# Patient Record
Sex: MALE | Employment: OTHER | URBAN - NONMETROPOLITAN AREA
[De-identification: names, ages, dates, MRNs, and addresses within clinical notes are randomized per-mention and may not be internally consistent; named-entity substitution may affect disease eponyms.]

---

## 2020-11-06 ENCOUNTER — DOCTOR'S OFFICE (OUTPATIENT)
Dept: URBAN - NONMETROPOLITAN AREA CLINIC 1 | Facility: CLINIC | Age: 65
Setting detail: OPHTHALMOLOGY
End: 2020-11-06
Payer: COMMERCIAL

## 2020-11-06 DIAGNOSIS — E11.3393: ICD-10-CM

## 2020-11-06 DIAGNOSIS — H43.813: ICD-10-CM

## 2020-11-06 DIAGNOSIS — Z79.84: ICD-10-CM

## 2020-11-06 PROCEDURE — 92134 CPTRZ OPH DX IMG PST SGM RTA: CPT | Performed by: OPHTHALMOLOGY

## 2020-11-06 PROCEDURE — 92201 OPSCPY EXTND RTA DRAW UNI/BI: CPT | Performed by: OPHTHALMOLOGY

## 2020-11-06 PROCEDURE — 99204 OFFICE O/P NEW MOD 45 MIN: CPT | Performed by: OPHTHALMOLOGY

## 2020-11-06 ASSESSMENT — VISUAL ACUITY
OS_BCVA: 20/20
OD_BCVA: 20/20-1

## 2020-11-06 ASSESSMENT — CONFRONTATIONAL VISUAL FIELD TEST (CVF)
OS_FINDINGS: FULL
OD_FINDINGS: FULL

## 2020-11-12 ENCOUNTER — DOCTOR'S OFFICE (OUTPATIENT)
Dept: URBAN - NONMETROPOLITAN AREA CLINIC 1 | Facility: CLINIC | Age: 65
Setting detail: OPHTHALMOLOGY
End: 2020-11-12
Payer: COMMERCIAL

## 2020-11-12 DIAGNOSIS — H43.813: ICD-10-CM

## 2020-11-12 DIAGNOSIS — E11.3293: ICD-10-CM

## 2020-11-12 DIAGNOSIS — Z79.84: ICD-10-CM

## 2020-11-12 PROCEDURE — 92014 COMPRE OPH EXAM EST PT 1/>: CPT | Performed by: OPHTHALMOLOGY

## 2020-11-12 PROCEDURE — 92235 FLUORESCEIN ANGRPH MLTIFRAME: CPT | Performed by: OPHTHALMOLOGY

## 2020-11-12 PROCEDURE — 92250 FUNDUS PHOTOGRAPHY W/I&R: CPT | Performed by: OPHTHALMOLOGY

## 2020-11-12 ASSESSMENT — CONFRONTATIONAL VISUAL FIELD TEST (CVF)
OD_FINDINGS: FULL
OS_FINDINGS: FULL

## 2020-11-12 ASSESSMENT — VISUAL ACUITY
OD_BCVA: 20/25-1
OS_BCVA: 20/40

## 2022-01-11 ENCOUNTER — APPOINTMENT (EMERGENCY)
Dept: CT IMAGING | Facility: HOSPITAL | Age: 67
DRG: 062 | End: 2022-01-11
Payer: MEDICARE

## 2022-01-11 ENCOUNTER — HOSPITAL ENCOUNTER (INPATIENT)
Facility: HOSPITAL | Age: 67
LOS: 4 days | Discharge: HOME | DRG: 062 | End: 2022-01-15
Attending: EMERGENCY MEDICINE | Admitting: INTERNAL MEDICINE
Payer: MEDICARE

## 2022-01-11 DIAGNOSIS — N17.9 ACUTE KIDNEY INJURY SUPERIMPOSED ON CHRONIC KIDNEY DISEASE (HCC): ICD-10-CM

## 2022-01-11 DIAGNOSIS — I10 HTN (HYPERTENSION): ICD-10-CM

## 2022-01-11 DIAGNOSIS — N18.9 ACUTE KIDNEY INJURY SUPERIMPOSED ON CHRONIC KIDNEY DISEASE (HCC): ICD-10-CM

## 2022-01-11 DIAGNOSIS — N18.9 CKD (CHRONIC KIDNEY DISEASE): ICD-10-CM

## 2022-01-11 DIAGNOSIS — I63.9 CVA (CEREBRAL VASCULAR ACCIDENT) (HCC): Primary | ICD-10-CM

## 2022-01-11 LAB
2HR DELTA HS TROPONIN: 3 NG/L
ANION GAP SERPL CALCULATED.3IONS-SCNC: 10 MMOL/L (ref 4–13)
APTT PPP: 32 SECONDS (ref 23–37)
BUN SERPL-MCNC: 52 MG/DL (ref 5–25)
CALCIUM SERPL-MCNC: 9.3 MG/DL (ref 8.3–10.1)
CARDIAC TROPONIN I PNL SERPL HS: 19 NG/L
CARDIAC TROPONIN I PNL SERPL HS: 22 NG/L
CHLORIDE SERPL-SCNC: 103 MMOL/L (ref 100–108)
CO2 SERPL-SCNC: 24 MMOL/L (ref 21–32)
CREAT SERPL-MCNC: 3.96 MG/DL (ref 0.6–1.3)
ERYTHROCYTE [DISTWIDTH] IN BLOOD BY AUTOMATED COUNT: 14.3 % (ref 11.6–15.1)
FLUAV RNA RESP QL NAA+PROBE: NEGATIVE
FLUBV RNA RESP QL NAA+PROBE: NEGATIVE
GFR SERPL CREATININE-BSD FRML MDRD: 14 ML/MIN/1.73SQ M
GLUCOSE SERPL-MCNC: 121 MG/DL (ref 65–140)
GLUCOSE SERPL-MCNC: 154 MG/DL (ref 65–140)
HCT VFR BLD AUTO: 40.9 % (ref 36.5–49.3)
HGB BLD-MCNC: 13.3 G/DL (ref 12–17)
INR PPP: 0.95 (ref 0.84–1.19)
MCH RBC QN AUTO: 33.3 PG (ref 26.8–34.3)
MCHC RBC AUTO-ENTMCNC: 32.5 G/DL (ref 31.4–37.4)
MCV RBC AUTO: 103 FL (ref 82–98)
PLATELET # BLD AUTO: 108 THOUSANDS/UL (ref 149–390)
PMV BLD AUTO: 10 FL (ref 8.9–12.7)
POTASSIUM SERPL-SCNC: 3.8 MMOL/L (ref 3.5–5.3)
PROTHROMBIN TIME: 12.6 SECONDS (ref 11.6–14.5)
RBC # BLD AUTO: 3.99 MILLION/UL (ref 3.88–5.62)
RSV RNA RESP QL NAA+PROBE: NEGATIVE
SARS-COV-2 RNA RESP QL NAA+PROBE: NEGATIVE
SODIUM SERPL-SCNC: 137 MMOL/L (ref 136–145)
WBC # BLD AUTO: 5.89 THOUSAND/UL (ref 4.31–10.16)

## 2022-01-11 PROCEDURE — 0241U HB NFCT DS VIR RESP RNA 4 TRGT: CPT | Performed by: EMERGENCY MEDICINE

## 2022-01-11 PROCEDURE — 85027 COMPLETE CBC AUTOMATED: CPT | Performed by: EMERGENCY MEDICINE

## 2022-01-11 PROCEDURE — 80048 BASIC METABOLIC PNL TOTAL CA: CPT | Performed by: EMERGENCY MEDICINE

## 2022-01-11 PROCEDURE — 99223 1ST HOSP IP/OBS HIGH 75: CPT | Performed by: INTERNAL MEDICINE

## 2022-01-11 PROCEDURE — 82948 REAGENT STRIP/BLOOD GLUCOSE: CPT

## 2022-01-11 PROCEDURE — 3E03317 INTRODUCTION OF OTHER THROMBOLYTIC INTO PERIPHERAL VEIN, PERCUTANEOUS APPROACH: ICD-10-PCS | Performed by: FAMILY MEDICINE

## 2022-01-11 PROCEDURE — NC001 PR NO CHARGE: Performed by: INTERNAL MEDICINE

## 2022-01-11 PROCEDURE — 70496 CT ANGIOGRAPHY HEAD: CPT

## 2022-01-11 PROCEDURE — 85730 THROMBOPLASTIN TIME PARTIAL: CPT | Performed by: EMERGENCY MEDICINE

## 2022-01-11 PROCEDURE — 99291 CRITICAL CARE FIRST HOUR: CPT | Performed by: EMERGENCY MEDICINE

## 2022-01-11 PROCEDURE — 70498 CT ANGIOGRAPHY NECK: CPT

## 2022-01-11 PROCEDURE — 99291 CRITICAL CARE FIRST HOUR: CPT

## 2022-01-11 PROCEDURE — 84484 ASSAY OF TROPONIN QUANT: CPT | Performed by: PHYSICIAN ASSISTANT

## 2022-01-11 PROCEDURE — 93005 ELECTROCARDIOGRAM TRACING: CPT

## 2022-01-11 PROCEDURE — 36415 COLL VENOUS BLD VENIPUNCTURE: CPT | Performed by: EMERGENCY MEDICINE

## 2022-01-11 PROCEDURE — 1123F ACP DISCUSS/DSCN MKR DOCD: CPT | Performed by: PSYCHIATRY & NEUROLOGY

## 2022-01-11 PROCEDURE — 96361 HYDRATE IV INFUSION ADD-ON: CPT

## 2022-01-11 PROCEDURE — 96374 THER/PROPH/DIAG INJ IV PUSH: CPT

## 2022-01-11 PROCEDURE — 85610 PROTHROMBIN TIME: CPT | Performed by: EMERGENCY MEDICINE

## 2022-01-11 PROCEDURE — 84484 ASSAY OF TROPONIN QUANT: CPT | Performed by: EMERGENCY MEDICINE

## 2022-01-11 PROCEDURE — G0508 CRIT CARE TELEHEA CONSULT 60: HCPCS | Performed by: PSYCHIATRY & NEUROLOGY

## 2022-01-11 RX ORDER — FUROSEMIDE 80 MG
120 TABLET ORAL 2 TIMES DAILY
COMMUNITY

## 2022-01-11 RX ORDER — ALLOPURINOL 300 MG/1
TABLET ORAL
COMMUNITY

## 2022-01-11 RX ORDER — LEVOTHYROXINE SODIUM 0.12 MG/1
125 TABLET ORAL
Status: DISCONTINUED | OUTPATIENT
Start: 2022-01-12 | End: 2022-01-15 | Stop reason: HOSPADM

## 2022-01-11 RX ORDER — ATORVASTATIN CALCIUM 40 MG/1
40 TABLET, FILM COATED ORAL
Status: DISCONTINUED | OUTPATIENT
Start: 2022-01-12 | End: 2022-01-15 | Stop reason: HOSPADM

## 2022-01-11 RX ORDER — INSULIN GLARGINE 100 [IU]/ML
20 INJECTION, SOLUTION SUBCUTANEOUS
Status: DISCONTINUED | OUTPATIENT
Start: 2022-01-11 | End: 2022-01-14

## 2022-01-11 RX ORDER — MULTIVITAMIN WITH IRON
2400 TABLET ORAL DAILY
COMMUNITY

## 2022-01-11 RX ORDER — ASPIRIN 81 MG/1
81 TABLET ORAL
COMMUNITY

## 2022-01-11 RX ORDER — METOPROLOL SUCCINATE 25 MG/1
25 TABLET, EXTENDED RELEASE ORAL DAILY
Status: DISCONTINUED | OUTPATIENT
Start: 2022-01-12 | End: 2022-01-15 | Stop reason: HOSPADM

## 2022-01-11 RX ORDER — METOPROLOL SUCCINATE 25 MG/1
TABLET, EXTENDED RELEASE ORAL
COMMUNITY

## 2022-01-11 RX ORDER — ASPIRIN 81 MG/1
81 TABLET, CHEWABLE ORAL DAILY
Status: DISCONTINUED | OUTPATIENT
Start: 2022-01-12 | End: 2022-01-15 | Stop reason: HOSPADM

## 2022-01-11 RX ORDER — ATORVASTATIN CALCIUM 40 MG/1
40 TABLET, FILM COATED ORAL EVERY EVENING
Status: DISCONTINUED | OUTPATIENT
Start: 2022-01-11 | End: 2022-01-11

## 2022-01-11 RX ORDER — LABETALOL 20 MG/4 ML (5 MG/ML) INTRAVENOUS SYRINGE
10 EVERY 4 HOURS PRN
Status: DISCONTINUED | OUTPATIENT
Start: 2022-01-11 | End: 2022-01-13

## 2022-01-11 RX ORDER — TAMSULOSIN HYDROCHLORIDE 0.4 MG/1
0.4 CAPSULE ORAL
COMMUNITY

## 2022-01-11 RX ORDER — SODIUM CHLORIDE, SODIUM GLUCONATE, SODIUM ACETATE, POTASSIUM CHLORIDE, MAGNESIUM CHLORIDE, SODIUM PHOSPHATE, DIBASIC, AND POTASSIUM PHOSPHATE .53; .5; .37; .037; .03; .012; .00082 G/100ML; G/100ML; G/100ML; G/100ML; G/100ML; G/100ML; G/100ML
75 INJECTION, SOLUTION INTRAVENOUS CONTINUOUS
Status: ACTIVE | OUTPATIENT
Start: 2022-01-11 | End: 2022-01-12

## 2022-01-11 RX ORDER — LEVOTHYROXINE SODIUM 0.12 MG/1
TABLET ORAL
COMMUNITY

## 2022-01-11 RX ORDER — ATORVASTATIN CALCIUM 10 MG/1
TABLET, FILM COATED ORAL
COMMUNITY
End: 2022-01-15 | Stop reason: HOSPADM

## 2022-01-11 RX ORDER — LABETALOL 20 MG/4 ML (5 MG/ML) INTRAVENOUS SYRINGE
10 ONCE
Status: COMPLETED | OUTPATIENT
Start: 2022-01-11 | End: 2022-01-11

## 2022-01-11 RX ORDER — ASCORBIC ACID 500 MG
500 TABLET ORAL DAILY
COMMUNITY

## 2022-01-11 RX ORDER — HYDROCODONE BITARTRATE AND ACETAMINOPHEN 10; 325 MG/1; MG/1
TABLET ORAL
COMMUNITY
Start: 2021-09-13

## 2022-01-11 RX ORDER — INSULIN GLARGINE 100 [IU]/ML
20 INJECTION, SOLUTION SUBCUTANEOUS
COMMUNITY

## 2022-01-11 RX ADMIN — ALTEPLASE 81 MG: KIT at 17:44

## 2022-01-11 RX ADMIN — SODIUM CHLORIDE 50 ML: 9 INJECTION, SOLUTION INTRAVENOUS at 18:35

## 2022-01-11 RX ADMIN — IOHEXOL 100 ML: 350 INJECTION, SOLUTION INTRAVENOUS at 17:18

## 2022-01-11 RX ADMIN — INSULIN GLARGINE 20 UNITS: 100 INJECTION, SOLUTION SUBCUTANEOUS at 22:35

## 2022-01-11 RX ADMIN — LABETALOL HYDROCHLORIDE 10 MG: 5 INJECTION, SOLUTION INTRAVENOUS at 20:18

## 2022-01-11 RX ADMIN — LABETALOL HYDROCHLORIDE 10 MG: 5 INJECTION, SOLUTION INTRAVENOUS at 17:54

## 2022-01-11 RX ADMIN — SODIUM CHLORIDE, SODIUM GLUCONATE, SODIUM ACETATE, POTASSIUM CHLORIDE, MAGNESIUM CHLORIDE, SODIUM PHOSPHATE, DIBASIC, AND POTASSIUM PHOSPHATE 75 ML/HR: .53; .5; .37; .037; .03; .012; .00082 INJECTION, SOLUTION INTRAVENOUS at 21:04

## 2022-01-11 RX ADMIN — SODIUM CHLORIDE 1000 ML: 0.9 INJECTION, SOLUTION INTRAVENOUS at 17:20

## 2022-01-11 RX ADMIN — LABETALOL HYDROCHLORIDE 10 MG: 5 INJECTION, SOLUTION INTRAVENOUS at 17:30

## 2022-01-11 NOTE — TELEMEDICINE
TeleConsultation - Stroke   Marcell Parnell 74 y o  male MRN: [id number]  Unit/Bed#: ED 12 Encounter: [id number]    REQUIRED DOCUMENTATION:     1  This service was provided via Telemedicine  2  Provider located at [facility]  3  TeleMed provider: Cornelia Garduno DO   4  Identify all parties in room with patient during tele consult:  Daughter  11  Patient was then informed that this was a Telemedicine visit and that the exam was being conducted confidentially over secure lines  My office door was closed  No one else was in the room  Patient acknowledged consent and understanding of privacy and security of the Telemedicine visit, and gave us permission to have the assistant stay in the room in order to assist with the history and to conduct the exam   I informed the patient that I have reviewed their record in Epic and presented the opportunity for them to ask any questions regarding the visit today  The patient agreed to participate  Assessment/Plan   Assessment:  Acute onset of L hemiparesis, dysarthria, and facial droop concerning for acute ischemic stroke  TPA Decision: After a discussion of risks, benefits and alternatives (including best medical therapy excluding thrombolysis) reviewing inclusion and exclusion criteria the decision was made to proceed with thrombolytic therapy  Verbal consent was obtained from  the patient    Consent was obtained by Dr Branden Quinones and Dr Juancarlos Castelan (myself)    Plan:   -admit to ICU  -close neurochecks, post-tPA protocol; notify with changes  -HCT reviewed - unremarkable  -CTA reviewed - no significant vascular abnormality  -obtain MRI brain  -obtain TTE  -telemetry  -check lipid panel/A1c  -maintain BP <180/105 post-tPA  -avoid antithrombotics until 24 hr scan; if MRI is done around 24 hrs, no need for repeat CT as well  -if 24 hr scan is unremarkable, ok to initiate ASA  -PT/OT evals  -will follow results; call with questions    Marcell Parnell will need follow up in in 6 weeks with neurovascular attending or advance practitioner  He will not require outpatient neurological testing  Reason for Consult / Principal Problem: stroke alert  Hx and PE limited by: N/A  Patient last known well: 4pm 1/11  Stroke alert called: 5:02pm 1/11  Neurology time of arrival: immediate, by phone    HPI: Juliette Coburn is a 74 y o  male with HTN, HLD, CHF, and CKD who presents with acute onset of L-sided hemiparesis along with L facial droop and dysarthria  LKN at 4pm today with onset witnessed by daughter, then EMS was called and pt was brought to the hospital  NIHSS 5 on exam for L facial droop, LUE and LLE drift, and moderate dysarthria  Pt states this occurred suddenly and had felt fine otherwise  Denies prior similar symptoms  Has never had a stroke before  Not on any antithrombotics  No prior hemorrhages and no recent trauma/surgery  HCT and CTA were personally reviewed - no evidence of acute ischemia/hemorrhage and no significant abnormality on angiogram     Discussed tPA with ED and patient  No known contraindications to administration  Patient was in agreement with tPA, however BP was uncontrolled and required a dose of Labetalol  tPA was subsequently given at 5:43pm     Consult to Neurology  Consult performed by: Joel Sheets DO  Consult ordered by: Unique Anderson DO        Review of Systems   Neurological: Positive for facial asymmetry, speech difficulty and weakness  All other systems reviewed and are negative  Historical Information   No past medical history on file  No past surgical history on file  Social History   Social History     Substance and Sexual Activity   Alcohol Use Not on file     Social History     Substance and Sexual Activity   Drug Use Not on file     No existing history information found  No existing history information found    Social History     Tobacco Use   Smoking Status Not on file   Smokeless Tobacco Not on file     Family History: No family history on file     Review of previous medical records was completed  Meds/Allergies   all current active meds have been reviewed, current meds:   Current Facility-Administered Medications   Medication Dose Route Frequency    alteplase (ACTIVASE) infusion 81 mg  81 mg Intravenous Once    Followed by    sodium chloride 0 9 % bolus 50 mL  50 mL Intravenous Once    sodium chloride 0 9 % bolus 1,000 mL  1,000 mL Intravenous Once    and PTA meds:   None       Not on File    Objective   Vitals: There were no vitals taken for this visit  ,There is no height or weight on file to calculate BMI  No intake or output data in the 24 hours ending 01/11/22 1722    Invasive Devices: Invasive Devices  Report    Peripheral Intravenous Line            Peripheral IV 01/11/22 Left Antecubital <1 day                Physical Exam  Constitutional:       Appearance: He is well-developed  HENT:      Head: Normocephalic and atraumatic  Eyes:      Extraocular Movements: EOM normal       Conjunctiva/sclera: Conjunctivae normal    Pulmonary:      Effort: No respiratory distress  Abdominal:      General: There is no distension  Musculoskeletal:         General: No swelling  Cervical back: No rigidity  Skin:     Coloration: Skin is not jaundiced  Neurological:      Mental Status: He is alert and oriented to person, place, and time  Coordination: Finger-Nose-Finger Test normal        Neurologic Exam     Mental Status   Oriented to person, place, and time  Attention: normal    Level of consciousness: alert  Able to name object  Able to repeat  Normal comprehension  Moderately dysarthric     Cranial Nerves     CN II   Visual fields full to confrontation  CN III, IV, VI   Extraocular motions are normal      CN V   Facial sensation intact  CN VIII   Hearing: intact    CN XII   Tongue deviation: none  L lower facial droop     Motor Exam     Strength   Strength 5/5 except as noted   Drift in both LUE and LLE but able to sustain antigravity without hitting bed  Diminished handgrip in LUE     Sensory Exam   Light touch normal      Gait, Coordination, and Reflexes     Coordination   Finger to nose coordination: normal      NIHSS:  1a Level of Consciousness: 0 = Alert   1b  LOC Questions: 0 = Answers both correctly   1c  LOC Commands: 0 = Obeys both correctly   2  Best Gaze: 0 = Normal   3  Visual: 0 = No visual field loss   4  Facial Palsy: 2=Partial paralysis (total or near total paralysis of the lower face)   5a  Motor Right Arm: 0=No drift, limb holds 90 (or 45) degrees for full 10 seconds   5b  Motor Left Arm: 1=Drift, limb holds 90 (or 45) degrees but drifts down before full 10 seconds: does not hit bed   6a  Motor Right Le=No drift, limb holds 90 (or 45) degrees for full 10 seconds   6b  Motor Left Le=Drift, limb holds 90 (or 45) degrees but drifts down before full 10 seconds: does not hit bed   7  Limb Ataxia:  0=Absent   8  Sensory: 0=Normal; no sensory loss   9  Best Language:  0=No aphasia, normal   10  Dysarthria: 1=Mild to moderate, patient slurs at least some words and at worst, can be understood with some difficulty   11   Extinction and Inattention (formerly Neglect): 0=No abnormality   Total Score: 5     Time NIHSS was completed: 5:30pm    Modified [place] Score:  0 (No baseline symptoms/disability)    Lab Results:   CBC:   Results from last 7 days   Lab Units 22  1718   WBC Thousand/uL 5 89   RBC Million/uL 3 99   HEMOGLOBIN g/dL 13 3   HEMATOCRIT % 40 9   MCV fL 103*   PLATELETS Thousands/uL 108*   , BMP/CMP:   Results from last 7 days   Lab Units 22  1718   SODIUM mmol/L 137   POTASSIUM mmol/L 3 8   CHLORIDE mmol/L 103   CO2 mmol/L 24   BUN mg/dL 52*   CREATININE mg/dL 3 96*   CALCIUM mg/dL 9 3   EGFR ml/min/1 73sq m 14   , Coagulation:   Results from last 7 days   Lab Units 22  1718   INR  0 95     Imaging Studies: I have personally reviewed pertinent films in PACS with a Radiologist   EKG, Pathology, and Other Studies: I have personally reviewed pertinent reports  VTE Prophylaxis: Sequential compression device (Venodyne)     Counseling / Coordination of Care  Total Critical Care time spent 45 minutes excluding procedures, teaching and family updates

## 2022-01-11 NOTE — ED PROVIDER NOTES
History  Chief Complaint   Patient presents with   [address]     Patient is a 68-year-old male presents emergency department due to stroke-like symptoms last known well at 4:00 p m  Today call daughter due to slurred speech noticed also left facial droop and left arm weakness  No prior strokes no anticoagulants  Patient has been having issues with uncontrolled hypertension and medication changes recently  History provided by:  Patient and EMS personnel  CVA/TIA-like Symptoms  Presenting symptoms: language symptoms and weakness    Presenting symptoms: no headaches    Date/time of last known well:  1/11/2022 4:00 PM  Onset quality:  Sudden  Duration:  2 hours  Timing:  Constant  Progression:  Unchanged  Similar to previous episodes: no    Associated symptoms: no chest pain, no dizziness, no fever, no nausea and no vomiting        None       No past medical history on file  No past surgical history on file  No family history on file  I have reviewed and agree with the history as documented  No existing history information found  No existing history information found  Social History     Tobacco Use    Smoking status: Not on file    Smokeless tobacco: Not on file   Substance Use Topics    Alcohol use: Not on file    Drug use: Not on file       Review of Systems   Constitutional: Negative for activity change, appetite change, chills, fatigue and fever  HENT: Negative for congestion, ear pain, rhinorrhea and sore throat  Eyes: Negative for discharge, redness and visual disturbance  Respiratory: Negative for cough, chest tightness, shortness of breath and wheezing  Cardiovascular: Negative for chest pain and palpitations  Gastrointestinal: Negative for abdominal pain, constipation, diarrhea, nausea and vomiting  Endocrine: Negative for polydipsia and polyuria  Genitourinary: Negative for difficulty urinating, dysuria, frequency, hematuria and urgency     Musculoskeletal: Negative for arthralgias and myalgias  Skin: Negative for color change, pallor and rash  Neurological: Positive for facial asymmetry, speech difficulty and weakness  Negative for dizziness, light-headedness, numbness and headaches  Hematological: Negative for adenopathy  Does not bruise/bleed easily  All other systems reviewed and are negative  Physical Exam  Physical Exam  Vitals and nursing note reviewed  Constitutional:       Appearance: He is well-developed  HENT:      Head: Normocephalic and atraumatic  Right Ear: External ear normal       Left Ear: External ear normal       Nose: Nose normal    Eyes:      Conjunctiva/sclera: Conjunctivae normal       Pupils: Pupils are equal, round, and reactive to light  Cardiovascular:      Rate and Rhythm: Normal rate and regular rhythm  Heart sounds: Normal heart sounds  Pulmonary:      Effort: Pulmonary effort is normal  No respiratory distress  Breath sounds: Normal breath sounds  No wheezing or rales  Chest:      Chest wall: No tenderness  Abdominal:      General: Bowel sounds are normal  There is no distension  Palpations: Abdomen is soft  Tenderness: There is no abdominal tenderness  There is no guarding  Musculoskeletal:         General: Normal range of motion  Cervical back: Normal range of motion and neck supple  Skin:     General: Skin is warm and dry  Neurological:      Mental Status: He is alert and oriented to person, place, and time  Cranial Nerves: Dysarthria and facial asymmetry present  No cranial nerve deficit  Sensory: No sensory deficit  Motor: Weakness present           Vital Signs  ED Triage Vitals   Temperature Pulse Respirations Blood Pressure SpO2   01/11/22 1715 01/11/22 1715 01/11/22 1715 01/11/22 1715 01/11/22 1715   98 1 °F (36 7 °C) 80 18 142/90 97 %      Temp Source Heart Rate Source Patient Position - Orthostatic VS BP Location FiO2 (%)   01/11/22 1715 01/11/22 1715 01/11/22 1715 01/11/22 1742 --   Temporal Monitor Lying Right arm       Pain Score       01/11/22 1715       4           Vitals:    01/11/22 1742 01/11/22 1745 01/11/22 1758 01/11/22 1813   BP: (!) 181/89 (!) 175/79 167/79 168/76   Pulse: 75 75 71 70   Patient Position - Orthostatic VS: Lying Lying Lying Lying         Visual Acuity  Visual Acuity      Most Recent Value   L Pupil Size (mm) 3   R Pupil Size (mm) 3          ED Medications  Medications   alteplase (ACTIVASE) IV bolus 9 mg (9 mg Intravenous Given 1/11/22 1743)     Followed by   alteplase (ACTIVASE) infusion 81 mg (81 mg Intravenous New Bag 1/11/22 1744)     Followed by   sodium chloride 0 9 % bolus 50 mL (has no administration in time range)   sodium chloride 0 9 % bolus 1,000 mL (1,000 mL Intravenous New Bag 1/11/22 1720)   iohexol (OMNIPAQUE) 350 MG/ML injection (SINGLE-DOSE) 100 mL (100 mL Intravenous Given 1/11/22 1718)   Labetalol HCl (NORMODYNE) injection 10 mg (10 mg Intravenous Given 1/11/22 1730)   Labetalol HCl (NORMODYNE) injection 10 mg (10 mg Intravenous Given 1/11/22 1754)       Diagnostic Studies  Results Reviewed     Procedure Component Value Units Date/Time    COVID/FLU/RSV - 2 hour TAT [[id number]]  (Normal) Collected: 01/11/22 1732    Lab Status: Final result Specimen: Nares from Nasopharyngeal Swab Updated: 01/11/22 1823     SARS-CoV-2 Negative     INFLUENZA A PCR Negative     INFLUENZA B PCR Negative     RSV PCR Negative    Narrative:      FOR PEDIATRIC PATIENTS - copy/paste COVID Guidelines URL to browser: [email or web address]  [email or web address]    SARS-CoV-2 assay is a Nucleic Acid Amplification assay intended for the  qualitative detection of nucleic acid from SARS-CoV-2 in nasopharyngeal  swabs  Results are for the presumptive identification of SARS-CoV-2 RNA      Positive results are indicative of infection with SARS-CoV-2, the virus  causing COVID-19, but do not rule out bacterial infection or co-infection  with other viruses  Laboratories within the [place] and its  territories are required to report all positive results to the appropriate  public health authorities  Negative results do not preclude SARS-CoV-2  infection and should not be used as the sole basis for treatment or other  patient management decisions  Negative results must be combined with  clinical observations, patient history, and epidemiological information  This test has not been FDA cleared or approved  This test has been authorized by FDA under an Emergency Use Authorization  (EUA)  This test is only authorized for the duration of time the  declaration that circumstances exist justifying the authorization of the  emergency use of an in vitro diagnostic tests for detection of SARS-CoV-2  virus and/or diagnosis of COVID-19 infection under section 564(b)(1) of  the Act, 21 U  S C  122RRU-1(J)(1), unless the authorization is terminated  or revoked sooner  The test has been validated but independent review by FDA  and CLIA is pending  Test performed using [employer] GeneXpert: This RT-PCR assay targets N2,  a region unique to SARS-CoV-2  A conserved region in the E-gene was chosen  for pan-Sarbecovirus detection which includes SARS-CoV-2      HS Troponin I 2hr [[id number]]     Lab Status: No result Specimen: Blood     HS Troponin 0hr (reflex protocol) [[id number]]  (Normal) Collected: 01/11/22 1718    Lab Status: Final result Specimen: Blood from Arm, Right Updated: 01/11/22 1750     hs TnI 0hr 19 ng/L     Basic metabolic panel [[id number]]  (Abnormal) Collected: 01/11/22 1718    Lab Status: Final result Specimen: Blood from Arm, Right Updated: 01/11/22 1737     Sodium 137 mmol/L      Potassium 3 8 mmol/L      Chloride 103 mmol/L      CO2 24 mmol/L      ANION GAP 10 mmol/L      BUN 52 mg/dL      Creatinine 3 96 mg/dL      Glucose 154 mg/dL      Calcium 9 3 mg/dL      eGFR 14 ml/min/1 73sq m     Narrative:      National Kidney Disease Foundation guidelines for Chronic Kidney Disease (CKD):     Stage 1 with normal or high GFR (GFR > 90 mL/min/1 73 square meters)    Stage 2 Mild CKD (GFR = 60-89 mL/min/1 73 square meters)    Stage 3A Moderate CKD (GFR = 45-59 mL/min/1 73 square meters)    Stage 3B Moderate CKD (GFR = 30-44 mL/min/1 73 square meters)    Stage 4 Severe CKD (GFR = 15-29 mL/min/1 73 square meters)    Stage 5 End Stage CKD (GFR <15 mL/min/1 73 square meters)  Note: GFR calculation is accurate only with a steady state creatinine    Protime-INR [[id number]]  (Normal) Collected: 01/11/22 1718    Lab Status: Final result Specimen: Blood from Arm, Right Updated: 01/11/22 1735     Protime 12 6 seconds      INR 0 95    APTT [[id number]]  (Normal) Collected: 01/11/22 1718    Lab Status: Final result Specimen: Blood from Arm, Right Updated: 01/11/22 1735     PTT 32 seconds     CBC and Platelet [[id number]]  (Abnormal) Collected: 01/11/22 1718    Lab Status: Final result Specimen: Blood from Arm, Right Updated: 01/11/22 1724     WBC 5 89 Thousand/uL      RBC 3 99 Million/uL      Hemoglobin 13 3 g/dL      Hematocrit 40 9 %       fL      MCH 33 3 pg      MCHC 32 5 g/dL      RDW 14 3 %      Platelets 568 Thousands/uL      MPV 10 0 fL                  CTA stroke alert (head/neck)   Final Result by Travis Houston MD (01/11 1730)      No evidence of hemodynamic significant stenosis, aneurysm or dissection  I personally discussed this study with neurologist on 1/11/2022 at 5:21 PM                         Workstation performed: [id number]         CT stroke alert brain   Final Result by Travis Houston MD (01/11 1722)      No evidence of acute intracranial hemorrhage  Tiny low densities identified in the bilateral caudate age-indeterminate ischemic events               I personally discussed this study with neurologist on 1/11/2022 at 5:21 PM                 Workstation performed: [id number]                    Procedures  CriticalCare Time  Performed by: Merrill Robbins DO  Authorized by: Merrill Robbins DO     Critical care provider statement:     Critical care time (minutes):  60    Critical care was necessary to treat or prevent imminent or life-threatening deterioration of the following conditions:  CNS failure or compromise    Critical care was time spent personally by me on the following activities:  Blood draw for specimens, development of treatment plan with patient or surrogate, discussions with consultants, evaluation of patient's response to treatment, examination of patient, ordering and performing treatments and interventions, ordering and review of laboratory studies, re-evaluation of patient's condition and ordering and review of radiographic studies    ECG 12 Lead Documentation Only    Date/Time: 1/11/2022 6:09 PM  Performed by: Merrill Robbins DO  Authorized by: Merrill Robbins DO     ECG reviewed by me, the ED Provider: yes    Patient location:  ED  Previous ECG:     Comparison to cardiac monitor: Yes    Quality:     Tracing quality:  Limited by artifact  Rate:     ECG rate:  72    ECG rate assessment: normal    Rhythm:     Rhythm: sinus rhythm    QRS:     QRS axis:  Left    QRS intervals: Wide  Conduction:     Conduction: abnormal      Abnormal conduction: complete RBBB    ST segments:     ST segments:  Non-specific  T waves:     T waves: non-specific               ED Course  ED Course as of 01/11/22 1823   Tue Jan 11, 2022   1708 Spoke with Dr Gladis Lizarraga stroke neurologist on-call reviewed case and findings in the emergency department  1337 Tele neuro consultation completed by Dr Isabel Parker no contraindications present blood pressure requires lowering at this time with IV labetalol but is nearly in range 187/86 currently when below 185/110 will be able to administer tPA patient consented and agreement no contraindications  Risks and benefits of tPA were discussed and reviewed with patient and family       1746 BP is now 181 over 89 after IV labetalol proceeding with tPA administration at this time  [address] with Dr Uche Robb critical care attending on-call reviewed case and findings in the emergency department and management thus far Neurology recommendations she accepts for admission to critical care service                      Stroke Assessment     Row Name 01/11/22 1707             NIH Stroke Scale    Interval Baseline      Level of Consciousness (1a ) 0      LOC Questions (1b ) 0      LOC Commands (1c ) 0      Best Gaze (2 ) 0      Visual (3 ) 0      Facial Palsy (4 ) 1      Motor Arm, Left (5a ) 1      Motor Arm, Right (5b ) 0      Motor Leg, Left (6a ) 1      Motor Leg, Right (6b ) 0      Limb Ataxia (7 ) 0      Sensory (8 ) 0      Best Language (9 ) 0      Dysarthria (10 ) 1      Extinction and Inattention (11 ) (Formerly Neglect) 0      Total 4                                          MDM  Number of Diagnoses or Management Options  CKD (chronic kidney disease): new and requires workup  CVA (cerebral vascular accident) ([facility] [address] ): new and requires workup  HTN (hypertension): new and requires workup     Amount and/or Complexity of Data Reviewed  Clinical lab tests: ordered and reviewed  Tests in the radiology section of CPT®: reviewed and ordered  Tests in the medicine section of CPT®: ordered and reviewed  Decide to obtain previous medical records or to obtain history from someone other than the patient: yes  Review and summarize past medical records: yes  Independent visualization of images, tracings, or specimens: yes    Risk of Complications, Morbidity, and/or Mortality  Presenting problems: moderate  Diagnostic procedures: moderate  Management options: moderate    Patient Progress  Patient progress: stable      Disposition  Final diagnoses:   CVA (cerebral vascular accident) ([facility] [address] )   CKD (chronic kidney disease)   HTN (hypertension)     Time reflects when diagnosis was documented in both MDM as applicable and the Disposition within this note     Time User Action Codes Description Comment    1/11/2022  4:58 PM Ulysess Gross Add [I63 9] CVA (cerebral vascular accident) ([facility] [address] )     1/11/2022  5:39 PM Ulysess Gross Add [N18 9] CKD (chronic kidney disease)     1/11/2022  5:39 PM Ulysess Gross Add [I10] HTN (hypertension)       ED Disposition     ED Disposition Condition Date/Time Comment    Admit Stable Tue Jan 11, 2022  5:30 PM Case was discussed with Dr Cory Justin and the patient's admission status was agreed to be Admission Status: inpatient status to the service of Dr Cory Justin  Follow-up Information    None         Patient's Medications    No medications on file       No discharge procedures on file      PDMP Review     None          ED Provider  Electronically Signed by             Eze Hernandez DO  01/11/22 2142

## 2022-01-12 ENCOUNTER — APPOINTMENT (INPATIENT)
Dept: CT IMAGING | Facility: HOSPITAL | Age: 67
DRG: 062 | End: 2022-01-12
Payer: MEDICARE

## 2022-01-12 ENCOUNTER — APPOINTMENT (INPATIENT)
Dept: NON INVASIVE DIAGNOSTICS | Facility: HOSPITAL | Age: 67
DRG: 062 | End: 2022-01-12
Payer: MEDICARE

## 2022-01-12 ENCOUNTER — APPOINTMENT (INPATIENT)
Dept: MRI IMAGING | Facility: HOSPITAL | Age: 67
DRG: 062 | End: 2022-01-12
Payer: MEDICARE

## 2022-01-12 PROBLEM — I50.32 CHRONIC DIASTOLIC HF (HEART FAILURE) (HCC): Status: ACTIVE | Noted: 2022-01-12

## 2022-01-12 PROBLEM — I89.0 LYMPHEDEMA: Status: ACTIVE | Noted: 2022-01-12

## 2022-01-12 PROBLEM — E03.9 HYPOTHYROIDISM: Status: ACTIVE | Noted: 2022-01-12

## 2022-01-12 PROBLEM — N18.9 ACUTE KIDNEY INJURY SUPERIMPOSED ON CHRONIC KIDNEY DISEASE (HCC): Status: ACTIVE | Noted: 2022-01-12

## 2022-01-12 PROBLEM — E78.5 HLD (HYPERLIPIDEMIA): Status: ACTIVE | Noted: 2022-01-12

## 2022-01-12 PROBLEM — N17.9 ACUTE KIDNEY INJURY SUPERIMPOSED ON CHRONIC KIDNEY DISEASE (HCC): Status: ACTIVE | Noted: 2022-01-12

## 2022-01-12 PROBLEM — I10 HTN (HYPERTENSION): Status: ACTIVE | Noted: 2022-01-12

## 2022-01-12 PROBLEM — I63.9 CVA (CEREBRAL VASCULAR ACCIDENT) (HCC): Status: ACTIVE | Noted: 2022-01-12

## 2022-01-12 LAB
4HR DELTA HS TROPONIN: 0 NG/L
ALBUMIN SERPL BCP-MCNC: 3.3 G/DL (ref 3.5–5)
ALP SERPL-CCNC: 106 U/L (ref 46–116)
ALT SERPL W P-5'-P-CCNC: 22 U/L (ref 12–78)
ANION GAP SERPL CALCULATED.3IONS-SCNC: 7 MMOL/L (ref 4–13)
AORTIC ROOT: 2.8 CM
APICAL FOUR CHAMBER EJECTION FRACTION: 62 %
AST SERPL W P-5'-P-CCNC: 40 U/L (ref 5–45)
ATRIAL RATE: 72 BPM
BASOPHILS # BLD AUTO: 0.04 THOUSANDS/ΜL (ref 0–0.1)
BASOPHILS NFR BLD AUTO: 1 % (ref 0–1)
BILIRUB SERPL-MCNC: 0.52 MG/DL (ref 0.2–1)
BUN SERPL-MCNC: 51 MG/DL (ref 5–25)
CALCIUM ALBUM COR SERPL-MCNC: 9.6 MG/DL (ref 8.3–10.1)
CALCIUM SERPL-MCNC: 9 MG/DL (ref 8.3–10.1)
CARDIAC TROPONIN I PNL SERPL HS: 19 NG/L
CHLORIDE SERPL-SCNC: 101 MMOL/L (ref 100–108)
CHOLEST SERPL-MCNC: 140 MG/DL
CO2 SERPL-SCNC: 26 MMOL/L (ref 21–32)
CREAT SERPL-MCNC: 3.81 MG/DL (ref 0.6–1.3)
E WAVE DECELERATION TIME: 310 MS
EOSINOPHIL # BLD AUTO: 0.16 THOUSAND/ΜL (ref 0–0.61)
EOSINOPHIL NFR BLD AUTO: 3 % (ref 0–6)
ERYTHROCYTE [DISTWIDTH] IN BLOOD BY AUTOMATED COUNT: 14.5 % (ref 11.6–15.1)
FRACTIONAL SHORTENING: 50 % (ref 28–44)
GFR SERPL CREATININE-BSD FRML MDRD: 15 ML/MIN/1.73SQ M
GLUCOSE SERPL-MCNC: 102 MG/DL (ref 65–140)
GLUCOSE SERPL-MCNC: 111 MG/DL (ref 65–140)
GLUCOSE SERPL-MCNC: 148 MG/DL (ref 65–140)
GLUCOSE SERPL-MCNC: 157 MG/DL (ref 65–140)
GLUCOSE SERPL-MCNC: 93 MG/DL (ref 65–140)
GLUCOSE SERPL-MCNC: 97 MG/DL (ref 65–140)
HCT VFR BLD AUTO: 38.7 % (ref 36.5–49.3)
HDLC SERPL-MCNC: 30 MG/DL
HGB BLD-MCNC: 12.8 G/DL (ref 12–17)
IMM GRANULOCYTES # BLD AUTO: 0.02 THOUSAND/UL (ref 0–0.2)
IMM GRANULOCYTES NFR BLD AUTO: 0 % (ref 0–2)
INTERVENTRICULAR SEPTUM IN DIASTOLE (PARASTERNAL SHORT AXIS VIEW): 1.1 CM
LAAS-AP4: 21.7 CM2
LDLC SERPL CALC-MCNC: 75 MG/DL (ref 0–100)
LEFT ATRIUM SIZE: 3.9 CM
LEFT INTERNAL DIMENSION IN SYSTOLE: 2.4 CM (ref 2.1–4)
LEFT VENTRICULAR INTERNAL DIMENSION IN DIASTOLE: 4.8 CM (ref 13.07–19.48)
LEFT VENTRICULAR POSTERIOR WALL IN END DIASTOLE: 1.2 CM
LEFT VENTRICULAR STROKE VOLUME: 86 ML
LYMPHOCYTES # BLD AUTO: 1.02 THOUSANDS/ΜL (ref 0.6–4.47)
LYMPHOCYTES NFR BLD AUTO: 16 % (ref 14–44)
MAGNESIUM SERPL-MCNC: 2.1 MG/DL (ref 1.6–2.6)
MCH RBC QN AUTO: 33.4 PG (ref 26.8–34.3)
MCHC RBC AUTO-ENTMCNC: 33.1 G/DL (ref 31.4–37.4)
MCV RBC AUTO: 101 FL (ref 82–98)
MONOCYTES # BLD AUTO: 0.35 THOUSAND/ΜL (ref 0.17–1.22)
MONOCYTES NFR BLD AUTO: 6 % (ref 4–12)
MV PEAK A VEL: 0.88 M/S
MV PEAK E VEL: 48 CM/S
MV STENOSIS PRESSURE HALF TIME: 0 MS
NEUTROPHILS # BLD AUTO: 4.81 THOUSANDS/ΜL (ref 1.85–7.62)
NEUTS SEG NFR BLD AUTO: 74 % (ref 43–75)
NRBC BLD AUTO-RTO: 0 /100 WBCS
P AXIS: 19 DEGREES
PHOSPHATE SERPL-MCNC: 5 MG/DL (ref 2.3–4.1)
PLATELET # BLD AUTO: 102 THOUSANDS/UL (ref 149–390)
PMV BLD AUTO: 9.1 FL (ref 8.9–12.7)
POTASSIUM SERPL-SCNC: 5 MMOL/L (ref 3.5–5.3)
PR INTERVAL: 146 MS
PROT SERPL-MCNC: 7.3 G/DL (ref 6.4–8.2)
QRS AXIS: -19 DEGREES
QRSD INTERVAL: 160 MS
QT INTERVAL: 424 MS
QTC INTERVAL: 464 MS
RBC # BLD AUTO: 3.83 MILLION/UL (ref 3.88–5.62)
RIGHT ATRIAL 2D VOLUME: 50 ML
RIGHT ATRIUM AREA SYSTOLE A4C: 18.3 CM2
SL CV PED ECHO LEFT VENTRICLE DIASTOLIC VOLUME (MOD BIPLANE) 2D: 107 ML
SL CV PED ECHO LEFT VENTRICLE SYSTOLIC VOLUME (MOD BIPLANE) 2D: 21 ML
SODIUM SERPL-SCNC: 134 MMOL/L (ref 136–145)
T WAVE AXIS: 11 DEGREES
TRIGL SERPL-MCNC: 177 MG/DL
VENTRICULAR RATE: 72 BPM
WBC # BLD AUTO: 6.4 THOUSAND/UL (ref 4.31–10.16)
Z-SCORE OF LEFT VENTRICULAR DIMENSION IN END SYSTOLE: -12.01

## 2022-01-12 PROCEDURE — NC001 PR NO CHARGE: Performed by: PSYCHIATRY & NEUROLOGY

## 2022-01-12 PROCEDURE — 80061 LIPID PANEL: CPT | Performed by: PHYSICIAN ASSISTANT

## 2022-01-12 PROCEDURE — 83036 HEMOGLOBIN GLYCOSYLATED A1C: CPT | Performed by: PHYSICIAN ASSISTANT

## 2022-01-12 PROCEDURE — G1004 CDSM NDSC: HCPCS

## 2022-01-12 PROCEDURE — 85025 COMPLETE CBC W/AUTO DIFF WBC: CPT | Performed by: PHYSICIAN ASSISTANT

## 2022-01-12 PROCEDURE — 70450 CT HEAD/BRAIN W/O DYE: CPT

## 2022-01-12 PROCEDURE — 80053 COMPREHEN METABOLIC PANEL: CPT | Performed by: PHYSICIAN ASSISTANT

## 2022-01-12 PROCEDURE — 70551 MRI BRAIN STEM W/O DYE: CPT

## 2022-01-12 PROCEDURE — 84100 ASSAY OF PHOSPHORUS: CPT | Performed by: PHYSICIAN ASSISTANT

## 2022-01-12 PROCEDURE — 82948 REAGENT STRIP/BLOOD GLUCOSE: CPT

## 2022-01-12 PROCEDURE — 97166 OT EVAL MOD COMPLEX 45 MIN: CPT

## 2022-01-12 PROCEDURE — 99233 SBSQ HOSP IP/OBS HIGH 50: CPT | Performed by: INTERNAL MEDICINE

## 2022-01-12 PROCEDURE — 83735 ASSAY OF MAGNESIUM: CPT | Performed by: PHYSICIAN ASSISTANT

## 2022-01-12 PROCEDURE — B24BZZ4 ULTRASONOGRAPHY OF HEART WITH AORTA, TRANSESOPHAGEAL: ICD-10-PCS | Performed by: INTERNAL MEDICINE

## 2022-01-12 PROCEDURE — 93306 TTE W/DOPPLER COMPLETE: CPT

## 2022-01-12 PROCEDURE — 97162 PT EVAL MOD COMPLEX 30 MIN: CPT

## 2022-01-12 RX ORDER — HEPARIN SODIUM 5000 [USP'U]/ML
5000 INJECTION, SOLUTION INTRAVENOUS; SUBCUTANEOUS EVERY 8 HOURS SCHEDULED
Status: DISCONTINUED | OUTPATIENT
Start: 2022-01-12 | End: 2022-01-15 | Stop reason: HOSPADM

## 2022-01-12 RX ORDER — OXYCODONE HYDROCHLORIDE 10 MG/1
10 TABLET ORAL EVERY 6 HOURS PRN
Status: DISCONTINUED | OUTPATIENT
Start: 2022-01-12 | End: 2022-01-15 | Stop reason: HOSPADM

## 2022-01-12 RX ORDER — ACETAMINOPHEN 325 MG/1
650 TABLET ORAL EVERY 6 HOURS PRN
Status: DISCONTINUED | OUTPATIENT
Start: 2022-01-12 | End: 2022-01-15 | Stop reason: HOSPADM

## 2022-01-12 RX ADMIN — LEVOTHYROXINE SODIUM 125 MCG: 125 TABLET ORAL at 05:17

## 2022-01-12 RX ADMIN — INSULIN GLARGINE 20 UNITS: 100 INJECTION, SOLUTION SUBCUTANEOUS at 21:49

## 2022-01-12 RX ADMIN — OXYCODONE HYDROCHLORIDE 10 MG: 10 TABLET ORAL at 17:19

## 2022-01-12 RX ADMIN — METOPROLOL SUCCINATE 25 MG: 25 TABLET, EXTENDED RELEASE ORAL at 09:13

## 2022-01-12 RX ADMIN — FUROSEMIDE 120 MG: 80 TABLET ORAL at 17:19

## 2022-01-12 RX ADMIN — OXYCODONE HYDROCHLORIDE 10 MG: 10 TABLET ORAL at 01:21

## 2022-01-12 RX ADMIN — FUROSEMIDE 120 MG: 80 TABLET ORAL at 09:13

## 2022-01-12 RX ADMIN — HEPARIN SODIUM 5000 UNITS: 5000 INJECTION INTRAVENOUS; SUBCUTANEOUS at 21:49

## 2022-01-12 RX ADMIN — ATORVASTATIN CALCIUM 40 MG: 40 TABLET, FILM COATED ORAL at 17:19

## 2022-01-12 RX ADMIN — ASPIRIN 81 MG CHEWABLE TABLET 81 MG: 81 TABLET CHEWABLE at 18:52

## 2022-01-12 RX ADMIN — OXYCODONE HYDROCHLORIDE 10 MG: 10 TABLET ORAL at 09:16

## 2022-01-12 NOTE — CONSULTS
Consultation - Cardiology   Nate Johnson 74 y o  male MRN: [id number]  Unit/Bed#: -01 Encounter: [id number]    Assessment/Plan     Assessment:  CVA sp TPA with punctate foci of acute cerebral and R cerebral infarction  HTN  Smoking  Hx CAD  DOC on CKD  Lymphedema  HLD  CHF- euvolemic    Plan:  1  Will plan for BETTE next available  Will need to be NPO pending when this is available  2  If negative may consider do loop records as an OP    History of Present Illness   Physician Requesting Consult: Latoya Jackson MD  Reason for Consult / Principal Problem: BETTE for CVA  HPI: Nate Johnson is a 74y o  year old male with PMH of CKD, smoking, lymphedema, HTN, HLD, CAD with MI x 2 (1999, 2011 in reading) presented to the hospital for concerns with left sided hemiparesis and facial droop  He did receive TPA  He had a regular echocardiogram that did not show any abnormalities but technically difficult study  Consultation was placed to proceed with BETTE given MRI finding of small punctate cerebral infarctions  Neurology believes this is likely embolic   Pt reports no issues with swallowing and never had esophageal issues  He does report polyps on vocal cords but this was more than 40 years ago  He never had gastric bypass  He reports no recent chest pain  He does not regularly see a cardiologist  His EKG is non ischemic  Troponins negative on admission  Inpatient consult to Cardiology  Consult performed by: Immanuel Simon PA-C  Consult ordered by: GAGANDEEP Saunders          Review of Systems   Constitutional: Negative for chills, fatigue and unexpected weight change  Respiratory: Negative for chest tightness and shortness of breath  Cardiovascular: Negative for chest pain, palpitations and leg swelling  Skin: Negative for color change, pallor and rash  Neurological: Positive for speech difficulty and weakness  Negative for dizziness and light-headedness     Psychiatric/Behavioral: Negative for agitation, behavioral problems and confusion  Historical Information   History reviewed  No pertinent past medical history  History reviewed  No pertinent surgical history  Social History     Substance and Sexual Activity   Alcohol Use Not Currently     Social History     Substance and Sexual Activity   Drug Use Not on file     E-Cigarette/Vaping     E-Cigarette/Vaping Substances     Social History     Tobacco Use   Smoking Status Current Every Day Smoker    Packs/day: 0 50    Types: Cigarettes   Smokeless Tobacco Never Used     Family History: non-contributory    Meds/Allergies   all current active meds have been reviewed  Allergies   Allergen Reactions    Aspartame - Food Allergy Diarrhea    Hydralazine Tachycardia    Lisinopril Other (See Comments)     Other reaction(s): dry heaves      Morphine Swelling     Other reaction(s): lump at injection  Hand swelling at IV injection site      Ciprofloxacin Rash       Objective   Vitals: Blood pressure (!) 175/79, pulse 73, temperature (!) 97 °F (36 1 °C), temperature source Temporal, resp  rate (!) 25, height 6' (1 829 m), weight 124 kg (273 lb), SpO2 90 %  Orthostatic Blood Pressures      Most Recent Value   Blood Pressure 175/79 filed at 01/12/2022 0913   Patient Position - Orthostatic VS Lying filed at 01/12/2022 0724            Intake/Output Summary (Last 24 hours) at 1/12/2022 1335  Last data filed at 1/12/2022 1101  Gross per 24 hour   Intake 1396 ml   Output 1425 ml   Net -29 ml       Invasive Devices  Report    Peripheral Intravenous Line            Peripheral IV 01/11/22 Right Hand 1 day    Peripheral IV 01/11/22 Left Antecubital <1 day                Physical Exam  Constitutional:       Appearance: He is obese  He is not ill-appearing  Cardiovascular:      Rate and Rhythm: Normal rate  Heart sounds: No murmur heard  No gallop  Pulmonary:      Effort: Pulmonary effort is normal       Breath sounds: Normal breath sounds     Abdominal: Palpations: Abdomen is soft  Musculoskeletal:      Cervical back: Neck supple  Skin:     General: Skin is warm and dry  Capillary Refill: Capillary refill takes less than 2 seconds  Neurological:      Mental Status: He is alert and oriented to person, place, and time  Lab Results:   I have personally reviewed pertinent lab results  CBC with diff:   Results from last 7 days   Lab Units 01/11/22  1718   WBC Thousand/uL 5 89   RBC Million/uL 3 99   HEMOGLOBIN g/dL 13 3   HEMATOCRIT % 40 9   MCV fL 103*   MCH pg 33 3   MCHC g/dL 32 5   RDW % 14 3   MPV fL 10 0   PLATELETS Thousands/uL 108*     CMP:   Results from last 7 days   Lab Units 01/11/22  1718   SODIUM mmol/L 137   POTASSIUM mmol/L 3 8   CHLORIDE mmol/L 103   CO2 mmol/L 24   BUN mg/dL 52*   CREATININE mg/dL 3 96*   CALCIUM mg/dL 9 3   EGFR ml/min/1 73sq m 14     BNP:   Results from last 7 days   Lab Units 01/11/22  1718   POTASSIUM mmol/L 3 8   CHLORIDE mmol/L 103   CO2 mmol/L 24   BUN mg/dL 52*   CREATININE mg/dL 3 96*   CALCIUM mg/dL 9 3   EGFR ml/min/1 73sq m 14     Coags:   Results from last 7 days   Lab Units 01/11/22  1718   PTT seconds 32   INR  0 95     TSH:     Magnesium:     Lipid Profile:     Imaging: I have personally reviewed pertinent reports  TTE 1/12/21:      Left Ventricle: Technically difficult study, echo enhancement was not utilized  Mild concentric left ventricular hypertrophy with normal LV cavity size and systolic function  Estimated ejection fraction 62%  No obvious segmental wall motion abnormality identified  Full diastolic assessment was not performed    Tricuspid Valve: Grossly normal-appearing tricuspid leaflets with trace insufficiency  No adequate spectral envelope for estimating pulmonary pressure    Pulmonary pressure is not able to be estimated    Other findings as below      Compared to study performed June 9678, LV systolic function was described as normal with ejection fraction 60%  The right ventricle was described as having normal size and function  That study was also described as technically difficult          EKG: no events on telemetry

## 2022-01-12 NOTE — SPEECH THERAPY NOTE
Consult received and chart reviewed  Per chart review and discussion with RN, pt passed RN dysphagia assessment and is tolerating regular diet with thin liquids with no s/s of aspiration  Speech/Language deficits also denied by bedside RN  Formal speech/swallow evaluation does not appear to be indicated at this time  If new concerns arise, please reconsult  Thank you

## 2022-01-12 NOTE — QUICK NOTE
Updated daughter at the bedside on past 24 hour events and current treatment plan  All questions answered to her satisfaction

## 2022-01-12 NOTE — ASSESSMENT & PLAN NOTE
Lab Results   Component Value Date    EGFR 14 01/11/2022    CREATININE 3 96 (H) 01/11/2022      Baseline Cr: Appears to be 3 1 to 3 6 but widely variable   Admission Cr: 3 96   Etiology: Unclear   Avoid nephrotoxic medications/hypotension     Gentle fluids given IV contrast load

## 2022-01-12 NOTE — PROGRESS NOTES
[address]  Progress Note - Dong Locket [birth year], 74 y o  male MRN: [id number]  Unit/Bed#: -01 Encounter: [id number]  Primary Care Provider: Varghese Loo DO   Date and time admitted to hospital: 1/11/2022  5:11 PM    * CVA (cerebral vascular accident) [facility])  Assessment & Plan  · Initial [address] 1/11  · [place]  · Repeat overnight 1/12 given headache: NAICA  · Initial CTA 1/11:  · No LVO  · TPA administered at 1743 on 1/11  · MRI pending  · Neurology following  · NIHSS on arrival 5  · Left hemiparesis, dysarthria, facial droop,   · Current Neuro Exam:  · Mild left facial droop with decreased sensation lower facial nerve region on left, subtle 4/5 weakness on left upper and lower extremity that can oppose gravity fully but slightly decreased  strength, overshoots finger to nose test  · Continue stroke pathway  · MRI pending  · Antiplatelet Plan start ASA if serial CTH normal  · TTE pending  · High Intensity Statin  · Lipid Panel Pending  · Blood pressure goal:  · Permissive hypertension with goal SBP < 180  · Continue telemetry  · PT/OT/Speech following  · PMR consult  · Hemoglobin A1C: pending    Acute kidney injury superimposed on chronic kidney disease stage 4 ([address] )  Assessment & Plan  Lab Results   Component Value Date    EGFR 14 01/11/2022    CREATININE 3 96 (H) 01/11/2022      Baseline Cr: Appears to be 3 1 to 3 6 but widely variable   Admission Cr: 3 96   Etiology: Unclear   Avoid nephrotoxic medications/hypotension     Gentle fluids given IV contrast load      Hypothyroidism  Assessment & Plan  · Continue synthroid, check TSH    Lymphedema  Assessment & Plan  · Continue lasix    HLD (hyperlipidemia)  Assessment & Plan  · Increase statin intensity     Chronic diastolic HF (heart failure) (HCC)  Assessment & Plan  Wt Readings from Last 3 Encounters:   01/11/22 127 kg (280 lb 10 3 oz)       · Does not appear in exacerbation  · Takes lasix 120 mg BID at home  · Will hold tonight, restart tomorrow AM  · Daily weights      HTN (hypertension)  Assessment & Plan  · Resume home Toprolol XL today      ----------------------------------------------------------------------------------------  HPI  Per my H&P from 1/11:  "74 y o  male who presented with acute onset left sided facial droop, left sided hemiparesis, and dysarthria  He was a pre -hospital stroke alert  He has a PMH of CKD IV, chronic lymphedema of B/l LE, hypothyroidism, hypertension, and HLD  He arrived approx 1 hour following event to [facility]  His NIHSS was 5  He underwent CTH stroke alert that was unremarkable and CTA that did not show LVO  He was consented and underwent tPA administration at 1743  He had almost immediate improvement with residual subtle left sided hemiparesis and facial droop  He is referred to ICU s/p tPA  "    24hr events:   · Persistent headache, sent for [facility] without any acute intracranial pathology      Patient appropriate for transfer out of the ICU today?: Patient does not meet criteria for referral to the ICU Follow-Up Clinic; referral has not been made     Disposition: Transfer to Med Surg with Telemetry  once interval CTH negative  Code Status: Level 1 - Full Code  ---------------------------------------------------------------------------------------  SUBJECTIVE  Slept well overnight; Still with persistent frontal headache now in the center of his head but slightly improved    Review of Systems  Review of systems was reviewed and negative unless stated above in HPI/24-hour events   ---------------------------------------------------------------------------------------  OBJECTIVE    Vitals   Vitals:    01/12/22 0130 01/12/22 0200 01/12/22 0230 01/12/22 0300   BP: 166/76 146/65 158/68 145/65   BP Location: Right arm Right arm Right arm Right arm   Pulse: 69 69 69 69   Resp: 17 17 16 17   Temp:    98 2 °F (36 8 °C)   TempSrc:    Temporal   SpO2: 93% (!) 87% (!) 87% 91%   Weight:       Height: Temp (24hrs), Av °F (36 7 °C), Min:97 2 °F (36 2 °C), Max:98 2 °F (36 8 °C)  Current: Temperature: 98 2 °F (36 8 °C)          Respiratory:  SpO2: SpO2: 91 %, SpO2 Activity: SpO2 Activity: At Rest, SpO2 Device: O2 Device: None (Room air)       Invasive/non-invasive ventilation settings   Respiratory  Report   Lab Data (Last 4 hours)    None         O2/Vent Data (Last 4 hours)    None                Physical Exam  Vitals and nursing note reviewed  Constitutional:       General: He is not in acute distress  Appearance: He is well-developed and well-groomed  HENT:      Head: Normocephalic and atraumatic  Nose: Nose normal       Mouth/Throat:      Mouth: Mucous membranes are moist    Eyes:      Extraocular Movements: Extraocular movements intact  Conjunctiva/sclera: Conjunctivae normal       Pupils: Pupils are equal, round, and reactive to light  Cardiovascular:      Rate and Rhythm: Normal rate and regular rhythm  Pulses: Normal pulses  Heart sounds: No murmur heard  Pulmonary:      Effort: Pulmonary effort is normal       Breath sounds: Normal breath sounds  No wheezing or rales  Abdominal:      General: Abdomen is flat  Bowel sounds are normal  There is no distension  Tenderness: There is no abdominal tenderness  Genitourinary:     Comments: Deferred  Musculoskeletal:         General: Normal range of motion  Cervical back: Neck supple  Right lower leg: Edema (Chronic lymphedema) present  Left lower leg: Edema (Chronic lymphedema) present  Skin:     General: Skin is warm  Capillary Refill: Capillary refill takes less than 2 seconds  Neurological:      Mental Status: He is alert and oriented to person, place, and time  Comments: Left sided facial droop with decreased sensation in mandibular facial nerve on left side   Improved left upp and lower extremity strength now 5/5; Mildly ataxic with left finger with finger to nose Laboratory and Diagnostics:  Results from last 7 days   Lab Units 01/11/22  1718   WBC Thousand/uL 5 89   HEMOGLOBIN g/dL 13 3   HEMATOCRIT % 40 9   PLATELETS Thousands/uL 108*     Results from last 7 days   Lab Units 01/11/22  1718   SODIUM mmol/L 137   POTASSIUM mmol/L 3 8   CHLORIDE mmol/L 103   CO2 mmol/L 24   ANION GAP mmol/L 10   BUN mg/dL 52*   CREATININE mg/dL 3 96*   CALCIUM mg/dL 9 3   GLUCOSE RANDOM mg/dL 154*          Results from last 7 days   Lab Units 01/11/22  1718   INR  0 95   PTT seconds 32              ABG:    VBG:          Micro        EKG:   Imaging: I have personally reviewed pertinent films in PACS    Intake and Output  I/O       01/10 0701 01/11 0700 01/11 0701 01/12 0700    I V  (mL/kg)  221 3 (1 7)    IV Piggyback  81    Total Intake(mL/kg)  302 3 (2 4)    Urine (mL/kg/hr)  750    Total Output  750    Net  -447 8                Height and Weights   Height: 6' (182 9 cm)  IBW (Ideal Body Weight): 77 6 kg  Body mass index is 38 06 kg/m²  Weight (last 2 days)     Date/Time Weight    01/1953 127 (280 65)    01/11/22 1726 133 (293 43)    01/11/22 1715 133 (293 43)            Nutrition       Diet Orders   (From admission, onward)             Start     Ordered    01/11/22 1902  Diet NPO  Diet effective now        References:    Nutrtion Support Algorithm Enteral vs  Parenteral   Question Answer Comment   Diet Type NPO    RD to adjust diet per protocol?  Yes        01/11/22 1901                  Active Medications  Scheduled Meds:  Current Facility-Administered Medications   Medication Dose Route Frequency Provider Last Rate    acetaminophen  650 mg Oral Q6H PRN Baldemar Hubbard PA-C      aspirin  81 mg Oral Daily [place], [place]      atorvastatin  40 mg Oral Daily With Tiffany Moritz [place], [place]      insulin glargine  20 Units Subcutaneous HS [place], [place]      insulin lispro  1-6 Units Subcutaneous Q6H [facility] [place], [place]      Labetalol HCl  10 mg Intravenous Q4H PRN Yvon Polanco PA-C      levothyroxine  125 mcg Oral Early Morning Yvon Polanco [place]      metoprolol succinate  25 mg Oral Daily Yvon Polanco [place]      multi-electrolyte  75 mL/hr Intravenous Continuous Yvon Polanco [place] 75 mL/hr (01/11/22 2104)    oxyCODONE  10 mg Oral Q6H PRN Yvon Polanco PA-C       Continuous Infusions:  multi-electrolyte, 75 mL/hr, Last Rate: 75 mL/hr (01/11/22 2104)      PRN Meds:   acetaminophen, 650 mg, Q6H PRN  Labetalol HCl, 10 mg, Q4H PRN  oxyCODONE, 10 mg, Q6H PRN        Invasive Devices Review  Invasive Devices  Report    Peripheral Intravenous Line            Peripheral IV 01/11/22 Right Hand 1 day    Peripheral IV 01/11/22 Left Antecubital <1 day                Rationale for remaining devices: N/A  ---------------------------------------------------------------------------------------  Advance Directive and Living Will:      Power of :    POLST:    ---------------------------------------------------------------------------------------  Care Time Delivered:   No Critical Care time spent       Yvon Polanco PA-C      Portions of the record may have been created with voice recognition software  Occasional wrong word or "sound a like" substitutions may have occurred due to the inherent limitations of voice recognition software    Read the chart carefully and recognize, using context, where substitutions have occurred

## 2022-01-12 NOTE — H&P
[address] 1955, 74 y o  male MRN: [id number]  Unit/Bed#: -01 Encounter: [id number]  Primary Care Provider: Sheila Chase DO   Date and time admitted to hospital: 1/11/2022  5:11 PM    No new Assessment & Plan notes have been filed under this hospital service since the last note was generated  Service: Critical Care/ICU    -------------------------------------------------------------------------------------------------------------  Chief Complaint: Left facial droop, left sided weakness, dysarthria    History of Present Illness   HX and PE limited by: Daniela  Maria Teresa Schrader is a 74 y o  male who presented with acute onset left sided facial droop, left sided hemiparesis, and dysarthria  He was a pre -hospital stroke alert  He has a PMH of CKD IV, chronic lymphedema of B/l LE, hypothyroidism, hypertension, and HLD  He arrived approx 1 hour following event to [address] [place] [address]  His NIHSS was 5  He underwent CTH stroke alert that was unremarkable and CTA that did not show LVO  He was consented and underwent tPA administration at 1743  He had almost immediate improvement with residual subtle left sided hemiparesis and facial droop  He is referred to ICU s/p tPA  History obtained from chart review and the patient  His wife was present at bedside  -------------------------------------------------------------------------------------------------------------  Dispo: Admit to Critical Care     Code Status: Level 1 - Full Code  --------------------------------------------------------------------------------------------------------------  Review of Systems    A 12-point, complete review of systems was reviewed and negative except as stated above     Physical Exam  Vitals and nursing note reviewed  Constitutional:       General: He is not in acute distress  Appearance: He is well-developed and well-groomed  HENT:      Head: Normocephalic and atraumatic        Nose: Nose normal       Mouth/Throat:      Mouth: Mucous membranes are moist    Eyes:      Extraocular Movements: EOM normal       Conjunctiva/sclera: Conjunctivae normal       Pupils: Pupils are equal, round, and reactive to light  Cardiovascular:      Rate and Rhythm: Normal rate and regular rhythm  Pulses: Normal pulses  Heart sounds: No murmur heard  Pulmonary:      Effort: Pulmonary effort is normal       Breath sounds: Normal breath sounds  No wheezing  Abdominal:      General: Abdomen is flat  Bowel sounds are normal  There is no distension  Palpations: Abdomen is soft  Genitourinary:     Comments: Deferred  Musculoskeletal:         General: Normal range of motion  Cervical back: Neck supple  Right lower leg: Edema (Chronic lymphedema) present  Left lower leg: Edema (Chronic lymphedema ) present  Skin:     General: Skin is warm and dry  Capillary Refill: Capillary refill takes less than 2 seconds  Neurological:      Mental Status: He is alert and oriented to person, place, and time  Coordination: Finger-Nose-Finger Test abnormal (overshoots with left upper extremity)  Psychiatric:         Speech: Speech normal      Neurologic Exam     Mental Status   Oriented to person, place, and time  Registration: recalls 3 of 3 objects  Follows 3 step commands  Attention: normal  Concentration: normal    Speech: speech is normal   Level of consciousness: alert  Knowledge: good  Cranial Nerves     CN II   Visual fields full to confrontation  CN III, IV, VI   Pupils are equal, round, and reactive to light    Extraocular motions are normal      CN V   Right facial sensation deficit: none  Left facial sensation deficit: mandible    CN VII   Right facial weakness: none  Left facial weakness: central    CN VIII   CN VIII normal      CN IX, X   CN IX normal    CN X normal      CN XI   CN XI normal      CN XII   CN XII normal      Motor Exam     Strength   Strength 5/5 except as noted  Left biceps: 4/5  Left triceps: 4/5  Left wrist flexion: 4/5  Left wrist extension: 4/5  Left interossei: 3/5    Sensory Exam   Light touch normal      Gait, Coordination, and Reflexes     Coordination   Finger to nose coordination: abnormal (overshoots with left upper extremity)    --------------------------------------------------------------------------------------------------------------  Vitals:   Vitals:    01/11/22 2200 01/11/22 2230 01/11/22 2300 01/11/22 2330   BP: 157/70 162/74 155/74 156/72   BP Location: Right arm Right arm Right arm Right arm   Pulse: 67 67 70 69   Resp: 18 17 16 19   Temp:    98 1 °F (36 7 °C)   TempSrc:    Temporal   SpO2: 95% 93% 90% 94%   Weight:       Height:         Temp  Min: 97 2 °F (36 2 °C)  Max: 98 1 °F (36 7 °C)  IBW (Ideal Body Weight): 77 6 kg  Height: 6' (182 9 cm)  Body mass index is 38 06 kg/m²  Laboratory and Diagnostics:  Results from last 7 days   Lab Units 01/11/22  1718   WBC Thousand/uL 5 89   HEMOGLOBIN g/dL 13 3   HEMATOCRIT % 40 9   PLATELETS Thousands/uL 108*     Results from last 7 days   Lab Units 01/11/22  1718   SODIUM mmol/L 137   POTASSIUM mmol/L 3 8   CHLORIDE mmol/L 103   CO2 mmol/L 24   ANION GAP mmol/L 10   BUN mg/dL 52*   CREATININE mg/dL 3 96*   CALCIUM mg/dL 9 3   GLUCOSE RANDOM mg/dL 154*          Results from last 7 days   Lab Units 01/11/22  1718   INR  0 95   PTT seconds 32              ABG:    VBG:          Micro:        EKG:   Imaging: I have personally reviewed pertinent films in PACS      Historical Information   History reviewed  No pertinent past medical history  History reviewed  No pertinent surgical history    Social History   Social History     Substance and Sexual Activity   Alcohol Use Not Currently     Social History     Substance and Sexual Activity   Drug Use Not on file     Social History     Tobacco Use   Smoking Status Current Every Day Smoker    Packs/day: 0 50    Types: Cigarettes   Smokeless Tobacco Never Used     Exercise History: N/A  Family History:   History reviewed  No pertinent family history  I have reviewed this patient's family history and commented on sigificant items within the HPI      Medications:  Current Facility-Administered Medications   Medication Dose Route Frequency    [START ON 1/12/2022] aspirin chewable tablet 81 mg  81 mg Oral Daily    [START ON 1/12/2022] atorvastatin (LIPITOR) tablet 40 mg  40 mg Oral Daily With Dinner    insulin glargine (LANTUS) subcutaneous injection 20 Units 0 2 mL  20 Units Subcutaneous HS    insulin lispro (HumaLOG) 100 units/mL subcutaneous injection 1-6 Units  1-6 Units Subcutaneous Q6H [address]    Labetalol HCl (NORMODYNE) injection 10 mg  10 mg Intravenous Q4H PRN    [START ON 1/12/2022] levothyroxine tablet 125 mcg  125 mcg Oral Early Morning    [START ON 1/12/2022] metoprolol succinate (TOPROL-XL) 24 hr tablet 25 mg  25 mg Oral Daily    multi-electrolyte (PLASMALYTE-A/ISOLYTE-S PH 7 4) IV solution  75 mL/hr Intravenous Continuous     Home medications:  Prior to Admission Medications   Prescriptions Last Dose Informant Patient Reported? Taking?    HYDROcodone-acetaminophen (NORCO)  mg per tablet   Yes Yes   Sig: Take by mouth   allopurinol (ZYLOPRIM) 300 mg tablet   Yes No   Sig: Take by mouth   ascorbic acid (VITAMIN C) 500 MG tablet   Yes No   Sig: Take 500 mg by mouth daily   aspirin (ECOTRIN LOW STRENGTH) 81 mg EC tablet   Yes No   Sig: Take 81 mg by mouth   atorvastatin (LIPITOR) 10 mg tablet   Yes No   Sig: Take by mouth   co-enzyme Q-10 30 MG capsule   Yes No   Sig: Take 100 mg by mouth   furosemide (LASIX) 80 mg tablet   Yes Yes   Sig: Take 120 mg by mouth 2 (two) times a day   insulin glargine (LANTUS) 100 units/mL subcutaneous injection   Yes No   Sig: Inject 20 Units under the skin   insulin lispro (HumaLOG) 100 units/mL injection   Yes No   levothyroxine 125 mcg tablet   Yes No   Sig: Take by mouth   metoprolol succinate (TOPROL-XL) 25 mg 24 hr tablet   Yes No   Sig: Take by mouth   tamsulosin (Flomax) 0 4 mg   Yes Yes   Sig: Take 0 4 mg by mouth daily with dinner   vitamin A 2400 MCG (8000 UT) capsule   Yes No   Sig: Take 2,400 Units by mouth daily   vitamin E, tocopherol, 200 units capsule   Yes No   Sig: Take 200 Units by mouth daily      Facility-Administered Medications: None     Allergies: Allergies   Allergen Reactions    Aspartame - Food Allergy Diarrhea    Hydralazine Tachycardia    Lisinopril Other (See Comments)     Other reaction(s): dry heaves      Morphine Swelling     Other reaction(s): lump at injection  Hand swelling at IV injection site      Ciprofloxacin Rash       ------------------------------------------------------------------------------------------------------------  Advance Directive and Living Will:      Power of :    POLST:    ------------------------------------------------------------------------------------------------------------  Anticipated Length of Stay is > 2 midnights    Care Time Delivered:   No Critical Care time spent       Archana Gramajo PA-C        Portions of the record may have been created with voice recognition software  Occasional wrong word or "sound a like" substitutions may have occurred due to the inherent limitations of voice recognition software    Read the chart carefully and recognize, using context, where substitutions have occurred

## 2022-01-12 NOTE — ASSESSMENT & PLAN NOTE
Wt Readings from Last 3 Encounters:   01/11/22 127 kg (280 lb 10 3 oz)       · Does not appear in exacerbation  · Takes lasix 120 mg BID at home  · Will hold tonight, restart tomorrow AM  · Daily weights

## 2022-01-12 NOTE — PLAN OF CARE
Problem: PHYSICAL THERAPY ADULT  Goal: Performs mobility at highest level of function for planned discharge setting  See evaluation for individualized goals  Description: Treatment/Interventions: Functional transfer training,LE strengthening/ROM,Elevations,Therapeutic exercise,Endurance training,Patient/family training,Equipment eval/education,Bed mobility,Gait training,Compensatory technique education,Spoke to nursing,OT  Equipment Recommended:  (dana, pt has)       See flowsheet documentation for full assessment, interventions and recommendations  Note: Prognosis: Good  Problem List: Decreased strength,Decreased endurance,Impaired balance,Decreased mobility,Decreased safety awareness,Impaired judgement,Obesity  Assessment: Pt is a 74 y o  male seen for PT evaluation s/p admission to [address] [place] [address] on 1/11/2022 with CVA (cerebral vascular accident) ([facility], [place] [address] )  Order placed for PT services  Upon evaluation: Pt is presenting with impaired functional mobility due to decreased strength, decreased endurance, impaired balance, gait deviations, decreased safety awareness, impaired judgment, fall risk and LE edema requiring supervision assistance for transfers and stand by to close supervision assistance for ambulation with spc  Pt's clinical presentation is currently evolving given the functional mobility deficits above, especially weakness, edema of extremities, decreased endurance, gait deviations, decreased activity tolerance, decreased functional mobility tolerance, altered sensation, decreased safety awareness and impaired judgement, coupled with fall risks as indicated by AM-PAC 6-Clicks: 23/72 as well as impaired balance, polypharmacy, impaired judgement, decreased safety awareness and limited sensation/neuropathy and combined with medical complications of hypertension , abnormal renal lab values and abnormal blood sugars, CVA    Pt's PMHx and comorbidities that may affect physical performance and progress include: CHF, CKD, HTN and lymphedema  Personal factors affecting pt at time of IE include: limited home support, inability to perform IADLs, inability to navigate community distances and limited insight into impairments  Pt will benefit from continued skilled PT services to address deficits as defined above and to maximize level of functional mobility to facilitate return toward PLOF and improved QOL  From PT/mobility standpoint, recommendation at time of d/c would be OP PT pending progress in order to reduce fall risk and maximize pt's functional independence and consistency with mobility in order to facilitate return to PLOF  Recommend ther ex next 1-2 sessions  Barriers to Discharge: Decreased caregiver support  Barriers to Discharge Comments: lives alone  PT Discharge Recommendation: Home with outpatient rehabilitation          See flowsheet documentation for full assessment

## 2022-01-12 NOTE — PLAN OF CARE
Problem: MOBILITY - ADULT  Goal: Maintain or return to baseline ADL function  Description: INTERVENTIONS:  -  Assess patient's ability to carry out ADLs; assess patient's baseline for ADL function and identify physical deficits which impact ability to perform ADLs (bathing, care of mouth/teeth, toileting, grooming, dressing, etc )  - Assess/evaluate cause of self-care deficits   - Assess range of motion  - Assess patient's mobility; develop plan if impaired  - Assess patient's need for assistive devices and provide as appropriate  - Encourage maximum independence but intervene and supervise when necessary  - Involve family in performance of ADLs  - Assess for home care needs following discharge   - Consider OT consult to assist with ADL evaluation and planning for discharge  - Provide patient education as appropriate  Outcome: Progressing  Goal: Maintains/Returns to pre admission functional level  Description: INTERVENTIONS:  - Perform BMAT or MOVE assessment daily    - Set and communicate daily mobility goal to care team and patient/family/caregiver  - Collaborate with rehabilitation services on mobility goals if consulted  - Perform Range of Motion 3 times a day  - Reposition patient every 2 hours    - Dangle patient 3 times a day  - Stand patient 3 times a day  - Ambulate patient 3 times a day  - Out of bed to chair 3 times a day   - Out of bed for meals 3 times a day  - Out of bed for toileting  - Record patient progress and toleration of activity level   Outcome: Progressing     Problem: PAIN - ADULT  Goal: Verbalizes/displays adequate comfort level or baseline comfort level  Description: Interventions:  - Encourage patient to monitor pain and request assistance  - Assess pain using appropriate pain scale  - Administer analgesics based on type and severity of pain and evaluate response  - Implement non-pharmacological measures as appropriate and evaluate response  - Consider cultural and social influences on pain and pain management  - Notify physician/advanced practitioner if interventions unsuccessful or patient reports new pain  Outcome: Progressing     Problem: INFECTION - ADULT  Goal: Absence or prevention of progression during hospitalization  Description: INTERVENTIONS:  - Assess and monitor for signs and symptoms of infection  - Monitor lab/diagnostic results  - Monitor all insertion sites, i e  indwelling lines, tubes, and drains  - Monitor endotracheal if appropriate and nasal secretions for changes in amount and color  - [place] appropriate cooling/warming therapies per order  - Administer medications as ordered  - Instruct and encourage patient and family to use good hand hygiene technique  - Identify and instruct in appropriate isolation precautions for identified infection/condition  Outcome: Progressing  Goal: Absence of fever/infection during neutropenic period  Description: INTERVENTIONS:  - Monitor WBC    Outcome: Progressing     Problem: SAFETY ADULT  Goal: Maintain or return to baseline ADL function  Description: INTERVENTIONS:  -  Assess patient's ability to carry out ADLs; assess patient's baseline for ADL function and identify physical deficits which impact ability to perform ADLs (bathing, care of mouth/teeth, toileting, grooming, dressing, etc )  - Assess/evaluate cause of self-care deficits   - Assess range of motion  - Assess patient's mobility; develop plan if impaired  - Assess patient's need for assistive devices and provide as appropriate  - Encourage maximum independence but intervene and supervise when necessary  - Involve family in performance of ADLs  - Assess for home care needs following discharge   - Consider OT consult to assist with ADL evaluation and planning for discharge  - Provide patient education as appropriate  Outcome: Progressing  Goal: Maintains/Returns to pre admission functional level  Description: INTERVENTIONS:  - Perform BMAT or MOVE assessment daily    - Set and communicate daily mobility goal to care team and patient/family/caregiver  - Collaborate with rehabilitation services on mobility goals if consulted  - Perform Range of Motion 3 times a day  - Reposition patient every 2 hours    - Dangle patient 3 times a day  - Stand patient 3 times a day  - Ambulate patient 3 times a day  - Out of bed to chair 3 times a day   - Out of bed for meals 3 times a day  - Out of bed for toileting  - Record patient progress and toleration of activity level   Outcome: Progressing  Goal: Patient will remain free of falls  Description: INTERVENTIONS:  - Educate patient/family on patient safety including physical limitations  - Instruct patient to call for assistance with activity   - Consult OT/PT to assist with strengthening/mobility   - Keep Call bell within reach  - Keep bed low and locked with side rails adjusted as appropriate  - Keep care items and personal belongings within reach  - Initiate and maintain comfort rounds  - Make Fall Risk Sign visible to staff  - Offer Toileting every 2 Hours, in advance of need  - Initiate/Maintain bed/chair alarm  - Obtain necessary fall risk management equipment: call bell/personal items in reach  - Apply yellow socks and bracelet for high fall risk patients  - Consider moving patient to room near nurses station  Outcome: Progressing     Problem: DISCHARGE PLANNING  Goal: Discharge to home or other facility with appropriate resources  Description: INTERVENTIONS:  - Identify barriers to discharge w/patient and caregiver  - Arrange for needed discharge resources and transportation as appropriate  - Identify discharge learning needs (meds, wound care, etc )  - Arrange for interpretive services to assist at discharge as needed  - Refer to Case Management Department for coordinating discharge planning if the patient needs post-hospital services based on physician/advanced practitioner order or complex needs related to functional status, cognitive ability, or social support system  Outcome: Progressing     Problem: Knowledge Deficit  Goal: Patient/family/caregiver demonstrates understanding of disease process, treatment plan, medications, and discharge instructions  Description: Complete learning assessment and assess knowledge base  Interventions:  - Provide teaching at level of understanding  - Provide teaching via preferred learning methods  Outcome: Progressing     Problem: Neurological Deficit  Goal: Neurological status is stable or improving  Description: Interventions:  - Monitor and assess patient's level of consciousness, motor function, sensory function, and level of assistance needed for ADLs  - Monitor and report changes from baseline  Collaborate with interdisciplinary team to initiate plan and implement interventions as ordered  - Provide and maintain a safe environment  - Consider seizure precautions  - Consider fall precautions  - Consider aspiration precautions  - Consider bleeding precautions  Outcome: Progressing     Problem: Activity Intolerance/Impaired Mobility  Goal: Mobility/activity is maintained at optimum level for patient  Description: Interventions:  - Assess and monitor patient  barriers to mobility and need for assistive/adaptive devices  - Assess patient's emotional response to limitations  - Collaborate with interdisciplinary team and initiate plans and interventions as ordered  - Encourage independent activity per ability   - Maintain proper body alignment  - Perform active/passive rom as tolerated/ordered  - Plan activities to conserve energy   - Turn patient as appropriate  Outcome: Progressing     Problem: Communication Impairment  Goal: Ability to express needs and understand communication  Description: Assess patient's communication skills and ability to understand information    Patient will demonstrate use of effective communication techniques, alternative methods of communication and understanding even if not able to speak  - Encourage communication and provide alternate methods of communication as needed  - Collaborate with case management/ for discharge needs  - Include patient/family/caregiver in decisions related to communication  Outcome: Progressing     Problem: Potential for Aspiration  Goal: Non-ventilated patient's risk of aspiration is minimized  Description: Assess and monitor vital signs, respiratory status, and labs (WBC)  Monitor for signs of aspiration (tachypnea, cough, rales, wheezing, cyanosis, fever)  - Assess and monitor patient's ability to swallow  - Place patient up in chair to eat if possible  - HOB up at 90 degrees to eat if unable to get patient up into chair   - Supervise patient during oral intake  - Instruct patient/ family to take small bites  - Instruct patient/ family to take small single sips when taking liquids  - Follow patient-specific strategies generated by speech pathologist   Outcome: Progressing  Goal: Ventilated patient's risk of aspiration is minimized  Description: Assess and monitor vital signs, respiratory status, airway cuff pressure, and labs (WBC)  Monitor for signs of aspiration (tachypnea, cough, rales, wheezing, cyanosis, fever)  - Elevate head of bed 30 degrees if patient has tube feeding   - Monitor tube feeding  Outcome: Progressing     Problem: Nutrition  Goal: Nutrition/Hydration status is improving  Description: Monitor and assess patient's nutrition/hydration status for malnutrition (ex- brittle hair, bruises, dry skin, pale skin and conjunctiva, muscle wasting, smooth red tongue, and disorientation)  Collaborate with interdisciplinary team and initiate plan and interventions as ordered  Monitor patient's weight and dietary intake as ordered or per policy  Utilize nutrition screening tool and intervene per policy  Determine patient's food preferences and provide high-protein, high-caloric foods as appropriate       - Assist patient with eating   - Allow adequate time for meals   - Encourage patient to take dietary supplement as ordered  - Collaborate with clinical nutritionist   - Include patient/family/caregiver in decisions related to nutrition    Outcome: Progressing

## 2022-01-12 NOTE — PROGRESS NOTES
Since arrival to ICU patient with moderate headache  He states he had this since symptoms started  Initially when he arrived to ICU he had hypertension treated with labetalol  Patient and nurse alerting me that headache has same severity and quality but has moved from his 'right temple to left temple to forehead ' Currently normotensive, no neuro changes  Will send for [facility] to ensure no findings concerning for hemorrhage  Neurology on call updated       Salvador Remy PA-C

## 2022-01-12 NOTE — PHYSICAL THERAPY NOTE
PHYSICAL THERAPY EVALUATION  NAME:  Iker Mo  DATE: 01/12/22    AGE:   74 y o  Mrn:   [id number]  ADMIT DX:  Slurred speech [R47 81]  HTN (hypertension) [I10]  CVA (cerebral vascular accident) ([address] ) [I63 9]  CKD (chronic kidney disease) [N18 9]    History reviewed  No pertinent past medical history  Length Of Stay: 1  Performed at least 2 patient identifiers during session: Name and Birthday  PHYSICAL THERAPY EVALUATION :      01/12/22 1439   PT Last Visit   PT Visit Date 01/12/22   Note Type   Note type Evaluation   Pain Assessment   Pain Assessment Tool 0-10   Pain Score No Pain   Restrictions/Precautions   Other Precautions Chair Alarm; Fall Risk;Multiple lines;Telemetry   Home Living   Type of Home Apartment  (no ISELA)   Home Layout One level;Performs ADLs on one level; Able to live on main level with bedroom/bathroom   Bathroom Shower/Tub Tub/shower unit   Bathroom Toilet Standard   Bathroom Equipment Shower chair;Grab bars in shower;Grab bars around toilet  (doesn't use shower chair)   Home Equipment Cane;Walker  (uses cane for mobility)   Additional Comments Reports living in a 1st floor apartment without ISELA and using spc PTA  Prior Function   Level of [place] Independent with ADLs and functional mobility   Lives With Alone   ADL Assistance Independent   IADLs Independent  (+ driving)   Falls in the last 6 months 0   Vocational Retired   Comments Reports being independent with mobility with spc and independent for ADLs and IADLs  General   Additional Pertinent History CT head negative for acute intracranial abnormality  MRI showing Few scattered punctate foci of acute cerebral and right cerebellar infarction  The multi territorial distribution of the punctate foci of infarction suggests a cardioembolic etiology  pt received tPA at 1743 on 1/11/2022      Family/Caregiver Present   (dtr present at end of session)   Cognition   Orientation Level Oriented X4   Following Commands Follows all commands and directions without difficulty   Subjective   Subjective "I'm cold"   RLE Assessment   RLE Assessment WFL   LLE Assessment   LLE Assessment   (inc B LE edema L LE > R)   LLE Overall AROM   L Hip Flexion AROM to 90 degrees, PROm WNL   L Hip ABduction WFL   L Knee Flexion WFL   L Knee Extension 0 degrees   L Ankle Dorsiflexion WFL   Strength LLE   LLE Overall Strength 2+/5  (except hip flexion 2/5 and ankle DF 3+/5)   Coordination   Rapid Alternating Movements Intact   Light Touch   RLE Light Touch Impaired   RLE Light Touch Comments intact to light touch, but diminished   LLE Light Touch Impaired   LLE Light Touch Comments intact to light touch, but diminished   Bed Mobility   Supine to Sit 7  Independent   Additional Comments HOB flat without bedrail   Transfers   Sit to Stand 5  Supervision   Additional items Increased time required;Verbal cues   Stand to Sit 5  Supervision   Additional items Increased time required;Verbal cues   Stand pivot 5  Supervision   Additional items Increased time required;Verbal cues   Additional Comments use of spc  sit<>stand with wide FREDO with supervision  spt with spc with supervision  pt with LOb posteriorly stepping to recover without physical assistance  pt frustrated with therapists being present despite balance deficits  Ambulation/Elevation   Gait pattern Wide FREDO;Step through pattern;Excessively slow   Gait Assistance   (stand by assistance to close supervision)   Additional items Verbal cues   Assistive Device [employer]   Distance 25'x1, 110'x1 with spc with stand by assistance-->close supervision with wide FREDO and decreased step length, slight path deviation     Balance   Static Sitting Normal   Dynamic Sitting Good   Static Standing Good   Dynamic Standing Fair +   Ambulatory Fair   Activity Tolerance   Activity Tolerance Patient limited by fatigue   Medical Staff Made Aware Jake CONSTANTINO   Nurse Made Aware GAGANDEEP Rod   Assessment   Prognosis Good   Problem List Decreased strength;Decreased endurance; Impaired balance;Decreased mobility; Decreased safety awareness; Impaired judgement;Obesity   Barriers to Discharge Decreased caregiver support   Barriers to Discharge Comments lives alone  Goals   Patient Goals "Go home"   [facility] Expiration Date 01/26/22   PT Treatment Day 0   Plan   Treatment/Interventions Functional transfer training;LE strengthening/ROM; Elevations; Therapeutic exercise; Endurance training;Patient/family training;Equipment eval/education; Bed mobility;Gait training; Compensatory technique education;Spoke to nursing;OT   PT Frequency 1-2x/wk   Recommendation   PT Discharge Recommendation Home with outpatient rehabilitation   Equipment Recommended   (cane, pt has)   AM-PAC Basic Mobility Inpatient   Turning in Bed Without Bedrails 4   Lying on Back to Sitting on Edge of Flat Bed 4   Moving Bed to Chair 3   Standing Up From Chair 3   Walk in Room 3   Climb 3-5 Stairs 3   Basic Mobility Inpatient Raw Score 20   Basic Mobility Standardized Score 43 99   Highest Level Of Mobility   JH-HLM Goal 6: Walk 10 steps or more   JH-HLM Highest Level of Mobility 7: Walk 25 feet or more   JH-HLM Goal Achieved Yes   End of Consult   Patient Position at End of Consult Bed/Chair alarm activated; All needs within reach  (sititng in recliner)     (Please find full objective findings from PT assessment regarding body systems outlined above)  Assessment: Pt is a 74 y o  male seen for PT evaluation s/p admission to [address] [place] [address] on 1/11/2022 with CVA (cerebral vascular accident) [facility])  Order placed for PT services  Upon evaluation: Pt is presenting with impaired functional mobility due to decreased strength, decreased endurance, impaired balance, gait deviations, decreased safety awareness, impaired judgment, fall risk and LE edema requiring supervision assistance for transfers and stand by to close supervision assistance for ambulation with spc   Pt's clinical presentation is currently evolving given the functional mobility deficits above, especially weakness, edema of extremities, decreased endurance, gait deviations, decreased activity tolerance, decreased functional mobility tolerance, altered sensation, decreased safety awareness and impaired judgement, coupled with fall risks as indicated by AM-PAC 6-Clicks: 49/40 as well as impaired balance, polypharmacy, impaired judgement, decreased safety awareness and limited sensation/neuropathy and combined with medical complications of hypertension , abnormal renal lab values and abnormal blood sugars, CVA  Pt's PMHx and comorbidities that may affect physical performance and progress include: CHF, CKD, HTN and lymphedema  Personal factors affecting pt at time of IE include: limited home support, inability to perform IADLs, inability to navigate community distances and limited insight into impairments  Pt will benefit from continued skilled PT services to address deficits as defined above and to maximize level of functional mobility to facilitate return toward PLOF and improved QOL  From PT/mobility standpoint, recommendation at time of d/c would be OP PT pending progress in order to reduce fall risk and maximize pt's functional independence and consistency with mobility in order to facilitate return to PLOF  Recommend ther ex next 1-2 sessions  The patient's AM-[facility] Basic Mobility Inpatient Short Form Raw Score is 20  A Raw score of greater than 16 suggests the patient may benefit from discharge to home  Please also refer to the recommendation of the Physical Therapist for safe discharge planning  Goals: Pt will: Perform bed mobility tasks with independent to reposition in bed and prepare for transfers  Pt will perform transfers with modified I to decrease burden of care, decrease risk for falls and improve activity tolerance and prepare for ambulation   Pt will ambulate with spc for >/= 200' with  modified I  to decrease burden of care, decrease risk for falls, improve activity tolerance and improve gait quality and to access home environment  Pt will complete >/= 4 steps with with unilateral handrail and spc with Supervision to improve activity tolerance and access community environment  Pt will participate in objective balance assessment to determine baseline fall risk  Pt will increase B LE strength >/= 1/2 MMT grade to facilitate functional mobility        Joshua Metuchen, PT,DPT

## 2022-01-12 NOTE — CASE MANAGEMENT
Case Management Assessment & Discharge Planning Note    Patient name Rajesh Lunsford  Location /-36 MRN [id number]  : 1955 Date 2022       Current Admission Date: 2022  Current Admission Diagnosis:CVA (cerebral vascular accident) [facility])   Patient Active Problem List    Diagnosis Date Noted    [facility] ) 2022    Acute kidney injury superimposed on chronic kidney disease stage 4 ([facility] ) 2022    HTN (hypertension) 2022    Chronic diastolic HF (heart failure) ([facility] ) 2022    HLD (hyperlipidemia) 2022    Lymphedema 2022    Hypothyroidism 2022      LOS (days): 1  Geometric Mean LOS (GMLOS) (days): 2 90  Days to GMLOS:2 1     OBJECTIVE:    Risk of Unplanned Readmission Score: 11         Current admission status: Inpatient       Preferred Pharmacy:   [address] [address]  Phone: [phone] Fax: [phone]    Primary Care Provider: Haylee Olmos DO    Primary Insurance: MEDICARE  Secondary Insurance:     ASSESSMENT:  Active Health Care Agents    There are no active Health Care Agents on file  Advance Directives  Does patient have a [address]?: No  Does patient currently have a Health Care decision maker?: Yes, please see Health Care Proxy section (Pts daughter, Song Walker, whould serve as decision maker)              Patient Information  Admitted from[de-identified] Home  Mental Status: Alert  During Assessment patient was accompanied by: Not accompanied during assessment  Assessment information provided by[de-identified] Daughter  Primary Caregiver: Self  Support Systems: [address] of Residence: [address] [place] [address] entry access options   Select all that apply : Stairs  Number of steps to enter home : 3  Type of Current Residence: Apartment  Floor Level: 1  Upon entering residence, is there a bedroom on the main floor (no further steps)?: Yes  Upon entering residence, is there a bathroom on the main floor (no further steps)?: Yes  In the last 12 months, was there a time when you were not able to pay the mortgage or rent on time?: No  In the last 12 months, how many places have you lived?: 1  In the last 12 months, was there a time when you did not have a steady place to sleep or slept in a shelter (including now)?: No  Living Arrangements: Lives Alone    Activities of Daily Living Prior to Admission  Functional Status: Independent  Completes ADLs independently?: Yes  Ambulates independently?: Yes  Does patient use assisted devices?: Yes  Assisted Devices (DME) used: Straight Cane  Does patient currently own DME?: Yes  What DME does the patient currently own?: Straight Cane  Does patient have a history of Outpatient Therapy (PT/OT)?: No  Does the patient have a history of Short-Term Rehab?: No  Does patient have a history of HHC?: Yes (does not remember name)  Does patient currently have [facility]?: No         Patient Information Continued  Income Source: Pension/senior living  Does patient have prescription coverage?: Yes  Within the past 12 months, you worried that your food would run out before you got the money to buy more : Never true  Within the past 12 months, the food you bought just didnt last and you didnt have money to get more : Never true  Does patient receive dialysis treatments?: No  Does patient have a history of substance abuse?: No  Does patient have a history of Mental Health Diagnosis?: No         Means of Transportation  Means of Transport to Appts[de-identified] Drives Self  In the past 12 months, has lack of transportation kept you from medical appointments or from getting medications?: No  In the past 12 months, has lack of transportation kept you from meetings, work, or from getting things needed for daily living?: No        DISCHARGE DETAILS:    Discharge planning discussed with[de-identified] pts daughter, Branden Prado of Choice: Yes     CM contacted family/caregiver?: Yes             Contacts  Patient Contacts: Melanyestefani Wagoner, daughter  Relationship to Patient[de-identified] Family  Contact Method: Phone  Reason/Outcome: Discharge Planning                   Would you like to participate in our [address] service program?  : No - Declined                    pt is from home, lives alone in an apartment  Pts family lives "across town" and is available to assist with pts needs at time of discharge  Pt drives  Pt uses a cane to ambulate  Pt IPTA  Pt does not have an official POA, daughter , Melany Wagoner, would act as decision maker if pt is unable to do so

## 2022-01-12 NOTE — PLAN OF CARE
Problem: MOBILITY - ADULT  Goal: Maintain or return to baseline ADL function  Description: INTERVENTIONS:  -  Assess patient's ability to carry out ADLs; assess patient's baseline for ADL function and identify physical deficits which impact ability to perform ADLs (bathing, care of mouth/teeth, toileting, grooming, dressing, etc )  - Assess/evaluate cause of self-care deficits   - Assess range of motion  - Assess patient's mobility; develop plan if impaired  - Assess patient's need for assistive devices and provide as appropriate  - Encourage maximum independence but intervene and supervise when necessary  - Involve family in performance of ADLs  - Assess for home care needs following discharge   - Consider OT consult to assist with ADL evaluation and planning for discharge  - Provide patient education as appropriate  Outcome: Progressing     Problem: PAIN - ADULT  Goal: Verbalizes/displays adequate comfort level or baseline comfort level  Description: Interventions:  - Encourage patient to monitor pain and request assistance  - Assess pain using appropriate pain scale  - Administer analgesics based on type and severity of pain and evaluate response  - Implement non-pharmacological measures as appropriate and evaluate response  - Consider cultural and social influences on pain and pain management  - Notify physician/advanced practitioner if interventions unsuccessful or patient reports new pain  Outcome: Progressing     Problem: INFECTION - ADULT  Goal: Absence or prevention of progression during hospitalization  Description: INTERVENTIONS:  - Assess and monitor for signs and symptoms of infection  - Monitor lab/diagnostic results  - Monitor all insertion sites, i e  indwelling lines, tubes, and drains  - Monitor endotracheal if appropriate and nasal secretions for changes in amount and color  - [place] appropriate cooling/warming therapies per order  - Administer medications as ordered  - Instruct and encourage patient and family to use good hand hygiene technique  - Identify and instruct in appropriate isolation precautions for identified infection/condition  Outcome: Progressing  Goal: Absence of fever/infection during neutropenic period  Description: INTERVENTIONS:  - Monitor WBC    Outcome: Progressing     Problem: SAFETY ADULT  Goal: Maintain or return to baseline ADL function  Description: INTERVENTIONS:  -  Assess patient's ability to carry out ADLs; assess patient's baseline for ADL function and identify physical deficits which impact ability to perform ADLs (bathing, care of mouth/teeth, toileting, grooming, dressing, etc )  - Assess/evaluate cause of self-care deficits   - Assess range of motion  - Assess patient's mobility; develop plan if impaired  - Assess patient's need for assistive devices and provide as appropriate  - Encourage maximum independence but intervene and supervise when necessary  - Involve family in performance of ADLs  - Assess for home care needs following discharge   - Consider OT consult to assist with ADL evaluation and planning for discharge  - Provide patient education as appropriate  Outcome: Progressing  Problem: DISCHARGE PLANNING  Goal: Discharge to home or other facility with appropriate resources  Description: INTERVENTIONS:  - Identify barriers to discharge w/patient and caregiver  - Arrange for needed discharge resources and transportation as appropriate  - Identify discharge learning needs (meds, wound care, etc )  - Arrange for interpretive services to assist at discharge as needed  - Refer to Case Management Department for coordinating discharge planning if the patient needs post-hospital services based on physician/advanced practitioner order or complex needs related to functional status, cognitive ability, or social support system  Outcome: Progressing     Problem: Knowledge Deficit  Goal: Patient/family/caregiver demonstrates understanding of disease process, treatment plan, medications, and discharge instructions  Description: Complete learning assessment and assess knowledge base  Interventions:  - Provide teaching at level of understanding  - Provide teaching via preferred learning methods  Outcome: Progressing     Problem: Neurological Deficit  Goal: Neurological status is stable or improving  Description: Interventions:  - Monitor and assess patient's level of consciousness, motor function, sensory function, and level of assistance needed for ADLs  - Monitor and report changes from baseline  Collaborate with interdisciplinary team to initiate plan and implement interventions as ordered  - Provide and maintain a safe environment  - Consider seizure precautions  - Consider fall precautions  - Consider aspiration precautions  - Consider bleeding precautions  Outcome: Progressing     Problem: Activity Intolerance/Impaired Mobility  Goal: Mobility/activity is maintained at optimum level for patient  Description: Interventions:  - Assess and monitor patient  barriers to mobility and need for assistive/adaptive devices  - Assess patient's emotional response to limitations  - Collaborate with interdisciplinary team and initiate plans and interventions as ordered  - Encourage independent activity per ability   - Maintain proper body alignment  - Perform active/passive rom as tolerated/ordered  - Plan activities to conserve energy   - Turn patient as appropriate  Outcome: Progressing     Problem: Communication Impairment  Goal: Ability to express needs and understand communication  Description: Assess patient's communication skills and ability to understand information  Patient will demonstrate use of effective communication techniques, alternative methods of communication and understanding even if not able to speak  - Encourage communication and provide alternate methods of communication as needed    - Collaborate with case management/ for discharge needs   - Include patient/family/caregiver in decisions related to communication  Outcome: Progressing     Problem: Potential for Aspiration  Goal: Non-ventilated patient's risk of aspiration is minimized  Description: Assess and monitor vital signs, respiratory status, and labs (WBC)  Monitor for signs of aspiration (tachypnea, cough, rales, wheezing, cyanosis, fever)  - Assess and monitor patient's ability to swallow  - Place patient up in chair to eat if possible  - HOB up at 90 degrees to eat if unable to get patient up into chair   - Supervise patient during oral intake  - Instruct patient/ family to take small bites  - Instruct patient/ family to take small single sips when taking liquids  - Follow patient-specific strategies generated by speech pathologist   Outcome: Progressing  Goal: Ventilated patient's risk of aspiration is minimized  Description: Assess and monitor vital signs, respiratory status, airway cuff pressure, and labs (WBC)  Monitor for signs of aspiration (tachypnea, cough, rales, wheezing, cyanosis, fever)  - Elevate head of bed 30 degrees if patient has tube feeding   - Monitor tube feeding  Outcome: Progressing     Problem: Nutrition  Goal: Nutrition/Hydration status is improving  Description: Monitor and assess patient's nutrition/hydration status for malnutrition (ex- brittle hair, bruises, dry skin, pale skin and conjunctiva, muscle wasting, smooth red tongue, and disorientation)  Collaborate with interdisciplinary team and initiate plan and interventions as ordered  Monitor patient's weight and dietary intake as ordered or per policy  Utilize nutrition screening tool and intervene per policy  Determine patient's food preferences and provide high-protein, high-caloric foods as appropriate  - Assist patient with eating   - Allow adequate time for meals   - Encourage patient to take dietary supplement as ordered    - Collaborate with clinical nutritionist   - Include patient/family/caregiver in decisions related to nutrition    Outcome: Progressing

## 2022-01-12 NOTE — QUICK NOTE
Neurology Note    Reviewed MRI imaging - scattered punctate acute infarcts in bilateral frontoparietal cortex, R occipital at MCA/PCA border, and R cerebellum indicating a proximal embolic source  TTE was technically difficult and could not visualize a significant portion though no clear embolic source identified; unfortunately given the higher suspicion for cardioembolism this is likely not a sufficient study to rule out an embolic source  Repeat CT tonight at 24 hrs pending - once this is done and no hemorrhage seen, can initiate ASA 81mg daily  Continue Atorvastatin  Maintain on telemetry and notify with any evidence of arrhythmia  Would obtain BETTE at this time if nothing on telemetry to explain his stroke (e g  Afib)  Recommend checking lipid panel, A1c, and D-dimer  If all above work-up unremarkable, may need loop recorder placement  Notify Neurology with any changes in exam     Call with questions

## 2022-01-12 NOTE — ASSESSMENT & PLAN NOTE
· Initial CTH 1/12  · NAICA  · Initial CTA 1/12:  · No LVO  · TPA administered at 1743  · MRI pending  · Neurology following  · NIHSS on arrival 5  · Left hemiparesis, dysarthria, facial droop,   · Current Neuro Exam:  · Mild left facial droop with decreased sensation lower facial nerve region on left, subtle 4/5 weakness on left upper and lower extremity that can oppose gravity fully but slightly decreased  strength, overshoots finger to nose test  · Continue stroke pathway  · MRI pending  · Antiplatelet Plan start ASA if serial CTH normal  · TTE pending  · High Intensity Statin  · Lipid Panel Pending  · Blood pressure goal:  · Permissive hypertension with goal SBP < 180  · Continue telemetry  ·   · PT/OT/Speech following  · PMR consult  · Hemoglobin A1C: pending

## 2022-01-12 NOTE — OCCUPATIONAL THERAPY NOTE
Occupational Therapy Evaluation     Patient Name: Dong Kerr  KJIPC'R Date: 1/12/2022  Problem List  Principal Problem:    CVA (cerebral vascular accident) [facility])  Active Problems:    Acute kidney injury superimposed on chronic kidney disease stage 4 (HCC)    HTN (hypertension)    Chronic diastolic HF (heart failure) (HCC)    HLD (hyperlipidemia)    Lymphedema    Hypothyroidism    Past Medical History  History reviewed  No pertinent past medical history  Past Surgical History  History reviewed  No pertinent surgical history  01/12/22 1440   Note Type   Note type Evaluation   Restrictions/Precautions   Other Precautions Chair Alarm; Fall Risk;Multiple lines;Telemetry   Pain Assessment   Pain Assessment Tool 0-10   Pain Score No Pain   Home Living   Type of Home Apartment  (0 ISEAL)   Home Layout One level;Performs ADLs on one level; Able to live on main level with bedroom/bathroom   Bathroom Shower/Tub Tub/shower unit   Bathroom Toilet Standard   Bathroom Equipment Shower chair;Grab bars in shower;Grab bars around toilet   Home Equipment Cane;Walker   Additional Comments Pt reports living alone in a one-level apartment  SPC use @ baseline  Prior Function   Level of [place] Independent with ADLs and functional mobility   Lives With Alone   ADL Assistance Independent   IADLs Independent   Falls in the last 6 months 0   Vocational Retired   Comments Pt reports Mod I for ADLs, IADLs and community mobility (+driving)  Subjective   Subjective "Don't bug me"   ADL   Grooming Assistance 5  Supervision/Setup   Grooming Deficit Setup;Supervision/safety   UB Dressing Assistance 5  Supervision/Setup   UB Dressing Deficit Setup; Increased time to complete   LB Dressing Assistance 5  Supervision/Setup   LB Dressing Deficit Setup; Increased time to complete   Toileting Assistance  5  Supervision/Setup   Toileting Deficit Setup; Increased time to complete   Additional Comments UB/LB ADLs @ S, provided set-up and increased time to complete  Toileting and grooming @ S   Bed Mobility   Supine to Sit 7  Independent   Additional Comments Pt supine in bed at beginning of session  Supine to sit @ I   Transfers   Sit to Stand 5  Supervision   Additional items Increased time required;Verbal cues   Stand to Sit 5  Supervision   Additional items Increased time required;Verbal cues   Stand pivot 5  Supervision   Additional items Increased time required;Verbal cues   Toilet transfer 5  Supervision   Additional items Verbal cues; Increased time required;Standard toilet   Additional Comments STS from EOB with SPC @ S  Pt able to ambulate around room, to/from bathroom, sink and toilet @ SBA d/t balance deficits  Pt with posterior LOB when turning from toilet to sink, corrected by wall  Pt requesting therapist do not put hands on him as they will not be able to help him at home  Pt seated in recliner chair at end of session with chair alarm intact and call bell within reach  All needs met  Balance   Static Sitting Normal   Dynamic Sitting Good   Static Standing Good   Dynamic Standing Fair +   Activity Tolerance   Activity Tolerance Patient limited by fatigue   Medical Staff Made Aware Co-eval with PT Aquiles Paiz   Nurse Made Aware  Spoke with GAGANDEEP Rod   RUKENDY Assessment   RUE Assessment WFL   LUE Assessment   LUE Assessment WFL   Hand Function   Gross Motor Coordination Functional   Fine Motor Coordination Functional   Sensation   Light Touch No apparent deficits   Vision - Complex Assessment   Tracking Able to track stimulus in all quads without difficulty   Cognition   Overall Cognitive Status [facility]   Arousal/Participation Alert; Responsive; Cooperative   Attention Within functional limits   Orientation Level Oriented X4   Memory Within functional limits   Following Commands Follows all commands and directions without difficulty   Comments Pt frustrated with therapy services for making pt moving and trying to assist   Assessment Limitation Decreased endurance;Decreased Safe judgement during ADL  (Decreased balance)   Prognosis Good   Assessment Pt is a 74 y o  male, admitted to [address] [place] [address] 1/11/2022 d/t experiencing left-sided facial droop/weakness and dysarthria  Dx: CVA  MRI showing few scattered punctate foci of acute cerebral and right cerebellar infarction; multi territorial distribution suggestion cardioembolic etiology  Pt with PMHx impacting their performance during ADL tasks, including: CKD IV, chronic lymphedema of BLE, hypothyroidism and hypertension  Prior to admission to the hospital Pt was performing ADLs, IADLs and functional mobility without physical assistance  Cognitive status was PTA was Intact  OT order placed to assess Pt's ADLs, cognitive status, and performance during functional tasks in order to maximize safety and independence while making most appropriate d/c recommendations  Pt's clinical presentation is currently evolving given new onset deficits that effect Pt's occupational performance and ability to safely return to OF including decrease endurance, decrease standing balance and decrease safety awareness  combined with medical complications of hypertension , abnormal CBC and need for input for mobility technique/safety  Personal factors affecting Pt at time of initial evaluation include: limited home support  Spoke with pt who reported no concerns regarding ADLs, IADLs or functional mobility upon return to home  Pt appears to be at prior level of functioning at this time and does not require acute OT services  Recommend outpatient PT services for balance deficits  D/C OT effective 1/12/2022  If new concerns arise, please re-consult     Plan   OT Frequency Eval only  (PT keeping pt on caseload for balance/endurance deficits)   Recommendation   OT Discharge Recommendation   (outpatient PT for balance deficits)   AM-PAC Daily Activity Inpatient   Lower Body Dressing 3   Bathing 3   Toileting 3   Upper Body Dressing 3 Grooming 3   Eating 4   Daily Activity Raw Score 19   Daily Activity Standardized Score (Calc for Raw Score >=11) 40 22   AM-[facility] Applied Cognition Inpatient   Following a Speech/Presentation 4   Understanding Ordinary Conversation 4   Taking Medications 4   Remembering Where Things Are Placed or Put Away 4   Remembering List of 4-5 Errands 3   Taking Care of Complicated Tasks 3   Applied Cognition Raw Score 22   Applied Cognition Standardized Score 47 83     The patient's raw score on the AM-PAC Daily Activity inpatient short form is 19, standardized score is 40 22, greater than 39 4  Patients at this level are likely to benefit from DC to home  Pt appears to be at prior level of functioning at this time and does not require acute OT services  D/C OT effective 1/12/2022  If new concerns arise, please re-consult      Shivani Paniagua OTR/L

## 2022-01-13 PROBLEM — D64.9 ANEMIA: Status: ACTIVE | Noted: 2022-01-13

## 2022-01-13 LAB
ANION GAP SERPL CALCULATED.3IONS-SCNC: 7 MMOL/L (ref 4–13)
BUN SERPL-MCNC: 54 MG/DL (ref 5–25)
CALCIUM SERPL-MCNC: 8.7 MG/DL (ref 8.3–10.1)
CHLORIDE SERPL-SCNC: 104 MMOL/L (ref 100–108)
CO2 SERPL-SCNC: 26 MMOL/L (ref 21–32)
CREAT SERPL-MCNC: 3.97 MG/DL (ref 0.6–1.3)
ERYTHROCYTE [DISTWIDTH] IN BLOOD BY AUTOMATED COUNT: 14.4 % (ref 11.6–15.1)
EST. AVERAGE GLUCOSE BLD GHB EST-MCNC: 131 MG/DL
FERRITIN SERPL-MCNC: 80 NG/ML (ref 8–388)
FOLATE SERPL-MCNC: >20 NG/ML (ref 3.1–17.5)
GFR SERPL CREATININE-BSD FRML MDRD: 14 ML/MIN/1.73SQ M
GLUCOSE SERPL-MCNC: 100 MG/DL (ref 65–140)
GLUCOSE SERPL-MCNC: 114 MG/DL (ref 65–140)
GLUCOSE SERPL-MCNC: 121 MG/DL (ref 65–140)
GLUCOSE SERPL-MCNC: 164 MG/DL (ref 65–140)
GLUCOSE SERPL-MCNC: 92 MG/DL (ref 65–140)
HBA1C MFR BLD: 6.2 %
HCT VFR BLD AUTO: 32.6 % (ref 36.5–49.3)
HGB BLD-MCNC: 10.9 G/DL (ref 12–17)
IRON SATN MFR SERPL: 25 % (ref 20–50)
IRON SERPL-MCNC: 63 UG/DL (ref 65–175)
MAGNESIUM SERPL-MCNC: 2.2 MG/DL (ref 1.6–2.6)
MCH RBC QN AUTO: 33.9 PG (ref 26.8–34.3)
MCHC RBC AUTO-ENTMCNC: 33.4 G/DL (ref 31.4–37.4)
MCV RBC AUTO: 101 FL (ref 82–98)
PLATELET # BLD AUTO: 101 THOUSANDS/UL (ref 149–390)
PMV BLD AUTO: 9.7 FL (ref 8.9–12.7)
POTASSIUM SERPL-SCNC: 3.8 MMOL/L (ref 3.5–5.3)
RBC # BLD AUTO: 3.22 MILLION/UL (ref 3.88–5.62)
SODIUM SERPL-SCNC: 137 MMOL/L (ref 136–145)
TIBC SERPL-MCNC: 250 UG/DL (ref 250–450)
TSH SERPL DL<=0.05 MIU/L-ACNC: 2.95 UIU/ML (ref 0.36–3.74)
VIT B12 SERPL-MCNC: 800 PG/ML (ref 100–900)
WBC # BLD AUTO: 5.36 THOUSAND/UL (ref 4.31–10.16)

## 2022-01-13 PROCEDURE — 80048 BASIC METABOLIC PNL TOTAL CA: CPT | Performed by: NURSE PRACTITIONER

## 2022-01-13 PROCEDURE — 83540 ASSAY OF IRON: CPT | Performed by: PHYSICIAN ASSISTANT

## 2022-01-13 PROCEDURE — 84443 ASSAY THYROID STIM HORMONE: CPT | Performed by: FAMILY MEDICINE

## 2022-01-13 PROCEDURE — 99232 SBSQ HOSP IP/OBS MODERATE 35: CPT | Performed by: PHYSICIAN ASSISTANT

## 2022-01-13 PROCEDURE — 83550 IRON BINDING TEST: CPT | Performed by: PHYSICIAN ASSISTANT

## 2022-01-13 PROCEDURE — 82948 REAGENT STRIP/BLOOD GLUCOSE: CPT

## 2022-01-13 PROCEDURE — 82607 VITAMIN B-12: CPT | Performed by: PHYSICIAN ASSISTANT

## 2022-01-13 PROCEDURE — 85027 COMPLETE CBC AUTOMATED: CPT | Performed by: NURSE PRACTITIONER

## 2022-01-13 PROCEDURE — 82728 ASSAY OF FERRITIN: CPT | Performed by: PHYSICIAN ASSISTANT

## 2022-01-13 PROCEDURE — 83735 ASSAY OF MAGNESIUM: CPT | Performed by: NURSE PRACTITIONER

## 2022-01-13 PROCEDURE — 82746 ASSAY OF FOLIC ACID SERUM: CPT | Performed by: PHYSICIAN ASSISTANT

## 2022-01-13 PROCEDURE — 86022 PLATELET ANTIBODIES: CPT | Performed by: PHYSICIAN ASSISTANT

## 2022-01-13 RX ORDER — LABETALOL 20 MG/4 ML (5 MG/ML) INTRAVENOUS SYRINGE
10 EVERY 4 HOURS PRN
Status: DISCONTINUED | OUTPATIENT
Start: 2022-01-13 | End: 2022-01-15 | Stop reason: HOSPADM

## 2022-01-13 RX ADMIN — LABETALOL HYDROCHLORIDE 10 MG: 5 INJECTION, SOLUTION INTRAVENOUS at 17:05

## 2022-01-13 RX ADMIN — FUROSEMIDE 120 MG: 80 TABLET ORAL at 17:06

## 2022-01-13 RX ADMIN — ATORVASTATIN CALCIUM 40 MG: 40 TABLET, FILM COATED ORAL at 16:36

## 2022-01-13 RX ADMIN — HEPARIN SODIUM 5000 UNITS: 5000 INJECTION INTRAVENOUS; SUBCUTANEOUS at 16:36

## 2022-01-13 RX ADMIN — OXYCODONE HYDROCHLORIDE 10 MG: 10 TABLET ORAL at 05:38

## 2022-01-13 RX ADMIN — INSULIN GLARGINE 20 UNITS: 100 INJECTION, SOLUTION SUBCUTANEOUS at 22:41

## 2022-01-13 RX ADMIN — ASPIRIN 81 MG CHEWABLE TABLET 81 MG: 81 TABLET CHEWABLE at 09:55

## 2022-01-13 RX ADMIN — METOPROLOL SUCCINATE 25 MG: 25 TABLET, EXTENDED RELEASE ORAL at 09:55

## 2022-01-13 RX ADMIN — OXYCODONE HYDROCHLORIDE 10 MG: 10 TABLET ORAL at 14:07

## 2022-01-13 RX ADMIN — LEVOTHYROXINE SODIUM 125 MCG: 125 TABLET ORAL at 05:39

## 2022-01-13 RX ADMIN — FUROSEMIDE 120 MG: 80 TABLET ORAL at 09:55

## 2022-01-13 RX ADMIN — INSULIN LISPRO 1 UNITS: 100 INJECTION, SOLUTION INTRAVENOUS; SUBCUTANEOUS at 16:36

## 2022-01-13 RX ADMIN — OXYCODONE HYDROCHLORIDE 10 MG: 10 TABLET ORAL at 22:41

## 2022-01-13 RX ADMIN — HEPARIN SODIUM 5000 UNITS: 5000 INJECTION INTRAVENOUS; SUBCUTANEOUS at 22:40

## 2022-01-13 RX ADMIN — HEPARIN SODIUM 5000 UNITS: 5000 INJECTION INTRAVENOUS; SUBCUTANEOUS at 05:39

## 2022-01-13 NOTE — ASSESSMENT & PLAN NOTE
· Patient with chronic history of lymphedema in bilateral lower extremities  · Continue home dose Lasix 120 mg b i d

## 2022-01-13 NOTE — ASSESSMENT & PLAN NOTE
· Hgb drop from 12 9 to 10 9 today, platelets low 623   · No prior hx of significant anemia   · No evidence of bleeding  · Check FOBT, iron study panel, vitB12, folate  · Continue to monitor for evidence of bleeding   · Patient is currently on SQ heparin for DVT ppx- will check HIT antibody

## 2022-01-13 NOTE — PLAN OF CARE
Problem: MOBILITY - ADULT  Goal: Maintain or return to baseline ADL function  Description: INTERVENTIONS:  -  Assess patient's ability to carry out ADLs; assess patient's baseline for ADL function and identify physical deficits which impact ability to perform ADLs (bathing, care of mouth/teeth, toileting, grooming, dressing, etc )  - Assess/evaluate cause of self-care deficits   - Assess range of motion  - Assess patient's mobility; develop plan if impaired  - Assess patient's need for assistive devices and provide as appropriate  - Encourage maximum independence but intervene and supervise when necessary  - Involve family in performance of ADLs  - Assess for home care needs following discharge   - Consider OT consult to assist with ADL evaluation and planning for discharge  - Provide patient education as appropriate  Outcome: Progressing  Goal: Maintains/Returns to pre admission functional level  Description: INTERVENTIONS:  - Perform BMAT or MOVE assessment daily    - Set and communicate daily mobility goal to care team and patient/family/caregiver  - Collaborate with rehabilitation services on mobility goals if consulted  - Perform Range of Motion 3 times a day  - Reposition patient every 2 hours    - Dangle patient 3 times a day  - Stand patient 3 times a day  - Ambulate patient 3 times a day  - Out of bed to chair 3 times a day   - Out of bed for meals 3 times a day  - Out of bed for toileting  - Record patient progress and toleration of activity level   Outcome: Progressing     Problem: PAIN - ADULT  Goal: Verbalizes/displays adequate comfort level or baseline comfort level  Description: Interventions:  - Encourage patient to monitor pain and request assistance  - Assess pain using appropriate pain scale  - Administer analgesics based on type and severity of pain and evaluate response  - Implement non-pharmacological measures as appropriate and evaluate response  - Consider cultural and social influences on pain and pain management  - Notify physician/advanced practitioner if interventions unsuccessful or patient reports new pain  Outcome: Progressing     Problem: INFECTION - ADULT  Goal: Absence or prevention of progression during hospitalization  Description: INTERVENTIONS:  - Assess and monitor for signs and symptoms of infection  - Monitor lab/diagnostic results  - Monitor all insertion sites, i e  indwelling lines, tubes, and drains  - Monitor endotracheal if appropriate and nasal secretions for changes in amount and color  - [place] appropriate cooling/warming therapies per order  - Administer medications as ordered  - Instruct and encourage patient and family to use good hand hygiene technique  - Identify and instruct in appropriate isolation precautions for identified infection/condition  Outcome: Progressing  Goal: Absence of fever/infection during neutropenic period  Description: INTERVENTIONS:  - Monitor WBC    Outcome: Progressing     Problem: SAFETY ADULT  Goal: Maintain or return to baseline ADL function  Description: INTERVENTIONS:  -  Assess patient's ability to carry out ADLs; assess patient's baseline for ADL function and identify physical deficits which impact ability to perform ADLs (bathing, care of mouth/teeth, toileting, grooming, dressing, etc )  - Assess/evaluate cause of self-care deficits   - Assess range of motion  - Assess patient's mobility; develop plan if impaired  - Assess patient's need for assistive devices and provide as appropriate  - Encourage maximum independence but intervene and supervise when necessary  - Involve family in performance of ADLs  - Assess for home care needs following discharge   - Consider OT consult to assist with ADL evaluation and planning for discharge  - Provide patient education as appropriate  Outcome: Progressing  Goal: Maintains/Returns to pre admission functional level  Description: INTERVENTIONS:  - Perform BMAT or MOVE assessment daily    - Set and communicate daily mobility goal to care team and patient/family/caregiver  - Collaborate with rehabilitation services on mobility goals if consulted  - Perform Range of Motion 3 times a day  - Reposition patient every 2 hours    - Dangle patient 3 times a day  - Stand patient 3 times a day  - Ambulate patient 3 times a day  - Out of bed to chair 3 times a day   - Out of bed for meals 3 times a day  - Out of bed for toileting  - Record patient progress and toleration of activity level   Outcome: Progressing  Goal: Patient will remain free of falls  Description: INTERVENTIONS:  - Educate patient/family on patient safety including physical limitations  - Instruct patient to call for assistance with activity   - Consult OT/PT to assist with strengthening/mobility   - Keep Call bell within reach  - Keep bed low and locked with side rails adjusted as appropriate  - Keep care items and personal belongings within reach  - Initiate and maintain comfort rounds  - Make Fall Risk Sign visible to staff  - Apply yellow socks and bracelet for high fall risk patients  - Consider moving patient to room near nurses station  Outcome: Progressing     Problem: DISCHARGE PLANNING  Goal: Discharge to home or other facility with appropriate resources  Description: INTERVENTIONS:  - Identify barriers to discharge w/patient and caregiver  - Arrange for needed discharge resources and transportation as appropriate  - Identify discharge learning needs (meds, wound care, etc )  - Arrange for interpretive services to assist at discharge as needed  - Refer to Case Management Department for coordinating discharge planning if the patient needs post-hospital services based on physician/advanced practitioner order or complex needs related to functional status, cognitive ability, or social support system  Outcome: Progressing     Problem: Knowledge Deficit  Goal: Patient/family/caregiver demonstrates understanding of disease process, treatment plan, medications, and discharge instructions  Description: Complete learning assessment and assess knowledge base  Interventions:  - Provide teaching at level of understanding  - Provide teaching via preferred learning methods  Outcome: Progressing     Problem: Neurological Deficit  Goal: Neurological status is stable or improving  Description: Interventions:  - Monitor and assess patient's level of consciousness, motor function, sensory function, and level of assistance needed for ADLs  - Monitor and report changes from baseline  Collaborate with interdisciplinary team to initiate plan and implement interventions as ordered  - Provide and maintain a safe environment  - Consider seizure precautions  - Consider fall precautions  - Consider aspiration precautions  - Consider bleeding precautions  Outcome: Progressing     Problem: Activity Intolerance/Impaired Mobility  Goal: Mobility/activity is maintained at optimum level for patient  Description: Interventions:  - Assess and monitor patient  barriers to mobility and need for assistive/adaptive devices  - Assess patient's emotional response to limitations  - Collaborate with interdisciplinary team and initiate plans and interventions as ordered  - Encourage independent activity per ability   - Maintain proper body alignment  - Perform active/passive rom as tolerated/ordered  - Plan activities to conserve energy   - Turn patient as appropriate  Outcome: Progressing     Problem: Communication Impairment  Goal: Ability to express needs and understand communication  Description: Assess patient's communication skills and ability to understand information  Patient will demonstrate use of effective communication techniques, alternative methods of communication and understanding even if not able to speak  - Encourage communication and provide alternate methods of communication as needed    - Collaborate with case management/ for discharge needs   - Include patient/family/caregiver in decisions related to communication  Outcome: Progressing     Problem: Potential for Aspiration  Goal: Non-ventilated patient's risk of aspiration is minimized  Description: Assess and monitor vital signs, respiratory status, and labs (WBC)  Monitor for signs of aspiration (tachypnea, cough, rales, wheezing, cyanosis, fever)  - Assess and monitor patient's ability to swallow  - Place patient up in chair to eat if possible  - HOB up at 90 degrees to eat if unable to get patient up into chair   - Supervise patient during oral intake  - Instruct patient/ family to take small bites  - Instruct patient/ family to take small single sips when taking liquids  - Follow patient-specific strategies generated by speech pathologist   Outcome: Progressing  Goal: Ventilated patient's risk of aspiration is minimized  Description: Assess and monitor vital signs, respiratory status, airway cuff pressure, and labs (WBC)  Monitor for signs of aspiration (tachypnea, cough, rales, wheezing, cyanosis, fever)  - Elevate head of bed 30 degrees if patient has tube feeding   - Monitor tube feeding  Outcome: Progressing     Problem: Nutrition  Goal: Nutrition/Hydration status is improving  Description: Monitor and assess patient's nutrition/hydration status for malnutrition (ex- brittle hair, bruises, dry skin, pale skin and conjunctiva, muscle wasting, smooth red tongue, and disorientation)  Collaborate with interdisciplinary team and initiate plan and interventions as ordered  Monitor patient's weight and dietary intake as ordered or per policy  Utilize nutrition screening tool and intervene per policy  Determine patient's food preferences and provide high-protein, high-caloric foods as appropriate  - Assist patient with eating   - Allow adequate time for meals   - Encourage patient to take dietary supplement as ordered    - Collaborate with clinical nutritionist   - Include patient/family/caregiver in decisions related to nutrition    Outcome: Progressing     Problem: Potential for Falls  Goal: Patient will remain free of falls  Description: INTERVENTIONS:  - Educate patient/family on patient safety including physical limitations  - Instruct patient to call for assistance with activity   - Consult OT/PT to assist with strengthening/mobility   - Keep Call bell within reach  - Keep bed low and locked with side rails adjusted as appropriate  - Keep care items and personal belongings within reach  - Initiate and maintain comfort rounds  - Make Fall Risk Sign visible to staff  - Apply yellow socks and bracelet for high fall risk patients  - Consider moving patient to room near nurses station  Outcome: Progressing

## 2022-01-13 NOTE — CONSULTS
Consult received for stroke pathway  Pt passed RN dysphagia screen  Pt was placed on CCD 2, cardiac diet though reports he feels he is not receiving enough food  Pt with allergy to aspartame, blood sugar levels have been < 180 since admission, will d/c CCD 2 restriction  Continue cardiac diet for chronic CHF/BLE edema  Pt declined diet ed stating, "My A1C is going up because the doctors are messing with my blood pressure medications  My A1C has always been around 5 6-5 7 " Pt reports not following special diet to control CKD 4, not interested at this time in discussing labs with physician  Is not interested in following diet

## 2022-01-13 NOTE — ASSESSMENT & PLAN NOTE
· BP has been labile, initially permissive HTN for 24 hours following acute CVA  · Continue home Toprol XL   · IV labetalol PRN SBP >170

## 2022-01-13 NOTE — PROGRESS NOTES
[address]  Progress Note - Bartolo Campbell [birth year], 74 y o  male MRN: [id number]  Unit/Bed#: -01 Encounter: [id number]  Primary Care Provider: Igor Zimmer DO   Date and time admitted to hospital: 1/11/2022  5:11 PM    * CVA (cerebral vascular accident) [facility])  Assessment & Plan  · ICU downgrade 01/13 after receiving TPA 01/11 for left hemiparesis, dysarthria, facial droop   · CT head and CTA head/neck without any acute findings  · MRI brain shows few scattered punctate foci of acute cerebral and right cerebellar infarction    The multi territorial distribution of the punctate foci of infarction suggests a cardioembolic etiology  · TTE technically difficult, could not clearly visualize embolic source   · Plan for BETTE with cardiology tomorrow to r/o embolic source, NPO at midnight   · If BETTE negative, patient will need outpatient loop recorder   · ASA 81mg daily re-initiated 01/13 after repeat CT head without evidence of hemorrhage   · Continue atorvastatin   · Telemetry without any evidence of afib or arrhythmia  · Lipid panel: elevated TG, low HDL, HgbA1c: 6 2   · Check d-dimer   · Cleared by speech therapy for regular diet, thin liquids  · PT/OT recommend outpatient rehab        Acute kidney injury superimposed on chronic kidney disease stage 4 [facility])  Assessment & Plan  Lab Results   Component Value Date    EGFR 14 01/13/2022    EGFR 15 01/12/2022    EGFR 14 01/11/2022    CREATININE 3 97 (H) 01/13/2022    CREATININE 3 81 (H) 01/12/2022    CREATININE 3 96 (H) 01/11/2022     · Baseline creatinine 3 1-3 6   · Creatinine on admission 3 96  · Creatinine today persistently elevated at 3 97, this is possibly secondary to receiving IV contrast for CTA head/neck on 01/11  · Will avoid IVF hydration in patient with bilateral LE edema and underlying diastolic CHF   · Patient is urinating appropriately   · Home dose of lasix was resumed 01/12, continue to monitor closely   · Avoid further Refill when due.   nephrotoxins/hypotension  · Will defer nephrology consult for now, if creatinine worsens tomorrow then will consult     Anemia  Assessment & Plan  · Hgb drop from 12 9 to 10 9 today, platelets low 018   · No prior hx of significant anemia   · No evidence of bleeding  · Check FOBT, iron study panel, vitB12, folate  · Continue to monitor for evidence of bleeding   · Patient is currently on SQ heparin for DVT ppx- will check HIT antibody     Hypothyroidism  Assessment & Plan  · Continue Synthroid  · Check TSH    Lymphedema  Assessment & Plan  · Patient with chronic history of lymphedema in bilateral lower extremities  · Continue home dose Lasix 120 mg b i d  HLD (hyperlipidemia)  Assessment & Plan  · Continue statin     Chronic diastolic HF (heart failure) (HCC)  Assessment & Plan  Wt Readings from Last 3 Encounters:   01/13/22 127 kg (280 lb 13 9 oz)   01/12/22 127 kg (279 lb 15 8 oz)       · 01/12 echo: EF 33%, diastolic function not evaluated   · Patient has bilateral LE edema which is chronic in nature due to underlying lymphedema  · Does not appear to be in acute exacerbation   · Home diuretic: lasix 120mg BID which was initially held and resumed 01/12  · Daily weights, Intake/Output   · Sodium restricted diet     HTN (hypertension)  Assessment & Plan  · BP has been labile, initially permissive HTN for 24 hours following acute CVA  · Continue home Toprol XL   · IV labetalol PRN SBP >170           VTE Pharmacologic Prophylaxis: VTE Score: 8 High Risk (Score >/= 5) - Pharmacological DVT Prophylaxis Ordered: heparin  Sequential Compression Devices Ordered  Patient Centered Rounds: I performed bedside rounds with nursing staff today  Discussions with Specialists or Other Care Team Provider: GIA,RN, cards    Education and Discussions with Family / Patient: Updated  (son) via phone  Time Spent for Care: 30 minutes   More than 50% of total time spent on counseling and coordination of care as described above  Current Length of Stay: 2 day(s)  Current Patient Status: Inpatient   Certification Statement: The patient will continue to require additional inpatient hospital stay due to Acute CVA, pending BETTE, DOC  Discharge Plan: Anticipate discharge in 24-48 hrs to home  Code Status: Level 1 - Full Code    Subjective:   Patient was sitting in the chair comfortably after eating breakfast today  No complaints at this time  Patient was hoping to go home today, discussed with patient the need of having a BETTE prior to discharge to figure out the cause of his stroke  Patient is agreeable with this plan  Denies any chest pain or shortness of breath  No residual weakness  No aphasia  Objective:     Vitals:   Temp (24hrs), Av 9 °F (36 6 °C), Min:97 4 °F (36 3 °C), Max:98 4 °F (36 9 °C)    Temp:  [97 4 °F (36 3 °C)-98 4 °F (36 9 °C)] 97 5 °F (36 4 °C)  HR:  [61-73] 61  Resp:  [16-44] 17  BP: (132-182)/(65-81) 177/80  SpO2:  [89 %-98 %] 95 %  Body mass index is 38 09 kg/m²  Input and Output Summary (last 24 hours): Intake/Output Summary (Last 24 hours) at 2022 1132  Last data filed at 2022 0830  Gross per 24 hour   Intake 490 ml   Output 925 ml   Net -435 ml       Physical Exam:   Physical Exam  Constitutional:       General: He is not in acute distress  HENT:      Mouth/Throat:      Mouth: Mucous membranes are moist    Eyes:      General: No scleral icterus  Cardiovascular:      Rate and Rhythm: Normal rate and regular rhythm  Heart sounds: Normal heart sounds  Pulmonary:      Effort: Pulmonary effort is normal  No respiratory distress  Breath sounds: Normal breath sounds  No wheezing, rhonchi or rales  Abdominal:      General: Abdomen is flat  Bowel sounds are normal       Palpations: Abdomen is soft  Tenderness: There is no abdominal tenderness  Musculoskeletal:         General: Swelling present  Right lower leg: Edema present        Left lower leg: Edema present  Comments: +1 bilateral LE pitting edema      Skin:     General: Skin is warm  Neurological:      General: No focal deficit present  Mental Status: He is alert and oriented to person, place, and time  Cranial Nerves: No cranial nerve deficit  Sensory: No sensory deficit  Motor: No weakness  Psychiatric:         Mood and Affect: Mood normal           Additional Data:     Labs:  Results from last 7 days   Lab Units 01/13/22  0542 01/12/22  1903 01/12/22  1903   WBC Thousand/uL 5 36   < > 6 40   HEMOGLOBIN g/dL 10 9*   < > 12 8   HEMATOCRIT % 32 6*   < > 38 7   PLATELETS Thousands/uL 101*   < > 102*   NEUTROS PCT %  --   --  74   LYMPHS PCT %  --   --  16   MONOS PCT %  --   --  6   EOS PCT %  --   --  3    < > = values in this interval not displayed  Results from last 7 days   Lab Units 01/13/22  0542 01/12/22  1844 01/12/22  1844   SODIUM mmol/L 137   < > 134*   POTASSIUM mmol/L 3 8   < > 5 0   CHLORIDE mmol/L 104   < > 101   CO2 mmol/L 26   < > 26   BUN mg/dL 54*   < > 51*   CREATININE mg/dL 3 97*   < > 3 81*   ANION GAP mmol/L 7   < > 7   CALCIUM mg/dL 8 7   < > 9 0   ALBUMIN g/dL  --   --  3 3*   TOTAL BILIRUBIN mg/dL  --   --  0 52   ALK PHOS U/L  --   --  106   ALT U/L  --   --  22   AST U/L  --   --  40   GLUCOSE RANDOM mg/dL 92   < > 157*    < > = values in this interval not displayed  Results from last 7 days   Lab Units 01/11/22  1718   INR  0 95     Results from last 7 days   Lab Units 01/13/22  0738 01/12/22  2148 01/12/22  1739 01/12/22  1157 01/12/22  0603 01/12/22  0044 01/11/22 2024   POC GLUCOSE mg/dl 100 97 148* 102 93 111 121     Results from last 7 days   Lab Units 01/12/22  1903   HEMOGLOBIN A1C % 6 2*           Lines/Drains:  Invasive Devices  Report    Peripheral Intravenous Line            Peripheral IV 01/13/22 Dorsal (posterior); Left Hand <1 day                  Telemetry:  Telemetry Orders (From admission, onward)             48 Hour Telemetry Monitoring  Continuous x 48 hours        Expiring   References:    Telemetry Guidelines   Question:  Reason for 48 Hour Telemetry  Answer:  Acute CVA (<24 hrs old, hemispheric strokes, selected brainstem strokes, cardiac arrhythmias)                 Telemetry Reviewed: Normal Sinus Rhythm  Indication for Continued Telemetry Use: Acute CVA             Imaging: Reviewed radiology reports from this admission including: CT head and MRI brain    Recent Cultures (last 7 days):         Last 24 Hours Medication List:   Current Facility-Administered Medications   Medication Dose Route Frequency Provider Last Rate    acetaminophen  650 mg Oral Q6H PRN Elizabeth Kaminski PA-C      aspirin  81 mg Oral Daily Elizabeth Garvey [place]      atorvastatin  40 mg Oral Daily With [employer] CHRISTA Hartmann      furosemide  120 mg Oral BID GAGANDEEP Finn      heparin (porcine)  5,000 Units Subcutaneous [facility] [place]      insulin glargine  20 Units Subcutaneous HS Elizabeth Kaminski PA-C      insulin lispro  1-6 Units Subcutaneous TID With Meals GAGANDEEP Finn      Labetalol HCl  10 mg Intravenous Q4H PRN Vin Davidson PA-C      levothyroxine  125 mcg Oral Early Morning Elizabeth Kaminski PA-C      metoprolol succinate  25 mg Oral Daily Elizabeth Kaminski [place]      oxyCODONE  10 mg Oral Q6H PRN Elizabeth Kaimnski PA-C          Today, Patient Was Seen By: Vin Davidson PA-C    **Please Note: This note may have been constructed using a voice recognition system  **

## 2022-01-13 NOTE — ASSESSMENT & PLAN NOTE
Lab Results   Component Value Date    EGFR 14 01/13/2022    EGFR 15 01/12/2022    EGFR 14 01/11/2022    CREATININE 3 97 (H) 01/13/2022    CREATININE 3 81 (H) 01/12/2022    CREATININE 3 96 (H) 01/11/2022     · Baseline creatinine 3 1-3 6   · Creatinine on admission 3 96  · Creatinine today persistently elevated at 3 97, this is possibly secondary to receiving IV contrast for CTA head/neck on 01/11  · Will avoid IVF hydration in patient with bilateral LE edema and underlying diastolic CHF   · Patient is urinating appropriately   · Home dose of lasix was resumed 01/12, continue to monitor closely   · Avoid further nephrotoxins/hypotension  · Will defer nephrology consult for now, if creatinine worsens tomorrow then will consult

## 2022-01-13 NOTE — ASSESSMENT & PLAN NOTE
Wt Readings from Last 3 Encounters:   01/13/22 127 kg (280 lb 13 9 oz)   01/12/22 127 kg (279 lb 15 8 oz)       · 01/12 echo: EF 44%, diastolic function not evaluated   · Patient has bilateral LE edema which is chronic in nature due to underlying lymphedema     · Does not appear to be in acute exacerbation   · Home diuretic: lasix 120mg BID which was initially held and resumed 01/12  · Daily weights, Intake/Output   · Sodium restricted diet

## 2022-01-13 NOTE — ASSESSMENT & PLAN NOTE
· ICU downgrade 01/13 after receiving TPA 01/11 for left hemiparesis, dysarthria, facial droop   · CT head and CTA head/neck without any acute findings  · MRI brain shows few scattered punctate foci of acute cerebral and right cerebellar infarction    The multi territorial distribution of the punctate foci of infarction suggests a cardioembolic etiology  · TTE technically difficult, could not clearly visualize embolic source   · Plan for BETTE with cardiology tomorrow to r/o embolic source, NPO at midnight   · If BETTE negative, patient will need outpatient loop recorder   · ASA 81mg daily re-initiated 01/13 after repeat CT head without evidence of hemorrhage   · Continue atorvastatin   · Telemetry without any evidence of afib or arrhythmia  · Lipid panel: elevated TG, low HDL, HgbA1c: 6 2   · Check d-dimer   · Cleared by speech therapy for regular diet, thin liquids  · PT/OT recommend outpatient rehab

## 2022-01-13 NOTE — QUICK NOTE
BETTE to be done tomorrow  Time is currently pending   NPO order is in      [place] CHRISTA

## 2022-01-13 NOTE — PLAN OF CARE
Problem: MOBILITY - ADULT  Goal: Maintain or return to baseline ADL function  Description: INTERVENTIONS:  -  Assess patient's ability to carry out ADLs; assess patient's baseline for ADL function and identify physical deficits which impact ability to perform ADLs (bathing, care of mouth/teeth, toileting, grooming, dressing, etc )  - Assess/evaluate cause of self-care deficits   - Assess range of motion  - Assess patient's mobility; develop plan if impaired  - Assess patient's need for assistive devices and provide as appropriate  - Encourage maximum independence but intervene and supervise when necessary  - Involve family in performance of ADLs  - Assess for home care needs following discharge   - Consider OT consult to assist with ADL evaluation and planning for discharge  - Provide patient education as appropriate  Outcome: Progressing  Goal: Maintains/Returns to pre admission functional level  Description: INTERVENTIONS:  - Perform BMAT or MOVE assessment daily    - Set and communicate daily mobility goal to care team and patient/family/caregiver  - Collaborate with rehabilitation services on mobility goals if consulted  - Perform Range of Motion 3 times a day  - Reposition patient every 2 hours    - Dangle patient 3 times a day  - Stand patient 3 times a day  - Ambulate patient 3 times a day  - Out of bed to chair 3 times a day   - Out of bed for meals 3 times a day  - Out of bed for toileting  - Record patient progress and toleration of activity level   Outcome: Progressing     Problem: PAIN - ADULT  Goal: Verbalizes/displays adequate comfort level or baseline comfort level  Description: Interventions:  - Encourage patient to monitor pain and request assistance  - Assess pain using appropriate pain scale  - Administer analgesics based on type and severity of pain and evaluate response  - Implement non-pharmacological measures as appropriate and evaluate response  - Consider cultural and social influences on pain and pain management  - Notify physician/advanced practitioner if interventions unsuccessful or patient reports new pain  Outcome: Progressing     Problem: INFECTION - ADULT  Goal: Absence or prevention of progression during hospitalization  Description: INTERVENTIONS:  - Assess and monitor for signs and symptoms of infection  - Monitor lab/diagnostic results  - Monitor all insertion sites, i e  indwelling lines, tubes, and drains  - Monitor endotracheal if appropriate and nasal secretions for changes in amount and color  - [place] appropriate cooling/warming therapies per order  - Administer medications as ordered  - Instruct and encourage patient and family to use good hand hygiene technique  - Identify and instruct in appropriate isolation precautions for identified infection/condition  Outcome: Progressing  Goal: Absence of fever/infection during neutropenic period  Description: INTERVENTIONS:  - Monitor WBC    Outcome: Progressing     Problem: SAFETY ADULT  Goal: Maintain or return to baseline ADL function  Description: INTERVENTIONS:  -  Assess patient's ability to carry out ADLs; assess patient's baseline for ADL function and identify physical deficits which impact ability to perform ADLs (bathing, care of mouth/teeth, toileting, grooming, dressing, etc )  - Assess/evaluate cause of self-care deficits   - Assess range of motion  - Assess patient's mobility; develop plan if impaired  - Assess patient's need for assistive devices and provide as appropriate  - Encourage maximum independence but intervene and supervise when necessary  - Involve family in performance of ADLs  - Assess for home care needs following discharge   - Consider OT consult to assist with ADL evaluation and planning for discharge  - Provide patient education as appropriate  Outcome: Progressing  Goal: Maintains/Returns to pre admission functional level  Description: INTERVENTIONS:  - Perform BMAT or MOVE assessment daily    - Set and communicate daily mobility goal to care team and patient/family/caregiver  - Collaborate with rehabilitation services on mobility goals if consulted  - Perform Range of Motion 3 times a day  - Reposition patient every 2 hours  - Dangle patient 3 times a day  - Stand patient 3 times a day  - Ambulate patient 3 times a day  - Out of bed to chair 3 times a day   - Out of bed for meals 3 times a day  - Out of bed for toileting  - Record patient progress and toleration of activity level   Outcome: Progressing     Problem: DISCHARGE PLANNING  Goal: Discharge to home or other facility with appropriate resources  Description: INTERVENTIONS:  - Identify barriers to discharge w/patient and caregiver  - Arrange for needed discharge resources and transportation as appropriate  - Identify discharge learning needs (meds, wound care, etc )  - Arrange for interpretive services to assist at discharge as needed  - Refer to Case Management Department for coordinating discharge planning if the patient needs post-hospital services based on physician/advanced practitioner order or complex needs related to functional status, cognitive ability, or social support system  Outcome: Progressing     Problem: Knowledge Deficit  Goal: Patient/family/caregiver demonstrates understanding of disease process, treatment plan, medications, and discharge instructions  Description: Complete learning assessment and assess knowledge base  Interventions:  - Provide teaching at level of understanding  - Provide teaching via preferred learning methods  Outcome: Progressing     Problem: Neurological Deficit  Goal: Neurological status is stable or improving  Description: Interventions:  - Monitor and assess patient's level of consciousness, motor function, sensory function, and level of assistance needed for ADLs  - Monitor and report changes from baseline   Collaborate with interdisciplinary team to initiate plan and implement interventions as ordered  - Provide and maintain a safe environment  - Consider seizure precautions  - Consider fall precautions  - Consider aspiration precautions  - Consider bleeding precautions  Outcome: Progressing     Problem: Activity Intolerance/Impaired Mobility  Goal: Mobility/activity is maintained at optimum level for patient  Description: Interventions:  - Assess and monitor patient  barriers to mobility and need for assistive/adaptive devices  - Assess patient's emotional response to limitations  - Collaborate with interdisciplinary team and initiate plans and interventions as ordered  - Encourage independent activity per ability   - Maintain proper body alignment  - Perform active/passive rom as tolerated/ordered  - Plan activities to conserve energy   - Turn patient as appropriate  Outcome: Progressing     Problem: Communication Impairment  Goal: Ability to express needs and understand communication  Description: Assess patient's communication skills and ability to understand information  Patient will demonstrate use of effective communication techniques, alternative methods of communication and understanding even if not able to speak  - Encourage communication and provide alternate methods of communication as needed  - Collaborate with case management/ for discharge needs  - Include patient/family/caregiver in decisions related to communication  Outcome: Progressing     Problem: Potential for Aspiration  Goal: Non-ventilated patient's risk of aspiration is minimized  Description: Assess and monitor vital signs, respiratory status, and labs (WBC)  Monitor for signs of aspiration (tachypnea, cough, rales, wheezing, cyanosis, fever)  - Assess and monitor patient's ability to swallow  - Place patient up in chair to eat if possible  - HOB up at 90 degrees to eat if unable to get patient up into chair   - Supervise patient during oral intake     - Instruct patient/ family to take small bites  - Instruct patient/ family to take small single sips when taking liquids  - Follow patient-specific strategies generated by speech pathologist   Outcome: Progressing  Goal: Ventilated patient's risk of aspiration is minimized  Description: Assess and monitor vital signs, respiratory status, airway cuff pressure, and labs (WBC)  Monitor for signs of aspiration (tachypnea, cough, rales, wheezing, cyanosis, fever)  - Elevate head of bed 30 degrees if patient has tube feeding   - Monitor tube feeding  Outcome: Progressing     Problem: Nutrition  Goal: Nutrition/Hydration status is improving  Description: Monitor and assess patient's nutrition/hydration status for malnutrition (ex- brittle hair, bruises, dry skin, pale skin and conjunctiva, muscle wasting, smooth red tongue, and disorientation)  Collaborate with interdisciplinary team and initiate plan and interventions as ordered  Monitor patient's weight and dietary intake as ordered or per policy  Utilize nutrition screening tool and intervene per policy  Determine patient's food preferences and provide high-protein, high-caloric foods as appropriate  - Assist patient with eating   - Allow adequate time for meals   - Encourage patient to take dietary supplement as ordered  - Collaborate with clinical nutritionist   - Include patient/family/caregiver in decisions related to nutrition    Outcome: Progressing

## 2022-01-14 ENCOUNTER — APPOINTMENT (INPATIENT)
Dept: NON INVASIVE DIAGNOSTICS | Facility: HOSPITAL | Age: 67
DRG: 062 | End: 2022-01-14
Payer: MEDICARE

## 2022-01-14 ENCOUNTER — APPOINTMENT (INPATIENT)
Dept: ULTRASOUND IMAGING | Facility: HOSPITAL | Age: 67
DRG: 062 | End: 2022-01-14
Payer: MEDICARE

## 2022-01-14 PROBLEM — I21.9 MI (MYOCARDIAL INFARCTION) (HCC): Status: ACTIVE | Noted: 2022-01-14

## 2022-01-14 PROBLEM — I20.8 ANGINA AT REST (HCC): Status: ACTIVE | Noted: 2022-01-14

## 2022-01-14 PROBLEM — F17.200 SMOKING: Status: ACTIVE | Noted: 2022-01-14

## 2022-01-14 PROBLEM — D69.6 THROMBOCYTOPENIA (HCC): Status: ACTIVE | Noted: 2022-01-14

## 2022-01-14 PROBLEM — I20.89 ANGINA AT REST: Status: ACTIVE | Noted: 2022-01-14

## 2022-01-14 PROBLEM — Z98.61 HISTORY OF PTCA: Status: ACTIVE | Noted: 2022-01-14

## 2022-01-14 PROBLEM — G47.30 SLEEP APNEA: Status: ACTIVE | Noted: 2022-01-14

## 2022-01-14 LAB
ANION GAP SERPL CALCULATED.3IONS-SCNC: 11 MMOL/L (ref 4–13)
BACTERIA UR QL AUTO: ABNORMAL /HPF
BASOPHILS # BLD AUTO: 0.02 THOUSANDS/ΜL (ref 0–0.1)
BASOPHILS NFR BLD AUTO: 0 % (ref 0–1)
BILIRUB UR QL STRIP: NEGATIVE
BUN SERPL-MCNC: 63 MG/DL (ref 5–25)
CALCIUM SERPL-MCNC: 8.5 MG/DL (ref 8.3–10.1)
CHLORIDE SERPL-SCNC: 101 MMOL/L (ref 100–108)
CLARITY UR: CLEAR
CO2 SERPL-SCNC: 23 MMOL/L (ref 21–32)
COLOR UR: YELLOW
CREAT SERPL-MCNC: 4.42 MG/DL (ref 0.6–1.3)
CREAT UR-MCNC: 72.4 MG/DL
D DIMER PPP FEU-MCNC: 1.3 UG/ML FEU
EOSINOPHIL # BLD AUTO: 0.16 THOUSAND/ΜL (ref 0–0.61)
EOSINOPHIL NFR BLD AUTO: 3 % (ref 0–6)
ERYTHROCYTE [DISTWIDTH] IN BLOOD BY AUTOMATED COUNT: 14.1 % (ref 11.6–15.1)
GFR SERPL CREATININE-BSD FRML MDRD: 12 ML/MIN/1.73SQ M
GLUCOSE SERPL-MCNC: 103 MG/DL (ref 65–140)
GLUCOSE SERPL-MCNC: 150 MG/DL (ref 65–140)
GLUCOSE SERPL-MCNC: 72 MG/DL (ref 65–140)
GLUCOSE SERPL-MCNC: 97 MG/DL (ref 65–140)
GLUCOSE SERPL-MCNC: 98 MG/DL (ref 65–140)
GLUCOSE UR STRIP-MCNC: NEGATIVE MG/DL
HCT VFR BLD AUTO: 31.5 % (ref 36.5–49.3)
HGB BLD-MCNC: 10.7 G/DL (ref 12–17)
HGB UR QL STRIP.AUTO: NEGATIVE
IMM GRANULOCYTES # BLD AUTO: 0.01 THOUSAND/UL (ref 0–0.2)
IMM GRANULOCYTES NFR BLD AUTO: 0 % (ref 0–2)
KETONES UR STRIP-MCNC: NEGATIVE MG/DL
LEUKOCYTE ESTERASE UR QL STRIP: NEGATIVE
LYMPHOCYTES # BLD AUTO: 1.33 THOUSANDS/ΜL (ref 0.6–4.47)
LYMPHOCYTES NFR BLD AUTO: 26 % (ref 14–44)
MCH RBC QN AUTO: 34.1 PG (ref 26.8–34.3)
MCHC RBC AUTO-ENTMCNC: 34 G/DL (ref 31.4–37.4)
MCV RBC AUTO: 100 FL (ref 82–98)
MONOCYTES # BLD AUTO: 0.3 THOUSAND/ΜL (ref 0.17–1.22)
MONOCYTES NFR BLD AUTO: 6 % (ref 4–12)
NEUTROPHILS # BLD AUTO: 3.21 THOUSANDS/ΜL (ref 1.85–7.62)
NEUTS SEG NFR BLD AUTO: 65 % (ref 43–75)
NITRITE UR QL STRIP: NEGATIVE
NON-SQ EPI CELLS URNS QL MICRO: ABNORMAL /HPF
NRBC BLD AUTO-RTO: 0 /100 WBCS
PH UR STRIP.AUTO: 5 [PH]
PLATELET # BLD AUTO: 98 THOUSANDS/UL (ref 149–390)
PMV BLD AUTO: 9.8 FL (ref 8.9–12.7)
POTASSIUM SERPL-SCNC: 3.7 MMOL/L (ref 3.5–5.3)
PROT UR STRIP-MCNC: ABNORMAL MG/DL
PROT UR-MCNC: 97 MG/DL
PROT/CREAT UR: 1.34 MG/G{CREAT} (ref 0–0.1)
RBC # BLD AUTO: 3.14 MILLION/UL (ref 3.88–5.62)
RBC #/AREA URNS AUTO: ABNORMAL /HPF
SODIUM SERPL-SCNC: 135 MMOL/L (ref 136–145)
SP GR UR STRIP.AUTO: 1.02 (ref 1–1.03)
UROBILINOGEN UR QL STRIP.AUTO: 0.2 E.U./DL
UUN 24H UR-MCNC: 450 MG/DL
WBC # BLD AUTO: 5.03 THOUSAND/UL (ref 4.31–10.16)
WBC #/AREA URNS AUTO: ABNORMAL /HPF

## 2022-01-14 PROCEDURE — 82948 REAGENT STRIP/BLOOD GLUCOSE: CPT

## 2022-01-14 PROCEDURE — 85025 COMPLETE CBC W/AUTO DIFF WBC: CPT | Performed by: FAMILY MEDICINE

## 2022-01-14 PROCEDURE — 93312 ECHO TRANSESOPHAGEAL: CPT

## 2022-01-14 PROCEDURE — 76770 US EXAM ABDO BACK WALL COMP: CPT

## 2022-01-14 PROCEDURE — 84156 ASSAY OF PROTEIN URINE: CPT | Performed by: PHYSICIAN ASSISTANT

## 2022-01-14 PROCEDURE — 80048 BASIC METABOLIC PNL TOTAL CA: CPT | Performed by: FAMILY MEDICINE

## 2022-01-14 PROCEDURE — 87205 SMEAR GRAM STAIN: CPT | Performed by: PHYSICIAN ASSISTANT

## 2022-01-14 PROCEDURE — 82570 ASSAY OF URINE CREATININE: CPT | Performed by: PHYSICIAN ASSISTANT

## 2022-01-14 PROCEDURE — 84540 ASSAY OF URINE/UREA-N: CPT | Performed by: PHYSICIAN ASSISTANT

## 2022-01-14 PROCEDURE — 99232 SBSQ HOSP IP/OBS MODERATE 35: CPT | Performed by: PHYSICIAN ASSISTANT

## 2022-01-14 PROCEDURE — 85379 FIBRIN DEGRADATION QUANT: CPT | Performed by: FAMILY MEDICINE

## 2022-01-14 PROCEDURE — 99223 1ST HOSP IP/OBS HIGH 75: CPT | Performed by: PHYSICIAN ASSISTANT

## 2022-01-14 PROCEDURE — 81001 URINALYSIS AUTO W/SCOPE: CPT | Performed by: PHYSICIAN ASSISTANT

## 2022-01-14 RX ORDER — LIDOCAINE HYDROCHLORIDE 10 MG/ML
INJECTION, SOLUTION EPIDURAL; INFILTRATION; INTRACAUDAL; PERINEURAL AS NEEDED
Status: DISCONTINUED | OUTPATIENT
Start: 2022-01-14 | End: 2022-01-14

## 2022-01-14 RX ORDER — INSULIN GLARGINE 100 [IU]/ML
15 INJECTION, SOLUTION SUBCUTANEOUS
Status: DISCONTINUED | OUTPATIENT
Start: 2022-01-14 | End: 2022-01-15 | Stop reason: HOSPADM

## 2022-01-14 RX ORDER — PROPOFOL 10 MG/ML
INJECTION, EMULSION INTRAVENOUS CONTINUOUS PRN
Status: DISCONTINUED | OUTPATIENT
Start: 2022-01-14 | End: 2022-01-14

## 2022-01-14 RX ORDER — SODIUM CHLORIDE, SODIUM LACTATE, POTASSIUM CHLORIDE, CALCIUM CHLORIDE 600; 310; 30; 20 MG/100ML; MG/100ML; MG/100ML; MG/100ML
50 INJECTION, SOLUTION INTRAVENOUS CONTINUOUS
Status: DISCONTINUED | OUTPATIENT
Start: 2022-01-14 | End: 2022-01-14

## 2022-01-14 RX ORDER — SODIUM CHLORIDE, SODIUM LACTATE, POTASSIUM CHLORIDE, CALCIUM CHLORIDE 600; 310; 30; 20 MG/100ML; MG/100ML; MG/100ML; MG/100ML
INJECTION, SOLUTION INTRAVENOUS CONTINUOUS PRN
Status: DISCONTINUED | OUTPATIENT
Start: 2022-01-14 | End: 2022-01-14

## 2022-01-14 RX ORDER — PROPOFOL 10 MG/ML
INJECTION, EMULSION INTRAVENOUS AS NEEDED
Status: DISCONTINUED | OUTPATIENT
Start: 2022-01-14 | End: 2022-01-14

## 2022-01-14 RX ORDER — METOCLOPRAMIDE HYDROCHLORIDE 5 MG/ML
INJECTION INTRAMUSCULAR; INTRAVENOUS AS NEEDED
Status: DISCONTINUED | OUTPATIENT
Start: 2022-01-14 | End: 2022-01-14

## 2022-01-14 RX ADMIN — PROPOFOL 100 MCG/KG/MIN: 10 INJECTION, EMULSION INTRAVENOUS at 08:04

## 2022-01-14 RX ADMIN — METOPROLOL SUCCINATE 25 MG: 25 TABLET, EXTENDED RELEASE ORAL at 09:11

## 2022-01-14 RX ADMIN — OXYCODONE HYDROCHLORIDE 10 MG: 10 TABLET ORAL at 14:24

## 2022-01-14 RX ADMIN — PROPOFOL 50 MG: 10 INJECTION, EMULSION INTRAVENOUS at 08:06

## 2022-01-14 RX ADMIN — OXYCODONE HYDROCHLORIDE 10 MG: 10 TABLET ORAL at 20:53

## 2022-01-14 RX ADMIN — HEPARIN SODIUM 5000 UNITS: 5000 INJECTION INTRAVENOUS; SUBCUTANEOUS at 20:53

## 2022-01-14 RX ADMIN — PROPOFOL 50 MG: 10 INJECTION, EMULSION INTRAVENOUS at 08:07

## 2022-01-14 RX ADMIN — INSULIN GLARGINE 15 UNITS: 100 INJECTION, SOLUTION SUBCUTANEOUS at 21:21

## 2022-01-14 RX ADMIN — INSULIN LISPRO 1 UNITS: 100 INJECTION, SOLUTION INTRAVENOUS; SUBCUTANEOUS at 11:39

## 2022-01-14 RX ADMIN — ATORVASTATIN CALCIUM 40 MG: 40 TABLET, FILM COATED ORAL at 16:29

## 2022-01-14 RX ADMIN — LIDOCAINE HYDROCHLORIDE 50 MG: 10 INJECTION, SOLUTION EPIDURAL; INFILTRATION; INTRACAUDAL; PERINEURAL at 08:04

## 2022-01-14 RX ADMIN — SODIUM CHLORIDE, SODIUM LACTATE, POTASSIUM CHLORIDE, AND CALCIUM CHLORIDE: .6; .31; .03; .02 INJECTION, SOLUTION INTRAVENOUS at 07:50

## 2022-01-14 RX ADMIN — OXYCODONE HYDROCHLORIDE 10 MG: 10 TABLET ORAL at 06:34

## 2022-01-14 RX ADMIN — PROPOFOL 50 MG: 10 INJECTION, EMULSION INTRAVENOUS at 08:05

## 2022-01-14 RX ADMIN — METOCLOPRAMIDE 10 MG: 5 INJECTION, SOLUTION INTRAMUSCULAR; INTRAVENOUS at 08:21

## 2022-01-14 RX ADMIN — LEVOTHYROXINE SODIUM 125 MCG: 125 TABLET ORAL at 06:03

## 2022-01-14 RX ADMIN — TOPICAL ANESTHETIC 1 SPRAY: 200 SPRAY DENTAL; PERIODONTAL at 08:02

## 2022-01-14 RX ADMIN — ASPIRIN 81 MG CHEWABLE TABLET 81 MG: 81 TABLET CHEWABLE at 09:11

## 2022-01-14 RX ADMIN — HEPARIN SODIUM 5000 UNITS: 5000 INJECTION INTRAVENOUS; SUBCUTANEOUS at 06:03

## 2022-01-14 RX ADMIN — HEPARIN SODIUM 5000 UNITS: 5000 INJECTION INTRAVENOUS; SUBCUTANEOUS at 14:20

## 2022-01-14 RX ADMIN — PROPOFOL 50 MG: 10 INJECTION, EMULSION INTRAVENOUS at 08:04

## 2022-01-14 NOTE — QUICK NOTE
Neurology Note    Reviewed BETTE results - unremarkable without evidence of cardiac source of embolism  Continue on ASA/statin  Keep on telemetry during admission  If no evidence of Afib on tele, plan on loop recorder placement as an outpatient  Will be seen as outpatient  No further inpatient recommendations otherwise; call with questions

## 2022-01-14 NOTE — PLAN OF CARE
Problem: MOBILITY - ADULT  Goal: Maintain or return to baseline ADL function  Description: INTERVENTIONS:  -  Assess patient's ability to carry out ADLs; assess patient's baseline for ADL function and identify physical deficits which impact ability to perform ADLs (bathing, care of mouth/teeth, toileting, grooming, dressing, etc )  - Assess/evaluate cause of self-care deficits   - Assess range of motion  - Assess patient's mobility; develop plan if impaired  - Assess patient's need for assistive devices and provide as appropriate  - Encourage maximum independence but intervene and supervise when necessary  - Involve family in performance of ADLs  - Assess for home care needs following discharge   - Consider OT consult to assist with ADL evaluation and planning for discharge  - Provide patient education as appropriate  Outcome: Progressing  Goal: Maintains/Returns to pre admission functional level  Description: INTERVENTIONS:  - Perform BMAT or MOVE assessment daily    - Set and communicate daily mobility goal to care team and patient/family/caregiver  - Collaborate with rehabilitation services on mobility goals if consulted  - Perform Range of Motion 3 times a day  - Reposition patient every 2 hours    - Dangle patient 3 times a day  - Stand patient 3 times a day  - Ambulate patient 3 times a day  - Out of bed to chair 3 times a day   - Out of bed for meals 3 times a day  - Out of bed for toileting  - Record patient progress and toleration of activity level   Outcome: Progressing     Problem: PAIN - ADULT  Goal: Verbalizes/displays adequate comfort level or baseline comfort level  Description: Interventions:  - Encourage patient to monitor pain and request assistance  - Assess pain using appropriate pain scale  - Administer analgesics based on type and severity of pain and evaluate response  - Implement non-pharmacological measures as appropriate and evaluate response  - Consider cultural and social influences on pain and pain management  - Notify physician/advanced practitioner if interventions unsuccessful or patient reports new pain  Outcome: Progressing     Problem: INFECTION - ADULT  Goal: Absence or prevention of progression during hospitalization  Description: INTERVENTIONS:  - Assess and monitor for signs and symptoms of infection  - Monitor lab/diagnostic results  - Monitor all insertion sites, i e  indwelling lines, tubes, and drains  - Monitor endotracheal if appropriate and nasal secretions for changes in amount and color  - [place] appropriate cooling/warming therapies per order  - Administer medications as ordered  - Instruct and encourage patient and family to use good hand hygiene technique  - Identify and instruct in appropriate isolation precautions for identified infection/condition  Outcome: Progressing     Problem: SAFETY ADULT  Goal: Maintain or return to baseline ADL function  Description: INTERVENTIONS:  -  Assess patient's ability to carry out ADLs; assess patient's baseline for ADL function and identify physical deficits which impact ability to perform ADLs (bathing, care of mouth/teeth, toileting, grooming, dressing, etc )  - Assess/evaluate cause of self-care deficits   - Assess range of motion  - Assess patient's mobility; develop plan if impaired  - Assess patient's need for assistive devices and provide as appropriate  - Encourage maximum independence but intervene and supervise when necessary  - Involve family in performance of ADLs  - Assess for home care needs following discharge   - Consider OT consult to assist with ADL evaluation and planning for discharge  - Provide patient education as appropriate  Outcome: Progressing  Goal: Maintains/Returns to pre admission functional level  Description: INTERVENTIONS:  - Perform BMAT or MOVE assessment daily    - Set and communicate daily mobility goal to care team and patient/family/caregiver     - Collaborate with rehabilitation services on mobility goals if consulted  - Perform Range of Motion 3 times a day  - Reposition patient every 2 hours    - Dangle patient 3 times a day  - Stand patient 3 times a day  - Ambulate patient 3 times a day  - Out of bed to chair 3 times a day   - Out of bed for meals 3 times a day  - Out of bed for toileting  - Record patient progress and toleration of activity level   Outcome: Progressing  Goal: Patient will remain free of falls  Description: INTERVENTIONS:  - Educate patient/family on patient safety including physical limitations  - Instruct patient to call for assistance with activity   - Consult OT/PT to assist with strengthening/mobility   - Keep Call bell within reach  - Keep bed low and locked with side rails adjusted as appropriate  - Keep care items and personal belongings within reach  - Initiate and maintain comfort rounds  - Make Fall Risk Sign visible to staff    - Apply yellow socks and bracelet for high fall risk patients  - Consider moving patient to room near nurses station  Outcome: Progressing     Problem: DISCHARGE PLANNING  Goal: Discharge to home or other facility with appropriate resources  Description: INTERVENTIONS:  - Identify barriers to discharge w/patient and caregiver  - Arrange for needed discharge resources and transportation as appropriate  - Identify discharge learning needs (meds, wound care, etc )  - Arrange for interpretive services to assist at discharge as needed  - Refer to Case Management Department for coordinating discharge planning if the patient needs post-hospital services based on physician/advanced practitioner order or complex needs related to functional status, cognitive ability, or social support system  Outcome: Progressing     Problem: Knowledge Deficit  Goal: Patient/family/caregiver demonstrates understanding of disease process, treatment plan, medications, and discharge instructions  Description: Complete learning assessment and assess knowledge base   Interventions:  - Provide teaching at level of understanding  - Provide teaching via preferred learning methods  Outcome: Progressing     Problem: Neurological Deficit  Goal: Neurological status is stable or improving  Description: Interventions:  - Monitor and assess patient's level of consciousness, motor function, sensory function, and level of assistance needed for ADLs  - Monitor and report changes from baseline  Collaborate with interdisciplinary team to initiate plan and implement interventions as ordered  - Provide and maintain a safe environment  - Consider seizure precautions  - Consider fall precautions  - Consider aspiration precautions  - Consider bleeding precautions  Outcome: Progressing     Problem: Activity Intolerance/Impaired Mobility  Goal: Mobility/activity is maintained at optimum level for patient  Description: Interventions:  - Assess and monitor patient  barriers to mobility and need for assistive/adaptive devices  - Assess patient's emotional response to limitations  - Collaborate with interdisciplinary team and initiate plans and interventions as ordered  - Encourage independent activity per ability   - Maintain proper body alignment  - Perform active/passive rom as tolerated/ordered  - Plan activities to conserve energy   - Turn patient as appropriate  Outcome: Progressing     Problem: Communication Impairment  Goal: Ability to express needs and understand communication  Description: Assess patient's communication skills and ability to understand information  Patient will demonstrate use of effective communication techniques, alternative methods of communication and understanding even if not able to speak  - Encourage communication and provide alternate methods of communication as needed  - Collaborate with case management/ for discharge needs  - Include patient/family/caregiver in decisions related to communication    Outcome: Progressing Problem: Potential for Aspiration  Goal: Non-ventilated patient's risk of aspiration is minimized  Description: Assess and monitor vital signs, respiratory status, and labs (WBC)  Monitor for signs of aspiration (tachypnea, cough, rales, wheezing, cyanosis, fever)  - Assess and monitor patient's ability to swallow  - Place patient up in chair to eat if possible  - HOB up at 90 degrees to eat if unable to get patient up into chair   - Supervise patient during oral intake  - Instruct patient/ family to take small bites  - Instruct patient/ family to take small single sips when taking liquids  - Follow patient-specific strategies generated by speech pathologist   Outcome: Progressing  Goal: Ventilated patient's risk of aspiration is minimized  Description: Assess and monitor vital signs, respiratory status, airway cuff pressure, and labs (WBC)  Monitor for signs of aspiration (tachypnea, cough, rales, wheezing, cyanosis, fever)  - Elevate head of bed 30 degrees if patient has tube feeding   - Monitor tube feeding  Outcome: Progressing     Problem: Nutrition  Goal: Nutrition/Hydration status is improving  Description: Monitor and assess patient's nutrition/hydration status for malnutrition (ex- brittle hair, bruises, dry skin, pale skin and conjunctiva, muscle wasting, smooth red tongue, and disorientation)  Collaborate with interdisciplinary team and initiate plan and interventions as ordered  Monitor patient's weight and dietary intake as ordered or per policy  Utilize nutrition screening tool and intervene per policy  Determine patient's food preferences and provide high-protein, high-caloric foods as appropriate  - Assist patient with eating   - Allow adequate time for meals   - Encourage patient to take dietary supplement as ordered  - Collaborate with clinical nutritionist   - Include patient/family/caregiver in decisions related to nutrition    Outcome: Progressing     Problem: Potential for Falls  Goal: Patient will remain free of falls  Description: INTERVENTIONS:  - Educate patient/family on patient safety including physical limitations  - Instruct patient to call for assistance with activity   - Consult OT/PT to assist with strengthening/mobility   - Keep Call bell within reach  - Keep bed low and locked with side rails adjusted as appropriate  - Keep care items and personal belongings within reach  - Initiate and maintain comfort rounds    - Apply yellow socks and bracelet for high fall risk patients  - Consider moving patient to room near nurses station  Outcome: Progressing

## 2022-01-14 NOTE — PHYSICAL THERAPY NOTE
PHYSICAL THERAPY TREATMENT NOTE  NAME:  Marie Latif  DATE: 01/14/22    Length Of Stay: 3  Performed at least 2 patient identifiers during session: Name and Birthday    TREATMENT flow sheet:      01/14/22 1137   PT Last Visit   PT Visit Date 01/14/22   Note Type   Note Type Cancelled Session   Cancel Reasons Other   Pain Assessment   Pain Assessment Tool 0-10   Pain Score No Pain   General   Chart Reviewed Yes       Pt pleasantly declined PT today  Pt noted that he is unsure to why he is being seen by PT  Pt was supine in bed and stated "watch this " Pt transferred himself out of bed and walked in his room with his SPC  Pt was in bedside recliner post-tx       Leslie Dickson PTA

## 2022-01-14 NOTE — CONSULTS
Consultation - Nephrology   Camille Tucker 74 y o  male MRN: [id number]  Unit/Bed#: -01 Encounter: [id number]      Assessment and Plan    1  Acute kidney injury, present on admission, superimposed on chronic kidney disease, stage IV  · With baseline serum creatinine 3 2 mg/dL on 12/21/2021  · With presenting creatinine 3 96 mg/dL on 01/11/2022 -> progressively worsened to serum creatinine 4 42 mg/dL  · Assessment/ Workup:  · No reports of poor p o  intake, N/V/D   · Recent history of CVA  · Pre-hospital diuretics furosemide 80 mg in the morning and 40 mg in the afternoon, per patient report  · Urinalysis reveals 2+ proteinuria  · Nonoliguric with 1 8 L urine output  · Nephrotoxin exposure:    · IV contrast exposure 01/11/2022  · Furosemide 120 mg by mouth twice daily started 01/12/2022 through 01/13/2022  · Propofol on 01/14/2022 (can cause renal tubular toxicity)  · Etiology:  · Suspect primary mechanism is over-diuresis given initiation of furosemide at a higher dose than pre-hospital in the days following IV contrast exposure  · At risk for contrast-induced acute kidney injury  · No evidence of urinary retention on renal ultrasound 01/14/2022  · Plan:  Request urine studies  Follow up results  Diuretic holiday today  If renal function improves tomorrow, will plan to restart pre-hospital furosemide 80 mg in the morning and 40 mg in the afternoon  Management of known lymphedema with compression treatments, if possible  Trend renal function  Continue to provide supportive care  Optimize hemodynamics  Maintain mean arterial pressure greater than 65 mmHg  Renally dose medications  Avoid nephrotoxins  Please discuss with renal any diuretics or possible nephrotoxic agents, such as NSAIDs or IV contrast  Recommend avoidance of PPIs in favor of H2 blocker, if appropriate for clinical scenario     Monitor for urinary retention- strict I&Os, record bladder scan PVRs and follow urinary retention protocol  2  Chronic kidney disease, stage IV baseline creatinine 3 2 mg/dL with proteinuria  · Follows with Dr Isauro Schrader  · Negative serologic workup 10/18/2021:  Normal AMBAR, complement C3, C4, SPEP  · Will need outpatient nephrology follow-up with primary nephrologist   3  Chronic diastolic congestive heart failure / HFpEF LVEF 62% / Lymphedema  · Strict I&Os  Standing scale daily weights  · Request 2 g sodium restriction, 1 8 L fluid restriction  · Diuretic holiday due to acute kidney injury  If creatinine improves, may restart pre-hospital furosemide 80 mg in the morning and 40 mg in the afternoon  4  Hypertension   · Patient reports worsening renal function with Arb, worsening lower extremity edema with calcium channel blocker and hydralazine  · Pre-hospital regimen was furosemide 80 mg in the morning and 40 mg in the afternoon, metoprolol succinate 25 mg once daily  · Blood pressure hypertensive, then borderline elevated  Maintain blood pressure less than 180/105 post tPA, per Neurology on 01/11/2022  · Continue metoprolol  5  Hypertensive nephrosclerosis, likely  · Not current candidate for Ace, Arb  6  Benign prostatic hyperplasia  · Consider restarting home tamsulosin 0 4 mg daily  Urinary retention protocol  7  Cerebrovascular accident  · S/p tPA 1/11/22  Neurology on board  On statin, beta-blocker  8  Factor V Leiden deficiency  · May contribute to increased risk of venous thrombosis embolism  9  Bilateral simple renal cysts  · Noted on renal ultrasound 01/14/2022  Repeat renal ultrasound in 6 months to monitor, if clinically indicated  Thank you for allowing us to participate in the care of your patient  Please feel free to contact us regarding the care of this patient, or any other questions/concerns that may be applicable      Patient Active Problem List   Diagnosis    [facility]A [facility]cerebral vascular accident) ([facility] [address] )    Acute kidney [facility] superimposed on chronic kidney disease stage 4 ([facility])    HTN (hypertension)    Chronic diastolic HF (heart failure) (HCC)    HLD (hyperlipidemia)    Lymphedema    Hypothyroidism    Anemia    Thrombocytopenia (HCC)    [facility] at [facility])    MI (myocardial infarction) ([facility] [address] )    History of PTCA    Sleep apnea    Smoking       History of Present Illness     Physician Requesting Consult: Molly Enrique MD    Reason for Consult / Principal Problem: Acute kidney injury superimposed on chronic kidney disease    Hx and PE limited by:  None    HPI: Cedric Guzman is a 74y o  year old male who presented to [address] [place] [address] Emergency Department on 01/11/2022 for evaluation of slurred speech, left-sided facial droop, left arm weakness  Patient received tPA  MRI brain without contrast on 01/12/2022 demonstrated "scattered punctate foci of acute cerebral and right cerebellar infarction   suggest cardioembolic etiology "    Nephrology is consulted for evaluation and management of acute kidney injury with peak creatinine 4 42 mg/dL 01/14/2021, superimposed on chronic kidney disease with baseline creatinine 3 2 mg/dL on 12/21/2021 per outside labs  Review of outside records reveals that patient follows with Dr Alexandra Dubois  Renal ultrasound reveals normal size kidneys and echogenicity with no evidence of obstruction  Urinalysis reveals 2+ proteinuria, otherwise bland  Further urine studies pending  Note the patient received furosemide 120 mg by mouth twice daily initiated past 2 days  Upon speaking with the patient, he reports home diuretic regimen is furosemide 80 mg in the morning 40 mg in the afternoon  He does report increased urination response to this dosage  He has known lymphedema  Patient explains he has had difficulty with numerous antihypertensive medications in the past:  Swelling with hydralazine, nifedipine, exacerbation of depression with carvedilol, worsening of renal function with losartan  He denies any poor p o  intake  He is currently NPO, however  Denies chest pain, trouble breathing, nausea, vomiting, diarrhea  Reports swelling is slightly worse than typical     History obtained from chart review and the patient    Review of Systems    Complains of lower extremity swelling, feeling down  A complete 10 point review of systems was performed and is otherwise negative  Historical Information   Past Medical History:   Diagnosis Date    Chronic kidney disease     Disease of thyroid gland     Hyperlipidemia     Hypertension     Stroke [facility])      Past Surgical History:   Procedure Laterality Date    CHOLECYSTECTOMY      CORONARY ANGIOPLASTY WITH STENT PLACEMENT      JOINT REPLACEMENT      bilateral knee     Social History   Social History     Substance and Sexual Activity   Alcohol Use Not Currently     Social History     Substance and Sexual Activity   Drug Use Never     Social History     Tobacco Use   Smoking Status Current Every Day Smoker    Packs/day: 0 50    Types: Cigarettes   Smokeless Tobacco Never Used     History reviewed  No pertinent family history      Meds/Allergies   all current active meds have been reviewed, current meds:   Current Facility-Administered Medications   Medication Dose Route Frequency    acetaminophen (TYLENOL) tablet 650 mg  650 mg Oral Q6H PRN    aspirin chewable tablet 81 mg  81 mg Oral Daily    atorvastatin (LIPITOR) tablet 40 mg  40 mg Oral Daily With Dinner    heparin (porcine) subcutaneous injection 5,000 Units  5,000 Units Subcutaneous Q8H [address]    insulin glargine (LANTUS) subcutaneous injection 20 Units 0 2 mL  20 Units Subcutaneous HS    insulin lispro (HumaLOG) 100 units/mL subcutaneous injection 1-6 Units  1-6 Units Subcutaneous TID With Meals    Labetalol HCl (NORMODYNE) injection 10 mg  10 mg Intravenous Q4H PRN    levothyroxine tablet 125 mcg  125 mcg Oral Early Morning    metoprolol succinate (TOPROL-XL) 24 hr tablet 25 mg  25 mg Oral Daily    oxyCODONE (ROXICODONE) immediate release tablet 10 mg  10 mg Oral Q6H PRN    and PTA meds:    Medications Prior to Admission   Medication    furosemide (LASIX) 80 mg tablet    HYDROcodone-acetaminophen (NORCO)  mg per tablet    tamsulosin (Flomax) 0 4 mg    allopurinol (ZYLOPRIM) 300 mg tablet    ascorbic acid (VITAMIN C) 500 MG tablet    aspirin (ECOTRIN LOW STRENGTH) 81 mg EC tablet    atorvastatin (LIPITOR) 10 mg tablet    co-enzyme Q-10 30 MG capsule    insulin glargine (LANTUS) 100 units/mL subcutaneous injection    insulin lispro (HumaLOG) 100 units/mL injection    levothyroxine 125 mcg tablet    metoprolol succinate (TOPROL-XL) 25 mg 24 hr tablet    vitamin A 2400 MCG (8000 UT) capsule    vitamin E, tocopherol, 200 units capsule         Allergies   Allergen Reactions    Carvedilol Shortness Of Breath    Aspartame - Food Allergy Diarrhea    Hydralazine Tachycardia    Lisinopril Other (See Comments)     Other reaction(s): dry heaves      Morphine Swelling     Other reaction(s): lump at injection  Hand swelling at IV injection site      Nifedipine Lightheadedness     didn't tolerate    Ciprofloxacin Rash    Strawberry Extract - Food Allergy Rash       Objective     Intake/Output Summary (Last 24 hours) at 1/14/2022 1329  Last data filed at 1/14/2022 1142  Gross per 24 hour   Intake 1200 ml   Output 2100 ml   Net -900 ml       Invasive Devices:        Physical Exam  Vitals and nursing note reviewed  Constitutional:       General: He is awake  He is not in acute distress  Appearance: Normal appearance  He is well-developed  He is morbidly obese  He is not ill-appearing, toxic-appearing or diaphoretic  HENT:      Head: Normocephalic and atraumatic  Jaw: There is normal jaw occlusion  Nose: Nose normal       Mouth/Throat:      Mouth: Mucous membranes are moist       Pharynx: Oropharynx is clear  No oropharyngeal exudate or posterior oropharyngeal erythema     Eyes:      General: Lids are normal  Vision grossly intact  Gaze aligned appropriately  No scleral icterus  Right eye: No discharge  Left eye: No discharge  Extraocular Movements: Extraocular movements intact  Conjunctiva/sclera: Conjunctivae normal       Pupils: Pupils are equal, round, and reactive to light  Neck:      Thyroid: No thyroid mass or thyromegaly  Trachea: Trachea and phonation normal    Cardiovascular:      Rate and Rhythm: Normal rate and regular rhythm  Heart sounds: Normal heart sounds, S1 normal and S2 normal  No murmur heard  No friction rub  No gallop  Comments: 3+ pedal edema->2+ edema at thighs  Pulmonary:      Effort: Pulmonary effort is normal  No respiratory distress  Breath sounds: Normal breath sounds  No stridor  No wheezing, rhonchi or rales  Abdominal:      General: Abdomen is flat  Bowel sounds are normal  There is no distension  Palpations: Abdomen is soft  There is no mass  Tenderness: There is no abdominal tenderness  There is no guarding  Hernia: No hernia is present  Musculoskeletal:         General: Normal range of motion  Cervical back: Normal range of motion and neck supple  No rigidity or tenderness  Right lower leg: 3+ Pitting Edema present  Left lower leg: 3+ Pitting Edema present  Lymphadenopathy:      Cervical: No cervical adenopathy  Skin:     General: Skin is warm and dry  Coloration: Skin is not jaundiced  Findings: No bruising  Nails: There is no clubbing  Comments: Bilateral lower extremity skin changes consistent with chronic venous stasis dermatitis  Erythema without warmth nor tenderness  Neurological:      Mental Status: He is alert and oriented to person, place, and time  Psychiatric:         Attention and Perception: Attention normal          Mood and Affect: Mood is depressed  Affect is flat           Speech: Speech normal          Behavior: Behavior normal  I/O last 3 completed shifts: In: 1010 [P O :1000; I V :10]  Out: 2325 [Urine:2325]    Vitals:    01/14/22 1206   BP: 154/76   Pulse: 68   Resp: 19   Temp: 98 5 °F (36 9 °C)   SpO2: 92%         Current Weight: Weight - Scale: 124 kg (273 lb)  First Weight: Weight - Scale: 133 kg (293 lb 6 9 oz)    Lab Results:  I have personally reviewed pertinent labs      CBC:   Lab Results   Component Value Date    WBC 5 03 01/14/2022    HGB 10 7 (L) 01/14/2022    HCT 31 5 (L) 01/14/2022     (H) 01/14/2022    PLT 98 (L) 01/14/2022    MCH 34 1 01/14/2022    MCHC 34 0 01/14/2022    RDW 14 1 01/14/2022    MPV 9 8 01/14/2022    NRBC 0 01/14/2022     CMP:   Lab Results   Component Value Date    K 3 7 01/14/2022     01/14/2022    CO2 23 01/14/2022    BUN 63 (H) 01/14/2022    CREATININE 4 42 (H) 01/14/2022    CALCIUM 8 5 01/14/2022    EGFR 12 01/14/2022     Phosphorus: No results found for: PHOS  Magnesium: No results found for: MG  Urinalysis:   Lab Results   Component Value Date    COLORU Yellow 01/14/2022    CLARITYU Clear 01/14/2022    SPECGRAV 1 020 01/14/2022    PHUR 5 0 01/14/2022    LEUKOCYTESUR Negative 01/14/2022    NITRITE Negative 01/14/2022    GLUCOSEU Negative 01/14/2022    KETONESU Negative 01/14/2022    BILIRUBINUR Negative 01/14/2022    BLOODU Negative 01/14/2022     Ionized Calcium: No results found for: CAION  Coagulation: No results found for: PT, INR, APTT  Troponin: No results found for: TROPONINI  ABG: No results found for: PHART, HPJ9NVM, PO2ART, VFW0PQD, M9JFKBMT, BEART, SOURCE    Results from last 7 days   Lab Units 01/14/22  0609 01/13/22  0542 01/12/22  1844   POTASSIUM mmol/L 3 7 3 8 5 0   CHLORIDE mmol/L 101 104 101   CO2 mmol/L 23 26 26   BUN mg/dL 63* 54* 51*   CREATININE mg/dL 4 42* 3 97* 3 81*   CALCIUM mg/dL 8 5 8 7 9 0   ALK PHOS U/L  --   --  106   ALT U/L  --   --  22   AST U/L  --   --  40       Radiology review:  Procedure: CT head wo contrast    Result Date: 1/12/2022  Narrative: CT BRAIN - WITHOUT CONTRAST INDICATION:   Stroke, follow up [address] F/u  COMPARISON:  CT and MRI performed earlier today  TECHNIQUE:  CT examination of the brain was performed  In addition to axial images, sagittal and coronal 2D reformatted images were created and submitted for interpretation  Radiation dose length product (DLP) for this visit:  958 mGy-cm   This examination, like all CT scans performed in the [facility], was performed utilizing techniques to minimize radiation dose exposure, including the use of iterative reconstruction and automated exposure control  IMAGE QUALITY:  Diagnostic  FINDINGS: PARENCHYMA: Scattered punctate infarcts and punctate microhemorrhage in the right brachium pontis identified on earlier MRI are not appreciated on CT  No acute territorial infarct  No acute hemorrhage, mass effect, shift or herniation  Mild chronic microangiopathic change  VENTRICLES AND EXTRA-AXIAL SPACES:  Normal for patient's age  VISUALIZED ORBITS AND PARANASAL SINUSES:  Unremarkable  CALVARIUM AND EXTRACRANIAL SOFT TISSUES:  Normal      Impression: Punctate infarcts and focus of microhemorrhage identified on earlier MRI are not appreciated on CT  No acute territorial infarct, hemorrhage or mass effect  Workstation performed: [id number]     Procedure: CT head wo contrast    Result Date: 1/12/2022  Narrative: CT BRAIN - WITHOUT CONTRAST INDICATION:   Headache, new or worsening (Age >= 50y) Headache after tPN  COMPARISON:  CTA of the head performed on 1/11/2022  TECHNIQUE:  CT examination of the brain was performed  In addition to axial images, sagittal and coronal 2D reformatted images were created and submitted for interpretation  Radiation dose length product (DLP) for this visit:  921 mGy-cm     This examination, like all CT scans performed in the [facility], was performed utilizing techniques to minimize radiation dose exposure, including the use of iterative reconstruction and automated exposure control  IMAGE QUALITY:  Diagnostic  FINDINGS: PARENCHYMA:  No intracranial mass, mass effect or midline shift  No CT signs of acute infarction  No acute parenchymal hemorrhage  Again seen are punctate hypodensities in bilateral basal ganglia which may represent age-indeterminate lacunar infarcts  Contrast enhancement of the cerebral vessels likely on the basis of CTA performed earlier  VENTRICLES AND EXTRA-AXIAL SPACES:  Normal for the patient's age  VISUALIZED ORBITS AND PARANASAL SINUSES:  Unremarkable  CALVARIUM AND EXTRACRANIAL SOFT TISSUES:  Normal      Impression: No acute intracranial abnormality  Workstation performed: [id number]     Procedure: MRI brain wo contrast    Result Date: 1/12/2022  Narrative: MRI BRAIN WITHOUT CONTRAST INDICATION: CVA  COMPARISON:   Head CT and CT angiography of the head and neck from January 11, 2022 and head CT from January 12, 2022  TECHNIQUE:  Sagittal T1, axial T2, axial FLAIR, axial T1, axial Mckenna and axial diffusion imaging  Imaging performed on 1 5T MRI  IMAGE QUALITY:  Diagnostic  FINDINGS: BRAIN PARENCHYMA:  There are few punctate scattered foci of diffusion restriction within the frontal lobes, right centrum semiovale, and right brachium pontis  Of these, the right brachium pontis focus of infarction is associated with petechial hemorrhage  Minimal cerebral white matter chronic microangiopathic changes  VENTRICLES:  Normal for the patient's age  SELLA AND PITUITARY GLAND:  Normal  ORBITS:  Normal  PARANASAL SINUSES:  Mild mucosal thickening in the ethmoid air cells  VASCULATURE:  Evaluation of the major intracranial vasculature demonstrates appropriate flow voids  CALVARIUM AND SKULL BASE:  Normal  EXTRACRANIAL SOFT TISSUES:  Normal      Impression: Few scattered punctate foci of acute cerebral and right cerebellar infarction    The multi territorial distribution of the punctate foci of infarction suggests a cardioembolic etiology  Minimal cerebral white matter signal abnormality suggesting chronic microangiopathy  I personally discussed this study with Eduardo Shelton on 1/12/2022 at 12:49 PM  Workstation performed: [id number]     Procedure: CT stroke alert brain    Result Date: 1/11/2022  Narrative: CT BRAIN - STROKE ALERT PROTOCOL INDICATION:   cva  COMPARISON:  None  TECHNIQUE:  CT examination of the brain was performed  In addition to axial images, coronal reformatted images were created and submitted for interpretation  Radiation dose length product (DLP) for this visit:  963 mGy-cm   This examination, like all CT scans performed in the [facility], was performed utilizing techniques to minimize radiation dose exposure, including the use of iterative reconstruction and automated exposure control  IMAGE QUALITY:  Diagnostic  FINDINGS:  PARENCHYMA:  No evidence of acute intracranial hemorrhage  Tiny low densities identified in the bilateral caudate age-indeterminate ischemic events  VENTRICLES AND EXTRA-AXIAL SPACES:  Normal for patient's age  VISUALIZED ORBITS AND PARANASAL SINUSES:  Unremarkable  CALVARIUM AND EXTRACRANIAL SOFT TISSUES:   Normal      Impression: No evidence of acute intracranial hemorrhage  Tiny low densities identified in the bilateral caudate age-indeterminate ischemic events  I personally discussed this study with neurologist on 1/11/2022 at 5:21 PM   Workstation performed: [id number]     Procedure: US kidney and bladder    Result Date: 1/14/2022  Narrative: RENAL ULTRASOUND INDICATION:   Worsening DOC  COMPARISON: None TECHNIQUE:   Ultrasound of the retroperitoneum was performed with a curvilinear transducer utilizing volumetric sweeps and still imaging techniques  FINDINGS: KIDNEYS: Symmetric and normal size  Right kidney:  10 6 x 5 3 x 4 6 cm  Left kidney:  11 2 x 5 9 x 5 point cm  Right kidney Normal echogenicity and contour  No suspicious masses detected    Simple right renal cysts measuring up to 16 mm are identified  No hydronephrosis  No shadowing calculi  No perinephric fluid collections  Left kidney Normal echogenicity and contour  No suspicious masses detected  Simple left renal cysts measuring up to 5 0 cm are identified  No hydronephrosis  No shadowing calculi  No perinephric fluid collections  URETERS: Nonvisualized  BLADDER: Normally distended  No focal thickening or mass lesions  Impression: No evidence of obstruction  Workstation performed: [id number]     Procedure: CTA stroke alert (head/neck)    Result Date: 1/11/2022  Narrative: CTA NECK AND BRAIN WITH CONTRAST INDICATION: cva COMPARISON:   None  TECHNIQUE:   Post contrast imaging was performed after administration of iodinated contrast through the neck and brain  Post contrast axial 0 625 mm images timed to opacify the arterial system  3D rendering was performed on an independent workstation  MIP reconstructions performed  Coronal reconstructions were performed of the noncontrast portion of the brain  Radiation dose length product (DLP) for this visit:  963 mGy-cm   This examination, like all CT scans performed in the [facility], was performed utilizing techniques to minimize radiation dose exposure, including the use of iterative reconstruction and automated exposure control  IV Contrast:  100 mL of iohexol (OMNIPAQUE)  IMAGE QUALITY:   Diagnostic FINDINGS: CERVICAL VASCULATURE AORTIC ARCH AND GREAT VESSELS:  Mild atherosclerotic disease of the arch, proximal great vessels and visualized subclavian vessels  No significant stenosis  RIGHT VERTEBRAL ARTERY CERVICAL SEGMENT:  Moderate stenosis at the origin  The vessel is normal in caliber throughout the neck  LEFT VERTEBRAL ARTERY CERVICAL SEGMENT:  Normal origin  The vessel is normal in caliber throughout the neck   RIGHT EXTRACRANIAL CAROTID SEGMENT:  Mild atherosclerotic disease of the distal common carotid artery and proximal cervical internal carotid artery without significant stenosis compared to the more distal ICA  LEFT EXTRACRANIAL CAROTID SEGMENT:  Mild atherosclerotic disease of the distal common carotid artery and proximal cervical internal carotid artery without significant stenosis compared to the more distal ICA  NASCET criteria was used to determine the degree of internal carotid artery diameter stenosis  INTRACRANIAL VASCULATURE INTERNAL CAROTID ARTERIES:  Normal enhancement of the intracranial portions of the internal carotid arteries  Normal ophthalmic artery origins  Normal ICA terminus  ANTERIOR CIRCULATION:  Symmetric A1 segments and anterior cerebral arteries with normal enhancement  Normal anterior communicating artery  MIDDLE CEREBRAL ARTERY CIRCULATION:  M1 segment and middle cerebral artery branches demonstrate normal enhancement bilaterally  DISTAL VERTEBRAL ARTERIES:  Normal distal vertebral arteries  Posterior inferior cerebellar artery origins are normal  Normal vertebral basilar junction  BASILAR ARTERY:  Basilar artery is normal in caliber  Normal superior cerebellar arteries  POSTERIOR CEREBRAL ARTERIES: Both posterior cerebral arteries arises from the basilar tip  Both arteries demonstrate normal enhancement  Normal posterior communicating arteries  VENOUS STRUCTURES:  Normal  NON VASCULAR ANATOMY BONY STRUCTURES:  No acute osseous abnormality  SOFT TISSUES OF THE NECK:  Unremarkable  THORACIC INLET:  Unremarkable  Impression: No evidence of hemodynamic significant stenosis, aneurysm or dissection  I personally discussed this study with neurologist on 1/11/2022 at 5:21 PM  Workstation performed: [id number]     Procedure: Echo complete w/ contrast if indicated    Result Date: 1/12/2022  Narrative: Kaylin Dean  Left Ventricle: Technically difficult study, echo enhancement was not utilized  Mild concentric left ventricular hypertrophy with normal LV cavity size and systolic function  Estimated ejection fraction 62%  No obvious segmental wall motion abnormality identified  Full diastolic assessment was not performed    Tricuspid Valve: Grossly normal-appearing tricuspid leaflets with trace insufficiency  No adequate spectral envelope for estimating pulmonary pressure    Pulmonary pressure is not able to be estimated    Other findings as below  Compared to study performed June 5732, LV systolic function was described as normal with ejection fraction 60%  The right ventricle was described as having normal size and function  That study was also described as technically difficult  Procedure: BETTE    Result Date: 1/14/2022  Narrative: Karina Ac  Left Ventricle: Intact LV systolic function  Visually estimated ejection fraction 60%  No obvious segmental wall motion abnormality seen  Diastolic characterization was not performed within the limits of the study    Left Atrial Appendage: Normal appearing left atrial appendage without thrombus  Normal spectral velocities seen within the left atrial appendage    Aorta: No significant plaquing identified in the thoracic aorta    No intracardiac mass or thrombus seen  No clear source of intracardiac thromboembolism  No intracardiac shunting identified  Lizbet Corey PA-C      Portions of the record may have been created with voice recognition software  Occasional wrong word or "sound a like" substitutions may have occurred due to the inherent limitations of voice recognition software  Read the chart carefully and recognize, using context, where substitutions have occurred

## 2022-01-14 NOTE — PROGRESS NOTES
[address]  Progress Note - [place] [birth year], 74 y o  male MRN: [id number]  Unit/Bed#: -01 Encounter: [id number]  Primary Care Provider: Sheila Chase DO   Date and time admitted to hospital: 1/11/2022  5:11 PM    * CVA (cerebral vascular accident) [facility])  Assessment & Plan  · ICU downgrade 01/13 after receiving TPA 01/11 for left hemiparesis, dysarthria, facial droop   · CT head and CTA head/neck without any acute findings  · MRI brain shows few scattered punctate foci of acute cerebral and right cerebellar infarction    The multi territorial distribution of the punctate foci of infarction suggests a cardioembolic etiology  · TTE technically difficult, could not clearly visualize embolic source   · BETTE without obvious source  · Patient will need outpatient loop recorder at discharge  · ASA 81mg daily re-initiated 01/13 after repeat CT head without evidence of hemorrhage   · Continue atorvastatin   · Telemetry without any evidence of afib or arrhythmia  · Lipid panel: elevated TG, low HDL, HgbA1c: 6 2   · D-dimer 1 3   · Cleared by speech therapy for regular diet, thin liquids  · PT/OT recommend outpatient rehab    · Outpatient follow-up with Neurology      Acute kidney injury superimposed on chronic kidney disease stage 4 [facility])  Assessment & Plan  Lab Results   Component Value Date    EGFR 12 01/14/2022    EGFR 14 01/13/2022    EGFR 15 01/12/2022    CREATININE 4 42 (H) 01/14/2022    CREATININE 3 97 (H) 01/13/2022    CREATININE 3 81 (H) 01/12/2022     · Baseline creatinine 3 1-3 6   · Creatinine on admission 3 96  · Creatinine today worsening to 4 42 today, this is possibly secondary to receiving IV contrast for CTA head/neck on 01/11  · Will avoid IVF hydration in patient with bilateral LE edema and underlying diastolic CHF   · Patient is urinating appropriately   · Home dose of lasix was resumed 01/12, continue to monitor closely   · Avoid further nephrotoxins/hypotension  · Check kidney/bladder ultrasound  · Will consult Nephrology due to progressively worsening kidney injury appreciate further input    Anemia  Assessment & Plan  · Hgb drop from 12 9 to 10 9 01/13  · Hgb today stable 10 7 compared to yesterday 10  9  platelets 98   · No prior hx of significant anemia   · No evidence of bleeding  · Check FOBT: pending   · Suspected anemia in the setting of chronic disease  · Continue to monitor for evidence of bleeding   · Patient is currently on SQ heparin for DVT ppx- pending HIT antibody     Hypothyroidism  Assessment & Plan  · Continue Synthroid  · TSH 2 9     Lymphedema  Assessment & Plan  · Patient with chronic history of lymphedema in bilateral lower extremities  · Continue home dose Lasix 120 mg b i d  HLD (hyperlipidemia)  Assessment & Plan  · Continue statin     Chronic diastolic HF (heart failure) (HCC)  Assessment & Plan  Wt Readings from Last 3 Encounters:   01/14/22 124 kg (273 lb)   01/12/22 127 kg (279 lb 15 8 oz)       · 01/12 echo: EF 41%, diastolic function not evaluated   · Patient has bilateral LE edema which is chronic in nature due to underlying lymphedema  · Does not appear to be in acute exacerbation   · Home diuretic: lasix 120mg BID which was initially held and resumed 01/12  · Daily weights, Intake/Output   · Sodium restricted diet     HTN (hypertension)  Assessment & Plan  · BP has been labile, initially permissive HTN for 24 hours following acute CVA  · Continue home Toprol XL   · IV labetalol PRN SBP >170         VTE Pharmacologic Prophylaxis: VTE Score: 8 High Risk (Score >/= 5) - Pharmacological DVT Prophylaxis Ordered: heparin  Sequential Compression Devices Ordered  Patient Centered Rounds: I performed bedside rounds with nursing staff today    Discussions with Specialists or Other Care Team Provider: Annika Tubbs     Education and Discussions with Family / Patient: Updated  (son's wife ) via phone     Time Spent for Care: 30 minutes  More than 50% of total time spent on counseling and coordination of care as described above  Current Length of Stay: 3 day(s)  Current Patient Status: Inpatient   Certification Statement: The patient will continue to require additional inpatient hospital stay due to worsening DOC   Discharge Plan: Anticipate discharge in 24-48 hrs to home  Code Status: Level 1 - Full Code    Subjective:   Patient was resting in bed comfortably after having BETTE completed today  Does note a mild cough likely secondary to anesthesia from BTETE  No complaints at this time  Reports to be eating and drinking well  Patient states he would like to go home  Objective:     Vitals:   Temp (24hrs), Av 1 °F (36 7 °C), Min:97 5 °F (36 4 °C), Max:98 5 °F (36 9 °C)    Temp:  [97 5 °F (36 4 °C)-98 5 °F (36 9 °C)] 98 5 °F (36 9 °C)  HR:  [62-80] 68  Resp:  [12-21] 19  BP: (104-175)/(54-83) 154/76  SpO2:  [90 %-99 %] 92 %  Body mass index is 37 03 kg/m²  Input and Output Summary (last 24 hours): Intake/Output Summary (Last 24 hours) at 2022 1316  Last data filed at 2022 1142  Gross per 24 hour   Intake 1200 ml   Output 2100 ml   Net -900 ml       Physical Exam:   Physical Exam  Constitutional:       General: He is not in acute distress  Appearance: He is obese  HENT:      Mouth/Throat:      Mouth: Mucous membranes are moist    Eyes:      General: No scleral icterus  Cardiovascular:      Rate and Rhythm: Normal rate and regular rhythm  Heart sounds: Normal heart sounds  Pulmonary:      Effort: Pulmonary effort is normal  No respiratory distress  Breath sounds: Normal breath sounds  No wheezing, rhonchi or rales  Abdominal:      General: Abdomen is flat  Bowel sounds are normal       Palpations: Abdomen is soft  Tenderness: There is no abdominal tenderness  Musculoskeletal:      Right lower leg: Edema present  Left lower leg: Edema present  Comments: +2 bilateral lower extremity pitting edema   Skin:     General: Skin is warm  Neurological:      General: No focal deficit present  Mental Status: He is alert  Mental status is at baseline  Psychiatric:         Mood and Affect: Mood normal           Additional Data:     Labs:  Results from last 7 days   Lab Units 01/14/22  0609   WBC Thousand/uL 5 03   HEMOGLOBIN g/dL 10 7*   HEMATOCRIT % 31 5*   PLATELETS Thousands/uL 98*   NEUTROS PCT % 65   LYMPHS PCT % 26   MONOS PCT % 6   EOS PCT % 3     Results from last 7 days   Lab Units 01/14/22  0609 01/13/22  0542 01/12/22  1844   SODIUM mmol/L 135*   < > 134*   POTASSIUM mmol/L 3 7   < > 5 0   CHLORIDE mmol/L 101   < > 101   CO2 mmol/L 23   < > 26   BUN mg/dL 63*   < > 51*   CREATININE mg/dL 4 42*   < > 3 81*   ANION GAP mmol/L 11   < > 7   CALCIUM mg/dL 8 5   < > 9 0   ALBUMIN g/dL  --   --  3 3*   TOTAL BILIRUBIN mg/dL  --   --  0 52   ALK PHOS U/L  --   --  106   ALT U/L  --   --  22   AST U/L  --   --  40   GLUCOSE RANDOM mg/dL 98   < > 157*    < > = values in this interval not displayed  Results from last 7 days   Lab Units 01/11/22  1718   INR  0 95     Results from last 7 days   Lab Units 01/14/22  1055 01/14/22  0734 01/13/22  2213 01/13/22  1619 01/13/22  1105 01/13/22  0738 01/12/22  2148 01/12/22  1739 01/12/22  1157 01/12/22  0603 01/12/22  0044 01/11/22  2024   POC GLUCOSE mg/dl 150* 103 121 164* 114 100 97 148* 102 93 111 121     Results from last 7 days   Lab Units 01/12/22  1903   HEMOGLOBIN A1C % 6 2*           Lines/Drains:  Invasive Devices  Report    Peripheral Intravenous Line            Peripheral IV 01/13/22 Dorsal (posterior); Left Hand 1 day                  Telemetry:  Telemetry Orders (From admission, onward)             48 Hour Telemetry Monitoring  Continuous x 48 hours        References:    Telemetry Guidelines   Question:  Reason for 48 Hour Telemetry  Answer:  Acute CVA (<24 hrs old, hemispheric strokes, selected brainstem strokes, cardiac arrhythmias)                 Telemetry Reviewed: Normal Sinus Rhythm  Indication for Continued Telemetry Use: Acute CVA             Imaging: No pertinent imaging reviewed  Recent Cultures (last 7 days):         Last 24 Hours Medication List:   Current Facility-Administered Medications   Medication Dose Route Frequency Provider Last Rate    acetaminophen  650 mg Oral Q6H PRN Will CHRISTA Juarez      aspirin  81 mg Oral Daily Will Stephen Juarez      atorvastatin  40 mg Oral Daily With Niyah Hair CHRISTA Hartmann      heparin (porcine)  5,000 Units Subcutaneous [facility] Will Stephen Juarez      insulin glargine  20 Units Subcutaneous HS Will Stephen Juarez      insulin lispro  1-6 Units Subcutaneous TID With Meals Will CHRISTA Jaurez      Labetalol HCl  10 mg Intravenous Q4H PRN GAGANDEEP Bernardo      levothyroxine  125 mcg Oral Early Morning Will CHRISTA Juarez      metoprolol succinate  25 mg Oral Daily Will Stephen Juarez      oxyCODONE  10 mg Oral Q6H PRN Will CHRISTA Juarez          Today, Patient Was Seen By: Ama Sharma PA-C    **Please Note: This note may have been constructed using a voice recognition system  **

## 2022-01-14 NOTE — ASSESSMENT & PLAN NOTE
Wt Readings from Last 3 Encounters:   01/14/22 124 kg (273 lb)   01/12/22 127 kg (279 lb 15 8 oz)       · 01/12 echo: EF 24%, diastolic function not evaluated   · Patient has bilateral LE edema which is chronic in nature due to underlying lymphedema     · Does not appear to be in acute exacerbation   · Home diuretic: lasix 120mg BID which was initially held and resumed 01/12  · Daily weights, Intake/Output   · Sodium restricted diet

## 2022-01-14 NOTE — ANESTHESIA POSTPROCEDURE EVALUATION
Post-Op Assessment Note    CV Status:  Stable  Pain Score: 0    Pain management: adequate     Mental Status:  Sleepy   Hydration Status:  Euvolemic   PONV Controlled:  Controlled   Airway Patency:  Patent      Post Op Vitals Reviewed: Yes      Staff: CRNA         No complications documented      BP   104/95   Temp      Pulse 79   Resp 18   SpO2 97

## 2022-01-14 NOTE — ASSESSMENT & PLAN NOTE
· Hgb drop from 12 9 to 10 9 01/13  · Hgb today stable 10 7 compared to yesterday 10  9   platelets 98   · No prior hx of significant anemia   · No evidence of bleeding  · Check FOBT: pending   · Suspected anemia in the setting of chronic disease  · Continue to monitor for evidence of bleeding   · Patient is currently on SQ heparin for DVT ppx- pending HIT antibody

## 2022-01-14 NOTE — PLAN OF CARE
Problem: MOBILITY - ADULT  Goal: Maintain or return to baseline ADL function  Description: INTERVENTIONS:  -  Assess patient's ability to carry out ADLs; assess patient's baseline for ADL function and identify physical deficits which impact ability to perform ADLs (bathing, care of mouth/teeth, toileting, grooming, dressing, etc )  - Assess/evaluate cause of self-care deficits   - Assess range of motion  - Assess patient's mobility; develop plan if impaired  - Assess patient's need for assistive devices and provide as appropriate  - Encourage maximum independence but intervene and supervise when necessary  - Involve family in performance of ADLs  - Assess for home care needs following discharge   - Consider OT consult to assist with ADL evaluation and planning for discharge  - Provide patient education as appropriate  Outcome: Progressing  Goal: Maintains/Returns to pre admission functional level  Description: INTERVENTIONS:  - Perform BMAT or MOVE assessment daily    - Set and communicate daily mobility goal to care team and patient/family/caregiver  - Collaborate with rehabilitation services on mobility goals if consulted  - Perform Range of Motion 3 times a day  - Reposition patient every 2 hours    - Dangle patient 3 times a day  - Stand patient 3 times a day  - Ambulate patient 3 times a day  - Out of bed to chair 3 times a day   - Out of bed for meals 3 times a day  - Out of bed for toileting  - Record patient progress and toleration of activity level   Outcome: Progressing     Problem: PAIN - ADULT  Goal: Verbalizes/displays adequate comfort level or baseline comfort level  Description: Interventions:  - Encourage patient to monitor pain and request assistance  - Assess pain using appropriate pain scale  - Administer analgesics based on type and severity of pain and evaluate response  - Implement non-pharmacological measures as appropriate and evaluate response  - Consider cultural and social influences on pain and pain management  - Notify physician/advanced practitioner if interventions unsuccessful or patient reports new pain  Outcome: Progressing     Problem: INFECTION - ADULT  Goal: Absence or prevention of progression during hospitalization  Description: INTERVENTIONS:  - Assess and monitor for signs and symptoms of infection  - Monitor lab/diagnostic results  - Monitor all insertion sites, i e  indwelling lines, tubes, and drains  - Monitor endotracheal if appropriate and nasal secretions for changes in amount and color  - [place] appropriate cooling/warming therapies per order  - Administer medications as ordered  - Instruct and encourage patient and family to use good hand hygiene technique  - Identify and instruct in appropriate isolation precautions for identified infection/condition  Outcome: Progressing  Goal: Absence of fever/infection during neutropenic period  Description: INTERVENTIONS:  - Monitor WBC    Outcome: Progressing

## 2022-01-14 NOTE — ANESTHESIA PREPROCEDURE EVALUATION
Procedure:  BETTE    Relevant Problems   ANESTHESIA (within normal limits)      CARDIO   (+) Angina at rest [facility]) (pt reports sporadic angina lasting "couple minutes", unrelated to activity, has been ongoing "for a long time" - none since in hospital   Prior hx angioplasty and remote stent after 2 MIs)   (+) HLD (hyperlipidemia)   (+) HTN (hypertension) (labile BPs, mostly -160s)   (+) History of PTCA   (+) MI (myocardial infarction) ([facility])      ENDO   (+) Hypothyroidism      /RENAL   (+) Acute kidney injury superimposed on chronic kidney disease stage 4 ([facility])      HEMATOLOGY   (+) Anemia   (+) Thrombocytopenia (HCC)      NEURO/PSYCH   (+) CVA (cerebral vascular accident) ([address] ) (s/p TPA 1/11)      PULMONARY   (+) Sleep apnea (pt reports prior dx SHAD but did not get CPAP)   (+) Smoking   (-) URI (upper respiratory infection)      Other   (+) Chronic diastolic HF (heart failure) ([facility])   (+) Lymphedema    BMI 38    Physical Exam    Airway    Mallampati score: II  TM Distance: >3 FB  Neck ROM: full     Dental   Comment: Upper edentulous, lower incisors intact,     Cardiovascular      Pulmonary      Other Findings       Lab Results   Component Value Date    WBC 5 03 01/14/2022    HGB 10 7 (L) 01/14/2022    PLT 98 (L) 01/14/2022     Lab Results   Component Value Date    SODIUM 135 (L) 01/14/2022    K 3 7 01/14/2022    BUN 63 (H) 01/14/2022    CREATININE 4 42 (H) 01/14/2022    EGFR 12 01/14/2022     Lab Results   Component Value Date    PTT 32 01/11/2022      Lab Results   Component Value Date    INR 0 95 01/11/2022     Lab Results   Component Value Date    HGBA1C 6 2 (H) 01/12/2022     Anesthesia Plan  ASA Score- 4     Anesthesia Type- IV sedation with anesthesia with ASA Monitors  Additional Monitors:   Airway Plan:           Plan Factors-Exercise tolerance (METS): >4 METS  Chart reviewed  Existing labs reviewed  Patient summary reviewed  Patient is a current smoker   Patient did not smoke on day of surgery  Induction- intravenous  Postoperative Plan-     Informed Consent- Anesthetic plan and risks discussed with patient  I personally reviewed this patient with the CRNA  Discussed and agreed on the Anesthesia Plan with the CRNA Gerhardt Sep

## 2022-01-14 NOTE — CASE MANAGEMENT
Case Management Progress Note    Patient name Jaiden Nguyen /-64 MRN [id number]  : 1955 Date 2022       LOS (days): 3  Geometric Mean LOS (GMLOS) (days): 2 90  Days to GMLOS:0        OBJECTIVE:        Current admission status: Inpatient  Preferred Pharmacy:   [address] [place] [id number]  Phone: [phone] Fax: [phone]    Primary Care Provider: eBar Huynh DO    Primary Insurance: MEDICARE  Secondary Insurance:     Chart reviewed aware of medical management  Case was discussed in multidisciplinary discharge meeting, with provider, PT, Nursing and CM  Clinical information supporting hospitalization:   + stroke  Plan is BETTE today  Pt with DOC _ ^ Creatinine   - renal is consulted  Barriers to discharge:  - medical management  Discharge plan:  Home with OP Therapy  CM will continue to follow plan of care

## 2022-01-14 NOTE — ASSESSMENT & PLAN NOTE
Lab Results   Component Value Date    EGFR 12 01/14/2022    EGFR 14 01/13/2022    EGFR 15 01/12/2022    CREATININE 4 42 (H) 01/14/2022    CREATININE 3 97 (H) 01/13/2022    CREATININE 3 81 (H) 01/12/2022     · Baseline creatinine 3 1-3 6   · Creatinine on admission 3 96  · Creatinine today worsening to 4 42 today, this is possibly secondary to receiving IV contrast for CTA head/neck on 01/11  · Will avoid IVF hydration in patient with bilateral LE edema and underlying diastolic CHF   · Patient is urinating appropriately   · Home dose of lasix was resumed 01/12, continue to monitor closely   · Avoid further nephrotoxins/hypotension  · Check kidney/bladder ultrasound  · Will consult Nephrology due to progressively worsening kidney injury appreciate further input

## 2022-01-14 NOTE — ASSESSMENT & PLAN NOTE
· ICU downgrade 01/13 after receiving TPA 01/11 for left hemiparesis, dysarthria, facial droop   · CT head and CTA head/neck without any acute findings  · MRI brain shows few scattered punctate foci of acute cerebral and right cerebellar infarction    The multi territorial distribution of the punctate foci of infarction suggests a cardioembolic etiology  · TTE technically difficult, could not clearly visualize embolic source   · BETTE without obvious source  · Patient will need outpatient loop recorder at discharge  · ASA 81mg daily re-initiated 01/13 after repeat CT head without evidence of hemorrhage   · Continue atorvastatin   · Telemetry without any evidence of afib or arrhythmia  · Lipid panel: elevated TG, low HDL, HgbA1c: 6 2   · D-dimer 1 3   · Cleared by speech therapy for regular diet, thin liquids  · PT/OT recommend outpatient rehab    · Outpatient follow-up with Neurology

## 2022-01-15 VITALS
BODY MASS INDEX: 38.68 KG/M2 | HEIGHT: 72 IN | RESPIRATION RATE: 19 BRPM | TEMPERATURE: 97.8 F | OXYGEN SATURATION: 97 % | HEART RATE: 56 BPM | DIASTOLIC BLOOD PRESSURE: 79 MMHG | WEIGHT: 285.6 LBS | SYSTOLIC BLOOD PRESSURE: 147 MMHG

## 2022-01-15 PROBLEM — D68.51 FACTOR 5 LEIDEN MUTATION, HETEROZYGOUS (HCC): Status: ACTIVE | Noted: 2022-01-15

## 2022-01-15 LAB
ANION GAP SERPL CALCULATED.3IONS-SCNC: 6 MMOL/L (ref 4–13)
BASOPHILS # BLD AUTO: 0.04 THOUSANDS/ΜL (ref 0–0.1)
BASOPHILS NFR BLD AUTO: 1 % (ref 0–1)
BUN SERPL-MCNC: 63 MG/DL (ref 5–25)
CALCIUM SERPL-MCNC: 8.4 MG/DL (ref 8.3–10.1)
CHLORIDE SERPL-SCNC: 103 MMOL/L (ref 100–108)
CO2 SERPL-SCNC: 25 MMOL/L (ref 21–32)
CREAT SERPL-MCNC: 4.43 MG/DL (ref 0.6–1.3)
EOSINOPHIL # BLD AUTO: 0.18 THOUSAND/ΜL (ref 0–0.61)
EOSINOPHIL NFR BLD AUTO: 4 % (ref 0–6)
EOSINOPHIL NFR URNS MANUAL: 0 %
ERYTHROCYTE [DISTWIDTH] IN BLOOD BY AUTOMATED COUNT: 14 % (ref 11.6–15.1)
GFR SERPL CREATININE-BSD FRML MDRD: 12 ML/MIN/1.73SQ M
GLUCOSE SERPL-MCNC: 103 MG/DL (ref 65–140)
GLUCOSE SERPL-MCNC: 129 MG/DL (ref 65–140)
GLUCOSE SERPL-MCNC: 77 MG/DL (ref 65–140)
HCT VFR BLD AUTO: 31.6 % (ref 36.5–49.3)
HGB BLD-MCNC: 10.6 G/DL (ref 12–17)
IMM GRANULOCYTES # BLD AUTO: 0.02 THOUSAND/UL (ref 0–0.2)
IMM GRANULOCYTES NFR BLD AUTO: 0 % (ref 0–2)
LYMPHOCYTES # BLD AUTO: 1.32 THOUSANDS/ΜL (ref 0.6–4.47)
LYMPHOCYTES NFR BLD AUTO: 28 % (ref 14–44)
MCH RBC QN AUTO: 33.3 PG (ref 26.8–34.3)
MCHC RBC AUTO-ENTMCNC: 33.5 G/DL (ref 31.4–37.4)
MCV RBC AUTO: 99 FL (ref 82–98)
MONOCYTES # BLD AUTO: 0.33 THOUSAND/ΜL (ref 0.17–1.22)
MONOCYTES NFR BLD AUTO: 7 % (ref 4–12)
NEUTROPHILS # BLD AUTO: 2.76 THOUSANDS/ΜL (ref 1.85–7.62)
NEUTS SEG NFR BLD AUTO: 60 % (ref 43–75)
NRBC BLD AUTO-RTO: 0 /100 WBCS
PF4 HEPARIN CMPLX AB SER-ACNC: 0.84 OD (ref 0–0.4)
PLATELET # BLD AUTO: 100 THOUSANDS/UL (ref 149–390)
PMV BLD AUTO: 8.8 FL (ref 8.9–12.7)
POTASSIUM SERPL-SCNC: 3.9 MMOL/L (ref 3.5–5.3)
RBC # BLD AUTO: 3.18 MILLION/UL (ref 3.88–5.62)
SODIUM SERPL-SCNC: 134 MMOL/L (ref 136–145)
WBC # BLD AUTO: 4.65 THOUSAND/UL (ref 4.31–10.16)

## 2022-01-15 PROCEDURE — 82948 REAGENT STRIP/BLOOD GLUCOSE: CPT

## 2022-01-15 PROCEDURE — 80048 BASIC METABOLIC PNL TOTAL CA: CPT | Performed by: PHYSICIAN ASSISTANT

## 2022-01-15 PROCEDURE — 99239 HOSP IP/OBS DSCHRG MGMT >30: CPT | Performed by: PHYSICIAN ASSISTANT

## 2022-01-15 PROCEDURE — 85025 COMPLETE CBC W/AUTO DIFF WBC: CPT | Performed by: PHYSICIAN ASSISTANT

## 2022-01-15 RX ORDER — ATORVASTATIN CALCIUM 40 MG/1
40 TABLET, FILM COATED ORAL
Qty: 30 TABLET | Refills: 0 | Status: SHIPPED | OUTPATIENT
Start: 2022-01-15

## 2022-01-15 RX ADMIN — HEPARIN SODIUM 5000 UNITS: 5000 INJECTION INTRAVENOUS; SUBCUTANEOUS at 05:06

## 2022-01-15 RX ADMIN — LEVOTHYROXINE SODIUM 125 MCG: 125 TABLET ORAL at 05:06

## 2022-01-15 RX ADMIN — ASPIRIN 81 MG CHEWABLE TABLET 81 MG: 81 TABLET CHEWABLE at 08:45

## 2022-01-15 RX ADMIN — METOPROLOL SUCCINATE 25 MG: 25 TABLET, EXTENDED RELEASE ORAL at 08:45

## 2022-01-15 RX ADMIN — OXYCODONE HYDROCHLORIDE 10 MG: 10 TABLET ORAL at 05:15

## 2022-01-15 NOTE — ASSESSMENT & PLAN NOTE
· Patient with chronic history of lymphedema in bilateral lower extremities  · Continue home dose Lasix 80mg qAM + 40mg in the afternoon

## 2022-01-15 NOTE — ASSESSMENT & PLAN NOTE
Wt Readings from Last 3 Encounters:   01/15/22 130 kg (285 lb 9 6 oz)   01/12/22 127 kg (279 lb 15 8 oz)       · 01/12 echo: EF 02%, diastolic function not evaluated   · Patient has bilateral LE edema which is chronic in nature due to underlying lymphedema     · Does not appear to be in acute exacerbation   · Home diuretic: lasix 80mg qAM and 40mg in the afternoon   · Lasix 120mg bid was started on 01/12 and stopped 01/14 due to worsening DOC due to over-diuresis  · Daily weights, Intake/Output   · Sodium restricted diet   · Hold lasix today and resume tomorrow, follow up with nephrology as outpatient and cardiologist

## 2022-01-15 NOTE — ASSESSMENT & PLAN NOTE
Lab Results   Component Value Date    EGFR 12 01/15/2022    EGFR 12 01/14/2022    EGFR 14 01/13/2022    CREATININE 4 43 (H) 01/15/2022    CREATININE 4 42 (H) 01/14/2022    CREATININE 3 97 (H) 01/13/2022     · Baseline creatinine 3 1-3 6   · Creatinine on admission 3 96  · Creatinine today worsening to 4 42 today, this is possibly secondary to receiving IV contrast for CTA head/neck on 01/11  · Will avoid IVF hydration in patient with bilateral LE edema and underlying diastolic CHF   · Patient is urinating appropriately   · Lasix was resumed 01/12, and held 01/14 and 01/15   · Avoid further nephrotoxins/hypotension  · Kidney/bladder ultrasound without evidence obstruction or retention  · Will consult Nephrology due to progressively worsening kidney injury appreciate further input:   · Recommend diuretic holiday, continue to hold Lasix today, patient can resume home dose tomorrow which is 80 mg in the morning and 40 mg in afternoon  · Acute kidney injury likely multifactorial in the setting of over-diuresis, contrast induced kidney injury, propofol  · Follow up with Dr Davian Berkowitz as outpatient on 01/25 with BMP prior

## 2022-01-15 NOTE — ASSESSMENT & PLAN NOTE
· Hgb drop from 12 9 to 10 9 01/13  · Hgb today stable 10 7 compared to yesterday 10  9   platelets 98   · No prior hx of significant anemia   · No evidence of bleeding  · Check FOBT: pending- no BM since admission   · Suspected anemia in the setting of chronic disease  · Continue to monitor for evidence of bleeding   · Patient was on SQ heparin for DVT ppx  · HIT antibody resulted after patient was discharged mildly elevated 0 844- T- score 1, HIT ruled out

## 2022-01-15 NOTE — DISCHARGE SUMMARY
[address]  Discharge- Tod Flower 1955, 74 y o  male MRN: [id number]  Unit/Bed#: -01 Encounter: [id number]  Primary Care Provider: Lucian Preston DO   Date and time admitted to hospital: 1/11/2022  5:11 PM    * CVA (cerebral vascular accident) [facility])  Assessment & Plan  · ICU downgrade 01/13 after receiving TPA 01/11 for left hemiparesis, dysarthria, facial droop   · CT head and CTA head/neck without any acute findings  · MRI brain shows few scattered punctate foci of acute cerebral and right cerebellar infarction    The multi territorial distribution of the punctate foci of infarction suggests a cardioembolic etiology  · TTE technically difficult, could not clearly visualize embolic source   · BETTE without obvious source  · Patient will need outpatient loop recorder at discharge  · ASA 81mg daily re-initiated 01/13 after repeat CT head without evidence of hemorrhage   · Continue atorvastatin   · Telemetry without any evidence of afib or arrhythmia  · Lipid panel: elevated TG, low HDL, HgbA1c: 6 2   · D-dimer 1 3   · Cleared by speech therapy for regular diet, thin liquids  · PT/OT recommend outpatient rehab    · Outpatient follow-up with Neurology  · Outpatient loop recorder with Cardiology      Acute kidney injury superimposed on chronic kidney disease stage 4 [facility])  Assessment & Plan  Lab Results   Component Value Date    EGFR 12 01/15/2022    EGFR 12 01/14/2022    EGFR 14 01/13/2022    CREATININE 4 43 (H) 01/15/2022    CREATININE 4 42 (H) 01/14/2022    CREATININE 3 97 (H) 01/13/2022     · Baseline creatinine 3 1-3 6   · Creatinine on admission 3 96  · Creatinine today worsening to 4 42 today, this is possibly secondary to receiving IV contrast for CTA head/neck on 01/11  · Will avoid IVF hydration in patient with bilateral LE edema and underlying diastolic CHF   · Patient is urinating appropriately   · Lasix was resumed 01/12, and held 01/14 and 01/15   · Avoid further nephrotoxins/hypotension  · Kidney/bladder ultrasound without evidence obstruction or retention  · Will consult Nephrology due to progressively worsening kidney injury appreciate further input:   · Recommend diuretic holiday, continue to hold Lasix today, patient can resume home dose tomorrow which is 80 mg in the morning and 40 mg in afternoon  · Acute kidney injury likely multifactorial in the setting of over-diuresis, contrast induced kidney injury, propofol  · Follow up with Dr Myrtle Chavez as outpatient on 01/25 with BMP prior     Anemia  Assessment & Plan  · Hgb drop from 12 9 to 10 9 01/13  · Hgb today stable 10 7 compared to yesterday 10  9  platelets 98   · No prior hx of significant anemia   · No evidence of bleeding  · Check FOBT: pending- no BM since admission   · Suspected anemia in the setting of chronic disease  · Continue to monitor for evidence of bleeding   · Patient was on SQ heparin for DVT ppx  · HIT antibody resulted after patient was discharged mildly elevated 0 844- T- score 1, HIT ruled out     Factor 5 Leiden mutation, heterozygous ([facility] [address] )  Assessment & Plan  · Not on chronic anticoagulation    Hypothyroidism  Assessment & Plan  · Continue Synthroid  · TSH 2 9     Lymphedema  Assessment & Plan  · Patient with chronic history of lymphedema in bilateral lower extremities  · Continue home dose Lasix 80mg qAM + 40mg in the afternoon     HLD (hyperlipidemia)  Assessment & Plan  · Continue statin     Chronic diastolic HF (heart failure) (HCC)  Assessment & Plan  Wt Readings from Last 3 Encounters:   01/15/22 130 kg (285 lb 9 6 oz)   01/12/22 127 kg (279 lb 15 8 oz)       · 01/12 echo: EF 65%, diastolic function not evaluated   · Patient has bilateral LE edema which is chronic in nature due to underlying lymphedema     · Does not appear to be in acute exacerbation   · Home diuretic: lasix 80mg qAM and 40mg in the afternoon   · Lasix 120mg bid was started on 01/12 and stopped 01/14 due to worsening DOC due to over-diuresis  · Daily weights, Intake/Output   · Sodium restricted diet   · Hold lasix today and resume tomorrow, follow up with nephrology as outpatient and cardiologist     HTN (hypertension)  Assessment & Plan  · BP has been labile, initially permissive HTN for 24 hours following acute CVA  · Continue home Toprol XL   · IV labetalol PRN SBP >170         Medical Problems             Resolved Problems  Date Reviewed: 1/15/2022    None              Discharging Physician / Practitioner: Brie Franco PA-C  PCP: Haylee Olmos DO  Admission Date:   Admission Orders (From admission, onward)     Ordered        01/11/22 [address]  Once                      Discharge Date: 01/15/22    Consultations During Hospital Stay:  · Neurology, Cardiology, Nephrology    Procedures Performed:   01/14 BETTE: No clear source of intracardiac thromboembolism  No intracardiac shunting identified  Significant Findings / Test Results:   · 01/11 CT stroke alert brain: no acute intracranial hemorrhage  Tiny low densities identified the bilateral caudate age-indeterminate ischemic events  · 0 1/11 CTA stroke alert head/neck:  No evidence of hemodynamic significant stenosis, aneurysm or dissection  · 01/12 CT head:  Acute intracranial abnormality  · 01/12 MRI brain: Few scattered punctate foci of acute cerebral and right cerebellar infarction  The multi territorial distribution of the punctate foci of infarction suggests a cardioembolic etiology  Minimal cerebral white matter signal abnormality suggesting chronic microangiopathy  · 01/12 CT head: Punctate infarcts and focus of microhemorrhage identified on earlier MRI are not appreciated on CT  No acute territorial infarct, hemorrhage or mass effect  · 01/14 US kidney and bladder:  No evidence of obstruction  · Creatinine on admission 3 96, on discharge 4 43    Incidental Findings:   · None     Test Results Pending at Discharge (will require follow up):    · None Outpatient Tests Requested:  · Outpatient loop recorder with Cardiology    Complications:  None    Reason for Admission:  Left hemiparesis, dysarthria, facial droop due to acute CVA    Hospital Course:   Krystian Crenshaw is a 74 y o  male patient who originally presented to the hospital on 1/11/2022 due to acute onset left-sided facial droop, left-sided hemiparesis, dysarthria  Patient was pre-hospital stroke alert  NIHSS score was 5  Patient underwent tPA administration on admission and was admitted to Critical Care Service  Patient was subsequent ICU downgrade on 0 1/13  MRI of the brain showed "few scattered punctate foci of acute cerebral and right cerebellar infarction  The multi territorial distribution of the punctate foci of infarction suggests a cardioembolic etiology " TTE was indeterminate and therefore Cardiology was consulted for BETTE which was without obvious cardioembolic source  Patient was seen by Neurology who recommends for patient to continue aspirin 81 mg daily and increased the dose of his atorvastatin to 40 mg daily at discharge  Patient will need outpatient loop recorder placed with Cardiology a discharge  Patient was monitored on telemetry throughout admission without any evidence of atrial fibrillation or arrhythmia  Patient was cleared by speech therapy for regular diet, thin liquids  Patient was also seen by PT/OT who states patient can go home with outpatient rehab  Patient is to follow-up with neurology as an outpatient and also Cardiology for loop recorder placement  Patient with underlying chronic kidney disease baseline creatinine around 3 1 to 3 6 with progressively worsening creatinine throughout admission due to receiving IV contrast and propofol and also due to over-diuresis with p o  Lasix  Patient is seen by Nephrology who recommended for patient to undergo diuretic holiday and hold Lasix  from 01/14 until 1/15  Patient can resume Lasix tomorrow    Patient has follow-up already scheduled with Dr Armando Aguilar his outpatient nephrologist on 01/25  Patient is to have outpatient BMP prior to follow-up with Nephrology to monitor creatinine  Patient was requesting to leave today, patient is stable for discharge today with outpatient follow-up with Neurology, Cardiology, Nephrology  All questions were answered appropriately  Please see above list of diagnoses and related plan for additional information  Condition at Discharge: stable    Discharge Day Visit / Exam:   Subjective:  Patient was sitting in the chair comfortably with no complaints at this time  Patient was requesting to go home  Vitals: Blood Pressure: 147/79 (01/15/22 1057)  Pulse: 56 (01/15/22 1057)  Temperature: 97 8 °F (36 6 °C) (01/15/22 1057)  Temp Source: Oral (01/14/22 2303)  Respirations: 19 (01/15/22 1057)  Height: 6' (182 9 cm) (01/14/22 0750)  Weight - Scale: 130 kg (285 lb 9 6 oz) (01/15/22 0535)  SpO2: 97 % (01/15/22 1057)  Exam:   Physical Exam  Constitutional:       General: He is not in acute distress  Appearance: He is obese  HENT:      Mouth/Throat:      Mouth: Mucous membranes are moist    Eyes:      General: No scleral icterus  Cardiovascular:      Rate and Rhythm: Normal rate and regular rhythm  Heart sounds: Normal heart sounds  Pulmonary:      Breath sounds: Normal breath sounds  Abdominal:      General: Abdomen is flat  Bowel sounds are normal       Palpations: Abdomen is soft  Tenderness: There is no abdominal tenderness  Musculoskeletal:      Right lower leg: Edema present  Left lower leg: Edema present  Skin:     General: Skin is warm  Neurological:      General: No focal deficit present  Mental Status: He is alert and oriented to person, place, and time  Cranial Nerves: No cranial nerve deficit  Motor: No weakness     Psychiatric:         Mood and Affect: Mood normal           Discussion with Family: Patient declined call to contact person  Discharge instructions/Information to patient and family:   See after visit summary for information provided to patient and family  Provisions for Follow-Up Care:  See after visit summary for information related to follow-up care and any pertinent home health orders  Disposition:   Home with outpatient rehab    Planned Readmission:  None     Discharge Statement:  I spent 45 minutes discharging the patient  This time was spent on the day of discharge  I had direct contact with the patient on the day of discharge  Greater than 50% of the total time was spent examining patient, answering all patient questions, arranging and discussing plan of care with patient as well as directly providing post-discharge instructions  Additional time then spent on discharge activities  Discharge Medications:  See after visit summary for reconciled discharge medications provided to patient and/or family        **Please Note: This note may have been constructed using a voice recognition system**

## 2022-01-15 NOTE — DISCHARGE INSTR - AVS FIRST PAGE
Please take 40mg atorvastatin instead of 10mg and continue taking aspirin 81mg daily   Please follow up with cardiology as an outpatient for a loop recorder, they will call to schedule appointment   Stop taking lasix today, you may resume your normal dose of lasix 80mg in the morning and 40mg in the afternoon  tomorrow, have outpatient labwork called BMP prior to your appointment with Dr Davian Berkowitz scheduled for 01/25  Please also follow up with neurology in regards to your stroke, they will call to schedule appointment but if you don't hear from them within a few days please call the office to schedule   Please return to the ER If you notice any sudden changes in strength, speech, movement, dizziness, etc

## 2022-01-15 NOTE — DISCHARGE INSTRUCTIONS
Ischemic Stroke   WHAT YOU NEED TO KNOW:   An ischemic stroke occurs when blood flow to part of your brain is blocked  The block is usually caused by a blood clot that gets stuck in a narrow blood vessel  When oxygen cannot get to an area of the brain, tissue in that area may get damaged  The damage can cause loss of body functions controlled by that area of the brain  A stroke is a medical emergency that needs immediate treatment  Most medicines and treatments work best the sooner they are given  DISCHARGE INSTRUCTIONS:   Call your local emergency number (911 in the [address] [facility]) or have someone else call if:   · You have any of the following signs of a stroke:      ? Numbness or drooping on one side of your face     ? Weakness in an arm or leg    ? Confusion or difficulty speaking    ? Dizziness, a severe headache, or vision loss       · You have a seizure  · You have chest pain or shortness of breath  Seek care immediately if:   · Your arm or leg feels warm, tender, and painful  It may look swollen and red  · You have loss of balance or coordination  · You have double vision or vision loss  · You have unusual or heavy bleeding  Call your doctor if:   · Your blood pressure is higher or lower than you were told it should be  · You have questions or concerns about your condition or care  Warning signs of a stroke: The words BE FAST can help you remember and recognize warning signs of a stroke:  · B = Balance:  Sudden loss of balance    · E = Eyes:  Loss of vision in one or both eyes    · F = Face:  Face droops on one side    · A = Arms:  Arm drops when both arms are raised    · S = Speech:  Speech is slurred or sounds different    · T = Time:  Time to get help immediately       Medicines: You may  need any of the following:  · Antiplatelets , such as aspirin, help prevent blood clots  Take your antiplatelet medicine exactly as directed   These medicines make it more likely for you to bleed or bruise  If you are told to take aspirin, do not take acetaminophen or ibuprofen instead  · Blood thinners  help prevent blood clots  Clots can cause strokes, heart attacks, and death  The following are general safety guidelines to follow while you are taking a blood thinner:    ? Watch for bleeding and bruising while you take blood thinners  Watch for bleeding from your gums or nose  Watch for blood in your urine and bowel movements  Use a soft washcloth on your skin, and a soft toothbrush to brush your teeth  This can keep your skin and gums from bleeding  If you shave, use an electric shaver  Do not play contact sports  ? Tell your dentist and other healthcare providers that you take a blood thinner  Wear a bracelet or necklace that says you take this medicine  ? Do not start or stop any other medicines unless your healthcare provider tells you to  Many medicines cannot be used with blood thinners  ? Take your blood thinner exactly as prescribed by your healthcare provider  Do not skip does or take less than prescribed  Tell your provider right away if you forget to take your blood thinner, or if you take too much  ? Warfarin  is a blood thinner that you may need to take  The following are things you should be aware of if you take warfarin:     § Foods and medicines can affect the amount of warfarin in your blood  Do not make major changes to your diet while you take warfarin  Warfarin works best when you eat about the same amount of vitamin K every day  Vitamin K is found in green leafy vegetables and certain other foods  Ask for more information about what to eat when you are taking warfarin  § You will need to see your healthcare provider for follow-up visits when you are on warfarin  You will need regular blood tests  These tests are used to decide how much medicine you need  · Medicines  may be given to treat health conditions that increase the risk for a stroke   Examples are high cholesterol, high blood pressure, and diabetes  · Take your medicine as directed  Contact your healthcare provider if you think your medicine is not helping or if you have side effects  Tell him or her if you are allergic to any medicine  Keep a list of the medicines, vitamins, and herbs you take  Include the amounts, and when and why you take them  Bring the list or the pill bottles to follow-up visits  Carry your medicine list with you in case of an emergency  Recovery testing: Your healthcare provider will test your recovery 90 days (3 months) after your stroke  This may be done over the phone or in person  Your provider will ask how well you can do the activities you did before the stroke  He or she will also ask how well you can do your daily activities without help  Your provider may make recommendations for you based on your test  For example, you may need someone to help you walk safely  You may also need help with daily activities, such as getting dressed  Based on your answers, your provider may do this test again over time  Manage an ischemic stroke:   · Go to stroke rehabilitation (rehab) if directed  Rehab is a program run by specialists who will help you recover abilities you may have lost  Specialists include physical, occupational, and speech therapists  Physical therapists help you gain strength or keep your balance  Occupational therapists teach you new ways to do daily activities, such as getting dressed  Your therapy may include movements for everyday activities  An example is being able to raise yourself from a chair  A speech therapist helps you improve your ability to talk and swallow  You may also be shown ways to manage a medical condition that increases the risk for a stroke  Examples include diabetes, high blood pressure, or a heart condition  · Wear pressure stockings as directed  Pressure stockings help keep blood from pooling in your leg veins   Your healthcare provider can prescribe stockings that are right for you  Do not buy over-the-counter pressure stockings unless your healthcare provider says it is okay  They may not fit correctly or may have elastic that cuts off your circulation  Ask your healthcare provider when to start wearing pressure stockings and how long to wear them each day  · Make your home safe  Remove anything you might trip over  Tape electrical cords down  Keep paths clear throughout your home  Make sure your home is well lit  Put nonslip materials on surfaces that might be slippery  An example is your bathtub or shower floor  A cane or walker may help you keep your balance as you walk  What you can do to prevent another stroke:   · Manage health conditions  A condition such as diabetes can increase your risk for a stroke  Control your blood sugar level if you have hyperglycemia or diabetes  Take your prescribed medicines and check your blood sugar level as directed  · Check your blood pressure as directed  High blood pressure can increase your risk for a stroke  If you have high blood pressure, follow your healthcare provider's directions for controlling it  · Do not use nicotine products or illegal drugs  Nicotine and other chemicals in cigarettes and cigars can cause blood vessel damage  Nicotine and illegal drugs both increase your risk for a stroke  Ask your healthcare provider for information if you currently smoke or use drugs and need help to quit  E- cigarettes or smokeless tobacco still contain nicotine  Talk to your healthcare provider before you use these products  · Talk to your healthcare provider about alcohol  Alcohol can raise your blood pressure  A drink of alcohol is 12 ounces of beer, 5 ounces of wine, or 1½ ounces of liquor  The recommended limit is 2 drinks within 24 hours for men and 1 drink within 24 hours for women  Do not binge drink or save a week's worth of alcohol to drink in 1 or 2 days   Limit weekly amounts as directed by your provider  · Eat a variety of healthy foods  Healthy foods include whole-grain breads, low-fat dairy products, beans, lean meats, and fish  Eat at least 5 servings of fruits and vegetables each day  Choose foods that are low in fat, cholesterol, salt, and sugar  Eat foods that are high in potassium, such as potatoes and bananas  A dietitian can help you create healthy meal plans  · Maintain a healthy weight  Ask your healthcare provider what a healthy weight is for you  Ask him or her to help you create a weight loss plan if you are overweight  He or she can help you create small goals if you have a lot of weight to lose  · Exercise as directed  Exercise can lower your blood pressure, cholesterol, weight, and blood sugar levels  Healthcare providers will help you create exercise goals  They can also help you make a plan to reach your goals  For example, you can break exercise into 10 minute periods, 3 times in the day  Find an exercise that you enjoy  This will make it easier for you to reach your exercise goals  · Manage stress  Stress can raise your blood pressure  Find new ways to relax, such as deep breathing or listening to music  · Talk to your healthcare provider about risk factors for women  Birth control pills increase your risk, especially if you are older than 35 or smoke cigarettes  Talk to your healthcare provider about other forms of contraception  Estrogen levels drop during menopause  Low estrogen levels may increase your risk for stroke  Talk to your healthcare provider about hormone replacement therapy to reduce your risk for a stroke  What you need to know about depression after a stroke:  Talk to your healthcare provider if you have depression that continues or is getting worse  Your provider may be able to help treat your depression  Your provider can also recommend support groups for you to join   A support group is a place to talk with others who have had a stroke  It may also help to talk to friends and family members about how you are feeling  Tell your family and friends to let your healthcare provider know if they see any signs of depression:  · Extreme sadness    · Avoiding social interaction with family or friends    · A lack of interest in things you once enjoyed    · Irritability    · Trouble sleeping    · Low energy levels    · A change in eating habits or sudden weight gain or loss    Follow up with your doctor or neurologist as directed: You may need to come in over time for brain function or blood tests  Your provider may refer you to a specialist, or to other kinds of care  An example is palliative (comfort) care  Your provider can also give information about respite care to anyone who helps care for you  Respite care is a service to help caregivers take a break or get more rest  Write down your questions so you remember to ask them during your visits  For support and more information:   · American Heart Association and American Stroke Association  [address] [address] [facility]   Phone: 2- 221 - 527-1407  Web Address: https://www strong [employer]  [address] \[facility] [place] [facility]\"" 2021 Information is for End User's use only and may not be sold, redistributed or otherwise used for commercial purposes  All illustrations and images included in CareNotes® are the copyrighted property of A D A M , Inc  or [address] [place] [address]  The above information is an  only  It is not intended as medical advice for individual conditions or treatments  Talk to your doctor, nurse or pharmacist before following any medical regimen to see if it is safe and effective for you

## 2022-01-27 ENCOUNTER — TELEPHONE (OUTPATIENT)
Dept: NEUROLOGY | Facility: CLINIC | Age: 67
End: 2022-01-27

## 2022-01-27 NOTE — TELEPHONE ENCOUNTER
1st Attempt Spoke with Pt's daughter in law after pt's phone numb has been disconnected to [facility] HFU appt, she stated that her  has more control of Pt's appt, took our phone number and will pass msg to pt's son  HFU/SLOW/ATT/AP/Stroke Alert/DC1/15    Note from Chart:         Onofre Mensah will need follow up in in 6 weeks with neurovascular attending or advance practitioner  He will not require outpatient neurological testing

## 2022-03-08 ENCOUNTER — OFFICE VISIT (OUTPATIENT)
Dept: NEUROLOGY | Facility: CLINIC | Age: 67
End: 2022-03-08
Payer: MEDICARE

## 2022-03-08 VITALS
SYSTOLIC BLOOD PRESSURE: 172 MMHG | HEART RATE: 70 BPM | BODY MASS INDEX: 38.33 KG/M2 | DIASTOLIC BLOOD PRESSURE: 75 MMHG | WEIGHT: 283 LBS | HEIGHT: 72 IN

## 2022-03-08 DIAGNOSIS — D68.51 FACTOR 5 LEIDEN MUTATION, HETEROZYGOUS (HCC): ICD-10-CM

## 2022-03-08 DIAGNOSIS — F17.200 SMOKING: ICD-10-CM

## 2022-03-08 DIAGNOSIS — Z86.73 HISTORY OF CVA (CEREBROVASCULAR ACCIDENT): Primary | ICD-10-CM

## 2022-03-08 DIAGNOSIS — E78.5 HLD (HYPERLIPIDEMIA): ICD-10-CM

## 2022-03-08 DIAGNOSIS — I10 HYPERTENSION, UNSPECIFIED TYPE: ICD-10-CM

## 2022-03-08 DIAGNOSIS — G47.30 SLEEP APNEA: ICD-10-CM

## 2022-03-08 PROCEDURE — 1123F ACP DISCUSS/DSCN MKR DOCD: CPT | Performed by: PHYSICIAN ASSISTANT

## 2022-03-08 PROCEDURE — 99215 OFFICE O/P EST HI 40 MIN: CPT | Performed by: PHYSICIAN ASSISTANT

## 2022-03-08 NOTE — PATIENT INSTRUCTIONS
 Continue with combination of aspirin 81mg and atorvastatin 40mg daily for secondary stroke prevention   BP goal < 130/80, continue to monitor twice a day    LDL goal < 100   Defer to PCP regarding glycemic and blood pressure management    Counseling    Smoking cessation  Discussed that untreated sleep apnea is risk factor for future strokes  Men with moderate-severe sleep apnea are 3x greater risk compared to men without or mild sleep apnea   Overtime untreated sleep apnea can lead to systemic problems with uncontrolled blood pressure and atrial fibrillation   Highly suggest loop recorder placement and sleep medicine referral    Follow up with ophthalmologist    I advised patient to avoid using NSAIDs for headaches or other pain and to stick to tylenol if needed   Recommend mediterranean diet & regular exercise regimen atleast 4-5 times a week for 20-30 minutes   Educated patient/family regarding medication compliance     Recommend follow up 6 months  I would be happy to see the patient sooner if any new questions/concerns arise  Patient/Guardian was advised to the call the office if they have any questions and concerns in the meantime  Patient/Guardian does understand that if they have any new stroke like symptoms such as facial droop on one side, weakness/paralysis on either side, speech trouble, numbness on one side, balance issues, any vision changes, extreme dizziness or any new headache, to call 9-1-1 immediately or to proceed to the nearest ER immediately

## 2022-03-08 NOTE — ASSESSMENT & PLAN NOTE
· Checks blood pressure twice a day  · On average around 145-160/72-90  · Today in the office 175/75  · BP goal <130/80  · Management deferred to PCP and nephrologist

## 2022-03-08 NOTE — PROGRESS NOTES
[facility]  PATIENT:  Abhi Moore  MRN:  [id number]  :  1955  DATE OF SERVICE:  3/8/2022      Assessment/Plan:        Problem List Items Addressed This Visit        Respiratory    Sleep apnea     · In the hospital, came running in with oxygen   · Had a sleep study in the past but couldn't fall asleep  Discussed that untreated sleep apnea is risk factor for future strokes  Men with moderate-severe sleep apnea are 3x greater risk compared to men without or mild sleep apnea  · Overtime untreated sleep apnea can lead to systemic problems with uncontrolled blood pressure and atrial fibrillation  · He does not wish for referral for sleep medicine at this time            Cardiovascular and Mediastinum    HTN (hypertension)     · Checks blood pressure twice a day  · On average around 145-160/72-90  · Today in the office 175/75  · BP goal <130/80  · Management deferred to PCP and nephrologist            Hematopoietic and Hemostatic    Factor [facility], [facility]       Other    History of CVA (cerebrovascular accident) - Primary     65M presented with dysarthric speech, left sided weakness  NIHSS4 status post tPA    Imaging:  CTA without LVO, stenosis, aneurysm or dissection  MRI brain few scattered punctate foci of acute cerebral and right cerebellar infarct  BETTE unremarkable    Plan:  Concern for cardioembolic  No history of atrial fibrillation  Due to no strong evidence of atrial fibrillation was placed on aspirin 81mg and atorvastatin increased to 40mg daily for secondary stroke prevention   Discussed loop recorder  He will consider placement at this time  If atrial fibrillation was detected he would need addition of AC to current regimen (history of MCKINLEY)   Reported new double vision occasionally for the past 4 weeks which started after his hospitalization   Explained that locations of strokes would not correlate with symptom and that concern would be for additional strokes  Recommended CT head to evaluate for new stroke  He does not wish to undergo imaging at this time  Recommend following up with ophthalmology   Reviewed small vessel risk factors as well  Follow up in 6 months           HLD (hyperlipidemia)     · On atorvastatin          Smoking     · 0 5 ppd smoker  · Not ready to quit at this time  History of Present Illness:   Sophie Leon is a right handed 74 y o  male PMH including HTN, HLD, CKD stage 4, MI s/p MCKINLEY x1, heterozygous factor 5 leiden mutation who presents for follow up in regard to his recent stroke  Errol López presented to [address] [place] [address] on 01/11/2022 with symptoms of slurred speech, left facial and arm and leg weakness that started two hours prior to arrival  /90  NIHSS of 4  He was found a candidate for thrombolytic therapy  CTH was negative for any acute intracranial hemorrhage  Tiny low densities identified in the bilateral caudate age indeterminate ischemic events  CTA without evidence of hemodynamic significant stenosis, aneurysm or dissection  MRI brain demonstrated few scattered punctate foci of acute cereberal and right cerebellar infarction  The multi territorial distribution of the punctate foci of infarction suggests a cardioembolic etiology  BETTE was unremarkable  Etiology for stroke felt to be embolic  He was started on aspirin 81mg and statin medication  No signs of atrial fibrillation detected on tele  It was recommended for outpatient loop recorder placement  Residual symptoms of weakness, fatigue    Double vision which started about 4 weeks ago described as [id number] and overlying  Noticed out of one eye  If he closes his one eye it balances out  He also notes problems with focusing  Therapies: None  Errol López has been on aspirin 81mg and atorvastatin for secondary stroke prevention  He is compliant and tolerating the medication without significant side effects   No reported new TIA/stroke like symptoms  He admits to occasional SOB that sometimes comes out of no where  He denies any dizziness, heart palpitations, chest pain  PMH/FHx:  History of PE in 2008  History of nontraumatic compartment syndrome  History of MI with 1 MCKINLEY   Diagnosed with Factor 5 liden over 10 years ago when he was getting knee replacements  States he had a thrombosis panel as workup for his orthopedic   Fhx: brother with PE    Sleep:  4-5 hours per night   Wakes up often to use the bathroom  Believes he snores, does live alone  He has woken himself from sleep  [place] Sleepiness Score 8/24    Mood:  Panic attacks  Results:     Stroke risk factors were evaluated including:   Lab Results   Component Value Date/Time    CHOLESTEROL 140 01/12/2022 06:44 PM     Lab Results   Component Value Date/Time    TRIG 177 (H) 01/12/2022 06:44 PM     Lab Results   Component Value Date/Time    HDL 30 (L) 01/12/2022 06:44 PM     Lab Results   Component Value Date/Time    LDLCALC 75 01/12/2022 06:44 PM       Lab Results   Component Value Date/Time    HGBA1C 6 2 (H) 01/12/2022 07:03 PM     Lab Results   Component Value Date/Time     01/12/2022 07:03 PM       Imaging:     Results for orders placed during the hospital encounter of 01/11/22    MRI brain wo contrast    Narrative  MRI BRAIN WITHOUT CONTRAST    INDICATION: CVA  COMPARISON:   Head CT and CT angiography of the head and neck from January 11, 2022 and head CT from January 12, 2022  TECHNIQUE:  Sagittal T1, axial T2, axial FLAIR, axial T1, axial [place] and axial diffusion imaging  Imaging performed on 1 5T MRI  IMAGE QUALITY:  Diagnostic  FINDINGS:    BRAIN PARENCHYMA:  There are few punctate scattered foci of diffusion restriction within the frontal lobes, right centrum semiovale, and right brachium pontis  Of these, the right brachium pontis focus of infarction is associated with petechial  hemorrhage        Minimal cerebral white matter chronic microangiopathic changes  VENTRICLES:  Normal for the patient's age  SELLA AND PITUITARY GLAND:  Normal     ORBITS:  Normal     PARANASAL SINUSES:  Mild mucosal thickening in the ethmoid air cells  VASCULATURE:  Evaluation of the major intracranial vasculature demonstrates appropriate flow voids  CALVARIUM AND SKULL BASE:  Normal     EXTRACRANIAL SOFT TISSUES:  Normal     Impression  Few scattered punctate foci of acute cerebral and right cerebellar infarction  The multi territorial distribution of the punctate foci of infarction suggests a cardioembolic etiology  Minimal cerebral white matter signal abnormality suggesting chronic microangiopathy  I personally discussed this study with Nazanin Campbells on 1/12/2022 at 12:49 PM           Workstation performed: [id number]    Results for orders placed during the hospital encounter of 01/11/22    CTA stroke alert (head/neck)    Narrative  CTA NECK AND BRAIN WITH CONTRAST    INDICATION: cva    COMPARISON:   None  TECHNIQUE:   Post contrast imaging was performed after administration of iodinated contrast through the neck and brain  Post contrast axial 0 625 mm images timed to opacify the arterial system  3D rendering was performed on an independent workstation  MIP reconstructions performed  Coronal reconstructions were performed of the noncontrast portion of the brain  Radiation dose length product (DLP) for this visit:  963 mGy-cm   This examination, like all CT scans performed in the [facility], was performed utilizing techniques to minimize radiation dose exposure, including the use of iterative  reconstruction and automated exposure control  IV Contrast:  100 mL of iohexol (OMNIPAQUE)    IMAGE QUALITY:   Diagnostic    FINDINGS:    CERVICAL VASCULATURE  AORTIC ARCH AND GREAT VESSELS:  Mild atherosclerotic disease of the arch, proximal great vessels and visualized subclavian vessels  No significant stenosis      RIGHT VERTEBRAL ARTERY CERVICAL SEGMENT:  Moderate stenosis at the origin  The vessel is normal in caliber throughout the neck  LEFT VERTEBRAL ARTERY CERVICAL SEGMENT:  Normal origin  The vessel is normal in caliber throughout the neck  RIGHT EXTRACRANIAL CAROTID SEGMENT:  Mild atherosclerotic disease of the distal common carotid artery and proximal cervical internal carotid artery without significant stenosis compared to the more distal ICA  LEFT EXTRACRANIAL CAROTID SEGMENT:  Mild atherosclerotic disease of the distal common carotid artery and proximal cervical internal carotid artery without significant stenosis compared to the more distal ICA  NASCET criteria was used to determine the degree of internal carotid artery diameter stenosis  INTRACRANIAL VASCULATURE  INTERNAL CAROTID ARTERIES:  Normal enhancement of the intracranial portions of the internal carotid arteries  Normal ophthalmic artery origins  Normal ICA terminus  ANTERIOR CIRCULATION:  Symmetric A1 segments and anterior cerebral arteries with normal enhancement  Normal anterior communicating artery  MIDDLE CEREBRAL ARTERY CIRCULATION:  M1 segment and middle cerebral artery branches demonstrate normal enhancement bilaterally  DISTAL VERTEBRAL ARTERIES:  Normal distal vertebral arteries  Posterior inferior cerebellar artery origins are normal  Normal vertebral basilar junction  BASILAR ARTERY:  Basilar artery is normal in caliber  Normal superior cerebellar arteries  POSTERIOR CEREBRAL ARTERIES: Both posterior cerebral arteries arises from the basilar tip  Both arteries demonstrate normal enhancement  Normal posterior communicating arteries  VENOUS STRUCTURES:  Normal       NON VASCULAR ANATOMY  BONY STRUCTURES:  No acute osseous abnormality  SOFT TISSUES OF THE NECK:  Unremarkable  THORACIC INLET:  Unremarkable  Impression  No evidence of hemodynamic significant stenosis, aneurysm or dissection          I personally discussed this study with neurologist on 1/11/2022 at 5:21 PM                 Workstation performed: [id number]    No results found for this or any previous visit  No results found for this or any previous visit  No results found for this or any previous visit  No results found for this or any previous visit        Past Medical History:     Past Medical History:   Diagnosis Date    Chronic kidney disease     Disease of thyroid gland     Hyperlipidemia     Hypertension     Stroke [facility])        Patient Active Problem List   Diagnosis    History of CVA (cerebrovascular accident)    Acute kidney injury superimposed on chronic kidney disease stage 4 ([place] [address] )    HTN (hypertension)    [facility] diastolic HF [facility] failure[facility]    HLD (hyperlipidemia)    Lymphedema    Hypothyroidism    Anemia    Thrombocytopenia ([facility])    Angina at rest [facility])    MI (myocardial infarction) ([place] [address] )    History of PTCA    Sleep apnea    Smoking    Factor 5 Leiden mutation, heterozygous ([facility])       Medications:      Current Outpatient Medications   Medication Sig Dispense Refill    allopurinol (ZYLOPRIM) 300 mg tablet Take by mouth      ascorbic acid (VITAMIN C) 500 MG tablet Take 500 mg by mouth daily      aspirin (ECOTRIN LOW STRENGTH) 81 mg EC tablet Take 81 mg by mouth      atorvastatin (LIPITOR) 40 mg tablet Take 1 tablet (40 mg total) by mouth daily with dinner 30 tablet 0    co-enzyme Q-10 30 MG capsule Take 100 mg by mouth      furosemide (LASIX) 80 mg tablet Take 120 mg by mouth 2 (two) times a day      HYDROcodone-acetaminophen (NORCO)  mg per tablet Take by mouth      insulin glargine (LANTUS) 100 units/mL subcutaneous injection Inject 20 Units under the skin      insulin lispro (HumaLOG) 100 units/mL injection       levothyroxine 125 mcg tablet Take by mouth      metoprolol succinate (TOPROL-XL) 25 mg 24 hr tablet Take by mouth      tamsulosin (Flomax) 0 4 mg Take 0 4 mg by mouth daily with dinner      vitamin A 2400 MCG (8000 UT) capsule Take 2,400 Units by mouth daily      vitamin E, tocopherol, 200 units capsule Take 200 Units by mouth daily       No current facility-administered medications for this visit  Allergies: Allergies   Allergen Reactions    Carvedilol Shortness Of Breath    Aspartame - Food Allergy Diarrhea    Hydralazine Tachycardia    Lisinopril Other (See Comments)     Other reaction(s): dry heaves      Morphine Swelling     Other reaction(s): lump at injection  Hand swelling at IV injection site      Nifedipine Lightheadedness     didn't tolerate    Ciprofloxacin Rash    Strawberry Extract - Food Allergy Rash       Family History:     History reviewed  No pertinent family history  Social History:     Social History     Socioeconomic History    Marital status:      Spouse name: Not on file    Number of children: Not on file    Years of education: Not on file    Highest education level: Not on file   Occupational History    Not on file   Tobacco Use    Smoking status: Current Every Day Smoker     Packs/day: 0 50     Types: Cigarettes    Smokeless tobacco: Never Used   Vaping Use    Vaping Use: Never used   Substance and Sexual Activity    Alcohol use: Not Currently    Drug use: Never    Sexual activity: Not on file   Other Topics Concern    Not on file   Social History Narrative    Not on file     Social Determinants of Health     Financial Resource Strain: Not on file   Food Insecurity: No Food Insecurity    Worried About Running Out of Food in the Last Year: Never true    Janneth of Food in the Last Year: Never true   Transportation Needs: No Transportation Needs    Lack of Transportation (Medical): No    Lack of Transportation (Non-Medical):  No   Physical Activity: Not on file   Stress: Not on file   Social Connections: Not on file   Intimate Partner Violence: Not on file   Housing Stability: Low Risk     Unable to Pay for Housing in the Last Year: No    Number of Places Lived in the Last Year: 1    Unstable Housing in the Last Year: No         ROS:     Review of Systems   Constitutional: Negative  Negative for appetite change and fever  HENT: Negative  Negative for hearing loss, tinnitus, trouble swallowing and voice change  Eyes: Positive for visual disturbance (double vision)  Negative for photophobia and pain  Respiratory: Negative  Negative for shortness of breath  Cardiovascular: Negative  Negative for palpitations  Gastrointestinal: Negative  Negative for nausea and vomiting  Endocrine: Negative  Negative for cold intolerance  Genitourinary: Negative  Negative for dysuria, frequency and urgency  Musculoskeletal: Negative for gait problem, myalgias and neck pain  Skin: Negative  Negative for rash  Neurological: Positive for weakness (left leg)  Negative for dizziness, tremors, seizures, syncope, facial asymmetry, speech difficulty, light-headedness, numbness and headaches  Hematological: Negative  Does not bruise/bleed easily  Psychiatric/Behavioral: Positive for sleep disturbance (sleeping alot)  Negative for confusion and hallucinations  All other systems reviewed and are negative      ROS reviewed personally and edited as needed  Objective:   Physical Exam:    BP (!) 172/75 (BP Location: Right arm, Patient Position: Sitting, Cuff Size: Standard)   Pulse 70   Ht 6' (1 829 m)   Wt 128 kg (283 lb)   BMI 38 38 kg/m²     Physical Exam  Eyes:      Extraocular Movements: EOM normal       Pupils: Pupils are equal, round, and reactive to light  Neurological:      Mental Status: He is oriented to person, place, and time  Coordination: Finger-Nose-Finger Test normal       Gait: Gait is intact  Deep Tendon Reflexes:      Reflex Scores:       Bicep reflexes are 1+ on the right side and 1+ on the left side         Brachioradialis reflexes are 1+ on the right side and 1+ on the left side        Patellar reflexes are 1+ on the right side and 1+ on the left side  Achilles reflexes are 1+ on the right side and 1+ on the left side  Psychiatric:         Speech: Speech normal        Neurologic Exam     Mental Status   Oriented to person, place, and time  Follows 2 step commands  Attention: normal  Concentration: normal    Speech: speech is normal   Level of consciousness: alert  Knowledge: good  Normal comprehension  Cranial Nerves     CN II   Right visual field deficit: none  Left visual field deficit: none     CN III, IV, VI   Pupils are equal, round, and reactive to light  Extraocular motions are normal    Right pupil: Size: 3 mm  Shape: regular  Reactivity: brisk  Left pupil: Size: 3 mm  Shape: regular  Reactivity: brisk  CN III: no CN III palsy  CN VI: no CN VI palsy  Nystagmus: none   Diplopia: none  Ophthalmoparesis: none  Upgaze: normal  Downgaze: normal  Conjugate gaze: present    CN V   Facial sensation intact  CN VII   Facial expression full, symmetric  CN VIII   Hearing: intact    CN IX, X   Palate: symmetric    CN XI   Right trapezius strength: normal  Left trapezius strength: normal    CN XII   Tongue: not atrophic  Fasciculations: absent  Tongue deviation: none    Motor Exam   Right arm pronator drift: absent  Left arm pronator drift: absent    Strength   Strength 5/5 except as noted     RLE:  HF: 3-4-/5    LLE:  HF 2/5  KF/KE: 4/5     Sensory Exam   Light touch normal      Gait, Coordination, and Reflexes     Gait  Gait: normal    Coordination   Finger to nose coordination: normal    Reflexes   Right brachioradialis: 1+  Left brachioradialis: 1+  Right biceps: 1+  Left biceps: 1+  Right patellar: 1+  Left patellar: 1+  Right achilles: 1+  Left achilles: 1+  Right ankle clonus: absent  Left ankle clonus: absent        I have spent 60 minutes with Patient  today in which greater than 50% of this time was spent in counseling/coordination of care regarding Diagnostic results, Prognosis, Risks and benefits of tx options, Intructions for management, Patient and family education, Importance of tx compliance, Risk factor reductions, Impressions and Plan of care as above

## 2022-03-08 NOTE — PROGRESS NOTES
Review of Systems   Constitutional: Negative  Negative for appetite change and fever  HENT: Negative  Negative for hearing loss, tinnitus, trouble swallowing and voice change  Eyes: Positive for visual disturbance (double vision)  Negative for photophobia and pain  Respiratory: Negative  Negative for shortness of breath  Cardiovascular: Negative  Negative for palpitations  Gastrointestinal: Negative  Negative for nausea and vomiting  Endocrine: Negative  Negative for cold intolerance  Genitourinary: Negative  Negative for dysuria, frequency and urgency  Musculoskeletal: Negative for gait problem, myalgias and neck pain  Skin: Negative  Negative for rash  Neurological: Positive for weakness (left leg)  Negative for dizziness, tremors, seizures, syncope, facial asymmetry, speech difficulty, light-headedness, numbness and headaches  Hematological: Negative  Does not bruise/bleed easily  Psychiatric/Behavioral: Positive for sleep disturbance (sleeping alot)  Negative for confusion and hallucinations  All other systems reviewed and are negative

## 2022-03-08 NOTE — ASSESSMENT & PLAN NOTE
· In the hospital, came running in with oxygen   · Had a sleep study in the past but couldn't fall asleep  Discussed that untreated sleep apnea is risk factor for future strokes  Men with moderate-severe sleep apnea are 3x greater risk compared to men without or mild sleep apnea  · Overtime untreated sleep apnea can lead to systemic problems with uncontrolled blood pressure and atrial fibrillation     · He does not wish for referral for sleep medicine at this time

## 2022-03-08 NOTE — ASSESSMENT & PLAN NOTE
66M presented with dysarthric speech, left sided weakness  NIHSS4 status post tPA    Imaging:  CTA without LVO, stenosis, aneurysm or dissection  MRI brain few scattered punctate foci of acute cerebral and right cerebellar infarct  BETTE unremarkable    Plan:  Concern for cardioembolic  No history of atrial fibrillation  Due to no strong evidence of atrial fibrillation was placed on aspirin 81mg and atorvastatin increased to 40mg daily for secondary stroke prevention   Discussed loop recorder  He will consider placement at this time  If atrial fibrillation was detected he would need addition of AC to current regimen (history of MCKINLEY)   Reported new double vision occasionally for the past 4 weeks which started after his hospitalization  Explained that locations of strokes would not correlate with symptom and that concern would be for additional strokes  Recommended CT head to evaluate for new stroke  He does not wish to undergo imaging at this time  Recommend following up with ophthalmology   Reviewed small vessel risk factors as well       Follow up in 6 months

## 2022-04-11 ENCOUNTER — DOCTOR'S OFFICE (OUTPATIENT)
Dept: URBAN - NONMETROPOLITAN AREA CLINIC 1 | Facility: CLINIC | Age: 67
Setting detail: OPHTHALMOLOGY
End: 2022-04-11
Payer: COMMERCIAL

## 2022-04-11 DIAGNOSIS — H53.2: ICD-10-CM

## 2022-04-11 DIAGNOSIS — H43.813: ICD-10-CM

## 2022-04-11 DIAGNOSIS — I63.9: ICD-10-CM

## 2022-04-11 DIAGNOSIS — Z79.84: ICD-10-CM

## 2022-04-11 DIAGNOSIS — H50.21: ICD-10-CM

## 2022-04-11 PROCEDURE — 92060 SENSORIMOTOR EXAMINATION: CPT | Performed by: OPHTHALMOLOGY

## 2022-04-11 PROCEDURE — 99214 OFFICE O/P EST MOD 30 MIN: CPT | Performed by: OPHTHALMOLOGY

## 2022-04-11 PROCEDURE — 92083 EXTENDED VISUAL FIELD XM: CPT | Performed by: OPHTHALMOLOGY

## 2022-04-11 ASSESSMENT — CONFRONTATIONAL VISUAL FIELD TEST (CVF)
OS_FINDINGS: FULL
OD_FINDINGS: FULL

## 2022-04-14 ASSESSMENT — SPHEQUIV_DERIVED: OS_SPHEQUIV: 0.5

## 2022-04-14 ASSESSMENT — VISUAL ACUITY
OS_BCVA: 20/25-1
OD_BCVA: 20/25-1

## 2022-04-14 ASSESSMENT — REFRACTION_CURRENTRX
OS_CYLINDER: -0.75
OS_SPHERE: -1.25
OD_OVR_VA: 20/
OD_SPHERE: -0.50
OS_OVR_VA: 20/
OD_AXIS: 71
OD_CYLINDER: -0.25
OS_AXIS: 151

## 2022-04-14 ASSESSMENT — REFRACTION_AUTOREFRACTION
OS_CYLINDER: -1.00
OD_AXIS: 56
OD_CYLINDER: -0.75
OS_AXIS: 96
OS_SPHERE: +1.00
OD_SPHERE: PL

## 2022-05-19 ENCOUNTER — DOCTOR'S OFFICE (OUTPATIENT)
Dept: URBAN - NONMETROPOLITAN AREA CLINIC 1 | Facility: CLINIC | Age: 67
Setting detail: OPHTHALMOLOGY
End: 2022-05-19
Payer: COMMERCIAL

## 2022-05-19 DIAGNOSIS — I63.9: ICD-10-CM

## 2022-05-19 DIAGNOSIS — H53.2: ICD-10-CM

## 2022-05-19 DIAGNOSIS — H50.21: ICD-10-CM

## 2022-05-19 DIAGNOSIS — Z79.84: ICD-10-CM

## 2022-05-19 PROBLEM — H43.813 POSTERIOR VITREOUS DETACHMENT; BOTH EYES: Status: ACTIVE | Noted: 2020-11-06

## 2022-05-19 PROCEDURE — 99213 OFFICE O/P EST LOW 20 MIN: CPT | Performed by: OPHTHALMOLOGY

## 2022-05-19 ASSESSMENT — CONFRONTATIONAL VISUAL FIELD TEST (CVF)
OS_FINDINGS: FULL
OD_FINDINGS: FULL

## 2022-05-19 ASSESSMENT — SPHEQUIV_DERIVED
OD_SPHEQUIV: -0.375
OS_SPHEQUIV: 0.625

## 2022-05-19 ASSESSMENT — REFRACTION_CURRENTRX
OD_AXIS: 71
OS_SPHERE: -1.25
OD_SPHERE: -0.50
OD_CYLINDER: -0.25
OS_OVR_VA: 20/
OS_AXIS: 151
OS_CYLINDER: -0.75
OD_OVR_VA: 20/

## 2022-05-19 ASSESSMENT — VISUAL ACUITY
OD_BCVA: 20/25-1
OS_BCVA: 20/25-2

## 2022-05-19 ASSESSMENT — REFRACTION_AUTOREFRACTION
OD_SPHERE: 0.00
OS_AXIS: 93
OD_AXIS: 59
OD_CYLINDER: -0.75
OS_SPHERE: +1.00
OS_CYLINDER: -0.75

## 2022-07-15 ENCOUNTER — OFFICE VISIT (OUTPATIENT)
Dept: NEPHROLOGY | Facility: CLINIC | Age: 67
End: 2022-07-15
Payer: MEDICARE

## 2022-07-15 VITALS
DIASTOLIC BLOOD PRESSURE: 78 MMHG | BODY MASS INDEX: 38.19 KG/M2 | OXYGEN SATURATION: 94 % | WEIGHT: 282 LBS | HEIGHT: 72 IN | SYSTOLIC BLOOD PRESSURE: 164 MMHG | HEART RATE: 71 BPM

## 2022-07-15 DIAGNOSIS — N18.4 TYPE 2 DIABETES MELLITUS WITH STAGE 4 CHRONIC KIDNEY DISEASE, WITH LONG-TERM CURRENT USE OF INSULIN (HCC): ICD-10-CM

## 2022-07-15 DIAGNOSIS — N18.9 ACUTE KIDNEY INJURY SUPERIMPOSED ON CHRONIC KIDNEY DISEASE (HCC): Primary | ICD-10-CM

## 2022-07-15 DIAGNOSIS — E66.01 OBESITY, MORBID (HCC): ICD-10-CM

## 2022-07-15 DIAGNOSIS — I10 HYPERTENSION, UNSPECIFIED TYPE: ICD-10-CM

## 2022-07-15 DIAGNOSIS — N17.9 ACUTE KIDNEY INJURY SUPERIMPOSED ON CHRONIC KIDNEY DISEASE (HCC): Primary | ICD-10-CM

## 2022-07-15 DIAGNOSIS — N18.4 STAGE 4 CHRONIC KIDNEY DISEASE (HCC): ICD-10-CM

## 2022-07-15 DIAGNOSIS — Z79.4 TYPE 2 DIABETES MELLITUS WITH STAGE 4 CHRONIC KIDNEY DISEASE, WITH LONG-TERM CURRENT USE OF INSULIN (HCC): ICD-10-CM

## 2022-07-15 DIAGNOSIS — F17.200 SMOKING: ICD-10-CM

## 2022-07-15 DIAGNOSIS — E11.22 TYPE 2 DIABETES MELLITUS WITH STAGE 4 CHRONIC KIDNEY DISEASE, WITH LONG-TERM CURRENT USE OF INSULIN (HCC): ICD-10-CM

## 2022-07-15 DIAGNOSIS — I50.32 CHRONIC DIASTOLIC HF (HEART FAILURE) (HCC): ICD-10-CM

## 2022-07-15 DIAGNOSIS — N25.81 SECONDARY HYPERPARATHYROIDISM OF RENAL ORIGIN (HCC): ICD-10-CM

## 2022-07-15 PROCEDURE — 99215 OFFICE O/P EST HI 40 MIN: CPT | Performed by: PHYSICIAN ASSISTANT

## 2022-07-15 RX ORDER — VITAMIN B COMPLEX
1 CAPSULE ORAL DAILY
COMMUNITY

## 2022-07-15 NOTE — ASSESSMENT & PLAN NOTE
Lab Results   Component Value Date    HGBA1C 6 2 (H) 01/12/2022   Likely not a candidate for SGLT2 inhibitor given advanced renal disease

## 2022-07-15 NOTE — PROGRESS NOTES
Assessment & Plan:    1  Acute kidney injury superimposed on chronic kidney disease stage 4 [facility])  Assessment & Plan:  Lab Results   Component Value Date    EGFR 12 01/15/2022    EGFR 12 01/14/2022    EGFR 14 01/13/2022    CREATININE 4 43 (H) 01/15/2022    CREATININE 4 42 (H) 01/14/2022    CREATININE 3 97 (H) 01/13/2022   Improvement with estimated GFR around 18 mL/min  Patient presents for 2nd opinion  Prior negative serologic workup  Renal biopsy August 2020  Will request report  Patient would like to opt for peritoneal dialysis when the time comes, but does not believe that dialysis initiation is indicated  Indeed, patient denies any uremic signs or symptoms  Volume control remains an issue due to lymphedema, but his lungs are clear  He has tried compression treatments in the past, from which he would likely benefit  Of note, during prior hospitalization, patient was over diuresed resulting in worsening of renal function  Will likely have to live with edema to a certain extent to optimize renal function  Patient reports that he would like to follow-up with [facility]  I did leave that decision entirely up to the patient  I did advise that we also utilize [facility] [employer]  Patient opted to follow-up with us  Will plan to re-evaluate in 2 months with labs prior  Patient declines transplant referral at this time  He would like peritoneal dialysis, when the time comes  I did advise that we would revisit dialysis preparations if evidence of renal function worsening  He verbalized understanding  Avoid nephrotoxins and renally dose medications for creatinine clearance of 15 mL/min  2  Stage 4 chronic kidney disease ([facility] [address] )  -     Comprehensive metabolic panel; Future; Expected date: 08/29/2022  -     CBC and differential; Future; Expected date: 08/29/2022  -     Protein / creatinine ratio, urine;  Future; Expected date: 08/29/2022  -     Urinalysis with microscopic; Future; Expected date: 08/29/2022    3  Type 2 diabetes mellitus with stage 4 chronic kidney disease, with long-term current use of insulin ([facility])  Assessment & Plan:    Lab Results   Component Value Date    HGBA1C 6 2 (H) 01/12/2022   Likely not a candidate for SGLT2 inhibitor given advanced renal disease  4  Obesity, morbid ([facility] [address] )  Assessment & Plan: Body mass index is 38 25 kg/m²  BMI greater than 40 is associated with greater risk of progression of renal disease secondary to glomerular hyperfiltration  Recommend weight loss  5  Secondary hyperparathyroidism of renal origin [facility])  Assessment & Plan:  Continue calcitriol 0 25 mcg Monday Wednesday Friday  Patient reports that he experienced adverse effects when he was taking this daily  6  Hypertension, unspecified type  Assessment & Plan:  Elevated in office today  May be impart situational given topic of conversation today  In addition, he has significant lower extremity edema  Will continue furosemide 80 mg twice daily, metoprolol 25 mg daily  I am asked patient to reduce fluid intake  Would look to adjustment of diuretic at follow-up, however patient reports decreased urine output with higher dose and when he was on bumetanide  He is not certain what dose of bumetanide he was taking at the time  Recommend low-sodium diet  7  Chronic diastolic HF (heart failure) ([facility])  Assessment & Plan:  Wt Readings from Last 3 Encounters:   07/15/22 128 kg (282 lb)   03/08/22 128 kg (283 lb)   01/15/22 130 kg (285 lb 9 6 oz)   With lymphedema but overall compensated with lungs clear to auscultation  Continue Lasix 80 mg twice daily  Reduce fluid intake to 50 oz daily  Low-sodium diet  8  Smoking  Assessment & Plan:  Would benefit from smoking cessation  The benefits, risks and alternatives to the treatment plan were discussed at this visit   Patient was advised of common adverse effects of any medical therapies prescribed  All questions were answered and discussed with the patient and any accompanying family members or caretakers  Subjective:      Patient ID: Camille Tucker is a 74 y o  male with HTN, CKD4, HLD, Factor 5 Leiden mutation, history of stroke seen in the [facility]  HPI     Patient was seen during hospitalization at [facility] from 01/11/2022 through 01/15/2022  At that time, he was seen for acute kidney injury superimposed on chronic kidney disease, stage IV with baseline creatinine 3 2 mg/dL with proteinuria  On this occurred in the setting of likely over-diuresis  Patient typically follows with [facility] [place]  Today, patient presents for follow-up of hospitalization and for a "second opinion " He has undergone renal biopsy August 2020  Will need to request biopsy results  Risk factors for chronic kidney disease include smoking status, uncontrolled hypertension, diabetes, hyperlipidemia  Blood pressure is 164/78 with a heart rate of seventy-one  Patient reports adherence with furosemide 120 mg twice daily, metoprolol succinate 25 mg once daily  Patient had a BUN of 60 mg/dL with a creatinine of 3 6 mg/dL estimated GFR 18 mL/min on 06/28/2022  He is not certain if he is interested in transplant given his other comorbidities  When the time comes, he would like to opt for peritoneal dialysis  He would like to switch providers because he is not comfortable with the push to begin dialysis now when he does not believe it is indicated  Additionally, he was unhappy with the [address] [place] [address] that he went to that told him the weekend is the "kill zone" and that if he misses 1 treatment he will die  He has significant lower extremity edema  He reports taking furosemide 80 mg twice daily  He reports that higher dose and change to bumetanide actually decreased urine output    He drinks about 2 quarts of water daily in addition to sodas     History is obtained from patient  He is accompanied by significant other  The following portions of the patient's history were reviewed and updated as appropriate: allergies, current medications, past family history, past medical history, past social history, past surgical history, and problem list     Review of Systems   Constitutional: Negative for activity change, chills, diaphoresis, fatigue and fever  HENT: Negative for mouth sores and trouble swallowing  Respiratory: Negative for apnea, cough, chest tightness, shortness of breath and wheezing  Cardiovascular: Positive for leg swelling  Negative for chest pain and palpitations  Gastrointestinal: Negative for abdominal distention, abdominal pain, blood in stool, constipation, diarrhea and nausea  Genitourinary: Negative for decreased urine volume, difficulty urinating, dysuria, enuresis, frequency, hematuria and urgency  Musculoskeletal: Negative for arthralgias, back pain and joint swelling  Skin: Negative for pallor, rash and wound  Neurological: Negative for dizziness, seizures, light-headedness, numbness and headaches  Hematological: Does not bruise/bleed easily  Psychiatric/Behavioral: Negative for agitation, behavioral problems and confusion  The patient is not nervous/anxious  Objective:      /78 (BP Location: Left arm, Patient Position: Sitting, Cuff Size: Standard)   Pulse 71   Ht 6' (1 829 m)   Wt 128 kg (282 lb)   SpO2 94%   BMI 38 25 kg/m²          Physical Exam  Vitals and nursing note reviewed  Constitutional:       General: He is awake  He is not in acute distress  Appearance: Normal appearance  He is well-developed  He is obese  He is not ill-appearing, toxic-appearing or diaphoretic  HENT:      Head: Normocephalic and atraumatic  Jaw: There is normal jaw occlusion        Nose: Nose normal       Mouth/Throat:      Mouth: Mucous membranes are moist       Pharynx: Oropharynx is clear  No oropharyngeal exudate or posterior oropharyngeal erythema  Eyes:      General: Lids are normal  Vision grossly intact  Gaze aligned appropriately  No scleral icterus  Right eye: No discharge  Left eye: No discharge  Extraocular Movements: Extraocular movements intact  Conjunctiva/sclera: Conjunctivae normal       Pupils: Pupils are equal, round, and reactive to light  Neck:      Thyroid: No thyroid mass or thyromegaly  Trachea: Trachea and phonation normal    Cardiovascular:      Rate and Rhythm: Normal rate and regular rhythm  Heart sounds: Normal heart sounds, S1 normal and S2 normal  No murmur heard  No friction rub  No gallop  Comments: Bilateral lower extremity lymphedema  Pulmonary:      Effort: Pulmonary effort is normal  No respiratory distress  Breath sounds: Normal breath sounds  No stridor  No wheezing, rhonchi or rales  Abdominal:      General: Abdomen is flat  Bowel sounds are normal  There is no distension  Palpations: Abdomen is soft  There is no mass  Tenderness: There is no abdominal tenderness  There is no guarding  Hernia: No hernia is present  Musculoskeletal:         General: Normal range of motion  Cervical back: Normal range of motion and neck supple  No rigidity or tenderness  Right lower leg: 3+ Pitting Edema present  Left lower leg: 3+ Pitting Edema present  Lymphadenopathy:      Cervical: No cervical adenopathy  Skin:     General: Skin is warm and dry  Coloration: Skin is not jaundiced  Findings: No bruising  Nails: There is no clubbing  Neurological:      General: No focal deficit present  Mental Status: He is alert and oriented to person, place, and time  Mental status is at baseline     Psychiatric:         Attention and Perception: Attention and perception normal          Mood and Affect: Mood and affect normal          Speech: Speech normal          Behavior: Behavior normal  Behavior is cooperative  Thought Content:  Thought content normal          Judgment: Judgment normal              Lab Results   Component Value Date    SODIUM 134 (L) 01/15/2022    K 3 9 01/15/2022     01/15/2022    CO2 25 01/15/2022    AGAP 6 01/15/2022    BUN 63 (H) 01/15/2022    CREATININE 4 43 (H) 01/15/2022    GLUC 77 01/15/2022    CALCIUM 8 4 01/15/2022    AST 40 01/12/2022    ALT 22 01/12/2022    ALKPHOS 106 01/12/2022    TP 7 3 01/12/2022    TBILI 0 52 01/12/2022    EGFR 12 01/15/2022      Lab Results   Component Value Date    CREATININE 4 43 (H) 01/15/2022    CREATININE 4 42 (H) 01/14/2022    CREATININE 3 97 (H) 01/13/2022    CREATININE 3 81 (H) 01/12/2022    CREATININE 3 96 (H) 01/11/2022      Lab Results   Component Value Date    COLORU Yellow 01/14/2022    CLARITYU Clear 01/14/2022    SPECGRAV 1 020 01/14/2022    PHUR 5 0 01/14/2022    LEUKOCYTESUR Negative 01/14/2022    NITRITE Negative 01/14/2022    PROTEIN  (2+) (A) 01/14/2022    GLUCOSEU Negative 01/14/2022    KETONESU Negative 01/14/2022    UROBILINOGEN 0 2 01/14/2022    BILIRUBINUR Negative 01/14/2022    BLOODU Negative 01/14/2022    RBCUA None Seen 01/14/2022    WBCUA None Seen 01/14/2022    EPIS None Seen 01/14/2022    BACTERIA None Seen 01/14/2022      No results found for: LABPROT  No results found for: Rowdy Elaine  Lab Results   Component Value Date    WBC 4 65 01/15/2022    HGB 10 6 (L) 01/15/2022    HCT 31 6 (L) 01/15/2022    MCV 99 (H) 01/15/2022     (L) 01/15/2022      Lab Results   Component Value Date    HGB 10 6 (L) 01/15/2022    HGB 10 7 (L) 01/14/2022    HGB 10 9 (L) 01/13/2022    HGB 12 8 01/12/2022    HGB 13 3 01/11/2022      Lab Results   Component Value Date    IRON 63 (L) 01/13/2022    TIBC 250 01/13/2022    FERRITIN 80 01/13/2022      No results found for: PTHCALCIUM, PJFH29THGSWE, PHOSPHORUS   Lab Results   Component Value Date    CHOLESTEROL 140 01/12/2022    HDL 30 (L) 01/12/2022    LDLCALC 75 01/12/2022    TRIG 177 (H) 01/12/2022      No results found for: URICACID   Lab Results   Component Value Date    HGBA1C 6 2 (H) 01/12/2022      No results found for: TSHANTIBODY, T0SBOUQ, FREET4   No results found for: AMBAR, DSDNAAB, RFIGM   No results found for: PROT, UPEP, IMMUNOFIX, KAPPALAMBDA, KAPPALIGHT     Spent 45 minutes in face-to-face time with patient  Portions of the record may have been created with voice recognition software  Occasional wrong word or "sound a like" substitutions may have occurred due to the inherent limitations of voice recognition software  Read the chart carefully and recognize, using context, where substitutions have occurred  If you have any questions, please contact the dictating provider

## 2022-07-15 NOTE — ASSESSMENT & PLAN NOTE
Wt Readings from Last 3 Encounters:   07/15/22 128 kg (282 lb)   03/08/22 128 kg (283 lb)   01/15/22 130 kg (285 lb 9 6 oz)   With lymphedema but overall compensated with lungs clear to auscultation  Continue Lasix 80 mg twice daily  Reduce fluid intake to 50 oz daily  Low-sodium diet

## 2022-07-15 NOTE — ASSESSMENT & PLAN NOTE
Lab Results   Component Value Date    EGFR 12 01/15/2022    EGFR 12 01/14/2022    EGFR 14 01/13/2022    CREATININE 4 43 (H) 01/15/2022    CREATININE 4 42 (H) 01/14/2022    CREATININE 3 97 (H) 01/13/2022   Improvement with estimated GFR around 18 mL/min  Patient presents for 2nd opinion  Prior negative serologic workup  Renal biopsy August 2020  Will request report  Patient would like to opt for peritoneal dialysis when the time comes, but does not believe that dialysis initiation is indicated  Indeed, patient denies any uremic signs or symptoms  Volume control remains an issue due to lymphedema, but his lungs are clear  He has tried compression treatments in the past, from which he would likely benefit  Of note, during prior hospitalization, patient was over diuresed resulting in worsening of renal function  Will likely have to live with edema to a certain extent to optimize renal function  Patient reports that he would like to follow-up with [facility]  I did leave that decision entirely up to the patient  I did advise that we also utilize [facility] [employer]  Patient opted to follow-up with us  Will plan to re-evaluate in 2 months with labs prior  Patient declines transplant referral at this time  He would like peritoneal dialysis, when the time comes  I did advise that we would revisit dialysis preparations if evidence of renal function worsening  He verbalized understanding  Avoid nephrotoxins and renally dose medications for creatinine clearance of 15 mL/min

## 2022-07-15 NOTE — ASSESSMENT & PLAN NOTE
Continue calcitriol 0 25 mcg Monday Wednesday Friday  Patient reports that he experienced adverse effects when he was taking this daily

## 2022-07-15 NOTE — ASSESSMENT & PLAN NOTE
Elevated in office today  May be impart situational given topic of conversation today  In addition, he has significant lower extremity edema  Will continue furosemide 80 mg twice daily, metoprolol 25 mg daily  I am asked patient to reduce fluid intake  Would look to adjustment of diuretic at follow-up, however patient reports decreased urine output with higher dose and when he was on bumetanide  He is not certain what dose of bumetanide he was taking at the time  Recommend low-sodium diet

## 2022-07-15 NOTE — ASSESSMENT & PLAN NOTE
Body mass index is 38 25 kg/m²  BMI greater than 40 is associated with greater risk of progression of renal disease secondary to glomerular hyperfiltration  Recommend weight loss

## 2022-09-09 LAB
CREAT ?TM UR-SCNC: 59.9 UMOL/L
EXT PROTEIN URINE: 124
HBA1C MFR BLD HPLC: 6.9 %
PROT/CREAT UR: 2.07 MG/G{CREAT}

## 2022-09-16 ENCOUNTER — OFFICE VISIT (OUTPATIENT)
Dept: NEPHROLOGY | Facility: CLINIC | Age: 67
End: 2022-09-16
Payer: MEDICARE

## 2022-09-16 VITALS
HEIGHT: 72 IN | SYSTOLIC BLOOD PRESSURE: 158 MMHG | DIASTOLIC BLOOD PRESSURE: 82 MMHG | BODY MASS INDEX: 37.93 KG/M2 | WEIGHT: 280 LBS

## 2022-09-16 DIAGNOSIS — N18.4 STAGE 4 CHRONIC KIDNEY DISEASE (HCC): Primary | ICD-10-CM

## 2022-09-16 DIAGNOSIS — M89.9 CHRONIC KIDNEY DISEASE-MINERAL AND BONE DISORDER: ICD-10-CM

## 2022-09-16 DIAGNOSIS — E83.9 CHRONIC KIDNEY DISEASE-MINERAL AND BONE DISORDER: ICD-10-CM

## 2022-09-16 DIAGNOSIS — N18.9 CHRONIC KIDNEY DISEASE-MINERAL AND BONE DISORDER: ICD-10-CM

## 2022-09-16 DIAGNOSIS — I50.32 CHRONIC DIASTOLIC HF (HEART FAILURE) (HCC): ICD-10-CM

## 2022-09-16 DIAGNOSIS — I12.9 HYPERTENSIVE NEPHROSCLEROSIS, STAGE 1 THROUGH STAGE 4 OR UNSPECIFIED CHRONIC KIDNEY DISEASE: ICD-10-CM

## 2022-09-16 DIAGNOSIS — E11.21 DIABETIC NEPHROPATHY ASSOCIATED WITH TYPE 2 DIABETES MELLITUS (HCC): ICD-10-CM

## 2022-09-16 PROCEDURE — 99213 OFFICE O/P EST LOW 20 MIN: CPT | Performed by: NURSE PRACTITIONER

## 2022-09-16 RX ORDER — CLINDAMYCIN HYDROCHLORIDE 300 MG/1
CAPSULE ORAL
COMMUNITY
Start: 2022-09-16

## 2022-09-16 NOTE — PROGRESS NOTES
Nephrology   Office Follow-Up  Shivam Stallworth 69 y o  male MRN: [id number]    Encounter: [id number]        [address] was seen in the [facility] office today  All diagnoses and orders for visit:     1  Stage 4 chronic kidney disease ([address] )  · Baseline creatinine mid 3s  · Previously followed with Dr Sarahi Lorenz  · Etiology likely diabetic nephropathy with subnephrotic range proteinuria, hypertensive nephrosclerosis, obesity related issues, possible underlying CRS  · ? Biopsy years ago- will attempt to obtain records  · Not on ace/arg/SGLT2i  · Underwent education class with [facility]  Apparently TCU was also offered- patient declined  · Reviewed access and modality in detail today- declined referral  · Transplant referral- declined referral  · Obtain labs from this week from Dr Devaughn Skiff   -     Comprehensive metabolic panel; Future; Expected date: 12/23/2022   -     CBC and differential; Future; Expected date: 12/23/2022   -     Microalbumin / creatinine urine ratio; Future; Expected date: 12/23/2022   -     Urinalysis with microscopic; Future; Expected date: 12/23/2022   -     Protein / creatinine ratio, urine; Future; Expected date: 12/23/2022  2  Diabetic nephropathy associated with type 2 diabetes mellitus (HCC)  · Suspected diagnosis  Not a candidate for ace/arb/sglt2i  3  Hypertensive nephrosclerosis, stage 1 through stage 4 or unspecified chronic kidney disease  · Blood pressure goal < 140/80 mmHg  · Home blood pressures ranging 123/71-1/40/80  · No changes made  4  Chronic diastolic HF (heart failure) (H[facility])  · Lower extremity edema noted- patient states better than typical  · Continue lasix 80 mg BID  Seems to produce response to this based upon our conversation  May eventually need transition to torsemide or bumex  5  Chronic kidney disease-mineral and bone disorder   -     PTH, intact; Future; Expected date: 12/23/2022   -     Phosphorus; Future; Expected date: 12/23/2022  6  Acidosis  · Previously on bicarb tabs  Await repeat labs    HPI: Juliette Coburn is a 69 y o  male who is here for scheduled follow-up regarding CKD 4  Previously followed with Dr Daniela Valenzuela but switched to our practice over the summer  Reviewed indications of dialysis (acidosis, electrolyte disorders, uremia, overload/oliguria) with patient  Thus far no indication for RRT (renal replacement therapy) - monitor acid/base  Reviewed modalities of RRT including iHD, HHD, PD  Reviewed access (PC/AVG/AVF/PDC)  No interest in access planning  Declined transplant referral  "I hate the word dialysis "     Will obtain lab work from PCP office  May need to add sodium bicarbonate tabs  Continue sodium restricted diet, lasix 80 mg BID  Serial lab work  Return to office in December with nephrologist      ROS:   Review of Systems   Constitutional: Negative for chills and fever  HENT: Negative for ear pain and sore throat  Eyes: Negative for pain and visual disturbance  Respiratory: Negative for cough and shortness of breath  Cardiovascular: Negative for chest pain and palpitations  Gastrointestinal: Negative for abdominal pain and vomiting  Genitourinary: Negative for dysuria and hematuria  Musculoskeletal: Negative for arthralgias and back pain  Skin: Negative for color change and rash  Neurological: Negative for seizures and syncope  All other systems reviewed and are negative        Allergies: Carvedilol, Aspartame - food allergy, Bumetanide, Hydralazine, Lisinopril, Morphine, Nifedipine, Ciprofloxacin, and Strawberry extract - food allergy    Medications:   Current Outpatient Medications:     allopurinol (ZYLOPRIM) 300 mg tablet, Take by mouth, Disp: , Rfl:     Arginine 1000 MG TABS, Take by mouth One daily, Disp: , Rfl:     ascorbic acid (VITAMIN C) 500 MG tablet, Take 500 mg by mouth daily, Disp: , Rfl:     aspirin (ECOTRIN LOW STRENGTH) 81 mg EC tablet, Take 81 mg by mouth, Disp: , Rfl:    atorvastatin (LIPITOR) 40 mg tablet, Take 1 tablet (40 mg total) by mouth daily with dinner, Disp: 30 tablet, Rfl: 0    b complex vitamins capsule, Take 1 capsule by mouth daily, Disp: , Rfl:     clindamycin (CLEOCIN) 300 MG capsule, , Disp: , Rfl:     co-enzyme Q-10 30 MG capsule, Take 100 mg by mouth, Disp: , Rfl:     furosemide (LASIX) 80 mg tablet, Take 120 mg by mouth 2 (two) times a day, Disp: , Rfl:     HYDROcodone-acetaminophen (NORCO)  mg per tablet, Take by mouth, Disp: , Rfl:     insulin glargine (LANTUS) 100 units/mL subcutaneous injection, Inject 20 Units under the skin, Disp: , Rfl:     insulin lispro (HumaLOG) 100 units/mL injection, , Disp: , Rfl:     levothyroxine 125 mcg tablet, Take by mouth, Disp: , Rfl:     metoprolol succinate (TOPROL-XL) 25 mg 24 hr tablet, Take by mouth 1-2 prn, Disp: , Rfl:     tamsulosin (FLOMAX) 0 4 mg, Take 0 4 mg by mouth daily with dinner, Disp: , Rfl:     vitamin A 2400 MCG (8000 UT) capsule, Take 2,400 Units by mouth daily, Disp: , Rfl:     vitamin E, tocopherol, 200 units capsule, Take 200 Units by mouth daily 180 mg daily, Disp: , Rfl:     Past Medical History:   Diagnosis Date    Chronic kidney disease     Disease of thyroid gland     Hyperlipidemia     Hypertension     Stroke [facility])      Past Surgical History:   Procedure Laterality Date    CHOLECYSTECTOMY      CORONARY ANGIOPLASTY WITH STENT PLACEMENT      JOINT REPLACEMENT      bilateral knee     Family History   Problem Relation Age of Onset    Kidney failure Mother     Cirrhosis Mother     Colon cancer Brother       reports that he has been smoking cigarettes  He has been smoking about 0 50 packs per day  He has never used smokeless tobacco  He reports previous alcohol use  He reports that he does not use drugs  Physical Exam:   Vitals:    09/16/22 1243   BP: 158/82   Weight: 127 kg (280 lb)   Height: 6' (1 829 m)     Body mass index is 37 97 kg/m²      General: conscious, cooperative, in no acute distress, appears stated age  Eyes: conjunctivae pale, anicteric sclerae  ENT: lips and mucous membranes moist  Neck: supple, no JVD, no masses  Chest:  essentially clear breath sounds bilaterally, no crackles, ronchus or wheezings  CVS: S1 & S2, normal rate, regular rhythm  Abdomen: soft, non-tender, non-distended, normoactive bowel sounds, rounded obese  Extremities: moderate edema of both legs  Skin: no rash   Neuro: awake, alert, oriented       Diagnostic Data:  Lab: I have personally reviewed pertinent lab results  ,   CBC:       CMP: No results found for: SODIUM, K, CL, CO2, ANIONGAP, BUN, CREATININE, GLUCOSE, CALCIUM, AST, ALT, ALKPHOS, PROT, BILITOT, EGFR,   PT/INR: No results found for: PT, INR,   Magnesium: No components found for: MAG,  Phosphorous: No results found for: PHOS    Patient Instructions   Lab work in December       Portions of the record may have been created with voice recognition software  Occasional wrong word or "sound a like" substitutions may have occurred due to the inherent limitations of voice recognition software  Read the chart carefully and recognize, using context, where substitutions have occurred  If you have any questions, please contact the dictating provider

## 2022-09-23 ENCOUNTER — HOSPITAL ENCOUNTER (INPATIENT)
Facility: HOSPITAL | Age: 67
LOS: 3 days | Discharge: HOME | DRG: 660 | End: 2022-09-26
Attending: INTERNAL MEDICINE | Admitting: INTERNAL MEDICINE
Payer: MEDICARE

## 2022-09-23 ENCOUNTER — HOSPITAL ENCOUNTER (EMERGENCY)
Facility: HOSPITAL | Age: 67
End: 2022-09-23
Attending: EMERGENCY MEDICINE
Payer: MEDICARE

## 2022-09-23 ENCOUNTER — APPOINTMENT (EMERGENCY)
Dept: CT IMAGING | Facility: HOSPITAL | Age: 67
End: 2022-09-23
Payer: MEDICARE

## 2022-09-23 VITALS
RESPIRATION RATE: 18 BRPM | HEART RATE: 69 BPM | BODY MASS INDEX: 41.31 KG/M2 | WEIGHT: 288.58 LBS | HEIGHT: 70 IN | OXYGEN SATURATION: 93 % | TEMPERATURE: 97.4 F | SYSTOLIC BLOOD PRESSURE: 163 MMHG | DIASTOLIC BLOOD PRESSURE: 76 MMHG

## 2022-09-23 DIAGNOSIS — L03.317 CELLULITIS AND ABSCESS OF BUTTOCK: ICD-10-CM

## 2022-09-23 DIAGNOSIS — N20.1 LEFT URETERAL STONE: ICD-10-CM

## 2022-09-23 DIAGNOSIS — L02.31 CELLULITIS AND ABSCESS OF BUTTOCK: ICD-10-CM

## 2022-09-23 DIAGNOSIS — N18.9 CHRONIC KIDNEY DISEASE-MINERAL AND BONE DISORDER: ICD-10-CM

## 2022-09-23 DIAGNOSIS — N18.4 STAGE 4 CHRONIC KIDNEY DISEASE (HCC): ICD-10-CM

## 2022-09-23 DIAGNOSIS — M89.9 CHRONIC KIDNEY DISEASE-MINERAL AND BONE DISORDER: ICD-10-CM

## 2022-09-23 DIAGNOSIS — D68.51 FACTOR 5 LEIDEN MUTATION, HETEROZYGOUS (HCC): ICD-10-CM

## 2022-09-23 DIAGNOSIS — E66.01 OBESITY, MORBID (HCC): ICD-10-CM

## 2022-09-23 DIAGNOSIS — E83.9 CHRONIC KIDNEY DISEASE-MINERAL AND BONE DISORDER: ICD-10-CM

## 2022-09-23 DIAGNOSIS — N13.2 HYDRONEPHROSIS WITH URINARY OBSTRUCTION DUE TO URETERAL CALCULUS: ICD-10-CM

## 2022-09-23 DIAGNOSIS — N13.2 URETERAL STONE WITH HYDRONEPHROSIS: ICD-10-CM

## 2022-09-23 DIAGNOSIS — N17.9 AKI (ACUTE KIDNEY INJURY) (HCC): ICD-10-CM

## 2022-09-23 DIAGNOSIS — N13.30 HYDROURETERONEPHROSIS: ICD-10-CM

## 2022-09-23 DIAGNOSIS — N20.1 URETEROLITHIASIS: Primary | ICD-10-CM

## 2022-09-23 DIAGNOSIS — L02.31 CUTANEOUS ABSCESS OF BUTTOCK: Primary | ICD-10-CM

## 2022-09-23 PROBLEM — K61.0 PERIANAL ABSCESS: Status: ACTIVE | Noted: 2022-09-23

## 2022-09-23 LAB
ALBUMIN SERPL BCP-MCNC: 3.6 G/DL (ref 3.5–5)
ALP SERPL-CCNC: 120 U/L (ref 46–116)
ALT SERPL W P-5'-P-CCNC: 25 U/L (ref 12–78)
ANION GAP SERPL CALCULATED.3IONS-SCNC: 12 MMOL/L (ref 4–13)
APTT PPP: 30 SECONDS (ref 23–37)
AST SERPL W P-5'-P-CCNC: 18 U/L (ref 5–45)
BACTERIA UR QL AUTO: NORMAL /HPF
BASOPHILS # BLD AUTO: 0.04 THOUSANDS/ΜL (ref 0–0.1)
BASOPHILS NFR BLD AUTO: 0 % (ref 0–1)
BILIRUB SERPL-MCNC: 0.41 MG/DL (ref 0.2–1)
BILIRUB UR QL STRIP: NEGATIVE
BUN SERPL-MCNC: 50 MG/DL (ref 5–25)
CALCIUM SERPL-MCNC: 9.9 MG/DL (ref 8.3–10.1)
CHLORIDE SERPL-SCNC: 100 MMOL/L (ref 96–108)
CLARITY UR: CLEAR
CO2 SERPL-SCNC: 24 MMOL/L (ref 21–32)
COARSE GRAN CASTS URNS QL MICRO: NORMAL /LPF
COLOR UR: YELLOW
CREAT SERPL-MCNC: 4.19 MG/DL (ref 0.6–1.3)
EOSINOPHIL # BLD AUTO: 0.07 THOUSAND/ΜL (ref 0–0.61)
EOSINOPHIL NFR BLD AUTO: 1 % (ref 0–6)
ERYTHROCYTE [DISTWIDTH] IN BLOOD BY AUTOMATED COUNT: 14.7 % (ref 11.6–15.1)
GFR SERPL CREATININE-BSD FRML MDRD: 13 ML/MIN/1.73SQ M
GLUCOSE SERPL-MCNC: 128 MG/DL (ref 65–140)
GLUCOSE SERPL-MCNC: 164 MG/DL (ref 65–140)
GLUCOSE SERPL-MCNC: 236 MG/DL (ref 65–140)
GLUCOSE UR STRIP-MCNC: NEGATIVE MG/DL
HCT VFR BLD AUTO: 42 % (ref 36.5–49.3)
HGB BLD-MCNC: 13.4 G/DL (ref 12–17)
HGB UR QL STRIP.AUTO: ABNORMAL
IMM GRANULOCYTES # BLD AUTO: 0.04 THOUSAND/UL (ref 0–0.2)
IMM GRANULOCYTES NFR BLD AUTO: 0 % (ref 0–2)
INR PPP: 0.96 (ref 0.84–1.19)
KETONES UR STRIP-MCNC: NEGATIVE MG/DL
LACTATE SERPL-SCNC: 1.7 MMOL/L (ref 0.5–2)
LEUKOCYTE ESTERASE UR QL STRIP: NEGATIVE
LYMPHOCYTES # BLD AUTO: 0.85 THOUSANDS/ΜL (ref 0.6–4.47)
LYMPHOCYTES NFR BLD AUTO: 9 % (ref 14–44)
MCH RBC QN AUTO: 32.1 PG (ref 26.8–34.3)
MCHC RBC AUTO-ENTMCNC: 31.9 G/DL (ref 31.4–37.4)
MCV RBC AUTO: 101 FL (ref 82–98)
MONOCYTES # BLD AUTO: 0.31 THOUSAND/ΜL (ref 0.17–1.22)
MONOCYTES NFR BLD AUTO: 3 % (ref 4–12)
NEUTROPHILS # BLD AUTO: 8.29 THOUSANDS/ΜL (ref 1.85–7.62)
NEUTS SEG NFR BLD AUTO: 87 % (ref 43–75)
NITRITE UR QL STRIP: NEGATIVE
NON-SQ EPI CELLS URNS QL MICRO: NORMAL /HPF
NRBC BLD AUTO-RTO: 0 /100 WBCS
PH UR STRIP.AUTO: 5.5 [PH]
PLATELET # BLD AUTO: 127 THOUSANDS/UL (ref 149–390)
PMV BLD AUTO: 9.4 FL (ref 8.9–12.7)
POTASSIUM SERPL-SCNC: 4.1 MMOL/L (ref 3.5–5.3)
PROT SERPL-MCNC: 8.3 G/DL (ref 6.4–8.4)
PROT UR STRIP-MCNC: ABNORMAL MG/DL
PROTHROMBIN TIME: 12.9 SECONDS (ref 11.6–14.5)
RBC # BLD AUTO: 4.17 MILLION/UL (ref 3.88–5.62)
RBC #/AREA URNS AUTO: NORMAL /HPF
SODIUM SERPL-SCNC: 136 MMOL/L (ref 135–147)
SP GR UR STRIP.AUTO: 1.02 (ref 1–1.03)
UROBILINOGEN UR QL STRIP.AUTO: 0.2 E.U./DL
WBC # BLD AUTO: 9.6 THOUSAND/UL (ref 4.31–10.16)
WBC #/AREA URNS AUTO: NORMAL /HPF

## 2022-09-23 PROCEDURE — 96361 HYDRATE IV INFUSION ADD-ON: CPT

## 2022-09-23 PROCEDURE — 99285 EMERGENCY DEPT VISIT HI MDM: CPT

## 2022-09-23 PROCEDURE — 83605 ASSAY OF LACTIC ACID: CPT | Performed by: PHYSICIAN ASSISTANT

## 2022-09-23 PROCEDURE — 80053 COMPREHEN METABOLIC PANEL: CPT | Performed by: PHYSICIAN ASSISTANT

## 2022-09-23 PROCEDURE — 85610 PROTHROMBIN TIME: CPT | Performed by: PHYSICIAN ASSISTANT

## 2022-09-23 PROCEDURE — 96374 THER/PROPH/DIAG INJ IV PUSH: CPT

## 2022-09-23 PROCEDURE — 85025 COMPLETE CBC W/AUTO DIFF WBC: CPT | Performed by: PHYSICIAN ASSISTANT

## 2022-09-23 PROCEDURE — 99285 EMERGENCY DEPT VISIT HI MDM: CPT | Performed by: PHYSICIAN ASSISTANT

## 2022-09-23 PROCEDURE — 85730 THROMBOPLASTIN TIME PARTIAL: CPT | Performed by: PHYSICIAN ASSISTANT

## 2022-09-23 PROCEDURE — 36415 COLL VENOUS BLD VENIPUNCTURE: CPT | Performed by: PHYSICIAN ASSISTANT

## 2022-09-23 PROCEDURE — 81001 URINALYSIS AUTO W/SCOPE: CPT | Performed by: PHYSICIAN ASSISTANT

## 2022-09-23 PROCEDURE — 82948 REAGENT STRIP/BLOOD GLUCOSE: CPT

## 2022-09-23 PROCEDURE — 74176 CT ABD & PELVIS W/O CONTRAST: CPT

## 2022-09-23 PROCEDURE — 96376 TX/PRO/DX INJ SAME DRUG ADON: CPT

## 2022-09-23 PROCEDURE — 99222 1ST HOSP IP/OBS MODERATE 55: CPT | Performed by: UROLOGY

## 2022-09-23 PROCEDURE — 96375 TX/PRO/DX INJ NEW DRUG ADDON: CPT

## 2022-09-23 RX ORDER — OXYCODONE HYDROCHLORIDE 5 MG/1
5 TABLET ORAL EVERY 4 HOURS PRN
Status: DISCONTINUED | OUTPATIENT
Start: 2022-09-23 | End: 2022-09-26 | Stop reason: HOSPADM

## 2022-09-23 RX ORDER — OXYCODONE HYDROCHLORIDE 5 MG/1
2.5 TABLET ORAL EVERY 4 HOURS PRN
Status: CANCELLED | OUTPATIENT
Start: 2022-09-23

## 2022-09-23 RX ORDER — OXYCODONE HYDROCHLORIDE 5 MG/1
5 TABLET ORAL EVERY 4 HOURS PRN
Status: CANCELLED | OUTPATIENT
Start: 2022-09-23

## 2022-09-23 RX ORDER — ONDANSETRON 2 MG/ML
4 INJECTION INTRAMUSCULAR; INTRAVENOUS ONCE
Status: COMPLETED | OUTPATIENT
Start: 2022-09-23 | End: 2022-09-23

## 2022-09-23 RX ORDER — ONDANSETRON 2 MG/ML
4 INJECTION INTRAMUSCULAR; INTRAVENOUS EVERY 6 HOURS PRN
Status: DISCONTINUED | OUTPATIENT
Start: 2022-09-23 | End: 2022-09-26 | Stop reason: HOSPADM

## 2022-09-23 RX ORDER — INSULIN LISPRO 100 [IU]/ML
2-12 INJECTION, SOLUTION INTRAVENOUS; SUBCUTANEOUS
Status: DISCONTINUED | OUTPATIENT
Start: 2022-09-23 | End: 2022-09-26 | Stop reason: HOSPADM

## 2022-09-23 RX ORDER — FENTANYL CITRATE 50 UG/ML
25 INJECTION, SOLUTION INTRAMUSCULAR; INTRAVENOUS ONCE
Status: COMPLETED | OUTPATIENT
Start: 2022-09-23 | End: 2022-09-23

## 2022-09-23 RX ORDER — SODIUM CHLORIDE, SODIUM GLUCONATE, SODIUM ACETATE, POTASSIUM CHLORIDE, MAGNESIUM CHLORIDE, SODIUM PHOSPHATE, DIBASIC, AND POTASSIUM PHOSPHATE .53; .5; .37; .037; .03; .012; .00082 G/100ML; G/100ML; G/100ML; G/100ML; G/100ML; G/100ML; G/100ML
50 INJECTION, SOLUTION INTRAVENOUS CONTINUOUS
Status: DISCONTINUED | OUTPATIENT
Start: 2022-09-23 | End: 2022-09-25

## 2022-09-23 RX ORDER — HEPARIN SODIUM 5000 [USP'U]/ML
5000 INJECTION, SOLUTION INTRAVENOUS; SUBCUTANEOUS EVERY 8 HOURS SCHEDULED
Status: DISCONTINUED | OUTPATIENT
Start: 2022-09-23 | End: 2022-09-26 | Stop reason: HOSPADM

## 2022-09-23 RX ORDER — ACETAMINOPHEN 325 MG/1
975 TABLET ORAL EVERY 8 HOURS SCHEDULED
Status: DISCONTINUED | OUTPATIENT
Start: 2022-09-23 | End: 2022-09-26 | Stop reason: HOSPADM

## 2022-09-23 RX ORDER — HYDROMORPHONE HCL IN WATER/PF 6 MG/30 ML
0.2 PATIENT CONTROLLED ANALGESIA SYRINGE INTRAVENOUS EVERY 4 HOURS PRN
Status: CANCELLED | OUTPATIENT
Start: 2022-09-23

## 2022-09-23 RX ORDER — HYDROMORPHONE HCL IN WATER/PF 6 MG/30 ML
0.2 PATIENT CONTROLLED ANALGESIA SYRINGE INTRAVENOUS EVERY 4 HOURS PRN
Status: DISCONTINUED | OUTPATIENT
Start: 2022-09-23 | End: 2022-09-25

## 2022-09-23 RX ORDER — SODIUM CHLORIDE 9 MG/ML
100 INJECTION, SOLUTION INTRAVENOUS CONTINUOUS
Status: DISCONTINUED | OUTPATIENT
Start: 2022-09-23 | End: 2022-09-23 | Stop reason: HOSPADM

## 2022-09-23 RX ORDER — FENTANYL CITRATE 50 UG/ML
50 INJECTION, SOLUTION INTRAMUSCULAR; INTRAVENOUS ONCE
Status: COMPLETED | OUTPATIENT
Start: 2022-09-23 | End: 2022-09-23

## 2022-09-23 RX ORDER — INSULIN GLARGINE 100 [IU]/ML
16 INJECTION, SOLUTION SUBCUTANEOUS
Status: DISCONTINUED | OUTPATIENT
Start: 2022-09-23 | End: 2022-09-26 | Stop reason: HOSPADM

## 2022-09-23 RX ORDER — OXYCODONE HYDROCHLORIDE 5 MG/1
2.5 TABLET ORAL EVERY 4 HOURS PRN
Status: DISCONTINUED | OUTPATIENT
Start: 2022-09-23 | End: 2022-09-26 | Stop reason: HOSPADM

## 2022-09-23 RX ADMIN — OXYCODONE HYDROCHLORIDE 5 MG: 5 TABLET ORAL at 17:47

## 2022-09-23 RX ADMIN — FENTANYL CITRATE 25 MCG: 50 INJECTION INTRAMUSCULAR; INTRAVENOUS at 11:07

## 2022-09-23 RX ADMIN — INSULIN LISPRO 4 UNITS: 100 INJECTION, SOLUTION INTRAVENOUS; SUBCUTANEOUS at 21:17

## 2022-09-23 RX ADMIN — ONDANSETRON 4 MG: 2 INJECTION INTRAMUSCULAR; INTRAVENOUS at 10:05

## 2022-09-23 RX ADMIN — ACETAMINOPHEN 975 MG: 325 TABLET, FILM COATED ORAL at 17:47

## 2022-09-23 RX ADMIN — SODIUM CHLORIDE 100 ML/HR: 0.9 INJECTION, SOLUTION INTRAVENOUS at 12:44

## 2022-09-23 RX ADMIN — HEPARIN SODIUM 5000 UNITS: 5000 INJECTION INTRAVENOUS; SUBCUTANEOUS at 17:47

## 2022-09-23 RX ADMIN — SODIUM CHLORIDE, SODIUM GLUCONATE, SODIUM ACETATE, POTASSIUM CHLORIDE, MAGNESIUM CHLORIDE, SODIUM PHOSPHATE, DIBASIC, AND POTASSIUM PHOSPHATE 100 ML/HR: .53; .5; .37; .037; .03; .012; .00082 INJECTION, SOLUTION INTRAVENOUS at 17:47

## 2022-09-23 RX ADMIN — INSULIN GLARGINE 16 UNITS: 100 INJECTION, SOLUTION SUBCUTANEOUS at 21:16

## 2022-09-23 RX ADMIN — FENTANYL CITRATE 50 MCG: 50 INJECTION INTRAMUSCULAR; INTRAVENOUS at 10:05

## 2022-09-23 RX ADMIN — OXYCODONE HYDROCHLORIDE 5 MG: 5 TABLET ORAL at 21:15

## 2022-09-23 NOTE — EMTALA/ACUTE CARE TRANSFER
3 [facility] [address]  [facility] [address]  Dept: [phone]      EMTALA TRANSFER CONSENT    NAME America Roman DOB 1955                              MRN [id number]    I have been informed of my rights regarding examination, treatment, and transfer   by Dr Ashley Vivas DO    Benefits: Specialized equipment and/or services available at the receiving facility (Include comment)________________________    Risks: Potential for delay in receiving treatment, Potential deterioration of medical condition, Loss of IV, Increased discomfort during transfer, Possible worsening of condition or death during transfer      Consent for Transfer:  I acknowledge that my medical condition has been evaluated and explained to me by the emergency department physician or other qualified medical person and/or my attending physician, who has recommended that I be transferred to the service of  Accepting Physician: Dr Lori Patino at [address] [facility] Name, [address] : [facility]  The above potential benefits of such transfer, the potential risks associated with such transfer, and the probable risks of not being transferred have been explained to me, and I fully understand them  The doctor has explained that, in my case, the benefits of transfer outweigh the risks  I agree to be transferred  I authorize the performance of emergency medical procedures and treatments upon me in both transit and upon arrival at the receiving facility  Additionally, I authorize the release of any and all medical records to the receiving facility and request they be transported with me, if possible  I understand that the safest mode of transportation during a medical emergency is an ambulance and that the Hospital advocates the use of this mode of transport   Risks of traveling to the receiving facility by car, including absence of medical control, life sustaining equipment, such as oxygen, and medical personnel has been explained to me and I fully understand them  (CARY CORRECT BOX BELOW)  [  ]  I consent to the stated transfer and to be transported by ambulance/helicopter  [  ]  I consent to the stated transfer, but refuse transportation by ambulance and accept full responsibility for my transportation by car  I understand the risks of non-ambulance transfers and I exonerate the Hospital and its staff from any deterioration in my condition that results from this refusal     X___________________________________________    DATE  22  TIME________  Signature of patient or legally responsible individual signing on patient behalf           RELATIONSHIP TO PATIENT_________________________          Provider Certification    NAME Norah Jacobs                                         1955                              MRN [id number]    A medical screening exam was performed on the above named patient  Based on the examination:    Condition Necessitating Transfer The primary encounter diagnosis was Ureterolithiasis  Diagnoses of Hydronephrosis with urinary obstruction due to ureteral calculus, DOC (acute kidney injury) ([facility] [address] ), Left ureteral stone, and Hydroureteronephrosis were also pertinent to this visit      Patient Condition: The patient has been stabilized such that within reasonable medical probability, no material deterioration of the patient condition or the condition of the unborn child(lesa) is likely to result from the transfer    Reason for Transfer: Level of Care needed not available at this facility    Transfer Requirements: Lauren Wayne7, [place]   · Space available and qualified personnel available for treatment as acknowledged by    · Agreed to accept transfer and to provide appropriate medical treatment as acknowledged by       Dr Giovanni Lugo  · Appropriate medical records of the examination and treatment of the patient are provided at the time of transfer   [address] [facility], [address] _______  · Transfer will be performed by qualified personnel from    and appropriate transfer equipment as required, including the use of necessary and appropriate life support measures  Provider Certification: I have examined the patient and explained the following risks and benefits of being transferred/refusing transfer to the patient/family:  General risk, such as traffic hazards, adverse weather conditions, rough terrain or turbulence, possible failure of equipment (including vehicle or aircraft), or consequences of actions of persons outside the control of the transport personnel, Unanticipated needs of medical equipment and personnel during transport, Risk of worsening condition, The possibility of a transport vehicle being unavailable      Based on these reasonable risks and benefits to the patient and/or the unborn child(lesa), and based upon the information available at the time of the patients examination, I certify that the medical benefits reasonably to be expected from the provision of appropriate medical treatments at another medical facility outweigh the increasing risks, if any, to the individuals medical condition, and in the case of labor to the unborn child, from effecting the transfer      X____________________________________________ DATE 09/23/22        TIME_______      ORIGINAL - SEND TO MEDICAL RECORDS   COPY - SEND WITH PATIENT DURING TRANSFER

## 2022-09-23 NOTE — TREATMENT PLAN
Received TT message from ED provider that patient had presented for complaints of left flank pain  I did not personally exam the patient but a comprehensive chart review was performed  Patient has a PMHx significant for CKD stage 4 with a baseline creatinine in the mid 3's and follows with nephrology  CT imaging from today shows a 5 mm left proximal ureteral calculus with mild upstream hydronephrosis  Additional left renal calculi measuring 1 5 cm and a 3 mm right non-obstructing renal calculi  Patient is afebrile, hypertensive otherwise stable  Negative leukocytosis, negative lactic acid, urine is pending  DOC creatinine 4 19 previously 3 6 (6/28/2022)  Plan:    -Recommend non-urgent transfer to [facility] under [facility] service with consult to Urology for possible add on stone intervention tomorrow    -Patient should be made NPO at midnight  -Consider antibiotics if urine is positive for infection   -Continue MET with IV hydration, Flomax, and pain control         [place]

## 2022-09-23 NOTE — ED NOTES
Patient transported to [facility] [address], RN  09/23/22 6114

## 2022-09-23 NOTE — H&P
INTERNAL MEDICINE RESIDENCY ADMISSION H&P     Name: Erasmo Fabry   Age & Sex: 69 y o  male   MRN: [id number]  Unit/Bed#: [facility] 923-01   Encounter: [id number]  Primary Care Provider: Cheo Vazquez DO    Code Status: Prior  Admission Status: INPATIENT   Disposition: Patient requires Med/Surg    Admit to team: SOD Team C     ASSESSMENT/PLAN     Principal Problem:    Ureteral stone with hydronephrosis  Active Problems:    History of CVA (cerebrovascular accident)    Acute kidney injury superimposed on chronic kidney disease stage 4 ([facility])    HTN (hypertension)    HLD (hyperlipidemia)    Lymphedema    Hypothyroidism    MI (myocardial infarction) ([facility] )    Factor 5 Leiden mutation, heterozygous ([place] [address] )    Type 2 diabetes mellitus with stage 4 chronic kidney disease, with long-term current use of insulin ([place] [address] )    Cutaneous abscess of buttock      Cutaneous abscess of buttock  Assessment & Plan  Patient describes having boil for 3-4 days  On exam, has right medial buttock abscess, but non expressible  Assessment: abscess is 0 7x1cm (less than 2 cm in diameter) without significant erythema or systemic symptoms  Will hold off on incision & drainage  Will hold off on routine antibiotic therapy in setting of mild skin abscess (in reference to IDSA guideline 2014)  · Consider antibiotics with gm+ coverage for skin/soft tissue infection if sample may be cultured prior  · Wound care consulted, appreciate recs    Type 2 diabetes mellitus with stage 4 chronic kidney disease, with long-term current use of insulin ([facility])  Assessment & Plan  Lab Results   Component Value Date    HGBA1C 6 9 09/09/2022       No results for input(s): POCGLU in the last 72 hours  Blood Sugar Average: Last 72 hrs:     Home regimen:  Lantus 20 units  A1c 6 9 earlier this month      · Lantus 16 U  · SSI algorithm 4  · BG checks before meals and HS  · Adjust regimen as necessary to maintain -180    Factor 5 Leiden mutation, heterozygous [facility])  Assessment & Plan  · Not on chronic anticoagulation at home    MI (myocardial infarction) [facility])  Assessment & Plan  S/p balloon angioplasty in 1999 and MCKINLEY in 2015  Chest pain-free  · Continue home aspirin 81 mg, atorvastatin 40 mg    Hypothyroidism  Assessment & Plan  Continue home levothyroxine 125 mcg    Lymphedema  Assessment & Plan  Hx of lymphedema with swelling of bilateral lower extremities  Weeping present in RLE  · Wound care consulted for management of LE weeping    HLD (hyperlipidemia)  Assessment & Plan  Lipid panel from January:  , , HDL 30, LDL 75     · Continue home atorvastatin 40 mg    HTN (hypertension)  Assessment & Plan  · Continue home regimen Toprol 25 mg  · Continue to monitor BP    Acute kidney injury superimposed on chronic kidney disease stage 4 [facility])  Assessment & Plan  Lab Results   Component Value Date    EGFR 13 09/23/2022    EGFR 12 01/15/2022    EGFR 12 01/14/2022    CREATININE 4 19 (H) 09/23/2022    CREATININE 4 43 (H) 01/15/2022    CREATININE 4 42 (H) 01/14/2022       Creatinine 4 19, baseline 3 2-3 4  Seen by Nephrology in 9/16/22 and declined transplant referral   Was recommended to start dialysis from prior nephrology visit in on July but patient declined  Home regimen: Lasix 120 mg BID  · Isolyte 100 cc/hr  · Nephrology consulted - appreciate recommendations  · Monitor I/O, BUN, creatinine  · Daily weights   · Avoid nephrotoxic agents, hypotension    History of CVA (cerebrovascular accident)  Assessment & Plan  Stroke in January s/p tPA  MRI showed few scattered punctate foci of acute cerebral and right cerebellar infarction, suspicious for cardioembolic etiology  TTE unrevealing  Underwent loop recorder placement on 06/28  * Ureteral stone with hydronephrosis  Assessment & Plan  Presented with left flank pain around midnight  Found to have 5 mm left proximal ureteral calculus with mild hydronephroureterosis  Labs significant for DOC  No leukocytosis or lactic acidosis  UA showed proteinuria, negative for WBC, bacteria, nitrites  Started on  cc prior to transfer  Case discussed with urology who recommended transfer for operative procedure  · Geriatric pain regimen:  · Tylenol 970 5q 8 hours  · Oxycodone 2 5 mg q 4 hours p r n  For moderate pain  · Oxycodone 5 mg Q 4 hours p r n  For severe pain  · IV Dilaudid 0 2 mg q 4 hours for breakthrough  · NPO @ MN for anticipated procedure  · Continue isolyte 100 cc/hr  · Urology consulted - appreciate recommendations  · Appears to have scheduled cytoscopy tomorrow   · PT/OT      VTE Pharmacologic Prophylaxis: Heparin  VTE Mechanical Prophylaxis: sequential compression device    CHIEF COMPLAINT   No chief complaint on file  HISTORY OF PRESENT ILLNESS     68 y/o male with a history of CKD 4, hypertension, CAD status post MCKINLEY in 2015, stroke in January status post tPA and loop recorder placement in June, type 2 diabetes on insulin, tobacco use, HFpEF, secondary hyperparathyroidism who presented to the [place] [address] with left flank pain  Pain started around midnight last night and is constant  Found to have 5 mm left ureteral stone with hydronephrosis and DOC  Vitals stable  No leukocytosis, lactic acidosis, or evidence of infection on UA  Case discussed with Urology who recommended transfer for stent placement  Started on IVF and given Zofran and fentanyl prior to transfer  On admission, patient endorses having left flank pain previously but currently pain is more controlled  Otherwise denies dysuria, hematuria, suprapubic tenderness, urinary frequency/urgency, fevers, chills  Patient was able to urinate and void without issues upon arrival to [facility]  Vitals on admission: 97 7F, /87, SpO2 94%  REVIEW OF SYSTEMS     Review of Systems   Constitutional: Negative for chills, diaphoresis, fatigue, fever and unexpected weight change     HENT: Negative for congestion, rhinorrhea, sinus pressure, sinus pain and sore throat  Eyes: Negative for visual disturbance  Respiratory: Negative for apnea, cough, chest tightness, shortness of breath and wheezing  Cardiovascular: Positive for leg swelling  Negative for chest pain and palpitations  Gastrointestinal: Negative for abdominal distention, abdominal pain, anal bleeding, blood in stool, constipation, diarrhea and nausea  Genitourinary: Positive for flank pain  Negative for difficulty urinating, dysuria, frequency, penile discharge, penile pain, scrotal swelling and testicular pain  Musculoskeletal: Negative for arthralgias, back pain and joint swelling  Uses cane   Skin: Positive for wound         "boil" on right buttock x 4 days (Gets boils once in a while)   Neurological: Positive for headaches  Negative for dizziness and syncope  Psychiatric/Behavioral: Negative for confusion and decreased concentration  OBJECTIVE   There were no vitals filed for this visit  Temperature:   Temp (24hrs), Av 4 °F (36 3 °C), Min:97 4 °F (36 3 °C), Max:97 4 °F (36 3 °C)       Intake & Output:  I/O     None        Weights: There is no height or weight on file to calculate BMI  Weight (last 2 days)     None        Physical Exam  Vitals and nursing note reviewed  Constitutional:       Appearance: He is well-developed  HENT:      Head: Normocephalic and atraumatic  Nose: No congestion or rhinorrhea  Mouth/Throat:      Pharynx: No oropharyngeal exudate  Eyes:      Conjunctiva/sclera: Conjunctivae normal    Cardiovascular:      Rate and Rhythm: Normal rate and regular rhythm  Heart sounds: No murmur heard  Pulmonary:      Effort: Pulmonary effort is normal  No respiratory distress  Breath sounds: Normal breath sounds  Abdominal:      General: There is no distension  Palpations: Abdomen is soft  Tenderness: There is no abdominal tenderness   There is no right CVA tenderness, left CVA tenderness or guarding  Genitourinary:     Comments: Right medial buttock with 0 7x1cm skin abscess, fluctuant but no drainage of purulent fluid / non expressible  Tender to touch  Musculoskeletal:      Cervical back: Neck supple  Right lower leg: Edema present  Left lower leg: Edema present  Comments: Non pitting b/l edema  Venous stasis with weeping on the LLE  Skin:     General: Skin is warm and dry  Capillary Refill: Capillary refill takes less than 2 seconds  Findings: No lesion  Comments: B/l venous stasis of LEs   Neurological:      Mental Status: He is alert and oriented to person, place, and time  Gait: Gait normal       Comments: Uses cane to ambulate   Psychiatric:         Mood and Affect: Mood normal        PAST MEDICAL HISTORY     Past Medical History:   Diagnosis Date    Chronic kidney disease     Disease of thyroid gland     Hyperlipidemia     Hypertension     Stroke ([facility] [address] )      PAST SURGICAL HISTORY     Past Surgical History:   Procedure Laterality Date    CHOLECYSTECTOMY      CORONARY ANGIOPLASTY WITH STENT PLACEMENT      JOINT REPLACEMENT      bilateral knee     SOCIAL & FAMILY HISTORY     Social History     Substance and Sexual Activity   Alcohol Use Not Currently     Social History     Substance and Sexual Activity   Drug Use Never     Social History     Tobacco Use   Smoking Status Current Every Day Smoker    Packs/day: 0 50    Types: Cigarettes   Smokeless Tobacco Never Used     Family History   Problem Relation Age of Onset    Kidney failure Mother     Cirrhosis Mother     Colon cancer Brother      LABORATORY DATA     Labs: I have personally reviewed pertinent reports      Results from last 7 days   Lab Units 09/23/22  0950   WBC Thousand/uL 9 60   HEMOGLOBIN g/dL 13 4   HEMATOCRIT % 42 0   PLATELETS Thousands/uL 127*   NEUTROS PCT % 87*   MONOS PCT % 3*      Results from last 7 days   Lab Units 09/23/22  0950   POTASSIUM mmol/L 4 1 CHLORIDE mmol/L 100   CO2 mmol/L 24   BUN mg/dL 50*   CREATININE mg/dL 4 19*   CALCIUM mg/dL 9 9   ALK PHOS U/L 120*   ALT U/L 25   AST U/L 18              Results from last 7 days   Lab Units 09/23/22  0950   INR  0 96   PTT seconds 30     Results from last 7 days   Lab Units 09/23/22  0950   LACTIC ACID mmol/L 1 7         Micro:  No results found for: Marge Ollie, WOUNDCULT, SPUTUMCULTUR  IMAGING & DIAGNOSTIC TESTS     Imaging: I have personally reviewed pertinent reports  CT renal stone study abdomen pelvis wo contrast    Result Date: 9/23/2022  Impression: 5 mm left proximal ureteral calculus with mild upstream hydronephroureterosis  No loculated perinephric collection  Left renal calculi, 1 5 cm and smaller  3 mm right renal nonobstructing calculus  Colonic diverticulosis  This study demonstrates a significant  finding and was documented as such in [facility] for liaison and referring practitioner notification  Workstation performed: [id number]     EKG, Pathology, and Other Studies: I have personally reviewed pertinent reports  ALLERGIES     Allergies   Allergen Reactions    Carvedilol Shortness Of Breath    Aspartame - Food Allergy Diarrhea    Bumetanide Other (See Comments)     Lightheadedness,tiredness, confusion, drowsiness, dry mouth, nausea, ringing in ears, bruising, change in urine  Lightheadedness,tiredness, confusion, drowsiness, dry mouth, nausea, ringing in ears, bruising, change in urine      Hydralazine Tachycardia    Lisinopril Other (See Comments)     Other reaction(s): dry heaves      Morphine Swelling     Other reaction(s): lump at injection  Hand swelling at IV injection site      Nifedipine Lightheadedness     didn't tolerate    Ciprofloxacin Rash    Strawberry Extract - Food Allergy Rash     MEDICATIONS PRIOR TO ARRIVAL     Prior to Admission medications    Medication Sig Start Date End Date Taking?  Authorizing Provider   allopurinol (ZYLOPRIM) 300 mg tablet Take by mouth Historical Provider, MD   Arginine 1000 MG TABS Take by mouth One daily    Historical Provider, MD   ascorbic acid (VITAMIN C) 500 MG tablet Take 500 mg by mouth daily    Historical Provider, MD   aspirin (ECOTRIN LOW STRENGTH) 81 mg EC tablet Take 81 mg by mouth    Historical Provider, MD   atorvastatin (LIPITOR) 40 mg tablet Take 1 tablet (40 mg total) by mouth daily with dinner 1/15/22   Yariel John PA-C   b complex vitamins capsule Take 1 capsule by mouth daily    Historical Provider, MD   clindamycin (CLEOCIN) 300 MG capsule  9/16/22   Historical Provider, MD   co-enzyme Q-10 30 MG capsule Take 100 mg by mouth    Historical Provider, MD   furosemide (LASIX) 80 mg tablet Take 120 mg by mouth 2 (two) times a day    Historical Provider, MD   HYDROcodone-acetaminophen (NORCO)  mg per tablet Take by mouth 9/13/21   Historical Provider, MD   insulin glargine (LANTUS) 100 units/mL subcutaneous injection Inject 20 Units under the skin    Historical Provider, MD   insulin lispro (HumaLOG) 100 units/mL injection     Historical Provider, MD   levothyroxine 125 mcg tablet Take by mouth    Historical Provider, MD   metoprolol succinate (TOPROL-XL) 25 mg 24 hr tablet Take by mouth 1-2 prn    Historical Provider, MD   tamsulosin (FLOMAX) 0 4 mg Take 0 4 mg by mouth daily with dinner    Historical Provider, MD   vitamin A 2400 MCG (8000 UT) capsule Take 2,400 Units by mouth daily    Historical Provider, MD   vitamin E, tocopherol, 200 units capsule Take 200 Units by mouth daily 180 mg daily    Historical Provider, MD     MEDICATIONS ADMINISTERED IN LAST 24 HOURS     CURRENT MEDICATIONS     Facility-Administered Medications Ordered in Other Encounters   Medication Dose Route Frequency Provider Last Rate    sodium chloride  100 mL/hr Intravenous Continuous Danni Vaughn PA-C 100 mL/hr (09/23/22 1244)     No current facility-administered medications for this encounter          Admission Time  I spent 30 minutes admitting the patient  This involved direct patient contact where I performed a full history and physical, reviewing previous records, and reviewing laboratory and other diagnostic studies  Portions of the record may have been created with voice recognition software  Occasional wrong word or "sound a like" substitutions may have occurred due to the inherent limitations of voice recognition software    Read the chart carefully and recognize, using context, where substitutions have occurred     ==  Isis Xiao MD  [address]  Internal Medicine Residency PGY-2

## 2022-09-23 NOTE — ED PROVIDER NOTES
History  Chief Complaint   Patient presents with    Flank Pain     Pt reports L sided kidney pain more so located in his back that has been constant since 1230 lastnight  Denies any vomiting/blood in urine  69year old male presents emergency department for evaluation of left-sided flank pain onset midnight yesterday when patient was attempting to fall asleep  States pain as been consistent since  Does not radiate around into the abdomen  He denies fevers or chills  Denies dysuria, hematuria urinary frequency  Reports occasional nausea  Denies vomiting  Denies any testicular pain or swelling  Has history kidney stones and reports this feels similar  History provided by:  Patient  Flank Pain  Pain location:  L flank  Pain quality: sharp    Pain radiates to:  Does not radiate  Pain severity:  Moderate  Onset quality:  Sudden  Timing:  Constant  Chronicity:  New  Relieved by:  None tried  Worsened by:  Nothing  Ineffective treatments:  None tried  Associated symptoms: nausea    Associated symptoms: no anorexia, no belching, no chest pain, no chills, no constipation, no cough, no diarrhea, no dysuria, no fatigue, no fever, no flatus, no hematemesis, no hematochezia, no hematuria, no melena, no shortness of breath, no sore throat and no vomiting        Prior to Admission Medications   Prescriptions Last Dose Informant Patient Reported? Taking?    Arginine 1000 MG TABS   Yes No   Sig: Take by mouth One daily   HYDROcodone-acetaminophen (NORCO)  mg per tablet   Yes No   Sig: Take by mouth   allopurinol (ZYLOPRIM) 300 mg tablet   Yes No   Sig: Take by mouth   ascorbic acid (VITAMIN C) 500 MG tablet   Yes No   Sig: Take 500 mg by mouth daily   aspirin (ECOTRIN LOW STRENGTH) 81 mg EC tablet   Yes No   Sig: Take 81 mg by mouth   atorvastatin (LIPITOR) 40 mg tablet   No No   Sig: Take 1 tablet (40 mg total) by mouth daily with dinner   b complex vitamins capsule   Yes No   Sig: Take 1 capsule by mouth daily clindamycin (CLEOCIN) 300 MG capsule   Yes No   co-enzyme Q-10 30 MG capsule   Yes No   Sig: Take 100 mg by mouth   furosemide (LASIX) 80 mg tablet   Yes No   Sig: Take 120 mg by mouth 2 (two) times a day   insulin glargine (LANTUS) 100 units/mL subcutaneous injection   Yes No   Sig: Inject 20 Units under the skin   insulin lispro (HumaLOG) 100 units/mL injection   Yes No   levothyroxine 125 mcg tablet   Yes No   Sig: Take by mouth   metoprolol succinate (TOPROL-XL) 25 mg 24 hr tablet   Yes No   Sig: Take by mouth 1-2 prn   tamsulosin (FLOMAX) 0 4 mg   Yes No   Sig: Take 0 4 mg by mouth daily with dinner   vitamin A 2400 MCG (8000 UT) capsule   Yes No   Sig: Take 2,400 Units by mouth daily   vitamin E, tocopherol, 200 units capsule   Yes No   Sig: Take 200 Units by mouth daily 180 mg daily      Facility-Administered Medications: None       Past Medical History:   Diagnosis Date    Chronic kidney disease     Disease of thyroid gland     Hyperlipidemia     Hypertension     Stroke [facility])        Past Surgical History:   Procedure Laterality Date    CHOLECYSTECTOMY      CORONARY ANGIOPLASTY WITH STENT PLACEMENT      JOINT REPLACEMENT      bilateral knee       Family History   Problem Relation Age of Onset    Kidney failure Mother     Cirrhosis Mother     Colon cancer Brother      I have reviewed and agree with the history as documented  E-Cigarette/Vaping    E-Cigarette Use Never User      E-Cigarette/Vaping Substances     Social History     Tobacco Use    Smoking status: Current Every Day Smoker     Packs/day: 0 50     Types: Cigarettes    Smokeless tobacco: Never Used   Vaping Use    Vaping Use: Never used   Substance Use Topics    Alcohol use: Not Currently    Drug use: Never       Review of Systems   Constitutional: Negative for appetite change, chills, diaphoresis, fatigue and fever  HENT: Negative  Negative for sore throat  Respiratory: Negative    Negative for cough and shortness of breath  Cardiovascular: Negative  Negative for chest pain  Gastrointestinal: Positive for nausea  Negative for abdominal distention, abdominal pain, anal bleeding, anorexia, blood in stool, constipation, diarrhea, flatus, hematemesis, hematochezia, melena, rectal pain and vomiting  Genitourinary: Positive for flank pain  Negative for decreased urine volume, difficulty urinating, dysuria, frequency, genital sores, hematuria, penile discharge, penile pain, penile swelling, scrotal swelling, testicular pain and urgency  Skin: Negative  Neurological: Negative  All other systems reviewed and are negative  Physical Exam  Physical Exam  Vitals and nursing note reviewed  Constitutional:       General: He is not in acute distress  Appearance: Normal appearance  He is obese  He is ill-appearing (chronically )  He is not toxic-appearing or diaphoretic  HENT:      Head: Normocephalic and atraumatic  Nose: Nose normal       Mouth/Throat:      Mouth: Mucous membranes are moist    Eyes:      Conjunctiva/sclera: Conjunctivae normal    Cardiovascular:      Rate and Rhythm: Normal rate and regular rhythm  Pulmonary:      Effort: Pulmonary effort is normal  No respiratory distress  Breath sounds: Normal breath sounds  No stridor  No wheezing or rhonchi  Chest:      Chest wall: No tenderness  Abdominal:      General: Abdomen is flat  Bowel sounds are normal  There is no distension  Palpations: Abdomen is soft  Tenderness: There is no abdominal tenderness  There is left CVA tenderness  There is no right CVA tenderness or guarding  Musculoskeletal:         General: Normal range of motion  Cervical back: Normal range of motion  Right lower leg: Edema present  Left lower leg: Edema present  Skin:     General: Skin is warm and dry  Capillary Refill: Capillary refill takes less than 2 seconds  Neurological:      General: No focal deficit present        Mental Status: He is alert and oriented to person, place, and time     Psychiatric:         Mood and Affect: Mood normal          Behavior: Behavior normal          Vital Signs  ED Triage Vitals [09/23/22 0938]   Temperature Pulse Respirations Blood Pressure SpO2   (!) 97 4 °F (36 3 °C) 78 19 (!) 186/88 96 %      Temp Source Heart Rate Source Patient Position - Orthostatic VS BP Location FiO2 (%)   Temporal Monitor Sitting Right arm --      Pain Score       9           Vitals:    09/23/22 1130 09/23/22 1200 09/23/22 1230 09/23/22 1300   BP: 169/74 170/77 155/74 164/76   Pulse: 74 72 75 72   Patient Position - Orthostatic VS:             Visual Acuity      ED Medications  Medications   sodium chloride 0 9 % infusion (100 mL/hr Intravenous New Bag 9/23/22 1244)   ondansetron (ZOFRAN) injection 4 mg (4 mg Intravenous Given 9/23/22 1005)   fentanyl citrate (PF) 100 MCG/2ML 50 mcg (50 mcg Intravenous Given 9/23/22 1005)   fentanyl citrate (PF) 100 MCG/2ML 25 mcg (25 mcg Intravenous Given 9/23/22 1107)       Diagnostic Studies  Results Reviewed     Procedure Component Value Units Date/Time    Urine Microscopic [[id number]] Collected: 09/23/22 1243    Lab Status: Final result Specimen: Urine, Clean Catch Updated: 09/23/22 1315     RBC, UA 1-2 /hpf      WBC, UA 0-1 /hpf      Epithelial Cells Occasional /hpf      Bacteria, UA Occasional /hpf      COARSE GRANULAR CASTS 0-1 /lpf     UA w Reflex to Microscopic w Reflex to Culture [[id number]]  (Abnormal) Collected: 09/23/22 1243    Lab Status: Final result Specimen: Urine, Clean Catch Updated: 09/23/22 1300     Color, UA Yellow     Clarity, UA Clear     Specific [place], UA 1 020     pH, UA 5 5     Leukocytes, UA Negative     Nitrite, UA Negative     Protein,  (2+) mg/dl      Glucose, UA Negative mg/dl      Ketones, UA Negative mg/dl      Urobilinogen, UA 0 2 E U /dl      Bilirubin, UA Negative     Occult Blood, UA Trace-Intact    Lactic acid [[id number]]  (Normal) Collected: 09/23/22 0950    Lab Status: Final result Specimen: Blood from Arm, Right Updated: 09/23/22 1030     LACTIC ACID 1 7 mmol/L     Narrative:      Result may be elevated if tourniquet was used during collection      Comprehensive metabolic panel [[id number]]  (Abnormal) Collected: 09/23/22 0950    Lab Status: Final result Specimen: Blood from Arm, Right Updated: 09/23/22 1029     Sodium 136 mmol/L      Potassium 4 1 mmol/L      Chloride 100 mmol/L      CO2 24 mmol/L      ANION GAP 12 mmol/L      BUN 50 mg/dL      Creatinine 4 19 mg/dL      Glucose 164 mg/dL      Calcium 9 9 mg/dL      AST 18 U/L      ALT 25 U/L      Alkaline Phosphatase 120 U/L      Total Protein 8 3 g/dL      Albumin 3 6 g/dL      Total Bilirubin 0 41 mg/dL      eGFR 13 ml/min/1 73sq m     Narrative:      National Kidney Disease Foundation guidelines for Chronic Kidney Disease (CKD):     Stage 1 with normal or high GFR (GFR > 90 mL/min/1 73 square meters)    Stage 2 Mild CKD (GFR = 60-89 mL/min/1 73 square meters)    Stage 3A Moderate CKD (GFR = 45-59 mL/min/1 73 square meters)    Stage 3B Moderate CKD (GFR = 30-44 mL/min/1 73 square meters)    Stage 4 Severe CKD (GFR = 15-29 mL/min/1 73 square meters)    Stage 5 End Stage CKD (GFR <15 mL/min/1 73 square meters)  Note: GFR calculation is accurate only with a steady state creatinine    Protime-INR [[id number]]  (Normal) Collected: 09/23/22 0950    Lab Status: Final result Specimen: Blood from Arm, Right Updated: 09/23/22 1011     Protime 12 9 seconds      INR 0 96    APTT [[id number]]  (Normal) Collected: 09/23/22 0950    Lab Status: Final result Specimen: Blood from Arm, Right Updated: 09/23/22 1011     PTT 30 seconds     CBC and differential [[id number]]  (Abnormal) Collected: 09/23/22 0950    Lab Status: Final result Specimen: Blood from Arm, Right Updated: 09/23/22 0957     WBC 9 60 Thousand/uL      RBC 4 17 Million/uL      Hemoglobin 13 4 g/dL      Hematocrit 42 0 %       fL      MCH 32 1 pg      MCHC 31 9 g/dL      RDW 14 7 %      MPV 9 4 fL      Platelets 611 Thousands/uL      nRBC 0 /100 WBCs      Neutrophils Relative 87 %      Immat GRANS % 0 %      Lymphocytes Relative 9 %      Monocytes Relative 3 %      Eosinophils Relative 1 %      Basophils Relative 0 %      Neutrophils Absolute 8 29 Thousands/µL      Immature Grans Absolute 0 04 Thousand/uL      Lymphocytes Absolute 0 85 Thousands/µL      Monocytes Absolute 0 31 Thousand/µL      Eosinophils Absolute 0 07 Thousand/µL      Basophils Absolute 0 04 Thousands/µL                  CT renal stone study abdomen pelvis wo contrast   Final Result by Brynn Gatica MD (09/23 1021)      5 mm left proximal ureteral calculus with mild upstream hydronephroureterosis  No loculated perinephric collection  Left renal calculi, 1 5 cm and smaller  3 mm right renal nonobstructing calculus  Colonic diverticulosis  This study demonstrates a significant  finding and was documented as such in [facility] for liaison and referring practitioner notification  Workstation performed: [id number]                    Procedures  Procedures         ED Course  ED Course as of 09/23/22 1325   Fri Sep 23, 2022   1019 WBC: 9 60   1033 Creatinine(!): 4 19  Baseline 3 2 per nephrology note was been currently working patient up for DOC   1033 CT renal stone:   5 mm left proximal ureteral calculus with mild upstream hydronephroureterosis  No loculated perinephric collection      Left renal calculi, 1 5 cm and smaller      3 mm right renal nonobstructing calculus      Colonic diverticulosis  1044 TT sent to urology    [phone] Urology KARMEN Juares recommends given his Creatinine I would recommend non-urgent transfer to [place] for possible stone intervention tomorrow  I would admit to [facility] with consult to Urology  MET with fomax and hydration as appropriate and pain control  2403 I discussed treatment plan with patient    He is agreeable to transfer for intervention  Requests lunch  1227 Patient accepted by [facility] by Dr Bladimir Enriquez  Discussed recommendation of IV fluids and patient has history of heart failure  [facility] recommends may proceed with light fluids   1325 Blood, UA(!): Trace-Intact  Negative for UTI                               SBIRT 22yo+    Flowsheet Row Most Recent Value   SBIRT (25 yo +)    In order to provide better care to our patients, we are screening all of our patients for alcohol and drug use  Would it be okay to ask you these screening questions? No Filed at: 09/23/2022 0946                    MDM  Number of Diagnoses or Management Options  DOC (acute kidney injury) [facility]): new and requires workup  Hydroureteronephrosis: new and requires workup  Left ureteral stone: new and requires workup  Diagnosis management comments: 69year old male presenting to emergency department for evaluation of left flank pain onset last night  Vitals and medical record review  Left CVA tenderness on exam   Abdomen is soft nontender  Lab significant for DOC  UA noted for blood however no sign of infection  No leukocytosis or lactic acidosis  CT scan concerning for left ureteral stone with hydroureteronephrosis  Discussed with urology who recommended transfer to [facility] for stone intervention  Discussed transfer with patient for stone intervention, requests [facility]  Patient was accepted by Dr Lindy La at [facility] for suspected stone intervention tomorrow          Amount and/or Complexity of Data Reviewed  Clinical lab tests: ordered and reviewed  Tests in the radiology section of CPT®: ordered and reviewed  Review and summarize past medical records: yes  Discuss the patient with other providers: yes  Independent visualization of images, tracings, or specimens: yes        Disposition  Final diagnoses:   DOC (acute kidney injury) ([facility] [address] )   Left ureteral stone   Hydroureteronephrosis     Time reflects when diagnosis was documented in both MDM as applicable and the Disposition within this note     Time User Action Codes Description Comment    9/23/2022 12:09 PM Aubree Heman T Add [N20 1] Ureterolithiasis     9/23/2022 12:09 PM Jil, Damaris Foxholm T Add [N13 2] Hydronephrosis with urinary obstruction due to ureteral calculus     9/23/2022 12:09 PM Jil, Damaris Foxholm T Add [N17 9] DOC (acute kidney injury) ([address] )     9/23/2022 12:28 PM Ulysses Clan Add [N20 1] Left ureteral stone     9/23/2022 12:29 PM Ulysses Clan Add [N13 30] Hydroureteronephrosis     9/23/2022 12:29 PM Ulysses Clan Modify [N17 9] DOC (acute kidney injury) [facility])       ED Disposition     ED Disposition   Transfer to Another Facility-In Network    Condition   --    Date/Time   Fri Sep 23, 2022 12:28 PM    Comment   Chet Lobato should be [facility]             MD Documentation    [address] Most Recent Value   Patient Condition The patient has been stabilized such that within reasonable medical probability, no material deterioration of the patient condition or the condition of the unborn child(lesa) is likely to result from the transfer   Reason for Transfer Level of Care needed not available at this facility   Benefits of Transfer Specialized equipment and/or services available at the receiving facility (Include comment)________________________   Risks of Transfer Potential for delay in receiving treatment, Potential deterioration of medical condition, Loss of IV, Increased discomfort during transfer, Possible worsening of condition or death during transfer   Accepting Physician Dr Dee Dee Dawson Name, Carlitos Kelly, [facility] PA-C   Provider Certification General risk, such as traffic hazards, adverse weather conditions, rough terrain or turbulence, possible failure of equipment (including vehicle or aircraft), or consequences of actions of persons outside the control of the transport personnel, Unanticipated needs of medical equipment and personnel during transport, Risk of worsening condition, The possibility of a transport vehicle being unavailable      RN Documentation    [address] Name, [facility]      Follow-up Information    None         Patient's Medications   Discharge Prescriptions    No medications on file       No discharge procedures on file      PDMP Review     None          ED Provider  Electronically Signed by           Chon Stevens PA-C  09/23/22 0183

## 2022-09-23 NOTE — ASSESSMENT & PLAN NOTE
Presented with left flank pain around midnight  Found to have 5 mm left proximal ureteral calculus with mild hydronephroureterosis  Labs significant for DOC  No leukocytosis or lactic acidosis  UA showed proteinuria, negative for WBC, bacteria, nitrites  Started on  cc prior to transfer  Case discussed with urology who recommended transfer for operative procedure  · Geriatric pain regimen:  · Tylenol 970 5q 8 hours  · Oxycodone 2 5 mg q 4 hours p r n  For moderate pain  · Oxycodone 5 mg Q 4 hours p r n   For severe pain  · IV Dilaudid 0 2 mg q 4 hours for breakthrough  · Made NPO @ [facility] for anticipated procedure  · Continue isolyte 100 cc/hr  · Urology consulted - appreciate recommendations  · Status post cystoscopy with lithotripsy and left ureteral stent placement on 09/24  · Plan for stent removal with Urology in 1 week as an outpatient  · PT/OT

## 2022-09-23 NOTE — ASSESSMENT & PLAN NOTE
S/p balloon angioplasty in 1999 and MCKINLEY in 2015  Chest pain-free      · Continue home aspirin 81 mg, atorvastatin 40 mg  · Restarted ASA post-op with recs from [facility]

## 2022-09-23 NOTE — ASSESSMENT & PLAN NOTE
Lab Results   Component Value Date    EGFR 12 09/25/2022    EGFR 13 09/24/2022    EGFR 13 09/23/2022    CREATININE 4 33 (H) 09/25/2022    CREATININE 4 31 (H) 09/24/2022    CREATININE 4 19 (H) 09/23/2022       Creatinine 4 19, baseline 3 2-3 4  Seen by Nephrology in 9/16/22 and declined transplant referral   Was recommended to start dialysis from prior nephrology visit in on July but patient declined  Home regimen: Lasix 120 mg BID      · Isolyte 100 cc/hr  · Nephrology consulted - appreciate recommendations  · Monitor I/O, BUN, creatinine  · Daily weights   · Avoid nephrotoxic agents, hypotension  · Home lasix restarted

## 2022-09-23 NOTE — ASSESSMENT & PLAN NOTE
Lab Results   Component Value Date    HGBA1C 6 9 09/09/2022       Recent Labs     09/24/22  1800 09/24/22 2046 09/25/22  0717 09/25/22  1154   POCGLU 215* 243* 172* 254*       Blood Sugar Average: Last 72 hrs:  (P) 193 375   Home regimen:  Lantus 20 units  A1c 6 9 earlier this month      · Lantus 16 U  · SSI algorithm 4  · BG checks before meals and HS  · Adjust regimen as necessary to maintain -180

## 2022-09-23 NOTE — ASSESSMENT & PLAN NOTE
Stroke in January s/p tPA  MRI showed few scattered punctate foci of acute cerebral and right cerebellar infarction, suspicious for cardioembolic etiology  TTE unrevealing    Underwent loop recorder placement on 06/28     -Restarted aspirin after procedure with urology on 9/24

## 2022-09-23 NOTE — ASSESSMENT & PLAN NOTE
Hx of lymphedema with swelling of bilateral lower extremities  Weeping present in RLE       · Wound care consulted for management of LE weeping

## 2022-09-23 NOTE — ASSESSMENT & PLAN NOTE
Patient describes having boil for 3-4 days  On exam, has right medial buttock abscess, but non expressible  Assessment: abscess is 0 7x1cm (less than 2 cm in diameter) without significant erythema or systemic symptoms  Will hold off on incision & drainage  Will hold off on routine antibiotic therapy in setting of mild skin abscess (in reference to IDSA guideline 2014)       · Wound culture and started Ancef x7days  · Wound culture growing Gram-positive diplococci -> will await sensitivities for outpatient antibiotic regimen  · Will d/c on Doxy 100 mg BID for 7-day course and follow wound cx sensitivities   · Wound care consulted, appreciate recs

## 2022-09-24 ENCOUNTER — APPOINTMENT (OUTPATIENT)
Dept: RADIOLOGY | Facility: HOSPITAL | Age: 67
DRG: 660 | End: 2022-09-24
Payer: MEDICARE

## 2022-09-24 ENCOUNTER — ANESTHESIA (INPATIENT)
Dept: PERIOP | Facility: HOSPITAL | Age: 67
DRG: 660 | End: 2022-09-24
Payer: MEDICARE

## 2022-09-24 ENCOUNTER — ANESTHESIA EVENT (INPATIENT)
Dept: PERIOP | Facility: HOSPITAL | Age: 67
DRG: 660 | End: 2022-09-24
Payer: MEDICARE

## 2022-09-24 LAB
ANION GAP SERPL CALCULATED.3IONS-SCNC: 6 MMOL/L (ref 4–13)
BASOPHILS # BLD AUTO: 0.03 THOUSANDS/ΜL (ref 0–0.1)
BASOPHILS NFR BLD AUTO: 1 % (ref 0–1)
BUN SERPL-MCNC: 56 MG/DL (ref 5–25)
CALCIUM SERPL-MCNC: 9.3 MG/DL (ref 8.3–10.1)
CHLORIDE SERPL-SCNC: 108 MMOL/L (ref 96–108)
CO2 SERPL-SCNC: 25 MMOL/L (ref 21–32)
CREAT SERPL-MCNC: 4.31 MG/DL (ref 0.6–1.3)
EOSINOPHIL # BLD AUTO: 0.17 THOUSAND/ΜL (ref 0–0.61)
EOSINOPHIL NFR BLD AUTO: 3 % (ref 0–6)
ERYTHROCYTE [DISTWIDTH] IN BLOOD BY AUTOMATED COUNT: 14.8 % (ref 11.6–15.1)
FERRITIN SERPL-MCNC: 74 NG/ML (ref 8–388)
FOLATE SERPL-MCNC: >20 NG/ML (ref 3.1–17.5)
GFR SERPL CREATININE-BSD FRML MDRD: 13 ML/MIN/1.73SQ M
GLUCOSE SERPL-MCNC: 113 MG/DL (ref 65–140)
GLUCOSE SERPL-MCNC: 143 MG/DL (ref 65–140)
GLUCOSE SERPL-MCNC: 156 MG/DL (ref 65–140)
GLUCOSE SERPL-MCNC: 215 MG/DL (ref 65–140)
GLUCOSE SERPL-MCNC: 243 MG/DL (ref 65–140)
HCT VFR BLD AUTO: 34.2 % (ref 36.5–49.3)
HGB BLD-MCNC: 11 G/DL (ref 12–17)
IMM GRANULOCYTES # BLD AUTO: 0.02 THOUSAND/UL (ref 0–0.2)
IMM GRANULOCYTES NFR BLD AUTO: 0 % (ref 0–2)
IRON SATN MFR SERPL: 16 % (ref 20–50)
IRON SERPL-MCNC: 34 UG/DL (ref 65–175)
LYMPHOCYTES # BLD AUTO: 1.21 THOUSANDS/ΜL (ref 0.6–4.47)
LYMPHOCYTES NFR BLD AUTO: 19 % (ref 14–44)
MCH RBC QN AUTO: 32.8 PG (ref 26.8–34.3)
MCHC RBC AUTO-ENTMCNC: 32.2 G/DL (ref 31.4–37.4)
MCV RBC AUTO: 102 FL (ref 82–98)
MONOCYTES # BLD AUTO: 0.34 THOUSAND/ΜL (ref 0.17–1.22)
MONOCYTES NFR BLD AUTO: 5 % (ref 4–12)
NEUTROPHILS # BLD AUTO: 4.66 THOUSANDS/ΜL (ref 1.85–7.62)
NEUTS SEG NFR BLD AUTO: 72 % (ref 43–75)
NRBC BLD AUTO-RTO: 0 /100 WBCS
PLATELET # BLD AUTO: 108 THOUSANDS/UL (ref 149–390)
PMV BLD AUTO: 9.5 FL (ref 8.9–12.7)
POTASSIUM SERPL-SCNC: 3.8 MMOL/L (ref 3.5–5.3)
RBC # BLD AUTO: 3.35 MILLION/UL (ref 3.88–5.62)
SODIUM SERPL-SCNC: 139 MMOL/L (ref 135–147)
TIBC SERPL-MCNC: 211 UG/DL (ref 250–450)
VIT B12 SERPL-MCNC: 843 PG/ML (ref 100–900)
WBC # BLD AUTO: 6.43 THOUSAND/UL (ref 4.31–10.16)

## 2022-09-24 PROCEDURE — NC001 PR NO CHARGE: Performed by: INTERNAL MEDICINE

## 2022-09-24 PROCEDURE — C1894 INTRO/SHEATH, NON-LASER: HCPCS | Performed by: UROLOGY

## 2022-09-24 PROCEDURE — 87077 CULTURE AEROBIC IDENTIFY: CPT

## 2022-09-24 PROCEDURE — 0TF78ZZ FRAGMENTATION IN LEFT URETER, VIA NATURAL OR ARTIFICIAL OPENING ENDOSCOPIC: ICD-10-PCS | Performed by: UROLOGY

## 2022-09-24 PROCEDURE — 99222 1ST HOSP IP/OBS MODERATE 55: CPT | Performed by: INTERNAL MEDICINE

## 2022-09-24 PROCEDURE — 0T778DZ DILATION OF LEFT URETER WITH INTRALUMINAL DEVICE, VIA NATURAL OR ARTIFICIAL OPENING ENDOSCOPIC: ICD-10-PCS | Performed by: UROLOGY

## 2022-09-24 PROCEDURE — 85025 COMPLETE CBC W/AUTO DIFF WBC: CPT

## 2022-09-24 PROCEDURE — C1769 GUIDE WIRE: HCPCS | Performed by: UROLOGY

## 2022-09-24 PROCEDURE — 83540 ASSAY OF IRON: CPT

## 2022-09-24 PROCEDURE — 87186 SC STD MICRODIL/AGAR DIL: CPT

## 2022-09-24 PROCEDURE — 82746 ASSAY OF FOLIC ACID SERUM: CPT

## 2022-09-24 PROCEDURE — 80048 BASIC METABOLIC PNL TOTAL CA: CPT

## 2022-09-24 PROCEDURE — 87205 SMEAR GRAM STAIN: CPT

## 2022-09-24 PROCEDURE — 83550 IRON BINDING TEST: CPT

## 2022-09-24 PROCEDURE — C2617 STENT, NON-COR, TEM W/O DEL: HCPCS | Performed by: UROLOGY

## 2022-09-24 PROCEDURE — 82948 REAGENT STRIP/BLOOD GLUCOSE: CPT

## 2022-09-24 PROCEDURE — 74018 RADEX ABDOMEN 1 VIEW: CPT

## 2022-09-24 PROCEDURE — C1758 CATHETER, URETERAL: HCPCS | Performed by: UROLOGY

## 2022-09-24 PROCEDURE — 82607 VITAMIN B-12: CPT

## 2022-09-24 PROCEDURE — 99223 1ST HOSP IP/OBS HIGH 75: CPT | Performed by: INTERNAL MEDICINE

## 2022-09-24 PROCEDURE — 87070 CULTURE OTHR SPECIMN AEROBIC: CPT

## 2022-09-24 PROCEDURE — 82728 ASSAY OF FERRITIN: CPT

## 2022-09-24 PROCEDURE — 52356 CYSTO/URETERO W/LITHOTRIPSY: CPT | Performed by: UROLOGY

## 2022-09-24 DEVICE — INLAY OPTIMA URETERAL STENT W/O GUIDEWIRE
Type: IMPLANTABLE DEVICE | Site: URETER | Status: FUNCTIONAL
Brand: BARD® INLAY OPTIMA® URETERAL STENT

## 2022-09-24 RX ORDER — ATORVASTATIN CALCIUM 40 MG/1
40 TABLET, FILM COATED ORAL
Status: DISCONTINUED | OUTPATIENT
Start: 2022-09-24 | End: 2022-09-26 | Stop reason: HOSPADM

## 2022-09-24 RX ORDER — PROPOFOL 10 MG/ML
INJECTION, EMULSION INTRAVENOUS AS NEEDED
Status: DISCONTINUED | OUTPATIENT
Start: 2022-09-24 | End: 2022-09-24

## 2022-09-24 RX ORDER — FENTANYL CITRATE/PF 50 MCG/ML
25 SYRINGE (ML) INJECTION
Status: CANCELLED | OUTPATIENT
Start: 2022-09-24

## 2022-09-24 RX ORDER — PHENAZOPYRIDINE HYDROCHLORIDE 100 MG/1
100 TABLET, FILM COATED ORAL 3 TIMES DAILY PRN
Status: DISCONTINUED | OUTPATIENT
Start: 2022-09-24 | End: 2022-09-26 | Stop reason: HOSPADM

## 2022-09-24 RX ORDER — CEFAZOLIN SODIUM 1 G/3ML
INJECTION, POWDER, FOR SOLUTION INTRAMUSCULAR; INTRAVENOUS AS NEEDED
Status: DISCONTINUED | OUTPATIENT
Start: 2022-09-24 | End: 2022-09-24

## 2022-09-24 RX ORDER — METOPROLOL SUCCINATE 25 MG/1
25 TABLET, EXTENDED RELEASE ORAL DAILY
Status: DISCONTINUED | OUTPATIENT
Start: 2022-09-24 | End: 2022-09-26 | Stop reason: HOSPADM

## 2022-09-24 RX ORDER — ONDANSETRON 2 MG/ML
INJECTION INTRAMUSCULAR; INTRAVENOUS AS NEEDED
Status: DISCONTINUED | OUTPATIENT
Start: 2022-09-24 | End: 2022-09-24

## 2022-09-24 RX ORDER — ASPIRIN 81 MG/1
81 TABLET ORAL DAILY
Status: DISCONTINUED | OUTPATIENT
Start: 2022-09-25 | End: 2022-09-26 | Stop reason: HOSPADM

## 2022-09-24 RX ORDER — ALBUTEROL SULFATE 2.5 MG/3ML
2.5 SOLUTION RESPIRATORY (INHALATION) EVERY 4 HOURS PRN
Status: CANCELLED | OUTPATIENT
Start: 2022-09-24

## 2022-09-24 RX ORDER — ONDANSETRON 2 MG/ML
4 INJECTION INTRAMUSCULAR; INTRAVENOUS ONCE AS NEEDED
Status: CANCELLED | OUTPATIENT
Start: 2022-09-24

## 2022-09-24 RX ORDER — LIDOCAINE HYDROCHLORIDE 10 MG/ML
INJECTION, SOLUTION EPIDURAL; INFILTRATION; INTRACAUDAL; PERINEURAL AS NEEDED
Status: DISCONTINUED | OUTPATIENT
Start: 2022-09-24 | End: 2022-09-24

## 2022-09-24 RX ORDER — MAGNESIUM HYDROXIDE 1200 MG/15ML
LIQUID ORAL AS NEEDED
Status: DISCONTINUED | OUTPATIENT
Start: 2022-09-24 | End: 2022-09-24 | Stop reason: HOSPADM

## 2022-09-24 RX ORDER — SODIUM CHLORIDE, SODIUM GLUCONATE, SODIUM ACETATE, POTASSIUM CHLORIDE, MAGNESIUM CHLORIDE, SODIUM PHOSPHATE, DIBASIC, AND POTASSIUM PHOSPHATE .53; .5; .37; .037; .03; .012; .00082 G/100ML; G/100ML; G/100ML; G/100ML; G/100ML; G/100ML; G/100ML
100 INJECTION, SOLUTION INTRAVENOUS CONTINUOUS
Status: CANCELLED | OUTPATIENT
Start: 2022-09-24

## 2022-09-24 RX ORDER — FENTANYL CITRATE 50 UG/ML
INJECTION, SOLUTION INTRAMUSCULAR; INTRAVENOUS AS NEEDED
Status: DISCONTINUED | OUTPATIENT
Start: 2022-09-24 | End: 2022-09-24

## 2022-09-24 RX ORDER — DEXAMETHASONE SODIUM PHOSPHATE 10 MG/ML
INJECTION, SOLUTION INTRAMUSCULAR; INTRAVENOUS AS NEEDED
Status: DISCONTINUED | OUTPATIENT
Start: 2022-09-24 | End: 2022-09-24

## 2022-09-24 RX ORDER — CEFAZOLIN SODIUM 2 G/50ML
2000 SOLUTION INTRAVENOUS EVERY 12 HOURS
Status: DISCONTINUED | OUTPATIENT
Start: 2022-09-24 | End: 2022-09-26 | Stop reason: HOSPADM

## 2022-09-24 RX ORDER — TAMSULOSIN HYDROCHLORIDE 0.4 MG/1
0.4 CAPSULE ORAL
Status: DISCONTINUED | OUTPATIENT
Start: 2022-09-24 | End: 2022-09-26 | Stop reason: HOSPADM

## 2022-09-24 RX ORDER — LEVOTHYROXINE SODIUM 0.12 MG/1
125 TABLET ORAL
Status: DISCONTINUED | OUTPATIENT
Start: 2022-09-24 | End: 2022-09-26 | Stop reason: HOSPADM

## 2022-09-24 RX ADMIN — METOPROLOL SUCCINATE 25 MG: 25 TABLET, EXTENDED RELEASE ORAL at 12:38

## 2022-09-24 RX ADMIN — HEPARIN SODIUM 5000 UNITS: 5000 INJECTION INTRAVENOUS; SUBCUTANEOUS at 13:27

## 2022-09-24 RX ADMIN — PHENAZOPYRIDINE 100 MG: 100 TABLET ORAL at 13:26

## 2022-09-24 RX ADMIN — FENTANYL CITRATE 25 MCG: 50 INJECTION INTRAMUSCULAR; INTRAVENOUS at 09:39

## 2022-09-24 RX ADMIN — CEFAZOLIN SODIUM 2000 MG: 2 SOLUTION INTRAVENOUS at 21:17

## 2022-09-24 RX ADMIN — FENTANYL CITRATE 25 MCG: 50 INJECTION INTRAMUSCULAR; INTRAVENOUS at 09:27

## 2022-09-24 RX ADMIN — SODIUM CHLORIDE, SODIUM GLUCONATE, SODIUM ACETATE, POTASSIUM CHLORIDE, MAGNESIUM CHLORIDE, SODIUM PHOSPHATE, DIBASIC, AND POTASSIUM PHOSPHATE 50 ML/HR: .53; .5; .37; .037; .03; .012; .00082 INJECTION, SOLUTION INTRAVENOUS at 15:44

## 2022-09-24 RX ADMIN — HEPARIN SODIUM 5000 UNITS: 5000 INJECTION INTRAVENOUS; SUBCUTANEOUS at 04:36

## 2022-09-24 RX ADMIN — HEPARIN SODIUM 5000 UNITS: 5000 INJECTION INTRAVENOUS; SUBCUTANEOUS at 21:12

## 2022-09-24 RX ADMIN — INSULIN LISPRO 4 UNITS: 100 INJECTION, SOLUTION INTRAVENOUS; SUBCUTANEOUS at 21:14

## 2022-09-24 RX ADMIN — FENTANYL CITRATE 25 MCG: 50 INJECTION INTRAMUSCULAR; INTRAVENOUS at 09:32

## 2022-09-24 RX ADMIN — DEXAMETHASONE SODIUM PHOSPHATE 10 MG: 10 INJECTION, SOLUTION INTRAMUSCULAR; INTRAVENOUS at 09:30

## 2022-09-24 RX ADMIN — ACETAMINOPHEN 975 MG: 325 TABLET, FILM COATED ORAL at 13:27

## 2022-09-24 RX ADMIN — LIDOCAINE HYDROCHLORIDE 50 MG: 10 INJECTION, SOLUTION EPIDURAL; INFILTRATION; INTRACAUDAL; PERINEURAL at 09:20

## 2022-09-24 RX ADMIN — ACETAMINOPHEN 975 MG: 325 TABLET, FILM COATED ORAL at 21:12

## 2022-09-24 RX ADMIN — FENTANYL CITRATE 25 MCG: 50 INJECTION INTRAMUSCULAR; INTRAVENOUS at 09:56

## 2022-09-24 RX ADMIN — OXYCODONE HYDROCHLORIDE 5 MG: 5 TABLET ORAL at 11:24

## 2022-09-24 RX ADMIN — CEFAZOLIN 3000 MG: 1 INJECTION, POWDER, FOR SOLUTION INTRAMUSCULAR; INTRAVENOUS at 09:15

## 2022-09-24 RX ADMIN — ATORVASTATIN CALCIUM 40 MG: 40 TABLET, FILM COATED ORAL at 18:00

## 2022-09-24 RX ADMIN — SODIUM CHLORIDE, SODIUM GLUCONATE, SODIUM ACETATE, POTASSIUM CHLORIDE, MAGNESIUM CHLORIDE, SODIUM PHOSPHATE, DIBASIC, AND POTASSIUM PHOSPHATE 100 ML/HR: .53; .5; .37; .037; .03; .012; .00082 INJECTION, SOLUTION INTRAVENOUS at 06:16

## 2022-09-24 RX ADMIN — INSULIN LISPRO 2 UNITS: 100 INJECTION, SOLUTION INTRAVENOUS; SUBCUTANEOUS at 12:39

## 2022-09-24 RX ADMIN — TAMSULOSIN HYDROCHLORIDE 0.4 MG: 0.4 CAPSULE ORAL at 18:00

## 2022-09-24 RX ADMIN — PROPOFOL 200 MG: 10 INJECTION, EMULSION INTRAVENOUS at 09:20

## 2022-09-24 RX ADMIN — LEVOTHYROXINE SODIUM 125 MCG: 125 TABLET ORAL at 11:17

## 2022-09-24 RX ADMIN — INSULIN GLARGINE 16 UNITS: 100 INJECTION, SOLUTION SUBCUTANEOUS at 21:17

## 2022-09-24 RX ADMIN — ONDANSETRON 4 MG: 2 INJECTION INTRAMUSCULAR; INTRAVENOUS at 09:52

## 2022-09-24 RX ADMIN — INSULIN LISPRO 4 UNITS: 100 INJECTION, SOLUTION INTRAVENOUS; SUBCUTANEOUS at 18:01

## 2022-09-24 NOTE — PLAN OF CARE
Problem: PAIN - ADULT  Goal: Verbalizes/displays adequate comfort level or baseline comfort level  Description: Interventions:  - Encourage patient to monitor pain and request assistance  - Assess pain using appropriate pain scale  - Administer analgesics based on type and severity of pain and evaluate response  - Implement non-pharmacological measures as appropriate and evaluate response  - Consider cultural and social influences on pain and pain management  - Notify physician/advanced practitioner if interventions unsuccessful or patient reports new pain  Outcome: Progressing     Problem: INFECTION - ADULT  Goal: Absence or prevention of progression during hospitalization  Description: INTERVENTIONS:  - Assess and monitor for signs and symptoms of infection  - Monitor lab/diagnostic results  - Monitor all insertion sites, i e  indwelling lines, tubes, and drains  - Monitor endotracheal if appropriate and nasal secretions for changes in amount and color  - [place] appropriate cooling/warming therapies per order  - Administer medications as ordered  - Instruct and encourage patient and family to use good hand hygiene technique  - Identify and instruct in appropriate isolation precautions for identified infection/condition  Outcome: Progressing  Goal: Absence of fever/infection during neutropenic period  Description: INTERVENTIONS:  - Monitor WBC    Outcome: Progressing     Problem: SAFETY ADULT  Goal: Patient will remain free of falls  Description: INTERVENTIONS:  - Educate patient/family on patient safety including physical limitations  - Instruct patient to call for assistance with activity   - Consult OT/PT to assist with strengthening/mobility   - Keep Call bell within reach  - Keep bed low and locked with side rails adjusted as appropriate  - Keep care items and personal belongings within reach  - Initiate and maintain comfort rounds  - Make Fall Risk Sign visible to staff  - Offer Toileting every *** Hours, in advance of need  - Initiate/Maintain ***alarm  - Obtain necessary fall risk management equipment: ***  - Apply yellow socks and bracelet for high fall risk patients  - Consider moving patient to room near nurses station  Outcome: Progressing  Goal: Maintain or return to baseline ADL function  Description: INTERVENTIONS:  -  Assess patient's ability to carry out ADLs; assess patient's baseline for ADL function and identify physical deficits which impact ability to perform ADLs (bathing, care of mouth/teeth, toileting, grooming, dressing, etc )  - Assess/evaluate cause of self-care deficits   - Assess range of motion  - Assess patient's mobility; develop plan if impaired  - Assess patient's need for assistive devices and provide as appropriate  - Encourage maximum independence but intervene and supervise when necessary  - Involve family in performance of ADLs  - Assess for home care needs following discharge   - Consider OT consult to assist with ADL evaluation and planning for discharge  - Provide patient education as appropriate  Outcome: Progressing  Goal: Maintains/Returns to pre admission functional level  Description: INTERVENTIONS:  - Perform BMAT or MOVE assessment daily    - Set and communicate daily mobility goal to care team and patient/family/caregiver  - Collaborate with rehabilitation services on mobility goals if consulted  - Perform Range of Motion *** times a day  - Reposition patient every *** hours    - Dangle patient *** times a day  - Stand patient *** times a day  - Ambulate patient *** times a day  - Out of bed to chair *** times a day   - Out of bed for meals *** times a day  - Out of bed for toileting  - Record patient progress and toleration of activity level   Outcome: Progressing     Problem: DISCHARGE PLANNING  Goal: Discharge to home or other facility with appropriate resources  Description: INTERVENTIONS:  - Identify barriers to discharge w/patient and caregiver  - Arrange for needed discharge resources and transportation as appropriate  - Identify discharge learning needs (meds, wound care, etc )  - Arrange for interpretive services to assist at discharge as needed  - Refer to Case Management Department for coordinating discharge planning if the patient needs post-hospital services based on physician/advanced practitioner order or complex needs related to functional status, cognitive ability, or social support system  Outcome: Progressing     Problem: Knowledge Deficit  Goal: Patient/family/caregiver demonstrates understanding of disease process, treatment plan, medications, and discharge instructions  Description: Complete learning assessment and assess knowledge base  Interventions:  - Provide teaching at level of understanding  - Provide teaching via preferred learning methods  Outcome: Progressing     Problem: Nutrition/Hydration-ADULT  Goal: Nutrient/Hydration intake appropriate for improving, restoring or maintaining nutritional needs  Description: Monitor and assess patient's nutrition/hydration status for malnutrition  Collaborate with interdisciplinary team and initiate plan and interventions as ordered  Monitor patient's weight and dietary intake as ordered or per policy  Utilize nutrition screening tool and intervene as necessary  Determine patient's food preferences and provide high-protein, high-caloric foods as appropriate       INTERVENTIONS:  - Monitor oral intake, urinary output, labs, and treatment plans  - Assess nutrition and hydration status and recommend course of action  - Evaluate amount of meals eaten  - Assist patient with eating if necessary   - Allow adequate time for meals  - Recommend/ encourage appropriate diets, oral nutritional supplements, and vitamin/mineral supplements  - Order, calculate, and assess calorie counts as needed  - Recommend, monitor, and adjust tube feedings and TPN/PPN based on assessed needs  - Assess need for intravenous fluids  - Provide specific nutrition/hydration education as appropriate  - Include patient/family/caregiver in decisions related to nutrition  Outcome: Progressing

## 2022-09-24 NOTE — OP NOTE
OPERATIVE REPORT  PATIENT NAME: Norah Jacobs    :  1955  MRN: [id number]  Pt Location: [facility] ROOM 01    SURGERY DATE: 2022    Surgeon(s) and Role:     * Margie Masters MD - Primary    Preop Diagnosis:  Ureterolithiasis [N20 1] left  Hydronephrosis with urinary obstruction due to ureteral calculus [N13 2]  DOC (acute kidney injury) ([address] ) [N17 9]  Left renal calculus  Post-Op Diagnosis Codes:     * Ureterolithiasis [N20 1]     * Hydronephrosis with urinary obstruction due to ureteral calculus [N13 2]     * DOC (acute kidney injury) ([address] ) [N17 9]  Left renal calculus  Procedure(s) (LRB):  CYSTOSCOPY URETEROSCOPY WITH LITHOTRIPSY HOLMIUM LASER, AND INSERTION STENT URETERAL (Left)    Specimen(s):  * No specimens in log *    Estimated Blood Loss:   Minimal    Drains:  * No LDAs found *    Anesthesia Type:   General    Operative Indications:  Ureterolithiasis [N20 1]  Hydronephrosis with urinary obstruction due to ureteral calculus [N13 2]  DOC (acute kidney injury) ([address] ) [N17 9]  As above    Operative Findings:  Proximal ureteral stone pushed up into the kidney, large 1 5 cm stone in the lower pole, broken up into tiny passable fragments  Stent left with string  Complications:   None    Procedure and Technique:  49-year-old man, transfer from [address] [place] [address] with a 5 mm proximal left ureteral calculus with mild hydronephrosis  No fever, no elevated white count  He has 1 5 cm and some smaller stones in his left kidney  There is a 3 mm right renal nonobstructing calculus  He has not had surgery for stones before but he has passed many on his own  He currently is not having much in the way of pain  I explained the situation to him and we offered him cystoscopy left ureteroscopy laser lithotripsy of the ureteral and left renal calculi  I do not feel that he will pass the left renal calculus which is 1 5 cm  He wishes to proceed    The risks of bleeding infection damage urinary tract with need for additional procedures were explained he gives informed consent  Patient was brought to the operating room identified properly  LMA was induced patient was prepped and draped in dorsal lithotomy position usual fashion  A time-out was performed  Cystoscopy was carried out with a 22 [place] cystoscopy sheath and 30 degree lens  The urethra was normal without stricture  The bladder was smooth not trabeculated there are no stones tumors or lesions  The prostate was nonobstructive  A 0 035 in guidewire was placed up the left ureter up into the kidney  I then established 2 wire access with the dual-lumen catheter, then placed a 35 cm 10/12 ureteral access sheath over the working wire  I could Not see any stones fluoroscopically  I then placed the disposable ureteroscope up the ureter and I saw where the proximal ureteral stone had been but obviously had been pushed up into the kidney  I went up into the kidney and I looked around and there was a couple of stones, 1 very large which was a 1 5 cm  Using the 200 micron holmium laser fiber, I broke up all stones into tiny passable fragments  I then withdrew the scope and inspected the entire ureter and saw nothing else  I placed 6 [place] by 26 cm stent over the wire and coils were established renal pelvis and bladder  The stent string was externalized and taped to the dorsal penis  The bladder was drained with the cystoscopy sheath     I was present for the entire procedure and A qualified resident physician was not available    Patient Disposition:  PACU  and extubated and stable        SIGNATURE: [unfilled]  DATE: September 24, 2022  TIME: 9:55 AM

## 2022-09-24 NOTE — PLAN OF CARE
Problem: PAIN - ADULT  Goal: Verbalizes/displays adequate comfort level or baseline comfort level  Description: Interventions:  - Encourage patient to monitor pain and request assistance  - Assess pain using appropriate pain scale  - Administer analgesics based on type and severity of pain and evaluate response  - Implement non-pharmacological measures as appropriate and evaluate response  - Consider cultural and social influences on pain and pain management  - Notify physician/advanced practitioner if interventions unsuccessful or patient reports new pain  Outcome: Progressing     Problem: INFECTION - ADULT  Goal: Absence or prevention of progression during hospitalization  Description: INTERVENTIONS:  - Assess and monitor for signs and symptoms of infection  - Monitor lab/diagnostic results  - Monitor all insertion sites, i e  indwelling lines, tubes, and drains  - Monitor endotracheal if appropriate and nasal secretions for changes in amount and color  - [place] appropriate cooling/warming therapies per order  - Administer medications as ordered  - Instruct and encourage patient and family to use good hand hygiene technique  - Identify and instruct in appropriate isolation precautions for identified infection/condition  Outcome: Progressing  Goal: Absence of fever/infection during neutropenic period  Description: INTERVENTIONS:  - Monitor WBC    Outcome: Progressing     Problem: SAFETY ADULT  Goal: Patient will remain free of falls  Description: INTERVENTIONS:  - Educate patient/family on patient safety including physical limitations  - Instruct patient to call for assistance with activity   - Consult OT/PT to assist with strengthening/mobility   - Keep Call bell within reach  - Keep bed low and locked with side rails adjusted as appropriate  - Keep care items and personal belongings within reach  - Initiate and maintain comfort rounds  - Make Fall Risk Sign visible to staff  - Offer Toileting every 2 Hours, in advance of need  - Initiate/Maintain TABS alarm  - Obtain necessary fall risk management equipment  - Apply yellow socks and bracelet for high fall risk patients  - Consider moving patient to room near nurses station  Outcome: Progressing  Goal: Maintain or return to baseline ADL function  Description: INTERVENTIONS:  -  Assess patient's ability to carry out ADLs; assess patient's baseline for ADL function and identify physical deficits which impact ability to perform ADLs (bathing, care of mouth/teeth, toileting, grooming, dressing, etc )  - Assess/evaluate cause of self-care deficits   - Assess range of motion  - Assess patient's mobility; develop plan if impaired  - Assess patient's need for assistive devices and provide as appropriate  - Encourage maximum independence but intervene and supervise when necessary  - Involve family in performance of ADLs  - Assess for home care needs following discharge   - Consider OT consult to assist with ADL evaluation and planning for discharge  - Provide patient education as appropriate  Outcome: Progressing  Goal: Maintains/Returns to pre admission functional level  Description: INTERVENTIONS:  - Perform BMAT or MOVE assessment daily    - Set and communicate daily mobility goal to care team and patient/family/caregiver  - Collaborate with rehabilitation services on mobility goals if consulted  - Perform Range of Motion 5 times a day  - Reposition patient every 2 hours    - Dangle patient 3 times a day  - Stand patient 3 times a day  - Ambulate patient 3 times a day  - Out of bed to chair 3 times a day   - Out of bed for meals 3 times a day  - Out of bed for toileting  - Record patient progress and toleration of activity level   Outcome: Progressing     Problem: DISCHARGE PLANNING  Goal: Discharge to home or other facility with appropriate resources  Description: INTERVENTIONS:  - Identify barriers to discharge w/patient and caregiver  - Arrange for needed discharge resources and transportation as appropriate  - Identify discharge learning needs (meds, wound care, etc )  - Arrange for interpretive services to assist at discharge as needed  - Refer to Case Management Department for coordinating discharge planning if the patient needs post-hospital services based on physician/advanced practitioner order or complex needs related to functional status, cognitive ability, or social support system  Outcome: Progressing     Problem: Knowledge Deficit  Goal: Patient/family/caregiver demonstrates understanding of disease process, treatment plan, medications, and discharge instructions  Description: Complete learning assessment and assess knowledge base    Interventions:  - Provide teaching at level of understanding  - Provide teaching via preferred learning methods  Outcome: Progressing

## 2022-09-24 NOTE — CONSULTS
Consultation - Nephrology   Betty Fairbanks 69 y o  male MRN: [id number]  Unit/Bed#: [facility] 923-01 Encounter: [id number]      Assessment/Plan:  1  Acute kidney injury, most likely secondary to component of prerenal azotemia plus obstructive uropathy given obstructing stone  2  CKD stage 4 with baseline creatinine recently between 3 3 and 3 4, EGFR of 18  3  Left-sided hydronephrosis secondary to obstructing ureteral calculus status post cystoscopy with laser lithotripsy and stent placement  4  Anemia of chronic kidney disease hemoglobin currently stable at 11 0  5  Noted abscess of the buttock  Workup as per primary service  6  Diabetes with associated diabetic kidney disease  7  Hypertension, blood pressure currently appears stable  8  Chronic lymphedema with chronic bilateral lower extremity swelling  9  History of CVA    Plan:  · Recommend continued monitoring of renal function creatinine currently 4 3, hopeful plateau  · Given patient's significant lower extremity swelling, avoid aggressive IV fluid resuscitation  · Continue with gentle IV fluids  · Continue to hold diuretic therapy    History of Present Illness   Physician Requesting Consult: Brenda Rivera MD  Reason for Consult / Principal Problem:  Acute kidney injury  HPI: Betty Fairbanks is a 69y o  year old male who presents with flank pain    Patient is a 58-year-old male with complex past medical history significant for chronic kidney disease stage 4, hypertension, coronary disease, CVA, diabetes and diastolic heart failure  Patient presents secondary to increasing left flank pain  Presented to [facility], evaluation at that time showed obstructing hydronephrosis secondary to ureteral stone  Patient was transferred for cystoscopy and stent placement  Patient currently seen in the PACU after cystoscopy  Appears comfortable  Denies any chest pain shortness of breath  Denies abdominal pain  Currently without nausea vomiting or diarrhea  Complains of polyuria  History obtained from chart review and the patient    Review of Systems   Constitutional: Negative for activity change and appetite change  Respiratory: Negative for chest tightness and shortness of breath  Cardiovascular: Positive for leg swelling  Gastrointestinal: Negative for abdominal pain, diarrhea, nausea and vomiting  Genitourinary: Positive for difficulty urinating and flank pain  Neurological: Negative for syncope         Pertinent findings of a 10 point review of systems noted above otherwise all others negative    Historical Information   Patient Active Problem List   Diagnosis    History of CVA (cerebrovascular accident)    Acute kidney injury superimposed on chronic kidney disease stage 4 ([place] [address] )    HTN (hypertension)    [facility]    HLD (hyperlipidemia)    Lymphedema    Hypothyroidism    Anemia    Thrombocytopenia ([facility]    Angina at rest [facility])    MI (myocardial infarction) ([place] [address] )    History of PTCA    Sleep apnea    Smoking    Factor 5 Leiden mutation, heterozygous ([place] [address] )    Type 2 diabetes mellitus with stage 4 chronic kidney disease, with long-term current use of insulin ([facility])    Obesity, morbid ([place] [address] )    Secondary hyperparathyroidism of renal origin ([place] [address] )    Stage 4 chronic kidney disease ([facility])    Chronic kidney disease-mineral and bone disorder    Ureteral stone with hydronephrosis    Cutaneous abscess of buttock     Past Medical History:   Diagnosis Date    Chronic kidney disease     Disease of thyroid gland     Hyperlipidemia     Hypertension     Stroke [facility])      Past Surgical History:   Procedure Laterality Date    CHOLECYSTECTOMY      CORONARY ANGIOPLASTY WITH STENT PLACEMENT      JOINT REPLACEMENT      bilateral knee     Social History   Social History     Substance and Sexual Activity   Alcohol Use Not Currently     Social History     Substance and Sexual Activity   Drug Use Never     Social History Tobacco Use   Smoking Status Current Every Day Smoker    Packs/day: 0 50    Types: Cigarettes   Smokeless Tobacco Never Used     Family History   Problem Relation Age of Onset    Kidney failure Mother     Cirrhosis Mother     Colon cancer Brother        Meds/Allergies   current meds:   Current Facility-Administered Medications   Medication Dose Route Frequency    acetaminophen (TYLENOL) tablet 975 mg  975 mg Oral Q8H [facility]    [START ON 9/25/2022] aspirin (ECOTRIN LOW STRENGTH) EC tablet 81 mg  81 mg Oral Daily    atorvastatin (LIPITOR) tablet 40 mg  40 mg Oral Daily With Dinner    ceFAZolin (ANCEF) IVPB (premix in dextrose) 2,000 mg 50 mL  2,000 mg Intravenous Q12H    heparin (porcine) subcutaneous injection 5,000 Units  5,000 Units Subcutaneous Q8H [facility]    oxyCODONE (ROXICODONE) IR tablet 2 5 mg  2 5 mg Oral Q4H PRN    Or    oxyCODONE (ROXICODONE) IR tablet 5 mg  5 mg Oral Q4H PRN    Or    HYDROmorphone HCl (DILAUDID) injection 0 2 mg  0 2 mg Intravenous Q4H PRN    insulin glargine (LANTUS) subcutaneous injection 16 Units 0 16 mL  16 Units Subcutaneous HS    insulin lispro (HumaLOG) 100 units/mL subcutaneous injection 2-12 Units  2-12 Units Subcutaneous TID AC    insulin lispro (HumaLOG) 100 units/mL subcutaneous injection 2-12 Units  2-12 Units Subcutaneous HS    levothyroxine tablet 125 mcg  125 mcg Oral Early Morning    metoprolol succinate (TOPROL-XL) 24 hr tablet 25 mg  25 mg Oral Daily    multi-electrolyte (PLASMALYTE-A/ISOLYTE-S PH 7 4) IV solution  50 mL/hr Intravenous Continuous    ondansetron (ZOFRAN) injection 4 mg  4 mg Intravenous Q6H PRN    phenazopyridine (PYRIDIUM) tablet 100 mg  100 mg Oral TID PRN    tamsulosin (FLOMAX) capsule 0 4 mg  0 4 mg Oral Daily With Dinner       Allergies   Allergen Reactions    Carvedilol Shortness Of Breath    Aspartame - Food Allergy Diarrhea    Bumetanide Other (See Comments)     Lightheadedness,tiredness, confusion, drowsiness, dry mouth, nausea, ringing in ears, bruising, change in urine  Lightheadedness,tiredness, confusion, drowsiness, dry mouth, nausea, ringing in ears, bruising, change in urine      Hydralazine Tachycardia    Lisinopril Other (See Comments)     Other reaction(s): dry heaves      Morphine Swelling     Other reaction(s): lump at injection  Hand swelling at IV injection site      Nifedipine Lightheadedness     didn't tolerate    Ciprofloxacin Rash    Strawberry Extract - Food Allergy Rash         Objective   /78 (BP Location: Right arm)   Pulse 66   Temp (!) 97 2 °F (36 2 °C) (Temporal)   Resp 16   Ht 5' 10" (1 778 m)   Wt 129 kg (284 lb 13 4 oz)   SpO2 96%   BMI 40 87 kg/m²     Intake/Output Summary (Last 24 hours) at 9/24/2022 1244  Last data filed at 9/24/2022 1128  Gross per 24 hour   Intake 1748 33 ml   Output 1100 ml   Net 648 33 ml       Current Weight: Weight - Scale: 129 kg (284 lb 13 4 oz)    Physical Exam  Constitutional:       Appearance: He is obese  He is not ill-appearing  HENT:      Head: Normocephalic and atraumatic  Mouth/Throat:      Mouth: Mucous membranes are moist       Pharynx: Oropharynx is clear  Cardiovascular:      Rate and Rhythm: Regular rhythm  Pulmonary:      Effort: Pulmonary effort is normal       Breath sounds: Normal breath sounds  Abdominal:      General: There is no distension  Palpations: Abdomen is soft  Tenderness: There is no abdominal tenderness  Musculoskeletal:         General: No deformity  Right lower leg: Edema present  Left lower leg: Edema present  Lymphadenopathy:      Cervical: No cervical adenopathy  Skin:     General: Skin is warm and dry  Findings: No rash  Neurological:      Mental Status: He is alert and oriented to person, place, and time             Lab Results:    Results from last 7 days   Lab Units 09/24/22  0440   WBC Thousand/uL 6 43   HEMOGLOBIN g/dL 11 0*   HEMATOCRIT % 34 2*   PLATELETS Thousands/uL 108* Results from last 7 days   Lab Units 09/24/22  0440   POTASSIUM mmol/L 3 8   CHLORIDE mmol/L 108   CO2 mmol/L 25   BUN mg/dL 56*   CREATININE mg/dL 4 31*   CALCIUM mg/dL 9 3

## 2022-09-24 NOTE — PROGRESS NOTES
INTERNAL MEDICINE RESIDENCY PROGRESS NOTE     Name: Ezequiel Valero   Age & Sex: 69 y o  male   MRN: [id number]  Unit/Bed#: [facility] 923-01   Encounter: [id number]  Team: SOD Team C     PATIENT INFORMATION     Name: Ezequiel Valero   Age & Sex: 69 y o  male   MRN: [id number]  Hospital Stay Days: 1    ASSESSMENT/PLAN     Principal Problem:    Ureteral stone with hydronephrosis  Active Problems:    History of CVA (cerebrovascular accident)    Acute kidney injury superimposed on chronic kidney disease stage 4 (HCC)    HTN (hypertension)    HLD (hyperlipidemia)    Lymphedema    Hypothyroidism    MI (myocardial infarction) ([facility] )    Factor 5 Leiden mutation, heterozygous ([place] [id number] )    Type 2 diabetes mellitus with stage 4 chronic kidney disease, with long-term current use of insulin ([facility])    Cutaneous abscess of buttock      Cutaneous abscess of buttock  Assessment & Plan  Patient describes having boil for 3-4 days  On exam, has right medial buttock abscess, but non expressible  Assessment: abscess is 0 7x1cm (less than 2 cm in diameter) without significant erythema or systemic symptoms  Will hold off on incision & drainage  Will hold off on routine antibiotic therapy in setting of mild skin abscess (in reference to IDSA guideline 2014)  · Consider antibiotics with gm+ coverage for skin/soft tissue infection if sample may be cultured prior  · Wound care consulted, appreciate recs    Type 2 diabetes mellitus with stage 4 chronic kidney disease, with long-term current use of insulin [facility])  Assessment & Plan  Lab Results   Component Value Date    HGBA1C 6 9 09/09/2022       Recent Labs     09/23/22  1743 09/23/22 2038   POCGLU 128 236*       Blood Sugar Average: Last 72 hrs:  (P) 182   Home regimen:  Lantus 20 units  A1c 6 9 earlier this month      · Lantus 16 U  · SSI algorithm 4  · BG checks before meals and HS  · Adjust regimen as necessary to maintain -180    Factor 5 Leiden mutation, heterozygous [facility])  Assessment & Plan  · Not on chronic anticoagulation at home    MI (myocardial infarction) [facility])  Assessment & Plan  S/p balloon angioplasty in 1999 and MCKINLEY in 2015  Chest pain-free  · Continue home aspirin 81 mg, atorvastatin 40 mg  · Held aspirin in anticipation of urologic procedure, will restart post-op with recs from Uro    Hypothyroidism  Assessment & Plan  Continue home levothyroxine 125 mcg    Lymphedema  Assessment & Plan  Hx of lymphedema with swelling of bilateral lower extremities  Weeping present in RLE  · Wound care consulted for management of LE weeping    HLD (hyperlipidemia)  Assessment & Plan  Lipid panel from January:  , , HDL 30, LDL 75     · Continue home atorvastatin 40 mg    HTN (hypertension)  Assessment & Plan  · Held home regimen Toprol 25 mg  · Continue to monitor BP    Acute kidney injury superimposed on chronic kidney disease stage 4 [facility])  Assessment & Plan  Lab Results   Component Value Date    EGFR 13 09/24/2022    EGFR 13 09/23/2022    EGFR 12 01/15/2022    CREATININE 4 31 (H) 09/24/2022    CREATININE 4 19 (H) 09/23/2022    CREATININE 4 43 (H) 01/15/2022       Creatinine 4 19, baseline 3 2-3 4  Seen by Nephrology in 9/16/22 and declined transplant referral   Was recommended to start dialysis from prior nephrology visit in on July but patient declined  Home regimen: Lasix 120 mg BID  · Isolyte 100 cc/hr  · Nephrology consulted - appreciate recommendations  · Monitor I/O, BUN, creatinine  · Daily weights   · Avoid nephrotoxic agents, hypotension  · Holding Lasix    History of CVA (cerebrovascular accident)  Assessment & Plan  Stroke in January s/p tPA  MRI showed few scattered punctate foci of acute cerebral and right cerebellar infarction, suspicious for cardioembolic etiology  TTE unrevealing    Underwent loop recorder placement on 06/28     -Restart aspirin after procedure with urology on 9/24    * Ureteral stone with hydronephrosis  Assessment & Plan  Presented with left flank pain around midnight  Found to have 5 mm left proximal ureteral calculus with mild hydronephroureterosis  Labs significant for DOC  No leukocytosis or lactic acidosis  UA showed proteinuria, negative for WBC, bacteria, nitrites  Started on  cc prior to transfer  Case discussed with urology who recommended transfer for operative procedure  · Geriatric pain regimen:  · Tylenol 970 5q 8 hours  · Oxycodone 2 5 mg q 4 hours p r n  For moderate pain  · Oxycodone 5 mg Q 4 hours p r n  For severe pain  · IV Dilaudid 0 2 mg q 4 hours for breakthrough  · Made NPO @ [facility] for anticipated procedure  · Continue isolyte 100 cc/hr  · Urology consulted - appreciate recommendations  · Appears to have scheduled cytoscopy tomorrow   · PT/OT      Disposition: Continue inpatient care    SUBJECTIVE     Patient seen and examined  No acute events overnight  Denies any complaints on my exam  Awaiting procedure with urology  OBJECTIVE     Vitals:    22 2216 22 2252 22 0538 22 0747   BP:  163/84  159/80   BP Location:       Pulse:  72  68   Resp:  16  18   Temp:  (!) 97 2 °F (36 2 °C)  97 5 °F (36 4 °C)   SpO2: 95% 95%  91%   Weight:   129 kg (284 lb 13 4 oz)    Height:          Temperature:   Temp (24hrs), Av 5 °F (36 4 °C), Min:97 2 °F (36 2 °C), Max:97 7 °F (36 5 °C)    Temperature: 97 5 °F (36 4 °C)  Intake & Output:  I/O        0701   0700  0701   0700    I V  (mL/kg)  1248 3 (9 7)    Total Intake(mL/kg)  1248 3 (9 7)    Urine (mL/kg/hr)  850    Total Output  850    Net  +398 3              Weights:   IBW (Ideal Body Weight): 73 kg    Body mass index is 40 87 kg/m²  Weight (last 2 days)     Date/Time Weight    22 0538 129 (284 83)    22 1700 129 (285)        Physical Exam  Constitutional:       General: He is not in acute distress  Appearance: He is obese  HENT:      Head: Normocephalic and atraumatic        Nose: Nose normal       Mouth/Throat: Mouth: Mucous membranes are moist    Eyes:      Extraocular Movements: Extraocular movements intact  Cardiovascular:      Rate and Rhythm: Normal rate and regular rhythm  Pulmonary:      Effort: Pulmonary effort is normal  No respiratory distress  Abdominal:      Palpations: Abdomen is soft  Genitourinary:     Comments: R medial buttocks wound  Musculoskeletal:      Right lower leg: Edema (Non-pitting) present  Left lower leg: Edema (NOn-pitting) present  Comments: LLE wrapped 2/2 weeping wounds   Skin:     General: Skin is warm and dry  Neurological:      Mental Status: He is alert and oriented to person, place, and time  LABORATORY DATA     Labs: I have personally reviewed pertinent reports  Results from last 7 days   Lab Units 09/24/22  0440 09/23/22  0950   WBC Thousand/uL 6 43 9 60   HEMOGLOBIN g/dL 11 0* 13 4   HEMATOCRIT % 34 2* 42 0   PLATELETS Thousands/uL 108* 127*   NEUTROS PCT % 72 87*   MONOS PCT % 5 3*      Results from last 7 days   Lab Units 09/24/22  0440 09/23/22  0950   POTASSIUM mmol/L 3 8 4 1   CHLORIDE mmol/L 108 100   CO2 mmol/L 25 24   BUN mg/dL 56* 50*   CREATININE mg/dL 4 31* 4 19*   CALCIUM mg/dL 9 3 9 9   ALK PHOS U/L  --  120*   ALT U/L  --  25   AST U/L  --  18              Results from last 7 days   Lab Units 09/23/22  0950   INR  0 96   PTT seconds 30     Results from last 7 days   Lab Units 09/23/22  0950   LACTIC ACID mmol/L 1 7           IMAGING & DIAGNOSTIC TESTING     Radiology Results: I have personally reviewed pertinent reports  CT renal stone study abdomen pelvis wo contrast    Result Date: 9/23/2022  Impression: 5 mm left proximal ureteral calculus with mild upstream hydronephroureterosis  No loculated perinephric collection  Left renal calculi, 1 5 cm and smaller  3 mm right renal nonobstructing calculus  Colonic diverticulosis   This study demonstrates a significant  finding and was documented as such in [facility] for liaison and referring practitioner notification  Workstation performed: [id number]     Other Diagnostic Testing: I have personally reviewed pertinent reports  ACTIVE MEDICATIONS     Current Facility-Administered Medications   Medication Dose Route Frequency    acetaminophen (TYLENOL) tablet 975 mg  975 mg Oral Q8H [address]    atorvastatin (LIPITOR) tablet 40 mg  40 mg Oral Daily With Dinner    heparin (porcine) subcutaneous injection 5,000 Units  5,000 Units Subcutaneous Q8H [address]    oxyCODONE (ROXICODONE) IR tablet 2 5 mg  2 5 mg Oral Q4H PRN    Or    oxyCODONE (ROXICODONE) IR tablet 5 mg  5 mg Oral Q4H PRN    Or    HYDROmorphone HCl (DILAUDID) injection 0 2 mg  0 2 mg Intravenous Q4H PRN    insulin glargine (LANTUS) subcutaneous injection 16 Units 0 16 mL  16 Units Subcutaneous HS    insulin lispro (HumaLOG) 100 units/mL subcutaneous injection 2-12 Units  2-12 Units Subcutaneous TID AC    insulin lispro (HumaLOG) 100 units/mL subcutaneous injection 2-12 Units  2-12 Units Subcutaneous HS    levothyroxine tablet 125 mcg  125 mcg Oral Early Morning    multi-electrolyte (PLASMALYTE-A/ISOLYTE-S PH 7 4) IV solution  100 mL/hr Intravenous Continuous    ondansetron (ZOFRAN) injection 4 mg  4 mg Intravenous Q6H PRN    tamsulosin (FLOMAX) capsule 0 4 mg  0 4 mg Oral Daily With Dinner       VTE Pharmacologic Prophylaxis: Heparin  VTE Mechanical Prophylaxis: sequential compression device    Portions of the record may have been created with voice recognition software  Occasional wrong word or "sound a like" substitutions may have occurred due to the inherent limitations of voice recognition software    Read the chart carefully and recognize, using context, where substitutions have occurred   ==  [address], [address]  Internal Medicine Residency PGY-1

## 2022-09-24 NOTE — DISCHARGE INSTRUCTIONS
Please follow-up with urology in the next 1 week and with your PCP in the next 2 weeks  Follow-up with Nephrology within next 2 weeks   Take Doxycycline for 7 days (ending on 10/02/2022)  Get lab-work done by this Thursday    Expect to see blood in the urine, and to experience urgency/frequency/burning with urination and dribbling  This is normal after urological procedures  Is also normal to experience some nausea after these procedures  Go back your regular diet carefully  It is normal to feel pain in the kidney when urinating and when the bladder is filling due to urine refluxing up to the kidney because of the open stent  The stent is necessary to keep the ureter open to allow clots and swelling to resolve and to allow the kidney to drain properly after instrumentation  Some stones may pass around the stent, but most stones pass after the stent is removed  The presence of the stent makes the ureter wider while it is in and after has been removed, allowing passage of larger fragments  Call for fever greater that 101 5, inability to urinate, prolonged nausea and vomiting, or severe pain not relieved by pain medications  Cony Quinonez Keep stent string taped- if it becomes dislodged or gets pulled out early, that is OK- simply remove it completely by pulling on string until it is completely out  No driving/operating machinery for 24 hours, and while taking narcotics  Take over the counter remedy of choice to avoid constipation  Drink plenty of fluids  Acute Kidney Injury (DOC)  You have been diagnosed with Acute Kidney Injury (DOC)  The following information has been developed to provide you with information about DOC and treatment  What is Acute Kidney Injury (DOC)? DOC occurs when kidney function decreases over a short period of time  This condition causes a buildup of waste products in the blood and can cause fluid to build up causing swelling in the legs and shortness of breath    Sometimes called Acute Kidney Failure or Acute Renal Failure, DOC is often reversible if it is found and treated quickly  How do you know if you have DOC? DOC is diagnosed by assessing kidney function  This is done by obtaining a blood test to measure the blood level of creatinine  Decreased urine output can sometimes also indicate DOC  Who is at risk for DOC? DOC can happen to anyone but usually happens to people who are already sick and may be in the hospital  People are at higher risk for DOC if they have any of the following:  age 72 years or older  high or low blood pressure  underlying kidney disease (e g , Chronic Kidney Disease (CKD)  peripheral vascular disease (hardening of arteries)  chronic diseases such as liver disease, heart disease and diabetes  a single kidney    What are the symptoms of DOC? You may or may not have the symptoms to suggest you have kidney injury until the DOC has progressed  Some of the symptoms are listed below:  Not making enough urine  Increased swelling in legs   Feeling tired  Trouble breathing or shortness of breath  Nausea  New or worsening confusion    What causes DOC? The causes are divided into three categories:  Not enough blood flowing to the kidneys (e g , low blood pressure, bleeding, diarrhea, dehydration)  Injury directly to the kidneys (e g , blood clots, severe infections such as sepsis, medicine toxicity, IV contrast dye used for cardiac catheterization or CT scans)  Blockage to the tubes (ureters) that drain the urine from the kidneys (e g , enlarged prostate, kidney stones, blood clots)    What is the treatment for DOC? The treatment for DOC depends on correcting what caused it  Treatment usually involves removing the cause and measures to prevent further injury to the kidneys  This may require the use of intravenous fluids or medications and/or temporary dialysis      Dialysis is a process using a machine that does the job of the kidneys to remove waste and help correct the electrolyte and fluid balance while the kidneys are recovering  If dialysis is needed to treat DOC, the doctor will assess daily to see if the kidneys are showing signs of recovery  The daily assessments determine how long dialysis needs to continue  Depending on the cause and the extent of damage, an episode of DOC may resolve in a few days to several weeks to several months  What are the long term effects of having an episode of DOC? People who have one episode of DOC are at an increased risk of having another episode of DOC as well as other health problems such as kidney disease, stroke, and heart disease  In a small number of people who had unrecognized kidney disease, an DOC episode may result in Chronic Kidney Disease (CKD) which requires lifelong monitoring and treatment  How do you prevent future episodes of DOC? Make sure to follow up with the kidney doctor after hospital discharge and obtain blood work to reassess and monitor kidney function  If you have diabetes, keep your blood sugar in goal range and keep appointments with your diabetes specialist    If you have high blood pressure, have your blood pressure checked regularly to make sure it is in target range  If you take blood pressure medicine called ACEIs or ARBs (e g , Lisinopril, Enalapril, Diovan, Losartan), your doctor may tell you to skip a dose or two if you have severe dehydration and your blood pressure is running low  Avoid using medicines such as NSAIDs (Nonsteroidal Anti-inflammatory Drugs) and Feliciano-2 Inhibitors (a type of NSAID) that may be harmful to kidney function  These may include the medicines listed in the table that follows  Examples of NSAIDS and Feliciano-2 Inhibitors   Talk to your doctor or healthcare provider before stopping any medicine ordered for you     Celecoxib (CELEBREX) Ketoprofen (ORUDIS, ORUVAIL)   Diclofenac (VOLTAREN, CATAFLAM) Ketorolac (TORADOL)   Diflunisal (DOLOBID) Meloxicam (MOBIC)   Etodolac (LODINE) Nabumetone (RELAFEN)   Fenoprofen (NALFON) Naproxen (ALEVE, NAPROSYN,    NAPRELAN, ANAPROX)   Flurbiprofen (ANSAID) Oxaprozin (DAYPRO)   Ibuprofen (MOTRIN, ADVIL) Piroxicam (FELDENE)   Indomethacin (INDOCIN) Sulindac (CLINORIL)    Tolmetin (Ulises )     Is there a special diet for people with DOC? People with DOC and/or other kidney disease often have high potassium and phosphorus levels in their blood  To protect the kidneys from further injury and to avoid complications, most doctors recommend following a healthy diet choosing foods low potassium and low phosphorus  Limiting dietary potassium to 2 5 grams/day and phosphorus to 800 milligrams/day is recommended  A dietitian can help you with learning more about this type of diet  The tables on the following page may help you to choose lower potassium and phosphorus foods  The following websites are also good sources of information:  [employer] at  org/nutrition/Kidney-Disease-Stages-1-4   [facility]  https://www niddk nih gov/health-information/kidney-disease/chronic-kidney-disease-ckd/eating-nutrition  https://[facility] org/health/articles/15641-renal-diet-basics  [employer]    If you have any questions or concerns about your condition, please contact your doctor or healthcare provider  These tables may help you to choose lower potassium and phosphorus foods    AVOID these higher phosphorus* foods: CHOOSE these lower phosphorus* foods:   Milk, pudding , yogurt made from animals and from many soy varieties Rice milk (unfortified), nondairy creamer   Hard cheeses, ricotta, cottage cheese, fat-free cream cheese Regular and low-fat cream cheese   Ice cream, frozen yogurt Sherbet, frozen fruit pops, sorbet   Soups made with milk, dried peas, beans, lentils or other high phosphorus ingredients Soups made with broth, are water-based, or other lower phosphorus ingredients   Whole grains, including whole-grain breads, crackers, cereal, rice and pasta, corn tortillas Refined grains, including white bread, crackers, cereals, rice and pasta   Quick breads, biscuits, cornbread, muffins, pancakes, waffles, granola, wheat germ Homemade refined (white) dinner rolls, bagels, English muffins, sugar cookies, shortbread cookies, juve food cake   Dried peas (split, black-eyed), beans (black, garbanzo, lima, kidney, navy, nuñez), lentils Green peas (canned, frozen), green beans, wax beans   Organ meats, walleye, [place], sardines Lean beef, pork, lamb, poultry, other fish   Nuts and seeds Popcorn   Peanut butter, other nut butters; tofu, veggie or soy burgers Jam, jelly, honey   Chocolate, including chocolate drinks Carob (chocolate-flavored) candy, hard candy,  gumdrops   German, pepper-type sodas, flavored hall, bottled teas, beer Lemon-lime soda, ginger ale or root beer, plain water, cream soda, grape soda   *Always read labels and avoid foods with ingredients containing "phos"  AVOID these higher potassium foods: CHOOSE these lower potassium foods:      Milk (fat free, low fat, whole, buttermilk, Soy), yogurt    Regular and low-fat cream cheese      Beans (white, [place]), [place] sprouts, spinach Swiss       chard, broccoli, avocado, artichoke, potatoes, sweet      potatoes, tomatoes/tomato sauce, beet greens      Green beans, alfalfa sprouts, bamboo shoots (canned),       cabbage, carrots, cauliflower, corn, cucumber, eggplant,      endive, lettuce, mushrooms, onions, radishes,  watercress,       water chestnuts (canned), rice, peas      Halibut, tuna, cod, snapper, tuna fish, turkey    Egg, lean beef, pork, lamb, shellfish, chicken       Banana, papaya, orange, cantaloupe, dates, raisins and      other dried fruit, pomegranate, avocado      Apple, applesauce, blackberries, raspberries, pears,     watermelon, cucumbers, blueberries, cranberries,       peaches      Almonds, peanuts, hazelnuts, [place], cashew, mixed,      seeds (sunflower, pumpkin)     Homemade refined (white) dinner rolls, bagels,      English muffins, flour tortilla, crackers, veda      crackers, popcorn, pretzels, spaghetti or macaroni,       hummus      Tomato or vegetable juice, prune juice    Papaya, marixa, or pear nectar, cranberry juice cocktail      Molasses                                                               Kidney Stones   WHAT YOU NEED TO KNOW:   Kidney stones form in the urinary system when the water and waste in your urine are out of balance  When this happens, certain types of waste crystals separate from the urine  The crystals build up and form kidney stones  You may have more than one kidney stone  DISCHARGE INSTRUCTIONS:   Return to the emergency department if:   You are vomiting and it is not relieved with medicine  Call your doctor or kidney specialist if:   You have a fever  You have trouble urinating  You see blood in your urine  You have severe pain  You have any questions or concerns about your condition or care  Medicines: You may need any of the following:  NSAIDs , such as ibuprofen, help decrease swelling, pain, and fever  This medicine is available with or without a doctor's order  NSAIDs can cause stomach bleeding or kidney problems in certain people  If you take blood thinner medicine, always ask your healthcare provider if NSAIDs are safe for you  Always read the medicine label and follow directions  Acetaminophen  decreases pain and fever  It is available without a doctor's order  Ask how much to take and how often to take it  Follow directions  Read the labels of all other medicines you are using to see if they also contain acetaminophen, or ask your doctor or pharmacist  Acetaminophen can cause liver damage if not taken correctly   Do not use more than 4 grams (4,000 milligrams) total of acetaminophen in one day  Prescription pain medicine  may be given  Ask your healthcare provider how to take this medicine safely  Some prescription pain medicines contain acetaminophen  Do not take other medicines that contain acetaminophen without talking to your healthcare provider  Too much acetaminophen may cause liver damage  Prescription pain medicine may cause constipation  Ask your healthcare provider how to prevent or treat constipation  Medicines  to balance your electrolytes may be needed  Take your medicine as directed  Contact your healthcare provider if you think your medicine is not helping or if you have side effects  Tell him or her if you are allergic to any medicine  Keep a list of the medicines, vitamins, and herbs you take  Include the amounts, and when and why you take them  Bring the list or the pill bottles to follow-up visits  Carry your medicine list with you in case of an emergency  What you can do to manage kidney stones:   Drink more liquids  Your healthcare provider may tell you to drink at least 8 to 12 (eight-ounce) cups of liquids each day  This helps flush out the kidney stones when you urinate  Water is the best liquid to drink  Strain your urine every time you go to the bathroom  Urinate through a strainer or a piece of thin cloth to catch the stones  Take the stones to your healthcare provider so they can be sent to the lab for tests  This will help your healthcare providers plan the best treatment for you  Ask if you should avoid any foods  You may need to limit oxalate  Oxalate is a chemical found in some plant foods  The most common type of kidney stone is made up of crystals that contain calcium and oxalate  Your healthcare provider or dietitian may recommend that you limit oxalate if you get this type of kidney stone often  You may need to limit how much sodium (salt) or protein you eat  Ask for information about the best foods for you  Be physically active as directed  Your stones may pass more easily if you stay active  Physical activity can also help you manage your weight  Ask about the best activities for you  After you pass the kidney stones: Your healthcare provider may  order a 24-hour urine test  Results from a 24-hour urine test will help your healthcare provider plan ways to prevent more stones from forming  Your healthcare provider will give you more instructions  Follow up with your doctor or kidney specialist as directed:  Write down your questions so you remember to ask them during your visits  © Copyright [employer] 2022 Information is for End User's use only and may not be sold, redistributed or otherwise used for commercial purposes  All illustrations and images included in CareNotes® are the copyrighted property of [employer]  or [facility] Dax Esqueda   The above information is an  only  It is not intended as medical advice for individual conditions or treatments  Talk to your doctor, nurse or pharmacist before following any medical regimen to see if it is safe and effective for you

## 2022-09-24 NOTE — QUICK NOTE
Communicated with nurse Zak to obtain wound culture from R medial buttocks abscess prior to starting Ancef

## 2022-09-24 NOTE — ANESTHESIA POSTPROCEDURE EVALUATION
Post-Op Assessment Note    CV Status:  Stable    Pain management: adequate     Mental Status:  Awake and sleepy   Hydration Status:  Euvolemic   PONV Controlled:  Controlled   Airway Patency:  Patent      Post Op Vitals Reviewed: Yes      Staff: Anesthesiologist, CRNA         No complications documented      /65 (09/24/22 1005)    Temp 97 6 °F (36 4 °C) (09/24/22 1005)    Pulse 64 (09/24/22 1005)   Resp 16 (09/24/22 1005)    SpO2 100 % (09/24/22 1005)

## 2022-09-24 NOTE — CONSULTS
Consults: Mariia Wong [facility] 69 y o  male [id number]   Unit/Bed #: [facility] 923-01  Encounter: [id number]        Assessment  & Plan  :  Nephrolithiasis:  -CT scan reveals 5 mm left proximal ureteral calculus with mild upstream hydroureteronephrosis  No loculated perinephric collection  Left renal calculi measuring 1 5 cm and smaller  Nonobstructing right mid 3 mm calculus  -patient with CKD baseline 3 1, currently 4 1, most likely due to obstruction, nephrology consulted appreciate their input  -no leukocytosis  -UA negative , 0-1 WBC, RBC1-2  occasional bacteria  -Discussed with patient procedure in depth discussed cystoscopy ureteroscopy holmium laser lithotripsy basket extraction ureteral stent insertion on the left  Discussed risks of procedure including bleeding, infection, damage to nearby structures such as kidney ureter and bladder need for additional stone procedures in the setting of possible infection and impaction  Discuss which would expect with ureteral stent placement including frequency urgency, dysuria and bladder spasms   -surgical consent signed at bedside, patient agreeable to plan at this   -NPO at midnight, plan for surgical intervention tomorrow    Plan for OR Tomorrow     Subjective :    Yenni Ballard  is a 69 y o  male past medical history of CKD stage 4, hypertension, CAD status post MCKINLEY in 2015, stroke in January status post tPA and loop recorder placement in June, type 2 diabetes on insulin, tobacco use,HF along with past urologic history of nephrolithiasis  He is to follow with a urologist many years ago for nephrolithiasis  Denies any prior surgical intervention  Reports that he has been able to passed stones on his own  Reports he began to experience left-sided flank pain starting evening  Denies any nausea vomiting fevers or chills  Reports that this began late in the evening attempting to go to sleep however continued to worsen    Patient hydrocodone which she has for chronic back pain and this did not ease his pain  His pain continued to worsen at that point he realized, that this is most likely a kidney stone  And presented to the emergency room  CT scan revealed a 5 mm left proximal ureteral calculus some mild upstream hydroureteronephrosis along with nonobstructing stones on the left measuring 1 5 cm and nonobstructing stone on the right measuring 3 mm     Patient also DOC 4 1 baseline 3 1  No white count, UA negative  Patient was transferred to [facility] for surgical intervention  Patient evaluated bedside currently reporting his pain is significantly improved currently at a 3 or 4/10  Denies any current nausea, vomiting, fevers, chills  Allergies   Allergen Reactions    Carvedilol Shortness Of Breath    Aspartame - Food Allergy Diarrhea    Bumetanide Other (See Comments)     Lightheadedness,tiredness, confusion, drowsiness, dry mouth, nausea, ringing in ears, bruising, change in urine  Lightheadedness,tiredness, confusion, drowsiness, dry mouth, nausea, ringing in ears, bruising, change in urine      Hydralazine Tachycardia    Lisinopril Other (See Comments)     Other reaction(s): dry heaves      Morphine Swelling     Other reaction(s): lump at injection  Hand swelling at IV injection site      Nifedipine Lightheadedness     didn't tolerate    Ciprofloxacin Rash    Strawberry Extract - Food Allergy Rash      Current Outpatient Medications   Medication Instructions    allopurinol (ZYLOPRIM) 300 mg tablet Oral    Arginine 1000 MG TABS Oral, One daily    ascorbic acid (VITAMIN C) 500 mg, Oral, Daily    aspirin (ECOTRIN LOW STRENGTH) 81 mg, Oral    atorvastatin (LIPITOR) 40 mg, Oral, Daily with dinner    b complex vitamins capsule 1 capsule, Oral, Daily    clindamycin (CLEOCIN) 300 MG capsule No dose, route, or frequency recorded      co-enzyme Q-10 100 mg, Oral    furosemide (LASIX) 120 mg, Oral, 2 times daily    HYDROcodone-acetaminophen (NORCO)  mg per tablet Oral    insulin glargine (LANTUS) 20 Units, Subcutaneous    insulin lispro (HumaLOG) 100 units/mL injection No dose, route, or frequency recorded   levothyroxine 125 mcg tablet Oral    metoprolol succinate (TOPROL-XL) 25 mg 24 hr tablet Oral, 1-2 prn    tamsulosin (FLOMAX) 0 4 mg, Oral, Daily with dinner    vitamin A 2,400 Units, Oral, Daily    vitamin E (tocopherol) 200 Units, Oral, Daily, 180 mg daily      Past Medical History:   Diagnosis Date    Chronic kidney disease     Disease of thyroid gland     Hyperlipidemia     Hypertension     Stroke [facility])      Past Surgical History:   Procedure Laterality Date    CHOLECYSTECTOMY      CORONARY ANGIOPLASTY WITH STENT PLACEMENT      JOINT REPLACEMENT      bilateral knee     Family History   Problem Relation Age of Onset    Kidney failure Mother     Cirrhosis Mother     Colon cancer Brother      Social History     Socioeconomic History    Marital status:      Spouse name: Not on file    Number of children: Not on file    Years of education: Not on file    Highest education level: Not on file   Occupational History    Not on file   Tobacco Use    Smoking status: Current Every Day Smoker     Packs/day: 0 50     Types: Cigarettes    Smokeless tobacco: Never Used   Vaping Use    Vaping Use: Never used   Substance and Sexual Activity    Alcohol use: Not Currently    Drug use: Never    Sexual activity: Not on file   Other Topics Concern    Not on file   Social History Narrative    Not on file     Social Determinants of Health     Financial Resource Strain: Not on file   Food Insecurity: No Food Insecurity    Worried About Running Out of Food in the Last Year: Never true    Janneth of Food in the Last Year: Never true   Transportation Needs: No Transportation Needs    Lack of Transportation (Medical): No    Lack of Transportation (Non-Medical):  No   Physical Activity: Not on file Stress: Not on file   Social Connections: Not on file   Intimate Partner Violence: Not on file   Housing Stability: Low Risk     Unable to Pay for Housing in the Last Year: No    Number of Places Lived in the Last Year: 1    Unstable Housing in the Last Year: No        Review of Systems   Constitutional: Negative  Negative for chills and fever  HENT: Negative  Eyes: Negative  Respiratory: Negative  Cardiovascular: Negative  Gastrointestinal: Negative  Negative for abdominal pain, diarrhea, nausea and vomiting  Endocrine: Negative  Genitourinary: Positive for flank pain  Negative for difficulty urinating, frequency, hematuria and urgency  Skin: Negative  Allergic/Immunologic: Negative  Neurological: Negative  Hematological: Negative  Psychiatric/Behavioral: Negative  Objective     Physical Exam  Constitutional:       General: He is not in acute distress  Appearance: He is obese  He is not ill-appearing, toxic-appearing or diaphoretic  HENT:      Head: Normocephalic and atraumatic  Right Ear: External ear normal       Left Ear: External ear normal       Nose: Nose normal       Mouth/Throat:      Pharynx: Oropharynx is clear  Eyes:      General: No scleral icterus  Conjunctiva/sclera: Conjunctivae normal    Cardiovascular:      Rate and Rhythm: Normal rate and regular rhythm  Pulses: Normal pulses  Heart sounds: No murmur heard  No friction rub  No gallop  Pulmonary:      Effort: Pulmonary effort is normal  No respiratory distress  Breath sounds: No wheezing, rhonchi or rales  Abdominal:      General: Bowel sounds are normal  There is no distension  Palpations: Abdomen is soft  Tenderness: There is no abdominal tenderness  There is left CVA tenderness  There is no right CVA tenderness  Musculoskeletal:         General: Normal range of motion  Cervical back: Normal range of motion     Skin:     General: Skin is warm and dry  Neurological:      General: No focal deficit present  Mental Status: He is alert and oriented to person, place, and time  Psychiatric:         Mood and Affect: Mood normal          Behavior: Behavior normal          Thought Content: Thought content normal          Judgment: Judgment normal                 Imaging:  CT ABDOMEN AND PELVIS WITHOUT IV CONTRAST - LOW DOSE RENAL STONE      INDICATION:   Left flank pain      COMPARISON:  None      TECHNIQUE:  Low radiation dose thin section CT examination of the abdomen and pelvis was performed without intravenous or oral contrast according to a protocol specifically designed to evaluate for urinary tract calculus  Axial, sagittal, and coronal 2D   reformatted images were created from the source data and submitted for interpretation  Evaluation for pathology in the abdomen and pelvis that is unrelated to urinary tract calculi is limited        Radiation dose length product (DLP) for this visit:  847 58 mGy-cm   This examination, like all CT scans performed in the [facility], was performed utilizing techniques to minimize radiation dose exposure, including the use of iterative   reconstruction and automated exposure control      URINARY TRACT FINDINGS:     RIGHT KIDNEY AND URETER:  3 mm lower pole calculus  No hydronephrosis or hydroureter  Several renal simple cysts, the largest of which measures 2 7 cm and several subcentimeter renal hypodensities too small to characterize statistically representing   simple cysts      LEFT KIDNEY AND URETER:  5 mm calculus in the proximal ureter at the level of L4 with mild upstream hydronephroureterosis and perinephric stranding  No loculated perinephric collection  1 5 cm renal lower pole calyceal calculus  Additional 1 to 2 mm   renal calculi are present   Several renal simple cysts, the largest of which measures 4 8 cm and several subcentimeter renal hypodensities too small to characterize statistically representing simple cysts      URINARY BLADDER:  Unremarkable         ADDITIONAL FINDINGS:     LOWER CHEST:  Coronary artery calcification      SOLID VISCERA: Limited low radiation dose noncontrast CT evaluation demonstrates no clinically significant abnormality of the imaged portions of the liver, spleen, or adrenal glands  Mild diffuse fatty infiltration of the pancreas      GALLBLADDER/BILIARY TREE:  Post cholecystectomy      STOMACH AND BOWEL:  Colonic diverticulosis without diverticulitis  No bowel obstruction      APPENDIX:  A normal appendix was visualized      ABDOMINOPELVIC CAVITY:  No ascites  No pneumoperitoneum  No lymphadenopathy  Aortoiliac calcification  Mild ectasia of the infrarenal aorta maximum diameter 2 8 cm and proximal right and distal left common iliac artery maximum diameter 1 8 cm      REPRODUCTIVE ORGANS:  Unremarkable for patient's age      ABDOMINAL WALL/INGUINAL REGIONS:  Minimal bilateral ventral lower abdominal wall subcutaneous reticular nodular soft tissue thickening attributed to sequela of prior injections  Small fat-containing left inguinal hernia      OSSEOUS STRUCTURES:  No acute fracture or osseous destructive lesion identified  Degenerative changes of the spine, pubic symphysis, and multiple joints      IMPRESSION:     5 mm left proximal ureteral calculus with mild upstream hydronephroureterosis   No loculated perinephric collection      Left renal calculi, 1 5 cm and smaller      3 mm right renal nonobstructing calculus      Colonic diverticulosis      This study demonstrates a significant  finding and was documented as such in [facility] for liaison and referring practitioner notification         Labs:  Lab Results   Component Value Date    SODIUM 136 09/23/2022    K 4 1 09/23/2022     09/23/2022    CO2 24 09/23/2022    BUN 50 (H) 09/23/2022    CREATININE 4 19 (H) 09/23/2022    GLUC 164 (H) 09/23/2022    CALCIUM 9 9 09/23/2022         Lab Results Component Value Date    WBC 9 60 09/23/2022    HGB 13 4 09/23/2022    HCT 42 0 09/23/2022     (H) 09/23/2022     (L) 09/23/2022         VTE Pharmacologic Prophylaxis: Heparin  VTE Mechanical Prophylaxis: sequential compression device     Brigitte Richter PA-C

## 2022-09-24 NOTE — ANESTHESIA PREPROCEDURE EVALUATION
Procedure:  CYSTOSCOPY URETEROSCOPY WITH LITHOTRIPSY HOLMIUM LASER, RETROGRADE PYELOGRAM AND INSERTION STENT URETERAL (Left Bladder)    Relevant Problems   CARDIO   (+) Angina at rest ([facility])   (+) HLD (hyperlipidemia)   (+) HTN (hypertension)   (+) History of PTCA   (+) MI (myocardial infarction) (HCC)      ENDO   (+) Hypothyroidism   (+) Secondary hyperparathyroidism of renal origin ([facility])   (+) Type 2 diabetes mellitus with stage 4 chronic kidney disease, with long-term current use of insulin ([facility])      /RENAL   (+) Acute kidney injury superimposed on chronic kidney disease stage 4 ([facility])   (+) Chronic kidney disease-mineral and bone disorder   (+) Stage 4 chronic kidney disease ([facility])   (+) Ureteral stone with hydronephrosis      HEMATOLOGY   (+) Anemia   (+) Thrombocytopenia (HCC)      NEURO/PSYCH   (+) History of CVA (cerebrovascular accident)      PULMONARY   (+) Sleep apnea   (+) Smoking      Cardiovascular and Mediastinum   (+) Chronic diastolic HF (heart failure) ([facility])      Musculoskeletal and Integument   (+) Cutaneous abscess of buttock      Hematopoietic and Hemostatic   (+) Factor 5 Leiden mutation, heterozygous ([facility] )      Other   (+) Lymphedema   (+) Obesity, morbid ([facility] )      Lab Results   Component Value Date    SODIUM 139 09/24/2022    K 3 8 09/24/2022     09/24/2022    CO2 25 09/24/2022    AGAP 6 09/24/2022    BUN 56 (H) 09/24/2022    CREATININE 4 31 (H) 09/24/2022    GLUC 113 09/24/2022    CALCIUM 9 3 09/24/2022    AST 18 09/23/2022    ALT 25 09/23/2022    ALKPHOS 120 (H) 09/23/2022    TP 8 3 09/23/2022    TBILI 0 41 09/23/2022    EGFR 13 09/24/2022     Lab Results   Component Value Date    WBC 6 43 09/24/2022    HGB 11 0 (L) 09/24/2022    HCT 34 2 (L) 09/24/2022     (H) 09/24/2022     (L) 09/24/2022       Physical Exam    Airway       Dental       Cardiovascular      Pulmonary      Other Findings        Anesthesia Plan  ASA Score- 3     Anesthesia Type- general with ASA Monitors  Additional Monitors:   Airway Plan: LMA  Plan Factors-Exercise tolerance (METS): >4 METS  Chart reviewed  EKG reviewed  Imaging results reviewed  Existing labs reviewed  Patient summary reviewed  Induction- intravenous  Postoperative Plan-     Informed Consent- Anesthetic plan and risks discussed with patient  I personally reviewed this patient with the CRNA  Discussed and agreed on the Anesthesia Plan with the CRNA  Karen Klein

## 2022-09-24 NOTE — QUICK NOTE
Patient underwent successful cystoscopy left ureteroscopy laser lithotripsy and all stones were dusted to small passable fragments  A stent was left with a string taped to the penis  He will follow-up with us in our office next week to have this removed, and our office has been notified contact him to set this up  From my standpoint he can be discharged if afebrile and stable

## 2022-09-25 LAB
ANION GAP SERPL CALCULATED.3IONS-SCNC: 7 MMOL/L (ref 4–13)
BASOPHILS # BLD AUTO: 0.01 THOUSANDS/ΜL (ref 0–0.1)
BASOPHILS NFR BLD AUTO: 0 % (ref 0–1)
BUN SERPL-MCNC: 66 MG/DL (ref 5–25)
CALCIUM SERPL-MCNC: 9.9 MG/DL (ref 8.3–10.1)
CHLORIDE SERPL-SCNC: 105 MMOL/L (ref 96–108)
CO2 SERPL-SCNC: 24 MMOL/L (ref 21–32)
CREAT SERPL-MCNC: 4.33 MG/DL (ref 0.6–1.3)
EOSINOPHIL # BLD AUTO: 0 THOUSAND/ΜL (ref 0–0.61)
EOSINOPHIL NFR BLD AUTO: 0 % (ref 0–6)
ERYTHROCYTE [DISTWIDTH] IN BLOOD BY AUTOMATED COUNT: 14.5 % (ref 11.6–15.1)
GFR SERPL CREATININE-BSD FRML MDRD: 12 ML/MIN/1.73SQ M
GLUCOSE SERPL-MCNC: 134 MG/DL (ref 65–140)
GLUCOSE SERPL-MCNC: 168 MG/DL (ref 65–140)
GLUCOSE SERPL-MCNC: 172 MG/DL (ref 65–140)
GLUCOSE SERPL-MCNC: 253 MG/DL (ref 65–140)
GLUCOSE SERPL-MCNC: 254 MG/DL (ref 65–140)
HCT VFR BLD AUTO: 36.8 % (ref 36.5–49.3)
HGB BLD-MCNC: 11.9 G/DL (ref 12–17)
IMM GRANULOCYTES # BLD AUTO: 0.04 THOUSAND/UL (ref 0–0.2)
IMM GRANULOCYTES NFR BLD AUTO: 1 % (ref 0–2)
LYMPHOCYTES # BLD AUTO: 0.56 THOUSANDS/ΜL (ref 0.6–4.47)
LYMPHOCYTES NFR BLD AUTO: 7 % (ref 14–44)
MCH RBC QN AUTO: 32.9 PG (ref 26.8–34.3)
MCHC RBC AUTO-ENTMCNC: 32.3 G/DL (ref 31.4–37.4)
MCV RBC AUTO: 102 FL (ref 82–98)
MONOCYTES # BLD AUTO: 0.26 THOUSAND/ΜL (ref 0.17–1.22)
MONOCYTES NFR BLD AUTO: 3 % (ref 4–12)
NEUTROPHILS # BLD AUTO: 7.69 THOUSANDS/ΜL (ref 1.85–7.62)
NEUTS SEG NFR BLD AUTO: 89 % (ref 43–75)
NRBC BLD AUTO-RTO: 0 /100 WBCS
PLATELET # BLD AUTO: 133 THOUSANDS/UL (ref 149–390)
PMV BLD AUTO: 9.8 FL (ref 8.9–12.7)
POTASSIUM SERPL-SCNC: 4.7 MMOL/L (ref 3.5–5.3)
RBC # BLD AUTO: 3.62 MILLION/UL (ref 3.88–5.62)
SODIUM SERPL-SCNC: 136 MMOL/L (ref 135–147)
WBC # BLD AUTO: 8.56 THOUSAND/UL (ref 4.31–10.16)

## 2022-09-25 PROCEDURE — 97162 PT EVAL MOD COMPLEX 30 MIN: CPT

## 2022-09-25 PROCEDURE — 99232 SBSQ HOSP IP/OBS MODERATE 35: CPT | Performed by: INTERNAL MEDICINE

## 2022-09-25 PROCEDURE — 85025 COMPLETE CBC W/AUTO DIFF WBC: CPT | Performed by: UROLOGY

## 2022-09-25 PROCEDURE — 82948 REAGENT STRIP/BLOOD GLUCOSE: CPT

## 2022-09-25 PROCEDURE — 80048 BASIC METABOLIC PNL TOTAL CA: CPT | Performed by: UROLOGY

## 2022-09-25 RX ORDER — HYDROMORPHONE HCL IN WATER/PF 6 MG/30 ML
0.2 PATIENT CONTROLLED ANALGESIA SYRINGE INTRAVENOUS EVERY 4 HOURS PRN
Status: DISCONTINUED | OUTPATIENT
Start: 2022-09-25 | End: 2022-09-26 | Stop reason: HOSPADM

## 2022-09-25 RX ORDER — FUROSEMIDE 80 MG
80 TABLET ORAL
Status: DISCONTINUED | OUTPATIENT
Start: 2022-09-25 | End: 2022-09-26 | Stop reason: HOSPADM

## 2022-09-25 RX ORDER — CEPHALEXIN 500 MG/1
500 CAPSULE ORAL EVERY 6 HOURS SCHEDULED
Qty: 40 CAPSULE | Refills: 0 | Status: CANCELLED | OUTPATIENT
Start: 2022-09-25 | End: 2022-10-05

## 2022-09-25 RX ADMIN — INSULIN LISPRO 6 UNITS: 100 INJECTION, SOLUTION INTRAVENOUS; SUBCUTANEOUS at 22:25

## 2022-09-25 RX ADMIN — METOPROLOL SUCCINATE 25 MG: 25 TABLET, EXTENDED RELEASE ORAL at 09:09

## 2022-09-25 RX ADMIN — INSULIN LISPRO 6 UNITS: 100 INJECTION, SOLUTION INTRAVENOUS; SUBCUTANEOUS at 13:49

## 2022-09-25 RX ADMIN — CEFAZOLIN SODIUM 2000 MG: 2 SOLUTION INTRAVENOUS at 09:09

## 2022-09-25 RX ADMIN — HEPARIN SODIUM 5000 UNITS: 5000 INJECTION INTRAVENOUS; SUBCUTANEOUS at 22:20

## 2022-09-25 RX ADMIN — INSULIN GLARGINE 16 UNITS: 100 INJECTION, SOLUTION SUBCUTANEOUS at 22:24

## 2022-09-25 RX ADMIN — ACETAMINOPHEN 975 MG: 325 TABLET, FILM COATED ORAL at 13:49

## 2022-09-25 RX ADMIN — OXYCODONE HYDROCHLORIDE 5 MG: 5 TABLET ORAL at 22:19

## 2022-09-25 RX ADMIN — HEPARIN SODIUM 5000 UNITS: 5000 INJECTION INTRAVENOUS; SUBCUTANEOUS at 05:41

## 2022-09-25 RX ADMIN — HYDROMORPHONE HYDROCHLORIDE 0.2 MG: 0.2 INJECTION, SOLUTION INTRAMUSCULAR; INTRAVENOUS; SUBCUTANEOUS at 23:28

## 2022-09-25 RX ADMIN — ACETAMINOPHEN 975 MG: 325 TABLET, FILM COATED ORAL at 05:41

## 2022-09-25 RX ADMIN — CEFAZOLIN SODIUM 2000 MG: 2 SOLUTION INTRAVENOUS at 22:30

## 2022-09-25 RX ADMIN — FUROSEMIDE 80 MG: 80 TABLET ORAL at 17:39

## 2022-09-25 RX ADMIN — OXYCODONE HYDROCHLORIDE 5 MG: 5 TABLET ORAL at 03:44

## 2022-09-25 RX ADMIN — OXYCODONE HYDROCHLORIDE 5 MG: 5 TABLET ORAL at 09:09

## 2022-09-25 RX ADMIN — ASPIRIN 81 MG: 81 TABLET, COATED ORAL at 09:09

## 2022-09-25 RX ADMIN — SODIUM CHLORIDE, SODIUM GLUCONATE, SODIUM ACETATE, POTASSIUM CHLORIDE, MAGNESIUM CHLORIDE, SODIUM PHOSPHATE, DIBASIC, AND POTASSIUM PHOSPHATE 50 ML/HR: .53; .5; .37; .037; .03; .012; .00082 INJECTION, SOLUTION INTRAVENOUS at 09:13

## 2022-09-25 RX ADMIN — INSULIN LISPRO 2 UNITS: 100 INJECTION, SOLUTION INTRAVENOUS; SUBCUTANEOUS at 09:17

## 2022-09-25 RX ADMIN — ATORVASTATIN CALCIUM 40 MG: 40 TABLET, FILM COATED ORAL at 17:35

## 2022-09-25 RX ADMIN — HEPARIN SODIUM 5000 UNITS: 5000 INJECTION INTRAVENOUS; SUBCUTANEOUS at 13:49

## 2022-09-25 RX ADMIN — LEVOTHYROXINE SODIUM 125 MCG: 125 TABLET ORAL at 05:41

## 2022-09-25 RX ADMIN — TAMSULOSIN HYDROCHLORIDE 0.4 MG: 0.4 CAPSULE ORAL at 17:35

## 2022-09-25 NOTE — PROGRESS NOTES
NEPHROLOGY PROGRESS NOTE   Laura Crowley 69 y o  male MRN: [id number]  Unit/Bed#: [facility] 923-01 Encounter: [id number]  Reason for Consult:  Acute kidney injury    Assessment/Plan:  1  Acute kidney injury, most likely secondary to component of prerenal azotemia plus obstructive uropathy given obstructing stone  2  CKD stage 4 with baseline creatinine recently between 3 3 and 3 4, EGFR of 18  3  Left-sided hydronephrosis secondary to obstructing ureteral calculus status post cystoscopy with laser lithotripsy and stent placement  4  Anemia of chronic kidney disease hemoglobin currently stable at 11 0  5  Noted abscess of the buttock  Workup as per primary service  6  Diabetes with associated diabetic kidney disease  7  Hypertension, blood pressure currently appears stable  8  Chronic lymphedema with chronic bilateral lower extremity swelling  9  History of CVA     PLAN:  · Renal function overall appears stable with creatinine of 4 3, given advanced CKD in recent DOC may represent new baseline  · Oral intake appears stable, discontinue IV fluids and resume diuretic therapy    SUBJECTIVE:  Seen and examined  Patient awake alert  Overall is feeling better  Still with dysuria however urine is clearing  Appetite is improving  Denies any shortness of breath  Review of Systems    OBJECTIVE:  Current Weight: Weight - Scale: 129 kg (284 lb 13 4 oz)  Vitals:    09/24/22 1523 09/24/22 2141 09/24/22 2200 09/25/22 0720   BP: 139/81 141/80  156/81   BP Location:       Pulse: 65 67  62   Resp: 18 20     Temp: (!) 97 3 °F (36 3 °C) 97 7 °F (36 5 °C)  97 7 °F (36 5 °C)   TempSrc:  Temporal     SpO2: 94%  95% 94%   Weight:       Height:           Intake/Output Summary (Last 24 hours) at 9/25/2022 1146  Last data filed at 9/25/2022 0501  Gross per 24 hour   Intake 530 ml   Output 1600 ml   Net -1070 ml       Physical Exam  Constitutional:       Appearance: He is obese  He is not ill-appearing     HENT:      Head: Normocephalic and atraumatic  Eyes:      General: No scleral icterus  Cardiovascular:      Rate and Rhythm: Normal rate and regular rhythm  Pulmonary:      Effort: Pulmonary effort is normal       Breath sounds: Rales (Basilar rales) present  Abdominal:      General: There is distension  Palpations: Abdomen is soft  Tenderness: There is no abdominal tenderness  Musculoskeletal:      Right lower leg: Edema present  Left lower leg: Edema present  Skin:     General: Skin is warm and dry  Neurological:      Mental Status: He is alert and oriented to person, place, and time           Medications:    Current Facility-Administered Medications:     acetaminophen (TYLENOL) tablet 975 mg, 975 mg, Oral, Q8H [facility], Jose Little MD, 975 mg at 09/25/22 0541    aspirin (ECOTRIN LOW STRENGTH) EC tablet 81 mg, 81 mg, Oral, Daily, Genna Ron MD, 81 mg at 09/25/22 0909    atorvastatin (LIPITOR) tablet 40 mg, 40 mg, Oral, Daily With Arash Montilla MD, 40 mg at 09/24/22 1800    ceFAZolin (ANCEF) IVPB (premix in dextrose) 2,000 mg 50 mL, 2,000 mg, Intravenous, Q12H, Preeti Caicedo MD, Last Rate: 100 mL/hr at 09/25/22 0909, 2,000 mg at 09/25/22 0909    heparin (porcine) subcutaneous injection 5,000 Units, 5,000 Units, Subcutaneous, Q8H [facility], 5,000 Units at 09/25/22 0541 **AND** [CANCELED] Platelet count, , , Once, Preeti Caicedo MD    oxyCODONE (ROXICODONE) IR tablet 2 5 mg, 2 5 mg, Oral, Q4H PRN **OR** oxyCODONE (ROXICODONE) IR tablet 5 mg, 5 mg, Oral, Q4H PRN, 5 mg at 09/25/22 0909 **OR** HYDROmorphone HCl (DILAUDID) injection 0 2 mg, 0 2 mg, Intravenous, Q4H PRN, Jose Little MD    insulin glargine (LANTUS) subcutaneous injection 16 Units 0 16 mL, 16 Units, Subcutaneous, HS, Jose Little MD, 16 Units at 09/24/22 2117    insulin lispro (HumaLOG) 100 units/mL subcutaneous injection 2-12 Units, 2-12 Units, Subcutaneous, TID AC, 2 Units at 09/25/22 0917 **AND** Fingerstick Glucose (POCT), , , 4x Daily AC and at bedtime, Tal Garcia MD    insulin lispro (HumaLOG) 100 units/mL subcutaneous injection 2-12 Units, 2-12 Units, Subcutaneous, HS, Tal Garcia MD, 4 Units at 09/24/22 2114    levothyroxine tablet 125 mcg, 125 mcg, Oral, Early Morning, Tal Garcia MD, 125 mcg at 09/25/22 0541    metoprolol succinate (TOPROL-XL) 24 hr tablet 25 mg, 25 mg, Oral, Daily, Nazanin Martin MD, 25 mg at 09/25/22 0909    multi-electrolyte (PLASMALYTE-A/ISOLYTE-S PH 7 4) IV solution, 50 mL/hr, Intravenous, Continuous, Keisha Dunn DO, Last Rate: 50 mL/hr at 09/25/22 0913, 50 mL/hr at 09/25/22 0913    ondansetron (ZOFRAN) injection 4 mg, 4 mg, Intravenous, Q6H PRN, Tal Garcia MD    phenazopyridine (PYRIDIUM) tablet 100 mg, 100 mg, Oral, TID PRN, Tal Garcia MD, 100 mg at 09/24/22 1326    tamsulosin (FLOMAX) capsule 0 4 mg, 0 4 mg, Oral, Daily With Norm Lara MD, 0 4 mg at 09/24/22 1800    Laboratory Results:  Results from last 7 days   Lab Units 09/25/22  0628 09/24/22  0440 09/23/22  0950   WBC Thousand/uL 8 56 6 43 9 60   HEMOGLOBIN g/dL 11 9* 11 0* 13 4   HEMATOCRIT % 36 8 34 2* 42 0   PLATELETS Thousands/uL 133* 108* 127*   POTASSIUM mmol/L 4 7 3 8 4 1   CHLORIDE mmol/L 105 108 100   CO2 mmol/L 24 25 24   BUN mg/dL 66* 56* 50*   CREATININE mg/dL 4 33* 4 31* 4 19*   CALCIUM mg/dL 9 9 9 3 9 9

## 2022-09-25 NOTE — PROGRESS NOTES
INTERNAL MEDICINE RESIDENCY PROGRESS NOTE     Name: Ebenezer Larios   Age & Sex: 69 y o  male   MRN: [id number]  Unit/Bed#: [facility] 923-01   Encounter: [id number]  Team: SOD Team C     PATIENT INFORMATION     Name: Ebenezer Larios   Age & Sex: 69 y o  male   MRN: [id number]  Hospital Stay Days: 2    ASSESSMENT/PLAN     Principal Problem:    Ureteral stone with hydronephrosis  Active Problems:    History of CVA (cerebrovascular accident)    Acute kidney injury superimposed on chronic kidney disease stage 4 (HCC)    HTN (hypertension)    HLD (hyperlipidemia)    Lymphedema    Hypothyroidism    MI (myocardial infarction) ([facility] [address] )    Factor 5 Leiden mutation, heterozygous ([facility] )    Type 2 diabetes mellitus with stage 4 chronic kidney disease, with long-term current use of insulin ([facility])    Cutaneous abscess of buttock      Cutaneous abscess of buttock  Assessment & Plan  Patient describes having boil for 3-4 days  On exam, has right medial buttock abscess, but non expressible  Assessment: abscess is 0 7x1cm (less than 2 cm in diameter) without significant erythema or systemic symptoms  Will hold off on incision & drainage  Will hold off on routine antibiotic therapy in setting of mild skin abscess (in reference to IDSA guideline 2014)  · Wound culture and started Ancef x7days  · Wound culture growing Gram-positive diplococci -> will await sensitivities for outpatient antibiotic regimen  · Wound care consulted, appreciate recs    Type 2 diabetes mellitus with stage 4 chronic kidney disease, with long-term current use of insulin [facility])  Assessment & Plan  Lab Results   Component Value Date    HGBA1C 6 9 09/09/2022       Recent Labs     09/24/22  1800 09/24/22  2046 09/25/22  0717 09/25/22  1154   POCGLU 215* 243* 172* 254*       Blood Sugar Average: Last 72 hrs:  (P) 193 375   Home regimen:  Lantus 20 units  A1c 6 9 earlier this month      · Lantus 16 U  · SSI algorithm 4  · BG checks before meals and HS  · Adjust regimen as necessary to maintain -180    Factor 5 Leiden mutation, heterozygous [facility])  Assessment & Plan  · Not on chronic anticoagulation at home    MI (myocardial infarction) [facility])  Assessment & Plan  S/p balloon angioplasty in 1999 and MCKINLEY in 2015  Chest pain-free  · Continue home aspirin 81 mg, atorvastatin 40 mg  · Restarted ASA post-op with recs from Uro    Hypothyroidism  Assessment & Plan  Continue home levothyroxine 125 mcg    Lymphedema  Assessment & Plan  Hx of lymphedema with swelling of bilateral lower extremities  Weeping present in RLE  · Wound care consulted for management of LE weeping    HLD (hyperlipidemia)  Assessment & Plan  Lipid panel from January:  , , HDL 30, LDL 75     · Continue home atorvastatin 40 mg    HTN (hypertension)  Assessment & Plan  · Continue home regimen Toprol 25 mg  · Continue to monitor BP    Acute kidney injury superimposed on chronic kidney disease stage 4 [facility])  Assessment & Plan  Lab Results   Component Value Date    EGFR 12 09/25/2022    EGFR 13 09/24/2022    EGFR 13 09/23/2022    CREATININE 4 33 (H) 09/25/2022    CREATININE 4 31 (H) 09/24/2022    CREATININE 4 19 (H) 09/23/2022       Creatinine 4 19, baseline 3 2-3 4  Seen by Nephrology in 9/16/22 and declined transplant referral   Was recommended to start dialysis from prior nephrology visit in on July but patient declined  Home regimen: Lasix 120 mg BID  · Isolyte 100 cc/hr  · Nephrology consulted - appreciate recommendations  · Monitor I/O, BUN, creatinine  · Daily weights   · Avoid nephrotoxic agents, hypotension  · Holding Lasix    History of CVA (cerebrovascular accident)  Assessment & Plan  Stroke in January s/p tPA  MRI showed few scattered punctate foci of acute cerebral and right cerebellar infarction, suspicious for cardioembolic etiology  TTE unrevealing    Underwent loop recorder placement on 06/28     -Restarted aspirin after procedure with urology on 9/24    * Ureteral stone with hydronephrosis  Assessment & Plan  Presented with left flank pain around midnight  Found to have 5 mm left proximal ureteral calculus with mild hydronephroureterosis  Labs significant for DOC  No leukocytosis or lactic acidosis  UA showed proteinuria, negative for WBC, bacteria, nitrites  Started on  cc prior to transfer  Case discussed with urology who recommended transfer for operative procedure  · Geriatric pain regimen:  · Tylenol 970 5q 8 hours  · Oxycodone 2 5 mg q 4 hours p r n  For moderate pain  · Oxycodone 5 mg Q 4 hours p r n  For severe pain  · IV Dilaudid 0 2 mg q 4 hours for breakthrough  · Made NPO @ [facility] for anticipated procedure  · Continue isolyte 100 cc/hr  · Urology consulted - appreciate recommendations  · Status post cystoscopy with lithotripsy and left ureteral stent placement on   · Plan for stent removal with Urology in 1 week as an outpatient  · PT/OT      Disposition: Inpatient     SUBJECTIVE     Patient seen and examined  No acute events overnight  Today overall the patient was feeling well and had no acute complaints  He states that he would like to go home  OBJECTIVE     Vitals:    22 1523 22 2141 22 2200 22 0720   BP: 139/81 141/80  156/81   BP Location:       Pulse: 65 67  62   Resp: 18 20     Temp: (!) 97 3 °F (36 3 °C) 97 7 °F (36 5 °C)  97 7 °F (36 5 °C)   TempSrc:  Temporal     SpO2: 94%  95% 94%   Weight:       Height:          Temperature:   Temp (24hrs), Av 6 °F (36 4 °C), Min:97 3 °F (36 3 °C), Max:97 7 °F (36 5 °C)    Temperature: 97 7 °F (36 5 °C)  Intake & Output:  I/O        07 0700  0701   0700  07 0700    P  O   480     I V  (mL/kg) 1248 3 (9 7) 500 (3 9)     IV Piggyback  50     Total Intake(mL/kg) 1248 3 (9 7) 1030 (8)     Urine (mL/kg/hr) 850 1850 (0 6)     Total Output 850 1850     Net +398 3 -820                Weights:   IBW (Ideal Body Weight): 73 kg    Body mass index is 40 87 kg/m²  Weight (last 2 days)     Date/Time Weight    09/24/22 0538 129 (284 83)    09/23/22 1700 129 (285)        Physical Exam  Constitutional:       Appearance: He is well-developed  He is obese  HENT:      Head: Normocephalic and atraumatic  Nose: Nose normal    Eyes:      General:         Right eye: No discharge  Left eye: No discharge  Conjunctiva/sclera: Conjunctivae normal       Pupils: Pupils are equal, round, and reactive to light  Neck:      Thyroid: No thyromegaly  Cardiovascular:      Rate and Rhythm: Normal rate and regular rhythm  Heart sounds: Normal heart sounds  No murmur heard  No friction rub  No gallop  Pulmonary:      Effort: Pulmonary effort is normal  No respiratory distress  Breath sounds: Normal breath sounds  No stridor  No wheezing or rales  Chest:      Chest wall: No tenderness  Abdominal:      General: Bowel sounds are normal  There is no distension  Palpations: Abdomen is soft  There is no mass  Tenderness: There is no abdominal tenderness  There is no guarding or rebound  Musculoskeletal:         General: No tenderness or deformity  Right lower leg: Edema present  Left lower leg: Edema present  Comments: Wrapped left lower extremity   Lymphadenopathy:      Cervical: No cervical adenopathy  Skin:     General: Skin is warm and dry  Neurological:      Mental Status: He is alert and oriented to person, place, and time  Psychiatric:         Mood and Affect: Mood normal          Behavior: Behavior normal          Thought Content: Thought content normal          Judgment: Judgment normal        LABORATORY DATA     Labs: I have personally reviewed pertinent reports    Results from last 7 days   Lab Units 09/25/22  0628 09/24/22  0440 09/23/22  0950   WBC Thousand/uL 8 56 6 43 9 60   HEMOGLOBIN g/dL 11 9* 11 0* 13 4   HEMATOCRIT % 36 8 34 2* 42 0   PLATELETS Thousands/uL 133* 108* 127*   NEUTROS PCT % 89* 72 87*   MONOS PCT % 3* 5 3*      Results from last 7 days   Lab Units 09/25/22  0628 09/24/22  0440 09/23/22  0950   POTASSIUM mmol/L 4 7 3 8 4 1   CHLORIDE mmol/L 105 108 100   CO2 mmol/L 24 25 24   BUN mg/dL 66* 56* 50*   CREATININE mg/dL 4 33* 4 31* 4 19*   CALCIUM mg/dL 9 9 9 3 9 9   ALK PHOS U/L  --   --  120*   ALT U/L  --   --  25   AST U/L  --   --  18              Results from last 7 days   Lab Units 09/23/22  0950   INR  0 96   PTT seconds 30     Results from last 7 days   Lab Units 09/23/22  0950   LACTIC ACID mmol/L 1 7           IMAGING & DIAGNOSTIC TESTING     Radiology Results: I have personally reviewed pertinent reports  XR abdomen 1 view kub    Result Date: 9/25/2022  Impression: Fluoroscopy provided for procedure guidance  Please see separate procedure note for details  Workstation performed: [id number]     CT renal stone study abdomen pelvis wo contrast    Result Date: 9/23/2022  Impression: 5 mm left proximal ureteral calculus with mild upstream hydronephroureterosis  No loculated perinephric collection  Left renal calculi, 1 5 cm and smaller  3 mm right renal nonobstructing calculus  Colonic diverticulosis  This study demonstrates a significant  finding and was documented as such in [facility] for liaison and referring practitioner notification  Workstation performed: [id number]     Other Diagnostic Testing: I have personally reviewed pertinent reports      ACTIVE MEDICATIONS     Current Facility-Administered Medications   Medication Dose Route Frequency    acetaminophen (TYLENOL) tablet 975 mg  975 mg Oral Q8H [address]    aspirin (ECOTRIN LOW STRENGTH) EC tablet 81 mg  81 mg Oral Daily    atorvastatin (LIPITOR) tablet 40 mg  40 mg Oral Daily With Dinner    ceFAZolin (ANCEF) IVPB (premix in dextrose) 2,000 mg 50 mL  2,000 mg Intravenous Q12H    furosemide (LASIX) tablet 80 mg  80 mg Oral BID (diuretic)    heparin (porcine) subcutaneous injection 5,000 Units  5,000 Units Subcutaneous Q8H [address]    oxyCODONE (ROXICODONE) IR tablet 2 5 mg  2 5 mg Oral Q4H PRN    Or    oxyCODONE (ROXICODONE) IR tablet 5 mg  5 mg Oral Q4H PRN    Or    HYDROmorphone HCl (DILAUDID) injection 0 2 mg  0 2 mg Intravenous Q4H PRN    insulin glargine (LANTUS) subcutaneous injection 16 Units 0 16 mL  16 Units Subcutaneous HS    insulin lispro (HumaLOG) 100 units/mL subcutaneous injection 2-12 Units  2-12 Units Subcutaneous TID AC    insulin lispro (HumaLOG) 100 units/mL subcutaneous injection 2-12 Units  2-12 Units Subcutaneous HS    levothyroxine tablet 125 mcg  125 mcg Oral Early Morning    metoprolol succinate (TOPROL-XL) 24 hr tablet 25 mg  25 mg Oral Daily    ondansetron (ZOFRAN) injection 4 mg  4 mg Intravenous Q6H PRN    phenazopyridine (PYRIDIUM) tablet 100 mg  100 mg Oral TID PRN    tamsulosin (FLOMAX) capsule 0 4 mg  0 4 mg Oral Daily With Dinner       VTE Pharmacologic Prophylaxis: Heparin  VTE Mechanical Prophylaxis: sequential compression device    Portions of the record may have been created with voice recognition software  Occasional wrong word or "sound a like" substitutions may have occurred due to the inherent limitations of voice recognition software    Read the chart carefully and recognize, using context, where substitutions have occurred   ==  Alonzo Ramos MD  [facility]  Internal Medicine Residency PGY-3

## 2022-09-25 NOTE — PLAN OF CARE
Problem: PAIN - ADULT  Goal: Verbalizes/displays adequate comfort level or baseline comfort level  Description: Interventions:  - Encourage patient to monitor pain and request assistance  - Assess pain using appropriate pain scale  - Administer analgesics based on type and severity of pain and evaluate response  - Implement non-pharmacological measures as appropriate and evaluate response  - Consider cultural and social influences on pain and pain management  - Notify physician/advanced practitioner if interventions unsuccessful or patient reports new pain  Outcome: Progressing     Problem: INFECTION - ADULT  Goal: Absence or prevention of progression during hospitalization  Description: INTERVENTIONS:  - Assess and monitor for signs and symptoms of infection  - Monitor lab/diagnostic results  - Monitor all insertion sites, i e  indwelling lines, tubes, and drains  - Monitor endotracheal if appropriate and nasal secretions for changes in amount and color  - Byron appropriate cooling/warming therapies per order  - Administer medications as ordered  - Instruct and encourage patient and family to use good hand hygiene technique  - Identify and instruct in appropriate isolation precautions for identified infection/condition  Outcome: Progressing  Goal: Absence of fever/infection during neutropenic period  Description: INTERVENTIONS:  - Monitor WBC    Outcome: Progressing     Problem: SAFETY ADULT  Goal: Patient will remain free of falls  Description: INTERVENTIONS:  - Educate patient/family on patient safety including physical limitations  - Instruct patient to call for assistance with activity   - Consult OT/PT to assist with strengthening/mobility   - Keep Call bell within reach  - Keep bed low and locked with side rails adjusted as appropriate  - Keep care items and personal belongings within reach  - Initiate and maintain comfort rounds  - Make Fall Risk Sign visible to staff  - Offer Toileting every 2 Hours, in advance of need  - Initiate/Maintain alarm  - Obtain necessary fall risk management equipment:   - Apply yellow socks and bracelet for high fall risk patients  - Consider moving patient to room near nurses station  Outcome: Progressing  Goal: Maintain or return to baseline ADL function  Description: INTERVENTIONS:  -  Assess patient's ability to carry out ADLs; assess patient's baseline for ADL function and identify physical deficits which impact ability to perform ADLs (bathing, care of mouth/teeth, toileting, grooming, dressing, etc )  - Assess/evaluate cause of self-care deficits   - Assess range of motion  - Assess patient's mobility; develop plan if impaired  - Assess patient's need for assistive devices and provide as appropriate  - Encourage maximum independence but intervene and supervise when necessary  - Involve family in performance of ADLs  - Assess for home care needs following discharge   - Consider OT consult to assist with ADL evaluation and planning for discharge  - Provide patient education as appropriate  Outcome: Progressing  Goal: Maintains/Returns to pre admission functional level  Description: INTERVENTIONS:  - Perform BMAT or MOVE assessment daily    - Set and communicate daily mobility goal to care team and patient/family/caregiver  - Collaborate with rehabilitation services on mobility goals if consulted  - Reposition patient every 2 hours    - Dangle patient 3 times a day  - Stand patient 3 times a day  - Ambulate patient 3 times a day  - Out of bed to chair 3 times a day   - Out of bed for meals 3 times a day  - Out of bed for toileting  - Record patient progress and toleration of activity level   Outcome: Progressing     Problem: DISCHARGE PLANNING  Goal: Discharge to home or other facility with appropriate resources  Description: INTERVENTIONS:  - Identify barriers to discharge w/patient and caregiver  - Arrange for needed discharge resources and transportation as appropriate  - Identify discharge learning needs (meds, wound care, etc )  - Arrange for interpretive services to assist at discharge as needed  - Refer to Case Management Department for coordinating discharge planning if the patient needs post-hospital services based on physician/advanced practitioner order or complex needs related to functional status, cognitive ability, or social support system  Outcome: Progressing     Problem: Knowledge Deficit  Goal: Patient/family/caregiver demonstrates understanding of disease process, treatment plan, medications, and discharge instructions  Description: Complete learning assessment and assess knowledge base  Interventions:  - Provide teaching at level of understanding  - Provide teaching via preferred learning methods  Outcome: Progressing     Problem: Nutrition/Hydration-ADULT  Goal: Nutrient/Hydration intake appropriate for improving, restoring or maintaining nutritional needs  Description: Monitor and assess patient's nutrition/hydration status for malnutrition  Collaborate with interdisciplinary team and initiate plan and interventions as ordered  Monitor patient's weight and dietary intake as ordered or per policy  Utilize nutrition screening tool and intervene as necessary  Determine patient's food preferences and provide high-protein, high-caloric foods as appropriate       INTERVENTIONS:  - Monitor oral intake, urinary output, labs, and treatment plans  - Assess nutrition and hydration status and recommend course of action  - Evaluate amount of meals eaten  - Assist patient with eating if necessary   - Allow adequate time for meals  - Recommend/ encourage appropriate diets, oral nutritional supplements, and vitamin/mineral supplements  - Order, calculate, and assess calorie counts as needed  - Recommend, monitor, and adjust tube feedings and TPN/PPN based on assessed needs  - Assess need for intravenous fluids  - Provide specific nutrition/hydration education as appropriate  - Include patient/family/caregiver in decisions related to nutrition  Outcome: Progressing     Problem: Potential for Falls  Goal: Patient will remain free of falls  Description: INTERVENTIONS:  - Educate patient/family on patient safety including physical limitations  - Instruct patient to call for assistance with activity   - Consult OT/PT to assist with strengthening/mobility   - Keep Call bell within reach  - Keep bed low and locked with side rails adjusted as appropriate  - Keep care items and personal belongings within reach  - Initiate and maintain comfort rounds  - Make Fall Risk Sign visible to staff  - Offer Toileting every 2 Hours, in advance of need  - Initiate/Maintain alarm  - Obtain necessary fall risk management equipment:   - Apply yellow socks and bracelet for high fall risk patients  - Consider moving patient to room near nurses station  Outcome: Progressing

## 2022-09-25 NOTE — PHYSICAL THERAPY NOTE
Physical Therapy Evaluation    Patient's Name: Render Grist    Admitting Diagnosis  Kidney stone [N20 0]    Problem List  Patient Active Problem List   Diagnosis    History of CVA (cerebrovascular accident)    Acute kidney injury superimposed on chronic kidney disease stage 4 ([facility])    HTN (hypertension)    Chronic diastolic HF (heart failure) ([facility])    HLD (hyperlipidemia)    Lymphedema    Hypothyroidism    Anemia    Thrombocytopenia ([facility])    Angina at rest [facility])    MI (myocardial infarction) ([place] [address] )    History of PTCA    Sleep apnea    Smoking    Factor 5 Leiden mutation, heterozygous ([place] [address] )    Type 2 diabetes mellitus with stage 4 chronic kidney disease, with long-term current use of insulin ([facility])    Obesity, morbid ([facility])    Secondary hyperparathyroidism of renal origin ([place] [address] )    Stage 4 chronic kidney disease ([facility])    Chronic kidney disease-mineral and bone disorder    Ureteral stone with hydronephrosis    Cutaneous abscess of buttock       Past Medical History  Past Medical History:   Diagnosis Date    Chronic kidney disease     Disease of thyroid gland     Hyperlipidemia     Hypertension     Stroke [facility])        Past Surgical History  Past Surgical History:   Procedure Laterality Date    CHOLECYSTECTOMY      CORONARY ANGIOPLASTY WITH STENT PLACEMENT      JOINT REPLACEMENT      bilateral knee        09/25/22 0828   PT Last Visit   PT Visit Date 09/25/22   Note Type   Note type Evaluation   Pain Assessment   Pain Assessment Tool 0-10   Pain Score No Pain  (no pain, "just itchy")   Restrictions/Precautions   Weight Bearing Precautions Per Order No   Other Precautions Multiple lines   Home Living   Type of Home Apartment  (1st floor, 0 ISELA)   Home Equipment Cane   Prior Function   Level of [place] Independent with ADLs and functional mobility   Lives With Alone   Receives Help From Family  (local children able to assist as needed)   ADL Assistance Independent   IADLs Independent   Falls in the last 6 months 0 Vocational Retired   Comments Pt reports use of straight cane at baselines, denies any recent falls   General   Family/Caregiver Present No   Cognition   Overall Cognitive Status WFL   Arousal/Participation Alert   Orientation Level Oriented X4   Following Commands Follows all commands and directions without difficulty   Comments Pt pleasant and cooperative throughout session   RLE Assessment   RLE Assessment WFL   LLE Assessment   LLE Assessment WFL   Light Touch   RLE Light Touch Grossly intact  (B/L distal LE pitting edema)   LLE Light Touch Grossly intact  (B/L distal LE pitting edema)   Bed Mobility   Additional Comments Pt OOB in chair upon PT arrival   Transfers   Sit to Stand 6  Modified independent   Stand to Sit 6  Modified independent   Additional Comments with straight cane   Ambulation/Elevation   Gait pattern Decreased foot clearance; Short stride; Wide FREDO   Gait Assistance 5  Supervision   Assistive Device Straight cane   Distance 100 ft, limited by fatigue, mild BENITEZ, O2 sats 92-95% on RA   Balance   Static Sitting Good   Dynamic Sitting Fair +   Static Standing Fair   Dynamic Standing Fair   Ambulatory Fair -   Activity Tolerance   Activity Tolerance Patient limited by fatigue   Nurse Made Aware RN updated   Assessment   Assessment Pt is a 69 y o  male seen for PT evaluation s/p admit to [facility] on 9/23/2022  Pt was admitted with a primary dx of: Ureteral stone with hydronephrosis s/p cystoscopy with stent placement on 9/24  PT now consulted for assessment of mobility and d/c needs  Pts current comorbidities and personal factors effecting treatment include: CVA, HTN, DM, CKD  Pts current clinical presentation is Evolving (moderate complexity) due to Ongoing medical management for primary dx, Decreased activity tolerance compared to baseline, Trending lab values, Continuous pulse oximetry monitoring   Prior to admission, pt was independent with use of SC   Upon evaluation, pt currently isModified Independent for transfers and Supervision for ambulation 100 ft w/ SPC  Given BENITEZ, pt educated on energy conservation techniques, importance of rest breaks, family assistance for IADL's, pt verbalized understanding and states children will be able to assist as needed  At conclusion of PT session pt returned back in chair with phone and call bell within reach  Pt denies any further questions at this time  Recommend no rehab needs upon hospital D/C     Goals   Patient Goals to go home today   Plan   PT Frequency Other (Comment)  (one time evaluation)   Recommendation   PT Discharge Recommendation No rehabilitation needs   AM-PAC Basic Mobility Inpatient   Turning in Bed Without Bedrails 4   Lying on Back to Sitting on Edge of Flat Bed 4   Moving Bed to Chair 4   Standing Up From Chair 4   Walk in Room 4   Climb 3-5 Stairs 3   Basic Mobility Inpatient Raw Score 23   Basic Mobility Standardized Score 50 88   Highest Level Of Mobility   JH-HLM Goal 7: Walk 25 feet or more   JH-HLM Achieved 7: Walk 25 feet or more       Lu Davidson, PT, DPT, GCS

## 2022-09-26 ENCOUNTER — TELEPHONE (OUTPATIENT)
Dept: NEPHROLOGY | Facility: CLINIC | Age: 67
End: 2022-09-26

## 2022-09-26 ENCOUNTER — TELEPHONE (OUTPATIENT)
Dept: UROLOGY | Facility: MEDICAL CENTER | Age: 67
End: 2022-09-26

## 2022-09-26 VITALS
BODY MASS INDEX: 40.78 KG/M2 | OXYGEN SATURATION: 94 % | SYSTOLIC BLOOD PRESSURE: 167 MMHG | WEIGHT: 284.83 LBS | TEMPERATURE: 97.3 F | HEIGHT: 70 IN | HEART RATE: 69 BPM | DIASTOLIC BLOOD PRESSURE: 79 MMHG | RESPIRATION RATE: 19 BRPM

## 2022-09-26 DIAGNOSIS — N18.9 ACUTE KIDNEY INJURY SUPERIMPOSED ON CHRONIC KIDNEY DISEASE (HCC): Primary | ICD-10-CM

## 2022-09-26 DIAGNOSIS — N17.9 ACUTE KIDNEY INJURY SUPERIMPOSED ON CHRONIC KIDNEY DISEASE (HCC): Primary | ICD-10-CM

## 2022-09-26 LAB
ANION GAP SERPL CALCULATED.3IONS-SCNC: 8 MMOL/L (ref 4–13)
BASOPHILS # BLD AUTO: 0.02 THOUSANDS/ΜL (ref 0–0.1)
BASOPHILS NFR BLD AUTO: 0 % (ref 0–1)
BUN SERPL-MCNC: 70 MG/DL (ref 5–25)
CALCIUM SERPL-MCNC: 9.6 MG/DL (ref 8.3–10.1)
CHLORIDE SERPL-SCNC: 106 MMOL/L (ref 96–108)
CO2 SERPL-SCNC: 23 MMOL/L (ref 21–32)
CREAT SERPL-MCNC: 4.19 MG/DL (ref 0.6–1.3)
EOSINOPHIL # BLD AUTO: 0.09 THOUSAND/ΜL (ref 0–0.61)
EOSINOPHIL NFR BLD AUTO: 1 % (ref 0–6)
ERYTHROCYTE [DISTWIDTH] IN BLOOD BY AUTOMATED COUNT: 14.5 % (ref 11.6–15.1)
GFR SERPL CREATININE-BSD FRML MDRD: 13 ML/MIN/1.73SQ M
GLUCOSE SERPL-MCNC: 125 MG/DL (ref 65–140)
GLUCOSE SERPL-MCNC: 129 MG/DL (ref 65–140)
GLUCOSE SERPL-MCNC: 278 MG/DL (ref 65–140)
HCT VFR BLD AUTO: 35.7 % (ref 36.5–49.3)
HGB BLD-MCNC: 11.8 G/DL (ref 12–17)
IMM GRANULOCYTES # BLD AUTO: 0.03 THOUSAND/UL (ref 0–0.2)
IMM GRANULOCYTES NFR BLD AUTO: 0 % (ref 0–2)
LYMPHOCYTES # BLD AUTO: 1.39 THOUSANDS/ΜL (ref 0.6–4.47)
LYMPHOCYTES NFR BLD AUTO: 19 % (ref 14–44)
MCH RBC QN AUTO: 33.3 PG (ref 26.8–34.3)
MCHC RBC AUTO-ENTMCNC: 33.1 G/DL (ref 31.4–37.4)
MCV RBC AUTO: 101 FL (ref 82–98)
MONOCYTES # BLD AUTO: 0.33 THOUSAND/ΜL (ref 0.17–1.22)
MONOCYTES NFR BLD AUTO: 4 % (ref 4–12)
NEUTROPHILS # BLD AUTO: 5.64 THOUSANDS/ΜL (ref 1.85–7.62)
NEUTS SEG NFR BLD AUTO: 76 % (ref 43–75)
NRBC BLD AUTO-RTO: 0 /100 WBCS
PLATELET # BLD AUTO: 130 THOUSANDS/UL (ref 149–390)
PMV BLD AUTO: 10.1 FL (ref 8.9–12.7)
POTASSIUM SERPL-SCNC: 3.7 MMOL/L (ref 3.5–5.3)
RBC # BLD AUTO: 3.54 MILLION/UL (ref 3.88–5.62)
SODIUM SERPL-SCNC: 137 MMOL/L (ref 135–147)
WBC # BLD AUTO: 7.5 THOUSAND/UL (ref 4.31–10.16)

## 2022-09-26 PROCEDURE — 80048 BASIC METABOLIC PNL TOTAL CA: CPT | Performed by: UROLOGY

## 2022-09-26 PROCEDURE — 82948 REAGENT STRIP/BLOOD GLUCOSE: CPT

## 2022-09-26 PROCEDURE — 99238 HOSP IP/OBS DSCHRG MGMT 30/<: CPT | Performed by: INTERNAL MEDICINE

## 2022-09-26 PROCEDURE — 99233 SBSQ HOSP IP/OBS HIGH 50: CPT | Performed by: INTERNAL MEDICINE

## 2022-09-26 PROCEDURE — 97166 OT EVAL MOD COMPLEX 45 MIN: CPT

## 2022-09-26 PROCEDURE — 99222 1ST HOSP IP/OBS MODERATE 55: CPT | Performed by: SURGERY

## 2022-09-26 PROCEDURE — 85025 COMPLETE CBC W/AUTO DIFF WBC: CPT | Performed by: UROLOGY

## 2022-09-26 RX ORDER — DOXYCYCLINE 100 MG/1
100 TABLET ORAL 2 TIMES DAILY
Qty: 14 TABLET | Refills: 0 | Status: SHIPPED | OUTPATIENT
Start: 2022-09-26 | End: 2022-10-03

## 2022-09-26 RX ORDER — SENNOSIDES 8.6 MG
650 CAPSULE ORAL EVERY 8 HOURS PRN
Qty: 30 TABLET | Refills: 0 | Status: SHIPPED | OUTPATIENT
Start: 2022-09-26 | End: 2022-09-29

## 2022-09-26 RX ADMIN — HEPARIN SODIUM 5000 UNITS: 5000 INJECTION INTRAVENOUS; SUBCUTANEOUS at 06:02

## 2022-09-26 RX ADMIN — FUROSEMIDE 80 MG: 80 TABLET ORAL at 08:48

## 2022-09-26 RX ADMIN — LEVOTHYROXINE SODIUM 125 MCG: 125 TABLET ORAL at 06:01

## 2022-09-26 RX ADMIN — HEPARIN SODIUM 5000 UNITS: 5000 INJECTION INTRAVENOUS; SUBCUTANEOUS at 13:21

## 2022-09-26 RX ADMIN — METOPROLOL SUCCINATE 25 MG: 25 TABLET, EXTENDED RELEASE ORAL at 08:49

## 2022-09-26 RX ADMIN — ASPIRIN 81 MG: 81 TABLET, COATED ORAL at 08:49

## 2022-09-26 RX ADMIN — OXYCODONE HYDROCHLORIDE 5 MG: 5 TABLET ORAL at 06:06

## 2022-09-26 RX ADMIN — CEFAZOLIN SODIUM 2000 MG: 2 SOLUTION INTRAVENOUS at 08:51

## 2022-09-26 RX ADMIN — ACETAMINOPHEN 975 MG: 325 TABLET, FILM COATED ORAL at 13:21

## 2022-09-26 RX ADMIN — INSULIN LISPRO 6 UNITS: 100 INJECTION, SOLUTION INTRAVENOUS; SUBCUTANEOUS at 13:22

## 2022-09-26 RX ADMIN — ACETAMINOPHEN 975 MG: 325 TABLET, FILM COATED ORAL at 06:01

## 2022-09-26 RX ADMIN — PHENAZOPYRIDINE 100 MG: 100 TABLET ORAL at 06:01

## 2022-09-26 NOTE — DISCHARGE SUMMARY
INTERNAL MEDICINE RESIDENCY DISCHARGE SUMMARY     Juanita Benoit   69 y o  male  MRN: [id number]  Room/Bed: [place] [address]    Encounter: [id number]    Principal Problem:    Ureteral stone with hydronephrosis  Active Problems:    History of CVA (cerebrovascular accident)    Acute kidney injury superimposed on chronic kidney disease stage 4 (HCC)    HTN (hypertension)    HLD (hyperlipidemia)    Lymphedema    Hypothyroidism    MI (myocardial infarction) ([facility] )    Factor 5 Leiden mutation, heterozygous ([facility] )    Type 2 diabetes mellitus with stage 4 chronic kidney disease, with long-term current use of insulin ([facility])    Cutaneous abscess of buttock      Cutaneous abscess of buttock  Assessment & Plan  Patient describes having boil for 3-4 days  On exam, has right medial buttock abscess, but non expressible  Assessment: abscess is 0 7x1cm (less than 2 cm in diameter) without significant erythema or systemic symptoms  Will hold off on incision & drainage  Will hold off on routine antibiotic therapy in setting of mild skin abscess (in reference to IDSA guideline 2014)  · Wound culture and started Ancef x7days  · Wound culture growing Gram-positive diplococci -> will await sensitivities for outpatient antibiotic regimen  · Will d/c on Doxy 100 mg BID for 7-day course and follow wound cx sensitivities   · Wound care consulted, appreciate recs    Type 2 diabetes mellitus with stage 4 chronic kidney disease, with long-term current use of insulin [facility])  Assessment & Plan  Lab Results   Component Value Date    HGBA1C 6 9 09/09/2022       Recent Labs     09/24/22  1800 09/24/22  2046 09/25/22  0717 09/25/22  1154   POCGLU 215* 243* 172* 254*       Blood Sugar Average: Last 72 hrs:  (P) 193 375   Home regimen:  Lantus 20 units  A1c 6 9 earlier this month      · Lantus 16 U  · SSI algorithm 4  · BG checks before meals and HS  · Adjust regimen as necessary to maintain -180    Factor 5 Leiden mutation, heterozygous [facility])  Assessment & Plan  · Not on chronic anticoagulation at home    MI (myocardial infarction) [facility])  Assessment & Plan  S/p balloon angioplasty in 1999 and MCKINLEY in 2015  Chest pain-free  · Continue home aspirin 81 mg, atorvastatin 40 mg  · Restarted ASA post-op with recs from Uro    Hypothyroidism  Assessment & Plan  Continue home levothyroxine 125 mcg    Lymphedema  Assessment & Plan  Hx of lymphedema with swelling of bilateral lower extremities  Weeping present in RLE  · Wound care consulted for management of LE weeping    HLD (hyperlipidemia)  Assessment & Plan  Lipid panel from January:  , , HDL 30, LDL 75     · Continue home atorvastatin 40 mg    HTN (hypertension)  Assessment & Plan  · Continue home regimen Toprol 25 mg  · Continue to monitor BP    Acute kidney injury superimposed on chronic kidney disease stage 4 [facility])  Assessment & Plan  Lab Results   Component Value Date    EGFR 12 09/25/2022    EGFR 13 09/24/2022    EGFR 13 09/23/2022    CREATININE 4 33 (H) 09/25/2022    CREATININE 4 31 (H) 09/24/2022    CREATININE 4 19 (H) 09/23/2022       Creatinine 4 19, baseline 3 2-3 4  Seen by Nephrology in 9/16/22 and declined transplant referral   Was recommended to start dialysis from prior nephrology visit in on July but patient declined  Home regimen: Lasix 120 mg BID  · Isolyte 100 cc/hr  · Nephrology consulted - appreciate recommendations  · Monitor I/O, BUN, creatinine  · Daily weights   · Avoid nephrotoxic agents, hypotension  · Home lasix restarted    History of CVA (cerebrovascular accident)  Assessment & Plan  Stroke in January s/p tPA  MRI showed few scattered punctate foci of acute cerebral and right cerebellar infarction, suspicious for cardioembolic etiology  TTE unrevealing    Underwent loop recorder placement on 06/28     -Restarted aspirin after procedure with urology on 9/24    * Ureteral stone with hydronephrosis  Assessment & Plan  Presented with left flank pain around midnight  Found to have 5 mm left proximal ureteral calculus with mild hydronephroureterosis  Labs significant for DOC  No leukocytosis or lactic acidosis  UA showed proteinuria, negative for WBC, bacteria, nitrites  Started on  cc prior to transfer  Case discussed with urology who recommended transfer for operative procedure  · Geriatric pain regimen:  · Tylenol 970 5q 8 hours  · Oxycodone 2 5 mg q 4 hours p r n  For moderate pain  · Oxycodone 5 mg Q 4 hours p r n  For severe pain  · IV Dilaudid 0 2 mg q 4 hours for breakthrough  · Made NPO @ [facility] for anticipated procedure  · Continue isolyte 100 cc/hr  · Urology consulted - appreciate recommendations  · Status post cystoscopy with lithotripsy and left ureteral stent placement on 09/24  · Plan for stent removal with Urology in 1 week as an outpatient  · PT/OT      [address]     Per Dr Giovanna Gonzalez MD H&P on 09/23/2022: "70 y/o male with a history of CKD 4, hypertension, CAD status post MCKINLEY in 2015, stroke in January status post tPA and loop recorder placement in June, type 2 diabetes on insulin, tobacco use, HFpEF, secondary hyperparathyroidism who presented to the [place] [address] with left flank pain  Pain started around midnight last night and is constant  Found to have 5 mm left ureteral stone with hydronephrosis and DOC  Vitals stable  No leukocytosis, lactic acidosis, or evidence of infection on UA  Case discussed with Urology who recommended transfer for stent placement  Started on IVF and given Zofran and fentanyl prior to transfer        On admission, patient endorses having left flank pain previously but currently pain is more controlled  Otherwise denies dysuria, hematuria, suprapubic tenderness, urinary frequency/urgency, fevers, chills  Patient was able to urinate and void without issues upon arrival to [facility]   Vitals on admission: 97 7F, /87, SpO2 94% "    Hospital Course:  CT abdomen pelvis showed 5 mm left proximal ureteral calculus with mild hydro nephroureterosis and 1 5 cm renal lower pole calculus  Patient went to OR with Urology on 09/24/2022 for cystoscopy ureteroscopy with laser lithotripsy and left ureteral stent placement  Per Urology after procedure patient was stable for discharge and recommended outpatient follow-up in 1 week for removal of stent  Nephrology was also consulted as patient had elevated creatinine to 4 3 from baseline of 3 1 to 3 6  Nephrology initially recommended holding diuretics, however on 09/25 recommended restarting home diuretics and discontinuing IV fluids  Recommended outpatient follow-up with Nephrology  On admission, patient was also found to have an abscess on his right medial buttocks  Initially, wound culture was taken and patient was started on IV Ancef  Wound cx revealed Citrobacter amalonaticus, however sensitivities were pending  General surgery was consulted for recommendations on I and D of abscess  They noted a spontaneously draining right gluteal abscess and recommended probing or opening of abscess to ensure no deep pockets present  However, patient was not agreeable to surgical intervention  PT/OT recommended home without any acute rehab needs  Given that patient was otherwise stable, he was discharged on 7 day course of doxycycline 100 mg b i d , urology follow-up, and Nephrology follow-up  Also ordered BMP within next week to assess renal function  He was advised to follow-up with his PCP for further management of his gluteal abscess  Will follow-up on results of wound culture sensitivity for further adjustment of antibiotic regimen  DISCHARGE INFORMATION     PCP at Discharge: Mary Scott DO    Admitting Provider: Domingo Martínez MD  Admission Date: 9/23/2022    Discharge Provider:  Aria Vallejo MD  Discharge Date: 09/26/2022    Discharge Disposition: Home/Self Care  Discharge Condition: fair  Discharge with Lines: no    Discharge Diet: regular diet  Activity Restrictions: none  Test Results Pending at Discharge:  Post hospitalization BMP    Discharge Diagnoses:  Principal Problem:    Ureteral stone with hydronephrosis  Active Problems:    History of CVA (cerebrovascular accident)    Acute kidney injury superimposed on chronic kidney disease stage 4 (HCC)    HTN (hypertension)    HLD (hyperlipidemia)    Lymphedema    Hypothyroidism    MI (myocardial infarction) ([facility] )    Factor 5 Leiden mutation, heterozygous ([facility] )    Type 2 diabetes mellitus with stage 4 chronic kidney disease, with long-term current use of insulin ([facility])    Cutaneous abscess of buttock  Resolved Problems:    * No resolved hospital problems  *      Consulting Providers:  ·  urology  · Nephrology  · General surgery    Diagnostic & Therapeutic Procedures Performed:  XR abdomen 1 view kub    Result Date: 9/25/2022  Impression: Fluoroscopy provided for procedure guidance  Please see separate procedure note for details  Workstation performed: [id number]     CT renal stone study abdomen pelvis wo contrast    Result Date: 9/23/2022  Impression: 5 mm left proximal ureteral calculus with mild upstream hydronephroureterosis  No loculated perinephric collection  Left renal calculi, 1 5 cm and smaller  3 mm right renal nonobstructing calculus  Colonic diverticulosis  This study demonstrates a significant  finding and was documented as such in [facility] for liaison and referring practitioner notification   Workstation performed: [id number]       Code Status: Level 1 - Full Code  Advance Directive & Living Will: <no information>  Power of :    POLST:      Medications:  Current Discharge Medication List        Current Discharge Medication List        Current Discharge Medication List      CONTINUE these medications which have NOT CHANGED    Details   allopurinol (ZYLOPRIM) 300 mg tablet Take by mouth      Arginine 1000 MG TABS Take by mouth One daily      ascorbic acid (VITAMIN C) 500 MG tablet Take 500 mg by mouth daily      aspirin (ECOTRIN LOW STRENGTH) 81 mg EC tablet Take 81 mg by mouth      atorvastatin (LIPITOR) 40 mg tablet Take 1 tablet (40 mg total) by mouth daily with dinner  Qty: 30 tablet, Refills: 0    Associated Diagnoses: CVA (cerebral vascular accident) ([facility] )      b complex vitamins capsule Take 1 capsule by mouth daily      clindamycin (CLEOCIN) 300 MG capsule       co-enzyme Q-10 30 MG capsule Take 100 mg by mouth      furosemide (LASIX) 80 mg tablet Take 120 mg by mouth 2 (two) times a day      HYDROcodone-acetaminophen (NORCO)  mg per tablet Take by mouth      insulin glargine (LANTUS) 100 units/mL subcutaneous injection Inject 20 Units under the skin      insulin lispro (HumaLOG) 100 units/mL injection       levothyroxine 125 mcg tablet Take by mouth      metoprolol succinate (TOPROL-XL) 25 mg 24 hr tablet Take by mouth 1-2 prn      tamsulosin (FLOMAX) 0 4 mg Take 0 4 mg by mouth daily with dinner      vitamin A 2400 MCG (8000 UT) capsule Take 2,400 Units by mouth daily      vitamin E, tocopherol, 200 units capsule Take 200 Units by mouth daily 180 mg daily             Allergies:   Allergies   Allergen Reactions    Carvedilol Shortness Of Breath    Aspartame - Food Allergy Diarrhea    Bumetanide Other (See Comments)     Lightheadedness,tiredness, confusion, drowsiness, dry mouth, nausea, ringing in ears, bruising, change in urine  Lightheadedness,tiredness, confusion, drowsiness, dry mouth, nausea, ringing in ears, bruising, change in urine      Hydralazine Tachycardia    Lisinopril Other (See Comments)     Other reaction(s): dry heaves      Morphine Swelling     Other reaction(s): lump at injection  Hand swelling at IV injection site      Nifedipine Lightheadedness     didn't tolerate    Ciprofloxacin Rash    Strawberry Extract - Food Allergy Rash     Visit Vitals  /79   Pulse 69   Temp (!) 97 3 °F (36 3 °C)   Resp 19   Ht 5' 10" (1 778 m)   Wt 129 kg (284 lb 13 4 oz)   SpO2 94%   BMI 40 87 kg/m²   Smoking Status Current Every Day Smoker   BSA 2 42 m²       Physical Exam  Constitutional:       Appearance: He is well-developed  He is obese  HENT:      Head: Normocephalic and atraumatic  Nose: Nose normal    Eyes:      General:         Right eye: No discharge  Left eye: No discharge  Conjunctiva/sclera: Conjunctivae normal       Pupils: Pupils are equal, round, and reactive to light  Neck:      Thyroid: No thyromegaly  Cardiovascular:      Rate and Rhythm: Normal rate and regular rhythm  Heart sounds: Normal heart sounds  No murmur heard  No friction rub  No gallop  Pulmonary:      Effort: Pulmonary effort is normal  No respiratory distress  Breath sounds: Normal breath sounds  No stridor  No wheezing or rales  Chest:      Chest wall: No tenderness  Abdominal:      General: Bowel sounds are normal  There is no distension  Palpations: Abdomen is soft  There is no mass  Tenderness: There is no abdominal tenderness  There is no guarding or rebound  Musculoskeletal:         General: No tenderness or deformity  Right lower leg: Edema present  Left lower leg: Edema present  Comments: Wrapped left lower extremity   Lymphadenopathy:      Cervical: No cervical adenopathy  Skin:     General: Skin is warm and dry  Neurological:      Mental Status: He is alert and oriented to person, place, and time  Psychiatric:         Mood and Affect: Mood normal          Behavior: Behavior normal          Thought Content:  Thought content normal          Judgment: Judgment normal        FOLLOW-UP     PCP Outpatient Follow-up:  Follow-up with PCP within next week    Consulting Providers Follow-up:  Follow-up with urology for removal of left ureteral stent; follow-up with nephrology for DOC on CKD     Active Issues Requiring Follow-up:   Left ureteral stent and DOC and CKD    Discharge Statement:   I spent 30 minutes minutes discharging the patient  This time was spent on the day of discharge  I had direct contact with the patient on the day of discharge  Additional documentation is required if more than 30 minutes were spent on discharge  Portions of the record may have been created with voice recognition software  Occasional wrong word or "sound a like" substitutions may have occurred due to the inherent limitations of voice recognition software    Read the chart carefully and recognize, using context, where substitutions have occurred     ==  Caren Hinkle MD  [address]  Internal Medicine Resident PGY-1

## 2022-09-26 NOTE — CONSULTS
Acute Care Surgery  Consultation    Assessment:  69year old male admitted for hydronephrosis 2/2 ureteral stone, general surgery consulted d/t right gluteal abscess growing gram positive cocci in pairs  Vital signs stable, afebrile    WBC: 7 50 (8 56)  Hgb: 11 8 (11 9)  Cr: 4 19 (4 33)    Plan:   Wound inspected at bedside  There is a linear opening within the abscess that has actively drained without intervention  On manipulation of surrounding skin, no further drainage from abscess  Induration noted at 12 oclock position   Discussed with patient that this wound could be further probed or opened to ensure no deeper pockets are present  He is not agreeable to surgical intervention at this time   Allow wound to remain open to air for drainage   Local wound care   Continue antibiotics   General surgery to sign off at this time  Please reach out with any new questions or concerns or should patient be more agreeable to further abscess I&D      History of Present Illness   HPI:  Rosalia Cervantes is a 69 y o  male admitted 2/2 hydronephrosis in setting of ureteral stones  He was additionally found to have a right gluteal abscess which is open to air and draining without intervention  On presentation to patient's room, it was difficult to obtain history of abscess 2/2 patient being uncooperative  He reports that he has previously had abscesses and that they drain on their own and do not need surgical involvement  He reported multiple times that he does not want surgical intervention and simply wants to be discharged home, stating unless I can help with him being discharged, he does not want my involvement  Review of Systems   Reason unable to perform ROS: Uncooperative         Historical Information   Past Medical History:   Diagnosis Date    Chronic kidney disease     Disease of thyroid gland     Hyperlipidemia     Hypertension     Stroke [facility])      Past Surgical History:   Procedure Laterality Date    CHOLECYSTECTOMY      CORONARY ANGIOPLASTY WITH STENT PLACEMENT      JOINT REPLACEMENT      bilateral knee     Social History   Social History     Substance and Sexual Activity   Alcohol Use Not Currently     Social History     Substance and Sexual Activity   Drug Use Never     Social History     Tobacco Use   Smoking Status Current Every Day Smoker    Packs/day: 0 50    Types: Cigarettes   Smokeless Tobacco Never Used     Family History   Problem Relation Age of Onset    Kidney failure Mother     Cirrhosis Mother     Colon cancer Brother        Meds/Allergies   all current active meds have been reviewed, current meds:   Current Facility-Administered Medications   Medication Dose Route Frequency    acetaminophen (TYLENOL) tablet 975 mg  975 mg Oral Q8H [address]    aspirin (ECOTRIN LOW STRENGTH) EC tablet 81 mg  81 mg Oral Daily    atorvastatin (LIPITOR) tablet 40 mg  40 mg Oral Daily With Dinner    ceFAZolin (ANCEF) IVPB (premix in dextrose) 2,000 mg 50 mL  2,000 mg Intravenous Q12H    furosemide (LASIX) tablet 80 mg  80 mg Oral BID (diuretic)    heparin (porcine) subcutaneous injection 5,000 Units  5,000 Units Subcutaneous Q8H [address]    HYDROmorphone HCl (DILAUDID) injection 0 2 mg  0 2 mg Intravenous Q4H PRN    insulin glargine (LANTUS) subcutaneous injection 16 Units 0 16 mL  16 Units Subcutaneous HS    insulin lispro (HumaLOG) 100 units/mL subcutaneous injection 2-12 Units  2-12 Units Subcutaneous TID AC    insulin lispro (HumaLOG) 100 units/mL subcutaneous injection 2-12 Units  2-12 Units Subcutaneous HS    levothyroxine tablet 125 mcg  125 mcg Oral Early Morning    metoprolol succinate (TOPROL-XL) 24 hr tablet 25 mg  25 mg Oral Daily    ondansetron (ZOFRAN) injection 4 mg  4 mg Intravenous Q6H PRN    oxyCODONE (ROXICODONE) IR tablet 2 5 mg  2 5 mg Oral Q4H PRN    Or    oxyCODONE (ROXICODONE) IR tablet 5 mg  5 mg Oral Q4H PRN    phenazopyridine (PYRIDIUM) tablet 100 mg  100 mg Oral TID PRN    tamsulosin (FLOMAX) capsule 0 4 mg  0 4 mg Oral Daily With Dinner    and PTA meds:   Prior to Admission Medications   Prescriptions Last Dose Informant Patient Reported? Taking?    Arginine 1000 MG TABS   Yes No   Sig: Take by mouth One daily   HYDROcodone-acetaminophen (NORCO)  mg per tablet   Yes No   Sig: Take by mouth   allopurinol (ZYLOPRIM) 300 mg tablet   Yes No   Sig: Take by mouth   ascorbic acid (VITAMIN C) 500 MG tablet   Yes No   Sig: Take 500 mg by mouth daily   aspirin (ECOTRIN LOW STRENGTH) 81 mg EC tablet   Yes No   Sig: Take 81 mg by mouth   atorvastatin (LIPITOR) 40 mg tablet   No No   Sig: Take 1 tablet (40 mg total) by mouth daily with dinner   b complex vitamins capsule   Yes No   Sig: Take 1 capsule by mouth daily   clindamycin (CLEOCIN) 300 MG capsule   Yes No   co-enzyme Q-10 30 MG capsule   Yes No   Sig: Take 100 mg by mouth   furosemide (LASIX) 80 mg tablet   Yes No   Sig: Take 120 mg by mouth 2 (two) times a day   insulin glargine (LANTUS) 100 units/mL subcutaneous injection   Yes No   Sig: Inject 20 Units under the skin   insulin lispro (HumaLOG) 100 units/mL injection   Yes No   levothyroxine 125 mcg tablet   Yes No   Sig: Take by mouth   metoprolol succinate (TOPROL-XL) 25 mg 24 hr tablet   Yes No   Sig: Take by mouth 1-2 prn   tamsulosin (FLOMAX) 0 4 mg   Yes No   Sig: Take 0 4 mg by mouth daily with dinner   vitamin A 2400 MCG (8000 UT) capsule   Yes No   Sig: Take 2,400 Units by mouth daily   vitamin E, tocopherol, 200 units capsule   Yes No   Sig: Take 200 Units by mouth daily 180 mg daily      Facility-Administered Medications: None     Allergies   Allergen Reactions    Carvedilol Shortness Of Breath    Aspartame - Food Allergy Diarrhea    Bumetanide Other (See Comments)     Lightheadedness,tiredness, confusion, drowsiness, dry mouth, nausea, ringing in ears, bruising, change in urine  Lightheadedness,tiredness, confusion, drowsiness, dry mouth, nausea, ringing in ears, bruising, change in urine      Hydralazine Tachycardia    Lisinopril Other (See Comments)     Other reaction(s): dry heaves      Morphine Swelling     Other reaction(s): lump at injection  Hand swelling at IV injection site      Nifedipine Lightheadedness     didn't tolerate    Ciprofloxacin Rash    Strawberry Extract - Food Allergy Rash       Objective   First Vitals:   Blood Pressure: 162/87 (09/23/22 1736)  Pulse: 75 (09/23/22 1736)  Temperature: 97 7 °F (36 5 °C) (09/23/22 1736)  Temp Source: Temporal (09/24/22 1005)  Respirations: 20 (09/23/22 1736)  Height: 5' 10" (177 8 cm) (09/23/22 1700)  Weight - Scale: 129 kg (285 lb) (09/23/22 1700)  SpO2: 94 % (09/23/22 1736)    Current Vitals:   Blood Pressure: 146/73 (09/26/22 0717)  Pulse: 69 (09/25/22 2328)  Temperature: 98 1 °F (36 7 °C) (09/26/22 0717)  Temp Source: Temporal (09/25/22 2328)  Respirations: 18 (09/25/22 2328)  Height: 5' 10" (177 8 cm) (09/23/22 1700)  Weight - Scale: 129 kg (284 lb 13 4 oz) (09/24/22 0538)  SpO2: 94 % (09/25/22 2328)      Intake/Output Summary (Last 24 hours) at 9/26/2022 1237  Last data filed at 9/25/2022 2230  Gross per 24 hour   Intake 235 ml   Output 400 ml   Net -165 ml       Invasive Devices  Report    Peripheral Intravenous Line  Duration           Peripheral IV 09/23/22 Left;Ventral (anterior) Forearm 2 days    Peripheral IV 09/24/22 Left;Dorsal (posterior) Hand 2 days                Physical Exam  Vitals and nursing note reviewed  Constitutional:       General: He is not in acute distress  Appearance: Normal appearance  He is obese  He is not ill-appearing  HENT:      Head: Normocephalic  Nose: Nose normal       Mouth/Throat:      Mouth: Mucous membranes are moist       Pharynx: Oropharynx is clear  Eyes:      Conjunctiva/sclera: Conjunctivae normal    Cardiovascular:      Rate and Rhythm: Normal rate and regular rhythm  Pulmonary:      Effort: Pulmonary effort is normal  No respiratory distress  Abdominal:      General: Abdomen is flat  There is no distension  Palpations: Abdomen is soft  Musculoskeletal:      Right lower leg: Edema present  Left lower leg: Edema present  Skin:     Findings: Abscess and lesion present  Neurological:      General: No focal deficit present  Mental Status: He is alert and oriented to person, place, and time  Mental status is at baseline  Psychiatric:         Attention and Perception: Attention and perception normal          Mood and Affect: Affect is angry  Behavior: Behavior is uncooperative and aggressive  Cognition and Memory: Cognition and memory normal          Lab Results:   CBC:   Lab Results   Component Value Date    WBC 7 50 09/26/2022    HGB 11 8 (L) 09/26/2022    HCT 35 7 (L) 09/26/2022     (H) 09/26/2022     (L) 09/26/2022    MCH 33 3 09/26/2022    MCHC 33 1 09/26/2022    RDW 14 5 09/26/2022    MPV 10 1 09/26/2022    NRBC 0 09/26/2022   , CMP:   Lab Results   Component Value Date    SODIUM 137 09/26/2022    K 3 7 09/26/2022     09/26/2022    CO2 23 09/26/2022    BUN 70 (H) 09/26/2022    CREATININE 4 19 (H) 09/26/2022    CALCIUM 9 6 09/26/2022    EGFR 13 09/26/2022     Imaging: I have personally reviewed pertinent reports  EKG, Pathology, and Other Studies: I have personally reviewed pertinent reports  Counseling / Coordination of Care  Total floor / unit time spent today 15 minutes  Greater than 50% of total time was spent with the patient and / or family counseling and / or coordination of care      Casey Davis MD  9/26/2022 12:37 PM

## 2022-09-26 NOTE — PROGRESS NOTES
NEPHROLOGY PROGRESS NOTE   Arron Hernandez 69 y o  male MRN: [id number]  Unit/Bed#: [facility] 923-01 Encounter: [id number]    ASSESSMENT & PLAN:  55-year-old male with history of hypertension, diabetes mellitus, diastolic heart failure, CVA presented with left flank pain and was transferred to [facility] for cystoscopy and stent placement  After transfer underwent cystoscopy and laser lithotripsy with stent placement by Urology    Acute kidney injury, POA  -Baseline creatinine:  3 3-3 6 mg/dl  -Admission creatinine:  On 9/23 was 4 19 mg/dl  - Work up:   · UA with microscopy:  UA with 2+ protein, just 1-2 RBCs per high-power field  · Imaging:  CT abdomen with 5 mm left proximal ureteral calculus with hydroureteronephrosis  Left renal calculi, 3 mm right renal calculi  -Etiology:  Acute kidney injury suspected to be component of prerenal azotemia plus obstructive uropathy given left hydroureteronephrosis which required stent placement on 09/24  [facility] Course:  Status post cystoscopy, lithotripsy and left ureteral stent placement on 09/24 by Urology  -Plan:   · Renal function improving to creatinine 4 19  Was started back on diuretics on 09/25  Continue current home dose diuretics 80 mg twice daily of Lasix and monitor input and output  · Overall stable from Nephrology side, patient wants to go home and will follow-up with outpatient Nephrology  · Avoid nephrotoxins and dose all medications per EGFR  · Avoid hypotension  Chronic Kidney Disease Stage 4  -Outpatient Nephrologist: follows with KARMEN Yu and was last seen on 09/16  -Baseline Creatinine:  3 3-3 6, creatinine on outpatient labs in June 28, 2022 was 3 6  -Etiology:  Due likely due to hypertension as well as diabetes mellitus  -Avoid Nephrotoxins and Dose all medications per eGFR  -Will need outpatient follow up after discharge      Left-sided hydronephrosis, status post obstructing ureteral calculus  -status post cystoscopy, laser lithotripsy and stent placement on 09/24 by Urology  -continue to follow-up with Urology    BP/Primary hypertension  -blood pressure is currently stable  Continue metoprolol, avoid hypotension  Currently on Lasix    Anemia in the setting of chronic kidney disease:  Currently stable continue to monitor  Iron saturation 16%  Consider oral iron  Would hold off on IV iron in the setting of active infection requiring antibiotic  -hemoglobin stable at 11 8 gram/deciliter    Lower extremity edema/lymphedema  -restarted back on home dose of Lasix 80 mg twice daily on 09/25, continue at current dose  Recommend limb elevation     Cutaneous abscess of the buttock-wound culture positive   Antibiotics per primary team  History of CVA, status post tPA    Discussed with primary team resident  Office informed and message sent to her outpatient nephrologist advanced practitioner    SUBJECTIVE:  C/o dark urine  No chest pain and sob  Has chronic edema     OBJECTIVE:  Current Weight: Weight - Scale: 129 kg (284 lb 13 4 oz)  Vitals:    09/26/22 0717   BP: 146/73   Pulse:    Resp:    Temp: 98 1 °F (36 7 °C)   SpO2:        Intake/Output Summary (Last 24 hours) at 9/26/2022 4815  Last data filed at 9/25/2022 2230  Gross per 24 hour   Intake 235 ml   Output 400 ml   Net -165 ml       Physical Exam  General:  Ill looking, awake  Eyes: Conjunctivae pink,  Sclera anicteric  ENT: lips and mucous membranes moist  Neck: supple   Chest: Clear to Auscultation both lungs,  no crackles, ronchus or wheezing  CVS: S1 & S2 present, normal rate, regular rhythm, no murmur    Abdomen: soft, non-tender, non-distended, Bowel sounds normoactive  Extremities: 1 +  edema of  legs  Skin: no rash  Neuro: awake, alert, oriented x 3   Psych: Mood and affect appropriate     Medications:    Current Facility-Administered Medications:     acetaminophen (TYLENOL) tablet 975 mg, 975 mg, Oral, Q8H [address], Ozzy Mullen MD, 975 mg at 09/26/22 0601    aspirin (ECOTRIN LOW STRENGTH) EC tablet 81 mg, 81 mg, Oral, Daily, Genna Chasidy Casarez MD, 81 mg at 09/26/22 0849    atorvastatin (LIPITOR) tablet 40 mg, 40 mg, Oral, Daily With Vic Avila MD, 40 mg at 09/25/22 1735    ceFAZolin (ANCEF) IVPB (premix in dextrose) 2,000 mg 50 mL, 2,000 mg, Intravenous, Q12H, María Moreno MD, Last Rate: 100 mL/hr at 09/26/22 0851, 2,000 mg at 09/26/22 0851    furosemide (LASIX) tablet 80 mg, 80 mg, Oral, BID (diuretic), Mercedes Dunn DO, 80 mg at 09/26/22 0848    heparin (porcine) subcutaneous injection 5,000 Units, 5,000 Units, Subcutaneous, Q8H [facility], 5,000 Units at 09/26/22 0602 **AND** [CANCELED] Platelet count, , , Once, María Moreno MD    HYDROmorphone HCl (DILAUDID) injection 0 2 mg, 0 2 mg, Intravenous, Q4H PRN, Leighton Schaffer MD, 0 2 mg at 09/25/22 2328    insulin glargine (LANTUS) subcutaneous injection 16 Units 0 16 mL, 16 Units, Subcutaneous, HS, Tiffani Genao MD, 16 Units at 09/25/22 2224    insulin lispro (HumaLOG) 100 units/mL subcutaneous injection 2-12 Units, 2-12 Units, Subcutaneous, TID AC, 6 Units at 09/25/22 1349 **AND** Fingerstick Glucose (POCT), , , 4x Daily AC and at bedtime, Tiffani Genao MD    insulin lispro (HumaLOG) 100 units/mL subcutaneous injection 2-12 Units, 2-12 Units, Subcutaneous, HS, Tiffani Genao MD, 6 Units at 09/25/22 2225    levothyroxine tablet 125 mcg, 125 mcg, Oral, Early Morning, Tiffani Genao MD, 125 mcg at 09/26/22 0601    metoprolol succinate (TOPROL-XL) 24 hr tablet 25 mg, 25 mg, Oral, Daily, María Moreno MD, 25 mg at 09/26/22 0849    ondansetron (ZOFRAN) injection 4 mg, 4 mg, Intravenous, Q6H PRN, Tiffani Genao MD    oxyCODONE (ROXICODONE) IR tablet 2 5 mg, 2 5 mg, Oral, Q4H PRN **OR** oxyCODONE (ROXICODONE) IR tablet 5 mg, 5 mg, Oral, Q4H PRN, 5 mg at 09/26/22 0606 **OR** [DISCONTINUED] HYDROmorphone HCl (DILAUDID) injection 0 2 mg, 0 2 mg, Intravenous, Q4H PRN, Kathy Messer MD    phenazopyridine (PYRIDIUM) tablet 100 mg, 100 mg, Oral, TID PRN, Kathy Messer MD, 100 mg at 09/26/22 0601    tamsulosin (FLOMAX) capsule 0 4 mg, 0 4 mg, Oral, Daily With Dinner, Kathy Messer MD, 0 4 mg at 09/25/22 1735    Invasive Devices:        Lab Results:   Results from last 7 days   Lab Units 09/26/22  0539 09/25/22  0628 09/24/22  0440 09/23/22  0950   WBC Thousand/uL 7 50 8 56 6 43 9 60   HEMOGLOBIN g/dL 11 8* 11 9* 11 0* 13 4   HEMATOCRIT % 35 7* 36 8 34 2* 42 0   PLATELETS Thousands/uL 130* 133* 108* 127*   POTASSIUM mmol/L 3 7 4 7 3 8 4 1   CHLORIDE mmol/L 106 105 108 100   CO2 mmol/L 23 24 25 24   BUN mg/dL 70* 66* 56* 50*   CREATININE mg/dL 4 19* 4 33* 4 31* 4 19*   CALCIUM mg/dL 9 6 9 9 9 3 9 9   ALK PHOS U/L  --   --   --  120*   ALT U/L  --   --   --  25   AST U/L  --   --   --  18       Previous work up:         Portions of the record may have been created with voice recognition software  Occasional wrong word or "sound a like" substitutions may have occurred due to the inherent limitations of voice recognition software  Read the chart carefully and recognize, using context, where substitutions have occurred  If you have any questions, please contact the dictating provider

## 2022-09-26 NOTE — PLAN OF CARE
Problem: PAIN - ADULT  Goal: Verbalizes/displays adequate comfort level or baseline comfort level  Description: Interventions:  - Encourage patient to monitor pain and request assistance  - Assess pain using appropriate pain scale  - Administer analgesics based on type and severity of pain and evaluate response  - Implement non-pharmacological measures as appropriate and evaluate response  - Consider cultural and social influences on pain and pain management  - Notify physician/advanced practitioner if interventions unsuccessful or patient reports new pain  Outcome: Progressing     Problem: INFECTION - ADULT  Goal: Absence or prevention of progression during hospitalization  Description: INTERVENTIONS:  - Assess and monitor for signs and symptoms of infection  - Monitor lab/diagnostic results  - Monitor all insertion sites, i e  indwelling lines, tubes, and drains  - Monitor endotracheal if appropriate and nasal secretions for changes in amount and color  - [place] appropriate cooling/warming therapies per order  - Administer medications as ordered  - Instruct and encourage patient and family to use good hand hygiene technique  - Identify and instruct in appropriate isolation precautions for identified infection/condition  Outcome: Progressing  Goal: Absence of fever/infection during neutropenic period  Description: INTERVENTIONS:  - Monitor WBC    Outcome: Progressing     Problem: SAFETY ADULT  Goal: Patient will remain free of falls  Description: INTERVENTIONS:  - Educate patient/family on patient safety including physical limitations  - Instruct patient to call for assistance with activity   - Consult OT/PT to assist with strengthening/mobility   - Keep Call bell within reach  - Keep bed low and locked with side rails adjusted as appropriate  - Keep care items and personal belongings within reach  - Initiate and maintain comfort rounds  - Make Fall Risk Sign visible to staff  - Offer Toileting every 2 Hours, in advance of need  - Initiate/Maintain alarm  - Obtain necessary fall risk management equipment:   - Apply yellow socks and bracelet for high fall risk patients  - Consider moving patient to room near nurses station  Outcome: Progressing  Goal: Maintain or return to baseline ADL function  Description: INTERVENTIONS:  -  Assess patient's ability to carry out ADLs; assess patient's baseline for ADL function and identify physical deficits which impact ability to perform ADLs (bathing, care of mouth/teeth, toileting, grooming, dressing, etc )  - Assess/evaluate cause of self-care deficits   - Assess range of motion  - Assess patient's mobility; develop plan if impaired  - Assess patient's need for assistive devices and provide as appropriate  - Encourage maximum independence but intervene and supervise when necessary  - Involve family in performance of ADLs  - Assess for home care needs following discharge   - Consider OT consult to assist with ADL evaluation and planning for discharge  - Provide patient education as appropriate  Outcome: Progressing  Goal: Maintains/Returns to pre admission functional level  Description: INTERVENTIONS:  - Perform BMAT or MOVE assessment daily    - Set and communicate daily mobility goal to care team and patient/family/caregiver  - Collaborate with rehabilitation services on mobility goals if consulted  - Reposition patient every 2 hours    - Dangle patient 3 times a day  - Stand patient 3 times a day  - Ambulate patient 3 times a day  - Out of bed to chair 3 times a day   - Out of bed for meals 3 times a day  - Out of bed for toileting  - Record patient progress and toleration of activity level   Outcome: Progressing     Problem: DISCHARGE PLANNING  Goal: Discharge to home or other facility with appropriate resources  Description: INTERVENTIONS:  - Identify barriers to discharge w/patient and caregiver  - Arrange for needed discharge resources and transportation as appropriate  - Identify discharge learning needs (meds, wound care, etc )  - Arrange for interpretive services to assist at discharge as needed  - Refer to Case Management Department for coordinating discharge planning if the patient needs post-hospital services based on physician/advanced practitioner order or complex needs related to functional status, cognitive ability, or social support system  Outcome: Progressing     Problem: Knowledge Deficit  Goal: Patient/family/caregiver demonstrates understanding of disease process, treatment plan, medications, and discharge instructions  Description: Complete learning assessment and assess knowledge base  Interventions:  - Provide teaching at level of understanding  - Provide teaching via preferred learning methods  Outcome: Progressing     Problem: Nutrition/Hydration-ADULT  Goal: Nutrient/Hydration intake appropriate for improving, restoring or maintaining nutritional needs  Description: Monitor and assess patient's nutrition/hydration status for malnutrition  Collaborate with interdisciplinary team and initiate plan and interventions as ordered  Monitor patient's weight and dietary intake as ordered or per policy  Utilize nutrition screening tool and intervene as necessary  Determine patient's food preferences and provide high-protein, high-caloric foods as appropriate       INTERVENTIONS:  - Monitor oral intake, urinary output, labs, and treatment plans  - Assess nutrition and hydration status and recommend course of action  - Evaluate amount of meals eaten  - Assist patient with eating if necessary   - Allow adequate time for meals  - Recommend/ encourage appropriate diets, oral nutritional supplements, and vitamin/mineral supplements  - Order, calculate, and assess calorie counts as needed  - Recommend, monitor, and adjust tube feedings and TPN/PPN based on assessed needs  - Assess need for intravenous fluids  - Provide specific nutrition/hydration education as appropriate  - Include patient/family/caregiver in decisions related to nutrition  Outcome: Progressing     Problem: Potential for Falls  Goal: Patient will remain free of falls  Description: INTERVENTIONS:  - Educate patient/family on patient safety including physical limitations  - Instruct patient to call for assistance with activity   - Consult OT/PT to assist with strengthening/mobility   - Keep Call bell within reach  - Keep bed low and locked with side rails adjusted as appropriate  - Keep care items and personal belongings within reach  - Initiate and maintain comfort rounds  - Make Fall Risk Sign visible to staff  - Offer Toileting every 2 Hours, in advance of need  - Initiate/Maintain alarm  - Obtain necessary fall risk management equipment:   - Apply yellow socks and bracelet for high fall risk patients  - Consider moving patient to room near nurses station  Outcome: Progressing

## 2022-09-26 NOTE — OCCUPATIONAL THERAPY NOTE
Occupational Therapy Evaluation     Patient Name: Mayo Morgan  [id number] Date: 9/26/2022  Problem List  Principal Problem:    Ureteral stone with hydronephrosis  Active Problems:    History of CVA (cerebrovascular accident)    Acute kidney injury superimposed on chronic kidney disease stage 4 (HCC)    HTN (hypertension)    HLD (hyperlipidemia)    Lymphedema    Hypothyroidism    MI (myocardial infarction) ([facility] [address] )    Factor 5 Leiden mutation, heterozygous ([place] [address] )    Type 2 diabetes mellitus with stage 4 chronic kidney disease, with long-term current use of insulin ([place] [address] )    Cutaneous abscess of buttock    Past Medical History  Past Medical History:   Diagnosis Date    Chronic kidney disease     Disease of thyroid gland     Hyperlipidemia     Hypertension     Stroke [facility])      Past Surgical History  Past Surgical History:   Procedure Laterality Date    CHOLECYSTECTOMY      CORONARY ANGIOPLASTY WITH STENT PLACEMENT      JOINT REPLACEMENT      bilateral knee    [place] CYSTO/URETERO W/LITHOTRIPSY &INDWELL STENT INSRT Left 9/24/2022    Procedure: CYSTOSCOPY URETEROSCOPY WITH LITHOTRIPSY HOLMIUM LASER, AND INSERTION STENT URETERAL;  Surgeon: Tiffani Genao MD;  Location: [facility];  Service: Urology         09/26/22 0920   OT Last Visit   OT Visit Date 09/26/22   Note Type   Note type Evaluation   Restrictions/Precautions   Weight Bearing Precautions Per Order No   Other Precautions Multiple lines;Pain   Pain Assessment   Pain Assessment Tool 0-10   Pain Score 4   Pain Location/Orientation Orientation: Left; Location: Back   Hospital Pain Intervention(s) Repositioned; Ambulation/increased activity   Home Living   Type of Home Apartment   Home Layout One level  (2STE)   Bathroom Shower/Tub Tub/shower unit   Bathroom Toilet Standard   Bathroom Equipment Grab bars in shower; Shower chair   Home Equipment Cane;Walker  (used [facility] PTA)   Prior Function   Level of [place] Independent with ADLs and functional mobility   Lives With Alone Receives Help From Family   ADL Assistance Independent   IADLs Independent   Falls in the last 6 months 0   Vocational Retired   Lifestyle   Autonomy PTA, pt reports being I with ADLs, IADLs, SPC for fnxl mobility, (+)   Reciprocal Relationships Pt reports local kids who can assist as needed   Service to Others Retired   Intrinsic Gratification Watching movies   Psychosocial   Psychosocial (WDL) WDL   ADL   Where Assessed Chair   Eating Assistance 7  Independent   Grooming Assistance 7  Independent   UB Bathing Assistance 7  Independent   LB Bathing Assistance 5  [address]  7  Independent   LB Dressing Assistance 5  Supervision/Setup   LB Dressing Deficit Don/doff L shoe;Don/doff R shoe  (pt currently utilizes slides, does not use socks 2* increased swelling in B/L feet)   Toileting Assistance  5  Supervision/Setup   Bed Mobility   Supine to Sit Unable to assess   Sit to Supine Unable to assess   Additional Comments Pt seated OOB in chair upon arrival   Transfers   Sit to Stand 5  Supervision   Stand to Sit 5  Supervision   Additional Comments transfers with [facility]   Functional Mobility   Functional Mobility 5  Supervision   Additional items [facility]   Balance   Static Sitting Good   Dynamic Sitting Fair +   Static Standing Fair   Dynamic Standing Fair   Ambulatory Fair -   Activity Tolerance   Activity Tolerance Patient tolerated treatment well   Nurse Made Aware PAYAM Bhatt   RUE Assessment   RUE Assessment WFL   LUE Assessment   LUE Assessment WFL   Vision - Complex Assessment   Ocular Range of Motion WFL   Head Position WDL   Cognition   Overall Cognitive Status WFL   Arousal/Participation Responsive; Alert; Cooperative   Attention Within functional limits   Orientation Level Oriented X4   Memory Within functional limits   Following Commands Follows all commands and directions without difficulty   Comments Pt pleasant and cooperative t/o session   Assessment   Assessment Pt is a 69 y o  male admitted to [facility] on 9/23/2022 w/ Ureteral stone with hydronephrosis  Pt s/p cystoscopy and stent  has a past medical history of Chronic kidney disease, Disease of thyroid gland, Hyperlipidemia, Hypertension, and Stroke ([facility] [address] )  Pt with active OT orders  As per pt report, pta, resides alone in a 1STA, 2STE  Pt was I w/  ADLS and IADLS, (+) drove  Upon evaluation, pt currently requires S with [facility] for transfers and mobility  Pt currently requires I eating, I grooming, I UB ADLs, S LB ADLs, and S toileting  Pt reports local kids who can assist as needed  Pt with no questions or concerns at this time  From OT standpoint, recommendation would be home with social support  No further acute OT needs  D/C OT  Please re-consult if needed  Thank you  Pt was left after session with all current needs met  The patient's raw score on the AM-PAC Daily Activity inpatient short form is 21, standardized score is 44 27, greater than 39 4  Patients at this level are likely to benefit from discharge to home  Please refer to the recommendation of the Occupational Therapist for safe discharge planning     Goals   Patient Goals to go home   Plan   OT Frequency Eval only   Recommendation   OT Discharge Recommendation No rehabilitation needs   Equipment Recommended Bedside commode   Commode Type Standard   AM-PAC Daily Activity Inpatient   Lower Body Dressing 3   Bathing 3   Toileting 3   Upper Body Dressing 4   Grooming 4   Eating 4   Daily Activity Raw Score 21   Daily Activity Standardized Score (Calc for Raw Score >=11) 44 27   AM-PAC Applied Cognition Inpatient   Following a Speech/Presentation 4   Understanding Ordinary Conversation 4   Taking Medications 4   Remembering Where Things Are Placed or Put Away 4   Remembering List of 4-5 Errands 4   Taking Care of Complicated Tasks 4   Applied Cognition Raw Score 24   Applied Cognition Standardized Score 62 21       Shiloh Monge MS, OTR/L

## 2022-09-26 NOTE — TELEPHONE ENCOUNTER
----- Message from Derrek Wick MD sent at 9/24/2022 10:05 AM EDT -----  Patient underwent cystoscopy left ureteroscopy laser lithotripsy with stent with string-please have him follow-up this upcoming week such as Thursday for cystoscopy and stent removal by pulling on the string with nurse, then see a provider in 6 months with a renal ultrasound  Mikey Veronica

## 2022-09-26 NOTE — TELEPHONE ENCOUNTER
----- Message from Rohan Brar MD sent at 9/26/2022  9:45 AM EDT -----  MA team:   Patient was seen for acute kidney injury likely prerenal plus obstructive uropathy underwent left-sided stent placement  She follows [address]  Please arrange for outpatient follow-up with advanced practitioner Ainsley Woodward    Please order for repeat BMP to be done in 1 week with results forwarded to Martina Coronel

## 2022-09-26 NOTE — CASE MANAGEMENT
Case Management Discharge Planning Note    Patient name Alba Esposito  Location [facility] 923/[facility] 275-53 MRN [id number]  : 1955 Date 2022       Current Admission Date: 2022  Current Admission Diagnosis:Ureteral stone with hydronephrosis   Patient Active Problem List    Diagnosis Date Noted    Ureteral stone with hydronephrosis 2022    Cutaneous abscess of buttock 2022    Chronic kidney disease-mineral and bone disorder 2022    Type 2 diabetes mellitus with stage 4 chronic kidney disease, with long-term current use of insulin ([place] [address] ) 07/15/2022    Obesity, morbid ([place] [address] ) 07/15/2022    Secondary hyperparathyroidism of renal origin ([place] [address] ) 07/15/2022    Stage 4 chronic kidney disease ([place] [address] ) 07/15/2022    Factor 5 Leiden mutation, heterozygous ([place] [address] ) 01/15/2022    Thrombocytopenia ([place] [address] ) 2022    [facility] rest [facility]) 2022    MI (myocardial infarction) ([place] [address] ) 2022    History of PTCA 2022    Sleep apnea 2022    Smoking 2022    Anemia 2022    History of CVA (cerebrovascular accident) 2022    Acute kidney injury superimposed on chronic kidney disease stage 4 ([place] [address] ) 2022    HTN (hypertension) 2022    Chronic diastolic HF (heart failure) ([place] [address] ) 2022    HLD (hyperlipidemia) 2022    Lymphedema 2022    Hypothyroidism 2022      LOS (days): 3  Geometric Mean LOS (GMLOS) (days): 3 10  Days to GMLOS:0 1     OBJECTIVE:  Risk of Unplanned Readmission Score: 15 3         Current admission status: Inpatient   Preferred Pharmacy:   [address] [place] [address] [place] [address]  Phone: [phone] Fax: [phone]    Primary Care Provider: Shante Parker DO    Primary Insurance: MEDICARE  Secondary Insurance:     DISCHARGE DETAILS:       Additional Comments: CM was made aware that pt is cleared for d/c today   CM ordered [facility] as requested

## 2022-09-26 NOTE — WOUND OSTOMY CARE
Consult Note - Wound   Arron Hernandez 69 y o  male MRN: [id number]  Unit/Bed#: [facility] 923-01 Encounter: [id number]        History and Present Illness:  Patient is a 70 yo male that was admitted to the hospital for treatment of ureteral stone with hydronephrosis  Patient has a PMH of CKD 4, hypertension, CAD status post MCKINLEY in 2015, stroke in January status post tPA and loop recorder placement in June, type 2 diabetes on insulin, tobacco use, HFpEF, secondary hyperparathyroidism  PMH of lymphadema- reports wearing compression socks out patient  Patient is a min assist for turning and repositioning  Patient is reports continence of bowel and bladder- patient had leaks of urine when ambulating  On assessment, patient is sitting OOB in chair- patient states that he cannot sleep in bed       Wound Care was consulted for right butt skin abscess and RLE weeping  B/L lower extremities are swollen and dry  B/l heels are dry, intact, and lacie  Assessment Findings:   1  Left Pre-Tibial Partial thickness wound: wound bed is pink and fragile  Viviane-wound is macerated with scar tissue noted circumferentially around the site  Small amount of serosanguineous drainage noted  Area cleansed and dressed per orders below  2  Right Pretibial: Skin is clean, dry, and intact with no skin openings noted  Lotion applied  3  Right Buttocks Abscess: Full thickness skin loss with a yellow slough wound bed  Unable to determine full depth related to slough covering wound bed  Viviane-wound is hyperpigmented and fragile and blanches  Small serosanguineous drainage noted  Area dressed and cleansed per orders below  No induration, fluctuance, odor, warmth/temperature differences, redness, or purulence noted to the above noted wounds and skin areas assessed  New dressings applied per orders listed below  Patient tolerated well- no s/s of non-verbal pain or discomfort observed during the encounter  Bedside nurse aware of plan of care   See flow sheets for more detailed assessment findings  Orders listed below and wound care will continue to follow, call or tiger text with questions  Skin Care Plan:  1-Cleanse sacro-buttocks with soap and water  Apply Maxorb Ag and a foam to right buttocks abscess  Darryn with T for treatment  Change every other day or PRN  2-Turn/reposition q2h or when medically stable for pressure re-distribution on skin   3-Elevate heels to offload pressure  4-Moisturize skin daily with skin nourishing cream- especially b/l LE   5-Ehob cushion in chair when out of bed  6-Hydraguard to bilateral heels BID and PRN  7-Left Pre-Tibial: Cleanse area and pat dry  Apply adaptic to wound bed and place Maxorb on top  Cover with Allevyn Foam Dressing  Darryn with T for treatment  Change every other day or PRN  Wounds:  Wound 09/26/22 Pretibial Left (Active)   Wound Image   09/26/22 0936   Wound Description Pink;Fragile 09/26/22 0936   Viviane-wound Assessment Scar Tissue; Maceration 09/26/22 0936   Wound Length (cm) 2 5 cm 09/26/22 0936   Wound Width (cm) 3 cm 09/26/22 0936   Wound Depth (cm) 0 1 cm 09/26/22 0936   Wound Surface Area (cm^2) 7 5 cm^2 09/26/22 0936   Wound Volume (cm^3) 0 75 cm^3 09/26/22 0936   Calculated Wound Volume (cm^3) 0 75 cm^3 09/26/22 0936   Drainage Amount Small 09/26/22 0936   Drainage Description Serosanguineous 09/26/22 0936   Non-staged Wound Description Partial thickness 09/26/22 0936   Treatments Cleansed;Site care 09/26/22 0936   Dressing Non adherent;Calcium Alginate; Foam, Silicon (eg  Allevyn, etc) 09/26/22 0936   Wound packed? No 09/26/22 0936   Dressing Changed New 09/26/22 0936   Patient Tolerance Tolerated well 09/26/22 0936   Dressing Status Clean;Dry; Intact 09/26/22 0936       Wound 09/26/22 Pretibial Right (Active)   Wound Image   09/26/22 0937   Wound Description Intact;Dry 09/26/22 0937   Viviane-wound Assessment Intact 09/26/22 0937   Wound Length (cm) 0 cm 09/26/22 0937   Wound Width (cm) 0 cm 09/26/22 0937   Wound Depth (cm) 0 cm 09/26/22 0937   Wound Surface Area (cm^2) 0 cm^2 09/26/22 0937   Wound Volume (cm^3) 0 cm^3 09/26/22 0937   Calculated Wound Volume (cm^3) 0 cm^3 09/26/22 0937   Treatments Cleansed 09/26/22 0937   Dressing Changed New 09/26/22 0937   Patient Tolerance Tolerated well 09/26/22 0937       Wound 09/26/22 Buttocks Right (Active)   Wound Image   09/26/22 9222   Wound Description Slough; Yellow 09/26/22 0938   Viviane-wound Assessment Hyperpigmented;Fragile 09/26/22 0938   Wound Length (cm) 0 5 cm 09/26/22 0938   Wound Width (cm) 2 cm 09/26/22 0938   Wound Depth (cm) 0 2 cm 09/26/22 0938   Wound Surface Area (cm^2) 1 cm^2 09/26/22 0938   Wound Volume (cm^3) 0 2 cm^3 09/26/22 0938   Calculated Wound Volume (cm^3) 0 2 cm^3 09/26/22 0938   Drainage Amount Small 09/26/22 0938   Drainage Description Serosanguineous 09/26/22 0938   Non-staged Wound Description Full thickness 09/26/22 0938   Treatments Cleansed 09/26/22 0938   Dressing Calcium Alginate; Foam, Silicon (eg  Allevyn, etc) 09/26/22 0938   Dressing Changed New 09/26/22 0938   Patient Tolerance Tolerated well 09/26/22 0938   Dressing Status Dry; Intact; Clean 09/26/22 Ana M Gamboa 794, BSN

## 2022-09-26 NOTE — DISCHARGE INSTR - OTHER ORDERS
Skin Care Plan:  1-Cleanse sacro-buttocks with soap and water  Apply Maxorb Ag and a foam to right buttocks abscess  Darryn with T for treatment  Change every other day or PRN  2-Turn/reposition q2h or when medically stable for pressure re-distribution on skin   3-Elevate heels to offload pressure  4-Moisturize skin daily with skin nourishing cream- especially b/l LE   5-Ehob cushion in chair when out of bed  6-Hydraguard to bilateral heels BID and PRN  7-Left Pre-Tibial: Cleanse area and pat dry  Apply adaptic to wound bed and place Maxorb on top  Cover with Allevyn Foam Dressing  Darryn with T for treatment  Change every other day or PRN

## 2022-09-27 LAB
BACTERIA WND AEROBE CULT: ABNORMAL
BACTERIA WND AEROBE CULT: ABNORMAL
GRAM STN SPEC: ABNORMAL
GRAM STN SPEC: ABNORMAL

## 2022-09-27 NOTE — TELEPHONE ENCOUNTER
Post Op Note    Ebenezer Larios is a 69 y o  male s/p CYSTOSCOPY URETEROSCOPY WITH LITHOTRIPSY HOLMIUM LASER, AND INSERTION STENT URETERAL ( left bladder) performed 9/24/2022 by Dr Bolivar Knowles  How would you rate your pain on a scale from 1 to 10, 10 being the worst pain ever? 3- Some discomfort, not too bad   Have you had a fever? No  Have your bowel movements been regular? Yes  Do you have any difficulty urinating? No  Do you have any other questions or concerns that I can address at this time? Patient reports feeling ok, just urinating frequently  Advised that this is normal with stent in place  Patient denies fevers  Scheduled patient for stent with string removal in the [facility] on 9/29 @ 11:00 am  He was provided with office address  Patient wishes to wait to schedule 6 month follow up with renal ultrasound until he comes into the office on Thursday

## 2022-09-28 LAB
DME PARACHUTE DELIVERY DATE ACTUAL: NORMAL
DME PARACHUTE DELIVERY DATE REQUESTED: NORMAL
DME PARACHUTE ITEM DESCRIPTION: NORMAL
DME PARACHUTE ORDER STATUS: NORMAL
DME PARACHUTE SUPPLIER NAME: NORMAL
DME PARACHUTE SUPPLIER PHONE: NORMAL

## 2022-11-09 DIAGNOSIS — I48.0 PAROXYSMAL ATRIAL FIBRILLATION (HCC): ICD-10-CM

## 2022-11-09 DIAGNOSIS — R60.1 GENERALIZED EDEMA: ICD-10-CM

## 2022-11-17 ENCOUNTER — DOCTOR'S OFFICE (OUTPATIENT)
Dept: URBAN - NONMETROPOLITAN AREA CLINIC 1 | Facility: CLINIC | Age: 67
Setting detail: OPHTHALMOLOGY
End: 2022-11-17
Payer: COMMERCIAL

## 2022-11-17 DIAGNOSIS — H53.2: ICD-10-CM

## 2022-11-17 DIAGNOSIS — H25.13: ICD-10-CM

## 2022-11-17 DIAGNOSIS — I63.9: ICD-10-CM

## 2022-11-17 DIAGNOSIS — H50.21: ICD-10-CM

## 2022-11-17 PROCEDURE — 99213 OFFICE O/P EST LOW 20 MIN: CPT | Performed by: OPHTHALMOLOGY

## 2022-11-17 ASSESSMENT — REFRACTION_CURRENTRX
OD_SPHERE: -0.50
OD_OVR_VA: 20/
OS_VPRISM_DIRECTION: BF
OD_OVR_VA: 20/
OD_AXIS: 032
OD_SPHERE: -1.25
OS_OVR_VA: 20/
OS_SPHERE: -1.25
OD_VPRISM_DIRECTION: BF
OS_SPHERE: -0.50
OS_CYLINDER: -0.75
OS_OVR_VA: 20/
OS_CYLINDER: -0.25
OS_AXIS: 151
OD_ADD: +2.00
OS_AXIS: 118
OD_AXIS: 71
OD_CYLINDER: -0.75
OS_ADD: +2.00
OD_CYLINDER: -0.25

## 2022-11-17 ASSESSMENT — CONFRONTATIONAL VISUAL FIELD TEST (CVF)
OS_FINDINGS: FULL
OD_FINDINGS: FULL

## 2022-11-17 ASSESSMENT — REFRACTION_AUTOREFRACTION
OS_AXIS: 088
OS_SPHERE: -0.25
OD_CYLINDER: -0.75
OD_SPHERE: -0.75
OD_AXIS: 058
OS_CYLINDER: -0.50

## 2022-11-17 ASSESSMENT — SPHEQUIV_DERIVED
OS_SPHEQUIV: -0.5
OD_SPHEQUIV: -1.125

## 2022-11-17 ASSESSMENT — VISUAL ACUITY
OD_BCVA: 20/25+2
OS_BCVA: 20/40+2

## 2022-11-30 ENCOUNTER — DOCTOR'S OFFICE (OUTPATIENT)
Dept: URBAN - NONMETROPOLITAN AREA CLINIC 1 | Facility: CLINIC | Age: 67
Setting detail: OPHTHALMOLOGY
End: 2022-11-30

## 2022-11-30 ENCOUNTER — OPTICAL OFFICE (OUTPATIENT)
Dept: URBAN - NONMETROPOLITAN AREA CLINIC 4 | Facility: CLINIC | Age: 67
Setting detail: OPHTHALMOLOGY
End: 2022-11-30

## 2022-11-30 DIAGNOSIS — H52.223: ICD-10-CM

## 2022-11-30 DIAGNOSIS — H52.4: ICD-10-CM

## 2022-11-30 PROBLEM — H25.13 CATARACT; BOTH EYES: Status: ACTIVE | Noted: 2022-11-17

## 2022-11-30 PROCEDURE — 92015 DETERMINE REFRACTIVE STATE: CPT | Performed by: OPTOMETRIST

## 2022-11-30 PROCEDURE — V2784 LENS POLYCARB OR EQUAL: HCPCS | Performed by: OPTOMETRIST

## 2022-11-30 PROCEDURE — V2781 PROGRESSIVE LENS PER LENS: HCPCS | Performed by: OPTOMETRIST

## 2022-11-30 PROCEDURE — V2744 TINT PHOTOCHROMATIC LENS/ES: HCPCS | Performed by: OPTOMETRIST

## 2022-11-30 PROCEDURE — V2020 VISION SVCS FRAMES PURCHASES: HCPCS | Performed by: OPTOMETRIST

## 2022-11-30 PROCEDURE — V2750 ANTI-REFLECTIVE COATING: HCPCS | Performed by: OPTOMETRIST

## 2022-11-30 ASSESSMENT — REFRACTION_CURRENTRX
OD_CYLINDER: -0.75
OD_ADD: +2.00
OD_OVR_VA: 20/
OD_SPHERE: -1.25
OD_VPRISM_DIRECTION: BF
OS_OVR_VA: 20/
OD_CYLINDER: -0.75
OS_SPHERE: -0.50
OS_AXIS: 118
OS_OVR_VA: 20/
OD_OVR_VA: 20/
OD_AXIS: 032
OD_ADD: +2.00
OD_AXIS: 035
OS_AXIS: 103
OS_VPRISM_DIRECTION: BF
OS_SPHERE: -0.50
OS_CYLINDER: -0.25
OS_CYLINDER: -0.25
OS_ADD: +2.25
OS_ADD: +2.00
OD_SPHERE: -1.25

## 2022-11-30 ASSESSMENT — SPHEQUIV_DERIVED
OS_SPHEQUIV: -0.125
OD_SPHEQUIV: -1.125
OS_SPHEQUIV: -0.5
OD_SPHEQUIV: -1.125

## 2022-11-30 ASSESSMENT — REFRACTION_MANIFEST
OS_ADD: +2.50
OU_VA: 20/20-2
OD_CYLINDER: -0.75
OD_ADD: +2.50
OS_VA1: 20/25+2
OS_VA2: 20/25+2
OS_CYLINDER: -0.75
OS_SPHERE: +0.25
OD_SPHERE: -0.75
OD_VA2: 20/25+2
OS_AXIS: 090
OD_VA1: 20/25+2
OD_AXIS: 065

## 2022-11-30 ASSESSMENT — REFRACTION_AUTOREFRACTION
OS_CYLINDER: -0.50
OS_SPHERE: -0.25
OS_AXIS: 088
OD_SPHERE: -0.75
OD_CYLINDER: -0.75
OD_AXIS: 058

## 2022-11-30 ASSESSMENT — VISUAL ACUITY
OS_BCVA: 20/30-2
OD_BCVA: 20/30-1

## 2022-12-06 ENCOUNTER — HOSPITAL ENCOUNTER (EMERGENCY)
Facility: HOSPITAL | Age: 67
Discharge: HOME | End: 2022-12-06
Attending: EMERGENCY MEDICINE

## 2022-12-06 ENCOUNTER — APPOINTMENT (EMERGENCY)
Dept: RADIOLOGY | Facility: HOSPITAL | Age: 67
End: 2022-12-06

## 2022-12-06 VITALS
HEART RATE: 109 BPM | TEMPERATURE: 97.3 F | OXYGEN SATURATION: 96 % | HEIGHT: 70 IN | WEIGHT: 281.09 LBS | DIASTOLIC BLOOD PRESSURE: 68 MMHG | SYSTOLIC BLOOD PRESSURE: 128 MMHG | BODY MASS INDEX: 40.24 KG/M2 | RESPIRATION RATE: 20 BRPM

## 2022-12-06 DIAGNOSIS — I48.91 ATRIAL FIBRILLATION WITH RAPID VENTRICULAR RESPONSE (HCC): Primary | ICD-10-CM

## 2022-12-06 DIAGNOSIS — N18.9 CKD (CHRONIC KIDNEY DISEASE): ICD-10-CM

## 2022-12-06 LAB
ALBUMIN SERPL BCP-MCNC: 3.6 G/DL (ref 3.5–5)
ALP SERPL-CCNC: 121 U/L (ref 46–116)
ALT SERPL W P-5'-P-CCNC: 33 U/L (ref 12–78)
ANION GAP SERPL CALCULATED.3IONS-SCNC: 10 MMOL/L (ref 4–13)
AST SERPL W P-5'-P-CCNC: 31 U/L (ref 5–45)
BASOPHILS # BLD AUTO: 0.05 THOUSANDS/ÂΜL (ref 0–0.1)
BASOPHILS NFR BLD AUTO: 1 % (ref 0–1)
BILIRUB SERPL-MCNC: 0.4 MG/DL (ref 0.2–1)
BUN SERPL-MCNC: 58 MG/DL (ref 5–25)
CALCIUM SERPL-MCNC: 9.8 MG/DL (ref 8.3–10.1)
CARDIAC TROPONIN I PNL SERPL HS: 7 NG/L
CHLORIDE SERPL-SCNC: 102 MMOL/L (ref 96–108)
CO2 SERPL-SCNC: 26 MMOL/L (ref 21–32)
CREAT SERPL-MCNC: 3.64 MG/DL (ref 0.6–1.3)
EOSINOPHIL # BLD AUTO: 0.19 THOUSAND/ÂΜL (ref 0–0.61)
EOSINOPHIL NFR BLD AUTO: 2 % (ref 0–6)
ERYTHROCYTE [DISTWIDTH] IN BLOOD BY AUTOMATED COUNT: 14.9 % (ref 11.6–15.1)
FLUAV RNA RESP QL NAA+PROBE: NEGATIVE
FLUBV RNA RESP QL NAA+PROBE: NEGATIVE
GFR SERPL CREATININE-BSD FRML MDRD: 16 ML/MIN/1.73SQ M
GLUCOSE SERPL-MCNC: 132 MG/DL (ref 65–140)
HCT VFR BLD AUTO: 42.7 % (ref 36.5–49.3)
HGB BLD-MCNC: 13.7 G/DL (ref 12–17)
IMM GRANULOCYTES # BLD AUTO: 0.02 THOUSAND/UL (ref 0–0.2)
IMM GRANULOCYTES NFR BLD AUTO: 0 % (ref 0–2)
LYMPHOCYTES # BLD AUTO: 1.28 THOUSANDS/ÂΜL (ref 0.6–4.47)
LYMPHOCYTES NFR BLD AUTO: 16 % (ref 14–44)
MAGNESIUM SERPL-MCNC: 2.2 MG/DL (ref 1.6–2.6)
MCH RBC QN AUTO: 32.7 PG (ref 26.8–34.3)
MCHC RBC AUTO-ENTMCNC: 32.1 G/DL (ref 31.4–37.4)
MCV RBC AUTO: 102 FL (ref 82–98)
MONOCYTES # BLD AUTO: 0.31 THOUSAND/ÂΜL (ref 0.17–1.22)
MONOCYTES NFR BLD AUTO: 4 % (ref 4–12)
NEUTROPHILS # BLD AUTO: 5.98 THOUSANDS/ÂΜL (ref 1.85–7.62)
NEUTS SEG NFR BLD AUTO: 77 % (ref 43–75)
NRBC BLD AUTO-RTO: 0 /100 WBCS
NT-PROBNP SERPL-MCNC: 300 PG/ML
PLATELET # BLD AUTO: 135 THOUSANDS/UL (ref 149–390)
PMV BLD AUTO: 9 FL (ref 8.9–12.7)
POTASSIUM SERPL-SCNC: 4.1 MMOL/L (ref 3.5–5.3)
PROT SERPL-MCNC: 8 G/DL (ref 6.4–8.4)
RBC # BLD AUTO: 4.19 MILLION/UL (ref 3.88–5.62)
RSV RNA RESP QL NAA+PROBE: NEGATIVE
SARS-COV-2 RNA RESP QL NAA+PROBE: NEGATIVE
SODIUM SERPL-SCNC: 138 MMOL/L (ref 135–147)
WBC # BLD AUTO: 7.83 THOUSAND/UL (ref 4.31–10.16)

## 2022-12-06 RX ORDER — METOPROLOL TARTRATE 5 MG/5ML
5 INJECTION INTRAVENOUS ONCE
Status: COMPLETED | OUTPATIENT
Start: 2022-12-06 | End: 2022-12-06

## 2022-12-06 RX ADMIN — METOPROLOL TARTRATE 25 MG: 25 TABLET, FILM COATED ORAL at 17:49

## 2022-12-06 RX ADMIN — METOROPROLOL TARTRATE 5 MG: 5 INJECTION, SOLUTION INTRAVENOUS at 18:44

## 2022-12-06 NOTE — ED PROVIDER NOTES
History  Chief Complaint   Patient presents with   • Chest Pain     Pt reports chest pain since this AM, states it started as a tightness but now pt reports feeling palpitations, pt also having SOB and cough during triage     Patient is a 64-year-old male with history of coronary artery disease congestive heart failure and atrial fibrillation on Eliquis presents to the emergency department due to palpitations and irregular heartbeat and shortness of breath and chest discomfort started this morning patient reports having similar symptoms on Sunday but self resolved patient reports having a internal cardiac monitor implanted and monitor was reading to him that he is in A  fib today  Patient reports she is not always in A  Fib  History provided by:  Patient  Chest Pain  Pain location:  Substernal area  Pain quality: tightness    Pain radiates to:  Does not radiate  Pain severity:  Mild  Onset quality:  Gradual  Duration:  1 day  Timing:  Constant  Progression:  Worsening  Chronicity:  New  Associated symptoms: palpitations and shortness of breath    Associated symptoms: no abdominal pain, no cough, no dizziness, no fatigue, no fever, no headache, no nausea, no numbness, not vomiting and no weakness        Prior to Admission Medications   Prescriptions Last Dose Informant Patient Reported? Taking?    Arginine 1000 MG TABS   Yes No   Sig: Take by mouth One daily   HYDROcodone-acetaminophen (NORCO)  mg per tablet   Yes No   Sig: Take by mouth   allopurinol (ZYLOPRIM) 300 mg tablet   Yes No   Sig: Take by mouth   ascorbic acid (VITAMIN C) 500 MG tablet   Yes No   Sig: Take 500 mg by mouth daily   aspirin (ECOTRIN LOW STRENGTH) 81 mg EC tablet   Yes No   Sig: Take 81 mg by mouth   atorvastatin (LIPITOR) 40 mg tablet   No No   Sig: Take 1 tablet (40 mg total) by mouth daily with dinner   b complex vitamins capsule   Yes No   Sig: Take 1 capsule by mouth daily   clindamycin (CLEOCIN) 300 MG capsule   Yes No co-enzyme Q-10 30 MG capsule   Yes No   Sig: Take 100 mg by mouth   furosemide (LASIX) 80 mg tablet   Yes No   Sig: Take 120 mg by mouth 2 (two) times a day   insulin glargine (LANTUS) 100 units/mL subcutaneous injection   Yes No   Sig: Inject 20 Units under the skin   insulin lispro (HumaLOG) 100 units/mL injection   Yes No   levothyroxine 125 mcg tablet   Yes No   Sig: Take by mouth   metoprolol succinate (TOPROL-XL) 25 mg 24 hr tablet   Yes No   Sig: Take by mouth 1-2 prn   tamsulosin (FLOMAX) 0 4 mg   Yes No   Sig: Take 0 4 mg by mouth daily with dinner   vitamin A 2400 MCG (8000 UT) capsule   Yes No   Sig: Take 2,400 Units by mouth daily   vitamin E, tocopherol, 200 units capsule   Yes No   Sig: Take 200 Units by mouth daily 180 mg daily      Facility-Administered Medications: None       Past Medical History:   Diagnosis Date   • Chronic kidney disease    • Disease of thyroid gland    • Hyperlipidemia    • Hypertension    • MI (myocardial infarction) ([address] [address] )    • Stroke [facility])        Past Surgical History:   Procedure Laterality Date   • CHOLECYSTECTOMY     • CORONARY ANGIOPLASTY WITH STENT PLACEMENT     • JOINT REPLACEMENT      bilateral knee   • NH CYSTO/URETERO W/LITHOTRIPSY &INDWELL STENT INSRT Left 9/24/2022    Procedure: CYSTOSCOPY URETEROSCOPY WITH LITHOTRIPSY HOLMIUM LASER, AND INSERTION STENT URETERAL;  Surgeon: Tiffani Genao MD;  Location: [facility];  Service: Urology       Family History   Problem Relation Age of Onset   • Kidney failure Mother    • Cirrhosis Mother    • Colon cancer Brother      I have reviewed and agree with the history as documented      E-Cigarette/Vaping   • E-Cigarette Use Never User      E-Cigarette/Vaping Substances     Social History     Tobacco Use   • Smoking status: Every Day     Packs/day: 0 50     Types: Cigarettes   • Smokeless tobacco: Never   Vaping Use   • Vaping Use: Never used   Substance Use Topics   • Alcohol use: Not Currently   • Drug use: Never       Review of Systems   Constitutional: Negative for activity change, appetite change, chills, fatigue and fever  HENT: Negative for congestion, ear pain, rhinorrhea and sore throat  Eyes: Negative for discharge, redness and visual disturbance  Respiratory: Positive for shortness of breath  Negative for cough, chest tightness and wheezing  Cardiovascular: Positive for chest pain, palpitations and leg swelling  Gastrointestinal: Negative for abdominal pain, constipation, diarrhea, nausea and vomiting  Endocrine: Negative for polydipsia and polyuria  Genitourinary: Negative for difficulty urinating, dysuria, frequency, hematuria and urgency  Musculoskeletal: Negative for arthralgias and myalgias  Skin: Negative for color change, pallor and rash  Neurological: Negative for dizziness, weakness, light-headedness, numbness and headaches  Hematological: Negative for adenopathy  Does not bruise/bleed easily  All other systems reviewed and are negative  Physical Exam  Physical Exam  Vitals and nursing note reviewed  Constitutional:       Appearance: He is well-developed and well-nourished  HENT:      Head: Normocephalic and atraumatic  Right Ear: External ear normal       Left Ear: External ear normal       Nose: Nose normal       Mouth/Throat:      Mouth: Oropharynx is clear and moist    Eyes:      Extraocular Movements: EOM normal       Conjunctiva/sclera: Conjunctivae normal       Pupils: Pupils are equal, round, and reactive to light  Cardiovascular:      Rate and Rhythm: Tachycardia present  Rhythm irregularly irregular  Pulses: Intact distal pulses  Heart sounds: Normal heart sounds  Pulmonary:      Effort: Pulmonary effort is normal  No respiratory distress  Breath sounds: Examination of the right-lower field reveals decreased breath sounds  Examination of the left-lower field reveals decreased breath sounds  Decreased breath sounds present  No wheezing or rales     Chest: Chest wall: No tenderness  Abdominal:      General: Bowel sounds are normal  There is no distension  Palpations: Abdomen is soft  Tenderness: There is no abdominal tenderness  There is no guarding  Musculoskeletal:         General: Normal range of motion  Cervical back: Normal range of motion and neck supple  Right lower leg: Edema present  Left lower leg: Edema present  Skin:     General: Skin is warm and dry  Neurological:      Mental Status: He is alert and oriented to person, place, and time  Cranial Nerves: No cranial nerve deficit  Sensory: No sensory deficit  Psychiatric:         Mood and Affect: Mood and affect normal          Vital Signs  ED Triage Vitals [12/06/22 1706]   Temperature Pulse Respirations Blood Pressure SpO2   (!) 97 3 °F (36 3 °C) 68 20 166/91 97 %      Temp Source Heart Rate Source Patient Position - Orthostatic VS BP Location FiO2 (%)   Temporal Monitor Sitting Left arm --      Pain Score       --           Vitals:    12/06/22 1706 12/06/22 1745   BP: 166/91 128/68   Pulse: 68 (!) 109   Patient Position - Orthostatic VS: Sitting Sitting         Visual Acuity      ED Medications  Medications   metoprolol tartrate (LOPRESSOR) tablet 25 mg (25 mg Oral Given 12/6/22 1749)   metoprolol (LOPRESSOR) injection 5 mg (5 mg Intravenous Given 12/6/22 1844)       Diagnostic Studies  Results Reviewed     Procedure Component Value Units Date/Time    FLU/RSV/COVID - if FLU/RSV clinically relevant [[id number]]  (Normal) Collected: 12/06/22 1824    Lab Status: Final result Specimen: Nares from Nose Updated: 12/06/22 1917     SARS-CoV-2 Negative     INFLUENZA A PCR Negative     INFLUENZA B PCR Negative     RSV PCR Negative    Narrative:      FOR PEDIATRIC PATIENTS - copy/paste COVID Guidelines URL to browser: [email or web address]    SARS-CoV-2 assay is a Nucleic Acid Amplification assay intended for the  qualitative detection of nucleic acid from SARS-CoV-2 in nasopharyngeal  swabs  Results are for the presumptive identification of SARS-CoV-2 RNA  Positive results are indicative of infection with SARS-CoV-2, the virus  causing COVID-19, but do not rule out bacterial infection or co-infection  with other viruses  Laboratories within the [facility] and its  territories are required to report all positive results to the appropriate  public health authorities  Negative results do not preclude SARS-CoV-2  infection and should not be used as the sole basis for treatment or other  patient management decisions  Negative results must be combined with  clinical observations, patient history, and epidemiological information  This test has not been FDA cleared or approved  This test has been authorized by FDA under an Emergency Use Authorization  (EUA)  This test is only authorized for the duration of time the  declaration that circumstances exist justifying the authorization of the  emergency use of an in vitro diagnostic tests for detection of SARS-CoV-2  virus and/or diagnosis of COVID-19 infection under section 564(b)(1) of  the Act, 21 U  S C  169GJV-1(L)(2), unless the authorization is terminated  or revoked sooner  The test has been validated but independent review by FDA  and CLIA is pending  Test performed using [employer]: This RT-PCR assay targets N2,  a region unique to SARS-CoV-2  A conserved region in the E-gene was chosen  for pan-Sarbecovirus detection which includes SARS-CoV-2  According to CMS-2020-01-R, this platform meets the definition of high-throughput technology      Comprehensive metabolic panel [[id number]]  (Abnormal) Collected: 12/06/22 1824    Lab Status: Final result Specimen: Blood from Arm, Right Updated: 12/06/22 1858     Sodium 138 mmol/L      Potassium 4 1 mmol/L      Chloride 102 mmol/L      CO2 26 mmol/L      ANION GAP 10 mmol/L      BUN 58 mg/dL      Creatinine 3 64 mg/dL      Glucose 132 mg/dL      Calcium 9 8 mg/dL      AST 31 U/L      ALT 33 U/L      Alkaline Phosphatase 121 U/L      Total Protein 8 0 g/dL      Albumin 3 6 g/dL      Total Bilirubin 0 40 mg/dL      eGFR 16 ml/min/1 73sq m     Narrative:      [place] guidelines for Chronic Kidney Disease (CKD):   •  Stage 1 with normal or high GFR (GFR > 90 mL/min/1 73 square meters)  •  Stage 2 Mild CKD (GFR = 60-89 mL/min/1 73 square meters)  •  Stage 3A Moderate CKD (GFR = 45-59 mL/min/1 73 square meters)  •  Stage 3B Moderate CKD (GFR = 30-44 mL/min/1 73 square meters)  •  Stage 4 Severe CKD (GFR = 15-29 mL/min/1 73 square meters)  •  Stage 5 End Stage CKD (GFR <15 mL/min/1 73 square meters)  Note: GFR calculation is accurate only with a steady state creatinine    Magnesium [[id number]]  (Normal) Collected: 12/06/22 1824    Lab Status: Final result Specimen: Blood from Arm, Right Updated: 12/06/22 1855     Magnesium 2 2 mg/dL     NT-BNP PRO [[id number]]  (Abnormal) Collected: 12/06/22 1824    Lab Status: Final result Specimen: Blood from Arm, Right Updated: 12/06/22 1855     NT-proBNP 300 pg/mL     HS Troponin 0hr (reflex protocol) [[id number]]  (Normal) Collected: 12/06/22 1824    Lab Status: Final result Specimen: Blood from Arm, Right Updated: 12/06/22 1855     hs TnI 0hr 7 ng/L     HS Troponin I 2hr [[id number]]     Lab Status: No result Specimen: Blood     Protime-INR [[id number]] Updated: 12/06/22 1853    Lab Status: No result Specimen: Blood from Arm, Right     APTT [[id number]] Updated: 12/06/22 1853    Lab Status: No result Specimen: Blood from Arm, Right     CBC and differential [[id number]]  (Abnormal) Collected: 12/06/22 1824    Lab Status: Final result Specimen: Blood from Arm, Right Updated: 12/06/22 1837     WBC 7 83 Thousand/uL      RBC 4 19 Million/uL      Hemoglobin 13 7 g/dL      Hematocrit 42 7 %       fL      MCH 32 7 pg      MCHC 32 1 g/dL      RDW 14 9 %      MPV 9 0 fL Platelets 104 Thousands/uL      nRBC 0 /100 WBCs      Neutrophils Relative 77 %      Immat GRANS % 0 %      Lymphocytes Relative 16 %      Monocytes Relative 4 %      Eosinophils Relative 2 %      Basophils Relative 1 %      Neutrophils Absolute 5 98 Thousands/µL      Immature Grans Absolute 0 02 Thousand/uL      Lymphocytes Absolute 1 28 Thousands/µL      Monocytes Absolute 0 31 Thousand/µL      Eosinophils Absolute 0 19 Thousand/µL      Basophils Absolute 0 05 Thousands/µL                  XR chest 1 view portable   ED Interpretation by Elio Hernandez DO (12/06 1731)   Mild pulmonary vascular congestion bibasilar atelectasis no focal infiltrate                 Procedures  Procedures         ED Course  ED Course as of 12/06/22 1918   Tue Dec 06, 2022   1754 And noted to be in atrial fibrillation with rate in the low 100s to 130s intermittently in the ED given oral and will give IV lopressor also when IV access obtained  1857 Creatinine(!): 3 64  Noted baseline creatinine about 3 5-4 with chronic kidney disease  1858 Heart rate is now improved into the 80s to 90s still in A  fib patient does feel better feels like heart is not racing around so much now after oral and IV Lopressor given  26 Spoke with Dr Linn Kennedy reviewed case and findings in the emergency department and management thus far recommends that patient's metoprolol should be increased to 50 mg twice daily from current dose of 50 in the morning and 25 in evening now  1911 Patient reevaluated in the ED now advised of cardiology recommendations he is anxious and wishing to return home at this time feels entirely well and reports the rumbling is in his chest has resolved with rate control now    Advised patient of increasing metoprolol dose recommendation he understands this and agrees also recommended prompt follow-up with cardiologist for further evaluation and treatment and obtain test results patient understands this recommendation and agrees and will follow up promptly as advised return precautions and anticipatory guidance discussed  HEART Risk Score    Flowsheet Row Most Recent Value   Heart Score Risk Calculator    History 0 Filed at: 12/06/2022 1909   ECG 1 Filed at: 12/06/2022 1909   Age 2 Filed at: 12/06/2022 1909   Risk Factors 2 Filed at: 12/06/2022 1909   Troponin 0 Filed at: 12/06/2022 1909   HEART Score 5 Filed at: 12/06/2022 1909                                      MDM  Number of Diagnoses or Management Options  Atrial fibrillation with rapid ventricular response [facility]): new and requires workup  CKD (chronic kidney disease): new and requires workup  Diagnosis management comments: Patient remained clinically and hemodynamically stable in the emergency department initial rapid A  fib was resolved in the ED rates remained steady in the 80-90 range and patient was feeling well and wishing to return home  No evidence of acute cardiopulmonary pathology symptoms had been ongoing for a full day very low utility of second troponin at this point patient did not really have chest pain rather a discomfort and uncomfortable feeling in the chest related to the A  fib likely  Case was discussed with cardiology who was in agreement with increasing metoprolol dose and recommended discharge for now with prompt follow-up  Patient was in agreement and wished to return home and felt well  Advise prompt follow-up with PCP and cardiology return precautions anticipatory as discussed           Amount and/or Complexity of Data Reviewed  Clinical lab tests: ordered and reviewed  Tests in the radiology section of CPT®: ordered and reviewed  Tests in the medicine section of CPT®: reviewed and ordered  Decide to obtain previous medical records or to obtain history from someone other than the patient: yes  Review and summarize past medical records: yes  Independent visualization of images, tracings, or specimens: yes    Risk of Complications, Morbidity, and/or Mortality  Presenting problems: moderate  Diagnostic procedures: moderate  Management options: moderate    Patient Progress  Patient progress: stable      Disposition  Final diagnoses:   Atrial fibrillation with rapid ventricular response ([address] ) - Rate controlled in the ED with oral meds   CKD (chronic kidney disease)     Time reflects when diagnosis was documented in both MDM as applicable and the Disposition within this note     Time User Action Codes Description Comment    12/6/2022  7:09 PM Easton Sharath Add [I48 91] Atrial fibrillation with rapid ventricular response ([address] )     12/6/2022  7:10 PM Easton Sharath Modify [I48 91] Atrial fibrillation with rapid ventricular response ([address] ) Rate controlled in the ED with oral meds    12/6/2022  7:10 PM Bennie Shoemaker Add [N18 9] CKD (chronic kidney disease)       ED Disposition     ED Disposition   Discharge    Condition   Stable    Date/Time   Tue Dec 6, 2022  7:15 PM    Comment   Mena Cuellar discharge to home/self care  Follow-up Information     Follow up With Specialties Details Why Contact Info    Buffalo Skill, DO Internal Medicine, Pediatrics Schedule an appointment as soon as possible for a visit in 3 days  [address] [facility] [id number]  [facility] [address], DO Cardiology, Internal Medicine Schedule an appointment as soon as possible for a visit in 3 days  Radha Azevedo [place] [address]  [phone]            Patient's Medications   Discharge Prescriptions    No medications on file       No discharge procedures on file      PDMP Review     None          ED Provider  Electronically Signed by           Akash Mosqueda DO  12/06/22 1918

## 2022-12-07 NOTE — DISCHARGE INSTRUCTIONS
Increase your metoprolol dose to 50 mg twice daily for now and follow-up promptly with your cardiologist

## 2022-12-11 LAB
QRS AXIS: -22 DEGREES
QRSD INTERVAL: 156 MS
QT INTERVAL: 394 MS
QTC INTERVAL: 510 MS
T WAVE AXIS: 3 DEGREES
VENTRICULAR RATE: 101 BPM

## 2023-01-27 ENCOUNTER — OFFICE VISIT (OUTPATIENT)
Dept: NEPHROLOGY | Facility: CLINIC | Age: 68
End: 2023-01-27

## 2023-01-27 VITALS — WEIGHT: 287 LBS | HEIGHT: 70 IN | OXYGEN SATURATION: 100 % | HEART RATE: 69 BPM | BODY MASS INDEX: 41.09 KG/M2

## 2023-01-27 DIAGNOSIS — I10 PRIMARY HYPERTENSION: Primary | ICD-10-CM

## 2023-01-27 DIAGNOSIS — N18.4 STAGE 4 CHRONIC KIDNEY DISEASE (HCC): ICD-10-CM

## 2023-01-27 DIAGNOSIS — N25.81 SECONDARY HYPERPARATHYROIDISM OF RENAL ORIGIN (HCC): ICD-10-CM

## 2023-01-27 DIAGNOSIS — R60.0 BILATERAL LOWER EXTREMITY EDEMA: ICD-10-CM

## 2023-01-27 DIAGNOSIS — N13.2 URETERAL STONE WITH HYDRONEPHROSIS: ICD-10-CM

## 2023-01-27 DIAGNOSIS — I50.32 CHRONIC DIASTOLIC HF (HEART FAILURE) (HCC): ICD-10-CM

## 2023-01-27 RX ORDER — NITROGLYCERIN 0.4 MG/1
TABLET SUBLINGUAL
COMMUNITY
Start: 2022-12-09

## 2023-01-27 NOTE — ASSESSMENT & PLAN NOTE
Blood pressures are controlled at this time, continue with current medications  Continue to focus on low-sodium diet

## 2023-01-27 NOTE — PROGRESS NOTES
[facility] [place], [place]    Name: Yomi Cunningham  YOB: 1955      Assessment/Plan:    Stage 4 chronic kidney disease [facility])  Lab Results   Component Value Date    EGFR 16 12/06/2022    EGFR 13 09/26/2022    EGFR 12 09/25/2022    CREATININE 3 64 (H) 12/06/2022    CREATININE 4 19 (H) 09/26/2022    CREATININE 4 33 (H) 09/25/2022     Patient is a presumed chronic kidney disease stage IV with an estimated GFR less than 30 mils/minute  We will continue to monitor the patient's blood work closely  We will likely check labs every 2 months for now, potentially more often depending on where his labs were noted to be from last week  Patient is aware to avoid all nonsteroid inflammatory medications in the meantime  He is also to continue to focus on blood sugar control  We briefly discussed dialysis, but spent most the time discussing potential transplant  Since the patient has been stable and his presumed estimated GFR is less than 20 mill/minute, he is a good candidate to be evaluated for transplant at this time  Patient is aware that there are 4 programs in the area which we can refer him to  He will think on this, discussed with his family, and then get back to us with a decision  Chronic diastolic HF (heart failure) (HCC)  Wt Readings from Last 3 Encounters:   01/27/23 130 kg (287 lb)   12/06/22 127 kg (281 lb 1 4 oz)   09/29/22 131 kg (288 lb)   Patient appears to be compensated at this time, continue with furosemide 120 mg twice daily  Patient could continue to improve sodium restriction in his diet  He will be at high risk for potential acute kidney injury and acute exacerbation of underlying chronic kidney disease if he continues to remain on the higher sodium diet which he has been enjoying    Patient's risk for acute kidney injury may also increase if we need to escalate diuretic regimen, which we will try to avoid by limiting sodium intake is much as possible in the diet  HTN (hypertension)  Blood pressures are controlled at this time, continue with current medications  Continue to focus on low-sodium diet  Secondary hyperparathyroidism of renal origin ([facility] )  Follow-up PTH levels, patient's previous levels although elevated did not warrant initiation of an activated vitamin D agent  We will continue to monitor for now and consider treatment depending on how the patient progresses from the clinical standpoint  Problem List Items Addressed This Visit        Endocrine    Secondary hyperparathyroidism of renal origin ([facility] )     Follow-up PTH levels, patient's previous levels although elevated did not warrant initiation of an activated vitamin D agent  We will continue to monitor for now and consider treatment depending on how the patient progresses from the clinical standpoint  Cardiovascular and Mediastinum    HTN (hypertension) - Primary     Blood pressures are controlled at this time, continue with current medications  Continue to focus on low-sodium diet  Chronic diastolic HF (heart failure) (HCC)     Wt Readings from Last 3 Encounters:   01/27/23 130 kg (287 lb)   12/06/22 127 kg (281 lb 1 4 oz)   09/29/22 131 kg (288 lb)   Patient appears to be compensated at this time, continue with furosemide 120 mg twice daily  Patient could continue to improve sodium restriction in his diet  He will be at high risk for potential acute kidney injury and acute exacerbation of underlying chronic kidney disease if he continues to remain on the higher sodium diet which he has been enjoying  Patient's risk for acute kidney injury may also increase if we need to escalate diuretic regimen, which we will try to avoid by limiting sodium intake is much as possible in the diet               Relevant Medications    nitroglycerin (NITROSTAT) 0 4 mg SL tablet       Genitourinary    Stage 4 chronic kidney disease (HCC)     Lab Results Component Value Date    EGFR 16 12/06/2022    EGFR 13 09/26/2022    EGFR 12 09/25/2022    CREATININE 3 64 (H) 12/06/2022    CREATININE 4 19 (H) 09/26/2022    CREATININE 4 33 (H) 09/25/2022     Patient is a presumed chronic kidney disease stage IV with an estimated GFR less than 30 mils/minute  We will continue to monitor the patient's blood work closely  We will likely check labs every 2 months for now, potentially more often depending on where his labs were noted to be from last week  Patient is aware to avoid all nonsteroid inflammatory medications in the meantime  He is also to continue to focus on blood sugar control  We briefly discussed dialysis, but spent most the time discussing potential transplant  Since the patient has been stable and his presumed estimated GFR is less than 20 mill/minute, he is a good candidate to be evaluated for transplant at this time  Patient is aware that there are 4 programs in the area which we can refer him to  He will think on this, discussed with his family, and then get back to us with a decision  Ureteral stone with hydronephrosis       Other    Bilateral lower extremity edema       Patient currently stable from a renal standpoint, we will see him back for regular appointment in approximately 2 months, blood work will likely be done every 2 months as well  Subjective:      Patient ID: Mena Rendon is a 69 y o  male  Patient presents for follow-up appointment  This is the first time that I saw the patient, we reviewed all labs available  Patient also had labs done with his primary care provider, these labs are pending to be faxed over at the moment  It appears that the patient had fairly good kidney function up until 4 or 5 years ago at which time he suffered a significant decline down to presumed kidney function of 20 mL/minute    Since that time, the patient has been seen nephrology, however he has been disappointed with some aspects of his care in the outpatient setting  They have been preparing him for potential dialysis since he was seen, however he has not had an evaluation by the vascular surgeons for fistula placement, and he was also interested in having home therapy, and his previous nephrologist was not keen on this treatment modality for him  Thankfully, the patient has been stable with his kidney function, the most recent blood work noted an estimated GFR of 16 mL/minute, however, as mentioned above there is blood work from the previous week which is pending from his primary care provider  He has not had a kidney biopsy in the past     One of the patient's main medical issues includes swelling of the bilateral lower extremities  He is currently on furosemide 120 mg twice daily  Patient believes he produces approximately 2 quarts or more of urine a day  He continues to enjoy some high sodium foods, but has been working on trying to reduce this as much as possible  Patient is taking all medications as prescribed with no specific side effects at this time  The following portions of the patient's history were reviewed and updated as appropriate: allergies, current medications, past family history, past medical history, past social history, past surgical history and problem list     Review of Systems   All other systems reviewed and are negative  Social History     Socioeconomic History   • Marital status:       Spouse name: None   • Number of children: None   • Years of education: None   • Highest education level: None   Occupational History   • None   Tobacco Use   • Smoking status: Every Day     Packs/day: 0 50     Types: Cigarettes   • Smokeless tobacco: Never   Vaping Use   • Vaping Use: Never used   Substance and Sexual Activity   • Alcohol use: Not Currently   • Drug use: Never   • Sexual activity: None   Other Topics Concern   • None   Social History Narrative   • None     Social Determinants of Health     Financial Resource Strain: Not on file   Food Insecurity: Not on file   Transportation Needs: Not on file   Physical Activity: Not on file   Stress: Not on file   Social Connections: Not on file   Intimate Partner Violence: Not on file   Housing Stability: Not on file     Past Medical History:   Diagnosis Date   • Chronic kidney disease    • Disease of thyroid gland    • Hyperlipidemia    • Hypertension    • MI (myocardial infarction) ([facility] )    • Stroke [facility])      Past Surgical History:   Procedure Laterality Date   • CHOLECYSTECTOMY     • CORONARY ANGIOPLASTY WITH STENT PLACEMENT     • JOINT REPLACEMENT      bilateral knee   • AK CYSTO/URETERO W/LITHOTRIPSY &INDWELL STENT INSRT Left 9/24/2022    Procedure: CYSTOSCOPY URETEROSCOPY WITH LITHOTRIPSY HOLMIUM LASER, AND INSERTION STENT URETERAL;  Surgeon: Gopal Camp MD;  Location: [facility];  Service: Urology       Current Outpatient Medications:   •  allopurinol (ZYLOPRIM) 300 mg tablet, Take by mouth, Disp: , Rfl:   •  apixaban (ELIQUIS) 5 mg, 2 (two) times a day, Disp: , Rfl:   •  Arginine 1000 MG TABS, Take by mouth One daily, Disp: , Rfl:   •  ascorbic acid (VITAMIN C) 500 MG tablet, Take 500 mg by mouth daily, Disp: , Rfl:   •  aspirin (ECOTRIN LOW STRENGTH) 81 mg EC tablet, Take 81 mg by mouth, Disp: , Rfl:   •  atorvastatin (LIPITOR) 40 mg tablet, Take 1 tablet (40 mg total) by mouth daily with dinner, Disp: 30 tablet, Rfl: 0  •  b complex vitamins capsule, Take 1 capsule by mouth daily, Disp: , Rfl:   •  co-enzyme Q-10 30 MG capsule, Take 100 mg by mouth, Disp: , Rfl:   •  furosemide (LASIX) 80 mg tablet, Take 120 mg by mouth 2 (two) times a day, Disp: , Rfl:   •  HYDROcodone-acetaminophen (NORCO)  mg per tablet, Take by mouth, Disp: , Rfl:   •  insulin glargine (LANTUS) 100 units/mL subcutaneous injection, Inject 20 Units under the skin every 12 (twelve) hours, Disp: , Rfl:   •  insulin lispro (HumaLOG) 100 units/mL injection, , Disp: , Rfl:   •  levothyroxine 125 mcg tablet, Take by mouth, Disp: , Rfl:   •  metoprolol succinate (TOPROL-XL) 25 mg 24 hr tablet, Take by mouth 4 (four) times a day 1-2 prn, Disp: , Rfl:   •  nitroglycerin (NITROSTAT) 0 4 mg SL tablet, PLEASE SEE ATTACHED FOR DETAILED DIRECTIONS, Disp: , Rfl:   •  tamsulosin (FLOMAX) 0 4 mg, Take 0 4 mg by mouth daily with dinner, Disp: , Rfl:   •  vitamin A 2400 MCG (8000 UT) capsule, Take 2,400 Units by mouth daily, Disp: , Rfl:   •  vitamin E, tocopherol, 200 units capsule, Take 200 Units by mouth daily 180 mg daily, Disp: , Rfl:     Lab Results   Component Value Date    SODIUM 138 12/06/2022    K 4 1 12/06/2022     12/06/2022    CO2 26 12/06/2022    AGAP 10 12/06/2022    BUN 58 (H) 12/06/2022    CREATININE 3 64 (H) 12/06/2022    GLUC 132 12/06/2022    CALCIUM 9 8 12/06/2022    AST 31 12/06/2022    ALT 33 12/06/2022    ALKPHOS 121 (H) 12/06/2022    TP 8 0 12/06/2022    TBILI 0 40 12/06/2022    EGFR 16 12/06/2022     Lab Results   Component Value Date    WBC 7 83 12/06/2022    HGB 13 7 12/06/2022    HCT 42 7 12/06/2022     (H) 12/06/2022     (L) 12/06/2022     Lab Results   Component Value Date    CHOLESTEROL 140 01/12/2022     Lab Results   Component Value Date    HDL 30 (L) 01/12/2022     Lab Results   Component Value Date    LDLCALC 75 01/12/2022     Lab Results   Component Value Date    TRIG 177 (H) 01/12/2022     No results found for: [place], [place]  Lab Results   Component Value Date    XPB3MQQVOQFG 2 949 01/13/2022     Lab Results   Component Value Date    CALCIUM 9 8 12/06/2022    PHOS 5 0 (H) 01/12/2022     No results found for: SPEP, UPEP  No results found for: TREMAYNE CORDERO        Objective:      BP (P) 134/80 (BP Location: Left arm, Patient Position: Sitting, Cuff Size: Standard)   Pulse 69   Ht 5' 10" (1 778 m)   Wt 130 kg (287 lb)   SpO2 100%   BMI 41 18 kg/m²          Physical Exam  Vitals reviewed  Constitutional:       General: He is not in acute distress  Appearance: He is well-developed  HENT:      Head: Normocephalic and atraumatic  Eyes:      Conjunctiva/sclera: Conjunctivae normal    Cardiovascular:      Rate and Rhythm: Normal rate and regular rhythm  Pulmonary:      Effort: Pulmonary effort is normal       Breath sounds: Normal breath sounds  Abdominal:      Palpations: Abdomen is soft  Musculoskeletal:      Cervical back: Neck supple  Right lower leg: Edema present  Left lower leg: Edema present  Skin:     General: Skin is warm  Findings: No rash  Neurological:      Mental Status: He is alert and oriented to person, place, and time  Cranial Nerves: No cranial nerve deficit     Psychiatric:         Behavior: Behavior normal

## 2023-01-27 NOTE — ASSESSMENT & PLAN NOTE
Lab Results   Component Value Date    EGFR 16 12/06/2022    EGFR 13 09/26/2022    EGFR 12 09/25/2022    CREATININE 3 64 (H) 12/06/2022    CREATININE 4 19 (H) 09/26/2022    CREATININE 4 33 (H) 09/25/2022     Patient is a presumed chronic kidney disease stage IV with an estimated GFR less than 30 mils/minute  We will continue to monitor the patient's blood work closely  We will likely check labs every 2 months for now, potentially more often depending on where his labs were noted to be from last week  Patient is aware to avoid all nonsteroid inflammatory medications in the meantime  He is also to continue to focus on blood sugar control  We briefly discussed dialysis, but spent most the time discussing potential transplant  Since the patient has been stable and his presumed estimated GFR is less than 20 mill/minute, he is a good candidate to be evaluated for transplant at this time  Patient is aware that there are 4 programs in the area which we can refer him to  He will think on this, discussed with his family, and then get back to us with a decision

## 2023-01-27 NOTE — ASSESSMENT & PLAN NOTE
Wt Readings from Last 3 Encounters:   01/27/23 130 kg (287 lb)   12/06/22 127 kg (281 lb 1 4 oz)   09/29/22 131 kg (288 lb)   Patient appears to be compensated at this time, continue with furosemide 120 mg twice daily  Patient could continue to improve sodium restriction in his diet  He will be at high risk for potential acute kidney injury and acute exacerbation of underlying chronic kidney disease if he continues to remain on the higher sodium diet which he has been enjoying  Patient's risk for acute kidney injury may also increase if we need to escalate diuretic regimen, which we will try to avoid by limiting sodium intake is much as possible in the diet

## 2023-01-27 NOTE — ASSESSMENT & PLAN NOTE
Follow-up PTH levels, patient's previous levels although elevated did not warrant initiation of an activated vitamin D agent  We will continue to monitor for now and consider treatment depending on how the patient progresses from the clinical standpoint

## 2023-03-06 DIAGNOSIS — N18.9 ACUTE KIDNEY INJURY SUPERIMPOSED ON CHRONIC KIDNEY DISEASE: Primary | ICD-10-CM

## 2023-03-06 DIAGNOSIS — N17.9 ACUTE KIDNEY INJURY SUPERIMPOSED ON CHRONIC KIDNEY DISEASE: Primary | ICD-10-CM

## 2023-03-15 LAB — HBA1C MFR BLD HPLC: 6.5 %

## 2023-03-20 RX ORDER — CALCITRIOL 0.25 UG/1
CAPSULE, LIQUID FILLED ORAL
COMMUNITY
Start: 2023-02-10

## 2023-03-24 ENCOUNTER — OFFICE VISIT (OUTPATIENT)
Dept: NEPHROLOGY | Facility: CLINIC | Age: 68
End: 2023-03-24

## 2023-03-24 VITALS
SYSTOLIC BLOOD PRESSURE: 158 MMHG | HEART RATE: 67 BPM | TEMPERATURE: 97 F | DIASTOLIC BLOOD PRESSURE: 88 MMHG | OXYGEN SATURATION: 98 % | WEIGHT: 287.8 LBS | HEIGHT: 70 IN | BODY MASS INDEX: 41.2 KG/M2

## 2023-03-24 DIAGNOSIS — E66.01 OBESITY, MORBID (HCC): ICD-10-CM

## 2023-03-24 DIAGNOSIS — N18.4 TYPE 2 DIABETES MELLITUS WITH STAGE 4 CHRONIC KIDNEY DISEASE, WITH LONG-TERM CURRENT USE OF INSULIN (HCC): ICD-10-CM

## 2023-03-24 DIAGNOSIS — E11.22 TYPE 2 DIABETES MELLITUS WITH STAGE 4 CHRONIC KIDNEY DISEASE, WITH LONG-TERM CURRENT USE OF INSULIN (HCC): ICD-10-CM

## 2023-03-24 DIAGNOSIS — E55.9 VITAMIN D DEFICIENCY: ICD-10-CM

## 2023-03-24 DIAGNOSIS — N18.4 STAGE 4 CHRONIC KIDNEY DISEASE (HCC): ICD-10-CM

## 2023-03-24 DIAGNOSIS — N25.81 SECONDARY HYPERPARATHYROIDISM OF RENAL ORIGIN (HCC): ICD-10-CM

## 2023-03-24 DIAGNOSIS — Z79.4 TYPE 2 DIABETES MELLITUS WITH STAGE 4 CHRONIC KIDNEY DISEASE, WITH LONG-TERM CURRENT USE OF INSULIN (HCC): ICD-10-CM

## 2023-03-24 DIAGNOSIS — I89.0 LYMPHEDEMA: Primary | ICD-10-CM

## 2023-03-24 DIAGNOSIS — N13.2 URETERAL STONE WITH HYDRONEPHROSIS: ICD-10-CM

## 2023-03-24 RX ORDER — METOLAZONE 5 MG/1
5 TABLET ORAL DAILY
Qty: 30 TABLET | Refills: 4 | Status: SHIPPED | OUTPATIENT
Start: 2023-03-24

## 2023-03-24 NOTE — ASSESSMENT & PLAN NOTE
Lab Results   Component Value Date    EGFR 16 12/06/2022    EGFR 13 09/26/2022    EGFR 12 09/25/2022    CREATININE 3 64 (H) 12/06/2022    CREATININE 4 19 (H) 09/26/2022    CREATININE 4 33 (H) 09/25/2022   Appears to have plateaued

## 2023-03-24 NOTE — PATIENT INSTRUCTIONS
Constipation  -Check C lax contains bisacodyl which is what you should take as it is a stimulant laxative    You probably should take a stool softener as well as MiraLAX

## 2023-03-24 NOTE — ASSESSMENT & PLAN NOTE
Status post cystoscopy with lithotripsy  He is still passing fragments    He has a chronic stone former having the first 1 in 1982

## 2023-03-24 NOTE — PROGRESS NOTES
[facility] [place], [place]    Name: Dc Escalante  YOB: 1955      Assessment/Plan:         Problem List Items Addressed This Visit        Endocrine    Type 2 diabetes mellitus with stage 4 chronic kidney disease, with long-term current use of insulin ([facility] [address] )       Lab Results   Component Value Date    HGBA1C 6 9 09/09/2022   Being followed by his PCP         Relevant Orders    Vitamin D 25 hydroxy    Secondary hyperparathyroidism of renal origin ([facility] )     Check a PTH and vitamin D level         Relevant Orders    PTH, intact       Genitourinary    Stage 4 chronic kidney disease ([facility] )     Creatinine has been fairly stable over the last few lab draws  We will refer him to Dr Jeff Maradiaga for transplant evaluation         Relevant Orders    Comprehensive metabolic panel    Ureteral stone with hydronephrosis     Status post cystoscopy with lithotripsy  He is still passing fragments  He has a chronic stone former having the first 1 in 1982            Other    Lymphedema - Primary     He has significant lower extremity edema with stasis changes  Unfortunately he is sleeping in the recliner and is not elevating his legs  I explained that Lasix will probably cause vascular volume depletion and worsening of his kidney function  He noted that he has less voiding when he does take Lasix and a higher dose  He is taking 160 mg now  We will try adding metolazone 5 mg in the morning 30 minutes prior to Lasix dose  He should notice an increase in his urinary volume  If he does not he needs to let us know and stop the metolazone  He should weigh himself each day, limit fluids and avoid salt  He will have a BMP in 1 week to check his kidney function         Obesity, morbid ([facility] )   Other Visit Diagnoses     Vitamin D deficiency        Relevant Orders    Vitamin D 25 hydroxy            Subjective:      Patient ID: Dc Escalante is a 69 y o  male  referred by Dr Sara ARTHUR he has a history of stage IV/V chronic kidney disease with a baseline creatinine of 4 19 which did improve to 3 64 on July 6  He has a history of chronic diastolic heart failure, hypertension and secondary hyperparathyroidism of renal origin  He checks his blood pressure at home and it runs in the 130 range    Labs on 3/15 demonstrate an eGFR of 16 ml/min  He does not want to go on dialysis  His eGFR is stable and he says his legs are swelling  He increased his furosemide to 200mg and had less urianry output  He is taking 160mg of furosemide daily    From an historical perspective  He has a history of hypertension, diabetes mellitus, diastolic heart failure and CVA  He had been evaluated at [facility] September 2022 for an obstructing stone  He underwent cystoscopy and laser lithotripsy with stent placement by urology  He continues to pass fragments  On admission his creatinine at that time was 4 19 with a baseline creatinine of 3 3-3 6  He did improve subsequently  His current creatinine is 3 9 mg/dL    The following portions of the patient's history were reviewed and updated as appropriate: allergies, current medications, past family history, past medical history, past social history, past surgical history and problem list     Review of Systems   Constitutional: Positive for fatigue  HENT: Negative for hearing loss  Eyes: Negative for visual disturbance  Respiratory: Positive for shortness of breath  With atrial fibrillation  He had an MI 2 weeks ago on March 8th  He did not go to the hospital but went to his PCP who did an EKG    Cardiovascular: Positive for palpitations and leg swelling  Negative for chest pain  Gastrointestinal: Positive for constipation  Negative for abdominal pain, blood in stool and diarrhea  Genitourinary: Negative for difficulty urinating, dysuria and hematuria  Musculoskeletal: Positive for arthralgias and gait problem     Neurological: Positive for weakness  Negative for dizziness  Social History     Socioeconomic History   • Marital status:       Spouse name: None   • Number of children: None   • Years of education: None   • Highest education level: None   Occupational History   • None   Tobacco Use   • Smoking status: Every Day     Packs/day: 0 50     Types: Cigarettes   • Smokeless tobacco: Never   Vaping Use   • Vaping Use: Never used   Substance and Sexual Activity   • Alcohol use: Not Currently   • Drug use: Never   • Sexual activity: None   Other Topics Concern   • None   Social History Narrative   • None     Social Determinants of Health     Financial Resource Strain: Not on file   Food Insecurity: Not on file   Transportation Needs: Not on file   Physical Activity: Not on file   Stress: Not on file   Social Connections: Not on file   Intimate Partner Violence: Not on file   Housing Stability: Not on file     Past Medical History:   Diagnosis Date   • Chronic kidney disease    • Disease of thyroid gland    • Hyperlipidemia    • Hypertension    • MI (myocardial infarction) ([facility] [address] )    • Stroke ([facility] [address] )      Past Surgical History:   Procedure Laterality Date   • CHOLECYSTECTOMY     • CORONARY ANGIOPLASTY WITH STENT PLACEMENT     • JOINT REPLACEMENT      bilateral knee   • [place] CYSTO/URETERO W/LITHOTRIPSY &INDWELL STENT INSRT Left 9/24/2022    Procedure: CYSTOSCOPY URETEROSCOPY WITH LITHOTRIPSY HOLMIUM LASER, AND INSERTION STENT URETERAL;  Surgeon: Wes Perez MD;  Location: [facility];  Service: Urology       Current Outpatient Medications:   •  allopurinol (ZYLOPRIM) 300 mg tablet, Take by mouth, Disp: , Rfl:   •  apixaban (ELIQUIS) 5 mg, 2 (two) times a day, Disp: , Rfl:   •  Arginine 1000 MG TABS, Take by mouth One daily, Disp: , Rfl:   •  ascorbic acid (VITAMIN C) 500 MG tablet, Take 500 mg by mouth daily, Disp: , Rfl:   •  aspirin (ECOTRIN LOW STRENGTH) 81 mg EC tablet, Take 81 mg by mouth, Disp: , Rfl:   •  atorvastatin (LIPITOR) 40 mg tablet, Take 1 tablet (40 mg total) by mouth daily with dinner, Disp: 30 tablet, Rfl: 0  •  b complex vitamins capsule, Take 1 capsule by mouth daily, Disp: , Rfl:   •  calcitriol (ROCALTROL) 0 25 mcg capsule, TAKE 1 CAPSULE (0 25 MCG TOTAL) BY MOUTH THREE TIMES A WEEK, Disp: , Rfl:   •  co-enzyme Q-10 30 MG capsule, Take 100 mg by mouth, Disp: , Rfl:   •  furosemide (LASIX) 80 mg tablet, Take 160 mg by mouth 2 (two) times a day, Disp: , Rfl:   •  HYDROcodone-acetaminophen (NORCO)  mg per tablet, Take by mouth, Disp: , Rfl:   •  insulin glargine (LANTUS) 100 units/mL subcutaneous injection, Inject 20 Units under the skin every 12 (twelve) hours, Disp: , Rfl:   •  insulin lispro (HumaLOG) 100 units/mL injection, , Disp: , Rfl:   •  levothyroxine 125 mcg tablet, Take by mouth, Disp: , Rfl:   •  metoprolol succinate (TOPROL-XL) 25 mg 24 hr tablet, Take by mouth 4 (four) times a day 1-2 prn, Disp: , Rfl:   •  nitroglycerin (NITROSTAT) 0 4 mg SL tablet, PLEASE SEE ATTACHED FOR DETAILED DIRECTIONS, Disp: , Rfl:   •  tamsulosin (FLOMAX) 0 4 mg, Take 0 4 mg by mouth daily with dinner, Disp: , Rfl:   •  vitamin A 2400 MCG (8000 UT) capsule, Take 2,400 Units by mouth daily, Disp: , Rfl:   •  vitamin E, tocopherol, 200 units capsule, Take 200 Units by mouth daily 180 mg daily, Disp: , Rfl:     Lab Results   Component Value Date    SODIUM 138 12/06/2022    K 4 1 12/06/2022     12/06/2022    CO2 26 12/06/2022    AGAP 10 12/06/2022    BUN 58 (H) 12/06/2022    CREATININE 3 64 (H) 12/06/2022    GLUC 132 12/06/2022    CALCIUM 9 8 12/06/2022    AST 31 12/06/2022    ALT 33 12/06/2022    ALKPHOS 121 (H) 12/06/2022    TP 8 0 12/06/2022    TBILI 0 40 12/06/2022    EGFR 16 12/06/2022     Lab Results   Component Value Date    WBC 7 83 12/06/2022    HGB 13 7 12/06/2022    HCT 42 7 12/06/2022     (H) 12/06/2022     (L) 12/06/2022     Lab Results   Component Value Date    CHOLESTEROL 140 01/12/2022     Lab Results Component Value Date    HDL 30 (L) 01/12/2022     Lab Results   Component Value Date    LDLCALC 75 01/12/2022     Lab Results   Component Value Date    TRIG 177 (H) 01/12/2022     No results found for: [place], [place]  Lab Results   Component Value Date    PUU7ZUFBVBXT 2 949 01/13/2022     Lab Results   Component Value Date    CALCIUM 9 8 12/06/2022    PHOS 5 0 (H) 01/12/2022     No results found for: SPEP, UPEP  No results found for: MICROALBUR, [id number]        Objective:      /88 (BP Location: Left arm, Patient Position: Sitting, Cuff Size: Large)   Pulse 67   Temp (!) 97 °F (36 1 °C)   Ht 5' 10" (1 778 m)   Wt 131 kg (287 lb 12 8 oz)   SpO2 98%   BMI 41 30 kg/m²          Physical Exam  Vitals reviewed  Constitutional:       General: He is not in acute distress  Appearance: He is obese  He is not toxic-appearing  HENT:      Head: Normocephalic and atraumatic  Right Ear: External ear normal       Left Ear: External ear normal    Eyes:      Extraocular Movements: Extraocular movements intact  Conjunctiva/sclera: Conjunctivae normal       Pupils: Pupils are equal, round, and reactive to light  Cardiovascular:      Rate and Rhythm: Normal rate  Rhythm irregular  Pulmonary:      Effort: Pulmonary effort is normal  No respiratory distress  Breath sounds: Normal breath sounds  No wheezing or rales  Abdominal:      General: Bowel sounds are normal       Palpations: Abdomen is soft  Musculoskeletal:      Cervical back: Normal range of motion and neck supple  Right lower leg: Edema present  Left lower leg: Edema present  Lymphadenopathy:      Cervical: No cervical adenopathy  Neurological:      General: No focal deficit present  Mental Status: He is alert  Gait: Gait abnormal    Psychiatric:         Mood and Affect: Mood normal          Behavior: Behavior normal          Thought Content:  Thought content normal          Judgment: Judgment normal

## 2023-03-24 NOTE — ASSESSMENT & PLAN NOTE
He has significant lower extremity edema with stasis changes  Unfortunately he is sleeping in the recliner and is not elevating his legs  I explained that Lasix will probably cause vascular volume depletion and worsening of his kidney function  He noted that he has less voiding when he does take Lasix and a higher dose  He is taking 160 mg now  We will try adding metolazone 5 mg in the morning 30 minutes prior to Lasix dose  He should notice an increase in his urinary volume  If he does not he needs to let us know and stop the metolazone  He should weigh himself each day, limit fluids and avoid salt    He will have a BMP in 1 week to check his kidney function

## 2023-03-24 NOTE — ASSESSMENT & PLAN NOTE
Creatinine has been fairly stable over the last few lab draws    We will refer him to Dr Wilmer Walker for transplant evaluation

## 2023-03-27 ENCOUNTER — DOCUMENTATION (OUTPATIENT)
Dept: NEPHROLOGY | Facility: CLINIC | Age: 68
End: 2023-03-27

## 2023-03-27 NOTE — PROGRESS NOTES
Sent [employer] message to schedule a pre-transplant evaluation with Dr Per Thompson  This is the first attempt

## 2023-07-14 LAB — HBA1C MFR BLD HPLC: 6.2 %

## 2023-07-17 ENCOUNTER — OFFICE VISIT (OUTPATIENT)
Dept: NEPHROLOGY | Facility: CLINIC | Age: 68
End: 2023-07-17
Payer: COMMERCIAL

## 2023-07-17 VITALS
OXYGEN SATURATION: 94 % | BODY MASS INDEX: 23.59 KG/M2 | HEART RATE: 56 BPM | WEIGHT: 164.8 LBS | SYSTOLIC BLOOD PRESSURE: 140 MMHG | HEIGHT: 70 IN | DIASTOLIC BLOOD PRESSURE: 68 MMHG

## 2023-07-17 DIAGNOSIS — N25.81 SECONDARY HYPERPARATHYROIDISM OF RENAL ORIGIN (HCC): ICD-10-CM

## 2023-07-17 DIAGNOSIS — N18.4 STAGE 4 CHRONIC KIDNEY DISEASE (HCC): Primary | ICD-10-CM

## 2023-07-17 DIAGNOSIS — I89.0 LYMPHEDEMA: ICD-10-CM

## 2023-07-17 DIAGNOSIS — I50.32 CHRONIC DIASTOLIC HF (HEART FAILURE) (HCC): ICD-10-CM

## 2023-07-17 DIAGNOSIS — R60.0 BILATERAL LOWER EXTREMITY EDEMA: ICD-10-CM

## 2023-07-17 PROBLEM — N17.9 ACUTE KIDNEY INJURY SUPERIMPOSED ON CHRONIC KIDNEY DISEASE (HCC): Status: RESOLVED | Noted: 2022-01-12 | Resolved: 2023-07-17

## 2023-07-17 PROBLEM — N18.9 ACUTE KIDNEY INJURY SUPERIMPOSED ON CHRONIC KIDNEY DISEASE (HCC): Status: RESOLVED | Noted: 2022-01-12 | Resolved: 2023-07-17

## 2023-07-17 PROCEDURE — 99214 OFFICE O/P EST MOD 30 MIN: CPT | Performed by: INTERNAL MEDICINE

## 2023-07-17 RX ORDER — METOLAZONE 5 MG/1
5 TABLET ORAL DAILY
Qty: 90 TABLET | Refills: 2
Start: 2023-07-17

## 2023-07-17 NOTE — ASSESSMENT & PLAN NOTE
Wt Readings from Last 3 Encounters:   07/17/23 74.8 kg (164 lb 12.8 oz)   03/24/23 131 kg (287 lb 12.8 oz)   01/27/23 130 kg (287 lb)     Patient is compensated at this time, weight recorded is not accurate. Patient remains significantly volume overloaded and was advised to reinitiate metolazone instead of taking on a daily basis to take it every other day or 3 times a week. Patient is aware that this will significantly increase urine output and that in general at this time our goal is for him to lose about 15 pounds over the next 2 weeks. We will check blood work in 2 weeks to reassess electrolytes as well as kidney function. Previously with aggressive diuresis, the patient may have gone into acute kidney injury however we do not have blood work to confirm this. We will also need to keep an eye on his potassium levels.

## 2023-07-17 NOTE — ASSESSMENT & PLAN NOTE
As noted above, patient to reinitiate metolazone with the furosemide, however only every other day or perhaps 3 times a week. He was also told that he may reduce this further if he begins to experience signs and symptoms of aggressive diuresis such as palpitations, lightheadedness/dizziness, or other signs and symptoms as kidney failure such as metallic taste.

## 2023-07-17 NOTE — PROGRESS NOTES
[facility] [address] [place] [address]    Name: Fili Cole  YOB: 1955      Assessment/Plan:    Stage 4 chronic kidney disease [facility])  Lab Results   Component Value Date    EGFR 16 12/06/2022    EGFR 13 09/26/2022    EGFR 12 09/25/2022    CREATININE 3.64 (H) 12/06/2022    CREATININE 4.19 (H) 09/26/2022    CREATININE 4.33 (H) 09/25/2022     Follow-up blood work, patient just had this done on July 14. Continue to avoid potential nephrotoxins. Patient will be seen the transplant program in early August 2023. He is aware that he will need to discontinue tobacco use in order to remain eligible for a kidney transplant going forward. Chronic diastolic HF (heart failure) (HCC)  Wt Readings from Last 3 Encounters:   07/17/23 74.8 kg (164 lb 12.8 oz)   03/24/23 131 kg (287 lb 12.8 oz)   01/27/23 130 kg (287 lb)     Patient is compensated at this time, weight recorded is not accurate. Patient remains significantly volume overloaded and was advised to reinitiate metolazone instead of taking on a daily basis to take it every other day or 3 times a week. Patient is aware that this will significantly increase urine output and that in general at this time our goal is for him to lose about 15 pounds over the next 2 weeks. We will check blood work in 2 weeks to reassess electrolytes as well as kidney function. Previously with aggressive diuresis, the patient may have gone into acute kidney injury however we do not have blood work to confirm this. We will also need to keep an eye on his potassium levels. Secondary hyperparathyroidism of renal origin ([address])  No recent PTH level, will assess PTH prior to next regular appointment. Bilateral lower extremity edema  As noted above, patient to reinitiate metolazone with the furosemide, however only every other day or perhaps 3 times a week.   He was also told that he may reduce this further if he begins to experience signs and symptoms of aggressive diuresis such as palpitations, lightheadedness/dizziness, or other signs and symptoms as kidney failure such as metallic taste. Problem List Items Addressed This Visit        Endocrine    Secondary hyperparathyroidism of renal origin ([address])     No recent PTH level, will assess PTH prior to next regular appointment. Cardiovascular and Mediastinum    Chronic diastolic HF (heart failure) (HCC)     Wt Readings from Last 3 Encounters:   07/17/23 74.8 kg (164 lb 12.8 oz)   03/24/23 131 kg (287 lb 12.8 oz)   01/27/23 130 kg (287 lb)     Patient is compensated at this time, weight recorded is not accurate. Patient remains significantly volume overloaded and was advised to reinitiate metolazone instead of taking on a daily basis to take it every other day or 3 times a week. Patient is aware that this will significantly increase urine output and that in general at this time our goal is for him to lose about 15 pounds over the next 2 weeks. We will check blood work in 2 weeks to reassess electrolytes as well as kidney function. Previously with aggressive diuresis, the patient may have gone into acute kidney injury however we do not have blood work to confirm this. We will also need to keep an eye on his potassium levels. Genitourinary    Stage 4 chronic kidney disease [facility]) - Primary     Lab Results   Component Value Date    EGFR 16 12/06/2022    EGFR 13 09/26/2022    EGFR 12 09/25/2022    CREATININE 3.64 (H) 12/06/2022    CREATININE 4.19 (H) 09/26/2022    CREATININE 4.33 (H) 09/25/2022     Follow-up blood work, patient just had this done on July 14. Continue to avoid potential nephrotoxins. Patient will be seen the transplant program in early August 2023. He is aware that he will need to discontinue tobacco use in order to remain eligible for a kidney transplant going forward.          Relevant Medications    metolazone (ZAROXOLYN) 5 mg tablet    Other Relevant Orders    Basic metabolic panel    Magnesium       Other    Lymphedema    Relevant Medications    metolazone (ZAROXOLYN) 5 mg tablet    Bilateral lower extremity edema     As noted above, patient to reinitiate metolazone with the furosemide, however only every other day or perhaps 3 times a week. He was also told that he may reduce this further if he begins to experience signs and symptoms of aggressive diuresis such as palpitations, lightheadedness/dizziness, or other signs and symptoms as kidney failure such as metallic taste. Patient appears to be stable at this time, we will reassess kidney function every 2 weeks for now, more frequently if indicated. We will see him back for regular appointment in approximately 3 months. Subjective:      Patient ID: Flower Blackwell is a 69 y.o. male. Patient presents for follow-up appoint. Reviewed the patient's labs in detail, most recent creatinine from March 2023 was noted to be 3.9 mg/dL, there were no significant electrolyte abnormalities. Patient has been avoiding all nonsteroid anti-inflammatory medications. He is taking all of his medications as prescribed with no specific side effects at this time. He was given metolazone at last visit and lost about 30 lbs, but was lightheaded and dizzy, as well as having a metallic taste in his mouth, and he stopped taking and he now has his swelling back, but he is no longer having symptoms. Hypertension  This is a chronic problem. The current episode started more than 1 year ago. The problem is unchanged. The problem is controlled. Pertinent negatives include no chest pain, orthopnea or peripheral edema. There are no associated agents to hypertension. Risk factors for coronary artery disease include male gender, obesity and diabetes mellitus. Past treatments include lifestyle changes and beta blockers.        The following portions of the patient's history were reviewed and updated as appropriate: allergies, current medications, past family history, past medical history, past social history, past surgical history and problem list.    Review of Systems   Cardiovascular: Negative for chest pain and orthopnea. All other systems reviewed and are negative. Social History     Socioeconomic History   • Marital status:      Spouse name: None   • Number of children: None   • Years of education: None   • Highest education level: None   Occupational History   • None   Tobacco Use   • Smoking status: Every Day     Packs/day: 0.50     Types: Cigarettes   • Smokeless tobacco: Never   Vaping Use   • Vaping Use: Never used   Substance and Sexual Activity   • Alcohol use: Not Currently   • Drug use: Never   • Sexual activity: None   Other Topics Concern   • None   Social History Narrative   • None     Social Determinants of Health     Financial Resource Strain: Not on file   Food Insecurity: No Food Insecurity (1/12/2022)    Hunger Vital Sign    • Worried About Running Out of Food in the Last Year: Never true    • Ran Out of Food in the Last Year: Never true   Transportation Needs: No Transportation Needs (1/12/2022)    PRAPARE - Transportation    • Lack of Transportation (Medical): No    • Lack of Transportation (Non-Medical):  No   Physical Activity: Not on file   Stress: Not on file   Social Connections: Not on file   Intimate Partner Violence: Not on file   Housing Stability: Low Risk  (1/12/2022)    Housing Stability Vital Sign    • Unable to Pay for Housing in the Last Year: No    • Number of Places Lived in the Last Year: 1    • Unstable Housing in the Last Year: No     Past Medical History:   Diagnosis Date   • Acute kidney injury superimposed on chronic kidney disease stage 4 ([facility]) 1/12/2022   • Chronic kidney disease    • Disease of thyroid gland    • Hyperlipidemia    • Hypertension    • MI (myocardial infarction) ([address] [place] [address])    • Stroke [facility])      Past Surgical History:   Procedure Laterality Date   • CHOLECYSTECTOMY     • CORONARY ANGIOPLASTY WITH STENT PLACEMENT     • JOINT REPLACEMENT      bilateral knee   • MI CYSTO/URETERO W/LITHOTRIPSY &INDWELL STENT INSRT Left 9/24/2022    Procedure: CYSTOSCOPY URETEROSCOPY WITH LITHOTRIPSY HOLMIUM LASER, AND INSERTION STENT URETERAL;  Surgeon: Lucas Harden MD;  Location: [facility];  Service: Urology   • [facility] [address] BIOPSY  8/5/2020       Current Outpatient Medications:   •  allopurinol (ZYLOPRIM) 300 mg tablet, Take by mouth, Disp: , Rfl:   •  apixaban (ELIQUIS) 5 mg, 2 (two) times a day, Disp: , Rfl:   •  Arginine 1000 MG TABS, Take by mouth One daily, Disp: , Rfl:   •  ascorbic acid (VITAMIN C) 500 MG tablet, Take 500 mg by mouth daily, Disp: , Rfl:   •  aspirin (ECOTRIN LOW STRENGTH) 81 mg EC tablet, Take 81 mg by mouth, Disp: , Rfl:   •  atorvastatin (LIPITOR) 40 mg tablet, Take 1 tablet (40 mg total) by mouth daily with dinner, Disp: 30 tablet, Rfl: 0  •  b complex vitamins capsule, Take 1 capsule by mouth daily, Disp: , Rfl:   •  calcitriol (ROCALTROL) 0.25 mcg capsule, TAKE 1 CAPSULE (0.25 MCG TOTAL) BY MOUTH THREE TIMES A WEEK, Disp: , Rfl:   •  co-enzyme Q-10 30 MG capsule, Take 100 mg by mouth, Disp: , Rfl:   •  furosemide (LASIX) 80 mg tablet, Take 160 mg by mouth 2 (two) times a day, Disp: , Rfl:   •  HYDROcodone-acetaminophen (NORCO)  mg per tablet, Take by mouth, Disp: , Rfl:   •  insulin glargine (LANTUS) 100 units/mL subcutaneous injection, Inject 20 Units under the skin every 12 (twelve) hours, Disp: , Rfl:   •  insulin lispro (HumaLOG) 100 units/mL injection, , Disp: , Rfl:   •  levothyroxine 125 mcg tablet, Take by mouth, Disp: , Rfl:   •  metolazone (ZAROXOLYN) 5 mg tablet, Take 1 tablet (5 mg total) by mouth daily, Disp: 90 tablet, Rfl: 2  •  metoprolol succinate (TOPROL-XL) 25 mg 24 hr tablet, Take by mouth 4 (four) times a day 1-2 prn, Disp: , Rfl:   •  nitroglycerin (NITROSTAT) 0.4 mg SL tablet, PLEASE SEE ATTACHED FOR DETAILED DIRECTIONS, Disp: , Rfl:   •  tamsulosin (FLOMAX) 0.4 mg, Take 0.4 mg by mouth daily with dinner, Disp: , Rfl:   •  vitamin A 2400 MCG (8000 UT) capsule, Take 2,400 Units by mouth daily, Disp: , Rfl:   •  vitamin E, tocopherol, 200 units capsule, Take 200 Units by mouth daily 180 mg daily, Disp: , Rfl:     Lab Results   Component Value Date    SODIUM 138 12/06/2022    K 4.1 12/06/2022     12/06/2022    CO2 26 12/06/2022    AGAP 10 12/06/2022    BUN 58 (H) 12/06/2022    CREATININE 3.64 (H) 12/06/2022    GLUC 132 12/06/2022    CALCIUM 9.8 12/06/2022    AST 31 12/06/2022    ALT 33 12/06/2022    ALKPHOS 121 (H) 12/06/2022    TP 8.0 12/06/2022    TBILI 0.40 12/06/2022    EGFR 16 12/06/2022     Lab Results   Component Value Date    WBC 7.83 12/06/2022    HGB 13.7 12/06/2022    HCT 42.7 12/06/2022     (H) 12/06/2022     (L) 12/06/2022     Lab Results   Component Value Date    CHOLESTEROL 140 01/12/2022     Lab Results   Component Value Date    HDL 30 (L) 01/12/2022     Lab Results   Component Value Date    LDLCALC 75 01/12/2022     Lab Results   Component Value Date    TRIG 177 (H) 01/12/2022     No results found for: "CHOLHDL"  Lab Results   Component Value Date    FLK4VNSKSDGE 2.949 01/13/2022     Lab Results   Component Value Date    CALCIUM 9.8 12/06/2022    PHOS 5.0 (H) 01/12/2022     No results found for: "SPEP", "UPEP"  No results found for: "Carlin Primmer", "[id number]"        Objective:      /68 (BP Location: Left arm, Patient Position: Sitting, Cuff Size: Large) Comment: 122/60 in right arm. Pulse 56   Ht 5' 10" (1.778 m)   Wt 74.8 kg (164 lb 12.8 oz)   SpO2 94%   BMI 23.65 kg/m²          Physical Exam  Vitals reviewed. Constitutional:       General: He is not in acute distress. Appearance: He is well-developed. HENT:      Head: Normocephalic and atraumatic.    Eyes:      Conjunctiva/sclera: Conjunctivae normal.   Cardiovascular:      Rate and Rhythm: Normal rate and regular rhythm. Pulmonary:      Effort: Pulmonary effort is normal.      Breath sounds: Normal breath sounds. Abdominal:      Palpations: Abdomen is soft. Musculoskeletal:      Cervical back: Neck supple. Right lower leg: Edema present. Left lower leg: Edema present. Skin:     General: Skin is warm. Findings: No rash. Neurological:      Mental Status: He is alert and oriented to person, place, and time. Cranial Nerves: No cranial nerve deficit.    Psychiatric:         Behavior: Behavior normal.

## 2023-07-17 NOTE — ASSESSMENT & PLAN NOTE
Lab Results   Component Value Date    EGFR 16 12/06/2022    EGFR 13 09/26/2022    EGFR 12 09/25/2022    CREATININE 3.64 (H) 12/06/2022    CREATININE 4.19 (H) 09/26/2022    CREATININE 4.33 (H) 09/25/2022     Follow-up blood work, patient just had this done on July 14. Continue to avoid potential nephrotoxins. Patient will be seen the transplant program in early August 2023. He is aware that he will need to discontinue tobacco use in order to remain eligible for a kidney transplant going forward.

## 2023-07-17 NOTE — PATIENT INSTRUCTIONS
Please take metolazone 3 times a week for now. Please check labs every 2 or 4 weeks depending on how your blood work is looking.

## 2023-08-08 ENCOUNTER — OFFICE VISIT (OUTPATIENT)
Dept: NEPHROLOGY | Facility: CLINIC | Age: 68
End: 2023-08-08

## 2023-08-08 VITALS
WEIGHT: 257 LBS | HEIGHT: 70 IN | BODY MASS INDEX: 36.79 KG/M2 | SYSTOLIC BLOOD PRESSURE: 122 MMHG | DIASTOLIC BLOOD PRESSURE: 70 MMHG

## 2023-08-08 DIAGNOSIS — Z01.818 PRE-TRANSPLANT EVALUATION FOR CKD (CHRONIC KIDNEY DISEASE): Primary | ICD-10-CM

## 2023-08-08 NOTE — PROGRESS NOTES
Assessment     Ester Lagos is a 69 y.o. male  referred by Dr. Ember Estrada and Dr Clementina Storey with a past medical history of CKD stage IV native disease secondary to biopsy proven diabetes/HTN, with creat 3.9 mg/dL 3/2022, with other past medical history of diastolic heart failure, Factor V Leiden mutation, lymphedema, diabetes (diagnosed ), CAD with prior PCI, a fib on AC, ureteral stone status post lithotripsy with history of nephrolithiasis since , hypertension (since at least ), CVA, hypothyroid, strong family history of CAD/CVA in paternal side and cancer in maternal side, history of compartment syndrome  after bleed RUE due to coumadin, retired (was ), smoker (actively smoking 48 year), no ETOH, no drugs seen in the Nephrology Clinic for pre-transplant kidney evaluation.  - MI; pt was on warfarin had complicated by compartment syndrome in RUE    2020-renal biopsy with diffuse diabetic glomerulosclerosis, mild, segmental sclerosing features-hypertension obesity obese associated, arteriolosclerosis with hyalinosis moderate, tubular atrophy and interstitial fibrosis    2023-echocardiogram- EF 60 to 99%, grade 1 diastolic dysfunction, mild aortic regurgitation,    2022-CT scan of the abdomen pelvis-5 mm left proximal ureteral calculus with hydronephrosis, left renal calculi, 3 mm right nonobstructing calculi, diverticulosis    2022 - had admission - cutaneous abscess of glut treated with antibiotic. Had admission also for ureteral stone with hydro. Plan   1. High risk - CAD, poor ambulation (utlizes cane, frail); history of pRBC, Factor V Leiden mutation, history of PE , CVA, current active smoker, will discuss regarding AOT see pt locally prior to moving forward with evaluation. 2. The need to avoid blood transfusion to decrease allosensitization was explained to the patient.   3. The advantages of a living donor over a  donor was explained to the patient and family. I strongly encouraged the patient and family to pursue a living donor. 4-follows with Dr Portia Oakley at [facility] - CAD and a fib - MI in [address] (angio); second MI had PCI; had potential third MI but pt did not see help at that time  5 - CVA - required TPA in 2022, scattered punctate foci of acute cerebral and R cerebellar infart; was found to have A fib. 6. Uses cane for ambulation - chronic pain, spine disease, CVA  7. Endocrine - insulin dependent DM. Takes total 40 units daily  8.  had PE and history of Factor V Leiden mutation - restarted on coumadin  9 - history of RUE bleed/compartment syndrome in  post starting coumadin and was off coumadin until  when had PE  10 - Psych- history of depression and anxiety  11 - current active smoker    I have discussed with Kathrine Kerr and family at length about the risk and benefits of kidney transplantation. I have strongly encouraged him to pursue living donation, and explained to him the benefits of living donation over  donor transplantation. We have briefly discussed the surgical procedure. We have discussed about the need for life long immunosuppression and the importance of compliance. We have discussed the side effects of immunosuppression including but not limited to infection, malignancies and developing/worsening diabetes control. Kathrine Kerr verbalized understanding and is interested in pursuing transplant. During this visit, I spent 45 minutes with the patient; more than 50% of the time I counseled the patient regarding the risks and benefits of kidney transplantation, the immediate and long-term complications of kidney transplantation, and the advantage of receiving a living donor kidney transplant. It was a pleasure evaluating your patient in the office today. Thank you for allowing our team to participate in the care of Mr Kathrine Kerr.  Please do not hesitate to contact our team if further issues/questions shall arise in the interim. HISTORY OF PRESENT ILLNESS    Lisset Arthur is a 69 y.o. male seen in the Nephrology Clinic for evaluation for kidney transplant. CKD IV due to DM/HTN. Renal disease is biopsy proven. Primary Nephrologist is Dr Nick Jack and Dr Elliott Corbett. HD unit - n/a    Native Urine Output: yes  Hx of voiding difficulty: no  Hx of hematuria: no  Hx of proteinuria: yes  Hx of nephrolithiasis: yes  Hx of recurrent urinary tract infection: no    HTN: onset 1999,  hx of malignant HTN: no, admission for HTN emergencies: no    DM type 2    Total insulin requirement/day 40 units  DM retinopathy: no, Laser Surgery: no  DM neuropathy: no  DM gastroparesis: no  DKA: no  Hypoglycemic awareness: no  Hx of amputation: no     PAD/PVD: no, Claudication: no  Cardiac history - CAD: yes, MI: yes     Primary Cardiologist: Dr Carmina Barron; Dr Shaan Cunningham    Exercise tolerance: no    Hx of CVA: yes    Hx of DVT/PE: no    Viral Infection:    HIV: no  Hep B: no  Hep C: no    Cancer: History of cancer: no; brother colon ca; maternal aunt brain tumor; maternal aunt breast ca    Health maintenance and other pertinent history:    Male:    Colonoscopy: unsure if up to date  Prostate: no issues    Blood transfusion: yes    Last admission 9/2022    Review Of Systems:     Constitutional: dec appetite. no fevers, chills, involuntary weight gain or weight loss. Eyes: wears glasses; does not recall retinopathy  ENT: no ear disease, epistaxis or oral ulcers. Respiratory: no SOB, cough, hemoptysis, BENITEZ. Cardiovascular: no CP, palpitations, claudication, edema. GI: no N/V/D/C, abdominal pain, melena, BRBPR. : see HPI  Endocrine: + thyroid disease (hypothyroid)  Heme: + factor V leiden, PE  MS: lymphedema clinic.   Skin: chronic venous changes LE  Neuro: + CVA  Psych: + depression, anxiety    Lives with self; son is nearby    Employment history: retired;  and  prior    HD Access: n/a  Abdominal Surgeries: cholecystectomy     Smoke: yes (1/2 ppd; smoked for 50 years)  ETOH: none  Drugs: none    Past Medical History:   Diagnosis Date   • Acute kidney injury superimposed on chronic kidney disease stage 4 ([address]) 1/12/2022   • Chronic kidney disease    • Disease of thyroid gland    • Hyperlipidemia    • Hypertension    • MI (myocardial infarction) ([address])    • Stroke [facility])      Past Surgical History:   Procedure Laterality Date   • CHOLECYSTECTOMY     • CORONARY ANGIOPLASTY WITH STENT PLACEMENT     • JOINT REPLACEMENT      bilateral knee   • CO CYSTO/URETERO W/LITHOTRIPSY &INDWELL STENT INSRT Left 9/24/2022    Procedure: CYSTOSCOPY URETEROSCOPY WITH LITHOTRIPSY HOLMIUM LASER, AND INSERTION STENT URETERAL;  Surgeon: Jarad Lawler MD;  Location: [facility];  Service: Urology   • [address] [place] [address],[address] BIOPSY  8/5/2020       Family History   Problem Relation Age of Onset   • Kidney failure Mother    • Cirrhosis Mother    • Colon cancer Brother      Social History     Socioeconomic History   • Marital status:      Spouse name: None   • Number of children: None   • Years of education: None   • Highest education level: None   Occupational History   • None   Tobacco Use   • Smoking status: Every Day     Packs/day: 0.50     Types: Cigarettes   • Smokeless tobacco: Never   Vaping Use   • Vaping Use: Never used   Substance and Sexual Activity   • Alcohol use: Not Currently   • Drug use: Never   • Sexual activity: None   Other Topics Concern   • None   Social History Narrative   • None     Social Determinants of Health     Financial Resource Strain: Not on file   Food Insecurity: No Food Insecurity (1/12/2022)    Hunger Vital Sign    • Worried About Running Out of Food in the Last Year: Never true    • Ran Out of Food in the Last Year: Never true   Transportation Needs: No Transportation Needs (1/12/2022)    PRAPARE - Transportation    • Lack of Transportation (Medical):  No    • Lack of Transportation (Non-Medical):  No   Physical Activity: Not on file   Stress: Not on file   Social Connections: Not on file   Intimate Partner Violence: Not on file   Housing Stability: Low Risk  (1/12/2022)    Housing Stability Vital Sign    • Unable to Pay for Housing in the Last Year: No    • Number of Places Lived in the Last Year: 1    • Unstable Housing in the Last Year: No         Living donors:  has not discussed with family    Current Outpatient Medications   Medication Sig Dispense Refill   • allopurinol (ZYLOPRIM) 300 mg tablet Take by mouth     • apixaban (ELIQUIS) 5 mg 2 (two) times a day     • Arginine 1000 MG TABS Take by mouth One daily     • ascorbic acid (VITAMIN C) 500 MG tablet Take 500 mg by mouth daily     • aspirin (ECOTRIN LOW STRENGTH) 81 mg EC tablet Take 81 mg by mouth     • atorvastatin (LIPITOR) 40 mg tablet Take 1 tablet (40 mg total) by mouth daily with dinner 30 tablet 0   • b complex vitamins capsule Take 1 capsule by mouth daily     • calcitriol (ROCALTROL) 0.25 mcg capsule TAKE 1 CAPSULE (0.25 MCG TOTAL) BY MOUTH THREE TIMES A WEEK     • co-enzyme Q-10 30 MG capsule Take 100 mg by mouth     • furosemide (LASIX) 80 mg tablet Take 160 mg by mouth 2 (two) times a day     • HYDROcodone-acetaminophen (NORCO)  mg per tablet Take by mouth     • insulin glargine (LANTUS) 100 units/mL subcutaneous injection Inject 20 Units under the skin every 12 (twelve) hours     • insulin lispro (HumaLOG) 100 units/mL injection      • levothyroxine 125 mcg tablet Take by mouth     • metoprolol succinate (TOPROL-XL) 25 mg 24 hr tablet Take by mouth 4 (four) times a day 1-2 prn     • nitroglycerin (NITROSTAT) 0.4 mg SL tablet PLEASE SEE ATTACHED FOR DETAILED DIRECTIONS     • tamsulosin (FLOMAX) 0.4 mg Take 0.4 mg by mouth daily with dinner     • vitamin A 2400 MCG (8000 UT) capsule Take 2,400 Units by mouth daily     • vitamin E, tocopherol, 200 units capsule Take 200 Units by mouth daily 180 mg daily     • metolazone (ZAROXOLYN) 5 mg tablet Take 1 tablet (5 mg total) by mouth daily (Patient not taking: Reported on 8/8/2023) 90 tablet 2     No current facility-administered medications for this visit. Carvedilol, Aspartame - food allergy, Bumetanide, Cephalexin, Hydralazine, Lisinopril, Morphine, Nifedipine, Ciprofloxacin, and Strawberry extract - food allergy    Physical Exam:    /70 (BP Location: Left arm, Patient Position: Sitting, Cuff Size: Large)   Ht 5' 10" (1.778 m)   Wt 117 kg (257 lb)   BMI 36.88 kg/m²     General : NAD    HEENT: anicteric, no thrush, dental poor.  Missing teeth upper jaw; lower jaw poor dentition  Cardiac:  RRR, S1, S2 normal,  no murmur    Lung:  CTAB   Abdomen:  soft, nontender, no ascites   HD access: n/a   2+ edema; but skin wrinklin  Neuro:no focal neuro deficits   Skin: no rash  Carotid bruit: no

## 2023-08-08 NOTE — PATIENT INSTRUCTIONS
1) We will review your case at the transplant committee meeting and will review our decision with you in approximately 4 weeks over the phone. 2) If you do not receive a call from [facility] within 4 weeks, please call our local Nephrology office. 3) Once we call you with our decision regarding further evaluation, you will receive further instruction over the phone and in the mail regarding the next steps to complete the transplant evaluation. 4) All of your non transplant evaluation follow up regarding your regular medical follow ups, your renal care follow ups, and any other specialists visits you are following with should continue as you are advised by those respective physicians and continue to follow with their management and care.    5) please review with your PCP about timing of colonoscopy if you are due to up date or not  6)  please have labwork for your nephrologist as you are planning for Dr Aleja Quezada this week

## 2023-08-08 NOTE — LETTER
August 8, 2023     Gelene Balloon, [address]  [address] [facility] [id number]    Patient: Tashi Avila   YOB: 1955   Date of Visit: 8/8/2023       Dear Dr. Shoaib English: Thank you for referring Tobias Ko to me for evaluation. Below are my notes for this consultation. If you have questions, please do not hesitate to call me. I look forward to following your patient along with you. Sincerely,        Nehemiah Goode MD        CC: MD Nehemiah De Santiago MD  8/8/2023 12:23 PM  Sign when Signing Visit  Assessment     Tashi Avila is a 69 y.o. male  referred by Dr. Amador Bean and Dr Rodrick Ferrell with a past medical history of CKD stage IV native disease secondary to biopsy proven diabetes/HTN, with creat 3.9 mg/dL 3/2022, with other past medical history of diastolic heart failure, Factor V Leiden mutation, lymphedema, diabetes (diagnosed 2005), CAD with prior PCI, a fib on AC, ureteral stone status post lithotripsy with history of nephrolithiasis since 1982, hypertension (since at least 1999), CVA, hypothyroid, strong family history of CAD/CVA in paternal side and cancer in maternal side, history of compartment syndrome 2019 after bleed RUE due to coumadin, retired (was ), smoker (actively smoking 48 year), no ETOH, no drugs seen in the Nephrology Clinic for pre-transplant kidney evaluation.     1999 - MI; pt was on warfarin had complicated by compartment syndrome in RUE    August 2020-renal biopsy with diffuse diabetic glomerulosclerosis, mild, segmental sclerosing features-hypertension obesity obese associated, arteriolosclerosis with hyalinosis moderate, tubular atrophy and interstitial fibrosis    April 2023-echocardiogram- EF 60 to 41%, grade 1 diastolic dysfunction, mild aortic regurgitation,    September 2022-CT scan of the abdomen pelvis-5 mm left proximal ureteral calculus with hydronephrosis, left renal calculi, 3 mm right nonobstructing calculi, diverticulosis    2022 - had admission - cutaneous abscess of glut treated with antibiotic. Had admission also for ureteral stone with hydro. Plan   1. High risk - CAD, poor ambulation (utlizes cane, frail); history of pRBC, Factor V Leiden mutation, history of PE , CVA, current active smoker, will discuss regarding AOT see pt locally prior to moving forward with evaluation. 2. The need to avoid blood transfusion to decrease allosensitization was explained to the patient. 3. The advantages of a living donor over a  donor was explained to the patient and family. I strongly encouraged the patient and family to pursue a living donor. 4-follows with Dr Zeynep Vargas at [facility] - CAD and a fib - MI in [address] [facility] (angio); second MI had PCI; had potential third MI but pt did not see help at that time  5 - CVA - required TPA in 2022, scattered punctate foci of acute cerebral and R cerebellar infart; was found to have A fib. 6. Uses cane for ambulation - chronic pain, spine disease, CVA  7. Endocrine - insulin dependent DM. Takes total 40 units daily  8.  had PE and history of Factor V Leiden mutation - restarted on coumadin  9 - history of RUE bleed/compartment syndrome in  post starting coumadin and was off coumadin until  when had PE  10 - Psych- history of depression and anxiety  11 - current active smoker    I have discussed with Jayden Dawson and family at length about the risk and benefits of kidney transplantation. I have strongly encouraged him to pursue living donation, and explained to him the benefits of living donation over  donor transplantation. We have briefly discussed the surgical procedure. We have discussed about the need for life long immunosuppression and the importance of compliance. We have discussed the side effects of immunosuppression including but not limited to infection, malignancies and developing/worsening diabetes control.  Jayden Dawson verbalized understanding and is interested in pursuing transplant. During this visit, I spent 45 minutes with the patient; more than 50% of the time I counseled the patient regarding the risks and benefits of kidney transplantation, the immediate and long-term complications of kidney transplantation, and the advantage of receiving a living donor kidney transplant. It was a pleasure evaluating your patient in the office today. Thank you for allowing our team to participate in the care of Mr Tashi Avila. Please do not hesitate to contact our team if further issues/questions shall arise in the interim. HISTORY OF PRESENT ILLNESS    Tashi Avila is a 69 y.o. male seen in the Nephrology Clinic for evaluation for kidney transplant. CKD IV due to DM/HTN. Renal disease is biopsy proven. Primary Nephrologist is Dr Amador Bean and Dr Rodrick Ferrell. HD unit - n/a    Native Urine Output: yes  Hx of voiding difficulty: no  Hx of hematuria: no  Hx of proteinuria: yes  Hx of nephrolithiasis: yes  Hx of recurrent urinary tract infection: no    HTN: onset 1999,  hx of malignant HTN: no, admission for HTN emergencies: no    DM type 2    Total insulin requirement/day 40 units  DM retinopathy: no, Laser Surgery: no  DM neuropathy: no  DM gastroparesis: no  DKA: no  Hypoglycemic awareness: no  Hx of amputation: no     PAD/PVD: no, Claudication: no  Cardiac history - CAD: yes, MI: yes     Primary Cardiologist: Dr Francisco Infante; Dr Ortega Mccray    Exercise tolerance: no    Hx of CVA: yes    Hx of DVT/PE: no    Viral Infection:    HIV: no  Hep B: no  Hep C: no    Cancer: History of cancer: no; brother colon ca; maternal aunt brain tumor; maternal aunt breast ca    Health maintenance and other pertinent history:    Male:    Colonoscopy: unsure if up to date  Prostate: no issues    Blood transfusion: yes    Last admission 9/2022    Review Of Systems:     Constitutional: dec appetite. no fevers, chills, involuntary weight gain or weight loss.   Eyes: wears glasses; does not recall retinopathy  ENT: no ear disease, epistaxis or oral ulcers. Respiratory: no SOB, cough, hemoptysis, BENITEZ. Cardiovascular: no CP, palpitations, claudication, edema. GI: no N/V/D/C, abdominal pain, melena, BRBPR. : see HPI  Endocrine: + thyroid disease (hypothyroid)  Heme: + factor V leiden, PE  MS: lymphedema clinic. Skin: chronic venous changes LE  Neuro: + CVA  Psych: + depression, anxiety    Lives with self; son is nearby    Employment history: retired;  and  prior    HD Access: n/a  Abdominal Surgeries: cholecystectomy     Smoke: yes (1/2 ppd; smoked for 50 years)  ETOH: none  Drugs: none    Past Medical History:   Diagnosis Date   • Acute kidney injury superimposed on chronic kidney disease stage 4 ([address]) 1/12/2022   • Chronic kidney disease    • Disease of thyroid gland    • Hyperlipidemia    • Hypertension    • MI (myocardial infarction) ([address])    • Stroke [facility])      Past Surgical History:   Procedure Laterality Date   • CHOLECYSTECTOMY     • CORONARY ANGIOPLASTY WITH STENT PLACEMENT     • JOINT REPLACEMENT      bilateral knee   • FL CYSTO/URETERO W/LITHOTRIPSY &INDWELL STENT INSRT Left 9/24/2022    Procedure: CYSTOSCOPY URETEROSCOPY WITH LITHOTRIPSY HOLMIUM LASER, AND INSERTION STENT URETERAL;  Surgeon: Theo Khan MD;  Location: [facility];  Service: Urology   • US GUIDED KIDNEY BIOPSY  8/5/2020       Family History   Problem Relation Age of Onset   • Kidney failure Mother    • Cirrhosis Mother    • Colon cancer Brother      Social History     Socioeconomic History   • Marital status:       Spouse name: None   • Number of children: None   • Years of education: None   • Highest education level: None   Occupational History   • None   Tobacco Use   • Smoking status: Every Day     Packs/day: 0.50     Types: Cigarettes   • Smokeless tobacco: Never   Vaping Use   • Vaping Use: Never used   Substance and Sexual Activity   • Alcohol use: Not Currently   • Drug use: Never   • Sexual activity: None   Other Topics Concern   • None   Social History Narrative   • None     Social Determinants of Health     Financial Resource Strain: Not on file   Food Insecurity: No Food Insecurity (1/12/2022)    Hunger Vital Sign    • Worried About Running Out of Food in the Last Year: Never true    • Ran Out of Food in the Last Year: Never true   Transportation Needs: No Transportation Needs (1/12/2022)    PRAPARE - Transportation    • Lack of Transportation (Medical): No    • Lack of Transportation (Non-Medical):  No   Physical Activity: Not on file   Stress: Not on file   Social Connections: Not on file   Intimate Partner Violence: Not on file   Housing Stability: Low Risk  (1/12/2022)    Housing Stability Vital Sign    • Unable to Pay for Housing in the Last Year: No    • Number of Places Lived in the Last Year: 1    • Unstable Housing in the Last Year: No         Living donors:  has not discussed with family    Current Outpatient Medications   Medication Sig Dispense Refill   • allopurinol (ZYLOPRIM) 300 mg tablet Take by mouth     • apixaban (ELIQUIS) 5 mg 2 (two) times a day     • Arginine 1000 MG TABS Take by mouth One daily     • ascorbic acid (VITAMIN C) 500 MG tablet Take 500 mg by mouth daily     • aspirin (ECOTRIN LOW STRENGTH) 81 mg EC tablet Take 81 mg by mouth     • atorvastatin (LIPITOR) 40 mg tablet Take 1 tablet (40 mg total) by mouth daily with dinner 30 tablet 0   • b complex vitamins capsule Take 1 capsule by mouth daily     • calcitriol (ROCALTROL) 0.25 mcg capsule TAKE 1 CAPSULE (0.25 MCG TOTAL) BY MOUTH THREE TIMES A WEEK     • co-enzyme Q-10 30 MG capsule Take 100 mg by mouth     • furosemide (LASIX) 80 mg tablet Take 160 mg by mouth 2 (two) times a day     • HYDROcodone-acetaminophen (NORCO)  mg per tablet Take by mouth     • insulin glargine (LANTUS) 100 units/mL subcutaneous injection Inject 20 Units under the skin every 12 (twelve) hours • insulin lispro (HumaLOG) 100 units/mL injection      • levothyroxine 125 mcg tablet Take by mouth     • metoprolol succinate (TOPROL-XL) 25 mg 24 hr tablet Take by mouth 4 (four) times a day 1-2 prn     • nitroglycerin (NITROSTAT) 0.4 mg SL tablet PLEASE SEE ATTACHED FOR DETAILED DIRECTIONS     • tamsulosin (FLOMAX) 0.4 mg Take 0.4 mg by mouth daily with dinner     • vitamin A 2400 MCG (8000 UT) capsule Take 2,400 Units by mouth daily     • vitamin E, tocopherol, 200 units capsule Take 200 Units by mouth daily 180 mg daily     • metolazone (ZAROXOLYN) 5 mg tablet Take 1 tablet (5 mg total) by mouth daily (Patient not taking: Reported on 8/8/2023) 90 tablet 2     No current facility-administered medications for this visit. Carvedilol, Aspartame - food allergy, Bumetanide, Cephalexin, Hydralazine, Lisinopril, Morphine, Nifedipine, Ciprofloxacin, and Strawberry extract - food allergy    Physical Exam:    /70 (BP Location: Left arm, Patient Position: Sitting, Cuff Size: Large)   Ht 5' 10" (1.778 m)   Wt 117 kg (257 lb)   BMI 36.88 kg/m²     General : NAD    HEENT: anicteric, no thrush, dental poor.  Missing teeth upper jaw; lower jaw poor dentition  Cardiac:  RRR, S1, S2 normal,  no murmur    Lung:  CTAB   Abdomen:  soft, nontender, no ascites   HD access: n/a   2+ edema; but skin wrinklin  Neuro:no focal neuro deficits   Skin: no rash  Carotid bruit: no

## 2023-11-03 ENCOUNTER — APPOINTMENT (OUTPATIENT)
Dept: LAB | Facility: MEDICAL CENTER | Age: 68
End: 2023-11-03
Payer: COMMERCIAL

## 2023-11-03 ENCOUNTER — OFFICE VISIT (OUTPATIENT)
Dept: NEPHROLOGY | Facility: CLINIC | Age: 68
End: 2023-11-03
Payer: COMMERCIAL

## 2023-11-03 VITALS
HEIGHT: 70 IN | HEART RATE: 77 BPM | DIASTOLIC BLOOD PRESSURE: 70 MMHG | BODY MASS INDEX: 37.8 KG/M2 | WEIGHT: 264 LBS | SYSTOLIC BLOOD PRESSURE: 140 MMHG | OXYGEN SATURATION: 97 %

## 2023-11-03 DIAGNOSIS — R60.0 BILATERAL LOWER EXTREMITY EDEMA: ICD-10-CM

## 2023-11-03 DIAGNOSIS — I50.32 CHRONIC DIASTOLIC HF (HEART FAILURE) (HCC): ICD-10-CM

## 2023-11-03 DIAGNOSIS — I10 PRIMARY HYPERTENSION: ICD-10-CM

## 2023-11-03 DIAGNOSIS — N18.5 STAGE 5 CHRONIC KIDNEY DISEASE NOT ON CHRONIC DIALYSIS (HCC): Primary | ICD-10-CM

## 2023-11-03 DIAGNOSIS — N25.81 SECONDARY HYPERPARATHYROIDISM OF RENAL ORIGIN (HCC): ICD-10-CM

## 2023-11-03 LAB
ANION GAP SERPL CALCULATED.3IONS-SCNC: 7 MMOL/L
BUN SERPL-MCNC: 65 MG/DL (ref 5–25)
CALCIUM SERPL-MCNC: 10.3 MG/DL (ref 8.4–10.2)
CHLORIDE SERPL-SCNC: 104 MMOL/L (ref 96–108)
CO2 SERPL-SCNC: 26 MMOL/L (ref 21–32)
CREAT SERPL-MCNC: 3.86 MG/DL (ref 0.6–1.3)
GFR SERPL CREATININE-BSD FRML MDRD: 14 ML/MIN/1.73SQ M
GLUCOSE SERPL-MCNC: 103 MG/DL (ref 65–140)
MAGNESIUM SERPL-MCNC: 2 MG/DL (ref 1.9–2.7)
POTASSIUM SERPL-SCNC: 4.1 MMOL/L (ref 3.5–5.3)
SODIUM SERPL-SCNC: 137 MMOL/L (ref 135–147)

## 2023-11-03 PROCEDURE — 99214 OFFICE O/P EST MOD 30 MIN: CPT | Performed by: INTERNAL MEDICINE

## 2023-11-03 NOTE — ASSESSMENT & PLAN NOTE
Wt Readings from Last 3 Encounters:   11/03/23 120 kg (264 lb)   08/08/23 117 kg (257 lb)   07/17/23 74.8 kg (164 lb 12.8 oz)     Patient is currently compensated, his weight in general is controlled despite no longer being able to take metolazone. We can consider other diuretics such as spironolactone or amiloride depending on how progresses from clinical standpoint.

## 2023-11-03 NOTE — PROGRESS NOTES
[facility] [address] [place] [address]    Name: Dee Dee Gonzalez  YOB: 1955      Assessment/Plan:    Stage 5 chronic kidney disease not on chronic dialysis [facility])  Lab Results   Component Value Date    EGFR 16 12/06/2022    EGFR 13 09/26/2022    EGFR 12 09/25/2022    CREATININE 3.64 (H) 12/06/2022    CREATININE 4.19 (H) 09/26/2022    CREATININE 4.33 (H) 09/25/2022     Patient's most recent creatinine noted to be at 3.8 mg/dL is from August.  He is agreeable with presenting today to have labs drawn for updated kidney function. Unfortunately, as mentioned in history present illness, the patient was declined for kidney transplant candidate with the [facility] due to high risk from the cardiovascular standpoint. The patient does not wish to proceed with second opinion at this time but will consider and think on this prior to our next visit. With respect to potentially proceeding with dialysis, the patient was hesitant if dialysis is a treatment choice if it does not serve as a bridge towards transplant. I agree that ideally dialysis is a bridge toward transplant but dialysis in and of itself if transplant is not an option is still worth it in order to allow extension of life in any meaningful way. I explained that if he wished to not have dialysis that is appropriate and his choice to make after discussion with the family and we will help with medical management. Normally, this will also mean a referral to our palliative care or hospice team depending on the patient's symptoms and how advanced he may be at that time. Patient understands that if he declines dialysis, the above is a possibility. If the patient is to initiate dialysis, he would prefer peritoneal dialysis, he does not want to go to in center treatments. We reviewed some of the specifics regarding peritoneal dialysis.   He is aware that he will need to have a surgical consultation to confirm that he is a candidate for. Hemodialysis catheter placement, after which time we will simply continue to monitor him and then if and when the situation calls for start of dialysis at some point in the near future, we will ask our surgeon to place the peritoneal dialysis catheter. With respect to continuation of medical management, patient is on L-arginine supplements, strongly recommended for the patient to continue this amino acid as it is one of the few which assist with acid management which can potentially extend native kidney function, and avoid the need for sodium bicarbonate supplementation. Chronic diastolic HF (heart failure) (HCC)  Wt Readings from Last 3 Encounters:   11/03/23 120 kg (264 lb)   08/08/23 117 kg (257 lb)   07/17/23 74.8 kg (164 lb 12.8 oz)     Patient is currently compensated, his weight in general is controlled despite no longer being able to take metolazone. We can consider other diuretics such as spironolactone or amiloride depending on how progresses from clinical standpoint. Bilateral lower extremity edema  As noted above, continue to encourage low-sodium diet, continue with current diuretic regimen. Although his overall fluid weight is increased, it is stable and appropriate and improved from our previous visit. Problem List Items Addressed This Visit          Endocrine    Secondary hyperparathyroidism of renal origin [facility])       Cardiovascular and Mediastinum    HTN (hypertension)    Chronic diastolic HF (heart failure) (HCC)     Wt Readings from Last 3 Encounters:   11/03/23 120 kg (264 lb)   08/08/23 117 kg (257 lb)   07/17/23 74.8 kg (164 lb 12.8 oz)   Patient is currently compensated, his weight in general is controlled despite no longer being able to take metolazone. We can consider other diuretics such as spironolactone or amiloride depending on how progresses from clinical standpoint.                     Genitourinary Stage 5 chronic kidney disease not on chronic dialysis [facility]) - Primary     Lab Results   Component Value Date    EGFR 16 12/06/2022    EGFR 13 09/26/2022    EGFR 12 09/25/2022    CREATININE 3.64 (H) 12/06/2022    CREATININE 4.19 (H) 09/26/2022    CREATININE 4.33 (H) 09/25/2022   Patient's most recent creatinine noted to be at 3.8 mg/dL is from August.  He is agreeable with presenting today to have labs drawn for updated kidney function. Unfortunately, as mentioned in history present illness, the patient was declined for kidney transplant candidate with the [facility] due to high risk from the cardiovascular standpoint. The patient does not wish to proceed with second opinion at this time but will consider and think on this prior to our next visit. With respect to potentially proceeding with dialysis, the patient was hesitant if dialysis is a treatment choice if it does not serve as a bridge towards transplant. I agree that ideally dialysis is a bridge toward transplant but dialysis in and of itself if transplant is not an option is still worth it in order to allow extension of life in any meaningful way. I explained that if he wished to not have dialysis that is appropriate and his choice to make after discussion with the family and we will help with medical management. Normally, this will also mean a referral to our palliative care or hospice team depending on the patient's symptoms and how advanced he may be at that time. Patient understands that if he declines dialysis, the above is a possibility. If the patient is to initiate dialysis, he would prefer peritoneal dialysis, he does not want to go to in center treatments. We reviewed some of the specifics regarding peritoneal dialysis. He is aware that he will need to have a surgical consultation to confirm that he is a candidate for.   Hemodialysis catheter placement, after which time we will simply continue to monitor him and then if and when the situation calls for start of dialysis at some point in the near future, we will ask our surgeon to place the peritoneal dialysis catheter. With respect to continuation of medical management, patient is on L-arginine supplements, strongly recommended for the patient to continue this amino acid as it is one of the few which assist with acid management which can potentially extend native kidney function, and avoid the need for sodium bicarbonate supplementation. Other    Bilateral lower extremity edema     As noted above, continue to encourage low-sodium diet, continue with current diuretic regimen. Although his overall fluid weight is increased, it is stable and appropriate and improved from our previous visit. Patient remained stable from the symptomatic standpoint regarding advanced chronic kidney disease. He will have blood work done today to update us on kidney function and electrolytes. Additional labs as needed and indicated. We will continue to check labs every 4-6 weeks for now, and see him back for regular appointment in January 2024. Subjective:      Patient ID: Carlos Mohr is a 77 y.o. male. Patient presents for follow-up appointment. Reviewed the patient's labs in detail. The most recent creatinine is 3.8 mg/dL from September 2023. There were no significant electrolyte abnormalities noted. He is taking all medications as prescribed with the exception of metolazone due to side effect of dysgeusia, intolerance of this medication was added to his allergy list.  Patient weight has overall significant improved, but continues to experience bilateral lower extremity edema. Since we last saw the patient, he was evaluated by our kidney transplant program and was declined due to high risk from the cardiovascular standpoint. He has not made a decision at this time if he wishes to pursue additional/second opinion regarding transplant.     He currently denies symptoms consistent with uremia. The following portions of the patient's history were reviewed and updated as appropriate: allergies, current medications, past family history, past medical history, past social history, past surgical history and problem list.    Review of Systems   All other systems reviewed and are negative. Social History     Socioeconomic History    Marital status:      Spouse name: None    Number of children: None    Years of education: None    Highest education level: None   Occupational History    None   Tobacco Use    Smoking status: Every Day     Packs/day: 0.50     Types: Cigarettes    Smokeless tobacco: Never   Vaping Use    Vaping Use: Never used   Substance and Sexual Activity    Alcohol use: Not Currently    Drug use: Never    Sexual activity: None   Other Topics Concern    None   Social History Narrative    None     Social Determinants of Health     Financial Resource Strain: Not on file   Food Insecurity: No Food Insecurity (1/12/2022)    Hunger Vital Sign     Worried About Running Out of Food in the Last Year: Never true     Ran Out of Food in the Last Year: Never true   Transportation Needs: No Transportation Needs (1/12/2022)    PRAPARE - Transportation     Lack of Transportation (Medical): No     Lack of Transportation (Non-Medical):  No   Physical Activity: Not on file   Stress: Not on file   Social Connections: Not on file   Intimate Partner Violence: Not on file   Housing Stability: Low Risk  (1/12/2022)    Housing Stability Vital Sign     Unable to Pay for Housing in the Last Year: No     Number of Places Lived in the Last Year: 1     Unstable Housing in the Last Year: No     Past Medical History:   Diagnosis Date    Acute kidney injury superimposed on chronic kidney disease stage 4 ([facility]) 1/12/2022    Chronic kidney disease     Disease of thyroid gland     Hyperlipidemia     Hypertension     MI (myocardial infarction) ([address] [place] [address])     Stroke ([address] [place] [address]) Past Surgical History:   Procedure Laterality Date    CHOLECYSTECTOMY      CORONARY ANGIOPLASTY WITH STENT PLACEMENT      JOINT REPLACEMENT      bilateral knee    ME CYSTO/URETERO W/LITHOTRIPSY &INDWELL STENT INSRT Left 9/24/2022    Procedure: CYSTOSCOPY URETEROSCOPY WITH LITHOTRIPSY HOLMIUM LASER, AND INSERTION STENT URETERAL;  Surgeon: Abdifatah Mayers MD;  Location: [facility];  Service: Urology    [address] [place] [address]  8/5/2020       Current Outpatient Medications:     allopurinol (ZYLOPRIM) 300 mg tablet, Take by mouth, Disp: , Rfl:     apixaban (ELIQUIS) 5 mg, 2 (two) times a day, Disp: , Rfl:     Arginine 1000 MG TABS, Take by mouth One daily, Disp: , Rfl:     ascorbic acid (VITAMIN C) 500 MG tablet, Take 500 mg by mouth daily, Disp: , Rfl:     aspirin (ECOTRIN LOW STRENGTH) 81 mg EC tablet, Take 81 mg by mouth, Disp: , Rfl:     atorvastatin (LIPITOR) 40 mg tablet, Take 1 tablet (40 mg total) by mouth daily with dinner, Disp: 30 tablet, Rfl: 0    b complex vitamins capsule, Take 1 capsule by mouth daily, Disp: , Rfl:     calcitriol (ROCALTROL) 0.25 mcg capsule, TAKE 1 CAPSULE (0.25 MCG TOTAL) BY MOUTH THREE TIMES A WEEK, Disp: , Rfl:     co-enzyme Q-10 30 MG capsule, Take 100 mg by mouth, Disp: , Rfl:     furosemide (LASIX) 80 mg tablet, Take 160 mg by mouth 2 (two) times a day, Disp: , Rfl:     HYDROcodone-acetaminophen (NORCO)  mg per tablet, Take by mouth, Disp: , Rfl:     insulin glargine (LANTUS) 100 units/mL subcutaneous injection, Inject 20 Units under the skin every 12 (twelve) hours, Disp: , Rfl:     insulin lispro (HumaLOG) 100 units/mL injection, , Disp: , Rfl:     levothyroxine 125 mcg tablet, Take by mouth, Disp: , Rfl:     metoprolol succinate (TOPROL-XL) 25 mg 24 hr tablet, Take by mouth 4 (four) times a day 1-2 prn, Disp: , Rfl:     nitroglycerin (NITROSTAT) 0.4 mg SL tablet, PLEASE SEE ATTACHED FOR DETAILED DIRECTIONS, Disp: , Rfl:     tamsulosin (FLOMAX) 0.4 mg, Take 0.4 mg by mouth daily with dinner, Disp: , Rfl:     vitamin A 2400 MCG (8000 UT) capsule, Take 2,400 Units by mouth daily, Disp: , Rfl:     vitamin E, tocopherol, 200 units capsule, Take 200 Units by mouth daily 180 mg daily, Disp: , Rfl:     Lab Results   Component Value Date    SODIUM 138 12/06/2022    K 4.1 12/06/2022     12/06/2022    CO2 26 12/06/2022    AGAP 10 12/06/2022    BUN 58 (H) 12/06/2022    CREATININE 3.64 (H) 12/06/2022    GLUC 132 12/06/2022    CALCIUM 9.8 12/06/2022    AST 31 12/06/2022    ALT 33 12/06/2022    ALKPHOS 121 (H) 12/06/2022    TP 8.0 12/06/2022    TBILI 0.40 12/06/2022    EGFR 16 12/06/2022     Lab Results   Component Value Date    WBC 7.83 12/06/2022    HGB 13.7 12/06/2022    HCT 42.7 12/06/2022     (H) 12/06/2022     (L) 12/06/2022     Lab Results   Component Value Date    CHOLESTEROL 140 01/12/2022     Lab Results   Component Value Date    HDL 30 (L) 01/12/2022     Lab Results   Component Value Date    LDLCALC 75 01/12/2022     Lab Results   Component Value Date    TRIG 177 (H) 01/12/2022     No results found for: "CHOLHDL"  Lab Results   Component Value Date    SIO9RQPQQKBP 2.949 01/13/2022     Lab Results   Component Value Date    CALCIUM 9.8 12/06/2022    PHOS 5.0 (H) 01/12/2022     No results found for: "SPEP", "UPEP"  No results found for: "Aysha Giselle", "[id number]"        Objective:      /70 (BP Location: Left arm, Patient Position: Sitting, Cuff Size: Large)   Pulse 77   Ht 5' 10" (1.778 m)   Wt 120 kg (264 lb)   SpO2 97%   BMI 37.88 kg/m²          Physical Exam  Vitals reviewed. Constitutional:       General: He is not in acute distress. Appearance: He is well-developed. HENT:      Head: Normocephalic and atraumatic. Eyes:      Conjunctiva/sclera: Conjunctivae normal.   Cardiovascular:      Rate and Rhythm: Normal rate and regular rhythm. Pulmonary:      Effort: Pulmonary effort is normal.      Breath sounds: Normal breath sounds.    Abdominal: Palpations: Abdomen is soft. Musculoskeletal:      Cervical back: Neck supple. Skin:     General: Skin is warm. Findings: No rash. Neurological:      Mental Status: He is alert and oriented to person, place, and time. Cranial Nerves: No cranial nerve deficit.    Psychiatric:         Behavior: Behavior normal.

## 2023-11-03 NOTE — ASSESSMENT & PLAN NOTE
As noted above, continue to encourage low-sodium diet, continue with current diuretic regimen. Although his overall fluid weight is increased, it is stable and appropriate and improved from our previous visit.

## 2023-11-03 NOTE — ASSESSMENT & PLAN NOTE
Lab Results   Component Value Date    EGFR 16 12/06/2022    EGFR 13 09/26/2022    EGFR 12 09/25/2022    CREATININE 3.64 (H) 12/06/2022    CREATININE 4.19 (H) 09/26/2022    CREATININE 4.33 (H) 09/25/2022     Patient's most recent creatinine noted to be at 3.8 mg/dL is from August.  He is agreeable with presenting today to have labs drawn for updated kidney function. Unfortunately, as mentioned in history present illness, the patient was declined for kidney transplant candidate with the [facility] due to high risk from the cardiovascular standpoint. The patient does not wish to proceed with second opinion at this time but will consider and think on this prior to our next visit. With respect to potentially proceeding with dialysis, the patient was hesitant if dialysis is a treatment choice if it does not serve as a bridge towards transplant. I agree that ideally dialysis is a bridge toward transplant but dialysis in and of itself if transplant is not an option is still worth it in order to allow extension of life in any meaningful way. I explained that if he wished to not have dialysis that is appropriate and his choice to make after discussion with the family and we will help with medical management. Normally, this will also mean a referral to our palliative care or hospice team depending on the patient's symptoms and how advanced he may be at that time. Patient understands that if he declines dialysis, the above is a possibility. If the patient is to initiate dialysis, he would prefer peritoneal dialysis, he does not want to go to in center treatments. We reviewed some of the specifics regarding peritoneal dialysis. He is aware that he will need to have a surgical consultation to confirm that he is a candidate for.   Hemodialysis catheter placement, after which time we will simply continue to monitor him and then if and when the situation calls for start of dialysis at some point in the near future, we will ask our surgeon to place the peritoneal dialysis catheter. With respect to continuation of medical management, patient is on L-arginine supplements, strongly recommended for the patient to continue this amino acid as it is one of the few which assist with acid management which can potentially extend native kidney function, and avoid the need for sodium bicarbonate supplementation.

## 2024-01-08 ENCOUNTER — APPOINTMENT (OUTPATIENT)
Dept: LAB | Facility: MEDICAL CENTER | Age: 69
End: 2024-01-08
Payer: COMMERCIAL

## 2024-01-08 ENCOUNTER — OFFICE VISIT (OUTPATIENT)
Dept: NEPHROLOGY | Facility: CLINIC | Age: 69
End: 2024-01-08
Payer: COMMERCIAL

## 2024-01-08 VITALS
DIASTOLIC BLOOD PRESSURE: 72 MMHG | OXYGEN SATURATION: 100 % | BODY MASS INDEX: 37.51 KG/M2 | HEART RATE: 64 BPM | WEIGHT: 262 LBS | HEIGHT: 70 IN | SYSTOLIC BLOOD PRESSURE: 134 MMHG

## 2024-01-08 DIAGNOSIS — N18.4 TYPE 2 DIABETES MELLITUS WITH STAGE 4 CHRONIC KIDNEY DISEASE, WITH LONG-TERM CURRENT USE OF INSULIN (HCC): ICD-10-CM

## 2024-01-08 DIAGNOSIS — E11.22 TYPE 2 DIABETES MELLITUS WITH STAGE 4 CHRONIC KIDNEY DISEASE, WITH LONG-TERM CURRENT USE OF INSULIN (HCC): ICD-10-CM

## 2024-01-08 DIAGNOSIS — Z79.4 TYPE 2 DIABETES MELLITUS WITH STAGE 4 CHRONIC KIDNEY DISEASE, WITH LONG-TERM CURRENT USE OF INSULIN (HCC): ICD-10-CM

## 2024-01-08 DIAGNOSIS — N18.5 STAGE 5 CHRONIC KIDNEY DISEASE NOT ON CHRONIC DIALYSIS (HCC): Primary | ICD-10-CM

## 2024-01-08 DIAGNOSIS — I50.32 CHRONIC DIASTOLIC HF (HEART FAILURE) (HCC): ICD-10-CM

## 2024-01-08 DIAGNOSIS — E66.01 OBESITY, MORBID (HCC): ICD-10-CM

## 2024-01-08 DIAGNOSIS — N25.81 SECONDARY HYPERPARATHYROIDISM OF RENAL ORIGIN (HCC): ICD-10-CM

## 2024-01-08 DIAGNOSIS — D68.51 FACTOR 5 LEIDEN MUTATION, HETEROZYGOUS (HCC): ICD-10-CM

## 2024-01-08 DIAGNOSIS — R60.0 BILATERAL LOWER EXTREMITY EDEMA: ICD-10-CM

## 2024-01-08 DIAGNOSIS — E55.9 VITAMIN D DEFICIENCY: ICD-10-CM

## 2024-01-08 DIAGNOSIS — N18.4 STAGE 4 CHRONIC KIDNEY DISEASE (HCC): ICD-10-CM

## 2024-01-08 LAB
25(OH)D3 SERPL-MCNC: 15 NG/ML (ref 30–100)
ALBUMIN SERPL BCP-MCNC: 3.8 G/DL (ref 3.5–5)
ALP SERPL-CCNC: 102 U/L (ref 34–104)
ALT SERPL W P-5'-P-CCNC: 13 U/L (ref 7–52)
ANION GAP SERPL CALCULATED.3IONS-SCNC: 10 MMOL/L
AST SERPL W P-5'-P-CCNC: 13 U/L (ref 13–39)
BILIRUB SERPL-MCNC: 0.36 MG/DL (ref 0.2–1)
BUN SERPL-MCNC: 79 MG/DL (ref 5–25)
CALCIUM SERPL-MCNC: 10.3 MG/DL (ref 8.4–10.2)
CHLORIDE SERPL-SCNC: 108 MMOL/L (ref 96–108)
CO2 SERPL-SCNC: 22 MMOL/L (ref 21–32)
CREAT SERPL-MCNC: 4.09 MG/DL (ref 0.6–1.3)
GFR SERPL CREATININE-BSD FRML MDRD: 13 ML/MIN/1.73SQ M
GLUCOSE P FAST SERPL-MCNC: 101 MG/DL (ref 65–99)
POTASSIUM SERPL-SCNC: 4.4 MMOL/L (ref 3.5–5.3)
PROT SERPL-MCNC: 7 G/DL (ref 6.4–8.4)
PTH-INTACT SERPL-MCNC: 199.8 PG/ML (ref 12–88)
SODIUM SERPL-SCNC: 140 MMOL/L (ref 135–147)

## 2024-01-08 PROCEDURE — 80053 COMPREHEN METABOLIC PANEL: CPT

## 2024-01-08 PROCEDURE — 36415 COLL VENOUS BLD VENIPUNCTURE: CPT

## 2024-01-08 PROCEDURE — 99214 OFFICE O/P EST MOD 30 MIN: CPT | Performed by: INTERNAL MEDICINE

## 2024-01-08 PROCEDURE — 82306 VITAMIN D 25 HYDROXY: CPT

## 2024-01-08 PROCEDURE — 83970 ASSAY OF PARATHORMONE: CPT

## 2024-01-08 RX ORDER — METOPROLOL SUCCINATE 100 MG/1
TABLET, EXTENDED RELEASE ORAL
COMMUNITY
Start: 2024-01-08

## 2024-01-08 NOTE — PROGRESS NOTES
[facility]  Marquise Santos DO    Name: Masoud Perry  YOB: 1955      Assessment/Plan:    Stage 5 chronic kidney disease not on chronic dialysis (HCC)  Lab Results   Component Value Date    EGFR 14 11/03/2023    EGFR 16 12/06/2022    EGFR 13 09/26/2022    CREATININE 3.86 (H) 11/03/2023    CREATININE 3.64 (H) 12/06/2022    CREATININE 4.19 (H) 09/26/2022     Patient had blood work done today to have follow-up labs.  At this time he is asymptomatic from the uremic standpoint.    If he is to proceed with dialysis, patient only wishes to have peritoneal dialysis.  If this is the case we will need to ensure that he is a candidate for a.  No dialysis catheter placement.  Referral placed into the chart for our surgeon to evaluate and confirm that he is able to have a catheter placed.  Patient has had an open cholecystectomy in the past.    As noted previously, unfortunately, the patient has been declined for a kidney transplant due to cardiovascular risk.  He is scheduled to have an ablation to address paroxysmal atrial fibrillation.  At this time, he wishes to not be reassessed for cardiac clearance for transplant after ablation is successfully managed.    Secondary hyperparathyroidism of renal origin ([facility])  Continue with calcitriol, follow PTH level with next set of labs.    Chronic diastolic HF (heart failure) ([facility])  Wt Readings from Last 3 Encounters:   01/08/24 119 kg (262 lb)   11/03/23 120 kg (264 lb)   08/08/23 117 kg (257 lb)     Patient appears to be compensated at this time, continue to encourage low-sodium diet, and furosemide currently dosed 160 mg twice daily.            Bilateral lower extremity edema  Stable, continue with low-sodium diet and furosemide as noted above.         Problem List Items Addressed This Visit          Endocrine    Type 2 diabetes mellitus with stage 4 chronic kidney disease, with long-term current use of insulin ([facility])     Secondary hyperparathyroidism of renal origin ([facility])     Continue with calcitriol, follow PTH level with next set of labs.            Cardiovascular and Mediastinum    Chronic diastolic HF (heart failure) ([facility])     Wt Readings from Last 3 Encounters:   01/08/24 119 kg (262 lb)   11/03/23 120 kg (264 lb)   08/08/23 117 kg (257 lb)     Patient appears to be compensated at this time, continue to encourage low-sodium diet, and furosemide currently dosed 160 mg twice daily.                 Relevant Medications    metoprolol succinate (TOPROL-XL) 100 mg 24 hr tablet       Hematopoietic and Hemostatic    Factor 5 Leiden mutation, heterozygous ([facility])       Genitourinary    Stage 5 chronic kidney disease not on chronic dialysis ([facility]) - Primary     Lab Results   Component Value Date    EGFR 14 11/03/2023    EGFR 16 12/06/2022    EGFR 13 09/26/2022    CREATININE 3.86 (H) 11/03/2023    CREATININE 3.64 (H) 12/06/2022    CREATININE 4.19 (H) 09/26/2022     Patient had blood work done today to have follow-up labs.  At this time he is asymptomatic from the uremic standpoint.    If he is to proceed with dialysis, patient only wishes to have peritoneal dialysis.  If this is the case we will need to ensure that he is a candidate for a.  No dialysis catheter placement.  Referral placed into the chart for our surgeon to evaluate and confirm that he is able to have a catheter placed.  Patient has had an open cholecystectomy in the past.    As noted previously, unfortunately, the patient has been declined for a kidney transplant due to cardiovascular risk.  He is scheduled to have an ablation to address paroxysmal atrial fibrillation.  At this time, he wishes to not be reassessed for cardiac clearance for transplant after ablation is successfully managed.         Relevant Orders    Ambulatory Referral to General Surgery       Other    Obesity, morbid (HCC)    Bilateral lower extremity edema     Stable, continue with low-sodium diet and  furosemide as noted above.            Patient remained stable from the kidney standpoint, advanced chronic kidney disease with no overt symptoms or signs of uremia.  Patient is agreeable for evaluation by surgeon for peritoneal dialysis catheter placement, referral was placed into the chart with preference of appointment to be done sometime in mid to late March 2024 due to upcoming cardiac ablation.    Will see the patient back for regular appointment in mid to late April 2024, to confirm that he is a candidate for peritoneal dialysis and establish plan of care going forward.    Subjective:      Patient ID: Masoud Perry is a 68 y.o. male.    Patient presents for follow-up appoint.    We reviewed the patient's labs in detail, most recent creatinine noted to be 3.86 mg/dL, which gives him an estimated GFR of around 14 mL/min, had a mild hypercalcemia, otherwise no other significant electrolyte abnormalities were noted.  He is taking all medications as prescribed with no specific side effects at this time.  He denies use of nonsteroidal anti-inflammatory medications.    Patient denies overt uremic signs and symptoms at this time.    Patient has been thinking about potentially proceeding with dialysis if needed.  If he would have dialysis he would be in the form of peritoneal dialysis.  Of note the patient has a history of an open cholecystectomy in the past, he denies history of small bowel obstruction or other obstructive process in his abdomen that he is aware of.    Patient has a loop recorder, noted to have several episodes of P-A-fib, and will be seeing cardio-electrophysiologist in the near future.    He also passed a few kidney stones since we last saw him without incident.              The following portions of the patient's history were reviewed and updated as appropriate: allergies, current medications, past family history, past medical history, past social history, past surgical history and problem  list.    Review of Systems   All other systems reviewed and are negative.        Social History     Socioeconomic History    Marital status:      Spouse name: None    Number of children: None    Years of education: None    Highest education level: None   Occupational History    None   Tobacco Use    Smoking status: Every Day     Current packs/day: 0.50     Types: Cigarettes    Smokeless tobacco: Never   Vaping Use    Vaping status: Never Used   Substance and Sexual Activity    Alcohol use: Not Currently    Drug use: Never    Sexual activity: None   Other Topics Concern    None   Social History Narrative    None     Social Determinants of Health     Financial Resource Strain: Not on file   Food Insecurity: No Food Insecurity (1/12/2022)    Hunger Vital Sign     Worried About Running Out of Food in the Last Year: Never true     Ran Out of Food in the Last Year: Never true   Transportation Needs: No Transportation Needs (1/12/2022)    PRAPARE - Transportation     Lack of Transportation (Medical): No     Lack of Transportation (Non-Medical): No   Physical Activity: Not on file   Stress: Not on file   Social Connections: Not on file   Intimate Partner Violence: Not on file   Housing Stability: Low Risk  (1/12/2022)    Housing Stability Vital Sign     Unable to Pay for Housing in the Last Year: No     Number of Places Lived in the Last Year: 1     Unstable Housing in the Last Year: No     Past Medical History:   Diagnosis Date    Acute kidney injury superimposed on chronic kidney disease stage 4 (HCC) 1/12/2022    Chronic kidney disease     Disease of thyroid gland     Hyperlipidemia     Hypertension     MI (myocardial infarction) (HCC)     Stroke (HCC)      Past Surgical History:   Procedure Laterality Date    CHOLECYSTECTOMY      CORONARY ANGIOPLASTY WITH STENT PLACEMENT      JOINT REPLACEMENT      bilateral knee    MT CYSTO/URETERO W/LITHOTRIPSY &INDWELL STENT INSRT Left 9/24/2022    Procedure: CYSTOSCOPY  URETEROSCOPY WITH LITHOTRIPSY HOLMIUM LASER, AND INSERTION STENT URETERAL;  Surgeon: Lewis Dahl MD;  Location: [facility];  Service: Urology    US GUIDED KIDNEY BIOPSY  8/5/2020       Current Outpatient Medications:     allopurinol (ZYLOPRIM) 300 mg tablet, Take by mouth, Disp: , Rfl:     apixaban (ELIQUIS) 5 mg, 2 (two) times a day, Disp: , Rfl:     Arginine 1000 MG TABS, Take by mouth One daily, Disp: , Rfl:     ascorbic acid (VITAMIN C) 500 MG tablet, Take 500 mg by mouth daily, Disp: , Rfl:     aspirin (ECOTRIN LOW STRENGTH) 81 mg EC tablet, Take 81 mg by mouth, Disp: , Rfl:     atorvastatin (LIPITOR) 40 mg tablet, Take 1 tablet (40 mg total) by mouth daily with dinner, Disp: 30 tablet, Rfl: 0    b complex vitamins capsule, Take 1 capsule by mouth daily, Disp: , Rfl:     calcitriol (ROCALTROL) 0.25 mcg capsule, TAKE 1 CAPSULE (0.25 MCG TOTAL) BY MOUTH THREE TIMES A WEEK, Disp: , Rfl:     co-enzyme Q-10 30 MG capsule, Take 100 mg by mouth, Disp: , Rfl:     furosemide (LASIX) 80 mg tablet, Take 160 mg by mouth 2 (two) times a day, Disp: , Rfl:     HYDROcodone-acetaminophen (NORCO)  mg per tablet, Take by mouth, Disp: , Rfl:     insulin glargine (LANTUS) 100 units/mL subcutaneous injection, Inject 20 Units under the skin every 12 (twelve) hours, Disp: , Rfl:     insulin lispro (HumaLOG) 100 units/mL injection, , Disp: , Rfl:     levothyroxine 125 mcg tablet, Take by mouth, Disp: , Rfl:     metoprolol succinate (TOPROL-XL) 100 mg 24 hr tablet, , Disp: , Rfl:     nitroglycerin (NITROSTAT) 0.4 mg SL tablet, PLEASE SEE ATTACHED FOR DETAILED DIRECTIONS, Disp: , Rfl:     tamsulosin (FLOMAX) 0.4 mg, Take 0.4 mg by mouth daily with dinner, Disp: , Rfl:     vitamin A 2400 MCG (8000 UT) capsule, Take 2,400 Units by mouth daily, Disp: , Rfl:     vitamin E, tocopherol, 200 units capsule, Take 200 Units by mouth daily 180 mg daily, Disp: , Rfl:     Lab Results   Component Value Date    SODIUM 137 11/03/2023    K 4.1  "11/03/2023     11/03/2023    CO2 26 11/03/2023    AGAP 7 11/03/2023    BUN 65 (H) 11/03/2023    CREATININE 3.86 (H) 11/03/2023    GLUC 103 11/03/2023    CALCIUM 10.3 (H) 11/03/2023    AST 31 12/06/2022    ALT 33 12/06/2022    ALKPHOS 121 (H) 12/06/2022    TP 8.0 12/06/2022    TBILI 0.40 12/06/2022    EGFR 14 11/03/2023     Lab Results   Component Value Date    WBC 7.83 12/06/2022    HGB 13.7 12/06/2022    HCT 42.7 12/06/2022     (H) 12/06/2022     (L) 12/06/2022     Lab Results   Component Value Date    CHOLESTEROL 140 01/12/2022     Lab Results   Component Value Date    HDL 30 (L) 01/12/2022     Lab Results   Component Value Date    LDLCALC 75 01/12/2022     Lab Results   Component Value Date    TRIG 177 (H) 01/12/2022     No results found for: \"CHOLHDL\"  Lab Results   Component Value Date    IPP1VHSPQQWH 2.949 01/13/2022     Lab Results   Component Value Date    CALCIUM 10.3 (H) 11/03/2023    PHOS 5.0 (H) 01/12/2022     No results found for: \"SPEP\", \"UPEP\"  No results found for: \"MICROALBUR\", \"DHVM29KRS\"        Objective:      /72 (BP Location: Left arm, Patient Position: Sitting, Cuff Size: Large)   Pulse 64   Ht 5' 10\" (1.778 m)   Wt 119 kg (262 lb)   SpO2 100%   BMI 37.59 kg/m²          Physical Exam  Vitals reviewed.   Constitutional:       General: He is not in acute distress.     Appearance: Normal appearance.   HENT:      Head: Normocephalic and atraumatic.      Right Ear: External ear normal.      Left Ear: External ear normal.   Eyes:      Conjunctiva/sclera: Conjunctivae normal.   Cardiovascular:      Rate and Rhythm: Normal rate and regular rhythm.      Heart sounds: Normal heart sounds.   Pulmonary:      Effort: No respiratory distress.      Breath sounds: No wheezing.   Abdominal:      Palpations: Abdomen is soft.   Musculoskeletal:      Right lower leg: Edema present.      Left lower leg: Edema present.      Comments: Left lower extremity edema with lymphedema " significantly greater than the right which is chronic   Skin:     General: Skin is warm and dry.   Neurological:      General: No focal deficit present.      Mental Status: He is alert and oriented to person, place, and time.   Psychiatric:         Mood and Affect: Mood normal.         Behavior: Behavior normal.

## 2024-01-08 NOTE — ASSESSMENT & PLAN NOTE
Lab Results   Component Value Date    EGFR 14 11/03/2023    EGFR 16 12/06/2022    EGFR 13 09/26/2022    CREATININE 3.86 (H) 11/03/2023    CREATININE 3.64 (H) 12/06/2022    CREATININE 4.19 (H) 09/26/2022     Patient had blood work done today to have follow-up labs.  At this time he is asymptomatic from the uremic standpoint.    If he is to proceed with dialysis, patient only wishes to have peritoneal dialysis.  If this is the case we will need to ensure that he is a candidate for a.  No dialysis catheter placement.  Referral placed into the chart for our surgeon to evaluate and confirm that he is able to have a catheter placed.  Patient has had an open cholecystectomy in the past.    As noted previously, unfortunately, the patient has been declined for a kidney transplant due to cardiovascular risk.  He is scheduled to have an ablation to address paroxysmal atrial fibrillation.  At this time, he wishes to not be reassessed for cardiac clearance for transplant after ablation is successfully managed.

## 2024-01-08 NOTE — ASSESSMENT & PLAN NOTE
Wt Readings from Last 3 Encounters:   01/08/24 119 kg (262 lb)   11/03/23 120 kg (264 lb)   08/08/23 117 kg (257 lb)     Patient appears to be compensated at this time, continue to encourage low-sodium diet, and furosemide currently dosed 160 mg twice daily.

## 2024-01-31 LAB — HBA1C MFR BLD HPLC: 6 %

## 2024-02-01 ENCOUNTER — TELEPHONE (OUTPATIENT)
Dept: NEPHROLOGY | Facility: CLINIC | Age: 69
End: 2024-02-01

## 2024-02-01 NOTE — TELEPHONE ENCOUNTER
Robaxin 500 mg 2 or 3 times a day if absolutely needed.  There is caution with all muscle relaxants, however this dosing should be okay for him.  Alternatively, benzodiazepines such as Versed may be another option if he does not respond to work feels uncomfortable with traditional muscle relaxants.

## 2024-02-01 NOTE — TELEPHONE ENCOUNTER
Called and spoke with Bridget at  Office she is aware   Robaxin 500 mg 2 or 3 times a day if absolutely needed.  There is caution with all muscle relaxants, however this dosing should be okay for him.  Alternatively, benzodiazepines such as Versed may be another option if he does not respond to work feels uncomfortable with traditional muscle relaxants.

## 2024-02-01 NOTE — TELEPHONE ENCOUNTER
Received a call from Bridget at  office regarding patient Masoud Perry, would like to know what he can give patient for muscle spasms as patient states he takes Magnesium . But knows that is not recommend.Please Advise.        [phone]

## 2024-02-15 ENCOUNTER — TELEPHONE (OUTPATIENT)
Dept: NEPHROLOGY | Facility: CLINIC | Age: 69
End: 2024-02-15

## 2024-02-15 NOTE — TELEPHONE ENCOUNTER
Call from Bridget at PCP's office.  Faxed over recent labs for review. Kidney function decreased, parathyroid increased.  Do you want to follow up with patient for these or do you want pcp to?

## 2024-02-16 NOTE — TELEPHONE ENCOUNTER
Called and spoke with patient.  He is aware, these labs are stable from before.    He states he has an issue with Afib today that lasted about 2 hours.  He states he did not go to the ER.  He states he is eating and drinking normally and no other issues at this time.

## 2024-02-16 NOTE — TELEPHONE ENCOUNTER
I believe these labs are stable from before, please make sure the patient is feeling okay, that he is not complaining of significant fatigue, that he is eating and drinking normally and there is no other issues going on at this time.

## 2024-03-26 ENCOUNTER — TELEPHONE (OUTPATIENT)
Dept: NEPHROLOGY | Facility: CLINIC | Age: 69
End: 2024-03-26

## 2024-03-26 DIAGNOSIS — N18.5 STAGE 5 CHRONIC KIDNEY DISEASE NOT ON CHRONIC DIALYSIS (HCC): Primary | ICD-10-CM

## 2024-03-26 NOTE — TELEPHONE ENCOUNTER
I called and spoke with Masoud regarding completing labs prior to appt on 04/02/2024. He states he normally goes to the lab before or after the appt to complete labs.

## 2024-04-02 ENCOUNTER — OFFICE VISIT (OUTPATIENT)
Dept: NEPHROLOGY | Facility: CLINIC | Age: 69
End: 2024-04-02
Payer: COMMERCIAL

## 2024-04-02 ENCOUNTER — APPOINTMENT (OUTPATIENT)
Dept: LAB | Facility: MEDICAL CENTER | Age: 69
End: 2024-04-02
Payer: COMMERCIAL

## 2024-04-02 VITALS
OXYGEN SATURATION: 99 % | DIASTOLIC BLOOD PRESSURE: 84 MMHG | BODY MASS INDEX: 39.57 KG/M2 | HEART RATE: 71 BPM | WEIGHT: 276.4 LBS | SYSTOLIC BLOOD PRESSURE: 142 MMHG | HEIGHT: 70 IN

## 2024-04-02 DIAGNOSIS — E03.9 HYPOTHYROIDISM, UNSPECIFIED TYPE: ICD-10-CM

## 2024-04-02 DIAGNOSIS — E55.9 VITAMIN D DEFICIENCY: ICD-10-CM

## 2024-04-02 DIAGNOSIS — I50.32 CHRONIC DIASTOLIC HF (HEART FAILURE) (HCC): ICD-10-CM

## 2024-04-02 DIAGNOSIS — E83.52 HYPERCALCEMIA: ICD-10-CM

## 2024-04-02 DIAGNOSIS — E03.9 HYPOTHYROIDISM, UNSPECIFIED TYPE: Primary | ICD-10-CM

## 2024-04-02 DIAGNOSIS — N25.81 SECONDARY HYPERPARATHYROIDISM OF RENAL ORIGIN (HCC): ICD-10-CM

## 2024-04-02 DIAGNOSIS — N18.5 STAGE 5 CHRONIC KIDNEY DISEASE NOT ON CHRONIC DIALYSIS (HCC): Primary | ICD-10-CM

## 2024-04-02 DIAGNOSIS — N18.5 STAGE 5 CHRONIC KIDNEY DISEASE NOT ON CHRONIC DIALYSIS (HCC): ICD-10-CM

## 2024-04-02 DIAGNOSIS — I12.0 BENIGN HYPERTENSION WITH CKD (CHRONIC KIDNEY DISEASE) STAGE V (HCC): ICD-10-CM

## 2024-04-02 DIAGNOSIS — N18.5 BENIGN HYPERTENSION WITH CKD (CHRONIC KIDNEY DISEASE) STAGE V (HCC): ICD-10-CM

## 2024-04-02 DIAGNOSIS — E11.21 DIABETIC NEPHROPATHY ASSOCIATED WITH TYPE 2 DIABETES MELLITUS (HCC): ICD-10-CM

## 2024-04-02 LAB
ALBUMIN SERPL BCP-MCNC: 3.8 G/DL (ref 3.5–5)
ALP SERPL-CCNC: 106 U/L (ref 34–104)
ALT SERPL W P-5'-P-CCNC: 14 U/L (ref 7–52)
ANION GAP SERPL CALCULATED.3IONS-SCNC: 10 MMOL/L (ref 4–13)
AST SERPL W P-5'-P-CCNC: 16 U/L (ref 13–39)
BASOPHILS # BLD AUTO: 0.05 THOUSANDS/ÂΜL (ref 0–0.1)
BASOPHILS NFR BLD AUTO: 1 % (ref 0–1)
BILIRUB SERPL-MCNC: 0.37 MG/DL (ref 0.2–1)
BUN SERPL-MCNC: 68 MG/DL (ref 5–25)
CA-I BLD-SCNC: 1.28 MMOL/L (ref 1.12–1.32)
CALCIUM SERPL-MCNC: 9.8 MG/DL (ref 8.4–10.2)
CHLORIDE SERPL-SCNC: 104 MMOL/L (ref 96–108)
CO2 SERPL-SCNC: 25 MMOL/L (ref 21–32)
CREAT SERPL-MCNC: 4.77 MG/DL (ref 0.6–1.3)
EOSINOPHIL # BLD AUTO: 0.29 THOUSAND/ÂΜL (ref 0–0.61)
EOSINOPHIL NFR BLD AUTO: 5 % (ref 0–6)
ERYTHROCYTE [DISTWIDTH] IN BLOOD BY AUTOMATED COUNT: 15.6 % (ref 11.6–15.1)
GFR SERPL CREATININE-BSD FRML MDRD: 11 ML/MIN/1.73SQ M
GLUCOSE SERPL-MCNC: 76 MG/DL (ref 65–140)
HCT VFR BLD AUTO: 34.1 % (ref 36.5–49.3)
HGB BLD-MCNC: 10.4 G/DL (ref 12–17)
IMM GRANULOCYTES # BLD AUTO: 0.01 THOUSAND/UL (ref 0–0.2)
IMM GRANULOCYTES NFR BLD AUTO: 0 % (ref 0–2)
LYMPHOCYTES # BLD AUTO: 1.39 THOUSANDS/ÂΜL (ref 0.6–4.47)
LYMPHOCYTES NFR BLD AUTO: 22 % (ref 14–44)
MAGNESIUM SERPL-MCNC: 2.7 MG/DL (ref 1.9–2.7)
MCH RBC QN AUTO: 32.6 PG (ref 26.8–34.3)
MCHC RBC AUTO-ENTMCNC: 30.5 G/DL (ref 31.4–37.4)
MCV RBC AUTO: 107 FL (ref 82–98)
MONOCYTES # BLD AUTO: 0.38 THOUSAND/ÂΜL (ref 0.17–1.22)
MONOCYTES NFR BLD AUTO: 6 % (ref 4–12)
NEUTROPHILS # BLD AUTO: 4.15 THOUSANDS/ÂΜL (ref 1.85–7.62)
NEUTS SEG NFR BLD AUTO: 66 % (ref 43–75)
NRBC BLD AUTO-RTO: 0 /100 WBCS
PHOSPHATE SERPL-MCNC: 5 MG/DL (ref 2.3–4.1)
PLATELET # BLD AUTO: 127 THOUSANDS/UL (ref 149–390)
PMV BLD AUTO: 9.3 FL (ref 8.9–12.7)
POTASSIUM SERPL-SCNC: 4.3 MMOL/L (ref 3.5–5.3)
PROT SERPL-MCNC: 7.1 G/DL (ref 6.4–8.4)
PTH-INTACT SERPL-MCNC: 211.4 PG/ML (ref 12–88)
RBC # BLD AUTO: 3.19 MILLION/UL (ref 3.88–5.62)
SODIUM SERPL-SCNC: 139 MMOL/L (ref 135–147)
T4 FREE SERPL-MCNC: 1.16 NG/DL (ref 0.61–1.12)
TSH SERPL DL<=0.05 MIU/L-ACNC: 6.52 UIU/ML (ref 0.45–4.5)
WBC # BLD AUTO: 6.27 THOUSAND/UL (ref 4.31–10.16)

## 2024-04-02 PROCEDURE — 99214 OFFICE O/P EST MOD 30 MIN: CPT | Performed by: NURSE PRACTITIONER

## 2024-04-02 PROCEDURE — 36415 COLL VENOUS BLD VENIPUNCTURE: CPT

## 2024-04-02 PROCEDURE — 82330 ASSAY OF CALCIUM: CPT

## 2024-04-02 PROCEDURE — 80053 COMPREHEN METABOLIC PANEL: CPT

## 2024-04-02 PROCEDURE — 83970 ASSAY OF PARATHORMONE: CPT

## 2024-04-02 PROCEDURE — 84439 ASSAY OF FREE THYROXINE: CPT

## 2024-04-02 PROCEDURE — 85025 COMPLETE CBC W/AUTO DIFF WBC: CPT

## 2024-04-02 PROCEDURE — 84100 ASSAY OF PHOSPHORUS: CPT

## 2024-04-02 PROCEDURE — 83735 ASSAY OF MAGNESIUM: CPT

## 2024-04-02 PROCEDURE — 84443 ASSAY THYROID STIM HORMONE: CPT

## 2024-04-02 RX ORDER — CALCIUM CARBONATE 300MG(750)
TABLET,CHEWABLE ORAL
COMMUNITY
Start: 2024-02-06

## 2024-04-02 RX ORDER — ERGOCALCIFEROL 1.25 MG/1
50000 CAPSULE ORAL WEEKLY
Qty: 12 CAPSULE | Refills: 1 | Status: SHIPPED | OUTPATIENT
Start: 2024-04-02

## 2024-04-02 NOTE — PATIENT INSTRUCTIONS
You have an elevated parathyroid hormone level and a low vitamin D level. I will treat you first with vitamin D supplementation as I suspect this may be one of the reasons your parathyroid hormone level is elevated. Will start vitamin D supplementation - ergocalciferol once a week     Continue calcitriol     Labs now

## 2024-04-02 NOTE — PROGRESS NOTES
Nephrology   Office Follow-Up  Masoud Perry 68 y.o. male MRN: [id number]    Encounter: [id number]        Assessment & Plan    Masoud Perry was seen in the [place] office today. All diagnoses and orders for visit:     1. Stage 5 chronic kidney disease not on chronic dialysis (HCC)  Baseline creatinine appears to be 3.6-4.3 mg/dL dating back to 2021. No updated labs to review- will go today after appt. Etiology biopsy proven (2020) diabetic nephropathy, HTN nephrosclerosis in addition to prerenal effect of diuretic.   Apparently not a candidate for renal transplant due to cardiovascular risk  Denies symptoms of uremia. No asterixis on exam. Examines significantly volume overloaded at risk for decompensation and hospitalization. Declines adjustment in diuretic at this time as outlined below. He will focus on dietary restrictions and likely will need PD sooner rather than later (potentially 3 months or less unless volume derangement improves)  Has upcoming appt with Dr. Park for PDC candidacy.   Once blood work resulted further recommendations will be forthcoming  -     Comprehensive metabolic panel; Future  -     CBC and differential; Future  -     Magnesium; Future  -     Phosphorus; Future  2. Chronic diastolic HF (heart failure) (HCC)  Examines significantly hypervolemic on lasix 160 mg twice a day. Declines adjustments or addition of sequential blockade. Prefers to adjust diet. As mentioned above, will likely need PD sooner rather than later to avoid further volume derangements.   3. Diabetic nephropathy associated with type 2 diabetes mellitus (HCC)  Not a candidate for RAASi, SGLT2i, AMC given advanced renal disease  4. Benign hypertension with CKD (chronic kidney disease) stage V (HCC)  Blood pressure 142/84 mmHg- no changes made at present   5. Secondary hyperparathyroidism of renal origin (HCC)  Continue current dose of calcitriol and add ergocalciferol. Repeat PTH today.   -     PTH, intact; Future  6.  Hypercalcemia  -     Calcium, ionized; Future  7. Vitamin D deficiency  -     ergocalciferol (VITAMIN D2) 50,000 units; Take 1 capsule (50,000 Units total) by mouth once a week  8. Hypothyroidism, unspecified type  -     TSH + Free T4; Future        HPI: Masoud Perry is a 68 y.o. male who is here for scheduled follow-up     Pertinent problems include CKD 5, HFpEF, Factor V Leiden, morbid obesity, PAF on Eliquis, hx CVA s/p tPA 2022, CAD s/p PCI, T2DM with presumed diabetic nephropathy, HTN, smoker    Planned for AF ablation with Dr. Alcala at end of month    Appt with Dr. Park this month to evaluate PDC candidacy    Examines significantly hypervolemic and at risk for decompensated heart failure. Discussed increasing intensity of diuretic regimen with patient in terms of switching agents or adding sequential blockade with metolazone. He declines any change and states he develops symptoms consistent with kidney failure (dysguesia, decreased urine output) with any diuretic change. No lab work to review. Will be done today after appt. To see Dr. Park for PDC placement. Start Vit D supplement ergocalciferol weekly. Suspect will likely need PD sooner rather than later to control volume status. This was discussed with patient and son.       ROS:   Review of Systems   Constitutional:  Positive for activity change. Negative for chills and fever.        Metal taste in mouth and decreased urination with any change to diuretics   HENT:  Negative for ear pain and sore throat.    Eyes:  Negative for pain and visual disturbance.   Respiratory:  Negative for cough and shortness of breath.    Cardiovascular:  Positive for leg swelling. Negative for chest pain and palpitations.   Gastrointestinal:  Negative for abdominal pain and vomiting.   Genitourinary:  Positive for frequency. Negative for dysuria and hematuria.   Musculoskeletal:  Positive for arthralgias, back pain and gait problem.   Skin:  Negative for color change and  rash.   Neurological:  Negative for seizures and syncope.   All other systems reviewed and are negative.      Allergies: Carvedilol, Aspartame - food allergy, Bumetanide, Cephalexin, Hydralazine, Lisinopril, Morphine, Nifedipine, Ciprofloxacin, Metolazone, and Strawberry extract - food allergy    Medications:   Current Outpatient Medications:     allopurinol (ZYLOPRIM) 300 mg tablet, Take by mouth, Disp: , Rfl:     apixaban (ELIQUIS) 5 mg, 2 (two) times a day, Disp: , Rfl:     Arginine 1000 MG TABS, Take 3,000 mg by mouth One daily, Disp: , Rfl:     ascorbic acid (VITAMIN C) 500 MG tablet, Take 500 mg by mouth daily, Disp: , Rfl:     aspirin (ECOTRIN LOW STRENGTH) 81 mg EC tablet, Take 81 mg by mouth, Disp: , Rfl:     atorvastatin (LIPITOR) 40 mg tablet, Take 1 tablet (40 mg total) by mouth daily with dinner, Disp: 30 tablet, Rfl: 0    b complex vitamins capsule, Take 1 capsule by mouth daily, Disp: , Rfl:     calcitriol (ROCALTROL) 0.25 mcg capsule, TAKE 1 CAPSULE (0.25 MCG TOTAL) BY MOUTH THREE TIMES A WEEK, Disp: , Rfl:     co-enzyme Q-10 30 MG capsule, Take 100 mg by mouth, Disp: , Rfl:     ergocalciferol (VITAMIN D2) 50,000 units, Take 1 capsule (50,000 Units total) by mouth once a week, Disp: 12 capsule, Rfl: 1    furosemide (LASIX) 80 mg tablet, Take 160 mg by mouth 2 (two) times a day, Disp: , Rfl:     HYDROcodone-acetaminophen (NORCO)  mg per tablet, Take by mouth, Disp: , Rfl:     insulin glargine (LANTUS) 100 units/mL subcutaneous injection, Inject 20 Units under the skin every 12 (twelve) hours, Disp: , Rfl:     insulin lispro (HumaLOG) 100 units/mL injection, as needed, Disp: , Rfl:     levothyroxine 125 mcg tablet, Take by mouth, Disp: , Rfl:     Magnesium 400 MG TABS, Take by mouth, Disp: , Rfl:     metoprolol succinate (TOPROL-XL) 100 mg 24 hr tablet, , Disp: , Rfl:     nitroglycerin (NITROSTAT) 0.4 mg SL tablet, PLEASE SEE ATTACHED FOR DETAILED DIRECTIONS, Disp: , Rfl:     tamsulosin (FLOMAX)  "0.4 mg, Take 0.4 mg by mouth daily with dinner, Disp: , Rfl:     vitamin A 2400 MCG (8000 UT) capsule, Take 2,400 Units by mouth daily, Disp: , Rfl:     vitamin E, tocopherol, 200 units capsule, Take 200 Units by mouth daily 180 mg daily, Disp: , Rfl:     Past Medical History:   Diagnosis Date    Acute kidney injury superimposed on chronic kidney disease stage 4 (HCC) 1/12/2022    Chronic kidney disease     Disease of thyroid gland     Hyperlipidemia     Hypertension     MI (myocardial infarction) (HCC)     Stroke (HCC)      Past Surgical History:   Procedure Laterality Date    CHOLECYSTECTOMY      CORONARY ANGIOPLASTY WITH STENT PLACEMENT      JOINT REPLACEMENT      bilateral knee    AR CYSTO/URETERO W/LITHOTRIPSY &INDWELL STENT INSRT Left 9/24/2022    Procedure: CYSTOSCOPY URETEROSCOPY WITH LITHOTRIPSY HOLMIUM LASER, AND INSERTION STENT URETERAL;  Surgeon: Lewis Dahl MD;  Location: [facility];  Service: Urology    US GUIDED KIDNEY BIOPSY  8/5/2020     Family History   Problem Relation Age of Onset    Kidney failure Mother     Cirrhosis Mother     Colon cancer Brother       reports that he has been smoking cigarettes. He has never used smokeless tobacco. He reports that he does not currently use alcohol. He reports that he does not use drugs.      Physical Exam:   Vitals:    04/02/24 1359   BP: 142/84   BP Location: Left arm   Patient Position: Sitting   Pulse: 71   SpO2: 99%   Weight: 125 kg (276 lb 6.4 oz)   Height: 5' 10\" (1.778 m)     Body mass index is 39.66 kg/m².    General: conscious, cooperative, in no acute distress, appears stated age  Eyes: conjunctivae pale, anicteric sclerae  ENT: lips and mucous membranes moist  Neck: supple, no JVD, no masses  Chest:  essentially clear breath sounds bilaterally, no crackles, ronchus or wheezings  CVS: S1 & S2, normal rate, regular rhythm  Abdomen: soft, non-tender, non-distended, normoactive bowel sounds, rounded obese  Extremities: severe edema of both legs, left " "greater than right  Skin: no rash pale  Neuro: awake, alert, oriented       Diagnostic Data:  Lab: I have personally reviewed pertinent lab results.,   CBC:  Results from last 7 days   Lab Units 04/02/24  1441   WBC Thousand/uL 6.27   HEMOGLOBIN g/dL 10.4*   HEMATOCRIT % 34.1*   PLATELETS Thousands/uL 127*      CMP:   Lab Results   Component Value Date    SODIUM 139 04/02/2024    K 4.3 04/02/2024     04/02/2024    CO2 25 04/02/2024    BUN 68 (H) 04/02/2024    CREATININE 4.77 (H) 04/02/2024    CALCIUM 9.8 04/02/2024    AST 16 04/02/2024    ALT 14 04/02/2024    ALKPHOS 106 (H) 04/02/2024    EGFR 11 04/02/2024   ,   PT/INR: No results found for: \"PT\", \"INR\",   Magnesium: No components found for: \"MAG\",  Phosphorous:   Lab Results   Component Value Date    PHOS 5.0 (H) 04/02/2024       Patient Instructions   You have an elevated parathyroid hormone level and a low vitamin D level. I will treat you first with vitamin D supplementation as I suspect this may be one of the reasons your parathyroid hormone level is elevated. Will start vitamin D supplementation - ergocalciferol once a week     Continue calcitriol     Labs now      Portions of the record may have been created with voice recognition software. Occasional wrong word or \"sound a like\" substitutions may have occurred due to the inherent limitations of voice recognition software. Read the chart carefully and recognize, using context, where substitutions have occurred.    If you have any questions, please contact the dictating provider.  "

## 2024-04-03 ENCOUNTER — TELEPHONE (OUTPATIENT)
Dept: NEPHROLOGY | Facility: CLINIC | Age: 69
End: 2024-04-03

## 2024-04-03 DIAGNOSIS — E61.1 IRON DEFICIENCY: ICD-10-CM

## 2024-04-03 DIAGNOSIS — N18.5 STAGE 5 CHRONIC KIDNEY DISEASE NOT ON CHRONIC DIALYSIS (HCC): Primary | ICD-10-CM

## 2024-04-03 NOTE — TELEPHONE ENCOUNTER
I called and spoke with Masoud. He is aware of lab results and provider recommendations. He is aware of what symptoms to watch for and seek Er attention if he develops any.

## 2024-04-18 ENCOUNTER — CONSULT (OUTPATIENT)
Dept: SURGERY | Facility: CLINIC | Age: 69
End: 2024-04-18

## 2024-04-18 VITALS
HEIGHT: 70 IN | TEMPERATURE: 97.6 F | HEART RATE: 60 BPM | OXYGEN SATURATION: 96 % | WEIGHT: 280 LBS | DIASTOLIC BLOOD PRESSURE: 64 MMHG | SYSTOLIC BLOOD PRESSURE: 140 MMHG | BODY MASS INDEX: 40.09 KG/M2

## 2024-04-18 DIAGNOSIS — I10 PRIMARY HYPERTENSION: Primary | ICD-10-CM

## 2024-04-18 DIAGNOSIS — I50.32 CHRONIC DIASTOLIC HF (HEART FAILURE) (HCC): ICD-10-CM

## 2024-04-18 DIAGNOSIS — N18.5 STAGE 5 CHRONIC KIDNEY DISEASE NOT ON CHRONIC DIALYSIS (HCC): ICD-10-CM

## 2024-04-19 NOTE — ASSESSMENT & PLAN NOTE
Blood pressure reviewed on today's office visit.  Appears stable.  Continue current medical management as per PCP/nephrology.

## 2024-04-19 NOTE — ASSESSMENT & PLAN NOTE
Lab Results   Component Value Date    EGFR 11 04/02/2024    EGFR 13 01/08/2024    EGFR 14 11/03/2023    CREATININE 4.77 (H) 04/02/2024    CREATININE 4.09 (H) 01/08/2024    CREATININE 3.86 (H) 11/03/2023       Patient with stage V chronic kidney disease.  Currently not exhibiting any uremic signs.  On based on history and physical examination I do feel that patient could be a good candidate for placement peritoneal dialysis catheter.  He has a history of open gallbladder but hopefully that will not interfere with the catheter placement.  Based on his body habitus he may require a extended cath.  The procedure was discussed at great length with the patient including all associated risk which included bleeding, infection, injury to surrounding structures, catheter dysfunction, and inability to place the catheter.  The patient verbalized understanding of these risks and is willing to proceed, consent was signed.  Patient is going to be undergoing a cardiac ablation in the next month and would need to follow-up with his cardiologist to determine clearance/optimization prior to surgery.  He is currently on Eliquis and will likely need to stop Eliquis 2 days prior to the procedure unless he he is taken off due to the cardiac ablation.  Will also reach out to nephrology to determine timeframe.

## 2024-04-19 NOTE — PROGRESS NOTES
Assessment/Plan:    Stage 5 chronic kidney disease not on chronic dialysis (HCC)  Lab Results   Component Value Date    EGFR 11 04/02/2024    EGFR 13 01/08/2024    EGFR 14 11/03/2023    CREATININE 4.77 (H) 04/02/2024    CREATININE 4.09 (H) 01/08/2024    CREATININE 3.86 (H) 11/03/2023       Patient with stage V chronic kidney disease.  Currently not exhibiting any uremic signs.  On based on history and physical examination I do feel that patient could be a good candidate for placement peritoneal dialysis catheter.  He has a history of open gallbladder but hopefully that will not interfere with the catheter placement.  Based on his body habitus he may require a extended cath.  The procedure was discussed at great length with the patient including all associated risk which included bleeding, infection, injury to surrounding structures, catheter dysfunction, and inability to place the catheter.  The patient verbalized understanding of these risks and is willing to proceed, consent was signed.  Patient is going to be undergoing a cardiac ablation in the next month and would need to follow-up with his cardiologist to determine clearance/optimization prior to surgery.  He is currently on Eliquis and will likely need to stop Eliquis 2 days prior to the procedure unless he he is taken off due to the cardiac ablation.  Will also reach out to nephrology to determine timeframe.      HTN (hypertension)  Blood pressure reviewed on today's office visit.  Appears stable.  Continue current medical management as per PCP/nephrology.     Diagnoses and all orders for this visit:    Primary hypertension    Stage 5 chronic kidney disease not on chronic dialysis (HCC)  -     Ambulatory Referral to General Surgery            Cardiology/imaging:    Recent echocardiogram dated April 7, 2023 was personally reviewed by me.      Notes:    Recent office visit by nephrology dated April 2, 2024 was personally viewed by me.    Labs:    Recent CBC,  CMP, dated April 2, 2024 was personally reviewed by me.      Subjective:      Patient ID: Masoud Perry is a 68 y.o. male.    68-year-old gentleman with CKD stage V, thyroid disease, hyperlipidemia, hypertension, CAD history of MI, history of stroke, A-fib on Eliquis, presents today in consultation for evaluation of peritoneal dialysis catheter.  Patient follows closely with nephrology and after further discussion he has decided he would prefer to proceed with peritoneal dialysis versus hemodialysis.  Patient states currently he feels fine he has no systems of renal failure as right now.  No nausea vomiting fevers chills.  Tolerates diet.  No lightheadedness.  No chest pain or shortness of breath.  He does walk with a cane.  Does have severe lymphedema which he hopes dialysis will help with.  He follows closely with cardiology as well and states he is scheduled for an ablation next month due to A-fib.        The following portions of the patient's history were reviewed and updated as appropriate: He  has a past medical history of Acute kidney injury superimposed on chronic kidney disease stage 4 ([facility]) (1/12/2022), Chronic kidney disease, Disease of thyroid gland, Hyperlipidemia, Hypertension, MI (myocardial infarction) (HCC), and Stroke (HCC).  He   Patient Active Problem List    Diagnosis Date Noted    Bilateral lower extremity edema 01/27/2023    Ureteral stone with hydronephrosis 09/23/2022    Cutaneous abscess of buttock 09/23/2022    Chronic kidney disease-mineral and bone disorder 09/16/2022    Type 2 diabetes mellitus with stage 4 chronic kidney disease, with long-term current use of insulin ([facility]) 07/15/2022    Obesity, morbid ([facility]) 07/15/2022    Secondary hyperparathyroidism of renal origin ([facility]) 07/15/2022    Stage 5 chronic kidney disease not on chronic dialysis ([facility]) 07/15/2022    Factor 5 Leiden mutation, heterozygous ([facility]) 01/15/2022    Thrombocytopenia ([facility]) 01/14/2022    Angina at rest 01/14/2022    MI  (myocardial infarction) (HCC) 01/14/2022    History of PTCA 01/14/2022    Sleep apnea 01/14/2022    Smoking 01/14/2022    Anemia 01/13/2022    History of CVA (cerebrovascular accident) 01/12/2022    HTN (hypertension) 01/12/2022    Chronic diastolic HF (heart failure) (HCC) 01/12/2022    HLD (hyperlipidemia) 01/12/2022    Lymphedema 01/12/2022    Hypothyroidism 01/12/2022     He  has a past surgical history that includes Cholecystectomy; Joint replacement; Coronary angioplasty with stent; pr cysto/uretero w/lithotripsy &indwell stent insrt (Left, 9/24/2022); and US guided kidney biopsy (8/5/2020).  His family history includes Cirrhosis in his mother; Colon cancer in his brother; Kidney failure in his mother.  He  reports that he has been smoking cigarettes. He has never used smokeless tobacco. He reports that he does not currently use alcohol. He reports that he does not use drugs.  Current Outpatient Medications   Medication Sig Dispense Refill    allopurinol (ZYLOPRIM) 300 mg tablet Take by mouth      apixaban (ELIQUIS) 5 mg 2 (two) times a day      Arginine 1000 MG TABS Take 3,000 mg by mouth One daily      ascorbic acid (VITAMIN C) 500 MG tablet Take 500 mg by mouth daily      aspirin (ECOTRIN LOW STRENGTH) 81 mg EC tablet Take 81 mg by mouth      atorvastatin (LIPITOR) 40 mg tablet Take 1 tablet (40 mg total) by mouth daily with dinner 30 tablet 0    b complex vitamins capsule Take 1 capsule by mouth daily      calcitriol (ROCALTROL) 0.25 mcg capsule TAKE 1 CAPSULE (0.25 MCG TOTAL) BY MOUTH THREE TIMES A WEEK      co-enzyme Q-10 30 MG capsule Take 100 mg by mouth      ergocalciferol (VITAMIN D2) 50,000 units Take 1 capsule (50,000 Units total) by mouth once a week 12 capsule 1    furosemide (LASIX) 80 mg tablet Take 160 mg by mouth 2 (two) times a day      HYDROcodone-acetaminophen (NORCO)  mg per tablet Take by mouth      insulin glargine (LANTUS) 100 units/mL subcutaneous injection Inject 20 Units under  "the skin every 12 (twelve) hours      insulin lispro (HumaLOG) 100 units/mL injection as needed      levothyroxine 125 mcg tablet Take by mouth      Magnesium 400 MG TABS Take by mouth      metoprolol succinate (TOPROL-XL) 100 mg 24 hr tablet       nitroglycerin (NITROSTAT) 0.4 mg SL tablet PLEASE SEE ATTACHED FOR DETAILED DIRECTIONS      tamsulosin (FLOMAX) 0.4 mg Take 0.4 mg by mouth daily with dinner      vitamin A 2400 MCG (8000 UT) capsule Take 2,400 Units by mouth daily      vitamin E, tocopherol, 200 units capsule Take 200 Units by mouth daily 180 mg daily       No current facility-administered medications for this visit.     He is allergic to carvedilol, aspartame - food allergy, bumetanide, cephalexin, hydralazine, lisinopril, morphine, nifedipine, ciprofloxacin, metolazone, and strawberry extract - food allergy..    Review of Systems      Review of systems completed, all negative except as noted in the HPI    Objective:      /64 (BP Location: Left arm, Patient Position: Sitting, Cuff Size: Standard)   Pulse 60   Temp 97.6 °F (36.4 °C) (Temporal)   Ht 5' 10\" (1.778 m)   Wt 127 kg (280 lb)   SpO2 96%   BMI 40.18 kg/m²          Physical Exam  Vitals reviewed.   Constitutional:       General: He is not in acute distress.     Appearance: Normal appearance. He is ill-appearing. He is not toxic-appearing or diaphoretic.   HENT:      Head: Normocephalic and atraumatic.      Right Ear: External ear normal.      Left Ear: External ear normal.   Eyes:      General: No scleral icterus.        Right eye: No discharge.         Left eye: No discharge.   Cardiovascular:      Rate and Rhythm: Normal rate.   Pulmonary:      Effort: Pulmonary effort is normal. No respiratory distress.   Abdominal:      General: There is no distension.      Palpations: Abdomen is soft. There is no mass.      Tenderness: There is no abdominal tenderness. There is no guarding.      Hernia: No hernia is present.   Musculoskeletal:    "      General: Swelling present.      Cervical back: Normal range of motion.      Right lower leg: Edema present.      Left lower leg: Edema present.   Skin:     General: Skin is warm.      Coloration: Skin is not jaundiced or pale.      Findings: No bruising or erythema.   Neurological:      General: No focal deficit present.      Mental Status: He is alert and oriented to person, place, and time.      Cranial Nerves: No cranial nerve deficit.   Psychiatric:         Mood and Affect: Mood normal.         Behavior: Behavior normal.         Thought Content: Thought content normal.         Judgment: Judgment normal.

## 2024-05-15 ENCOUNTER — PREP FOR PROCEDURE (OUTPATIENT)
Dept: SURGERY | Facility: CLINIC | Age: 69
End: 2024-05-15

## 2024-05-15 ENCOUNTER — TELEPHONE (OUTPATIENT)
Dept: SURGERY | Facility: CLINIC | Age: 69
End: 2024-05-15

## 2024-05-15 ENCOUNTER — TELEPHONE (OUTPATIENT)
Dept: PULMONOLOGY | Facility: CLINIC | Age: 69
End: 2024-05-15

## 2024-05-15 DIAGNOSIS — N28.9 KIDNEY DISEASE: Primary | ICD-10-CM

## 2024-05-15 NOTE — TELEPHONE ENCOUNTER
Serge'd call from [facility] re: the clearance requested for PD cath insertion procedure.  They asked if we would be handling the Loven[place], but I did let them know that if it is needed we would like his cardiology provider to handle it.  They will call back if they need anything further.

## 2024-05-15 NOTE — TELEPHONE ENCOUNTER
Per Dr Park and Nephrology. Pt is ready for pd cath placement. Pt had ablation done 4/29. I scheduled pt for 6/7 for cath and sent cardiac clearance to cardiologist. I went over instructions with patient and he has procedure packet. Post op Made.

## 2024-05-24 ENCOUNTER — ANESTHESIA EVENT (OUTPATIENT)
Dept: PERIOP | Facility: HOSPITAL | Age: 69
End: 2024-05-24
Payer: COMMERCIAL

## 2024-05-24 NOTE — PRE-PROCEDURE INSTRUCTIONS
Pre-Surgery Instructions:   Medication Instructions    allopurinol (ZYLOPRIM) 300 mg tablet Take as directed    apixaban (ELIQUIS) 5 mg Instructions provided by MD    Arginine 1000 MG TABS Stop taking 7 days prior to surgery.    ascorbic acid (VITAMIN C) 500 MG tablet Stop taking 7 days prior to surgery.    aspirin (ECOTRIN LOW STRENGTH) 81 mg EC tablet Instructions provided by MD    atorvastatin (LIPITOR) 40 mg tablet Take night before surgery    b complex vitamins capsule Stop taking 7 days prior to surgery.    calcitriol (ROCALTROL) 0.25 mcg capsule Stop taking 7 days prior to surgery.    co-enzyme Q-10 30 MG capsule Stop taking 7 days prior to surgery.    ergocalciferol (VITAMIN D2) 50,000 units Stop taking 7 days prior to surgery.    furosemide (LASIX) 80 mg tablet Hold day of surgery.    HYDROcodone-acetaminophen (NORCO)  mg per tablet PRN    insulin glargine (LANTUS) 100 units/mL subcutaneous injection Take full dose night before and morning of surgery    insulin lispro (HumaLOG) 100 units/mL injection Uses PRN- DO NOT take day of surgery    levothyroxine 125 mcg tablet Take day of surgery.    Magnesium 400 MG TABS Stop taking 7 days prior to surgery.    metoprolol succinate (TOPROL-XL) 100 mg 24 hr tablet Take day of surgery.    nitroglycerin (NITROSTAT) 0.4 mg SL tablet Uses PRN- OK to take day of surgery    tamsulosin (FLOMAX) 0.4 mg Take day of surgery.    vitamin A 2400 MCG (8000 UT) capsule Stop taking 7 days prior to surgery.    vitamin E, tocopherol, 200 units capsule Stop taking 7 days prior to surgery.      Medication instructions for day surgery reviewed. Please use only a sip of water to take your instructed medications. Avoid all over the counter vitamins, supplements and NSAIDS for one week prior to surgery per anesthesia guidelines. Tylenol is ok to take as needed.     You will receive a call one business day prior to surgery with an arrival time and hospital directions. If your surgery  is scheduled on a Monday, the hospital will be calling you on the Friday prior to your surgery. If you have not heard from anyone by 8pm, please call the hospital supervisor through the hospital  at [phone]. ([place] [phone] or [place] [phone]).    Do not eat or drink anything after midnight the night before your surgery, including candy, mints, lifesavers, or chewing gum. Do not drink alcohol 24hrs before your surgery. Try not to smoke at least 24hrs before your surgery.       Follow the pre surgery showering instructions as listed in the “My Surgical Experience Booklet” or otherwise provided by your surgeon's office. Do not use a blade to shave the surgical area 1 week before surgery. It is okay to use a clean electric clippers up to 24 hours before surgery. Do not apply any lotions, creams, including makeup, cologne, deodorant, or perfumes after showering on the day of your surgery. Do not use dry shampoo, hair spray, hair gel, or any type of hair products.     No contact lenses, eye make-up, or artificial eyelashes. Remove nail polish, including gel polish, and any artificial, gel, or acrylic nails if possible. Remove all jewelry including rings and body piercing jewelry.     Wear causal clothing that is easy to take on and off. Consider your type of surgery.    Keep any valuables, jewelry, piercings at home. Please bring any specially ordered equipment (sling, braces) if indicated.    Arrange for a responsible person to drive you to and from the hospital on the day of your surgery. Please confirm the visitor policy for the day of your procedure when you receive your phone call with an arrival time.     Call the surgeon's office with any new illnesses, exposures, or additional questions prior to surgery.    Please reference your “My Surgical Experience Booklet” for additional information to prepare for your upcoming surgery.

## 2024-05-30 ENCOUNTER — TELEPHONE (OUTPATIENT)
Dept: SURGERY | Facility: CLINIC | Age: 69
End: 2024-05-30

## 2024-05-30 NOTE — TELEPHONE ENCOUNTER
Informed the patient he is able to hold his eliquis 2days prior to pd cath placement.  He will finish Thursday evening and then hold until after procedure.  He verbalized understanding.     The clearance is in cardiology telephone encounter started 5/15/24

## 2024-06-06 NOTE — ANESTHESIA PREPROCEDURE EVALUATION
Procedure:  INSERTION PERITONEAL CATHETER DIALYSIS LAPAROSCOPIC (Abdomen)    Relevant Problems   CARDIO   (+) Angina at rest   (+) HLD (hyperlipidemia)   (+) HTN (hypertension)   (+) History of PTCA   (+) MI (myocardial infarction) (HCC)      ENDO   (+) Hypothyroidism   (+) Secondary hyperparathyroidism of renal origin (HCC)   (+) Type 2 diabetes mellitus with stage 4 chronic kidney disease, with long-term current use of insulin (HCC)      /RENAL   (+) Chronic kidney disease-mineral and bone disorder   (+) Stage 5 chronic kidney disease not on chronic dialysis (HCC)   (+) Ureteral stone with hydronephrosis      HEMATOLOGY   (+) Anemia   (+) Thrombocytopenia (HCC)      PULMONARY   (+) Sleep apnea   (+) Smoking      BMI 40  Physical Exam    Airway    Mallampati score: II  TM Distance: >3 FB  Neck ROM: full     Dental   No notable dental hx     Cardiovascular  Cardiovascular exam normal    Pulmonary  Pulmonary exam normal     Other Findings        Anesthesia Plan  ASA Score- 4     Anesthesia Type- general with ASA Monitors.         Additional Monitors:     Airway Plan: ETT.           Plan Factors-Exercise tolerance (METS): >4 METS.    Chart reviewed. EKG reviewed. Imaging results reviewed. Existing labs reviewed. Patient summary reviewed.    Patient is a current smoker.  Patient instructed to abstain from smoking on day of procedure. Patient did not smoke on day of surgery.    Obstructive sleep apnea risk education given perioperatively.        Induction- intravenous.    Postoperative Plan-     Perioperative Resuscitation Plan - Level 1 - Full Code.       Informed Consent- Anesthetic plan and risks discussed with patient.  I personally reviewed this patient with the CRNA. Discussed and agreed on the Anesthesia Plan with the CRNA..

## 2024-06-07 ENCOUNTER — HOSPITAL ENCOUNTER (OUTPATIENT)
Facility: HOSPITAL | Age: 69
Setting detail: OUTPATIENT SURGERY
Discharge: HOME | End: 2024-06-07
Attending: SURGERY | Admitting: SURGERY
Payer: COMMERCIAL

## 2024-06-07 ENCOUNTER — ANESTHESIA (OUTPATIENT)
Dept: PERIOP | Facility: HOSPITAL | Age: 69
End: 2024-06-07
Payer: COMMERCIAL

## 2024-06-07 VITALS
HEIGHT: 70 IN | WEIGHT: 270 LBS | HEART RATE: 54 BPM | TEMPERATURE: 97 F | BODY MASS INDEX: 38.65 KG/M2 | OXYGEN SATURATION: 96 % | DIASTOLIC BLOOD PRESSURE: 59 MMHG | SYSTOLIC BLOOD PRESSURE: 124 MMHG | RESPIRATION RATE: 20 BRPM

## 2024-06-07 LAB — GLUCOSE SERPL-MCNC: 109 MG/DL (ref 65–140)

## 2024-06-07 PROCEDURE — NC001 PR NO CHARGE

## 2024-06-07 PROCEDURE — 82948 REAGENT STRIP/BLOOD GLUCOSE: CPT

## 2024-06-07 PROCEDURE — 49324 LAP INSERT TUNNEL IP CATH: CPT

## 2024-06-07 PROCEDURE — C1750 CATH, HEMODIALYSIS,LONG-TERM: HCPCS | Performed by: SURGERY

## 2024-06-07 PROCEDURE — 93005 ELECTROCARDIOGRAM TRACING: CPT

## 2024-06-07 PROCEDURE — NC001 PR NO CHARGE: Performed by: SURGERY

## 2024-06-07 PROCEDURE — 49324 LAP INSERT TUNNEL IP CATH: CPT | Performed by: SURGERY

## 2024-06-07 DEVICE — PERITONEAL DIALYSIS CATHETER, SWAN NECK CURL CATH, 2 CUFFS RIGHT
Type: IMPLANTABLE DEVICE | Status: FUNCTIONAL
Brand: ARGYLE

## 2024-06-07 RX ORDER — EPHEDRINE SULFATE 50 MG/ML
INJECTION INTRAVENOUS AS NEEDED
Status: DISCONTINUED | OUTPATIENT
Start: 2024-06-07 | End: 2024-06-07

## 2024-06-07 RX ORDER — ALBUTEROL SULFATE 2.5 MG/3ML
2.5 SOLUTION RESPIRATORY (INHALATION) ONCE
Status: COMPLETED | OUTPATIENT
Start: 2024-06-07 | End: 2024-06-07

## 2024-06-07 RX ORDER — SODIUM CHLORIDE, SODIUM LACTATE, POTASSIUM CHLORIDE, CALCIUM CHLORIDE 600; 310; 30; 20 MG/100ML; MG/100ML; MG/100ML; MG/100ML
50 INJECTION, SOLUTION INTRAVENOUS CONTINUOUS
Status: DISCONTINUED | OUTPATIENT
Start: 2024-06-07 | End: 2024-06-07 | Stop reason: HOSPADM

## 2024-06-07 RX ORDER — SODIUM CHLORIDE 9 MG/ML
75 INJECTION, SOLUTION INTRAVENOUS CONTINUOUS
Status: DISCONTINUED | OUTPATIENT
Start: 2024-06-07 | End: 2024-06-07 | Stop reason: HOSPADM

## 2024-06-07 RX ORDER — GLYCOPYRROLATE 0.2 MG/ML
INJECTION INTRAMUSCULAR; INTRAVENOUS AS NEEDED
Status: DISCONTINUED | OUTPATIENT
Start: 2024-06-07 | End: 2024-06-07

## 2024-06-07 RX ORDER — KETAMINE HCL IN NACL, ISO-OSM 100MG/10ML
SYRINGE (ML) INJECTION AS NEEDED
Status: DISCONTINUED | OUTPATIENT
Start: 2024-06-07 | End: 2024-06-07

## 2024-06-07 RX ORDER — BUPIVACAINE HYDROCHLORIDE 5 MG/ML
INJECTION, SOLUTION EPIDURAL; INTRACAUDAL AS NEEDED
Status: DISCONTINUED | OUTPATIENT
Start: 2024-06-07 | End: 2024-06-07 | Stop reason: HOSPADM

## 2024-06-07 RX ORDER — ONDANSETRON 2 MG/ML
4 INJECTION INTRAMUSCULAR; INTRAVENOUS ONCE AS NEEDED
Status: DISCONTINUED | OUTPATIENT
Start: 2024-06-07 | End: 2024-06-07 | Stop reason: HOSPADM

## 2024-06-07 RX ORDER — HEPARIN SODIUM 5000 [USP'U]/ML
5000 INJECTION, SOLUTION INTRAVENOUS; SUBCUTANEOUS EVERY 8 HOURS SCHEDULED
Status: COMPLETED | OUTPATIENT
Start: 2024-06-07 | End: 2024-06-07

## 2024-06-07 RX ORDER — FENTANYL CITRATE 50 UG/ML
INJECTION, SOLUTION INTRAMUSCULAR; INTRAVENOUS AS NEEDED
Status: DISCONTINUED | OUTPATIENT
Start: 2024-06-07 | End: 2024-06-07

## 2024-06-07 RX ORDER — SODIUM CHLORIDE 9 MG/ML
INJECTION, SOLUTION INTRAVENOUS CONTINUOUS PRN
Status: DISCONTINUED | OUTPATIENT
Start: 2024-06-07 | End: 2024-06-07

## 2024-06-07 RX ORDER — NEOSTIGMINE METHYLSULFATE 1 MG/ML
INJECTION INTRAVENOUS AS NEEDED
Status: DISCONTINUED | OUTPATIENT
Start: 2024-06-07 | End: 2024-06-07

## 2024-06-07 RX ORDER — PROPOFOL 10 MG/ML
INJECTION, EMULSION INTRAVENOUS CONTINUOUS PRN
Status: DISCONTINUED | OUTPATIENT
Start: 2024-06-07 | End: 2024-06-07

## 2024-06-07 RX ORDER — FENTANYL CITRATE/PF 50 MCG/ML
25 SYRINGE (ML) INJECTION
Status: DISCONTINUED | OUTPATIENT
Start: 2024-06-07 | End: 2024-06-07 | Stop reason: HOSPADM

## 2024-06-07 RX ORDER — ONDANSETRON 2 MG/ML
4 INJECTION INTRAMUSCULAR; INTRAVENOUS EVERY 8 HOURS PRN
Status: DISCONTINUED | OUTPATIENT
Start: 2024-06-07 | End: 2024-06-07 | Stop reason: HOSPADM

## 2024-06-07 RX ORDER — HYDROMORPHONE HCL/PF 1 MG/ML
0.5 SYRINGE (ML) INJECTION
Status: DISCONTINUED | OUTPATIENT
Start: 2024-06-07 | End: 2024-06-07 | Stop reason: HOSPADM

## 2024-06-07 RX ORDER — OXYCODONE HYDROCHLORIDE AND ACETAMINOPHEN 5; 325 MG/1; MG/1
2 TABLET ORAL EVERY 4 HOURS PRN
Status: DISCONTINUED | OUTPATIENT
Start: 2024-06-07 | End: 2024-06-07 | Stop reason: HOSPADM

## 2024-06-07 RX ORDER — HEPARIN 100 UNIT/ML
SYRINGE INTRAVENOUS AS NEEDED
Status: DISCONTINUED | OUTPATIENT
Start: 2024-06-07 | End: 2024-06-07 | Stop reason: HOSPADM

## 2024-06-07 RX ORDER — CEFAZOLIN SODIUM 2 G/50ML
2000 SOLUTION INTRAVENOUS ONCE
Status: COMPLETED | OUTPATIENT
Start: 2024-06-07 | End: 2024-06-07

## 2024-06-07 RX ORDER — ACETAMINOPHEN 325 MG/1
975 TABLET ORAL ONCE
Status: COMPLETED | OUTPATIENT
Start: 2024-06-07 | End: 2024-06-07

## 2024-06-07 RX ORDER — CEFAZOLIN SODIUM 1 G/50ML
SOLUTION INTRAVENOUS AS NEEDED
Status: DISCONTINUED | OUTPATIENT
Start: 2024-06-07 | End: 2024-06-07

## 2024-06-07 RX ORDER — MAGNESIUM HYDROXIDE 1200 MG/15ML
LIQUID ORAL AS NEEDED
Status: DISCONTINUED | OUTPATIENT
Start: 2024-06-07 | End: 2024-06-07 | Stop reason: HOSPADM

## 2024-06-07 RX ORDER — ONDANSETRON 2 MG/ML
INJECTION INTRAMUSCULAR; INTRAVENOUS AS NEEDED
Status: DISCONTINUED | OUTPATIENT
Start: 2024-06-07 | End: 2024-06-07

## 2024-06-07 RX ORDER — VECURONIUM BROMIDE 1 MG/ML
INJECTION, POWDER, LYOPHILIZED, FOR SOLUTION INTRAVENOUS AS NEEDED
Status: DISCONTINUED | OUTPATIENT
Start: 2024-06-07 | End: 2024-06-07

## 2024-06-07 RX ORDER — PROPOFOL 10 MG/ML
INJECTION, EMULSION INTRAVENOUS AS NEEDED
Status: DISCONTINUED | OUTPATIENT
Start: 2024-06-07 | End: 2024-06-07

## 2024-06-07 RX ORDER — LIDOCAINE HYDROCHLORIDE 10 MG/ML
INJECTION, SOLUTION EPIDURAL; INFILTRATION; INTRACAUDAL; PERINEURAL AS NEEDED
Status: DISCONTINUED | OUTPATIENT
Start: 2024-06-07 | End: 2024-06-07

## 2024-06-07 RX ADMIN — HEPARIN SODIUM 5000 UNITS: 5000 INJECTION, SOLUTION INTRAVENOUS; SUBCUTANEOUS at 13:51

## 2024-06-07 RX ADMIN — NEOSTIGMINE METHYLSULFATE 1 MG: 1 INJECTION INTRAVENOUS at 15:40

## 2024-06-07 RX ADMIN — Medication 10 MG: at 15:07

## 2024-06-07 RX ADMIN — CEFAZOLIN SODIUM 1000 MG: 1 SOLUTION INTRAVENOUS at 14:35

## 2024-06-07 RX ADMIN — EPHEDRINE SULFATE 10 MG: 50 INJECTION, SOLUTION INTRAVENOUS at 14:40

## 2024-06-07 RX ADMIN — GLYCOPYRROLATE 0.2 MG: 0.2 INJECTION, SOLUTION INTRAMUSCULAR; INTRAVENOUS at 15:40

## 2024-06-07 RX ADMIN — VECURONIUM BROMIDE 7 MG: 10 INJECTION, POWDER, LYOPHILIZED, FOR SOLUTION INTRAVENOUS at 14:26

## 2024-06-07 RX ADMIN — PROPOFOL 150 MG: 10 INJECTION, EMULSION INTRAVENOUS at 14:25

## 2024-06-07 RX ADMIN — GLYCOPYRROLATE 0.6 MG: 0.2 INJECTION, SOLUTION INTRAMUSCULAR; INTRAVENOUS at 15:39

## 2024-06-07 RX ADMIN — PROPOFOL 100 MCG/KG/MIN: 10 INJECTION, EMULSION INTRAVENOUS at 14:30

## 2024-06-07 RX ADMIN — SUGAMMADEX 100 MG: 100 INJECTION, SOLUTION INTRAVENOUS at 15:52

## 2024-06-07 RX ADMIN — ALBUTEROL SULFATE 2.5 MG: 2.5 SOLUTION RESPIRATORY (INHALATION) at 12:29

## 2024-06-07 RX ADMIN — FENTANYL CITRATE 50 MCG: 50 INJECTION INTRAMUSCULAR; INTRAVENOUS at 14:25

## 2024-06-07 RX ADMIN — NEOSTIGMINE METHYLSULFATE 4 MG: 1 INJECTION INTRAVENOUS at 15:39

## 2024-06-07 RX ADMIN — EPHEDRINE SULFATE 5 MG: 50 INJECTION, SOLUTION INTRAVENOUS at 14:37

## 2024-06-07 RX ADMIN — ONDANSETRON 4 MG: 2 INJECTION INTRAMUSCULAR; INTRAVENOUS at 14:25

## 2024-06-07 RX ADMIN — SODIUM CHLORIDE: 0.9 INJECTION, SOLUTION INTRAVENOUS at 13:34

## 2024-06-07 RX ADMIN — SUGAMMADEX 100 MG: 100 INJECTION, SOLUTION INTRAVENOUS at 15:50

## 2024-06-07 RX ADMIN — LIDOCAINE HYDROCHLORIDE 50 MG: 10 INJECTION, SOLUTION EPIDURAL; INFILTRATION; INTRACAUDAL; PERINEURAL at 14:25

## 2024-06-07 RX ADMIN — FENTANYL CITRATE 50 MCG: 50 INJECTION INTRAMUSCULAR; INTRAVENOUS at 15:04

## 2024-06-07 RX ADMIN — Medication 20 MG: at 14:25

## 2024-06-07 RX ADMIN — CEFAZOLIN SODIUM 2000 MG: 2 SOLUTION INTRAVENOUS at 14:20

## 2024-06-07 RX ADMIN — ACETAMINOPHEN 975 MG: 325 TABLET, FILM COATED ORAL at 12:29

## 2024-06-07 RX ADMIN — NOREPINEPHRINE BITARTRATE 2 MCG/MIN: 1 SOLUTION INTRAVENOUS at 14:37

## 2024-06-07 NOTE — DISCHARGE INSTR - AVS FIRST PAGE
Follow-up with Dr. Park in the office after seen by the dialysis nurse.    Further diastolic.    Low intense activity as tolerated, no heavy lifting, nothing greater than 10 pounds for 2 weeks.    Dressing should remain on and should not be removed until seen by the dialysis nurse.  No showers or tub baths.  May sponge bathe but do not get the abdomen wet.    You may restart your Eliquis on Monday.    If develop fever, nausea vomit, worsening pain, redness drainage to the incision call the office or go to the ER.

## 2024-06-07 NOTE — ANESTHESIA POSTPROCEDURE EVALUATION
Post-Op Assessment Note    CV Status:  Stable  Pain Score: 1    Pain management: adequate    Multimodal analgesia used between 6 hours prior to anesthesia start to PACU discharge    Mental Status:  Sleepy and arousable   Hydration Status:  Euvolemic   PONV Controlled:  Controlled   Airway Patency:  Patent     Post Op Vitals Reviewed: Yes    No anethesia notable event occurred.    Staff: Anesthesiologist, CRNA               /63 (06/07/24 1559)    Temp 97.6 °F (36.4 °C) (06/07/24 1559)    Pulse 59 (06/07/24 1559)   Resp 16 (06/07/24 1559)    SpO2 99 % (06/07/24 1559)

## 2024-06-07 NOTE — H&P
"  Patient seen and examined. Prior history reviewed. Patient examined and no change from prior. Will proceed to OR as planned.    Blood pressure 161/72, pulse 61, temperature (!) 97.4 °F (36.3 °C), temperature source Temporal, resp. rate 20, height 5' 10\" (1.778 m), weight 122 kg (270 lb), SpO2 95%.    Assessment/Plan:     Stage 5 chronic kidney disease not on chronic dialysis (HCC)        Lab Results   Component Value Date     EGFR 11 04/02/2024     EGFR 13 01/08/2024     EGFR 14 11/03/2023     CREATININE 4.77 (H) 04/02/2024     CREATININE 4.09 (H) 01/08/2024     CREATININE 3.86 (H) 11/03/2023         Patient with stage V chronic kidney disease.  Currently not exhibiting any uremic signs.  On based on history and physical examination I do feel that patient could be a good candidate for placement peritoneal dialysis catheter.  He has a history of open gallbladder but hopefully that will not interfere with the catheter placement.  Based on his body habitus he may require a extended cath.  The procedure was discussed at great length with the patient including all associated risk which included bleeding, infection, injury to surrounding structures, catheter dysfunction, and inability to place the catheter.  The patient verbalized understanding of these risks and is willing to proceed, consent was signed.  Patient is going to be undergoing a cardiac ablation in the next month and would need to follow-up with his cardiologist to determine clearance/optimization prior to surgery.  He is currently on Eliquis and will likely need to stop Eliquis 2 days prior to the procedure unless he he is taken off due to the cardiac ablation.  Will also reach out to nephrology to determine timeframe.        HTN (hypertension)  Blood pressure reviewed on today's office visit.  Appears stable.  Continue current medical management as per PCP/nephrology.      Diagnoses and all orders for this visit:     Primary hypertension     Stage 5 " chronic kidney disease not on chronic dialysis (HCC)  -     Ambulatory Referral to General Surgery               Cardiology/imaging:     Recent echocardiogram dated April 7, 2023 was personally reviewed by me.        Notes:     Recent office visit by nephrology dated April 2, 2024 was personally viewed by me.     Labs:     Recent CBC, CMP, dated April 2, 2024 was personally reviewed by me.        Subjective:       Patient ID: Masoud Perry is a 68 y.o. male.     68-year-old gentleman with CKD stage V, thyroid disease, hyperlipidemia, hypertension, CAD history of MI, history of stroke, A-fib on Eliquis, presents today in consultation for evaluation of peritoneal dialysis catheter.  Patient follows closely with nephrology and after further discussion he has decided he would prefer to proceed with peritoneal dialysis versus hemodialysis.  Patient states currently he feels fine he has no systems of renal failure as right now.  No nausea vomiting fevers chills.  Tolerates diet.  No lightheadedness.  No chest pain or shortness of breath.  He does walk with a cane.  Does have severe lymphedema which he hopes dialysis will help with.  He follows closely with cardiology as well and states he is scheduled for an ablation next month due to A-fib.           The following portions of the patient's history were reviewed and updated as appropriate: He  has a past medical history of Acute kidney injury superimposed on chronic kidney disease stage 4 (HCC) (1/12/2022), Chronic kidney disease, Disease of thyroid gland, Hyperlipidemia, Hypertension, MI (myocardial infarction) (HCC), and Stroke (HCC).  He        Patient Active Problem List     Diagnosis Date Noted    Bilateral lower extremity edema 01/27/2023    Ureteral stone with hydronephrosis 09/23/2022    Cutaneous abscess of buttock 09/23/2022    Chronic kidney disease-mineral and bone disorder 09/16/2022    Type 2 diabetes mellitus with stage 4 chronic kidney disease, with  long-term current use of insulin ([facility]) 07/15/2022    Obesity, morbid ([facility]) 07/15/2022    Secondary hyperparathyroidism of renal origin ([facility]) 07/15/2022    Stage 5 chronic kidney disease not on chronic dialysis ([facility]) 07/15/2022    Factor 5 Leiden mutation, heterozygous ([facility]) 01/15/2022    Thrombocytopenia ([facility]) 01/14/2022    Angina at rest 01/14/2022    MI (myocardial infarction) ([facility]) 01/14/2022    History of PTCA 01/14/2022    Sleep apnea 01/14/2022    Smoking 01/14/2022    Anemia 01/13/2022    History of CVA (cerebrovascular accident) 01/12/2022    HTN (hypertension) 01/12/2022    Chronic diastolic HF (heart failure) ([facility]) 01/12/2022    HLD (hyperlipidemia) 01/12/2022    Lymphedema 01/12/2022    Hypothyroidism 01/12/2022      He  has a past surgical history that includes Cholecystectomy; Joint replacement; Coronary angioplasty with stent; pr cysto/uretero w/lithotripsy &indwell stent insrt (Left, 9/24/2022); and US guided kidney biopsy (8/5/2020).  His family history includes Cirrhosis in his mother; Colon cancer in his brother; Kidney failure in his mother.  He  reports that he has been smoking cigarettes. He has never used smokeless tobacco. He reports that he does not currently use alcohol. He reports that he does not use drugs.  Current Rx          Current Outpatient Medications   Medication Sig Dispense Refill    allopurinol (ZYLOPRIM) 300 mg tablet Take by mouth        apixaban (ELIQUIS) 5 mg 2 (two) times a day        Arginine 1000 MG TABS Take 3,000 mg by mouth One daily        ascorbic acid (VITAMIN C) 500 MG tablet Take 500 mg by mouth daily        aspirin (ECOTRIN LOW STRENGTH) 81 mg EC tablet Take 81 mg by mouth        atorvastatin (LIPITOR) 40 mg tablet Take 1 tablet (40 mg total) by mouth daily with dinner 30 tablet 0    b complex vitamins capsule Take 1 capsule by mouth daily        calcitriol (ROCALTROL) 0.25 mcg capsule TAKE 1 CAPSULE (0.25 MCG TOTAL) BY MOUTH THREE TIMES A WEEK        co-enzyme  "Q-10 30 MG capsule Take 100 mg by mouth        ergocalciferol (VITAMIN D2) 50,000 units Take 1 capsule (50,000 Units total) by mouth once a week 12 capsule 1    furosemide (LASIX) 80 mg tablet Take 160 mg by mouth 2 (two) times a day        HYDROcodone-acetaminophen (NORCO)  mg per tablet Take by mouth        insulin glargine (LANTUS) 100 units/mL subcutaneous injection Inject 20 Units under the skin every 12 (twelve) hours        insulin lispro (HumaLOG) 100 units/mL injection as needed        levothyroxine 125 mcg tablet Take by mouth        Magnesium 400 MG TABS Take by mouth        metoprolol succinate (TOPROL-XL) 100 mg 24 hr tablet          nitroglycerin (NITROSTAT) 0.4 mg SL tablet PLEASE SEE ATTACHED FOR DETAILED DIRECTIONS        tamsulosin (FLOMAX) 0.4 mg Take 0.4 mg by mouth daily with dinner        vitamin A 2400 MCG (8000 UT) capsule Take 2,400 Units by mouth daily        vitamin E, tocopherol, 200 units capsule Take 200 Units by mouth daily 180 mg daily          No current facility-administered medications for this visit.         He is allergic to carvedilol, aspartame - food allergy, bumetanide, cephalexin, hydralazine, lisinopril, morphine, nifedipine, ciprofloxacin, metolazone, and strawberry extract - food allergy..     Review of Systems       Review of systems completed, all negative except as noted in the HPI     Objective:        /64 (BP Location: Left arm, Patient Position: Sitting, Cuff Size: Standard)   Pulse 60   Temp 97.6 °F (36.4 °C) (Temporal)   Ht 5' 10\" (1.778 m)   Wt 127 kg (280 lb)   SpO2 96%   BMI 40.18 kg/m²             Physical Exam  Vitals reviewed.   Constitutional:       General: He is not in acute distress.     Appearance: Normal appearance. He is ill-appearing. He is not toxic-appearing or diaphoretic.   HENT:      Head: Normocephalic and atraumatic.      Right Ear: External ear normal.      Left Ear: External ear normal.   Eyes:      General: No scleral " icterus.        Right eye: No discharge.         Left eye: No discharge.   Cardiovascular:      Rate and Rhythm: Normal rate.   Pulmonary:      Effort: Pulmonary effort is normal. No respiratory distress.   Abdominal:      General: There is no distension.      Palpations: Abdomen is soft. There is no mass.      Tenderness: There is no abdominal tenderness. There is no guarding.      Hernia: No hernia is present.   Musculoskeletal:         General: Swelling present.      Cervical back: Normal range of motion.      Right lower leg: Edema present.      Left lower leg: Edema present.   Skin:     General: Skin is warm.      Coloration: Skin is not jaundiced or pale.      Findings: No bruising or erythema.   Neurological:      General: No focal deficit present.      Mental Status: He is alert and oriented to person, place, and time.      Cranial Nerves: No cranial nerve deficit.   Psychiatric:         Mood and Affect: Mood normal.         Behavior: Behavior normal.         Thought Content: Thought content normal.         Judgment: Judgment normal.

## 2024-06-08 NOTE — OP NOTE
OPERATIVE REPORT  PATIENT NAME: Masoud Perry    :  1955  MRN: [id number]  Pt Location: [facility] OR ROOM 02    SURGERY DATE: 2024    Surgeons and Role:     * Hiram Park,  - Primary     * Joseph Galvez PA-C - Assisting    Preop Diagnosis:  Kidney disease [N28.9]    Post-Op Diagnosis Codes:     * Kidney disease [N28.9]    Procedure(s):  INSERTION PERITONEAL CATHETER DIALYSIS LAPAROSCOPIC    Specimen(s):  * No specimens in log *    Estimated Blood Loss:   5 mL    Drains:  * No LDAs found *    Anesthesia Type:   General    Operative Indications:  Kidney disease [N28.9]      Operative Findings:  Successful placement of laparoscopic inserted peritoneal dialysis catheter on the right.  Good inflow and outflow.  No patient's anatomy and landmarks were somewhat unconventional.  Very high pubic symphysis which made measuring for the placement of the catheter somewhat difficult.  However was able to successfully place catheter on the right side within the pelvis with good inflow and outflow.  Noted scar tissue from previous open cholecystectomy which did not cause any issues.      Complications:   None    Procedure and Technique:  Patient was brought to the operating arena and placed in supine position on the operating table.  All the regular monitor devices were then connected.  The patient underwent general anesthesia with endotracheal ovation without complication.  Patient received perioperative antibiotics.  The patient received subcutaneous heparin addition to bilateral lower sequential rhizolysis DVT relaxants.  Patient is then prepped draped in usual sterile fashion.  Timeout was performed to verify correct patient, procedure position with side.  In the left upper quadrant a 5 mm incision was made using 15 blade scalpel.  A Veress needle was excessively inserted after positive saline drop test, the abdomen was insufflated with carbon oxide temperature 12 to 14 mmHg.  The peritoneum was then removed and using  the Optiview technique a 5 m trocar along with 30 degree scope was inserted under direct vision.  No injuries noted.  The abdomen was inspected there appeared to be adhesions to the upper abdominal wall in the epigastric region consistent with previous open cholecystectomy.  Additional 5 mm trocar was then placed right upper quadrant.  Upon placing the trocar there appeared to be bleeding above the peritoneum.  Likely due to repeated transection of a superficial vein.  The balloon of the trocar was then insufflated and retracted to provide pressure against the abdominal wall.  This took place for near nearly 3 to 4 minutes.  After which the bleeding appeared to be controlled.  The abdomen was then inspected there appeared to be no additional adhesions within the lower abdominal cavity.  The pelvis appeared to be free of any adhesions.  There was some slight adhesions of the sigmoid colon to the pelvic sidewall which was expected.  But no obvious adhesions in the pelvis that would disrupt the ability to place a peritoneal dialysis catheter.  Patient was then placed in Trendelenburg position.  The bowel swept out of the pelvis.  This point a 8 mm incision was made just beneath the initial marking as it was felt that the catheter would need to be placed lower to accommodate for the patient's anatomy.  The 8 mm trocar was then placed through the incision and pierced the fascia but did not ogden peritoneum.  The trocar was then tunneled for approximately 4 cm and then the peritoneum was pierced.  The pigtail peritoneal catheter was then placed make sure that the pigtail was facing towards the right.  The deep cuff was visualized and then pulled back to allow it to be snug within the rectus muscle.  The catheter was then tunneled and exited through the predetermined external site which was superior to a known skin fold.  500 cc of normal saline was then infused into the pelvis which had no issue and infused quickly.  The  the outflow was then tested and initially felt to be pretty good however there is significant mount of air that was also being removed.  Patient was taken out of Trendelenburg which also helped.  In addition some of the pneumoperitoneum was also released allowing the catheter to dive deeper into the pelvis as the abdominal wall was extremely distended due to the insufflation.  This allow the catheter to easily reach the pelvis and additional drainage of saline was obtained making a total of approximately 200 that was removed.  The abdominal wall where the trocar had pierced the abdominal wall was reevaluated and there is no active bleeding.  The abdomen was then allowed to desufflate.  Heparin was then flushed through the pigtail catheter.  The trocars were then removed.  Additional accessories to the end of the catheter were then placed.  The 8 mm incision was closed in layers using 3-0 Vicryl for the deep dermis.  4-0 Monocryl was then used for all skin incisions.  Surgical glue was then placed at the 3 incisions.  The catheter was then covered in a sandwich technique using 4 x 4's and Tegaderms.    The patient Toller procedure well to the postanesthesia care unit in stable condition all lap, needle, instrument counts were correct.   I was present for the entire procedure., A qualified resident physician was not available., and A physician assistant was required during the procedure for retraction, tissue handling, dissection and suturing.    Patient Disposition:  PACU         SIGNATURE: Hiram Park DO  DATE: June 7, 2024  TIME: 10:39 PM

## 2024-06-10 ENCOUNTER — TELEPHONE (OUTPATIENT)
Dept: NEPHROLOGY | Facility: CLINIC | Age: 69
End: 2024-06-10

## 2024-06-10 ENCOUNTER — TELEPHONE (OUTPATIENT)
Age: 69
End: 2024-06-10

## 2024-06-10 DIAGNOSIS — N18.6 ESRD ON PERITONEAL DIALYSIS (HCC): Primary | ICD-10-CM

## 2024-06-10 DIAGNOSIS — Z99.2 ESRD ON PERITONEAL DIALYSIS (HCC): Primary | ICD-10-CM

## 2024-06-10 LAB
ATRIAL RATE: 58 BPM
P AXIS: 31 DEGREES
PR INTERVAL: 198 MS
QRS AXIS: -12 DEGREES
QRSD INTERVAL: 160 MS
QT INTERVAL: 460 MS
QTC INTERVAL: 451 MS
T WAVE AXIS: 12 DEGREES
VENTRICULAR RATE: 58 BPM

## 2024-06-10 PROCEDURE — 93010 ELECTROCARDIOGRAM REPORT: CPT | Performed by: INTERNAL MEDICINE

## 2024-06-10 NOTE — TELEPHONE ENCOUNTER
Zahida from [facility] called asking for a note/order that the pt can be seen to have his PD cath flushed at the [place] location as the pt was never seen before. I spoke with Loraine in the office who said Neela will be filling the form out at Dr. Champion is off the next 2 days. I relayed the message to Zahida who verbalized understanding. If there are any questions Zahida's phone number is [phone].

## 2024-06-10 NOTE — TELEPHONE ENCOUNTER
[facility] calling saying they need an order placed to see patient to have his PD catheter flushed.  Thank you

## 2024-06-12 ENCOUNTER — TELEPHONE (OUTPATIENT)
Dept: NEPHROLOGY | Facility: CLINIC | Age: 69
End: 2024-06-12

## 2024-06-12 NOTE — TELEPHONE ENCOUNTER
Admissions paperwork completed and faxed to [facility].  Spoke with Zahida.  Appt for PD flush is scheduled for Friday this week.   CXR and Hep panel pending.

## 2024-06-12 NOTE — TELEPHONE ENCOUNTER
Li from [facility] called stating she needs Hep B panel and X-ray done. I called and spoke to patient, he stated is aware. Patient stated he has an appointment at [facility] on 6/20 to get his incision checked. Patient stated that's when he will get the labs and x-ray done.

## 2024-06-14 NOTE — TELEPHONE ENCOUNTER
Puneet from [facility], to follow up on the request for Hep B and Xray. I informed him the provider has ordered this and the patient is scheduled on 6/20/24. Puneet stated he would follow up after that date to have the results faxed to them.    Fax: [phone]   Tel: [phone] ext [phone]

## 2024-06-18 NOTE — TELEPHONE ENCOUNTER
Called and spoke with patient to remind him to have Blood work and CXR done for [facility] admission.  He states he will be going Thursday.  Will watch for results and fax to [facility].

## 2024-06-20 ENCOUNTER — APPOINTMENT (OUTPATIENT)
Dept: LAB | Facility: HOSPITAL | Age: 69
End: 2024-06-20
Payer: COMMERCIAL

## 2024-06-20 ENCOUNTER — HOSPITAL ENCOUNTER (OUTPATIENT)
Dept: RADIOLOGY | Facility: HOSPITAL | Age: 69
Discharge: HOME | End: 2024-06-20
Payer: COMMERCIAL

## 2024-06-20 ENCOUNTER — OFFICE VISIT (OUTPATIENT)
Dept: SURGERY | Facility: CLINIC | Age: 69
End: 2024-06-20

## 2024-06-20 VITALS
BODY MASS INDEX: 38.65 KG/M2 | SYSTOLIC BLOOD PRESSURE: 132 MMHG | WEIGHT: 270 LBS | HEART RATE: 66 BPM | HEIGHT: 70 IN | DIASTOLIC BLOOD PRESSURE: 64 MMHG | OXYGEN SATURATION: 96 % | TEMPERATURE: 98.1 F

## 2024-06-20 DIAGNOSIS — N18.6 ESRD ON PERITONEAL DIALYSIS (HCC): ICD-10-CM

## 2024-06-20 DIAGNOSIS — Z99.2 ESRD ON PERITONEAL DIALYSIS (HCC): ICD-10-CM

## 2024-06-20 DIAGNOSIS — N18.5 STAGE 5 CHRONIC KIDNEY DISEASE NOT ON CHRONIC DIALYSIS (HCC): Primary | ICD-10-CM

## 2024-06-20 PROCEDURE — 86705 HEP B CORE ANTIBODY IGM: CPT

## 2024-06-20 PROCEDURE — 87340 HEPATITIS B SURFACE AG IA: CPT

## 2024-06-20 PROCEDURE — 36415 COLL VENOUS BLD VENIPUNCTURE: CPT

## 2024-06-20 PROCEDURE — 86706 HEP B SURFACE ANTIBODY: CPT

## 2024-06-20 PROCEDURE — 86803 HEPATITIS C AB TEST: CPT

## 2024-06-20 PROCEDURE — 99024 POSTOP FOLLOW-UP VISIT: CPT

## 2024-06-20 PROCEDURE — 86704 HEP B CORE ANTIBODY TOTAL: CPT

## 2024-06-20 PROCEDURE — 71046 X-RAY EXAM CHEST 2 VIEWS: CPT

## 2024-06-20 NOTE — PROGRESS NOTES
Ambulatory Visit  Name: Masoud Perry      : 1955      MRN: [id number]  Encounter Provider: Joseph Galvez PA-C  Encounter Date: 2024   Encounter department: [facility] SURGERY [facility]    Assessment & Plan   1. Stage 5 chronic kidney disease not on chronic dialysis (HCC)  Assessment & Plan:  68-year-old male with a history of stage V chronic kidney disease seen here today for postoperative follow-up status post laparoscopic placement of a peritoneal dialysis catheter on 2024 by Dr. Hiram Park.  He was discharged after outpatient surgery that same day.  Overall doing well.  He was seen in the dialysis clinic last week and had instruction in initial takedown of the dialysis catheter dressing.  He reported that the dialysis nurse was able to flush the catheter without problem.  He is scheduled to begin initial dialysis through the PD catheter this Monday at the clinic for 2 weeks and then transition to home PD dialysis.  He offers no complaints at this time, overall doing well.  Appetite fine.  His son is present with him here today.    Abdomen appears obese.  Positive bowel sounds are heard.  Laparoscopic incisions are all closed with skin glue, some dried scab, no periwound erythema.  PD catheter site dressing was not removed, no tenderness or bruising.    At this time no further follow-up with general surgery needed.  Follow-up with nephrology and dialysis clinic as scheduled.  No need for any pain medication.  Follow-up PCP for management of chronic medical problems and glycemic control.  Patient was instructed about the importance of maintaining bowel regiment and prevent constipation, may use over-the-counter stool softener daily and as needed laxative such as MiraLAX.    Joseph Galvez PA-C      OPERATIVE REPORT  PATIENT NAME: Masoud Perry    :  1955  MRN: [id number]  Pt Location: [facility] OR ROOM 02     SURGERY DATE: 2024     Surgeons and Role:     * Hiram Park DO -  Primary     * Joseph Galvez PA-C - Assisting     Preop Diagnosis:  Kidney disease [N28.9]     Post-Op Diagnosis Codes:     * Kidney disease [N28.9]     Procedure(s):  INSERTION PERITONEAL CATHETER DIALYSIS LAPAROSCOPIC       History of Present Illness     Masoud Perry is a 68 y.o. male with history of stage V end-stage renal failure not yet started on dialysis presented here to the surgery office for follow-up today, he underwent laparoscopic placement of PD catheter by Dr. Park on June 7, 2024.  Discharged the same day after his elective surgery.  Patient seen with his son present here today.  Offers no complaints.  Normal bowel movements.  Patient was followed after surgery in the dialysis clinic earlier this week, initial takedown of the dressing.  Apparently the catheter flushed well by the nurse there at the dialysis clinic.  Plans for initiation of dialysis through the catheter this Monday to be performed in the clinic for an initial 2 weeks then transition to home dialysis after that.  His nephrologist is Dr. Marquise Santos.    Review of Systems   Constitutional:  Negative for chills, fever and unexpected weight change.   Respiratory:  Negative for cough, shortness of breath and wheezing.    Cardiovascular:  Negative for chest pain, palpitations and leg swelling.   Gastrointestinal:  Negative for abdominal pain, constipation, diarrhea and nausea.   Genitourinary:  Positive for decreased urine volume. Negative for difficulty urinating, dysuria, flank pain, frequency, hematuria, penile swelling, scrotal swelling, testicular pain and urgency.   Musculoskeletal:  Negative for arthralgias and back pain.   Skin:  Positive for wound. Negative for color change, pallor and rash.   Neurological:  Negative for dizziness.   Hematological: Negative.    Psychiatric/Behavioral:  Negative for confusion and decreased concentration.    All other systems reviewed and are negative.        Pertinent Medical History    Past Medical History   Past Medical History:   Diagnosis Date    Acute kidney injury superimposed on chronic kidney disease stage 4 (HCC) 01/12/2022    Chronic kidney disease     Compartment syndrome of forearm ([facility])     Right, 2019    Disease of thyroid gland     Hyperlipidemia     Hypertension     MI (myocardial infarction) ([facility])     x3 - 1999, 2010, after 2015    Stroke ([facility])     2021-22     Past Surgical History:   Procedure Laterality Date    CARDIAC ELECTROPHYSIOLOGY STUDY AND ABLATION  04/29/2024    CHOLECYSTECTOMY      CORONARY ANGIOPLASTY WITH STENT PLACEMENT      JOINT REPLACEMENT      bilateral knee    AR CYSTO/URETERO W/LITHOTRIPSY &INDWELL STENT INSRT Left 09/24/2022    Procedure: CYSTOSCOPY URETEROSCOPY WITH LITHOTRIPSY HOLMIUM LASER, AND INSERTION STENT URETERAL;  Surgeon: Lewis Dahl MD;  Location: [facility];  Service: Urology    AR LAPS INSERTION TUNNELED INTRAPERITONEAL CATHETER N/A 6/7/2024    Procedure: INSERTION PERITONEAL CATHETER DIALYSIS LAPAROSCOPIC;  Surgeon: Hiram Park DO;  Location: [facility];  Service: General    US GUIDED KIDNEY BIOPSY  08/05/2020     Family History   Problem Relation Age of Onset    Kidney failure Mother     Cirrhosis Mother     Colon cancer Brother      Current Outpatient Medications on File Prior to Visit   Medication Sig Dispense Refill    allopurinol (ZYLOPRIM) 300 mg tablet Take by mouth      apixaban (ELIQUIS) 5 mg 2 (two) times a day      Arginine 1000 MG TABS Take 3,000 mg by mouth One daily      ascorbic acid (VITAMIN C) 500 MG tablet Take 500 mg by mouth daily      aspirin (ECOTRIN LOW STRENGTH) 81 mg EC tablet Take 81 mg by mouth      atorvastatin (LIPITOR) 40 mg tablet Take 1 tablet (40 mg total) by mouth daily with dinner 30 tablet 0    b complex vitamins capsule Take 1 capsule by mouth daily      calcitriol (ROCALTROL) 0.25 mcg capsule TAKE 1 CAPSULE (0.25 MCG TOTAL) BY MOUTH THREE TIMES A WEEK      co-enzyme Q-10 30 MG capsule Take 100 mg by  mouth      ergocalciferol (VITAMIN D2) 50,000 units Take 1 capsule (50,000 Units total) by mouth once a week 12 capsule 1    furosemide (LASIX) 80 mg tablet Take 160 mg by mouth 2 (two) times a day      HYDROcodone-acetaminophen (NORCO)  mg per tablet Take by mouth      insulin glargine (LANTUS) 100 units/mL subcutaneous injection Inject 20 Units under the skin every 12 (twelve) hours      insulin lispro (HumaLOG) 100 units/mL injection as needed      levothyroxine 125 mcg tablet Take by mouth      Magnesium 400 MG TABS Take by mouth      metoprolol succinate (TOPROL-XL) 100 mg 24 hr tablet       nitroglycerin (NITROSTAT) 0.4 mg SL tablet PLEASE SEE ATTACHED FOR DETAILED DIRECTIONS      tamsulosin (FLOMAX) 0.4 mg Take 0.4 mg by mouth daily with dinner      vitamin A 2400 MCG (8000 UT) capsule Take 2,400 Units by mouth daily      vitamin E, tocopherol, 200 units capsule Take 200 Units by mouth daily 180 mg daily       No current facility-administered medications on file prior to visit.     Allergies   Allergen Reactions    Carvedilol Shortness Of Breath    Aspartame - Food Allergy Diarrhea    Bumetanide Other (See Comments)     Lightheadedness,tiredness, confusion, drowsiness, dry mouth, nausea, ringing in ears, bruising, change in urine  Lightheadedness,tiredness, confusion, drowsiness, dry mouth, nausea, ringing in ears, bruising, change in urine      Cephalexin Other (See Comments)    Hydralazine Tachycardia    Lisinopril Other (See Comments)     Other reaction(s): dry heaves      Morphine Swelling     Other reaction(s): lump at injection  Hand swelling at IV injection site      Nifedipine Lightheadedness     didn't tolerate    Ciprofloxacin Rash    Metolazone Other (See Comments)     Metallic taste    Strawberry Extract - Food Allergy Rash      Current Outpatient Medications on File Prior to Visit   Medication Sig Dispense Refill    allopurinol (ZYLOPRIM) 300 mg tablet Take by mouth      apixaban (ELIQUIS)  5 mg 2 (two) times a day      Arginine 1000 MG TABS Take 3,000 mg by mouth One daily      ascorbic acid (VITAMIN C) 500 MG tablet Take 500 mg by mouth daily      aspirin (ECOTRIN LOW STRENGTH) 81 mg EC tablet Take 81 mg by mouth      atorvastatin (LIPITOR) 40 mg tablet Take 1 tablet (40 mg total) by mouth daily with dinner 30 tablet 0    b complex vitamins capsule Take 1 capsule by mouth daily      calcitriol (ROCALTROL) 0.25 mcg capsule TAKE 1 CAPSULE (0.25 MCG TOTAL) BY MOUTH THREE TIMES A WEEK      co-enzyme Q-10 30 MG capsule Take 100 mg by mouth      ergocalciferol (VITAMIN D2) 50,000 units Take 1 capsule (50,000 Units total) by mouth once a week 12 capsule 1    furosemide (LASIX) 80 mg tablet Take 160 mg by mouth 2 (two) times a day      HYDROcodone-acetaminophen (NORCO)  mg per tablet Take by mouth      insulin glargine (LANTUS) 100 units/mL subcutaneous injection Inject 20 Units under the skin every 12 (twelve) hours      insulin lispro (HumaLOG) 100 units/mL injection as needed      levothyroxine 125 mcg tablet Take by mouth      Magnesium 400 MG TABS Take by mouth      metoprolol succinate (TOPROL-XL) 100 mg 24 hr tablet       nitroglycerin (NITROSTAT) 0.4 mg SL tablet PLEASE SEE ATTACHED FOR DETAILED DIRECTIONS      tamsulosin (FLOMAX) 0.4 mg Take 0.4 mg by mouth daily with dinner      vitamin A 2400 MCG (8000 UT) capsule Take 2,400 Units by mouth daily      vitamin E, tocopherol, 200 units capsule Take 200 Units by mouth daily 180 mg daily       No current facility-administered medications on file prior to visit.      Social History     Tobacco Use    Smoking status: Every Day     Current packs/day: 0.50     Types: Cigarettes    Smokeless tobacco: Never   Vaping Use    Vaping status: Never Used   Substance and Sexual Activity    Alcohol use: Not Currently    Drug use: Never    Sexual activity: Not on file     Objective     /64 (BP Location: Left arm, Patient Position: Sitting, Cuff Size:  "Standard)   Pulse 66   Temp 98.1 °F (36.7 °C) (Temporal)   Ht 5' 10\" (1.778 m)   Wt 122 kg (270 lb)   SpO2 96%   BMI 38.74 kg/m²     Physical Exam  Vitals reviewed.   Constitutional:       General: He is not in acute distress.     Appearance: He is ill-appearing. He is not toxic-appearing.   HENT:      Head: Normocephalic.      Nose: Nose normal.      Mouth/Throat:      Pharynx: Oropharynx is clear.   Eyes:      Conjunctiva/sclera: Conjunctivae normal.   Cardiovascular:      Rate and Rhythm: Normal rate and regular rhythm.   Pulmonary:      Effort: Pulmonary effort is normal.      Breath sounds: Normal breath sounds.   Abdominal:      General: There is distension.      Palpations: Abdomen is soft. There is no mass.      Tenderness: There is no abdominal tenderness. There is no right CVA tenderness, left CVA tenderness, guarding or rebound.      Hernia: No hernia is present.      Comments: Obese.  PD catheter in place on the midline of the abdominal fold, dressing not removed.  Nontender, no drainage or bruising.  Laparoscopic incisions all closed with skin glue, some scab formation, nontender throughout.   Musculoskeletal:      Cervical back: Normal range of motion and neck supple.      Right lower leg: No edema.      Left lower leg: No edema.   Skin:     Capillary Refill: Capillary refill takes less than 2 seconds.      Coloration: Skin is not pale.      Findings: No bruising, erythema or rash.   Neurological:      Mental Status: He is alert. Mental status is at baseline.   Psychiatric:         Mood and Affect: Mood normal.         Thought Content: Thought content normal.         Judgment: Judgment normal.       Administrative Statements    I have spent a total time of 30 minutes on 06/20/24 In caring for this patient including Prognosis, Risks and benefits of tx options, Instructions for management, Patient and family education, Importance of tx compliance, Impressions, Counseling / Coordination of care, " Documenting in the medical record, and Obtaining or reviewing history  .    Joseph Galvez PA-C

## 2024-06-20 NOTE — ASSESSMENT & PLAN NOTE
68-year-old male with a history of stage V chronic kidney disease seen here today for postoperative follow-up status post laparoscopic placement of a peritoneal dialysis catheter on June 7, 2024 by Dr. Hiram Park.  He was discharged after outpatient surgery that same day.  Overall doing well.  He was seen in the dialysis clinic last week and had instruction in initial takedown of the dialysis catheter dressing.  He reported that the dialysis nurse was able to flush the catheter without problem.  He is scheduled to begin initial dialysis through the PD catheter this Monday at the clinic for 2 weeks and then transition to home PD dialysis.  He offers no complaints at this time, overall doing well.  Appetite fine.  His son is present with him here today.    Abdomen appears obese.  Positive bowel sounds are heard.  Laparoscopic incisions are all closed with skin glue, some dried scab, no periwound erythema.  PD catheter site dressing was not removed, no tenderness or bruising.    At this time no further follow-up with general surgery needed.  Follow-up with nephrology and dialysis clinic as scheduled.  No need for any pain medication.  Follow-up PCP for management of chronic medical problems and glycemic control.  Patient was instructed about the importance of maintaining bowel regiment and prevent constipation, may use over-the-counter stool softener daily and as needed laxative such as MiraLAX.    Joseph Galvez PA-C

## 2024-06-20 NOTE — RESULT ENCOUNTER NOTE
XR result faxed to Puneet at [facility]. Hep B panel completed today but still in process.

## 2024-06-21 ENCOUNTER — TELEPHONE (OUTPATIENT)
Dept: NEPHROLOGY | Facility: CLINIC | Age: 69
End: 2024-06-21

## 2024-06-21 LAB
HBV CORE AB SER QL: NORMAL
HBV CORE IGM SER QL: NORMAL
HBV SURFACE AB SER-ACNC: <3 MIU/ML
HBV SURFACE AG SER QL: NORMAL
HCV AB SER QL: NORMAL

## 2024-06-21 NOTE — TELEPHONE ENCOUNTER
Call from [facility], requesting CXR and Hep panel results.  Faxed CXR.  They are aware Hep B is in process and I will fax as soon as resulted.

## 2024-06-25 ENCOUNTER — HOSPITAL ENCOUNTER (EMERGENCY)
Facility: HOSPITAL | Age: 69
Discharge: HOME | End: 2024-06-25
Attending: EMERGENCY MEDICINE
Payer: COMMERCIAL

## 2024-06-25 ENCOUNTER — APPOINTMENT (EMERGENCY)
Dept: RADIOLOGY | Facility: HOSPITAL | Age: 69
End: 2024-06-25
Payer: COMMERCIAL

## 2024-06-25 VITALS
WEIGHT: 273.81 LBS | HEART RATE: 84 BPM | SYSTOLIC BLOOD PRESSURE: 128 MMHG | DIASTOLIC BLOOD PRESSURE: 65 MMHG | RESPIRATION RATE: 21 BRPM | OXYGEN SATURATION: 96 % | HEIGHT: 70 IN | TEMPERATURE: 97.2 F | BODY MASS INDEX: 39.2 KG/M2

## 2024-06-25 DIAGNOSIS — R00.0 RACING HEART BEAT: ICD-10-CM

## 2024-06-25 DIAGNOSIS — R07.89 ATYPICAL CHEST PAIN: Primary | ICD-10-CM

## 2024-06-25 LAB
ALBUMIN SERPL BCG-MCNC: 4 G/DL (ref 3.5–5)
ALP SERPL-CCNC: 105 U/L (ref 34–104)
ALT SERPL W P-5'-P-CCNC: 15 U/L (ref 7–52)
ANION GAP SERPL CALCULATED.3IONS-SCNC: 10 MMOL/L (ref 4–13)
ANISOCYTOSIS BLD QL SMEAR: PRESENT
APTT PPP: 31 SECONDS (ref 23–37)
AST SERPL W P-5'-P-CCNC: 18 U/L (ref 13–39)
BASOPHILS # BLD AUTO: 0.04 THOUSANDS/ÂΜL (ref 0–0.1)
BASOPHILS NFR BLD AUTO: 1 % (ref 0–1)
BILIRUB SERPL-MCNC: 0.36 MG/DL (ref 0.2–1)
BUN SERPL-MCNC: 73 MG/DL (ref 5–25)
CALCIUM SERPL-MCNC: 10.6 MG/DL (ref 8.4–10.2)
CARDIAC TROPONIN I PNL SERPL HS: 15 NG/L
CHLORIDE SERPL-SCNC: 105 MMOL/L (ref 96–108)
CK SERPL-CCNC: 22 U/L (ref 39–308)
CO2 SERPL-SCNC: 21 MMOL/L (ref 21–32)
CREAT SERPL-MCNC: 5.04 MG/DL (ref 0.6–1.3)
EOSINOPHIL # BLD AUTO: 0.19 THOUSAND/ÂΜL (ref 0–0.61)
EOSINOPHIL NFR BLD AUTO: 4 % (ref 0–6)
ERYTHROCYTE [DISTWIDTH] IN BLOOD BY AUTOMATED COUNT: 15.8 % (ref 11.6–15.1)
FLUAV RNA RESP QL NAA+PROBE: NEGATIVE
FLUBV RNA RESP QL NAA+PROBE: NEGATIVE
GFR SERPL CREATININE-BSD FRML MDRD: 10 ML/MIN/1.73SQ M
GLUCOSE SERPL-MCNC: 127 MG/DL (ref 65–140)
HCT VFR BLD AUTO: 34 % (ref 36.5–49.3)
HGB BLD-MCNC: 10.6 G/DL (ref 12–17)
IMM GRANULOCYTES # BLD AUTO: 0.02 THOUSAND/UL (ref 0–0.2)
IMM GRANULOCYTES NFR BLD AUTO: 0 % (ref 0–2)
INR PPP: 1.05 (ref 0.84–1.19)
LYMPHOCYTES # BLD AUTO: 1.03 THOUSANDS/ÂΜL (ref 0.6–4.47)
LYMPHOCYTES NFR BLD AUTO: 21 % (ref 14–44)
MAGNESIUM SERPL-MCNC: 2.8 MG/DL (ref 1.9–2.7)
MCH RBC QN AUTO: 31.3 PG (ref 26.8–34.3)
MCHC RBC AUTO-ENTMCNC: 31.2 G/DL (ref 31.4–37.4)
MCV RBC AUTO: 100 FL (ref 82–98)
MICROCYTES BLD QL AUTO: PRESENT
MONOCYTES # BLD AUTO: 0.26 THOUSAND/ÂΜL (ref 0.17–1.22)
MONOCYTES NFR BLD AUTO: 5 % (ref 4–12)
NEUTROPHILS # BLD AUTO: 3.49 THOUSANDS/ÂΜL (ref 1.85–7.62)
NEUTS SEG NFR BLD AUTO: 69 % (ref 43–75)
NRBC BLD AUTO-RTO: 0 /100 WBCS
PLATELET # BLD AUTO: 99 THOUSANDS/UL (ref 149–390)
PLATELET BLD QL SMEAR: ABNORMAL
PMV BLD AUTO: 8.8 FL (ref 8.9–12.7)
POTASSIUM SERPL-SCNC: 4.1 MMOL/L (ref 3.5–5.3)
PROT SERPL-MCNC: 7.6 G/DL (ref 6.4–8.4)
PROTHROMBIN TIME: 14 SECONDS (ref 11.6–14.5)
RBC # BLD AUTO: 3.39 MILLION/UL (ref 3.88–5.62)
RBC MORPH BLD: PRESENT
RSV RNA RESP QL NAA+PROBE: NEGATIVE
SARS-COV-2 RNA RESP QL NAA+PROBE: NEGATIVE
SODIUM SERPL-SCNC: 136 MMOL/L (ref 135–147)
WBC # BLD AUTO: 5.03 THOUSAND/UL (ref 4.31–10.16)

## 2024-06-25 PROCEDURE — 85610 PROTHROMBIN TIME: CPT | Performed by: PHYSICIAN ASSISTANT

## 2024-06-25 PROCEDURE — 82550 ASSAY OF CK (CPK): CPT | Performed by: PHYSICIAN ASSISTANT

## 2024-06-25 PROCEDURE — 93005 ELECTROCARDIOGRAM TRACING: CPT

## 2024-06-25 PROCEDURE — 83735 ASSAY OF MAGNESIUM: CPT | Performed by: PHYSICIAN ASSISTANT

## 2024-06-25 PROCEDURE — 85025 COMPLETE CBC W/AUTO DIFF WBC: CPT | Performed by: PHYSICIAN ASSISTANT

## 2024-06-25 PROCEDURE — 84484 ASSAY OF TROPONIN QUANT: CPT | Performed by: PHYSICIAN ASSISTANT

## 2024-06-25 PROCEDURE — 99285 EMERGENCY DEPT VISIT HI MDM: CPT | Performed by: PHYSICIAN ASSISTANT

## 2024-06-25 PROCEDURE — 80053 COMPREHEN METABOLIC PANEL: CPT | Performed by: PHYSICIAN ASSISTANT

## 2024-06-25 PROCEDURE — 36415 COLL VENOUS BLD VENIPUNCTURE: CPT | Performed by: PHYSICIAN ASSISTANT

## 2024-06-25 PROCEDURE — 71045 X-RAY EXAM CHEST 1 VIEW: CPT

## 2024-06-25 PROCEDURE — 0241U HB NFCT DS VIR RESP RNA 4 TRGT: CPT | Performed by: PHYSICIAN ASSISTANT

## 2024-06-25 PROCEDURE — 85730 THROMBOPLASTIN TIME PARTIAL: CPT | Performed by: PHYSICIAN ASSISTANT

## 2024-06-25 RX ORDER — AMIODARONE HYDROCHLORIDE 200 MG/1
200 TABLET ORAL 2 TIMES DAILY WITH MEALS
Status: DISCONTINUED | OUTPATIENT
Start: 2024-06-25 | End: 2024-06-25 | Stop reason: HOSPADM

## 2024-06-25 RX ORDER — WARFARIN SODIUM 5 MG/1
5 TABLET ORAL
Status: COMPLETED | OUTPATIENT
Start: 2024-06-25 | End: 2024-06-25

## 2024-06-25 RX ADMIN — WARFARIN SODIUM 5 MG: 5 TABLET ORAL at 11:43

## 2024-06-25 RX ADMIN — AMIODARONE HYDROCHLORIDE 200 MG: 200 TABLET ORAL at 11:43

## 2024-06-25 NOTE — DISCHARGE INSTRUCTIONS
Take the amlodipine as directed and anticoagulation clinic should call you regarding your Coumadin dosing.

## 2024-06-25 NOTE — CONSULTS
Consultation - Cardiology   Masoud Perry 68 y.o. male MRN: [id number]  Unit/Bed#: ED 07 Encounter: [id number]    Assessment & Plan     Assessment[place]  [place] [facility] [place]   - episodes of afib on loop recorder [place] [facility] [place]    - all episodes of symptoms are not afib   - currently in NSR    - on BB   - he stopped his Eliquis due to Fatigue  ESRD on Peritoneal Dialysis   CAD  Hx stroke     Plan:  Start Coumadin per patient request  Will put in MTM referral  INR goal 2-3   Continue BB  Add amiodarone 200 mg PO BID x 2 weeks  Then amiodarone 200 mg daily  [place] with [place] [facility]     History of Present Illness   Physician Requesting Consult: Scott Nielsen MD  Reason for Consult / Principal Problem: paf    HPI: Masoud Perry is a 68 y.o. year old male with history of CAD, ESRD on Peritoneal dialysis, PAF sp ablation 4[place]2024 [place] [facility] [place] here for concerns with weakness and fatigue. Having multiple episodes of fast heart rate and fatigue and this AM felt that his heart was beating 150 bpm. He sent a remote in for his loop recorder. Currently at the bedside he is feeling very tired and feels that he is still having afib. Monitor is showing nsr. He reports a lot of frustration over his Eliquis that made him feel weak. He discontinued this and feels much better off Eliquis. After our discussion he would be willing to start coumadin.    Consult to cardiology  Consult performed by: Maria Eugenia Ortiz PA-C  Consult ordered by: Abdoul Kent PA-C          Review of Systems   Constitutional:  Positive for fatigue. Negative for chills and unexpected weight change.   Respiratory:  Positive for shortness of breath. Negative for chest tightness.    Cardiovascular:  Negative for chest pain, palpitations and leg swelling.   Skin:  Negative for color change.   Neurological:  Positive for dizziness, weakness and light-headedness.   Psychiatric/Behavioral:  Negative for agitation, behavioral problems  and confusion.        Historical Information   Past Medical History:   Diagnosis Date    Acute kidney injury superimposed on chronic kidney disease stage 4 (HCC) 01/12/2022    Chronic kidney disease     Compartment syndrome of forearm ([facility])     Right, 2019    Disease of thyroid gland     Hyperlipidemia     Hypertension     MI (myocardial infarction) ([facility])     x3 - 1999, 2010, after 2015    Stroke ([facility])     2021-22     Past Surgical History:   Procedure Laterality Date    CARDIAC ELECTROPHYSIOLOGY STUDY AND ABLATION  04/29/2024    CHOLECYSTECTOMY      CORONARY ANGIOPLASTY WITH STENT PLACEMENT      JOINT REPLACEMENT      bilateral knee    NC CYSTO/URETERO W/LITHOTRIPSY &INDWELL STENT INSRT Left 09/24/2022    Procedure: CYSTOSCOPY URETEROSCOPY WITH LITHOTRIPSY HOLMIUM LASER, AND INSERTION STENT URETERAL;  Surgeon: Lewis Dahl MD;  Location:  MAIN OR;  Service: Urology    NC LAPS INSERTION TUNNELED INTRAPERITONEAL CATHETER N/A 6/7/2024    Procedure: INSERTION PERITONEAL CATHETER DIALYSIS LAPAROSCOPIC;  Surgeon: Hiram Park DO;  Location: [facility];  Service: General    US GUIDED KIDNEY BIOPSY  08/05/2020     Social History     Substance and Sexual Activity   Alcohol Use Not Currently     Social History     Substance and Sexual Activity   Drug Use Never     E-Cigarette/Vaping    E-Cigarette Use Never User      E-Cigarette/Vaping Substances     Social History     Tobacco Use   Smoking Status Every Day    Current packs/day: 0.50    Types: Cigarettes   Smokeless Tobacco Never     Family History: non-contributory    Meds/Allergies   all current active meds have been reviewed  Allergies   Allergen Reactions    Carvedilol Shortness Of Breath    Aspartame - Food Allergy Diarrhea    Bumetanide Other (See Comments)     Lightheadedness,tiredness, confusion, drowsiness, dry mouth, nausea, ringing in ears, bruising, change in urine  Lightheadedness,tiredness, confusion, drowsiness, dry mouth, nausea, ringing in ears,  "bruising, change in urine      Cephalexin Other (See Comments)    Hydralazine Tachycardia    Lisinopril Other (See Comments)     Other reaction(s): dry heaves      Morphine Swelling     Other reaction(s): lump at injection  Hand swelling at IV injection site      Nifedipine Lightheadedness     didn't tolerate    Ciprofloxacin Rash    Metolazone Other (See Comments)     Metallic taste    Strawberry Extract - Food Allergy Rash       Objective   Vitals: Blood pressure 128/65, pulse 84, temperature (!) 97.2 °F (36.2 °C), resp. rate 21, height 5' 10\" (1.778 m), weight 124 kg (273 lb 13 oz), SpO2 96%.  Orthostatic Blood Pressures      Flowsheet Row Most Recent Value   Blood Pressure 128/65 filed at 06/25/2024 1015   Patient Position - Orthostatic VS Lying filed at 06/25/2024 1015            No intake or output data in the 24 hours ending 06/25/24 1119    Invasive Devices       Peripheral Intravenous Line  Duration             Peripheral IV 06/25/24 Left Antecubital <1 day                    Physical Exam  Constitutional:       Appearance: Normal appearance.   HENT:      Head: Normocephalic and atraumatic.   Cardiovascular:      Rate and Rhythm: Normal rate.      Heart sounds: No murmur heard.  Pulmonary:      Effort: Pulmonary effort is normal.   Musculoskeletal:         General: Swelling present.      Cervical back: Neck supple.   Skin:     Capillary Refill: Capillary refill takes less than 2 seconds.   Neurological:      Mental Status: He is alert.   Psychiatric:         Mood and Affect: Mood normal.         Lab Results: I have personally reviewed pertinent lab results.    CBC with diff:   Results from last 7 days   Lab Units 06/25/24  0917   WBC Thousand/uL 5.03   RBC Million/uL 3.39*   HEMOGLOBIN g/dL 10.6*   HEMATOCRIT % 34.0*   MCV fL 100*   MCH pg 31.3   MCHC g/dL 31.2*   RDW % 15.8*   MPV fL 8.8*   PLATELETS Thousands/uL 99*     CMP:   Results from last 7 days   Lab Units 06/25/24  0917   SODIUM mmol/L 136 "   CHLORIDE mmol/L 105   CO2 mmol/L 21   BUN mg/dL 73*   CREATININE mg/dL 5.04*   CALCIUM mg/dL 10.6*   AST U/L 18   ALT U/L 15   ALK PHOS U/L 105*   EGFR ml/min/1.73sq m 10     HS Troponin:   0   Lab Value Date/Time    HSTNI0 15 06/25/2024 0917    HSTNI2 22 01/11/2022 2141    HSTNI4 19 01/11/2022 2335     BNP:   Results from last 7 days   Lab Units 06/25/24  0917   POTASSIUM mmol/L 4.1   CHLORIDE mmol/L 105   CO2 mmol/L 21   BUN mg/dL 73*   CREATININE mg/dL 5.04*   CALCIUM mg/dL 10.6*   EGFR ml/min/1.73sq m 10     Coags:   Results from last 7 days   Lab Units 06/25/24  0917   PTT seconds 31   INR  1.05     TSH:     Magnesium:   Results from last 7 days   Lab Units 06/25/24  0917   MAGNESIUM mg/dL 2.8*     Lipid Profile:     Imaging: I have personally reviewed pertinent reports.    EKG: nsr on telemetry

## 2024-06-25 NOTE — ED PROVIDER NOTES
"History  Chief Complaint   Patient presents with    Chest Pain     Pt presented to this ED via EMS from home c/o chest pain \"burning\" across chest radiating down both arms and up neck since dialysis at 1600 w/sob and weakness. Hx afib/ablation , dm. Denies travel/fevers/cough/n/v/d     The patient is a 68-year-old male with a past medical history of end-stage renal disease, A-fib, CAD on Eliquis who presents with a chief complaint of generalized weakness shortness of breath chest pain.  He states that he has had recurrent and more frequent episodes of A-fib.  Has heart racing sensations for several hours.  Most recently today for 5 hours he had heart racing, chest burning.  He states that currently he feels very worn out and weak.  He states that he had 4 aspirin given by EMS.  States that he has had more frequent episodes over the last 4 months of his A-fib.  Has had an ablation done in the past.  Has started peritoneal dialysis started yesterday for his first treatment.  Still does produce urine.          Prior to Admission Medications   Prescriptions Last Dose Informant Patient Reported? Taking?   Arginine 1000 MG TABS 2024 Self Yes Yes   Sig: Take 3,000 mg by mouth One daily   HYDROcodone-acetaminophen (NORCO)  mg per tablet 2024 Self Yes Yes   Sig: Take by mouth   Magnesium 400 MG TABS 2024  Yes Yes   Sig: Take by mouth   allopurinol (ZYLOPRIM) 300 mg tablet 2024 Self Yes Yes   Sig: Take by mouth   apixaban (ELIQUIS) 5 mg Not Taking Self Yes No   Si (two) times a day   Patient not taking: Reported on 2024   ascorbic acid (VITAMIN C) 500 MG tablet 2024 Self Yes Yes   Sig: Take 500 mg by mouth daily   aspirin (ECOTRIN LOW STRENGTH) 81 mg EC tablet 2024 Self Yes Yes   Sig: Take 81 mg by mouth   atorvastatin (LIPITOR) 40 mg tablet  Self No No   Sig: Take 1 tablet (40 mg total) by mouth daily with dinner   b complex vitamins capsule 2024 Self Yes Yes   Sig: Take " 1 capsule by mouth daily   calcitriol (ROCALTROL) 0.25 mcg capsule 6/24/2024 Self Yes Yes   Sig: TAKE 1 CAPSULE (0.25 MCG TOTAL) BY MOUTH THREE TIMES A WEEK   co-enzyme Q-10 30 MG capsule 6/24/2024 Self Yes Yes   Sig: Take 100 mg by mouth   ergocalciferol (VITAMIN D2) 50,000 units   No No   Sig: Take 1 capsule (50,000 Units total) by mouth once a week   furosemide (LASIX) 80 mg tablet 6/25/2024 Self Yes Yes   Sig: Take 160 mg by mouth 2 (two) times a day   insulin glargine (LANTUS) 100 units/mL subcutaneous injection 6/24/2024 Self Yes Yes   Sig: Inject 20 Units under the skin every 12 (twelve) hours   insulin lispro (HumaLOG) 100 units/mL injection 6/24/2024 Self Yes Yes   Sig: as needed   levothyroxine 125 mcg tablet 6/25/2024 Self Yes Yes   Sig: Take by mouth   metoprolol succinate (TOPROL-XL) 100 mg 24 hr tablet 6/25/2024  Yes Yes   nitroglycerin (NITROSTAT) 0.4 mg SL tablet Unknown Self Yes No   Sig: PLEASE SEE ATTACHED FOR DETAILED DIRECTIONS   tamsulosin (FLOMAX) 0.4 mg 6/24/2024 Self Yes Yes   Sig: Take 0.4 mg by mouth daily with dinner   vitamin A 2400 MCG (8000 UT) capsule 6/24/2024 Self Yes Yes   Sig: Take 2,400 Units by mouth daily   vitamin E, tocopherol, 200 units capsule 6/24/2024 Self Yes Yes   Sig: Take 200 Units by mouth daily 180 mg daily      Facility-Administered Medications: None       Past Medical History:   Diagnosis Date    Acute kidney injury superimposed on chronic kidney disease stage 4 ([facility]) 01/12/2022    Chronic kidney disease     Compartment syndrome of forearm ([facility])     Right, 2019    Disease of thyroid gland     Hyperlipidemia     Hypertension     MI (myocardial infarction) ([facility])     x3 - 1999, 2010, after 2015    Stroke ([facility])     2021-22       Past Surgical History:   Procedure Laterality Date    CARDIAC ELECTROPHYSIOLOGY STUDY AND ABLATION  04/29/2024    CHOLECYSTECTOMY      CORONARY ANGIOPLASTY WITH STENT PLACEMENT      JOINT REPLACEMENT      bilateral knee    IA CYSTO/URETERO  W/LITHOTRIPSY &INDWELL STENT INSRT Left 09/24/2022    Procedure: CYSTOSCOPY URETEROSCOPY WITH LITHOTRIPSY HOLMIUM LASER, AND INSERTION STENT URETERAL;  Surgeon: Lewis Dahl MD;  Location:  MAIN OR;  Service: Urology    MI LAPS INSERTION TUNNELED INTRAPERITONEAL CATHETER N/A 6/7/2024    Procedure: INSERTION PERITONEAL CATHETER DIALYSIS LAPAROSCOPIC;  Surgeon: Hiram Park DO;  Location: [facility];  Service: General    US GUIDED KIDNEY BIOPSY  08/05/2020       Family History   Problem Relation Age of Onset    Kidney failure Mother     Cirrhosis Mother     Colon cancer Brother      I have reviewed and agree with the history as documented.    E-Cigarette/Vaping    E-Cigarette Use Never User      E-Cigarette/Vaping Substances     Social History     Tobacco Use    Smoking status: Every Day     Current packs/day: 0.50     Types: Cigarettes    Smokeless tobacco: Never   Vaping Use    Vaping status: Never Used   Substance Use Topics    Alcohol use: Not Currently    Drug use: Never       Review of Systems   All other systems reviewed and are negative.      Physical Exam  Physical Exam  Vitals and nursing note reviewed.   Constitutional:       General: He is not in acute distress.     Appearance: He is well-developed.   HENT:      Head: Normocephalic and atraumatic.      Mouth/Throat:      Mouth: Oropharynx is clear and moist.   Eyes:      Extraocular Movements: Extraocular movements intact and EOM normal.      Pupils: Pupils are equal, round, and reactive to light.   Cardiovascular:      Rate and Rhythm: Regular rhythm. Bradycardia present.      Heart sounds: No murmur heard.  Pulmonary:      Effort: Pulmonary effort is normal. No respiratory distress.      Breath sounds: Normal breath sounds.   Chest:      Chest wall: No tenderness.   Abdominal:      General: Bowel sounds are normal.      Palpations: Abdomen is soft.      Tenderness: There is no abdominal tenderness.   Musculoskeletal:      Cervical back: Normal range  of motion.      Right lower leg: No edema.      Left lower leg: No edema.   Skin:     General: Skin is warm and dry.      Capillary Refill: Capillary refill takes less than 2 seconds.   Neurological:      Mental Status: He is alert and oriented to person, place, and time.   Psychiatric:         Mood and Affect: Mood and affect normal.         Behavior: Behavior normal.         Vital Signs  ED Triage Vitals   Temperature Pulse Respirations Blood Pressure SpO2   06/25/24 0904 06/25/24 0904 06/25/24 0904 06/25/24 0904 06/25/24 0901   (!) 97.2 °F (36.2 °C) 57 22 (!) 171/75 99 %      Temp src Heart Rate Source Patient Position - Orthostatic VS BP Location FiO2 (%)   -- 06/25/24 0904 06/25/24 0904 06/25/24 0904 --    Monitor Lying Left arm       Pain Score       06/25/24 0904       6           Vitals:    06/25/24 0904 06/25/24 0945 06/25/24 1015   BP: (!) 171/75 130/63 128/65   Pulse: 57 88 84   Patient Position - Orthostatic VS: Lying Lying Lying         Visual Acuity      ED Medications  Medications   amiodarone tablet 200 mg (200 mg Oral Given 6/25/24 1143)   warfarin (COUMADIN) tablet 5 mg (5 mg Oral Given 6/25/24 1143)       Diagnostic Studies  Results Reviewed       Procedure Component Value Units Date/Time    FLU/RSV/COVID - if FLU/RSV clinically relevant [[id number]]  (Normal) Collected: 06/25/24 0917    Lab Status: Final result Specimen: Nares from Nose Updated: 06/25/24 1001     SARS-CoV-2 Negative     INFLUENZA A PCR Negative     INFLUENZA B PCR Negative     RSV PCR Negative    Narrative:      FOR PEDIATRIC PATIENTS - copy/paste COVID Guidelines URL to browser: [email or web address]    SARS-CoV-2 assay is a Nucleic Acid Amplification assay intended for the  qualitative detection of nucleic acid from SARS-CoV-2 in nasopharyngeal  swabs. Results are for the presumptive identification of SARS-CoV-2 RNA.    Positive results are indicative of infection with  SARS-CoV-2, the virus  causing COVID-19, but do not rule out bacterial infection or co-infection  with other viruses. Laboratories within the [place] and its  territories are required to report all positive results to the appropriate  public health authorities. Negative results do not preclude SARS-CoV-2  infection and should not be used as the sole basis for treatment or other  patient management decisions. Negative results must be combined with  clinical observations, patient history, and epidemiological information.  This test has not been FDA cleared or approved.    This test has been authorized by FDA under an Emergency Use Authorization  (EUA). This test is only authorized for the duration of time the  declaration that circumstances exist justifying the authorization of the  emergency use of an in vitro diagnostic tests for detection of SARS-CoV-2  virus and/or diagnosis of COVID-19 infection under section 564(b)(1) of  the Act, 21 U.S.C. 360bbb-3(b)(1), unless the authorization is terminated  or revoked sooner. The test has been validated but independent review by FDA  and CLIA is pending.    Test performed using [employer] Gene[employer]pert: This RT-PCR assay targets N2,  a region unique to SARS-CoV-2. A conserved region in the E-gene was chosen  for pan-Sarbecovirus detection which includes SARS-CoV-2.    According to CMS-2020-01-R, this platform meets the definition of high-throughput technology.    HS Troponin 0hr (reflex protocol) [[id number]]  (Normal) Collected: 06/25/24 0917    Lab Status: Final result Specimen: Blood from Arm, Left Updated: 06/25/24 0954     hs TnI 0hr 15 ng/L     CBC and differential [[id number]]  (Abnormal) Collected: 06/25/24 0917    Lab Status: Final result Specimen: Blood from Arm, Left Updated: 06/25/24 0952     WBC 5.03 Thousand/uL      RBC 3.39 Million/uL      Hemoglobin 10.6 g/dL      Hematocrit 34.0 %       fL      MCH 31.3 pg      MCHC 31.2 g/dL      RDW 15.8 %      MPV  8.8 fL      Platelets 99 Thousands/uL      nRBC 0 /100 WBCs      Segmented % 69 %      Immature Grans % 0 %      Lymphocytes % 21 %      Monocytes % 5 %      Eosinophils Relative 4 %      Basophils Relative 1 %      Absolute Neutrophils 3.49 Thousands/µL      Absolute Immature Grans 0.02 Thousand/uL      Absolute Lymphocytes 1.03 Thousands/µL      Absolute Monocytes 0.26 Thousand/µL      Eosinophils Absolute 0.19 Thousand/µL      Basophils Absolute 0.04 Thousands/µL     Narrative:      This is an appended report.  These results have been appended to a previously verified report.    Smear Review(Phlebs Do Not Order) [[id number]]  (Abnormal) Collected: 06/25/24 0917    Lab Status: Final result Specimen: Blood from Arm, Left Updated: 06/25/24 0952     RBC Morphology Present     Platelet Estimate Decreased     Anisocytosis Present     Microcytes Present    Comprehensive metabolic panel [[id number]]  (Abnormal) Collected: 06/25/24 0917    Lab Status: Final result Specimen: Blood from Arm, Left Updated: 06/25/24 0947     Sodium 136 mmol/L      Potassium 4.1 mmol/L      Chloride 105 mmol/L      CO2 21 mmol/L      ANION GAP 10 mmol/L      BUN 73 mg/dL      Creatinine 5.04 mg/dL      Glucose 127 mg/dL      Calcium 10.6 mg/dL      AST 18 U/L      ALT 15 U/L      Alkaline Phosphatase 105 U/L      Total Protein 7.6 g/dL      Albumin 4.0 g/dL      Total Bilirubin 0.36 mg/dL      eGFR 10 ml/min/1.73sq m     Narrative:      National Kidney Disease Foundation guidelines for Chronic Kidney Disease (CKD):     Stage 1 with normal or high GFR (GFR > 90 mL/min/1.73 square meters)    Stage 2 Mild CKD (GFR = 60-89 mL/min/1.73 square meters)    Stage 3A Moderate CKD (GFR = 45-59 mL/min/1.73 square meters)    Stage 3B Moderate CKD (GFR = 30-44 mL/min/1.73 square meters)    Stage 4 Severe CKD (GFR = 15-29 mL/min/1.73 square meters)    Stage 5 End Stage CKD (GFR <15 mL/min/1.73 square meters)  Note: GFR calculation is accurate only with a  steady state creatinine    CK [[id number]]  (Abnormal) Collected: 06/25/24 0917    Lab Status: Final result Specimen: Blood from Arm, Left Updated: 06/25/24 0947     Total CK 22 U/L     Magnesium [[id number]]  (Abnormal) Collected: 06/25/24 0917    Lab Status: Final result Specimen: Blood from Arm, Left Updated: 06/25/24 0947     Magnesium 2.8 mg/dL     Protime-INR [[id number]]  (Normal) Collected: 06/25/24 0917    Lab Status: Final result Specimen: Blood from Arm, Left Updated: 06/25/24 0942     Protime 14.0 seconds      INR 1.05    APTT [[id number]]  (Normal) Collected: 06/25/24 0917    Lab Status: Final result Specimen: Blood from Arm, Left Updated: 06/25/24 0942     PTT 31 seconds                    XR chest 1 view portable    (Results Pending)              Procedures  Procedures         ED Course  ED Course as of 06/25/24 1216   Tue Jun 25, 2024   1032 Reaching out to cardiology, Dr. Fisher   1051 Cardiology at bedside to discussed with patient                                             Medical Decision Making  The patient is a 68-year-old male with a past medical history of end-stage renal disease, A-fib, CAD on Eliquis who presents with a chief complaint of generalized weakness shortness of breath chest pain.  He states that he has had recurrent and more frequent episodes of A-fib.  Has heart racing sensations for several hours.  Most recently today for 5 hours he had heart racing, chest burning.  He states that currently he feels very worn out and weak.  He states that he had 4 aspirin given by EMS.  States that he has had more frequent episodes over the last 4 months of his A-fib.  Has had an ablation done in the past.  Has started peritoneal dialysis started yesterday for his first treatment.  Still does produce urine.    Was not in A-fib in the emergency department.  Patient's blood work demonstrated CKD which the patient has a history of.  Magnesium slightly elevated.  Patient troponin of 15 which is  consistent with previous lab draws as well as the patient's kidney dysfunction.  Patient discussed with cardiology consult placed.  Cardiology did speak with the EP  Will transition now to Coumadin.  Did start on amiodarone.  Patient in agreement treatment plan discharged home.    Amount and/or Complexity of Data Reviewed  Labs: ordered. Decision-making details documented in ED Course.  Radiology: ordered and independent interpretation performed. Decision-making details documented in ED Course.  ECG/medicine tests: ordered and independent interpretation performed. Decision-making details documented in ED Course.    Risk  Prescription drug management.             Disposition  Final diagnoses:   Atypical chest pain   Racing heart beat     Time reflects when diagnosis was documented in both MDM as applicable and the Disposition within this note       Time User Action Codes Description Comment    6/25/2024 11:29 AM Abdoul Kent [R07.89] Atypical chest pain     6/25/2024 11:29 AM Abdoul Kent [R00.0] Racing heart beat           ED Disposition       ED Disposition   Discharge    Condition   Stable    Date/Time   Tue Jun 25, 2024 11:29 AM    Comment   Masoud Perry discharge to home/self care.                   Follow-up Information    None         Discharge Medication List as of 6/25/2024 11:32 AM        CONTINUE these medications which have NOT CHANGED    Details   allopurinol (ZYLOPRIM) 300 mg tablet Take by mouth, Historical Med      Arginine 1000 MG TABS Take 3,000 mg by mouth One daily, Historical Med      ascorbic acid (VITAMIN C) 500 MG tablet Take 500 mg by mouth daily, Historical Med      aspirin (ECOTRIN LOW STRENGTH) 81 mg EC tablet Take 81 mg by mouth, Historical Med      b complex vitamins capsule Take 1 capsule by mouth daily, Historical Med      calcitriol (ROCALTROL) 0.25 mcg capsule TAKE 1 CAPSULE (0.25 MCG TOTAL) BY MOUTH THREE TIMES A WEEK, Historical Med      co-enzyme Q-10 30 MG capsule  Take 100 mg by mouth, Historical Med      furosemide (LASIX) 80 mg tablet Take 160 mg by mouth 2 (two) times a day, Historical Med      HYDROcodone-acetaminophen (NORCO)  mg per tablet Take by mouth, Starting Mon 9/13/2021, Historical Med      insulin glargine (LANTUS) 100 units/mL subcutaneous injection Inject 20 Units under the skin every 12 (twelve) hours, Historical Med      insulin lispro (HumaLOG) 100 units/mL injection as needed, Historical Med      levothyroxine 125 mcg tablet Take by mouth, Historical Med      Magnesium 400 MG TABS Take by mouth, Starting Tue 2/6/2024, Historical Med      metoprolol succinate (TOPROL-XL) 100 mg 24 hr tablet Historical Med      tamsulosin (FLOMAX) 0.4 mg Take 0.4 mg by mouth daily with dinner, Historical Med      vitamin A 2400 MCG (8000 UT) capsule Take 2,400 Units by mouth daily, Historical Med      vitamin E, tocopherol, 200 units capsule Take 200 Units by mouth daily 180 mg daily, Historical Med      apixaban (ELIQUIS) 5 mg 2 (two) times a day, Starting Fri 9/30/2022, Historical Med      atorvastatin (LIPITOR) 40 mg tablet Take 1 tablet (40 mg total) by mouth daily with dinner, Starting Sat 1/15/2022, Normal      ergocalciferol (VITAMIN D2) 50,000 units Take 1 capsule (50,000 Units total) by mouth once a week, Starting Tue 4/2/2024, Normal      nitroglycerin (NITROSTAT) 0.4 mg SL tablet PLEASE SEE ATTACHED FOR DETAILED DIRECTIONS, Historical Med             No discharge procedures on file.    PDMP Review         Value Time User    PDMP Reviewed  Yes 6/20/2024  1:26 PM Joseph Galvez PA-C            ED Provider  Electronically Signed by             Abdoul Kent PA-C  06/25/24 8225

## 2024-06-26 LAB
ATRIAL RATE: 82 BPM
P AXIS: 78 DEGREES
PR INTERVAL: 240 MS
QRS AXIS: -23 DEGREES
QRSD INTERVAL: 162 MS
QT INTERVAL: 396 MS
QTC INTERVAL: 462 MS
T WAVE AXIS: 6 DEGREES
VENTRICULAR RATE: 82 BPM

## 2024-07-22 ENCOUNTER — DOCUMENTATION (OUTPATIENT)
Dept: OTHER | Facility: HOSPITAL | Age: 69
End: 2024-07-22

## 2024-07-22 DIAGNOSIS — I82.463 ACUTE DEEP VEIN THROMBOSIS (DVT) OF CALF MUSCLE VEIN OF BOTH LOWER EXTREMITIES (HCC): Primary | ICD-10-CM

## 2024-07-23 ENCOUNTER — APPOINTMENT (EMERGENCY)
Dept: NON INVASIVE DIAGNOSTICS | Facility: HOSPITAL | Age: 69
DRG: 602 | End: 2024-07-23
Payer: COMMERCIAL

## 2024-07-23 ENCOUNTER — APPOINTMENT (EMERGENCY)
Dept: RADIOLOGY | Facility: HOSPITAL | Age: 69
DRG: 602 | End: 2024-07-23
Payer: COMMERCIAL

## 2024-07-23 ENCOUNTER — HOSPITAL ENCOUNTER (INPATIENT)
Facility: HOSPITAL | Age: 69
LOS: 13 days | Discharge: HOME HEALTH | DRG: 602 | End: 2024-08-05
Attending: EMERGENCY MEDICINE | Admitting: FAMILY MEDICINE
Payer: COMMERCIAL

## 2024-07-23 DIAGNOSIS — L03.115 CELLULITIS OF RIGHT LOWER EXTREMITY: Primary | ICD-10-CM

## 2024-07-23 DIAGNOSIS — N18.6 STAGE 5 CHRONIC KIDNEY DISEASE ON CHRONIC DIALYSIS (HCC): ICD-10-CM

## 2024-07-23 DIAGNOSIS — R79.89 ELEVATED LFTS: ICD-10-CM

## 2024-07-23 DIAGNOSIS — Z99.2 STAGE 5 CHRONIC KIDNEY DISEASE ON CHRONIC DIALYSIS (HCC): ICD-10-CM

## 2024-07-23 DIAGNOSIS — I48.91 ATRIAL FIBRILLATION WITH RVR (HCC): ICD-10-CM

## 2024-07-23 DIAGNOSIS — I48.91 ATRIAL FIBRILLATION, UNSPECIFIED TYPE (HCC): ICD-10-CM

## 2024-07-23 DIAGNOSIS — R79.1 SUPRATHERAPEUTIC INR: ICD-10-CM

## 2024-07-23 DIAGNOSIS — N18.6 ESRD (END STAGE RENAL DISEASE) (HCC): ICD-10-CM

## 2024-07-23 DIAGNOSIS — I50.33 ACUTE ON CHRONIC DIASTOLIC HF (HEART FAILURE) (HCC): ICD-10-CM

## 2024-07-23 DIAGNOSIS — D64.9 ANEMIA, UNSPECIFIED TYPE: ICD-10-CM

## 2024-07-23 LAB
2HR DELTA HS TROPONIN: -1 NG/L
4HR DELTA HS TROPONIN: -1 NG/L
ALBUMIN SERPL BCG-MCNC: 3.6 G/DL (ref 3.5–5)
ALP SERPL-CCNC: 91 U/L (ref 34–104)
ALT SERPL W P-5'-P-CCNC: 22 U/L (ref 7–52)
ANION GAP SERPL CALCULATED.3IONS-SCNC: 13 MMOL/L (ref 4–13)
AST SERPL W P-5'-P-CCNC: 24 U/L (ref 13–39)
BACTERIA UR QL AUTO: ABNORMAL /HPF
BASOPHILS # BLD AUTO: 0.02 THOUSANDS/ÂΜL (ref 0–0.1)
BASOPHILS NFR BLD AUTO: 0 % (ref 0–1)
BILIRUB SERPL-MCNC: 0.51 MG/DL (ref 0.2–1)
BILIRUB UR QL STRIP: NEGATIVE
BNP SERPL-MCNC: 71 PG/ML (ref 0–100)
BUN SERPL-MCNC: 83 MG/DL (ref 5–25)
CALCIUM SERPL-MCNC: 10.8 MG/DL (ref 8.4–10.2)
CARDIAC TROPONIN I PNL SERPL HS: 6 NG/L
CARDIAC TROPONIN I PNL SERPL HS: 6 NG/L
CARDIAC TROPONIN I PNL SERPL HS: 7 NG/L
CHLORIDE SERPL-SCNC: 99 MMOL/L (ref 96–108)
CLARITY UR: CLEAR
CO2 SERPL-SCNC: 21 MMOL/L (ref 21–32)
COLOR UR: YELLOW
CREAT SERPL-MCNC: 5.84 MG/DL (ref 0.6–1.3)
CREAT UR-MCNC: 66.9 MG/DL
EOSINOPHIL # BLD AUTO: 0.13 THOUSAND/ÂΜL (ref 0–0.61)
EOSINOPHIL NFR BLD AUTO: 1 % (ref 0–6)
ERYTHROCYTE [DISTWIDTH] IN BLOOD BY AUTOMATED COUNT: 16.5 % (ref 11.6–15.1)
GFR SERPL CREATININE-BSD FRML MDRD: 8 ML/MIN/1.73SQ M
GLUCOSE SERPL-MCNC: 141 MG/DL (ref 65–140)
GLUCOSE SERPL-MCNC: 143 MG/DL (ref 65–140)
GLUCOSE SERPL-MCNC: 158 MG/DL (ref 65–140)
GLUCOSE UR STRIP-MCNC: NEGATIVE MG/DL
HCT VFR BLD AUTO: 31.7 % (ref 36.5–49.3)
HGB BLD-MCNC: 10.1 G/DL (ref 12–17)
HGB UR QL STRIP.AUTO: ABNORMAL
IMM GRANULOCYTES # BLD AUTO: 0.03 THOUSAND/UL (ref 0–0.2)
IMM GRANULOCYTES NFR BLD AUTO: 0 % (ref 0–2)
INR PPP: 6.62 (ref 0.84–1.19)
KETONES UR STRIP-MCNC: NEGATIVE MG/DL
LACTATE SERPL-SCNC: 1 MMOL/L (ref 0.5–2)
LEUKOCYTE ESTERASE UR QL STRIP: NEGATIVE
LYMPHOCYTES # BLD AUTO: 0.7 THOUSANDS/ÂΜL (ref 0.6–4.47)
LYMPHOCYTES NFR BLD AUTO: 8 % (ref 14–44)
MCH RBC QN AUTO: 31.5 PG (ref 26.8–34.3)
MCHC RBC AUTO-ENTMCNC: 31.9 G/DL (ref 31.4–37.4)
MCV RBC AUTO: 99 FL (ref 82–98)
MONOCYTES # BLD AUTO: 0.53 THOUSAND/ÂΜL (ref 0.17–1.22)
MONOCYTES NFR BLD AUTO: 6 % (ref 4–12)
NEUTROPHILS # BLD AUTO: 7.65 THOUSANDS/ÂΜL (ref 1.85–7.62)
NEUTS SEG NFR BLD AUTO: 85 % (ref 43–75)
NITRITE UR QL STRIP: NEGATIVE
NON-SQ EPI CELLS URNS QL MICRO: ABNORMAL /HPF
NRBC BLD AUTO-RTO: 0 /100 WBCS
PH UR STRIP.AUTO: 5.5 [PH]
PLATELET # BLD AUTO: 134 THOUSANDS/UL (ref 149–390)
PMV BLD AUTO: 10.6 FL (ref 8.9–12.7)
POTASSIUM SERPL-SCNC: 3.5 MMOL/L (ref 3.5–5.3)
PROCALCITONIN SERPL-MCNC: 0.89 NG/ML
PROT SERPL-MCNC: 7.6 G/DL (ref 6.4–8.4)
PROT UR STRIP-MCNC: ABNORMAL MG/DL
PROTHROMBIN TIME: 57.8 SECONDS (ref 11.6–14.5)
RBC # BLD AUTO: 3.21 MILLION/UL (ref 3.88–5.62)
RBC #/AREA URNS AUTO: ABNORMAL /HPF
SODIUM 24H UR-SCNC: 48 MOL/L
SODIUM SERPL-SCNC: 133 MMOL/L (ref 135–147)
SP GR UR STRIP.AUTO: 1.02 (ref 1–1.03)
UROBILINOGEN UR QL STRIP.AUTO: 0.2 E.U./DL
WBC # BLD AUTO: 9.06 THOUSAND/UL (ref 4.31–10.16)
WBC #/AREA URNS AUTO: ABNORMAL /HPF

## 2024-07-23 PROCEDURE — 96365 THER/PROPH/DIAG IV INF INIT: CPT

## 2024-07-23 PROCEDURE — 83880 ASSAY OF NATRIURETIC PEPTIDE: CPT | Performed by: EMERGENCY MEDICINE

## 2024-07-23 PROCEDURE — 99223 1ST HOSP IP/OBS HIGH 75: CPT

## 2024-07-23 PROCEDURE — 87081 CULTURE SCREEN ONLY: CPT

## 2024-07-23 PROCEDURE — 85025 COMPLETE CBC W/AUTO DIFF WBC: CPT | Performed by: EMERGENCY MEDICINE

## 2024-07-23 PROCEDURE — 99285 EMERGENCY DEPT VISIT HI MDM: CPT | Performed by: EMERGENCY MEDICINE

## 2024-07-23 PROCEDURE — 85610 PROTHROMBIN TIME: CPT | Performed by: EMERGENCY MEDICINE

## 2024-07-23 PROCEDURE — 87186 SC STD MICRODIL/AGAR DIL: CPT

## 2024-07-23 PROCEDURE — 82043 UR ALBUMIN QUANTITATIVE: CPT

## 2024-07-23 PROCEDURE — 1124F ACP DISCUSS-NO DSCNMKR DOCD: CPT | Performed by: INTERNAL MEDICINE

## 2024-07-23 PROCEDURE — 87186 SC STD MICRODIL/AGAR DIL: CPT | Performed by: EMERGENCY MEDICINE

## 2024-07-23 PROCEDURE — 93005 ELECTROCARDIOGRAM TRACING: CPT

## 2024-07-23 PROCEDURE — 83935 ASSAY OF URINE OSMOLALITY: CPT

## 2024-07-23 PROCEDURE — 83605 ASSAY OF LACTIC ACID: CPT | Performed by: EMERGENCY MEDICINE

## 2024-07-23 PROCEDURE — 87205 SMEAR GRAM STAIN: CPT

## 2024-07-23 PROCEDURE — 82948 REAGENT STRIP/BLOOD GLUCOSE: CPT

## 2024-07-23 PROCEDURE — 36415 COLL VENOUS BLD VENIPUNCTURE: CPT | Performed by: EMERGENCY MEDICINE

## 2024-07-23 PROCEDURE — 71045 X-RAY EXAM CHEST 1 VIEW: CPT

## 2024-07-23 PROCEDURE — 84484 ASSAY OF TROPONIN QUANT: CPT

## 2024-07-23 PROCEDURE — 81001 URINALYSIS AUTO W/SCOPE: CPT

## 2024-07-23 PROCEDURE — 87154 CUL TYP ID BLD PTHGN 6+ TRGT: CPT | Performed by: EMERGENCY MEDICINE

## 2024-07-23 PROCEDURE — 84145 PROCALCITONIN (PCT): CPT

## 2024-07-23 PROCEDURE — 87040 BLOOD CULTURE FOR BACTERIA: CPT | Performed by: EMERGENCY MEDICINE

## 2024-07-23 PROCEDURE — 87077 CULTURE AEROBIC IDENTIFY: CPT

## 2024-07-23 PROCEDURE — 84300 ASSAY OF URINE SODIUM: CPT

## 2024-07-23 PROCEDURE — 93970 EXTREMITY STUDY: CPT

## 2024-07-23 PROCEDURE — 93970 EXTREMITY STUDY: CPT | Performed by: SURGERY

## 2024-07-23 PROCEDURE — 99285 EMERGENCY DEPT VISIT HI MDM: CPT

## 2024-07-23 PROCEDURE — 84484 ASSAY OF TROPONIN QUANT: CPT | Performed by: EMERGENCY MEDICINE

## 2024-07-23 PROCEDURE — 82570 ASSAY OF URINE CREATININE: CPT

## 2024-07-23 PROCEDURE — 80053 COMPREHEN METABOLIC PANEL: CPT | Performed by: EMERGENCY MEDICINE

## 2024-07-23 PROCEDURE — 87070 CULTURE OTHR SPECIMN AEROBIC: CPT

## 2024-07-23 PROCEDURE — 87077 CULTURE AEROBIC IDENTIFY: CPT | Performed by: EMERGENCY MEDICINE

## 2024-07-23 RX ORDER — SIMETHICONE 80 MG
80 TABLET,CHEWABLE ORAL 4 TIMES DAILY PRN
Status: DISCONTINUED | OUTPATIENT
Start: 2024-07-23 | End: 2024-08-05 | Stop reason: HOSPADM

## 2024-07-23 RX ORDER — INSULIN LISPRO 100 [IU]/ML
1-6 INJECTION, SOLUTION INTRAVENOUS; SUBCUTANEOUS
Status: DISCONTINUED | OUTPATIENT
Start: 2024-07-23 | End: 2024-08-05 | Stop reason: HOSPADM

## 2024-07-23 RX ORDER — POLYETHYLENE GLYCOL 3350 17 G/17G
17 POWDER, FOR SOLUTION ORAL DAILY PRN
Status: DISCONTINUED | OUTPATIENT
Start: 2024-07-23 | End: 2024-07-31

## 2024-07-23 RX ORDER — LANOLIN ALCOHOL/MO/W.PET/CERES
400 CREAM (GRAM) TOPICAL DAILY
Status: DISCONTINUED | OUTPATIENT
Start: 2024-07-24 | End: 2024-07-29

## 2024-07-23 RX ORDER — OXYCODONE HYDROCHLORIDE 10 MG/1
10 TABLET ORAL EVERY 4 HOURS PRN
Status: DISCONTINUED | OUTPATIENT
Start: 2024-07-23 | End: 2024-07-23

## 2024-07-23 RX ORDER — HYDROCODONE BITARTRATE AND ACETAMINOPHEN 5; 325 MG/1; MG/1
2 TABLET ORAL EVERY 6 HOURS PRN
Status: DISCONTINUED | OUTPATIENT
Start: 2024-07-23 | End: 2024-08-05 | Stop reason: HOSPADM

## 2024-07-23 RX ORDER — ATORVASTATIN CALCIUM 40 MG/1
40 TABLET, FILM COATED ORAL
Status: DISCONTINUED | OUTPATIENT
Start: 2024-07-23 | End: 2024-08-05 | Stop reason: HOSPADM

## 2024-07-23 RX ORDER — METOPROLOL SUCCINATE 100 MG/1
100 TABLET, EXTENDED RELEASE ORAL 2 TIMES DAILY
Status: DISCONTINUED | OUTPATIENT
Start: 2024-07-23 | End: 2024-08-05 | Stop reason: HOSPADM

## 2024-07-23 RX ORDER — VANCOMYCIN HYDROCHLORIDE 1 G/200ML
1000 INJECTION, SOLUTION INTRAVENOUS ONCE
Status: COMPLETED | OUTPATIENT
Start: 2024-07-23 | End: 2024-07-23

## 2024-07-23 RX ORDER — HYDROCODONE BITARTRATE AND ACETAMINOPHEN 5; 325 MG/1; MG/1
1 TABLET ORAL EVERY 6 HOURS PRN
Status: DISCONTINUED | OUTPATIENT
Start: 2024-07-23 | End: 2024-08-05 | Stop reason: HOSPADM

## 2024-07-23 RX ORDER — LEVOTHYROXINE SODIUM 0.12 MG/1
125 TABLET ORAL
Status: DISCONTINUED | OUTPATIENT
Start: 2024-07-24 | End: 2024-08-05 | Stop reason: HOSPADM

## 2024-07-23 RX ORDER — NICOTINE 21 MG/24HR
1 PATCH, TRANSDERMAL 24 HOURS TRANSDERMAL DAILY
Status: DISCONTINUED | OUTPATIENT
Start: 2024-07-24 | End: 2024-07-25

## 2024-07-23 RX ORDER — ACETAMINOPHEN 325 MG/1
650 TABLET ORAL EVERY 6 HOURS PRN
Status: DISCONTINUED | OUTPATIENT
Start: 2024-07-23 | End: 2024-08-05 | Stop reason: HOSPADM

## 2024-07-23 RX ORDER — TAMSULOSIN HYDROCHLORIDE 0.4 MG/1
0.4 CAPSULE ORAL
Status: DISCONTINUED | OUTPATIENT
Start: 2024-07-23 | End: 2024-08-05 | Stop reason: HOSPADM

## 2024-07-23 RX ORDER — OXYCODONE HYDROCHLORIDE 5 MG/1
5 TABLET ORAL EVERY 4 HOURS PRN
Status: DISCONTINUED | OUTPATIENT
Start: 2024-07-23 | End: 2024-07-23

## 2024-07-23 RX ORDER — INSULIN LISPRO 100 [IU]/ML
1-6 INJECTION, SOLUTION INTRAVENOUS; SUBCUTANEOUS
Status: DISCONTINUED | OUTPATIENT
Start: 2024-07-24 | End: 2024-08-05 | Stop reason: HOSPADM

## 2024-07-23 RX ORDER — HYDROMORPHONE HCL IN WATER/PF 6 MG/30 ML
0.2 PATIENT CONTROLLED ANALGESIA SYRINGE INTRAVENOUS
Status: DISCONTINUED | OUTPATIENT
Start: 2024-07-23 | End: 2024-07-24

## 2024-07-23 RX ORDER — INSULIN GLARGINE 100 [IU]/ML
15 INJECTION, SOLUTION SUBCUTANEOUS EVERY 12 HOURS SCHEDULED
Status: DISCONTINUED | OUTPATIENT
Start: 2024-07-23 | End: 2024-08-05 | Stop reason: HOSPADM

## 2024-07-23 RX ORDER — INSULIN LISPRO 100 [IU]/ML
1-6 INJECTION, SOLUTION INTRAVENOUS; SUBCUTANEOUS
Status: DISCONTINUED | OUTPATIENT
Start: 2024-07-23 | End: 2024-07-23

## 2024-07-23 RX ADMIN — METOPROLOL SUCCINATE 100 MG: 100 TABLET, EXTENDED RELEASE ORAL at 21:28

## 2024-07-23 RX ADMIN — HYDROCODONE BITARTRATE AND ACETAMINOPHEN 2 TABLET: 5; 325 TABLET ORAL at 17:40

## 2024-07-23 RX ADMIN — VANCOMYCIN HYDROCHLORIDE 1000 MG: 1 INJECTION, SOLUTION INTRAVENOUS at 13:00

## 2024-07-23 RX ADMIN — AMPICILLIN SODIUM AND SULBACTAM SODIUM 3 G: 100; 50 INJECTION, POWDER, FOR SOLUTION INTRAVENOUS at 18:20

## 2024-07-23 RX ADMIN — TAMSULOSIN HYDROCHLORIDE 0.4 MG: 0.4 CAPSULE ORAL at 17:40

## 2024-07-23 RX ADMIN — INSULIN LISPRO 1 UNITS: 100 INJECTION, SOLUTION INTRAVENOUS; SUBCUTANEOUS at 18:20

## 2024-07-23 RX ADMIN — HYDROMORPHONE HYDROCHLORIDE 0.2 MG: 0.2 INJECTION, SOLUTION INTRAMUSCULAR; INTRAVENOUS; SUBCUTANEOUS at 21:56

## 2024-07-23 RX ADMIN — ATORVASTATIN CALCIUM 40 MG: 40 TABLET, FILM COATED ORAL at 17:40

## 2024-07-23 RX ADMIN — INSULIN GLARGINE 15 UNITS: 100 INJECTION, SOLUTION SUBCUTANEOUS at 21:28

## 2024-07-23 NOTE — ASSESSMENT & PLAN NOTE
Wt Readings from Last 3 Encounters:   07/23/24 121 kg (265 lb 10.5 oz)   06/25/24 124 kg (273 lb 13 oz)   06/20/24 122 kg (270 lb)     History of chf managed by both cardiology and nephrology, on lasix 80 mg bid outpatient, patient states that he took 240 mg of lasix yesterday and again today.   Echo 4/2023: Systolic function is normal with an ejection fraction of 60-65%. Wall motion is within normal limits. There is grade I (mild) diastolic dysfunction   BNP normal  CXR: No acute cardiopulmonary disease.   Venous duplex no DVT  Due to patient taking 240 mg lasix today, hold further lasix today, but patient does examine overloaded, appreciate nephrology recs on diuretics.   Monitor I&Os and daily weights  Sodium and fluid restriction

## 2024-07-23 NOTE — ED PROVIDER NOTES
History  Chief Complaint   Patient presents with    Leg Swelling     Pt reports bilateral leg swelling x friday     Patient reports increasing bilateral leg swelling over the past 4 days.  States the right is more involved than the left.  He is having pain in the right leg as well.  No fevers or chills but there is redness he states over the right leg with some drainage.  He denies chest pain or shortness of breath.      History provided by:  Patient   used: No    Leg Pain  Location:  Leg  Time since incident:  4 days  Leg location:  L lower leg and R lower leg  Pain details:     Quality:  Aching (Swelling and drainage)    Radiates to:  Does not radiate    Severity:  Moderate    Onset quality:  Gradual    Duration:  4 days    Timing:  Constant    Progression:  Unchanged  Chronicity:  New  Relieved by:  Nothing  Worsened by:  Nothing  Ineffective treatments:  None tried  Associated symptoms: no fever and no neck pain        Prior to Admission Medications   Prescriptions Last Dose Informant Patient Reported? Taking?   Arginine 1000 MG TABS  Self Yes No   Sig: Take 3,000 mg by mouth One daily   HYDROcodone-acetaminophen (NORCO)  mg per tablet  Self Yes No   Sig: Take by mouth   Magnesium 400 MG TABS   Yes No   Sig: Take by mouth   allopurinol (ZYLOPRIM) 300 mg tablet  Self Yes No   Sig: Take by mouth   apixaban (ELIQUIS) 5 mg  Self Yes No   Si (two) times a day   Patient not taking: Reported on 2024   ascorbic acid (VITAMIN C) 500 MG tablet  Self Yes No   Sig: Take 500 mg by mouth daily   aspirin (ECOTRIN LOW STRENGTH) 81 mg EC tablet  Self Yes No   Sig: Take 81 mg by mouth   atorvastatin (LIPITOR) 40 mg tablet  Self No No   Sig: Take 1 tablet (40 mg total) by mouth daily with dinner   b complex vitamins capsule  Self Yes No   Sig: Take 1 capsule by mouth daily   calcitriol (ROCALTROL) 0.25 mcg capsule  Self Yes No   Sig: TAKE 1 CAPSULE (0.25 MCG TOTAL) BY MOUTH THREE TIMES A WEEK    co-enzyme Q-10 30 MG capsule  Self Yes No   Sig: Take 100 mg by mouth   ergocalciferol (VITAMIN D2) 50,000 units   No No   Sig: Take 1 capsule (50,000 Units total) by mouth once a week   furosemide (LASIX) 80 mg tablet  Self Yes No   Sig: Take 160 mg by mouth 2 (two) times a day   insulin glargine (LANTUS) 100 units/mL subcutaneous injection  Self Yes No   Sig: Inject 20 Units under the skin every 12 (twelve) hours   insulin lispro (HumaLOG) 100 units/mL injection  Self Yes No   Sig: as needed   levothyroxine 125 mcg tablet  Self Yes No   Sig: Take by mouth   metoprolol succinate (TOPROL-XL) 100 mg 24 hr tablet   Yes No   nitroglycerin (NITROSTAT) 0.4 mg SL tablet  Self Yes No   Sig: PLEASE SEE ATTACHED FOR DETAILED DIRECTIONS   tamsulosin (FLOMAX) 0.4 mg  Self Yes No   Sig: Take 0.4 mg by mouth daily with dinner   vitamin A 2400 MCG (8000 UT) capsule  Self Yes No   Sig: Take 2,400 Units by mouth daily   vitamin E, tocopherol, 200 units capsule  Self Yes No   Sig: Take 200 Units by mouth daily 180 mg daily      Facility-Administered Medications: None       Past Medical History:   Diagnosis Date    Acute kidney injury superimposed on chronic kidney disease stage 4 ([facility]) 01/12/2022    Chronic kidney disease     Compartment syndrome of forearm ([facility])     Right, 2019    Disease of thyroid gland     Hyperlipidemia     Hypertension     MI (myocardial infarction) ([facility])     x3 - 1999, 2010, after 2015    Stroke ([facility])     2021-22       Past Surgical History:   Procedure Laterality Date    CARDIAC ELECTROPHYSIOLOGY STUDY AND ABLATION  04/29/2024    CHOLECYSTECTOMY      CORONARY ANGIOPLASTY WITH STENT PLACEMENT      JOINT REPLACEMENT      bilateral knee    NM CYSTO/URETERO W/LITHOTRIPSY &INDWELL STENT INSRT Left 09/24/2022    Procedure: CYSTOSCOPY URETEROSCOPY WITH LITHOTRIPSY HOLMIUM LASER, AND INSERTION STENT URETERAL;  Surgeon: Lewis Dahl MD;  Location: [facility];  Service: Urology    NM LAPS INSERTION TUNNELED  INTRAPERITONEAL CATHETER N/A 6/7/2024    Procedure: INSERTION PERITONEAL CATHETER DIALYSIS LAPAROSCOPIC;  Surgeon: Hiram Park DO;  Location: [facility];  Service: General    US GUIDED KIDNEY BIOPSY  08/05/2020       Family History   Problem Relation Age of Onset    Kidney failure Mother     Cirrhosis Mother     Colon cancer Brother      I have reviewed and agree with the history as documented.    E-Cigarette/Vaping    E-Cigarette Use Never User      E-Cigarette/Vaping Substances     Social History     Tobacco Use    Smoking status: Every Day     Current packs/day: 0.50     Types: Cigarettes    Smokeless tobacco: Never   Vaping Use    Vaping status: Never Used   Substance Use Topics    Alcohol use: Not Currently    Drug use: Never       Review of Systems   Constitutional:  Negative for chills and fever.   HENT:  Negative for ear pain, hearing loss, sore throat, trouble swallowing and voice change.    Eyes:  Negative for pain and discharge.   Respiratory:  Negative for cough, shortness of breath and wheezing.    Cardiovascular:  Negative for chest pain and palpitations.   Gastrointestinal:  Negative for abdominal pain, blood in stool, constipation, diarrhea, nausea and vomiting.   Genitourinary:  Negative for dysuria, flank pain, frequency and hematuria.   Musculoskeletal:  Negative for joint swelling, neck pain and neck stiffness.   Skin:  Negative for rash and wound.   Neurological:  Negative for dizziness, seizures, syncope, facial asymmetry and headaches.   Psychiatric/Behavioral:  Negative for hallucinations, self-injury and suicidal ideas.    All other systems reviewed and are negative.      Physical Exam  Physical Exam  Vitals and nursing note reviewed.   Constitutional:       General: He is not in acute distress.     Appearance: He is well-developed.   HENT:      Head: Normocephalic and atraumatic.      Right Ear: External ear normal.      Left Ear: External ear normal.   Eyes:      General: No scleral  icterus.        Right eye: No discharge.         Left eye: No discharge.      Extraocular Movements: Extraocular movements intact.      Conjunctiva/sclera: Conjunctivae normal.   Cardiovascular:      Rate and Rhythm: Normal rate and regular rhythm.      Heart sounds: Normal heart sounds. No murmur heard.  Pulmonary:      Effort: Pulmonary effort is normal.      Breath sounds: Normal breath sounds. No wheezing or rales.   Abdominal:      General: Bowel sounds are normal. There is no distension.      Palpations: Abdomen is soft.      Tenderness: There is no abdominal tenderness. There is no guarding or rebound.   Musculoskeletal:         General: No deformity. Normal range of motion.      Cervical back: Normal range of motion and neck supple.      Comments: Bilateral lower extremity swelling, 2+ pitting edema, chronic venous stasis change in the bilateral legs.  However, patient has increased redness and warmth of the right leg compared to the left.  These findings are concerning for cellulitis.  Patient has some drainage from the areas.   Skin:     General: Skin is warm and dry.      Findings: No rash.   Neurological:      General: No focal deficit present.      Mental Status: He is alert and oriented to person, place, and time.      Cranial Nerves: No cranial nerve deficit.   Psychiatric:         Mood and Affect: Mood normal.         Behavior: Behavior normal.         Thought Content: Thought content normal.         Judgment: Judgment normal.         Vital Signs  ED Triage Vitals   Temperature Pulse Respirations Blood Pressure SpO2   07/23/24 1242 07/23/24 1243 07/23/24 1242 07/23/24 1243 07/23/24 1243   97.9 °F (36.6 °C) 89 18 126/58 97 %      Temp Source Heart Rate Source Patient Position - Orthostatic VS BP Location FiO2 (%)   07/23/24 1242 07/23/24 1242 -- -- --   Temporal Monitor         Pain Score       07/23/24 1241       10 - Worst Possible Pain           Vitals:    07/23/24 1243   BP: 126/58   Pulse: 89          Visual Acuity      ED Medications  Medications   vancomycin (VANCOCIN) IVPB (premix in dextrose) 1,000 mg 200 mL (1,000 mg Intravenous New Bag 7/23/24 1300)       Diagnostic Studies  Results Reviewed       Procedure Component Value Units Date/Time    Protime-INR [[id number]]  (Abnormal) Collected: 07/23/24 1400    Lab Status: Final result Specimen: Blood from Arm, Right Updated: 07/23/24 1419     Protime 57.8 seconds      INR 6.62    B-Type Natriuretic Peptide(BNP) [[id number]]  (Normal) Collected: 07/23/24 1253    Lab Status: Final result Specimen: Blood from Arm, Left Updated: 07/23/24 1327     BNP 71 pg/mL     Comprehensive metabolic panel [[id number]]  (Abnormal) Collected: 07/23/24 1253    Lab Status: Final result Specimen: Blood from Arm, Left Updated: 07/23/24 1322     Sodium 133 mmol/L      Potassium 3.5 mmol/L      Chloride 99 mmol/L      CO2 21 mmol/L      ANION GAP 13 mmol/L      BUN 83 mg/dL      Creatinine 5.84 mg/dL      Glucose 141 mg/dL      Calcium 10.8 mg/dL      AST 24 U/L      ALT 22 U/L      Alkaline Phosphatase 91 U/L      Total Protein 7.6 g/dL      Albumin 3.6 g/dL      Total Bilirubin 0.51 mg/dL      eGFR 8 ml/min/1.73sq m     Narrative:      National Kid[employer] guidelines for Chronic Kidney Disease (CKD):     Stage 1 with normal or high GFR (GFR > 90 mL/min/1.73 square meters)    Stage 2 Mild CKD (GFR = 60-89 mL/min/1.73 square meters)    Stage 3A Moderate CKD (GFR = 45-59 mL/min/1.73 square meters)    Stage 3B Moderate CKD (GFR = 30-44 mL/min/1.73 square meters)    Stage 4 Severe CKD (GFR = 15-29 mL/min/1.73 square meters)    Stage 5 End Stage CKD (GFR <15 mL/min/1.73 square meters)  Note: GFR calculation is accurate only with a steady state creatinine    HS Troponin 0hr (reflex protocol) [[id number]]  (Normal) Collected: 07/23/24 1253    Lab Status: Final result Specimen: Blood from Arm, Left Updated: 07/23/24 1321     hs TnI 0hr 7 ng/L     HS Troponin I 2hr [[id number]]      Lab Status: No result Specimen: Blood     Lactic acid, plasma (w/reflex if result > 2.0) [[id number]]  (Normal) Collected: 07/23/24 1253    Lab Status: Final result Specimen: Blood from Arm, Left Updated: 07/23/24 1313     LACTIC ACID 1.0 mmol/L     Narrative:      Result may be elevated if tourniquet was used during collection.    CBC and differential [[id number]]  (Abnormal) Collected: 07/23/24 1253    Lab Status: Final result Specimen: Blood from Arm, Left Updated: 07/23/24 1300     WBC 9.06 Thousand/uL      RBC 3.21 Million/uL      Hemoglobin 10.1 g/dL      Hematocrit 31.7 %      MCV 99 fL      MCH 31.5 pg      MCHC 31.9 g/dL      RDW 16.5 %      MPV 10.6 fL      Platelets 134 Thousands/uL      nRBC 0 /100 WBCs      Segmented % 85 %      Immature Grans % 0 %      Lymphocytes % 8 %      Monocytes % 6 %      Eosinophils Relative 1 %      Basophils Relative 0 %      Absolute Neutrophils 7.65 Thousands/µL      Absolute Immature Grans 0.03 Thousand/uL      Absolute Lymphocytes 0.70 Thousands/µL      Absolute Monocytes 0.53 Thousand/µL      Eosinophils Absolute 0.13 Thousand/µL      Basophils Absolute 0.02 Thousands/µL     Blood culture #2 [[id number]] Collected: 07/23/24 1253    Lab Status: In process Specimen: Blood from Arm, Right Updated: 07/23/24 1259    Blood culture #1 [[id number]] Collected: 07/23/24 1253    Lab Status: In process Specimen: Blood from Arm, Left Updated: 07/23/24 1259                   XR chest portable   Final Result by Sedrick Jade MD (07/23 1344)      No acute cardiopulmonary disease.            Workstation performed: [id number]          VAS VENOUS DUPLEX - LOWER LIMB BILATERAL    (Results Pending)              Procedures  ECG 12 Lead Documentation Only    Date/Time: 7/23/2024 12:52 PM    Performed by: Fermin Francisco MD  Authorized by: Fermin Francisco MD    ECG reviewed by me, the ED Provider: yes    Patient location:  ED  Previous ECG:     Previous ECG:   Unavailable  Interpretation:     Interpretation: non-specific    Rate:     ECG rate:  76    ECG rate assessment: normal    Rhythm:     Rhythm: sinus rhythm    Ectopy:     Ectopy: none    QRS:     QRS axis:  Normal    QRS intervals:  Normal  Conduction:     Conduction: abnormal      Abnormal conduction: complete RBBB    ST segments:     ST segments:  Normal  T waves:     T waves: normal             ED Course                                 SBIRT 22yo+      Flowsheet Row Most Recent Value   Initial Alcohol Screen: US AUDIT-C     1. How often do you have a drink containing alcohol? 0 Filed at: 07/23/2024 1241   2. How many drinks containing alcohol do you have on a typical day you are drinking?  0 Filed at: 07/23/2024 1241   3a. Male UNDER 65: How often do you have five or more drinks on one occasion? 0 Filed at: 07/23/2024 1241   3b. FEMALE Any Age, or MALE 65+: How often do you have 4 or more drinks on one occassion? 0 Filed at: 07/23/2024 1241   Audit-C Score 0 Filed at: 07/23/2024 1241   ERASTO: How many times in the past year have you...    Used an illegal drug or used a prescription medication for non-medical reasons? Never Filed at: 07/23/2024 1241                      Medical Decision Making  Patient presents to the emergency department with leg swelling and pain.      Based on the MSE and the history provided, diagnostic considerations include but are not limited to dependent edema, CHF, DVT, cellulitis    Based on the work-up performed in the emergency department which includes physical examination, laboratory testing, imaging which may include advanced imaging as necessary such as CT scan or ultrasound, it is deemed that the patient will require admission to the hospital for treatment of cellulitis.    DVT study negative.  Lab work reveals normal white count, no lactic acidosis.  Noted to have elevated INR.  Exam findings however consistent with cellulitis of the right leg, will admit for same.    Amount  and/or Complexity of Data Reviewed  Labs: ordered. Decision-making details documented in ED Course.     Details: Elevated INR  Radiology: ordered and independent interpretation performed. Decision-making details documented in ED Course.     Details: DVT study negative, chest x-ray negative  ECG/medicine tests: ordered and independent interpretation performed. Decision-making details documented in ED Course.     Details: Normal sinus rhythm rate 75 with right bundle    Risk  Prescription drug management.  Decision regarding hospitalization.                 Disposition  Final diagnoses:   Cellulitis of right lower extremity     Time reflects when diagnosis was documented in both MDM as applicable and the Disposition within this note       Time User Action Codes Description Comment    7/23/2024  2:19 PM Fermin Francisco Add [L03.115] Cellulitis of right lower extremity           ED Disposition       ED Disposition   Admit    Condition   Stable    Date/Time   Tue Jul 23, 2024 1419    Comment                  Follow-up Information    None         Patient's Medications   Discharge Prescriptions    No medications on file       No discharge procedures on file.    PDMP Review         Value Time User    PDMP Reviewed  Yes 6/20/2024  1:26 PM Joseph Galvez PA-C            ED Provider  Electronically Signed by             Fermin Francisco MD  07/23/24 4772

## 2024-07-23 NOTE — H&P
[facility]  H&P  Name: Masoud Perry 68 y.o. male I MRN: [id number]  Unit/Bed#: -01 I Date of Admission: 7/23/2024   Date of Service: 7/23/2024 I Hospital Day: 0      Assessment & Plan   * Cellulitis of right lower extremity  Assessment & Plan  Presented to ED with bilateral leg swelling x 4 days. Right leg more swollen and red over the last 2 days. Significant pain and having drainage in the right leg as well. NO fevers or chills.   No recent antibiotics outpatient  No imaging  Procal slightly elevated 0.89  BC drawn - will follow and adjust antibiotics as needed   Was started on vancomycin in the ED; will continue on unasyn  Follow CBC and fever curve    Lab Results   Component Value Date    WBC 9.06 07/23/2024    WBC 5.03 06/25/2024    WBC 6.27 04/02/2024       Acute on chronic diastolic HF (heart failure) (HCC)  Assessment & Plan  Wt Readings from Last 3 Encounters:   07/23/24 121 kg (265 lb 10.5 oz)   06/25/24 124 kg (273 lb 13 oz)   06/20/24 122 kg (270 lb)     History of chf managed by both cardiology and nephrology, on lasix 80 mg bid outpatient, patient states that he took 240 mg of lasix yesterday and again today.   Echo 4/2023: Systolic function is normal with an ejection fraction of 60-65%. Wall motion is within normal limits. There is grade I (mild) diastolic dysfunction   BNP normal  CXR: No acute cardiopulmonary disease.   Venous duplex no DVT  Due to patient taking 240 mg lasix today, hold further lasix today, but patient does examine overloaded, appreciate nephrology recs on diuretics.   Monitor I&Os and daily weights  Sodium and fluid restriction    Atrial fibrillation (HCC)  Assessment & Plan  Paroxysmal atrial fibrillation. sp ablation with Dr Alcala at [facility] 4/2024, took himself off Eliquis post procedure AMA;  has episodes on loop recorder of afib that are symptomatic, no longer taking amiodarone  Currently patient on coumadin, follows outpatient with coumadin  clinic, INR supratherapeutic, holding further coumadin dosing  Monitor INR daily    Stage 5 chronic kidney disease on peritoneal dialysis (HCC)  Assessment & Plan  Lab Results   Component Value Date    EGFR 8 07/23/2024    EGFR 10 06/25/2024    EGFR 12 (L) 04/29/2024    CREATININE 5.84 (H) 07/23/2024    CREATININE 5.04 (H) 06/25/2024    CREATININE 5.0 (H) 04/29/2024     History of stage 5 ckd recently started on peritoneal dialysis, first treatment on 6/24. Had PD catheter placed on 6/7/2024. Was scheduled to do PD dialysis in clinic and then transition to home PD dialysis.   Last PD was done yesterday, patient currently doing every other day PD, goal for everyday per patient.   Will consult nephrology  Avoid nephrotoxic medications, nsaids, hypotension    Lymphedema  Assessment & Plan  With history of chronic lymphedema following with cardiology outpatient. Feels that his legs have been swelling more recently, right leg more swollen than left due to current cellulitis episode.     HTN (hypertension)  Assessment & Plan  History of htn on lasix and toprol xl, continue home meds  BP acceptable    History of CVA (cerebrovascular accident)  Assessment & Plan  Status post presumed cardioembolic CVA requiring tPA administration January 2022  Currently on aspirin and lipitor, continue       VTE Pharmacologic Prophylaxis: VTE Score: 5 High Risk (Score >/= 5) - Pharmacological DVT Prophylaxis Contraindicated. Sequential Compression Devices Ordered. Supratherapeutic INR  Code Status: Level 1 - Full Code   Discussion with family: Updated  (son) at bedside.    Anticipated Length of Stay: Patient will be admitted on an inpatient basis with an anticipated length of stay of greater than 2 midnights secondary to cellulitis, chf exacerbation.    Total Time Spent on Date of Encounter in care of patient: 70 mins. This time was spent on one or more of the following: performing physical exam; counseling and coordination of  care; obtaining or reviewing history; documenting in the medical record; reviewing/ordering tests, medications or procedures; communicating with other healthcare professionals and discussing with patient's family/caregivers.    Chief Complaint: bilateral lower extremity swelling, right leg redness    History of Present Illness:  Masoud Perry is a 68 y.o. male with a PMH of chf, a fib on coumadin, factor 5 Leiden mutation, HTN, CKD on peritoneal dialysis, lymphedema, history of CVA who presents with bilateral lower extremity swelling for the last few days. States that he noticed he was having increased swelling to the lower legs over the last few days, right more than the left. States that the left is only slightly more swollen than baseline. Also noted to have significantly more redness to the right leg than normal and it is more painful as well for him to touch. No fevers or chills. No nausea, vomiting, diarrhea, constipation. Doesn't feel he is gaining any weight, feels he is losing it. Feels SOB when he is moving around. Significantly painful on the right leg to move around. Patient also states he started PD recently and last session was last night, currently doing every other day, working towards daily. Yesterday took 240 mg of lasix, today took 240 mg daily of lasix. Still makes urine.     Review of Systems:  Review of Systems    Past Medical and Surgical History:   Past Medical History:   Diagnosis Date    Acute kidney injury superimposed on chronic kidney disease stage 4 (HCC) 01/12/2022    Chronic kidney disease     Compartment syndrome of forearm ([facility])     Right, 2019    Disease of thyroid gland     Hyperlipidemia     Hypertension     MI (myocardial infarction) ([facility])     x3 - 1999, 2010, after 2015    Stroke ([facility])     2021-22       Past Surgical History:   Procedure Laterality Date    CARDIAC ELECTROPHYSIOLOGY STUDY AND ABLATION  04/29/2024    CHOLECYSTECTOMY      CORONARY ANGIOPLASTY WITH STENT PLACEMENT       JOINT REPLACEMENT      bilateral knee    NC CYSTO/URETERO W/LITHOTRIPSY &INDWELL STENT INSRT Left 09/24/2022    Procedure: CYSTOSCOPY URETEROSCOPY WITH LITHOTRIPSY HOLMIUM LASER, AND INSERTION STENT URETERAL;  Surgeon: Lewis Dahl MD;  Location: [facility];  Service: Urology    NC LAPS INSERTION TUNNELED INTRAPERITONEAL CATHETER N/A 6/7/2024    Procedure: INSERTION PERITONEAL CATHETER DIALYSIS LAPAROSCOPIC;  Surgeon: Hiram Park DO;  Location: [facility];  Service: General    US GUIDED KIDNEY BIOPSY  08/05/2020       Meds/Allergies:  Prior to Admission medications    Medication Sig Start Date End Date Taking? Authorizing Provider   allopurinol (ZYLOPRIM) 300 mg tablet Take by mouth   Yes Historical Provider, MD   aspirin (ECOTRIN LOW STRENGTH) 81 mg EC tablet Take 81 mg by mouth   Yes Historical Provider, MD   atorvastatin (LIPITOR) 40 mg tablet Take 1 tablet (40 mg total) by mouth daily with dinner 1/15/22  Yes Sheila Bronson PA-C   calcitriol (ROCALTROL) 0.25 mcg capsule TAKE 1 CAPSULE (0.25 MCG TOTAL) BY MOUTH THREE TIMES A WEEK 2/10/23  Yes Historical Provider, MD   HYDROcodone-acetaminophen (NORCO)  mg per tablet Take by mouth 9/13/21  Yes Historical Provider, MD   insulin glargine (LANTUS) 100 units/mL subcutaneous injection Inject 15 Units under the skin every 12 (twelve) hours   Yes Historical Provider, MD   insulin lispro (HumaLOG) 100 units/mL injection as needed   Yes Historical Provider, MD   levothyroxine 125 mcg tablet Take by mouth   Yes Historical Provider, MD   metoprolol succinate (TOPROL-XL) 100 mg 24 hr tablet Take 100 mg by mouth 2 (two) times a day 1/8/24  Yes Historical Provider, MD   tamsulosin (FLOMAX) 0.4 mg Take 0.4 mg by mouth daily with dinner   Yes Historical Provider, MD   apixaban (ELIQUIS) 5 mg 2 (two) times a day  Patient not taking: Reported on 7/23/2024 9/30/22   Historical Provider, MD   Arginine 1000 MG TABS Take 3,000 mg by mouth One daily    Historical  Provider, MD   ascorbic acid (VITAMIN C) 500 MG tablet Take 500 mg by mouth daily    Historical Provider, MD   b complex vitamins capsule Take 1 capsule by mouth daily    Historical Provider, MD   co-enzyme Q-10 30 MG capsule Take 100 mg by mouth    Historical Provider, MD   ergocalciferol (VITAMIN D2) 50,000 units Take 1 capsule (50,000 Units total) by mouth once a week 4/2/24   GAGANDEEP Browne   furosemide (LASIX) 80 mg tablet Take 160 mg by mouth 2 (two) times a day    Historical Provider, MD   Magnesium 400 MG TABS Take by mouth 2/6/24   Historical Provider, MD   nitroglycerin (NITROSTAT) 0.4 mg SL tablet PLEASE SEE ATTACHED FOR DETAILED DIRECTIONS 12/9/22   Historical Provider, MD   vitamin A 2400 MCG (8000 UT) capsule Take 2,400 Units by mouth daily    Historical Provider, MD   vitamin E, tocopherol, 200 units capsule Take 200 Units by mouth daily 180 mg daily    Historical Provider, MD     I have reviewed home medications with patient personally.    Allergies:   Allergies   Allergen Reactions    Amiodarone Dizziness    Carvedilol Shortness Of Breath    Aspartame - Food Allergy Diarrhea    Bumetanide Other (See Comments)     Lightheadedness,tiredness, confusion, drowsiness, dry mouth, nausea, ringing in ears, bruising, change in urine  Lightheadedness,tiredness, confusion, drowsiness, dry mouth, nausea, ringing in ears, bruising, change in urine      Cephalexin Other (See Comments)    Hydralazine Tachycardia    Lisinopril Other (See Comments)     Other reaction(s): dry heaves      Morphine Swelling     Other reaction(s): lump at injection  Hand swelling at IV injection site      Nifedipine Lightheadedness     didn't tolerate    Ciprofloxacin Rash    Metolazone Other (See Comments)     Metallic taste    Strawberry Extract - Food Allergy Rash       Social History:  Marital Status:    Occupation:   Patient Pre-hospital Living Situation: Home  Patient Pre-hospital Level of Mobility: unable to be assessed  "at time of evaluation  Patient Pre-hospital Diet Restrictions:   Substance Use History:   Social History     Substance and Sexual Activity   Alcohol Use Not Currently     Social History     Tobacco Use   Smoking Status Every Day    Current packs/day: 0.50    Types: Cigarettes   Smokeless Tobacco Never     Social History     Substance and Sexual Activity   Drug Use Never       Family History:  Family History   Problem Relation Age of Onset    Kidney failure Mother     Cirrhosis Mother     Colon cancer Brother        Physical Exam:     Vitals:   Blood Pressure: 158/69 (07/23/24 1535)  Pulse: 81 (07/23/24 1535)  Temperature: 98.3 °F (36.8 °C) (07/23/24 1535)  Temp Source: Temporal (07/23/24 1535)  Respirations: 20 (07/23/24 1535)  Height: 5' 10\" (177.8 cm) (07/23/24 1530)  Weight - Scale: 119 kg (263 lb 0.1 oz) (07/23/24 1530)  SpO2: 98 % (07/23/24 1535)    Physical Exam  Vitals reviewed.   Constitutional:       General: He is not in acute distress.     Appearance: He is obese. He is ill-appearing. He is not toxic-appearing.   HENT:      Head: Normocephalic and atraumatic.      Mouth/Throat:      Mouth: Mucous membranes are moist.   Cardiovascular:      Rate and Rhythm: Normal rate and regular rhythm.      Heart sounds: No murmur heard.  Pulmonary:      Effort: No respiratory distress.      Breath sounds: No stridor. No wheezing, rhonchi or rales.   Abdominal:      General: Bowel sounds are normal. There is no distension.      Palpations: Abdomen is soft. There is no mass.      Tenderness: There is no abdominal tenderness.   Musculoskeletal:         General: Tenderness (tenderness to palpation RLE) present.      Right lower leg: Edema present.      Left lower leg: Edema present.   Skin:     General: Skin is warm and dry.      Findings: Erythema (significant erythema and warmth to the RLE) present.      Comments: Some drainage appreciated from the RLE   Neurological:      Mental Status: He is alert and oriented to " person, place, and time.   Psychiatric:         Mood and Affect: Mood normal.         Behavior: Behavior normal.          Additional Data:     Lab Results:  Results from last 7 days   Lab Units 07/23/24  1253   WBC Thousand/uL 9.06   HEMOGLOBIN g/dL 10.1*   HEMATOCRIT % 31.7*   PLATELETS Thousands/uL 134*   SEGS PCT % 85*   LYMPHO PCT % 8*   MONO PCT % 6   EOS PCT % 1     Results from last 7 days   Lab Units 07/23/24  1253   SODIUM mmol/L 133*   POTASSIUM mmol/L 3.5   CHLORIDE mmol/L 99   CO2 mmol/L 21   BUN mg/dL 83*   CREATININE mg/dL 5.84*   ANION GAP mmol/L 13   CALCIUM mg/dL 10.8*   ALBUMIN g/dL 3.6   TOTAL BILIRUBIN mg/dL 0.51   ALK PHOS U/L 91   ALT U/L 22   AST U/L 24   GLUCOSE RANDOM mg/dL 141*     Results from last 7 days   Lab Units 07/23/24  1400   INR  6.62*         Lab Results   Component Value Date    HGBA1C 6.0 01/31/2024    HGBA1C 6.2 07/14/2023    HGBA1C 6.5 03/15/2023     Results from last 7 days   Lab Units 07/23/24  1253   LACTIC ACID mmol/L 1.0   PROCALCITONIN ng/ml 0.89*       Lines/Drains:  Invasive Devices       Peripheral Intravenous Line  Duration             Peripheral IV 07/23/24 Right;Ventral (anterior) Forearm <1 day                        Imaging: Reviewed radiology reports from this admission including: chest xray   VAS VENOUS DUPLEX - LOWER LIMB BILATERAL   Final Result by Conor Silva DO (07/23 1636)      XR chest portable   Final Result by Sedrick Jade MD (07/23 1344)      No acute cardiopulmonary disease.            Workstation performed: [id number]             EKG and Other Studies Reviewed on Admission:   EKG: Personally Reviewed. NSR. HR 76.    ** Please Note: This note has been constructed using a voice recognition system. **

## 2024-07-23 NOTE — ASSESSMENT & PLAN NOTE
Presented to ED with bilateral leg swelling x 4 days. Right leg more swollen and red over the last 2 days. Significant pain and having drainage in the right leg as well. NO fevers or chills.   No recent antibiotics outpatient  No imaging  Procal slightly elevated 0.89  BC drawn - will follow and adjust antibiotics as needed   Was started on vancomycin in the ED; will continue on unasyn  Follow CBC and fever curve    Lab Results   Component Value Date    WBC 9.06 07/23/2024    WBC 5.03 06/25/2024    WBC 6.27 04/02/2024

## 2024-07-23 NOTE — ASSESSMENT & PLAN NOTE
Lab Results   Component Value Date    EGFR 8 07/23/2024    EGFR 10 06/25/2024    EGFR 12 (L) 04/29/2024    CREATININE 5.84 (H) 07/23/2024    CREATININE 5.04 (H) 06/25/2024    CREATININE 5.0 (H) 04/29/2024     History of stage 5 ckd recently started on peritoneal dialysis, first treatment on 6/24. Had PD catheter placed on 6/7/2024. Was scheduled to do PD dialysis in clinic and then transition to home PD dialysis.   Last PD was done yesterday, patient currently doing every other day PD, goal for everyday per patient.   Will consult nephrology  Avoid nephrotoxic medications, nsaids, hypotension

## 2024-07-23 NOTE — ASSESSMENT & PLAN NOTE
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

## 2024-07-23 NOTE — ASSESSMENT & PLAN NOTE
With history of chronic lymphedema following with cardiology outpatient. Feels that his legs have been swelling more recently, right leg more swollen than left due to current cellulitis episode.

## 2024-07-23 NOTE — PLAN OF CARE
Problem: Prexisting or High Potential for Compromised Skin Integrity  Goal: Skin integrity is maintained or improved  Description: INTERVENTIONS:  - Identify patients at risk for skin breakdown  - Assess and monitor skin integrity  - Assess and monitor nutrition and hydration status  - Monitor labs   - Assess for incontinence   - Turn and reposition patient  - Assist with mobility/ambulation  - Relieve pressure over bony prominences  - Avoid friction and shearing  - Provide appropriate hygiene as needed including keeping skin clean and dry  - Evaluate need for skin moisturizer/barrier cream  - Collaborate with interdisciplinary team   - Patient/family teaching  - Consider wound care consult   Outcome: Progressing     Problem: METABOLIC, FLUID AND ELECTROLYTES - ADULT  Goal: Electrolytes maintained within normal limits  Description: INTERVENTIONS:  - Monitor labs and assess patient for signs and symptoms of electrolyte imbalances  - Administer electrolyte replacement as ordered  - Monitor response to electrolyte replacements, including repeat lab results as appropriate  - Instruct patient on fluid and nutrition as appropriate  Outcome: Progressing  Goal: Fluid balance maintained  Description: INTERVENTIONS:  - Monitor labs   - Monitor I/O and WT  - Instruct patient on fluid and nutrition as appropriate  - Assess for signs & symptoms of volume excess or deficit  Outcome: Progressing  Goal: Glucose maintained within target range  Description: INTERVENTIONS:  - Monitor Blood Glucose as ordered  - Assess for signs and symptoms of hyperglycemia and hypoglycemia  - Administer ordered medications to maintain glucose within target range  - Assess nutritional intake and initiate nutrition service referral as needed  Outcome: Progressing     Problem: SKIN/TISSUE INTEGRITY - ADULT  Goal: Skin Integrity remains intact(Skin Breakdown Prevention)  Description: Assess:  -Perform Quintin assessment every shift  -Clean and  moisturize skin as needed  -Inspect skin when repositioning, toileting, and assisting with ADLS  -Assess extremities for adequate circulation and sensation     Bed Management:  -Have minimal linens on bed & keep smooth, unwrinkled  -Change linens as needed when moist or perspiring  -Avoid sitting or lying in one position for more than 2 hours while in bed  -Keep HOB at 30degrees     Toileting:  -Assess for incontinence every 2 hours       Problem: PAIN - ADULT  Goal: Verbalizes/displays adequate comfort level or baseline comfort level  Description: Interventions:  - Encourage patient to monitor pain and request assistance  - Assess pain using appropriate pain scale  - Administer analgesics based on type and severity of pain and evaluate response  - Implement non-pharmacological measures as appropriate and evaluate response  - Consider cultural and social influences on pain and pain management  - Notify physician/advanced practitioner if interventions unsuccessful or patient reports new pain  Outcome: Progressing     Problem: INFECTION - ADULT  Goal: Absence or prevention of progression during hospitalization  Description: INTERVENTIONS:  - Assess and monitor for signs and symptoms of infection  - Monitor lab/diagnostic results  - Monitor all insertion sites, i.e. indwelling lines, tubes, and drains  - Monitor endotracheal if appropriate and nasal secretions for changes in amount and color  - [place] appropriate cooling/warming therapies per order  - Administer medications as ordered  - Instruct and encourage patient and family to use good hand hygiene technique  - Identify and instruct in appropriate isolation precautions for identified infection/condition  Outcome: Progressing  Goal: Absence of fever/infection during neutropenic period  Description: INTERVENTIONS:  - Monitor WBC    Outcome: Progressing

## 2024-07-23 NOTE — ASSESSMENT & PLAN NOTE
Paroxysmal atrial fibrillation. sp ablation with Dr Alcala at [facility] 4/2024, took himself off Eliquis post procedure AMA;  has episodes on loop recorder of afib that are symptomatic, no longer taking amiodarone  Currently patient on coumadin, follows outpatient with coumadin clinic, INR supratherapeutic, holding further coumadin dosing  Monitor INR daily

## 2024-07-24 ENCOUNTER — APPOINTMENT (INPATIENT)
Dept: CT IMAGING | Facility: HOSPITAL | Age: 69
DRG: 602 | End: 2024-07-24
Payer: COMMERCIAL

## 2024-07-24 PROBLEM — Z99.2 PATIENT ON PERITONEAL DIALYSIS (HCC): Status: ACTIVE | Noted: 2024-07-24

## 2024-07-24 PROBLEM — N18.6 ESRD (END STAGE RENAL DISEASE) (HCC): Status: ACTIVE | Noted: 2024-07-24

## 2024-07-24 PROBLEM — E87.70 HYPERVOLEMIA: Status: ACTIVE | Noted: 2024-07-24

## 2024-07-24 PROBLEM — R78.81 BACTEREMIA: Status: ACTIVE | Noted: 2024-07-24

## 2024-07-24 LAB
25(OH)D3 SERPL-MCNC: 30.8 NG/ML (ref 30–100)
ALBUMIN SERPL BCG-MCNC: 2.6 G/DL (ref 3.5–5)
ALP SERPL-CCNC: 70 U/L (ref 34–104)
ALT SERPL W P-5'-P-CCNC: 26 U/L (ref 7–52)
ANION GAP SERPL CALCULATED.3IONS-SCNC: 10 MMOL/L (ref 4–13)
ANION GAP SERPL CALCULATED.3IONS-SCNC: 10 MMOL/L (ref 4–13)
AST SERPL W P-5'-P-CCNC: 34 U/L (ref 13–39)
ATRIAL RATE: 76 BPM
BILIRUB SERPL-MCNC: 0.37 MG/DL (ref 0.2–1)
BUN SERPL-MCNC: 83 MG/DL (ref 5–25)
BUN SERPL-MCNC: 84 MG/DL (ref 5–25)
CALCIUM ALBUM COR SERPL-MCNC: 10.9 MG/DL (ref 8.3–10.1)
CALCIUM SERPL-MCNC: 9.8 MG/DL (ref 8.4–10.2)
CALCIUM SERPL-MCNC: 9.9 MG/DL (ref 8.4–10.2)
CHLORIDE SERPL-SCNC: 102 MMOL/L (ref 96–108)
CHLORIDE SERPL-SCNC: 103 MMOL/L (ref 96–108)
CO2 SERPL-SCNC: 21 MMOL/L (ref 21–32)
CO2 SERPL-SCNC: 23 MMOL/L (ref 21–32)
CREAT SERPL-MCNC: 5.86 MG/DL (ref 0.6–1.3)
CREAT SERPL-MCNC: 5.88 MG/DL (ref 0.6–1.3)
CREAT UR-MCNC: 67.4 MG/DL
ERYTHROCYTE [DISTWIDTH] IN BLOOD BY AUTOMATED COUNT: 16.3 % (ref 11.6–15.1)
EST. AVERAGE GLUCOSE BLD GHB EST-MCNC: 123 MG/DL
FERRITIN SERPL-MCNC: 69 NG/ML (ref 24–336)
FOLATE SERPL-MCNC: >22.3 NG/ML
GFR SERPL CREATININE-BSD FRML MDRD: 8 ML/MIN/1.73SQ M
GFR SERPL CREATININE-BSD FRML MDRD: 8 ML/MIN/1.73SQ M
GLUCOSE SERPL-MCNC: 116 MG/DL (ref 65–140)
GLUCOSE SERPL-MCNC: 120 MG/DL (ref 65–140)
GLUCOSE SERPL-MCNC: 132 MG/DL (ref 65–140)
GLUCOSE SERPL-MCNC: 145 MG/DL (ref 65–140)
GLUCOSE SERPL-MCNC: 155 MG/DL (ref 65–140)
HBA1C MFR BLD: 5.9 %
HCT VFR BLD AUTO: 23.4 % (ref 36.5–49.3)
HCT VFR BLD AUTO: 23.9 % (ref 36.5–49.3)
HGB BLD-MCNC: 7.5 G/DL (ref 12–17)
HGB BLD-MCNC: 7.5 G/DL (ref 12–17)
INR PPP: 7.56 (ref 0.84–1.19)
IRON SATN MFR SERPL: 6 % (ref 15–50)
IRON SERPL-MCNC: 10 UG/DL (ref 50–212)
MAGNESIUM SERPL-MCNC: 2.2 MG/DL (ref 1.9–2.7)
MCH RBC QN AUTO: 30.5 PG (ref 26.8–34.3)
MCHC RBC AUTO-ENTMCNC: 31.4 G/DL (ref 31.4–37.4)
MCV RBC AUTO: 97 FL (ref 82–98)
MICROALBUMIN UR-MCNC: 381 MG/L
MICROALBUMIN/CREAT 24H UR: 565 MG/G CREATININE (ref 0–30)
OSMOLALITY UR: 307 MMOL/KG
P AXIS: 15 DEGREES
PHOSPHATE SERPL-MCNC: 4.9 MG/DL (ref 2.3–4.1)
PLATELET # BLD AUTO: 101 THOUSANDS/UL (ref 149–390)
PMV BLD AUTO: 9.4 FL (ref 8.9–12.7)
POTASSIUM SERPL-SCNC: 3.2 MMOL/L (ref 3.5–5.3)
POTASSIUM SERPL-SCNC: 3.7 MMOL/L (ref 3.5–5.3)
PR INTERVAL: 184 MS
PROCALCITONIN SERPL-MCNC: 0.72 NG/ML
PROT SERPL-MCNC: 5.5 G/DL (ref 6.4–8.4)
PROTHROMBIN TIME: 64 SECONDS (ref 11.6–14.5)
QRS AXIS: -24 DEGREES
QRSD INTERVAL: 154 MS
QT INTERVAL: 434 MS
QTC INTERVAL: 488 MS
RBC # BLD AUTO: 2.46 MILLION/UL (ref 3.88–5.62)
SODIUM SERPL-SCNC: 134 MMOL/L (ref 135–147)
SODIUM SERPL-SCNC: 135 MMOL/L (ref 135–147)
T WAVE AXIS: -5 DEGREES
TIBC SERPL-MCNC: 174 UG/DL (ref 250–450)
UIBC SERPL-MCNC: 164 UG/DL (ref 155–355)
VENTRICULAR RATE: 76 BPM
VIT B12 SERPL-MCNC: 811 PG/ML (ref 180–914)
WBC # BLD AUTO: 6.62 THOUSAND/UL (ref 4.31–10.16)

## 2024-07-24 PROCEDURE — 82728 ASSAY OF FERRITIN: CPT

## 2024-07-24 PROCEDURE — 73700 CT LOWER EXTREMITY W/O DYE: CPT

## 2024-07-24 PROCEDURE — 82746 ASSAY OF FOLIC ACID SERUM: CPT

## 2024-07-24 PROCEDURE — 82306 VITAMIN D 25 HYDROXY: CPT

## 2024-07-24 PROCEDURE — 85014 HEMATOCRIT: CPT

## 2024-07-24 PROCEDURE — 80048 BASIC METABOLIC PNL TOTAL CA: CPT

## 2024-07-24 PROCEDURE — 80053 COMPREHEN METABOLIC PANEL: CPT

## 2024-07-24 PROCEDURE — 99255 IP/OBS CONSLTJ NEW/EST HI 80: CPT | Performed by: INTERNAL MEDICINE

## 2024-07-24 PROCEDURE — 83735 ASSAY OF MAGNESIUM: CPT

## 2024-07-24 PROCEDURE — 85027 COMPLETE CBC AUTOMATED: CPT

## 2024-07-24 PROCEDURE — 84145 PROCALCITONIN (PCT): CPT

## 2024-07-24 PROCEDURE — 84100 ASSAY OF PHOSPHORUS: CPT

## 2024-07-24 PROCEDURE — 83550 IRON BINDING TEST: CPT

## 2024-07-24 PROCEDURE — 99223 1ST HOSP IP/OBS HIGH 75: CPT | Performed by: INTERNAL MEDICINE

## 2024-07-24 PROCEDURE — 85018 HEMOGLOBIN: CPT

## 2024-07-24 PROCEDURE — 99232 SBSQ HOSP IP/OBS MODERATE 35: CPT

## 2024-07-24 PROCEDURE — 83540 ASSAY OF IRON: CPT

## 2024-07-24 PROCEDURE — 82948 REAGENT STRIP/BLOOD GLUCOSE: CPT

## 2024-07-24 PROCEDURE — 82607 VITAMIN B-12: CPT

## 2024-07-24 PROCEDURE — 85610 PROTHROMBIN TIME: CPT

## 2024-07-24 PROCEDURE — 83036 HEMOGLOBIN GLYCOSYLATED A1C: CPT | Performed by: FAMILY MEDICINE

## 2024-07-24 RX ORDER — FUROSEMIDE 10 MG/ML
80 INJECTION INTRAMUSCULAR; INTRAVENOUS ONCE
Status: COMPLETED | OUTPATIENT
Start: 2024-07-24 | End: 2024-07-24

## 2024-07-24 RX ORDER — HYDROMORPHONE HCL/PF 1 MG/ML
1 SYRINGE (ML) INJECTION
Status: DISCONTINUED | OUTPATIENT
Start: 2024-07-24 | End: 2024-08-05 | Stop reason: HOSPADM

## 2024-07-24 RX ORDER — FUROSEMIDE 10 MG/ML
20 SYRINGE (ML) INJECTION CONTINUOUS
Status: DISCONTINUED | OUTPATIENT
Start: 2024-07-24 | End: 2024-07-26

## 2024-07-24 RX ORDER — HYDROMORPHONE HCL/PF 1 MG/ML
0.5 SYRINGE (ML) INJECTION
Status: DISCONTINUED | OUTPATIENT
Start: 2024-07-24 | End: 2024-07-24

## 2024-07-24 RX ORDER — POTASSIUM CHLORIDE 20 MEQ/1
40 TABLET, EXTENDED RELEASE ORAL ONCE
Status: COMPLETED | OUTPATIENT
Start: 2024-07-24 | End: 2024-07-24

## 2024-07-24 RX ADMIN — INSULIN LISPRO 1 UNITS: 100 INJECTION, SOLUTION INTRAVENOUS; SUBCUTANEOUS at 11:45

## 2024-07-24 RX ADMIN — METOPROLOL SUCCINATE 100 MG: 100 TABLET, EXTENDED RELEASE ORAL at 20:12

## 2024-07-24 RX ADMIN — HYDROMORPHONE HYDROCHLORIDE 0.5 MG: 1 INJECTION, SOLUTION INTRAMUSCULAR; INTRAVENOUS; SUBCUTANEOUS at 20:30

## 2024-07-24 RX ADMIN — INSULIN GLARGINE 15 UNITS: 100 INJECTION, SOLUTION SUBCUTANEOUS at 08:31

## 2024-07-24 RX ADMIN — HYDROMORPHONE HYDROCHLORIDE 0.5 MG: 1 INJECTION, SOLUTION INTRAMUSCULAR; INTRAVENOUS; SUBCUTANEOUS at 13:38

## 2024-07-24 RX ADMIN — HYDROCODONE BITARTRATE AND ACETAMINOPHEN 2 TABLET: 5; 325 TABLET ORAL at 15:39

## 2024-07-24 RX ADMIN — FUROSEMIDE 80 MG: 10 INJECTION, SOLUTION INTRAMUSCULAR; INTRAVENOUS at 11:46

## 2024-07-24 RX ADMIN — PIPERACILLIN AND TAZOBACTAM 4.5 G: 36; 4.5 INJECTION, POWDER, FOR SOLUTION INTRAVENOUS at 20:37

## 2024-07-24 RX ADMIN — HYDROCODONE BITARTRATE AND ACETAMINOPHEN 2 TABLET: 5; 325 TABLET ORAL at 02:39

## 2024-07-24 RX ADMIN — ASPIRIN 81 MG: 81 TABLET, COATED ORAL at 08:31

## 2024-07-24 RX ADMIN — PIPERACILLIN AND TAZOBACTAM 4.5 G: 4; .5 INJECTION, POWDER, FOR SOLUTION INTRAVENOUS at 15:40

## 2024-07-24 RX ADMIN — Medication 20 MG/HR: at 11:51

## 2024-07-24 RX ADMIN — HYDROMORPHONE HYDROCHLORIDE 1 MG: 1 INJECTION, SOLUTION INTRAMUSCULAR; INTRAVENOUS; SUBCUTANEOUS at 23:18

## 2024-07-24 RX ADMIN — METOPROLOL SUCCINATE 100 MG: 100 TABLET, EXTENDED RELEASE ORAL at 08:31

## 2024-07-24 RX ADMIN — Medication 20 MG/HR: at 23:05

## 2024-07-24 RX ADMIN — TAMSULOSIN HYDROCHLORIDE 0.4 MG: 0.4 CAPSULE ORAL at 15:40

## 2024-07-24 RX ADMIN — INSULIN GLARGINE 15 UNITS: 100 INJECTION, SOLUTION SUBCUTANEOUS at 20:40

## 2024-07-24 RX ADMIN — Medication 400 MG: at 08:31

## 2024-07-24 RX ADMIN — ATORVASTATIN CALCIUM 40 MG: 40 TABLET, FILM COATED ORAL at 15:40

## 2024-07-24 RX ADMIN — HYDROCODONE BITARTRATE AND ACETAMINOPHEN 2 TABLET: 5; 325 TABLET ORAL at 09:05

## 2024-07-24 RX ADMIN — LEVOTHYROXINE SODIUM 125 MCG: 125 TABLET ORAL at 05:09

## 2024-07-24 RX ADMIN — HYDROMORPHONE HYDROCHLORIDE 0.2 MG: 0.2 INJECTION, SOLUTION INTRAMUSCULAR; INTRAVENOUS; SUBCUTANEOUS at 07:36

## 2024-07-24 RX ADMIN — HYDROCODONE BITARTRATE AND ACETAMINOPHEN 2 TABLET: 5; 325 TABLET ORAL at 22:30

## 2024-07-24 RX ADMIN — POTASSIUM CHLORIDE 40 MEQ: 1500 TABLET, EXTENDED RELEASE ORAL at 16:46

## 2024-07-24 NOTE — DISCHARGE INSTR - OTHER ORDERS
Skin care plans:  1-Hydraguard to sacrum, buttocks TID and PRN.  2-Hydraguard to bilateral heel BID and PRN.  3-Elevate heels to offload pressure.  4-Ehob cushion when out of bed when medically stable  5-Turn/reposition q2h or when medically stable for pressure re-distribution on skin.Use foam wedges and ATR system.  6-Moisturize skin daily with skin nourishing cream.  7-Cleanse bilateral lower extremities with soap and water, apply skin nourishing cream to intact skin . Apply Normogel to wound beds and cover with adaptic non adherent dressing cover with Melgisorb AG DSD wrap with kelix and ace. Change daily and prn   [id number] Low Air Loss Mattress        Private Pay Home Health Agencies: Serving [facility] and [facility]        [employer]:       For [facility]: [phone]                                                For [facility] :  [phone]        [place]:                 For [facility]: [phone]                                        For [facility] :    [phone]        [facility] Hel[facility]:  Serving Parts of [facility], [facility],                                        [place]: [phone]        [facility]: Serving [facility]  [phone]        In Home Referral: Serving [facility], ( [facility])                                         [phone]        Independent Living: Serving [facility]: [phone]        [facility] [facility]: Serving [facility]  [phone]        [facility] : [phone]        [facility]: Serving [facility] [phone]        [facility]: Serving [facility]  [phone]        [facility]: Serving [facility]: [phone]/ [phone]        [facility]: Serving [facility]  [phone]  ([phone])        [employer]: Serving [facility]: [phone]        [employer]: Serving [facility], [facility]                                 [phone]        Care Link: For  [facility]      [phone]        [facility] [place]: For [facility]    [phone]            Other Information:      [facility]: [phone]_ 24 hr shifts or Live in_ available in [facility]        [employer] ( a company that will provide assistance to find care ) [employer]   [phone]        This list is not intended to be all inclusive, please utilize it as a resource.                Mar-24

## 2024-07-24 NOTE — ASSESSMENT & PLAN NOTE
Wt Readings from Last 3 Encounters:   07/24/24 120 kg (263 lb 7.2 oz)   06/25/24 124 kg (273 lb 13 oz)   06/20/24 122 kg (270 lb)     History of chf managed by both cardiology and nephrology, on lasix 80 mg bid outpatient, patient states that he took 240 mg of lasix yesterday and again today.   Echo 4/2023: Systolic function is normal with an ejection fraction of 60-65%. Wall motion is within normal limits. There is grade I (mild) diastolic dysfunction   BNP normal  CXR: No acute cardiopulmonary disease.   Venous duplex no DVT  Due to patient taking 240 mg lasix today, hold further lasix today, but patient does examine overloaded, appreciate nephrology recs on diuretics.   Nephro: Started on Lasix infusion  Monitor I&Os and daily weights  Sodium and fluid restriction

## 2024-07-24 NOTE — ASSESSMENT & PLAN NOTE
Presented to ED with bilateral leg swelling x 4 days. Right leg more swollen and red over the last 2 days. Significant pain and having drainage in the right leg as well. NO fevers or chills.   No recent antibiotics outpatient  CT RLE: No fluid collection seen, cellulitic changes leg, No intramuscular collection, no lytic lesion or periosteal reaction  Procal slightly elevated 0.89  Wound culture pending  BC 1/2 gram negative rods; Enterobacterales   Was started on vancomycin in the ED; switched to zosyn per ID recs  Follow CBC and fever curve  ID consulted: Start Zosyn renally dose, follow-up blood cultures, anticipate 10 days of therapy    Lab Results   Component Value Date    WBC 6.62 07/24/2024    WBC 9.06 07/23/2024    WBC 5.03 06/25/2024

## 2024-07-24 NOTE — ASSESSMENT & PLAN NOTE
Lab Results   Component Value Date    EGFR 8 07/24/2024    EGFR 8 07/23/2024    EGFR 10 06/25/2024    CREATININE 5.88 (H) 07/24/2024    CREATININE 5.84 (H) 07/23/2024    CREATININE 5.04 (H) 06/25/2024     History of stage 5 ckd recently started on peritoneal dialysis, first treatment on 6/24. Had PD catheter placed on 6/7/2024. Was scheduled to do PD dialysis in clinic and then transition to home PD dialysis.   Last PD was done yesterday, patient currently doing every other day PD, goal for everyday per patient.   Nephro: His outpatient prescription is a 2400 fill volume with 3 exchanges for a total fill volume of 7200 mL over 9 hours.  Patient was not as compliant with dialysis over the last few days due to feeling sick.  While inpatient, plan on CAPD with 2000 cc fills, 4 exchanges a day of dextrose 2.5%. We will start with this lower dextrose concentration and see if we can attain a negative value in terms of his output to avoid excessively increasing his sugars but may require dextrose 4.25%.   Avoid nephrotoxic medications, nsaids, hypotension

## 2024-07-24 NOTE — WOUND OSTOMY CARE
Consult Note - Wound   Masoud Perry 68 y.o. male MRN: [id number]  Unit/Bed#: -01 Encounter: [id number]      History and Present Illness:  68 year old male admitted with ICU OW cellulitis of right lower extremity lymphedema CKD       Assessment and findings.   Pt for initial wound consult. Pt is beginning procedure of PD. RN discussed assessment and findings plan of care initiated.  1)POA Bilateral inner lower buttocks with purple non blanchable tissue presents as Deep Tissue Injury. DTI may evolve to stage 3 4 or unstageable wound true depth unknown.  Pt to have hydraguard applied to hydrate skin. Position off of sacrum and use foam wedges and low air loss mattress to offload pressure.    1)Left anterior and posterior tibia. Mix of irregular shaped partial thickness skin loss. Irregular borders with ill defined edges. Periwound is thick dry flaky with hemosiderin staining.                  Skin care plans:  1-Hydraguard to sacrum, buttocks TID and PRN.  2-Hydraguard to bilateral heel BID and PRN.  3-Elevate heels to offload pressure.  4-Ehob cushion when out of bed when medically stable  5-Turn/reposition q2h or when medically stable for pressure re-distribution on skin.Use foam wedges and ATR system.  6-Moisturize skin daily with skin nourishing cream.  7-Cleanse bilateral lower extremities with soap and water, apply skin nourishing cream to intact skin . Apply Normogel to wound beds and cover with adaptic non adherent dressing cover with Melgisorb AG DSD wrap with kelix and ace. Change daily and prn   8-[id number] Low Air Loss Mattress     Call or tigertext with any questions  Wound Care will continue to follow weekly    Melissa LARRYN RN [facility]ON

## 2024-07-24 NOTE — ASSESSMENT & PLAN NOTE
1 out of 2 blood cultures positive for gram-negative rods, coming back as Enterobacterales  Second blood culture still pending  ID consulted, recommending discontinue Unasyn and start Zosyn  Anticipate 10 days of therapy final choice of antibiotics to be determined

## 2024-07-24 NOTE — PROGRESS NOTES
[facility]  Progress Note  Name: Masoud Perry I  MRN: [id number]  Unit/Bed#: -01 I Date of Admission: 7/23/2024   Date of Service: 7/24/2024 I Hospital Day: 1    Assessment & Plan   * Cellulitis of right lower extremity  Assessment & Plan  Presented to ED with bilateral leg swelling x 4 days. Right leg more swollen and red over the last 2 days. Significant pain and having drainage in the right leg as well. NO fevers or chills.   No recent antibiotics outpatient  CT RLE: No fluid collection seen, cellulitic changes leg, No intramuscular collection, no lytic lesion or periosteal reaction  Procal slightly elevated 0.89  Wound culture pending  BC 1/2 gram negative rods; Enterobacterales   Was started on vancomycin in the ED; switched to zosyn per ID recs  Follow CBC and fever curve  ID consulted: Start Zosyn renally dose, follow-up blood cultures, anticipate 10 days of therapy    Lab Results   Component Value Date    WBC 6.62 07/24/2024    WBC 9.06 07/23/2024    WBC 5.03 06/25/2024       Acute on chronic diastolic HF (heart failure) (HCC)  Assessment & Plan  Wt Readings from Last 3 Encounters:   07/24/24 120 kg (263 lb 7.2 oz)   06/25/24 124 kg (273 lb 13 oz)   06/20/24 122 kg (270 lb)     History of chf managed by both cardiology and nephrology, on lasix 80 mg bid outpatient, patient states that he took 240 mg of lasix yesterday and again today.   Echo 4/2023: Systolic function is normal with an ejection fraction of 60-65%. Wall motion is within normal limits. There is grade I (mild) diastolic dysfunction   BNP normal  CXR: No acute cardiopulmonary disease.   Venous duplex no DVT  Due to patient taking 240 mg lasix today, hold further lasix today, but patient does examine overloaded, appreciate nephrology recs on diuretics.   Nephro: Started on Lasix infusion  Monitor I&Os and daily weights  Sodium and fluid restriction    Bacteremia  Assessment & Plan  1 out of 2 blood cultures  positive for gram-negative rods, coming back as Enterobacterales  Second blood culture still pending  ID consulted, recommending discontinue Unasyn and start Zosyn  Anticipate 10 days of therapy final choice of antibiotics to be determined    Anemia  Assessment & Plan  Patient with history of anemia baseline hemoglobin around 9-10 per outpatient labs  On admission, patient with hemoglobin of 10.1, decreased to 7.5 today, possibly secondary to volume overload, no evidence of bleeding at this time.  CT right lower extremity without evidence of bleeding.  Repeat hemoglobin still at 7.5  Iron panel ordered and pending  Does receive IV Venofer 200 mg with dialysis treatments as an outpatient and received 5 doses for iron deficiency anemia.  Hold on Venofer in setting of acute infection.  Transfuse for hemoglobin less than 7    Stage 5 chronic kidney disease on peritoneal dialysis (HCC)  Assessment & Plan  Lab Results   Component Value Date    EGFR 8 07/24/2024    EGFR 8 07/23/2024    EGFR 10 06/25/2024    CREATININE 5.88 (H) 07/24/2024    CREATININE 5.84 (H) 07/23/2024    CREATININE 5.04 (H) 06/25/2024     History of stage 5 ckd recently started on peritoneal dialysis, first treatment on 6/24. Had PD catheter placed on 6/7/2024. Was scheduled to do PD dialysis in clinic and then transition to home PD dialysis.   Last PD was done yesterday, patient currently doing every other day PD, goal for everyday per patient.   Nephro: His outpatient prescription is a 2400 fill volume with 3 exchanges for a total fill volume of 7200 mL over 9 hours.  Patient was not as compliant with dialysis over the last few days due to feeling sick.  While inpatient, plan on CAPD with 2000 cc fills, 4 exchanges a day of dextrose 2.5%. We will start with this lower dextrose concentration and see if we can attain a negative value in terms of his output to avoid excessively increasing his sugars but may require dextrose 4.25%.   Avoid nephrotoxic  medications, nsaids, hypotension    Atrial fibrillation (HCC)  Assessment & Plan  Paroxysmal atrial fibrillation. sp ablation with Dr Alcala at [facility] 4/2024, took himself off Eliquis post procedure AMA;  has episodes on loop recorder of afib that are symptomatic, no longer taking amiodarone  Currently patient on coumadin, follows outpatient with coumadin clinic, INR supratherapeutic, holding further coumadin dosing  Monitor INR daily -remains supratherapeutic, if continues to elevate, consider dose of vitamin K, no evidence of bleeding at this time.    Lymphedema  Assessment & Plan  With history of chronic lymphedema following with cardiology outpatient. Feels that his legs have been swelling more recently, right leg more swollen than left due to current cellulitis episode.   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 Sequential Compression Devices Ordered.    Mobility:   Basic Mobility Inpatient Raw Score: 11  JH-HLM Goal: 4: Move to chair/commode  JH-HLM Achieved: 2: Bed activities/Dependent transfer  JH-HLM Goal NOT achieved. Continue with multidisciplinary rounding and encourage appropriate mobility to improve upon JH-HLM goals.    Patient Centered Rounds: I performed bedside rounds with nursing staff today.   Discussions with Specialists or Other Care Team Provider: Nursing, case management, nephrology, infectious disease    Education and Discussions with Family / Patient: Patient declined call to .     Total Time Spent on Date of Encounter in care of patient:  mins. This time was spent on  one or more of the following: performing physical exam; counseling and coordination of care; obtaining or reviewing history; documenting in the medical record; reviewing/ordering tests, medications or procedures; communicating with other healthcare professionals and discussing with patient's family/caregivers.    Current Length of Stay: 1 day(s)  Current Patient Status: Inpatient   Certification Statement: The patient will continue to require additional inpatient hospital stay due to IV antibiotics, cellulitis, bacteremia, CHF exacerbation  Discharge Plan: Anticipate discharge in >72 hrs to discharge location to be determined pending rehab evaluations.    Code Status: Level 1 - Full Code    Subjective:   Seen and examined at bedside today.  Patient states that he is still having lots of pain in the right leg.  Does not feel that the swelling or redness has gone down at all yet.  Said that he slept well last night.  No nausea or vomiting.  Feels that the legs are still weeping.  Feels that the left leg may have gone down a little bit.  No other complaints.  No abdominal pain.    Objective:     Vitals:   Temp (24hrs), Av.3 °F (36.8 °C), Min:97.8 °F (36.6 °C), Max:99.3 °F (37.4 °C)    Temp:  [97.8 °F (36.6 °C)-99.3 °F (37.4 °C)] 97.8 °F (36.6 °C)  HR:  [69-79] 70  Resp:  [17-20] 20  BP: (121-148)/(56-84) 132/63  SpO2:  [88 %-98 %] 96 %  Body mass index is 37.8 kg/m².     Input and Output Summary (last 24 hours):     Intake/Output Summary (Last 24 hours) at 2024 1554  Last data filed at 2024 1510  Gross per 24 hour   Intake 820.2 ml   Output 2475 ml   Net -1654.8 ml       Physical Exam:   Physical Exam  Vitals reviewed.   Constitutional:       General: He is not in acute distress.     Appearance: He is obese. He is ill-appearing. He is not toxic-appearing.   HENT:      Head: Normocephalic and atraumatic.      Mouth/Throat:      Mouth: Mucous membranes are moist.   Cardiovascular:      Rate and Rhythm:  Normal rate and regular rhythm.      Heart sounds: No murmur heard.  Pulmonary:      Effort: No respiratory distress.      Breath sounds: No stridor. No wheezing, rhonchi or rales.   Abdominal:      General: Bowel sounds are normal. There is no distension.      Palpations: Abdomen is soft. There is no mass.      Tenderness: There is no abdominal tenderness.   Musculoskeletal:         General: Tenderness (Right lower extremity) present.      Right lower leg: Edema (Lower extremity more swollen than left lower extremity) present.      Left lower leg: Edema present.   Skin:     General: Skin is warm and dry.      Findings: Erythema (Right lower extremity) present.      Comments: Bilateral lower extremities weeping   Neurological:      General: No focal deficit present.      Mental Status: He is alert and oriented to person, place, and time.   Psychiatric:         Mood and Affect: Mood normal.         Behavior: Behavior normal.          Additional Data:     Labs:  Results from last 7 days   Lab Units 07/24/24  1039 07/24/24  0450 07/23/24  1253   WBC Thousand/uL  --  6.62 9.06   HEMOGLOBIN g/dL 7.5* 7.5* 10.1*   HEMATOCRIT % 23.4* 23.9* 31.7*   PLATELETS Thousands/uL  --  101* 134*   SEGS PCT %  --   --  85*   LYMPHO PCT %  --   --  8*   MONO PCT %  --   --  6   EOS PCT %  --   --  1     Results from last 7 days   Lab Units 07/24/24  0450   SODIUM mmol/L 134*   POTASSIUM mmol/L 3.7   CHLORIDE mmol/L 103   CO2 mmol/L 21   BUN mg/dL 84*   CREATININE mg/dL 5.88*   ANION GAP mmol/L 10   CALCIUM mg/dL 9.8   ALBUMIN g/dL 2.6*   TOTAL BILIRUBIN mg/dL 0.37   ALK PHOS U/L 70   ALT U/L 26   AST U/L 34   GLUCOSE RANDOM mg/dL 120     Results from last 7 days   Lab Units 07/24/24  0450   INR  7.56*     Results from last 7 days   Lab Units 07/24/24  1059 07/24/24  0710 07/23/24  2121 07/23/24  1803   POC GLUCOSE mg/dl 155* 116 143* 158*     Results from last 7 days   Lab Units 07/24/24  0450   HEMOGLOBIN A1C % 5.9*     Results from  last 7 days   Lab Units 07/23/24  1253   LACTIC ACID mmol/L 1.0   PROCALCITONIN ng/ml 0.89*       Lines/Drains:  Invasive Devices       Peripheral Intravenous Line  Duration             Peripheral IV 07/23/24 Right;Ventral (anterior) Forearm 1 day    Long-Dwell Peripheral IV (Midline) 07/24/24 Left Basilic <1 day              Line  Duration             Peritoneal Dialysis Catheter Column-disk Abdominal 47 days                          Imaging: Reviewed radiology reports from this admission including: CT right lower extremity    Recent Cultures (last 7 days):   Results from last 7 days   Lab Units 07/23/24  1757 07/23/24  1253   BLOOD CULTURE   --  Received in Microbiology Lab. Culture in Progress.   GRAM STAIN RESULT  No polys seen*  4+ Gram negative rods* Gram negative rods*       Last 24 Hours Medication List:   Current Facility-Administered Medications   Medication Dose Route Frequency Provider Last Rate    acetaminophen  650 mg Oral Q6H PRN Rachel Conn PA-C      aspirin  81 mg Oral Daily Rachel Conn PA-C      atorvastatin  40 mg Oral Daily With Dinner Rachel Conn PA-C      furosemide  20 mg/hr Intravenous Continuous [place] MachadoC 20 mg/hr (07/24/24 1157)    HYDROcodone-acetaminophen  1 tablet Oral Q6H PRN Rachel Conn PA-C      HYDROcodone-acetaminophen  2 tablet Oral Q6H PRN Rachel Conn PA-C      HYDROmorphone  0.5 mg Intravenous Q3H PRN Rachel Conn PA-C      insulin glargine  15 Units Subcutaneous Q12H VASU Rachel Conn PA-C      insulin lispro  1-6 Units Subcutaneous HS Rachel Conn PA-C      insulin lispro  1-6 Units Subcutaneous TID AC Nora Mathew MD      levothyroxine  125 mcg Oral Early Morning Rcahel Conn PA-C      magnesium Oxide  400 mg Oral Daily Rachel Conn PA-C      metoprolol succinate  100 mg Oral BID Rachel Conn PA-C      nicotine  1 patch Transdermal Daily Rachel Conn PA-C       piperacillin-tazobactam  4.5 g Intravenous Once Rachel Conn PA-C 4.5 g (07/24/24 1540)    Followed by    piperacillin-tazobactam  4.5 g Intravenous Q12H Rachel Conn PA-C      polyethylene glycol  17 g Oral Daily PRN Rachel Conn PA-C      simethicone  80 mg Oral 4x Daily PRN Rachel Conn PA-C      tamsulosin  0.4 mg Oral Daily With Dinner Rachel Conn PA-C          Today, Patient Was Seen By: Rachel Conn PA-C    **Please Note: This note may have been constructed using a voice recognition system.**

## 2024-07-24 NOTE — CONSULTS
Consultation - Nephrology   Masoud Perry 68 y.o. male MRN: [id number]  Unit/Bed#: -01 Encounter: [id number]    ASSESSMENT/PLAN:    ESRD on PD  [facility]'s outpatient clinic is [facility] under the care of Dr Santos. EDW is 119 kg. He recently started about a month ago and undergoes nightly CCPD at home. His outpatient prescription is a 2400 fill volume with 3 exchanges for a total fill volume of 7200 mL over 9 hours.     Acute on chronic diastolic congestive heart failure  Patient presented with worsening edema in his legs, typically on lasix 80 mg BID as an outpatient but was taking lasix 3-4 times instead over the past few days to get edema under better control. He does still examine significantly volume overloaded with 3+ pitting edema and would recommend aggressive diuresis with lasix infusion. Patient has adequate urine output with 1.2 L in the past 24 hours. Recheck BMP later this afternoon to monitor for electrolyte abnormalities. Discussed plan of care with Dr Azar    Hypertension/blood pressure  -OP regimen: metoprolol 100 mg BID, lasix 80 mg BID  -Current regimen: monitor with lasix infusion     Hyponatremia  -mild hyponatremia likely related to volume overload and decreased free water excretion with ESRD     Anemia  -Hgb 7.5, abrupt drop from 7/23. Check repeat Hb/HCT.   -Recommend transfuse for goal greater than 7 per primary team  -patient on IV venofer 200 mg with dialysis treatments as an outpatient and has received 5 doses for iron deficiency anemia.     Cellulitis of right lower extremity  Antibiotic management per primary team     Additional Medical Problems: atrial fibrillation, history of CVA on aspirin and lipitor     HISTORY OF PRESENT ILLNESS:  Requesting Physician: Nora Mathew MD  Reason for Consult: Diuretic management, ESRD    Masoud Perry is a 68 y.o. year old male with a history of ESRD on PD, chronic diastolic heart failure, atrial fibrillation, hypertension, chronic  lymphedema, BPH, type 2 diabetes and nephrolithiasis who was admitted to [facility] after presenting with worsening lower extremity edema. He was also found to have a right lower extremity cellulitis for which he was placed on antibiotics. The patient typically takes lasix 80 mg twice daily as an outpatient but more recently he has been taking his lasix 3-4 times a day to get better control of his peripheral edema. He was recently started on peritoneal dialysis about a month ago to better address volume issues. His outpatient clinic is [facility]. A renal consultation is requested today for assistance in the management of acute CHF.    PAST MEDICAL HISTORY:  Past Medical History:   Diagnosis Date    Anemia in chronic kidney disease     Anticoagulated on Coumadin     Atrial fibrillation (HCC)     BPH (benign prostatic hyperplasia)     CAD (coronary artery disease)     CKD (chronic kidney disease), stage V (Formerly [facility])     Compartment syndrome of forearm ([facility]ly [facility])     Right, 2019    COPD (chronic obstructive pulmonary disease) (Formerly [facility])     CVA (cerebral vascular accident) ([facility]ly [facility])     Diastolic CHF ([facility]ly [facility])     grade 1 DD    DVT (deep venous thrombosis) ([facility])     Factor V deficiency ([facility])     Gout     Hyperlipidemia     Hypertension     Hypothyroidism     MI (myocardial infarction) ([facility]ly [facility])     x3 - 1999, 2010, after 2015    Morbid obesity ([facility])     Nephrolithiasis     Noncompliance with medications     SHAD (obstructive sleep apnea)     Peritoneal dialysis catheter in place ([facility])     Pulmonary embolism ([facility])     Secondary hyperparathyroidism of renal origin ([facility])     Spinal stenosis of lumbar region     Status post placement of implantable loop recorder     Tobacco dependence        PAST SURGICAL HISTORY:  Past Surgical History:   Procedure Laterality Date    CARDIAC ELECTROPHYSIOLOGY STUDY AND ABLATION  04/29/2024    CHOLECYSTECTOMY      CORONARY ANGIOPLASTY WITH STENT PLACEMENT      JOINT REPLACEMENT      bilateral knee    KS CYSTO/URETERO  W/LITHOTRIPSY &INDWELL STENT INSRT Left 09/24/2022    Procedure: CYSTOSCOPY URETEROSCOPY WITH LITHOTRIPSY HOLMIUM LASER, AND INSERTION STENT URETERAL;  Surgeon: Lewis Dahl MD;  Location:  MAIN OR;  Service: Urology    KY LAPS INSERTION TUNNELED INTRAPERITONEAL CATHETER N/A 6/7/2024    Procedure: INSERTION PERITONEAL CATHETER DIALYSIS LAPAROSCOPIC;  Surgeon: Hiram Park DO;  Location: [facility];  Service: General    US GUIDED KIDNEY BIOPSY  08/05/2020       ALLERGIES:  Allergies   Allergen Reactions    Carvedilol Shortness Of Breath    Amiodarone Dizziness    Aspartame - Food Allergy Diarrhea    Bumetanide GI Intolerance and Lightheadedness           Cephalexin Other (See Comments)     unknown    Ciprofloxacin Rash    Hydralazine Tachycardia    Lisinopril GI Intolerance           Metolazone Other (See Comments)     Metallic taste    Morphine Swelling     Of injection site    Nifedipine Lightheadedness    Strawberry Extract - Food Allergy Rash       SOCIAL HISTORY:  Social History     Substance and Sexual Activity   Alcohol Use Not Currently     Social History     Substance and Sexual Activity   Drug Use Never     Social History     Tobacco Use   Smoking Status Every Day    Current packs/day: 0.50    Types: Cigarettes   Smokeless Tobacco Never       FAMILY HISTORY:  Family History   Problem Relation Age of Onset    Kidney failure Mother     Cirrhosis Mother     Colon cancer Brother        MEDICATIONS:    Current Facility-Administered Medications:     acetaminophen (TYLENOL) tablet 650 mg, 650 mg, Oral, Q6H PRN, Rachel Conn PA-C    ampicillin-sulbactam (UNASYN) 3 g in sodium chloride 0.9 % 100 mL IVPB, 3 g, Intravenous, Q24H, Rachel Conn PA-C, Stopped at 07/23/24 1850    aspirin (ECOTRIN LOW STRENGTH) EC tablet 81 mg, 81 mg, Oral, Daily, Rachel Conn PA-C, 81 mg at 07/24/24 0831    atorvastatin (LIPITOR) tablet 40 mg, 40 mg, Oral, Daily With Dinner, Rachel Conn PA-C, 40 mg at  07/23/24 1740    HYDROcodone-acetaminophen (NORCO) 5-325 mg per tablet 1 tablet, 1 tablet, Oral, Q6H PRN, Rachel Conn PA-C    HYDROcodone-acetaminophen (NORCO) 5-325 mg per tablet 2 tablet, 2 tablet, Oral, Q6H PRN, HOANG Givens-C, 2 tablet at 07/24/24 0905    HYDROmorphone HCl (DILAUDID) injection 0.2 mg, 0.2 mg, Intravenous, Q3H PRN, Adina Garcia MD, 0.2 mg at 07/24/24 0736    insulin glargine (LANTUS) subcutaneous injection 15 Units 0.15 mL, 15 Units, Subcutaneous, Q12H VASU, Rachel Conn PA-C, 15 Units at 07/24/24 0831    insulin lispro (HumALOG/ADMELOG) 100 units/mL subcutaneous injection 1-6 Units, 1-6 Units, Subcutaneous, HS, Rachel Conn PA-C    insulin lispro (HumALOG/ADMELOG) 100 units/mL subcutaneous injection 1-6 Units, 1-6 Units, Subcutaneous, TID AC **AND** Fingerstick Glucose (POCT), , , 4x Daily AC and at bedtime, Nora Mathew MD    levothyroxine tablet 125 mcg, 125 mcg, Oral, Early Morning, Rachel Conn PA-C, 125 mcg at 07/24/24 0509    magnesium Oxide (MAG-OX) tablet 400 mg, 400 mg, Oral, Daily, HOANG Givens-BAIRON, 400 mg at 07/24/24 0831    metoprolol succinate (TOPROL-XL) 24 hr tablet 100 mg, 100 mg, Oral, BID, HOANG Givens-C, 100 mg at 07/24/24 0831    nicotine (NICODERM CQ) 14 mg/24hr TD 24 hr patch 1 patch, 1 patch, Transdermal, Daily, Rachel Conn PA-C    polyethylene glycol (MIRALAX) packet 17 g, 17 g, Oral, Daily PRN, Rachel Conn PA-C    simethicone (MYLICON) chewable tablet 80 mg, 80 mg, Oral, 4x Daily PRN, Rachel Conn PA-C    tamsulosin (FLOMAX) capsule 0.4 mg, 0.4 mg, Oral, Daily With Dinner, Rachel Conn PA-C, 0.4 mg at 07/23/24 1740    REVIEW OF SYSTEMS:  Review of Systems   Constitutional:  Negative for chills and fever.   Respiratory:  Negative for cough and shortness of breath.    Cardiovascular:  Negative for chest pain.   Gastrointestinal:  Negative for abdominal pain, constipation and  diarrhea.   Genitourinary:  Negative for dysuria and hematuria.   Neurological:  Positive for headaches. Negative for dizziness.      A complete 10 point review of systems was performed and found to be negative unless otherwise noted above or in the HPI.    PHYSICAL EXAM:  Current Weight: Weight - Scale: 120 kg (263 lb 7.2 oz)  First Weight: Weight - Scale: 121 kg (265 lb 10.5 oz)  Vitals:    07/24/24 0440 07/24/24 0700 07/24/24 0740 07/24/24 0831   BP:  148/65     BP Location:  Right arm     Pulse: 71 71  79   Resp:       Temp:  99.3 °F (37.4 °C)     TempSrc:  Temporal     SpO2: 93% 98% 98%    Weight:       Height:           Intake/Output Summary (Last 24 hours) at 7/24/2024 0950  Last data filed at 7/24/2024 0600  Gross per 24 hour   Intake 300 ml   Output 1225 ml   Net -925 ml     Physical Exam  Vitals and nursing note reviewed.   Constitutional:       General: He is not in acute distress.     Appearance: He is well-developed.   HENT:      Head: Normocephalic and atraumatic.   Cardiovascular:      Rate and Rhythm: Normal rate and regular rhythm.      Heart sounds: No murmur heard.  Pulmonary:      Effort: Pulmonary effort is normal. No respiratory distress.      Breath sounds: Normal breath sounds.   Abdominal:      Palpations: Abdomen is soft.      Tenderness: There is no abdominal tenderness.   Musculoskeletal:      Right lower leg: Edema present.      Left lower leg: Edema present.      Comments: Notable erythema and tenderness present in RLE   Skin:     General: Skin is warm and dry.      Capillary Refill: Capillary refill takes less than 2 seconds.   Neurological:      Mental Status: He is alert.   Psychiatric:         Mood and Affect: Mood normal.          Invasive Devices:      Lab Results:   Results from last 7 days   Lab Units 07/24/24  0450 07/23/24  1253   WBC Thousand/uL 6.62 9.06   HEMOGLOBIN g/dL 7.5* 10.1*   HEMATOCRIT % 23.9* 31.7*   PLATELETS Thousands/uL 101* 134*   POTASSIUM mmol/L 3.7 3.5    CHLORIDE mmol/L 103 99   CO2 mmol/L 21 21   BUN mg/dL 84* 83*   CREATININE mg/dL 5.88* 5.84*   CALCIUM mg/dL 9.8 10.8*   MAGNESIUM mg/dL 2.2  --    PHOSPHORUS mg/dL 4.9*  --    ALK PHOS U/L 70 91   ALT U/L 26 22   AST U/L 34 24       I have personally reviewed the blood work as stated above and in my note.  I have personally reviewed CXR imaging studies.  I have personally reviewed internal medicine note.

## 2024-07-24 NOTE — UTILIZATION REVIEW
Initial Clinical Review    Admission: Date/Time/Statement:   Admission Orders (From admission, onward)       Ordered        07/23/24 1420  INPATIENT ADMISSION  Once                          Orders Placed This Encounter   Procedures    INPATIENT ADMISSION     Standing Status:   Standing     Number of Occurrences:   1     Order Specific Question:   Level of Care     Answer:   Med Surg [16]     Order Specific Question:   Estimated length of stay     Answer:   More than 2 Midnights     Order Specific Question:   Certification     Answer:   I certify that inpatient services are medically necessary for this patient for a duration of greater than two midnights. See H&P and MD Progress Notes for additional information about the patient's course of treatment.     ED Arrival Information       Expected   -    Arrival   7/23/2024 12:40    Acuity   Urgent              Means of arrival   Ambulance    Escorted by   [facility]    Service   Hospitalist    Admission type   Emergency              Arrival complaint   Edema in feet             Chief Complaint   Patient presents with    Leg Swelling     Pt reports bilateral leg swelling x friday       Initial Presentation: 68 y.o. male to ED via EMS from home  Present to ED with bilateral leg swelling x 4 days. Right leg more swollen and red over the last 2 days. Significant pain and having drainage in the right leg as well.  Feels SOB when he is moving around. Significantly painful on the right leg to move around. Patient also states he started PD recently and last session was last night, currently doing every other day, working towards daily. patient states that he took 240 mg of lasix yesterday and again today. Still makes urine.   PMHX: chf, a fib on coumadin, factor 5 Leiden mutation, HTN, CVA, CKD stage 5 ckd recently started on peritoneal dialysis, first treatment on 6/24. Had PD catheter placed on 6/7/2024. Echo 4/2023: Systolic function is normal with an ejection fraction  of 60-65%. Wall motion is within normal limits. There is grade I (mild) diastolic dysfunction   Admitted to [facility] with DX: Cellulitis of right lower extremity   on exam: lungs with rales; Ext right > left edema -right is erythematous/ tender and warm; Some drainage appreciated from the RLE. Pain 10/10; procal 0.89; Cr 5.84; Na 133   PLAN: cont iv abx; monitor labs; pain control (see below); fluid restriction; Accuchecks with ssic; cont PD; f/u blood cx; Nephrology consult      Anticipated Length of Stay/Certification Statement: Patient will be admitted on an inpatient basis with an anticipated length of stay of greater than 2 midnights secondary to cellulitis, chf exacerbation.       Date: 7/24/24       Day 2  Patient states that he is still having lots of pain in the right leg. Does not feel that the swelling or redness has gone down at all yet. Feels that the legs are still weeping. Feels that the left leg may have gone down a little bit. BC 1/2 gram negative rods; Enterobacterales. Second blood cx  - pending; ID consulted: Start Zosyn renally dose, follow-up blood cultures, anticipate 10 days of therapy. INR daily -remains supratherapeutic (INR 7.56). Exam: I/O net -1654; Tenderness and erythema RLE; B/L LE edema with weeping; Heme 7.5; Na 134; Cr 5.88; Alb 2.6  Plan: cont iv abx; recd lasix iv x1 then started on lasix gtt; monitor labs; pain control (see below); fluid restriction; Accuchecks with ssic; cont PD; f/u 2nd blood cx; f/u wound cx; f/u iron panel      NEPHROLOGY CONSULT   Renal: The patient has recent onset of end-stage renal disease has been initiated and trained on nocturnal cycler peritoneal dialysis.  He states he has done it at home just did not do it the last couple days as he was feeling sick and his legs were really swollen.  Labs reviewed today show potassium of 3.7 and hemoglobin is 7.5.  The patient has significant lower extremity edema with cellulitis. The patient is on nocturnal cycler  peritoneal dialysis and that modality is not available at this facility so we will do CAPD as his regimen. Will plan on CAPD with 2000 cc fills, 4 exchanges a day of dextrose 2.5%.  We will start with this lower dextrose concentration and see if we can attain a negative value in terms of his output to avoid excessively increasing his sugars but may require dextrose 4.25%.  Lower extremity edema: For her lower extremity edema has had oozing and weeping and cellulitis.  He still some residual function so I think it is a good idea to place him on a Lasix infusion to try and reduce his volume status as tolerated.  We will also use peritoneal dialysis as above.  Plan: give Lasix 80 mg IV bolus and then Lasix 20 mg an hour       Date: 7/25/24      Day 3: Has surpassed a 2nd midnight with active treatments and services. Require additional inpatient hospital stay due to requiring lasix drip and iv antibiotics for cellulitis and chf exacerbation   Patient states that he is in a lot of pain. States that he is refusing all dressings on lower extremities due to severe pain.  Exam: I/O net -283; Procal trending down (0.65); Second blood culture no growth at 24 hours; Cr 5.91; Iron panel showing low iron; Heme 7.8; INR 4.63; Heme A1c 5.9  Plan: cont iv abx; Given one dose of epogen; monitor labs; pain control (see below); fluid restriction; Accuchecks with ssic; cont PD; f/u 2nd blood cx; f/u wound cx; f/u occult stool; hold coumadin      ED Triage Vitals   Temperature Pulse Respirations Blood Pressure SpO2 Pain Score   07/23/24 1242 07/23/24 1243 07/23/24 1242 07/23/24 1243 07/23/24 1243 07/23/24 1241   97.9 °F (36.6 °C) 89 18 126/58 97 % 10 - Worst Possible Pain     Weight (last 2 days)       Date/Time Weight    07/25/24 0300 116 (256.62)     Weight: new bed; no PD fluid instilled at 07/25/24 0300    07/24/24 0400 120 (263.45)    07/23/24 1530 119 (263.01)    07/23/24 1242 121 (265.65)            Vital Signs (last 3 days)        Date/Time Temp Pulse Resp BP MAP (mmHg) SpO2 Calculated FIO2 (%) - Nasal Cannula Nasal Cannula O2 Flow Rate (L/min) O2 Device Patient Position - Orthostatic VS [place] Coma Scale Score Pain    07/25/24 1051 -- -- -- -- -- -- -- -- -- -- -- 9    07/25/24 0900 97.5 °F (36.4 °C) 77 22 142/66 -- 96 % -- -- None (Room air) -- -- 10 - Worst Possible Pain    07/25/24 0845 -- -- -- -- -- -- -- -- -- -- -- 6    07/25/24 0844 -- -- -- -- -- -- -- -- -- -- -- 6    07/25/24 0722 98 °F (36.7 °C) 66 22 139/64 92 99 % 28 2 L/min Nasal cannula Lying -- --    07/25/24 0600 -- 61 18 -- -- 99 % -- -- -- -- -- --    07/25/24 0508 -- -- -- -- -- -- -- -- -- -- -- 10 - Worst Possible Pain    07/25/24 0300 97.9 °F (36.6 °C) 69 22 126/60 86 98 % 28 2 L/min Nasal cannula Lying -- --    07/25/24 0037 97.6 °F (36.4 °C) 74 33 139/64 92 99 % -- -- None (Room air) Lying -- --    07/24/24 2345 -- 74 29 -- -- 99 % -- -- -- -- 15 --    07/24/24 2318 -- -- -- -- -- -- -- -- -- -- -- 10 - Worst Possible Pain    07/24/24 2230 -- -- -- -- -- -- -- -- -- -- -- 10 - Worst Possible Pain    07/24/24 2200 -- 71 18 -- -- 95 % -- -- -- -- -- --    07/24/24 2030 -- -- -- -- -- -- -- -- -- -- -- 10 - Worst Possible Pain    07/24/24 1945 -- 72 30 -- -- 98 % -- -- -- -- 15 6    07/24/24 1918 98.8 °F (37.1 °C) 70 24 116/58 82 98 % 28 2 L/min -- Lying -- --    07/24/24 1539 -- -- -- -- -- -- -- -- -- -- 15 9    07/24/24 1510 97.8 °F (36.6 °C) 70 -- 132/63 90 96 % -- -- None (Room air) Lying -- --    07/24/24 1337 -- -- -- -- -- -- -- -- -- -- -- 9    07/24/24 1150 -- 69 -- 126/84 96 93 % -- -- -- -- -- --    07/24/24 0905 -- -- -- -- -- -- -- -- -- -- -- 10 - Worst Possible Pain    07/24/24 0831 -- 79 -- -- -- -- -- -- -- -- -- --    07/24/24 0740 -- -- -- -- -- 98 % -- -- None (Room air) -- 15 --    07/24/24 0736 -- -- -- -- -- -- -- -- -- -- -- 8    07/24/24 0700 99.3 °F (37.4 °C) 71 -- 148/65 94 98 % 28 2 L/min Nasal cannula Lying -- --    07/24/24 0440 -- 71 --  -- -- 93 % 28 2 L/min Nasal cannula -- -- --    07/24/24 0415 -- 75 -- -- -- 88 % -- -- -- -- -- --    07/24/24 0400 98.1 °F (36.7 °C) 76 20 121/56 81 91 % -- -- None (Room air) Lying -- --    07/24/24 0239 -- -- -- -- -- -- -- -- -- -- -- 9 07/24/24 0000 -- -- -- -- -- -- -- -- -- -- 15 5    07/23/24 2300 98 °F (36.7 °C) 78 18 137/64 91 93 % -- -- None (Room air) Lying -- --    07/23/24 2156 -- -- -- -- -- -- -- -- -- -- -- 9 07/23/24 2000 -- 77 -- -- -- 96 % -- -- -- -- 15 7    07/23/24 1900 98.2 °F (36.8 °C) 75 17 125/60 87 96 % -- -- None (Room air) Lying -- --    07/23/24 1740 -- -- -- -- -- -- -- -- -- -- -- 7 07/23/24 1535 98.3 °F (36.8 °C) 81 20 158/69 99 98 % -- -- None (Room air) Lying 15 3    07/23/24 1506 -- 68 16 128/62 89 99 % -- -- None (Room air) Lying -- --    07/23/24 1335 -- -- -- -- -- -- -- -- -- -- 15 --    07/23/24 1243 -- 89 -- 126/58 -- 97 % -- -- -- -- -- --    07/23/24 1242 97.9 °F (36.6 °C) -- 18 -- -- -- -- -- -- -- -- --    07/23/24 1241 -- -- -- -- -- -- -- -- -- -- -- 10 - Worst Possible Pain              Pertinent Labs/Diagnostic Test Results:   Radiology:  CT lower extremity wo contrast right   Final Interpretation by Janey Guzman MD (07/24 1414)      No fluid collection seen   Cellulitic changes leg   No intramuscular collection   No lytic lesion or periosteal reaction         Workstation performed: [id number]          VAS VENOUS DUPLEX - LOWER LIMB BILATERAL   Final Interpretation by Conor Silva DO (07/23 1636)      XR chest portable   Final Interpretation by Sedrick Jade MD (07/23 7961)      No acute cardiopulmonary disease.            Workstation performed: [id number]           Cardiology:  ECG 12 lead   Final Result by Jagjit Yuan MD (07/24 0927)   Normal sinus rhythm   Right bundle branch block   T wave abnormality, consider lateral ischemia   Abnormal ECG   When compared with ECG of 25-JUN-2024 09:02,   MI interval has decreased   T wave  inversion now evident in Anterolateral leads   Confirmed by Jagjit Yuan [id number]) on 7/24/2024 9:27:00 AM          Results from last 7 days   Lab Units 07/25/24  0341 07/24/24  1039 07/24/24  0450 07/23/24  1253   WBC Thousand/uL 6.34  --  6.62 9.06   HEMOGLOBIN g/dL 7.8* 7.5* 7.5* 10.1*   HEMATOCRIT % 24.7* 23.4* 23.9* 31.7*   PLATELETS Thousands/uL 112*  --  101* 134*   TOTAL NEUT ABS Thousands/µL  --   --   --  7.65*        Results from last 7 days   Lab Units 07/25/24  0341 07/24/24  1554 07/24/24  0450 07/23/24  1253   SODIUM mmol/L 137 135 134* 133*   POTASSIUM mmol/L 3.8 3.2* 3.7 3.5   CHLORIDE mmol/L 102 102 103 99   CO2 mmol/L 23 23 21 21   ANION GAP mmol/L 12 10 10 13   BUN mg/dL 85* 83* 84* 83*   CREATININE mg/dL 5.91* 5.86* 5.88* 5.84*   EGFR ml/min/1.73sq m 8 8 8 8   CALCIUM mg/dL 10.1 9.9 9.8 10.8*   MAGNESIUM mg/dL 2.3  --  2.2  --    PHOSPHORUS mg/dL 5.1*  --  4.9*  --      Results from last 7 days   Lab Units 07/24/24  0450 07/23/24  1253   AST U/L 34 24   ALT U/L 26 22   ALK PHOS U/L 70 91   TOTAL PROTEIN g/dL 5.5* 7.6   ALBUMIN g/dL 2.6* 3.6   TOTAL BILIRUBIN mg/dL 0.37 0.51     Results from last 7 days   Lab Units 07/25/24  1052 07/25/24  0723 07/24/24  2040 07/24/24  1059 07/24/24  0710 07/23/24  2121 07/23/24  1803   POC GLUCOSE mg/dl 207* 139 132 155* 116 143* 158*     Results from last 7 days   Lab Units 07/25/24  0341 07/24/24  1554 07/24/24  0450 07/23/24  1253   GLUCOSE RANDOM mg/dL 138 145* 120 141*        Results from last 7 days   Lab Units 07/24/24  0450   HEMOGLOBIN A1C % 5.9*   EAG mg/dl 123       Results from last 7 days   Lab Units 07/23/24  1746 07/23/24  1509 07/23/24  1253   HS TNI 0HR ng/L  --   --  7   HS TNI 2HR ng/L  --  6  --    HSTNI D2 ng/L  --  -1  --    HS TNI 4HR ng/L 6  --   --    HSTNI D4 ng/L -1  --   --         Results from last 7 days   Lab Units 07/25/24  0341 07/24/24  0450 07/23/24  1400   PROTIME seconds 43.9* 64.0* 57.8*   INR  4.63* 7.56* 6.62*         Results from last 7 days   Lab Units 07/25/24  0341 07/24/24  1747 07/23/24  1253   PROCALCITONIN ng/ml 0.65* 0.72* 0.89*     Results from last 7 days   Lab Units 07/23/24  1253   LACTIC ACID mmol/L 1.0       Results from last 7 days   Lab Units 07/23/24  1253   BNP pg/mL 71       Results from last 7 days   Lab Units 07/23/24  1802   OSMO UR mmol/     Results from last 7 days   Lab Units 07/23/24  1802   CLARITY UA  Clear   COLOR UA  Yellow   SPEC GRAV UA  1.020   PH UA  5.5   GLUCOSE UA mg/dl Negative   KETONES UA mg/dl Negative   BLOOD UA  Large*   PROTEIN UA mg/dl 100 (2+)*   NITRITE UA  Negative   BILIRUBIN UA  Negative   UROBILINOGEN UA E.U./dl 0.2   LEUKOCYTES UA  Negative   WBC UA /hpf 2-4   RBC UA /hpf 10-20*   BACTERIA UA /hpf Occasional   EPITHELIAL CELLS WET PREP /hpf None Seen   SODIUM UR  48   CREATININE UR mg/dL 67.4  66.9       Results from last 7 days   Lab Units 07/23/24  1757 07/23/24  1253   BLOOD CULTURE   --  No Growth at 24 hrs.   GRAM STAIN RESULT  No polys seen*  4+ Gram negative rods* Gram negative rods*     ED Treatment-Medication Administration from 07/23/2024 1239 to 07/23/2024 1527         Date/Time Order Dose Route Action     07/23/2024 1300 vancomycin (VANCOCIN) IVPB (premix in dextrose) 1,000 mg 200 mL 1,000 mg Intravenous New Bag            Past Medical History:   Diagnosis Date    Anemia in chronic kidney disease     Anticoagulated on Coumadin     Atrial fibrillation (HCC)     BPH (benign prostatic hyperplasia)     CAD (coronary artery disease)     CKD (chronic kidney disease), stage V (HCC)     Compartment syndrome of forearm (HCC)     Right, 2019    COPD (chronic obstructive pulmonary disease) (HCC)     CVA (cerebral vascular accident) (HCC)     Diastolic CHF (HCC)     grade 1 DD    DVT (deep venous thrombosis) ([facility])     Factor V deficiency (HCC)     Gout     Hyperlipidemia     Hypertension     Hypothyroidism     MI (myocardial infarction) ([facility])     x3 - 1999, 2010,  after 2015    Morbid obesity (HCC)     Nephrolithiasis     Noncompliance with medications     SHAD (obstructive sleep apnea)     Peritoneal dialysis catheter in place (HCC)     Pulmonary embolism (HCC)     Secondary hyperparathyroidism of renal origin (HCC)     Spinal stenosis of lumbar region     Status post placement of implantable loop recorder     Tobacco dependence      Present on Admission:   HTN (hypertension)   Acute on chronic diastolic HF (heart failure) (HCC)   Lymphedema   Anemia      Admitting Diagnosis: Leg swelling [M79.89]  Cellulitis of right lower extremity [L03.115]    Age/Sex: 68 y.o. male    Admission Orders:  SCDs; I/O; daily wts; Consistent Carbohydrate Diet. Blood glucose checks ACHS. Fluid restriction 2000      Scheduled Medications:  ampicillin-sulbactam, 3 g, Intravenous, Q24H  aspirin, 81 mg, Oral, Daily  atorvastatin, 40 mg, Oral, Daily With Dinner  furosemide, 80 mg, Intravenous, Once  (recd 7/24)   insulin glargine, 15 Units, Subcutaneous, Q12H VASU  insulin lispro, 1-6 Units, Subcutaneous, HS  insulin lispro, 1-6 Units, Subcutaneous, TID AC  levothyroxine, 125 mcg, Oral, Early Morning  magnesium Oxide, 400 mg, Oral, Daily  metoprolol succinate, 100 mg, Oral, BID  nicotine, 1 patch, Transdermal, Daily  tamsulosin, 0.4 mg, Oral, Daily With Dinner      Continuous IV Infusions:  furosemide, 20 mg/hr, Intravenous, Continuous (started 7/24)       PRN Meds:  acetaminophen, 650 mg, Oral, Q6H PRN  HYDROcodone-acetaminophen, 1 tablet, Oral, Q6H PRN  HYDROcodone-acetaminophen, 2 tablet, Oral, Q6H PRN  (7/23 recd x1)  (7/24 recd x4)  (7/25 recd x2 so far today)   HYDROmorphone, 0.2 mg, Intravenous, Q3H PRN  (7/23 recd x1)  (7/24 recd x2)   polyethylene glycol, 17 g, Oral, Daily PRN  (7/25 recd x1 so far today)   simethicone, 80 mg, Oral, 4x Daily PRN  HYDROmorphone (DILAUDID) injection 1 mg, Intravenous, Q3H PRN  (7/24 recd x1)  (7/25 recd x1 so far today)         IP CONSULT TO NEPHROLOGY  IP  CONSULT TO INFECTIOUS DISEASES  IP CONSULT TO ACUTE CARE SURGERY    Network Utilization Review Department  ATTENTION: Please call with any questions or concerns to [phone] and carefully listen to the prompts so that you are directed to the right person. All voicemails are confidential.   For Discharge needs, contact Care Management DC Support Team at [phone] opt. 2  Send all requests for admission clinical reviews, approved or denied determinations and any other requests to dedicated fax number below belonging to the campus where the patient is receiving treatment. List of dedicated fax numbers for the Facilities:  FACILITY NAME UR FAX NUMBER   ADMISSION DENIALS (Administrative/Medical Necessity) [phone]   DISCHARGE SUPPORT TEAM (NETWORK) [phone]   PARENT CHILD HEALTH (Maternity/NICU/Pediatrics) [phone]   [facility] [phone]   [facility] [phone]   [facility] [phone]   [facility] [phone]   [facility] [phone]   [facility] [phone]   [facility] [phone]   [facility] [phone]   [facility] [phone]   Counts include 234 beds at the [facility] [phone]   [facility] [phone]   [facility] [phone]

## 2024-07-24 NOTE — PROGRESS NOTES
"Pt reports good appetite currently and PTA, not happy with meal options offered at hospital. Reports getting severe diarrhea from sugar substitutes and does not want to receive them. Reports he cooks his own meals at home \"And I know what I'm doing.\" Has no nutrition questions at this time.     Will order double PRO servings for increased PRO needs. Continue CCD 2, 2gm Na, fluid restriction per MD. +sucralose and saccharin to allergy list as pt does not want to receive these due to causing severe diarrhea. Unable to provide Robert to pt for wound healing, contains sugar substitutes.   "

## 2024-07-24 NOTE — ASSESSMENT & PLAN NOTE
Patient with history of anemia baseline hemoglobin around 9-10 per outpatient labs  On admission, patient with hemoglobin of 10.1, decreased to 7.5 today, possibly secondary to volume overload, no evidence of bleeding at this time.  CT right lower extremity without evidence of bleeding.  Repeat hemoglobin still at 7.5  Iron panel ordered and pending  Does receive IV Venofer 200 mg with dialysis treatments as an outpatient and received 5 doses for iron deficiency anemia.  Hold on Venofer in setting of acute infection.  Transfuse for hemoglobin less than 7

## 2024-07-24 NOTE — CASE MANAGEMENT
Case Management Assessment & Discharge Planning Note    Patient name Masoud Goregh  Location /-01 MRN [id number]  : 1955 Date 2024       Current Admission Date: 2024  Current Admission Diagnosis:Cellulitis of right lower extremity   Patient Active Problem List    Diagnosis Date Noted Date Diagnosed    Cellulitis of right lower extremity 2024     Atrial fibrillation (HCC) 2024     Bilateral lower extremity edema 2023     Ureteral stone with hydronephrosis 2022     Cutaneous abscess of buttock 2022     Chronic kidney disease-mineral and bone disorder 2022     Type 2 diabetes mellitus with stage 4 chronic kidney disease, with long-term current use of insulin (HCC) 07/15/2022     Obesity, morbid ([facility]) 07/15/2022     Secondary hyperparathyroidism of renal origin ([facility]) 07/15/2022     Stage 5 chronic kidney disease on peritoneal dialysis ([facility]) 07/15/2022     Factor 5 Leiden mutation, heterozygous ([facility]) 01/15/2022     Thrombocytopenia ([facility]) 2022     Angina at rest 2022     MI (myocardial infarction) ([facility]) 2022     History of PTCA 2022     Sleep apnea 2022     Smoking 2022     Anemia 2022     History of CVA (cerebrovascular accident) 2022     HTN (hypertension) 2022     Acute on chronic diastolic HF (heart failure) ([facility]) 2022     HLD (hyperlipidemia) 2022     Lymphedema 2022     Hypothyroidism 2022       LOS (days): 1  Geometric Mean LOS (GMLOS) (days): 4.8  Days to GMLOS:4     OBJECTIVE:    Risk of Unplanned Readmission Score: 20.67         Current admission status: Inpatient  Referral Reason:  (dc planning)    Preferred Pharmacy:   [facility] - [place], [place] - [address]  [address]  [place] [address]  Phone: [phone] Fax: [phone]    Primary Care Provider: Jignesh Baker DO    Primary Insurance: [employer] REP  Secondary Insurance:      CM met with patient at the bedside,baseline information  was obtained. CM discussed the role of CM in helping the patient develop a discharge plan and assist the patient in carry out their plan.      ASSESSMENT:  Active Health Care Proxies    There are no active Health Care Proxies on file.       Advance Directives  Does patient have a Health Care POA?: Yes  Does patient have Advance Directives?: No  Was patient offered paperwork?: Yes (declined)  Primary Contact: timothy mack JR (Son)  [phone]         Readmission Root Cause  30 Day Readmission: No    Patient Information  Admitted from:: Home  Mental Status: Alert  During Assessment patient was accompanied by: Not accompanied during assessment  Assessment information provided by:: Patient  Primary Caregiver: Self  Support Systems: Son, Family members  [place] Residence[place] [facility]  What city do you live in?: [facility]  Home entry access options. Select all that apply.: Stairs  Number of steps to enter home.: 2  Type of Current Residence: Apartment  Floor Level: 1  Living Arrangements: Lives Alone    Activities of Daily Living Prior to Admission  Functional Status: Independent  Completes ADLs independently?: Yes  Ambulates independently?: Yes  Does patient use assisted devices?: Yes  Assisted Devices (DME) used: Walker  Does patient currently own DME?: Yes  What DME does the patient currently own?: Bedside Commode, Straight Cane, Walker  Does patient have a history of Outpatient Therapy (PT/OT)?: No  Does the patient have a history of Short-Term Rehab?: No  Does patient have a history of HHC?: Yes ([facility])  Does patient currently have HHC?: No         Patient Information Continued  Income Source: Pension/senior living  Does patient have prescription coverage?: Yes  Does patient receive dialysis treatments?: Yes ([facility]  Does patient have a history of substance abuse?: No  Does patient have a history of Mental Health  Diagnosis?: No         Means of Transportation  Means of Transport to Appts:: Family transport      Social Determinants of Health (SDOH)      Flowsheet Row Most Recent Value   Housing Stability    In the last 12 months, was there a time when you were not able to pay the mortgage or rent on time? N   In the past 12 months, how many times have you moved where you were living? 0   Transportation Needs    In the past 12 months, has lack of transportation kept you from medical appointments or from getting medications? no   In the past 12 months, has lack of transportation kept you from meetings, work, or from getting things needed for daily living? No   Food Insecurity    Within the past 12 months, you worried that your food would run out before you got the money to buy more. Never true   Within the past 12 months, the food you bought just didn't last and you didn't have money to get more. Never true   Utilities    In the past 12 months has the electric, gas, oil, or water company threatened to shut off services in your home? No          Pt has 2 children that resides close by.  Pt does not drive, since Nov 2023- relies on son and grandson.    [facility] [place]  [facility]         DISCHARGE DETAILS:    Discharge planning discussed with:: patient  Freedom of Choice: Yes  Comments - Freedom of Choice: Discussed the high probabilty of STR and the barriers associated with STR will be finding a facility that accepts his insurance and can manage his PD. He is aware a broad referral will be made and it may not be local to his home.  CM contacted family/caregiver?: Yes  Were Treatment Team discharge recommendations reviewed with patient/caregiver?: Yes          Contacts  Patient Contacts: timothy mack JR (Son)  [phone]  Relationship to Patient:: Family  Contact Method: Phone  Phone Number: [phone]  Reason/Outcome: Discharge Planning (confirmed baseline information and discussed potential  STR and reviewed barriers of finding a local SNF that can manage his PD needs)    Requested Home Health Care         Is the patient interested in HHC at discharge?: No         Treatment Team Recommendation:  (tbd- anticipte STR)  Discharge Destination Plan:: Short Term Rehab (tbd)  Transport at Discharge : Wheelchair van        Will request therapy evaluation.   Anticipate STR.

## 2024-07-24 NOTE — ASSESSMENT & PLAN NOTE
1325  Discharged with mom after id bands checked and matched teaching done questions answered no distress   Paroxysmal atrial fibrillation. [place] [facility] [place], took himself off Eliquis post procedure AMA;  has episodes on loop recorder of afib that are symptomatic, no longer taking amiodarone  Currently patient on coumadin, follows outpatient with coumadin clinic, INR supratherapeutic, holding further coumadin dosing  Monitor INR daily -remains supratherapeutic, if continues to elevate, consider dose of vitamin K, no evidence of bleeding at this time.

## 2024-07-24 NOTE — PLAN OF CARE
Pt admitted yesterday for RLE cellulitis.  Pt on unasyn; renal dosing and given dose of vancomycin.  Pt with significant pain with any movement or tactile stimulation.  Pt chronically take 2 norco for back pain.  Pt getting norco and IV dilaudid for breakthrough pain.  Pt intermittently refusing repositioning due to pain.  BLE continue to weep with yellow drainage R > L.  Nephrology consulted for PD therapy.  Pt making QS urine.  Labs drawn and results pending.  Will continue to monitor and support as needed.    Problem: Prexisting or High Potential for Compromised Skin Integrity  Goal: Skin integrity is maintained or improved  Description: INTERVENTIONS:  - Identify patients at risk for skin breakdown  - Assess and monitor skin integrity  - Assess and monitor nutrition and hydration status  - Monitor labs   - Assess for incontinence   - Turn and reposition patient  - Assist with mobility/ambulation  - Relieve pressure over bony prominences  - Avoid friction and shearing  - Provide appropriate hygiene as needed including keeping skin clean and dry  - Evaluate need for skin moisturizer/barrier cream  - Collaborate with interdisciplinary team   - Patient/family teaching  - Consider wound care consult   Outcome: Progressing     Problem: METABOLIC, FLUID AND ELECTROLYTES - ADULT  Goal: Electrolytes maintained within normal limits  Description: INTERVENTIONS:  - Monitor labs and assess patient for signs and symptoms of electrolyte imbalances  - Administer electrolyte replacement as ordered  - Monitor response to electrolyte replacements, including repeat lab results as appropriate  - Instruct patient on fluid and nutrition as appropriate  Outcome: Progressing  Goal: Fluid balance maintained  Description: INTERVENTIONS:  - Monitor labs   - Monitor I/O and WT  - Instruct patient on fluid and nutrition as appropriate  - Assess for signs & symptoms of volume excess or deficit  Outcome: Progressing  Goal: Glucose maintained  within target range  Description: INTERVENTIONS:  - Monitor Blood Glucose as ordered  - Assess for signs and symptoms of hyperglycemia and hypoglycemia  - Administer ordered medications to maintain glucose within target range  - Assess nutritional intake and initiate nutrition service referral as needed  Outcome: Progressing     Problem: SKIN/TISSUE INTEGRITY - ADULT  Goal: Incision(s), wounds(s) or drain site(s) healing without S/S of infection  Description: INTERVENTIONS  - Assess and document dressing, incision, wound bed, drain sites and surrounding tissue  - Provide patient and family education  - Perform skin care/dressing changes per wound  Outcome: Progressing     Problem: PAIN - ADULT  Goal: Verbalizes/displays adequate comfort level or baseline comfort level  Description: Interventions:  - Encourage patient to monitor pain and request assistance  - Assess pain using appropriate pain scale  - Administer analgesics based on type and severity of pain and evaluate response  - Implement non-pharmacological measures as appropriate and evaluate response  - Consider cultural and social influences on pain and pain management  - Notify physician/advanced practitioner if interventions unsuccessful or patient reports new pain  Outcome: Progressing     Problem: INFECTION - ADULT  Goal: Absence or prevention of progression during hospitalization  Description: INTERVENTIONS:  - Assess and monitor for signs and symptoms of infection  - Monitor lab/diagnostic results  - Monitor all insertion sites, i.e. indwelling lines, tubes, and drains  - Monitor endotracheal if appropriate and nasal secretions for changes in amount and color  - [place] appropriate cooling/warming therapies per order  - Administer medications as ordered  - Instruct and encourage patient and family to use good hand hygiene technique  - Identify and instruct in appropriate isolation precautions for identified infection/condition  Outcome: Progressing      Problem: Nutrition/Hydration-ADULT  Goal: Nutrient/Hydration intake appropriate for improving, restoring or maintaining nutritional needs  Description: Monitor and assess patient's nutrition/hydration status for malnutrition. Collaborate with interdisciplinary team and initiate plan and interventions as ordered.  Monitor patient's weight and dietary intake as ordered or per policy. Utilize nutrition screening tool and intervene as necessary. Determine patient's food preferences and provide high-protein, high-caloric foods as appropriate.     INTERVENTIONS:  - Monitor oral intake, urinary output, labs, and treatment plans  - Assess nutrition and hydration status and recommend course of action  - Evaluate amount of meals eaten  - Assist patient with eating if necessary   - Allow adequate time for meals  - Recommend/ encourage appropriate diets, oral nutritional supplements, and vitamin/mineral supplements  - Order, calculate, and assess calorie counts as needed  - Recommend, monitor, and adjust tube feedings and TPN/PPN based on assessed needs  - Assess need for intravenous fluids  - Provide specific nutrition/hydration education as appropriate  - Include patient/family/caregiver in decisions related to nutrition  Outcome: Progressing     Problem: GENITOURINARY - ADULT  Goal: Maintains or returns to baseline urinary function  Description: INTERVENTIONS:  - Assess urinary function  - Encourage oral fluids to ensure adequate hydration if ordered  - Administer IV fluids as ordered to ensure adequate hydration  - Administer ordered medications as needed  - Offer frequent toileting  - Follow urinary retention protocol if ordered  Outcome: Progressing     Problem: SAFETY ADULT  Goal: Patient will remain free of falls  Description: INTERVENTIONS:  - Educate patient/family on patient safety including physical limitations  - Instruct patient to call for assistance with activity   - Consult OT/PT to assist with  strengthening/mobility   - Keep Call bell within reach  - Keep bed low and locked with side rails adjusted as appropriate  - Keep care items and personal belongings within reach  - Initiate and maintain comfort rounds  - Make Fall Risk Sign visible to staff  - Offer Toileting every 2 Hours, in advance of need if unable to notify staff of need  - Initiate/Maintain bed/chair alarm for confusion, impulsivity, or noncompliance with notifying staff.  - Apply yellow socks and bracelet for high fall risk patients  - Consider moving patient to room near nurses station  Outcome: Progressing  Goal: Maintain or return to baseline ADL function  Description: INTERVENTIONS:  -  Assess patient's ability to carry out ADLs; assess patient's baseline for ADL function and identify physical deficits which impact ability to perform ADLs (bathing, care of mouth/teeth, toileting, grooming, dressing, etc.)  - Assess/evaluate cause of self-care deficits   - Assess range of motion  - Assess patient's mobility; develop plan if impaired  - Assess patient's need for assistive devices and provide as appropriate  - Encourage maximum independence but intervene and supervise when necessary  - Involve family in performance of ADLs  - Assess for home care needs following discharge   - Consider OT consult to assist with ADL evaluation and planning for discharge  - Provide patient education as appropriate  Outcome: Progressing  Goal: Maintains/Returns to pre admission functional level  Description: INTERVENTIONS:  - Perform AM-PAC 6 Click Basic Mobility/ Daily Activity assessment daily.  - Set and communicate daily mobility goal to care team and patient/family/caregiver.   - Collaborate with rehabilitation services on mobility goals if consulted  - Perform Range of Motion 3 times a day.  - Reposition patient every 2 hours if unable to reposition self  - Out of bed for toileting if medically appropriate  - Record patient progress and toleration of  activity level   Outcome: Progressing     Problem: DISCHARGE PLANNING  Goal: Discharge to home or other facility with appropriate resources  Description: INTERVENTIONS:  - Identify barriers to discharge w/patient and caregiver  - Arrange for needed discharge resources and transportation as appropriate  - Identify discharge learning needs (meds, wound care, etc.)  - Arrange for interpretive services to assist at discharge as needed  - Refer to Case Management Department for coordinating discharge planning if the patient needs post-hospital services based on physician/advanced practitioner order or complex needs related to functional status, cognitive ability, or social support system  Outcome: Progressing     Problem: Knowledge Deficit  Goal: Patient/family/caregiver demonstrates understanding of disease process, treatment plan, medications, and discharge instructions  Description: Complete learning assessment and assess knowledge base.  Interventions:  - Provide teaching at level of understanding  - Provide teaching via preferred learning methods  Outcome: Progressing

## 2024-07-24 NOTE — CONSULTS
Consultation - Infectious Disease   Masoud Perry 68 y.o. male MRN: [id number]  Unit/Bed#: -01 Encounter: [id number]      Inpatient consult to Infectious Diseases  Consult performed by: Leni Matta MD  Consult ordered by: Rachel Conn PA-C            VIRTUAL CARE DOCUMENTATION:     1. This service was provided via Telemedicine using [employer]     2. Parties in the room with patient during teleconsult Patient only    3. Confidentiality My office door was closed     4. Participants No one else was in the room    5. Patient acknowledged consent and understanding of privacy and security of the  Telemedicine consult. I informed the patient that I have reviewed their record in Epic and presented the opportunity for them to ask any questions regarding the visit today.  The patient agreed to participate.    6. Time spent 7 minutes       Assessment/Recommendations     Enterobacterales bacteremia   Secondary to right leg cellulitis. No other source appreciated, no GI,  or respiratory symptoms. Has a recently placed PD catheter but without abd pain, cloudy effluent or local signs of infection. Afebrile without leukcytosis    -Discontinue amp-sulbactam and start zosyn renally dosed  -FU blood c/s  -Additional mngt as below  -Anticipate 10 days of therapy, final choice of abx to be determined     Right leg cellulitis   In the setting of worsening lower extremity edema, chronic lymphedema, acute on chronic diastolic CHF, ESRD. Wound cx with gram negative rods. No clinical evidence of abscess.     -Recommend zosyn  -FU cultures  -Await CT RLE  -Serial extremity exams, optimize volume status with PD and diuretic infusion, recommend wound care consult, limb elevation and ace wrap when patient is able to tolerate    ESRD on peritoneal dialysis   Had PD catheter placed on 6/7, tolerating well with no local signs of infection    -Management as per nephrology    Acute on chronic diastolic CHF, lymphedema  Risk  factors for recurrent infection      RLE duplex images personally reviewed and show no DVT    Discussed above plan in detail with the primary service who is in agreement.    History     Reason for Consult: Cellulitis  HPI: Masoud Perry is a 68 y.o. year old male with ESRD on peritoneal dialysis, chronic lymphedema, chronic diastolic CHF, hx of stroke.  He presented to the ER on 7/23 with few days of worsening leg swelling, right leg pain with redness and disclored drainage as well as burning pain. He reports missing few PD sessions because of feeling unwell.  He has no previous hx leg cellulitis but reports chronic leg swelling.  Infectious disease is being consulted for diagnostic work up and antibiotic management.    Review of Systems  Pertinent positives and negatives as noted in HPI. Rest complete 12 point system-based review of systems is otherwise negative.    PAST MEDICAL HISTORY:  Past Medical History:   Diagnosis Date    Anemia in chronic kidney disease     Anticoagulated on Coumadin     Atrial fibrillation (HCC)     BPH (benign prostatic hyperplasia)     CAD (coronary artery disease)     CKD (chronic kidney disease), stage V (HCC)     Compartment syndrome of forearm (HCC)     Right, 2019    COPD (chronic obstructive pulmonary disease) (HCC)     CVA (cerebral vascular accident) (HCC)     Diastolic CHF (HCC)     grade 1 DD    DVT (deep venous thrombosis) (HCC)     Factor V deficiency (HCC)     Gout     Hyperlipidemia     Hypertension     Hypothyroidism     MI (myocardial infarction) (HCC)     x3 - 1999, 2010, after 2015    Morbid obesity (HCC)     Nephrolithiasis     Noncompliance with medications     SHAD (obstructive sleep apnea)     Peritoneal dialysis catheter in place (HCC)     Pulmonary embolism (HCC)     Secondary hyperparathyroidism of renal origin (HCC)     Spinal stenosis of lumbar region     Status post placement of implantable loop recorder     Tobacco dependence      Past Surgical History:    Procedure Laterality Date    CARDIAC ELECTROPHYSIOLOGY STUDY AND ABLATION  2024    CHOLECYSTECTOMY      CORONARY ANGIOPLASTY WITH STENT PLACEMENT      JOINT REPLACEMENT      bilateral knee    NH CYSTO/URETERO W/LITHOTRIPSY &INDWELL STENT INSRT Left 2022    Procedure: CYSTOSCOPY URETEROSCOPY WITH LITHOTRIPSY HOLMIUM LASER, AND INSERTION STENT URETERAL;  Surgeon: Lewis Dahl MD;  Location: [facility];  Service: Urology    NH LAPS INSERTION TUNNELED INTRAPERITONEAL CATHETER N/A 2024    Procedure: INSERTION PERITONEAL CATHETER DIALYSIS LAPAROSCOPIC;  Surgeon: Hiram Park DO;  Location: [facility];  Service: General    US GUIDED KIDNEY BIOPSY  2020       FAMILY HISTORY:  Non-contributory    SOCIAL HISTORY:  Social History     Social History     Substance and Sexual Activity   Alcohol Use Not Currently     Social History     Substance and Sexual Activity   Drug Use Never     Social History     Tobacco Use   Smoking Status Every Day    Current packs/day: 0.50    Types: Cigarettes   Smokeless Tobacco Never       ALLERGIES:  Allergies   Allergen Reactions    Carvedilol Shortness Of Breath    Amiodarone Dizziness    Aspartame - Food Allergy Diarrhea    Bumetanide GI Intolerance and Lightheadedness           Cephalexin Other (See Comments)     unknown    Ciprofloxacin Rash    Hydralazine Tachycardia    Lisinopril GI Intolerance           Metolazone Other (See Comments)     Metallic taste    Morphine Swelling     Of injection site    Nifedipine Lightheadedness    Strawberry Extract - Food Allergy Rash       MEDICATIONS:  All current active medications have been reviewed.    Physical Exam     Temp:  [97.9 °F (36.6 °C)-99.3 °F (37.4 °C)] 99.3 °F (37.4 °C)  HR:  [68-89] 79  Resp:  [16-20] 20  BP: (121-158)/(56-69) 148/65  SpO2:  [88 %-99 %] 98 %  Temp (24hrs), Av.3 °F (36.8 °C), Min:97.9 °F (36.6 °C), Max:99.3 °F (37.4 °C)  Current: Temperature: 99.3 °F (37.4 °C)    Intake/Output Summary  (Last 24 hours) at 7/24/2024 1105  Last data filed at 7/24/2024 1103  Gross per 24 hour   Intake 780 ml   Output 1225 ml   Net -445 ml         Physical exam findings reported by bedside and primary medical team staff    General Appearance:  Appearing ill, nontoxic, and in no distress, appears stated age   Head:  Normocephalic, without obvious abnormality, atraumatic   Eyes:  PERRL, conjunctiva pink and sclera anicteric, both eyes   Nose: Nares normal, mucosa normal, no drainage   Throat: Oropharynx moist without lesions; lips, mucosa, and tongue normal; teeth and gums normal   Neck: Supple, symmetrical, trachea midline, no adenopathy, no tenderness/mass/nodules   Back:   Symmetric, no curvature, ROM normal, no CVA tenderness   Lungs:   Clear to auscultation bilaterally, no audible wheezes, rhonchi and rales, respirations unlabored   Chest Wall:  No tenderness or deformity   Heart:  Regular rate and rhythm, S1, S2 normal, no murmur, rub or gallop   Abdomen:   Soft, non-tender, non-distended, positive bowel sounds, no masses, no organomegaly, PD catheter site c/d/i    No CVA tenderness   Extremities: R>L 2-3+ edema    Skin: RLE warmth and erythema, warm and render to touch . Chronic venous hyperpigmentation changes noted.   Lymph nodes: Cervical, supraclavicular, and axillary nodes normal   Neurologic: Alert and oriented times 3       Invasive Devices:   Peripheral IV 07/23/24 Right;Ventral (anterior) Forearm (Active)   Site Assessment WDL 07/24/24 0736   Dressing Type Transparent 07/24/24 0736   Line Status Flushed 07/24/24 0736   Dressing Status Clean;Dry;Intact 07/24/24 0736   Dressing Change Due 07/27/24 07/24/24 0736   Reason Not Rotated Not due 07/24/24 0736       Peritoneal Dialysis Catheter Column-disk Abdominal (Active)       Labs, Imaging, & Other Studies     Lab Results:    I have personally reviewed pertinent labs.    Results from last 7 days   Lab Units 07/24/24  1039 07/24/24  0450 07/23/24  1253   WBC  Thousand/uL  --  6.62 9.06   HEMOGLOBIN g/dL 7.5* 7.5* 10.1*   PLATELETS Thousands/uL  --  101* 134*     Results from last 7 days   Lab Units 07/24/24  0450 07/23/24  1253   POTASSIUM mmol/L 3.7 3.5   CHLORIDE mmol/L 103 99   CO2 mmol/L 21 21   BUN mg/dL 84* 83*   CREATININE mg/dL 5.88* 5.84*   EGFR ml/min/1.73sq m 8 8   CALCIUM mg/dL 9.8 10.8*   AST U/L 34 24   ALT U/L 26 22   ALK PHOS U/L 70 91     Results from last 7 days   Lab Units 07/23/24  1253   BLOOD CULTURE  Received in Microbiology Lab. Culture in Progress.   GRAM STAIN RESULT  Gram negative rods*       Imaging Studies:   I have personally reviewed pertinent imaging study reports and images in PACS.      EKG, Pathology, and Other Studies:   I have personally reviewed pertinent reports and reviewed external records.    Counseling/Coordination of care:       Total 90 minutes communication with the patient via telehealth.  Labs, medical tests and imaging studies were independently and extensively reviewed by me as noted above in HPI and old records were obtained and summarized as noted above in HPI.   My recommendations were discussed with the patient in detail who verbalized understanding.

## 2024-07-24 NOTE — PLAN OF CARE
Problem: Prexisting or High Potential for Compromised Skin Integrity  Goal: Skin integrity is maintained or improved  Description: INTERVENTIONS:  - Identify patients at risk for skin breakdown  - Assess and monitor skin integrity  - Assess and monitor nutrition and hydration status  - Monitor labs   - Assess for incontinence   - Turn and reposition patient  - Assist with mobility/ambulation  - Relieve pressure over bony prominences  - Avoid friction and shearing  - Provide appropriate hygiene as needed including keeping skin clean and dry  - Evaluate need for skin moisturizer/barrier cream  - Collaborate with interdisciplinary team   - Patient/family teaching  - Consider wound care consult   Outcome: Progressing     Problem: METABOLIC, FLUID AND ELECTROLYTES - ADULT  Goal: Electrolytes maintained within normal limits  Description: INTERVENTIONS:  - Monitor labs and assess patient for signs and symptoms of electrolyte imbalances  - Administer electrolyte replacement as ordered  - Monitor response to electrolyte replacements, including repeat lab results as appropriate  - Instruct patient on fluid and nutrition as appropriate  Outcome: Progressing  Goal: Fluid balance maintained  Description: INTERVENTIONS:  - Monitor labs   - Monitor I/O and WT  - Instruct patient on fluid and nutrition as appropriate  - Assess for signs & symptoms of volume excess or deficit  Outcome: Progressing  Goal: Glucose maintained within target range  Description: INTERVENTIONS:  - Monitor Blood Glucose as ordered  - Assess for signs and symptoms of hyperglycemia and hypoglycemia  - Administer ordered medications to maintain glucose within target range  - Assess nutritional intake and initiate nutrition service referral as needed  Outcome: Progressing     Problem: SKIN/TISSUE INTEGRITY - ADULT  Goal: Incision(s), wounds(s) or drain site(s) healing without S/S of infection  Description: INTERVENTIONS  - Assess and document dressing,  incision, wound bed, drain sites and surrounding tissue  - Provide patient and family education  - Perform skin care/dressing changes per wound  Outcome: Progressing     Problem: PAIN - ADULT  Goal: Verbalizes/displays adequate comfort level or baseline comfort level  Description: Interventions:  - Encourage patient to monitor pain and request assistance  - Assess pain using appropriate pain scale  - Administer analgesics based on type and severity of pain and evaluate response  - Implement non-pharmacological measures as appropriate and evaluate response  - Consider cultural and social influences on pain and pain management  - Notify physician/advanced practitioner if interventions unsuccessful or patient reports new pain  Outcome: Progressing     Problem: INFECTION - ADULT  Goal: Absence or prevention of progression during hospitalization  Description: INTERVENTIONS:  - Assess and monitor for signs and symptoms of infection  - Monitor lab/diagnostic results  - Monitor all insertion sites, i.e. indwelling lines, tubes, and drains  - Monitor endotracheal if appropriate and nasal secretions for changes in amount and color  - [place] appropriate cooling/warming therapies per order  - Administer medications as ordered  - Instruct and encourage patient and family to use good hand hygiene technique  - Identify and instruct in appropriate isolation precautions for identified infection/condition  Outcome: Progressing     Problem: Nutrition/Hydration-ADULT  Goal: Nutrient/Hydration intake appropriate for improving, restoring or maintaining nutritional needs  Description: Monitor and assess patient's nutrition/hydration status for malnutrition. Collaborate with interdisciplinary team and initiate plan and interventions as ordered.  Monitor patient's weight and dietary intake as ordered or per policy. Utilize nutrition screening tool and intervene as necessary. Determine patient's food preferences and provide high-protein,  high-caloric foods as appropriate.     INTERVENTIONS:  - Monitor oral intake, urinary output, labs, and treatment plans  - Assess nutrition and hydration status and recommend course of action  - Evaluate amount of meals eaten  - Assist patient with eating if necessary   - Allow adequate time for meals  - Recommend/ encourage appropriate diets, oral nutritional supplements, and vitamin/mineral supplements  - Order, calculate, and assess calorie counts as needed  - Recommend, monitor, and adjust tube feedings and TPN/PPN based on assessed needs  - Assess need for intravenous fluids  - Provide specific nutrition/hydration education as appropriate  - Include patient/family/caregiver in decisions related to nutrition  Outcome: Progressing     Problem: GENITOURINARY - ADULT  Goal: Maintains or returns to baseline urinary function  Description: INTERVENTIONS:  - Assess urinary function  - Encourage oral fluids to ensure adequate hydration if ordered  - Administer IV fluids as ordered to ensure adequate hydration  - Administer ordered medications as needed  - Offer frequent toileting  - Follow urinary retention protocol if ordered  Outcome: Progressing     Problem: SAFETY ADULT  Goal: Patient will remain free of falls  Description: INTERVENTIONS:  - Educate patient/family on patient safety including physical limitations  - Instruct patient to call for assistance with activity   - Consult OT/PT to assist with strengthening/mobility   - Keep Call bell within reach  - Keep bed low and locked with side rails adjusted as appropriate  - Keep care items and personal belongings within reach  - Initiate and maintain comfort rounds  - Make Fall Risk Sign visible to staff  - Offer Toileting every 2 Hours, in advance of need if unable to notify staff of need  - Initiate/Maintain bed/chair alarm for confusion, impulsivity, or noncompliance with notifying staff.  - Apply yellow socks and bracelet for high fall risk patients  - Consider  moving patient to room near nurses station  Outcome: Progressing  Goal: Maintain or return to baseline ADL function  Description: INTERVENTIONS:  -  Assess patient's ability to carry out ADLs; assess patient's baseline for ADL function and identify physical deficits which impact ability to perform ADLs (bathing, care of mouth/teeth, toileting, grooming, dressing, etc.)  - Assess/evaluate cause of self-care deficits   - Assess range of motion  - Assess patient's mobility; develop plan if impaired  - Assess patient's need for assistive devices and provide as appropriate  - Encourage maximum independence but intervene and supervise when necessary  - Involve family in performance of ADLs  - Assess for home care needs following discharge   - Consider OT consult to assist with ADL evaluation and planning for discharge  - Provide patient education as appropriate  Outcome: Progressing  Goal: Maintains/Returns to pre admission functional level  Description: INTERVENTIONS:  - Perform AM-PAC 6 Click Basic Mobility/ Daily Activity assessment daily.  - Set and communicate daily mobility goal to care team and patient/family/caregiver.   - Collaborate with rehabilitation services on mobility goals if consulted  - Perform Range of Motion 3 times a day.  - Reposition patient every 2 hours if unable to reposition self  - Out of bed for toileting if medically appropriate  - Record patient progress and toleration of activity level   Outcome: Progressing     Problem: DISCHARGE PLANNING  Goal: Discharge to home or other facility with appropriate resources  Description: INTERVENTIONS:  - Identify barriers to discharge w/patient and caregiver  - Arrange for needed discharge resources and transportation as appropriate  - Identify discharge learning needs (meds, wound care, etc.)  - Arrange for interpretive services to assist at discharge as needed  - Refer to Case Management Department for coordinating discharge planning if the patient  needs post-hospital services based on physician/advanced practitioner order or complex needs related to functional status, cognitive ability, or social support system  Outcome: Progressing     Problem: Knowledge Deficit  Goal: Patient/family/caregiver demonstrates understanding of disease process, treatment plan, medications, and discharge instructions  Description: Complete learning assessment and assess knowledge base.  Interventions:  - Provide teaching at level of understanding  - Provide teaching via preferred learning methods  Outcome: Progressing

## 2024-07-24 NOTE — ASSESSMENT & PLAN NOTE
With history of chronic lymphedema following with cardiology outpatient. Feels that his legs have been swelling more recently, right leg more swollen than left due to current cellulitis episode.   Started on Lasix infusion

## 2024-07-25 LAB
ANION GAP SERPL CALCULATED.3IONS-SCNC: 12 MMOL/L (ref 4–13)
APPEARANCE FLD: CLEAR
BUN SERPL-MCNC: 85 MG/DL (ref 5–25)
CALCIUM SERPL-MCNC: 10.1 MG/DL (ref 8.4–10.2)
CHLORIDE SERPL-SCNC: 102 MMOL/L (ref 96–108)
CO2 SERPL-SCNC: 23 MMOL/L (ref 21–32)
COLOR FLD: COLORLESS
CREAT SERPL-MCNC: 5.91 MG/DL (ref 0.6–1.3)
ERYTHROCYTE [DISTWIDTH] IN BLOOD BY AUTOMATED COUNT: 16.4 % (ref 11.6–15.1)
GFR SERPL CREATININE-BSD FRML MDRD: 8 ML/MIN/1.73SQ M
GLUCOSE SERPL-MCNC: 138 MG/DL (ref 65–140)
GLUCOSE SERPL-MCNC: 139 MG/DL (ref 65–140)
GLUCOSE SERPL-MCNC: 169 MG/DL (ref 65–140)
GLUCOSE SERPL-MCNC: 176 MG/DL (ref 65–140)
GLUCOSE SERPL-MCNC: 207 MG/DL (ref 65–140)
HCT VFR BLD AUTO: 24.7 % (ref 36.5–49.3)
HGB BLD-MCNC: 7.8 G/DL (ref 12–17)
HISTIOCYTES NFR FLD: 3 %
INR PPP: 4.63 (ref 0.84–1.19)
LYMPHOCYTES NFR BLD AUTO: 17 %
MAGNESIUM SERPL-MCNC: 2.3 MG/DL (ref 1.9–2.7)
MCH RBC QN AUTO: 31.1 PG (ref 26.8–34.3)
MCHC RBC AUTO-ENTMCNC: 31.6 G/DL (ref 31.4–37.4)
MCV RBC AUTO: 98 FL (ref 82–98)
MONO+MESO NFR FLD MANUAL: 1 %
MONOCYTES NFR BLD AUTO: 65 %
MRSA NOSE QL CULT: NORMAL
NEUTS SEG NFR BLD AUTO: 14 %
PHOSPHATE SERPL-MCNC: 5.1 MG/DL (ref 2.3–4.1)
PLATELET # BLD AUTO: 112 THOUSANDS/UL (ref 149–390)
PMV BLD AUTO: 8.9 FL (ref 8.9–12.7)
POTASSIUM SERPL-SCNC: 3.8 MMOL/L (ref 3.5–5.3)
PROCALCITONIN SERPL-MCNC: 0.65 NG/ML
PROTHROMBIN TIME: 43.9 SECONDS (ref 11.6–14.5)
RBC # BLD AUTO: 2.51 MILLION/UL (ref 3.88–5.62)
SITE: NORMAL
SODIUM SERPL-SCNC: 137 MMOL/L (ref 135–147)
TOTAL CELLS COUNTED SPEC: 100
WBC # BLD AUTO: 6.34 THOUSAND/UL (ref 4.31–10.16)
WBC # FLD MANUAL: 8 /UL

## 2024-07-25 PROCEDURE — 89051 BODY FLUID CELL COUNT: CPT | Performed by: INTERNAL MEDICINE

## 2024-07-25 PROCEDURE — 97163 PT EVAL HIGH COMPLEX 45 MIN: CPT

## 2024-07-25 PROCEDURE — 85027 COMPLETE CBC AUTOMATED: CPT | Performed by: FAMILY MEDICINE

## 2024-07-25 PROCEDURE — 82948 REAGENT STRIP/BLOOD GLUCOSE: CPT

## 2024-07-25 PROCEDURE — 83735 ASSAY OF MAGNESIUM: CPT | Performed by: FAMILY MEDICINE

## 2024-07-25 PROCEDURE — 84145 PROCALCITONIN (PCT): CPT | Performed by: FAMILY MEDICINE

## 2024-07-25 PROCEDURE — 84100 ASSAY OF PHOSPHORUS: CPT | Performed by: FAMILY MEDICINE

## 2024-07-25 PROCEDURE — 85610 PROTHROMBIN TIME: CPT | Performed by: FAMILY MEDICINE

## 2024-07-25 PROCEDURE — 97167 OT EVAL HIGH COMPLEX 60 MIN: CPT

## 2024-07-25 PROCEDURE — 99233 SBSQ HOSP IP/OBS HIGH 50: CPT | Performed by: INTERNAL MEDICINE

## 2024-07-25 PROCEDURE — 99232 SBSQ HOSP IP/OBS MODERATE 35: CPT

## 2024-07-25 PROCEDURE — 80048 BASIC METABOLIC PNL TOTAL CA: CPT | Performed by: FAMILY MEDICINE

## 2024-07-25 PROCEDURE — 99232 SBSQ HOSP IP/OBS MODERATE 35: CPT | Performed by: INTERNAL MEDICINE

## 2024-07-25 RX ORDER — DOCUSATE SODIUM 100 MG/1
100 CAPSULE, LIQUID FILLED ORAL 2 TIMES DAILY
Status: DISCONTINUED | OUTPATIENT
Start: 2024-07-25 | End: 2024-07-31

## 2024-07-25 RX ORDER — SENNOSIDES 8.6 MG
2 TABLET ORAL
Status: DISCONTINUED | OUTPATIENT
Start: 2024-07-25 | End: 2024-07-31

## 2024-07-25 RX ORDER — POTASSIUM CHLORIDE 20 MEQ/1
40 TABLET, EXTENDED RELEASE ORAL ONCE
Status: COMPLETED | OUTPATIENT
Start: 2024-07-25 | End: 2024-07-25

## 2024-07-25 RX ORDER — FERROUS SULFATE 325(65) MG
325 TABLET ORAL
Status: DISCONTINUED | OUTPATIENT
Start: 2024-07-25 | End: 2024-08-05 | Stop reason: HOSPADM

## 2024-07-25 RX ADMIN — LEVOTHYROXINE SODIUM 125 MCG: 125 TABLET ORAL at 05:09

## 2024-07-25 RX ADMIN — HYDROCODONE BITARTRATE AND ACETAMINOPHEN 2 TABLET: 5; 325 TABLET ORAL at 10:52

## 2024-07-25 RX ADMIN — DOCUSATE SODIUM 100 MG: 100 CAPSULE, LIQUID FILLED ORAL at 17:01

## 2024-07-25 RX ADMIN — INSULIN GLARGINE 15 UNITS: 100 INJECTION, SOLUTION SUBCUTANEOUS at 20:53

## 2024-07-25 RX ADMIN — METOPROLOL SUCCINATE 100 MG: 100 TABLET, EXTENDED RELEASE ORAL at 20:53

## 2024-07-25 RX ADMIN — HYDROCODONE BITARTRATE AND ACETAMINOPHEN 2 TABLET: 5; 325 TABLET ORAL at 05:08

## 2024-07-25 RX ADMIN — INSULIN GLARGINE 15 UNITS: 100 INJECTION, SOLUTION SUBCUTANEOUS at 09:10

## 2024-07-25 RX ADMIN — HYDROMORPHONE HYDROCHLORIDE 1 MG: 1 INJECTION, SOLUTION INTRAMUSCULAR; INTRAVENOUS; SUBCUTANEOUS at 19:44

## 2024-07-25 RX ADMIN — HYDROMORPHONE HYDROCHLORIDE 1 MG: 1 INJECTION, SOLUTION INTRAMUSCULAR; INTRAVENOUS; SUBCUTANEOUS at 12:24

## 2024-07-25 RX ADMIN — TAMSULOSIN HYDROCHLORIDE 0.4 MG: 0.4 CAPSULE ORAL at 16:03

## 2024-07-25 RX ADMIN — ATORVASTATIN CALCIUM 40 MG: 40 TABLET, FILM COATED ORAL at 16:03

## 2024-07-25 RX ADMIN — INSULIN LISPRO 1 UNITS: 100 INJECTION, SOLUTION INTRAVENOUS; SUBCUTANEOUS at 17:00

## 2024-07-25 RX ADMIN — POTASSIUM CHLORIDE 40 MEQ: 1500 TABLET, EXTENDED RELEASE ORAL at 09:59

## 2024-07-25 RX ADMIN — FERROUS SULFATE TAB 325 MG (65 MG ELEMENTAL FE) 325 MG: 325 (65 FE) TAB at 09:59

## 2024-07-25 RX ADMIN — HYDROCODONE BITARTRATE AND ACETAMINOPHEN 2 TABLET: 5; 325 TABLET ORAL at 17:00

## 2024-07-25 RX ADMIN — HYDROMORPHONE HYDROCHLORIDE 1 MG: 1 INJECTION, SOLUTION INTRAMUSCULAR; INTRAVENOUS; SUBCUTANEOUS at 09:10

## 2024-07-25 RX ADMIN — EPOETIN ALFA 20000 UNITS: 10000 SOLUTION INTRAVENOUS; SUBCUTANEOUS at 10:52

## 2024-07-25 RX ADMIN — ASPIRIN 81 MG: 81 TABLET, COATED ORAL at 09:10

## 2024-07-25 RX ADMIN — PIPERACILLIN AND TAZOBACTAM 4.5 G: 36; 4.5 INJECTION, POWDER, FOR SOLUTION INTRAVENOUS at 19:43

## 2024-07-25 RX ADMIN — HYDROMORPHONE HYDROCHLORIDE 1 MG: 1 INJECTION, SOLUTION INTRAMUSCULAR; INTRAVENOUS; SUBCUTANEOUS at 15:27

## 2024-07-25 RX ADMIN — Medication 400 MG: at 09:10

## 2024-07-25 RX ADMIN — METOPROLOL SUCCINATE 100 MG: 100 TABLET, EXTENDED RELEASE ORAL at 09:10

## 2024-07-25 RX ADMIN — DOCUSATE SODIUM 100 MG: 100 CAPSULE, LIQUID FILLED ORAL at 10:52

## 2024-07-25 RX ADMIN — INSULIN LISPRO 2 UNITS: 100 INJECTION, SOLUTION INTRAVENOUS; SUBCUTANEOUS at 10:56

## 2024-07-25 RX ADMIN — INSULIN LISPRO 1 UNITS: 100 INJECTION, SOLUTION INTRAVENOUS; SUBCUTANEOUS at 21:02

## 2024-07-25 RX ADMIN — HYDROCODONE BITARTRATE AND ACETAMINOPHEN 2 TABLET: 5; 325 TABLET ORAL at 21:52

## 2024-07-25 RX ADMIN — PIPERACILLIN AND TAZOBACTAM 4.5 G: 36; 4.5 INJECTION, POWDER, FOR SOLUTION INTRAVENOUS at 07:50

## 2024-07-25 RX ADMIN — POLYETHYLENE GLYCOL 3350 17 G: 17 POWDER, FOR SOLUTION ORAL at 10:51

## 2024-07-25 RX ADMIN — SENNOSIDES 17.2 MG: 8.6 TABLET, FILM COATED ORAL at 21:02

## 2024-07-25 NOTE — PLAN OF CARE
Problem: Prexisting or High Potential for Compromised Skin Integrity  Goal: Skin integrity is maintained or improved  Description: INTERVENTIONS:  - Identify patients at risk for skin breakdown  - Assess and monitor skin integrity  - Assess and monitor nutrition and hydration status  - Monitor labs   - Assess for incontinence   - Turn and reposition patient  - Assist with mobility/ambulation  - Relieve pressure over bony prominences  - Avoid friction and shearing  - Provide appropriate hygiene as needed including keeping skin clean and dry  - Evaluate need for skin moisturizer/barrier cream  - Collaborate with interdisciplinary team   - Patient/family teaching  - Consider wound care consult   Outcome: Progressing     Problem: METABOLIC, FLUID AND ELECTROLYTES - ADULT  Goal: Electrolytes maintained within normal limits  Description: INTERVENTIONS:  - Monitor labs and assess patient for signs and symptoms of electrolyte imbalances  - Administer electrolyte replacement as ordered  - Monitor response to electrolyte replacements, including repeat lab results as appropriate  - Instruct patient on fluid and nutrition as appropriate  Outcome: Progressing  Goal: Fluid balance maintained  Description: INTERVENTIONS:  - Monitor labs   - Monitor I/O and WT  - Instruct patient on fluid and nutrition as appropriate  - Assess for signs & symptoms of volume excess or deficit  Outcome: Progressing  Goal: Glucose maintained within target range  Description: INTERVENTIONS:  - Monitor Blood Glucose as ordered  - Assess for signs and symptoms of hyperglycemia and hypoglycemia  - Administer ordered medications to maintain glucose within target range  - Assess nutritional intake and initiate nutrition service referral as needed  Outcome: Progressing     Problem: SKIN/TISSUE INTEGRITY - ADULT  Goal: Incision(s), wounds(s) or drain site(s) healing without S/S of infection  Description: INTERVENTIONS  - Assess and document dressing,  incision, wound bed, drain sites and surrounding tissue  - Provide patient and family education  - Perform skin care/dressing changes per wound  Outcome: Progressing     Problem: GENITOURINARY - ADULT  Goal: Maintains or returns to baseline urinary function  Description: INTERVENTIONS:  - Assess urinary function  - Encourage oral fluids to ensure adequate hydration if ordered  - Administer IV fluids as ordered to ensure adequate hydration  - Administer ordered medications as needed  - Offer frequent toileting  - Follow urinary retention protocol if ordered  Outcome: Progressing     Problem: PAIN - ADULT  Goal: Verbalizes/displays adequate comfort level or baseline comfort level  Description: Interventions:  - Encourage patient to monitor pain and request assistance  - Assess pain using appropriate pain scale  - Administer analgesics based on type and severity of pain and evaluate response  - Implement non-pharmacological measures as appropriate and evaluate response  - Consider cultural and social influences on pain and pain management  - Notify physician/advanced practitioner if interventions unsuccessful or patient reports new pain  Outcome: Progressing     Problem: INFECTION - ADULT  Goal: Absence or prevention of progression during hospitalization  Description: INTERVENTIONS:  - Assess and monitor for signs and symptoms of infection  - Monitor lab/diagnostic results  - Monitor all insertion sites, i.e. indwelling lines, tubes, and drains  - Monitor endotracheal if appropriate and nasal secretions for changes in amount and color  - [place] appropriate cooling/warming therapies per order  - Administer medications as ordered  - Instruct and encourage patient and family to use good hand hygiene technique  - Identify and instruct in appropriate isolation precautions for identified infection/condition  Outcome: Progressing     Problem: SAFETY ADULT  Goal: Patient will remain free of falls  Description:  INTERVENTIONS:  - Educate patient/family on patient safety including physical limitations  - Instruct patient to call for assistance with activity   - Consult OT/PT to assist with strengthening/mobility   - Keep Call bell within reach  - Keep bed low and locked with side rails adjusted as appropriate  - Keep care items and personal belongings within reach  - Initiate and maintain comfort rounds  - Make Fall Risk Sign visible to staff  - Offer Toileting every 2 Hours, in advance of need if unable to notify staff of need  - Initiate/Maintain bed/chair alarm for confusion, impulsivity, or noncompliance with notifying staff.  - Apply yellow socks and bracelet for high fall risk patients  - Consider moving patient to room near nurses station  Outcome: Progressing  Goal: Maintain or return to baseline ADL function  Description: INTERVENTIONS:  -  Assess patient's ability to carry out ADLs; assess patient's baseline for ADL function and identify physical deficits which impact ability to perform ADLs (bathing, care of mouth/teeth, toileting, grooming, dressing, etc.)  - Assess/evaluate cause of self-care deficits   - Assess range of motion  - Assess patient's mobility; develop plan if impaired  - Assess patient's need for assistive devices and provide as appropriate  - Encourage maximum independence but intervene and supervise when necessary  - Involve family in performance of ADLs  - Assess for home care needs following discharge   - Consider OT consult to assist with ADL evaluation and planning for discharge  - Provide patient education as appropriate  Outcome: Progressing  Goal: Maintains/Returns to pre admission functional level  Description: INTERVENTIONS:  - Perform AM-PAC 6 Click Basic Mobility/ Daily Activity assessment daily.  - Set and communicate daily mobility goal to care team and patient/family/caregiver.   - Collaborate with rehabilitation services on mobility goals if consulted  - Perform Range of Motion 3  times a day.  - Reposition patient every 2 hours if unable to reposition self  - Out of bed for toileting if medically appropriate  - Record patient progress and toleration of activity level   Outcome: Progressing     Problem: DISCHARGE PLANNING  Goal: Discharge to home or other facility with appropriate resources  Description: INTERVENTIONS:  - Identify barriers to discharge w/patient and caregiver  - Arrange for needed discharge resources and transportation as appropriate  - Identify discharge learning needs (meds, wound care, etc.)  - Arrange for interpretive services to assist at discharge as needed  - Refer to Case Management Department for coordinating discharge planning if the patient needs post-hospital services based on physician/advanced practitioner order or complex needs related to functional status, cognitive ability, or social support system  Outcome: Progressing     Problem: Knowledge Deficit  Goal: Patient/family/caregiver demonstrates understanding of disease process, treatment plan, medications, and discharge instructions  Description: Complete learning assessment and assess knowledge base.  Interventions:  - Provide teaching at level of understanding  - Provide teaching via preferred learning methods  Outcome: Progressing     Problem: Nutrition/Hydration-ADULT  Goal: Nutrient/Hydration intake appropriate for improving, restoring or maintaining nutritional needs  Description: Monitor and assess patient's nutrition/hydration status for malnutrition. Collaborate with interdisciplinary team and initiate plan and interventions as ordered.  Monitor patient's weight and dietary intake as ordered or per policy. Utilize nutrition screening tool and intervene as necessary. Determine patient's food preferences and provide high-protein, high-caloric foods as appropriate.     INTERVENTIONS:  - Monitor oral intake, urinary output, labs, and treatment plans  - Assess nutrition and hydration status and recommend  course of action  - Evaluate amount of meals eaten  - Assist patient with eating if necessary   - Allow adequate time for meals  - Recommend/ encourage appropriate diets, oral nutritional supplements, and vitamin/mineral supplements  - Order, calculate, and assess calorie counts as needed  - Recommend, monitor, and adjust tube feedings and TPN/PPN based on assessed needs  - Assess need for intravenous fluids  - Provide specific nutrition/hydration education as appropriate  - Include patient/family/caregiver in decisions related to nutrition  Outcome: Progressing     Problem: Knowledge Deficit  Goal: Patient/family/caregiver demonstrates understanding of disease process, treatment plan, medications, and discharge instructions  Description: Complete learning assessment and assess knowledge base.  Interventions:  - Provide teaching at level of understanding  - Provide teaching via preferred learning methods  Outcome: Progressing

## 2024-07-25 NOTE — PROGRESS NOTES
NEPHROLOGY PROGRESS NOTE   Masoud Perry 68 y.o. male MRN: [id number]  Unit/Bed#: -01 Encounter: [id number]      HPI/24hr EVENTS:    -This is a 68 year old male with a history of chronic lymphedema, hypertension, CVA, atrial fibrillation, ESRD on PD, chronic diastolic heart failure, BPH, type 2 diabetes and nephrolithiasis presenting for worsening bilateral lower extremity edema despite increasing the dose and frequency of his outpatient diuretics as well as a painful and erythematous right leg. Nephrology consulted for management of volume status.     -Patient seen and examined today. He was undergoing therapy at the time of my examination. He reports that 2.4L of urine output is normal for him. No acute overnight events noted.     ASSESSMENT/PLAN:    ESRD on PD  Patient's outpatient clinic is [facility] under the care of Dr Santos. EDW is 119 kg. He recently started about a month ago and undergoes nightly CCPD at home. His outpatient prescription is a 2400 fill volume with 3 exchanges for a total fill volume of 7200 mL over 9 hours. He is currently undergoing CAPD with 2000 cc fills and 4 exchanges per day with dextrose 2.5%. His most recent weight was 116 kg on bed scale with no PD fluid instilled. Trying to avoid 4.25% dextrose solution to prevent hyperglycemia with a history of type 2 diabetes but will continue to monitor and utilize if indicated.     Acute on chronic diastolic congestive heart failure  He was started on a lasix infusion at 20 cc/hr on 7/24. In this span he is net negative 1.6 L and has a urine output of 2.4 L over 24 hours. His weight has also decreased to 116 kg on bed scale. He continues to examine significantly volume overloaded and will likely continue with aggressive diuresis over the next 24 hours. Further escalation of diuresis as necessary vs initiation of sequential nephron blockade however metolazone use may be limited due to the patient's allergy of metallic taste on  this medication. Would encourage patient to abide by a salt restriction    Mild hyponatremia  Resolved with fluid restriction and aggressive diuresis. Continue to monitor     Hypokalemia  Afternoon bmp on 7/24 revealing hypokalemia was repleted and levels now back to normal. Continue to monitor on lasix infusion    Anemia  Recent iron saturation revealing an isat of 6 as well as a hemoglobin of 7.8 compared to 10.1 on 7/23. Patient reports not having a bowel movement since being hospitalized. With active bacteremia would avoid IV venofer as this may exacerbate infection. Transfuse for hb less than 7 as indicated    Atrial fibrillation  Patient rate controlled with metoprolol 100 mg BID. Holding coumadin per primary team    Bacteremia  Patient with growth of enterobacterales on blood culture in setting of RLE cellulitis. Antibiotics adjusted and renally dosed per ID    Hyperphosphatemia  Phosphorus level 5.1, no active intervention indicated at this time. Continue to monitor     ROS:  Review of Systems   Constitutional:  Positive for chills. Negative for fever.   HENT:  Negative for rhinorrhea and sore throat.    Respiratory:  Negative for cough and shortness of breath.    Cardiovascular:  Negative for chest pain and palpitations.   Gastrointestinal:  Negative for abdominal pain and nausea.   Genitourinary:  Negative for dysuria and hematuria.   Neurological:  Negative for dizziness and headaches.      A complete 10 point review of systems was performed and found to be negative unless otherwise noted above or in the HPI.    OBJECTIVE:  Current Weight: Weight - Scale: 116 kg (256 lb 9.9 oz) (new bed; no PD fluid instilled)  Vitals:    07/25/24 0037 07/25/24 0300 07/25/24 0600 07/25/24 0722   BP: 139/64 126/60  139/64   BP Location: Right arm Right arm  Right arm   Pulse: 74 69 61 66   Resp: (!) 33 22 18 (!) 32   Temp: 97.6 °F (36.4 °C) 97.9 °F (36.6 °C)  98 °F (36.7 °C)   TempSrc: Temporal Temporal  Temporal   SpO2:  99% 98% 99% 99%   Weight:  116 kg (256 lb 9.9 oz)     Height:           Intake/Output Summary (Last 24 hours) at 7/25/2024 0918  Last data filed at 7/25/2024 0822  Gross per 24 hour   Intake 1786.3 ml   Output 2775 ml   Net -988.7 ml     Physical Exam  Vitals and nursing note reviewed.   Constitutional:       General: He is not in acute distress.     Appearance: He is well-developed.   HENT:      Head: Normocephalic and atraumatic.   Cardiovascular:      Rate and Rhythm: Normal rate and regular rhythm.      Heart sounds: No murmur heard.  Pulmonary:      Effort: Pulmonary effort is normal. No respiratory distress.      Breath sounds: Normal breath sounds.   Abdominal:      Palpations: Abdomen is soft.      Tenderness: There is no abdominal tenderness.   Musculoskeletal:      Right lower leg: Edema present.      Left lower leg: Edema present.   Skin:     General: Skin is warm and dry.      Capillary Refill: Capillary refill takes less than 2 seconds.   Neurological:      Mental Status: He is alert.   Psychiatric:         Mood and Affect: Mood normal.          Medications:    Current Facility-Administered Medications:     acetaminophen (TYLENOL) tablet 650 mg, 650 mg, Oral, Q6H PRN, Rachel Conn PA-C    aspirin (ECOTRIN LOW STRENGTH) EC tablet 81 mg, 81 mg, Oral, Daily, Rachel Conn PA-C, 81 mg at 07/25/24 0910    atorvastatin (LIPITOR) tablet 40 mg, 40 mg, Oral, Daily With Dinner, Rachel Conn PA-C, 40 mg at 07/24/24 1540    epoetin domingo (EPOGEN,PROCRIT) injection 20,000 Units, 20,000 Units, Subcutaneous, Once, Jignesh Hussein MD    ferrous sulfate tablet 325 mg, 325 mg, Oral, Daily With Breakfast, Jignesh Hussein MD    furosemide (LASIX) 500 mg infusion 50 mL, 20 mg/hr, Intravenous, Continuous, Nickolas Blount PA-C, Last Rate: 2 mL/hr at 07/24/24 2305, 20 mg/hr at 07/24/24 2305    HYDROcodone-acetaminophen (NORCO) 5-325 mg per tablet 1 tablet, 1 tablet, Oral, Q6H PRN, Rachel Conn PA-C     HYDROcodone-acetaminophen (NORCO) 5-325 mg per tablet 2 tablet, 2 tablet, Oral, Q6H PRN, Rachel Conn PA-C, 2 tablet at 07/25/24 0508    HYDROmorphone (DILAUDID) injection 1 mg, 1 mg, Intravenous, Q3H PRN, Citlali S Trudy, CRNP, 1 mg at 07/25/24 0910    insulin glargine (LANTUS) subcutaneous injection 15 Units 0.15 mL, 15 Units, Subcutaneous, Q12H VASU, Rachel Conn PA-C, 15 Units at 07/25/24 0910    insulin lispro (HumALOG/ADMELOG) 100 units/mL subcutaneous injection 1-6 Units, 1-6 Units, Subcutaneous, HS, Rachel Conn PA-C    insulin lispro (HumALOG/ADMELOG) 100 units/mL subcutaneous injection 1-6 Units, 1-6 Units, Subcutaneous, TID AC, 1 Units at 07/24/24 1145 **AND** Fingerstick Glucose (POCT), , , 4x Daily AC and at bedtime, Nora Mathew MD    levothyroxine tablet 125 mcg, 125 mcg, Oral, Early Morning, Rachel Conn PA-C, 125 mcg at 07/25/24 0509    magnesium Oxide (MAG-OX) tablet 400 mg, 400 mg, Oral, Daily, Rachel Conn PA-C, 400 mg at 07/25/24 0910    metoprolol succinate (TOPROL-XL) 24 hr tablet 100 mg, 100 mg, Oral, BID, Rachel Conn PA-C, 100 mg at 07/25/24 0910    nicotine (NICODERM CQ) 14 mg/24hr TD 24 hr patch 1 patch, 1 patch, Transdermal, Daily, Rachel Conn PA-C    [COMPLETED] piperacillin-tazobactam (ZOSYN) 4.5 g in sodium chloride 0.9 % 100 mL IV LOADING DOSE, 4.5 g, Intravenous, Once, Stopped at 07/24/24 1650 **FOLLOWED BY** piperacillin-tazobactam (ZOSYN) 4.5 g in sodium chloride 0.9 % 100 mL IVPB (EXTENDED INFUSION), 4.5 g, Intravenous, Q12H, Rachel Conn PA-C, Last Rate: 25 mL/hr at 07/25/24 0750, 4.5 g at 07/25/24 0750    polyethylene glycol (MIRALAX) packet 17 g, 17 g, Oral, Daily PRN, Rachel Conn PA-C    potassium chloride (Klor-Con M20) CR tablet 40 mEq, 40 mEq, Oral, Once, Jignesh Hussein MD    simethicone (MYLICON) chewable tablet 80 mg, 80 mg, Oral, 4x Daily PRN, Rachel Conn PA-C    tamsulosin (FLOMAX) capsule 0.4  mg, 0.4 mg, Oral, Daily With Dinner, Rachel Conn PA-C, 0.4 mg at 07/24/24 1540    Laboratory Results:  Results from last 7 days   Lab Units 07/25/24  0341 07/24/24  1554 07/24/24  1039 07/24/24  0450 07/23/24  1253   WBC Thousand/uL 6.34  --   --  6.62 9.06   HEMOGLOBIN g/dL 7.8*  --  7.5* 7.5* 10.1*   HEMATOCRIT % 24.7*  --  23.4* 23.9* 31.7*   PLATELETS Thousands/uL 112*  --   --  101* 134*   POTASSIUM mmol/L 3.8 3.2*  --  3.7 3.5   CHLORIDE mmol/L 102 102  --  103 99   CO2 mmol/L 23 23  --  21 21   BUN mg/dL 85* 83*  --  84* 83*   CREATININE mg/dL 5.91* 5.86*  --  5.88* 5.84*   CALCIUM mg/dL 10.1 9.9  --  9.8 10.8*   MAGNESIUM mg/dL 2.3  --   --  2.2  --    PHOSPHORUS mg/dL 5.1*  --   --  4.9*  --        I have personally reviewed the blood work as stated above and in my note.  I have personally reviewed internal medicine note.

## 2024-07-25 NOTE — NURSING NOTE
Pt cont with significant pain in right lower extremity, attempted to work with physical therapy, able to dangle at side. Refused to get oob to chair or stand, move or walk. Becomes agitated and verbally aggressive with staff when educated on need to move, deep breath and get oob. Pt medicated for pain but cont to refuse to reposition in bed. Refusing to allow dressing to be applied or cleansing of wounds on rt leg. Refusing to elevate or even let staff touch rt leg. Ofelia Salmeron PA-C for [facility] made aware of same.

## 2024-07-25 NOTE — ASSESSMENT & PLAN NOTE
Presented to ED with bilateral leg swelling x 4 days. Right leg more swollen and red over the last 2 days. Significant pain and having drainage in the right leg as well. NO fevers or chills.   No recent antibiotics outpatient  CT RLE: No fluid collection seen, cellulitic changes leg, No intramuscular collection, no lytic lesion or periosteal reaction  Procal slightly elevated 0.89, now down trending   Wound culture pending  BC 1/2 gram negative rods; Enterobacterales   Follow CBC and fever curve  ID consulted: Start Zosyn renally dose, follow-up blood cultures, anticipate 10 days of therapy. Slow and prolonged recovery expected  Now agreeable to wound care and dressing changes   General surgery consulted- no surgical intervention, no signs of necrosis. Continue wound care    Lab Results   Component Value Date    WBC 6.34 07/25/2024    WBC 6.62 07/24/2024    WBC 9.06 07/23/2024

## 2024-07-25 NOTE — ASSESSMENT & PLAN NOTE
Lab Results   Component Value Date    EGFR 8 07/25/2024    EGFR 8 07/24/2024    EGFR 8 07/24/2024    CREATININE 5.91 (H) 07/25/2024    CREATININE 5.86 (H) 07/24/2024    CREATININE 5.88 (H) 07/24/2024     History of stage 5 ckd recently started on peritoneal dialysis, first treatment on 6/24. Had PD catheter placed on 6/7/2024. Was scheduled to do PD dialysis in clinic and then transition to home PD dialysis.   Last PD was done yesterday, patient currently doing every other day PD, goal for everyday per patient.   Nephro: His outpatient prescription is a 2400 fill volume with 3 exchanges for a total fill volume of 7200 mL over 9 hours.  Patient was not as compliant with dialysis over the last few days due to feeling sick.  While inpatient, plan on CAPD with 2000 cc fills, 4 exchanges a day of dextrose 2.5%.   Avoid nephrotoxic medications, nsaids, hypotension

## 2024-07-25 NOTE — CONSULTS
Consultation - General Surgery   Masoud Perry 68 y.o. male MRN: [id number]  Unit/Bed#: -01 Encounter: [id number]    Assessment:  Masoud Perry is a 68 y.o. male with past medical history significant for anemia of chronic disease, chronic lymphedema, anticoagulated on Coumadin for atrial fibrillation, BPH, coronary artery disease, chronic kidney disease on peritoneal dialysis, COPD, history of CVA, diastolic CHF, DVT, factor V deficiency, hypertension, hyperlipidemia, history of myocardial infarction, morbid obesity, tobacco abuse, who initially presented to [facility] with complaints of bilateral lower extremity swelling for a few days prior to admission, right leg worse than left leg with significant pain and drainage in the right leg.     CT lower extremity wo contrast right 07/24/2024:  -No fluid collection seen  -Cellulitic changes leg  -No intramuscular collection  -No lytic lesion or periosteal reaction    Venous duplex b/l LE 07/23/2024:  -RIGHT LOWER LIMB:  No evidence of acute or chronic deep vein thrombosis in the visualized  segments.  No evidence of superficial thrombophlebitis noted.  Doppler evaluation shows a normal response to augmentation maneuvers.  Popliteal, posterior tibial and anterior tibial arterial Doppler waveforms are  triphasic/hyperemic.  -LEFT LOWER LIMB:  No evidence of acute or chronic deep vein thrombosis in the visualized  segments.  No evidence of superficial thrombophlebitis noted.  Doppler evaluation shows a normal response to augmentation maneuvers.  Popliteal, posterior tibial and anterior tibial arterial Doppler waveforms are  triphasic/biphasic.  -Technically difficult/limited study. The bilateral calf veins were poorly  visualized on today's exam.    -Afebrile, vital signs stable on room air    -No evidence of leukocytosis WBC 6 (6, 9)  -Chronic anemia with hemoglobin of 7.8 (7.5, 7.5, 10.1)  -Chronic thrombocytopenia platelets 112 (101, 134)  -Chronic kidney  disease stage V on PD EGFR 8 (8, 8, 8)  -Supratherapeutic, but trending downward, holding Coumadin, PT 43.9, INR 4.6 (64/7.56)  -downward trending procal 0.65 (0.72, 0.89)  -MRSA culture neg  -wound culture 07/23/2024: no polys +4 gram neg rods  -blood culture 07/23/2024: +gram neg rods 1/2    Plan:  -no plans for acute surgical intervention as no evidence of abscess or collection on ct scan  -cont IV zosyn  -cont volume control per nephrology  -agree with wound care recommendations, when pt is able to tolerate:  cleansing bilateral lower extremities with soap and water.  Apply skin nourishing cream to intact skin.  Apply normal gel to wound beds and cover with Adaptic nonadherent dressing cover with Melgisorb Ag dry sterile dressing wrap with Kerlix and Ace.  Change daily and as needed.  Along with low air mattress.  -cont to elevate as tolerated by patient    -remainder of care per primary team      History of Present Illness   Chief Complaint: b/l LE edema R>L with pain and drainage    HPI:  Masoud Perry is a 68 y.o. male with past medical history significant for anemia of chronic disease, chronic lymphedema, anticoagulated on Coumadin for atrial fibrillation, BPH, coronary artery disease, chronic kidney disease on peritoneal dialysis, COPD, history of CVA, diastolic CHF, DVT, factor V deficiency, hypertension, hyperlipidemia, history of myocardial infarction, morbid obesity, tobacco abuse, who initially presented to [facility] with complaints of bilateral lower extremity swelling for a few days prior to admission, right leg worse than left leg with significant pain and drainage in the right leg.     Patient was admitted, underwent CT scan of right lower extremity with no fluid collection, but cellulitic changes noted.  No intramuscular collection no, lytic lesions or periosteal reaction.  Blood cultures 1 out of 2 showing gram-negative rods Enterobacter is on Zosyn per infectious disease  recommendations, with continued improvement in leukocytosis and Pro-Scott with downward trend.    Patient was seen and evaluated yesterday by wound care, who made recommendations of cleansing bilateral lower extremities with soap and water.  Apply skin nourishing cream to intact skin.  Apply normal gel to wound beds and cover with Adaptic nonadherent dressing cover with Melgisorb Ag dry sterile dressing wrap with Kerlix and Ace.  Change daily and as needed.  Along with low air mattress.  Unfortunately patient has not allowed wound care to be completed.    Given continued pain, consultation was placed to general surgery for evaluation.    Review of Systems   Skin:  Positive for color change.        Chronic venous stasis changes appreciated with erythema and hyperpigmentation       Historical Information   Past Medical History:   Diagnosis Date    Anemia in chronic kidney disease     Anticoagulated on Coumadin     Atrial fibrillation (HCC)     BPH (benign prostatic hyperplasia)     CAD (coronary artery disease)     CKD (chronic kidney disease), stage V ([facility])     Compartment syndrome of forearm ([facility])     Right, 2019    COPD (chronic obstructive pulmonary disease) ([facility])     CVA (cerebral vascular accident) ([facility])     Diastolic CHF ([facility])     grade 1 DD    DVT (deep venous thrombosis) ([facility])     Factor V deficiency ([facility])     Gout     Hyperlipidemia     Hypertension     Hypothyroidism     MI (myocardial infarction) ([facility])     x3 - 1999, 2010, after 2015    Morbid obesity ([facility])     Nephrolithiasis     Noncompliance with medications     SHAD (obstructive sleep apnea)     Peritoneal dialysis catheter in place ([facility])     Pulmonary embolism ([facility])     Secondary hyperparathyroidism of renal origin ([facility])     Spinal stenosis of lumbar region     Status post placement of implantable loop recorder     Tobacco dependence      Past Surgical History:   Procedure Laterality Date    CARDIAC ELECTROPHYSIOLOGY STUDY AND ABLATION  04/29/2024     CHOLECYSTECTOMY      CORONARY ANGIOPLASTY WITH STENT PLACEMENT      JOINT REPLACEMENT      bilateral knee    WI CYSTO/URETERO W/LITHOTRIPSY &INDWELL STENT INSRT Left 09/24/2022    Procedure: CYSTOSCOPY URETEROSCOPY WITH LITHOTRIPSY HOLMIUM LASER, AND INSERTION STENT URETERAL;  Surgeon: Lewis Dahl MD;  Location:  MAIN OR;  Service: Urology    WI LAPS INSERTION TUNNELED INTRAPERITONEAL CATHETER N/A 6/7/2024    Procedure: INSERTION PERITONEAL CATHETER DIALYSIS LAPAROSCOPIC;  Surgeon: Hiram Park DO;  Location: [facility] MAIN OR;  Service: General    US GUIDED KIDNEY BIOPSY  08/05/2020     Social History   Social History     Substance and Sexual Activity   Alcohol Use Not Currently     Social History     Substance and Sexual Activity   Drug Use Never     E-Cigarette/Vaping    E-Cigarette Use Never User      E-Cigarette/Vaping Substances     Social History     Tobacco Use   Smoking Status Every Day    Current packs/day: 0.50    Types: Cigarettes   Smokeless Tobacco Never     Family History: non-contributory    Meds/Allergies   all current active meds have been reviewed  Allergies   Allergen Reactions    Carvedilol Shortness Of Breath    Saccharin - Food Allergy Diarrhea     GI intolerance, adamant does not want to receive    Sucralose - Food Allergy Diarrhea     GI intolerance, adamant does not want to receive    Amiodarone Dizziness    Aspartame - Food Allergy Diarrhea    Bumetanide GI Intolerance and Lightheadedness           Cephalexin Other (See Comments)     unknown    Ciprofloxacin Rash    Hydralazine Tachycardia    Lisinopril GI Intolerance           Metolazone Other (See Comments)     Metallic taste    Morphine Swelling     Of injection site    Nifedipine Lightheadedness    Strawberry Extract - Food Allergy Rash       Objective   First Vitals:   Blood Pressure: 126/58 (07/23/24 1243)  Pulse: 89 (07/23/24 1243)  Temperature: 97.9 °F (36.6 °C) (07/23/24 1242)  Temp Source: Temporal (07/23/24 1242)  Respirations: 18  "(07/23/24 1242)  Height: 5' 10\" (177.8 cm) (07/23/24 1242)  Weight - Scale: 121 kg (265 lb 10.5 oz) (07/23/24 1242)  SpO2: 97 % (07/23/24 1243)    Current Vitals:   Blood Pressure: 142/66 (07/25/24 0900)  Pulse: 77 (07/25/24 0900)  Temperature: 97.5 °F (36.4 °C) (07/25/24 0900)  Temp Source: Temporal (07/25/24 0900)  Respirations: 22 (07/25/24 0900)  Height: 5' 10\" (177.8 cm) (07/23/24 1530)  Weight - Scale: 116 kg (256 lb 9.9 oz) (new bed; no PD fluid instilled) (07/25/24 0300)  SpO2: 96 % (07/25/24 0900)      Intake/Output Summary (Last 24 hours) at 7/25/2024 1250  Last data filed at 7/25/2024 1230  Gross per 24 hour   Intake 1914.13 ml   Output 2150 ml   Net -235.87 ml       Invasive Devices       Peripheral Intravenous Line  Duration             Peripheral IV 07/23/24 Right;Ventral (anterior) Forearm 1 day    Long-Dwell Peripheral IV (Midline) 07/24/24 Left Basilic <1 day              Line  Duration             Peritoneal Dialysis Catheter Column-disk Abdominal 48 days                    Physical Exam  Vitals reviewed.   Constitutional:       Appearance: He is ill-appearing.      Comments: Chronically ill appearing   Cardiovascular:      Rate and Rhythm: Normal rate.      Comments: Easily dopplerable DP and PT on RLE  Pulmonary:      Effort: Pulmonary effort is normal. No respiratory distress.   Musculoskeletal:         General: Swelling (+ 3 pitting edema RLE) present.   Skin:     General: Skin is warm.      Findings: Erythema, lesion and rash present.      Comments: Chronic venous stasis changes with hyperpigmentation to B/L LE.    Pitting edema B/L LE    Right leg with erythema/hyperpigmentation extending from right foot to below right knee with no large open wounds but small skin tear at anterior shin and bottom of foot that is leaking serosang fluid ( in setting of INR 4). Foot and leg are warm with no evidence of necrosis. Leg is tender and patient is not agreeable to leg being moved or lifted at time of " evaluation besides a very small internal rotation at his hip.    Neurological:      General: No focal deficit present.      Mental Status: He is alert.         Lab Results: I have personally reviewed pertinent lab results.  , CBC:   Lab Results   Component Value Date    WBC 6.34 07/25/2024    HGB 7.8 (L) 07/25/2024    HCT 24.7 (L) 07/25/2024    MCV 98 07/25/2024     (L) 07/25/2024    RBC 2.51 (L) 07/25/2024    MCH 31.1 07/25/2024    MCHC 31.6 07/25/2024    RDW 16.4 (H) 07/25/2024    MPV 8.9 07/25/2024   , CMP:   Lab Results   Component Value Date    SODIUM 137 07/25/2024    K 3.8 07/25/2024     07/25/2024    CO2 23 07/25/2024    BUN 85 (H) 07/25/2024    CREATININE 5.91 (H) 07/25/2024    CALCIUM 10.1 07/25/2024    EGFR 8 07/25/2024     Imaging: I have personally reviewed pertinent films in PACS  EKG, Pathology, and Other Studies: I have personally reviewed pertinent reports.        Code Status: Level 1 - Full Code  Advance Directive and Living Will:      Power of :    POLST:      Counseling / Coordination of Care  Total floor / unit time spent today 30 minutes.  Greater than 50% of total time was spent with the patient and / or family counseling and / or coordination of care.  A description of the counseling / coordination of care: Potential plan of care, recommendations.

## 2024-07-25 NOTE — UTILIZATION REVIEW
NOTIFICATION OF INPATIENT ADMISSION   AUTHORIZATION REQUEST   SERVICING FACILITY:   [place] [address]  Tax ID: [id number]  NPI: [id number] ATTENDING PROVIDER:  Attending Name and NPI#: Nora Mathew Md [[id number]]  Address: [address] [place] [address]  Phone: [phone]   ADMISSION INFORMATION:  Place of Service: Inpatient [facility]  Place of Service Code: 21  Inpatient Admission Date/Time: 7/23/24  2:20 PM  Discharge Date/Time: No discharge date for patient encounter.  Admitting Diagnosis Code/Description:  Leg swelling [M79.89]  Cellulitis of right lower extremity [L03.115]     UTILIZATION REVIEW CONTACT:  Arleen Cotto, Utilization   Network Utilization Review Department  Phone: [phone]  Fax [phone]  Email: [email or web address]  Contact for approvals/pending authorizations, clinical reviews, and discharge.     PHYSICIAN ADVISORY SERVICES:  Medical Necessity Denial & Gnbv-fg-Lvko Review  Phone: [phone]  Fax: [phone]  Email: [email or web address]     DISCHARGE SUPPORT TEAM:  For Patients Discharge Needs & Updates  Phone: [phone] opt. 2 Fax: [phone]  Email: [email or web address]

## 2024-07-25 NOTE — ASSESSMENT & PLAN NOTE
Wt Readings from Last 3 Encounters:   07/25/24 116 kg (256 lb 9.9 oz)   06/25/24 124 kg (273 lb 13 oz)   06/20/24 122 kg (270 lb)     History of chf managed by both cardiology and nephrology, on lasix 80 mg bid outpatient, patient states that he took 240 mg of lasix yesterday and again today.   Echo 4/2023: Systolic function is normal with an ejection fraction of 60-65%. Wall motion is within normal limits. There is grade I (mild) diastolic dysfunction   BNP normal  CXR: No acute cardiopulmonary disease.   Venous duplex-no DVT  Pedal pulses monitored with dopplers  Nephro: Started on Lasix infusion. Plan to transition to oral in 1-2 days  Monitor I&Os and daily weights

## 2024-07-25 NOTE — NURSING NOTE
"Pt asked again about doing dressing to RLE, cont to refuse. States, \"not today\" Was able to elavate on pillow and place 4x4 on outer rt calf for small to mod amt serosanq drainage. Pt repositioned as able and allowed, shifted off right side with wedges.   "

## 2024-07-25 NOTE — PLAN OF CARE
Problem: PHYSICAL THERAPY ADULT  Goal: Performs mobility at highest level of function for planned discharge setting.  See evaluation for individualized goals.  Description: Treatment/Interventions: Functional transfer training, ADL retraining, LE strengthening/ROM, Therapeutic exercise, Elevations, Endurance training, Patient/family training, Equipment eval/education, Bed mobility, Gait training, Compensatory technique education, Spoke to nursing, OT, Spoke to case management          See flowsheet documentation for full assessment, interventions and recommendations.  Note: Prognosis: Fair  Problem List: Decreased strength, Decreased range of motion, Decreased endurance, Impaired balance, Decreased mobility, Impaired judgement, Obesity, Decreased skin integrity, Pain, Impaired sensation  Assessment: Pt is a 68 y.o. male seen for PT evaluation s/p admission to [facility] on 7/23/2024 with Cellulitis of right lower extremity.  Order placed for PT services.  Upon evaluation: Pt is presenting with impaired functional mobility due to pain, decreased strength, decreased ROM, decreased endurance, impaired balance, impaired judgment, fall risk, LE edema, and impaired skin integrity requiring  2-3 person assistance for bed mobility. Pt's clinical presentation is currently unstable/unpredictable given the functional mobility deficits above, especially weakness, decreased ROM, edema of extremities, decreased skin integrity, decreased endurance, impaired balance, pain, decreased activity tolerance, decreased functional mobility tolerance, altered sensation, and impaired judgement, coupled with fall risks as indicated by AM-PAC 6-Clicks: 07/24 as well as polypharmacy and obesity and combined with medical complications of pain impacting overall mobility status, abnormal renal lab values, abnormal H&H, abnormal CBC, fear/retreat, and need for input for mobility technique/safety.  Pt's PMHx and comorbidities  that may affect physical performance and progress include: CHF, CKD, CVA, obesity, and lymphedema . Personal factors affecting pt at time of IE include: questionable non-compliance, anxiety, step(s) to enter environment, behavioral pattern, inability to perform IADLs, inability to perform ADLs, inability to ambulate household distances, inability to navigate community distances, and tobacco use. Pt will benefit from continued skilled PT services to address deficits as defined above and to maximize level of functional mobility to facilitate return toward PLOF and improved QOL. From PT/mobility standpoint, recommendation at time of d/c would be Level I (Maximum Resource Intensity) pending progress in order to reduce fall risk and maximize pt's functional independence and consistency with mobility in order to facilitate return to PLOF.  Recommend trial with walker next 1-2 sessions and ther ex next 1-2 sessions.  Barriers to Discharge: Other (Comment)  Barriers to Discharge Comments: current assist level for mobility; pain level; questionable insight; fall risk  Rehab Resource Intensity Level, PT: I (Maximum Resource Intensity)    See flowsheet documentation for full assessment.

## 2024-07-25 NOTE — ASSESSMENT & PLAN NOTE
Patient with history of anemia baseline hemoglobin around 9-10 per outpatient labs  On admission, patient with hemoglobin of 10.1, decreased to 7.5, possibly secondary to volume overload, no evidence of bleeding at this time.  CT right lower extremity without evidence of bleeding.  Iron panel showing low iron  Does receive IV Venofer 200 mg with dialysis treatments as an outpatient and received 5 doses for iron deficiency anemia.  Hold on Venofer in setting of acute infection.  Given one dose of epogen on 7/25  Stool occult pending   Transfuse for hemoglobin less than 7

## 2024-07-25 NOTE — ASSESSMENT & PLAN NOTE
Presented to ED with bilateral leg swelling x 4 days. Right leg more swollen and red over the last 2 days. Significant pain and having drainage in the right leg as well. NO fevers or chills.   No recent antibiotics outpatient  CT RLE: No fluid collection seen, cellulitic changes leg, No intramuscular collection, no lytic lesion or periosteal reaction  Procal slightly elevated 0.89, now down trending   Wound culture pending  BC 1/2 gram negative rods; Enterobacterales   Follow CBC and fever curve  ID consulted: Start Zosyn renally dose, follow-up blood cultures, anticipate 10 days of therapy  Adamantly refusing dressings on lower extremities despite education     Lab Results   Component Value Date    WBC 6.34 07/25/2024    WBC 6.62 07/24/2024    WBC 9.06 07/23/2024

## 2024-07-25 NOTE — PROGRESS NOTES
[facility]  Progress Note  Name: Masoud Perry I  MRN: [id number]  Unit/Bed#: -01 I Date of Admission: 7/23/2024   Date of Service: 7/25/2024 I Hospital Day: 2    Assessment & Plan   * Cellulitis of right lower extremity  Assessment & Plan  Presented to ED with bilateral leg swelling x 4 days. Right leg more swollen and red over the last 2 days. Significant pain and having drainage in the right leg as well. NO fevers or chills.   No recent antibiotics outpatient  CT RLE: No fluid collection seen, cellulitic changes leg, No intramuscular collection, no lytic lesion or periosteal reaction  Procal slightly elevated 0.89, now down trending   Wound culture pending  BC 1/2 gram negative rods; Enterobacterales   Follow CBC and fever curve  ID consulted: Start Zosyn renally dose, follow-up blood cultures, anticipate 10 days of therapy  Adamantly refusing dressings on lower extremities despite education     Lab Results   Component Value Date    WBC 6.34 07/25/2024    WBC 6.62 07/24/2024    WBC 9.06 07/23/2024       Bacteremia  Assessment & Plan  1 out of 2 blood cultures positive for gram-negative rods, coming back as Enterobacterales  Second blood culture no growth at 24 hours   ID consulted, recommending discontinue Unasyn and start Zosyn  Anticipate 10 days of therapy final choice of antibiotics to be determined    Atrial fibrillation (HCC)  Assessment & Plan  Paroxysmal atrial fibrillation. sp ablation with Dr Alcala at [facility] 4/2024, took himself off Eliquis post procedure AMA;  has episodes on loop recorder of afib that are symptomatic, no longer taking amiodarone  Currently patient on coumadin, follows outpatient with coumadin clinic  Monitor INR daily -remains supratherapeutic, no evidence of bleeding at this time. Hold coumadin    Stage 5 chronic kidney disease on peritoneal dialysis (HCC)  Assessment & Plan  Lab Results   Component Value Date    EGFR 8 07/25/2024    EGFR 8 07/24/2024     EGFR 8 07/24/2024    CREATININE 5.91 (H) 07/25/2024    CREATININE 5.86 (H) 07/24/2024    CREATININE 5.88 (H) 07/24/2024     History of stage 5 ckd recently started on peritoneal dialysis, first treatment on 6/24. Had PD catheter placed on 6/7/2024. Was scheduled to do PD dialysis in clinic and then transition to home PD dialysis.   Last PD was done yesterday, patient currently doing every other day PD, goal for everyday per patient.   Nephro: His outpatient prescription is a 2400 fill volume with 3 exchanges for a total fill volume of 7200 mL over 9 hours.  Patient was not as compliant with dialysis over the last few days due to feeling sick.  While inpatient, plan on CAPD with 2000 cc fills, 4 exchanges a day of dextrose 2.5%.   Avoid nephrotoxic medications, nsaids, hypotension    Anemia  Assessment & Plan  Patient with history of anemia baseline hemoglobin around 9-10 per outpatient labs  On admission, patient with hemoglobin of 10.1, decreased to 7.5, possibly secondary to volume overload, no evidence of bleeding at this time.  CT right lower extremity without evidence of bleeding.  Iron panel showing low iron  Does receive IV Venofer 200 mg with dialysis treatments as an outpatient and received 5 doses for iron deficiency anemia.  Hold on Venofer in setting of acute infection.  Given one dose of epogen on 7/25  Stool occult pending   Transfuse for hemoglobin less than 7    Lymphedema  Assessment & Plan  With history of chronic lymphedema following with cardiology outpatient. Feels that his legs have been swelling more recently, right leg more swollen than left due to current cellulitis episode.   Started on Lasix infusion    Acute on chronic diastolic HF (heart failure) (HCC)  Assessment & Plan  Wt Readings from Last 3 Encounters:   07/25/24 116 kg (256 lb 9.9 oz)   06/25/24 124 kg (273 lb 13 oz)   06/20/24 122 kg (270 lb)     History of chf managed by both cardiology and nephrology, on lasix 80 mg bid  outpatient, patient states that he took 240 mg of lasix yesterday and again today.   Echo 4/2023: Systolic function is normal with an ejection fraction of 60-65%. Wall motion is within normal limits. There is grade I (mild) diastolic dysfunction   BNP normal  CXR: No acute cardiopulmonary disease.   Venous duplex-no DVT  Pedal pulses monitored with dopplers  Nephro: Started on Lasix infusion. Plan to transition to oral in 1-2 days  Monitor I&Os and daily weights    HTN (hypertension)  Assessment & Plan  History of htn on lasix and toprol xl, continue home meds    History of CVA (cerebrovascular accident)  Assessment & Plan  Status post presumed cardioembolic CVA requiring tPA administration January 2022  Currently on aspirin and lipitor, continue             VTE Pharmacologic Prophylaxis: VTE Score: 5 High Risk (Score >/= 5) - Pharmacological DVT Prophylaxis Contraindicated. Sequential Compression Devices Ordered.    Mobility:   Basic Mobility Inpatient Raw Score: 7  JH-HLM Goal: 2: Bed activities/Dependent transfer  JH-HLM Achieved: 3: Sit at edge of bed  JH-HLM Goal achieved. Continue to encourage appropriate mobility.    Patient Centered Rounds: I performed bedside rounds with nursing staff today.   Discussions with Specialists or Other Care Team Provider: nursing, cm and nephrology    Education and Discussions with Family / Patient: Patient declined call to .     Total Time Spent on Date of Encounter in care of patient:  mins. This time was spent on one or more of the following: performing physical exam; counseling and coordination of care; obtaining or reviewing history; documenting in the medical record; reviewing/ordering tests, medications or procedures; communicating with other healthcare professionals and discussing with patient's family/caregivers.    Current Length of Stay: 2 day(s)  Current Patient Status: Inpatient   Certification Statement: The patient will continue to require additional  inpatient hospital stay due to requiring lasix drip and iv antibiotics for cellulitis and chf exacerbation  Discharge Plan: Anticipate discharge in >72 hrs to rehab facility.    Code Status: Level 1 - Full Code    Subjective:   Patient states that he is in a lot of pain. States that he is refusing all dressings on lower extremities due to severe pain.     Objective:     Vitals:   Temp (24hrs), Av.9 °F (36.6 °C), Min:97.5 °F (36.4 °C), Max:98.8 °F (37.1 °C)    Temp:  [97.5 °F (36.4 °C)-98.8 °F (37.1 °C)] 97.5 °F (36.4 °C)  HR:  [61-77] 77  Resp:  [18-33] 22  BP: (116-142)/(58-84) 142/66  SpO2:  [93 %-99 %] 96 %  Body mass index is 36.82 kg/m².     Input and Output Summary (last 24 hours):     Intake/Output Summary (Last 24 hours) at 2024 1110  Last data filed at 2024 1001  Gross per 24 hour   Intake 1666.3 ml   Output 1950 ml   Net -283.7 ml       Physical Exam:   Physical Exam  Vitals reviewed.   Constitutional:       General: He is not in acute distress.     Appearance: Normal appearance. He is obese. He is ill-appearing (chronically).   HENT:      Head: Normocephalic and atraumatic.      Nose: Nose normal.      Mouth/Throat:      Mouth: Mucous membranes are moist.      Pharynx: Oropharynx is clear.   Eyes:      Extraocular Movements: Extraocular movements intact.      Conjunctiva/sclera: Conjunctivae normal.   Cardiovascular:      Rate and Rhythm: Normal rate and regular rhythm.      Pulses: Normal pulses.      Heart sounds: Normal heart sounds. No murmur heard.  Pulmonary:      Effort: Pulmonary effort is normal. No respiratory distress.      Breath sounds: Normal breath sounds. No wheezing.   Abdominal:      General: Abdomen is flat. Bowel sounds are normal. There is no distension.      Palpations: Abdomen is soft.      Tenderness: There is no abdominal tenderness. There is no guarding.   Musculoskeletal:         General: Tenderness present. Normal range of motion.      Cervical back: Normal range  of motion.      Right lower leg: Edema present.      Left lower leg: Edema present.      Comments: Chronic lymphedema   Skin:     General: Skin is warm.      Findings: Erythema present.      Comments: Chronic venous stasis changes to bilateral lower extremities   Erythema worse on right lower extremity  Tenderness to palpation of bilateral lower extremities   Neurological:      General: No focal deficit present.      Mental Status: He is alert and oriented to person, place, and time. Mental status is at baseline.      Motor: No weakness.   Psychiatric:         Mood and Affect: Mood normal.         Behavior: Behavior normal.         Thought Content: Thought content normal.         Judgment: Judgment normal.          Additional Data:     Labs:  Results from last 7 days   Lab Units 07/25/24  0341 07/24/24  0450 07/23/24  1253   WBC Thousand/uL 6.34   < > 9.06   HEMOGLOBIN g/dL 7.8*   < > 10.1*   HEMATOCRIT % 24.7*   < > 31.7*   PLATELETS Thousands/uL 112*   < > 134*   SEGS PCT %  --   --  85*   LYMPHO PCT %  --   --  8*   MONO PCT %  --   --  6   EOS PCT %  --   --  1    < > = values in this interval not displayed.     Results from last 7 days   Lab Units 07/25/24  0341 07/24/24  1554 07/24/24  0450   SODIUM mmol/L 137   < > 134*   POTASSIUM mmol/L 3.8   < > 3.7   CHLORIDE mmol/L 102   < > 103   CO2 mmol/L 23   < > 21   BUN mg/dL 85*   < > 84*   CREATININE mg/dL 5.91*   < > 5.88*   ANION GAP mmol/L 12   < > 10   CALCIUM mg/dL 10.1   < > 9.8   ALBUMIN g/dL  --   --  2.6*   TOTAL BILIRUBIN mg/dL  --   --  0.37   ALK PHOS U/L  --   --  70   ALT U/L  --   --  26   AST U/L  --   --  34   GLUCOSE RANDOM mg/dL 138   < > 120    < > = values in this interval not displayed.     Results from last 7 days   Lab Units 07/25/24  0341   INR  4.63*     Results from last 7 days   Lab Units 07/25/24  1052 07/25/24  0723 07/24/24  2040 07/24/24  1059 07/24/24  0710 07/23/24  2121 07/23/24  1803   POC GLUCOSE mg/dl 207* 139 132 155* 116  143* 158*     Results from last 7 days   Lab Units 07/24/24  0450   HEMOGLOBIN A1C % 5.9*     Results from last 7 days   Lab Units 07/25/24  0341 07/24/24  1747 07/23/24  1253   LACTIC ACID mmol/L  --   --  1.0   PROCALCITONIN ng/ml 0.65* 0.72* 0.89*       Lines/Drains:  Invasive Devices       Peripheral Intravenous Line  Duration             Peripheral IV 07/23/24 Right;Ventral (anterior) Forearm 1 day    Long-Dwell Peripheral IV (Midline) 07/24/24 Left Basilic <1 day              Line  Duration             Peritoneal Dialysis Catheter Column-disk Abdominal 48 days                      Telemetry:  Telemetry Orders (From admission, onward)               24 Hour Telemetry Monitoring  Continuous x 24 Hours (Telem)        Question:  Reason for 24 Hour Telemetry  Answer:  Decompensated CHF- and any one of the following: continuous diuretic infusion or total diuretic dose >200 mg daily, associated electrolyte derangement (I.e. K < 3.0), ionotropic drip (continuous infusion), hx of ventricular arrhythmia, or new EF < 35%                     Telemetry Reviewed: Normal Sinus Rhythm  Indication for Continued Telemetry Use: Acute CHF on >200 mg lasix/day or equivalent dose or with new reduced EF.              Imaging: No pertinent imaging reviewed.    Recent Cultures (last 7 days):   Results from last 7 days   Lab Units 07/23/24  1757 07/23/24  1253   BLOOD CULTURE   --  No Growth at 24 hrs.   GRAM STAIN RESULT  No polys seen*  4+ Gram negative rods* Gram negative rods*       Last 24 Hours Medication List:   Current Facility-Administered Medications   Medication Dose Route Frequency Provider Last Rate    acetaminophen  650 mg Oral Q6H PRN Rachel Conn PA-C      aspirin  81 mg Oral Daily Rachel Conn PA-C      atorvastatin  40 mg Oral Daily With Dinner Rachel Conn PA-C      docusate sodium  100 mg Oral BID Ofelia Salmeron PA-C      ferrous sulfate  325 mg Oral Daily With Breakfast Jignesh Hussein MD       furosemide  20 mg/hr Intravenous Continuous Nickolas Blount PA-C 20 mg/hr (07/24/24 2305)    HYDROcodone-acetaminophen  1 tablet Oral Q6H PRN Rachel Conn PA-C      HYDROcodone-acetaminophen  2 tablet Oral Q6H PRN Rachel Conn PA-C      HYDROmorphone  1 mg Intravenous Q3H PRN GAGANDEEP Bernardo      insulin glargine  15 Units Subcutaneous Q12H VASU Rachel Conn PA-C      insulin lispro  1-6 Units Subcutaneous HS Rachel Conn PA-C      insulin lispro  1-6 Units Subcutaneous TID AC Nora Mathew MD      levothyroxine  125 mcg Oral Early Morning Rachel Conn PA-C      magnesium Oxide  400 mg Oral Daily Rachel Conn PA-C      metoprolol succinate  100 mg Oral BID Rachel Conn PA-C      piperacillin-tazobactam  4.5 g Intravenous Q12H Rachel Conn PA-C 4.5 g (07/25/24 0750)    polyethylene glycol  17 g Oral Daily PRN Rachel Conn PA-C      senna  2 tablet Oral HS Ofelia Salmeron PA-C      simethicone  80 mg Oral 4x Daily PRN Rachel Conn PA-C      tamsulosin  0.4 mg Oral Daily With Dinner Rachel Conn PA-C          Today, Patient Was Seen By: Ofelia Salmeron PA-C    **Please Note: This note may have been constructed using a voice recognition system.**

## 2024-07-25 NOTE — ASSESSMENT & PLAN NOTE
1 out of 2 blood cultures positive for gram-negative rods, coming back as Enterobacterales  Second blood culture no growth at 24 hours   ID consulted, recommending discontinue Unasyn and start Zosyn  Anticipate 10 days of therapy final choice of antibiotics to be determined

## 2024-07-25 NOTE — PHYSICAL THERAPY NOTE
PHYSICAL THERAPY EVALUATION      NAME:  Masoud Perry  DATE: 07/25/24    AGE:   68 y.o.  Mrn:   [id number]  ADMIT DX:  Leg swelling [M79.89]  Cellulitis of right lower extremity [L03.115]    Past Medical History:   Diagnosis Date    Anemia in chronic kidney disease     Anticoagulated on Coumadin     Atrial fibrillation (HCC)     BPH (benign prostatic hyperplasia)     CAD (coronary artery disease)     CKD (chronic kidney disease), stage V (HCC)     Compartment syndrome of forearm (HCC)     Right, 2019    COPD (chronic obstructive pulmonary disease) (HCC)     CVA (cerebral vascular accident) (HCC)     Diastolic CHF (HCC)     grade 1 DD    DVT (deep venous thrombosis) (HCC)     Factor V deficiency (HCC)     Gout     Hyperlipidemia     Hypertension     Hypothyroidism     MI (myocardial infarction) (HCC)     x3 - 1999, 2010, after 2015    Morbid obesity (HCC)     Nephrolithiasis     Noncompliance with medications     SHAD (obstructive sleep apnea)     Peritoneal dialysis catheter in place (HCC)     Pulmonary embolism (HCC)     Secondary hyperparathyroidism of renal origin (HCC)     Spinal stenosis of lumbar region     Status post placement of implantable loop recorder     Tobacco dependence      Length Of Stay: 2  Performed at least 2 patient identifiers during session: Name and Birthday    PHYSICAL THERAPY EVALUATION:       07/25/24 0844   PT Last Visit   PT Visit Date 07/25/24   Note Type   Note type Evaluation   Pain Assessment   Pain Assessment Tool 0-10   Pain Score 6  (at rest)   Pain Location/Orientation Orientation: Right;Orientation: Lower;Location: Leg   Pain Onset/Description Onset: Ongoing;Frequency: Constant/Continuous   Hospital Pain Intervention(s)   (nurse issues pain medication)   Restrictions/Precautions   Weight Bearing Precautions Per Order No   Other Precautions Chair Alarm;Bed Alarm;Fall Risk;Pain;O2;Multiple lines;Telemetry   Home Living   Type of Home Apartment   Home Layout One level  (2 ISELA no  rail)   Bathroom Shower/Tub Tub/shower unit   Bathroom Toilet Standard   Bathroom Equipment Grab bars in shower  (BSC frame over toilet)   Home Equipment Cane;Walker   Additional Comments Sleeps in recliner chair at home   Prior Function   Level of [place] Independent with ADLs;Independent with functional mobility;Needs assistance with IADLS   Lives With Alone   Receives Help From Family  (son, grandson, dtr)   IADLs Family/Friend/Other provides transportation;Independent with meal prep;Independent with medication management  (has not driven since November)   Falls in the last 6 months 1 to 4   Comments Typically ambulates with cane mod (I), but last few days at home has needed to utilize RW due to pain   General   Family/Caregiver Present No   Cognition   Overall Cognitive Status WFL   Arousal/Participation Alert   Orientation Level Oriented X4   RLE Assessment   RLE Assessment X  (unable to formally assess due to significant pain and hypersensitivity)   LLE Assessment   LLE Assessment WFL   Light Touch   RLE Light Touch   (very painful and sensitive)   Bed Mobility   Supine to Sit 2  Maximal assistance   Additional items Assist x 2;Increased time required;Verbal cues;LE management  (& trunk; pt attempting to assist with his personal leg )   Sit to Supine 1  Dependent   Additional items Assist x 3;Increased time required;Verbal cues;LE management  (& trunk)   Transfers   Sit to Stand   (attempted up to RW however pt unable to achieve full stand due to pain level R LE - achieves about 30% stand with no WBing through R LE)   Ambulation/Elevation   Gait Assistance Not tested  (pt unable due to pain R LE)   Balance   Static Sitting Fair +   Dynamic Sitting Fair   Endurance Deficit   Endurance Deficit Yes   Activity Tolerance   Activity Tolerance Patient limited by pain;Patient limited by fatigue   Medical Staff Made Aware DOUGIE Cabral   Nurse Made Aware Yes, Matilda   Assessment   Prognosis Fair   Problem List  Decreased strength;Decreased range of motion;Decreased endurance;Impaired balance;Decreased mobility;Impaired judgement;Obesity;Decreased skin integrity;Pain;Impaired sensation   Barriers to Discharge Other (Comment)   Barriers to Discharge Comments current assist level for mobility; pain level; questionable insight; fall risk   Goals   STG Expiration Date 08/08/24   PT Treatment Day 0   Plan   Treatment/Interventions Functional transfer training;ADL retraining;LE strengthening/ROM;Therapeutic exercise;Elevations;Endurance training;Patient/family training;Equipment eval/education;Bed mobility;Gait training;Compensatory technique education;Spoke to nursing;OT;Spoke to case management   PT Frequency 2-3x/wk   Discharge Recommendation   Rehab Resource Intensity Level, PT I (Maximum Resource Intensity)   AM-PAC Basic Mobility Inpatient   Turning in Flat Bed Without Bedrails 2   Lying on Back to Sitting on Edge of Flat Bed Without Bedrails 1   Moving Bed to Chair 1   Standing Up From Chair Using Arms 1   Walk in Room 1   Climb 3-5 Stairs With Railing 1   Basic Mobility Inpatient Raw Score 7   Turning Head Towards Sound 4   Follow Simple Instructions 4   Low Function Basic Mobility Raw Score  15   Low Function Basic Mobility Standardized Score  23.9   [facility] Highest Level Of Mobility   -HL Goal 2: Bed activities/Dependent transfer   -HL Achieved 3: Sit at edge of bed   End of Consult   Patient Position at End of Consult Supine;All needs within reach  (RN present)   End of Consult Comments Room Air with SpO2 >90%     (Please find full objective findings from PT assessment regarding body systems outlined above).     Assessment: Pt is a 68 y.o. male seen for PT evaluation s/p admission to [facility] on 7/23/2024 with Cellulitis of right lower extremity.  Order placed for PT services.  Upon evaluation: Pt is presenting with impaired functional mobility due to pain, decreased strength, decreased  ROM, decreased endurance, impaired balance, impaired judgment, fall risk, LE edema, and impaired skin integrity requiring  2-3 person assistance for bed mobility. Pt's clinical presentation is currently unstable/unpredictable given the functional mobility deficits above, especially weakness, decreased ROM, edema of extremities, decreased skin integrity, decreased endurance, impaired balance, pain, decreased activity tolerance, decreased functional mobility tolerance, altered sensation, and impaired judgement, coupled with fall risks as indicated by AM-PAC 6-Clicks: 07/24 as well as polypharmacy and obesity and combined with medical complications of pain impacting overall mobility status, abnormal renal lab values, abnormal H&H, abnormal CBC, fear/retreat, and need for input for mobility technique/safety.  Pt's PMHx and comorbidities that may affect physical performance and progress include: CHF, CKD, CVA, obesity, and lymphedema . Personal factors affecting pt at time of IE include: questionable non-compliance, anxiety, step(s) to enter environment, behavioral pattern, inability to perform IADLs, inability to perform ADLs, inability to ambulate household distances, inability to navigate community distances, and tobacco use. Pt will benefit from continued skilled PT services to address deficits as defined above and to maximize level of functional mobility to facilitate return toward PLOF and improved QOL. From PT/mobility standpoint, recommendation at time of d/c would be Level I (Maximum Resource Intensity) pending progress in order to reduce fall risk and maximize pt's functional independence and consistency with mobility in order to facilitate return to PLOF.  Recommend trial with walker next 1-2 sessions and ther ex next 1-2 sessions.     The patient's AM-[facility] Basic Mobility Inpatient Short Form Raw Score is 7. A Raw score of less than or equal to 16 suggests the patient may benefit from discharge to post-acute  rehabilitation services. Please also refer to the recommendation of the Physical Therapist for safe discharge planning.       Goals: Pt will perform bed mobility tasks with modified I to reposition in bed and prepare for transfers. Pt will perform transfers with modified I to increase Indep in home environment and decrease burden of care and prepare for ambulation. Pt will ambulate with RW for >/= 20 ft with  modified I  to decrease risk for falls, improve activity tolerance, improve gait quality, and promote proper use of assistive device and to access home environment. Pt will complete >/= 2 steps with with unilateral handrail with  min A  to return to home with ISELA. Pt will participate in objective balance assessment to determine baseline fall risk.         Leonides Toure, PT,DPT

## 2024-07-25 NOTE — PROGRESS NOTES
Progress Note - Infectious Disease   Masoud Perry 68 y.o. male MRN: [id number]  Unit/Bed#: -01 Encounter: [id number]          VIRTUAL CARE DOCUMENTATION:     1. This service was provided via Telemedicine using iCreate Kit     2. Parties in the room with patient during teleconsult Patient only    3. Confidentiality My office door was closed     4. Participants No one else was in the room    5. Patient acknowledged consent and understanding of privacy and security of the  Telemedicine consult. I informed the patient that I have reviewed their record in Epic and presented the opportunity for them to ask any questions regarding the visit today.  The patient agreed to participate.    6. Time spent 7 minutes       Assessment/Recommendations     Enterobacterales bacteremia              Secondary to right leg cellulitis. No other source appreciated, no GI,  or respiratory symptoms. Has a recently placed PD catheter but without abd pain, cloudy effluent or local signs of infection. Afebrile without leukcytosis     -Continue zosyn renally dosed  -FU blood c/s  -Additional mngt as below  -Anticipate 10 days of therapy, final choice of abx to be determined     Right leg cellulitis              In the setting of worsening lower extremity edema, chronic lymphedema, acute on chronic diastolic CHF, ESRD. Wound cx with gram negative rods. No clinical or CT evidence of abscess. However patient has intractable pain and reports bloody drainage     -Continue zosyn  -FU cultures  -Given severe pain would recommend surgical evaluation  -Serial extremity exams, optimize volume status with PD and diuretic infusion, recommend wound care consult, limb elevation and ace wrap when patient is able to tolerate     ESRD on peritoneal dialysis              Had PD catheter placed on 6/7, tolerating well with no local signs of infection     -Management as per nephrology     Acute on chronic diastolic CHF, lymphedema  Risk factors for  recurrent infection        CT RLE images personally reviewed and show no fluid collection    Discussed above plan in detail with the primary service who is in agreement.    History       Subjective:  The patient had severe pain overnight and declined repositioning, dressing or care due to right leg pain. Tolerating peritoneal dialysis.  No fevers, chills, or sweats.  No nausea, vomiting, or diarrhea.    Antibiotics:  Zosyn    Physical Exam     Temp:  [97.6 °F (36.4 °C)-98.8 °F (37.1 °C)] 98 °F (36.7 °C)  HR:  [61-79] 66  Resp:  [18-33] 32  BP: (116-139)/(58-84) 139/64  SpO2:  [93 %-99 %] 99 %  Temp (24hrs), Av °F (36.7 °C), Min:97.6 °F (36.4 °C), Max:98.8 °F (37.1 °C)  Current: Temperature: 98 °F (36.7 °C)    Intake/Output Summary (Last 24 hours) at 2024 0824  Last data filed at 2024 0822  Gross per 24 hour   Intake 2026.3 ml   Output 2775 ml   Net -748.7 ml       Physical exam findings reported by bedside and primary medical team staff      General Appearance:  Appearing ill and fatigued, nontoxic, and in no distress, appears stated age   Lungs:   Diminished to auscultation bilaterally, no audible wheezes, rhonchi and rales, respirations unlabored   Heart:  Regular rate and rhythm, S1, S2 normal, no murmur, rub or gallop   Abdomen:   Soft, non-tender, non-distended, positive bowel sounds, no masses, no organomegaly, PD catheter in place    No CVA tenderness   Extremities: Extremities normal, atraumatic, no cyanosis, clubbing , 2-3+ edema    Skin: RLE diffuse erythema, chronic venous hyperpigmentation changes with weeping, no open wounds. Leg not examined today due to severe pain patient has declined dressing care   Neurologic: Alert and oriented times 3,         Invasive Devices:   Peripheral IV 24 Right;Ventral (anterior) Forearm (Active)   Site Assessment WDL 24   Dressing Type Transparent 24   Line Status Infusing;Flushed;No blood return 24   Dressing Status  Clean;Dry;Intact 07/24/24 1945   Dressing Change Due 07/27/24 07/24/24 1600   Reason Not Rotated Not due 07/24/24 1945       Long-Dwell Peripheral IV (Midline) 07/24/24 Left Basilic (Active)   Site Assessment WDL 07/24/24 1945   Dressing Type Chlorhexidine dressing;Securing device 07/24/24 1945   Line Status Blood return noted;Flushed;Saline locked 07/24/24 1945   Dressing Status Clean;Dry;Intact 07/24/24 1945   Dressing Change Due 07/31/24 07/24/24 1600   Reason Not Rotated Not due 07/24/24 1945       Peritoneal Dialysis Catheter Column-disk Abdominal (Active)   Site Assessment WDL 07/25/24 0300   Status Draining 07/25/24 0300   Site Condition No complications 07/25/24 0300   Dressing Status Clean;Dry;Intact 07/25/24 0300   Dressing Gauze 07/24/24 1945   Drainage Description Other (Comment) 07/24/24 1344       Labs, Imaging, & Other Studies     Lab Results:    I have personally reviewed pertinent labs.    Results from last 7 days   Lab Units 07/25/24  0341 07/24/24  1039 07/24/24  0450 07/23/24  1253   WBC Thousand/uL 6.34  --  6.62 9.06   HEMOGLOBIN g/dL 7.8* 7.5* 7.5* 10.1*   PLATELETS Thousands/uL 112*  --  101* 134*     Results from last 7 days   Lab Units 07/25/24  0341 07/24/24  1554 07/24/24  0450 07/23/24  1253   POTASSIUM mmol/L 3.8   < > 3.7 3.5   CHLORIDE mmol/L 102   < > 103 99   CO2 mmol/L 23   < > 21 21   BUN mg/dL 85*   < > 84* 83*   CREATININE mg/dL 5.91*   < > 5.88* 5.84*   EGFR ml/min/1.73sq m 8   < > 8 8   CALCIUM mg/dL 10.1   < > 9.8 10.8*   AST U/L  --   --  34 24   ALT U/L  --   --  26 22   ALK PHOS U/L  --   --  70 91    < > = values in this interval not displayed.     Results from last 7 days   Lab Units 07/23/24  1757 07/23/24  1253   BLOOD CULTURE   --  No Growth at 24 hrs.   GRAM STAIN RESULT  No polys seen*  4+ Gram negative rods* Gram negative rods*       Imaging Studies:   I have personally reviewed pertinent imaging study reports and images in PACS.      EKG, Pathology, and Other  Studies:   I have personally reviewed pertinent reports.        Counseling/Coordination of care:       Total 50 minutes communication with the patient via telehealth.  Labs, medical tests and imaging studies were independently reviewed by me as noted above.

## 2024-07-25 NOTE — ASSESSMENT & PLAN NOTE
Paroxysmal atrial fibrillation. sp ablation with Dr Alcala at [facility] 4/2024, took himself off Eliquis post procedure AMA;  has episodes on loop recorder of afib that are symptomatic, no longer taking amiodarone  Currently patient on coumadin, follows outpatient with coumadin clinic  Monitor INR daily -remains supratherapeutic, no evidence of bleeding at this time. Hold coumadin

## 2024-07-25 NOTE — PLAN OF CARE
ID consulted yesterday d/t positive wound and blood cultures.  Pt's abx changed to extended zosyn with renal dosing.  Pt transferred to critical care bed at beginning of shift.  Pt continues with severe pain getting his routine norco and dilaudid IV per breakthrough pain.  Pt repeatedly refuses care or repositioning due to pain.  During this shift, pt has yelled and RN and raised his fist twice like he was going to hit.  Pt remains on Lasix gtt.  PD Q6 hrs.  Will continue to monitor and support as needed.    Problem: Prexisting or High Potential for Compromised Skin Integrity  Goal: Skin integrity is maintained or improved  Description: INTERVENTIONS:  - Identify patients at risk for skin breakdown  - Assess and monitor skin integrity  - Assess and monitor nutrition and hydration status  - Monitor labs   - Assess for incontinence   - Turn and reposition patient  - Assist with mobility/ambulation  - Relieve pressure over bony prominences  - Avoid friction and shearing  - Provide appropriate hygiene as needed including keeping skin clean and dry  - Evaluate need for skin moisturizer/barrier cream  - Collaborate with interdisciplinary team   - Patient/family teaching  - Consider wound care consult   Outcome: Progressing     Problem: METABOLIC, FLUID AND ELECTROLYTES - ADULT  Goal: Electrolytes maintained within normal limits  Description: INTERVENTIONS:  - Monitor labs and assess patient for signs and symptoms of electrolyte imbalances  - Administer electrolyte replacement as ordered  - Monitor response to electrolyte replacements, including repeat lab results as appropriate  - Instruct patient on fluid and nutrition as appropriate  Outcome: Progressing  Goal: Fluid balance maintained  Description: INTERVENTIONS:  - Monitor labs   - Monitor I/O and WT  - Instruct patient on fluid and nutrition as appropriate  - Assess for signs & symptoms of volume excess or deficit  Outcome: Progressing  Goal: Glucose maintained  within target range  Description: INTERVENTIONS:  - Monitor Blood Glucose as ordered  - Assess for signs and symptoms of hyperglycemia and hypoglycemia  - Administer ordered medications to maintain glucose within target range  - Assess nutritional intake and initiate nutrition service referral as needed  Outcome: Progressing     Problem: SKIN/TISSUE INTEGRITY - ADULT  Goal: Incision(s), wounds(s) or drain site(s) healing without S/S of infection  Description: INTERVENTIONS  - Assess and document dressing, incision, wound bed, drain sites and surrounding tissue  - Provide patient and family education  - Perform skin care/dressing changes per wound  Outcome: Progressing     Problem: PAIN - ADULT  Goal: Verbalizes/displays adequate comfort level or baseline comfort level  Description: Interventions:  - Encourage patient to monitor pain and request assistance  - Assess pain using appropriate pain scale  - Administer analgesics based on type and severity of pain and evaluate response  - Implement non-pharmacological measures as appropriate and evaluate response  - Consider cultural and social influences on pain and pain management  - Notify physician/advanced practitioner if interventions unsuccessful or patient reports new pain  Outcome: Progressing     Problem: INFECTION - ADULT  Goal: Absence or prevention of progression during hospitalization  Description: INTERVENTIONS:  - Assess and monitor for signs and symptoms of infection  - Monitor lab/diagnostic results  - Monitor all insertion sites, i.e. indwelling lines, tubes, and drains  - Monitor endotracheal if appropriate and nasal secretions for changes in amount and color  - [place] appropriate cooling/warming therapies per order  - Administer medications as ordered  - Instruct and encourage patient and family to use good hand hygiene technique  - Identify and instruct in appropriate isolation precautions for identified infection/condition  Outcome: Progressing      Problem: Nutrition/Hydration-ADULT  Goal: Nutrient/Hydration intake appropriate for improving, restoring or maintaining nutritional needs  Description: Monitor and assess patient's nutrition/hydration status for malnutrition. Collaborate with interdisciplinary team and initiate plan and interventions as ordered.  Monitor patient's weight and dietary intake as ordered or per policy. Utilize nutrition screening tool and intervene as necessary. Determine patient's food preferences and provide high-protein, high-caloric foods as appropriate.     INTERVENTIONS:  - Monitor oral intake, urinary output, labs, and treatment plans  - Assess nutrition and hydration status and recommend course of action  - Evaluate amount of meals eaten  - Assist patient with eating if necessary   - Allow adequate time for meals  - Recommend/ encourage appropriate diets, oral nutritional supplements, and vitamin/mineral supplements  - Order, calculate, and assess calorie counts as needed  - Recommend, monitor, and adjust tube feedings and TPN/PPN based on assessed needs  - Assess need for intravenous fluids  - Provide specific nutrition/hydration education as appropriate  - Include patient/family/caregiver in decisions related to nutrition  Outcome: Progressing     Problem: GENITOURINARY - ADULT  Goal: Maintains or returns to baseline urinary function  Description: INTERVENTIONS:  - Assess urinary function  - Encourage oral fluids to ensure adequate hydration if ordered  - Administer IV fluids as ordered to ensure adequate hydration  - Administer ordered medications as needed  - Offer frequent toileting  - Follow urinary retention protocol if ordered  Outcome: Progressing     Problem: SAFETY ADULT  Goal: Patient will remain free of falls  Description: INTERVENTIONS:  - Educate patient/family on patient safety including physical limitations  - Instruct patient to call for assistance with activity   - Consult OT/PT to assist with  strengthening/mobility   - Keep Call bell within reach  - Keep bed low and locked with side rails adjusted as appropriate  - Keep care items and personal belongings within reach  - Initiate and maintain comfort rounds  - Make Fall Risk Sign visible to staff  - Offer Toileting every 2 Hours, in advance of need if unable to notify staff of need  - Initiate/Maintain bed/chair alarm for confusion, impulsivity, or noncompliance with notifying staff.  - Apply yellow socks and bracelet for high fall risk patients  - Consider moving patient to room near nurses station  Outcome: Progressing  Goal: Maintain or return to baseline ADL function  Description: INTERVENTIONS:  -  Assess patient's ability to carry out ADLs; assess patient's baseline for ADL function and identify physical deficits which impact ability to perform ADLs (bathing, care of mouth/teeth, toileting, grooming, dressing, etc.)  - Assess/evaluate cause of self-care deficits   - Assess range of motion  - Assess patient's mobility; develop plan if impaired  - Assess patient's need for assistive devices and provide as appropriate  - Encourage maximum independence but intervene and supervise when necessary  - Involve family in performance of ADLs  - Assess for home care needs following discharge   - Consider OT consult to assist with ADL evaluation and planning for discharge  - Provide patient education as appropriate  Outcome: Progressing  Goal: Maintains/Returns to pre admission functional level  Description: INTERVENTIONS:  - Perform AM-PAC 6 Click Basic Mobility/ Daily Activity assessment daily.  - Set and communicate daily mobility goal to care team and patient/family/caregiver.   - Collaborate with rehabilitation services on mobility goals if consulted  - Perform Range of Motion 3 times a day.  - Reposition patient every 2 hours if unable to reposition self  - Out of bed for toileting if medically appropriate  - Record patient progress and toleration of  activity level   Outcome: Progressing     Problem: DISCHARGE PLANNING  Goal: Discharge to home or other facility with appropriate resources  Description: INTERVENTIONS:  - Identify barriers to discharge w/patient and caregiver  - Arrange for needed discharge resources and transportation as appropriate  - Identify discharge learning needs (meds, wound care, etc.)  - Arrange for interpretive services to assist at discharge as needed  - Refer to Case Management Department for coordinating discharge planning if the patient needs post-hospital services based on physician/advanced practitioner order or complex needs related to functional status, cognitive ability, or social support system  Outcome: Progressing     Problem: Knowledge Deficit  Goal: Patient/family/caregiver demonstrates understanding of disease process, treatment plan, medications, and discharge instructions  Description: Complete learning assessment and assess knowledge base.  Interventions:  - Provide teaching at level of understanding  - Provide teaching via preferred learning methods  Outcome: Progressing

## 2024-07-25 NOTE — OCCUPATIONAL THERAPY NOTE
Occupational Therapy Evaluation     Patient Name: Masoud Perry  Today's Date: 7/25/2024  Problem List  Principal Problem:    Cellulitis of right lower extremity  Active Problems:    History of CVA (cerebrovascular accident)    HTN (hypertension)    Acute on chronic diastolic HF (heart failure) (HCC)    Lymphedema    Anemia    Stage 5 chronic kidney disease on peritoneal dialysis (HCC)    Atrial fibrillation (HCC)    Bacteremia    Past Medical History  Past Medical History:   Diagnosis Date    Anemia in chronic kidney disease     Anticoagulated on Coumadin     Atrial fibrillation (HCC)     BPH (benign prostatic hyperplasia)     CAD (coronary artery disease)     CKD (chronic kidney disease), stage V (HCC)     Compartment syndrome of forearm (HCC)     Right, 2019    COPD (chronic obstructive pulmonary disease) (HCC)     CVA (cerebral vascular accident) (HCC)     Diastolic CHF (HCC)     grade 1 DD    DVT (deep venous thrombosis) (HCC)     Factor V deficiency (HCC)     Gout     Hyperlipidemia     Hypertension     Hypothyroidism     MI (myocardial infarction) (HCC)     x3 - 1999, 2010, after 2015    Morbid obesity (HCC)     Nephrolithiasis     Noncompliance with medications     SHAD (obstructive sleep apnea)     Peritoneal dialysis catheter in place (HCC)     Pulmonary embolism (HCC)     Secondary hyperparathyroidism of renal origin (HCC)     Spinal stenosis of lumbar region     Status post placement of implantable loop recorder     Tobacco dependence      Past Surgical History  Past Surgical History:   Procedure Laterality Date    CARDIAC ELECTROPHYSIOLOGY STUDY AND ABLATION  04/29/2024    CHOLECYSTECTOMY      CORONARY ANGIOPLASTY WITH STENT PLACEMENT      JOINT REPLACEMENT      bilateral knee    SC CYSTO/URETERO W/LITHOTRIPSY &INDWELL STENT INSRT Left 09/24/2022    Procedure: CYSTOSCOPY URETEROSCOPY WITH LITHOTRIPSY HOLMIUM LASER, AND INSERTION STENT URETERAL;  Surgeon: Lewis Dahl MD;  Location: [facility];  Service:  Urology    SC LAPS INSERTION TUNNELED INTRAPERITONEAL CATHETER N/A 6/7/2024    Procedure: INSERTION PERITONEAL CATHETER DIALYSIS LAPAROSCOPIC;  Surgeon: Hiram Park DO;  Location: [facility];  Service: General    US GUIDED KIDNEY BIOPSY  08/05/2020 07/25/24 0871   Note Type   Note type Evaluation   Pain Assessment   Pain Assessment Tool 0-10   Pain Score 6   Pain Location/Orientation Orientation: Right;Location: Leg   Restrictions/Precautions   Other Precautions Chair Alarm;Bed Alarm;Fall Risk;Pain;Telemetry;Multiple lines;O2  (2 L O2 NC upon start of session- titrated down to RA- spO2 reading >/=95% throughout)   Home Living   Type of Home Apartment  (2 ISELA no HR)   Home Layout One level;Performs ADLs on one level;Able to live on main level with bedroom/bathroom   Bathroom Shower/Tub Tub/shower unit   Bathroom Toilet Standard  (BSC frame over top)   Bathroom Equipment Grab bars in shower   Home Equipment Walker;Cane   Additional Comments Pt reports living in an apt with 2 ISELA no HR and primarily uses SPC for functional mobility, however for the last few days has needed to use the RW.   Prior Function   Level of [place] Independent with ADLs;Independent with functional mobility   Lives With Alone   Receives Help From Family  (Son, grandson, daughter)   IADLs Independent with meal prep;Independent with medication management;Family/Friend/Other provides transportation   Falls in the last 6 months 1 to 4   Comments PTA, pt reports independence with ADLs, functional mobility, and most IADLs- has assistance for transportation.   ADL   UB Dressing Assistance 5  Supervision/Setup   UB Dressing Deficit Verbal cueing;Supervision/safety;Increased time to complete   LB Dressing Assistance 1  Total Assistance   LB Dressing Deficit Don/doff R sock;Don/doff L sock   Additional Comments Pt would require total assistance for LB ADLs at this time due to significant pain in BLEs. Functionally, pt could complete UB ADLs  with setup and increased time.   Bed Mobility   Supine to Sit 2  Maximal assistance   Additional items Assist x 2;Increased time required;Verbal cues;LE management;Other;Comment;Leg   (LE management and trunk management. Pt attempting to assist with his personal leg .)   Sit to Supine 1  Dependent   Additional items Assist x 3;Increased time required;Verbal cues;LE management;Other;Comment  (Trunk management)   Additional Comments Significant time required for the pt to complete bed mobility as he is limited by pain. Pt completing supine > sit with max assist x 2 and dependent x 3 to return to supine.   Transfers   Sit to Stand Unable to assess   Additional Comments While seated EOB, pt attempted to perform sit > stand up to RW. However, pt unable to achieve full stand due RLE pain- achieves about 30% stand with no WBing through RLE.   Balance   Static Sitting Fair +   Dynamic Sitting Fair   Activity Tolerance   Activity Tolerance Patient limited by pain;Patient limited by fatigue   Medical Staff Made Aware Carlie POTTER   Nurse Made Aware aMtilda HARE   RUKENDY Assessment   RUE Assessment WFL   LUE Assessment   LUE Assessment WFL   Hand Function   Gross Motor Coordination Functional   Fine Motor Coordination Functional   Sensation   Light Touch No apparent deficits   Psychosocial   Psychosocial (WDL) X   Patient Behaviors/Mood Anxious   Cognition   Overall Cognitive Status WFL   Arousal/Participation Alert;Cooperative   Attention Within functional limits   Orientation Level Oriented X4   Memory Within functional limits   Following Commands Follows one step commands with increased time or repetition   Comments Maximal time required to carry out commands but only due to significant pain that he tends to perseverate on. Is receptive to reassurance and encouragement   Assessment   Limitation Decreased ADL status;Decreased endurance;Decreased self-care trans;Decreased high-level ADLs   Prognosis Fair   Assessment Pt  is a 68 y.o. male, admitted to [facility]/23/2024 d/t experiencing leg swelling. Dx: Cellulitis of RLE. Pt with PMHx impacting their performance during ADL tasks, including: acute on chronic diastolic HF, Afib, CKD 5 on peritoneal dialysis, lymphedema, HTN, CVA, factor 5 Leiden mutation, compartment syndrome of R forearm 2019, HLD, MI, B TKRs. Prior to admission to the hospital Pt was performing ADLs without physical assistance. Most IADLs without physical assistance. Functional transfers/ambulation without physical assistance. Cognitive status PTA was WFL. OT order placed to assess Pt's ADLs, cognitive status, and performance during functional tasks in order to maximize safety and independence while making most appropriate d/c recommendations. PT/OT co-evaluation completed at this time d/t significant mobility deficits and safety concerns. Pt's clinical presentation is currently unstable/unpredictable given new onset deficits that affect Pt's occupational performance and ability to safely return to PLOF including decrease activity tolerance, decrease standing balance, decrease performance during ADL tasks, increased pain, decrease activity engagement, decrease performance during functional transfers, steps to enter home, limited family support, high fall risk, and limited insight to deficits combined with medical complications of pain impacting overall mobility status, abnormal renal lab values, abnormal H&H, abnormal CBC, edema/swelling, wounds, decreased skin integrity, fear/retreat, and need for input for mobility technique/safety. Personal factors affecting Pt at time of initial evaluation include: hx of non-compliance, step(s) to enter environment, limited home support, inability to perform IADLs, inability to perform ADLs, inability to ambulate household distances, inability to navigate community distances, limited insight into impairments, decreased initiation and engagement, recent fall(s)/fall history,  questionable non-compliance, and tobacco use. Pt will benefit from continued skilled OT services to address deficits as defined above and to maximize level independence/participation during ADLs and functional tasks to facilitate return toward PLOF and improved quality of life. From an occupational therapy standpoint, Level I (Maximum Resource Intensity) is recommended upon d/c.   Goals   Patient Goals to have no pain   Plan   Treatment Interventions ADL retraining;Functional transfer training;UE strengthening/ROM;Endurance training;Patient/family training;Equipment evaluation/education;Compensatory technique education;Continued evaluation;Energy conservation;Activityengagement   Goal Expiration Date 08/08/24   OT Frequency 3-5x/wk   Discharge Recommendation   Rehab Resource Intensity Level, OT I (Maximum Resource Intensity)   -PAC Daily Activity Inpatient   Lower Body Dressing 1   Bathing 2   Toileting 2   Upper Body Dressing 3   Grooming 3   Eating 4   Daily Activity Raw Score 15   Daily Activity Standardized Score (Calc for Raw Score >=11) 34.69   AM-PAC Applied Cognition Inpatient   Following a Speech/Presentation 3   Understanding Ordinary Conversation 4   Taking Medications 3   Remembering Where Things Are Placed or Put Away 3   Remembering List of 4-5 Errands 3   Taking Care of Complicated Tasks 3   Applied Cognition Raw Score 19   Applied Cognition Standardized Score 39.77   End of Consult   Patient Position at End of Consult Supine;Bed/Chair alarm activated;All needs within reach     The patient's raw score on the AM-PAC Daily Activity Inpatient Short Form is 15. A raw score of less than 19 suggests the patient may benefit from discharge to post-acute rehabilitation services. Please refer to the recommendation of the Occupational Therapist for safe discharge planning.    Pt goals to be met by 8/8/2024:    Pt will demonstrate ability to complete grooming/hygiene tasks @ mod I after set-up.  Pt will  demonstrate ability to complete supine<>sit @ mod I in order to increase safety and independence during ADL tasks.  Pt will demonstrate ability to complete UB ADLs including washing/dressing @ mod I in order to increase performance and participation during meaningful tasks  Pt will demonstrate ability to complete LB dressing @ mod I in order to increase safety and independence during meaningful tasks.   Pt will demonstrate ability to nain/doff socks/shoes while sitting EOB/chair @ mod I in order to increase safety and independence during meaningful tasks.   Pt will demonstrate ability to complete toileting tasks including CM and pericare @ mod I in order to increase safety and independence during meaningful tasks.  Pt will demonstrate ability to complete EOB, chair, toilet/commode transfers @ mod I in order to increase performance and participation during functional tasks.  Pt will demonstrate ability to stand for 10 minutes while maintaining F+ balance with use of RW for UB support PRN.  Pt will demonstrate ability to tolerate 30 minute OT session with no vc'ing for deep breathing or use of energy conservation techniques in order to increase activity tolerance during functional tasks.   Pt will demonstrate Good carryover of use of energy conservation/compensatory strategies during ADLs and functional tasks in order to increase safety and reduce risk for falls.   Pt will demonstrate Good attention and participation in continued evaluation of functional ambulation house hold distances in order to assist with safe d/c planning.  Pt will attend to continued cognitive assessments 100% of the time in order to provide most appropriate d/c recommendations.   Pt will follow 100% simple 2-step commands and be A&O x4 consistently with environmental cues to increase participation in functional activities.   Pt will identify 3 areas of interest/hobbies and 1 intervention on how to incorporate into daily life in order to  increase interaction with environment and peers as well as increase participation in meaningful tasks.   Pt will demonstrate 100% carryover of BUE HEP in order to increase BUE MS and increase performance during functional tasks upon d/c home.    Naun Fagan MS, OTR/L

## 2024-07-26 ENCOUNTER — APPOINTMENT (INPATIENT)
Dept: RADIOLOGY | Facility: HOSPITAL | Age: 69
DRG: 602 | End: 2024-07-26
Payer: COMMERCIAL

## 2024-07-26 LAB
2HR DELTA HS TROPONIN: 2 NG/L
4HR DELTA HS TROPONIN: 2 NG/L
ANION GAP SERPL CALCULATED.3IONS-SCNC: 8 MMOL/L (ref 4–13)
ATRIAL RATE: 131 BPM
ATRIAL RATE: 136 BPM
ATRIAL RATE: 83 BPM
BACTERIA BLD CULT: ABNORMAL
BUN SERPL-MCNC: 67 MG/DL (ref 5–25)
CALCIUM SERPL-MCNC: 8.7 MG/DL (ref 8.4–10.2)
CARDIAC TROPONIN I PNL SERPL HS: 11 NG/L
CARDIAC TROPONIN I PNL SERPL HS: 11 NG/L
CARDIAC TROPONIN I PNL SERPL HS: 9 NG/L
CHLORIDE SERPL-SCNC: 108 MMOL/L (ref 96–108)
CO2 SERPL-SCNC: 22 MMOL/L (ref 21–32)
CREAT SERPL-MCNC: 4.7 MG/DL (ref 0.6–1.3)
DATE SPECIMEN #1: ABNORMAL
DATE SPECIMEN #2: ABNORMAL
DATE SPECIMEN #3: ABNORMAL
ENTEROBACTERALES DNA BLD POS NAA+N-PRB: DETECTED
ERYTHROCYTE [DISTWIDTH] IN BLOOD BY AUTOMATED COUNT: 16.5 % (ref 11.6–15.1)
GFR SERPL CREATININE-BSD FRML MDRD: 11 ML/MIN/1.73SQ M
GLUCOSE SERPL-MCNC: 152 MG/DL (ref 65–140)
GLUCOSE SERPL-MCNC: 171 MG/DL (ref 65–140)
GLUCOSE SERPL-MCNC: 174 MG/DL (ref 65–140)
GLUCOSE SERPL-MCNC: 182 MG/DL (ref 65–140)
GLUCOSE SERPL-MCNC: 195 MG/DL (ref 65–140)
GRAM STN SPEC: ABNORMAL
HCT VFR BLD AUTO: 23.4 % (ref 36.5–49.3)
HEMOCCULT SP1 STL QL: POSITIVE
HEMOCCULT SP2 STL QL: POSITIVE
HEMOCCULT SP3 STL QL: NEGATIVE
HGB BLD-MCNC: 7.2 G/DL (ref 12–17)
INR PPP: 4.07 (ref 0.84–1.19)
MAGNESIUM SERPL-MCNC: 1.8 MG/DL (ref 1.9–2.7)
MCH RBC QN AUTO: 30.8 PG (ref 26.8–34.3)
MCHC RBC AUTO-ENTMCNC: 30.8 G/DL (ref 31.4–37.4)
MCV RBC AUTO: 100 FL (ref 82–98)
P AXIS: 14 DEGREES
PLATELET # BLD AUTO: 117 THOUSANDS/UL (ref 149–390)
PMV BLD AUTO: 9.2 FL (ref 8.9–12.7)
POTASSIUM SERPL-SCNC: 3.2 MMOL/L (ref 3.5–5.3)
POTASSIUM SERPL-SCNC: 4.2 MMOL/L (ref 3.5–5.3)
PR INTERVAL: 186 MS
PROTHROMBIN TIME: 39.7 SECONDS (ref 11.6–14.5)
QRS AXIS: -27 DEGREES
QRS AXIS: -29 DEGREES
QRS AXIS: -40 DEGREES
QRSD INTERVAL: 158 MS
QRSD INTERVAL: 164 MS
QRSD INTERVAL: 168 MS
QT INTERVAL: 376 MS
QT INTERVAL: 378 MS
QT INTERVAL: 390 MS
QTC INTERVAL: 482 MS
QTC INTERVAL: 560 MS
QTC INTERVAL: 571 MS
RBC # BLD AUTO: 2.34 MILLION/UL (ref 3.88–5.62)
SODIUM SERPL-SCNC: 138 MMOL/L (ref 135–147)
T WAVE AXIS: -22 DEGREES
T WAVE AXIS: -35 DEGREES
T WAVE AXIS: -46 DEGREES
VENTRICULAR RATE: 129 BPM
VENTRICULAR RATE: 132 BPM
VENTRICULAR RATE: 99 BPM
WBC # BLD AUTO: 6.45 THOUSAND/UL (ref 4.31–10.16)

## 2024-07-26 PROCEDURE — 99232 SBSQ HOSP IP/OBS MODERATE 35: CPT | Performed by: INTERNAL MEDICINE

## 2024-07-26 PROCEDURE — 71045 X-RAY EXAM CHEST 1 VIEW: CPT

## 2024-07-26 PROCEDURE — 80048 BASIC METABOLIC PNL TOTAL CA: CPT | Performed by: INTERNAL MEDICINE

## 2024-07-26 PROCEDURE — 84132 ASSAY OF SERUM POTASSIUM: CPT | Performed by: INTERNAL MEDICINE

## 2024-07-26 PROCEDURE — 85027 COMPLETE CBC AUTOMATED: CPT

## 2024-07-26 PROCEDURE — 85610 PROTHROMBIN TIME: CPT

## 2024-07-26 PROCEDURE — 99232 SBSQ HOSP IP/OBS MODERATE 35: CPT

## 2024-07-26 PROCEDURE — 99233 SBSQ HOSP IP/OBS HIGH 50: CPT | Performed by: INTERNAL MEDICINE

## 2024-07-26 PROCEDURE — 82948 REAGENT STRIP/BLOOD GLUCOSE: CPT

## 2024-07-26 PROCEDURE — 84484 ASSAY OF TROPONIN QUANT: CPT | Performed by: PHYSICIAN ASSISTANT

## 2024-07-26 PROCEDURE — 83735 ASSAY OF MAGNESIUM: CPT | Performed by: INTERNAL MEDICINE

## 2024-07-26 PROCEDURE — 93005 ELECTROCARDIOGRAM TRACING: CPT

## 2024-07-26 PROCEDURE — 82270 OCCULT BLOOD FECES: CPT | Performed by: INTERNAL MEDICINE

## 2024-07-26 RX ORDER — MAGNESIUM SULFATE HEPTAHYDRATE 40 MG/ML
2 INJECTION, SOLUTION INTRAVENOUS ONCE
Status: COMPLETED | OUTPATIENT
Start: 2024-07-26 | End: 2024-07-26

## 2024-07-26 RX ORDER — POTASSIUM CHLORIDE 20 MEQ/1
40 TABLET, EXTENDED RELEASE ORAL EVERY 4 HOURS
Status: COMPLETED | OUTPATIENT
Start: 2024-07-26 | End: 2024-07-26

## 2024-07-26 RX ORDER — TORSEMIDE 20 MG/1
40 TABLET ORAL 2 TIMES DAILY
Status: DISCONTINUED | OUTPATIENT
Start: 2024-07-26 | End: 2024-07-27

## 2024-07-26 RX ORDER — METOPROLOL TARTRATE 1 MG/ML
5 INJECTION, SOLUTION INTRAVENOUS ONCE
Status: COMPLETED | OUTPATIENT
Start: 2024-07-26 | End: 2024-07-26

## 2024-07-26 RX ADMIN — PIPERACILLIN AND TAZOBACTAM 4.5 G: 36; 4.5 INJECTION, POWDER, FOR SOLUTION INTRAVENOUS at 08:59

## 2024-07-26 RX ADMIN — INSULIN GLARGINE 15 UNITS: 100 INJECTION, SOLUTION SUBCUTANEOUS at 09:02

## 2024-07-26 RX ADMIN — HYDROMORPHONE HYDROCHLORIDE 1 MG: 1 INJECTION, SOLUTION INTRAMUSCULAR; INTRAVENOUS; SUBCUTANEOUS at 16:23

## 2024-07-26 RX ADMIN — Medication 400 MG: at 09:09

## 2024-07-26 RX ADMIN — FERROUS SULFATE TAB 325 MG (65 MG ELEMENTAL FE) 325 MG: 325 (65 FE) TAB at 07:35

## 2024-07-26 RX ADMIN — ATORVASTATIN CALCIUM 40 MG: 40 TABLET, FILM COATED ORAL at 15:58

## 2024-07-26 RX ADMIN — METOPROLOL TARTRATE 5 MG: 5 INJECTION INTRAVENOUS at 05:19

## 2024-07-26 RX ADMIN — MAGNESIUM SULFATE HEPTAHYDRATE 2 G: 40 INJECTION, SOLUTION INTRAVENOUS at 09:04

## 2024-07-26 RX ADMIN — TORSEMIDE 40 MG: 20 TABLET ORAL at 20:22

## 2024-07-26 RX ADMIN — HYDROMORPHONE HYDROCHLORIDE 1 MG: 1 INJECTION, SOLUTION INTRAMUSCULAR; INTRAVENOUS; SUBCUTANEOUS at 07:35

## 2024-07-26 RX ADMIN — HYDROMORPHONE HYDROCHLORIDE 1 MG: 1 INJECTION, SOLUTION INTRAMUSCULAR; INTRAVENOUS; SUBCUTANEOUS at 10:53

## 2024-07-26 RX ADMIN — INSULIN LISPRO 1 UNITS: 100 INJECTION, SOLUTION INTRAVENOUS; SUBCUTANEOUS at 16:25

## 2024-07-26 RX ADMIN — TORSEMIDE 40 MG: 20 TABLET ORAL at 10:53

## 2024-07-26 RX ADMIN — HYDROMORPHONE HYDROCHLORIDE 1 MG: 1 INJECTION, SOLUTION INTRAMUSCULAR; INTRAVENOUS; SUBCUTANEOUS at 22:01

## 2024-07-26 RX ADMIN — METOPROLOL SUCCINATE 100 MG: 100 TABLET, EXTENDED RELEASE ORAL at 07:34

## 2024-07-26 RX ADMIN — TAMSULOSIN HYDROCHLORIDE 0.4 MG: 0.4 CAPSULE ORAL at 15:58

## 2024-07-26 RX ADMIN — INSULIN LISPRO 1 UNITS: 100 INJECTION, SOLUTION INTRAVENOUS; SUBCUTANEOUS at 21:24

## 2024-07-26 RX ADMIN — INSULIN LISPRO 1 UNITS: 100 INJECTION, SOLUTION INTRAVENOUS; SUBCUTANEOUS at 11:04

## 2024-07-26 RX ADMIN — INSULIN GLARGINE 15 UNITS: 100 INJECTION, SOLUTION SUBCUTANEOUS at 20:22

## 2024-07-26 RX ADMIN — METOPROLOL SUCCINATE 100 MG: 100 TABLET, EXTENDED RELEASE ORAL at 20:22

## 2024-07-26 RX ADMIN — POTASSIUM CHLORIDE 40 MEQ: 1500 TABLET, EXTENDED RELEASE ORAL at 09:09

## 2024-07-26 RX ADMIN — HYDROCODONE BITARTRATE AND ACETAMINOPHEN 2 TABLET: 5; 325 TABLET ORAL at 03:11

## 2024-07-26 RX ADMIN — Medication 20 MG/HR: at 00:08

## 2024-07-26 RX ADMIN — HYDROCODONE BITARTRATE AND ACETAMINOPHEN 2 TABLET: 5; 325 TABLET ORAL at 09:09

## 2024-07-26 RX ADMIN — INSULIN LISPRO 2 UNITS: 100 INJECTION, SOLUTION INTRAVENOUS; SUBCUTANEOUS at 07:43

## 2024-07-26 RX ADMIN — ASPIRIN 81 MG: 81 TABLET, COATED ORAL at 09:09

## 2024-07-26 RX ADMIN — LEVOTHYROXINE SODIUM 125 MCG: 125 TABLET ORAL at 05:19

## 2024-07-26 RX ADMIN — DOCUSATE SODIUM 100 MG: 100 CAPSULE, LIQUID FILLED ORAL at 09:09

## 2024-07-26 RX ADMIN — POTASSIUM CHLORIDE 40 MEQ: 1500 TABLET, EXTENDED RELEASE ORAL at 12:18

## 2024-07-26 RX ADMIN — HYDROCODONE BITARTRATE AND ACETAMINOPHEN 2 TABLET: 5; 325 TABLET ORAL at 15:58

## 2024-07-26 RX ADMIN — PIPERACILLIN AND TAZOBACTAM 4.5 G: 36; 4.5 INJECTION, POWDER, FOR SOLUTION INTRAVENOUS at 19:24

## 2024-07-26 NOTE — PLAN OF CARE
Problem: INFECTION - ADULT  Goal: Absence or prevention of progression during hospitalization  Description: INTERVENTIONS:  - Assess and monitor for signs and symptoms of infection  - Monitor lab/diagnostic results  - Monitor all insertion sites, i.e. indwelling lines, tubes, and drains  - Monitor endotracheal if appropriate and nasal secretions for changes in amount and color  - [place] appropriate cooling/warming therapies per order  - Administer medications as ordered  - Instruct and encourage patient and family to use good hand hygiene technique  - Identify and instruct in appropriate isolation precautions for identified infection/condition  Outcome: Progressing     Problem: PAIN - ADULT  Goal: Verbalizes/displays adequate comfort level or baseline comfort level  Description: Interventions:  - Encourage patient to monitor pain and request assistance  - Assess pain using appropriate pain scale  - Administer analgesics based on type and severity of pain and evaluate response  - Implement non-pharmacological measures as appropriate and evaluate response  - Consider cultural and social influences on pain and pain management  - Notify physician/advanced practitioner if interventions unsuccessful or patient reports new pain  Outcome: Progressing     Problem: GENITOURINARY - ADULT  Goal: Maintains or returns to baseline urinary function  Description: INTERVENTIONS:  - Assess urinary function  - Encourage oral fluids to ensure adequate hydration if ordered  - Administer IV fluids as ordered to ensure adequate hydration  - Administer ordered medications as needed  - Offer frequent toileting  - Follow urinary retention protocol if ordered  Outcome: Progressing     Problem: SKIN/TISSUE INTEGRITY - ADULT  Goal: Incision(s), wounds(s) or drain site(s) healing without S/S of infection  Description: INTERVENTIONS  - Assess and document dressing, incision, wound bed, drain sites and surrounding tissue  - Provide patient and  family education  - Perform skin care/dressing changes per wound  Outcome: Progressing     Problem: METABOLIC, FLUID AND ELECTROLYTES - ADULT  Goal: Electrolytes maintained within normal limits  Description: INTERVENTIONS:  - Monitor labs and assess patient for signs and symptoms of electrolyte imbalances  - Administer electrolyte replacement as ordered  - Monitor response to electrolyte replacements, including repeat lab results as appropriate  - Instruct patient on fluid and nutrition as appropriate  Outcome: Progressing  Goal: Fluid balance maintained  Description: INTERVENTIONS:  - Monitor labs   - Monitor I/O and WT  - Instruct patient on fluid and nutrition as appropriate  - Assess for signs & symptoms of volume excess or deficit  Outcome: Progressing     Problem: Prexisting or High Potential for Compromised Skin Integrity  Goal: Skin integrity is maintained or improved  Description: INTERVENTIONS:  - Identify patients at risk for skin breakdown  - Assess and monitor skin integrity  - Assess and monitor nutrition and hydration status  - Monitor labs   - Assess for incontinence   - Turn and reposition patient  - Assist with mobility/ambulation  - Relieve pressure over bony prominences  - Avoid friction and shearing  - Provide appropriate hygiene as needed including keeping skin clean and dry  - Evaluate need for skin moisturizer/barrier cream  - Collaborate with interdisciplinary team   - Patient/family teaching  - Consider wound care consult   Outcome: Progressing

## 2024-07-26 NOTE — NURSING NOTE
Pt continues having uncontrolled afib with RVR. Hr 120-160, occ breaks into NSR but does not sustain. Pt admits to having palpitations with elevated HR that comes and goes. Denies any cp or sob  at present. Pt medicated with Toprol XL 100mg  PO early. EKG done. Ofelia Salmeron PA-C and Dr. Mathew notified of same. Will consult cardiology.

## 2024-07-26 NOTE — PLAN OF CARE
Problem: Prexisting or High Potential for Compromised Skin Integrity  Goal: Skin integrity is maintained or improved  Description: INTERVENTIONS:  - Identify patients at risk for skin breakdown  - Assess and monitor skin integrity  - Assess and monitor nutrition and hydration status  - Monitor labs   - Assess for incontinence   - Turn and reposition patient  - Assist with mobility/ambulation  - Relieve pressure over bony prominences  - Avoid friction and shearing  - Provide appropriate hygiene as needed including keeping skin clean and dry  - Evaluate need for skin moisturizer/barrier cream  - Collaborate with interdisciplinary team   - Patient/family teaching  - Consider wound care consult   Outcome: Progressing     Problem: METABOLIC, FLUID AND ELECTROLYTES - ADULT  Goal: Electrolytes maintained within normal limits  Description: INTERVENTIONS:  - Monitor labs and assess patient for signs and symptoms of electrolyte imbalances  - Administer electrolyte replacement as ordered  - Monitor response to electrolyte replacements, including repeat lab results as appropriate  - Instruct patient on fluid and nutrition as appropriate  Outcome: Progressing     Problem: SKIN/TISSUE INTEGRITY - ADULT  Goal: Incision(s), wounds(s) or drain site(s) healing without S/S of infection  Description: INTERVENTIONS  - Assess and document dressing, incision, wound bed, drain sites and surrounding tissue  - Provide patient and family education  - Perform skin care/dressing changes per wound  Outcome: Progressing     Problem: GENITOURINARY - ADULT  Goal: Maintains or returns to baseline urinary function  Description: INTERVENTIONS:  - Assess urinary function  - Encourage oral fluids to ensure adequate hydration if ordered  - Administer IV fluids as ordered to ensure adequate hydration  - Administer ordered medications as needed  - Offer frequent toileting  - Follow urinary retention protocol if ordered  Outcome: Progressing     Problem:  PAIN - ADULT  Goal: Verbalizes/displays adequate comfort level or baseline comfort level  Description: Interventions:  - Encourage patient to monitor pain and request assistance  - Assess pain using appropriate pain scale  - Administer analgesics based on type and severity of pain and evaluate response  - Implement non-pharmacological measures as appropriate and evaluate response  - Consider cultural and social influences on pain and pain management  - Notify physician/advanced practitioner if interventions unsuccessful or patient reports new pain  Outcome: Not Progressing     Problem: SAFETY ADULT  Goal: Patient will remain free of falls  Description: INTERVENTIONS:  - Educate patient/family on patient safety including physical limitations  - Instruct patient to call for assistance with activity   - Consult OT/PT to assist with strengthening/mobility   - Keep Call bell within reach  - Keep bed low and locked with side rails adjusted as appropriate  - Keep care items and personal belongings within reach  - Initiate and maintain comfort rounds  - Make Fall Risk Sign visible to staff  - Offer Toileting every 2 Hours, in advance of need if unable to notify staff of need  - Initiate/Maintain bed/chair alarm for confusion, impulsivity, or noncompliance with notifying staff.  - Apply yellow socks and bracelet for high fall risk patients  - Consider moving patient to room near nurses station  Outcome: Progressing

## 2024-07-26 NOTE — PROGRESS NOTES
Progress Note - General Surgery   Masoud Perry 68 y.o. male MRN: [id number]  Unit/Bed#: -01 Encounter: [id number]     Assessment: 67yo M with severe right leg pain and edema d/t cellulitis   -bacteremia, 1/2 BC 7/23/2024 growing Providencia  -no signs of abscess collection or necrotizing infx on exam or CT  -hx of severe chronic kelsea LE lymphedema and venous stasis  -pt reports slight improvement in pain level today, was able to tolerate wound care and ace wrapping of his legs     CT right lower extremity wo contrast 07/24/2024:  -No fluid collection seen  -Cellulitic changes leg  -No intramuscular collection  -No lytic lesion or periosteal reaction     Venous duplex b/l LE 07/23/2024:  -RIGHT LOWER LIMB:  No evidence of acute or chronic deep vein thrombosis in the visualized  segments.  No evidence of superficial thrombophlebitis noted.  Doppler evaluation shows a normal response to augmentation maneuvers.  Popliteal, posterior tibial and anterior tibial arterial Doppler waveforms are  triphasic/hyperemic.  -LEFT LOWER LIMB:  No evidence of acute or chronic deep vein thrombosis in the visualized  segments.  No evidence of superficial thrombophlebitis noted.  Doppler evaluation shows a normal response to augmentation maneuvers.  Popliteal, posterior tibial and anterior tibial arterial Doppler waveforms are  triphasic/biphasic.  -Technically difficult/limited study. The bilateral calf veins were poorly  visualized on today's exam.     -Afebrile, vital signs stable on room air   -No leukocytosis WBC 6 (6,6,9)  -Chronic anemia with hemoglobin of 7.2 (7.8, 7.5, 7.5, 10.1)  -Chronic thrombocytopenia platelets 112 (101, 134)  -Chronic kidney disease stage V on PD EGFR 8 (8, 8, 8)  -Supratherapeutic INR, but downtrending, Coumadin on hold, INR 4.07 (4.6, 7.5, 6.6))  -procal yesterday 0.65 (0.72, 0.89)  -MRSA culture neg  -wound culture 07/23/2024: Growing Pseudomonas    Pmhx: anemia of chronic disease, chronic  lymphedema, anticoagulated on Coumadin for atrial fibrillation, BPH, coronary artery disease, chronic kidney disease on peritoneal dialysis, COPD, history of CVA, diastolic CHF, DVT, factor V deficiency, hypertension, hyperlipidemia, history of myocardial infarction, morbid obesity, tobacco abuse  Pshx: PD catheter insertion 6/7/24, Cholecystectomy; Joint replacement; Coronary angioplasty with stent; pr cysto/uretero w/lithotripsy &indwell stent insrt (Left, 9/24/2022); and US guided kidney biopsy (8/5/2020)     Plan:  -no plans for acute surgical intervention as no evidence of abscess or collection on ct scan  -cont IV Abx per ID  -cont volume control per nephrology  -agree with wound care recommendations: cleansing bilateral lower extremities with soap and water.  Apply skin nourishing cream to intact skin.  Apply Normogel to wound beds and cover with Adaptic nonadherent dressing cover with Melgisorb Ag dry sterile dressing wrap with Kerlix and Ace.  Change daily and as needed.  Along with low air mattress.  -cont to elevate as tolerated by patient     -remainder of care per primary team    Subjective: Patient seen while having peritoneal dialysis.  He states he is doing a little better today, but continues to have pain.  He feels his edema has decreased bilaterally, though only slightly on the right.  He was premedicated then was able to tolerate having wound care performed to his legs earlier this morning.  Pain is currently well-controlled.    Objective:   General: VSS, NAD, alert  Constitutional: Ill-appearing  Lungs nl respiratory effort  Heart: Normal rate  Extremities: Nursing staff has just finished wound care of both legs including Ace wrapping.  Wrappings were not removed for further examination.  DP and PT pulses easily dopplerable on exam yesterday.  Neuro: A&Ox3, affect appropriate      Blood pressure 102/57, pulse (!) 133, temperature 97.6 °F (36.4 °C), temperature source Oral, resp. rate (!) 23,  "height 5' 10\" (1.778 m), weight 117 kg (258 lb 13.1 oz), SpO2 95%.,Body mass index is 37.14 kg/m².      Intake/Output Summary (Last 24 hours) at 7/26/2024 0936  Last data filed at 7/26/2024 0303  Gross per 24 hour   Intake 579.53 ml   Output 1575 ml   Net -995.47 ml       Invasive Devices       Peripheral Intravenous Line  Duration             Peripheral IV 07/23/24 Right;Ventral (anterior) Forearm 2 days    Long-Dwell Peripheral IV (Midline) 07/24/24 Left Basilic 1 day              Line  Duration             Peritoneal Dialysis Catheter Column-disk Abdominal 49 days                    Lab, Imaging and other studies:CBC:   Lab Results   Component Value Date    WBC 6.45 07/26/2024    HGB 7.2 (L) 07/26/2024    HCT 23.4 (L) 07/26/2024     (H) 07/26/2024     (L) 07/26/2024    RBC 2.34 (L) 07/26/2024    MCH 30.8 07/26/2024    MCHC 30.8 (L) 07/26/2024    RDW 16.5 (H) 07/26/2024    MPV 9.2 07/26/2024   , CMP:   Lab Results   Component Value Date    SODIUM 138 07/26/2024    K 3.2 (L) 07/26/2024     07/26/2024    CO2 22 07/26/2024    BUN 67 (H) 07/26/2024    CREATININE 4.70 (H) 07/26/2024    CALCIUM 8.7 07/26/2024    EGFR 11 07/26/2024   , Coagulation:   Lab Results   Component Value Date    INR 4.07 (H) 07/26/2024   , Urinalysis: No results found for: \"COLORU\", \"CLARITYU\", \"SPECGRAV\", \"PHUR\", \"LEUKOCYTESUR\", \"NITRITE\", \"PROTEINUA\", \"GLUCOSEU\", \"KETONESU\", \"BILIRUBINUR\", \"BLOODU\", Amylase: No results found for: \"AMYLASE\", Lipase: No results found for: \"LIPASE\"  VTE Pharmacologic Prophylaxis: Reason for no pharmacologic prophylaxis supratherapeutic INR  VTE Mechanical Prophylaxis: reason for no mechanical VTE prophylaxis unable to tolerater      "

## 2024-07-26 NOTE — PROGRESS NOTES
Progress Note - Infectious Disease   Masoud Perry 68 y.o. male MRN: [id number]  Unit/Bed#: -01 Encounter: [id number]          VIRTUAL CARE DOCUMENTATION:     1. This service was provided via Telemedicine using [employer]     2. Parties in the room with patient during teleconsult RN    3. Confidentiality My office door was closed     4. Participants No one else was in the room    5. Patient acknowledged consent and understanding of privacy and security of the  Telemedicine consult. I informed the patient that I have reviewed their record in Epic and presented the opportunity for them to ask any questions regarding the visit today.  The patient agreed to participate.    6. Time spent 9 minutes       Assessment/Recommendations     Providencia bacteremia              Secondary to right leg cellulitis. No other source appreciated, no GI,  or respiratory symptoms. Has a recently placed PD catheter but without abd pain, cloudy effluent or local signs of infection. Afebrile without leukcytosis     -Continue zosyn renally dosed  -FU blood c/s  -Additional mngt as below  -Anticipate 10 days of therapy, final choice of abx to be determined     Right leg cellulitis              In the setting of worsening lower extremity edema, chronic lymphedema, acute on chronic diastolic CHF, ESRD. Wound cx with Pseudomonas. No clinical or CT evidence of abscess or necrotizing infection. Patient has ongoing severe pain and edema     -Continue zosyn  -FU cultures  -Appreciate surgical evaluation given intractable leg pain, no evidence of necrotizing infection and no surgical intervention anticipated  -Continue serial extremity exams, optimize volume status with PD and diuretics, continue wound care, limb elevation and ace wrap and recommend premedicating to allow limb care  -Anticipate slow and prolonged recovery due to extent of tissue edema     ESRD on peritoneal dialysis              Had PD catheter placed on 6/7, tolerating  well with no local signs of infection/PD related peritonitis. Dialysate wbc 8     -Management as per nephrology     4. Acute on chronic diastolic CHF, lymphedema  Risk factors for recurrent infection    5. A fib with RVR   Likely secondary to acute infection     CT RLE images personally reviewed and show no fluid collection    Discussed above plan in detail with the primary service who is in agreement.    History       Subjective:  The patient continues with severe R leg pain , unable to tolerate repositioning, dressing or care. Had an episode of rapid a fib this morning.  No fevers, chills, or sweats.  No nausea, vomiting, or diarrhea.    Antibiotics:  Zosyn    Physical Exam     Temp:  [97.3 °F (36.3 °C)-98.2 °F (36.8 °C)] 97.6 °F (36.4 °C)  HR:  [] 133  Resp:  [16-38] 23  BP: ()/(57-65) 102/57  SpO2:  [95 %-96 %] 95 %  Temp (24hrs), Av.7 °F (36.5 °C), Min:97.3 °F (36.3 °C), Max:98.2 °F (36.8 °C)  Current: Temperature: 97.6 °F (36.4 °C)    Intake/Output Summary (Last 24 hours) at 2024 0912  Last data filed at 2024 0303  Gross per 24 hour   Intake 819.53 ml   Output 575 ml   Net 244.53 ml       Physical exam findings reported by bedside and primary medical team staff      General Appearance:  Appearing ill and fatigued, nontoxic, and in no distress, appears stated age   Lungs:   Diminished to auscultation bilaterally, no audible wheezes, rhonchi and rales, respirations unlabored   Heart:  Irregularly irregular rate and rhythm, S1, S2 normal, no murmur, rub or gallop   Abdomen:   Soft, non-tender, non-distended, positive bowel sounds, no masses, no organomegaly, PD catheter in place    No CVA tenderness   Extremities: Extremities normal, atraumatic, no cyanosis, clubbing , 2-3+ edema    Skin: RLE limited exam but diffuse erythema, ecchymosis, chronic venous hyperpigmentation changes with weeping, small lateral superficial wound without necrosis and small skin tear of plantar surface of  foot   Neurologic: Alert and oriented times 3,         Invasive Devices:   Peripheral IV 07/23/24 Right;Ventral (anterior) Forearm (Active)   Site Assessment WDL 07/24/24 1945   Dressing Type Transparent 07/24/24 1945   Line Status Infusing;Flushed;No blood return 07/24/24 1945   Dressing Status Clean;Dry;Intact 07/24/24 1945   Dressing Change Due 07/27/24 07/24/24 1600   Reason Not Rotated Not due 07/24/24 1945       Long-Dwell Peripheral IV (Midline) 07/24/24 Left Basilic (Active)   Site Assessment WDL 07/24/24 1945   Dressing Type Chlorhexidine dressing;Securing device 07/24/24 1945   Line Status Blood return noted;Flushed;Saline locked 07/24/24 1945   Dressing Status Clean;Dry;Intact 07/24/24 1945   Dressing Change Due 07/31/24 07/24/24 1600   Reason Not Rotated Not due 07/24/24 1945       Peritoneal Dialysis Catheter Column-disk Abdominal (Active)   Site Assessment [facility] 07/25/24 0300   Status Draining 07/25/24 0300   Site Condition No complications 07/25/24 0300   Dressing Status Clean;Dry;Intact 07/25/24 0300   Dressing Gauze 07/24/24 1945   Drainage Description Other (Comment) 07/24/24 1344       Labs, Imaging, & Other Studies     Lab Results:    I have personally reviewed pertinent labs.    Results from last 7 days   Lab Units 07/26/24  0433 07/25/24  0341 07/24/24  1039 07/24/24  0450   WBC Thousand/uL 6.45 6.34  --  6.62   HEMOGLOBIN g/dL 7.2* 7.8* 7.5* 7.5*   PLATELETS Thousands/uL 117* 112*  --  101*     Results from last 7 days   Lab Units 07/26/24  0433 07/24/24  1554 07/24/24  0450 07/23/24  1253   POTASSIUM mmol/L 3.2*   < > 3.7 3.5   CHLORIDE mmol/L 108   < > 103 99   CO2 mmol/L 22   < > 21 21   BUN mg/dL 67*   < > 84* 83*   CREATININE mg/dL 4.70*   < > 5.88* 5.84*   EGFR ml/min/1.73sq m 11   < > 8 8   CALCIUM mg/dL 8.7   < > 9.8 10.8*   AST U/L  --   --  34 24   ALT U/L  --   --  26 22   ALK PHOS U/L  --   --  70 91    < > = values in this interval not displayed.     Results from last 7 days   Lab  Units 07/23/24  1757 07/23/24  1253   BLOOD CULTURE   --  No Growth at 48 hrs.  Providencia rettgeri*   GRAM STAIN RESULT  No polys seen*  4+ Gram negative rods* Gram negative rods*   WOUND CULTURE  4+ Growth of Pseudomonas aeruginosa*  --    MRSA CULTURE ONLY  No Methicillin Resistant Staphlyococcus aureus (MRSA) isolated  --        Imaging Studies:   I have personally reviewed pertinent imaging study reports and images in PACS.      EKG, Pathology, and Other Studies:   I have personally reviewed pertinent reports.        Counseling/Coordination of care:       Total 50 minutes communication with the patient via telehealth.  Labs, medical tests and imaging studies were independently reviewed by me as noted above.

## 2024-07-26 NOTE — CONSULTS
Consultation - Cardiology   Masoud Perry 68 y.o. male MRN: [id number]  Unit/Bed#: -01 Encounter: [id number]    Assessment & Plan     Assessment:  PAF- converted to NSR this AM   - anticoagulated on coumadin   - hx of afib ablation at [facility] 4/2024   - intolerant to amiodarone historically    - CP likely related to demand ischemia from afib with rvr, first troponin 9  ESRD on PD  Cellulitis   CAD sp PCI remotely   Hx stroke   Smoking  Dm2  HTN    Plan:  Continue rate control  Unfortunately pt unable to tolerate amiodarone  Anticoagulate with coumadin   Monitor on telemetry  Treat electrolyte disturbance as appropriate  Treat underlying cellulitis as likely  of his recurrence  Needs fu with EP team on discharge - [facility] [address] [place]     History of Present Illness   Physician Requesting Consult: Nora Mathew MD  Reason for Consult / Principal Problem: afib with rvr   HPI: Masoud Perry is a 68 y.o. year old male with history of DM2, HTN, smoking, CAD, ESRD on PD, PAF sp ablation 4/2024 on coumadin, loop recorder presented to the ED for concerns with cellulitis. Having episodes of afib with rvr in the setting of active infection. The episodes do cause him to have some chest discomfort. By the time of our evaluation patient converted back to NSR. He states his pain has improved since then. Mag and potassium being replaced by nephrology.     Inpatient consult to Cardiology  Consult performed by: Maria Eugenia Ortiz PA-C  Consult ordered by: Ofelia Salmeron PA-C          Review of Systems   Constitutional:  Positive for fatigue.   Respiratory:  Positive for chest tightness and shortness of breath.    Cardiovascular:  Positive for chest pain and palpitations.   Skin:  Negative for color change.   Neurological:  Negative for weakness.   Psychiatric/Behavioral:  Positive for agitation.        Historical Information   Past Medical History:   Diagnosis Date    Anemia in chronic kidney disease     Anticoagulated on Coumadin      Atrial fibrillation (HCC)     BPH (benign prostatic hyperplasia)     CAD (coronary artery disease)     CKD (chronic kidney disease), stage V (HCC)     Compartment syndrome of forearm (HCC)     Right, 2019    COPD (chronic obstructive pulmonary disease) (HCC)     CVA (cerebral vascular accident) (HCC)     Diastolic CHF (HCC)     grade 1 DD    DVT (deep venous thrombosis) (HCC)     Factor V deficiency (HCC)     Gout     Hyperlipidemia     Hypertension     Hypothyroidism     MI (myocardial infarction) (HCC)     x3 - 1999, 2010, after 2015    Morbid obesity (HCC)     Nephrolithiasis     Noncompliance with medications     SHAD (obstructive sleep apnea)     Peritoneal dialysis catheter in place (HCC)     Pulmonary embolism (HCC)     Secondary hyperparathyroidism of renal origin (HCC)     Spinal stenosis of lumbar region     Status post placement of implantable loop recorder     Tobacco dependence      Past Surgical History:   Procedure Laterality Date    CARDIAC ELECTROPHYSIOLOGY STUDY AND ABLATION  04/29/2024    CHOLECYSTECTOMY      CORONARY ANGIOPLASTY WITH STENT PLACEMENT      JOINT REPLACEMENT      bilateral knee    TX CYSTO/URETERO W/LITHOTRIPSY &INDWELL STENT INSRT Left 09/24/2022    Procedure: CYSTOSCOPY URETEROSCOPY WITH LITHOTRIPSY HOLMIUM LASER, AND INSERTION STENT URETERAL;  Surgeon: Lewis Dahl MD;  Location:  MAIN OR;  Service: Urology    TX LAPS INSERTION TUNNELED INTRAPERITONEAL CATHETER N/A 6/7/2024    Procedure: INSERTION PERITONEAL CATHETER DIALYSIS LAPAROSCOPIC;  Surgeon: Hiram Park DO;  Location: [facility];  Service: General    US GUIDED KIDNEY BIOPSY  08/05/2020     Social History     Substance and Sexual Activity   Alcohol Use Not Currently     Social History     Substance and Sexual Activity   Drug Use Never     E-Cigarette/Vaping    E-Cigarette Use Never User      E-Cigarette/Vaping Substances     Social History     Tobacco Use   Smoking Status Every Day    Current packs/day: 0.50     "Types: Cigarettes   Smokeless Tobacco Never     Family History: non-contributory    Meds/Allergies   all current active meds have been reviewed  Allergies   Allergen Reactions    Carvedilol Shortness Of Breath    Saccharin - Food Allergy Diarrhea     GI intolerance, adamant does not want to receive    Sucralose - Food Allergy Diarrhea     GI intolerance, adamant does not want to receive    Amiodarone Dizziness    Aspartame - Food Allergy Diarrhea    Bumetanide GI Intolerance and Lightheadedness           Cephalexin Other (See Comments)     unknown    Ciprofloxacin Rash    Hydralazine Tachycardia    Lisinopril GI Intolerance           Metolazone Other (See Comments)     Metallic taste    Morphine Swelling     Of injection site    Nifedipine Lightheadedness    Strawberry Extract - Food Allergy Rash       Objective   Vitals: Blood pressure 99/58, pulse 74, temperature 97.6 °F (36.4 °C), temperature source Oral, resp. rate 19, height 5' 10\" (1.778 m), weight 117 kg (258 lb 13.1 oz), SpO2 98%.  Orthostatic Blood Pressures      Flowsheet Row Most Recent Value   Blood Pressure 99/58 filed at 07/26/2024 1000   Patient Position - Orthostatic VS Lying filed at 07/26/2024 0732              Intake/Output Summary (Last 24 hours) at 7/26/2024 1104  Last data filed at 7/26/2024 0946  Gross per 24 hour   Intake 1291.5 ml   Output 1550 ml   Net -258.5 ml       Invasive Devices       Peripheral Intravenous Line  Duration             Peripheral IV 07/23/24 Right;Ventral (anterior) Forearm 2 days    Long-Dwell Peripheral IV (Midline) 07/24/24 Left Basilic 1 day              Line  Duration             Peritoneal Dialysis Catheter Column-disk Abdominal 49 days                    Physical Exam  Constitutional:       Appearance: Normal appearance.   Cardiovascular:      Rate and Rhythm: Normal rate.      Heart sounds: No murmur heard.  Pulmonary:      Effort: Pulmonary effort is normal.   Abdominal:      Palpations: Abdomen is soft. "   Musculoskeletal:      Cervical back: Neck supple.   Neurological:      General: No focal deficit present.      Mental Status: He is alert.   Psychiatric:         Mood and Affect: Mood normal.         Lab Results: I have personally reviewed pertinent lab results.    CBC with diff:   Results from last 7 days   Lab Units 07/26/24  0433   WBC Thousand/uL 6.45   RBC Million/uL 2.34*   HEMOGLOBIN g/dL 7.2*   HEMATOCRIT % 23.4*   MCV fL 100*   MCH pg 30.8   MCHC g/dL 30.8*   RDW % 16.5*   MPV fL 9.2   PLATELETS Thousands/uL 117*     CMP:   Results from last 7 days   Lab Units 07/26/24  0433 07/24/24  1554 07/24/24  0450   SODIUM mmol/L 138   < > 134*   CHLORIDE mmol/L 108   < > 103   CO2 mmol/L 22   < > 21   BUN mg/dL 67*   < > 84*   CREATININE mg/dL 4.70*   < > 5.88*   CALCIUM mg/dL 8.7   < > 9.8   AST U/L  --   --  34   ALT U/L  --   --  26   ALK PHOS U/L  --   --  70   EGFR ml/min/1.73sq m 11   < > 8    < > = values in this interval not displayed.     HS Troponin:   0   Lab Value Date/Time    HSTNI0 9 07/26/2024 0859    HSTNI2 6 07/23/2024 1509    HSTNI4 6 07/23/2024 1746    HSTNI4 19 01/11/2022 2335     BNP:   Results from last 7 days   Lab Units 07/26/24  0433   POTASSIUM mmol/L 3.2*   CHLORIDE mmol/L 108   CO2 mmol/L 22   BUN mg/dL 67*   CREATININE mg/dL 4.70*   CALCIUM mg/dL 8.7   EGFR ml/min/1.73sq m 11     Coags:   Results from last 7 days   Lab Units 07/26/24  0433   INR  4.07*     TSH:     Magnesium:   Results from last 7 days   Lab Units 07/26/24  0433   MAGNESIUM mg/dL 1.8*     Lipid Profile:     Imaging: I have personally reviewed pertinent reports.    EKG: ns with RBBB      Echo 4/7/2023:    Left Ventricle: Left ventricle is normal in size. There is mild   concentric hypertrophy. Systolic function is normal with an ejection   fraction of 60-65%. Wall motion is within normal limits. There is grade I   (mild) diastolic dysfunction and normal left atrial pressure.     Left Atrium: Left atrium cavity size is  normal.     Right Ventricle: Right ventricle was not well visualized. Right   ventricle cavity is probably mildly dilated. Systolic function is normal.     Aortic Valve: There is mild sclerosis. There is mild regurgitation.   There is no evidence of aortic valve stenosis.     Pericardium: There is no pericardial effusion.     Ascending Aorta: The aorta appears normal in size.

## 2024-07-26 NOTE — NURSING NOTE
Pt admits to chest tightness on and off with radiation to neck. Denies any SOB. LAZARO Navarrete for cardiology made aware of same. Labs drawn and sent. Pt allowed staff to bathe him, change his linen, reposition and do dressing changes on bilat lower extremities. Aure fair. Med for pain prior to cares. Offered to get pt oob, refused. Refusing pillows under right leg.

## 2024-07-26 NOTE — CASE MANAGEMENT
Case Management Discharge Planning Note    Patient name Masoud Goregh  Location /-01 MRN [id number]  : 1955 Date 2024       Current Admission Date: 2024  Current Admission Diagnosis:Cellulitis of right lower extremity   Patient Active Problem List    Diagnosis Date Noted Date Diagnosed    Patient on peritoneal dialysis (HCC) 2024     Hypervolemia 2024     ESRD (end stage renal disease) ([facility]) 2024     Bacteremia 2024     Cellulitis of right lower extremity 2024     Atrial fibrillation ([facility]) 2024     Bilateral lower extremity edema 2023     Ureteral stone with hydronephrosis 2022     Cutaneous abscess of buttock 2022     Chronic kidney disease-mineral and bone disorder 2022     Type 2 diabetes mellitus with stage 4 chronic kidney disease, with long-term current use of insulin ([facility]) 07/15/2022     Obesity, morbid ([facility]) 07/15/2022     Secondary hyperparathyroidism of renal origin ([facility]) 07/15/2022     Stage 5 chronic kidney disease on peritoneal dialysis ([facility]) 07/15/2022     Factor 5 Leiden mutation, heterozygous ([facility]) 01/15/2022     Thrombocytopenia ([facility]) 2022     Angina at rest 2022     MI (myocardial infarction) ([facility]) 2022     History of PTCA 2022     Sleep apnea 2022     Smoking 2022     Anemia 2022     History of CVA (cerebrovascular accident) 2022     HTN (hypertension) 2022     Acute on chronic diastolic HF (heart failure) ([facility]) 2022     HLD (hyperlipidemia) 2022     Lymphedema 2022     Hypothyroidism 2022       LOS (days): 3  Geometric Mean LOS (GMLOS) (days): 4.8  Days to GMLOS:2     OBJECTIVE:  Risk of Unplanned Readmission Score: 24.79         Current admission status: Inpatient   Preferred Pharmacy:   [facility] - [place], [place] - [address]  [address]  [place] [address]  Phone: [phone] Fax:  [phone]    Primary Care Provider: Jignesh Baker DO    Primary Insurance: "[id number]  Secondary Insurance:     DISCHARGE DETAILS:     Chart reviewed aware of medical management. Case was discussed in multidisciplinary discharge meeting.  Clinical information supporting hospitalization:   Providencia bacteremia  Right leg cellulitis  ID is active.  Lasix infusion, IV antibiotics   Barriers to discharge:  - medical management  Discharge plan:  STR    Other: patient does need STR however plan is to make referrals after patient makes some progress with therapy- he is have a lot of pain in his legs.  Pt and son were made aware of the challenges of finding STR/ that can manage his PD needs and insurance.    CM will continue to follow plan of care.

## 2024-07-26 NOTE — ASSESSMENT & PLAN NOTE
Lab Results   Component Value Date    EGFR 11 07/26/2024    EGFR 8 07/25/2024    EGFR 8 07/24/2024    CREATININE 4.70 (H) 07/26/2024    CREATININE 5.91 (H) 07/25/2024    CREATININE 5.86 (H) 07/24/2024     History of stage 5 ckd recently started on peritoneal dialysis, first treatment on 6/24. Had PD catheter placed on 6/7/2024. Was scheduled to do PD dialysis in clinic and then transition to home PD dialysis.   Last PD was done yesterday, patient currently doing every other day PD, goal for everyday per patient.   Nephro: His outpatient prescription is a 2400 fill volume with 3 exchanges for a total fill volume of 7200 mL over 9 hours.  Patient was not as compliant with dialysis over the last few days due to feeling sick.  While inpatient, plan on CAPD with 2000 cc fills, 4 exchanges a day of dextrose 2.5%.   Avoid nephrotoxic medications, nsaids, hypotension

## 2024-07-26 NOTE — PROGRESS NOTES
[facility]  Progress Note  Name: Masoud Perry I  MRN: [id number]  Unit/Bed#: -01 I Date of Admission: 7/23/2024   Date of Service: 7/26/2024 I Hospital Day: 3    Assessment & Plan   * Cellulitis of right lower extremity  Assessment & Plan  Presented to ED with bilateral leg swelling x 4 days. Right leg more swollen and red over the last 2 days. Significant pain and having drainage in the right leg as well. NO fevers or chills.   No recent antibiotics outpatient  CT RLE: No fluid collection seen, cellulitic changes leg, No intramuscular collection, no lytic lesion or periosteal reaction  Procal slightly elevated 0.89, now down trending   Wound culture pending  BC 1/2 gram negative rods; Enterobacterales   Follow CBC and fever curve  ID consulted: Start Zosyn renally dose, follow-up blood cultures, anticipate 10 days of therapy. Slow and prolonged recovery expected  Now agreeable to wound care and dressing changes   General surgery consulted- no surgical intervention, no signs of necrosis. Continue wound care    Lab Results   Component Value Date    WBC 6.34 07/25/2024    WBC 6.62 07/24/2024    WBC 9.06 07/23/2024       Bacteremia  Assessment & Plan  1 out of 2 blood cultures positive for gram-negative rods, coming back as Enterobacterales  Second blood culture no growth at 48 hours   ID consulted, recommending discontinue Unasyn and start Zosyn  Anticipate 10 days of therapy final choice of antibiotics to be determined    Atrial fibrillation (HCC)  Assessment & Plan  Paroxysmal atrial fibrillation. sp ablation with Dr Alcala [place] [facility] [place], took himself off Eliquis post procedure AMA;  has episodes on loop recorder of afib that are symptomatic, no longer taking amiodarone  Currently patient on coumadin, follows outpatient with coumadin clinic  Patient went into a fib rvr overnight on 7/26  Cardiology consulted - continue metoprolol and monitor on tele. Unable to tolerate amio and  cannot take other anti arrhythmics due to esrd   Converted back into NSR on 7/26  Monitor INR daily -remains supratherapeutic. Hold coumadin    Stage 5 chronic kidney disease on peritoneal dialysis (HCC)  Assessment & Plan  Lab Results   Component Value Date    EGFR 11 07/26/2024    EGFR 8 07/25/2024    EGFR 8 07/24/2024    CREATININE 4.70 (H) 07/26/2024    CREATININE 5.91 (H) 07/25/2024    CREATININE 5.86 (H) 07/24/2024     History of stage 5 ckd recently started on peritoneal dialysis, first treatment on 6/24. Had PD catheter placed on 6/7/2024. Was scheduled to do PD dialysis in clinic and then transition to home PD dialysis.   Last PD was done yesterday, patient currently doing every other day PD, goal for everyday per patient.   Nephro: His outpatient prescription is a 2400 fill volume with 3 exchanges for a total fill volume of 7200 mL over 9 hours.  Patient was not as compliant with dialysis over the last few days due to feeling sick.  While inpatient, plan on CAPD with 2000 cc fills, 4 exchanges a day of dextrose 2.5%.   Avoid nephrotoxic medications, nsaids, hypotension    Anemia  Assessment & Plan  Patient with history of anemia baseline hemoglobin around 9-10 per outpatient labs  On admission, patient with hemoglobin of 10.1, decreased to 7.5, possibly secondary to volume overload, no evidence of bleeding at this time.  CT right lower extremity without evidence of bleeding.  Iron panel showing low iron  Does receive IV Venofer 200 mg with dialysis treatments as an outpatient and received 5 doses for iron deficiency anemia.  Hold on Venofer in setting of acute infection.  Given one dose of epogen on 7/25  Stool occult positive x1  GI consulted    Transfuse for hemoglobin less than 7    Lymphedema  Assessment & Plan  With history of chronic lymphedema following with cardiology outpatient. Feels that his legs have been swelling more recently, right leg more swollen than left due to current cellulitis  episode.   Transitioned from lasix infusion to torsemide 40mg bid     Acute on chronic diastolic HF (heart failure) (HCC)  Assessment & Plan  Wt Readings from Last 3 Encounters:   07/26/24 117 kg (258 lb 13.1 oz)   06/25/24 124 kg (273 lb 13 oz)   06/20/24 122 kg (270 lb)     History of chf managed by both cardiology and nephrology, on lasix 80 mg bid outpatient, patient states that he took 240 mg of lasix yesterday and again today.   Echo 4/2023: Systolic function is normal with an ejection fraction of 60-65%. Wall motion is within normal limits. There is grade I (mild) diastolic dysfunction   BNP normal  CXR: No acute cardiopulmonary disease.   Venous duplex-no DVT  Pedal pulses monitored with dopplers  Nephro: Stop Lasix infusion. 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 Sequential Compression Devices Ordered.    Mobility:   Basic Mobility Inpatient Raw Score: 10  JH-HLM Goal: 4: Move to chair/commode  JH-HLM Achieved: 3: Sit at edge of bed  JH-HLM Goal achieved. Continue to encourage appropriate mobility.    Patient Centered Rounds: I performed bedside rounds with nursing staff today.   Discussions with Specialists or Other Care Team Provider: nursing, cm, GI, cardiology and nephrology    Education and Discussions with Family / Patient: Patient declined call to .     Total Time Spent on Date of Encounter in care of patient:  mins. This time was spent on one or more of the following: performing physical exam; counseling and coordination of care; obtaining or  reviewing history; documenting in the medical record; reviewing/ordering tests, medications or procedures; communicating with other healthcare professionals and discussing with patient's family/caregivers.    Current Length of Stay: 3 day(s)  Current Patient Status: Inpatient   Certification Statement: The patient will continue to require additional inpatient hospital stay due to requiring iv antibiotics for cellulitis   Discharge Plan: Anticipate discharge in >72 hrs to rehab facility.    Code Status: Level 3 - DNAR and DNI    Subjective:   Patient states that his pain is improved today. Denies chest pain or shortness of breath. States that this morning when he was in a fib, he was having shortness of breath and palpitations. These symptoms have since resolved since going back into NSR. Agreeable to dressing changes today and wound care.     Objective:     Vitals:   Temp (24hrs), Av.7 °F (36.5 °C), Min:97.3 °F (36.3 °C), Max:98.2 °F (36.8 °C)    Temp:  [97.3 °F (36.3 °C)-98.2 °F (36.8 °C)] 97.9 °F (36.6 °C)  HR:  [] 74  Resp:  [16-38] 19  BP: ()/(57-65) 99/58  SpO2:  [95 %-98 %] 98 %  Body mass index is 37.14 kg/m².     Input and Output Summary (last 24 hours):     Intake/Output Summary (Last 24 hours) at 2024 1351  Last data filed at 2024 1201  Gross per 24 hour   Intake 1041.5 ml   Output 1500 ml   Net -458.5 ml       Physical Exam:   Physical Exam  Vitals reviewed.   Constitutional:       General: He is not in acute distress.     Appearance: Normal appearance. He is obese. He is ill-appearing.   HENT:      Head: Normocephalic and atraumatic.      Nose: Nose normal.      Mouth/Throat:      Mouth: Mucous membranes are moist.      Pharynx: Oropharynx is clear.   Eyes:      Extraocular Movements: Extraocular movements intact.      Conjunctiva/sclera: Conjunctivae normal.   Cardiovascular:      Rate and Rhythm: Normal rate and regular rhythm.      Pulses: Normal pulses.      Heart sounds:  Normal heart sounds. No murmur heard.  Pulmonary:      Effort: Pulmonary effort is normal. No respiratory distress.      Breath sounds: Normal breath sounds. No wheezing.   Abdominal:      General: Abdomen is flat. Bowel sounds are normal. There is no distension.      Palpations: Abdomen is soft.      Tenderness: There is no abdominal tenderness. There is no guarding.   Musculoskeletal:         General: Normal range of motion.      Cervical back: Normal range of motion.      Right lower leg: Edema present.      Left lower leg: Edema present.      Comments: Chronic lymphedema   Skin:     General: Skin is warm.      Coloration: Skin is pale.      Findings: Erythema present.      Comments: Erythema to right lower extremity   Dressing on bilateral lower extremities CDI    Neurological:      General: No focal deficit present.      Mental Status: He is alert and oriented to person, place, and time. Mental status is at baseline.      Motor: No weakness.   Psychiatric:         Mood and Affect: Mood normal.         Behavior: Behavior normal.         Thought Content: Thought content normal.         Judgment: Judgment normal.          Additional Data:     Labs:  Results from last 7 days   Lab Units 07/26/24  0433 07/24/24  0450 07/23/24  1253   WBC Thousand/uL 6.45   < > 9.06   HEMOGLOBIN g/dL 7.2*   < > 10.1*   HEMATOCRIT % 23.4*   < > 31.7*   PLATELETS Thousands/uL 117*   < > 134*   SEGS PCT %  --   --  85*   LYMPHO PCT %  --   --  8*   MONO PCT %  --   --  6   EOS PCT %  --   --  1    < > = values in this interval not displayed.     Results from last 7 days   Lab Units 07/26/24  0433 07/24/24  1554 07/24/24  0450   SODIUM mmol/L 138   < > 134*   POTASSIUM mmol/L 3.2*   < > 3.7   CHLORIDE mmol/L 108   < > 103   CO2 mmol/L 22   < > 21   BUN mg/dL 67*   < > 84*   CREATININE mg/dL 4.70*   < > 5.88*   ANION GAP mmol/L 8   < > 10   CALCIUM mg/dL 8.7   < > 9.8   ALBUMIN g/dL  --   --  2.6*   TOTAL BILIRUBIN mg/dL  --   --  0.37    ALK PHOS U/L  --   --  70   ALT U/L  --   --  26   AST U/L  --   --  34   GLUCOSE RANDOM mg/dL 152*   < > 120    < > = values in this interval not displayed.     Results from last 7 days   Lab Units 07/26/24  0433   INR  4.07*     Results from last 7 days   Lab Units 07/26/24  1103 07/26/24  0728 07/25/24  2053 07/25/24  1600 07/25/24  1052 07/25/24  0723 07/24/24  2040 07/24/24  1059 07/24/24  0710 07/23/24  2121 07/23/24  1803   POC GLUCOSE mg/dl 171* 195* 176* 169* 207* 139 132 155* 116 143* 158*     Results from last 7 days   Lab Units 07/24/24  0450   HEMOGLOBIN A1C % 5.9*     Results from last 7 days   Lab Units 07/25/24  0341 07/24/24  1747 07/23/24  1253   LACTIC ACID mmol/L  --   --  1.0   PROCALCITONIN ng/ml 0.65* 0.72* 0.89*       Lines/Drains:  Invasive Devices       Peripheral Intravenous Line  Duration             Peripheral IV 07/23/24 Right;Ventral (anterior) Forearm 3 days    Long-Dwell Peripheral IV (Midline) 07/24/24 Left Basilic 1 day              Line  Duration             Peritoneal Dialysis Catheter Column-disk Abdominal 49 days                      Telemetry:  Telemetry Orders (From admission, onward)               24 Hour Telemetry Monitoring  Continuous x 24 Hours (Telem)        Question:  Reason for 24 Hour Telemetry  Answer:  Decompensated CHF- and any one of the following: continuous diuretic infusion or total diuretic dose >200 mg daily, associated electrolyte derangement (I.e. K < 3.0), ionotropic drip (continuous infusion), hx of ventricular arrhythmia, or new EF < 35%                     Telemetry Reviewed: Normal Sinus Rhythm  Indication for Continued Telemetry Use: Arrthymias requiring medical therapy             Imaging: No pertinent imaging reviewed.    Recent Cultures (last 7 days):   Results from last 7 days   Lab Units 07/23/24  1757 07/23/24  1253   BLOOD CULTURE   --  Providencia rettgeri*  No Growth at 48 hrs.   GRAM STAIN RESULT  No polys seen*  4+ Gram negative rods*  Gram negative rods*   WOUND CULTURE  4+ Growth of Pseudomonas aeruginosa*  4+ Growth of Enterococcus faecalis*  --        Last 24 Hours Medication List:   Current Facility-Administered Medications   Medication Dose Route Frequency Provider Last Rate    acetaminophen  650 mg Oral Q6H PRN Rachel Conn PA-C      aspirin  81 mg Oral Daily Rachel Conn PA-C      atorvastatin  40 mg Oral Daily With Dinner Rachel Conn PA-C      docusate sodium  100 mg Oral BID Ofelia Salmeron PA-C      ferrous sulfate  325 mg Oral Daily With Breakfast Jignesh Hussein MD      HYDROcodone-acetaminophen  1 tablet Oral Q6H PRN Rachel Conn PA-C      HYDROcodone-acetaminophen  2 tablet Oral Q6H PRN Rachel Conn PA-C      HYDROmorphone  1 mg Intravenous Q3H PRN GAGANDEEP Bernardo      insulin glargine  15 Units Subcutaneous Q12H [facility] Rachel Conn PA-C      insulin lispro  1-6 Units Subcutaneous HS Rachel Conn PA-C      insulin lispro  1-6 Units Subcutaneous TID AC Nora Mathew MD      levothyroxine  125 mcg Oral Early Morning Rachel Conn PA-C      magnesium Oxide  400 mg Oral Daily Rachel Conn PA-C      metoprolol succinate  100 mg Oral BID Rachel Conn PA-C      piperacillin-tazobactam  4.5 g Intravenous Q12H Rachel Conn PA-C 4.5 g (07/26/24 0859)    polyethylene glycol  17 g Oral Daily PRN Rachel Conn PA-C      senna  2 tablet Oral HS Ofelia Salmeron PA-C      simethicone  80 mg Oral 4x Daily PRN Rachel Conn PA-C      tamsulosin  0.4 mg Oral Daily With Dinner Rachel Conn PA-C      torsemide  40 mg Oral BID Jignesh Hussein MD          Today, Patient Was Seen By: Ofelia Salmeron PA-C    **Please Note: This note may have been constructed using a voice recognition system.**

## 2024-07-26 NOTE — ASSESSMENT & PLAN NOTE
Paroxysmal atrial fibrillation. [place] [facility] [place], took himself off Eliquis post procedure AMA;  has episodes on loop recorder of afib that are symptomatic, no longer taking amiodarone  Currently patient on coumadin, follows outpatient with coumadin clinic  Patient went into a fib rvr overnight on 7/26  Cardiology consulted - continue metoprolol and monitor on tele. Unable to tolerate amio and cannot take other anti arrhythmics due to esrd   Converted back into NSR on 7/26  Monitor INR daily -remains supratherapeutic. Hold coumadin

## 2024-07-26 NOTE — ASSESSMENT & PLAN NOTE
With history of chronic lymphedema following with cardiology outpatient. Feels that his legs have been swelling more recently, right leg more swollen than left due to current cellulitis episode.   Transitioned from lasix infusion to torsemide 40mg bid

## 2024-07-26 NOTE — ASSESSMENT & PLAN NOTE
Patient with history of anemia baseline hemoglobin around 9-10 per outpatient labs  On admission, patient with hemoglobin of 10.1, decreased to 7.5, possibly secondary to volume overload, no evidence of bleeding at this time.  CT right lower extremity without evidence of bleeding.  Iron panel showing low iron  Does receive IV Venofer 200 mg with dialysis treatments as an outpatient and received 5 doses for iron deficiency anemia.  Hold on Venofer in setting of acute infection.  Given one dose of epogen on 7/25  Stool occult positive x1  GI consulted    Transfuse for hemoglobin less than 7

## 2024-07-26 NOTE — PLAN OF CARE
Problem: Prexisting or High Potential for Compromised Skin Integrity  Goal: Skin integrity is maintained or improved  Description: INTERVENTIONS:  - Identify patients at risk for skin breakdown  - Assess and monitor skin integrity  - Assess and monitor nutrition and hydration status  - Monitor labs   - Assess for incontinence   - Turn and reposition patient  - Assist with mobility/ambulation  - Relieve pressure over bony prominences  - Avoid friction and shearing  - Provide appropriate hygiene as needed including keeping skin clean and dry  - Evaluate need for skin moisturizer/barrier cream  - Collaborate with interdisciplinary team   - Patient/family teaching  - Consider wound care consult   Outcome: Progressing     Problem: METABOLIC, FLUID AND ELECTROLYTES - ADULT  Goal: Electrolytes maintained within normal limits  Description: INTERVENTIONS:  - Monitor labs and assess patient for signs and symptoms of electrolyte imbalances  - Administer electrolyte replacement as ordered  - Monitor response to electrolyte replacements, including repeat lab results as appropriate  - Instruct patient on fluid and nutrition as appropriate  Outcome: Progressing  Goal: Fluid balance maintained  Description: INTERVENTIONS:  - Monitor labs   - Monitor I/O and WT  - Instruct patient on fluid and nutrition as appropriate  - Assess for signs & symptoms of volume excess or deficit  Outcome: Progressing  Goal: Glucose maintained within target range  Description: INTERVENTIONS:  - Monitor Blood Glucose as ordered  - Assess for signs and symptoms of hyperglycemia and hypoglycemia  - Administer ordered medications to maintain glucose within target range  - Assess nutritional intake and initiate nutrition service referral as needed  Outcome: Progressing     Problem: SKIN/TISSUE INTEGRITY - ADULT  Goal: Incision(s), wounds(s) or drain site(s) healing without S/S of infection  Description: INTERVENTIONS  - Assess and document dressing,  incision, wound bed, drain sites and surrounding tissue  - Provide patient and family education  - Perform skin care/dressing changes per wound  Outcome: Progressing     Problem: GENITOURINARY - ADULT  Goal: Maintains or returns to baseline urinary function  Description: INTERVENTIONS:  - Assess urinary function  - Encourage oral fluids to ensure adequate hydration if ordered  - Administer IV fluids as ordered to ensure adequate hydration  - Administer ordered medications as needed  - Offer frequent toileting  - Follow urinary retention protocol if ordered  Outcome: Progressing     Problem: PAIN - ADULT  Goal: Verbalizes/displays adequate comfort level or baseline comfort level  Description: Interventions:  - Encourage patient to monitor pain and request assistance  - Assess pain using appropriate pain scale  - Administer analgesics based on type and severity of pain and evaluate response  - Implement non-pharmacological measures as appropriate and evaluate response  - Consider cultural and social influences on pain and pain management  - Notify physician/advanced practitioner if interventions unsuccessful or patient reports new pain  Outcome: Progressing     Problem: INFECTION - ADULT  Goal: Absence or prevention of progression during hospitalization  Description: INTERVENTIONS:  - Assess and monitor for signs and symptoms of infection  - Monitor lab/diagnostic results  - Monitor all insertion sites, i.e. indwelling lines, tubes, and drains  - Monitor endotracheal if appropriate and nasal secretions for changes in amount and color  - [place] appropriate cooling/warming therapies per order  - Administer medications as ordered  - Instruct and encourage patient and family to use good hand hygiene technique  - Identify and instruct in appropriate isolation precautions for identified infection/condition  Outcome: Progressing     Problem: SAFETY ADULT  Goal: Patient will remain free of falls  Description:  INTERVENTIONS:  - Educate patient/family on patient safety including physical limitations  - Instruct patient to call for assistance with activity   - Consult OT/PT to assist with strengthening/mobility   - Keep Call bell within reach  - Keep bed low and locked with side rails adjusted as appropriate  - Keep care items and personal belongings within reach  - Initiate and maintain comfort rounds  - Make Fall Risk Sign visible to staff  - Offer Toileting every 2 Hours, in advance of need if unable to notify staff of need  - Initiate/Maintain bed/chair alarm for confusion, impulsivity, or noncompliance with notifying staff.  - Apply yellow socks and bracelet for high fall risk patients  - Consider moving patient to room near nurses station  Outcome: Progressing  Goal: Maintain or return to baseline ADL function  Description: INTERVENTIONS:  -  Assess patient's ability to carry out ADLs; assess patient's baseline for ADL function and identify physical deficits which impact ability to perform ADLs (bathing, care of mouth/teeth, toileting, grooming, dressing, etc.)  - Assess/evaluate cause of self-care deficits   - Assess range of motion  - Assess patient's mobility; develop plan if impaired  - Assess patient's need for assistive devices and provide as appropriate  - Encourage maximum independence but intervene and supervise when necessary  - Involve family in performance of ADLs  - Assess for home care needs following discharge   - Consider OT consult to assist with ADL evaluation and planning for discharge  - Provide patient education as appropriate  Outcome: Progressing  Goal: Maintains/Returns to pre admission functional level  Description: INTERVENTIONS:  - Perform AM-PAC 6 Click Basic Mobility/ Daily Activity assessment daily.  - Set and communicate daily mobility goal to care team and patient/family/caregiver.   - Collaborate with rehabilitation services on mobility goals if consulted  - Perform Range of Motion 3  times a day.  - Reposition patient every 2 hours if unable to reposition self  - Out of bed for toileting if medically appropriate  - Record patient progress and toleration of activity level   Outcome: Progressing     Problem: DISCHARGE PLANNING  Goal: Discharge to home or other facility with appropriate resources  Description: INTERVENTIONS:  - Identify barriers to discharge w/patient and caregiver  - Arrange for needed discharge resources and transportation as appropriate  - Identify discharge learning needs (meds, wound care, etc.)  - Arrange for interpretive services to assist at discharge as needed  - Refer to Case Management Department for coordinating discharge planning if the patient needs post-hospital services based on physician/advanced practitioner order or complex needs related to functional status, cognitive ability, or social support system  Outcome: Progressing     Problem: Knowledge Deficit  Goal: Patient/family/caregiver demonstrates understanding of disease process, treatment plan, medications, and discharge instructions  Description: Complete learning assessment and assess knowledge base.  Interventions:  - Provide teaching at level of understanding  - Provide teaching via preferred learning methods  Outcome: Progressing     Problem: Nutrition/Hydration-ADULT  Goal: Nutrient/Hydration intake appropriate for improving, restoring or maintaining nutritional needs  Description: Monitor and assess patient's nutrition/hydration status for malnutrition. Collaborate with interdisciplinary team and initiate plan and interventions as ordered.  Monitor patient's weight and dietary intake as ordered or per policy. Utilize nutrition screening tool and intervene as necessary. Determine patient's food preferences and provide high-protein, high-caloric foods as appropriate.     INTERVENTIONS:  - Monitor oral intake, urinary output, labs, and treatment plans  - Assess nutrition and hydration status and recommend  course of action  - Evaluate amount of meals eaten  - Assist patient with eating if necessary   - Allow adequate time for meals  - Recommend/ encourage appropriate diets, oral nutritional supplements, and vitamin/mineral supplements  - Order, calculate, and assess calorie counts as needed  - Recommend, monitor, and adjust tube feedings and TPN/PPN based on assessed needs  - Assess need for intravenous fluids  - Provide specific nutrition/hydration education as appropriate  - Include patient/family/caregiver in decisions related to nutrition  Outcome: Progressing

## 2024-07-26 NOTE — ASSESSMENT & PLAN NOTE
1 out of 2 blood cultures positive for gram-negative rods, coming back as Enterobacterales  Second blood culture no growth at 48 hours   ID consulted, recommending discontinue Unasyn and start Zosyn  Anticipate 10 days of therapy final choice of antibiotics to be determined

## 2024-07-26 NOTE — ASSESSMENT & PLAN NOTE
Wt Readings from Last 3 Encounters:   07/26/24 117 kg (258 lb 13.1 oz)   06/25/24 124 kg (273 lb 13 oz)   06/20/24 122 kg (270 lb)     History of chf managed by both cardiology and nephrology, on lasix 80 mg bid outpatient, patient states that he took 240 mg of lasix yesterday and again today.   Echo 4/2023: Systolic function is normal with an ejection fraction of 60-65%. Wall motion is within normal limits. There is grade I (mild) diastolic dysfunction   BNP normal  CXR: No acute cardiopulmonary disease.   Venous duplex-no DVT  Pedal pulses monitored with dopplers  Nephro: Stop Lasix infusion. Transition to torsemide 40mg bid   Monitor I&Os and daily weights

## 2024-07-27 LAB
ANION GAP SERPL CALCULATED.3IONS-SCNC: 11 MMOL/L (ref 4–13)
BACTERIA WND AEROBE CULT: ABNORMAL
BACTERIA WND AEROBE CULT: ABNORMAL
BASOPHILS # BLD AUTO: 0.04 THOUSANDS/ÂΜL (ref 0–0.1)
BASOPHILS NFR BLD AUTO: 0 % (ref 0–1)
BUN SERPL-MCNC: 72 MG/DL (ref 5–25)
CALCIUM SERPL-MCNC: 10.7 MG/DL (ref 8.4–10.2)
CHLORIDE SERPL-SCNC: 103 MMOL/L (ref 96–108)
CO2 SERPL-SCNC: 23 MMOL/L (ref 21–32)
CREAT SERPL-MCNC: 5.51 MG/DL (ref 0.6–1.3)
EOSINOPHIL # BLD AUTO: 0.26 THOUSAND/ÂΜL (ref 0–0.61)
EOSINOPHIL NFR BLD AUTO: 2 % (ref 0–6)
ERYTHROCYTE [DISTWIDTH] IN BLOOD BY AUTOMATED COUNT: 16.5 % (ref 11.6–15.1)
GFR SERPL CREATININE-BSD FRML MDRD: 9 ML/MIN/1.73SQ M
GLUCOSE SERPL-MCNC: 208 MG/DL (ref 65–140)
GLUCOSE SERPL-MCNC: 235 MG/DL (ref 65–140)
GLUCOSE SERPL-MCNC: 247 MG/DL (ref 65–140)
GLUCOSE SERPL-MCNC: 248 MG/DL (ref 65–140)
GLUCOSE SERPL-MCNC: 248 MG/DL (ref 65–140)
GRAM STN SPEC: ABNORMAL
GRAM STN SPEC: ABNORMAL
HCT VFR BLD AUTO: 28.3 % (ref 36.5–49.3)
HGB BLD-MCNC: 8.9 G/DL (ref 12–17)
IMM GRANULOCYTES # BLD AUTO: 0.1 THOUSAND/UL (ref 0–0.2)
IMM GRANULOCYTES NFR BLD AUTO: 1 % (ref 0–2)
INR PPP: 4.74 (ref 0.84–1.19)
LYMPHOCYTES # BLD AUTO: 0.66 THOUSANDS/ÂΜL (ref 0.6–4.47)
LYMPHOCYTES NFR BLD AUTO: 6 % (ref 14–44)
MAGNESIUM SERPL-MCNC: 2.6 MG/DL (ref 1.9–2.7)
MCH RBC QN AUTO: 30.6 PG (ref 26.8–34.3)
MCHC RBC AUTO-ENTMCNC: 31.4 G/DL (ref 31.4–37.4)
MCV RBC AUTO: 97 FL (ref 82–98)
MONOCYTES # BLD AUTO: 0.56 THOUSAND/ÂΜL (ref 0.17–1.22)
MONOCYTES NFR BLD AUTO: 5 % (ref 4–12)
NEUTROPHILS # BLD AUTO: 9.18 THOUSANDS/ÂΜL (ref 1.85–7.62)
NEUTS SEG NFR BLD AUTO: 86 % (ref 43–75)
NRBC BLD AUTO-RTO: 0 /100 WBCS
PLATELET # BLD AUTO: 201 THOUSANDS/UL (ref 149–390)
PMV BLD AUTO: 9.8 FL (ref 8.9–12.7)
POTASSIUM SERPL-SCNC: 4.3 MMOL/L (ref 3.5–5.3)
PROTHROMBIN TIME: 44.7 SECONDS (ref 11.6–14.5)
RBC # BLD AUTO: 2.91 MILLION/UL (ref 3.88–5.62)
SODIUM SERPL-SCNC: 137 MMOL/L (ref 135–147)
WBC # BLD AUTO: 10.8 THOUSAND/UL (ref 4.31–10.16)

## 2024-07-27 PROCEDURE — 99232 SBSQ HOSP IP/OBS MODERATE 35: CPT | Performed by: INTERNAL MEDICINE

## 2024-07-27 PROCEDURE — 83735 ASSAY OF MAGNESIUM: CPT | Performed by: INTERNAL MEDICINE

## 2024-07-27 PROCEDURE — 85025 COMPLETE CBC W/AUTO DIFF WBC: CPT | Performed by: INTERNAL MEDICINE

## 2024-07-27 PROCEDURE — 80048 BASIC METABOLIC PNL TOTAL CA: CPT | Performed by: INTERNAL MEDICINE

## 2024-07-27 PROCEDURE — 99232 SBSQ HOSP IP/OBS MODERATE 35: CPT

## 2024-07-27 PROCEDURE — 82948 REAGENT STRIP/BLOOD GLUCOSE: CPT

## 2024-07-27 PROCEDURE — 85610 PROTHROMBIN TIME: CPT

## 2024-07-27 RX ORDER — TORSEMIDE 20 MG/1
60 TABLET ORAL 2 TIMES DAILY
Status: DISCONTINUED | OUTPATIENT
Start: 2024-07-27 | End: 2024-08-03

## 2024-07-27 RX ADMIN — INSULIN LISPRO 3 UNITS: 100 INJECTION, SOLUTION INTRAVENOUS; SUBCUTANEOUS at 12:42

## 2024-07-27 RX ADMIN — HYDROMORPHONE HYDROCHLORIDE 1 MG: 1 INJECTION, SOLUTION INTRAMUSCULAR; INTRAVENOUS; SUBCUTANEOUS at 02:59

## 2024-07-27 RX ADMIN — HYDROCODONE BITARTRATE AND ACETAMINOPHEN 2 TABLET: 5; 325 TABLET ORAL at 16:14

## 2024-07-27 RX ADMIN — METOPROLOL SUCCINATE 100 MG: 100 TABLET, EXTENDED RELEASE ORAL at 20:22

## 2024-07-27 RX ADMIN — PIPERACILLIN AND TAZOBACTAM 4.5 G: 36; 4.5 INJECTION, POWDER, FOR SOLUTION INTRAVENOUS at 08:33

## 2024-07-27 RX ADMIN — METOPROLOL SUCCINATE 100 MG: 100 TABLET, EXTENDED RELEASE ORAL at 08:24

## 2024-07-27 RX ADMIN — ASPIRIN 81 MG: 81 TABLET, COATED ORAL at 08:23

## 2024-07-27 RX ADMIN — TAMSULOSIN HYDROCHLORIDE 0.4 MG: 0.4 CAPSULE ORAL at 16:14

## 2024-07-27 RX ADMIN — Medication 400 MG: at 08:23

## 2024-07-27 RX ADMIN — HYDROCODONE BITARTRATE AND ACETAMINOPHEN 2 TABLET: 5; 325 TABLET ORAL at 04:31

## 2024-07-27 RX ADMIN — INSULIN LISPRO 3 UNITS: 100 INJECTION, SOLUTION INTRAVENOUS; SUBCUTANEOUS at 22:16

## 2024-07-27 RX ADMIN — HYDROCODONE BITARTRATE AND ACETAMINOPHEN 2 TABLET: 5; 325 TABLET ORAL at 22:16

## 2024-07-27 RX ADMIN — TORSEMIDE 60 MG: 20 TABLET ORAL at 08:23

## 2024-07-27 RX ADMIN — HYDROCODONE BITARTRATE AND ACETAMINOPHEN 2 TABLET: 5; 325 TABLET ORAL at 10:33

## 2024-07-27 RX ADMIN — HYDROMORPHONE HYDROCHLORIDE 1 MG: 1 INJECTION, SOLUTION INTRAMUSCULAR; INTRAVENOUS; SUBCUTANEOUS at 08:32

## 2024-07-27 RX ADMIN — INSULIN LISPRO 2 UNITS: 100 INJECTION, SOLUTION INTRAVENOUS; SUBCUTANEOUS at 08:18

## 2024-07-27 RX ADMIN — PIPERACILLIN AND TAZOBACTAM 4.5 G: 36; 4.5 INJECTION, POWDER, FOR SOLUTION INTRAVENOUS at 20:08

## 2024-07-27 RX ADMIN — INSULIN GLARGINE 15 UNITS: 100 INJECTION, SOLUTION SUBCUTANEOUS at 08:23

## 2024-07-27 RX ADMIN — LEVOTHYROXINE SODIUM 125 MCG: 125 TABLET ORAL at 05:10

## 2024-07-27 RX ADMIN — TORSEMIDE 60 MG: 20 TABLET ORAL at 20:22

## 2024-07-27 RX ADMIN — ATORVASTATIN CALCIUM 40 MG: 40 TABLET, FILM COATED ORAL at 16:14

## 2024-07-27 RX ADMIN — INSULIN GLARGINE 15 UNITS: 100 INJECTION, SOLUTION SUBCUTANEOUS at 20:26

## 2024-07-27 RX ADMIN — INSULIN LISPRO 3 UNITS: 100 INJECTION, SOLUTION INTRAVENOUS; SUBCUTANEOUS at 16:23

## 2024-07-27 RX ADMIN — HYDROMORPHONE HYDROCHLORIDE 1 MG: 1 INJECTION, SOLUTION INTRAMUSCULAR; INTRAVENOUS; SUBCUTANEOUS at 17:31

## 2024-07-27 RX ADMIN — FERROUS SULFATE TAB 325 MG (65 MG ELEMENTAL FE) 325 MG: 325 (65 FE) TAB at 08:23

## 2024-07-27 RX ADMIN — HYDROMORPHONE HYDROCHLORIDE 1 MG: 1 INJECTION, SOLUTION INTRAMUSCULAR; INTRAVENOUS; SUBCUTANEOUS at 12:42

## 2024-07-27 NOTE — ASSESSMENT & PLAN NOTE
Paroxysmal atrial fibrillation. sp ablation with Dr Alcala at [facility] [place] 4/2024, took himself off Eliquis post procedure AMA;  has episodes on loop recorder of afib that are symptomatic, no longer taking amiodarone  Currently patient on coumadin, follows outpatient with coumadin clinic  Patient went into a fib rvr overnight on 7/26  Cardiology consulted - continue metoprolol and monitor on tele. Unable to tolerate amio and cannot take other anti arrhythmics due to esrd   Converted back into NSR on 7/26  Monitor INR daily -remains supratherapeutic. Hold coumadin

## 2024-07-27 NOTE — ASSESSMENT & PLAN NOTE
Lab Results   Component Value Date    EGFR 9 07/27/2024    EGFR 11 07/26/2024    EGFR 8 07/25/2024    CREATININE 5.51 (H) 07/27/2024    CREATININE 4.70 (H) 07/26/2024    CREATININE 5.91 (H) 07/25/2024     History of stage 5 ckd recently started on peritoneal dialysis, first treatment on 6/24. Had PD catheter placed on 6/7/2024. Was scheduled to do PD dialysis in clinic and then transition to home PD dialysis.   Last PD was done yesterday, patient currently doing every other day PD, goal for everyday per patient.   Nephro: His outpatient prescription is a 2400 fill volume with 3 exchanges. Patient was not as compliant with dialysis over the last few days due to feeling sick.  While inpatient, plan on CAPD with 2000 cc fills, 4 exchanges a day of dextrose 2.5%.   Avoid nephrotoxic medications, nsaids, hypotension

## 2024-07-27 NOTE — NURSING NOTE
0900:  Patient refused B/L wound care/dressing changes.  Stated this could be done at a later time, was too tired/in pain.  Pain medicine given.    1300:  Attempted wound care/dressing changes, patient again declined.     1700:  Wound care/dressing changes were again refused per patient, educated patient on the need for daily wound care/dressing changes, patient firmly declined at this time.

## 2024-07-27 NOTE — ASSESSMENT & PLAN NOTE
Wt Readings from Last 3 Encounters:   07/27/24 117 kg (257 lb 15 oz)   06/25/24 124 kg (273 lb 13 oz)   06/20/24 122 kg (270 lb)     History of chf managed by both cardiology and nephrology, on lasix 80 mg bid outpatient, patient states that he took 240 mg of lasix yesterday and again today.   Echo 4/2023: Systolic function is normal with an ejection fraction of 60-65%. Wall motion is within normal limits. There is grade I (mild) diastolic dysfunction   BNP normal  CXR: No acute cardiopulmonary disease.   Venous duplex-no DVT  Pedal pulses monitored with dopplers  Nephro: Stop Lasix infusion. Transition to torsemide 60mg bid. If no improvement today, may transition back to iv diuretics   Monitor I&Os and daily weights

## 2024-07-27 NOTE — ASSESSMENT & PLAN NOTE
Presented to ED with bilateral leg swelling x 4 days. Right leg more swollen and red over the last 2 days. Significant pain and having drainage in the right leg as well. NO fevers or chills.   No recent antibiotics outpatient  CT RLE: No fluid collection seen, cellulitic changes leg, No intramuscular collection, no lytic lesion or periosteal reaction  Procal slightly elevated 0.89, now down trending   Wound culture pending  BC 1/2 gram negative rods; Enterobacterales   Follow CBC and fever curve  ID consulted: Start Zosyn renally dose, follow-up blood cultures, anticipate 10 days of therapy. Slow and prolonged recovery expected  Now agreeable to wound care and dressing changes   General surgery consulted- no surgical intervention, no signs of necrosis. Continue wound care    Lab Results   Component Value Date    WBC 10.80 (H) 07/27/2024    WBC 6.45 07/26/2024    WBC 6.34 07/25/2024

## 2024-07-27 NOTE — PROGRESS NOTES
[facility]  Progress Note  Name: Masoud Perry I  MRN: [id number]  Unit/Bed#: -01 I Date of Admission: 7/23/2024   Date of Service: 7/27/2024 I Hospital Day: 4    Assessment & Plan   * Cellulitis of right lower extremity  Assessment & Plan  Presented to ED with bilateral leg swelling x 4 days. Right leg more swollen and red over the last 2 days. Significant pain and having drainage in the right leg as well. NO fevers or chills.   No recent antibiotics outpatient  CT RLE: No fluid collection seen, cellulitic changes leg, No intramuscular collection, no lytic lesion or periosteal reaction  Procal slightly elevated 0.89, now down trending   Wound culture pending  BC 1/2 gram negative rods; Enterobacterales   Follow CBC and fever curve  ID consulted: Start Zosyn renally dose, follow-up blood cultures, anticipate 10 days of therapy. Slow and prolonged recovery expected  Now agreeable to wound care and dressing changes   General surgery consulted- no surgical intervention, no signs of necrosis. Continue wound care    Lab Results   Component Value Date    WBC 10.80 (H) 07/27/2024    WBC 6.45 07/26/2024    WBC 6.34 07/25/2024       Acute on chronic diastolic HF (heart failure) (HCC)  Assessment & Plan  Wt Readings from Last 3 Encounters:   07/27/24 117 kg (257 lb 15 oz)   06/25/24 124 kg (273 lb 13 oz)   06/20/24 122 kg (270 lb)     History of chf managed by both cardiology and nephrology, on lasix 80 mg bid outpatient, patient states that he took 240 mg of lasix yesterday and again today.   Echo 4/2023: Systolic function is normal with an ejection fraction of 60-65%. Wall motion is within normal limits. There is grade I (mild) diastolic dysfunction   BNP normal  CXR: No acute cardiopulmonary disease.   Venous duplex-no DVT  Pedal pulses monitored with dopplers  Nephro: Stop Lasix infusion. Transition to torsemide 60mg bid. If no improvement today, may transition back to iv diuretics    Monitor I&Os and daily weights    Anemia  Assessment & Plan  Patient with history of anemia baseline hemoglobin around 9-10 per outpatient labs  On admission, patient with hemoglobin of 10.1, decreased to 7.5, possibly secondary to volume overload, no evidence of bleeding at this time.  CT right lower extremity without evidence of bleeding.  Iron panel showing low iron  Does receive IV Venofer 200 mg with dialysis treatments as an outpatient and received 5 doses for iron deficiency anemia.  Hold on Venofer in setting of acute infection.  Given one dose of epogen on 7/25  Stool occult positive x1  GI consulted  - NPO Sunday night into Monday for possible EGD. Sounds like gastritis. If hgb stable Monday, can f/u OP. If drop in hgb, will have egd   Transfuse for hemoglobin less than 7    Bacteremia  Assessment & Plan  1 out of 2 blood cultures positive for gram-negative rods, coming back as Enterobacterales  Second blood culture no growth at 48 hours   ID consulted, recommending discontinue Unasyn and start Zosyn  Anticipate 10 days of therapy final choice of antibiotics to be determined    Atrial fibrillation (HCC)  Assessment & Plan  Paroxysmal atrial fibrillation. [place] [facility] [place] took himself off Eliquis post procedure AMA;  has episodes on loop recorder of afib that are symptomatic, no longer taking amiodarone  Currently patient on coumadin, follows outpatient with coumadin clinic  Patient went into a fib rvr overnight on 7/26  Cardiology consulted - continue metoprolol and monitor on tele. Unable to tolerate amio and cannot take other anti arrhythmics due to esrd   Converted back into NSR on 7/26  Monitor INR daily -remains supratherapeutic. Hold coumadin    Stage 5 chronic kidney disease on peritoneal dialysis (HCC)  Assessment & Plan  Lab Results   Component Value Date    EGFR 9 07/27/2024    EGFR 11 07/26/2024    EGFR 8 07/25/2024    CREATININE 5.51 (H) 07/27/2024    CREATININE 4.70  (H) 07/26/2024    CREATININE 5.91 (H) 07/25/2024     History of stage 5 ckd recently started on peritoneal dialysis, first treatment on 6/24. Had PD catheter placed on 6/7/2024. Was scheduled to do PD dialysis in clinic and then transition to home PD dialysis.   Last PD was done yesterday, patient currently doing every other day PD, goal for everyday per patient.   Nephro: His outpatient prescription is a 2400 fill volume with 3 exchanges. Patient was not as compliant with dialysis over the last few days due to feeling sick.  While inpatient, plan on CAPD with 2000 cc fills, 4 exchanges a day of dextrose 2.5%.   Avoid nephrotoxic medications, nsaids, hypotension    Lymphedema  Assessment & Plan  With history of chronic lymphedema following with cardiology outpatient. Feels that his legs have been swelling more recently, right leg more swollen than left due to current cellulitis episode.   Transitioned from lasix infusion to torsemide 40mg bid     HTN (hypertension)  Assessment & Plan  History of htn on lasix and toprol xl, continue home meds    History of CVA (cerebrovascular accident)  Assessment & Plan  Status post presumed cardioembolic CVA requiring tPA administration January 2022  Currently on aspirin and lipitor, continue             VTE Pharmacologic Prophylaxis: VTE Score: 5 High Risk (Score >/= 5) - Pharmacological DVT Prophylaxis Contraindicated. Sequential Compression Devices Ordered.    Mobility:   Basic Mobility Inpatient Raw Score: 10  JH-HLM Goal: 4: Move to chair/commode  JH-HLM Achieved: 4: Move to chair/commode  JH-HLM Goal achieved. Continue to encourage appropriate mobility.    Patient Centered Rounds: I performed bedside rounds with nursing staff today.   Discussions with Specialists or Other Care Team Provider: nursing, cm and nephrology    Education and Discussions with Family / Patient: Patient declined call to .     Total Time Spent on Date of Encounter in care of patient:   mins. This time was spent on one or more of the following: performing physical exam; counseling and coordination of care; obtaining or reviewing history; documenting in the medical record; reviewing/ordering tests, medications or procedures; communicating with other healthcare professionals and discussing with patient's family/caregivers.    Current Length of Stay: 4 day(s)  Current Patient Status: Inpatient   Certification Statement: The patient will continue to require additional inpatient hospital stay due to requiring diuresis and iv antibiotics for cellulitis   Discharge Plan: Anticipate discharge in >72 hrs to rehab facility.    Code Status: Level 3 - DNAR and DNI    Subjective:   Patient states that he is still having a lot of pain in his right foot. Denies chest pain or shortness of breath    Objective:     Vitals:   Temp (24hrs), Av.2 °F (36.8 °C), Min:97.5 °F (36.4 °C), Max:98.9 °F (37.2 °C)    Temp:  [97.5 °F (36.4 °C)-98.9 °F (37.2 °C)] 98.9 °F (37.2 °C)  HR:  [76-85] 79  Resp:  [20-35] 22  BP: (108-143)/(58-69) 108/59  SpO2:  [95 %-99 %] 97 %  Body mass index is 37.01 kg/m².     Input and Output Summary (last 24 hours):     Intake/Output Summary (Last 24 hours) at 2024 1109  Last data filed at 2024 0920  Gross per 24 hour   Intake 1015.42 ml   Output -150 ml   Net 1165.42 ml       Physical Exam:   Physical Exam  Vitals reviewed.   Constitutional:       General: He is not in acute distress.     Appearance: Normal appearance. He is obese. He is not ill-appearing.   HENT:      Head: Normocephalic and atraumatic.      Nose: Nose normal.      Mouth/Throat:      Mouth: Mucous membranes are moist.      Pharynx: Oropharynx is clear.   Eyes:      Extraocular Movements: Extraocular movements intact.      Conjunctiva/sclera: Conjunctivae normal.   Cardiovascular:      Rate and Rhythm: Normal rate and regular rhythm.      Pulses: Normal pulses.      Heart sounds: Normal heart sounds. No murmur  heard.  Pulmonary:      Effort: Pulmonary effort is normal. No respiratory distress.      Breath sounds: Normal breath sounds. No wheezing.   Abdominal:      General: Abdomen is flat. Bowel sounds are normal. There is no distension.      Palpations: Abdomen is soft.      Tenderness: There is no abdominal tenderness. There is no guarding.   Musculoskeletal:         General: Normal range of motion.      Cervical back: Normal range of motion.      Right lower leg: Edema present.      Left lower leg: Edema present.      Comments: Chronic lymphedema with R>L   Skin:     General: Skin is warm.      Findings: Erythema present.      Comments: Right lower extremity erythema with warmth  Dressing lower extremities cdi   Neurological:      General: No focal deficit present.      Mental Status: He is alert and oriented to person, place, and time. Mental status is at baseline.      Motor: No weakness.   Psychiatric:         Mood and Affect: Mood normal.         Behavior: Behavior normal.         Thought Content: Thought content normal.         Judgment: Judgment normal.          Additional Data:     Labs:  Results from last 7 days   Lab Units 07/27/24  0434   WBC Thousand/uL 10.80*   HEMOGLOBIN g/dL 8.9*   HEMATOCRIT % 28.3*   PLATELETS Thousands/uL 201   SEGS PCT % 86*   LYMPHO PCT % 6*   MONO PCT % 5   EOS PCT % 2     Results from last 7 days   Lab Units 07/27/24  0434 07/24/24  1554 07/24/24  0450   SODIUM mmol/L 137   < > 134*   POTASSIUM mmol/L 4.3   < > 3.7   CHLORIDE mmol/L 103   < > 103   CO2 mmol/L 23   < > 21   BUN mg/dL 72*   < > 84*   CREATININE mg/dL 5.51*   < > 5.88*   ANION GAP mmol/L 11   < > 10   CALCIUM mg/dL 10.7*   < > 9.8   ALBUMIN g/dL  --   --  2.6*   TOTAL BILIRUBIN mg/dL  --   --  0.37   ALK PHOS U/L  --   --  70   ALT U/L  --   --  26   AST U/L  --   --  34   GLUCOSE RANDOM mg/dL 247*   < > 120    < > = values in this interval not displayed.     Results from last 7 days   Lab Units 07/27/24  0434   INR   4.74*     Results from last 7 days   Lab Units 24  0707 24  2021 24  1605 24  1103 24  0728 24  2053 24  1600 24  1052 24  0723 24  2040 24  1059 24  0710   POC GLUCOSE mg/dl 208* 182* 174* 171* 195* 176* 169* 207* 139 132 155* 116     Results from last 7 days   Lab Units 24  0450   HEMOGLOBIN A1C % 5.9*     Results from last 7 days   Lab Units 24  0341 24  1747 24  1253   LACTIC ACID mmol/L  --   --  1.0   PROCALCITONIN ng/ml 0.65* 0.72* 0.89*       Lines/Drains:  Invasive Devices       Peripheral Intravenous Line  Duration             Peripheral IV 24 Right;Ventral (anterior) Forearm 3 days    Long-Dwell Peripheral IV (Midline) 24 Left Basilic 2 days              Line  Duration             Peritoneal Dialysis Catheter Column-disk Abdominal 50 days                      Telemetry:  Telemetry Orders (From admission, onward)               24 Hour Telemetry Monitoring  Continuous x 24 Hours (Telem)           Question:  Reason for 24 Hour Telemetry  Answer:  Decompensated CHF- and any one of the following: continuous diuretic infusion or total diuretic dose >200 mg daily, associated electrolyte derangement (I.e. K < 3.0), ionotropic drip (continuous infusion), hx of ventricular arrhythmia, or new EF < 35%                     Telemetry Reviewed: Normal Sinus Rhythm  Indication for Continued Telemetry Use: No indication for continued use. Will discontinue.              Imaging: No pertinent imaging reviewed.    Recent Cultures (last 7 days):   Results from last 7 days   Lab Units 24  1757 24  1253   BLOOD CULTURE   --  No Growth at 72 hrs.  Providencia rettgeri*   GRAM STAIN RESULT  No polys seen*  4+ Gram negative rods* Gram negative rods*   WOUND CULTURE  4+ Growth of Pseudomonas aeruginosa*  4+ Growth of Enterococcus faecalis*  --        Last 24 Hours Medication List:   Current  Facility-Administered Medications   Medication Dose Route Frequency Provider Last Rate    acetaminophen  650 mg Oral Q6H PRN Rachel Conn PA-C      aspirin  81 mg Oral Daily Rachel Conn PA-C      atorvastatin  40 mg Oral Daily With Dinner Rachel Conn PA-C      docusate sodium  100 mg Oral BID Ofelia Salmeron PA-C      ferrous sulfate  325 mg Oral Daily With Breakfast Jignesh Hussein MD      HYDROcodone-acetaminophen  1 tablet Oral Q6H PRN Rachel Conn PA-C      HYDROcodone-acetaminophen  2 tablet Oral Q6H PRN Rachel Conn PA-C      HYDROmorphone  1 mg Intravenous Q3H PRN GAGANDEEP Bernardo      insulin glargine  15 Units Subcutaneous Q12H VASU Rachel Conn PA-C      insulin lispro  1-6 Units Subcutaneous HS Rachel Conn PA-C      insulin lispro  1-6 Units Subcutaneous TID AC Nora Mathew MD      levothyroxine  125 mcg Oral Early Morning Rachel Conn PA-C      magnesium Oxide  400 mg Oral Daily Rachel Conn PA-C      metoprolol succinate  100 mg Oral BID Rachel Conn PA-C      piperacillin-tazobactam  4.5 g Intravenous Q12H Rachel Conn PA-C 4.5 g (07/27/24 0833)    polyethylene glycol  17 g Oral Daily PRN Rachel Conn PA-C      senna  2 tablet Oral HS Ofelia Salmeron PA-C      simethicone  80 mg Oral 4x Daily PRN Rachel Conn PA-C      tamsulosin  0.4 mg Oral Daily With Dinner Rachel Conn PA-C      torsemide  60 mg Oral BID Ze Doty DO          Today, Patient Was Seen By: Ofelia Salmeron PA-C    **Please Note: This note may have been constructed using a voice recognition system.**

## 2024-07-27 NOTE — PLAN OF CARE
Problem: Prexisting or High Potential for Compromised Skin Integrity  Goal: Skin integrity is maintained or improved  Description: INTERVENTIONS:  - Identify patients at risk for skin breakdown  - Assess and monitor skin integrity  - Assess and monitor nutrition and hydration status  - Monitor labs   - Assess for incontinence   - Turn and reposition patient  - Assist with mobility/ambulation  - Relieve pressure over bony prominences  - Avoid friction and shearing  - Provide appropriate hygiene as needed including keeping skin clean and dry  - Evaluate need for skin moisturizer/barrier cream  - Collaborate with interdisciplinary team   - Patient/family teaching  - Consider wound care consult   Outcome: Progressing     Problem: METABOLIC, FLUID AND ELECTROLYTES - ADULT  Goal: Electrolytes maintained within normal limits  Description: INTERVENTIONS:  - Monitor labs and assess patient for signs and symptoms of electrolyte imbalances  - Administer electrolyte replacement as ordered  - Monitor response to electrolyte replacements, including repeat lab results as appropriate  - Instruct patient on fluid and nutrition as appropriate  Outcome: Progressing  Goal: Fluid balance maintained  Description: INTERVENTIONS:  - Monitor labs   - Monitor I/O and WT  - Instruct patient on fluid and nutrition as appropriate  - Assess for signs & symptoms of volume excess or deficit  Outcome: Progressing  Goal: Glucose maintained within target range  Description: INTERVENTIONS:  - Monitor Blood Glucose as ordered  - Assess for signs and symptoms of hyperglycemia and hypoglycemia  - Administer ordered medications to maintain glucose within target range  - Assess nutritional intake and initiate nutrition service referral as needed  Outcome: Progressing     Problem: SKIN/TISSUE INTEGRITY - ADULT  Goal: Incision(s), wounds(s) or drain site(s) healing without S/S of infection  Description: INTERVENTIONS  - Assess and document dressing,  incision, wound bed, drain sites and surrounding tissue  - Provide patient and family education  - Perform skin care/dressing changes per wound  Outcome: Progressing     Problem: GENITOURINARY - ADULT  Goal: Maintains or returns to baseline urinary function  Description: INTERVENTIONS:  - Assess urinary function  - Encourage oral fluids to ensure adequate hydration if ordered  - Administer IV fluids as ordered to ensure adequate hydration  - Administer ordered medications as needed  - Offer frequent toileting  - Follow urinary retention protocol if ordered  Outcome: Progressing     Problem: PAIN - ADULT  Goal: Verbalizes/displays adequate comfort level or baseline comfort level  Description: Interventions:  - Encourage patient to monitor pain and request assistance  - Assess pain using appropriate pain scale  - Administer analgesics based on type and severity of pain and evaluate response  - Implement non-pharmacological measures as appropriate and evaluate response  - Consider cultural and social influences on pain and pain management  - Notify physician/advanced practitioner if interventions unsuccessful or patient reports new pain  Outcome: Progressing     Problem: INFECTION - ADULT  Goal: Absence or prevention of progression during hospitalization  Description: INTERVENTIONS:  - Assess and monitor for signs and symptoms of infection  - Monitor lab/diagnostic results  - Monitor all insertion sites, i.e. indwelling lines, tubes, and drains  - Monitor endotracheal if appropriate and nasal secretions for changes in amount and color  - [place] appropriate cooling/warming therapies per order  - Administer medications as ordered  - Instruct and encourage patient and family to use good hand hygiene technique  - Identify and instruct in appropriate isolation precautions for identified infection/condition  Outcome: Progressing     Problem: SAFETY ADULT  Goal: Patient will remain free of falls  Description:  INTERVENTIONS:  - Educate patient/family on patient safety including physical limitations  - Instruct patient to call for assistance with activity   - Consult OT/PT to assist with strengthening/mobility   - Keep Call bell within reach  - Keep bed low and locked with side rails adjusted as appropriate  - Keep care items and personal belongings within reach  - Initiate and maintain comfort rounds  - Make Fall Risk Sign visible to staff  - Offer Toileting every 2 Hours, in advance of need if unable to notify staff of need  - Initiate/Maintain bed/chair alarm for confusion, impulsivity, or noncompliance with notifying staff.  - Apply yellow socks and bracelet for high fall risk patients  - Consider moving patient to room near nurses station  Outcome: Progressing  Goal: Maintain or return to baseline ADL function  Description: INTERVENTIONS:  -  Assess patient's ability to carry out ADLs; assess patient's baseline for ADL function and identify physical deficits which impact ability to perform ADLs (bathing, care of mouth/teeth, toileting, grooming, dressing, etc.)  - Assess/evaluate cause of self-care deficits   - Assess range of motion  - Assess patient's mobility; develop plan if impaired  - Assess patient's need for assistive devices and provide as appropriate  - Encourage maximum independence but intervene and supervise when necessary  - Involve family in performance of ADLs  - Assess for home care needs following discharge   - Consider OT consult to assist with ADL evaluation and planning for discharge  - Provide patient education as appropriate  Outcome: Progressing  Goal: Maintains/Returns to pre admission functional level  Description: INTERVENTIONS:  - Perform AM-PAC 6 Click Basic Mobility/ Daily Activity assessment daily.  - Set and communicate daily mobility goal to care team and patient/family/caregiver.   - Collaborate with rehabilitation services on mobility goals if consulted  - Perform Range of Motion 3  times a day.  - Reposition patient every 2 hours if unable to reposition self  - Out of bed for toileting if medically appropriate  - Record patient progress and toleration of activity level   Outcome: Progressing     Problem: DISCHARGE PLANNING  Goal: Discharge to home or other facility with appropriate resources  Description: INTERVENTIONS:  - Identify barriers to discharge w/patient and caregiver  - Arrange for needed discharge resources and transportation as appropriate  - Identify discharge learning needs (meds, wound care, etc.)  - Arrange for interpretive services to assist at discharge as needed  - Refer to Case Management Department for coordinating discharge planning if the patient needs post-hospital services based on physician/advanced practitioner order or complex needs related to functional status, cognitive ability, or social support system  Outcome: Progressing     Problem: Knowledge Deficit  Goal: Patient/family/caregiver demonstrates understanding of disease process, treatment plan, medications, and discharge instructions  Description: Complete learning assessment and assess knowledge base.  Interventions:  - Provide teaching at level of understanding  - Provide teaching via preferred learning methods  Outcome: Progressing     Problem: Nutrition/Hydration-ADULT  Goal: Nutrient/Hydration intake appropriate for improving, restoring or maintaining nutritional needs  Description: Monitor and assess patient's nutrition/hydration status for malnutrition. Collaborate with interdisciplinary team and initiate plan and interventions as ordered.  Monitor patient's weight and dietary intake as ordered or per policy. Utilize nutrition screening tool and intervene as necessary. Determine patient's food preferences and provide high-protein, high-caloric foods as appropriate.     INTERVENTIONS:  - Monitor oral intake, urinary output, labs, and treatment plans  - Assess nutrition and hydration status and recommend  course of action  - Evaluate amount of meals eaten  - Assist patient with eating if necessary   - Allow adequate time for meals  - Recommend/ encourage appropriate diets, oral nutritional supplements, and vitamin/mineral supplements  - Order, calculate, and assess calorie counts as needed  - Recommend, monitor, and adjust tube feedings and TPN/PPN based on assessed needs  - Assess need for intravenous fluids  - Provide specific nutrition/hydration education as appropriate  - Include patient/family/caregiver in decisions related to nutrition  Outcome: Progressing

## 2024-07-27 NOTE — PROGRESS NOTES
Progress Note - Nephrology   Masoud Perry 68 y.o. male MRN: [id number]  Unit/Bed#: -01 Encounter: [id number]    Assessment and Plan:  End-stage renal disease on CAPD: The patient utilizes [facility] at home and has been on CAPD during his hospitalization.  He is currently receiving 4.25% every 6 hours.  When I spoke with the patient's nurse the patient is approximately only 300 cc net negative over the past 24 hours.  Will maintain at least for the next 24 hours 4.25% exchanges but will change the frequency of the exchanges to every 4 hours to try to maximize ultrafiltration.  Depending on response would likely that on July 28 alternate 4.25 and 2.5 as the goal would not be to maintain long-term use of 4.25 exchanges.  Patient's glucose levels have been acceptable with last 3 levels measured 174, 182 and 208.  Electrolytes including potassium are acceptable as well.  There is no evidence of any peritonitis with white cells 8 in the peritoneal fluid.      2.  Acute on chronic diastolic CHF and lower extremity edema: Plan with increasing 4.25% exchanges every 4 hours as above.  In addition we will increase torsemide dose to 60 twice daily and follow urine output.  Chest x-ray obtained on July 26 showed mild venous vascular congestion.  I did review chest x-ray myself as well.  There is certainly baseline lymphedema especially given asymmetric edema in the lower extremity.      3.  Anemia secondary to end-stage renal disease on CAPD: Hemoglobin was 7.2 on July 26.  Hemoglobin is 8.9.  Questionable improvement in delusional effect versus lab abnormality.  Repeat CBC.    4.  Lymphedema/right lower extremity cellulitis: Increasing torsemide to 60 twice daily.  The patient is on renal dose Zosyn and ID has been following.    5. Providencia Bacteremia: ID following felt to be secondary to right leg cellulitis.      VIRTUAL CARE DOCUMENTATION:     1. This service was provided via Telemedicine using [employer]     2.  "Parties in the room with patient during teleconsult Patient only    3. Confidentiality My office door was closed     4. Participants No one else was in the room    5. Patient acknowledged consent and understanding of privacy and security of the  Telemedicine consult. I informed the patient that I have reviewed their record in Epic and presented the opportunity for them to ask any questions regarding the visit today.  The patient agreed to participate.    6. Time spent including review of the medical record, creation of plan of care, clinical decision making and documentation of medical decision making, interview with the patient, brief discussion with the patient's nurse total time including patient interview as noted above approximately 32 minutes.            Subjective:   Mr. Perry is seen in follow-up with regards to end-stage renal disease currently on CAPD for fluid management.  He denies any complaints with regards to his PD which seems to be going well.  I spoke with the patient's nurse this morning with regards to his PD regimen.  He is currently on 4.25% 2 L every 6 hours.  He is just about even to just very slightly net negative approximately 300 cc net negative over the past 24 hours.  He had been on Lasix infusion and this was transitioned to oral diuretic.  It is likely there is certainly baseline lymphedema.  The patient still notes discomfort in that right lower extremity.  Systolic blood pressure has been 120-140 predominantly over the past 12 hours.  Pulse oximetry is 95% on room air.  His current weight via bed scale is 257 pounds and on July 23 it had been 263 pounds.      Objective:     Vitals: Blood pressure 108/59, pulse 79, temperature 98.9 °F (37.2 °C), temperature source Temporal, resp. rate 22, height 5' 10\" (1.778 m), weight 117 kg (257 lb 15 oz), SpO2 95%.,Body mass index is 37.01 kg/m².    Weight (last 2 days)       Date/Time Weight    07/27/24 0431 117 (257.94)    07/26/24 0500 117 " (258.82)    07/25/24 0300 116 (256.62)     Weight: new bed; no PD fluid instilled at 07/25/24 0300              Intake/Output Summary (Last 24 hours) at 7/27/2024 0731  Last data filed at 7/27/2024 0300  Gross per 24 hour   Intake 1757.35 ml   Output 800 ml   Net 957.35 ml            Physical Exam: General: patient is in NAD  Skin: No new rash noted  Eyes: I do not appreciate any scleral icterus  ENT: Normocephalic atraumatic  Neck: Appears to be supple  Chest: There is no accessory muscle use noted, there is no conversational dyspnea appreciated.  CVS: The patient's pulse has been in the 70s to 80s.  Extremities: There is lower extremity edema noted.  The patient feels the left leg is certainly improved and there is asymmetric edema with right leg greater than left.  The right lower extremity is wrapped from below the knee down.  As per the patient it is tender to palpation.  Prior notes indicate no evidence of DVT by venous duplex but there is cellulitis noted.  Neuro: No focal deficits.  Psych: The patient answers questions appropriately.                Medications Prior to Admission:     allopurinol (ZYLOPRIM) 300 mg tablet    ascorbic acid (VITAMIN C) 500 MG tablet    aspirin (ECOTRIN LOW STRENGTH) 81 mg EC tablet    atorvastatin (LIPITOR) 40 mg tablet    b complex vitamins capsule    calcitriol (ROCALTROL) 0.25 mcg capsule    co-enzyme Q-10 30 MG capsule    ergocalciferol (VITAMIN D2) 50,000 units    furosemide (LASIX) 80 mg tablet    HYDROcodone-acetaminophen (NORCO)  mg per tablet    insulin glargine (LANTUS) 100 units/mL subcutaneous injection    insulin lispro (HumaLOG) 100 units/mL injection    levothyroxine 125 mcg tablet    Magnesium 400 MG TABS    metoprolol succinate (TOPROL-XL) 100 mg 24 hr tablet    tamsulosin (FLOMAX) 0.4 mg    vitamin A 2400 MCG (8000 UT) capsule    vitamin E, tocopherol, 200 units capsule    Arginine 1000 MG TABS    nitroglycerin (NITROSTAT) 0.4 mg SL tablet    Current  Facility-Administered Medications   Medication Dose Route Frequency    acetaminophen (TYLENOL) tablet 650 mg  650 mg Oral Q6H PRN    aspirin (ECOTRIN LOW STRENGTH) EC tablet 81 mg  81 mg Oral Daily    atorvastatin (LIPITOR) tablet 40 mg  40 mg Oral Daily With Dinner    docusate sodium (COLACE) capsule 100 mg  100 mg Oral BID    ferrous sulfate tablet 325 mg  325 mg Oral Daily With Breakfast    HYDROcodone-acetaminophen (NORCO) 5-325 mg per tablet 1 tablet  1 tablet Oral Q6H PRN    HYDROcodone-acetaminophen (NORCO) 5-325 mg per tablet 2 tablet  2 tablet Oral Q6H PRN    HYDROmorphone (DILAUDID) injection 1 mg  1 mg Intravenous Q3H PRN    insulin glargine (LANTUS) subcutaneous injection 15 Units 0.15 mL  15 Units Subcutaneous Q12H VASU    insulin lispro (HumALOG/ADMELOG) 100 units/mL subcutaneous injection 1-6 Units  1-6 Units Subcutaneous HS    insulin lispro (HumALOG/ADMELOG) 100 units/mL subcutaneous injection 1-6 Units  1-6 Units Subcutaneous TID AC    levothyroxine tablet 125 mcg  125 mcg Oral Early Morning    magnesium Oxide (MAG-OX) tablet 400 mg  400 mg Oral Daily    metoprolol succinate (TOPROL-XL) 24 hr tablet 100 mg  100 mg Oral BID    piperacillin-tazobactam (ZOSYN) 4.5 g in sodium chloride 0.9 % 100 mL IVPB (EXTENDED INFUSION)  4.5 g Intravenous Q12H    polyethylene glycol (MIRALAX) packet 17 g  17 g Oral Daily PRN    senna (SENOKOT) tablet 17.2 mg  2 tablet Oral HS    simethicone (MYLICON) chewable tablet 80 mg  80 mg Oral 4x Daily PRN    tamsulosin (FLOMAX) capsule 0.4 mg  0.4 mg Oral Daily With Dinner    torsemide (DEMADEX) tablet 40 mg  40 mg Oral BID        Lab, Imaging and other studies: I have personally reviewed pertinent labs.  CBC:   Lab Results   Component Value Date    WBC 10.80 (H) 07/27/2024    RBC 2.91 (L) 07/27/2024     CMP:   Lab Results   Component Value Date     07/27/2024     04/29/2024    CO2 23 07/27/2024    CO2 22 04/29/2024    BUN 72 (H) 07/27/2024    BUN 66 (H)  04/29/2024    BUN 46 (H) 10/18/2021    CREATININE 5.51 (H) 07/27/2024    CREATININE 5.0 (H) 04/29/2024    CALCIUM 10.7 (H) 07/27/2024    CALCIUM 10.0 04/29/2024    AST 34 07/24/2024    AST 20 06/10/2020    ALT 26 07/24/2024    ALT 17 06/10/2020    ALKPHOS 70 07/24/2024    ALKPHOS 70 06/10/2020    EGFR 9 07/27/2024    EGFR 12 (L) 04/29/2024    EGFR 16 (L) 06/11/2020     Phosphorus:   Lab Results   Component Value Date    PHOS 5.1 (H) 07/25/2024    PHOS 3.9 10/18/2021     Magnesium:   Lab Results   Component Value Date    MG 2.6 07/27/2024     Urinalysis:   Lab Results   Component Value Date    COLORU Yellow 07/23/2024    CLARITYU Clear 07/23/2024    SPECGRAV 1.020 07/23/2024    PHUR 5.5 07/23/2024    LEUKOCYTESUR Negative 07/23/2024    NITRITE Negative 07/23/2024    GLUCOSEU Negative 07/23/2024    KETONESU Negative 07/23/2024    BILIRUBINUR Negative 07/23/2024    BLOODU Large (A) 07/23/2024     BMP:   Lab Results   Component Value Date    SODIUM 137 07/27/2024    SODIUM 139 04/29/2024    CO2 23 07/27/2024    CO2 22 04/29/2024    BUN 72 (H) 07/27/2024    BUN 66 (H) 04/29/2024    BUN 46 (H) 10/18/2021    CREATININE 5.51 (H) 07/27/2024    CREATININE 5.0 (H) 04/29/2024    CALCIUM 10.7 (H) 07/27/2024    CALCIUM 10.0 04/29/2024

## 2024-07-28 ENCOUNTER — APPOINTMENT (INPATIENT)
Dept: ULTRASOUND IMAGING | Facility: HOSPITAL | Age: 69
DRG: 602 | End: 2024-07-28
Payer: COMMERCIAL

## 2024-07-28 PROBLEM — R79.89 ELEVATED LFTS: Status: ACTIVE | Noted: 2024-07-28

## 2024-07-28 LAB
ALBUMIN SERPL BCG-MCNC: 2.7 G/DL (ref 3.5–5)
ALP SERPL-CCNC: 97 U/L (ref 34–104)
ALT SERPL W P-5'-P-CCNC: 188 U/L (ref 7–52)
ANION GAP SERPL CALCULATED.3IONS-SCNC: 9 MMOL/L (ref 4–13)
AST SERPL W P-5'-P-CCNC: 143 U/L (ref 13–39)
BACTERIA BLD CULT: NORMAL
BILIRUB DIRECT SERPL-MCNC: 0.07 MG/DL (ref 0–0.2)
BILIRUB SERPL-MCNC: 0.46 MG/DL (ref 0.2–1)
BUN SERPL-MCNC: 67 MG/DL (ref 5–25)
CALCIUM SERPL-MCNC: 10.8 MG/DL (ref 8.4–10.2)
CHLORIDE SERPL-SCNC: 100 MMOL/L (ref 96–108)
CO2 SERPL-SCNC: 27 MMOL/L (ref 21–32)
CREAT SERPL-MCNC: 5.65 MG/DL (ref 0.6–1.3)
ERYTHROCYTE [DISTWIDTH] IN BLOOD BY AUTOMATED COUNT: 16.4 % (ref 11.6–15.1)
GFR SERPL CREATININE-BSD FRML MDRD: 9 ML/MIN/1.73SQ M
GLUCOSE SERPL-MCNC: 224 MG/DL (ref 65–140)
GLUCOSE SERPL-MCNC: 231 MG/DL (ref 65–140)
GLUCOSE SERPL-MCNC: 265 MG/DL (ref 65–140)
GLUCOSE SERPL-MCNC: 272 MG/DL (ref 65–140)
GLUCOSE SERPL-MCNC: 328 MG/DL (ref 65–140)
HCT VFR BLD AUTO: 27.8 % (ref 36.5–49.3)
HGB BLD-MCNC: 8.5 G/DL (ref 12–17)
INR PPP: 5.62 (ref 0.84–1.19)
MAGNESIUM SERPL-MCNC: 2.5 MG/DL (ref 1.9–2.7)
MCH RBC QN AUTO: 30.7 PG (ref 26.8–34.3)
MCHC RBC AUTO-ENTMCNC: 30.6 G/DL (ref 31.4–37.4)
MCV RBC AUTO: 100 FL (ref 82–98)
PLATELET # BLD AUTO: 215 THOUSANDS/UL (ref 149–390)
PMV BLD AUTO: 10.4 FL (ref 8.9–12.7)
POTASSIUM SERPL-SCNC: 3.7 MMOL/L (ref 3.5–5.3)
PROT SERPL-MCNC: 6.2 G/DL (ref 6.4–8.4)
PROTHROMBIN TIME: 51 SECONDS (ref 11.6–14.5)
RBC # BLD AUTO: 2.77 MILLION/UL (ref 3.88–5.62)
SODIUM SERPL-SCNC: 136 MMOL/L (ref 135–147)
WBC # BLD AUTO: 8.89 THOUSAND/UL (ref 4.31–10.16)

## 2024-07-28 PROCEDURE — 99232 SBSQ HOSP IP/OBS MODERATE 35: CPT | Performed by: INTERNAL MEDICINE

## 2024-07-28 PROCEDURE — 82948 REAGENT STRIP/BLOOD GLUCOSE: CPT

## 2024-07-28 PROCEDURE — 76705 ECHO EXAM OF ABDOMEN: CPT

## 2024-07-28 PROCEDURE — 83735 ASSAY OF MAGNESIUM: CPT

## 2024-07-28 PROCEDURE — 80076 HEPATIC FUNCTION PANEL: CPT

## 2024-07-28 PROCEDURE — 99232 SBSQ HOSP IP/OBS MODERATE 35: CPT

## 2024-07-28 PROCEDURE — 85027 COMPLETE CBC AUTOMATED: CPT

## 2024-07-28 PROCEDURE — 85610 PROTHROMBIN TIME: CPT

## 2024-07-28 PROCEDURE — 80048 BASIC METABOLIC PNL TOTAL CA: CPT

## 2024-07-28 RX ORDER — POTASSIUM CHLORIDE 20 MEQ/1
20 TABLET, EXTENDED RELEASE ORAL ONCE
Status: COMPLETED | OUTPATIENT
Start: 2024-07-28 | End: 2024-07-28

## 2024-07-28 RX ADMIN — TAMSULOSIN HYDROCHLORIDE 0.4 MG: 0.4 CAPSULE ORAL at 16:42

## 2024-07-28 RX ADMIN — INSULIN LISPRO 5 UNITS: 100 INJECTION, SOLUTION INTRAVENOUS; SUBCUTANEOUS at 08:22

## 2024-07-28 RX ADMIN — INSULIN GLARGINE 15 UNITS: 100 INJECTION, SOLUTION SUBCUTANEOUS at 08:22

## 2024-07-28 RX ADMIN — PIPERACILLIN AND TAZOBACTAM 4.5 G: 36; 4.5 INJECTION, POWDER, FOR SOLUTION INTRAVENOUS at 20:09

## 2024-07-28 RX ADMIN — HYDROCODONE BITARTRATE AND ACETAMINOPHEN 2 TABLET: 5; 325 TABLET ORAL at 18:14

## 2024-07-28 RX ADMIN — PIPERACILLIN AND TAZOBACTAM 4.5 G: 36; 4.5 INJECTION, POWDER, FOR SOLUTION INTRAVENOUS at 09:00

## 2024-07-28 RX ADMIN — Medication 400 MG: at 08:23

## 2024-07-28 RX ADMIN — ASPIRIN 81 MG: 81 TABLET, COATED ORAL at 08:23

## 2024-07-28 RX ADMIN — METOPROLOL SUCCINATE 100 MG: 100 TABLET, EXTENDED RELEASE ORAL at 21:09

## 2024-07-28 RX ADMIN — INSULIN LISPRO 2 UNITS: 100 INJECTION, SOLUTION INTRAVENOUS; SUBCUTANEOUS at 16:48

## 2024-07-28 RX ADMIN — FERROUS SULFATE TAB 325 MG (65 MG ELEMENTAL FE) 325 MG: 325 (65 FE) TAB at 08:23

## 2024-07-28 RX ADMIN — ATORVASTATIN CALCIUM 40 MG: 40 TABLET, FILM COATED ORAL at 16:42

## 2024-07-28 RX ADMIN — HYDROCODONE BITARTRATE AND ACETAMINOPHEN 1 TABLET: 5; 325 TABLET ORAL at 23:03

## 2024-07-28 RX ADMIN — INSULIN LISPRO 4 UNITS: 100 INJECTION, SOLUTION INTRAVENOUS; SUBCUTANEOUS at 11:47

## 2024-07-28 RX ADMIN — HYDROMORPHONE HYDROCHLORIDE 1 MG: 1 INJECTION, SOLUTION INTRAMUSCULAR; INTRAVENOUS; SUBCUTANEOUS at 14:07

## 2024-07-28 RX ADMIN — TORSEMIDE 60 MG: 20 TABLET ORAL at 21:09

## 2024-07-28 RX ADMIN — POTASSIUM CHLORIDE 20 MEQ: 1500 TABLET, EXTENDED RELEASE ORAL at 08:23

## 2024-07-28 RX ADMIN — HYDROCODONE BITARTRATE AND ACETAMINOPHEN 2 TABLET: 5; 325 TABLET ORAL at 05:08

## 2024-07-28 RX ADMIN — TORSEMIDE 60 MG: 20 TABLET ORAL at 08:31

## 2024-07-28 RX ADMIN — METOPROLOL SUCCINATE 100 MG: 100 TABLET, EXTENDED RELEASE ORAL at 08:31

## 2024-07-28 RX ADMIN — HYDROMORPHONE HYDROCHLORIDE 1 MG: 1 INJECTION, SOLUTION INTRAMUSCULAR; INTRAVENOUS; SUBCUTANEOUS at 09:11

## 2024-07-28 RX ADMIN — INSULIN LISPRO 3 UNITS: 100 INJECTION, SOLUTION INTRAVENOUS; SUBCUTANEOUS at 21:10

## 2024-07-28 RX ADMIN — LEVOTHYROXINE SODIUM 125 MCG: 125 TABLET ORAL at 05:08

## 2024-07-28 RX ADMIN — HYDROCODONE BITARTRATE AND ACETAMINOPHEN 2 TABLET: 5; 325 TABLET ORAL at 11:47

## 2024-07-28 RX ADMIN — INSULIN GLARGINE 15 UNITS: 100 INJECTION, SOLUTION SUBCUTANEOUS at 21:10

## 2024-07-28 NOTE — ASSESSMENT & PLAN NOTE
Patient with history of anemia baseline hemoglobin around 9-10 per outpatient labs  On admission, patient with hemoglobin of 10.1, decreased to 7.5, possibly secondary to volume overload, no evidence of bleeding at this time.  CT right lower extremity without evidence of bleeding.  Iron panel showing low iron  Does receive IV Venofer 200 mg with dialysis treatments as an outpatient and received 5 doses for iron deficiency anemia.  Hold on Venofer in setting of acute infection.  Given one dose of epogen on 7/25  Stool occult positive x1  GI consulted  - NPO Sunday night into Monday for possible EGD. Sounds like gastritis. If hgb stable Monday, can f/u OP. If drop in hgb, will have egd   Transfuse for hemoglobin less than 7

## 2024-07-28 NOTE — ASSESSMENT & PLAN NOTE
No personal hx of cirrhosis  Likely the cause of the elevated inr, as patient has not gotten coumadin since prior to admission   RUQ US pending   Suspect hepatorenal, as patient has ESRD on PD  Trend LFT

## 2024-07-28 NOTE — ASSESSMENT & PLAN NOTE
Wt Readings from Last 3 Encounters:   07/28/24 116 kg (254 lb 13.6 oz)   06/25/24 124 kg (273 lb 13 oz)   06/20/24 122 kg (270 lb)     History of chf managed by both cardiology and nephrology, on lasix 80 mg bid outpatient, patient states that he took 240 mg of lasix yesterday and again today.   Echo 4/2023: Systolic function is normal with an ejection fraction of 60-65%. Wall motion is within normal limits. There is grade I (mild) diastolic dysfunction   BNP normal  CXR: No acute cardiopulmonary disease.   Venous duplex-no DVT  Pedal pulses monitored with dopplers  Nephro: Stop Lasix infusion. Transition to torsemide 60mg bid.  Monitor I&Os and daily weights

## 2024-07-28 NOTE — ASSESSMENT & PLAN NOTE
1 out of 2 blood cultures positive for gram-negative rods, coming back as Enterobacterales  Second blood culture no growth at 4 days  ID consulted, recommending discontinue Unasyn and start Zosyn  Anticipate 10 days of therapy final choice of antibiotics to be determined

## 2024-07-28 NOTE — PLAN OF CARE
Problem: Prexisting or High Potential for Compromised Skin Integrity  Goal: Skin integrity is maintained or improved  Description: INTERVENTIONS:  - Identify patients at risk for skin breakdown  - Assess and monitor skin integrity  - Assess and monitor nutrition and hydration status  - Monitor labs   - Assess for incontinence   - Turn and reposition patient  - Assist with mobility/ambulation  - Relieve pressure over bony prominences  - Avoid friction and shearing  - Provide appropriate hygiene as needed including keeping skin clean and dry  - Evaluate need for skin moisturizer/barrier cream  - Collaborate with interdisciplinary team   - Patient/family teaching  - Consider wound care consult   Outcome: Progressing     Problem: METABOLIC, FLUID AND ELECTROLYTES - ADULT  Goal: Electrolytes maintained within normal limits  Description: INTERVENTIONS:  - Monitor labs and assess patient for signs and symptoms of electrolyte imbalances  - Administer electrolyte replacement as ordered  - Monitor response to electrolyte replacements, including repeat lab results as appropriate  - Instruct patient on fluid and nutrition as appropriate  Outcome: Progressing  Goal: Fluid balance maintained  Description: INTERVENTIONS:  - Monitor labs   - Monitor I/O and WT  - Instruct patient on fluid and nutrition as appropriate  - Assess for signs & symptoms of volume excess or deficit  Outcome: Progressing  Goal: Glucose maintained within target range  Description: INTERVENTIONS:  - Monitor Blood Glucose as ordered  - Assess for signs and symptoms of hyperglycemia and hypoglycemia  - Administer ordered medications to maintain glucose within target range  - Assess nutritional intake and initiate nutrition service referral as needed  Outcome: Progressing     Problem: SKIN/TISSUE INTEGRITY - ADULT  Goal: Incision(s), wounds(s) or drain site(s) healing without S/S of infection  Description: INTERVENTIONS  - Assess and document dressing,  incision, wound bed, drain sites and surrounding tissue  - Provide patient and family education  - Perform skin care/dressing changes per wound  Outcome: Progressing     Problem: GENITOURINARY - ADULT  Goal: Maintains or returns to baseline urinary function  Description: INTERVENTIONS:  - Assess urinary function  - Encourage oral fluids to ensure adequate hydration if ordered  - Administer IV fluids as ordered to ensure adequate hydration  - Administer ordered medications as needed  - Offer frequent toileting  - Follow urinary retention protocol if ordered  Outcome: Progressing     Problem: PAIN - ADULT  Goal: Verbalizes/displays adequate comfort level or baseline comfort level  Description: Interventions:  - Encourage patient to monitor pain and request assistance  - Assess pain using appropriate pain scale  - Administer analgesics based on type and severity of pain and evaluate response  - Implement non-pharmacological measures as appropriate and evaluate response  - Consider cultural and social influences on pain and pain management  - Notify physician/advanced practitioner if interventions unsuccessful or patient reports new pain  Outcome: Progressing     Problem: INFECTION - ADULT  Goal: Absence or prevention of progression during hospitalization  Description: INTERVENTIONS:  - Assess and monitor for signs and symptoms of infection  - Monitor lab/diagnostic results  - Monitor all insertion sites, i.e. indwelling lines, tubes, and drains  - Monitor endotracheal if appropriate and nasal secretions for changes in amount and color  - Miranda appropriate cooling/warming therapies per order  - Administer medications as ordered  - Instruct and encourage patient and family to use good hand hygiene technique  - Identify and instruct in appropriate isolation precautions for identified infection/condition  Outcome: Progressing     Problem: SAFETY ADULT  Goal: Patient will remain free of falls  Description:  INTERVENTIONS:  - Educate patient/family on patient safety including physical limitations  - Instruct patient to call for assistance with activity   - Consult OT/PT to assist with strengthening/mobility   - Keep Call bell within reach  - Keep bed low and locked with side rails adjusted as appropriate  - Keep care items and personal belongings within reach  - Initiate and maintain comfort rounds  - Make Fall Risk Sign visible to staff  - Offer Toileting every 2 Hours, in advance of need if unable to notify staff of need  - Initiate/Maintain bed/chair alarm for confusion, impulsivity, or noncompliance with notifying staff.  - Apply yellow socks and bracelet for high fall risk patients  - Consider moving patient to room near nurses station  Outcome: Progressing  Goal: Maintain or return to baseline ADL function  Description: INTERVENTIONS:  -  Assess patient's ability to carry out ADLs; assess patient's baseline for ADL function and identify physical deficits which impact ability to perform ADLs (bathing, care of mouth/teeth, toileting, grooming, dressing, etc.)  - Assess/evaluate cause of self-care deficits   - Assess range of motion  - Assess patient's mobility; develop plan if impaired  - Assess patient's need for assistive devices and provide as appropriate  - Encourage maximum independence but intervene and supervise when necessary  - Involve family in performance of ADLs  - Assess for home care needs following discharge   - Consider OT consult to assist with ADL evaluation and planning for discharge  - Provide patient education as appropriate  Outcome: Progressing  Goal: Maintains/Returns to pre admission functional level  Description: INTERVENTIONS:  - Perform AM-PAC 6 Click Basic Mobility/ Daily Activity assessment daily.  - Set and communicate daily mobility goal to care team and patient/family/caregiver.   - Collaborate with rehabilitation services on mobility goals if consulted  - Perform Range of Motion 3  times a day.  - Reposition patient every 2 hours if unable to reposition self  - Out of bed for toileting if medically appropriate  - Record patient progress and toleration of activity level   Outcome: Progressing     Problem: DISCHARGE PLANNING  Goal: Discharge to home or other facility with appropriate resources  Description: INTERVENTIONS:  - Identify barriers to discharge w/patient and caregiver  - Arrange for needed discharge resources and transportation as appropriate  - Identify discharge learning needs (meds, wound care, etc.)  - Arrange for interpretive services to assist at discharge as needed  - Refer to Case Management Department for coordinating discharge planning if the patient needs post-hospital services based on physician/advanced practitioner order or complex needs related to functional status, cognitive ability, or social support system  Outcome: Progressing     Problem: Knowledge Deficit  Goal: Patient/family/caregiver demonstrates understanding of disease process, treatment plan, medications, and discharge instructions  Description: Complete learning assessment and assess knowledge base.  Interventions:  - Provide teaching at level of understanding  - Provide teaching via preferred learning methods  Outcome: Progressing     Problem: Nutrition/Hydration-ADULT  Goal: Nutrient/Hydration intake appropriate for improving, restoring or maintaining nutritional needs  Description: Monitor and assess patient's nutrition/hydration status for malnutrition. Collaborate with interdisciplinary team and initiate plan and interventions as ordered.  Monitor patient's weight and dietary intake as ordered or per policy. Utilize nutrition screening tool and intervene as necessary. Determine patient's food preferences and provide high-protein, high-caloric foods as appropriate.     INTERVENTIONS:  - Monitor oral intake, urinary output, labs, and treatment plans  - Assess nutrition and hydration status and recommend  course of action  - Evaluate amount of meals eaten  - Assist patient with eating if necessary   - Allow adequate time for meals  - Recommend/ encourage appropriate diets, oral nutritional supplements, and vitamin/mineral supplements  - Order, calculate, and assess calorie counts as needed  - Recommend, monitor, and adjust tube feedings and TPN/PPN based on assessed needs  - Assess need for intravenous fluids  - Provide specific nutrition/hydration education as appropriate  - Include patient/family/caregiver in decisions related to nutrition  Outcome: Progressing

## 2024-07-28 NOTE — PROGRESS NOTES
Progress Note - Nephrology   Masoud Perry 68 y.o. male MRN: [id number]  Unit/Bed#: -01 Encounter: [id number]    Assessment and Plan:  End-stage renal disease on CAPD: The patient utilizes CCPD at home and has been on CAPD during his hospitalization.  Continue to maintain today 4.25% exchanges every 4 hours.  The patient again has been net negative about 1 L.  I think we may be reaching his equilibration point especially looking at the blood pressures being  systolic range predominantly.  Will maintain torsemide dosing at 60 twice daily.  Monitor glucose levels they had been 208-248 on July 27. The patient is on short and long-acting insulin.     2.  Acute on chronic diastolic CHF and lower extremity edema: Plan on maintaining 4.25% exchanges every 4 hours as above.  In addition we will increase torsemide dose to 60 twice daily and follow urine output.  Chest x-ray obtained on July 26 showed mild venous vascular congestion.  I did review chest x-ray myself as well.  There is certainly baseline lymphedema especially given asymmetric edema in the lower extremity.     3.  Anemia secondary to end-stage renal disease on CAPD with possible acute component: Hemoglobin is 8.5 on July 28.  The patient received 1 dose of Epogen on July 25.  Patient's stool occult was positive x 1.  Patient's hemoglobin had been 10.1 and a decrease in the mid 7 range.  Plan is for possible EGD on Monday.    4.  Lymphedema/right lower extremity cellulitis: Maintaining torsemide 60 mg twice daily.  The patient is on renal dose Zosyn and ID has been following.     5. Providencia Bacteremia: ID following felt to be secondary to right leg cellulitis.    6.  Electrolytes and acid-base: Potassium was 3.7 and magnesium was 2.5.  CO2 was 27.  Will replace potassium.  Last phosphorus was 5.1 on July 25 and calcium is 10.8.  We may be reaching his equilibration point here.    VIRTUAL CARE DOCUMENTATION:     1. This service was provided via  "Telemedicine using [employer]     2. Parties in the room with patient during teleconsult Patient only    3. Confidentiality My office door was closed     4. Participants No one else was in the room    5. Patient acknowledged consent and understanding of privacy and security of the  Telemedicine consult. I informed the patient that I have reviewed their record in Epic and presented the opportunity for them to ask any questions regarding the visit today.  The patient agreed to participate.    6. Time spent including documentation of medical care, documentation of clinical decision making, interview with the patient this morning, review of medical record, approximately 23 minutes.       Subjective:   Mr. Perry is seen today on July 28 in follow-up for CAPD and fluid management.  On July 27 it was again noted that on 4.25% every 6 hours 2 L exchanges he was net negative only approximately 300 cc.  We had increased his frequency of his PD exchanges on CAPD to 4.25% every 4 hours.  He is approximately net negative about 1 L over the past 24 hours.  His weight is decreased on bed scale down to 254 pounds.  We had also increased his oral torsemide as well.  This morning he denies any acute complaints did not get much sleep overnight.  Systolic blood pressure has been as low as  systolic and last blood pressure 132/70.  Pulse oximetry is 97% on room air.  Weight is decreased to 254 pounds with 257 pounds again noting it is a bed scale.  Not sure if there is fluid in the belly or if the patient has been drained fluid wise before weight was obtained.  No acute events noted overnight.    Objective:     Vitals: Blood pressure 132/70, pulse 80, temperature 98.2 °F (36.8 °C), temperature source Temporal, resp. rate (!) 30, height 5' 10\" (1.778 m), weight 116 kg (254 lb 13.6 oz), SpO2 97%.,Body mass index is 36.57 kg/m².    Weight (last 2 days)       Date/Time Weight    07/28/24 0514 116 (254.85)    07/27/24 0431 117 " (257.94)    07/26/24 0500 117 (258.82)              Intake/Output Summary (Last 24 hours) at 7/28/2024 0652  Last data filed at 7/28/2024 0546  Gross per 24 hour   Intake 540 ml   Output 1500 ml   Net -960 ml            Physical Exam: General: patient is in NAD; he is lying flat supine in bed and he is not in any acute distress  Skin: No new rash is noted  Eyes: I do not appreciate any scleral icterus  ENT: NC/AT  Neck: Supple  Chest: There is no conversational dyspnea when I spoke with the patient this morning the patient is not in any respiratory distress.  CVS: Pulse ranges have been 70-80 on July 27  Abdomen: The patient denies any abdominal discomfort.  Extremities: There is bilateral lower extremity edema and there is asymmetric edema with right leg bigger than the left leg and there is some erythema on the right lower extremity.  There are dressings on the lower extremities.  Neuro: No focal deficit noted  Psych: The patient answers questions appropriately for me.                Medications Prior to Admission:     allopurinol (ZYLOPRIM) 300 mg tablet    ascorbic acid (VITAMIN C) 500 MG tablet    aspirin (ECOTRIN LOW STRENGTH) 81 mg EC tablet    atorvastatin (LIPITOR) 40 mg tablet    b complex vitamins capsule    calcitriol (ROCALTROL) 0.25 mcg capsule    co-enzyme Q-10 30 MG capsule    ergocalciferol (VITAMIN D2) 50,000 units    furosemide (LASIX) 80 mg tablet    HYDROcodone-acetaminophen (NORCO)  mg per tablet    insulin glargine (LANTUS) 100 units/mL subcutaneous injection    insulin lispro (HumaLOG) 100 units/mL injection    levothyroxine 125 mcg tablet    Magnesium 400 MG TABS    metoprolol succinate (TOPROL-XL) 100 mg 24 hr tablet    tamsulosin (FLOMAX) 0.4 mg    vitamin A 2400 MCG (8000 UT) capsule    vitamin E, tocopherol, 200 units capsule    Arginine 1000 MG TABS    nitroglycerin (NITROSTAT) 0.4 mg SL tablet    Current Facility-Administered Medications   Medication Dose Route Frequency     acetaminophen (TYLENOL) tablet 650 mg  650 mg Oral Q6H PRN    aspirin (ECOTRIN LOW STRENGTH) EC tablet 81 mg  81 mg Oral Daily    atorvastatin (LIPITOR) tablet 40 mg  40 mg Oral Daily With Dinner    docusate sodium (COLACE) capsule 100 mg  100 mg Oral BID    ferrous sulfate tablet 325 mg  325 mg Oral Daily With Breakfast    HYDROcodone-acetaminophen (NORCO) 5-325 mg per tablet 1 tablet  1 tablet Oral Q6H PRN    HYDROcodone-acetaminophen (NORCO) 5-325 mg per tablet 2 tablet  2 tablet Oral Q6H PRN    HYDROmorphone (DILAUDID) injection 1 mg  1 mg Intravenous Q3H PRN    insulin glargine (LANTUS) subcutaneous injection 15 Units 0.15 mL  15 Units Subcutaneous Q12H VASU    insulin lispro (HumALOG/ADMELOG) 100 units/mL subcutaneous injection 1-6 Units  1-6 Units Subcutaneous HS    insulin lispro (HumALOG/ADMELOG) 100 units/mL subcutaneous injection 1-6 Units  1-6 Units Subcutaneous TID AC    levothyroxine tablet 125 mcg  125 mcg Oral Early Morning    magnesium Oxide (MAG-OX) tablet 400 mg  400 mg Oral Daily    metoprolol succinate (TOPROL-XL) 24 hr tablet 100 mg  100 mg Oral BID    piperacillin-tazobactam (ZOSYN) 4.5 g in sodium chloride 0.9 % 100 mL IVPB (EXTENDED INFUSION)  4.5 g Intravenous Q12H    polyethylene glycol (MIRALAX) packet 17 g  17 g Oral Daily PRN    senna (SENOKOT) tablet 17.2 mg  2 tablet Oral HS    simethicone (MYLICON) chewable tablet 80 mg  80 mg Oral 4x Daily PRN    tamsulosin (FLOMAX) capsule 0.4 mg  0.4 mg Oral Daily With Dinner    torsemide (DEMADEX) tablet 60 mg  60 mg Oral BID        Lab, Imaging and other studies: I have personally reviewed pertinent labs.  CBC:   Lab Results   Component Value Date    WBC 8.89 07/28/2024    RBC 2.77 (L) 07/28/2024     CMP:   Lab Results   Component Value Date     07/28/2024     04/29/2024    CO2 27 07/28/2024    CO2 22 04/29/2024    BUN 67 (H) 07/28/2024    BUN 66 (H) 04/29/2024    BUN 46 (H) 10/18/2021    CREATININE 5.65 (H) 07/28/2024    CREATININE  5.0 (H) 04/29/2024    CALCIUM 10.8 (H) 07/28/2024    CALCIUM 10.0 04/29/2024    AST 34 07/24/2024    AST 20 06/10/2020    ALT 26 07/24/2024    ALT 17 06/10/2020    ALKPHOS 70 07/24/2024    ALKPHOS 70 06/10/2020    EGFR 9 07/28/2024    EGFR 12 (L) 04/29/2024    EGFR 16 (L) 06/11/2020     Phosphorus:   Lab Results   Component Value Date    PHOS 5.1 (H) 07/25/2024    PHOS 3.9 10/18/2021     Magnesium:   Lab Results   Component Value Date    MG 2.5 07/28/2024     Urinalysis:   Lab Results   Component Value Date    COLORU Yellow 07/23/2024    CLARITYU Clear 07/23/2024    SPECGRAV 1.020 07/23/2024    PHUR 5.5 07/23/2024    LEUKOCYTESUR Negative 07/23/2024    NITRITE Negative 07/23/2024    GLUCOSEU Negative 07/23/2024    KETONESU Negative 07/23/2024    BILIRUBINUR Negative 07/23/2024    BLOODU Large (A) 07/23/2024     BMP:   Lab Results   Component Value Date    SODIUM 136 07/28/2024    SODIUM 139 04/29/2024    CO2 27 07/28/2024    CO2 22 04/29/2024    BUN 67 (H) 07/28/2024    BUN 66 (H) 04/29/2024    BUN 46 (H) 10/18/2021    CREATININE 5.65 (H) 07/28/2024    CREATININE 5.0 (H) 04/29/2024    CALCIUM 10.8 (H) 07/28/2024    CALCIUM 10.0 04/29/2024

## 2024-07-28 NOTE — PLAN OF CARE
Problem: INFECTION - ADULT  Goal: Absence or prevention of progression during hospitalization  Description: INTERVENTIONS:  - Assess and monitor for signs and symptoms of infection  - Monitor lab/diagnostic results  - Monitor all insertion sites, i.e. indwelling lines, tubes, and drains  - Monitor endotracheal if appropriate and nasal secretions for changes in amount and color  - [place] appropriate cooling/warming therapies per order  - Administer medications as ordered  - Instruct and encourage patient and family to use good hand hygiene technique  - Identify and instruct in appropriate isolation precautions for identified infection/condition  Outcome: Progressing     Problem: Knowledge Deficit  Goal: Patient/family/caregiver demonstrates understanding of disease process, treatment plan, medications, and discharge instructions  Description: Complete learning assessment and assess knowledge base.  Interventions:  - Provide teaching at level of understanding  - Provide teaching via preferred learning methods  Outcome: Progressing

## 2024-07-28 NOTE — PROGRESS NOTES
[facility]  Progress Note  Name: Masoud Perry I  MRN: [id number]  Unit/Bed#: -01 I Date of Admission: 7/23/2024   Date of Service: 7/28/2024 I Hospital Day: 5    Assessment & Plan   * Cellulitis of right lower extremity  Assessment & Plan  Presented to ED with bilateral leg swelling x 4 days. Right leg more swollen and red over the last 2 days. Significant pain and having drainage in the right leg as well. NO fevers or chills.   No recent antibiotics outpatient  CT RLE: No fluid collection seen, cellulitic changes leg, No intramuscular collection, no lytic lesion or periosteal reaction  Procal slightly elevated 0.89, now down trending   Wound culture pending  BC 1/2 gram negative rods; Enterobacterales   Follow CBC and fever curve  ID consulted: Start Zosyn renally dose, follow-up blood cultures, anticipate 10 days of therapy. Slow and prolonged recovery expected  Now agreeable to wound care and dressing changes   General surgery consulted- no surgical intervention, no signs of necrosis. Continue wound care    Lab Results   Component Value Date    WBC 8.89 07/28/2024    WBC 10.80 (H) 07/27/2024    WBC 6.45 07/26/2024       Acute on chronic diastolic HF (heart failure) (HCC)  Assessment & Plan  Wt Readings from Last 3 Encounters:   07/28/24 116 kg (254 lb 13.6 oz)   06/25/24 124 kg (273 lb 13 oz)   06/20/24 122 kg (270 lb)     History of chf managed by both cardiology and nephrology, on lasix 80 mg bid outpatient, patient states that he took 240 mg of lasix yesterday and again today.   Echo 4/2023: Systolic function is normal with an ejection fraction of 60-65%. Wall motion is within normal limits. There is grade I (mild) diastolic dysfunction   BNP normal  CXR: No acute cardiopulmonary disease.   Venous duplex-no DVT  Pedal pulses monitored with dopplers  Nephro: Stop Lasix infusion. Transition to torsemide 60mg bid.  Monitor I&Os and daily weights    Anemia  Assessment &  Plan  Patient with history of anemia baseline hemoglobin around 9-10 per outpatient labs  On admission, patient with hemoglobin of 10.1, decreased to 7.5, possibly secondary to volume overload, no evidence of bleeding at this time.  CT right lower extremity without evidence of bleeding.  Iron panel showing low iron  Does receive IV Venofer 200 mg with dialysis treatments as an outpatient and received 5 doses for iron deficiency anemia.  Hold on Venofer in setting of acute infection.  Given one dose of epogen on 7/25  Stool occult positive x1  GI consulted  - NPO Sunday night into Monday for possible EGD. Sounds like gastritis. If hgb stable Monday, can f/u OP. If drop in hgb, will have egd   Transfuse for hemoglobin less than 7    Elevated LFTs  Assessment & Plan  No personal hx of cirrhosis  Likely the cause of the elevated inr, as patient has not gotten coumadin since prior to admission   RUQ US pending   Suspect hepatorenal, as patient has ESRD on PD  Trend LFT     Bacteremia  Assessment & Plan  1 out of 2 blood cultures positive for gram-negative rods, coming back as Enterobacterales  Second blood culture no growth at 4 days  ID consulted, recommending discontinue Unasyn and start Zosyn  Anticipate 10 days of therapy final choice of antibiotics to be determined    Atrial fibrillation (HCC)  Assessment & Plan  Paroxysmal atrial fibrillation. sp ablation with Dr Alcala at [facility] 4/2024, took himself off Eliquis post procedure AMA;  has episodes on loop recorder of afib that are symptomatic, no longer taking amiodarone  Currently patient on coumadin, follows outpatient with coumadin clinic  Patient went into a fib rvr overnight on 7/26  Cardiology consulted - continue metoprolol and monitor on tele. Unable to tolerate amio and cannot take other anti arrhythmics due to esrd   Converted back into NSR on 7/26  Monitor INR daily -remains supratherapeutic. Hold coumadin    Stage 5 chronic kidney disease on peritoneal  dialysis (HCC)  Assessment & Plan  Lab Results   Component Value Date    EGFR 9 07/28/2024    EGFR 9 07/27/2024    EGFR 11 07/26/2024    CREATININE 5.65 (H) 07/28/2024    CREATININE 5.51 (H) 07/27/2024    CREATININE 4.70 (H) 07/26/2024     History of stage 5 ckd recently started on peritoneal dialysis, first treatment on 6/24. Had PD catheter placed on 6/7/2024. Was scheduled to do PD dialysis in clinic and then transition to home PD dialysis.   Last PD was done yesterday, patient currently doing every other day PD, goal for everyday per patient.   Nephro: His outpatient prescription is a 2400 fill volume with 3 exchanges. Patient was not as compliant with dialysis over the last few days due to feeling sick.  While inpatient, plan on CAPD with 2000 cc fills, 4 exchanges a day of dextrose 2.5%.   Avoid nephrotoxic medications, nsaids, hypotension    Lymphedema  Assessment & Plan  With history of chronic lymphedema following with cardiology outpatient. Feels that his legs have been swelling more recently, right leg more swollen than left due to current cellulitis episode.   Transitioned from lasix infusion to torsemide 40mg bid     HTN (hypertension)  Assessment & Plan  History of htn on lasix and toprol xl, continue home meds    History of CVA (cerebrovascular accident)  Assessment & Plan  Status post presumed cardioembolic CVA requiring tPA administration January 2022  Currently on aspirin and lipitor, continue             VTE Pharmacologic Prophylaxis: VTE Score: 5 High Risk (Score >/= 5) - Pharmacological DVT Prophylaxis Contraindicated. Sequential Compression Devices Ordered.    Mobility:   Basic Mobility Inpatient Raw Score: 10  JH-HLM Goal: 4: Move to chair/commode  JH-HLM Achieved: 2: Bed activities/Dependent transfer  JH-HLM Goal NOT achieved. Continue with multidisciplinary rounding and encourage appropriate mobility to improve upon JH-HLM goals.    Patient Centered Rounds: I performed bedside rounds with  nursing staff today.   Discussions with Specialists or Other Care Team Provider: nursing and cm    Education and Discussions with Family / Patient: Patient declined call to .     Total Time Spent on Date of Encounter in care of patient:  mins. This time was spent on one or more of the following: performing physical exam; counseling and coordination of care; obtaining or reviewing history; documenting in the medical record; reviewing/ordering tests, medications or procedures; communicating with other healthcare professionals and discussing with patient's family/caregivers.    Current Length of Stay: 5 day(s)  Current Patient Status: Inpatient   Certification Statement: The patient will continue to require additional inpatient hospital stay due to requiring iv antibiotics for cellulitis   Discharge Plan: Anticipate discharge in >72 hrs to rehab facility.    Code Status: Level 3 - DNAR and DNI    Subjective:   Patient states that the pain is improving and that his leg looks as though it is getting better. Denies chest pain or shortness of breath.    Objective:     Vitals:   Temp (24hrs), Av.9 °F (36.6 °C), Min:97.5 °F (36.4 °C), Max:98.4 °F (36.9 °C)    Temp:  [97.5 °F (36.4 °C)-98.4 °F (36.9 °C)] 98 °F (36.7 °C)  HR:  [79-85] 83  Resp:  [22-37] 30  BP: (102-132)/(56-70) 118/64  SpO2:  [92 %-98 %] 97 %  Body mass index is 36.57 kg/m².     Input and Output Summary (last 24 hours):     Intake/Output Summary (Last 24 hours) at 2024 1222  Last data filed at 2024 0915  Gross per 24 hour   Intake 540 ml   Output 2200 ml   Net -1660 ml       Physical Exam:   Physical Exam  Vitals reviewed.   Constitutional:       General: He is not in acute distress.     Appearance: Normal appearance. He is obese. He is not ill-appearing.   HENT:      Head: Normocephalic and atraumatic.      Nose: Nose normal.      Mouth/Throat:      Mouth: Mucous membranes are moist.      Pharynx: Oropharynx is clear.   Eyes:       Extraocular Movements: Extraocular movements intact.      Conjunctiva/sclera: Conjunctivae normal.   Cardiovascular:      Rate and Rhythm: Normal rate and regular rhythm.      Pulses: Normal pulses.      Heart sounds: Normal heart sounds. No murmur heard.  Pulmonary:      Effort: Pulmonary effort is normal. No respiratory distress.      Breath sounds: Normal breath sounds. No wheezing.   Abdominal:      General: Abdomen is flat. Bowel sounds are normal. There is no distension.      Palpations: Abdomen is soft.      Tenderness: There is no abdominal tenderness. There is no guarding.   Musculoskeletal:         General: Normal range of motion.      Cervical back: Normal range of motion.      Right lower leg: Edema present.      Left lower leg: Edema present.      Comments: Chronic lymphedema with R>L   Skin:     General: Skin is warm.      Findings: Erythema present.      Comments: Right lower extremity erythema with warmth  Dressing lower extremities cdi   Neurological:      General: No focal deficit present.      Mental Status: He is alert and oriented to person, place, and time. Mental status is at baseline.      Motor: No weakness.   Psychiatric:         Mood and Affect: Mood normal.         Behavior: Behavior normal.         Thought Content: Thought content normal.         Judgment: Judgment normal.          Additional Data:     Labs:  Results from last 7 days   Lab Units 07/28/24  0508 07/27/24  0434   WBC Thousand/uL 8.89 10.80*   HEMOGLOBIN g/dL 8.5* 8.9*   HEMATOCRIT % 27.8* 28.3*   PLATELETS Thousands/uL 215 201   SEGS PCT %  --  86*   LYMPHO PCT %  --  6*   MONO PCT %  --  5   EOS PCT %  --  2     Results from last 7 days   Lab Units 07/28/24  0508   SODIUM mmol/L 136   POTASSIUM mmol/L 3.7   CHLORIDE mmol/L 100   CO2 mmol/L 27   BUN mg/dL 67*   CREATININE mg/dL 5.65*   ANION GAP mmol/L 9   CALCIUM mg/dL 10.8*   ALBUMIN g/dL 2.7*   TOTAL BILIRUBIN mg/dL 0.46   ALK PHOS U/L 97   ALT U/L 188*   AST U/L 143*    GLUCOSE RANDOM mg/dL 265*     Results from last 7 days   Lab Units 07/28/24  0508   INR  5.62*     Results from last 7 days   Lab Units 07/28/24  1102 07/28/24  0703 07/27/24  2025 07/27/24  1622 07/27/24  1140 07/27/24  0707 07/26/24  2021 07/26/24  1605 07/26/24  1103 07/26/24  0728 07/25/24  2053 07/25/24  1600   POC GLUCOSE mg/dl 272* 328* 248* 235* 248* 208* 182* 174* 171* 195* 176* 169*     Results from last 7 days   Lab Units 07/24/24  0450   HEMOGLOBIN A1C % 5.9*     Results from last 7 days   Lab Units 07/25/24  0341 07/24/24  1747 07/23/24  1253   LACTIC ACID mmol/L  --   --  1.0   PROCALCITONIN ng/ml 0.65* 0.72* 0.89*       Lines/Drains:  Invasive Devices       Peripheral Intravenous Line  Duration             Peripheral IV 07/23/24 Right;Ventral (anterior) Forearm 4 days    Long-Dwell Peripheral IV (Midline) 07/24/24 Left Basilic 3 days              Line  Duration             Peritoneal Dialysis Catheter Column-disk Abdominal 51 days                          Imaging: No pertinent imaging reviewed.    Recent Cultures (last 7 days):   Results from last 7 days   Lab Units 07/23/24  1757 07/23/24  1253   BLOOD CULTURE   --  No Growth After 4 Days.  Providencia rettgeri*   GRAM STAIN RESULT  No polys seen*  4+ Gram negative rods* Gram negative rods*   WOUND CULTURE  4+ Growth of Pseudomonas aeruginosa*  4+ Growth of Enterococcus faecalis*  --        Last 24 Hours Medication List:   Current Facility-Administered Medications   Medication Dose Route Frequency Provider Last Rate    acetaminophen  650 mg Oral Q6H PRN Rachel Conn PA-C      aspirin  81 mg Oral Daily Rachel Conn PA-C      atorvastatin  40 mg Oral Daily With Dinner Rachel Conn PA-C      docusate sodium  100 mg Oral BID Ofelia Salmeron PA-C      ferrous sulfate  325 mg Oral Daily With Breakfast Jignesh Hussein MD      HYDROcodone-acetaminophen  1 tablet Oral Q6H PRN Rachel Conn PA-C      HYDROcodone-acetaminophen  2  tablet Oral Q6H PRN Rachel Conn PA-C      HYDROmorphone  1 mg Intravenous Q3H PRN GAGANDEEP Bernardo      insulin glargine  15 Units Subcutaneous Q12H [facility] Rachel Conn PA-C      insulin lispro  1-6 Units Subcutaneous HS Rachel Conn PA-C      insulin lispro  1-6 Units Subcutaneous TID AC Nora Mathew MD      levothyroxine  125 mcg Oral Early Morning Rachel Conn PA-C      magnesium Oxide  400 mg Oral Daily Rachel Conn PA-C      metoprolol succinate  100 mg Oral BID Rachel Conn PA-C      piperacillin-tazobactam  4.5 g Intravenous Q12H Rachel Conn PA-C 4.5 g (07/28/24 0900)    polyethylene glycol  17 g Oral Daily PRN Rachel Conn PA-C      senna  2 tablet Oral HS Ofelia Salmeron PA-C      simethicone  80 mg Oral 4x Daily PRN Rachel Conn PA-C      tamsulosin  0.4 mg Oral Daily With Dinner Rachel Conn PA-C      torsemide  60 mg Oral BID Ze Doty,           Today, Patient Was Seen By: Ofelia Salmeron PA-C    **Please Note: This note may have been constructed using a voice recognition system.**

## 2024-07-28 NOTE — ASSESSMENT & PLAN NOTE
Paroxysmal atrial fibrillation. sp ablation with Dr Alcala at [facility] 4/2024, took himself off Eliquis post procedure AMA;  has episodes on loop recorder of afib that are symptomatic, no longer taking amiodarone  Currently patient on coumadin, follows outpatient with coumadin clinic  Patient went into a fib rvr overnight on 7/26  Cardiology consulted - continue metoprolol and monitor on tele. Unable to tolerate amio and cannot take other anti arrhythmics due to esrd   Converted back into NSR on 7/26  Monitor INR daily -remains supratherapeutic. Hold coumadin

## 2024-07-28 NOTE — ASSESSMENT & PLAN NOTE
Lab Results   Component Value Date    EGFR 9 07/28/2024    EGFR 9 07/27/2024    EGFR 11 07/26/2024    CREATININE 5.65 (H) 07/28/2024    CREATININE 5.51 (H) 07/27/2024    CREATININE 4.70 (H) 07/26/2024     History of stage 5 ckd recently started on peritoneal dialysis, first treatment on 6/24. Had PD catheter placed on 6/7/2024. Was scheduled to do PD dialysis in clinic and then transition to home PD dialysis.   Last PD was done yesterday, patient currently doing every other day PD, goal for everyday per patient.   Nephro: His outpatient prescription is a 2400 fill volume with 3 exchanges. Patient was not as compliant with dialysis over the last few days due to feeling sick.  While inpatient, plan on CAPD with 2000 cc fills, 4 exchanges a day of dextrose 2.5%.   Avoid nephrotoxic medications, nsaids, hypotension

## 2024-07-28 NOTE — ASSESSMENT & PLAN NOTE
Presented to ED with bilateral leg swelling x 4 days. Right leg more swollen and red over the last 2 days. Significant pain and having drainage in the right leg as well. NO fevers or chills.   No recent antibiotics outpatient  CT RLE: No fluid collection seen, cellulitic changes leg, No intramuscular collection, no lytic lesion or periosteal reaction  Procal slightly elevated 0.89, now down trending   Wound culture pending  BC 1/2 gram negative rods; Enterobacterales   Follow CBC and fever curve  ID consulted: Start Zosyn renally dose, follow-up blood cultures, anticipate 10 days of therapy. Slow and prolonged recovery expected  Now agreeable to wound care and dressing changes   General surgery consulted- no surgical intervention, no signs of necrosis. Continue wound care    Lab Results   Component Value Date    WBC 8.89 07/28/2024    WBC 10.80 (H) 07/27/2024    WBC 6.45 07/26/2024

## 2024-07-29 ENCOUNTER — APPOINTMENT (INPATIENT)
Dept: NON INVASIVE DIAGNOSTICS | Facility: HOSPITAL | Age: 69
DRG: 602 | End: 2024-07-29
Payer: COMMERCIAL

## 2024-07-29 LAB
ALBUMIN SERPL BCG-MCNC: 2.6 G/DL (ref 3.5–5)
ALP SERPL-CCNC: 90 U/L (ref 34–104)
ALT SERPL W P-5'-P-CCNC: 232 U/L (ref 7–52)
ANION GAP SERPL CALCULATED.3IONS-SCNC: 9 MMOL/L (ref 4–13)
AORTIC ROOT: 3.8 CM
AORTIC VALVE MEAN VELOCITY: 8.1 M/S
AST SERPL W P-5'-P-CCNC: 184 U/L (ref 13–39)
AV AREA BY CONTINUOUS VTI: 3.3 CM2
AV AREA PEAK VELOCITY: 3.2 CM2
AV LVOT MEAN GRADIENT: 1 MMHG
AV LVOT PEAK GRADIENT: 2 MMHG
AV MEAN GRADIENT: 3 MMHG
AV PEAK GRADIENT: 5 MMHG
AV VALVE AREA: 3.33 CM2
AV VELOCITY RATIO: 0.6
BILIRUB SERPL-MCNC: 0.39 MG/DL (ref 0.2–1)
BSA FOR ECHO PROCEDURE: 2.32 M2
BUN SERPL-MCNC: 62 MG/DL (ref 5–25)
CALCIUM ALBUM COR SERPL-MCNC: 11.6 MG/DL (ref 8.3–10.1)
CALCIUM SERPL-MCNC: 10.5 MG/DL (ref 8.4–10.2)
CHLORIDE SERPL-SCNC: 100 MMOL/L (ref 96–108)
CO2 SERPL-SCNC: 27 MMOL/L (ref 21–32)
CREAT SERPL-MCNC: 5.88 MG/DL (ref 0.6–1.3)
DOP CALC AO PEAK VEL: 1.17 M/S
DOP CALC AO VTI: 21.76 CM
DOP CALC LVOT AREA: 5.31 CM2
DOP CALC LVOT CARDIAC INDEX: 2.21 L/MIN/M2
DOP CALC LVOT CARDIAC OUTPUT: 5.14 L/MIN
DOP CALC LVOT DIAMETER: 2.6 CM
DOP CALC LVOT PEAK VEL VTI: 13.65 CM
DOP CALC LVOT PEAK VEL: 0.7 M/S
DOP CALC LVOT STROKE INDEX: 31.5 ML/M2
DOP CALC LVOT STROKE VOLUME: 72.44
E WAVE DECELERATION TIME: 421 MS
E/A RATIO: 0.53
ERYTHROCYTE [DISTWIDTH] IN BLOOD BY AUTOMATED COUNT: 16.4 % (ref 11.6–15.1)
FRACTIONAL SHORTENING: 42 (ref 28–44)
GFR SERPL CREATININE-BSD FRML MDRD: 8 ML/MIN/1.73SQ M
GLUCOSE SERPL-MCNC: 144 MG/DL (ref 65–140)
GLUCOSE SERPL-MCNC: 150 MG/DL (ref 65–140)
GLUCOSE SERPL-MCNC: 219 MG/DL (ref 65–140)
GLUCOSE SERPL-MCNC: 257 MG/DL (ref 65–140)
GLUCOSE SERPL-MCNC: 268 MG/DL (ref 65–140)
HAV IGM SER QL: NORMAL
HBV CORE IGM SER QL: NORMAL
HBV SURFACE AG SER QL: NORMAL
HCT VFR BLD AUTO: 26.2 % (ref 36.5–49.3)
HCV AB SER QL: NORMAL
HGB BLD-MCNC: 8 G/DL (ref 12–17)
INR PPP: 6.91 (ref 0.84–1.19)
INTERVENTRICULAR SEPTUM IN DIASTOLE (PARASTERNAL SHORT AXIS VIEW): 1.4 CM
INTERVENTRICULAR SEPTUM: 1.4 CM (ref 0.6–1.1)
LEFT ATRIUM SIZE: 3.7 CM
LEFT INTERNAL DIMENSION IN SYSTOLE: 1.8 CM (ref 2.1–4)
LEFT VENTRICULAR INTERNAL DIMENSION IN DIASTOLE: 3.1 CM (ref 3.5–6)
LEFT VENTRICULAR POSTERIOR WALL IN END DIASTOLE: 1.4 CM
LEFT VENTRICULAR STROKE VOLUME: 29 ML
LVSV (TEICH): 29 ML
MAGNESIUM SERPL-MCNC: 2.4 MG/DL (ref 1.9–2.7)
MCH RBC QN AUTO: 30.8 PG (ref 26.8–34.3)
MCHC RBC AUTO-ENTMCNC: 30.5 G/DL (ref 31.4–37.4)
MCV RBC AUTO: 101 FL (ref 82–98)
MV E'TISSUE VEL-LAT: 9 CM/S
MV E'TISSUE VEL-SEP: 5 CM/S
MV PEAK A VEL: 0.73 M/S
MV PEAK E VEL: 39 CM/S
MV STENOSIS PRESSURE HALF TIME: 122 MS
MV VALVE AREA P 1/2 METHOD: 1.8
PLATELET # BLD AUTO: 184 THOUSANDS/UL (ref 149–390)
PMV BLD AUTO: 9.2 FL (ref 8.9–12.7)
POTASSIUM SERPL-SCNC: 3.3 MMOL/L (ref 3.5–5.3)
PROT SERPL-MCNC: 6.1 G/DL (ref 6.4–8.4)
PROTHROMBIN TIME: 59.7 SECONDS (ref 11.6–14.5)
RBC # BLD AUTO: 2.6 MILLION/UL (ref 3.88–5.62)
SL CV PED ECHO LEFT VENTRICLE DIASTOLIC VOLUME (MOD BIPLANE) 2D: 39 ML
SL CV PED ECHO LEFT VENTRICLE SYSTOLIC VOLUME (MOD BIPLANE) 2D: 10 ML
SODIUM SERPL-SCNC: 136 MMOL/L (ref 135–147)
TRICUSPID ANNULAR PLANE SYSTOLIC EXCURSION: 1.7 CM
WBC # BLD AUTO: 6.76 THOUSAND/UL (ref 4.31–10.16)

## 2024-07-29 PROCEDURE — 80074 ACUTE HEPATITIS PANEL: CPT

## 2024-07-29 PROCEDURE — 85027 COMPLETE CBC AUTOMATED: CPT

## 2024-07-29 PROCEDURE — 83735 ASSAY OF MAGNESIUM: CPT

## 2024-07-29 PROCEDURE — 84166 PROTEIN E-PHORESIS/URINE/CSF: CPT | Performed by: INTERNAL MEDICINE

## 2024-07-29 PROCEDURE — 80053 COMPREHEN METABOLIC PANEL: CPT

## 2024-07-29 PROCEDURE — 99233 SBSQ HOSP IP/OBS HIGH 50: CPT | Performed by: INTERNAL MEDICINE

## 2024-07-29 PROCEDURE — 82948 REAGENT STRIP/BLOOD GLUCOSE: CPT

## 2024-07-29 PROCEDURE — 86335 IMMUNFIX E-PHORSIS/URINE/CSF: CPT | Performed by: INTERNAL MEDICINE

## 2024-07-29 PROCEDURE — 93306 TTE W/DOPPLER COMPLETE: CPT

## 2024-07-29 PROCEDURE — 99232 SBSQ HOSP IP/OBS MODERATE 35: CPT

## 2024-07-29 PROCEDURE — 93306 TTE W/DOPPLER COMPLETE: CPT | Performed by: INTERNAL MEDICINE

## 2024-07-29 PROCEDURE — 85610 PROTHROMBIN TIME: CPT

## 2024-07-29 RX ORDER — CINACALCET 30 MG/1
30 TABLET, FILM COATED ORAL
Status: DISCONTINUED | OUTPATIENT
Start: 2024-07-29 | End: 2024-08-05 | Stop reason: HOSPADM

## 2024-07-29 RX ORDER — POTASSIUM CHLORIDE 20 MEQ/1
40 TABLET, EXTENDED RELEASE ORAL ONCE
Status: COMPLETED | OUTPATIENT
Start: 2024-07-29 | End: 2024-07-29

## 2024-07-29 RX ORDER — MIDODRINE HYDROCHLORIDE 5 MG/1
10 TABLET ORAL
Status: DISCONTINUED | OUTPATIENT
Start: 2024-07-29 | End: 2024-08-03

## 2024-07-29 RX ORDER — POTASSIUM CHLORIDE 20 MEQ/1
20 TABLET, EXTENDED RELEASE ORAL ONCE
Status: COMPLETED | OUTPATIENT
Start: 2024-07-29 | End: 2024-07-29

## 2024-07-29 RX ADMIN — IRON SUCROSE 300 MG: 20 INJECTION, SOLUTION INTRAVENOUS at 11:22

## 2024-07-29 RX ADMIN — MIDODRINE HYDROCHLORIDE 10 MG: 5 TABLET ORAL at 08:39

## 2024-07-29 RX ADMIN — DOCUSATE SODIUM 100 MG: 100 CAPSULE, LIQUID FILLED ORAL at 08:35

## 2024-07-29 RX ADMIN — POTASSIUM CHLORIDE 20 MEQ: 1500 TABLET, EXTENDED RELEASE ORAL at 11:22

## 2024-07-29 RX ADMIN — TAMSULOSIN HYDROCHLORIDE 0.4 MG: 0.4 CAPSULE ORAL at 16:51

## 2024-07-29 RX ADMIN — PIPERACILLIN AND TAZOBACTAM 4.5 G: 36; 4.5 INJECTION, POWDER, FOR SOLUTION INTRAVENOUS at 21:22

## 2024-07-29 RX ADMIN — FERROUS SULFATE TAB 325 MG (65 MG ELEMENTAL FE) 325 MG: 325 (65 FE) TAB at 08:30

## 2024-07-29 RX ADMIN — METOPROLOL SUCCINATE 100 MG: 100 TABLET, EXTENDED RELEASE ORAL at 21:22

## 2024-07-29 RX ADMIN — INSULIN LISPRO 2 UNITS: 100 INJECTION, SOLUTION INTRAVENOUS; SUBCUTANEOUS at 11:23

## 2024-07-29 RX ADMIN — INSULIN GLARGINE 15 UNITS: 100 INJECTION, SOLUTION SUBCUTANEOUS at 21:22

## 2024-07-29 RX ADMIN — INSULIN LISPRO 3 UNITS: 100 INJECTION, SOLUTION INTRAVENOUS; SUBCUTANEOUS at 08:30

## 2024-07-29 RX ADMIN — INSULIN LISPRO 1 UNITS: 100 INJECTION, SOLUTION INTRAVENOUS; SUBCUTANEOUS at 21:26

## 2024-07-29 RX ADMIN — INSULIN GLARGINE 15 UNITS: 100 INJECTION, SOLUTION SUBCUTANEOUS at 08:37

## 2024-07-29 RX ADMIN — MIDODRINE HYDROCHLORIDE 10 MG: 5 TABLET ORAL at 16:50

## 2024-07-29 RX ADMIN — HYDROCODONE BITARTRATE AND ACETAMINOPHEN 2 TABLET: 5; 325 TABLET ORAL at 11:22

## 2024-07-29 RX ADMIN — TORSEMIDE 60 MG: 20 TABLET ORAL at 21:22

## 2024-07-29 RX ADMIN — HYDROMORPHONE HYDROCHLORIDE 1 MG: 1 INJECTION, SOLUTION INTRAMUSCULAR; INTRAVENOUS; SUBCUTANEOUS at 16:25

## 2024-07-29 RX ADMIN — POTASSIUM CHLORIDE 40 MEQ: 1500 TABLET, EXTENDED RELEASE ORAL at 08:30

## 2024-07-29 RX ADMIN — LEVOTHYROXINE SODIUM 125 MCG: 125 TABLET ORAL at 05:00

## 2024-07-29 RX ADMIN — MIDODRINE HYDROCHLORIDE 10 MG: 5 TABLET ORAL at 11:22

## 2024-07-29 RX ADMIN — HYDROCODONE BITARTRATE AND ACETAMINOPHEN 2 TABLET: 5; 325 TABLET ORAL at 05:43

## 2024-07-29 RX ADMIN — PHYTONADIONE 1 MG: 10 INJECTION, EMULSION INTRAMUSCULAR; INTRAVENOUS; SUBCUTANEOUS at 07:36

## 2024-07-29 RX ADMIN — PIPERACILLIN AND TAZOBACTAM 4.5 G: 36; 4.5 INJECTION, POWDER, FOR SOLUTION INTRAVENOUS at 08:56

## 2024-07-29 RX ADMIN — HYDROCODONE BITARTRATE AND ACETAMINOPHEN 2 TABLET: 5; 325 TABLET ORAL at 00:19

## 2024-07-29 RX ADMIN — CINACALCET 30 MG: 30 TABLET, FILM COATED ORAL at 08:30

## 2024-07-29 RX ADMIN — ATORVASTATIN CALCIUM 40 MG: 40 TABLET, FILM COATED ORAL at 16:51

## 2024-07-29 RX ADMIN — HYDROCODONE BITARTRATE AND ACETAMINOPHEN 2 TABLET: 5; 325 TABLET ORAL at 21:34

## 2024-07-29 RX ADMIN — TORSEMIDE 60 MG: 20 TABLET ORAL at 08:35

## 2024-07-29 RX ADMIN — ASPIRIN 81 MG: 81 TABLET, COATED ORAL at 08:35

## 2024-07-29 NOTE — QUICK NOTE
INR supra therapeutic at 6.91 today (has been trending up over the last 3 days and up from 5.6 yesterday). Patient has not received any warfarin since admission more than 5 days ago. Patient with guiac positive stool on 7/26 and reports dark stools on exam when questioned. Patient did have a loose bowel movement while in his room which was dark brown, not black and without distinctive odor of melena. With that said guiac was positive on 7/26 and his Hb has also been downtrending from 8.9-->8.0 this AM over last 3 days. Patient additionally noted to have rising AST/ALT in the past two days although bilirubin remains wnl. There is concern for hepatorenal syndrome and patient does endorse fatigue/nausea that has been worsening over past 2-3 days. Mentation wise patient not currently encephalopathic or with asterixis, so not currently in acute liver failure but do have concern this may be developing. As such do anticipate INR will continue to rise and his risk of worsening bleeding will only increase with time. Given this will give 1mg IV Vitamin K. Do note patient's elevated VTE risk given hx afib/hx presumed cardioembolic CVA in 2022, but do feel that risks of continuing to hold off on reversal are outweighed by the benefits of reversal given patient's clinical trajectory. Consideration for the risks were taken into effect with lower dosage of Vitamin K given as well as holding off giving FFP. I did discuss this plan with patient and patient understanding of situation as well as risks and would like to proceed with the IV vitamin K. We will consult GI team on case and appreciate their assistance.

## 2024-07-29 NOTE — ASSESSMENT & PLAN NOTE
Lab Results   Component Value Date    EGFR 8 07/29/2024    EGFR 9 07/28/2024    EGFR 9 07/27/2024    CREATININE 5.88 (H) 07/29/2024    CREATININE 5.65 (H) 07/28/2024    CREATININE 5.51 (H) 07/27/2024     History of stage 5 ckd recently started on peritoneal dialysis, first treatment on 6/24. Had PD catheter placed on 6/7/2024. Was scheduled to do PD dialysis in clinic and then transition to home PD dialysis.   Last PD was done yesterday, patient currently doing every other day PD, goal for everyday per patient.   Nephro adjusting PD solution. decrease him to 2.5% 4 times daily regimen  Hypercalcemia: checking PTH, SPEP, UPEP, light chain ratio, vitamin D level, ACE, PTH related protein, adding sensipar 30 mg qd.   Avoid nephrotoxic medications, nsaids, hypotension   Transport paged @[phone]

## 2024-07-29 NOTE — PROGRESS NOTES
[facility]  Progress Note  Name: Masoud Perry I  MRN: [id number]  Unit/Bed#: -01 I Date of Admission: 7/23/2024   Date of Service: 7/29/2024 I Hospital Day: 6    Assessment & Plan   * Cellulitis of right lower extremity  Assessment & Plan  Presented to ED with bilateral leg swelling x 4 days. Right leg more swollen and red over the last 2 days. Significant pain and having drainage in the right leg as well. NO fevers or chills.   No recent antibiotics outpatient  CT RLE: No fluid collection seen, cellulitic changes leg, No intramuscular collection, no lytic lesion or periosteal reaction  Procal slightly elevated 0.89, now down trending   Wound culture pending  BC 1/2 gram negative rods; Enterobacterales   Follow CBC and fever curve  ID consulted: Start Zosyn renally dose, follow-up blood cultures, anticipate 10 days of therapy. Slow and prolonged recovery expected  Now agreeable to wound care and dressing changes   General surgery consulted- no surgical intervention, no signs of necrosis. Continue wound care    Lab Results   Component Value Date    WBC 6.76 07/29/2024    WBC 8.89 07/28/2024    WBC 10.80 (H) 07/27/2024       Acute on chronic diastolic HF (heart failure) (HCC)  Assessment & Plan  Wt Readings from Last 3 Encounters:   07/29/24 116 kg (254 lb 13.6 oz)   06/25/24 124 kg (273 lb 13 oz)   06/20/24 122 kg (270 lb)     History of chf managed by both cardiology and nephrology, on lasix 80 mg bid outpatient, patient states that he took 240 mg of lasix yesterday and again today.   Echo 4/2023: Systolic function is normal with an ejection fraction of 60-65%. Wall motion is within normal limits. There is grade I (mild) diastolic dysfunction   BNP normal  CXR: No acute cardiopulmonary disease.   Venous duplex-no DVT  Pedal pulses monitored with dopplers  Nephro: Stop Lasix infusion. Transition to torsemide 60mg bid.  Monitor I&Os and daily weights    Bacteremia  Assessment &  Plan  1 out of 2 blood cultures positive for gram-negative rods, coming back as Enterobacterales  Second blood culture no growth at 4 days  ID consulted, recommending discontinue Unasyn and start Zosyn  Anticipate 10 days of therapy on zosyn to complete on 8/2/2024    Anemia  Assessment & Plan  Patient with history of anemia baseline hemoglobin around 9-10 per outpatient labs  On admission, patient with hemoglobin of 10.1, decreased to 7.5, possibly secondary to volume overload, no evidence of bleeding at this time.  CT right lower extremity without evidence of bleeding.  Iron panel showing low iron  Does receive IV Venofer 200 mg with dialysis treatments as an outpatient and received 5 doses for iron deficiency anemia.  Hold on Venofer in setting of acute infection.  Given one dose of epogen on 7/25  Stool occult positive x1  GI consulted  - NPO Sunday night into Monday for possible EGD. Sounds like gastritis. If hgb stable Monday, can f/u OP. If drop in hgb, will have egd   Given dose of Vitamin K on 7/29 for supratherapeutic INR   Discussed with GI, due to stable hgb and supratherapeutic INR, no EGD at this time.   Transfuse for hemoglobin less than 7    Stage 5 chronic kidney disease on peritoneal dialysis (HCC)  Assessment & Plan  Lab Results   Component Value Date    EGFR 8 07/29/2024    EGFR 9 07/28/2024    EGFR 9 07/27/2024    CREATININE 5.88 (H) 07/29/2024    CREATININE 5.65 (H) 07/28/2024    CREATININE 5.51 (H) 07/27/2024     History of stage 5 ckd recently started on peritoneal dialysis, first treatment on 6/24. Had PD catheter placed on 6/7/2024. Was scheduled to do PD dialysis in clinic and then transition to home PD dialysis.   Last PD was done yesterday, patient currently doing every other day PD, goal for everyday per patient.   Nephro adjusting PD solution. decrease him to 2.5% 4 times daily regimen  Hypercalcemia: checking PTH, SPEP, UPEP, light chain ratio, vitamin D level, ACE, PTH related  protein, adding sensipar 30 mg qd.   Avoid nephrotoxic medications, nsaids, hypotension    Atrial fibrillation (HCC)  Assessment & Plan  Paroxysmal atrial fibrillation. sp ablation with Dr Alcala at [facility] 4/2024, took himself off Eliquis post procedure AMA;  has episodes on loop recorder of afib that are symptomatic, no longer taking amiodarone  Currently patient on coumadin, follows outpatient with coumadin clinic  Patient went into a fib rvr overnight on 7/26  Cardiology consulted - continue metoprolol and monitor on tele. Unable to tolerate amio and cannot take other anti arrhythmics due to esrd   Converted back into NSR on 7/26  Monitor INR daily -remains supratherapeutic. Hold coumadin, given dose of vitamin K on 7/29    Elevated LFTs  Assessment & Plan  No personal hx of cirrhosis  Likely the cause of the elevated inr, as patient has not gotten coumadin since prior to admission   RUQ US: Limited study as above. Grossly unremarkable albeit limited evaluation of the liver.Cholecystectomy. No biliary dilation. Small volume diffuse free fluid may represent ascites and/or fluid related to peritoneal dialysis.  Suspect hepatorenal, as patient has ESRD on PD  GI consulted  Trend LFT     Lymphedema  Assessment & Plan  With history of chronic lymphedema following with cardiology outpatient. Feels that his legs have been swelling more recently, right leg more swollen than left due to current cellulitis episode.   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 Sequential Compression Devices Ordered.    Mobility:   Basic Mobility Inpatient Raw Score: 10  JH-HLM Goal: 4: Move to chair/commode  JH-HLM Achieved: 2: Bed activities/Dependent transfer  JH-HLM Goal NOT achieved. Continue with multidisciplinary rounding and encourage appropriate mobility to improve upon JH-HLM goals.    Patient Centered Rounds: I performed bedside rounds with nursing staff today.   Discussions with Specialists or Other Care Team Provider: nursing, case management, nephrology, ID, GI    Education and Discussions with Family / Patient: Patient declined call to .     Total Time Spent on Date of Encounter in care of patient: mins. This time was spent on one or more of the following: performing physical exam; counseling and coordination of care; obtaining or reviewing history; documenting in the medical record; reviewing/ordering tests, medications or procedures; communicating with other healthcare professionals and discussing with patient's family/caregivers.    Current Length of Stay: 6 day(s)  Current Patient Status: Inpatient   Certification Statement: The patient will continue to require additional inpatient hospital stay due to IV abx, monitoring BP  Discharge Plan: Anticipate discharge in >72 hrs to rehab facility.    Code Status: Level 3 - DNAR and DNI    Subjective:   Seen and examined today. States that he still has pain in the right leg. Said that he feels it is looking a little better, but still painful. Feels the swelling has gone down as well. No nausea, vomiting, diarrhea, constipation, abdominal pain, CP, SOB, fever, chills. Said he didn't sleep well.     Objective:     Vitals:   Temp (24hrs), Av.9 °F (36.6 °C), Min:97.5 °F (36.4 °C), Max:98.4 °F (36.9 °C)    Temp:  [97.5 °F (36.4 °C)-98.4 °F (36.9 °C)] 97.7 °F (36.5 °C)  HR:  [69-77] 76  Resp:  [21-42] 21  BP: ()/(55-64) 99/58  SpO2:  [95 %-97 %]  95 %  Body mass index is 36.57 kg/m².     Input and Output Summary (last 24 hours):     Intake/Output Summary (Last 24 hours) at 7/29/2024 1054  Last data filed at 7/29/2024 0558  Gross per 24 hour   Intake 890 ml   Output 500 ml   Net 390 ml       Physical Exam:   Physical Exam  Vitals reviewed.   Constitutional:       General: He is not in acute distress.     Appearance: He is obese. He is ill-appearing. He is not toxic-appearing.   HENT:      Head: Normocephalic and atraumatic.      Mouth/Throat:      Mouth: Mucous membranes are moist.   Cardiovascular:      Rate and Rhythm: Normal rate and regular rhythm.      Heart sounds: No murmur heard.  Pulmonary:      Effort: No respiratory distress.      Breath sounds: No stridor. No wheezing.   Abdominal:      General: Bowel sounds are normal. There is no distension.      Palpations: Abdomen is soft. There is no mass.      Tenderness: There is no abdominal tenderness.   Musculoskeletal:         General: Tenderness present.      Right lower leg: Edema present.      Left lower leg: Edema present.      Comments: Chronic lymphedema with R>L    Skin:     General: Skin is warm and dry.      Findings: Erythema present.      Comments: Right lower extremity erythema with warmth, improved from admission  Dressing lower extremities cdi    Neurological:      General: No focal deficit present.      Mental Status: He is alert and oriented to person, place, and time.   Psychiatric:         Mood and Affect: Mood normal.         Behavior: Behavior normal.          Additional Data:     Labs:  Results from last 7 days   Lab Units 07/29/24  0433 07/28/24  0508 07/27/24  0434   WBC Thousand/uL 6.76   < > 10.80*   HEMOGLOBIN g/dL 8.0*   < > 8.9*   HEMATOCRIT % 26.2*   < > 28.3*   PLATELETS Thousands/uL 184   < > 201   SEGS PCT %  --   --  86*   LYMPHO PCT %  --   --  6*   MONO PCT %  --   --  5   EOS PCT %  --   --  2    < > = values in this interval not displayed.     Results from last 7  days   Lab Units 07/29/24  0433   SODIUM mmol/L 136   POTASSIUM mmol/L 3.3*   CHLORIDE mmol/L 100   CO2 mmol/L 27   BUN mg/dL 62*   CREATININE mg/dL 5.88*   ANION GAP mmol/L 9   CALCIUM mg/dL 10.5*   ALBUMIN g/dL 2.6*   TOTAL BILIRUBIN mg/dL 0.39   ALK PHOS U/L 90   ALT U/L 232*   AST U/L 184*   GLUCOSE RANDOM mg/dL 257*     Results from last 7 days   Lab Units 07/29/24  0433   INR  6.91*     Results from last 7 days   Lab Units 07/29/24  0709 07/28/24  2106 07/28/24  1524 07/28/24  1102 07/28/24  0703 07/27/24  2025 07/27/24  1622 07/27/24  1140 07/27/24  0707 07/26/24  2021 07/26/24  1605 07/26/24  1103   POC GLUCOSE mg/dl 268* 231* 224* 272* 328* 248* 235* 248* 208* 182* 174* 171*     Results from last 7 days   Lab Units 07/24/24  0450   HEMOGLOBIN A1C % 5.9*     Results from last 7 days   Lab Units 07/25/24  0341 07/24/24  1747 07/23/24  1253   LACTIC ACID mmol/L  --   --  1.0   PROCALCITONIN ng/ml 0.65* 0.72* 0.89*       Lines/Drains:  Invasive Devices       Peripheral Intravenous Line  Duration             Peripheral IV 07/23/24 Right;Ventral (anterior) Forearm 5 days    Long-Dwell Peripheral IV (Midline) 07/24/24 Left Basilic 4 days              Line  Duration             Peritoneal Dialysis Catheter Column-disk Abdominal 52 days                    Imaging: Reviewed radiology reports from this admission including: chest xray and ultrasound(s)    Recent Cultures (last 7 days):   Results from last 7 days   Lab Units 07/23/24  1757 07/23/24  1253   BLOOD CULTURE   --  No Growth After 5 Days.  Providencia rettgeri*   GRAM STAIN RESULT  No polys seen*  4+ Gram negative rods* Gram negative rods*   WOUND CULTURE  4+ Growth of Pseudomonas aeruginosa*  4+ Growth of Enterococcus faecalis*  --        Last 24 Hours Medication List:   Current Facility-Administered Medications   Medication Dose Route Frequency Provider Last Rate    acetaminophen  650 mg Oral Q6H PRN Rachel Conn PA-C      aspirin  81 mg Oral  Daily Rachel Conn PA-C      atorvastatin  40 mg Oral Daily With Dinner Rachel Conn PA-C      cinacalcet  30 mg Oral Daily With Breakfast Jignesh Hussein MD      docusate sodium  100 mg Oral BID Ofelia Salmeron PA-C      ferrous sulfate  325 mg Oral Daily With Breakfast Jignesh Hussein MD      HYDROcodone-acetaminophen  1 tablet Oral Q6H PRN Rachel Conn PA-C      HYDROcodone-acetaminophen  2 tablet Oral Q6H PRN Rachel Conn PA-C      HYDROmorphone  1 mg Intravenous Q3H PRN GAGANDEEP Bernardo      insulin glargine  15 Units Subcutaneous Q12H VASU Rachel Conn PA-C      insulin lispro  1-6 Units Subcutaneous HS Rachel Conn PA-C      insulin lispro  1-6 Units Subcutaneous TID AC Nora Mathew MD      iron sucrose  300 mg Intravenous Daily Jignesh Hussein MD      levothyroxine  125 mcg Oral Early Morning Rachel Conn PA-C      metoprolol succinate  100 mg Oral BID Rachel Conn PA-C      midodrine  10 mg Oral TID AC Jignesh Hussein MD      piperacillin-tazobactam  4.5 g Intravenous Q12H Rachel Conn PA-C 4.5 g (07/29/24 0856)    polyethylene glycol  17 g Oral Daily PRN Rachel Conn PA-C      potassium chloride  20 mEq Oral Once Jignesh Hussein MD      senna  2 tablet Oral HS Ofelia Salmeron PA-C      simethicone  80 mg Oral 4x Daily PRN Rachel Conn PA-C      tamsulosin  0.4 mg Oral Daily With Dinner Rachel Conn PA-C      torsemide  60 mg Oral BID Ze Doty DO          Today, Patient Was Seen By: Rachel Conn PA-C    **Please Note: This note may have been constructed using a voice recognition system.**

## 2024-07-29 NOTE — ASSESSMENT & PLAN NOTE
No personal hx of cirrhosis  Likely the cause of the elevated inr, as patient has not gotten coumadin since prior to admission   RUQ US: Limited study as above. Grossly unremarkable albeit limited evaluation of the liver.Cholecystectomy. No biliary dilation. Small volume diffuse free fluid may represent ascites and/or fluid related to peritoneal dialysis.  Suspect hepatorenal, as patient has ESRD on PD  GI consulted  Trend LFT

## 2024-07-29 NOTE — ASSESSMENT & PLAN NOTE
Patient with history of anemia baseline hemoglobin around 9-10 per outpatient labs  On admission, patient with hemoglobin of 10.1, decreased to 7.5, possibly secondary to volume overload, no evidence of bleeding at this time.  CT right lower extremity without evidence of bleeding.  Iron panel showing low iron  Does receive IV Venofer 200 mg with dialysis treatments as an outpatient and received 5 doses for iron deficiency anemia.  Hold on Venofer in setting of acute infection.  Given one dose of epogen on 7/25  Stool occult positive x1  GI consulted  - NPO Sunday night into Monday for possible EGD. Sounds like gastritis. If hgb stable Monday, can f/u OP. If drop in hgb, will have egd   Given dose of Vitamin K on 7/29 for supratherapeutic INR   Discussed with GI, due to stable hgb and supratherapeutic INR, no EGD at this time.   Transfuse for hemoglobin less than 7

## 2024-07-29 NOTE — PROGRESS NOTES
Progress Note - Nephrology   Masoud Perry 68 y.o. male MRN: [id number]  Unit/Bed#: -01 Encounter: [id number]    ASSESSMENT AND PLAN:  68year-old male with a history of chronic lymphedema/hypertension/CVA/atrial fibrillation/chronic diastolic CHF/BPH/diabetes mellitus type 2/nephrolithiasis/ESRD on PD presenting with worsening lower extremity edema     1.  ESRD on PD: Has been on CCPD now CAPD while hospitalized  - At this juncture I will start decreasing PD solution most of the volume appears to be lower extremity edema/lymphedema and the infection so I will decrease him to 2.5% 4 times daily regimen     2.  Hypertension/volume:    Acute on chronic diastolic CHF with lower extremity edema: Part of this is lymphedema.  No evidence of DVT by venous duplex; there is cellulitis of right lower extremity  - Volume improved  - CCPD please see above  Chest x-ray from the other day 7/26 low lung volumes vascular crowding but euvolemic at this juncture lungs are clear please see above    Relative hypotension: Most likely related to treating volume however rule out cardiac dysfunction, thyroid problem or adrenal insufficiency.  Check echocardiogram/thyroid profile/a.m. cortisol  Add midodrine 10 mg 3 times a day for blood pressure hemodynamic support     3.  Electrolytes/acid-base: Replete hypokalemia      4.  MBD of ESRD:  - Hyperphosphatemia acceptable currently off binders on renal diet will recheck in a.m.  - Hypercalcemia: This has been intermittent significantly increased over the last couple days slightly better.  I will recheck a PTH intact level and further evaluation including myeloma evaluation with SPEP UPEP and light chain ratio vitamin D levels/ACE level/PTH related protein.  In terms of treatment I would add Sensipar 30 mg daily.     5.  Anemia of ESRD:  - Hemoglobin 8.0  - 1 dose Epogen 20,000 units given last week  - Hemoccult positive for EGD per GI  - Now that the infection has been treated for  "several days I will give intravenous iron along with oral iron  - Transfuse as needed for hemoglobin less than 7.0 per primary service     6.  Ongoing problems:  - Enterobacter on blood culture with right lower extremity cellulitis per infectious disease: PD cell count only 8 no suggestion of peritonitis  - Atrial fibrillation per cardiology consultation  - Increase liver function studies: Right upper quadrant ultrasound from 7/28 Limited study grossly unremarkable evaluation of liver cholecystectomy no biliary dilatation: Needs GI follow-up in this regards     Discussed with primary service will make sure GI following both for anemia heme positive stools and increased liver function studies.  Also discussed adjusting PD as outlined above as a consequence of the discussion regarding volume.      Subjective:   Patient overall feels fairly well.  Only complaint is diarrhea  No nausea vomiting  Tolerating PD  No chest pain or shortness of breath today  No urinary symptoms    Objective:     Vitals: Blood pressure 99/58, pulse 76, temperature 97.7 °F (36.5 °C), temperature source Temporal, resp. rate 21, height 5' 10\" (1.778 m), weight 116 kg (254 lb 13.6 oz), SpO2 95%.,Body mass index is 36.57 kg/m².    Weight (last 2 days)       Date/Time Weight    07/29/24 0454 116 (254.85)     Weight: same as yesterday at 07/29/24 0454    07/28/24 0514 116 (254.85)    07/27/24 0431 117 (257.94)              Intake/Output Summary (Last 24 hours) at 7/29/2024 0821  Last data filed at 7/29/2024 0558  Gross per 24 hour   Intake 890 ml   Output 1000 ml   Net -110 ml            Physical Exam: General:  No acute distress  Skin:  No acute rash  Eyes:  No scleral icterus and noninjected  ENT:  Moist mucous membranes  Neck:  Supple, no jugular venous distention, trachea midline, overall appearance is normal  Chest:  Clear to auscultation  CVS:  Regular rate and rhythm, without a rub or gallops  Abdomen:  Normal bowel sounds, soft and nontender " and nondistended  Extremities: Right greater than left lower extremity edema with severe venous stasis changes some erythema of the right lower extremity more than the right and the pretibial regions are wrapped and no cyanosis, no significant arthritic changes  Neuro:  No gross focality  Psych:  Alert and oriented and appropriate                Medications:    Scheduled Meds:  Current Facility-Administered Medications   Medication Dose Route Frequency Provider Last Rate    acetaminophen  650 mg Oral Q6H PRN Rachel Conn PA-C      aspirin  81 mg Oral Daily Rachel Conn PA-C      atorvastatin  40 mg Oral Daily With Dinner Rachel Conn PA-C      cinacalcet  30 mg Oral Daily With Breakfast Jignesh Hussein MD      docusate sodium  100 mg Oral BID Ofelia Salmeron PA-C      ferrous sulfate  325 mg Oral Daily With Breakfast Jignesh Hussein MD      HYDROcodone-acetaminophen  1 tablet Oral Q6H PRN Rachel Conn PA-C      HYDROcodone-acetaminophen  2 tablet Oral Q6H PRN Rachel Conn PA-C      HYDROmorphone  1 mg Intravenous Q3H PRN GAGANDEEP Bernardo      insulin glargine  15 Units Subcutaneous Q12H VASU Rachel Conn PA-C      insulin lispro  1-6 Units Subcutaneous HS Rachel Conn PA-C      insulin lispro  1-6 Units Subcutaneous TID AC Nora Mathew MD      levothyroxine  125 mcg Oral Early Morning Rachel Conn PA-C      magnesium Oxide  400 mg Oral Daily Rachel Conn PA-C      metoprolol succinate  100 mg Oral BID Rachel Conn PA-C      piperacillin-tazobactam  4.5 g Intravenous Q12H Rachel Conn PA-C Stopped (07/29/24 0009)    polyethylene glycol  17 g Oral Daily PRN Rachel Conn PA-C      potassium chloride  20 mEq Oral Once Jignesh Hussein MD      potassium chloride  40 mEq Oral Once Jignesh Hussein MD      senna  2 tablet Oral HS Ofelia Salmeron PA-C      simethicone  80 mg Oral 4x Daily PRN Rachel Conn PA-C      tamsulosin  0.4 mg Oral  "Daily With Dinner Rachel Conn PA-C      torsemide  60 mg Oral BID Ze Doty DO         PRN Meds:.  acetaminophen    HYDROcodone-acetaminophen    HYDROcodone-acetaminophen    HYDROmorphone    polyethylene glycol    simethicone    Continuous Infusions:     Lab, Imaging and other studies: I have personally reviewed pertinent labs.  Laboratory Results:  Results from last 7 days   Lab Units 07/29/24  0433 07/28/24  0508 07/27/24  0434 07/26/24  1756 07/26/24  0433 07/25/24  0341 07/24/24  1554 07/24/24  1039 07/24/24  0450 07/23/24  1253   WBC Thousand/uL 6.76 8.89 10.80*  --  6.45 6.34  --   --  6.62 9.06   HEMOGLOBIN g/dL 8.0* 8.5* 8.9*  --  7.2* 7.8*  --  7.5* 7.5* 10.1*   HEMATOCRIT % 26.2* 27.8* 28.3*  --  23.4* 24.7*  --  23.4* 23.9* 31.7*   PLATELETS Thousands/uL 184 215 201  --  117* 112*  --   --  101* 134*   POTASSIUM mmol/L 3.3* 3.7 4.3 4.2 3.2* 3.8 3.2*  --  3.7 3.5   CHLORIDE mmol/L 100 100 103  --  108 102 102  --  103 99   CO2 mmol/L 27 27 23  --  22 23 23  --  21 21   BUN mg/dL 62* 67* 72*  --  67* 85* 83*  --  84* 83*   CREATININE mg/dL 5.88* 5.65* 5.51*  --  4.70* 5.91* 5.86*  --  5.88* 5.84*   CALCIUM mg/dL 10.5* 10.8* 10.7*  --  8.7 10.1 9.9  --  9.8 10.8*   MAGNESIUM mg/dL 2.4 2.5 2.6  --  1.8* 2.3  --   --  2.2  --    PHOSPHORUS mg/dL  --   --   --   --   --  5.1*  --   --  4.9*  --      Urinalysis:   Lab Results   Component Value Date    COLORU Yellow 07/23/2024    CLARITYU Clear 07/23/2024    SPECGRAV 1.020 07/23/2024    PHUR 5.5 07/23/2024    LEUKOCYTESUR Negative 07/23/2024    NITRITE Negative 07/23/2024    GLUCOSEU Negative 07/23/2024    KETONESU Negative 07/23/2024    BILIRUBINUR Negative 07/23/2024    BLOODU Large (A) 07/23/2024     ABGs: No results found for: \"PH\"  Radiology review:     Portions of the record may have been created with voice recognition software.  Occasional wrong word or \"sound a like\" substitutions may have occurred due to the inherent limitations of voice " recognition software.  Read the chart carefully and recognize, using context, where substitutions have occurred.

## 2024-07-29 NOTE — ASSESSMENT & PLAN NOTE
History of htn on lasix and toprol xl, continue home meds  Has been having some hypotension, nephrology ordered echo, thyroid profile, am cortisol, midodrine 10 mg tid

## 2024-07-29 NOTE — ASSESSMENT & PLAN NOTE
Wt Readings from Last 3 Encounters:   07/29/24 116 kg (254 lb 13.6 oz)   06/25/24 124 kg (273 lb 13 oz)   06/20/24 122 kg (270 lb)     History of chf managed by both cardiology and nephrology, on lasix 80 mg bid outpatient, patient states that he took 240 mg of lasix yesterday and again today.   Echo 4/2023: Systolic function is normal with an ejection fraction of 60-65%. Wall motion is within normal limits. There is grade I (mild) diastolic dysfunction   BNP normal  CXR: No acute cardiopulmonary disease.   Venous duplex-no DVT  Pedal pulses monitored with dopplers  Nephro: Stop Lasix infusion. Transition to torsemide 60mg bid.  Monitor I&Os and daily weights

## 2024-07-29 NOTE — OCCUPATIONAL THERAPY NOTE
Occupational Therapy Cancel Note    Patient Name: Masoud Perry  Today's Date: 7/29/2024 07/29/24 1410   Note Type   Note Type Cancelled Session   Cancel Reasons Other     Chart reviewed. Attempted to see pt for OT tx session this afternoon. Echo being completed at this time and pt unable to participate. Will continue to follow case and address as medically appropriate.    Anitra Saldivar, OTR/L

## 2024-07-29 NOTE — CASE MANAGEMENT
Case Management Discharge Planning Note    Patient name Masoud Goregh  Location /-01 MRN [id number]  : 1955 Date 2024       Current Admission Date: 2024  Current Admission Diagnosis:Cellulitis of right lower extremity   Patient Active Problem List    Diagnosis Date Noted Date Diagnosed    Elevated LFTs 2024     Patient on peritoneal dialysis (HCC) 2024     Hypervolemia 2024     ESRD (end stage renal disease) (Formerly [facility]) 2024     Bacteremia 2024     Cellulitis of right lower extremity 2024     Atrial fibrillation ([facility]) 2024     Bilateral lower extremity edema 2023     Ureteral stone with hydronephrosis 2022     Cutaneous abscess of buttock 2022     Chronic kidney disease-mineral and bone disorder 2022     Type 2 diabetes mellitus with stage 4 chronic kidney disease, with long-term current use of insulin ([facility]) 07/15/2022     Obesity, morbid ([facility]) 07/15/2022     Secondary hyperparathyroidism of renal origin ([facility]) 07/15/2022     Stage 5 chronic kidney disease on peritoneal dialysis ([facility]ly [facility]) 07/15/2022     Factor 5 Leiden mutation, heterozygous ([facility]) 01/15/2022     Thrombocytopenia ([facility]) 2022     Angina at rest 2022     MI (myocardial infarction) ([facility]) 2022     History of PTCA 2022     Sleep apnea 2022     Smoking 2022     Anemia 2022     History of CVA (cerebrovascular accident) 2022     HTN (hypertension) 2022     Acute on chronic diastolic HF (heart failure) ([facility]) 2022     HLD (hyperlipidemia) 2022     Lymphedema 2022     Hypothyroidism 2022       LOS (days): 6  Geometric Mean LOS (GMLOS) (days): 4.8  Days to GMLOS:-1.2     OBJECTIVE:  Risk of Unplanned Readmission Score: 23.64         Current admission status: Inpatient   Preferred Pharmacy:   [facility] - [place], [place] - [address]  [address]  [place] [address]  Phone:  [phone] Fax: [phone]    Primary Care Provider: Jignesh Baker DO    Primary Insurance: [employer]  Secondary Insurance:     DISCHARGE DETAILS:     Chart reviewed aware of medical management.  Pt continues with IV abx and monitoring BP plans - ID, neph, GI following  CM will continue to follow, current dc plan remains dependant on PT/OT assessments when patient is medically appropriate.

## 2024-07-29 NOTE — PROGRESS NOTES
VIRTUAL CARE DOCUMENTATION:     1. This service was provided via Telemedicine using [employer]     2. Parties in the room with patient during teleconsult Patient only    3. Confidentiality My office door was closed     4. Participants No one else was in the room    5. Patient acknowledged consent and understanding of privacy and security of the  Telemedicine consult. I informed the patient that I have reviewed their record in Epic and presented the opportunity for them to ask any questions regarding the visit today.  The patient agreed to participate.    6. Time spent 4 minutes          Progress Note - Infectious Disease   Masoud Perry 68 y.o. male MRN: [id number]  Unit/Bed#: -01 Encounter: [id number]      Impression/Plan:  Providencia bacteremia              Possibly secondary to right leg cellulitis. No other source appreciated, no GI,  or respiratory symptoms. Has a recently placed PD catheter but without abd pain, cloudy effluent or local signs of infection. Afebrile without leukcytosis     -Continue zosyn renally dosed through 8/2/2024 to complete 10 days total treatment  -Additional mngt as below     Right leg cellulitis              In the setting of worsening lower extremity edema, chronic lymphedema, acute on chronic diastolic CHF, ESRD. Wound cx with Pseudomonas and Enterococcus. No clinical or CT evidence of abscess or necrotizing infection.  Leg pain is now improved.     -Continue zosyn as above  -Surgical follow-up  -Continue serial extremity exams, optimize volume status with PD and diuretics, continue wound care, limb elevation and ace wrap and recommend premedicating to allow limb care  -Anticipate slow and prolonged recovery due to extent of tissue edema     ESRD on peritoneal dialysis              Had PD catheter placed on 6/7, tolerating well with no local signs of infection/PD related peritonitis. Dialysate wbc 8     -Management as per nephrology     4. Acute on chronic diastolic  CHF, lymphedema  Risk factors for recurrent infection     5. A fib with RVR.  Now with decreased heart rate              Likely secondary to acute infection    I spent 50 minutes in evaluation of the patient of which 30 minutes was in counseling/coordination of care    Discussed with the primary service the plan to continue the Zosyn as above and they agree with the plan    Antibiotics:  Zosyn 6    Subjective:  Patient has no fever, chills, sweats; no nausea, vomiting, diarrhea; no cough, shortness of breath; decreased leg pain. No new symptoms.    Objective:  Vitals:  Temp:  [97.5 °F (36.4 °C)-98.4 °F (36.9 °C)] 97.7 °F (36.5 °C)  HR:  [69-83] 76  Resp:  [21-42] 21  BP: ()/(55-64) 99/58  SpO2:  [95 %-97 %] 95 %  Temp (24hrs), Av.9 °F (36.6 °C), Min:97.5 °F (36.4 °C), Max:98.4 °F (36.9 °C)  Current: Temperature: 97.7 °F (36.5 °C)    Documented physical exam has been primarily done by the patient's nurse and/or the primary service due to limited examination abilities on telemedicine    Physical Exam:   General Appearance:  Alert, interactive, nontoxic, no acute distress.   Throat: Oropharynx moist without lesions.    Lungs:   Clear to auscultation bilaterally; no wheezes, rhonchi or rales; respirations unlabored   Heart:  Irregular; no murmur, rub or gallop   Abdomen:   Soft, non-tender, non-distended, positive bowel sounds.     Extremities: Chronic right greater than left lower extremity edema with venous stasis changes.   Skin: No new rashes or lesions. No draining wounds noted.       Labs, Imaging, & Other studies:   All pertinent labs and imaging studies were personally reviewed  Results from last 7 days   Lab Units 24  0433 24  0508 24  0434   WBC Thousand/uL 6.76 8.89 10.80*   HEMOGLOBIN g/dL 8.0* 8.5* 8.9*   PLATELETS Thousands/uL 184 215 201     Results from last 7 days   Lab Units 24  0433 24  0508 24  0434 24  1554 24  0450   SODIUM mmol/L 136 136  137   < > 134*   POTASSIUM mmol/L 3.3* 3.7 4.3   < > 3.7   CHLORIDE mmol/L 100 100 103   < > 103   CO2 mmol/L 27 27 23   < > 21   BUN mg/dL 62* 67* 72*   < > 84*   CREATININE mg/dL 5.88* 5.65* 5.51*   < > 5.88*   EGFR ml/min/1.73sq m 8 9 9   < > 8   CALCIUM mg/dL 10.5* 10.8* 10.7*   < > 9.8   AST U/L 184* 143*  --   --  34   ALT U/L 232* 188*  --   --  26   ALK PHOS U/L 90 97  --   --  70    < > = values in this interval not displayed.     Results from last 7 days   Lab Units 07/23/24  1757 07/23/24  1253   BLOOD CULTURE   --  No Growth After 5 Days.  Providencia rettgeri*   GRAM STAIN RESULT  No polys seen*  4+ Gram negative rods* Gram negative rods*   WOUND CULTURE  4+ Growth of Pseudomonas aeruginosa*  4+ Growth of Enterococcus faecalis*  --    MRSA CULTURE ONLY  No Methicillin Resistant Staphlyococcus aureus (MRSA) isolated  --      Results from last 7 days   Lab Units 07/25/24  0341 07/24/24  1747 07/23/24  1253   PROCALCITONIN ng/ml 0.65* 0.72* 0.89*         Results from last 7 days   Lab Units 07/24/24  0450   FERRITIN ng/mL 69

## 2024-07-29 NOTE — CONSULTS
Consultation - [facility]  Masoud Perry 68 y.o. male MRN: [id number]  Unit/Bed#: -01 Encounter: [id number]      ASSESSMENT & PLAN    Elevated LFTs  Acute on chronic anemia  A-fib on coumadin   Supratherapeutic INR   Positive FOBT   ESRD on peritoneal dilaysis  Patient presented with right leg swelling and was found to have cellulitis, noted to have dropping Hgb and positive FOBT  Hgb with improvement over the weekend, stable at 8.0 this AM without any overt GI bleeding  INR 6.91 this AM   No plans for endoscopic procedures at this time due to stable Hgb, no overt GI bleeding and supratherapeutic INR, would recommend outpatient follow up to discuss further   Labs on admission with normal AST/ALT (24, 22)  AST 34 > 143 > 184  ALT 26 > 188 > 232  Normal alk phos and t bili   RUQ US with hx of CCY, no biliary dilation, unremarkable liver  Suspect possibly 2/2 DILI   Will check hepatitis panel   Avoid hepatotoxins  Continue to monitor CMP daily       Reason for Consult / Principal Problem: anemia, CKD 5, supratherapeutic INR, elevated LFTs    HPI: Masoud Perry is a 68 y.o. year old male with a PMHx ESRD on PD, A-fib on Coumadin, BPH, CAD, anemia of CKD, COPD, hx of CVA, hx of PE/DVT, HLD, HTN, SHAD who presented with b/l lower extremity swelling x 4 days.    In the ED, labs with Hgb 10.1, INR 6.62. He was admitted with cellulitis of the right lower extremity and started on Zosyn.     GI initially consulted due to anemia and positive FOBT, now also consulted for elevated LFTs.     Patient seen at bedside this AM. He reports he is feeling okay. He denies any abdominal pain, N/V, change in bowel habits, black or bloody stools. He reports his last colonoscopy was about 15 years ago and he had precancerous colon polyps removed. He has a Fhx of CRC in his brother.     He denies any hx of liver disease.       Past Medical History:   Diagnosis Date    Anemia in chronic kidney disease      Anticoagulated on Coumadin     Atrial fibrillation (HCC)     BPH (benign prostatic hyperplasia)     CAD (coronary artery disease)     CKD (chronic kidney disease), stage V (HCC)     Compartment syndrome of forearm (HCC)     Right, 2019    COPD (chronic obstructive pulmonary disease) (HCC)     CVA (cerebral vascular accident) (HCC)     Diastolic CHF (HCC)     grade 1 DD    DVT (deep venous thrombosis) (HCC)     Factor V deficiency (HCC)     Gout     Hyperlipidemia     Hypertension     Hypothyroidism     MI (myocardial infarction) (HCC)     x3 - 1999, 2010, after 2015    Morbid obesity (HCC)     Nephrolithiasis     Noncompliance with medications     SHAD (obstructive sleep apnea)     Peritoneal dialysis catheter in place (HCC)     Pulmonary embolism (HCC)     Secondary hyperparathyroidism of renal origin (HCC)     Spinal stenosis of lumbar region     Status post placement of implantable loop recorder     Tobacco dependence      Past Surgical History:   Procedure Laterality Date    CARDIAC ELECTROPHYSIOLOGY STUDY AND ABLATION  04/29/2024    CHOLECYSTECTOMY      CORONARY ANGIOPLASTY WITH STENT PLACEMENT      JOINT REPLACEMENT      bilateral knee    KY CYSTO/URETERO W/LITHOTRIPSY &INDWELL STENT INSRT Left 09/24/2022    Procedure: CYSTOSCOPY URETEROSCOPY WITH LITHOTRIPSY HOLMIUM LASER, AND INSERTION STENT URETERAL;  Surgeon: Lewis Dahl MD;  Location:  MAIN OR;  Service: Urology    KY LAPS INSERTION TUNNELED INTRAPERITONEAL CATHETER N/A 6/7/2024    Procedure: INSERTION PERITONEAL CATHETER DIALYSIS LAPAROSCOPIC;  Surgeon: Hiram Park DO;  Location: [facility];  Service: General    US GUIDED KIDNEY BIOPSY  08/05/2020     Social History   Social History     Substance and Sexual Activity   Alcohol Use Not Currently     Social History     Substance and Sexual Activity   Drug Use Never     Social History     Tobacco Use   Smoking Status Every Day    Current packs/day: 0.50    Types: Cigarettes   Smokeless Tobacco Never        Family History   Problem Relation Age of Onset    Kidney failure Mother     Cirrhosis Mother     Colon cancer Brother          Medications Prior to Admission:     allopurinol (ZYLOPRIM) 300 mg tablet    ascorbic acid (VITAMIN C) 500 MG tablet    aspirin (ECOTRIN LOW STRENGTH) 81 mg EC tablet    atorvastatin (LIPITOR) 40 mg tablet    b complex vitamins capsule    calcitriol (ROCALTROL) 0.25 mcg capsule    co-enzyme Q-10 30 MG capsule    ergocalciferol (VITAMIN D2) 50,000 units    furosemide (LASIX) 80 mg tablet    HYDROcodone-acetaminophen (NORCO)  mg per tablet    insulin glargine (LANTUS) 100 units/mL subcutaneous injection    insulin lispro (HumaLOG) 100 units/mL injection    levothyroxine 125 mcg tablet    Magnesium 400 MG TABS    metoprolol succinate (TOPROL-XL) 100 mg 24 hr tablet    tamsulosin (FLOMAX) 0.4 mg    vitamin A 2400 MCG (8000 UT) capsule    vitamin E, tocopherol, 200 units capsule    Arginine 1000 MG TABS    nitroglycerin (NITROSTAT) 0.4 mg SL tablet  Current Facility-Administered Medications   Medication Dose Route Frequency    acetaminophen (TYLENOL) tablet 650 mg  650 mg Oral Q6H PRN    aspirin (ECOTRIN LOW STRENGTH) EC tablet 81 mg  81 mg Oral Daily    atorvastatin (LIPITOR) tablet 40 mg  40 mg Oral Daily With Dinner    cinacalcet (SENSIPAR) tablet 30 mg  30 mg Oral Daily With Breakfast    docusate sodium (COLACE) capsule 100 mg  100 mg Oral BID    ferrous sulfate tablet 325 mg  325 mg Oral Daily With Breakfast    HYDROcodone-acetaminophen (NORCO) 5-325 mg per tablet 1 tablet  1 tablet Oral Q6H PRN    HYDROcodone-acetaminophen (NORCO) 5-325 mg per tablet 2 tablet  2 tablet Oral Q6H PRN    HYDROmorphone (DILAUDID) injection 1 mg  1 mg Intravenous Q3H PRN    insulin glargine (LANTUS) subcutaneous injection 15 Units 0.15 mL  15 Units Subcutaneous Q12H VASU    insulin lispro (HumALOG/ADMELOG) 100 units/mL subcutaneous injection 1-6 Units  1-6 Units Subcutaneous HS    insulin lispro  (HumALOG/ADMELOG) 100 units/mL subcutaneous injection 1-6 Units  1-6 Units Subcutaneous TID AC    iron sucrose (VENOFER) 300 mg in sodium chloride 0.9 % 250 mL IVPB  300 mg Intravenous Daily    levothyroxine tablet 125 mcg  125 mcg Oral Early Morning    metoprolol succinate (TOPROL-XL) 24 hr tablet 100 mg  100 mg Oral BID    midodrine (PROAMATINE) tablet 10 mg  10 mg Oral TID AC    piperacillin-tazobactam (ZOSYN) 4.5 g in sodium chloride 0.9 % 100 mL IVPB (EXTENDED INFUSION)  4.5 g Intravenous Q12H    polyethylene glycol (MIRALAX) packet 17 g  17 g Oral Daily PRN    potassium chloride (Klor-Con M20) CR tablet 20 mEq  20 mEq Oral Once    senna (SENOKOT) tablet 17.2 mg  2 tablet Oral HS    simethicone (MYLICON) chewable tablet 80 mg  80 mg Oral 4x Daily PRN    tamsulosin (FLOMAX) capsule 0.4 mg  0.4 mg Oral Daily With Dinner    torsemide (DEMADEX) tablet 60 mg  60 mg Oral BID     Allergies   Allergen Reactions    Carvedilol Shortness Of Breath    Saccharin - Food Allergy Diarrhea     GI intolerance, adamant does not want to receive    Sucralose - Food Allergy Diarrhea     GI intolerance, adamant does not want to receive    Amiodarone Dizziness    Aspartame - Food Allergy Diarrhea    Bumetanide GI Intolerance and Lightheadedness           Cephalexin Other (See Comments)     unknown    Ciprofloxacin Rash    Hydralazine Tachycardia    Lisinopril GI Intolerance           Metolazone Other (See Comments)     Metallic taste    Morphine Swelling     Of injection site    Nifedipine Lightheadedness    Strawberry Extract - Food Allergy Rash       Physical Exam  Constitutional:       General: He is not in acute distress.     Appearance: He is ill-appearing.   HENT:      Head: Normocephalic and atraumatic.   Eyes:      General: No scleral icterus.  Pulmonary:      Effort: Pulmonary effort is normal. No respiratory distress.   Abdominal:      General: Abdomen is flat. There is no distension.      Palpations: Abdomen is soft.       Tenderness: There is no abdominal tenderness. There is no guarding.   Skin:     General: Skin is warm and dry.      Coloration: Skin is not jaundiced.   Neurological:      Mental Status: He is alert.       Most Recent Vital Signs:  Vitals:    07/29/24 0410 07/29/24 0418 07/29/24 0454 07/29/24 0703   BP:    99/58   BP Location:    Right arm   Pulse:  69  76   Resp:  21  21   Temp: 98 °F (36.7 °C)   97.7 °F (36.5 °C)   TempSrc: Temporal   Temporal   SpO2:  95%  95%   Weight:   116 kg (254 lb 13.6 oz)    Height:           Intake/Output Summary (Last 24 hours) at 7/29/2024 0942  Last data filed at 7/29/2024 0558  Gross per 24 hour   Intake 890 ml   Output 500 ml   Net 390 ml       LABS/IMAGING  Lab Results: I have reviewed all relevant lab results during this hospitalization.    Imaging Studies:  I have reviewed all the relevant images during this hospitalizations    Counseling / Coordination of Care  Total time spent today 45 minutes. Greater than 50% of total time was spent with the patient and / or family counseling and / or coordination of care.    Jennifer Baeza PA-C

## 2024-07-29 NOTE — ASSESSMENT & PLAN NOTE
1 out of 2 blood cultures positive for gram-negative rods, coming back as Enterobacterales  Second blood culture no growth at 4 days  ID consulted, recommending discontinue Unasyn and start Zosyn  Anticipate 10 days of therapy on zosyn to complete on 8/2/2024

## 2024-07-29 NOTE — ASSESSMENT & PLAN NOTE
With history of chronic lymphedema following with cardiology outpatient. Feels that his legs have been swelling more recently, right leg more swollen than left due to current cellulitis episode.   Transitioned from lasix infusion to torsemide 60mg bid

## 2024-07-29 NOTE — ASSESSMENT & PLAN NOTE
Paroxysmal atrial fibrillation. sp ablation with Dr Alcala at [facility] [place] 4/2024, took himself off Eliquis post procedure AMA;  has episodes on loop recorder of afib that are symptomatic, no longer taking amiodarone  Currently patient on coumadin, follows outpatient with coumadin clinic  Patient went into a fib rvr overnight on 7/26  Cardiology consulted - continue metoprolol and monitor on tele. Unable to tolerate amio and cannot take other anti arrhythmics due to esrd   Converted back into NSR on 7/26  Monitor INR daily -remains supratherapeutic. Hold coumadin, given dose of vitamin K on 7/29

## 2024-07-29 NOTE — PLAN OF CARE
Problem: METABOLIC, FLUID AND ELECTROLYTES - ADULT  Goal: Fluid balance maintained  Description: INTERVENTIONS:  - Monitor labs   - Monitor I/O and WT  - Instruct patient on fluid and nutrition as appropriate  - Assess for signs & symptoms of volume excess or deficit  Outcome: Progressing     Problem: SKIN/TISSUE INTEGRITY - ADULT  Goal: Incision(s), wounds(s) or drain site(s) healing without S/S of infection  Description: INTERVENTIONS  - Assess and document dressing, incision, wound bed, drain sites and surrounding tissue  - Provide patient and family education  - Perform skin care/dressing changes per wound  Outcome: Progressing     Problem: PAIN - ADULT  Goal: Verbalizes/displays adequate comfort level or baseline comfort level  Description: Interventions:  - Encourage patient to monitor pain and request assistance  - Assess pain using appropriate pain scale  - Administer analgesics based on type and severity of pain and evaluate response  - Implement non-pharmacological measures as appropriate and evaluate response  - Consider cultural and social influences on pain and pain management  - Notify physician/advanced practitioner if interventions unsuccessful or patient reports new pain  Outcome: Progressing     Problem: Nutrition/Hydration-ADULT  Goal: Nutrient/Hydration intake appropriate for improving, restoring or maintaining nutritional needs  Description: Monitor and assess patient's nutrition/hydration status for malnutrition. Collaborate with interdisciplinary team and initiate plan and interventions as ordered.  Monitor patient's weight and dietary intake as ordered or per policy. Utilize nutrition screening tool and intervene as necessary. Determine patient's food preferences and provide high-protein, high-caloric foods as appropriate.     INTERVENTIONS:  - Monitor oral intake, urinary output, labs, and treatment plans  - Assess nutrition and hydration status and recommend course of action  - Evaluate  amount of meals eaten  - Assist patient with eating if necessary   - Allow adequate time for meals  - Recommend/ encourage appropriate diets, oral nutritional supplements, and vitamin/mineral supplements  - Order, calculate, and assess calorie counts as needed  - Recommend, monitor, and adjust tube feedings and TPN/PPN based on assessed needs  - Assess need for intravenous fluids  - Provide specific nutrition/hydration education as appropriate  - Include patient/family/caregiver in decisions related to nutrition  Outcome: Progressing

## 2024-07-30 PROBLEM — I10 HTN (HYPERTENSION): Status: RESOLVED | Noted: 2022-01-12 | Resolved: 2024-07-30

## 2024-07-30 LAB
25(OH)D3 SERPL-MCNC: 26.7 NG/ML (ref 30–100)
ALBUMIN SERPL BCG-MCNC: 2.5 G/DL (ref 3.5–5)
ALBUMIN UR ELPH-MCNC: 48.1 %
ALP SERPL-CCNC: 94 U/L (ref 34–104)
ALPHA1 GLOB MFR UR ELPH: 17.9 %
ALPHA2 GLOB MFR UR ELPH: 12.4 %
ALT SERPL W P-5'-P-CCNC: 210 U/L (ref 7–52)
ANION GAP SERPL CALCULATED.3IONS-SCNC: 8 MMOL/L (ref 4–13)
AST SERPL W P-5'-P-CCNC: 123 U/L (ref 13–39)
B-GLOBULIN MFR UR ELPH: 7.1 %
BASOPHILS # BLD AUTO: 0.03 THOUSANDS/ÂΜL (ref 0–0.1)
BASOPHILS NFR BLD AUTO: 1 % (ref 0–1)
BILIRUB SERPL-MCNC: 0.37 MG/DL (ref 0.2–1)
BUN SERPL-MCNC: 61 MG/DL (ref 5–25)
CA-I BLD-SCNC: 1.35 MMOL/L (ref 1.12–1.32)
CALCIUM ALBUM COR SERPL-MCNC: 11 MG/DL (ref 8.3–10.1)
CALCIUM SERPL-MCNC: 9.8 MG/DL (ref 8.4–10.2)
CHLORIDE SERPL-SCNC: 100 MMOL/L (ref 96–108)
CO2 SERPL-SCNC: 29 MMOL/L (ref 21–32)
CORTIS SERPL-MCNC: 13.2 UG/DL
CREAT SERPL-MCNC: 6.06 MG/DL (ref 0.6–1.3)
EOSINOPHIL # BLD AUTO: 0.4 THOUSAND/ÂΜL (ref 0–0.61)
EOSINOPHIL NFR BLD AUTO: 7 % (ref 0–6)
ERYTHROCYTE [DISTWIDTH] IN BLOOD BY AUTOMATED COUNT: 16.5 % (ref 11.6–15.1)
GAMMA GLOB MFR UR ELPH: 14.5 %
GFR SERPL CREATININE-BSD FRML MDRD: 8 ML/MIN/1.73SQ M
GLUCOSE SERPL-MCNC: 107 MG/DL (ref 65–140)
GLUCOSE SERPL-MCNC: 115 MG/DL (ref 65–140)
GLUCOSE SERPL-MCNC: 119 MG/DL (ref 65–140)
GLUCOSE SERPL-MCNC: 144 MG/DL (ref 65–140)
GLUCOSE SERPL-MCNC: 148 MG/DL (ref 65–140)
HCT VFR BLD AUTO: 26.4 % (ref 36.5–49.3)
HGB BLD-MCNC: 8 G/DL (ref 12–17)
IMM GRANULOCYTES # BLD AUTO: 0.08 THOUSAND/UL (ref 0–0.2)
IMM GRANULOCYTES NFR BLD AUTO: 1 % (ref 0–2)
INR PPP: 1.33 (ref 0.84–1.19)
INTERPRETATION UR IFE-IMP: NORMAL
LYMPHOCYTES # BLD AUTO: 1.03 THOUSANDS/ÂΜL (ref 0.6–4.47)
LYMPHOCYTES NFR BLD AUTO: 18 % (ref 14–44)
MAGNESIUM SERPL-MCNC: 2.2 MG/DL (ref 1.9–2.7)
MCH RBC QN AUTO: 30.7 PG (ref 26.8–34.3)
MCHC RBC AUTO-ENTMCNC: 30.3 G/DL (ref 31.4–37.4)
MCV RBC AUTO: 101 FL (ref 82–98)
MONOCYTES # BLD AUTO: 0.34 THOUSAND/ÂΜL (ref 0.17–1.22)
MONOCYTES NFR BLD AUTO: 6 % (ref 4–12)
NEUTROPHILS # BLD AUTO: 3.99 THOUSANDS/ÂΜL (ref 1.85–7.62)
NEUTS SEG NFR BLD AUTO: 67 % (ref 43–75)
NRBC BLD AUTO-RTO: 0 /100 WBCS
PHOSPHATE SERPL-MCNC: 4.9 MG/DL (ref 2.3–4.1)
PLATELET # BLD AUTO: 205 THOUSANDS/UL (ref 149–390)
PMV BLD AUTO: 10.2 FL (ref 8.9–12.7)
POTASSIUM SERPL-SCNC: 3.6 MMOL/L (ref 3.5–5.3)
PROT PATTERN UR ELPH-IMP: NORMAL
PROT SERPL-MCNC: 5.8 G/DL (ref 6.4–8.4)
PROT UR-MCNC: 96.9 MG/DL
PROTHROMBIN TIME: 16.8 SECONDS (ref 11.6–14.5)
PTH-INTACT SERPL-MCNC: 82 PG/ML (ref 12–88)
RBC # BLD AUTO: 2.61 MILLION/UL (ref 3.88–5.62)
SODIUM SERPL-SCNC: 137 MMOL/L (ref 135–147)
T4 FREE SERPL-MCNC: 0.8 NG/DL (ref 0.61–1.12)
TSH SERPL DL<=0.05 MIU/L-ACNC: 12.24 UIU/ML (ref 0.45–4.5)
WBC # BLD AUTO: 5.87 THOUSAND/UL (ref 4.31–10.16)

## 2024-07-30 PROCEDURE — 83970 ASSAY OF PARATHORMONE: CPT | Performed by: INTERNAL MEDICINE

## 2024-07-30 PROCEDURE — 84443 ASSAY THYROID STIM HORMONE: CPT | Performed by: INTERNAL MEDICINE

## 2024-07-30 PROCEDURE — 83521 IG LIGHT CHAINS FREE EACH: CPT | Performed by: INTERNAL MEDICINE

## 2024-07-30 PROCEDURE — 99232 SBSQ HOSP IP/OBS MODERATE 35: CPT | Performed by: FAMILY MEDICINE

## 2024-07-30 PROCEDURE — 82948 REAGENT STRIP/BLOOD GLUCOSE: CPT

## 2024-07-30 PROCEDURE — 82397 CHEMILUMINESCENT ASSAY: CPT | Performed by: INTERNAL MEDICINE

## 2024-07-30 PROCEDURE — 83735 ASSAY OF MAGNESIUM: CPT | Performed by: INTERNAL MEDICINE

## 2024-07-30 PROCEDURE — 99232 SBSQ HOSP IP/OBS MODERATE 35: CPT | Performed by: INTERNAL MEDICINE

## 2024-07-30 PROCEDURE — 86334 IMMUNOFIX E-PHORESIS SERUM: CPT | Performed by: INTERNAL MEDICINE

## 2024-07-30 PROCEDURE — 85025 COMPLETE CBC W/AUTO DIFF WBC: CPT | Performed by: INTERNAL MEDICINE

## 2024-07-30 PROCEDURE — 84100 ASSAY OF PHOSPHORUS: CPT | Performed by: INTERNAL MEDICINE

## 2024-07-30 PROCEDURE — 82330 ASSAY OF CALCIUM: CPT | Performed by: INTERNAL MEDICINE

## 2024-07-30 PROCEDURE — 97530 THERAPEUTIC ACTIVITIES: CPT

## 2024-07-30 PROCEDURE — 82164 ANGIOTENSIN I ENZYME TEST: CPT | Performed by: INTERNAL MEDICINE

## 2024-07-30 PROCEDURE — 84166 PROTEIN E-PHORESIS/URINE/CSF: CPT | Performed by: PATHOLOGY

## 2024-07-30 PROCEDURE — 82306 VITAMIN D 25 HYDROXY: CPT | Performed by: INTERNAL MEDICINE

## 2024-07-30 PROCEDURE — 80053 COMPREHEN METABOLIC PANEL: CPT | Performed by: INTERNAL MEDICINE

## 2024-07-30 PROCEDURE — 82533 TOTAL CORTISOL: CPT | Performed by: INTERNAL MEDICINE

## 2024-07-30 PROCEDURE — 82652 VIT D 1 25-DIHYDROXY: CPT | Performed by: INTERNAL MEDICINE

## 2024-07-30 PROCEDURE — 97535 SELF CARE MNGMENT TRAINING: CPT

## 2024-07-30 PROCEDURE — 85610 PROTHROMBIN TIME: CPT

## 2024-07-30 PROCEDURE — 84165 PROTEIN E-PHORESIS SERUM: CPT | Performed by: INTERNAL MEDICINE

## 2024-07-30 PROCEDURE — 84439 ASSAY OF FREE THYROXINE: CPT | Performed by: INTERNAL MEDICINE

## 2024-07-30 RX ORDER — POTASSIUM CHLORIDE 20 MEQ/1
40 TABLET, EXTENDED RELEASE ORAL ONCE
Status: COMPLETED | OUTPATIENT
Start: 2024-07-30 | End: 2024-07-30

## 2024-07-30 RX ORDER — WARFARIN SODIUM 2 MG/1
2 TABLET ORAL
Status: DISCONTINUED | OUTPATIENT
Start: 2024-07-30 | End: 2024-08-05 | Stop reason: HOSPADM

## 2024-07-30 RX ORDER — CEFEPIME HYDROCHLORIDE 1 G/50ML
1000 INJECTION, SOLUTION INTRAVENOUS EVERY 24 HOURS
Status: DISCONTINUED | OUTPATIENT
Start: 2024-07-30 | End: 2024-08-02

## 2024-07-30 RX ADMIN — POTASSIUM CHLORIDE 40 MEQ: 1500 TABLET, EXTENDED RELEASE ORAL at 09:53

## 2024-07-30 RX ADMIN — HYDROCODONE BITARTRATE AND ACETAMINOPHEN 2 TABLET: 5; 325 TABLET ORAL at 15:34

## 2024-07-30 RX ADMIN — INSULIN GLARGINE 15 UNITS: 100 INJECTION, SOLUTION SUBCUTANEOUS at 08:19

## 2024-07-30 RX ADMIN — CINACALCET 30 MG: 30 TABLET, FILM COATED ORAL at 08:17

## 2024-07-30 RX ADMIN — FERROUS SULFATE TAB 325 MG (65 MG ELEMENTAL FE) 325 MG: 325 (65 FE) TAB at 08:17

## 2024-07-30 RX ADMIN — HYDROMORPHONE HYDROCHLORIDE 1 MG: 1 INJECTION, SOLUTION INTRAMUSCULAR; INTRAVENOUS; SUBCUTANEOUS at 00:55

## 2024-07-30 RX ADMIN — ATORVASTATIN CALCIUM 40 MG: 40 TABLET, FILM COATED ORAL at 15:38

## 2024-07-30 RX ADMIN — CEFEPIME HYDROCHLORIDE 1000 MG: 1 INJECTION, SOLUTION INTRAVENOUS at 20:55

## 2024-07-30 RX ADMIN — HYDROCODONE BITARTRATE AND ACETAMINOPHEN 2 TABLET: 5; 325 TABLET ORAL at 03:45

## 2024-07-30 RX ADMIN — MIDODRINE HYDROCHLORIDE 10 MG: 5 TABLET ORAL at 15:34

## 2024-07-30 RX ADMIN — HYDROCODONE BITARTRATE AND ACETAMINOPHEN 2 TABLET: 5; 325 TABLET ORAL at 09:52

## 2024-07-30 RX ADMIN — PIPERACILLIN AND TAZOBACTAM 4.5 G: 36; 4.5 INJECTION, POWDER, FOR SOLUTION INTRAVENOUS at 08:20

## 2024-07-30 RX ADMIN — HYDROMORPHONE HYDROCHLORIDE 1 MG: 1 INJECTION, SOLUTION INTRAMUSCULAR; INTRAVENOUS; SUBCUTANEOUS at 08:35

## 2024-07-30 RX ADMIN — MIDODRINE HYDROCHLORIDE 10 MG: 5 TABLET ORAL at 11:36

## 2024-07-30 RX ADMIN — MIDODRINE HYDROCHLORIDE 10 MG: 5 TABLET ORAL at 08:18

## 2024-07-30 RX ADMIN — TORSEMIDE 60 MG: 20 TABLET ORAL at 08:16

## 2024-07-30 RX ADMIN — METOPROLOL SUCCINATE 100 MG: 100 TABLET, EXTENDED RELEASE ORAL at 20:55

## 2024-07-30 RX ADMIN — ASPIRIN 81 MG: 81 TABLET, COATED ORAL at 08:16

## 2024-07-30 RX ADMIN — HYDROCODONE BITARTRATE AND ACETAMINOPHEN 2 TABLET: 5; 325 TABLET ORAL at 21:51

## 2024-07-30 RX ADMIN — METOPROLOL SUCCINATE 100 MG: 100 TABLET, EXTENDED RELEASE ORAL at 08:16

## 2024-07-30 RX ADMIN — TAMSULOSIN HYDROCHLORIDE 0.4 MG: 0.4 CAPSULE ORAL at 15:38

## 2024-07-30 RX ADMIN — WARFARIN SODIUM 2 MG: 2 TABLET ORAL at 18:41

## 2024-07-30 RX ADMIN — INSULIN GLARGINE 15 UNITS: 100 INJECTION, SOLUTION SUBCUTANEOUS at 20:55

## 2024-07-30 RX ADMIN — TORSEMIDE 60 MG: 20 TABLET ORAL at 20:55

## 2024-07-30 RX ADMIN — LEVOTHYROXINE SODIUM 125 MCG: 125 TABLET ORAL at 06:09

## 2024-07-30 RX ADMIN — HYDROMORPHONE HYDROCHLORIDE 1 MG: 1 INJECTION, SOLUTION INTRAMUSCULAR; INTRAVENOUS; SUBCUTANEOUS at 12:54

## 2024-07-30 NOTE — ASSESSMENT & PLAN NOTE
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  Patient also has a history of CVA and paroxysmal atrial fibrillation, at this time, considering risk versus benefits, after discussion is done with the patient's, will consider to resume Coumadin.   discussed in details with patient regarding this plan.  Patient seems frustrated since patient is on Coumadin for 10 years and he is going through multiple back-and-forth for adjusting dose to maintain INR in therapeutic level.  After extensive discussion is done, patient agrees to continue Coumadin but agrees to start with 2 mg.-Patient verbalizes regarding risk versus benefits, side effects and alternatives.

## 2024-07-30 NOTE — ASSESSMENT & PLAN NOTE
Patient with history of anemia baseline hemoglobin around 9-10 per outpatient labs  On admission, patient with hemoglobin of 10.1, decreased to 7.5, possibly secondary to volume overload, no evidence of bleeding at this time.  CT right lower extremity without evidence of bleeding.  Iron panel showing low iron  Does receive IV Venofer 200 mg with dialysis treatments as an outpatient and received 5 doses for iron deficiency anemia.   Given one dose of epogen on 7/25  Stool occult positive x2  GI consulted  -initial plan is to proceed with EGD on 7/29/2024.  But since hemoglobin remained stable, and patient INR was supratherapeutic, GI recommending hold off EGD, outpatient workup.  Given dose of Vitamin K on 7/29 for supratherapeutic INR -INR came down to 1.33-since per GI, no plan to proceed with EGD since hemoglobin remained stable.  Patient also has a history of CVA and paroxysmal atrial fibrillation, at this time, considering risk versus benefits, after discussion is done with the patient's, will consider to resume Coumadin-discussed in details with patient regarding this plan.  Patient seems frustrated since patient is on Coumadin for 10 years and he is going through multiple back-and-forth for adjusting dose to maintain INR in therapeutic level.  After extensive discussion is done, patient agrees to continue Coumadin but agrees to start with 2 mg.-Patient verbalizes regarding risk versus benefits, side effects and alternatives.

## 2024-07-30 NOTE — ASSESSMENT & PLAN NOTE
Lab Results   Component Value Date    EGFR 8 07/30/2024    EGFR 8 07/29/2024    EGFR 9 07/28/2024    CREATININE 6.06 (H) 07/30/2024    CREATININE 5.88 (H) 07/29/2024    CREATININE 5.65 (H) 07/28/2024     History of stage 5 ckd recently started on peritoneal dialysis, first treatment on 6/24. Had PD catheter placed on 6/7/2024. Was scheduled to do PD dialysis in clinic and then transition to home PD dialysis.   Last PD was done yesterday, patient currently doing every other day PD, goal for everyday per patient.   Nephro adjusting PD solution. decrease him to 2.5% 4 times daily regimen  Hypercalcemia: checking PTH, SPEP, UPEP, light chain ratio, vitamin D level, ACE, PTH related protein, adding sensipar 30 mg qd.   Avoid nephrotoxic medications, nsaids, hypotension

## 2024-07-30 NOTE — CASE MANAGEMENT
Case Management Discharge Planning Note    Patient name Masoud Goregh  Location /-01 MRN [id number]  : 1955 Date 2024       Current Admission Date: 2024  Current Admission Diagnosis:Cellulitis of right lower extremity   Patient Active Problem List    Diagnosis Date Noted Date Diagnosed    Elevated LFTs 2024     Patient on peritoneal dialysis ([facility]) 2024     Hypervolemia 2024     ESRD (end stage renal disease) ([facility]) 2024     Bacteremia 2024     Cellulitis of right lower extremity 2024     Atrial fibrillation ([facility]) 2024     Bilateral lower extremity edema 2023     Ureteral stone with hydronephrosis 2022     Cutaneous abscess of buttock 2022     Chronic kidney disease-mineral and bone disorder 2022     Type 2 diabetes mellitus with stage 4 chronic kidney disease, with long-term current use of insulin ([facility]) 07/15/2022     Obesity, morbid ([facility]) 07/15/2022     Secondary hyperparathyroidism of renal origin ([facility]) 07/15/2022     Stage 5 chronic kidney disease on peritoneal dialysis ([facility]) 07/15/2022     Factor 5 Leiden mutation, heterozygous ([facility]) 01/15/2022     Thrombocytopenia ([facility]) 2022     Angina at rest 2022     MI (myocardial infarction) ([facility]) 2022     History of PTCA 2022     Sleep apnea 2022     Smoking 2022     Anemia 2022     History of CVA (cerebrovascular accident) 2022     Acute on chronic diastolic HF (heart failure) ([facility]) 2022     HLD (hyperlipidemia) 2022     Lymphedema 2022     Hypothyroidism 2022       LOS (days): 7  Geometric Mean LOS (GMLOS) (days): 4.8  Days to GMLOS:-2.3     OBJECTIVE:  Risk of Unplanned Readmission Score: 27.79         Current admission status: Inpatient   Preferred Pharmacy:   [facility] - [place], [place] - [address]  [address]  [place] [address]  Phone: [phone] Fax:  [phone]    Primary Care Provider: Jignesh Baker DO    Primary Insurance: [email or web address] REP  Secondary Insurance:     DISCHARGE DETAILS:     CM submitted referral via [employer] for STR with PD available.     CM to follow patient's care and discharge needs.

## 2024-07-30 NOTE — ASSESSMENT & PLAN NOTE
Presented to ED with bilateral leg swelling x 4 days. Right leg more swollen and red over the last 2 days. Significant pain and having drainage in the right leg as well. NO fevers or chills.   No recent antibiotics outpatient  CT RLE: No fluid collection seen, cellulitic changes leg, No intramuscular collection, no lytic lesion or periosteal reaction  Procal slightly elevated 0.89, now down trending   BC 1/2 gram negative rods; Enterobacterales   Follow CBC and fever curve  ID consulted: Start Zosyn renally dose, follow-up blood cultures, anticipate 10 days of therapy. Slow and prolonged recovery expected-since patient liver enzyme was elevated, and suspected Zosyn could contribute that effect, for that reason, antibiotic switched to cefepime after discussion is done with infectious disease.  Now agreeable to wound care and dressing changes   General surgery consulted- no surgical intervention, no signs of necrosis. Continue wound care

## 2024-07-30 NOTE — ASSESSMENT & PLAN NOTE
Wt Readings from Last 3 Encounters:   07/30/24 118 kg (259 lb 4.2 oz)   06/25/24 124 kg (273 lb 13 oz)   06/20/24 122 kg (270 lb)     History of chf managed by both cardiology and nephrology, on lasix 80 mg bid outpatient, patient states that he took 240 mg of lasix yesterday and again today.   Echo 4/2023: Systolic function is normal with an ejection fraction of 60-65%. Wall motion is within normal limits. There is grade I (mild) diastolic dysfunction   BNP normal  CXR: No acute cardiopulmonary disease.   Venous duplex-no DVT  Pedal pulses monitored with dopplers  Nephro: torsemide 60mg bid.  Monitor I&Os and daily weights  Continue peritoneal dialysis

## 2024-07-30 NOTE — PLAN OF CARE
Problem: PHYSICAL THERAPY ADULT  Goal: Performs mobility at highest level of function for planned discharge setting.  See evaluation for individualized goals.  Description: Treatment/Interventions: Functional transfer training, ADL retraining, LE strengthening/ROM, Therapeutic exercise, Elevations, Endurance training, Patient/family training, Equipment eval/education, Bed mobility, Gait training, Compensatory technique education, Spoke to nursing, OT, Spoke to case management          See flowsheet documentation for full assessment, interventions and recommendations.  Outcome: Progressing  Note: Prognosis: Fair  Problem List: Decreased strength, Decreased endurance, Decreased mobility, Impaired balance, Impaired judgement, Decreased safety awareness, Obesity, Decreased skin integrity, Pain  Assessment: Patient seen for skilled PT session 1 this date with interventions to include therapeutic activity for bed mobility, transfer training, balance/endurance progression.  Pt demonstrated increased I with all mobility tasks at min A x 1 for bed mobility and min A x 2 with Rw for STS/SPT transfers.  Pt educated on proper use of AD, proper hand and foot placement and safety during transfer training.  Once seated in bedside chair, pt hyper focused on P in the R LE rated at 10/10.  Pt provided with reassurance, educated on proper breathing technique and ankle pumps to improve circulation, edema and healing.  Pt receptive to same.  Pt on 2 LPM during session with SPO2 95% and above.  At end of session, SPO2 removed with sat at 95% on RA.  Patient tolerated session well and should continue skilled PT during acute stay as he is functioning below his baseline.  Upon D/C, pt would benefit from transition to level I/max resource intensity as he lives alone, requires extensive A x 2 for all mobility at this time, increased fall risk/P.  Barriers to Discharge: Decreased caregiver support  Barriers to Discharge Comments: Pt lives  alone, requiring extensive A x 2 for mobility at this time, pain  Rehab Resource Intensity Level, PT: I (Maximum Resource Intensity)    See flowsheet documentation for full assessment.

## 2024-07-30 NOTE — ASSESSMENT & PLAN NOTE
Paroxysmal atrial fibrillation. [place]cal[place] [facility] [place], took himself off Eliquis post procedure AMA;  has episodes on loop recorder of afib that are symptomatic, no longer taking amiodarone  Currently patient on coumadin, follows outpatient with coumadin clinic  Patient went into a fib rvr overnight on 7/26  Cardiology consulted - continue metoprolol and monitor on tele. Unable to tolerate amio and cannot take other anti arrhythmics due to esrd   Converted back into NSR on 7/26  Monitor INR daily -remains supratherapeutic. Hold coumadin, given dose of vitamin K on 7/29-INR came down to 1.33-since per GI, no plan to proceed with EGD since hemoglobin remained stable.  Patient also has a history of CVA and paroxysmal atrial fibrillation, at this time, considering risk versus benefits, after discussion is done with the patient's, will consider to resume Coumadin-discussed in details with patient regarding this plan.  Patient seems frustrated since patient is on Coumadin for 10 years and he is going through multiple back-and-forth for adjusting dose to maintain INR in therapeutic level.  After extensive discussion is done, patient agrees to continue Coumadin but agrees to start with 2 mg.-Patient verbalizes regarding risk versus benefits, side effects and alternatives.

## 2024-07-30 NOTE — ASSESSMENT & PLAN NOTE
No personal hx of cirrhosis  Likely the cause of the elevated inr, as patient has not gotten coumadin since prior to admission   RUQ US: Limited study as above. Grossly unremarkable albeit limited evaluation of the liver.Cholecystectomy. No biliary dilation. Small volume diffuse free fluid may represent ascites and/or fluid related to peritoneal dialysis.  Suspect hepatorenal, as patient has ESRD on PD  LFTs remained stable, ultrasound reviewed.  Per GI, patient follow-up with-also recommending to switch antibiotic from Zosyn to different agents since dosing can contribute to elevated liver enzymes.  For that reason, antibiotic switched to cefepime.

## 2024-07-30 NOTE — PLAN OF CARE
Problem: Prexisting or High Potential for Compromised Skin Integrity  Goal: Skin integrity is maintained or improved  Description: INTERVENTIONS:  - Identify patients at risk for skin breakdown  - Assess and monitor skin integrity  - Assess and monitor nutrition and hydration status  - Monitor labs   - Assess for incontinence   - Turn and reposition patient  - Assist with mobility/ambulation  - Relieve pressure over bony prominences  - Avoid friction and shearing  - Provide appropriate hygiene as needed including keeping skin clean and dry  - Evaluate need for skin moisturizer/barrier cream  - Collaborate with interdisciplinary team   - Patient/family teaching  - Consider wound care consult   Outcome: Progressing     Problem: METABOLIC, FLUID AND ELECTROLYTES - ADULT  Goal: Electrolytes maintained within normal limits  Description: INTERVENTIONS:  - Monitor labs and assess patient for signs and symptoms of electrolyte imbalances  - Administer electrolyte replacement as ordered  - Monitor response to electrolyte replacements, including repeat lab results as appropriate  - Instruct patient on fluid and nutrition as appropriate  Outcome: Progressing  Goal: Fluid balance maintained  Description: INTERVENTIONS:  - Monitor labs   - Monitor I/O and WT  - Instruct patient on fluid and nutrition as appropriate  - Assess for signs & symptoms of volume excess or deficit  Outcome: Progressing  Goal: Glucose maintained within target range  Description: INTERVENTIONS:  - Monitor Blood Glucose as ordered  - Assess for signs and symptoms of hyperglycemia and hypoglycemia  - Administer ordered medications to maintain glucose within target range  - Assess nutritional intake and initiate nutrition service referral as needed  Outcome: Progressing     Problem: SKIN/TISSUE INTEGRITY - ADULT  Goal: Incision(s), wounds(s) or drain site(s) healing without S/S of infection  Description: INTERVENTIONS  - Assess and document dressing,  incision, wound bed, drain sites and surrounding tissue  - Provide patient and family education  - Perform skin care/dressing changes per wound  Outcome: Progressing     Problem: GENITOURINARY - ADULT  Goal: Maintains or returns to baseline urinary function  Description: INTERVENTIONS:  - Assess urinary function  - Encourage oral fluids to ensure adequate hydration if ordered  - Administer IV fluids as ordered to ensure adequate hydration  - Administer ordered medications as needed  - Offer frequent toileting  - Follow urinary retention protocol if ordered  Outcome: Progressing     Problem: PAIN - ADULT  Goal: Verbalizes/displays adequate comfort level or baseline comfort level  Description: Interventions:  - Encourage patient to monitor pain and request assistance  - Assess pain using appropriate pain scale  - Administer analgesics based on type and severity of pain and evaluate response  - Implement non-pharmacological measures as appropriate and evaluate response  - Consider cultural and social influences on pain and pain management  - Notify physician/advanced practitioner if interventions unsuccessful or patient reports new pain  Outcome: Progressing     Problem: INFECTION - ADULT  Goal: Absence or prevention of progression during hospitalization  Description: INTERVENTIONS:  - Assess and monitor for signs and symptoms of infection  - Monitor lab/diagnostic results  - Monitor all insertion sites, i.e. indwelling lines, tubes, and drains  - Monitor endotracheal if appropriate and nasal secretions for changes in amount and color  - [place] appropriate cooling/warming therapies per order  - Administer medications as ordered  - Instruct and encourage patient and family to use good hand hygiene technique  - Identify and instruct in appropriate isolation precautions for identified infection/condition  Outcome: Progressing     Problem: SAFETY ADULT  Goal: Patient will remain free of falls  Description:  INTERVENTIONS:  - Educate patient/family on patient safety including physical limitations  - Instruct patient to call for assistance with activity   - Consult OT/PT to assist with strengthening/mobility   - Keep Call bell within reach  - Keep bed low and locked with side rails adjusted as appropriate  - Keep care items and personal belongings within reach  - Initiate and maintain comfort rounds  - Make Fall Risk Sign visible to staff  - Offer Toileting every 2 Hours, in advance of need if unable to notify staff of need  - Initiate/Maintain bed/chair alarm for confusion, impulsivity, or noncompliance with notifying staff.  - Apply yellow socks and bracelet for high fall risk patients  - Consider moving patient to room near nurses station  Outcome: Progressing  Goal: Maintain or return to baseline ADL function  Description: INTERVENTIONS:  -  Assess patient's ability to carry out ADLs; assess patient's baseline for ADL function and identify physical deficits which impact ability to perform ADLs (bathing, care of mouth/teeth, toileting, grooming, dressing, etc.)  - Assess/evaluate cause of self-care deficits   - Assess range of motion  - Assess patient's mobility; develop plan if impaired  - Assess patient's need for assistive devices and provide as appropriate  - Encourage maximum independence but intervene and supervise when necessary  - Involve family in performance of ADLs  - Assess for home care needs following discharge   - Consider OT consult to assist with ADL evaluation and planning for discharge  - Provide patient education as appropriate  Outcome: Progressing  Goal: Maintains/Returns to pre admission functional level  Description: INTERVENTIONS:  - Perform AM-PAC 6 Click Basic Mobility/ Daily Activity assessment daily.  - Set and communicate daily mobility goal to care team and patient/family/caregiver.   - Collaborate with rehabilitation services on mobility goals if consulted  - Perform Range of Motion 3  times a day.  - Reposition patient every 2 hours if unable to reposition self  - Out of bed for toileting if medically appropriate  - Record patient progress and toleration of activity level   Outcome: Progressing     Problem: DISCHARGE PLANNING  Goal: Discharge to home or other facility with appropriate resources  Description: INTERVENTIONS:  - Identify barriers to discharge w/patient and caregiver  - Arrange for needed discharge resources and transportation as appropriate  - Identify discharge learning needs (meds, wound care, etc.)  - Arrange for interpretive services to assist at discharge as needed  - Refer to Case Management Department for coordinating discharge planning if the patient needs post-hospital services based on physician/advanced practitioner order or complex needs related to functional status, cognitive ability, or social support system  Outcome: Progressing     Problem: Knowledge Deficit  Goal: Patient/family/caregiver demonstrates understanding of disease process, treatment plan, medications, and discharge instructions  Description: Complete learning assessment and assess knowledge base.  Interventions:  - Provide teaching at level of understanding  - Provide teaching via preferred learning methods  Outcome: Progressing     Problem: Nutrition/Hydration-ADULT  Goal: Nutrient/Hydration intake appropriate for improving, restoring or maintaining nutritional needs  Description: Monitor and assess patient's nutrition/hydration status for malnutrition. Collaborate with interdisciplinary team and initiate plan and interventions as ordered.  Monitor patient's weight and dietary intake as ordered or per policy. Utilize nutrition screening tool and intervene as necessary. Determine patient's food preferences and provide high-protein, high-caloric foods as appropriate.     INTERVENTIONS:  - Monitor oral intake, urinary output, labs, and treatment plans  - Assess nutrition and hydration status and recommend  course of action  - Evaluate amount of meals eaten  - Assist patient with eating if necessary   - Allow adequate time for meals  - Recommend/ encourage appropriate diets, oral nutritional supplements, and vitamin/mineral supplements  - Order, calculate, and assess calorie counts as needed  - Recommend, monitor, and adjust tube feedings and TPN/PPN based on assessed needs  - Assess need for intravenous fluids  - Provide specific nutrition/hydration education as appropriate  - Include patient/family/caregiver in decisions related to nutrition  Outcome: Progressing

## 2024-07-30 NOTE — PROGRESS NOTES
[facility]  Progress Note  Name: Masoud Perry I  MRN: [id number]  Unit/Bed#: -01 I Date of Admission: 7/23/2024   Date of Service: 7/30/2024 I Hospital Day: 7    Assessment & Plan   Elevated LFTs  Assessment & Plan  No personal hx of cirrhosis  Likely the cause of the elevated inr, as patient has not gotten coumadin since prior to admission   RUQ US: Limited study as above. Grossly unremarkable albeit limited evaluation of the liver.Cholecystectomy. No biliary dilation. Small volume diffuse free fluid may represent ascites and/or fluid related to peritoneal dialysis.  Suspect hepatorenal, as patient has ESRD on PD  LFTs remained stable, ultrasound reviewed.  Per GI, patient follow-up with-also recommending to switch antibiotic from Zosyn to different agents since dosing can contribute to elevated liver enzymes.  For that reason, antibiotic switched to cefepime.    Bacteremia  Assessment & Plan  1 out of 2 blood cultures positive for gram-negative rods, coming back as Enterobacterales  Second blood culture no growth at 4 days  ID consulted, recommending discontinue Unasyn and start Zosyn  Anticipate 10 days of therapy on zosyn to complete on 8/2/2024    Atrial fibrillation (HCC)  Assessment & Plan  Paroxysmal atrial fibrillation. [place] [facility] [place], took himself off Eliquis post procedure AMA;  has episodes on loop recorder of afib that are symptomatic, no longer taking amiodarone  Currently patient on coumadin, follows outpatient with coumadin clinic  Patient went into a fib rvr overnight on 7/26  Cardiology consulted - continue metoprolol and monitor on tele. Unable to tolerate amio and cannot take other anti arrhythmics due to esrd   Converted back into NSR on 7/26  Monitor INR daily -remains supratherapeutic. Hold coumadin, given dose of vitamin K on 7/29-INR came down to 1.33-since per GI, no plan to proceed with EGD since hemoglobin remained stable.   Patient also has a history of CVA and paroxysmal atrial fibrillation, at this time, considering risk versus benefits, after discussion is done with the patient's, will consider to resume Coumadin-discussed in details with patient regarding this plan.  Patient seems frustrated since patient is on Coumadin for 10 years and he is going through multiple back-and-forth for adjusting dose to maintain INR in therapeutic level.  After extensive discussion is done, patient agrees to continue Coumadin but agrees to start with 2 mg.-Patient verbalizes regarding risk versus benefits, side effects and alternatives.    Stage 5 chronic kidney disease on peritoneal dialysis (HCC)  Assessment & Plan  Lab Results   Component Value Date    EGFR 8 07/30/2024    EGFR 8 07/29/2024    EGFR 9 07/28/2024    CREATININE 6.06 (H) 07/30/2024    CREATININE 5.88 (H) 07/29/2024    CREATININE 5.65 (H) 07/28/2024     History of stage 5 ckd recently started on peritoneal dialysis, first treatment on 6/24. Had PD catheter placed on 6/7/2024. Was scheduled to do PD dialysis in clinic and then transition to home PD dialysis.   Last PD was done yesterday, patient currently doing every other day PD, goal for everyday per patient.   Nephro adjusting PD solution. decrease him to 2.5% 4 times daily regimen  Hypercalcemia: checking PTH, SPEP, UPEP, light chain ratio, vitamin D level, ACE, PTH related protein, adding sensipar 30 mg qd.   Avoid nephrotoxic medications, nsaids, hypotension    Anemia  Assessment & Plan  Patient with history of anemia baseline hemoglobin around 9-10 per outpatient labs  On admission, patient with hemoglobin of 10.1, decreased to 7.5, possibly secondary to volume overload, no evidence of bleeding at this time.  CT right lower extremity without evidence of bleeding.  Iron panel showing low iron  Does receive IV Venofer 200 mg with dialysis treatments as an outpatient and received 5 doses for iron deficiency anemia.   Given one dose  of epogen on 7/25  Stool occult positive x2  GI consulted  -initial plan is to proceed with EGD on 7/29/2024.  But since hemoglobin remained stable, and patient INR was supratherapeutic, GI recommending hold off EGD, outpatient workup.  Given dose of Vitamin K on 7/29 for supratherapeutic INR -INR came down to 1.33-since per GI, no plan to proceed with EGD since hemoglobin remained stable.  Patient also has a history of CVA and paroxysmal atrial fibrillation, at this time, considering risk versus benefits, after discussion is done with the patient's, will consider to resume Coumadin-discussed in details with patient regarding this plan.  Patient seems frustrated since patient is on Coumadin for 10 years and he is going through multiple back-and-forth for adjusting dose to maintain INR in therapeutic level.  After extensive discussion is done, patient agrees to continue Coumadin but agrees to start with 2 mg.-Patient verbalizes regarding risk versus benefits, side effects and alternatives.    Lymphedema  Assessment & Plan  With history of chronic lymphedema following with cardiology outpatient. Feels that his legs have been swelling more recently, right leg more swollen than left due to current cellulitis episode.   Transitioned from lasix infusion to torsemide 60mg bid     Acute on chronic diastolic HF (heart failure) (HCC)  Assessment & Plan  Wt Readings from Last 3 Encounters:   07/30/24 118 kg (259 lb 4.2 oz)   06/25/24 124 kg (273 lb 13 oz)   06/20/24 122 kg (270 lb)     History of chf managed by both cardiology and nephrology, on lasix 80 mg bid outpatient, patient states that he took 240 mg of lasix yesterday and again today.   Echo 4/2023: Systolic function is normal with an ejection fraction of 60-65%. Wall motion is within normal limits. There is grade I (mild) diastolic dysfunction   BNP normal  CXR: No acute cardiopulmonary disease.   Venous duplex-no DVT  Pedal pulses monitored with dopplers  Nephro:  torsemide 60mg bid.  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  Patient also has a history of CVA and paroxysmal atrial fibrillation, at this time, considering risk versus benefits, after discussion is done with the patient's, will consider to resume Coumadin.   discussed in details with patient regarding this plan.  Patient seems frustrated since patient is on Coumadin for 10 years and he is going through multiple back-and-forth for adjusting dose to maintain INR in therapeutic level.  After extensive discussion is done, patient agrees to continue Coumadin but agrees to start with 2 mg.-Patient verbalizes regarding risk versus benefits, side effects and alternatives.    * Cellulitis of right lower extremity  Assessment & Plan  Presented to ED with bilateral leg swelling x 4 days. Right leg more swollen and red over the last 2 days. Significant pain and having drainage in the right leg as well. NO fevers or chills.   No recent antibiotics outpatient  CT RLE: No fluid collection seen, cellulitic changes leg, No intramuscular collection, no lytic lesion or periosteal reaction  Procal slightly elevated 0.89, now down trending   BC 1/2 gram negative rods; Enterobacterales   Follow CBC and fever curve  ID consulted: Start Zosyn renally dose, follow-up blood cultures, anticipate 10 days of therapy. Slow and prolonged recovery expected-since patient liver enzyme was elevated, and suspected Zosyn could contribute that effect, for that reason, antibiotic switched to cefepime after discussion is done with infectious disease.  Now agreeable to wound care and dressing changes   General surgery consulted- no surgical  intervention, no signs of necrosis. Continue wound care                   VTE Pharmacologic Prophylaxis: VTE Score: 5 High Risk (Score >/= 5) - Pharmacological DVT Prophylaxis Ordered: warfarin (Coumadin). Sequential Compression Devices Ordered.    Mobility:   Basic Mobility Inpatient Raw Score: 12  JH-HLM Goal: 4: Move to chair/commode  JH-HLM Achieved: 4: Move to chair/commode  JH-HLM Goal NOT achieved. Continue with multidisciplinary rounding and encourage appropriate mobility to improve upon JH-HLM goals.    Patient Centered Rounds: I performed bedside rounds with nursing staff today.   Discussions with Specialists or Other Care Team Provider: Note reviewed from infectious disease.  Case discussed with infectious disease, nephrology.    Education and Discussions with Family / Patient: Updated  (left voice message for son) via phone.      Current Length of Stay: 7 day(s)  Current Patient Status: Inpatient   Certification Statement: The patient will continue to require additional inpatient hospital stay due to monitorable conditions  Discharge Plan: Anticipate discharge in 24-48 hrs to rehab facility.    Code Status: Level 3 - DNAR and DNI    Subjective:   Seen and evaluated and examined.  Sitting upright position.  Seems patient is very frustrated when we started having discussion regarding Coumadin therapy.  Patient reports he is on Coumadin for 10 years and he is also going through these back-and-forth dose adjusting.  Denies any nausea, vomiting, abdominal pain.  He still having loose diarrhea    Objective:     Vitals:   Temp (24hrs), Av °F (36.7 °C), Min:97.8 °F (36.6 °C), Max:98.2 °F (36.8 °C)    Temp:  [97.8 °F (36.6 °C)-98.2 °F (36.8 °C)] 98.2 °F (36.8 °C)  HR:  [62-80] 62  Resp:  [14-25] 25  BP: (101-117)/(50-58) 117/57  SpO2:  [92 %-100 %] 100 %  Body mass index is 37.2 kg/m².     Input and Output Summary (last 24 hours):     Intake/Output Summary (Last 24 hours) at 2024 1412  Last  data filed at 7/30/2024 0820  Gross per 24 hour   Intake 490 ml   Output 1 ml   Net 489 ml       Physical Exam:   Physical Exam  Vitals and nursing note reviewed. Exam conducted with a chaperone present.   Constitutional:       Appearance: He is not ill-appearing or diaphoretic.   HENT:      Mouth/Throat:      Mouth: Mucous membranes are moist.   Eyes:      General: No scleral icterus.        Left eye: No discharge.      Extraocular Movements: Extraocular movements intact.      Conjunctiva/sclera: Conjunctivae normal.      Pupils: Pupils are equal, round, and reactive to light.   Cardiovascular:      Rate and Rhythm: Normal rate.      Heart sounds: No murmur heard.     No friction rub. No gallop.   Pulmonary:      Effort: Pulmonary effort is normal. No respiratory distress.      Breath sounds: No stridor. No wheezing or rhonchi.   Abdominal:      Tenderness: There is no abdominal tenderness. There is no guarding or rebound.      Hernia: No hernia is present.      Comments: Peritoneal dialysis catheter intact, without any signs or symptoms of infection   Musculoskeletal:      Comments: Right lower extremity is covered with clean, dry, intact dressing.  Left lower extremity is covered with ace wrap.   Neurological:      Mental Status: He is alert and oriented to person, place, and time.      Cranial Nerves: No cranial nerve deficit.      Sensory: No sensory deficit.      Motor: No weakness.      Coordination: Coordination normal.          Additional Data:     Labs:  Results from last 7 days   Lab Units 07/30/24  0648   WBC Thousand/uL 5.87   HEMOGLOBIN g/dL 8.0*   HEMATOCRIT % 26.4*   PLATELETS Thousands/uL 205   SEGS PCT % 67   LYMPHO PCT % 18   MONO PCT % 6   EOS PCT % 7*     Results from last 7 days   Lab Units 07/30/24  0648   SODIUM mmol/L 137   POTASSIUM mmol/L 3.6   CHLORIDE mmol/L 100   CO2 mmol/L 29   BUN mg/dL 61*   CREATININE mg/dL 6.06*   ANION GAP mmol/L 8   CALCIUM mg/dL 9.8   ALBUMIN g/dL 2.5*   TOTAL  BILIRUBIN mg/dL 0.37   ALK PHOS U/L 94   ALT U/L 210*   AST U/L 123*   GLUCOSE RANDOM mg/dL 119     Results from last 7 days   Lab Units 07/30/24  0655   INR  1.33*     Results from last 7 days   Lab Units 07/30/24  1056 07/30/24  0713 07/29/24  2100 07/29/24  1629 07/29/24  1105 07/29/24  0709 07/28/24  2106 07/28/24  1524 07/28/24  1102 07/28/24  0703 07/27/24  2025 07/27/24  1622   POC GLUCOSE mg/dl 115 107 150* 144* 219* 268* 231* 224* 272* 328* 248* 235*     Results from last 7 days   Lab Units 07/24/24  0450   HEMOGLOBIN A1C % 5.9*     Results from last 7 days   Lab Units 07/25/24  0341 07/24/24  1747   PROCALCITONIN ng/ml 0.65* 0.72*       Lines/Drains:  Invasive Devices       Peripheral Intravenous Line  Duration             Long-Dwell Peripheral IV (Midline) 07/24/24 Left Basilic 5 days    Long-Dwell Peripheral IV (Midline) 07/30/24 Right Basilic <1 day              Line  Duration             Peritoneal Dialysis Catheter Column-disk Abdominal 53 days                          Imaging: No pertinent imaging reviewed.    Recent Cultures (last 7 days):   Results from last 7 days   Lab Units 07/23/24  1757   GRAM STAIN RESULT  No polys seen*  4+ Gram negative rods*   WOUND CULTURE  4+ Growth of Pseudomonas aeruginosa*  4+ Growth of Enterococcus faecalis*       Last 24 Hours Medication List:   Current Facility-Administered Medications   Medication Dose Route Frequency Provider Last Rate    acetaminophen  650 mg Oral Q6H PRN Rachel Conn PA-C      aspirin  81 mg Oral Daily Rachel Conn PA-C      atorvastatin  40 mg Oral Daily With Dinner Rachel Conn PA-C      cefepime  1,000 mg Intravenous Q24H Moses Noel MD      cinacalcet  30 mg Oral Daily With Breakfast Jignesh Hussein MD      docusate sodium  100 mg Oral BID Ofelia Salmeron PA-C      ferrous sulfate  325 mg Oral Daily With Breakfast Jignesh Hussein MD      HYDROcodone-acetaminophen  1 tablet Oral Q6H PRN Rachel Conn PA-C       HYDROcodone-acetaminophen  2 tablet Oral Q6H PRN Rachel Conn PA-C      HYDROmorphone  1 mg Intravenous Q3H PRN GAGANDEEP Bernardo      insulin glargine  15 Units Subcutaneous Q12H VASU Rachel Conn PA-C      insulin lispro  1-6 Units Subcutaneous HS Rachel Conn PA-C      insulin lispro  1-6 Units Subcutaneous TID AC Nora Mathew MD      levothyroxine  125 mcg Oral Early Morning Rachel Conn PA-C      metoprolol succinate  100 mg Oral BID Rachel Conn PA-C      midodrine  10 mg Oral TID AC Jignesh Hussein MD      polyethylene glycol  17 g Oral Daily PRN Rachel Conn PA-C      senna  2 tablet Oral HS Ofelia Salmeron PA-C      simethicone  80 mg Oral 4x Daily PRN Rachel Conn PA-C      tamsulosin  0.4 mg Oral Daily With Dinner Rachel Conn PA-C      torsemide  60 mg Oral BID Ze Doty DO      warfarin  2 mg Oral Daily (warfarin) Moses Noel MD          Today, Patient Was Seen By: Moses Noel MD    **Please Note: This note may have been constructed using a voice recognition system.**

## 2024-07-30 NOTE — PROGRESS NOTES
Progress Note - Nephrology   Masoud Perry 68 y.o. male MRN: [id number]  Unit/Bed#: -01 Encounter: [id number]    ASSESSMENT AND PLAN:  68year-old male with a history of chronic lymphedema/hypertension/CVA/atrial fibrillation/chronic diastolic CHF/BPH/diabetes mellitus type 2/nephrolithiasis/ESRD on PD presenting with worsening lower extremity edema     1.  ESRD on PD: Has been on CCPD now CAPD while hospitalized  - Patient not totally removing all fluid given so I will reinitiate PD 4.25% alternating with 2.5% low calcium bath    PD note:  Present during procedure  Bags are clear  Currently 2.5% alternating with 4.25%  2 L    Recommend adjusting to 4.5% alternating with 2.5% low calcium bath     2.  Hypertension/volume:     Acute on chronic diastolic CHF with lower extremity edema: Part of this is lymphedema.  No evidence of DVT by venous duplex; there is cellulitis of right lower extremity  - Volume improved appears euvolemic  - CAPD please see above  Chest x-ray from the other day 7/26 low lung volumes vascular crowding but euvolemic at this juncture lungs are clear please see above     Relative hypotension: Improved most likely related to treating volume   TSH elevated free T4 T3 pending recommend adjustment per primary team  Cortisol pending  Echocardiogram EF 75% mild to moderate TR otherwise no change  Treatment: Continue midodrine 10 mg 3 times a day for blood pressure hemodynamic support     3.  Electrolytes/acid-base: Replete borderline hypokalemia      4.  MBD of ESRD:  - Hyperphosphatemia at goal at 4.9 off binders  - Hypercalcemia: Intermittent improved at 9.8/ionized calcium 1.35  - Workup: PTH intact level and further evaluation including myeloma evaluation with SPEP UPEP and light chain ratio vitamin D levels/ACE level/PTH related protein.    -Just started on Sensipar 30 mg daily adjust pending course/low calcium bath with PD     5.  Anemia of ESRD:  - Hemoglobin 8.0  - 1 dose Epogen 20,000  "units given last week  - Hemoccult positive for EGD per GI: Followed by GI: No plans for EGD at the moment the stable hemoglobin they recommended follow-up as an outpatient  - Iron supplement  - Transfuse as needed for hemoglobin less than 7.0 per primary service     6.  Ongoing problems:  - Enterobacter on blood culture with right lower extremity cellulitis per infectious disease: PD cell count only 8 no suggestion of peritonitis  - Atrial fibrillation per cardiology consultation  - Increase liver function studies: Right upper quadrant ultrasound from 7/28 Limited study grossly unremarkable evaluation of liver cholecystectomy no biliary dilatation:: GI evaluation felt it was related to DILI    Discussed with primary services consequent/result we will adjust PD to 4.25% 4 times a day      Subjective:   Patient tired  No chest pain or shortness of breath  No nausea vomiting or diarrhea  Tolerating PD well    Objective:     Vitals: Blood pressure 109/56, pulse 63, temperature 98 °F (36.7 °C), temperature source Temporal, resp. rate 14, height 5' 10\" (1.778 m), weight 118 kg (259 lb 4.2 oz), SpO2 100%.,Body mass index is 37.2 kg/m².    Weight (last 2 days)       Date/Time Weight    07/30/24 0609 118 (259.26)     Weight: with dialysate instilled at 07/30/24 0609    07/29/24 1400 116 (255.73)    07/29/24 0454 116 (254.85)     Weight: same as yesterday at 07/29/24 0454    07/28/24 0514 116 (254.85)              Intake/Output Summary (Last 24 hours) at 7/30/2024 0857  Last data filed at 7/30/2024 0820  Gross per 24 hour   Intake 1200 ml   Output 101 ml   Net 1099 ml            Physical Exam: General:  No acute distress/obese  Skin:  No acute rash  Eyes:  No scleral icterus and noninjected  ENT:  Moist mucous membranes  Neck:  Supple, no jugular venous distention, trachea midline, overall appearance is normal  Chest: Minor end expiratory rhonchi bilaterally  CVS:  Regular rate and rhythm, without a rub or gallops  Abdomen:  " Normal bowel sounds, soft and nontender and nondistended  Extremities: No change in lower extremity lymphedema/venous stasis changes/right lower extremity in bandage, and no cyanosis, no significant arthritic changes  Neuro:  No gross focality  Psych:  Alert and oriented and appropriate                Medications:    Scheduled Meds:  Current Facility-Administered Medications   Medication Dose Route Frequency Provider Last Rate    acetaminophen  650 mg Oral Q6H PRN HOANG Givens-C      aspirin  81 mg Oral Daily HOANG Givens-BAIRON      atorvastatin  40 mg Oral Daily With Dinner MONTANA GivensC      cinacalcet  30 mg Oral Daily With Breakfast Jignesh Hussein MD      docusate sodium  100 mg Oral BID Ofelia Salmeron PA-C      ferrous sulfate  325 mg Oral Daily With Breakfast Jignesh Hussein MD      HYDROcodone-acetaminophen  1 tablet Oral Q6H PRN HOANG Givens-C      HYDROcodone-acetaminophen  2 tablet Oral Q6H PRN HOANG Givens-C      HYDROmorphone  1 mg Intravenous Q3H PRN GAGANDEEP Bernardo      insulin glargine  15 Units Subcutaneous Q12H [facility] HOANG Givens-C      insulin lispro  1-6 Units Subcutaneous HS Rachel Conn, PA-C      insulin lispro  1-6 Units Subcutaneous TID AC Nora Mathew MD      iron sucrose  300 mg Intravenous Daily Jignesh Hussein MD Stopped (07/29/24 1252)    levothyroxine  125 mcg Oral Early Morning MONTANA GivensC      metoprolol succinate  100 mg Oral BID Rachel Conn PA-C      midodrine  10 mg Oral TID AC Jignesh Hussein MD      piperacillin-tazobactam  4.5 g Intravenous Q12H HOANG Givens-C 4.5 g (07/30/24 0820)    polyethylene glycol  17 g Oral Daily PRN Rachel Conn PA-C      senna  2 tablet Oral HS Ofelia Salmeron PA-C      simethicone  80 mg Oral 4x Daily PRN HOANG Givens-BAIRON      tamsulosin  0.4 mg Oral Daily With Dinner Rachel Conn PA-C      torsemide  60 mg Oral BID Ze Doty DO   "       PRN Meds:.  acetaminophen    HYDROcodone-acetaminophen    HYDROcodone-acetaminophen    HYDROmorphone    polyethylene glycol    simethicone    Continuous Infusions:     Lab, Imaging and other studies: I have personally reviewed pertinent labs.  Laboratory Results:  Results from last 7 days   Lab Units 07/30/24  0648 07/29/24  0433 07/28/24  0508 07/27/24  0434 07/26/24  1756 07/26/24  0433 07/25/24  0341 07/24/24  1554 07/24/24  1039 07/24/24  0450   WBC Thousand/uL 5.87 6.76 8.89 10.80*  --  6.45 6.34  --   --  6.62   HEMOGLOBIN g/dL 8.0* 8.0* 8.5* 8.9*  --  7.2* 7.8*  --  7.5* 7.5*   HEMATOCRIT % 26.4* 26.2* 27.8* 28.3*  --  23.4* 24.7*  --  23.4* 23.9*   PLATELETS Thousands/uL 205 184 215 201  --  117* 112*  --   --  101*   POTASSIUM mmol/L 3.6 3.3* 3.7 4.3 4.2 3.2* 3.8 3.2*  --  3.7   CHLORIDE mmol/L 100 100 100 103  --  108 102 102  --  103   CO2 mmol/L 29 27 27 23  --  22 23 23  --  21   BUN mg/dL 61* 62* 67* 72*  --  67* 85* 83*  --  84*   CREATININE mg/dL 6.06* 5.88* 5.65* 5.51*  --  4.70* 5.91* 5.86*  --  5.88*   CALCIUM mg/dL 9.8 10.5* 10.8* 10.7*  --  8.7 10.1 9.9  --  9.8   MAGNESIUM mg/dL 2.2 2.4 2.5 2.6  --  1.8* 2.3  --   --  2.2   PHOSPHORUS mg/dL 4.9*  --   --   --   --   --  5.1*  --   --  4.9*     Urinalysis:   Lab Results   Component Value Date    COLORU Yellow 07/23/2024    CLARITYU Clear 07/23/2024    SPECGRAV 1.020 07/23/2024    PHUR 5.5 07/23/2024    LEUKOCYTESUR Negative 07/23/2024    NITRITE Negative 07/23/2024    GLUCOSEU Negative 07/23/2024    KETONESU Negative 07/23/2024    BILIRUBINUR Negative 07/23/2024    BLOODU Large (A) 07/23/2024     ABGs: No results found for: \"PH\"  Radiology review:     Portions of the record may have been created with voice recognition software.  Occasional wrong word or \"sound a like\" substitutions may have occurred due to the inherent limitations of voice recognition software.  Read the chart carefully and recognize, using context, where substitutions " have occurred.

## 2024-07-30 NOTE — PLAN OF CARE
Problem: OCCUPATIONAL THERAPY ADULT  Goal: Performs self-care activities at highest level of function for planned discharge setting.  See evaluation for individualized goals.  Description: Treatment Interventions: ADL retraining, Functional transfer training, UE strengthening/ROM, Endurance training, Patient/family training, Equipment evaluation/education, Compensatory technique education, Continued evaluation, Energy conservation, Activityengagement          See flowsheet documentation for full assessment, interventions and recommendations.   Outcome: Progressing  Note: Limitation: Decreased ADL status, Decreased endurance, Decreased self-care trans, Decreased high-level ADLs  Prognosis: Fair  Assessment: Pt greeted bedside for OT treatment on 07/30/24 focusing on maximizing independence with ADLs. Pt is making great functional progress towards meeting goals. Pt completes with min Ax1, functional transfers at min AX2, and min AX2 with functional mobility with RW. Pt engages in ADL within room:max A with LB ADLs, and mod A with toileting. Pt limited by decreased safety awareness and impulsivity. Limitations that impact functional performance include decreased ADL status, UE ROM, UE strength, safe judgement during ADLs, cognition, endurance, self care transfers, and high level ADLs. Occupational performance areas to address ADL retraining, functional transfer training, UE strengthening/ROM, endurance training, cognitive reorientation, Pt/caregiver education, equipment evaluation/education, compensatory technique education, energy conservation, and activity engagement . Pt would benefit from continued skilled OT services while in hospital to maximize independence with ADLs. Will continue to follow Pt's goals and progress. Pt would benefit from level I resources (maximal intensity) upon DC to maximize safety and independence with ADLs and functional tasks of choice.     Rehab Resource Intensity Level, OT: I (Maximum  Resource Intensity)

## 2024-07-30 NOTE — PHYSICAL THERAPY NOTE
PHYSICAL THERAPY TREATMENT NOTE  NAME:  Masoud Perry  DATE: 07/30/24    Length Of Stay: 7  Performed at least 2 patient identifiers during session: Name and Birthday    TREATMENT FLOW SHEET:    07/30/24 1140   PT Last Visit   PT Visit Date 07/30/24   Note Type   Note Type Treatment for insurance authorization   Pain Assessment   Pain Assessment Tool 0-10   Pain Score 7  (at rest with pain increasing to 10/10 with activity)   Pain Location/Orientation Orientation: Right;Orientation: Lower;Location: Leg   Restrictions/Precautions   Weight Bearing Precautions Per Order No   Other Precautions Impulsive;Chair Alarm;Bed Alarm;Multiple lines;Pain;Fall Risk;O2  (2 LPM)   General   Chart Reviewed Yes   Family/Caregiver Present No   Cognition   Overall Cognitive Status WFL   Arousal/Participation Alert;Cooperative   Attention Within functional limits   Orientation Level Oriented X4   Following Commands Follows one step commands with increased time or repetition   Comments Pt noted  to be impulsive, somewhat anxious and hyperfocused on pain.  Receptive with reassurance and encouragement   Bed Mobility   Supine to Sit 4  Minimal assistance   Additional items Assist x 1;Increased time required;Verbal cues;LE management  (leg )   Additional Comments Pt supine in bed at start of session with all needs within reach. Pt completed supine > sit with min A x 1, increased time, VC's and use of leg .  Per pt, he sleeps in a recliner at home.   Transfers   Sit to Stand 4  Minimal assistance   Additional items Assist x 2;Increased time required;Verbal cues;Impulsive   Stand to Sit 4  Minimal assistance   Additional items Assist x 2;Increased time required;Verbal cues   Stand pivot 4  Minimal assistance   Additional items Assist x 2;Increased time required;Verbal cues;Impulsive   Additional Comments Patient completed all functional transfers with Rw for UB support @ min A x 2 overallSTs/SPT.  Pt intermittently required  increased time/attempts in order to prepare for transfers and at other times, he was noted to be impulsive with transitions to pain   Ambulation/Elevation   Gait Assistance Not tested   Balance   Static Sitting Fair +   Dynamic Sitting Fair   Static Standing Fair -   Dynamic Standing Poor +   Endurance Deficit   Endurance Deficit Yes   Endurance Deficit Description Pt on 2 LPM at start of session with sats in upper 90's t/o.  @ end of session, charge nurse removed O2 with pt seated in bedside chair on Ra at 95%   Activity Tolerance   Activity Tolerance Patient limited by fatigue;Patient limited by pain   Medical Staff Made Aware Rehana CONSTANTINO   Nurse Made Aware Matilda HARE   Exercises   Ankle Pumps Sitting;10 reps;AROM;Bilateral   Assessment   Prognosis Fair   Problem List Decreased strength;Decreased endurance;Decreased mobility;Impaired balance;Impaired judgement;Decreased safety awareness;Obesity;Decreased skin integrity;Pain   Barriers to Discharge Decreased caregiver support   Barriers to Discharge Comments Pt lives alone, requiring extensive A x 2 for mobility at this time, pain   Goals   PT Treatment Day 1   Plan   Treatment/Interventions Functional transfer training;ADL retraining;LE strengthening/ROM;Elevations;Therapeutic exercise;Endurance training;Patient/family training;Equipment eval/education;Bed mobility;Gait training;Compensatory technique education;Spoke to nursing;OT;Spoke to case management   Progress Progressing toward goals   PT Frequency 2-3x/wk   Discharge Recommendation   Rehab Resource Intensity Level, PT I (Maximum Resource Intensity)   Equipment Recommended   (TBD by buddy)   AM-PAC Basic Mobility Inpatient   Turning in Flat Bed Without Bedrails 3   Lying on Back to Sitting on Edge of Flat Bed Without Bedrails 3   Moving Bed to Chair 2   Standing Up From Chair Using Arms 2   Walk in Room 1   Climb 3-5 Stairs With Railing 1   Basic Mobility Inpatient Raw Score 12   Basic Mobility Standardized  Score 32.23   Turning Head Towards Sound 4   Follow Simple Instructions 4   Low Function Basic Mobility Raw Score  20   Low Function Basic Mobility Standardized Score  32.8   [facility] Highest Level Of Mobility   -HLM Goal 4: Move to chair/commode   -HL Achieved 4: Move to chair/commode   Education   Education Provided Mobility training;Home exercise program;Assistive device   Patient Reinforcement needed;Demonstrates verbal understanding   End of Consult   Patient Position at End of Consult Bedside chair;Bed/Chair alarm activated;All needs within reach   End of Consult Comments RA with SPO2 @ 95%       Patient seen for skilled PT session 1 this date with interventions to include therapeutic activity for bed mobility, transfer training, balance/endurance progression.  Pt demonstrated increased I with all mobility tasks at min A x 1 for bed mobility and min A x 2 with Rw for STS/SPT transfers.  Pt educated on proper use of AD, proper hand and foot placement and safety during transfer training.  Once seated in bedside chair, pt hyper focused on P in the R LE rated at 10/10.  Pt provided with reassurance, educated on proper breathing technique and ankle pumps to improve circulation, edema and healing.  Pt receptive to same.  Pt on 2 LPM during session with SPO2 95% and above.  At end of session, SPO2 removed with sat at 95% on RA.  Patient tolerated session well and should continue skilled PT during acute stay as he is functioning below his baseline.  Upon D/C, pt would benefit from transition to level I/max resource intensity as he lives alone, requires extensive A x 2 for all mobility at this time, increased fall risk/P.    The patient's AM-PAC Basic Mobility Inpatient Short Form Raw Score is 12. A Raw score of less than or equal to 16 suggests the patient may benefit from discharge to post-acute rehabilitation services. Please also refer to the recommendation of the Physical Therapist for safe discharge  planning.         Sheila Sandoval, PT, MSPT

## 2024-07-30 NOTE — PROGRESS NOTES
VIRTUAL CARE DOCUMENTATION:     1. This service was provided via Telemedicine using [employer]     2. Parties in the room with patient during teleconsult Patient only    3. Confidentiality My office door was closed     4. Participants No one else was in the room    5. Patient acknowledged consent and understanding of privacy and security of the  Telemedicine consult. I informed the patient that I have reviewed their record in Epic and presented the opportunity for them to ask any questions regarding the visit today.  The patient agreed to participate.    6. Time spent 8 minutes          Progress Note - Infectious Disease   Masoud Perry 68 y.o. male MRN: [id number]  Unit/Bed#: -01 Encounter: [id number]      Impression/Plan:  Providencia bacteremia              Possibly secondary to right leg cellulitis. No other source appreciated, no GI,  or respiratory symptoms. Has a recently placed PD catheter but without abd pain, cloudy effluent or local signs of infection. Afebrile without leukcytosis     -Continue zosyn renally dosed through 8/2/2024 to complete 10 days total treatment  -If concerned about Zosyn playing a role with the LFT abnormalities, can change to cefepime 1 g IV every 24 hours  -Additional mngt as below     Right leg cellulitis              In the setting of worsening lower extremity edema, chronic lymphedema, acute on chronic diastolic CHF, ESRD. Wound cx with Pseudomonas and Enterococcus.  Unclear significance of the Enterococcus.  No clinical or CT evidence of abscess or necrotizing infection.  Leg pain is now improved.     -Continue antibiotics as above  -Surgical follow-up  -Continue serial extremity exams, optimize volume status with PD and diuretics, continue wound care, limb elevation and ace wrap and recommend premedicating to allow limb care  -Anticipate slow and prolonged recovery due to extent of tissue edema     ESRD on peritoneal dialysis              Had PD catheter placed  on , tolerating well with no local signs of infection/PD related peritonitis. Dialysate wbc 8     -Management as per nephrology     4. Acute on chronic diastolic CHF, lymphedema  Risk factors for recurrent infection     5. A fib with RVR.  Now with decreased heart rate              Likely secondary to acute infection    6.  Elevated liver function test.  Consideration for the possibility of drug-induced liver injury.  If GI concerned about Zosyn playing a role, can change to cefepime as above    I spent 50 minutes in evaluation of the patient of which 30 minutes was in counseling/coordination of care    Discussed with the primary service the plan to continue the Zosyn but consider changing to cefepime if concerned about drug-induced liver injury and they agree with the plan.    Antibiotics:  Zosyn 7    Subjective:  Patient has no fever, chills, sweats; no nausea, vomiting, diarrhea; no cough, shortness of breath; no pain. No new symptoms.    Objective:  Vitals:  Temp:  [97.8 °F (36.6 °C)-98 °F (36.7 °C)] 98 °F (36.7 °C)  HR:  [65-80] 66  Resp:  [20-24] 23  BP: (101-114)/(50-58) 109/56  SpO2:  [92 %-100 %] 99 %  Temp (24hrs), Av.9 °F (36.6 °C), Min:97.8 °F (36.6 °C), Max:98 °F (36.7 °C)  Current: Temperature: 98 °F (36.7 °C)    Documented physical exam has been primarily done by the patient's nurse and/or the primary service due to limited examination abilities on telemedicine    Physical Exam:   General Appearance:  Alert, interactive, nontoxic, no acute distress.   Throat: Oropharynx moist without lesions.    Lungs:   Clear to auscultation bilaterally; no wheezes, rhonchi or rales; respirations unlabored   Heart:  Irregular; no murmur, rub or gallop   Abdomen:   Soft, non-tender, non-distended, positive bowel sounds.     Extremities: Right leg dressed with a dry dressing in place.   Skin: No new rashes or lesions. No draining wounds noted.       Labs, Imaging, & Other studies:   All pertinent labs and  imaging studies were personally reviewed  Results from last 7 days   Lab Units 07/30/24  0648 07/29/24  0433 07/28/24  0508   WBC Thousand/uL 5.87 6.76 8.89   HEMOGLOBIN g/dL 8.0* 8.0* 8.5*   PLATELETS Thousands/uL 205 184 215     Results from last 7 days   Lab Units 07/30/24  0648 07/29/24  0433 07/28/24  0508   SODIUM mmol/L 137 136 136   POTASSIUM mmol/L 3.6 3.3* 3.7   CHLORIDE mmol/L 100 100 100   CO2 mmol/L 29 27 27   BUN mg/dL 61* 62* 67*   CREATININE mg/dL 6.06* 5.88* 5.65*   EGFR ml/min/1.73sq m 8 8 9   CALCIUM mg/dL 9.8 10.5* 10.8*   AST U/L 123* 184* 143*   ALT U/L 210* 232* 188*   ALK PHOS U/L 94 90 97     Results from last 7 days   Lab Units 07/23/24  1757 07/23/24  1253   BLOOD CULTURE   --  No Growth After 5 Days.  Providencia rettgeri*   GRAM STAIN RESULT  No polys seen*  4+ Gram negative rods* Gram negative rods*   WOUND CULTURE  4+ Growth of Pseudomonas aeruginosa*  4+ Growth of Enterococcus faecalis*  --    MRSA CULTURE ONLY  No Methicillin Resistant Staphlyococcus aureus (MRSA) isolated  --      Results from last 7 days   Lab Units 07/25/24  0341 07/24/24  1747 07/23/24  1253   PROCALCITONIN ng/ml 0.65* 0.72* 0.89*         Results from last 7 days   Lab Units 07/24/24  0450   FERRITIN ng/mL 69         Right upper quadrant ultrasound.  Limited study but essentially negative.    Images personally reviewed by me in PACS and reviewed by me

## 2024-07-30 NOTE — OCCUPATIONAL THERAPY NOTE
Occupational Therapy Progress Note     Patient Name: Masoud Perry  Today's Date: 7/30/2024  Problem List  Principal Problem:    Cellulitis of right lower extremity  Active Problems:    History of CVA (cerebrovascular accident)    Acute on chronic diastolic HF (heart failure) (HCC)    Lymphedema    Anemia    Stage 5 chronic kidney disease on peritoneal dialysis (HCC)    Atrial fibrillation (HCC)    Bacteremia    Elevated LFTs            07/30/24 1138   OT Last Visit   OT Visit Date 07/30/24   Note Type   Note Type Treatment for insurance authorization   Pain Assessment   Pain Assessment Tool 0-10   Pain Score 7  (at rest and increased to 10/10 with activity)   Pain Location/Orientation Orientation: Right;Location: Leg   Effect of Pain on Daily Activities Limited sitting tolerance on BSC, limits participate in LB dressing   Hospital Pain Intervention(s) Repositioned;Ambulation/increased activity   Restrictions/Precautions   Weight Bearing Precautions Per Order No   Other Precautions Impulsive;Chair Alarm;Multiple lines;Bed Alarm;Fall Risk;Pain;O2  (2L O2, PD)   ADL   Where Assessed Edge of bed   LB Dressing Assistance 2  Maximal Assistance   LB Dressing Deficit Setup;Don/doff L shoe;Don/doff R shoe;Thread RLE into underwear;Thread LLE into underwear  (denies being able to reach to B/L LE despite encouragement, able to elevate for B/L LE therapist to assist)   LB Dressing Comments Donning of socks/brief   Toileting Assistance  3  Moderate Assistance   Toileting Deficit Setup;Increased time to complete;Supervison/safety;Clothing management up;Clothing management down;Bedside commode   Toileting Comments Min Ax2 transfer, max A clothing management   Bed Mobility   Supine to Sit 4  Minimal assistance   Additional items Assist x 1;Increased time required;Verbal cues;LE management;Comment  (LE )   Additional Comments Pt greeted supine in bed.   Transfers   Sit to Stand 4  Minimal assistance   Additional items Assist  x 2;Increased time required;Verbal cues;Impulsive   Stand to Sit 4  Minimal assistance   Additional items Assist x 2;Increased time required;Verbal cues;Impulsive   Stand pivot 4  Minimal assistance   Additional items Assist x 2;Increased time required;Impulsive;Verbal cues   Toilet transfer 4  Minimal assistance   Additional items Assist x 2;Increased time required;Verbal cues;Commode   Additional Comments with RW- Pt able to complete SPT from EOB <> BSC.   Functional Mobility   Functional Mobility 4  Minimal assistance  (TX COMPLETED BY OTR- SEE ADDITIONAL FUNCTIONAL MOBILITY GOAL BELOW )   Additional Comments Min AX2 with RW- few steps from BSC <> recliner chair.   Additional items Rolling walker   Cognition   Overall Cognitive Status WFL   Arousal/Participation Responsive;Alert   Attention Within functional limits   Orientation Level Oriented X4   Memory Decreased recall of precautions   Following Commands Follows one step commands with increased time or repetition   Comments Pt requires education and encouragement to participate in OT session. Pt constantly impulsive throuhgout session requiring therapist guarding/constant VCs ffor safety.  Pt with flat affect throuhgout session.   Activity Tolerance   Activity Tolerance Patient limited by pain   Medical Staff Made Aware RN cleared/updated. Portions of tx completed with ABBEY Pillai 2* to Pt's medical complexity, decreased endurance, and need for dispo planning. OT focus on maximizing independence with ADLs.   Assessment   Assessment Pt greeted bedside for OT treatment on 07/30/24 focusing on maximizing independence with ADLs. Pt is making great functional progress towards meeting goals. Pt completes with min Ax1, functional transfers at min AX2, and min AX2 with functional mobility with RW. Pt engages in ADL within room:max A with LB ADLs, and mod A with toileting. Pt limited by decreased safety awareness and impulsivity. Limitations that impact functional  performance include decreased ADL status, UE ROM, UE strength, safe judgement during ADLs, cognition, endurance, self care transfers, and high level ADLs. Occupational performance areas to address ADL retraining, functional transfer training, UE strengthening/ROM, endurance training, cognitive reorientation, Pt/caregiver education, equipment evaluation/education, compensatory technique education, energy conservation, and activity engagement . Pt would benefit from continued skilled OT services while in hospital to maximize independence with ADLs. Will continue to follow Pt's goals and progress. Pt would benefit from level I resources (maximal intensity) upon DC to maximize safety and independence with ADLs and functional tasks of choice.   Plan   Treatment Interventions ADL retraining;Functional transfer training;UE strengthening/ROM;Endurance training;Cognitive reorientation;Patient/family training;Equipment evaluation/education;Compensatory technique education;Energy conservation;Activityengagement   Goal Expiration Date 08/08/24   OT Treatment Day 1   OT Frequency 3-5x/wk   Discharge Recommendation   Rehab Resource Intensity Level, OT I (Maximum Resource Intensity)   Additional Comments  The patient's raw score on the AM-PAC Daily Activity Inpatient Short Form is 13. A raw score of less than 19 suggests the patient may benefit from discharge to post-acute rehabilitation services. Please refer to the recommendation of the Occupational Therapist for safe discharge planning.   AM-PAC Daily Activity Inpatient   Lower Body Dressing 2   Bathing 2   Toileting 2   Upper Body Dressing 2   Grooming 2   Eating 3   Daily Activity Raw Score 13   Daily Activity Standardized Score (Calc for Raw Score >=11) 32.03   AM-[facility] Applied Cognition Inpatient   Following a Speech/Presentation 3   Understanding Ordinary Conversation 4   Taking Medications 3   Remembering Where Things Are Placed or Put Away 3   Remembering List of 4-5  Errands 3   Taking Care of Complicated Tasks 3   Applied Cognition Raw Score 19   Applied Cognition Standardized Score 39.77   End of Consult   Education Provided Yes   Patient Position at End of Consult Bedside chair;All needs within reach  (Pt educated on calling RN prior to getting up from chair. Pt reports understanding. Hand off to nursing team at end of session.)   Nurse Communication Nurse aware of consult       GOALS:  Pt will complete functional mobility with AD PRN for item retrieval task at [place] level in order to increase participation with ADLs.    Rehana Fermin MS, OTR/L

## 2024-07-30 NOTE — PLAN OF CARE
Problem: Prexisting or High Potential for Compromised Skin Integrity  Goal: Skin integrity is maintained or improved  Description: INTERVENTIONS:  - Identify patients at risk for skin breakdown  - Assess and monitor skin integrity  - Assess and monitor nutrition and hydration status  - Monitor labs   - Assess for incontinence   - Turn and reposition patient  - Assist with mobility/ambulation  - Relieve pressure over bony prominences  - Avoid friction and shearing  - Provide appropriate hygiene as needed including keeping skin clean and dry  - Evaluate need for skin moisturizer/barrier cream  - Collaborate with interdisciplinary team   - Patient/family teaching  - Consider wound care consult   7/29/2024 2326 by Citlali Stevenson RN  Outcome: Progressing  7/29/2024 2326 by Citlali Stevenson RN  Outcome: Progressing     Problem: METABOLIC, FLUID AND ELECTROLYTES - ADULT  Goal: Electrolytes maintained within normal limits  Description: INTERVENTIONS:  - Monitor labs and assess patient for signs and symptoms of electrolyte imbalances  - Administer electrolyte replacement as ordered  - Monitor response to electrolyte replacements, including repeat lab results as appropriate  - Instruct patient on fluid and nutrition as appropriate  7/29/2024 2326 by Citlali Stevenson RN  Outcome: Progressing  7/29/2024 2326 by Citlali Stevenson RN  Outcome: Progressing  Goal: Fluid balance maintained  Description: INTERVENTIONS:  - Monitor labs   - Monitor I/O and WT  - Instruct patient on fluid and nutrition as appropriate  - Assess for signs & symptoms of volume excess or deficit  7/29/2024 2326 by Citlali Stevenson RN  Outcome: Progressing  7/29/2024 2326 by Citlali Stevenson RN  Outcome: Progressing  Goal: Glucose maintained within target range  Description: INTERVENTIONS:  - Monitor Blood Glucose as ordered  - Assess for signs and symptoms of hyperglycemia and hypoglycemia  - Administer ordered medications to maintain glucose  within target range  - Assess nutritional intake and initiate nutrition service referral as needed  7/29/2024 2326 by Citlali Stevenson RN  Outcome: Progressing  7/29/2024 2326 by Citlali Stevenson RN  Outcome: Progressing     Problem: SKIN/TISSUE INTEGRITY - ADULT  Goal: Incision(s), wounds(s) or drain site(s) healing without S/S of infection  Description: INTERVENTIONS  - Assess and document dressing, incision, wound bed, drain sites and surrounding tissue  - Provide patient and family education  - Perform skin care/dressing changes per wound  7/29/2024 2326 by Citlali Stevenson RN  Outcome: Progressing  7/29/2024 2326 by Citlali Stevenson RN  Outcome: Progressing     Problem: GENITOURINARY - ADULT  Goal: Maintains or returns to baseline urinary function  Description: INTERVENTIONS:  - Assess urinary function  - Encourage oral fluids to ensure adequate hydration if ordered  - Administer IV fluids as ordered to ensure adequate hydration  - Administer ordered medications as needed  - Offer frequent toileting  - Follow urinary retention protocol if ordered  7/29/2024 2326 by Citlali Stevenson RN  Outcome: Progressing  7/29/2024 2326 by Citlali Stevenson RN  Outcome: Progressing     Problem: PAIN - ADULT  Goal: Verbalizes/displays adequate comfort level or baseline comfort level  Description: Interventions:  - Encourage patient to monitor pain and request assistance  - Assess pain using appropriate pain scale  - Administer analgesics based on type and severity of pain and evaluate response  - Implement non-pharmacological measures as appropriate and evaluate response  - Consider cultural and social influences on pain and pain management  - Notify physician/advanced practitioner if interventions unsuccessful or patient reports new pain  7/29/2024 2326 by Citlali Stevenson RN  Outcome: Progressing  7/29/2024 2326 by Citlali Stevenson RN  Outcome: Progressing     Problem: INFECTION - ADULT  Goal: Absence or prevention of  progression during hospitalization  Description: INTERVENTIONS:  - Assess and monitor for signs and symptoms of infection  - Monitor lab/diagnostic results  - Monitor all insertion sites, i.e. indwelling lines, tubes, and drains  - Monitor endotracheal if appropriate and nasal secretions for changes in amount and color  - [place] appropriate cooling/warming therapies per order  - Administer medications as ordered  - Instruct and encourage patient and family to use good hand hygiene technique  - Identify and instruct in appropriate isolation precautions for identified infection/condition  7/29/2024 2326 by Citlali Stevenson RN  Outcome: Progressing  7/29/2024 2326 by Citlali Stevenson RN  Outcome: Progressing     Problem: SAFETY ADULT  Goal: Patient will remain free of falls  Description: INTERVENTIONS:  - Educate patient/family on patient safety including physical limitations  - Instruct patient to call for assistance with activity   - Consult OT/PT to assist with strengthening/mobility   - Keep Call bell within reach  - Keep bed low and locked with side rails adjusted as appropriate  - Keep care items and personal belongings within reach  - Initiate and maintain comfort rounds  - Make Fall Risk Sign visible to staff  - Offer Toileting every 2 Hours, in advance of need if unable to notify staff of need  - Initiate/Maintain bed/chair alarm for confusion, impulsivity, or noncompliance with notifying staff.  - Apply yellow socks and bracelet for high fall risk patients  - Consider moving patient to room near nurses station  7/29/2024 2326 by Citlali Stevenson RN  Outcome: Progressing  7/29/2024 2326 by Citlali Stevenson RN  Outcome: Progressing  Goal: Maintain or return to baseline ADL function  Description: INTERVENTIONS:  -  Assess patient's ability to carry out ADLs; assess patient's baseline for ADL function and identify physical deficits which impact ability to perform ADLs (bathing, care of mouth/teeth,  toileting, grooming, dressing, etc.)  - Assess/evaluate cause of self-care deficits   - Assess range of motion  - Assess patient's mobility; develop plan if impaired  - Assess patient's need for assistive devices and provide as appropriate  - Encourage maximum independence but intervene and supervise when necessary  - Involve family in performance of ADLs  - Assess for home care needs following discharge   - Consider OT consult to assist with ADL evaluation and planning for discharge  - Provide patient education as appropriate  7/29/2024 2326 by Citlali Stevenson RN  Outcome: Progressing  7/29/2024 2326 by Citlali Stevenson RN  Outcome: Progressing  Goal: Maintains/Returns to pre admission functional level  Description: INTERVENTIONS:  - Perform AM-PAC 6 Click Basic Mobility/ Daily Activity assessment daily.  - Set and communicate daily mobility goal to care team and patient/family/caregiver.   - Collaborate with rehabilitation services on mobility goals if consulted  - Perform Range of Motion 3 times a day.  - Reposition patient every 2 hours if unable to reposition self  - Out of bed for toileting if medically appropriate  - Record patient progress and toleration of activity level   7/29/2024 2326 by Citlali Stevenson RN  Outcome: Progressing  7/29/2024 2326 by Citlali Stevenson RN  Outcome: Progressing     Problem: DISCHARGE PLANNING  Goal: Discharge to home or other facility with appropriate resources  Description: INTERVENTIONS:  - Identify barriers to discharge w/patient and caregiver  - Arrange for needed discharge resources and transportation as appropriate  - Identify discharge learning needs (meds, wound care, etc.)  - Arrange for interpretive services to assist at discharge as needed  - Refer to Case Management Department for coordinating discharge planning if the patient needs post-hospital services based on physician/advanced practitioner order or complex needs related to functional status, cognitive  ability, or social support system  7/29/2024 2326 by Citlali Stevenson RN  Outcome: Progressing  7/29/2024 2326 by Citlali Stevenson RN  Outcome: Progressing     Problem: Knowledge Deficit  Goal: Patient/family/caregiver demonstrates understanding of disease process, treatment plan, medications, and discharge instructions  Description: Complete learning assessment and assess knowledge base.  Interventions:  - Provide teaching at level of understanding  - Provide teaching via preferred learning methods  7/29/2024 2326 by Citlali Stevenson RN  Outcome: Progressing  7/29/2024 2326 by Citlali Stevenson RN  Outcome: Progressing     Problem: Nutrition/Hydration-ADULT  Goal: Nutrient/Hydration intake appropriate for improving, restoring or maintaining nutritional needs  Description: Monitor and assess patient's nutrition/hydration status for malnutrition. Collaborate with interdisciplinary team and initiate plan and interventions as ordered.  Monitor patient's weight and dietary intake as ordered or per policy. Utilize nutrition screening tool and intervene as necessary. Determine patient's food preferences and provide high-protein, high-caloric foods as appropriate.     INTERVENTIONS:  - Monitor oral intake, urinary output, labs, and treatment plans  - Assess nutrition and hydration status and recommend course of action  - Evaluate amount of meals eaten  - Assist patient with eating if necessary   - Allow adequate time for meals  - Recommend/ encourage appropriate diets, oral nutritional supplements, and vitamin/mineral supplements  - Order, calculate, and assess calorie counts as needed  - Recommend, monitor, and adjust tube feedings and TPN/PPN based on assessed needs  - Assess need for intravenous fluids  - Provide specific nutrition/hydration education as appropriate  - Include patient/family/caregiver in decisions related to nutrition  7/29/2024 2326 by Citlali Stevenson RN  Outcome: Progressing  7/29/2024 2326 by  Citlali Stevenson, RN  Outcome: Progressing

## 2024-07-31 LAB
1,25(OH)2D SERPL-MCNC: 21.1 PG/ML (ref 5–200)
ACE SERPL-CCNC: 26 U/L (ref 14–82)
ALBUMIN SERPL BCG-MCNC: 2.3 G/DL (ref 3.5–5)
ALP SERPL-CCNC: 84 U/L (ref 34–104)
ALT SERPL W P-5'-P-CCNC: 141 U/L (ref 7–52)
ANION GAP SERPL CALCULATED.3IONS-SCNC: 9 MMOL/L (ref 4–13)
AST SERPL W P-5'-P-CCNC: 56 U/L (ref 13–39)
BASOPHILS # BLD AUTO: 0.03 THOUSANDS/ÂΜL (ref 0–0.1)
BASOPHILS NFR BLD AUTO: 0 % (ref 0–1)
BILIRUB SERPL-MCNC: 0.35 MG/DL (ref 0.2–1)
BUN SERPL-MCNC: 61 MG/DL (ref 5–25)
CALCIUM ALBUM COR SERPL-MCNC: 10.8 MG/DL (ref 8.3–10.1)
CALCIUM SERPL-MCNC: 9.4 MG/DL (ref 8.4–10.2)
CHLORIDE SERPL-SCNC: 102 MMOL/L (ref 96–108)
CO2 SERPL-SCNC: 26 MMOL/L (ref 21–32)
CREAT SERPL-MCNC: 6.13 MG/DL (ref 0.6–1.3)
EOSINOPHIL # BLD AUTO: 0.3 THOUSAND/ÂΜL (ref 0–0.61)
EOSINOPHIL NFR BLD AUTO: 4 % (ref 0–6)
ERYTHROCYTE [DISTWIDTH] IN BLOOD BY AUTOMATED COUNT: 16.4 % (ref 11.6–15.1)
GFR SERPL CREATININE-BSD FRML MDRD: 8 ML/MIN/1.73SQ M
GLUCOSE SERPL-MCNC: 113 MG/DL (ref 65–140)
GLUCOSE SERPL-MCNC: 114 MG/DL (ref 65–140)
GLUCOSE SERPL-MCNC: 148 MG/DL (ref 65–140)
GLUCOSE SERPL-MCNC: 162 MG/DL (ref 65–140)
GLUCOSE SERPL-MCNC: 166 MG/DL (ref 65–140)
HCT VFR BLD AUTO: 25.5 % (ref 36.5–49.3)
HGB BLD-MCNC: 7.9 G/DL (ref 12–17)
IMM GRANULOCYTES # BLD AUTO: 0.08 THOUSAND/UL (ref 0–0.2)
IMM GRANULOCYTES NFR BLD AUTO: 1 % (ref 0–2)
INR PPP: 1.26 (ref 0.84–1.19)
KAPPA LC FREE SER-MCNC: 154.7 MG/L (ref 3.3–19.4)
KAPPA LC FREE/LAMBDA FREE SER: 0.94 {RATIO} (ref 0.26–1.65)
LAMBDA LC FREE SERPL-MCNC: 165.2 MG/L (ref 5.7–26.3)
LYMPHOCYTES # BLD AUTO: 1.16 THOUSANDS/ÂΜL (ref 0.6–4.47)
LYMPHOCYTES NFR BLD AUTO: 17 % (ref 14–44)
MAGNESIUM SERPL-MCNC: 2 MG/DL (ref 1.9–2.7)
MCH RBC QN AUTO: 31.3 PG (ref 26.8–34.3)
MCHC RBC AUTO-ENTMCNC: 31 G/DL (ref 31.4–37.4)
MCV RBC AUTO: 101 FL (ref 82–98)
MONOCYTES # BLD AUTO: 0.37 THOUSAND/ÂΜL (ref 0.17–1.22)
MONOCYTES NFR BLD AUTO: 6 % (ref 4–12)
NEUTROPHILS # BLD AUTO: 4.81 THOUSANDS/ÂΜL (ref 1.85–7.62)
NEUTS SEG NFR BLD AUTO: 72 % (ref 43–75)
NRBC BLD AUTO-RTO: 0 /100 WBCS
PLATELET # BLD AUTO: 171 THOUSANDS/UL (ref 149–390)
PMV BLD AUTO: 9.4 FL (ref 8.9–12.7)
POTASSIUM SERPL-SCNC: 3.6 MMOL/L (ref 3.5–5.3)
PROT SERPL-MCNC: 5.4 G/DL (ref 6.4–8.4)
PROTHROMBIN TIME: 16.2 SECONDS (ref 11.6–14.5)
RBC # BLD AUTO: 2.52 MILLION/UL (ref 3.88–5.62)
SODIUM SERPL-SCNC: 137 MMOL/L (ref 135–147)
WBC # BLD AUTO: 6.75 THOUSAND/UL (ref 4.31–10.16)

## 2024-07-31 PROCEDURE — 99232 SBSQ HOSP IP/OBS MODERATE 35: CPT | Performed by: INTERNAL MEDICINE

## 2024-07-31 PROCEDURE — 82948 REAGENT STRIP/BLOOD GLUCOSE: CPT

## 2024-07-31 PROCEDURE — 85025 COMPLETE CBC W/AUTO DIFF WBC: CPT | Performed by: FAMILY MEDICINE

## 2024-07-31 PROCEDURE — 83735 ASSAY OF MAGNESIUM: CPT | Performed by: INTERNAL MEDICINE

## 2024-07-31 PROCEDURE — 99232 SBSQ HOSP IP/OBS MODERATE 35: CPT | Performed by: FAMILY MEDICINE

## 2024-07-31 PROCEDURE — 85610 PROTHROMBIN TIME: CPT | Performed by: FAMILY MEDICINE

## 2024-07-31 PROCEDURE — 80053 COMPREHEN METABOLIC PANEL: CPT | Performed by: INTERNAL MEDICINE

## 2024-07-31 RX ORDER — ALBUMIN (HUMAN) 12.5 G/50ML
25 SOLUTION INTRAVENOUS ONCE
Status: COMPLETED | OUTPATIENT
Start: 2024-07-31 | End: 2024-07-31

## 2024-07-31 RX ORDER — SACCHAROMYCES BOULARDII 250 MG
250 CAPSULE ORAL 2 TIMES DAILY
Status: DISCONTINUED | OUTPATIENT
Start: 2024-07-31 | End: 2024-08-05 | Stop reason: HOSPADM

## 2024-07-31 RX ADMIN — Medication 250 MG: at 18:06

## 2024-07-31 RX ADMIN — CEFEPIME HYDROCHLORIDE 1000 MG: 1 INJECTION, SOLUTION INTRAVENOUS at 20:19

## 2024-07-31 RX ADMIN — MIDODRINE HYDROCHLORIDE 10 MG: 5 TABLET ORAL at 12:14

## 2024-07-31 RX ADMIN — INSULIN GLARGINE 15 UNITS: 100 INJECTION, SOLUTION SUBCUTANEOUS at 09:14

## 2024-07-31 RX ADMIN — ASPIRIN 81 MG: 81 TABLET, COATED ORAL at 09:13

## 2024-07-31 RX ADMIN — INSULIN LISPRO 1 UNITS: 100 INJECTION, SOLUTION INTRAVENOUS; SUBCUTANEOUS at 09:15

## 2024-07-31 RX ADMIN — HYDROCODONE BITARTRATE AND ACETAMINOPHEN 2 TABLET: 5; 325 TABLET ORAL at 03:39

## 2024-07-31 RX ADMIN — INSULIN GLARGINE 15 UNITS: 100 INJECTION, SOLUTION SUBCUTANEOUS at 21:11

## 2024-07-31 RX ADMIN — HYDROCODONE BITARTRATE AND ACETAMINOPHEN 2 TABLET: 5; 325 TABLET ORAL at 09:12

## 2024-07-31 RX ADMIN — METOPROLOL SUCCINATE 100 MG: 100 TABLET, EXTENDED RELEASE ORAL at 09:13

## 2024-07-31 RX ADMIN — TORSEMIDE 60 MG: 20 TABLET ORAL at 09:12

## 2024-07-31 RX ADMIN — HYDROCODONE BITARTRATE AND ACETAMINOPHEN 2 TABLET: 5; 325 TABLET ORAL at 21:14

## 2024-07-31 RX ADMIN — MIDODRINE HYDROCHLORIDE 10 MG: 5 TABLET ORAL at 15:45

## 2024-07-31 RX ADMIN — MIDODRINE HYDROCHLORIDE 10 MG: 5 TABLET ORAL at 09:12

## 2024-07-31 RX ADMIN — CINACALCET 30 MG: 30 TABLET, FILM COATED ORAL at 09:14

## 2024-07-31 RX ADMIN — TORSEMIDE 60 MG: 20 TABLET ORAL at 21:10

## 2024-07-31 RX ADMIN — HYDROCODONE BITARTRATE AND ACETAMINOPHEN 2 TABLET: 5; 325 TABLET ORAL at 15:46

## 2024-07-31 RX ADMIN — METOPROLOL SUCCINATE 100 MG: 100 TABLET, EXTENDED RELEASE ORAL at 21:10

## 2024-07-31 RX ADMIN — FERROUS SULFATE TAB 325 MG (65 MG ELEMENTAL FE) 325 MG: 325 (65 FE) TAB at 09:14

## 2024-07-31 RX ADMIN — Medication 250 MG: at 14:22

## 2024-07-31 RX ADMIN — ALBUMIN (HUMAN) 25 G: 0.25 INJECTION, SOLUTION INTRAVENOUS at 12:14

## 2024-07-31 RX ADMIN — TAMSULOSIN HYDROCHLORIDE 0.4 MG: 0.4 CAPSULE ORAL at 15:46

## 2024-07-31 RX ADMIN — INSULIN LISPRO 1 UNITS: 100 INJECTION, SOLUTION INTRAVENOUS; SUBCUTANEOUS at 21:10

## 2024-07-31 RX ADMIN — LEVOTHYROXINE SODIUM 125 MCG: 125 TABLET ORAL at 05:34

## 2024-07-31 RX ADMIN — WARFARIN SODIUM 2 MG: 2 TABLET ORAL at 18:06

## 2024-07-31 RX ADMIN — ATORVASTATIN CALCIUM 40 MG: 40 TABLET, FILM COATED ORAL at 15:45

## 2024-07-31 NOTE — ASSESSMENT & PLAN NOTE
1 out of 2 blood cultures positive for gram-negative rods, coming back as Enterobacterales  Second blood culture no growth at 4 days  ID consulted, recommending discontinue Unasyn and start Zosyn  Anticipate 10 days of therapy on zosyn to complete on 8/2/2024-antibiotic switched to cefepime since patient liver enzyme is elevated and suspected Zosyn.

## 2024-07-31 NOTE — PROGRESS NOTES
VIRTUAL CARE DOCUMENTATION:     1. This service was provided via Telemedicine using [employer]     2. Parties in the room with patient during teleconsult Patient only    3. Confidentiality My office door was closed     4. Participants No one else was in the room    5. Patient acknowledged consent and understanding of privacy and security of the  Telemedicine consult. I informed the patient that I have reviewed their record in Epic and presented the opportunity for them to ask any questions regarding the visit today.  The patient agreed to participate.    6. Time spent 4 minutes          Progress Note - Infectious Disease   Masoud VERGARA Perry 68 y.o. male MRN: [id number]  Unit/Bed#: -01 Encounter: [id number]      Impression/Plan:  [place] bacteremia              Possibly secondary to right leg cellulitis. No other source appreciated, no GI,  or respiratory symptoms. Has a recently placed PD catheter but without abd pain, cloudy effluent or local signs of infection. Afebrile without leukocytosis.  Antibiotics changed to cefepime due to the concern about possible drug-induced liver injury from the Zosyn     -Continue cefepime renally dosed through 8/2/2024 to complete 10 days total treatment  -Additional mngt as below     Right leg cellulitis              In the setting of worsening lower extremity edema, chronic lymphedema, acute on chronic diastolic CHF, ESRD. Wound cx with Pseudomonas and Enterococcus.  Unclear significance of the Enterococcus.  No clinical or CT evidence of abscess or necrotizing infection.  Leg pain is now improved.     -Continue antibiotics as above  -Surgical follow-up  -Continue serial extremity exams, optimize volume status with PD and diuretics, continue wound care, limb elevation and ace wrap and recommend premedicating to allow limb care  -Anticipate slow and prolonged recovery due to extent of tissue edema     ESRD on peritoneal dialysis              Had PD catheter placed on  6/7, tolerating well with no local signs of infection/PD related peritonitis. Dialysate wbc 8     -Management as per nephrology     4. Acute on chronic diastolic CHF, lymphedema  Risk factors for recurrent infection     5. A fib with RVR.  Now with decreased heart rate              Likely secondary to acute infection     6.  Elevated liver function test.  Consideration for the possibility of drug-induced liver injury.  Some concerned about the possibility of Zosyn playing a role although the LFTs had started to decline prior to changing the antibiotics.  Will avoid Zosyn for now.    7.  Diarrhea.  Suspected medication effect.  Less likely C. difficile colitis.  Discontinue Senokot and MiraLAX  If diarrhea persists, may need to check stool for C. difficile.    I spent 50 minutes in evaluation of the patient of which 30 minutes was in counseling/coordination of care    Antibiotics:  Cefepime 2  Antibiotics 8    Subjective:  Patient has no fever, chills, sweats; no nausea, vomiting, diarrhea; no cough, shortness of breath; no pain. No new symptoms.    Objective:  Vitals:  Temp:  [97.7 °F (36.5 °C)-98.2 °F (36.8 °C)] 97.7 °F (36.5 °C)  HR:  [61-68] 65  Resp:  [14-39] 39  BP: (107-130)/(54-63) 107/56  SpO2:  [94 %-100 %] 99 %  Temp (24hrs), Av °F (36.7 °C), Min:97.7 °F (36.5 °C), Max:98.2 °F (36.8 °C)  Current: Temperature: 97.7 °F (36.5 °C)    Documented physical exam has been primarily done by the patient's nurse and/or the primary service due to limited examination abilities on telemedicine    Physical Exam:   General Appearance:  Alert, interactive, nontoxic, no acute distress.   Throat: Oropharynx moist without lesions.    Lungs:   Clear to auscultation bilaterally; no wheezes, rhonchi or rales; respirations unlabored   Heart:  RRR; no murmur, rub or gallop   Abdomen:   Soft, non-tender, non-distended, positive bowel sounds.     Extremities: Right leg dressed with a dry dressing in place.   Skin: No new rashes  or lesions. No draining wounds noted.       Labs, Imaging, & Other studies:   All pertinent labs and imaging studies were personally reviewed  Results from last 7 days   Lab Units 07/31/24  0458 07/30/24  0648 07/29/24  0433   WBC Thousand/uL 6.75 5.87 6.76   HEMOGLOBIN g/dL 7.9* 8.0* 8.0*   PLATELETS Thousands/uL 171 205 184     Results from last 7 days   Lab Units 07/31/24  0458 07/30/24  0648 07/29/24  0433   SODIUM mmol/L 137 137 136   POTASSIUM mmol/L 3.6 3.6 3.3*   CHLORIDE mmol/L 102 100 100   CO2 mmol/L 26 29 27   BUN mg/dL 61* 61* 62*   CREATININE mg/dL 6.13* 6.06* 5.88*   EGFR ml/min/1.73sq m 8 8 8   CALCIUM mg/dL 9.4 9.8 10.5*   AST U/L 56* 123* 184*   ALT U/L 141* 210* 232*   ALK PHOS U/L 84 94 90         Results from last 7 days   Lab Units 07/25/24  0341 07/24/24  1747   PROCALCITONIN ng/ml 0.65* 0.72*

## 2024-07-31 NOTE — PROGRESS NOTES
[facility]  Progress Note  Name: Masoud Perry I  MRN: [id number]  Unit/Bed#: -01 I Date of Admission: 7/23/2024   Date of Service: 7/31/2024 I Hospital Day: 8    Assessment & Plan   Elevated LFTs  Assessment & Plan  No personal hx of cirrhosis  Likely the cause of the elevated inr, as patient has not gotten coumadin since prior to admission   RUQ US: Limited study as above. Grossly unremarkable albeit limited evaluation of the liver.Cholecystectomy. No biliary dilation. Small volume diffuse free fluid may represent ascites and/or fluid related to peritoneal dialysis.  Suspect hepatorenal, as patient has ESRD on PD  Liver enzymes is trending down.    Bacteremia  Assessment & Plan  1 out of 2 blood cultures positive for gram-negative rods, coming back as Enterobacterales  Second blood culture no growth at 4 days  ID consulted, recommending discontinue Unasyn and start Zosyn  Anticipate 10 days of therapy on zosyn to complete on 8/2/2024-antibiotic switched to cefepime since patient liver enzyme is elevated and suspected Zosyn.    Atrial fibrillation (HCC)  Assessment & Plan  Paroxysmal atrial fibrillation. sp ablation with Dr Alcala at [facility] 4/2024, took himself off Eliquis post procedure AMA;  has episodes on loop recorder of afib that are symptomatic, no longer taking amiodarone  Currently patient on coumadin, follows outpatient with coumadin clinic  Patient went into a fib rvr overnight on 7/26  Cardiology consulted - continue metoprolol and monitor on tele. Unable to tolerate amio and cannot take other anti arrhythmics due to esrd   Converted back into NSR on 7/26  Continue Coumadin, INR is subtherapeutic-again discussed with patient's regarding Coumadin therapy and supratherapeutic INR with risk versus benefits, side effects and alternative.  Patient remain rigid on his decision to continue Coumadin 2 mg knowing the risk.    Stage 5 chronic kidney disease on peritoneal dialysis  ([facility])  Assessment & Plan  Lab Results   Component Value Date    EGFR 8 07/31/2024    EGFR 8 07/30/2024    EGFR 8 07/29/2024    CREATININE 6.13 (H) 07/31/2024    CREATININE 6.06 (H) 07/30/2024    CREATININE 5.88 (H) 07/29/2024     History of stage 5 ckd recently started on peritoneal dialysis, first treatment on 6/24. Had PD catheter placed on 6/7/2024. Was scheduled to do PD dialysis in clinic and then transition to home PD dialysis.   Last PD was done yesterday, patient currently doing every other day PD, goal for everyday per patient.   Nephro adjusting PD solution. decrease him to 2.5% 4 times daily regimen  Hypercalcemia: checking PTH, SPEP, UPEP, light chain ratio, vitamin D level, ACE, PTH related protein, adding sensipar 30 mg qd.   Avoid nephrotoxic medications, nsaids, hypotension    Anemia  Assessment & Plan  Patient with history of anemia baseline hemoglobin around 9-10 per outpatient labs  On admission, patient with hemoglobin of 10.1, decreased to 7.5, possibly secondary to volume overload, no evidence of bleeding at this time.  CT right lower extremity without evidence of bleeding.  Iron panel showing low iron  Does receive IV Venofer 200 mg with dialysis treatments as an outpatient and received 5 doses for iron deficiency anemia.   Given one dose of epogen on 7/25  Stool occult positive x2  GI consulted  -initial plan is to proceed with EGD on 7/29/2024.  But since hemoglobin remained stable, and patient INR was supratherapeutic, GI recommending hold off EGD, outpatient workup.  INR remains subtherapeutic, per discussion has done with patient, prefers to start with 2 mg Coumadin since patient is frustrated with this Coumadin therapy for last 10 years.  Patient hemoglobin 7.6, no active bleeding noted.    Lymphedema  Assessment & Plan  With history of chronic lymphedema following with cardiology outpatient. Feels that his legs have been swelling more recently, right leg more swollen than left due to  current cellulitis episode.   Transitioned from lasix infusion to torsemide 60mg bid     Acute on chronic diastolic HF (heart failure) (HCC)  Assessment & Plan  Wt Readings from Last 3 Encounters:   07/31/24 118 kg (260 lb 2.3 oz)   06/25/24 124 kg (273 lb 13 oz)   06/20/24 122 kg (270 lb)     History of chf managed by both cardiology and nephrology, on lasix 80 mg bid outpatient, patient states that he took 240 mg of lasix yesterday and again today.   Echo 4/2023: Systolic function is normal with an ejection fraction of 60-65%. Wall motion is within normal limits. There is grade I (mild) diastolic dysfunction   BNP normal  CXR: No acute cardiopulmonary disease.   Venous duplex-no DVT  Pedal pulses monitored with dopplers  Nephro: torsemide 60mg bid.  Monitor I&Os and daily weights  Continue peritoneal dialysis  Per nephrology, continue current treatment, patient and dialysis suggested.    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  Patient also has a history of CVA and paroxysmal atrial fibrillation, at this time, considering risk versus benefits, after discussion is done with the patient's, will consider to resume Coumadin.   discussed in details with patient regarding this plan.  Patient seems frustrated since patient is on Coumadin for 10 years and he is going through multiple back-and-forth for adjusting dose to maintain INR in therapeutic level.  After extensive discussion is done, patient agrees to continue Coumadin but agrees to start with 2 mg.-Patient verbalizes regarding risk versus benefits, side effects and alternatives.  INR remains subtherapeutic, patient prefers to continue 2 mg of Coumadin since he is frustrated with Coumadin therapy for last 10 years.   Verbalizes to understand the risk versus benefits, side effects and alternatives.    * Cellulitis of right lower extremity  Assessment & Plan  Presented to ED with bilateral leg swelling x 4 days. Right leg more swollen and red over the last 2 days. Significant pain and having drainage in the right leg as well. NO fevers or chills.   No recent antibiotics outpatient  CT RLE: No fluid collection seen, cellulitic changes leg, No intramuscular collection, no lytic lesion or periosteal reaction  Procal slightly elevated 0.89, now down trending   BC  gram negative rods; Enterobacterales   Follow CBC and fever curve  Per ID recommendation, continue cefepime till 2024-patient also having diarrhea, laxative discontinue.  If diarrhea persist, per ID recommendation will check for C. difficile                 VTE Pharmacologic Prophylaxis: VTE Score: 5 High Risk (Score >/= 5) - Pharmacological DVT Prophylaxis Ordered: warfarin (Coumadin). Sequential Compression Devices Ordered.    Mobility:   Basic Mobility Inpatient Raw Score: 14  JH-HLM Goal: 4: Move to chair/commode  JH-HLM Achieved: 4: Move to chair/commode  JH-HLM Goal achieved. Continue to encourage appropriate mobility.    Patient Centered Rounds: I performed bedside rounds with nursing staff today.   Discussions with Specialists or Other Care Team Provider: Nephrology, infectious disease    Education and Discussions with Family / Patient: Updated  (family members) at bedside.      Current Length of Stay: 8 day(s)  Current Patient Status: Inpatient   Certification Statement: The patient will continue to require additional inpatient hospital stay due to monitorable conditions  Discharge Plan: Anticipate discharge in 24-48 hrs to rehab facility.    Code Status: Level 3 - DNAR and DNI    Subjective:   Seen and evaluated during the run.  Complaining of diarrhea.  Denies any chest pain, nausea, vomiting.    Objective:     Vitals:   Temp (24hrs), Av.9  °F (36.6 °C), Min:97.7 °F (36.5 °C), Max:98.1 °F (36.7 °C)    Temp:  [97.7 °F (36.5 °C)-98.1 °F (36.7 °C)] 97.9 °F (36.6 °C)  HR:  [64-68] 64  Resp:  [16-39] 23  BP: (106-130)/(53-63) 106/53  SpO2:  [97 %-99 %] 97 %  Body mass index is 37.33 kg/m².     Input and Output Summary (last 24 hours):     Intake/Output Summary (Last 24 hours) at 7/31/2024 1523  Last data filed at 7/31/2024 1214  Gross per 24 hour   Intake 1150 ml   Output 1100 ml   Net 50 ml       Physical Exam:   Physical Exam  Vitals and nursing note reviewed.   Constitutional:       Appearance: Normal appearance. He is not ill-appearing or diaphoretic.   Eyes:      General: No scleral icterus.        Left eye: No discharge.      Extraocular Movements: Extraocular movements intact.      Conjunctiva/sclera: Conjunctivae normal.      Pupils: Pupils are equal, round, and reactive to light.   Cardiovascular:      Rate and Rhythm: Normal rate.      Heart sounds: No murmur heard.     No friction rub. No gallop.   Pulmonary:      Effort: Pulmonary effort is normal. No respiratory distress.      Breath sounds: No stridor. No wheezing or rhonchi.   Abdominal:      General: There is no distension.      Palpations: There is no mass.      Tenderness: There is no abdominal tenderness.      Hernia: No hernia is present.      Comments: Peritoneal dialysis catheter in place   Musculoskeletal:      Comments: Right lower extremity is covered with clean, dry, intact dressing.  Left lower extremity is covered with Ace wrap.   Skin:     Findings: Erythema present.   Neurological:      Mental Status: He is alert and oriented to person, place, and time.      Cranial Nerves: No cranial nerve deficit.      Sensory: No sensory deficit.      Motor: No weakness.      Coordination: Coordination normal.          Additional Data:     Labs:  Results from last 7 days   Lab Units 07/31/24  0458   WBC Thousand/uL 6.75   HEMOGLOBIN g/dL 7.9*   HEMATOCRIT % 25.5*   PLATELETS Thousands/uL 171    SEGS PCT % 72   LYMPHO PCT % 17   MONO PCT % 6   EOS PCT % 4     Results from last 7 days   Lab Units 07/31/24  0458   SODIUM mmol/L 137   POTASSIUM mmol/L 3.6   CHLORIDE mmol/L 102   CO2 mmol/L 26   BUN mg/dL 61*   CREATININE mg/dL 6.13*   ANION GAP mmol/L 9   CALCIUM mg/dL 9.4   ALBUMIN g/dL 2.3*   TOTAL BILIRUBIN mg/dL 0.35   ALK PHOS U/L 84   ALT U/L 141*   AST U/L 56*   GLUCOSE RANDOM mg/dL 113     Results from last 7 days   Lab Units 07/31/24  0458   INR  1.26*     Results from last 7 days   Lab Units 07/31/24  1155 07/31/24  0702 07/30/24  2115 07/30/24  1619 07/30/24  1056 07/30/24  0713 07/29/24  2100 07/29/24  1629 07/29/24  1105 07/29/24  0709 07/28/24  2106 07/28/24  1524   POC GLUCOSE mg/dl 148* 166* 144* 148* 115 107 150* 144* 219* 268* 231* 224*         Results from last 7 days   Lab Units 07/25/24  0341 07/24/24  1747   PROCALCITONIN ng/ml 0.65* 0.72*       Lines/Drains:  Invasive Devices       Peripheral Intravenous Line  Duration             Long-Dwell Peripheral IV (Midline) 07/24/24 Left Basilic 7 days    Long-Dwell Peripheral IV (Midline) 07/30/24 Right Basilic 1 day              Line  Duration             Peritoneal Dialysis Catheter Column-disk Abdominal 54 days                          Imaging: No pertinent imaging reviewed.    Recent Cultures (last 7 days):         Last 24 Hours Medication List:   Current Facility-Administered Medications   Medication Dose Route Frequency Provider Last Rate    acetaminophen  650 mg Oral Q6H PRN Rachel Conn PA-C      aspirin  81 mg Oral Daily Rachel Conn PA-C      atorvastatin  40 mg Oral Daily With Dinner Rachel Conn PA-C      cefepime  1,000 mg Intravenous Q24H Moses Noel MD 1,000 mg (07/30/24 2055)    cinacalcet  30 mg Oral Daily With Breakfast Jignesh Hussein MD      ferrous sulfate  325 mg Oral Daily With Breakfast Jignesh Hussein MD      HYDROcodone-acetaminophen  1 tablet Oral Q6H PRN Rachel Conn PA-C       HYDROcodone-acetaminophen  2 tablet Oral Q6H PRN Rachel Conn PA-C      HYDROmorphone  1 mg Intravenous Q3H PRN Citlali Yates, CRNP      insulin glargine  15 Units Subcutaneous Q12H VASU Rachel Conn PA-C      insulin lispro  1-6 Units Subcutaneous HS Rachel Conn PA-C      insulin lispro  1-6 Units Subcutaneous TID AC Nora Mathew MD      levothyroxine  125 mcg Oral Early Morning Rachel Conn PA-C      metoprolol succinate  100 mg Oral BID Rachel Conn PA-C      midodrine  10 mg Oral TID AC Jignesh Hussein MD      saccharomyces boulardii  250 mg Oral BID Moses Noel MD      simethicone  80 mg Oral 4x Daily PRN Rachel Conn PA-C      tamsulosin  0.4 mg Oral Daily With Dinner Rachel Conn PA-C      torsemide  60 mg Oral BID Ze Doty,       warfarin  2 mg Oral Daily (warfarin) Moses Noel MD          Today, Patient Was Seen By: Moses Noel MD    **Please Note: This note may have been constructed using a voice recognition system.**

## 2024-07-31 NOTE — ASSESSMENT & PLAN NOTE
Wt Readings from Last 3 Encounters:   07/31/24 118 kg (260 lb 2.3 oz)   06/25/24 124 kg (273 lb 13 oz)   06/20/24 122 kg (270 lb)     History of chf managed by both cardiology and nephrology, on lasix 80 mg bid outpatient, patient states that he took 240 mg of lasix yesterday and again today.   Echo 4/2023: Systolic function is normal with an ejection fraction of 60-65%. Wall motion is within normal limits. There is grade I (mild) diastolic dysfunction   BNP normal  CXR: No acute cardiopulmonary disease.   Venous duplex-no DVT  Pedal pulses monitored with dopplers  Nephro: torsemide 60mg bid.  Monitor I&Os and daily weights  Continue peritoneal dialysis  Per nephrology, continue current treatment, patient and dialysis suggested.

## 2024-07-31 NOTE — PROGRESS NOTES
NEPHROLOGY PROGRESS NOTE   Masoud Perry 68 y.o. male MRN: [id number]  Unit/Bed#: -01 Encounter: [id number]    ASSESSMENT & PLAN:  68year-old male with a history of chronic lymphedema/hypertension/CVA/atrial fibrillation/chronic diastolic CHF/BPH/diabetes mellitus type 2/nephrolithiasis/ESRD on PD presenting with worsening lower extremity edema     End-stage renal disease on dialysis  -ESRD on peritoneal dialysis.  As per patient was started on PD recently in July 2024 and at home dose PD on CCPD using all 2.5% dextrose.  -Access: PD catheter  -Plan: Still with significant lower extremity edema.  In the setting of lower extremity edema would continue peritoneal dialysis using all 4.25% dextrose.  Continue CAPD but will switch to all 4.25% dextrose for more ultrafiltration.  Continue diuretics    Volume status/fluid overload/acute on chronic diastolic CHF  -No evidence of DVT on duplex  -Echocardiogram showed ejection fraction 75% with mild to moderate TR  -Patient has significant lower extremity edema, part of this could be lymphedema as well  -Continue UF with PD.  Continue oral torsemide 60 mg twice daily    CKD/MBD  -Secondary hyperparathyroidism of renal origin, last PTH level from July 30 was 82 but previously was elevated to 211.4.  Hypercalcemia currently appears to be non-PTH mediated  - on Sensipar 30 mg daily which was recently started.  Monitor calcium level and PTH level.  -Hyperphosphatemia: Phosphorus at target range, off binders    Blood pressure/hypotension  -Blood pressure acceptable.  Continue midodrine 10 mg 3 times daily.  Follow-up a.m. cortisol.    Electrolytes:   -Hypercalcemia-calcium level improved to 10.8 mg/dL using 2.5% PD with low calcium.  Also was started on Sensipar which would help.  Continue to monitor calcium level, avoid any calcium supplementation.  Vitamin D level was actually low at 26.7.  125 vitamin D was normal at 21 workup showed negative urine  immunofixation.  -Patient was on calcitriol prior to admission which likely contributed to hypercalcemia.  Continue to hold calcitriol  - PTH RP, ACE enzyme, SPEP, light chain ratio under process    Anemia due to ESRD and iron deficiency as iron saturation was 6%  -Goal hemoglobin:  10-11 grams/deciliter  -Current hemoglobin: 7.9 g/dl  -Plan:  status post 1 dose of Epogen 20,000 units given last week.  Not a candidate for IV Venofer in the setting of active infection. Hemoccult was positive, seen by GI no plan for EGD as hemoglobin stable.  Continue to monitor hemoglobin    Right lower extremity cellulitis as per evidence of bacteremia: Antibiotics per ID with plan to complete on 8/2.  PD fluid was not suggestive of infection    Orders: A-fib/elevated liver enzymes: Management per primary team.    Discussed with primary team about plan to change PD prescription for more fluid removal and agreed with the plan    SUBJECTIVE:  No new complaints, no chest pain or shortness of breath    OBJECTIVE:  Current Weight: Weight - Scale: 118 kg (260 lb 2.3 oz)  Vitals:    07/31/24 0704   BP: 107/56   Pulse: 65   Resp: (!) 39   Temp: 97.7 °F (36.5 °C)   SpO2: 99%       Intake/Output Summary (Last 24 hours) at 7/31/2024 1041  Last data filed at 7/31/2024 0945  Gross per 24 hour   Intake 940 ml   Output 650 ml   Net 290 ml       Physical Exam  General:  Ill looking, awake.  Eyes: Conjunctivae pink,  Sclera anicteric  ENT: lips and mucous membranes moist  Neck: supple   Chest: Clear to Auscultation both lungs,  no crackles, ronchus or wheezing.  CVS: S1 & S2 present, normal rate, regular rhythm, no murmur.  Abdomen: soft, non-tender, non-distended, Bowel sounds normoactive  Extremities: 2  + edema of  legs  Skin: no rash  Neuro: awake, alert, oriented  Psych: Mood and affect appropriate      Medications:    Current Facility-Administered Medications:     acetaminophen (TYLENOL) tablet 650 mg, 650 mg, Oral, Q6H PRN, Rachel Koch  CHRISTA Conn    aspirin (ECOTRIN LOW STRENGTH) EC tablet 81 mg, 81 mg, Oral, Daily, Rachel Conn PA-C, 81 mg at 07/31/24 0913    atorvastatin (LIPITOR) tablet 40 mg, 40 mg, Oral, Daily With Dinner, Rachel Conn PA-C, 40 mg at 07/30/24 1538    cefepime (MAXIPIME) IVPB (premix in dextrose) 1,000 mg 50 mL, 1,000 mg, Intravenous, Q24H, Moses Noel MD, Last Rate: 100 mL/hr at 07/30/24 2055, 1,000 mg at 07/30/24 2055    cinacalcet (SENSIPAR) tablet 30 mg, 30 mg, Oral, Daily With Breakfast, Jignesh Hussein MD, 30 mg at 07/31/24 0914    docusate sodium (COLACE) capsule 100 mg, 100 mg, Oral, BID, Ofelia Salmeron PA-C, 100 mg at 07/29/24 0835    ferrous sulfate tablet 325 mg, 325 mg, Oral, Daily With Breakfast, Jignesh Hussein MD, 325 mg at 07/31/24 0914    HYDROcodone-acetaminophen (NORCO) 5-325 mg per tablet 1 tablet, 1 tablet, Oral, Q6H PRN, Rachel Conn PA-C, 1 tablet at 07/28/24 2303    HYDROcodone-acetaminophen (NORCO) 5-325 mg per tablet 2 tablet, 2 tablet, Oral, Q6H PRN, Rachel Conn PA-C, 2 tablet at 07/31/24 0912    HYDROmorphone (DILAUDID) injection 1 mg, 1 mg, Intravenous, Q3H PRN, Citlali Longx, CRNP, 1 mg at 07/30/24 1254    insulin glargine (LANTUS) subcutaneous injection 15 Units 0.15 mL, 15 Units, Subcutaneous, Q12H VASU, Rachel Conn PA-C, 15 Units at 07/31/24 0914    insulin lispro (HumALOG/ADMELOG) 100 units/mL subcutaneous injection 1-6 Units, 1-6 Units, Subcutaneous, HS, Rachel Conn PA-C, 1 Units at 07/29/24 2126    insulin lispro (HumALOG/ADMELOG) 100 units/mL subcutaneous injection 1-6 Units, 1-6 Units, Subcutaneous, TID AC, 1 Units at 07/31/24 0915 **AND** Fingerstick Glucose (POCT), , , 4x Daily AC and at bedtime, Nora Mathew MD    levothyroxine tablet 125 mcg, 125 mcg, Oral, Early Morning, Rachel Conn PA-C, 125 mcg at 07/31/24 0534    metoprolol succinate (TOPROL-XL) 24 hr tablet 100 mg, 100 mg, Oral, BID, Rachel Conn PA-C, 100 mg at  "07/31/24 0913    midodrine (PROAMATINE) tablet 10 mg, 10 mg, Oral, TID AC, Jignesh Hussein MD, 10 mg at 07/31/24 0912    simethicone (MYLICON) chewable tablet 80 mg, 80 mg, Oral, 4x Daily PRN, Rachel Conn PA-C    tamsulosin (FLOMAX) capsule 0.4 mg, 0.4 mg, Oral, Daily With Dinner, Rachel Conn PA-C, 0.4 mg at 07/30/24 1538    torsemide (DEMADEX) tablet 60 mg, 60 mg, Oral, BID, Ze Doty DO, 60 mg at 07/31/24 0912    warfarin (COUMADIN) tablet 2 mg, 2 mg, Oral, Daily (warfarin), Moses Noel MD, 2 mg at 07/30/24 1841    Invasive Devices:        Lab Results:   Results from last 7 days   Lab Units 07/31/24  0458 07/30/24  0648 07/29/24  0433 07/26/24  0433 07/25/24  0341   WBC Thousand/uL 6.75 5.87 6.76   < > 6.34   HEMOGLOBIN g/dL 7.9* 8.0* 8.0*   < > 7.8*   HEMATOCRIT % 25.5* 26.4* 26.2*   < > 24.7*   PLATELETS Thousands/uL 171 205 184   < > 112*   POTASSIUM mmol/L 3.6 3.6 3.3*   < > 3.8   CHLORIDE mmol/L 102 100 100   < > 102   CO2 mmol/L 26 29 27   < > 23   BUN mg/dL 61* 61* 62*   < > 85*   CREATININE mg/dL 6.13* 6.06* 5.88*   < > 5.91*   CALCIUM mg/dL 9.4 9.8 10.5*   < > 10.1   MAGNESIUM mg/dL 2.0 2.2 2.4   < > 2.3   PHOSPHORUS mg/dL  --  4.9*  --   --  5.1*   ALK PHOS U/L 84 94 90   < >  --    ALT U/L 141* 210* 232*   < >  --    AST U/L 56* 123* 184*   < >  --     < > = values in this interval not displayed.       Previous work up:  Chest x-ray suggestive of mild pulmonary venous congestion      Portions of the record may have been created with voice recognition software. Occasional wrong word or \"sound a like\" substitutions may have occurred due to the inherent limitations of voice recognition software. Read the chart carefully and recognize, using context, where substitutions have occurred.If you have any questions, please contact the dictating provider.    "

## 2024-07-31 NOTE — ASSESSMENT & PLAN NOTE
No personal hx of cirrhosis  Likely the cause of the elevated inr, as patient has not gotten coumadin since prior to admission   RUQ US: Limited study as above. Grossly unremarkable albeit limited evaluation of the liver.Cholecystectomy. No biliary dilation. Small volume diffuse free fluid may represent ascites and/or fluid related to peritoneal dialysis.  Suspect hepatorenal, as patient has ESRD on PD  Liver enzymes is trending down.

## 2024-07-31 NOTE — PLAN OF CARE
Problem: Prexisting or High Potential for Compromised Skin Integrity  Goal: Skin integrity is maintained or improved  Description: INTERVENTIONS:  - Identify patients at risk for skin breakdown  - Assess and monitor skin integrity  - Assess and monitor nutrition and hydration status  - Monitor labs   - Assess for incontinence   - Turn and reposition patient  - Assist with mobility/ambulation  - Relieve pressure over bony prominences  - Avoid friction and shearing  - Provide appropriate hygiene as needed including keeping skin clean and dry  - Evaluate need for skin moisturizer/barrier cream  - Collaborate with interdisciplinary team   - Patient/family teaching  - Consider wound care consult   Outcome: Progressing     Problem: METABOLIC, FLUID AND ELECTROLYTES - ADULT  Goal: Electrolytes maintained within normal limits  Description: INTERVENTIONS:  - Monitor labs and assess patient for signs and symptoms of electrolyte imbalances  - Administer electrolyte replacement as ordered  - Monitor response to electrolyte replacements, including repeat lab results as appropriate  - Instruct patient on fluid and nutrition as appropriate  Outcome: Progressing  Goal: Fluid balance maintained  Description: INTERVENTIONS:  - Monitor labs   - Monitor I/O and WT  - Instruct patient on fluid and nutrition as appropriate  - Assess for signs & symptoms of volume excess or deficit  Outcome: Progressing  Goal: Glucose maintained within target range  Description: INTERVENTIONS:  - Monitor Blood Glucose as ordered  - Assess for signs and symptoms of hyperglycemia and hypoglycemia  - Administer ordered medications to maintain glucose within target range  - Assess nutritional intake and initiate nutrition service referral as needed  Outcome: Progressing     Problem: SKIN/TISSUE INTEGRITY - ADULT  Goal: Incision(s), wounds(s) or drain site(s) healing without S/S of infection  Description: INTERVENTIONS  - Assess and document dressing,  incision, wound bed, drain sites and surrounding tissue  - Provide patient and family education  - Perform skin care/dressing changes per wound  Outcome: Progressing

## 2024-07-31 NOTE — ASSESSMENT & PLAN NOTE
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  Patient also has a history of CVA and paroxysmal atrial fibrillation, at this time, considering risk versus benefits, after discussion is done with the patient's, will consider to resume Coumadin.   discussed in details with patient regarding this plan.  Patient seems frustrated since patient is on Coumadin for 10 years and he is going through multiple back-and-forth for adjusting dose to maintain INR in therapeutic level.  After extensive discussion is done, patient agrees to continue Coumadin but agrees to start with 2 mg.-Patient verbalizes regarding risk versus benefits, side effects and alternatives.  INR remains subtherapeutic, patient prefers to continue 2 mg of Coumadin since he is frustrated with Coumadin therapy for last 10 years.  Verbalizes to understand the risk versus benefits, side effects and alternatives.

## 2024-07-31 NOTE — ASSESSMENT & PLAN NOTE
Paroxysmal atrial fibrillation. sp ablation with Dr Alcala at [facility] 4/2024, took himself off Eliquis post procedure AMA;  has episodes on loop recorder of afib that are symptomatic, no longer taking amiodarone  Currently patient on coumadin, follows outpatient with coumadin clinic  Patient went into a fib rvr overnight on 7/26  Cardiology consulted - continue metoprolol and monitor on tele. Unable to tolerate amio and cannot take other anti arrhythmics due to esrd   Converted back into NSR on 7/26  Continue Coumadin, INR is subtherapeutic-again discussed with patient's regarding Coumadin therapy and supratherapeutic INR with risk versus benefits, side effects and alternative.  Patient remain rigid on his decision to continue Coumadin 2 mg knowing the risk.

## 2024-07-31 NOTE — ASSESSMENT & PLAN NOTE
Presented to ED with bilateral leg swelling x 4 days. Right leg more swollen and red over the last 2 days. Significant pain and having drainage in the right leg as well. NO fevers or chills.   No recent antibiotics outpatient  CT RLE: No fluid collection seen, cellulitic changes leg, No intramuscular collection, no lytic lesion or periosteal reaction  Procal slightly elevated 0.89, now down trending   BC 1/2 gram negative rods; Enterobacterales   Follow CBC and fever curve  Per ID recommendation, continue cefepime till 8/2/2024-patient also having diarrhea, laxative discontinue.  If diarrhea persist, per ID recommendation will check for C. difficile

## 2024-07-31 NOTE — ASSESSMENT & PLAN NOTE
Patient with history of anemia baseline hemoglobin around 9-10 per outpatient labs  On admission, patient with hemoglobin of 10.1, decreased to 7.5, possibly secondary to volume overload, no evidence of bleeding at this time.  CT right lower extremity without evidence of bleeding.  Iron panel showing low iron  Does receive IV Venofer 200 mg with dialysis treatments as an outpatient and received 5 doses for iron deficiency anemia.   Given one dose of epogen on 7/25  Stool occult positive x2  GI consulted  -initial plan is to proceed with EGD on 7/29/2024.  But since hemoglobin remained stable, and patient INR was supratherapeutic, GI recommending hold off EGD, outpatient workup.  INR remains subtherapeutic, per discussion has done with patient, prefers to start with 2 mg Coumadin since patient is frustrated with this Coumadin therapy for last 10 years.  Patient hemoglobin 7.6, no active bleeding noted.

## 2024-07-31 NOTE — ASSESSMENT & PLAN NOTE
Lab Results   Component Value Date    EGFR 8 07/31/2024    EGFR 8 07/30/2024    EGFR 8 07/29/2024    CREATININE 6.13 (H) 07/31/2024    CREATININE 6.06 (H) 07/30/2024    CREATININE 5.88 (H) 07/29/2024     History of stage 5 ckd recently started on peritoneal dialysis, first treatment on 6/24. Had PD catheter placed on 6/7/2024. Was scheduled to do PD dialysis in clinic and then transition to home PD dialysis.   Last PD was done yesterday, patient currently doing every other day PD, goal for everyday per patient.   Nephro adjusting PD solution. decrease him to 2.5% 4 times daily regimen  Hypercalcemia: checking PTH, SPEP, UPEP, light chain ratio, vitamin D level, ACE, PTH related protein, adding sensipar 30 mg qd.   Avoid nephrotoxic medications, nsaids, hypotension

## 2024-07-31 NOTE — PLAN OF CARE
Problem: Prexisting or High Potential for Compromised Skin Integrity  Goal: Skin integrity is maintained or improved  Description: INTERVENTIONS:  - Identify patients at risk for skin breakdown  - Assess and monitor skin integrity  - Assess and monitor nutrition and hydration status  - Monitor labs   - Assess for incontinence   - Turn and reposition patient  - Assist with mobility/ambulation  - Relieve pressure over bony prominences  - Avoid friction and shearing  - Provide appropriate hygiene as needed including keeping skin clean and dry  - Evaluate need for skin moisturizer/barrier cream  - Collaborate with interdisciplinary team   - Patient/family teaching  - Consider wound care consult   Outcome: Progressing     Problem: METABOLIC, FLUID AND ELECTROLYTES - ADULT  Goal: Electrolytes maintained within normal limits  Description: INTERVENTIONS:  - Monitor labs and assess patient for signs and symptoms of electrolyte imbalances  - Administer electrolyte replacement as ordered  - Monitor response to electrolyte replacements, including repeat lab results as appropriate  - Instruct patient on fluid and nutrition as appropriate  Outcome: Progressing  Goal: Fluid balance maintained  Description: INTERVENTIONS:  - Monitor labs   - Monitor I/O and WT  - Instruct patient on fluid and nutrition as appropriate  - Assess for signs & symptoms of volume excess or deficit  Outcome: Progressing  Goal: Glucose maintained within target range  Description: INTERVENTIONS:  - Monitor Blood Glucose as ordered  - Assess for signs and symptoms of hyperglycemia and hypoglycemia  - Administer ordered medications to maintain glucose within target range  - Assess nutritional intake and initiate nutrition service referral as needed  Outcome: Progressing     Problem: SKIN/TISSUE INTEGRITY - ADULT  Goal: Incision(s), wounds(s) or drain site(s) healing without S/S of infection  Description: INTERVENTIONS  - Assess and document dressing,  incision, wound bed, drain sites and surrounding tissue  - Provide patient and family education  - Perform skin care/dressing changes per wound  Outcome: Progressing     Problem: GENITOURINARY - ADULT  Goal: Maintains or returns to baseline urinary function  Description: INTERVENTIONS:  - Assess urinary function  - Encourage oral fluids to ensure adequate hydration if ordered  - Administer IV fluids as ordered to ensure adequate hydration  - Administer ordered medications as needed  - Offer frequent toileting  - Follow urinary retention protocol if ordered  Outcome: Progressing     Problem: PAIN - ADULT  Goal: Verbalizes/displays adequate comfort level or baseline comfort level  Description: Interventions:  - Encourage patient to monitor pain and request assistance  - Assess pain using appropriate pain scale  - Administer analgesics based on type and severity of pain and evaluate response  - Implement non-pharmacological measures as appropriate and evaluate response  - Consider cultural and social influences on pain and pain management  - Notify physician/advanced practitioner if interventions unsuccessful or patient reports new pain  Outcome: Progressing     Problem: INFECTION - ADULT  Goal: Absence or prevention of progression during hospitalization  Description: INTERVENTIONS:  - Assess and monitor for signs and symptoms of infection  - Monitor lab/diagnostic results  - Monitor all insertion sites, i.e. indwelling lines, tubes, and drains  - Monitor endotracheal if appropriate and nasal secretions for changes in amount and color  - [place] appropriate cooling/warming therapies per order  - Administer medications as ordered  - Instruct and encourage patient and family to use good hand hygiene technique  - Identify and instruct in appropriate isolation precautions for identified infection/condition  Outcome: Progressing     Problem: SAFETY ADULT  Goal: Patient will remain free of falls  Description:  INTERVENTIONS:  - Educate patient/family on patient safety including physical limitations  - Instruct patient to call for assistance with activity   - Consult OT/PT to assist with strengthening/mobility   - Keep Call bell within reach  - Keep bed low and locked with side rails adjusted as appropriate  - Keep care items and personal belongings within reach  - Initiate and maintain comfort rounds  - Make Fall Risk Sign visible to staff  - Offer Toileting every 2 Hours, in advance of need if unable to notify staff of need  - Initiate/Maintain bed/chair alarm for confusion, impulsivity, or noncompliance with notifying staff.  - Apply yellow socks and bracelet for high fall risk patients  - Consider moving patient to room near nurses station  Outcome: Progressing  Goal: Maintain or return to baseline ADL function  Description: INTERVENTIONS:  -  Assess patient's ability to carry out ADLs; assess patient's baseline for ADL function and identify physical deficits which impact ability to perform ADLs (bathing, care of mouth/teeth, toileting, grooming, dressing, etc.)  - Assess/evaluate cause of self-care deficits   - Assess range of motion  - Assess patient's mobility; develop plan if impaired  - Assess patient's need for assistive devices and provide as appropriate  - Encourage maximum independence but intervene and supervise when necessary  - Involve family in performance of ADLs  - Assess for home care needs following discharge   - Consider OT consult to assist with ADL evaluation and planning for discharge  - Provide patient education as appropriate  Outcome: Progressing  Goal: Maintains/Returns to pre admission functional level  Description: INTERVENTIONS:  - Perform AM-PAC 6 Click Basic Mobility/ Daily Activity assessment daily.  - Set and communicate daily mobility goal to care team and patient/family/caregiver.   - Collaborate with rehabilitation services on mobility goals if consulted  - Perform Range of Motion 3  times a day.  - Reposition patient every 2 hours if unable to reposition self  - Out of bed for toileting if medically appropriate  - Record patient progress and toleration of activity level   Outcome: Progressing     Problem: DISCHARGE PLANNING  Goal: Discharge to home or other facility with appropriate resources  Description: INTERVENTIONS:  - Identify barriers to discharge w/patient and caregiver  - Arrange for needed discharge resources and transportation as appropriate  - Identify discharge learning needs (meds, wound care, etc.)  - Arrange for interpretive services to assist at discharge as needed  - Refer to Case Management Department for coordinating discharge planning if the patient needs post-hospital services based on physician/advanced practitioner order or complex needs related to functional status, cognitive ability, or social support system  Outcome: Progressing     Problem: Knowledge Deficit  Goal: Patient/family/caregiver demonstrates understanding of disease process, treatment plan, medications, and discharge instructions  Description: Complete learning assessment and assess knowledge base.  Interventions:  - Provide teaching at level of understanding  - Provide teaching via preferred learning methods  Outcome: Progressing     Problem: Nutrition/Hydration-ADULT  Goal: Nutrient/Hydration intake appropriate for improving, restoring or maintaining nutritional needs  Description: Monitor and assess patient's nutrition/hydration status for malnutrition. Collaborate with interdisciplinary team and initiate plan and interventions as ordered.  Monitor patient's weight and dietary intake as ordered or per policy. Utilize nutrition screening tool and intervene as necessary. Determine patient's food preferences and provide high-protein, high-caloric foods as appropriate.     INTERVENTIONS:  - Monitor oral intake, urinary output, labs, and treatment plans  - Assess nutrition and hydration status and recommend  course of action  - Evaluate amount of meals eaten  - Assist patient with eating if necessary   - Allow adequate time for meals  - Recommend/ encourage appropriate diets, oral nutritional supplements, and vitamin/mineral supplements  - Order, calculate, and assess calorie counts as needed  - Recommend, monitor, and adjust tube feedings and TPN/PPN based on assessed needs  - Assess need for intravenous fluids  - Provide specific nutrition/hydration education as appropriate  - Include patient/family/caregiver in decisions related to nutrition  Outcome: Progressing

## 2024-08-01 LAB
ALBUMIN SERPL BCG-MCNC: 2.7 G/DL (ref 3.5–5)
ALBUMIN SERPL ELPH-MCNC: 2.03 G/DL (ref 3.2–5.1)
ALBUMIN SERPL ELPH-MCNC: 39.1 % (ref 48–70)
ALP SERPL-CCNC: 82 U/L (ref 34–104)
ALPHA1 GLOB SERPL ELPH-MCNC: 0.57 G/DL (ref 0.15–0.47)
ALPHA1 GLOB SERPL ELPH-MCNC: 11 % (ref 1.8–7)
ALPHA2 GLOB SERPL ELPH-MCNC: 0.93 G/DL (ref 0.42–1.04)
ALPHA2 GLOB SERPL ELPH-MCNC: 17.8 % (ref 5.9–14.9)
ALT SERPL W P-5'-P-CCNC: 100 U/L (ref 7–52)
ANION GAP SERPL CALCULATED.3IONS-SCNC: 10 MMOL/L (ref 4–13)
AST SERPL W P-5'-P-CCNC: 35 U/L (ref 13–39)
BASOPHILS # BLD AUTO: 0.04 THOUSANDS/ÂΜL (ref 0–0.1)
BASOPHILS NFR BLD AUTO: 1 % (ref 0–1)
BETA GLOB ABNORMAL SERPL ELPH-MCNC: 0.33 G/DL (ref 0.31–0.57)
BETA1 GLOB SERPL ELPH-MCNC: 6.3 % (ref 4.7–7.7)
BETA2 GLOB SERPL ELPH-MCNC: 7.4 % (ref 3.1–7.9)
BETA2+GAMMA GLOB SERPL ELPH-MCNC: 0.38 G/DL (ref 0.2–0.58)
BILIRUB SERPL-MCNC: 0.39 MG/DL (ref 0.2–1)
BUN SERPL-MCNC: 57 MG/DL (ref 5–25)
CALCIUM ALBUM COR SERPL-MCNC: 10.9 MG/DL (ref 8.3–10.1)
CALCIUM SERPL-MCNC: 9.9 MG/DL (ref 8.4–10.2)
CHLORIDE SERPL-SCNC: 100 MMOL/L (ref 96–108)
CO2 SERPL-SCNC: 26 MMOL/L (ref 21–32)
CREAT SERPL-MCNC: 5.8 MG/DL (ref 0.6–1.3)
EOSINOPHIL # BLD AUTO: 0.27 THOUSAND/ÂΜL (ref 0–0.61)
EOSINOPHIL NFR BLD AUTO: 4 % (ref 0–6)
ERYTHROCYTE [DISTWIDTH] IN BLOOD BY AUTOMATED COUNT: 16.7 % (ref 11.6–15.1)
GAMMA GLOB ABNORMAL SERPL ELPH-MCNC: 0.96 G/DL (ref 0.4–1.66)
GAMMA GLOB SERPL ELPH-MCNC: 18.4 % (ref 6.9–22.3)
GFR SERPL CREATININE-BSD FRML MDRD: 9 ML/MIN/1.73SQ M
GLUCOSE SERPL-MCNC: 125 MG/DL (ref 65–140)
GLUCOSE SERPL-MCNC: 131 MG/DL (ref 65–140)
GLUCOSE SERPL-MCNC: 137 MG/DL (ref 65–140)
GLUCOSE SERPL-MCNC: 148 MG/DL (ref 65–140)
GLUCOSE SERPL-MCNC: 165 MG/DL (ref 65–140)
HCT VFR BLD AUTO: 26.9 % (ref 36.5–49.3)
HGB BLD-MCNC: 8.2 G/DL (ref 12–17)
IGG/ALB SER: 0.64 {RATIO} (ref 1.1–1.8)
IMM GRANULOCYTES # BLD AUTO: 0.07 THOUSAND/UL (ref 0–0.2)
IMM GRANULOCYTES NFR BLD AUTO: 1 % (ref 0–2)
INR PPP: 1.43 (ref 0.84–1.19)
INTERPRETATION UR IFE-IMP: NORMAL
LYMPHOCYTES # BLD AUTO: 1.49 THOUSANDS/ÂΜL (ref 0.6–4.47)
LYMPHOCYTES NFR BLD AUTO: 23 % (ref 14–44)
MCH RBC QN AUTO: 30.5 PG (ref 26.8–34.3)
MCHC RBC AUTO-ENTMCNC: 30.5 G/DL (ref 31.4–37.4)
MCV RBC AUTO: 100 FL (ref 82–98)
MONOCYTES # BLD AUTO: 0.41 THOUSAND/ÂΜL (ref 0.17–1.22)
MONOCYTES NFR BLD AUTO: 6 % (ref 4–12)
NEUTROPHILS # BLD AUTO: 4.11 THOUSANDS/ÂΜL (ref 1.85–7.62)
NEUTS SEG NFR BLD AUTO: 65 % (ref 43–75)
NRBC BLD AUTO-RTO: 0 /100 WBCS
PLATELET # BLD AUTO: 182 THOUSANDS/UL (ref 149–390)
PMV BLD AUTO: 10.2 FL (ref 8.9–12.7)
POTASSIUM SERPL-SCNC: 3.2 MMOL/L (ref 3.5–5.3)
PROT PATTERN SERPL ELPH-IMP: ABNORMAL
PROT SERPL-MCNC: 5.2 G/DL (ref 6.4–8.2)
PROT SERPL-MCNC: 5.8 G/DL (ref 6.4–8.4)
PROTHROMBIN TIME: 17.8 SECONDS (ref 11.6–14.5)
RBC # BLD AUTO: 2.69 MILLION/UL (ref 3.88–5.62)
SODIUM SERPL-SCNC: 136 MMOL/L (ref 135–147)
WBC # BLD AUTO: 6.39 THOUSAND/UL (ref 4.31–10.16)

## 2024-08-01 PROCEDURE — 85025 COMPLETE CBC W/AUTO DIFF WBC: CPT | Performed by: FAMILY MEDICINE

## 2024-08-01 PROCEDURE — 84165 PROTEIN E-PHORESIS SERUM: CPT | Performed by: PATHOLOGY

## 2024-08-01 PROCEDURE — 99232 SBSQ HOSP IP/OBS MODERATE 35: CPT | Performed by: FAMILY MEDICINE

## 2024-08-01 PROCEDURE — 82948 REAGENT STRIP/BLOOD GLUCOSE: CPT

## 2024-08-01 PROCEDURE — 85610 PROTHROMBIN TIME: CPT | Performed by: FAMILY MEDICINE

## 2024-08-01 PROCEDURE — 80053 COMPREHEN METABOLIC PANEL: CPT | Performed by: FAMILY MEDICINE

## 2024-08-01 PROCEDURE — 86334 IMMUNOFIX E-PHORESIS SERUM: CPT | Performed by: PATHOLOGY

## 2024-08-01 PROCEDURE — 99233 SBSQ HOSP IP/OBS HIGH 50: CPT | Performed by: INTERNAL MEDICINE

## 2024-08-01 RX ORDER — POTASSIUM CHLORIDE 20 MEQ/1
40 TABLET, EXTENDED RELEASE ORAL ONCE
Status: COMPLETED | OUTPATIENT
Start: 2024-08-01 | End: 2024-08-01

## 2024-08-01 RX ADMIN — HYDROCODONE BITARTRATE AND ACETAMINOPHEN 2 TABLET: 5; 325 TABLET ORAL at 23:49

## 2024-08-01 RX ADMIN — CEFEPIME HYDROCHLORIDE 1000 MG: 1 INJECTION, SOLUTION INTRAVENOUS at 20:44

## 2024-08-01 RX ADMIN — LEVOTHYROXINE SODIUM 125 MCG: 125 TABLET ORAL at 06:09

## 2024-08-01 RX ADMIN — METOPROLOL SUCCINATE 100 MG: 100 TABLET, EXTENDED RELEASE ORAL at 20:56

## 2024-08-01 RX ADMIN — TORSEMIDE 60 MG: 20 TABLET ORAL at 09:00

## 2024-08-01 RX ADMIN — TORSEMIDE 60 MG: 20 TABLET ORAL at 20:56

## 2024-08-01 RX ADMIN — Medication 250 MG: at 09:00

## 2024-08-01 RX ADMIN — MIDODRINE HYDROCHLORIDE 10 MG: 5 TABLET ORAL at 11:50

## 2024-08-01 RX ADMIN — INSULIN GLARGINE 15 UNITS: 100 INJECTION, SOLUTION SUBCUTANEOUS at 21:37

## 2024-08-01 RX ADMIN — POTASSIUM CHLORIDE 40 MEQ: 1500 TABLET, EXTENDED RELEASE ORAL at 08:59

## 2024-08-01 RX ADMIN — ASPIRIN 81 MG: 81 TABLET, COATED ORAL at 09:00

## 2024-08-01 RX ADMIN — METOPROLOL SUCCINATE 100 MG: 100 TABLET, EXTENDED RELEASE ORAL at 09:00

## 2024-08-01 RX ADMIN — ATORVASTATIN CALCIUM 40 MG: 40 TABLET, FILM COATED ORAL at 16:30

## 2024-08-01 RX ADMIN — HYDROCODONE BITARTRATE AND ACETAMINOPHEN 2 TABLET: 5; 325 TABLET ORAL at 03:44

## 2024-08-01 RX ADMIN — MIDODRINE HYDROCHLORIDE 10 MG: 5 TABLET ORAL at 06:25

## 2024-08-01 RX ADMIN — MIDODRINE HYDROCHLORIDE 10 MG: 5 TABLET ORAL at 16:30

## 2024-08-01 RX ADMIN — HYDROCODONE BITARTRATE AND ACETAMINOPHEN 2 TABLET: 5; 325 TABLET ORAL at 17:38

## 2024-08-01 RX ADMIN — Medication 250 MG: at 17:38

## 2024-08-01 RX ADMIN — FERROUS SULFATE TAB 325 MG (65 MG ELEMENTAL FE) 325 MG: 325 (65 FE) TAB at 09:00

## 2024-08-01 RX ADMIN — HYDROCODONE BITARTRATE AND ACETAMINOPHEN 2 TABLET: 5; 325 TABLET ORAL at 10:58

## 2024-08-01 RX ADMIN — INSULIN GLARGINE 15 UNITS: 100 INJECTION, SOLUTION SUBCUTANEOUS at 09:06

## 2024-08-01 RX ADMIN — CINACALCET 30 MG: 30 TABLET, FILM COATED ORAL at 09:00

## 2024-08-01 RX ADMIN — WARFARIN SODIUM 2 MG: 2 TABLET ORAL at 17:37

## 2024-08-01 RX ADMIN — TAMSULOSIN HYDROCHLORIDE 0.4 MG: 0.4 CAPSULE ORAL at 16:30

## 2024-08-01 NOTE — PROGRESS NOTES
NEPHROLOGY PROGRESS NOTE   Masoud Perry 68 y.o. male MRN: [id number]  Unit/Bed#: -01 Encounter: [id number]    ASSESSMENT & PLAN:  68year-old male with a history of chronic lymphedema/hypertension/CVA/atrial fibrillation/chronic diastolic CHF/BPH/diabetes mellitus type 2/nephrolithiasis/ESRD on PD presenting with worsening lower extremity edema     End-stage renal disease on dialysis  -ESRD on peritoneal dialysis.  Outpatient unit [facility]  as per patient was started on PD recently in July 2024 and at home dose PD on CCPD using all 2.5% dextrose.  -Access: PD catheter  -Plan: Still with significant lower extremity edema.  In the setting of lower extremity edema would continue peritoneal dialysis using all 4.25% dextrose, was switched to all 4.25 % dextrose on 7/31.  With trending down, continue PD with all 4.25% dextrose, patient having net ultrafiltration of around 500 to 800 mL with each PD exchanges    Volume status/fluid overload/acute on chronic diastolic CHF  -No evidence of DVT on duplex  -Echocardiogram showed ejection fraction 75% with mild to moderate TR  -Patient has significant lower extremity edema.  Part of this could be lymphedema as well.  Continue PD using more 4.25% dextrose as mentioned above.      CKD/MBD  -Secondary hyperparathyroidism of renal origin, last PTH level from July 30 was 82 but previously was elevated to 211.4 and was on calcitriol.  Hypercalcemia currently appears to be non-PTH mediated.  He was started on on Sensipar 30 mg daily recently during the hospital stay, monitor PTH level.  Continue to hold calcitriol which could cause hypercalcemia.  Monitor calcium level and PTH level.  -Hyperphosphatemia: Phosphorus at target range, off binders    Blood pressure/hypotension  -Blood pressure stable and is at goal.  Continue midodrine 10 mg 3 times daily.  Follow-up a.m. cortisol.    Electrolytes:   -Hypercalcemia-calcium level improved to 10.8 mg/dL using 2.5% PD with low  calcium.  Also was started on Sensipar which would help.  Continue to monitor calcium level, avoid any calcium supplementation.  Vitamin D level was actually low at 26.7.  125 vitamin D was normal at 21 workup showed negative urine immunofixation.  -Patient was on calcitriol prior to admission which likely contributed to hypercalcemia.  Continue to hold calcitriol  -Serum immunofixation cannot rule out small monoclonal gammopathy.Suggested retest in 3 to 6 months.  Informed patient about this positive finding as well.  ACE enzyme negative.  PTH RP under process  -Calcium level still elevated at 10.9 mg/dL continue to monitor recommend avoiding any calcium supplements.  Patient denies taking calcium supplements at home  -Hypokalemia potassium 3.2: Replaced    Anemia due to ESRD and iron deficiency as iron saturation was 6%  -Goal hemoglobin:  10-11 grams/deciliter  -Current hemoglobin: 8.2 g/dl  -Plan:  status post 1 dose of Epogen 20,000 units given last week.  Not a candidate for IV Venofer in the setting of active infection. Hemoccult was positive, seen by GI no plan for EGD as hemoglobin stable.  Continue to monitor hemoglobin    Right lower extremity cellulitis as per evidence of bacteremia: Antibiotics per ID with plan to complete on 8/2.  PD fluid was not suggestive of infection    Orders: A-fib/elevated liver enzymes: Management per primary team.    Discussed with primary team regarding plan to continue same PD prescription using all 4.25% dextrose and agreed with the plan for more fluid removal    SUBJECTIVE:  No shortness of breath, lower extremity edema improving with use of 4.25% dextrose on PD    OBJECTIVE:  Current Weight: Weight - Scale: 115 kg (253 lb 4.9 oz)  Vitals:    08/01/24 0707   BP: 110/59   Pulse:    Resp:    Temp: 98.5 °F (36.9 °C)   SpO2:        Intake/Output Summary (Last 24 hours) at 8/1/2024 0927  Last data filed at 8/1/2024 0601  Gross per 24 hour   Intake 770 ml   Output 2225 ml   Net  -1455 ml       Physical Exam  General:  Ill looking, awake.  Eyes: Conjunctivae pink,  Sclera anicteric  ENT: lips and mucous membranes moist  Neck: supple   Chest: Clear to Auscultation both lungs,  no crackles, ronchus or wheezing.  CVS: S1 & S2 present, normal rate, regular rhythm, no murmur.  Abdomen: soft, non-tender, non-distended, Bowel sounds normoactive.  PD catheter  Extremities: 2 +  edema of  legs  Skin: no rash  Neuro: awake, alert, oriented x 3   Psych: Mood and affect appropriate      Medications:    Current Facility-Administered Medications:     acetaminophen (TYLENOL) tablet 650 mg, 650 mg, Oral, Q6H PRN, HOANG Givens-C    aspirin (ECOTRIN LOW STRENGTH) EC tablet 81 mg, 81 mg, Oral, Daily, HOANG Givens-C, 81 mg at 08/01/24 0900    atorvastatin (LIPITOR) tablet 40 mg, 40 mg, Oral, Daily With Dinner, HOANG Givens-BAIRON, 40 mg at 07/31/24 1545    cefepime (MAXIPIME) IVPB (premix in dextrose) 1,000 mg 50 mL, 1,000 mg, Intravenous, Q24H, Moses Noel MD, Stopped at 07/31/24 2049    cinacalcet (SENSIPAR) tablet 30 mg, 30 mg, Oral, Daily With Breakfast, Jignesh Hussein MD, 30 mg at 08/01/24 0900    ferrous sulfate tablet 325 mg, 325 mg, Oral, Daily With Breakfast, Jignesh Hussein MD, 325 mg at 08/01/24 0900    HYDROcodone-acetaminophen (NORCO) 5-325 mg per tablet 1 tablet, 1 tablet, Oral, Q6H PRN, HOANG Givens-C, 1 tablet at 07/28/24 2303    HYDROcodone-acetaminophen (NORCO) 5-325 mg per tablet 2 tablet, 2 tablet, Oral, Q6H PRN, Rachel Conn PA-C, 2 tablet at 08/01/24 0344    HYDROmorphone (DILAUDID) injection 1 mg, 1 mg, Intravenous, Q3H PRN, GAGANDEEP Bernardo, 1 mg at 07/30/24 1254    insulin glargine (LANTUS) subcutaneous injection 15 Units 0.15 mL, 15 Units, Subcutaneous, Q12H [facility], Rachel Conn PA-C, 15 Units at 08/01/24 0906    insulin lispro (HumALOG/ADMELOG) 100 units/mL subcutaneous injection 1-6 Units, 1-6 Units, Subcutaneous, , Rachel Koch  CHRISTA Conn, 1 Units at 07/31/24 2110    insulin lispro (HumALOG/ADMELOG) 100 units/mL subcutaneous injection 1-6 Units, 1-6 Units, Subcutaneous, TID AC, 1 Units at 07/31/24 0915 **AND** Fingerstick Glucose (POCT), , , 4x Daily AC and at bedtime, Nora Mathew MD    levothyroxine tablet 125 mcg, 125 mcg, Oral, Early Morning, Rachel Conn PA-C, 125 mcg at 08/01/24 0609    metoprolol succinate (TOPROL-XL) 24 hr tablet 100 mg, 100 mg, Oral, BID, Rachel Conn PA-C, 100 mg at 08/01/24 0900    midodrine (PROAMATINE) tablet 10 mg, 10 mg, Oral, TID AC, Jignesh Hussein MD, 10 mg at 08/01/24 0625    saccharomyces boulardii (FLORASTOR) capsule 250 mg, 250 mg, Oral, BID, Moses Noel MD, 250 mg at 08/01/24 0900    simethicone (MYLICON) chewable tablet 80 mg, 80 mg, Oral, 4x Daily PRN, Rachel Conn PA-C    tamsulosin (FLOMAX) capsule 0.4 mg, 0.4 mg, Oral, Daily With Dinner, Rachel Conn PA-C, 0.4 mg at 07/31/24 1546    torsemide (DEMADEX) tablet 60 mg, 60 mg, Oral, BID, Ze Doty DO, 60 mg at 08/01/24 0900    warfarin (COUMADIN) tablet 2 mg, 2 mg, Oral, Daily (warfarin), Moses Noel MD, 2 mg at 07/31/24 1806    Invasive Devices:        Lab Results:   Results from last 7 days   Lab Units 08/01/24  0604 07/31/24  0458 07/30/24  0648 07/29/24  0433   WBC Thousand/uL 6.39 6.75 5.87 6.76   HEMOGLOBIN g/dL 8.2* 7.9* 8.0* 8.0*   HEMATOCRIT % 26.9* 25.5* 26.4* 26.2*   PLATELETS Thousands/uL 182 171 205 184   POTASSIUM mmol/L 3.2* 3.6 3.6 3.3*   CHLORIDE mmol/L 100 102 100 100   CO2 mmol/L 26 26 29 27   BUN mg/dL 57* 61* 61* 62*   CREATININE mg/dL 5.80* 6.13* 6.06* 5.88*   CALCIUM mg/dL 9.9 9.4 9.8 10.5*   MAGNESIUM mg/dL  --  2.0 2.2 2.4   PHOSPHORUS mg/dL  --   --  4.9*  --    ALK PHOS U/L 82 84 94 90   ALT U/L 100* 141* 210* 232*   AST U/L 35 56* 123* 184*       Previous work up:  Chest x-ray suggestive of mild pulmonary venous congestion      Portions of the record may have been  "created with voice recognition software. Occasional wrong word or \"sound a like\" substitutions may have occurred due to the inherent limitations of voice recognition software. Read the chart carefully and recognize, using context, where substitutions have occurred.If you have any questions, please contact the dictating provider.    "

## 2024-08-01 NOTE — PLAN OF CARE
Problem: Prexisting or High Potential for Compromised Skin Integrity  Goal: Skin integrity is maintained or improved  Description: INTERVENTIONS:  - Identify patients at risk for skin breakdown  - Assess and monitor skin integrity  - Assess and monitor nutrition and hydration status  - Monitor labs   - Assess for incontinence   - Turn and reposition patient  - Assist with mobility/ambulation  - Relieve pressure over bony prominences  - Avoid friction and shearing  - Provide appropriate hygiene as needed including keeping skin clean and dry  - Evaluate need for skin moisturizer/barrier cream  - Collaborate with interdisciplinary team   - Patient/family teaching  - Consider wound care consult   Outcome: Progressing     Problem: Nutrition/Hydration-ADULT  Goal: Nutrient/Hydration intake appropriate for improving, restoring or maintaining nutritional needs  Description: Monitor and assess patient's nutrition/hydration status for malnutrition. Collaborate with interdisciplinary team and initiate plan and interventions as ordered.  Monitor patient's weight and dietary intake as ordered or per policy. Utilize nutrition screening tool and intervene as necessary. Determine patient's food preferences and provide high-protein, high-caloric foods as appropriate.     INTERVENTIONS:  - Monitor oral intake, urinary output, labs, and treatment plans  - Assess nutrition and hydration status and recommend course of action  - Evaluate amount of meals eaten  - Assist patient with eating if necessary   - Allow adequate time for meals  - Recommend/ encourage appropriate diets, oral nutritional supplements, and vitamin/mineral supplements  - Order, calculate, and assess calorie counts as needed  - Recommend, monitor, and adjust tube feedings and TPN/PPN based on assessed needs  - Assess need for intravenous fluids  - Provide specific nutrition/hydration education as appropriate  - Include patient/family/caregiver in decisions related to  nutrition  Outcome: Progressing     Problem: GENITOURINARY - ADULT  Goal: Maintains or returns to baseline urinary function  Description: INTERVENTIONS:  - Assess urinary function  - Encourage oral fluids to ensure adequate hydration if ordered  - Administer IV fluids as ordered to ensure adequate hydration  - Administer ordered medications as needed  - Offer frequent toileting  - Follow urinary retention protocol if ordered  Outcome: Progressing

## 2024-08-01 NOTE — ASSESSMENT & PLAN NOTE
Paroxysmal atrial fibrillation. sp ablation with Dr Alcala at [facility] 4/2024, took himself off Eliquis post procedure AMA;  has episodes on loop recorder of afib that are symptomatic, no longer taking amiodarone  Currently patient on coumadin, follows outpatient with coumadin clinic  Patient went into a fib rvr overnight on 7/26  Cardiology consulted - continue metoprolol and monitor on tele. Unable to tolerate amio and cannot take other anti arrhythmics due to esrd   Converted back into NSR on 7/26  Continue Coumadin, INR is subtherapeutic-again discussed with patient's regarding Coumadin therapy and supratherapeutic INR with risk versus benefits, side effects and alternative.  Patient remain rigid on his decision to continue Coumadin 2 mg knowing the risk.

## 2024-08-01 NOTE — PROGRESS NOTES
[facility]  Progress Note  Name: Masoud Perry I  MRN: [id number]  Unit/Bed#: -01 I Date of Admission: 7/23/2024   Date of Service: 8/1/2024 I Hospital Day: 9    Assessment & Plan   Elevated LFTs  Assessment & Plan  No personal hx of cirrhosis  Likely the cause of the elevated inr, as patient has not gotten coumadin since prior to admission   RUQ US: Limited study as above. Grossly unremarkable albeit limited evaluation of the liver.Cholecystectomy. No biliary dilation. Small volume diffuse free fluid may represent ascites and/or fluid related to peritoneal dialysis.  Suspect hepatorenal, as patient has ESRD on PD  Liver enzymes is trending down.    Bacteremia  Assessment & Plan  1 out of 2 blood cultures positive for gram-negative rods, coming back as Enterobacterales  Second blood culture no growth at 4 days  ID consulted, recommending discontinue Unasyn and start Zosyn  Anticipate 10 days of therapy on zosyn to complete on 8/2/2024-antibiotic switched to cefepime since patient liver enzyme is elevated and suspected Zosyn.    Atrial fibrillation (HCC)  Assessment & Plan  Paroxysmal atrial fibrillation. sp ablation with Dr Alcala at [facility] 4/2024, took himself off Eliquis post procedure AMA;  has episodes on loop recorder of afib that are symptomatic, no longer taking amiodarone  Currently patient on coumadin, follows outpatient with coumadin clinic  Patient went into a fib rvr overnight on 7/26  Cardiology consulted - continue metoprolol and monitor on tele. Unable to tolerate amio and cannot take other anti arrhythmics due to esrd   Converted back into NSR on 7/26  Continue Coumadin, INR is subtherapeutic-again discussed with patient's regarding Coumadin therapy and supratherapeutic INR with risk versus benefits, side effects and alternative.  Patient remain rigid on his decision to continue Coumadin 2 mg knowing the risk.    Stage 5 chronic kidney disease on peritoneal dialysis  ([facility])  Assessment & Plan  Lab Results   Component Value Date    EGFR 9 08/01/2024    EGFR 8 07/31/2024    EGFR 8 07/30/2024    CREATININE 5.80 (H) 08/01/2024    CREATININE 6.13 (H) 07/31/2024    CREATININE 6.06 (H) 07/30/2024     History of stage 5 ckd recently started on peritoneal dialysis, first treatment on 6/24. Had PD catheter placed on 6/7/2024. Was scheduled to do PD dialysis in clinic and then transition to home PD dialysis.   Last PD was done yesterday, patient currently doing every other day PD, goal for everyday per patient.   Nephro adjusting PD solution. decrease him to 2.5% 4 times daily regimen  Hypercalcemia: checking PTH, SPEP, UPEP, light chain ratio, vitamin D level, ACE, PTH related protein, adding sensipar 30 mg qd.   Avoid nephrotoxic medications, nsaids, hypotension    Anemia  Assessment & Plan  Patient with history of anemia baseline hemoglobin around 9-10 per outpatient labs  On admission, patient with hemoglobin of 10.1, decreased to 7.5, possibly secondary to volume overload, no evidence of bleeding at this time.  CT right lower extremity without evidence of bleeding.  Iron panel showing low iron  Does receive IV Venofer 200 mg with dialysis treatments as an outpatient and received 5 doses for iron deficiency anemia.   Given one dose of epogen on 7/25  Stool occult positive x2  GI consulted  -initial plan is to proceed with EGD on 7/29/2024.  But since hemoglobin remained stable, and patient INR was supratherapeutic, GI recommending hold off EGD, outpatient workup.  INR remains subtherapeutic, per discussion has done with patient, prefers to start with 2 mg Coumadin since patient is frustrated with this Coumadin therapy for last 10 years.  Patient hemoglobin 7.6, no active bleeding noted.    Lymphedema  Assessment & Plan  With history of chronic lymphedema following with cardiology outpatient. Feels that his legs have been swelling more recently, right leg more swollen than left due to  current cellulitis episode.   Transitioned from lasix infusion to torsemide 60mg bid     Acute on chronic diastolic HF (heart failure) (HCC)  Assessment & Plan  Wt Readings from Last 3 Encounters:   08/01/24 115 kg (253 lb 4.9 oz)   06/25/24 124 kg (273 lb 13 oz)   06/20/24 122 kg (270 lb)     History of chf managed by both cardiology and nephrology, on lasix 80 mg bid outpatient, patient states that he took 240 mg of lasix yesterday and again today.   Echo 4/2023: Systolic function is normal with an ejection fraction of 60-65%. Wall motion is within normal limits. There is grade I (mild) diastolic dysfunction   BNP normal  CXR: No acute cardiopulmonary disease.   Venous duplex-no DVT  Pedal pulses monitored with dopplers  Nephro: torsemide 60mg bid.  Monitor I&Os and daily weights  Continue peritoneal dialysis  Per nephrology, continue current treatment, patient and dialysis suggested.    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  Patient also has a history of CVA and paroxysmal atrial fibrillation, at this time, considering risk versus benefits, after discussion is done with the patient's, will consider to resume Coumadin.   discussed in details with patient regarding this plan.  Patient seems frustrated since patient is on Coumadin for 10 years and he is going through multiple back-and-forth for adjusting dose to maintain INR in therapeutic level.  After extensive discussion is done, patient agrees to continue Coumadin but agrees to start with 2 mg.-Patient verbalizes regarding risk versus benefits, side effects and alternatives.  INR remains subtherapeutic, patient prefers to continue 2 mg of Coumadin since he is frustrated with Coumadin therapy for last 10 years.   Verbalizes to understand the risk versus benefits, side effects and alternatives.    * Cellulitis of right lower extremity  Assessment & Plan  Presented to ED with bilateral leg swelling x 4 days. Right leg more swollen and red over the last 2 days. Significant pain and having drainage in the right leg as well. NO fevers or chills.   No recent antibiotics outpatient  CT RLE: No fluid collection seen, cellulitic changes leg, No intramuscular collection, no lytic lesion or periosteal reaction  Procal slightly elevated 0.89, now down trending   BC  gram negative rods; Enterobacterales   Follow CBC and fever curve  Per ID recommendation, continue cefepime till 2024-patient also having diarrhea, laxative discontinue.  If diarrhea persist, per ID recommendation will check for C. difficile                 VTE Pharmacologic Prophylaxis: VTE Score: 5 High Risk (Score >/= 5) - Pharmacological DVT Prophylaxis Ordered: warfarin (Coumadin). Sequential Compression Devices Ordered.    Mobility:   Basic Mobility Inpatient Raw Score: 14  JH-HLM Goal: 4: Move to chair/commode  JH-HLM Achieved: 4: Move to chair/commode  JH-HLM Goal achieved. Continue to encourage appropriate mobility.    Patient Centered Rounds: I performed bedside rounds with nursing staff today.   Discussions with Specialists or Other Care Team Provider: Nephrology, infectious disease    Education and Discussions with Family / Patient:  With patient.       Current Length of Stay: 9 day(s)  Current Patient Status: Inpatient   Certification Statement: The patient will continue to require additional inpatient hospital stay due to monitorable contact  Discharge Plan:  To be determined    Code Status: Level 3 - DNAR and DNI    Subjective:   Seen and evaluated during the run.  Resting comfortably.  Denies any complaint.  Still not agrees to increase the dose of Coumadin, will continue 2 mg.    Objective:     Vitals:   Temp (24hrs), Av.2 °F (36.8 °C), Min:97.9  °F (36.6 °C), Max:98.6 °F (37 °C)    Temp:  [97.9 °F (36.6 °C)-98.6 °F (37 °C)] 98.5 °F (36.9 °C)  HR:  [59-68] 64  Resp:  [16-31] 16  BP: (106-141)/(53-63) 110/59  SpO2:  [95 %-99 %] 99 %  Body mass index is 36.35 kg/m².     Input and Output Summary (last 24 hours):     Intake/Output Summary (Last 24 hours) at 8/1/2024 0816  Last data filed at 8/1/2024 0601  Gross per 24 hour   Intake 1100 ml   Output 3075 ml   Net -1975 ml       Physical Exam:   Physical Exam  Vitals and nursing note reviewed.   Constitutional:       Appearance: He is not ill-appearing or diaphoretic.   Eyes:      General: No scleral icterus.        Left eye: No discharge.      Extraocular Movements: Extraocular movements intact.      Conjunctiva/sclera: Conjunctivae normal.      Pupils: Pupils are equal, round, and reactive to light.   Cardiovascular:      Rate and Rhythm: Normal rate.      Heart sounds: No murmur heard.     No friction rub. No gallop.   Pulmonary:      Effort: Pulmonary effort is normal. No respiratory distress.      Breath sounds: No stridor. No wheezing or rhonchi.   Abdominal:      General: There is no distension.      Palpations: There is no mass.      Tenderness: There is no abdominal tenderness.      Hernia: No hernia is present.      Comments: Peritoneal dialysis catheter in place   Musculoskeletal:      Comments: Lower extremity is covered with dressing as well as Ace wrap.   Neurological:      Mental Status: He is alert and oriented to person, place, and time.      Cranial Nerves: No cranial nerve deficit.      Sensory: No sensory deficit.      Motor: No weakness.      Coordination: Coordination normal.         Additional Data:     Labs:  Results from last 7 days   Lab Units 08/01/24  0604   WBC Thousand/uL 6.39   HEMOGLOBIN g/dL 8.2*   HEMATOCRIT % 26.9*   PLATELETS Thousands/uL 182   SEGS PCT % 65   LYMPHO PCT % 23   MONO PCT % 6   EOS PCT % 4     Results from last 7 days   Lab Units 08/01/24  0604   SODIUM mmol/L 136    POTASSIUM mmol/L 3.2*   CHLORIDE mmol/L 100   CO2 mmol/L 26   BUN mg/dL 57*   CREATININE mg/dL 5.80*   ANION GAP mmol/L 10   CALCIUM mg/dL 9.9   ALBUMIN g/dL 2.7*   TOTAL BILIRUBIN mg/dL 0.39   ALK PHOS U/L 82   ALT U/L 100*   AST U/L 35   GLUCOSE RANDOM mg/dL 165*     Results from last 7 days   Lab Units 08/01/24  0604   INR  1.43*     Results from last 7 days   Lab Units 08/01/24  0706 07/31/24  2104 07/31/24  1604 07/31/24  1155 07/31/24  0702 07/30/24  2115 07/30/24  1619 07/30/24  1056 07/30/24  0713 07/29/24  2100 07/29/24  1629 07/29/24  1105   POC GLUCOSE mg/dl 148* 162* 114 148* 166* 144* 148* 115 107 150* 144* 219*               Lines/Drains:  Invasive Devices       Peripheral Intravenous Line  Duration             Long-Dwell Peripheral IV (Midline) 07/24/24 Left Basilic 7 days    Long-Dwell Peripheral IV (Midline) 07/30/24 Right Basilic 2 days              Line  Duration             Peritoneal Dialysis Catheter Column-disk Abdominal 55 days                          Imaging: No pertinent imaging reviewed.    Recent Cultures (last 7 days):         Last 24 Hours Medication List:   Current Facility-Administered Medications   Medication Dose Route Frequency Provider Last Rate    acetaminophen  650 mg Oral Q6H PRN Rachel Conn PA-C      aspirin  81 mg Oral Daily Rachel Conn PA-C      atorvastatin  40 mg Oral Daily With Dinner Rachel Conn PA-C      cefepime  1,000 mg Intravenous Q24H Moses Noel MD Stopped (07/31/24 2049)    cinacalcet  30 mg Oral Daily With Breakfast Jignesh Hussein MD      ferrous sulfate  325 mg Oral Daily With Breakfast Jignesh Hussein MD      HYDROcodone-acetaminophen  1 tablet Oral Q6H PRN Rachel Conn PA-C      HYDROcodone-acetaminophen  2 tablet Oral Q6H PRN Rachel Conn PA-C      HYDROmorphone  1 mg Intravenous Q3H PRN GAGANDEEP Bernardo      insulin glargine  15 Units Subcutaneous Q12H [facility] Rachel Conn PA-C      insulin lispro  1-6  Units Subcutaneous HS Rachel Conn PA-C      insulin lispro  1-6 Units Subcutaneous TID AC Nora Mathew MD      levothyroxine  125 mcg Oral Early Morning Rachel Conn PA-C      metoprolol succinate  100 mg Oral BID Rachel Conn PA-C      midodrine  10 mg Oral TID AC Jignesh Hussein MD      saccharomyces boulardii  250 mg Oral BID Moses Noel MD      simethicone  80 mg Oral 4x Daily PRN Rachel Conn PA-C      tamsulosin  0.4 mg Oral Daily With Dinner Rachel Conn PA-C      torsemide  60 mg Oral BID Ze Doty DO      warfarin  2 mg Oral Daily (warfarin) Moses Noel MD          Today, Patient Was Seen By: Moses Noel MD    **Please Note: This note may have been constructed using a voice recognition system.**

## 2024-08-01 NOTE — CASE MANAGEMENT
Case Management Discharge Planning Note    Patient name Masoud Goregh  Location /-01 MRN [id number]  : 1955 Date 2024       Current Admission Date: 2024  Current Admission Diagnosis:Cellulitis of right lower extremity   Patient Active Problem List    Diagnosis Date Noted Date Diagnosed    Elevated LFTs 2024     Patient on peritoneal dialysis ([facility]) 2024     Hypervolemia 2024     ESRD (end stage renal disease) ([facility]) 2024     Bacteremia 2024     Cellulitis of right lower extremity 2024     Atrial fibrillation ([facility]) 2024     Bilateral lower extremity edema 2023     Ureteral stone with hydronephrosis 2022     Cutaneous abscess of buttock 2022     Chronic kidney disease-mineral and bone disorder 2022     Type 2 diabetes mellitus with stage 4 chronic kidney disease, with long-term current use of insulin ([facility]) 07/15/2022     Obesity, morbid ([facility]) 07/15/2022     Secondary hyperparathyroidism of renal origin ([facility]) 07/15/2022     Stage 5 chronic kidney disease on peritoneal dialysis ([facility]) 07/15/2022     Factor 5 Leiden mutation, heterozygous ([facility]) 01/15/2022     Thrombocytopenia ([facility]) 2022     Angina at rest 2022     MI (myocardial infarction) ([facility]) 2022     History of PTCA 2022     Sleep apnea 2022     Smoking 2022     Anemia 2022     History of CVA (cerebrovascular accident) 2022     Acute on chronic diastolic HF (heart failure) ([facility]) 2022     HLD (hyperlipidemia) 2022     Lymphedema 2022     Hypothyroidism 2022       LOS (days): 9  Geometric Mean LOS (GMLOS) (days): 4.8  Days to GMLOS:-4.2     OBJECTIVE:  Risk of Unplanned Readmission Score: 28.19         Current admission status: Inpatient   Preferred Pharmacy:   [facility] - [place], [place] - [address]  [address]  [place] [address]  Phone: [phone] Fax:  [phone]    Primary Care Provider: Jignesh Baker DO    Primary Insurance: [employer]  Secondary Insurance:     DISCHARGE DETAILS:     CM left message for patient's son regarding discharge planning and update on facilities - per wife he will be here around 330pm.     CM updated referral in [place] to add additional facilities based on [employer] website.     So far the following facilities are unavailable:  - [facility] - Unable to accommodate requested clinical needs  - [facility] - Unable to accommodate requested clinical needs  - [facility] - Payor not accepted at this facility  - [facility] - no bed available    The following facilities have not reviewed the referral:  - [facility]  - [facility]  - [facility]  - [facility]    The following facilities are reviewing the referral:  - [facility]  - [facility]  - [facility]  - [facility]  - [facility]  - [facility])            Chart reviewed aware of medical management.  Pt continues with iv abx  CM will continue to follow, current dc plan remains STR - CM seeking facility that can accommodate PD.

## 2024-08-01 NOTE — ASSESSMENT & PLAN NOTE
Wt Readings from Last 3 Encounters:   08/01/24 115 kg (253 lb 4.9 oz)   06/25/24 124 kg (273 lb 13 oz)   06/20/24 122 kg (270 lb)     History of chf managed by both cardiology and nephrology, on lasix 80 mg bid outpatient, patient states that he took 240 mg of lasix yesterday and again today.   Echo 4/2023: Systolic function is normal with an ejection fraction of 60-65%. Wall motion is within normal limits. There is grade I (mild) diastolic dysfunction   BNP normal  CXR: No acute cardiopulmonary disease.   Venous duplex-no DVT  Pedal pulses monitored with dopplers  Nephro: torsemide 60mg bid.  Monitor I&Os and daily weights  Continue peritoneal dialysis  Per nephrology, continue current treatment, patient and dialysis suggested.

## 2024-08-01 NOTE — ASSESSMENT & PLAN NOTE
Lab Results   Component Value Date    EGFR 9 08/01/2024    EGFR 8 07/31/2024    EGFR 8 07/30/2024    CREATININE 5.80 (H) 08/01/2024    CREATININE 6.13 (H) 07/31/2024    CREATININE 6.06 (H) 07/30/2024     History of stage 5 ckd recently started on peritoneal dialysis, first treatment on 6/24. Had PD catheter placed on 6/7/2024. Was scheduled to do PD dialysis in clinic and then transition to home PD dialysis.   Last PD was done yesterday, patient currently doing every other day PD, goal for everyday per patient.   Nephro adjusting PD solution. decrease him to 2.5% 4 times daily regimen  Hypercalcemia: checking PTH, SPEP, UPEP, light chain ratio, vitamin D level, ACE, PTH related protein, adding sensipar 30 mg qd.   Avoid nephrotoxic medications, nsaids, hypotension

## 2024-08-01 NOTE — PROGRESS NOTES
VIRTUAL CARE DOCUMENTATION:     1. This service was provided via Telemedicine using FRS Kit     2. Parties in the room with patient during teleconsult Patient only    3. Confidentiality My office door was closed     4. Participants No one else was in the room    5. Patient acknowledged consent and understanding of privacy and security of the  Telemedicine consult. I informed the patient that I have reviewed their record in Epic and presented the opportunity for them to ask any questions regarding the visit today.  The patient agreed to participate.    6. Time spent 3 minutes          Progress Note - Infectious Disease   Masoud Perry 68 y.o. male MRN: [id number]  Unit/Bed#: -01 Encounter: [id number]      Impression/Plan:  Providencia bacteremia              Possibly secondary to right leg cellulitis. No other source appreciated, no GI,  or respiratory symptoms. Has a recently placed PD catheter but without abd pain, cloudy effluent or local signs of infection. Afebrile without leukocytosis.  Antibiotics changed to cefepime due to the concern about possible drug-induced liver injury from the Zosyn     -Continue cefepime renally dosed through tomorrow to complete 10 days total treatment  -Additional mngt as below     Right leg cellulitis              In the setting of worsening lower extremity edema, chronic lymphedema, acute on chronic diastolic CHF, ESRD. Wound cx with Pseudomonas and Enterococcus.  Unclear significance of the Enterococcus.  No clinical or CT evidence of abscess or necrotizing infection.  Leg pain is now improved.     -Continue antibiotics as above  -Surgical follow-up  -Continue serial extremity exams, optimize volume status with PD and diuretics, continue wound care, limb elevation and ace wrap and recommend premedicating to allow limb care  -Anticipate slow and prolonged recovery due to extent of tissue edema     ESRD on peritoneal dialysis              Had PD catheter placed on  6/7, tolerating well with no local signs of infection/PD related peritonitis. Dialysate wbc 8     -Management as per nephrology     4. Acute on chronic diastolic CHF, lymphedema  Risk factors for recurrent infection     5. A fib with RVR.  Now with decreased heart rate              Likely secondary to acute infection     6.  Elevated liver function test.  Consideration for the possibility of drug-induced liver injury.  Some concerned about the possibility of Zosyn playing a role although the LFTs had started to decline prior to changing the antibiotics.  Will avoid Zosyn for now.  LFTs now continue to improve.     7.  Diarrhea.  Suspected medication effect.  Less likely C. difficile colitis.  Improved with discontinuation ration of the Senokot and MiraLAX  If diarrhea persists, may need to check stool for C. difficile.    I spent 50 minutes in evaluation of the patient of which 30 minutes was in counseling/coordination of care    Discussed with the primary service the plan to continue the cefepime through tomorrow and they agree with the plan    Antibiotics:  Cefepime 3  Antibiotics 9    Subjective:  Patient has no fever, chills, sweats; no nausea, vomiting, diarrhea; no cough, shortness of breath; decreased leg pain. No new symptoms.  Feeling better overall.  Able to ambulate.    Objective:  Vitals:  Temp:  [97.9 °F (36.6 °C)-98.6 °F (37 °C)] 98.5 °F (36.9 °C)  HR:  [59-68] 64  Resp:  [16-31] 16  BP: (106-141)/(53-63) 110/59  SpO2:  [95 %-99 %] 99 %  Temp (24hrs), Av.2 °F (36.8 °C), Min:97.9 °F (36.6 °C), Max:98.6 °F (37 °C)  Current: Temperature: 98.5 °F (36.9 °C)    Documented physical exam has been primarily done by the patient's nurse and/or the primary service due to limited examination abilities on telemedicine    Physical Exam:   General Appearance:  Alert, interactive, nontoxic, no acute distress.   Throat: Oropharynx moist without lesions.    Lungs:   Clear to auscultation bilaterally; no wheezes,  rhonchi or rales; respirations unlabored   Heart:  RRR; no murmur, rub or gallop   Abdomen:   Soft, non-tender, non-distended, positive bowel sounds.     Extremities: Persistent leg edema   Skin: No new rashes or lesions. No draining wounds noted.       Labs, Imaging, & Other studies:   All pertinent labs and imaging studies were personally reviewed  Results from last 7 days   Lab Units 08/01/24  0604 07/31/24  0458 07/30/24  0648   WBC Thousand/uL 6.39 6.75 5.87   HEMOGLOBIN g/dL 8.2* 7.9* 8.0*   PLATELETS Thousands/uL 182 171 205     Results from last 7 days   Lab Units 08/01/24  0604 07/31/24  0458 07/30/24  0648   SODIUM mmol/L 136 137 137   POTASSIUM mmol/L 3.2* 3.6 3.6   CHLORIDE mmol/L 100 102 100   CO2 mmol/L 26 26 29   BUN mg/dL 57* 61* 61*   CREATININE mg/dL 5.80* 6.13* 6.06*   EGFR ml/min/1.73sq m 9 8 8   CALCIUM mg/dL 9.9 9.4 9.8   AST U/L 35 56* 123*   ALT U/L 100* 141* 210*   ALK PHOS U/L 82 84 94

## 2024-08-01 NOTE — PROGRESS NOTES
Pastoral Care Progress Note    2024  Patient: Masoud Perry : 1955  Admission Date & Time: 2024 1240  MRN: [id number] [facility]: [id number]                     Chaplaincy Interventions Utilized:   Empowerment: Normalized experience of patient/family, Provided anxiety containment, and Reframed experience of patient/family    Exploration: Explored hope and Facilitated expression of regret    Relationship Building: Cultivated a relationship of care and support and Listened empathically    Ritual: Provided prayer and Read sacred text    Chaplaincy Outcomes Achieved:  Distress reduced, Expressed gratitude, Expressed humor, Expressed peace, Expressed ultimate hope, and Progressed toward meaning    Spiritual Coping Strategies Utilized:   Spiritual comfort, Spiritual gratitude, and Positive spiritual reframing    Pt seemed aloof when I first started talking to him, but we had a good visit. He shared that he has been having a number of health problems in recent years, and that his wife is . His son also got  in  and moved out. These changes and his health problems have caused him to be sad at times. He is a faithful man and appreciated prayer and scripture. I read to him from [facility].

## 2024-08-02 LAB
ALBUMIN SERPL BCG-MCNC: 2.9 G/DL (ref 3.5–5)
ALP SERPL-CCNC: 80 U/L (ref 34–104)
ALT SERPL W P-5'-P-CCNC: 82 U/L (ref 7–52)
ANION GAP SERPL CALCULATED.3IONS-SCNC: 9 MMOL/L (ref 4–13)
AST SERPL W P-5'-P-CCNC: 26 U/L (ref 13–39)
BASOPHILS # BLD AUTO: 0.03 THOUSANDS/ÂΜL (ref 0–0.1)
BASOPHILS NFR BLD AUTO: 0 % (ref 0–1)
BILIRUB SERPL-MCNC: 0.38 MG/DL (ref 0.2–1)
BUN SERPL-MCNC: 53 MG/DL (ref 5–25)
CALCIUM ALBUM COR SERPL-MCNC: 11.2 MG/DL (ref 8.3–10.1)
CALCIUM SERPL-MCNC: 10.3 MG/DL (ref 8.4–10.2)
CHLORIDE SERPL-SCNC: 102 MMOL/L (ref 96–108)
CO2 SERPL-SCNC: 27 MMOL/L (ref 21–32)
CREAT SERPL-MCNC: 5.95 MG/DL (ref 0.6–1.3)
EOSINOPHIL # BLD AUTO: 0.27 THOUSAND/ÂΜL (ref 0–0.61)
EOSINOPHIL NFR BLD AUTO: 4 % (ref 0–6)
ERYTHROCYTE [DISTWIDTH] IN BLOOD BY AUTOMATED COUNT: 16.8 % (ref 11.6–15.1)
GFR SERPL CREATININE-BSD FRML MDRD: 8 ML/MIN/1.73SQ M
GLUCOSE SERPL-MCNC: 117 MG/DL (ref 65–140)
GLUCOSE SERPL-MCNC: 121 MG/DL (ref 65–140)
GLUCOSE SERPL-MCNC: 124 MG/DL (ref 65–140)
GLUCOSE SERPL-MCNC: 143 MG/DL (ref 65–140)
GLUCOSE SERPL-MCNC: 150 MG/DL (ref 65–140)
HCT VFR BLD AUTO: 27.3 % (ref 36.5–49.3)
HGB BLD-MCNC: 8.4 G/DL (ref 12–17)
IMM GRANULOCYTES # BLD AUTO: 0.1 THOUSAND/UL (ref 0–0.2)
IMM GRANULOCYTES NFR BLD AUTO: 1 % (ref 0–2)
INR PPP: 1.75 (ref 0.85–1.19)
LYMPHOCYTES # BLD AUTO: 2.02 THOUSANDS/ÂΜL (ref 0.6–4.47)
LYMPHOCYTES NFR BLD AUTO: 27 % (ref 14–44)
MAGNESIUM SERPL-MCNC: 1.9 MG/DL (ref 1.9–2.7)
MCH RBC QN AUTO: 31.5 PG (ref 26.8–34.3)
MCHC RBC AUTO-ENTMCNC: 30.8 G/DL (ref 31.4–37.4)
MCV RBC AUTO: 102 FL (ref 82–98)
MONOCYTES # BLD AUTO: 0.41 THOUSAND/ÂΜL (ref 0.17–1.22)
MONOCYTES NFR BLD AUTO: 5 % (ref 4–12)
NEUTROPHILS # BLD AUTO: 4.76 THOUSANDS/ÂΜL (ref 1.85–7.62)
NEUTS SEG NFR BLD AUTO: 63 % (ref 43–75)
NRBC BLD AUTO-RTO: 0 /100 WBCS
PLATELET # BLD AUTO: 177 THOUSANDS/UL (ref 149–390)
PMV BLD AUTO: 9.1 FL (ref 8.9–12.7)
POTASSIUM SERPL-SCNC: 3.4 MMOL/L (ref 3.5–5.3)
PROT SERPL-MCNC: 6.1 G/DL (ref 6.4–8.4)
PROTHROMBIN TIME: 20.7 SECONDS (ref 12.3–15)
RBC # BLD AUTO: 2.67 MILLION/UL (ref 3.88–5.62)
SODIUM SERPL-SCNC: 138 MMOL/L (ref 135–147)
WBC # BLD AUTO: 7.59 THOUSAND/UL (ref 4.31–10.16)

## 2024-08-02 PROCEDURE — 85025 COMPLETE CBC W/AUTO DIFF WBC: CPT | Performed by: FAMILY MEDICINE

## 2024-08-02 PROCEDURE — 99232 SBSQ HOSP IP/OBS MODERATE 35: CPT | Performed by: INTERNAL MEDICINE

## 2024-08-02 PROCEDURE — 97530 THERAPEUTIC ACTIVITIES: CPT | Performed by: PHYSICAL THERAPIST

## 2024-08-02 PROCEDURE — 99232 SBSQ HOSP IP/OBS MODERATE 35: CPT | Performed by: FAMILY MEDICINE

## 2024-08-02 PROCEDURE — 83735 ASSAY OF MAGNESIUM: CPT | Performed by: INTERNAL MEDICINE

## 2024-08-02 PROCEDURE — 80053 COMPREHEN METABOLIC PANEL: CPT | Performed by: FAMILY MEDICINE

## 2024-08-02 PROCEDURE — 85610 PROTHROMBIN TIME: CPT | Performed by: FAMILY MEDICINE

## 2024-08-02 PROCEDURE — 82948 REAGENT STRIP/BLOOD GLUCOSE: CPT

## 2024-08-02 PROCEDURE — 97116 GAIT TRAINING THERAPY: CPT | Performed by: PHYSICAL THERAPIST

## 2024-08-02 RX ORDER — CEFEPIME HYDROCHLORIDE 1 G/50ML
1000 INJECTION, SOLUTION INTRAVENOUS EVERY 24 HOURS
Status: COMPLETED | OUTPATIENT
Start: 2024-08-02 | End: 2024-08-02

## 2024-08-02 RX ORDER — POTASSIUM CHLORIDE 20 MEQ/1
40 TABLET, EXTENDED RELEASE ORAL ONCE
Status: COMPLETED | OUTPATIENT
Start: 2024-08-02 | End: 2024-08-02

## 2024-08-02 RX ADMIN — HYDROCODONE BITARTRATE AND ACETAMINOPHEN 2 TABLET: 5; 325 TABLET ORAL at 18:04

## 2024-08-02 RX ADMIN — TORSEMIDE 60 MG: 20 TABLET ORAL at 21:51

## 2024-08-02 RX ADMIN — HYDROCODONE BITARTRATE AND ACETAMINOPHEN 2 TABLET: 5; 325 TABLET ORAL at 11:48

## 2024-08-02 RX ADMIN — TAMSULOSIN HYDROCHLORIDE 0.4 MG: 0.4 CAPSULE ORAL at 17:10

## 2024-08-02 RX ADMIN — INSULIN GLARGINE 15 UNITS: 100 INJECTION, SOLUTION SUBCUTANEOUS at 21:51

## 2024-08-02 RX ADMIN — METOPROLOL SUCCINATE 100 MG: 100 TABLET, EXTENDED RELEASE ORAL at 21:51

## 2024-08-02 RX ADMIN — LEVOTHYROXINE SODIUM 125 MCG: 125 TABLET ORAL at 05:48

## 2024-08-02 RX ADMIN — WARFARIN SODIUM 2 MG: 2 TABLET ORAL at 17:58

## 2024-08-02 RX ADMIN — ASPIRIN 81 MG: 81 TABLET, COATED ORAL at 08:15

## 2024-08-02 RX ADMIN — POTASSIUM CHLORIDE 40 MEQ: 1500 TABLET, EXTENDED RELEASE ORAL at 05:48

## 2024-08-02 RX ADMIN — MIDODRINE HYDROCHLORIDE 10 MG: 5 TABLET ORAL at 16:58

## 2024-08-02 RX ADMIN — TORSEMIDE 60 MG: 20 TABLET ORAL at 08:14

## 2024-08-02 RX ADMIN — INSULIN GLARGINE 15 UNITS: 100 INJECTION, SOLUTION SUBCUTANEOUS at 08:15

## 2024-08-02 RX ADMIN — HYDROCODONE BITARTRATE AND ACETAMINOPHEN 2 TABLET: 5; 325 TABLET ORAL at 05:48

## 2024-08-02 RX ADMIN — Medication 250 MG: at 17:59

## 2024-08-02 RX ADMIN — ATORVASTATIN CALCIUM 40 MG: 40 TABLET, FILM COATED ORAL at 17:10

## 2024-08-02 RX ADMIN — Medication 250 MG: at 08:14

## 2024-08-02 RX ADMIN — INSULIN LISPRO 1 UNITS: 100 INJECTION, SOLUTION INTRAVENOUS; SUBCUTANEOUS at 11:49

## 2024-08-02 RX ADMIN — METOPROLOL SUCCINATE 100 MG: 100 TABLET, EXTENDED RELEASE ORAL at 08:24

## 2024-08-02 RX ADMIN — CEFEPIME HYDROCHLORIDE 1000 MG: 1 INJECTION, SOLUTION INTRAVENOUS at 16:58

## 2024-08-02 RX ADMIN — MIDODRINE HYDROCHLORIDE 10 MG: 5 TABLET ORAL at 08:00

## 2024-08-02 RX ADMIN — MIDODRINE HYDROCHLORIDE 10 MG: 5 TABLET ORAL at 11:48

## 2024-08-02 RX ADMIN — HYDROMORPHONE HYDROCHLORIDE 1 MG: 1 INJECTION, SOLUTION INTRAMUSCULAR; INTRAVENOUS; SUBCUTANEOUS at 04:23

## 2024-08-02 RX ADMIN — FERROUS SULFATE TAB 325 MG (65 MG ELEMENTAL FE) 325 MG: 325 (65 FE) TAB at 08:15

## 2024-08-02 RX ADMIN — CINACALCET 30 MG: 30 TABLET, FILM COATED ORAL at 08:14

## 2024-08-02 NOTE — PLAN OF CARE
Problem: Prexisting or High Potential for Compromised Skin Integrity  Goal: Skin integrity is maintained or improved  Description: INTERVENTIONS:  - Identify patients at risk for skin breakdown  - Assess and monitor skin integrity  - Assess and monitor nutrition and hydration status  - Monitor labs   - Assess for incontinence   - Turn and reposition patient  - Assist with mobility/ambulation  - Relieve pressure over bony prominences  - Avoid friction and shearing  - Provide appropriate hygiene as needed including keeping skin clean and dry  - Evaluate need for skin moisturizer/barrier cream  - Collaborate with interdisciplinary team   - Patient/family teaching  - Consider wound care consult   Outcome: Progressing     Problem: METABOLIC, FLUID AND ELECTROLYTES - ADULT  Goal: Electrolytes maintained within normal limits  Description: INTERVENTIONS:  - Monitor labs and assess patient for signs and symptoms of electrolyte imbalances  - Administer electrolyte replacement as ordered  - Monitor response to electrolyte replacements, including repeat lab results as appropriate  - Instruct patient on fluid and nutrition as appropriate  Outcome: Progressing  Goal: Fluid balance maintained  Description: INTERVENTIONS:  - Monitor labs   - Monitor I/O and WT  - Instruct patient on fluid and nutrition as appropriate  - Assess for signs & symptoms of volume excess or deficit  Outcome: Progressing  Goal: Glucose maintained within target range  Description: INTERVENTIONS:  - Monitor Blood Glucose as ordered  - Assess for signs and symptoms of hyperglycemia and hypoglycemia  - Administer ordered medications to maintain glucose within target range  - Assess nutritional intake and initiate nutrition service referral as needed  Outcome: Progressing     Problem: SKIN/TISSUE INTEGRITY - ADULT  Goal: Incision(s), wounds(s) or drain site(s) healing without S/S of infection  Description: INTERVENTIONS  - Assess and document dressing,  incision, wound bed, drain sites and surrounding tissue  - Provide patient and family education  - Perform skin care/dressing changes per wound  Outcome: Progressing     Problem: GENITOURINARY - ADULT  Goal: Maintains or returns to baseline urinary function  Description: INTERVENTIONS:  - Assess urinary function  - Encourage oral fluids to ensure adequate hydration if ordered  - Administer IV fluids as ordered to ensure adequate hydration  - Administer ordered medications as needed  - Offer frequent toileting  - Follow urinary retention protocol if ordered  Outcome: Progressing     Problem: PAIN - ADULT  Goal: Verbalizes/displays adequate comfort level or baseline comfort level  Description: Interventions:  - Encourage patient to monitor pain and request assistance  - Assess pain using appropriate pain scale  - Administer analgesics based on type and severity of pain and evaluate response  - Implement non-pharmacological measures as appropriate and evaluate response  - Consider cultural and social influences on pain and pain management  - Notify physician/advanced practitioner if interventions unsuccessful or patient reports new pain  Outcome: Progressing     Problem: INFECTION - ADULT  Goal: Absence or prevention of progression during hospitalization  Description: INTERVENTIONS:  - Assess and monitor for signs and symptoms of infection  - Monitor lab/diagnostic results  - Monitor all insertion sites, i.e. indwelling lines, tubes, and drains  - Monitor endotracheal if appropriate and nasal secretions for changes in amount and color  - [place] appropriate cooling/warming therapies per order  - Administer medications as ordered  - Instruct and encourage patient and family to use good hand hygiene technique  - Identify and instruct in appropriate isolation precautions for identified infection/condition  Outcome: Progressing     Problem: SAFETY ADULT  Goal: Patient will remain free of falls  Description:  INTERVENTIONS:  - Educate patient/family on patient safety including physical limitations  - Instruct patient to call for assistance with activity   - Consult OT/PT to assist with strengthening/mobility   - Keep Call bell within reach  - Keep bed low and locked with side rails adjusted as appropriate  - Keep care items and personal belongings within reach  - Initiate and maintain comfort rounds  - Make Fall Risk Sign visible to staff  - Offer Toileting every 2 Hours, in advance of need if unable to notify staff of need  - Initiate/Maintain bed/chair alarm for confusion, impulsivity, or noncompliance with notifying staff.  - Apply yellow socks and bracelet for high fall risk patients  - Consider moving patient to room near nurses station  Outcome: Progressing  Goal: Maintain or return to baseline ADL function  Description: INTERVENTIONS:  -  Assess patient's ability to carry out ADLs; assess patient's baseline for ADL function and identify physical deficits which impact ability to perform ADLs (bathing, care of mouth/teeth, toileting, grooming, dressing, etc.)  - Assess/evaluate cause of self-care deficits   - Assess range of motion  - Assess patient's mobility; develop plan if impaired  - Assess patient's need for assistive devices and provide as appropriate  - Encourage maximum independence but intervene and supervise when necessary  - Involve family in performance of ADLs  - Assess for home care needs following discharge   - Consider OT consult to assist with ADL evaluation and planning for discharge  - Provide patient education as appropriate  Outcome: Progressing  Goal: Maintains/Returns to pre admission functional level  Description: INTERVENTIONS:  - Perform AM-PAC 6 Click Basic Mobility/ Daily Activity assessment daily.  - Set and communicate daily mobility goal to care team and patient/family/caregiver.   - Collaborate with rehabilitation services on mobility goals if consulted  - Perform Range of Motion 3  times a day.  - Reposition patient every 2 hours if unable to reposition self  - Out of bed for toileting if medically appropriate  - Record patient progress and toleration of activity level   Outcome: Progressing     Problem: DISCHARGE PLANNING  Goal: Discharge to home or other facility with appropriate resources  Description: INTERVENTIONS:  - Identify barriers to discharge w/patient and caregiver  - Arrange for needed discharge resources and transportation as appropriate  - Identify discharge learning needs (meds, wound care, etc.)  - Arrange for interpretive services to assist at discharge as needed  - Refer to Case Management Department for coordinating discharge planning if the patient needs post-hospital services based on physician/advanced practitioner order or complex needs related to functional status, cognitive ability, or social support system  Outcome: Progressing     Problem: Knowledge Deficit  Goal: Patient/family/caregiver demonstrates understanding of disease process, treatment plan, medications, and discharge instructions  Description: Complete learning assessment and assess knowledge base.  Interventions:  - Provide teaching at level of understanding  - Provide teaching via preferred learning methods  Outcome: Progressing     Problem: Nutrition/Hydration-ADULT  Goal: Nutrient/Hydration intake appropriate for improving, restoring or maintaining nutritional needs  Description: Monitor and assess patient's nutrition/hydration status for malnutrition. Collaborate with interdisciplinary team and initiate plan and interventions as ordered.  Monitor patient's weight and dietary intake as ordered or per policy. Utilize nutrition screening tool and intervene as necessary. Determine patient's food preferences and provide high-protein, high-caloric foods as appropriate.     INTERVENTIONS:  - Monitor oral intake, urinary output, labs, and treatment plans  - Assess nutrition and hydration status and recommend  course of action  - Evaluate amount of meals eaten  - Assist patient with eating if necessary   - Allow adequate time for meals  - Recommend/ encourage appropriate diets, oral nutritional supplements, and vitamin/mineral supplements  - Order, calculate, and assess calorie counts as needed  - Recommend, monitor, and adjust tube feedings and TPN/PPN based on assessed needs  - Assess need for intravenous fluids  - Provide specific nutrition/hydration education as appropriate  - Include patient/family/caregiver in decisions related to nutrition  Outcome: Progressing

## 2024-08-02 NOTE — PROGRESS NOTES
[facility]  Name: Masoud Perry I  MRN: [id number]  Unit/Bed#: -01 I Date of Admission: 7/23/2024   Date of Service: 8/2/2024 I Hospital Day: 10    Assessment & Plan   Elevated LFTs  Assessment & Plan  No personal hx of cirrhosis  Likely the cause of the elevated inr, as patient has not gotten coumadin since prior to admission   RUQ US: Limited study as above. Grossly unremarkable albeit limited evaluation of the liver.Cholecystectomy. No biliary dilation. Small volume diffuse free fluid may represent ascites and/or fluid related to peritoneal dialysis.  Suspect hepatorenal, as patient has ESRD on PD  Liver enzymes is trending down.    Bacteremia  Assessment & Plan  1 out of 2 blood cultures positive for gram-negative rods, coming back as Enterobacterales  Second blood culture no growth at 4 days  ID consulted, recommending discontinue Unasyn and start Zosyn  Anticipate 10 days of therapy on zosyn to complete on 8/2/2024-antibiotic switched to cefepime since patient liver enzyme is elevated and suspected Zosyn-patient is finishing up antibiotic today.    Atrial fibrillation (HCC)  Assessment & Plan  Paroxysmal atrial fibrillation. [place] [facility] [place] took himself off Eliquis post procedure AMA;  has episodes on loop recorder of afib that are symptomatic, no longer taking amiodarone  Currently patient on coumadin, follows outpatient with coumadin clinic  Patient went into a fib rvr overnight on 7/26  Cardiology consulted - continue metoprolol and monitor on tele. Unable to tolerate amio and cannot take other anti arrhythmics due to esrd   Converted back into NSR on 7/26  Continue Coumadin, INR is subtherapeutic-again discussed with patient's regarding Coumadin therapy and supratherapeutic INR with risk versus benefits, side effects and alternative.  Patient remain rigid on his decision to continue Coumadin 2 mg knowing the risk.  INR is trending up,  today INR is 1.75.  Continue 2 mg Coumadin    Stage 5 chronic kidney disease on peritoneal dialysis (HCC)  Assessment & Plan  Lab Results   Component Value Date    EGFR 8 08/02/2024    EGFR 9 08/01/2024    EGFR 8 07/31/2024    CREATININE 5.95 (H) 08/02/2024    CREATININE 5.80 (H) 08/01/2024    CREATININE 6.13 (H) 07/31/2024     History of stage 5 ckd recently started on peritoneal dialysis, first treatment on 6/24. Had PD catheter placed on 6/7/2024. Was scheduled to do PD dialysis in clinic and then transition to home PD dialysis.   Last PD was done yesterday, patient currently doing every other day PD, goal for everyday per patient.   Nephro adjusting PD solution. decrease him to 2.5% 4 times daily regimen  Hypercalcemia: PTH, SPEP, UPEP, light chain ratio, vitamin D level, ACE, PTH related protein, adding sensipar 30 mg qd.   Avoid nephrotoxic medications, nsaids, hypotension  PT OT recommending acute rehab.  Discussed in details with patient in presence of case management regarding the recommendations.  Patient is peritoneal dialysis dependent, it will take about 2 weeks to find a place and arrange everything.  After hearing that, patient does not want to stay in the hospital for 2 weeks, prefers to go home with home health aide.  Management on board.    Anemia  Assessment & Plan  Patient with history of anemia baseline hemoglobin around 9-10 per outpatient labs  On admission, patient with hemoglobin of 10.1, decreased to 7.5, possibly secondary to volume overload, no evidence of bleeding at this time.  CT right lower extremity without evidence of bleeding.  Iron panel showing low iron  Does receive IV Venofer 200 mg with dialysis treatments as an outpatient and received 5 doses for iron deficiency anemia.   Given one dose of epogen on 7/25  Stool occult positive x2  GI consulted  -initial plan is to proceed with EGD on 7/29/2024.  But since hemoglobin remained stable, and patient INR was supratherapeutic, GI  recommending hold off EGD, outpatient workup.  INR remains subtherapeutic, per discussion has done with patient, prefers to start with 2 mg Coumadin since patient is frustrated with this Coumadin therapy for last 10 years.  Hemoglobin remained stable    Lymphedema  Assessment & Plan  With history of chronic lymphedema following with cardiology outpatient. Feels that his legs have been swelling more recently, right leg more swollen than left due to current cellulitis episode.   Transitioned from lasix infusion to torsemide 60mg bid     Acute on chronic diastolic HF (heart failure) (HCC)  Assessment & Plan  Wt Readings from Last 3 Encounters:   08/02/24 113 kg (250 lb)   06/25/24 124 kg (273 lb 13 oz)   06/20/24 122 kg (270 lb)     History of chf managed by both cardiology and nephrology, on lasix 80 mg bid outpatient, patient states that he took 240 mg of lasix yesterday and again today.   Echo 4/2023: Systolic function is normal with an ejection fraction of 60-65%. Wall motion is within normal limits. There is grade I (mild) diastolic dysfunction   BNP normal  CXR: No acute cardiopulmonary disease.   Venous duplex-no DVT  Pedal pulses monitored with dopplers  Nephro: torsemide 60mg bid.  Monitor I&Os and daily weights  Continue peritoneal dialysis  Per nephrology, continue current treatment, patient and dialysis suggested.    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  Patient also has a history of CVA and paroxysmal atrial fibrillation, at this time, considering risk versus benefits, after discussion is done with the patient's, will consider to resume Coumadin.   discussed in details with patient regarding this plan.  Patient seems frustrated since patient is on Coumadin for  10 years and he is going through multiple back-and-forth for adjusting dose to maintain INR in therapeutic level.  After extensive discussion is done, patient agrees to continue Coumadin but agrees to start with 2 mg.-Patient verbalizes regarding risk versus benefits, side effects and alternatives.  INR remains subtherapeutic, patient prefers to continue 2 mg of Coumadin since he is frustrated with Coumadin therapy for last 10 years.  Verbalizes to understand the risk versus benefits, side effects and alternatives.    * Cellulitis of right lower extremity  Assessment & Plan  Presented to ED with bilateral leg swelling x 4 days. Right leg more swollen and red over the last 2 days. Significant pain and having drainage in the right leg as well. NO fevers or chills.   No recent antibiotics outpatient  CT RLE: No fluid collection seen, cellulitic changes leg, No intramuscular collection, no lytic lesion or periosteal reaction  Procal slightly elevated 0.89, now down trending   BC 1/2 gram negative rods; Enterobacterales   Follow CBC and fever curve  Patient is finishing up antibiotic today.  Bowel movement improved.                   VTE Pharmacologic Prophylaxis: VTE Score: 5 High Risk (Score >/= 5) - Pharmacological DVT Prophylaxis Ordered: warfarin (Coumadin). Sequential Compression Devices Ordered.    Mobility:   Basic Mobility Inpatient Raw Score: 16  JH-HLM Goal: 5: Stand one or more mins  JH-HLM Achieved: 6: Walk 10 steps or more  JH-HLM Goal achieved. Continue to encourage appropriate mobility.    Patient Centered Rounds: I performed bedside rounds with nursing staff today.   Discussions with Specialists or Other Care Team Provider: Nephrology, infectious disease    Education and Discussions with Family / Patient:  with patient.     Current Length of Stay: 10 day(s)  Current Patient Status: Inpatient   Certification Statement: The patient will continue to require additional inpatient hospital stay due to  monitorable conditions  Discharge Plan: Anticipate discharge in 24-48 hrs to home with home services.    Code Status: Level 3 - DNAR and DNI    Subjective:   Seen and evaluated during the run.  Resting comfortably.  Denies any significant complaint.  Has 1 bowel movement, loose.    Objective:     Vitals:   Temp (24hrs), Av.1 °F (36.7 °C), Min:97.1 °F (36.2 °C), Max:98.8 °F (37.1 °C)    Temp:  [97.1 °F (36.2 °C)-98.8 °F (37.1 °C)] 98.1 °F (36.7 °C)  HR:  [66-83] 66  Resp:  [16-26] 20  BP: (106-126)/(57-62) 111/58  SpO2:  [75 %-98 %] 95 %  Body mass index is 35.87 kg/m².     Input and Output Summary (last 24 hours):     Intake/Output Summary (Last 24 hours) at 2024 1415  Last data filed at 2024 1028  Gross per 24 hour   Intake 590 ml   Output 750 ml   Net -160 ml       Physical Exam:   Physical Exam  Vitals and nursing note reviewed.   Constitutional:       Appearance: Normal appearance. He is not ill-appearing or diaphoretic.   Eyes:      General: No scleral icterus.        Left eye: No discharge.      Extraocular Movements: Extraocular movements intact.      Conjunctiva/sclera: Conjunctivae normal.      Pupils: Pupils are equal, round, and reactive to light.   Cardiovascular:      Rate and Rhythm: Normal rate.      Heart sounds: No murmur heard.     No friction rub. No gallop.   Pulmonary:      Effort: Pulmonary effort is normal. No respiratory distress.      Breath sounds: No stridor. No wheezing or rhonchi.   Abdominal:      General: There is no distension.      Palpations: There is no mass.      Tenderness: There is no abdominal tenderness.      Hernia: No hernia is present.   Musculoskeletal:      Right lower leg: Edema present.      Left lower leg: Edema present.   Neurological:      Mental Status: He is alert and oriented to person, place, and time.      Cranial Nerves: No cranial nerve deficit.      Sensory: No sensory deficit.      Motor: No weakness.      Coordination: Coordination normal.           Additional Data:     Labs:  Results from last 7 days   Lab Units 08/02/24  0441   WBC Thousand/uL 7.59   HEMOGLOBIN g/dL 8.4*   HEMATOCRIT % 27.3*   PLATELETS Thousands/uL 177   SEGS PCT % 63   LYMPHO PCT % 27   MONO PCT % 5   EOS PCT % 4     Results from last 7 days   Lab Units 08/02/24  0441   SODIUM mmol/L 138   POTASSIUM mmol/L 3.4*   CHLORIDE mmol/L 102   CO2 mmol/L 27   BUN mg/dL 53*   CREATININE mg/dL 5.95*   ANION GAP mmol/L 9   CALCIUM mg/dL 10.3*   ALBUMIN g/dL 2.9*   TOTAL BILIRUBIN mg/dL 0.38   ALK PHOS U/L 80   ALT U/L 82*   AST U/L 26   GLUCOSE RANDOM mg/dL 117     Results from last 7 days   Lab Units 08/02/24  0441   INR  1.75*     Results from last 7 days   Lab Units 08/02/24  1103 08/02/24  0736 08/01/24  2137 08/01/24  1635 08/01/24  1119 08/01/24  0706 07/31/24  2104 07/31/24  1604 07/31/24  1155 07/31/24  0702 07/30/24  2115 07/30/24  1619   POC GLUCOSE mg/dl 150* 143* 125 137 131 148* 162* 114 148* 166* 144* 148*               Lines/Drains:  Invasive Devices       Peripheral Intravenous Line  Duration             Long-Dwell Peripheral IV (Midline) 07/24/24 Left Basilic 8 days              Line  Duration             Peritoneal Dialysis Catheter Column-disk Abdominal 56 days                          Imaging: No pertinent imaging reviewed.    Recent Cultures (last 7 days):         Last 24 Hours Medication List:   Current Facility-Administered Medications   Medication Dose Route Frequency Provider Last Rate    acetaminophen  650 mg Oral Q6H PRN Rachel Conn PA-C      aspirin  81 mg Oral Daily Rachel Conn PA-C      atorvastatin  40 mg Oral Daily With Dinner Rachel Conn PA-C      cefepime  1,000 mg Intravenous Q24H Sid Pedersen MD      cinacalcet  30 mg Oral Daily With Breakfast Jignesh Hussein MD      ferrous sulfate  325 mg Oral Daily With Breakfast Jignesh Hussein MD      HYDROcodone-acetaminophen  1 tablet Oral Q6H PRN Rachel Conn PA-C       HYDROcodone-acetaminophen  2 tablet Oral Q6H PRN Rachel Conn PA-C      HYDROmorphone  1 mg Intravenous Q3H PRN Citlali Yates, CRNP      insulin glargine  15 Units Subcutaneous Q12H VASU Rachel Conn PA-C      insulin lispro  1-6 Units Subcutaneous HS Rachel Conn PA-C      insulin lispro  1-6 Units Subcutaneous TID AC Nora Mathew MD      levothyroxine  125 mcg Oral Early Morning Rachel Conn PA-C      metoprolol succinate  100 mg Oral BID Rachel Conn PA-C      midodrine  10 mg Oral TID AC Jignesh Hussein MD      saccharomyces boulardii  250 mg Oral BID Moses Noel MD      simethicone  80 mg Oral 4x Daily PRN Rachel Conn PA-C      tamsulosin  0.4 mg Oral Daily With Dinner Rachel Conn PA-C      torsemide  60 mg Oral BID Ze Doty,       warfarin  2 mg Oral Daily (warfarin) Moses Noel MD          Today, Patient Was Seen By: Moses Noel MD    **Please Note: This note may have been constructed using a voice recognition system.**

## 2024-08-02 NOTE — ASSESSMENT & PLAN NOTE
Lab Results   Component Value Date    EGFR 8 08/02/2024    EGFR 9 08/01/2024    EGFR 8 07/31/2024    CREATININE 5.95 (H) 08/02/2024    CREATININE 5.80 (H) 08/01/2024    CREATININE 6.13 (H) 07/31/2024     History of stage 5 ckd recently started on peritoneal dialysis, first treatment on 6/24. Had PD catheter placed on 6/7/2024. Was scheduled to do PD dialysis in clinic and then transition to home PD dialysis.   Last PD was done yesterday, patient currently doing every other day PD, goal for everyday per patient.   Nephro adjusting PD solution. decrease him to 2.5% 4 times daily regimen  Hypercalcemia: PTH, SPEP, UPEP, light chain ratio, vitamin D level, ACE, PTH related protein, adding sensipar 30 mg qd.   Avoid nephrotoxic medications, nsaids, hypotension  PT OT recommending acute rehab.  Discussed in details with patient in presence of case management regarding the recommendations.  Patient is peritoneal dialysis dependent, it will take about 2 weeks to find a place and arrange everything.  After hearing that, patient does not want to stay in the hospital for 2 weeks, prefers to go home with home health aide.  Management on board.

## 2024-08-02 NOTE — CASE MANAGEMENT
Case Management Discharge Planning Note    Patient name Masoud Goregh  Location /-01 MRN [id number]  : 1955 Date 2024       Current Admission Date: 2024  Current Admission Diagnosis:Cellulitis of right lower extremity   Patient Active Problem List    Diagnosis Date Noted Date Diagnosed    Elevated LFTs 2024     Patient on peritoneal dialysis ([facility]) 2024     Hypervolemia 2024     ESRD (end stage renal disease) ([facility]) 2024     Bacteremia 2024     Cellulitis of right lower extremity 2024     Atrial fibrillation ([facility]) 2024     Bilateral lower extremity edema 2023     Ureteral stone with hydronephrosis 2022     Cutaneous abscess of buttock 2022     Chronic kidney disease-mineral and bone disorder 2022     Type 2 diabetes mellitus with stage 4 chronic kidney disease, with long-term current use of insulin ([facility]) 07/15/2022     Obesity, morbid ([facility]) 07/15/2022     Secondary hyperparathyroidism of renal origin ([facility]) 07/15/2022     Stage 5 chronic kidney disease on peritoneal dialysis ([facility]) 07/15/2022     Factor 5 Leiden mutation, heterozygous ([facility]) 01/15/2022     Thrombocytopenia ([facility]) 2022     Angina at rest 2022     MI (myocardial infarction) ([facility]) 2022     History of PTCA 2022     Sleep apnea 2022     Smoking 2022     Anemia 2022     History of CVA (cerebrovascular accident) 2022     Acute on chronic diastolic HF (heart failure) ([facility]) 2022     HLD (hyperlipidemia) 2022     Lymphedema 2022     Hypothyroidism 2022       LOS (days): 10  Geometric Mean LOS (GMLOS) (days): 4.8  Days to GMLOS:-5.3     OBJECTIVE:  Risk of Unplanned Readmission Score: 26.2         Current admission status: Inpatient   Preferred Pharmacy:   [facility] - [place], [place] - [address]  [address]  [place] [address]  Phone: [phone] Fax:  [phone]    Primary Care Provider: Jignesh Baker DO    Primary Insurance: [employer]  REP  Secondary Insurance:     DISCHARGE DETAILS:     ETA of Transport (Date): 08/03/24  ETA of Transport (Time): 1030              CM spoke to daughter regarding discharge plan. She can  patient at 1030am.     CM to follow patient's care and discharge needs.

## 2024-08-02 NOTE — PROGRESS NOTES
VIRTUAL CARE DOCUMENTATION:     1. This service was provided via Telemedicine using [employer]     2. Parties in the room with patient during teleconsult Patient only    3. Confidentiality My office door was closed     4. Participants No one else was in the room    5. Patient acknowledged consent and understanding of privacy and security of the  Telemedicine consult. I informed the patient that I have reviewed their record in Epic and presented the opportunity for them to ask any questions regarding the visit today.  The patient agreed to participate.    6. Time spent 5 minutes          Progress Note - Infectious Disease   Masoud Perry 68 y.o. male MRN: [id number]  Unit/Bed#: -01 Encounter: [id number]      Impression/Plan:  Providencia bacteremia              Possibly secondary to right leg cellulitis. No other source appreciated, no GI,  or respiratory symptoms. Has a recently placed PD catheter but without abd pain, cloudy effluent or local signs of infection. Afebrile without leukocytosis.  Antibiotics changed to cefepime due to the concern about possible drug-induced liver injury from the Zosyn     -Discontinue cefepime after dose today  -Will move up dosing to 3 PM to facilitate discharge if that is planned.  -Additional mngt as below     Right leg cellulitis              In the setting of worsening lower extremity edema, chronic lymphedema, acute on chronic diastolic CHF, ESRD. Wound cx with Pseudomonas and Enterococcus.  Unclear significance of the Enterococcus.  No clinical or CT evidence of abscess or necrotizing infection.  Leg pain is now improved.     -Continue antibiotics as above  -Surgical follow-up  -Continue serial extremity exams, optimize volume status with PD and diuretics, continue wound care, limb elevation and ace wrap and recommend premedicating to allow limb care  -Anticipate slow and prolonged recovery due to extent of tissue edema     ESRD on peritoneal dialysis               Had PD catheter placed on , tolerating well with no local signs of infection/PD related peritonitis. Dialysate wbc 8     -Management as per nephrology     4. Acute on chronic diastolic CHF, lymphedema  Risk factors for recurrent infection     5. A fib with RVR.  Now with decreased heart rate              Likely secondary to acute infection     6.  Elevated liver function test.  Consideration for the possibility of drug-induced liver injury.  Some concerned about the possibility of Zosyn playing a role although the LFTs had started to decline prior to changing the antibiotics.  Will avoid Zosyn for now.  LFTs now continue to improve and are now near normal.     7.  Diarrhea.  Suspected medication effect.  Less likely C. difficile colitis.  Improved with discontinuation ration of the Senokot and MiraLAX.  Still with occasional loose stool but only 2 times per day  If diarrhea persists, may need to check stool for C. difficile.    I spent 50 minutes in evaluation of the patient of which 30 minutes was in counseling/coordination of care    Discussed with the primary service the plan to change the cefepime to a 3 PM dose and then discontinue and they agree with the plan.    Antibiotics:  Cefepime 4  Antibiotics 10    Subjective:  Patient has no fever, chills, sweats; no nausea, vomiting, still with occasional loose stool but forming; no cough, shortness of breath; no pain. No new symptoms.    Objective:  Vitals:  Temp:  [97.1 °F (36.2 °C)-98.8 °F (37.1 °C)] 97.1 °F (36.2 °C)  HR:  [61-73] 68  Resp:  [16-29] 18  BP: (110-126)/(56-62) 120/60  SpO2:  [75 %-98 %] 98 %  Temp (24hrs), Av.9 °F (36.6 °C), Min:97.1 °F (36.2 °C), Max:98.8 °F (37.1 °C)  Current: Temperature: (!) 97.1 °F (36.2 °C)    Documented physical exam has been primarily done by the patient's nurse and/or the primary service due to limited examination abilities on telemedicine    Physical Exam:   General Appearance:  Alert, interactive, nontoxic, no  acute distress.   Throat: Oropharynx moist without lesions.    Lungs:   Clear to auscultation bilaterally; no wheezes, rhonchi or rales; respirations unlabored   Heart:  RRR; no murmur, rub or gallop   Abdomen:   Soft, non-tender, non-distended, positive bowel sounds.     Extremities: Bilateral lower extremities dressed without spreading erythema.  The dressings are dry.   Skin: No new rashes or lesions. No draining wounds noted.       Labs, Imaging, & Other studies:   All pertinent labs and imaging studies were personally reviewed  Results from last 7 days   Lab Units 08/02/24  0441 08/01/24  0604 07/31/24  0458   WBC Thousand/uL 7.59 6.39 6.75   HEMOGLOBIN g/dL 8.4* 8.2* 7.9*   PLATELETS Thousands/uL 177 182 171     Results from last 7 days   Lab Units 08/02/24 0441 08/01/24  0604 07/31/24  0458   SODIUM mmol/L 138 136 137   POTASSIUM mmol/L 3.4* 3.2* 3.6   CHLORIDE mmol/L 102 100 102   CO2 mmol/L 27 26 26   BUN mg/dL 53* 57* 61*   CREATININE mg/dL 5.95* 5.80* 6.13*   EGFR ml/min/1.73sq m 8 9 8   CALCIUM mg/dL 10.3* 9.9 9.4   AST U/L 26 35 56*   ALT U/L 82* 100* 141*   ALK PHOS U/L 80 82 84

## 2024-08-02 NOTE — ASSESSMENT & PLAN NOTE
Patient with history of anemia baseline hemoglobin around 9-10 per outpatient labs  On admission, patient with hemoglobin of 10.1, decreased to 7.5, possibly secondary to volume overload, no evidence of bleeding at this time.  CT right lower extremity without evidence of bleeding.  Iron panel showing low iron  Does receive IV Venofer 200 mg with dialysis treatments as an outpatient and received 5 doses for iron deficiency anemia.   Given one dose of epogen on 7/25  Stool occult positive x2  GI consulted  -initial plan is to proceed with EGD on 7/29/2024.  But since hemoglobin remained stable, and patient INR was supratherapeutic, GI recommending hold off EGD, outpatient workup.  INR remains subtherapeutic, per discussion has done with patient, prefers to start with 2 mg Coumadin since patient is frustrated with this Coumadin therapy for last 10 years.  Hemoglobin remained stable

## 2024-08-02 NOTE — PHYSICAL THERAPY NOTE
Physical Therapy treatment      Patient Name: Masoud Perry    Today's Date: 8/2/2024     Problem List  Principal Problem:    Cellulitis of right lower extremity  Active Problems:    History of CVA (cerebrovascular accident)    Acute on chronic diastolic HF (heart failure) (HCC)    Lymphedema    Anemia    Stage 5 chronic kidney disease on peritoneal dialysis ([facility]CC)    Atrial fibrillation (HCC)    Bacteremia    Elevated LFTs       Past Medical History  Past Medical History:   Diagnosis Date    Anemia in chronic kidney disease     Anticoagulated on Coumadin     Atrial fibrillation (HCC)     BPH (benign prostatic hyperplasia)     CAD (coronary artery disease)     CKD (chronic kidney disease), stage V (HCC)     Compartment syndrome of forearm (HCC)     Right, 2019    COPD (chronic obstructive pulmonary disease) (HCC)     CVA (cerebral vascular accident) (HCC)     Diastolic CHF (HCC)     grade 1 DD    DVT (deep venous thrombosis) (HCC)     Factor V deficiency (HCC)     Gout     Hyperlipidemia     Hypertension     Hypothyroidism     MI (myocardial infarction) (HCC)     x3 - 1999, 2010, after 2015    Morbid obesity (HCC)     Nephrolithiasis     Noncompliance with medications     SHAD (obstructive sleep apnea)     Peritoneal dialysis catheter in place (HCC)     Pulmonary embolism (HCC)     Secondary hyperparathyroidism of renal origin (HCC)     Spinal stenosis of lumbar region     Status post placement of implantable loop recorder     Tobacco dependence         Past Surgical History  Past Surgical History:   Procedure Laterality Date    CARDIAC ELECTROPHYSIOLOGY STUDY AND ABLATION  04/29/2024    CHOLECYSTECTOMY      CORONARY ANGIOPLASTY WITH STENT PLACEMENT      JOINT REPLACEMENT      bilateral knee    ID CYSTO/URETERO W/LITHOTRIPSY &INDWELL STENT INSRT Left 09/24/2022    Procedure: CYSTOSCOPY URETEROSCOPY WITH LITHOTRIPSY HOLMIUM LASER, AND INSERTION STENT URETERAL;   "Surgeon: Lewis Dahl MD;  Location: [facility];  Service: Urology    NV LAPS INSERTION TUNNELED INTRAPERITONEAL CATHETER N/A 6/7/2024    Procedure: INSERTION PERITONEAL CATHETER DIALYSIS LAPAROSCOPIC;  Surgeon: Hiram Park DO;  Location: [facility];  Service: General    US GUIDED KIDNEY BIOPSY  08/05/2020 08/02/24 1200   PT Last Visit   PT Visit Date 08/02/24   Note Type   Note type   (Treatment)   Pain Assessment   Pain Score 9   Pain Location/Orientation Location: Back   Restrictions/Precautions   Weight Bearing Precautions Per Order No   Other Precautions Telemetry;Fall Risk;O2;Pain   Cognition   Overall Cognitive Status WFL   Arousal/Participation Alert   Attention Within functional limits   Orientation Level Oriented X4   Memory Within functional limits   Following Commands Follows all commands and directions without difficulty   Subjective   Subjective Pt is agreeable to PT. current PT reccommnedation is rehab, However the patient reports he \" just wants to get out of here   Bed Mobility   Supine to Sit 5  Supervision   Additional items Increased time required;LE management  (Leg )   Additional Comments Seated OOB in chair at the end of the session   Transfers   Sit to Stand   (CGA)   Stand to Sit   (CGA)   Stand pivot   (CGA)   Additional Comments RW for transfers   Ambulation/Elevation   Gait pattern Decreased toe off;Decreased heel strike;Decreased hip extension;Antalgic;Decreased foot clearance;Forward Flexion;Poor UE support   Gait Assistance   (CGA)   Assistive Device Rolling walker   Distance 20 feet in room   Endurance Deficit   Endurance Deficit Yes   Endurance Deficit Description Limited OOB activity   Activity Tolerance   Activity Tolerance Patient limited by fatigue;Treatment limited secondary to medical complications (Comment)   Assessment   Prognosis Fair   Problem List Decreased strength;Decreased range of motion;Decreased endurance;Impaired balance;Decreased mobility "   Assessment Pt seen for PT treatment session this date with interventions consisting of therapeutic activities, and ambulation Pt agreeable to PT treatment session upon arrival, pt found supine in bed w/ HOB elevated, in no apparent distress and A&O x 4 . In comparison to previous session, pt with improvements in Functional activity tolerance, and endurance, completes bed mobility with (S) and transfers and ambulation with CGA this date.  . Continue to recommend Level I (Maximum Resource Intensity) at time of d/c in order to maximize pt's functional independence and safety w/ mobility. Pt continues to be functioning below baseline level, and remains limited 2* factors listed above and including decreased functional activity tolerance and limited OOB activity. . PT will continue to see pt while here in order to address the deficits listed above and provide interventions consistent w/ POC in effort to achieve STGs.   Plan   PT Frequency 2-3x/wk   Discharge Recommendation   Rehab Resource Intensity Level, PT I (Maximum Resource Intensity)   -PAC Basic Mobility Inpatient   Turning in Flat Bed Without Bedrails 3   Lying on Back to Sitting on Edge of Flat Bed Without Bedrails 3   Moving Bed to Chair 3   Standing Up From Chair Using Arms 3   Walk in Room 2   Climb 3-5 Stairs With Railing 2   Basic Mobility Inpatient Raw Score 16   Basic Mobility Standardized Score 38.32   [facility] Highest Level Of Mobility   -HL Goal 5: Stand one or more mins   -[facility] Achieved 6: Walk 10 steps or more     Pt seated OOB in chair at the end of the session, all lines intact, all needs within reach. Chair alarm on and turned on. Patients raw score on the -[facility] Basic Mobility inpatient short form is 16, standardized score is 38.32, (less than) 42.9. patient's at this level are likely to benefit from post acute rehab services, however, please refer to therapist recommendation for safe D/C Plan.

## 2024-08-02 NOTE — WOUND OSTOMY CARE
Consult Note - Wound   Masoud Perry 68 y.o. male MRN: [id number]  Unit/Bed#: -01 Encounter: [id number]      History and Present Illness:  68 year old female admitted with cellulitis of right lower extremity. PMH Lymphedema,history of CVA type 2 Dm, Peritoneal dialysis.    Assessment Findings:   1)Left anterior tibia dry, dressing and ace removed, no drainage. Cleansed and ABD jaclyn and ace applied pedal to below knee. ABD applied  to monitor for drainage/  2)Right anterior tibia 2 small areas measured as one. Partial thickness oval shaped wounds. Scant bloody drianage.     Skin care plans:  1-Hydraguard to bilateral sacrum, buttock and heels BID and PRN  2-Elevate heels to offload pressure.  3-Ehob cushion in chair when out of bed.  4-Moisturize skin daily with skin nourishing cream.  5-Turn/reposition q2h or when medically stable for pressure re-distribution on skin.   6-Cleanse anterior right upper tibia with soap and water, apply skin nourishing cream to intact skin . Apply xeroform gauze to wound bed  cover DSD, ABD  with kelix and ace from pedal to below knee  Change Mon WED Fri and prn soil or dislodgment         Wound 07/26/24 Other (comment) Lymphedema Pretibial Left (Active)   Wound Image   08/02/24 1707   Wound Description Brown;Light purple 08/02/24 1707   Viviane-wound Assessment Brown;Erythema;Hyperpigmented;Fragile 08/02/24 1707   Wound Length (cm) 20 cm 08/02/24 1707   Wound Width (cm) 12 cm 08/02/24 1707   Wound Depth (cm) 0 cm 08/02/24 1707   Wound Surface Area (cm^2) 240 cm^2 08/02/24 1707   Wound Volume (cm^3) 0 cm^3 08/02/24 1707   Calculated Wound Volume (cm^3) 0 cm^3 08/02/24 1707   Drainage Amount None 08/02/24 1707   Treatments Site care;Cleansed 08/02/24 1707   Dressing Dry dressing;ABD 08/02/24 1707   Dressing Changed Changed 08/02/24 1707   Patient Tolerance Tolerated well 08/02/24 1707   Dressing Status Clean;Dry;Intact 08/02/24 1707       Wound 08/02/24 Pretibial Right (Active)    Wound Image   08/02/24 1712   Wound Description Beefy red 08/02/24 1712   Viviane-wound Assessment Brown;Scaly 08/02/24 1712   Wound Length (cm) 0.8 cm 08/02/24 1712   Wound Width (cm) 0.2 cm 08/02/24 1712   Wound Depth (cm) 0.1 cm 08/02/24 1712   Wound Surface Area (cm^2) 0.16 cm^2 08/02/24 1712   Wound Volume (cm^3) 0.016 cm^3 08/02/24 1712   Calculated Wound Volume (cm^3) 0.02 cm^3 08/02/24 1712   Drainage Amount None 08/02/24 1712   Non-staged Wound Description Partial thickness 08/02/24 1712   Treatments Cleansed;Site care;Elevated 08/02/24 1712   Dressing Xeroform;Dry dressing;ABD 08/02/24 1712   Dressing Changed Changed 08/02/24 1712   Patient Tolerance Tolerated well 08/02/24 1712   Dressing Status Clean;Dry;Intact 08/02/24 1712     Call or tigertext with any questions  Wound Care will continue to follow weekly    Melissa LARRYN RN CWON

## 2024-08-02 NOTE — ASSESSMENT & PLAN NOTE
Paroxysmal atrial fibrillation. sp ablation with Dr Alcala at [facility] 4/2024, took himself off Eliquis post procedure AMA;  has episodes on loop recorder of afib that are symptomatic, no longer taking amiodarone  Currently patient on coumadin, follows outpatient with coumadin clinic  Patient went into a fib rvr overnight on 7/26  Cardiology consulted - continue metoprolol and monitor on tele. Unable to tolerate amio and cannot take other anti arrhythmics due to esrd   Converted back into NSR on 7/26  Continue Coumadin, INR is subtherapeutic-again discussed with patient's regarding Coumadin therapy and supratherapeutic INR with risk versus benefits, side effects and alternative.  Patient remain rigid on his decision to continue Coumadin 2 mg knowing the risk.  INR is trending up, today INR is 1.75.  Continue 2 mg Coumadin

## 2024-08-02 NOTE — PROGRESS NOTES
NEPHROLOGY PROGRESS NOTE   Masoud Perry 68 y.o. male MRN: [id number]  Unit/Bed#: -01 Encounter: [id number]    ASSESSMENT & PLAN:  68year-old male with a history of chronic lymphedema/hypertension/CVA/atrial fibrillation/chronic diastolic CHF/BPH/diabetes mellitus type 2/nephrolithiasis/ESRD on PD presenting with worsening lower extremity edema     End-stage renal disease on dialysis  -ESRD on peritoneal dialysis.  Outpatient unit [facility]  as per patient was started on PD recently in July 2024 and at home dose PD on CCPD using all 2.5% dextrose.  -Access: PD catheter  -Plan: Still with significant lower extremity edema.  In the setting of lower extremity edema would continue peritoneal dialysis using all 4.25% dextrose, was switched to all 4.25 % dextrose on 7/31.   Volume status improving and weight trended down to 250 pound.  Continue CAPD using all 4.25% dextrose.  Once volume status improves further, will switch to 2.5% dextrose low calcium.  Does have fluid absorption even with 4.25% dextrose on some of the exchanges.  -Not able to reach PD nurse but discussed with  at [facility]-recommended patient may needs extranasal 1.5 L last fill and also may need to use 4.25% dextrose on cycler for more fluid removal which needs to be arranged.  Would recommend inpatient stay over the weekend.    Volume status/fluid overload/acute on chronic diastolic CHF  -No evidence of DVT on duplex  -Echocardiogram showed ejection fraction 75% with mild to moderate TR  -Patient has significant lower extremity edema.  Part of this could be lymphedema as well.  Continue  PD using more 4.25% dextrose as mentioned above.  Discussed with primary nurse, do not have Extraneal in the hospital supply    CKD/MBD  -Secondary hyperparathyroidism of renal origin, last PTH level from July 30 was 82 but previously was elevated to 211.4 and was on calcitriol.  Hypercalcemia currently appears to be non-PTH  mediated.  He was started on on Sensipar 30 mg daily recently during the hospital stay, monitor PTH level.  Continue to hold calcitriol which could cause hypercalcemia.  Monitor calcium level and PTH level.  Calcium level currently trending up with switching to all 4.25% dextrose on PD.  Once volume status improves, would switch to low calcium 2.5% dextrose  -Hyperphosphatemia: Phosphorus at target range, off binders    Blood pressure/hypotension  -Blood pressure stable and at goal.  Continue midodrine 10 mg 3 times daily.  Follow-up a.m. cortisol.    Electrolytes:   -Hypercalcemia-calcium level improved to 10.8 mg/dL using 2.5% PD with low calcium.  Also was started on Sensipar which would help.  Continue to monitor calcium level, avoid any calcium supplementation.  Vitamin D level was actually low at 26.7.  125 vitamin D was normal at 21 workup showed negative urine immunofixation.  -Patient was on calcitriol prior to admission which likely contributed to hypercalcemia.  Continue to hold calcitriol  -Serum immunofixation cannot rule out small monoclonal gammopathy.Suggested retest in 3 to 6 months.  Informed patient about this positive finding as well.  ACE enzyme negative.  PTH RP under process  -Calcium level still elevated at 11.2 mg/dL continue to monitor recommend avoiding any calcium supplements.  Patient denies taking calcium supplements at home  -Hypokalemia potassium 3.4- replaced     Anemia due to ESRD and iron deficiency as iron saturation was 6%  -Goal hemoglobin:  10-11 grams/deciliter  -Current hemoglobin: 8.4 g/dl  -Plan:  status post 1 dose of Epogen 20,000 units given last week.  Not a candidate for IV Venofer in the setting of active infection. Hemoccult was positive, seen by GI no plan for EGD as hemoglobin stable.  Continue to monitor hemoglobin    Right lower extremity cellulitis as per evidence of bacteremia: Antibiotics per ID with plan to complete on 8/2.  PD fluid was not suggestive of  infection    Orders: A-fib/elevated liver enzymes: Management per primary team.    Discussed with primary team.  Plan to continue same PD prescription using all 4.25% dextrose to help with lower extremity edema    SUBJECTIVE:  No new complaints.  Lower extremity edema improving    OBJECTIVE:  Current Weight: Weight - Scale: 113 kg (250 lb)  Vitals:    08/02/24 0824   BP: 115/58   Pulse: 83   Resp:    Temp:    SpO2:        Intake/Output Summary (Last 24 hours) at 8/2/2024 0914  Last data filed at 8/2/2024 0838  Gross per 24 hour   Intake 1070 ml   Output 800 ml   Net 270 ml       Physical Exam  General:  Ill looking, awake.  Eyes: Conjunctivae pink,  Sclera anicteric  ENT: lips and mucous membranes moist  Neck: supple   Chest: Clear to Auscultation both lungs,  no crackles, ronchus or wheezing.  CVS: S1 & S2 present, normal rate, regular rhythm, no murmur.  Abdomen: soft, non-tender, non-distended, Bowel sounds normoactive  Extremities: 2 + edema of  legs  Skin: no rash  Neuro: awake, alert, oriented x 3   Psych: Mood and affect appropriate      Medications:    Current Facility-Administered Medications:     acetaminophen (TYLENOL) tablet 650 mg, 650 mg, Oral, Q6H PRN, Rachel Conn PA-C    aspirin (ECOTRIN LOW STRENGTH) EC tablet 81 mg, 81 mg, Oral, Daily, Rachel Conn PA-C, 81 mg at 08/02/24 0815    atorvastatin (LIPITOR) tablet 40 mg, 40 mg, Oral, Daily With Dinner, Rachel Conn PA-C, 40 mg at 08/01/24 1630    cefepime (MAXIPIME) IVPB (premix in dextrose) 1,000 mg 50 mL, 1,000 mg, Intravenous, Q24H, Sid Pedersen MD    cinacalcet (SENSIPAR) tablet 30 mg, 30 mg, Oral, Daily With Breakfast, Jignesh Hussein MD, 30 mg at 08/02/24 0814    ferrous sulfate tablet 325 mg, 325 mg, Oral, Daily With Breakfast, Jignesh Hussein MD, 325 mg at 08/02/24 0815    HYDROcodone-acetaminophen (NORCO) 5-325 mg per tablet 1 tablet, 1 tablet, Oral, Q6H PRN, Rachel Conn PA-C, 1 tablet at 07/28/24 4155     HYDROcodone-acetaminophen (NORCO) 5-325 mg per tablet 2 tablet, 2 tablet, Oral, Q6H PRN, Rachel Conn PA-C, 2 tablet at 08/02/24 0548    HYDROmorphone (DILAUDID) injection 1 mg, 1 mg, Intravenous, Q3H PRN, Citlali HILTON Trudy, CRNP, 1 mg at 08/02/24 0423    insulin glargine (LANTUS) subcutaneous injection 15 Units 0.15 mL, 15 Units, Subcutaneous, Q12H VASU, Rachel Conn PA-C, 15 Units at 08/02/24 0815    insulin lispro (HumALOG/ADMELOG) 100 units/mL subcutaneous injection 1-6 Units, 1-6 Units, Subcutaneous, HS, Rachel Conn PA-C, 1 Units at 07/31/24 2110    insulin lispro (HumALOG/ADMELOG) 100 units/mL subcutaneous injection 1-6 Units, 1-6 Units, Subcutaneous, TID AC, 1 Units at 07/31/24 0915 **AND** Fingerstick Glucose (POCT), , , 4x Daily AC and at bedtime, Nora Mathew MD    levothyroxine tablet 125 mcg, 125 mcg, Oral, Early Morning, Rachel Conn PA-C, 125 mcg at 08/02/24 0548    metoprolol succinate (TOPROL-XL) 24 hr tablet 100 mg, 100 mg, Oral, BID, Rachel Conn PA-C, 100 mg at 08/02/24 0824    midodrine (PROAMATINE) tablet 10 mg, 10 mg, Oral, TID AC, Jignesh Hussein MD, 10 mg at 08/02/24 0800    saccharomyces boulardii (FLORASTOR) capsule 250 mg, 250 mg, Oral, BID, Moses Noel MD, 250 mg at 08/02/24 0814    simethicone (MYLICON) chewable tablet 80 mg, 80 mg, Oral, 4x Daily PRN, Rachel Conn PA-C    tamsulosin (FLOMAX) capsule 0.4 mg, 0.4 mg, Oral, Daily With Dinner, Rachel Conn PA-C, 0.4 mg at 08/01/24 1630    torsemide (DEMADEX) tablet 60 mg, 60 mg, Oral, BID, Ze Doty DO, 60 mg at 08/02/24 0814    warfarin (COUMADIN) tablet 2 mg, 2 mg, Oral, Daily (warfarin), Moses Noel MD, 2 mg at 08/01/24 1737    Invasive Devices:        Lab Results:   Results from last 7 days   Lab Units 08/02/24  0441 08/01/24  0604 07/31/24  0458 07/30/24  0648   WBC Thousand/uL 7.59 6.39 6.75 5.87   HEMOGLOBIN g/dL 8.4* 8.2* 7.9* 8.0*   HEMATOCRIT % 27.3* 26.9* 25.5*  "26.4*   PLATELETS Thousands/uL 177 182 171 205   POTASSIUM mmol/L 3.4* 3.2* 3.6 3.6   CHLORIDE mmol/L 102 100 102 100   CO2 mmol/L 27 26 26 29   BUN mg/dL 53* 57* 61* 61*   CREATININE mg/dL 5.95* 5.80* 6.13* 6.06*   CALCIUM mg/dL 10.3* 9.9 9.4 9.8   MAGNESIUM mg/dL 1.9  --  2.0 2.2   PHOSPHORUS mg/dL  --   --   --  4.9*   ALK PHOS U/L 80 82 84 94   ALT U/L 82* 100* 141* 210*   AST U/L 26 35 56* 123*       Previous work up:  Chest x-ray suggestive of mild pulmonary venous congestion      Portions of the record may have been created with voice recognition software. Occasional wrong word or \"sound a like\" substitutions may have occurred due to the inherent limitations of voice recognition software. Read the chart carefully and recognize, using context, where substitutions have occurred.If you have any questions, please contact the dictating provider.    "

## 2024-08-02 NOTE — PROGRESS NOTES
Patient:    MRN:  [id number]    [place] Request ID:  [id number]    Level of care reserved:  Home Health Agency    Partner Reserved:  [place] [address] [phone]    Clinical needs requested:  skilled nursing, occupational therapy, physical therapy    Geography searched:  [phone]    Start of Service:    Request sent:  2:04pm EDT on 8/2/2024 by Akua Spann    Partner reserved:  3:21pm EDT on 8/2/2024 by Akua Spann    Choice list shared:  3:21pm EDT on 8/2/2024 by Akua Spann

## 2024-08-02 NOTE — CASE MANAGEMENT
Case Management Discharge Planning Note    Patient name Masoud Goregh  Location /-01 MRN [id number]  : 1955 Date 2024       Current Admission Date: 2024  Current Admission Diagnosis:Cellulitis of right lower extremity   Patient Active Problem List    Diagnosis Date Noted Date Diagnosed    Elevated LFTs 2024     Patient on peritoneal dialysis ([facility]) 2024     Hypervolemia 2024     ESRD (end stage renal disease) ([facility]) 2024     Bacteremia 2024     Cellulitis of right lower extremity 2024     Atrial fibrillation ([facility]) 2024     Bilateral lower extremity edema 2023     Ureteral stone with hydronephrosis 2022     Cutaneous abscess of buttock 2022     Chronic kidney disease-mineral and bone disorder 2022     Type 2 diabetes mellitus with stage 4 chronic kidney disease, with long-term current use of insulin ([facility]) 07/15/2022     Obesity, morbid ([facility]) 07/15/2022     Secondary hyperparathyroidism of renal origin ([facility]) 07/15/2022     Stage 5 chronic kidney disease on peritoneal dialysis ([facility]) 07/15/2022     Factor 5 Leiden mutation, heterozygous ([facility]) 01/15/2022     Thrombocytopenia ([facility]) 2022     Angina at rest 2022     MI (myocardial infarction) ([facility]) 2022     History of PTCA 2022     Sleep apnea 2022     Smoking 2022     Anemia 2022     History of CVA (cerebrovascular accident) 2022     Acute on chronic diastolic HF (heart failure) ([facility]) 2022     HLD (hyperlipidemia) 2022     Lymphedema 2022     Hypothyroidism 2022       LOS (days): 10  Geometric Mean LOS (GMLOS) (days): 4.8  Days to GMLOS:-5.2     OBJECTIVE:  Risk of Unplanned Readmission Score: 26.14         Current admission status: Inpatient   Preferred Pharmacy:   [facility] - [place], [place] - [address]  [address]  [place] [address]  Phone: [phone] Fax:  [phone]    Primary Care Provider: Jignesh Baker DO    Primary Insurance: [employer] RE[employer]  Secondary Insurance:     DISCHARGE DETAILS:    Discharge planning discussed with:: Patient, Son  Freedom of Choice: Yes  Comments - Freedom of Choice: STR is reccomended per therapy - CM cannot find facility with PD available - earliest is mid August - patient does not want to wait and wants to go home with HHC - son understands that he must honor father's wishes  CM contacted family/caregiver?: Yes  Were Treatment Team discharge recommendations reviewed with patient/caregiver?: Yes  Did patient/caregiver verbalize understanding of patient care needs?: Yes  Were patient/caregiver advised of the risks associated with not following Treatment Team discharge recommendations?: Yes    Contacts  Patient Contacts: timotyh mack JR (Son)  [phone], Lucas Baumann (Daughter)   [phone] (H)   [phone]  Relationship to Patient:: Family  Contact Method: Phone  Reason/Outcome: Discharge Planning    Requested Home Health Care         Is the patient interested in HH at discharge?: Yes  Home Health Discipline requested:: Nursing, Occupational Therapy, Physical Therapy  Home Health Agency Name:: [facility]  [facility] External Referral Reason (only applicable if external HHA name selected): Services not provided in network or near patient location  Home Health Follow-Up Provider:: PCP  Home Health Services Needed:: Gait/ADL Training, Wound/Ostomy Care, Evaluate Functional Status and Safety, Strengthening/Theraputic Exercises to Improve Function  Homebound Criteria Met:: Uses an Assist Device (i.e. cane, walker, etc), Requires the Assistance of Another Person for Safe Ambulation or to Leave the Home  Supporting Clincal Findings:: Fatigues Easliy in Short Distances, Limited Endurance    DME Referral Provided  Referral made for DME?: No    Other Referral/Resources/Interventions Provided:  Interventions: HHC  Referral Comments: [facility]          Treatment Team Recommendation: Short Term Rehab  Discharge Destination Plan:: Home with Home Health Care  Transport at Discharge : Family          CM met with patient and reviewed STR recommendation and issues with facility. CM inquired if patient agreeable to switch to HD instead of PD and he is not agreeable.     CM and provider talked with patient on interest in going home vs STR. Patient does not want to wait and feels they get worse being there and sitting waiting to go home. He wants [facility] at discharge and feels he can easily transfer in/out of car. CM can follow up with patients son on discharge for time frames and transportation.     CM submitted [facility] blanket referral.      CM spoke to patient's son regarding discharge plan. He understands he has to honor his father's wishes if he is requesting to come home with [facility]. CM reviewed choices - [facility] was preference based on statistics.   He works tomorrow and wouldn't be available until after 3pm He feels his sister's car is easier for transfer and would be available earlier. CM can call her to coordinate discharge.     CM LM for patient's daughter on her cell and house phone.     CM to follow patient's care and discharge needs.       Cm submitted referral

## 2024-08-02 NOTE — ASSESSMENT & PLAN NOTE
Wt Readings from Last 3 Encounters:   08/02/24 113 kg (250 lb)   06/25/24 124 kg (273 lb 13 oz)   06/20/24 122 kg (270 lb)     History of chf managed by both cardiology and nephrology, on lasix 80 mg bid outpatient, patient states that he took 240 mg of lasix yesterday and again today.   Echo 4/2023: Systolic function is normal with an ejection fraction of 60-65%. Wall motion is within normal limits. There is grade I (mild) diastolic dysfunction   BNP normal  CXR: No acute cardiopulmonary disease.   Venous duplex-no DVT  Pedal pulses monitored with dopplers  Nephro: torsemide 60mg bid.  Monitor I&Os and daily weights  Continue peritoneal dialysis  Per nephrology, continue current treatment, patient and dialysis suggested.

## 2024-08-02 NOTE — ASSESSMENT & PLAN NOTE
Presented to ED with bilateral leg swelling x 4 days. Right leg more swollen and red over the last 2 days. Significant pain and having drainage in the right leg as well. NO fevers or chills.   No recent antibiotics outpatient  CT RLE: No fluid collection seen, cellulitic changes leg, No intramuscular collection, no lytic lesion or periosteal reaction  Procal slightly elevated 0.89, now down trending   BC 1/2 gram negative rods; Enterobacterales   Follow CBC and fever curve  Patient is finishing up antibiotic today.  Bowel movement improved.

## 2024-08-02 NOTE — PLAN OF CARE
Problem: PHYSICAL THERAPY ADULT  Goal: Performs mobility at highest level of function for planned discharge setting.  See evaluation for individualized goals.  Description: Treatment/Interventions: Functional transfer training, ADL retraining, LE strengthening/ROM, Therapeutic exercise, Elevations, Endurance training, Patient/family training, Equipment eval/education, Bed mobility, Gait training, Compensatory technique education, Spoke to nursing, OT, Spoke to case management          See flowsheet documentation for full assessment, interventions and recommendations.  Note: Prognosis: Fair  Problem List: Decreased strength, Decreased range of motion, Decreased endurance, Impaired balance, Decreased mobility  Assessment: Pt seen for PT treatment session this date with interventions consisting of therapeutic activities, and ambulation Pt agreeable to PT treatment session upon arrival, pt found supine in bed w/ HOB elevated, in no apparent distress and A&O x 4 . In comparison to previous session, pt with improvements in Functional activity tolerance, and endurance, completes bed mobility with (S) and transfers and ambulation with CGA this date.  . Continue to recommend Level I (Maximum Resource Intensity) at time of d/c in order to maximize pt's functional independence and safety w/ mobility. Pt continues to be functioning below baseline level, and remains limited 2* factors listed above and including decreased functional activity tolerance and limited OOB activity. . PT will continue to see pt while here in order to address the deficits listed above and provide interventions consistent w/ POC in effort to achieve STGs.  Barriers to Discharge: Decreased caregiver support  Barriers to Discharge Comments: Pt lives alone, requiring extensive A x 2 for mobility at this time, pain  Rehab Resource Intensity Level, PT: I (Maximum Resource Intensity)    See flowsheet documentation for full assessment.

## 2024-08-02 NOTE — ASSESSMENT & PLAN NOTE
1 out of 2 blood cultures positive for gram-negative rods, coming back as Enterobacterales  Second blood culture no growth at 4 days  ID consulted, recommending discontinue Unasyn and start Zosyn  Anticipate 10 days of therapy on zosyn to complete on 8/2/2024-antibiotic switched to cefepime since patient liver enzyme is elevated and suspected Zosyn-patient is finishing up antibiotic today.   [FreeTextEntry1] : I was unable to treat the peristomal tissue.  I did remove the ileostomy bag and redressed the stoma with a 2 piece appliance and showed them how to use the barrier ring to protect the stoma from drainage.  I gave them samples and will order then if they think it is a better fit for her.  All questions answered.

## 2024-08-03 ENCOUNTER — APPOINTMENT (INPATIENT)
Dept: RADIOLOGY | Facility: HOSPITAL | Age: 69
DRG: 602 | End: 2024-08-03
Payer: COMMERCIAL

## 2024-08-03 PROBLEM — G47.34 NOCTURNAL HYPOXIA: Status: ACTIVE | Noted: 2024-08-03

## 2024-08-03 LAB
ALBUMIN SERPL BCG-MCNC: 3.1 G/DL (ref 3.5–5)
ALP SERPL-CCNC: 92 U/L (ref 34–104)
ALT SERPL W P-5'-P-CCNC: 66 U/L (ref 7–52)
ANION GAP SERPL CALCULATED.3IONS-SCNC: 11 MMOL/L (ref 4–13)
AST SERPL W P-5'-P-CCNC: 23 U/L (ref 13–39)
BASOPHILS # BLD AUTO: 0.06 THOUSANDS/ÂΜL (ref 0–0.1)
BASOPHILS NFR BLD AUTO: 1 % (ref 0–1)
BILIRUB SERPL-MCNC: 0.4 MG/DL (ref 0.2–1)
BUN SERPL-MCNC: 52 MG/DL (ref 5–25)
CALCIUM ALBUM COR SERPL-MCNC: 11 MG/DL (ref 8.3–10.1)
CALCIUM SERPL-MCNC: 10.3 MG/DL (ref 8.4–10.2)
CHLORIDE SERPL-SCNC: 101 MMOL/L (ref 96–108)
CO2 SERPL-SCNC: 26 MMOL/L (ref 21–32)
CREAT SERPL-MCNC: 6.16 MG/DL (ref 0.6–1.3)
EOSINOPHIL # BLD AUTO: 0.27 THOUSAND/ÂΜL (ref 0–0.61)
EOSINOPHIL NFR BLD AUTO: 3 % (ref 0–6)
ERYTHROCYTE [DISTWIDTH] IN BLOOD BY AUTOMATED COUNT: 17.5 % (ref 11.6–15.1)
GFR SERPL CREATININE-BSD FRML MDRD: 8 ML/MIN/1.73SQ M
GLUCOSE SERPL-MCNC: 115 MG/DL (ref 65–140)
GLUCOSE SERPL-MCNC: 116 MG/DL (ref 65–140)
GLUCOSE SERPL-MCNC: 143 MG/DL (ref 65–140)
GLUCOSE SERPL-MCNC: 146 MG/DL (ref 65–140)
GLUCOSE SERPL-MCNC: 146 MG/DL (ref 65–140)
HCT VFR BLD AUTO: 29.8 % (ref 36.5–49.3)
HGB BLD-MCNC: 8.9 G/DL (ref 12–17)
IMM GRANULOCYTES # BLD AUTO: 0.07 THOUSAND/UL (ref 0–0.2)
IMM GRANULOCYTES NFR BLD AUTO: 1 % (ref 0–2)
INR PPP: 2.09 (ref 0.85–1.19)
LYMPHOCYTES # BLD AUTO: 2.29 THOUSANDS/ÂΜL (ref 0.6–4.47)
LYMPHOCYTES NFR BLD AUTO: 28 % (ref 14–44)
MCH RBC QN AUTO: 30.8 PG (ref 26.8–34.3)
MCHC RBC AUTO-ENTMCNC: 29.9 G/DL (ref 31.4–37.4)
MCV RBC AUTO: 103 FL (ref 82–98)
MONOCYTES # BLD AUTO: 0.43 THOUSAND/ÂΜL (ref 0.17–1.22)
MONOCYTES NFR BLD AUTO: 5 % (ref 4–12)
NEUTROPHILS # BLD AUTO: 5.19 THOUSANDS/ÂΜL (ref 1.85–7.62)
NEUTS SEG NFR BLD AUTO: 62 % (ref 43–75)
NRBC BLD AUTO-RTO: 0 /100 WBCS
PLATELET # BLD AUTO: 199 THOUSANDS/UL (ref 149–390)
PMV BLD AUTO: 9.7 FL (ref 8.9–12.7)
POTASSIUM SERPL-SCNC: 3.7 MMOL/L (ref 3.5–5.3)
PROT SERPL-MCNC: 6.4 G/DL (ref 6.4–8.4)
PROTHROMBIN TIME: 23.7 SECONDS (ref 12.3–15)
RBC # BLD AUTO: 2.89 MILLION/UL (ref 3.88–5.62)
SODIUM SERPL-SCNC: 138 MMOL/L (ref 135–147)
WBC # BLD AUTO: 8.31 THOUSAND/UL (ref 4.31–10.16)

## 2024-08-03 PROCEDURE — 71045 X-RAY EXAM CHEST 1 VIEW: CPT

## 2024-08-03 PROCEDURE — 94761 N-INVAS EAR/PLS OXIMETRY MLT: CPT

## 2024-08-03 PROCEDURE — 85610 PROTHROMBIN TIME: CPT | Performed by: FAMILY MEDICINE

## 2024-08-03 PROCEDURE — 99232 SBSQ HOSP IP/OBS MODERATE 35: CPT | Performed by: FAMILY MEDICINE

## 2024-08-03 PROCEDURE — 80053 COMPREHEN METABOLIC PANEL: CPT | Performed by: FAMILY MEDICINE

## 2024-08-03 PROCEDURE — 99232 SBSQ HOSP IP/OBS MODERATE 35: CPT | Performed by: INTERNAL MEDICINE

## 2024-08-03 PROCEDURE — 82948 REAGENT STRIP/BLOOD GLUCOSE: CPT

## 2024-08-03 PROCEDURE — 85025 COMPLETE CBC W/AUTO DIFF WBC: CPT | Performed by: FAMILY MEDICINE

## 2024-08-03 RX ORDER — MIDODRINE HYDROCHLORIDE 5 MG/1
12.5 TABLET ORAL
Status: DISCONTINUED | OUTPATIENT
Start: 2024-08-03 | End: 2024-08-05 | Stop reason: HOSPADM

## 2024-08-03 RX ORDER — TORSEMIDE 20 MG/1
60 TABLET ORAL 2 TIMES DAILY
Status: DISCONTINUED | OUTPATIENT
Start: 2024-08-03 | End: 2024-08-05 | Stop reason: HOSPADM

## 2024-08-03 RX ADMIN — Medication 250 MG: at 17:09

## 2024-08-03 RX ADMIN — WARFARIN SODIUM 2 MG: 2 TABLET ORAL at 17:11

## 2024-08-03 RX ADMIN — ASPIRIN 81 MG: 81 TABLET, COATED ORAL at 08:46

## 2024-08-03 RX ADMIN — TORSEMIDE 60 MG: 20 TABLET ORAL at 21:35

## 2024-08-03 RX ADMIN — Medication 250 MG: at 08:46

## 2024-08-03 RX ADMIN — HYDROMORPHONE HYDROCHLORIDE 1 MG: 1 INJECTION, SOLUTION INTRAMUSCULAR; INTRAVENOUS; SUBCUTANEOUS at 17:17

## 2024-08-03 RX ADMIN — ATORVASTATIN CALCIUM 40 MG: 40 TABLET, FILM COATED ORAL at 17:10

## 2024-08-03 RX ADMIN — HYDROCODONE BITARTRATE AND ACETAMINOPHEN 2 TABLET: 5; 325 TABLET ORAL at 00:01

## 2024-08-03 RX ADMIN — LEVOTHYROXINE SODIUM 125 MCG: 125 TABLET ORAL at 06:07

## 2024-08-03 RX ADMIN — HYDROCODONE BITARTRATE AND ACETAMINOPHEN 2 TABLET: 5; 325 TABLET ORAL at 06:07

## 2024-08-03 RX ADMIN — CINACALCET 30 MG: 30 TABLET, FILM COATED ORAL at 08:46

## 2024-08-03 RX ADMIN — INSULIN GLARGINE 15 UNITS: 100 INJECTION, SOLUTION SUBCUTANEOUS at 21:36

## 2024-08-03 RX ADMIN — HYDROCODONE BITARTRATE AND ACETAMINOPHEN 2 TABLET: 5; 325 TABLET ORAL at 19:41

## 2024-08-03 RX ADMIN — MIDODRINE HYDROCHLORIDE 12.5 MG: 5 TABLET ORAL at 17:00

## 2024-08-03 RX ADMIN — MIDODRINE HYDROCHLORIDE 10 MG: 5 TABLET ORAL at 08:46

## 2024-08-03 RX ADMIN — TAMSULOSIN HYDROCHLORIDE 0.4 MG: 0.4 CAPSULE ORAL at 17:10

## 2024-08-03 RX ADMIN — HYDROCODONE BITARTRATE AND ACETAMINOPHEN 2 TABLET: 5; 325 TABLET ORAL at 13:05

## 2024-08-03 RX ADMIN — HYDROMORPHONE HYDROCHLORIDE 1 MG: 1 INJECTION, SOLUTION INTRAMUSCULAR; INTRAVENOUS; SUBCUTANEOUS at 04:45

## 2024-08-03 RX ADMIN — INSULIN GLARGINE 15 UNITS: 100 INJECTION, SOLUTION SUBCUTANEOUS at 08:47

## 2024-08-03 RX ADMIN — MIDODRINE HYDROCHLORIDE 10 MG: 5 TABLET ORAL at 11:50

## 2024-08-03 RX ADMIN — HYDROMORPHONE HYDROCHLORIDE 1 MG: 1 INJECTION, SOLUTION INTRAMUSCULAR; INTRAVENOUS; SUBCUTANEOUS at 11:50

## 2024-08-03 NOTE — ASSESSMENT & PLAN NOTE
Per nursing report, patient oxygen saturations drops overnight during the sleep.  Check overnight pulse oximetry, check chest x-ray

## 2024-08-03 NOTE — ASSESSMENT & PLAN NOTE
Lab Results   Component Value Date    EGFR 8 08/03/2024    EGFR 8 08/02/2024    EGFR 9 08/01/2024    CREATININE 6.16 (H) 08/03/2024    CREATININE 5.95 (H) 08/02/2024    CREATININE 5.80 (H) 08/01/2024     History of stage 5 ckd recently started on peritoneal dialysis, first treatment on 6/24. Had PD catheter placed on 6/7/2024. Was scheduled to do PD dialysis in clinic and then transition to home PD dialysis.   Last PD was done yesterday, patient currently doing every other day PD, goal for everyday per patient.   Nephro adjusting PD solution. decrease him to 2.5% 4 times daily regimen  Hypercalcemia: PTH, SPEP, UPEP, light chain ratio, vitamin D level, ACE, PTH related protein, adding sensipar 30 mg qd.   Avoid nephrotoxic medications, nsaids, hypotension  PT OT recommending acute rehab.  Discussed in details with patient in presence of case management regarding the recommendations.  Patient is peritoneal dialysis dependent, it will take about 2 weeks to find a place and arrange everything.  After hearing that, patient does not want to stay in the hospital for 2 weeks, prefers to go home with home health aide.  Management on board.

## 2024-08-03 NOTE — ASSESSMENT & PLAN NOTE
Wt Readings from Last 3 Encounters:   08/03/24 114 kg (250 lb 14.1 oz)   06/25/24 124 kg (273 lb 13 oz)   06/20/24 122 kg (270 lb)     History of chf managed by both cardiology and nephrology, on lasix 80 mg bid outpatient, patient states that he took 240 mg of lasix yesterday and again today.   Echo 4/2023: Systolic function is normal with an ejection fraction of 60-65%. Wall motion is within normal limits. There is grade I (mild) diastolic dysfunction   BNP normal  CXR: No acute cardiopulmonary disease.   Venous duplex-no DVT  Pedal pulses monitored with dopplers  Nephro: torsemide 60mg bid.  Monitor I&Os and daily weights  Continue peritoneal dialysis  Per nephrology, continue current treatment, patient and dialysis suggested.  Per nurse, patient oxygen saturations drop overnight, required 2 L of oxygen, check repeat chest x-ray.  Follow overnight oxygen test, pending home O2

## 2024-08-03 NOTE — ASSESSMENT & PLAN NOTE
1 out of 2 blood cultures positive for gram-negative rods, coming back as Enterobacterales  Second blood culture no growth at 4 days  ID consulted, recommending discontinue Unasyn and start Zosyn  Anticipate 10 days of therapy on zosyn to complete on 8/2/2024-antibiotic switched to cefepime since patient liver enzyme is elevated and suspected Zosyn-patient is finishing up antibiotic today.  Completed antibiotic at this time

## 2024-08-03 NOTE — ASSESSMENT & PLAN NOTE
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  Patient also has a history of CVA and paroxysmal atrial fibrillation, at this time, considering risk versus benefits, after discussion is done with the patient's, will consider to resume Coumadin.   INR is therapeutic, continue current treatment

## 2024-08-03 NOTE — ASSESSMENT & PLAN NOTE
With history of chronic lymphedema following with cardiology outpatient. Feels that his legs have been swelling more recently, right leg more swollen than left due to current cellulitis episode.   Transitioned from lasix infusion to torsemide 60mg bid   Vangie with patient and his daughter on the bedside regarding recommendation that patient will benefit to go to rehab.  But patient does not want to wait in the hospital for 2 weeks just to get placement.  Patient prefers to go home at this time.

## 2024-08-03 NOTE — RESPIRATORY THERAPY NOTE
Home Oxygen Qualifying Test     Patient name: Masoud Perry        : 1955   Date of Test:  August 3, 2024  Diagnosis:    Home Oxygen Test:    **Medicare Guidelines require item(s) 1-5 on all ambulatory patients or 1 and 2 on non-ambulatory patients.    1. Baseline SPO2 on Room Air at rest 98 %   If <= 88% on Room Air add O2 via NC to obtain SpO2 >=88%. If LPM needed, document LPM NA needed to reach =>88%    SPO2 during exertion on Room Air 94  %  During exertion monitor SPO2. If SPO2 increases >=89%, do not add supplemental oxygen    SPO2 on Oxygen at Rest NA % at NA LPM    SPO2 during exertion on Oxygen NA % at NA LPM    Test performed during exertion activity.      []  Supplemental Home Oxygen is indicated.    [x]  Client does not qualify for home oxygen.    Respiratory Additional Notes- Pt does not qualify for home O2.     Kamille Deras, RT

## 2024-08-03 NOTE — PROGRESS NOTES
[facility]  Progress Note  Name: Masoud Perry I  MRN: [id number]  Unit/Bed#: -01 I Date of Admission: 7/23/2024   Date of Service: 8/3/2024 I Hospital Day: 11    Assessment & Plan   Nocturnal hypoxia  Assessment & Plan  Per nursing report, patient oxygen saturations drops overnight during the sleep.  Check overnight pulse oximetry, check chest x-ray    Elevated LFTs  Assessment & Plan  No personal hx of cirrhosis  Likely the cause of the elevated inr, as patient has not gotten coumadin since prior to admission   RUQ US: Limited study as above. Grossly unremarkable albeit limited evaluation of the liver.Cholecystectomy. No biliary dilation. Small volume diffuse free fluid may represent ascites and/or fluid related to peritoneal dialysis.  Suspect hepatorenal, as patient has ESRD on PD  Liver enzymes is trending down.    Bacteremia  Assessment & Plan  1 out of 2 blood cultures positive for gram-negative rods, coming back as Enterobacterales  Second blood culture no growth at 4 days  ID consulted, recommending discontinue Unasyn and start Zosyn  Anticipate 10 days of therapy on zosyn to complete on 8/2/2024-antibiotic switched to cefepime since patient liver enzyme is elevated and suspected Zosyn-patient is finishing up antibiotic today.  Completed antibiotic at this time    Atrial fibrillation (HCC)  Assessment & Plan  Paroxysmal atrial fibrillation. sp ablation with Dr Alcala at [facility] 4/2024, took himself off Eliquis post procedure AMA;  has episodes on loop recorder of afib that are symptomatic, no longer taking amiodarone  Currently patient on coumadin, follows outpatient with coumadin clinic  Patient went into a fib rvr overnight on 7/26  Cardiology consulted - continue metoprolol and monitor on tele. Unable to tolerate amio and cannot take other anti arrhythmics due to esrd   Converted back into NSR on 7/26  Continue Coumadin, INR is therapeutic.    Stage 5 chronic kidney disease on  peritoneal dialysis (HCC)  Assessment & Plan  Lab Results   Component Value Date    EGFR 8 08/03/2024    EGFR 8 08/02/2024    EGFR 9 08/01/2024    CREATININE 6.16 (H) 08/03/2024    CREATININE 5.95 (H) 08/02/2024    CREATININE 5.80 (H) 08/01/2024     History of stage 5 ckd recently started on peritoneal dialysis, first treatment on 6/24. Had PD catheter placed on 6/7/2024. Was scheduled to do PD dialysis in clinic and then transition to home PD dialysis.   Last PD was done yesterday, patient currently doing every other day PD, goal for everyday per patient.   Nephro adjusting PD solution. decrease him to 2.5% 4 times daily regimen  Hypercalcemia: PTH, SPEP, UPEP, light chain ratio, vitamin D level, ACE, PTH related protein, adding sensipar 30 mg qd.   Avoid nephrotoxic medications, nsaids, hypotension  PT OT recommending acute rehab.  Discussed in details with patient in presence of case management regarding the recommendations.  Patient is peritoneal dialysis dependent, it will take about 2 weeks to find a place and arrange everything.  After hearing that, patient does not want to stay in the hospital for 2 weeks, prefers to go home with home health aide.  Management on board.    Anemia  Assessment & Plan  Patient with history of anemia baseline hemoglobin around 9-10 per outpatient labs  On admission, patient with hemoglobin of 10.1, decreased to 7.5, possibly secondary to volume overload, no evidence of bleeding at this time.  CT right lower extremity without evidence of bleeding.  Iron panel showing low iron  Does receive IV Venofer 200 mg with dialysis treatments as an outpatient and received 5 doses for iron deficiency anemia.   Given one dose of epogen on 7/25  Stool occult positive x2  GI consulted  -initial plan is to proceed with EGD on 7/29/2024.  But since hemoglobin remained stable, and patient INR was supratherapeutic, GI recommending hold off EGD, outpatient workup.  INR remains subtherapeutic, per  discussion has done with patient, prefers to start with 2 mg Coumadin since patient is frustrated with this Coumadin therapy for last 10 years.  Hemoglobin remained stable    Lymphedema  Assessment & Plan  With history of chronic lymphedema following with cardiology outpatient. Feels that his legs have been swelling more recently, right leg more swollen than left due to current cellulitis episode.   Transitioned from lasix infusion to torsemide 60mg bid   Vangie with patient and his daughter on the bedside regarding recommendation that patient will benefit to go to rehab.  But patient does not want to wait in the hospital for 2 weeks just to get placement.  Patient prefers to go home at this time.    Acute on chronic diastolic HF (heart failure) (HCC)  Assessment & Plan  Wt Readings from Last 3 Encounters:   08/03/24 114 kg (250 lb 14.1 oz)   06/25/24 124 kg (273 lb 13 oz)   06/20/24 122 kg (270 lb)     History of chf managed by both cardiology and nephrology, on lasix 80 mg bid outpatient, patient states that he took 240 mg of lasix yesterday and again today.   Echo 4/2023: Systolic function is normal with an ejection fraction of 60-65%. Wall motion is within normal limits. There is grade I (mild) diastolic dysfunction   BNP normal  CXR: No acute cardiopulmonary disease.   Venous duplex-no DVT  Pedal pulses monitored with dopplers  Nephro: torsemide 60mg bid.  Monitor I&Os and daily weights  Continue peritoneal dialysis  Per nephrology, continue current treatment, patient and dialysis suggested.  Per nurse, patient oxygen saturations drop overnight, required 2 L of oxygen, check repeat chest x-ray.  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  Patient also has a history of CVA and paroxysmal atrial fibrillation, at this time, considering risk versus benefits, after discussion is done with the patient's, will consider to resume Coumadin.   INR is therapeutic, continue current treatment    * Cellulitis of right lower extremity  Assessment & Plan  Presented to ED with bilateral leg swelling x 4 days. Right leg more swollen and red over the last 2 days. Significant pain and having drainage in the right leg as well. NO fevers or chills.   No recent antibiotics outpatient  CT RLE: No fluid collection seen, cellulitic changes leg, No intramuscular collection, no lytic lesion or periosteal reaction  Procal slightly elevated 0.89, now down trending   BC 1/2 gram negative rods; Enterobacterales   Completed IV antibiotic.  Discussed with patient and his daughter on the bedside regarding PT OT recommendation that patient will benefit to go to rehab.  But patient does not want to wait for 2 weeks to get placement.  For that reason he wants to prefer to go home.                 VTE Pharmacologic Prophylaxis: VTE Score: 5 High Risk (Score >/= 5) - Pharmacological DVT Prophylaxis Ordered: warfarin (Coumadin). Sequential Compression Devices Ordered.    Mobility:   Basic Mobility Inpatient Raw Score: 16  JH-HLM Goal: 5: Stand one or more mins  JH-HLM Achieved: 3: Sit at edge of bed  JH-HLM Goal achieved. Continue to encourage appropriate mobility.    Patient Centered Rounds: I performed bedside rounds with nursing staff today.   Discussions with Specialists or Other Care Team Provider: None    Education and Discussions with Family / Patient: Updated  (daughter) at bedside.    Current Length of Stay: 11 day(s)  Current Patient Status: Inpatient   Certification Statement: The patient will continue to require additional inpatient hospital stay due to monitorable clinical  Discharge Plan: Anticipate discharge in 24-48 hrs to home with home  services.    Code Status: Level 3 - DNAR and DNI    Subjective:   Seen and evaluated during the run.  Resting comfortably.  Denies any significant complaint.  Having regular bowel movement    Objective:     Vitals:   Temp (24hrs), Av.9 °F (36.6 °C), Min:97.6 °F (36.4 °C), Max:98.2 °F (36.8 °C)    Temp:  [97.6 °F (36.4 °C)-98.2 °F (36.8 °C)] 98 °F (36.7 °C)  HR:  [66-75] 75  Resp:  [17-92] 17  BP: ()/(52-60) 114/53  SpO2:  [81 %-98 %] 97 %  Body mass index is 36 kg/m².     Input and Output Summary (last 24 hours):     Intake/Output Summary (Last 24 hours) at 8/3/2024 1217  Last data filed at 8/3/2024 1047  Gross per 24 hour   Intake 80 ml   Output 2325 ml   Net -2245 ml       Physical Exam:   Physical Exam  Vitals and nursing note reviewed.   Constitutional:       Appearance: Normal appearance. He is obese. He is not ill-appearing or diaphoretic.   Eyes:      General: No scleral icterus.        Left eye: No discharge.      Extraocular Movements: Extraocular movements intact.      Conjunctiva/sclera: Conjunctivae normal.      Pupils: Pupils are equal, round, and reactive to light.   Cardiovascular:      Rate and Rhythm: Normal rate.      Heart sounds:      No friction rub. No gallop.   Pulmonary:      Effort: Pulmonary effort is normal. No respiratory distress.      Breath sounds: No stridor. No wheezing or rhonchi.   Abdominal:      General: There is no distension.      Palpations: There is no mass.      Tenderness: There is no abdominal tenderness.      Hernia: No hernia is present.   Musculoskeletal:      Right lower leg: Edema present.      Left lower leg: Edema present.   Neurological:      Mental Status: He is alert and oriented to person, place, and time.      Cranial Nerves: No cranial nerve deficit.      Sensory: No sensory deficit.      Motor: No weakness.      Coordination: Coordination normal.        Additional Data:     Labs:  Results from last 7 days   Lab Units 24  0451   WBC Thousand/uL  8.31   HEMOGLOBIN g/dL 8.9*   HEMATOCRIT % 29.8*   PLATELETS Thousands/uL 199   SEGS PCT % 62   LYMPHO PCT % 28   MONO PCT % 5   EOS PCT % 3     Results from last 7 days   Lab Units 08/03/24  0451   SODIUM mmol/L 138   POTASSIUM mmol/L 3.7   CHLORIDE mmol/L 101   CO2 mmol/L 26   BUN mg/dL 52*   CREATININE mg/dL 6.16*   ANION GAP mmol/L 11   CALCIUM mg/dL 10.3*   ALBUMIN g/dL 3.1*   TOTAL BILIRUBIN mg/dL 0.40   ALK PHOS U/L 92   ALT U/L 66*   AST U/L 23   GLUCOSE RANDOM mg/dL 115     Results from last 7 days   Lab Units 08/03/24  1018   INR  2.09*     Results from last 7 days   Lab Units 08/03/24  1115 08/03/24  0716 08/02/24  2120 08/02/24  1559 08/02/24  1103 08/02/24  0736 08/01/24  2137 08/01/24  1635 08/01/24  1119 08/01/24  0706 07/31/24  2104 07/31/24  1604   POC GLUCOSE mg/dl 146* 146* 121 124 150* 143* 125 137 131 148* 162* 114               Lines/Drains:  Invasive Devices       Peripheral Intravenous Line  Duration             Long-Dwell Peripheral IV (Midline) 07/24/24 Left Basilic 9 days              Line  Duration             Peritoneal Dialysis Catheter Column-disk Abdominal 57 days                          Imaging: Reviewed radiology reports from this admission including: chest xray    Recent Cultures (last 7 days):         Last 24 Hours Medication List:   Current Facility-Administered Medications   Medication Dose Route Frequency Provider Last Rate    acetaminophen  650 mg Oral Q6H PRN Rachel Conn PA-C      aspirin  81 mg Oral Daily Rachel Conn PA-C      atorvastatin  40 mg Oral Daily With Dinner Rachel Conn PA-C      cinacalcet  30 mg Oral Daily With Breakfast Jignesh Hussein MD      ferrous sulfate  325 mg Oral Daily With Breakfast Jignesh Hussein MD      HYDROcodone-acetaminophen  1 tablet Oral Q6H PRN HOANG Givens-BAIRON      HYDROcodone-acetaminophen  2 tablet Oral Q6H PRN Rachel Conn PA-C      HYDROmorphone  1 mg Intravenous Q3H PRN GAGANDEEP Bernardo       insulin glargine  15 Units Subcutaneous Q12H VASU Rachel Conn PA-C      insulin lispro  1-6 Units Subcutaneous HS Rachel Conn PA-C      insulin lispro  1-6 Units Subcutaneous TID AC Nora Mathew MD      levothyroxine  125 mcg Oral Early Morning Rachel Conn PA-C      metoprolol succinate  100 mg Oral BID Rachel Conn PA-C      midodrine  10 mg Oral TID AC Jignesh Hussein MD      saccharomyces boulardii  250 mg Oral BID Moses Noel MD      simethicone  80 mg Oral 4x Daily PRN Rachel Conn PA-C      tamsulosin  0.4 mg Oral Daily With Dinner Rachel Conn PA-C      torsemide  60 mg Oral BID Ze Doty DO      warfarin  2 mg Oral Daily (warfarin) Moses Noel MD          Today, Patient Was Seen By: Moses Noel MD    **Please Note: This note may have been constructed using a voice recognition system.**

## 2024-08-03 NOTE — PLAN OF CARE
Problem: Prexisting or High Potential for Compromised Skin Integrity  Goal: Skin integrity is maintained or improved  Description: INTERVENTIONS:  - Identify patients at risk for skin breakdown  - Assess and monitor skin integrity  - Assess and monitor nutrition and hydration status  - Monitor labs   - Assess for incontinence   - Turn and reposition patient  - Assist with mobility/ambulation  - Relieve pressure over bony prominences  - Avoid friction and shearing  - Provide appropriate hygiene as needed including keeping skin clean and dry  - Evaluate need for skin moisturizer/barrier cream  - Collaborate with interdisciplinary team   - Patient/family teaching  - Consider wound care consult   Outcome: Progressing     Problem: METABOLIC, FLUID AND ELECTROLYTES - ADULT  Goal: Electrolytes maintained within normal limits  Description: INTERVENTIONS:  - Monitor labs and assess patient for signs and symptoms of electrolyte imbalances  - Administer electrolyte replacement as ordered  - Monitor response to electrolyte replacements, including repeat lab results as appropriate  - Instruct patient on fluid and nutrition as appropriate  Outcome: Progressing  Goal: Fluid balance maintained  Description: INTERVENTIONS:  - Monitor labs   - Monitor I/O and WT  - Instruct patient on fluid and nutrition as appropriate  - Assess for signs & symptoms of volume excess or deficit  Outcome: Progressing  Goal: Glucose maintained within target range  Description: INTERVENTIONS:  - Monitor Blood Glucose as ordered  - Assess for signs and symptoms of hyperglycemia and hypoglycemia  - Administer ordered medications to maintain glucose within target range  - Assess nutritional intake and initiate nutrition service referral as needed  Outcome: Progressing     Problem: SKIN/TISSUE INTEGRITY - ADULT  Goal: Incision(s), wounds(s) or drain site(s) healing without S/S of infection  Description: INTERVENTIONS  - Assess and document dressing,  incision, wound bed, drain sites and surrounding tissue  - Provide patient and family education  - Perform skin care/dressing changes per wound  Outcome: Progressing     Problem: GENITOURINARY - ADULT  Goal: Maintains or returns to baseline urinary function  Description: INTERVENTIONS:  - Assess urinary function  - Encourage oral fluids to ensure adequate hydration if ordered  - Administer IV fluids as ordered to ensure adequate hydration  - Administer ordered medications as needed  - Offer frequent toileting  - Follow urinary retention protocol if ordered  Outcome: Progressing     Problem: PAIN - ADULT  Goal: Verbalizes/displays adequate comfort level or baseline comfort level  Description: Interventions:  - Encourage patient to monitor pain and request assistance  - Assess pain using appropriate pain scale  - Administer analgesics based on type and severity of pain and evaluate response  - Implement non-pharmacological measures as appropriate and evaluate response  - Consider cultural and social influences on pain and pain management  - Notify physician/advanced practitioner if interventions unsuccessful or patient reports new pain  Outcome: Progressing     Problem: INFECTION - ADULT  Goal: Absence or prevention of progression during hospitalization  Description: INTERVENTIONS:  - Assess and monitor for signs and symptoms of infection  - Monitor lab/diagnostic results  - Monitor all insertion sites, i.e. indwelling lines, tubes, and drains  - Monitor endotracheal if appropriate and nasal secretions for changes in amount and color  - [place] appropriate cooling/warming therapies per order  - Administer medications as ordered  - Instruct and encourage patient and family to use good hand hygiene technique  - Identify and instruct in appropriate isolation precautions for identified infection/condition  Outcome: Progressing     Problem: SAFETY ADULT  Goal: Patient will remain free of falls  Description:  INTERVENTIONS:  - Educate patient/family on patient safety including physical limitations  - Instruct patient to call for assistance with activity   - Consult OT/PT to assist with strengthening/mobility   - Keep Call bell within reach  - Keep bed low and locked with side rails adjusted as appropriate  - Keep care items and personal belongings within reach  - Initiate and maintain comfort rounds  - Make Fall Risk Sign visible to staff  - Offer Toileting every 2 Hours, in advance of need if unable to notify staff of need  - Initiate/Maintain bed/chair alarm for confusion, impulsivity, or noncompliance with notifying staff.  - Apply yellow socks and bracelet for high fall risk patients  - Consider moving patient to room near nurses station  Outcome: Progressing  Goal: Maintain or return to baseline ADL function  Description: INTERVENTIONS:  -  Assess patient's ability to carry out ADLs; assess patient's baseline for ADL function and identify physical deficits which impact ability to perform ADLs (bathing, care of mouth/teeth, toileting, grooming, dressing, etc.)  - Assess/evaluate cause of self-care deficits   - Assess range of motion  - Assess patient's mobility; develop plan if impaired  - Assess patient's need for assistive devices and provide as appropriate  - Encourage maximum independence but intervene and supervise when necessary  - Involve family in performance of ADLs  - Assess for home care needs following discharge   - Consider OT consult to assist with ADL evaluation and planning for discharge  - Provide patient education as appropriate  Outcome: Progressing  Goal: Maintains/Returns to pre admission functional level  Description: INTERVENTIONS:  - Perform AM-PAC 6 Click Basic Mobility/ Daily Activity assessment daily.  - Set and communicate daily mobility goal to care team and patient/family/caregiver.   - Collaborate with rehabilitation services on mobility goals if consulted  - Perform Range of Motion 3  times a day.  - Reposition patient every 2 hours if unable to reposition self  - Out of bed for toileting if medically appropriate  - Record patient progress and toleration of activity level   Outcome: Progressing     Problem: DISCHARGE PLANNING  Goal: Discharge to home or other facility with appropriate resources  Description: INTERVENTIONS:  - Identify barriers to discharge w/patient and caregiver  - Arrange for needed discharge resources and transportation as appropriate  - Identify discharge learning needs (meds, wound care, etc.)  - Arrange for interpretive services to assist at discharge as needed  - Refer to Case Management Department for coordinating discharge planning if the patient needs post-hospital services based on physician/advanced practitioner order or complex needs related to functional status, cognitive ability, or social support system  Outcome: Progressing     Problem: Knowledge Deficit  Goal: Patient/family/caregiver demonstrates understanding of disease process, treatment plan, medications, and discharge instructions  Description: Complete learning assessment and assess knowledge base.  Interventions:  - Provide teaching at level of understanding  - Provide teaching via preferred learning methods  Outcome: Progressing     Problem: Nutrition/Hydration-ADULT  Goal: Nutrient/Hydration intake appropriate for improving, restoring or maintaining nutritional needs  Description: Monitor and assess patient's nutrition/hydration status for malnutrition. Collaborate with interdisciplinary team and initiate plan and interventions as ordered.  Monitor patient's weight and dietary intake as ordered or per policy. Utilize nutrition screening tool and intervene as necessary. Determine patient's food preferences and provide high-protein, high-caloric foods as appropriate.     INTERVENTIONS:  - Monitor oral intake, urinary output, labs, and treatment plans  - Assess nutrition and hydration status and recommend  course of action  - Evaluate amount of meals eaten  - Assist patient with eating if necessary   - Allow adequate time for meals  - Recommend/ encourage appropriate diets, oral nutritional supplements, and vitamin/mineral supplements  - Order, calculate, and assess calorie counts as needed  - Recommend, monitor, and adjust tube feedings and TPN/PPN based on assessed needs  - Assess need for intravenous fluids  - Provide specific nutrition/hydration education as appropriate  - Include patient/family/caregiver in decisions related to nutrition  Outcome: Progressing

## 2024-08-03 NOTE — ASSESSMENT & PLAN NOTE
Paroxysmal atrial fibrillation. sp ablation with Dr Alcala at [facility] 4/2024, took himself off Eliquis post procedure AMA;  has episodes on loop recorder of afib that are symptomatic, no longer taking amiodarone  Currently patient on coumadin, follows outpatient with coumadin clinic  Patient went into a fib rvr overnight on 7/26  Cardiology consulted - continue metoprolol and monitor on tele. Unable to tolerate amio and cannot take other anti arrhythmics due to esrd   Converted back into NSR on 7/26  Continue Coumadin, INR is therapeutic.

## 2024-08-03 NOTE — ASSESSMENT & PLAN NOTE
Presented to ED with bilateral leg swelling x 4 days. Right leg more swollen and red over the last 2 days. Significant pain and having drainage in the right leg as well. NO fevers or chills.   No recent antibiotics outpatient  CT RLE: No fluid collection seen, cellulitic changes leg, No intramuscular collection, no lytic lesion or periosteal reaction  Procal slightly elevated 0.89, now down trending   BC 1/2 gram negative rods; Enterobacterales   Completed IV antibiotic.  Discussed with patient and his daughter on the bedside regarding PT OT recommendation that patient will benefit to go to rehab.  But patient does not want to wait for 2 weeks to get placement.  For that reason he wants to prefer to go home.

## 2024-08-03 NOTE — PROGRESS NOTES
Progress Note - Nephrology   Masoud Perry 68 y.o. male MRN: [id number]  Unit/Bed#: -01 Encounter: [id number]    A/P:  1.  End-stage renal disease on peritoneal dialysis at [facility] in [place] under the care of Dr. Santos.  Reports starting peritoneal dialysis in June 2024.  Using CCPD at home with all 2.5% dextrose.  Access PD catheter.    Reports chronic edema at baseline for the past 10 years, his edema has significantly improved since admission.  His EDW in the outpatient setting was 119 kg which is 262 pounds.  His weight is 250 pounds today.  Patient was switched to all 4.25% dianeal in the past 24 hours due to concerns for lower extremity edema.  His peritoneal dialysis output has anywhere between 1.2 L on yesterday's exchange to run even on most recent exchange.  Ultrafiltration: 1.2 L, 400 mL, 600 mL, 0    Discussed care with patient's RN today.  Suspect that his edema is baseline with chronic lymphedema and that his decline in ultrafiltration is due to reaching euvolemia.  He reports increased lightheadedness this a.m. and blood pressure trends declined on midodrine.  Will change back to 2.5% dianeal with low calcium at 4 PM exchange.  Please communicate ultrafiltration output with nephrology team.  May need to add  4.25% dextrose depending on volume status as we go.  Icodextrin not available at this hospital.  Unfortunately, patient's decline in renal reserve/urine output makes peritoneal dialysis difficult in terms of managing volume status.  Especially as he is a new patient, typically requires renal reserve to maintain euvolemia.  Chest x-ray today no acute cardiopulmonary disease.  Weight declined to 250 pounds today on bed scale, recommend standing scale weights with dry abdomen.    Echocardiogram 7/29 showed EF 75% with mild to moderate TR.  IVC not well-visualized.  Grade 1 diastolic dysfunction.    2.  BP/volume status  Decreased BP parameters  for torsemide to hold if systolic blood  "pressure under 100 .  Increase midodrine to 12.5 mg 3 times daily, he reports this is a new medication.    Volume status: Suspect his lower extremity edema is chronic in part with chronic lymphedema.  Anticipate that we may be reaching EDW.  Ultrafiltration volume is declining with each exchange.  Unfortunately, weight taken in the bed scale, ideally standing scale weights with dry abdomen.    3.  Hypercalcemia secondary to calcitriol and ESRD with decreased calcium excretion/CKD BMD  PTH level suppressed less than 100 on July 30.  Utilize low calcium, 2.5 dialysate.  Monitor trends on Sensipar 30 mg/day.  Phosphorus at target, binders.    Abnormal serum immunofixation I cannot rule out small monoclonal gammopathy.  Retest in 3 -6 months.  Follow-up PTH RP.    4.  Anemia of ESRD/iron deficiency anemia  Status post EPO 20,000 units last week.  Holding IV Venofer in the setting of infection.    5.  Right lower extremity cellulitis  Completed antibiotic course 8/2, PD fluid is not suggestive of infection.    Discussed care with primary team and RN in terms of switching to 2.5% low calcium exchanges.  Monitor volume status/blood pressure closely.  Increase midodrine to 12.5 mg 3 times daily.  Lower hold parameters for torsemide.      Follow up reason for today's visit:     Cellulitis of right lower extremity      Subjective:   Patient seen and examined today.  He reports his lower extremity edema is baseline for the past 10 years.  His weight in the outpatient setting was 119 kg.  He reports intermittent drain pain but denies cloudy fluid.  Denies chest pain.  Endorses lightheadedness this a.m.  Hypotensive this a.m. and limiting use of diuretics.    A complete 10 point review of systems was performed and is otherwise negative.     Objective:     Vitals: Blood pressure 101/59, pulse 75, temperature 98.2 °F (36.8 °C), temperature source Temporal, resp. rate 17, height 5' 10\" (1.778 m), weight 114 kg (250 lb 14.1 oz), " "SpO2 98%.,Body mass index is 36 kg/m².    Weight (last 2 days)       Date/Time Weight    08/03/24 0400 114 (250.88)    08/02/24 0500 113 (250)    08/01/24 0600 115 (253.31)              Intake/Output Summary (Last 24 hours) at 8/3/2024 1648  Last data filed at 8/3/2024 1047  Gross per 24 hour   Intake 80 ml   Output 1125 ml   Net -1045 ml     I/O last 3 completed shifts:  In: 670 [P.O.:620; IV Piggyback:50]  Out: 2175 [Urine:375; Other:1800]         Physical Exam: /59 (BP Location: Right arm)   Pulse 75   Temp 98.2 °F (36.8 °C) (Temporal)   Resp 17   Ht 5' 10\" (1.778 m)   Wt 114 kg (250 lb 14.1 oz)   SpO2 98%   BMI 36.00 kg/m²     General Appearance:    Alert, cooperative, no distress, appears stated age   Head:    Normocephalic, without obvious abnormality, atraumatic   Eyes:    Conjunctiva/corneas clear   Ears:    Normal external ears   Nose:   Nares normal, septum midline, mucosa normal, no drainage    or sinus tenderness   Throat:   Lips, mucosa, and tongue normal   Neck:    Back:      Lungs:     Clear to auscultation bilaterally, respirations unlabored   Chest wall:    No tenderness or deformity   Heart:    Regular rate and rhythm, no rub   Abdomen:     Soft, non-tender, bowel sounds active   Extremities: Bilateral lower extremity wrapped, chronic lymphedema   Skin: Chronic venous stasis changes bilateral lower extremity   Lymph nodes:      Neurologic: Alert, at baseline             Lab, Imaging and other studies: I have personally reviewed pertinent labs.  CBC:   Lab Results   Component Value Date    WBC 8.31 08/03/2024    HGB 8.9 (L) 08/03/2024    HCT 29.8 (L) 08/03/2024     (H) 08/03/2024     08/03/2024    RBC 2.89 (L) 08/03/2024    MCH 30.8 08/03/2024    MCHC 29.9 (L) 08/03/2024    RDW 17.5 (H) 08/03/2024    MPV 9.7 08/03/2024    NRBC 0 08/03/2024     CMP:   Lab Results   Component Value Date    K 3.7 08/03/2024     08/03/2024    CO2 26 08/03/2024    BUN 52 (H) 08/03/2024    " "CREATININE 6.16 (H) 08/03/2024    CALCIUM 10.3 (H) 08/03/2024    AST 23 08/03/2024    ALT 66 (H) 08/03/2024    ALKPHOS 92 08/03/2024    EGFR 8 08/03/2024       .  Results from last 7 days   Lab Units 08/03/24  0451 08/02/24  0441 08/01/24  0604   POTASSIUM mmol/L 3.7 3.4* 3.2*   CHLORIDE mmol/L 101 102 100   CO2 mmol/L 26 27 26   BUN mg/dL 52* 53* 57*   CREATININE mg/dL 6.16* 5.95* 5.80*   CALCIUM mg/dL 10.3* 10.3* 9.9   ALK PHOS U/L 92 80 82   ALT U/L 66* 82* 100*   AST U/L 23 26 35         Phosphorus: No results found for: \"PHOS\"  Magnesium: No results found for: \"MG\"  Urinalysis: No results found for: \"COLORU\", \"CLARITYU\", \"SPECGRAV\", \"PHUR\", \"LEUKOCYTESUR\", \"NITRITE\", \"PROTEINUA\", \"GLUCOSEU\", \"KETONESU\", \"BILIRUBINUR\", \"BLOODU\"  Ionized Calcium: No results found for: \"CAION\"  Coagulation:   Lab Results   Component Value Date    INR 2.09 (H) 08/03/2024     Troponin: No results found for: \"TROPONINI\"  ABG: No results found for: \"PHART\", \"SFH2JEU\", \"PO2ART\", \"JDU5NSG\", \"H7HQIYMC\", \"BEART\", \"SOURCE\"  Radiology review:     IMAGING  Procedure: XR chest portable    Result Date: 8/3/2024  Narrative: XR CHEST PORTABLE INDICATION: CHF. COMPARISON: CXR 7/26/2024, renal CT 9/23/2022, CTA neck 1/11/2022. FINDINGS: Clear lungs. No pneumothorax or pleural effusion. Normal cardiomediastinal silhouette. Loop recorder in the left chest wall. Bones are unremarkable for age. Normal upper abdomen.     Impression: No acute cardiopulmonary disease. Workstation performed: [id number]       Current Facility-Administered Medications   Medication Dose Route Frequency    acetaminophen (TYLENOL) tablet 650 mg  650 mg Oral Q6H PRN    aspirin (ECOTRIN LOW STRENGTH) EC tablet 81 mg  81 mg Oral Daily    atorvastatin (LIPITOR) tablet 40 mg  40 mg Oral Daily With Dinner    cinacalcet (SENSIPAR) tablet 30 mg  30 mg Oral Daily With Breakfast    ferrous sulfate tablet 325 mg  325 mg Oral Daily With Breakfast    HYDROcodone-acetaminophen (NORCO) 5-325 " mg per tablet 1 tablet  1 tablet Oral Q6H PRN    HYDROcodone-acetaminophen (NORCO) 5-325 mg per tablet 2 tablet  2 tablet Oral Q6H PRN    HYDROmorphone (DILAUDID) injection 1 mg  1 mg Intravenous Q3H PRN    insulin glargine (LANTUS) subcutaneous injection 15 Units 0.15 mL  15 Units Subcutaneous Q12H VASU    insulin lispro (HumALOG/ADMELOG) 100 units/mL subcutaneous injection 1-6 Units  1-6 Units Subcutaneous HS    insulin lispro (HumALOG/ADMELOG) 100 units/mL subcutaneous injection 1-6 Units  1-6 Units Subcutaneous TID AC    levothyroxine tablet 125 mcg  125 mcg Oral Early Morning    metoprolol succinate (TOPROL-XL) 24 hr tablet 100 mg  100 mg Oral BID    midodrine (PROAMATINE) tablet 12.5 mg  12.5 mg Oral TID AC    saccharomyces boulardii (FLORASTOR) capsule 250 mg  250 mg Oral BID    simethicone (MYLICON) chewable tablet 80 mg  80 mg Oral 4x Daily PRN    tamsulosin (FLOMAX) capsule 0.4 mg  0.4 mg Oral Daily With Dinner    torsemide (DEMADEX) tablet 60 mg  60 mg Oral BID    warfarin (COUMADIN) tablet 2 mg  2 mg Oral Daily (warfarin)     Medications Discontinued During This Encounter   Medication Reason    ampicillin-sulbactam (UNASYN) 3 g in sodium chloride 0.9 % 100 mL IVPB     oxyCODONE (ROXICODONE) IR tablet 5 mg     oxyCODONE (ROXICODONE) immediate release tablet 10 mg     insulin lispro (HumALOG/ADMELOG) 100 units/mL subcutaneous injection 1-6 Units     apixaban (ELIQUIS) 5 mg     ampicillin-sulbactam (UNASYN) 3 g in sodium chloride 0.9 % 100 mL IVPB     HYDROmorphone HCl (DILAUDID) injection 0.2 mg     HYDROmorphone (DILAUDID) injection 0.5 mg     nicotine (NICODERM CQ) 14 mg/24hr TD 24 hr patch 1 patch     furosemide (LASIX) 500 mg infusion 50 mL     torsemide (DEMADEX) tablet 40 mg     magnesium Oxide (MAG-OX) tablet 400 mg     iron sucrose (VENOFER) 300 mg in sodium chloride 0.9 % 250 mL IVPB     piperacillin-tazobactam (ZOSYN) 4.5 g in sodium chloride 0.9 % 100 mL IVPB (EXTENDED INFUSION)     polyethylene  glycol (MIRALAX) packet 17 g     senna (SENOKOT) tablet 17.2 mg     docusate sodium (COLACE) capsule 100 mg     cefepime (MAXIPIME) IVPB (premix in dextrose) 1,000 mg 50 mL     midodrine (PROAMATINE) tablet 10 mg     torsemide (DEMADEX) tablet 60 mg        Maria Eugenia Jane DO      This progress note was produced in part using a dictation device which may document imprecise wording from author's original intent.

## 2024-08-03 NOTE — PLAN OF CARE
Problem: Prexisting or High Potential for Compromised Skin Integrity  Goal: Skin integrity is maintained or improved  Description: INTERVENTIONS:  - Identify patients at risk for skin breakdown  - Assess and monitor skin integrity  - Assess and monitor nutrition and hydration status  - Monitor labs   - Assess for incontinence   - Turn and reposition patient  - Assist with mobility/ambulation  - Relieve pressure over bony prominences  - Avoid friction and shearing  - Provide appropriate hygiene as needed including keeping skin clean and dry  - Evaluate need for skin moisturizer/barrier cream  - Collaborate with interdisciplinary team   - Patient/family teaching  - Consider wound care consult   Outcome: Progressing     Problem: METABOLIC, FLUID AND ELECTROLYTES - ADULT  Goal: Electrolytes maintained within normal limits  Description: INTERVENTIONS:  - Monitor labs and assess patient for signs and symptoms of electrolyte imbalances  - Administer electrolyte replacement as ordered  - Monitor response to electrolyte replacements, including repeat lab results as appropriate  - Instruct patient on fluid and nutrition as appropriate  Outcome: Progressing  Goal: Fluid balance maintained  Description: INTERVENTIONS:  - Monitor labs   - Monitor I/O and WT  - Instruct patient on fluid and nutrition as appropriate  - Assess for signs & symptoms of volume excess or deficit  Outcome: Progressing  Goal: Glucose maintained within target range  Description: INTERVENTIONS:  - Monitor Blood Glucose as ordered  - Assess for signs and symptoms of hyperglycemia and hypoglycemia  - Administer ordered medications to maintain glucose within target range  - Assess nutritional intake and initiate nutrition service referral as needed  Outcome: Progressing     Problem: SKIN/TISSUE INTEGRITY - ADULT  Goal: Incision(s), wounds(s) or drain site(s) healing without S/S of infection  Description: INTERVENTIONS  - Assess and document dressing,  incision, wound bed, drain sites and surrounding tissue  - Provide patient and family education  - Perform skin care/dressing changes per wound  Outcome: Progressing     Problem: PAIN - ADULT  Goal: Verbalizes/displays adequate comfort level or baseline comfort level  Description: Interventions:  - Encourage patient to monitor pain and request assistance  - Assess pain using appropriate pain scale  - Administer analgesics based on type and severity of pain and evaluate response  - Implement non-pharmacological measures as appropriate and evaluate response  - Consider cultural and social influences on pain and pain management  - Notify physician/advanced practitioner if interventions unsuccessful or patient reports new pain  Outcome: Progressing     Problem: INFECTION - ADULT  Goal: Absence or prevention of progression during hospitalization  Description: INTERVENTIONS:  - Assess and monitor for signs and symptoms of infection  - Monitor lab/diagnostic results  - Monitor all insertion sites, i.e. indwelling lines, tubes, and drains  - Monitor endotracheal if appropriate and nasal secretions for changes in amount and color  - [place] appropriate cooling/warming therapies per order  - Administer medications as ordered  - Instruct and encourage patient and family to use good hand hygiene technique  - Identify and instruct in appropriate isolation precautions for identified infection/condition  Outcome: Progressing     Problem: Nutrition/Hydration-ADULT  Goal: Nutrient/Hydration intake appropriate for improving, restoring or maintaining nutritional needs  Description: Monitor and assess patient's nutrition/hydration status for malnutrition. Collaborate with interdisciplinary team and initiate plan and interventions as ordered.  Monitor patient's weight and dietary intake as ordered or per policy. Utilize nutrition screening tool and intervene as necessary. Determine patient's food preferences and provide high-protein,  high-caloric foods as appropriate.     INTERVENTIONS:  - Monitor oral intake, urinary output, labs, and treatment plans  - Assess nutrition and hydration status and recommend course of action  - Evaluate amount of meals eaten  - Assist patient with eating if necessary   - Allow adequate time for meals  - Recommend/ encourage appropriate diets, oral nutritional supplements, and vitamin/mineral supplements  - Order, calculate, and assess calorie counts as needed  - Recommend, monitor, and adjust tube feedings and TPN/PPN based on assessed needs  - Assess need for intravenous fluids  - Provide specific nutrition/hydration education as appropriate  - Include patient/family/caregiver in decisions related to nutrition  Outcome: Progressing     Problem: GENITOURINARY - ADULT  Goal: Maintains or returns to baseline urinary function  Description: INTERVENTIONS:  - Assess urinary function  - Encourage oral fluids to ensure adequate hydration if ordered  - Administer IV fluids as ordered to ensure adequate hydration  - Administer ordered medications as needed  - Offer frequent toileting  - Follow urinary retention protocol if ordered  Outcome: Progressing     Problem: SAFETY ADULT  Goal: Patient will remain free of falls  Description: INTERVENTIONS:  - Educate patient/family on patient safety including physical limitations  - Instruct patient to call for assistance with activity   - Consult OT/PT to assist with strengthening/mobility   - Keep Call bell within reach  - Keep bed low and locked with side rails adjusted as appropriate  - Keep care items and personal belongings within reach  - Initiate and maintain comfort rounds  - Make Fall Risk Sign visible to staff  - Offer Toileting every 2 Hours, in advance of need if unable to notify staff of need  - Initiate/Maintain bed/chair alarm for confusion, impulsivity, or noncompliance with notifying staff.  - Apply yellow socks and bracelet for high fall risk patients  - Consider  moving patient to room near nurses station  Outcome: Progressing  Goal: Maintain or return to baseline ADL function  Description: INTERVENTIONS:  -  Assess patient's ability to carry out ADLs; assess patient's baseline for ADL function and identify physical deficits which impact ability to perform ADLs (bathing, care of mouth/teeth, toileting, grooming, dressing, etc.)  - Assess/evaluate cause of self-care deficits   - Assess range of motion  - Assess patient's mobility; develop plan if impaired  - Assess patient's need for assistive devices and provide as appropriate  - Encourage maximum independence but intervene and supervise when necessary  - Involve family in performance of ADLs  - Assess for home care needs following discharge   - Consider OT consult to assist with ADL evaluation and planning for discharge  - Provide patient education as appropriate  Outcome: Progressing  Goal: Maintains/Returns to pre admission functional level  Description: INTERVENTIONS:  - Perform AM-PAC 6 Click Basic Mobility/ Daily Activity assessment daily.  - Set and communicate daily mobility goal to care team and patient/family/caregiver.   - Collaborate with rehabilitation services on mobility goals if consulted  - Perform Range of Motion 3 times a day.  - Reposition patient every 2 hours if unable to reposition self  - Out of bed for toileting if medically appropriate  - Record patient progress and toleration of activity level   Outcome: Progressing     Problem: DISCHARGE PLANNING  Goal: Discharge to home or other facility with appropriate resources  Description: INTERVENTIONS:  - Identify barriers to discharge w/patient and caregiver  - Arrange for needed discharge resources and transportation as appropriate  - Identify discharge learning needs (meds, wound care, etc.)  - Arrange for interpretive services to assist at discharge as needed  - Refer to Case Management Department for coordinating discharge planning if the patient  needs post-hospital services based on physician/advanced practitioner order or complex needs related to functional status, cognitive ability, or social support system  Outcome: Progressing     Problem: Knowledge Deficit  Goal: Patient/family/caregiver demonstrates understanding of disease process, treatment plan, medications, and discharge instructions  Description: Complete learning assessment and assess knowledge base.  Interventions:  - Provide teaching at level of understanding  - Provide teaching via preferred learning methods  Outcome: Progressing

## 2024-08-04 LAB
ALBUMIN SERPL BCG-MCNC: 2.9 G/DL (ref 3.5–5)
ALP SERPL-CCNC: 99 U/L (ref 34–104)
ALT SERPL W P-5'-P-CCNC: 52 U/L (ref 7–52)
ANION GAP SERPL CALCULATED.3IONS-SCNC: 11 MMOL/L (ref 4–13)
AST SERPL W P-5'-P-CCNC: 19 U/L (ref 13–39)
BASOPHILS # BLD AUTO: 0.04 THOUSANDS/ÂΜL (ref 0–0.1)
BASOPHILS NFR BLD AUTO: 1 % (ref 0–1)
BILIRUB SERPL-MCNC: 0.39 MG/DL (ref 0.2–1)
BUN SERPL-MCNC: 50 MG/DL (ref 5–25)
CALCIUM ALBUM COR SERPL-MCNC: 10.8 MG/DL (ref 8.3–10.1)
CALCIUM SERPL-MCNC: 9.9 MG/DL (ref 8.4–10.2)
CHLORIDE SERPL-SCNC: 98 MMOL/L (ref 96–108)
CO2 SERPL-SCNC: 26 MMOL/L (ref 21–32)
CREAT SERPL-MCNC: 6.65 MG/DL (ref 0.6–1.3)
EOSINOPHIL # BLD AUTO: 0.26 THOUSAND/ÂΜL (ref 0–0.61)
EOSINOPHIL NFR BLD AUTO: 3 % (ref 0–6)
ERYTHROCYTE [DISTWIDTH] IN BLOOD BY AUTOMATED COUNT: 17.5 % (ref 11.6–15.1)
GFR SERPL CREATININE-BSD FRML MDRD: 7 ML/MIN/1.73SQ M
GLUCOSE SERPL-MCNC: 101 MG/DL (ref 65–140)
GLUCOSE SERPL-MCNC: 111 MG/DL (ref 65–140)
GLUCOSE SERPL-MCNC: 119 MG/DL (ref 65–140)
GLUCOSE SERPL-MCNC: 133 MG/DL (ref 65–140)
GLUCOSE SERPL-MCNC: 135 MG/DL (ref 65–140)
HCT VFR BLD AUTO: 28 % (ref 36.5–49.3)
HGB BLD-MCNC: 8.4 G/DL (ref 12–17)
IMM GRANULOCYTES # BLD AUTO: 0.08 THOUSAND/UL (ref 0–0.2)
IMM GRANULOCYTES NFR BLD AUTO: 1 % (ref 0–2)
INR PPP: 2.64 (ref 0.85–1.19)
LYMPHOCYTES # BLD AUTO: 2.04 THOUSANDS/ÂΜL (ref 0.6–4.47)
LYMPHOCYTES NFR BLD AUTO: 25 % (ref 14–44)
MCH RBC QN AUTO: 30.7 PG (ref 26.8–34.3)
MCHC RBC AUTO-ENTMCNC: 30 G/DL (ref 31.4–37.4)
MCV RBC AUTO: 102 FL (ref 82–98)
MONOCYTES # BLD AUTO: 0.47 THOUSAND/ÂΜL (ref 0.17–1.22)
MONOCYTES NFR BLD AUTO: 6 % (ref 4–12)
NEUTROPHILS # BLD AUTO: 5.42 THOUSANDS/ÂΜL (ref 1.85–7.62)
NEUTS SEG NFR BLD AUTO: 64 % (ref 43–75)
NRBC BLD AUTO-RTO: 0 /100 WBCS
PLATELET # BLD AUTO: 165 THOUSANDS/UL (ref 149–390)
PMV BLD AUTO: 9 FL (ref 8.9–12.7)
POTASSIUM SERPL-SCNC: 3.5 MMOL/L (ref 3.5–5.3)
PROT SERPL-MCNC: 6.1 G/DL (ref 6.4–8.4)
PROTHROMBIN TIME: 28.2 SECONDS (ref 12.3–15)
RBC # BLD AUTO: 2.74 MILLION/UL (ref 3.88–5.62)
SODIUM SERPL-SCNC: 135 MMOL/L (ref 135–147)
WBC # BLD AUTO: 8.31 THOUSAND/UL (ref 4.31–10.16)

## 2024-08-04 PROCEDURE — 85025 COMPLETE CBC W/AUTO DIFF WBC: CPT | Performed by: FAMILY MEDICINE

## 2024-08-04 PROCEDURE — 80053 COMPREHEN METABOLIC PANEL: CPT | Performed by: FAMILY MEDICINE

## 2024-08-04 PROCEDURE — 99232 SBSQ HOSP IP/OBS MODERATE 35: CPT | Performed by: FAMILY MEDICINE

## 2024-08-04 PROCEDURE — 99232 SBSQ HOSP IP/OBS MODERATE 35: CPT | Performed by: INTERNAL MEDICINE

## 2024-08-04 PROCEDURE — 85610 PROTHROMBIN TIME: CPT | Performed by: FAMILY MEDICINE

## 2024-08-04 PROCEDURE — 82948 REAGENT STRIP/BLOOD GLUCOSE: CPT

## 2024-08-04 RX ADMIN — ASPIRIN 81 MG: 81 TABLET, COATED ORAL at 07:51

## 2024-08-04 RX ADMIN — MIDODRINE HYDROCHLORIDE 12.5 MG: 5 TABLET ORAL at 07:51

## 2024-08-04 RX ADMIN — LEVOTHYROXINE SODIUM 125 MCG: 125 TABLET ORAL at 05:11

## 2024-08-04 RX ADMIN — TAMSULOSIN HYDROCHLORIDE 0.4 MG: 0.4 CAPSULE ORAL at 17:07

## 2024-08-04 RX ADMIN — INSULIN GLARGINE 15 UNITS: 100 INJECTION, SOLUTION SUBCUTANEOUS at 21:44

## 2024-08-04 RX ADMIN — HYDROCODONE BITARTRATE AND ACETAMINOPHEN 2 TABLET: 5; 325 TABLET ORAL at 03:32

## 2024-08-04 RX ADMIN — Medication 250 MG: at 07:52

## 2024-08-04 RX ADMIN — TORSEMIDE 60 MG: 20 TABLET ORAL at 09:35

## 2024-08-04 RX ADMIN — HYDROCODONE BITARTRATE AND ACETAMINOPHEN 2 TABLET: 5; 325 TABLET ORAL at 21:46

## 2024-08-04 RX ADMIN — TORSEMIDE 60 MG: 20 TABLET ORAL at 21:44

## 2024-08-04 RX ADMIN — INSULIN GLARGINE 15 UNITS: 100 INJECTION, SOLUTION SUBCUTANEOUS at 07:52

## 2024-08-04 RX ADMIN — MIDODRINE HYDROCHLORIDE 12.5 MG: 5 TABLET ORAL at 15:36

## 2024-08-04 RX ADMIN — HYDROCODONE BITARTRATE AND ACETAMINOPHEN 2 TABLET: 5; 325 TABLET ORAL at 09:34

## 2024-08-04 RX ADMIN — ATORVASTATIN CALCIUM 40 MG: 40 TABLET, FILM COATED ORAL at 17:07

## 2024-08-04 RX ADMIN — Medication 250 MG: at 17:07

## 2024-08-04 RX ADMIN — CINACALCET 30 MG: 30 TABLET, FILM COATED ORAL at 07:51

## 2024-08-04 RX ADMIN — MIDODRINE HYDROCHLORIDE 12.5 MG: 5 TABLET ORAL at 11:35

## 2024-08-04 RX ADMIN — HYDROCODONE BITARTRATE AND ACETAMINOPHEN 2 TABLET: 5; 325 TABLET ORAL at 15:36

## 2024-08-04 RX ADMIN — WARFARIN SODIUM 2 MG: 2 TABLET ORAL at 17:06

## 2024-08-04 RX ADMIN — FERROUS SULFATE TAB 325 MG (65 MG ELEMENTAL FE) 325 MG: 325 (65 FE) TAB at 07:52

## 2024-08-04 RX ADMIN — METOPROLOL SUCCINATE 100 MG: 100 TABLET, EXTENDED RELEASE ORAL at 21:46

## 2024-08-04 NOTE — PLAN OF CARE
Problem: Prexisting or High Potential for Compromised Skin Integrity  Goal: Skin integrity is maintained or improved  Description: INTERVENTIONS:  - Identify patients at risk for skin breakdown  - Assess and monitor skin integrity  - Assess and monitor nutrition and hydration status  - Monitor labs   - Assess for incontinence   - Turn and reposition patient  - Assist with mobility/ambulation  - Relieve pressure over bony prominences  - Avoid friction and shearing  - Provide appropriate hygiene as needed including keeping skin clean and dry  - Evaluate need for skin moisturizer/barrier cream  - Collaborate with interdisciplinary team   - Patient/family teaching  - Consider wound care consult   Outcome: Progressing     Problem: METABOLIC, FLUID AND ELECTROLYTES - ADULT  Goal: Electrolytes maintained within normal limits  Description: INTERVENTIONS:  - Monitor labs and assess patient for signs and symptoms of electrolyte imbalances  - Administer electrolyte replacement as ordered  - Monitor response to electrolyte replacements, including repeat lab results as appropriate  - Instruct patient on fluid and nutrition as appropriate  Outcome: Progressing  Goal: Fluid balance maintained  Description: INTERVENTIONS:  - Monitor labs   - Monitor I/O and WT  - Instruct patient on fluid and nutrition as appropriate  - Assess for signs & symptoms of volume excess or deficit  Outcome: Progressing  Goal: Glucose maintained within target range  Description: INTERVENTIONS:  - Monitor Blood Glucose as ordered  - Assess for signs and symptoms of hyperglycemia and hypoglycemia  - Administer ordered medications to maintain glucose within target range  - Assess nutritional intake and initiate nutrition service referral as needed  Outcome: Progressing     Problem: SKIN/TISSUE INTEGRITY - ADULT  Goal: Incision(s), wounds(s) or drain site(s) healing without S/S of infection  Description: INTERVENTIONS  - Assess and document dressing,  incision, wound bed, drain sites and surrounding tissue  - Provide patient and family education  - Perform skin care/dressing changes per wound  Outcome: Progressing     Problem: GENITOURINARY - ADULT  Goal: Maintains or returns to baseline urinary function  Description: INTERVENTIONS:  - Assess urinary function  - Encourage oral fluids to ensure adequate hydration if ordered  - Administer IV fluids as ordered to ensure adequate hydration  - Administer ordered medications as needed  - Offer frequent toileting  - Follow urinary retention protocol if ordered  Outcome: Progressing     Problem: PAIN - ADULT  Goal: Verbalizes/displays adequate comfort level or baseline comfort level  Description: Interventions:  - Encourage patient to monitor pain and request assistance  - Assess pain using appropriate pain scale  - Administer analgesics based on type and severity of pain and evaluate response  - Implement non-pharmacological measures as appropriate and evaluate response  - Consider cultural and social influences on pain and pain management  - Notify physician/advanced practitioner if interventions unsuccessful or patient reports new pain  Outcome: Progressing     Problem: INFECTION - ADULT  Goal: Absence or prevention of progression during hospitalization  Description: INTERVENTIONS:  - Assess and monitor for signs and symptoms of infection  - Monitor lab/diagnostic results  - Monitor all insertion sites, i.e. indwelling lines, tubes, and drains  - Monitor endotracheal if appropriate and nasal secretions for changes in amount and color  - [place] appropriate cooling/warming therapies per order  - Administer medications as ordered  - Instruct and encourage patient and family to use good hand hygiene technique  - Identify and instruct in appropriate isolation precautions for identified infection/condition  Outcome: Progressing     Problem: SAFETY ADULT  Goal: Patient will remain free of falls  Description:  INTERVENTIONS:  - Educate patient/family on patient safety including physical limitations  - Instruct patient to call for assistance with activity   - Consult OT/PT to assist with strengthening/mobility   - Keep Call bell within reach  - Keep bed low and locked with side rails adjusted as appropriate  - Keep care items and personal belongings within reach  - Initiate and maintain comfort rounds  - Make Fall Risk Sign visible to staff  - Offer Toileting every 2 Hours, in advance of need if unable to notify staff of need  - Initiate/Maintain bed/chair alarm for confusion, impulsivity, or noncompliance with notifying staff.  - Apply yellow socks and bracelet for high fall risk patients  - Consider moving patient to room near nurses station  Outcome: Progressing  Goal: Maintain or return to baseline ADL function  Description: INTERVENTIONS:  -  Assess patient's ability to carry out ADLs; assess patient's baseline for ADL function and identify physical deficits which impact ability to perform ADLs (bathing, care of mouth/teeth, toileting, grooming, dressing, etc.)  - Assess/evaluate cause of self-care deficits   - Assess range of motion  - Assess patient's mobility; develop plan if impaired  - Assess patient's need for assistive devices and provide as appropriate  - Encourage maximum independence but intervene and supervise when necessary  - Involve family in performance of ADLs  - Assess for home care needs following discharge   - Consider OT consult to assist with ADL evaluation and planning for discharge  - Provide patient education as appropriate  Outcome: Progressing  Goal: Maintains/Returns to pre admission functional level  Description: INTERVENTIONS:  - Perform AM-PAC 6 Click Basic Mobility/ Daily Activity assessment daily.  - Set and communicate daily mobility goal to care team and patient/family/caregiver.   - Collaborate with rehabilitation services on mobility goals if consulted  - Perform Range of Motion 3  times a day.  - Reposition patient every 2 hours if unable to reposition self  - Out of bed for toileting if medically appropriate  - Record patient progress and toleration of activity level   Outcome: Progressing     Problem: DISCHARGE PLANNING  Goal: Discharge to home or other facility with appropriate resources  Description: INTERVENTIONS:  - Identify barriers to discharge w/patient and caregiver  - Arrange for needed discharge resources and transportation as appropriate  - Identify discharge learning needs (meds, wound care, etc.)  - Arrange for interpretive services to assist at discharge as needed  - Refer to Case Management Department for coordinating discharge planning if the patient needs post-hospital services based on physician/advanced practitioner order or complex needs related to functional status, cognitive ability, or social support system  Outcome: Progressing     Problem: Knowledge Deficit  Goal: Patient/family/caregiver demonstrates understanding of disease process, treatment plan, medications, and discharge instructions  Description: Complete learning assessment and assess knowledge base.  Interventions:  - Provide teaching at level of understanding  - Provide teaching via preferred learning methods  Outcome: Progressing     Problem: Nutrition/Hydration-ADULT  Goal: Nutrient/Hydration intake appropriate for improving, restoring or maintaining nutritional needs  Description: Monitor and assess patient's nutrition/hydration status for malnutrition. Collaborate with interdisciplinary team and initiate plan and interventions as ordered.  Monitor patient's weight and dietary intake as ordered or per policy. Utilize nutrition screening tool and intervene as necessary. Determine patient's food preferences and provide high-protein, high-caloric foods as appropriate.     INTERVENTIONS:  - Monitor oral intake, urinary output, labs, and treatment plans  - Assess nutrition and hydration status and recommend  course of action  - Evaluate amount of meals eaten  - Assist patient with eating if necessary   - Allow adequate time for meals  - Recommend/ encourage appropriate diets, oral nutritional supplements, and vitamin/mineral supplements  - Order, calculate, and assess calorie counts as needed  - Recommend, monitor, and adjust tube feedings and TPN/PPN based on assessed needs  - Assess need for intravenous fluids  - Provide specific nutrition/hydration education as appropriate  - Include patient/family/caregiver in decisions related to nutrition  Outcome: Progressing

## 2024-08-04 NOTE — ASSESSMENT & PLAN NOTE
Paroxysmal atrial fibrillation. sp ablation with Dr Alcala at [facility] 4/2024, took himself off Eliquis post procedure AMA;  has episodes on loop recorder of afib that are symptomatic, no longer taking amiodarone  Currently patient on coumadin, follows outpatient with coumadin clinic  Patient went into a fib rvr overnight on 7/26  Cardiology consulted - continue metoprolol and monitor on tele. Unable to tolerate amio and cannot take other anti arrhythmics due to esrd   Converted back into NSR on 7/26  Continue Coumadin, INR is therapeutic.   finger finger

## 2024-08-04 NOTE — PROGRESS NOTES
Progress Note - Nephrology   Masoud Perry 68 y.o. male MRN: [id number]  Unit/Bed#: -01 Encounter: [id number]    A/P:  1.  End-stage renal disease on peritoneal dialysis at [facility] in [place] under the care of Dr. Santos.  Patient reports starting peritoneal dialysis June 2024 and has been using CCPD at home with all 2.5% dextrose.  There was some conversation regarding adding in a 4.25 but was not done apparently.    Current Rx 2.5% low calcium dianeal 2 L every 6 hours changes,   Earlier during hospitalization, patient transition to 4.25% on 8/2 with concerns for worsening volume overload.  On 8/3 he had variable ultrafiltration with 4.25, including 1.2, 400, 600, 0 mL.  He did have a 2 L ultrafiltration on 8/3 approximately at 4:00.  After this he was switched to 2.5.  He was switched to 2.5 with concerns for lightheadedness/dizziness and his chest x-ray did not show acute cardiopulmonary disease.  Patient indicated to me in no uncertain terms that his leg swelling was at baseline for the past 10 years.  He has chronic lymphedema which is a known issue.  His previous weight was 119 kg which is 262 pounds per outpatient records.  His weight was 250 pounds on 8/3.    Weight today is 246 pounds.If patient continues to gain with 2.5% exchanges, can alternate 4.25 and 2.5 to keep relatively even in the short-term.  May benefit from icodextrin in the outpatient setting to minimize fluid gains and avoid 4.25 high glycemic effect on the peritoneum.    Unfortunately, patient's urine output has significantly declined which is resulting in volume issues.  Typically patients of peritoneal dialysis especially when they start need to have some degree of renal reserve to maintain euvolemia.    Echocardiogram 7/29 showed EF with 75% with mild to moderate TR.  IVC not well visualized and had grade 1 diastolic dysfunction.    2.  BP/volume status  Continue midodrine 12.5 3 times daily.  Reports midodrine is a new  "medication for him.  Continue torsemide with hold parameters under 100.  Patient did receive torsemide today.  Patient will need a new EDW, to 46 pounds list at this a.m.  He was transition from 4.25-2.5 yesterday as he was dizzy/lightheaded/had worsening hypotension and felt he was getting 2 to EDW.  He does have significant lower extremity edema but he is reporting that this is baseline and suspect he does have chronic lymphedema.    3.  Hypercalcemia due to at calcitriol and ESRD with decreased calcium excretion  Would hold calcitriol and follow trends on Sensipar 30 mg/day.  Phosphorus at target and is off binders.    4.  anemia of ESRD.  Status post EPO 20,000 units last week, follow hemoglobin trends.  Hold IV Venofer in the setting of infection.    5.  Right lower extremity cellulitis  PD fluid was not suggestive of infection and completed antibiotic course 8/2.      Follow up reason for today's visit:     Cellulitis of right lower extremity      Subjective:     Patient seen and examined today.  Patient denied any changes to leg swelling or shortness of breath.  Blood pressure trends have improved.  He has chronic lower extremity lymphedema.  Note denies abdominal pain/cloudy fluid.  Discussed care with RN, yesterday afternoon at approximately 4:00 drained and 2 L additional UF after this, he was slightly positive with each exchange.  A complete 10 point review of systems was performed and is otherwise negative.       Objective:     Vitals: Blood pressure 98/57, pulse 83, temperature 97.5 °F (36.4 °C), temperature source Temporal, resp. rate 18, height 5' 10\" (1.778 m), weight 112 kg (246 lb 0.5 oz), SpO2 95%.,Body mass index is 35.3 kg/m².    Weight (last 2 days)       Date/Time Weight    08/04/24 0543 112 (246.03)     Weight: dry weight at 08/04/24 0543    08/04/24 0500 112 (246.03)     Weight: dry weight at 08/04/24 0500    08/03/24 0400 114 (250.88)    08/02/24 0500 113 (250)              Intake/Output " "Summary (Last 24 hours) at 8/4/2024 1335  Last data filed at 8/4/2024 1125  Gross per 24 hour   Intake 230 ml   Output 1145 ml   Net -915 ml     I/O last 3 completed shifts:  In: 80 [P.O.:80]  Out: 2170 [Urine:370; Other:1800]         Physical Exam: BP 98/57 (BP Location: Right arm)   Pulse 83   Temp 97.5 °F (36.4 °C) (Temporal)   Resp 18   Ht 5' 10\" (1.778 m)   Wt 112 kg (246 lb 0.5 oz) Comment: dry weight  SpO2 95%   BMI 35.30 kg/m²     General Appearance:    Alert, cooperative, no distress, appears stated age   Head:    Normocephalic, without obvious abnormality, atraumatic   Eyes:    Conjunctiva/corneas clear   Ears:    Normal external ears   Nose:   Nares normal, septum midline, mucosa normal, no drainage    or sinus tenderness   Throat:   Lips, mucosa, and tongue normal; teeth and gums normal   Neck:    Back:      Lungs:     Clear to auscultation bilaterally, respirations unlabored   Chest wall:    No tenderness or deformity   Heart:    Regular rate and rhythm, S1 and S2 normal, no murmur, rub   or gallop   Abdomen:     Soft, distended, PD catheter present   Extremities: Chronic lymphedema   Skin: Chronic lower extremity venous stasis changes, legs wrapped   Lymph nodes:      Neurologic:   CNII-XII intact            Lab, Imaging and other studies: I have personally reviewed pertinent labs.  CBC:   Lab Results   Component Value Date    WBC 8.31 08/04/2024    HGB 8.4 (L) 08/04/2024    HCT 28.0 (L) 08/04/2024     (H) 08/04/2024     08/04/2024    RBC 2.74 (L) 08/04/2024    MCH 30.7 08/04/2024    MCHC 30.0 (L) 08/04/2024    RDW 17.5 (H) 08/04/2024    MPV 9.0 08/04/2024    NRBC 0 08/04/2024     CMP:   Lab Results   Component Value Date    K 3.5 08/04/2024    CL 98 08/04/2024    CO2 26 08/04/2024    BUN 50 (H) 08/04/2024    CREATININE 6.65 (H) 08/04/2024    CALCIUM 9.9 08/04/2024    AST 19 08/04/2024    ALT 52 08/04/2024    ALKPHOS 99 08/04/2024    EGFR 7 08/04/2024       .  Results from last 7 " "days   Lab Units 08/04/24  0438 08/03/24  0451 08/02/24  0441   POTASSIUM mmol/L 3.5 3.7 3.4*   CHLORIDE mmol/L 98 101 102   CO2 mmol/L 26 26 27   BUN mg/dL 50* 52* 53*   CREATININE mg/dL 6.65* 6.16* 5.95*   CALCIUM mg/dL 9.9 10.3* 10.3*   ALK PHOS U/L 99 92 80   ALT U/L 52 66* 82*   AST U/L 19 23 26         Phosphorus: No results found for: \"PHOS\"  Magnesium: No results found for: \"MG\"  Urinalysis: No results found for: \"COLORU\", \"CLARITYU\", \"SPECGRAV\", \"PHUR\", \"LEUKOCYTESUR\", \"NITRITE\", \"PROTEINUA\", \"GLUCOSEU\", \"KETONESU\", \"BILIRUBINUR\", \"BLOODU\"  Ionized Calcium: No results found for: \"CAION\"  Coagulation:   Lab Results   Component Value Date    INR 2.64 (H) 08/04/2024     Troponin: No results found for: \"TROPONINI\"  ABG: No results found for: \"PHART\", \"AKK2NSB\", \"PO2ART\", \"BIJ4TCI\", \"H1ZBSBOL\", \"BEART\", \"SOURCE\"  Radiology review:     IMAGING  Procedure: XR chest portable    Result Date: 8/3/2024  Narrative: XR CHEST PORTABLE INDICATION: CHF. COMPARISON: CXR 7/26/2024, renal CT 9/23/2022, CTA neck 1/11/2022. FINDINGS: Clear lungs. No pneumothorax or pleural effusion. Normal cardiomediastinal silhouette. Loop recorder in the left chest wall. Bones are unremarkable for age. Normal upper abdomen.     Impression: No acute cardiopulmonary disease. Workstation performed: [id number]       Current Facility-Administered Medications   Medication Dose Route Frequency    acetaminophen (TYLENOL) tablet 650 mg  650 mg Oral Q6H PRN    aspirin (ECOTRIN LOW STRENGTH) EC tablet 81 mg  81 mg Oral Daily    atorvastatin (LIPITOR) tablet 40 mg  40 mg Oral Daily With Dinner    cinacalcet (SENSIPAR) tablet 30 mg  30 mg Oral Daily With Breakfast    ferrous sulfate tablet 325 mg  325 mg Oral Daily With Breakfast    HYDROcodone-acetaminophen (NORCO) 5-325 mg per tablet 1 tablet  1 tablet Oral Q6H PRN    HYDROcodone-acetaminophen (NORCO) 5-325 mg per tablet 2 tablet  2 tablet Oral Q6H PRN    HYDROmorphone (DILAUDID) injection 1 mg  1 mg " Intravenous Q3H PRN    insulin glargine (LANTUS) subcutaneous injection 15 Units 0.15 mL  15 Units Subcutaneous Q12H VASU    insulin lispro (HumALOG/ADMELOG) 100 units/mL subcutaneous injection 1-6 Units  1-6 Units Subcutaneous HS    insulin lispro (HumALOG/ADMELOG) 100 units/mL subcutaneous injection 1-6 Units  1-6 Units Subcutaneous TID AC    levothyroxine tablet 125 mcg  125 mcg Oral Early Morning    metoprolol succinate (TOPROL-XL) 24 hr tablet 100 mg  100 mg Oral BID    midodrine (PROAMATINE) tablet 12.5 mg  12.5 mg Oral TID AC    saccharomyces boulardii (FLORASTOR) capsule 250 mg  250 mg Oral BID    simethicone (MYLICON) chewable tablet 80 mg  80 mg Oral 4x Daily PRN    tamsulosin (FLOMAX) capsule 0.4 mg  0.4 mg Oral Daily With Dinner    torsemide (DEMADEX) tablet 60 mg  60 mg Oral BID    warfarin (COUMADIN) tablet 2 mg  2 mg Oral Daily (warfarin)     Medications Discontinued During This Encounter   Medication Reason    ampicillin-sulbactam (UNASYN) 3 g in sodium chloride 0.9 % 100 mL IVPB     oxyCODONE (ROXICODONE) IR tablet 5 mg     oxyCODONE (ROXICODONE) immediate release tablet 10 mg     insulin lispro (HumALOG/ADMELOG) 100 units/mL subcutaneous injection 1-6 Units     apixaban (ELIQUIS) 5 mg     ampicillin-sulbactam (UNASYN) 3 g in sodium chloride 0.9 % 100 mL IVPB     HYDROmorphone HCl (DILAUDID) injection 0.2 mg     HYDROmorphone (DILAUDID) injection 0.5 mg     nicotine (NICODERM CQ) 14 mg/24hr TD 24 hr patch 1 patch     furosemide (LASIX) 500 mg infusion 50 mL     torsemide (DEMADEX) tablet 40 mg     magnesium Oxide (MAG-OX) tablet 400 mg     iron sucrose (VENOFER) 300 mg in sodium chloride 0.9 % 250 mL IVPB     piperacillin-tazobactam (ZOSYN) 4.5 g in sodium chloride 0.9 % 100 mL IVPB (EXTENDED INFUSION)     polyethylene glycol (MIRALAX) packet 17 g     senna (SENOKOT) tablet 17.2 mg     docusate sodium (COLACE) capsule 100 mg     cefepime (MAXIPIME) IVPB (premix in dextrose) 1,000 mg 50 mL      midodrine (PROAMATINE) tablet 10 mg     torsemide (DEMADEX) tablet 60 mg        Maria Eugenia Jane,       This progress note was produced in part using a dictation device which may document imprecise wording from author's original intent.

## 2024-08-04 NOTE — PROGRESS NOTES
[facility]  Progress Note  Name: Masoud Perry I  MRN: [id number]  Unit/Bed#: -01 I Date of Admission: 7/23/2024   Date of Service: 8/4/2024 I Hospital Day: 12    Assessment & Plan   Nocturnal hypoxia  Assessment & Plan  Per nursing report, patient oxygen saturations drops overnight during the sleep.  Overnight pulse oximetry done, does not qualify for oxygen.  Repeat chest x-ray also normal.    Elevated LFTs  Assessment & Plan  No personal hx of cirrhosis  Likely the cause of the elevated inr, as patient has not gotten coumadin since prior to admission   RUQ US: Limited study as above. Grossly unremarkable albeit limited evaluation of the liver.Cholecystectomy. No biliary dilation. Small volume diffuse free fluid may represent ascites and/or fluid related to peritoneal dialysis.  Suspect hepatorenal, as patient has ESRD on PD  Liver enzymes is trending down.    Bacteremia  Assessment & Plan  1 out of 2 blood cultures positive for gram-negative rods, coming back as Enterobacterales  Second blood culture no growth at 4 days  ID consulted, recommending discontinue Unasyn and start Zosyn  Anticipate 10 days of therapy on zosyn to complete on 8/2/2024-antibiotic switched to cefepime since patient liver enzyme is elevated and suspected Zosyn-patient is finishing up antibiotic today.  Completed antibiotic at this time    Atrial fibrillation (HCC)  Assessment & Plan  Paroxysmal atrial fibrillation. sp ablation with Dr Alcala at [facility] 4/2024, took himself off Eliquis post procedure AMA;  has episodes on loop recorder of afib that are symptomatic, no longer taking amiodarone  Currently patient on coumadin, follows outpatient with coumadin clinic  Patient went into a fib rvr overnight on 7/26  Cardiology consulted - continue metoprolol and monitor on tele. Unable to tolerate amio and cannot take other anti arrhythmics due to esrd   Converted back into NSR on 7/26  Continue Coumadin, INR is  therapeutic.    Stage 5 chronic kidney disease on peritoneal dialysis (HCC)  Assessment & Plan  Lab Results   Component Value Date    EGFR 7 08/04/2024    EGFR 8 08/03/2024    EGFR 8 08/02/2024    CREATININE 6.65 (H) 08/04/2024    CREATININE 6.16 (H) 08/03/2024    CREATININE 5.95 (H) 08/02/2024     History of stage 5 ckd recently started on peritoneal dialysis, first treatment on 6/24. Had PD catheter placed on 6/7/2024. Was scheduled to do PD dialysis in clinic and then transition to home PD dialysis.   Last PD was done yesterday, patient currently doing every other day PD, goal for everyday per patient.   Nephro adjusting PD solution. decrease him to 2.5% 4 times daily regimen  Hypercalcemia: PTH, SPEP, UPEP, light chain ratio, vitamin D level, ACE, PTH related protein, adding sensipar 30 mg qd.   Avoid nephrotoxic medications, nsaids, hypotension  PT OT recommending acute rehab.  Discussed in details with patient in presence of case management regarding the recommendations.  Patient is peritoneal dialysis dependent, it will take about 2 weeks to find a place and arrange everything.  After hearing that, patient does not want to stay in the hospital for 2 weeks, prefers to go home with home health aide.  Management on board.    Anemia  Assessment & Plan  Patient with history of anemia baseline hemoglobin around 9-10 per outpatient labs  On admission, patient with hemoglobin of 10.1, decreased to 7.5, possibly secondary to volume overload, no evidence of bleeding at this time.  CT right lower extremity without evidence of bleeding.  Iron panel showing low iron  Does receive IV Venofer 200 mg with dialysis treatments as an outpatient and received 5 doses for iron deficiency anemia.   Given one dose of epogen on 7/25  Stool occult positive x2  GI consulted  -initial plan is to proceed with EGD on 7/29/2024.  But since hemoglobin remained stable, and patient INR was supratherapeutic, GI recommending hold off EGD,  outpatient workup.  INR remains subtherapeutic, per discussion has done with patient, prefers to start with 2 mg Coumadin since patient is frustrated with this Coumadin therapy for last 10 years.  Hemoglobin remained stable    Lymphedema  Assessment & Plan  With history of chronic lymphedema following with cardiology outpatient. Feels that his legs have been swelling more recently, right leg more swollen than left due to current cellulitis episode.   Transitioned from lasix infusion to torsemide 60mg bid   Vangie with patient and his daughter on the bedside regarding recommendation that patient will benefit to go to rehab.  But patient does not want to wait in the hospital for 2 weeks just to get placement.  Patient prefers to go home at this time.    Acute on chronic diastolic HF (heart failure) (HCC)  Assessment & Plan  Wt Readings from Last 3 Encounters:   08/04/24 112 kg (246 lb 0.5 oz)   06/25/24 124 kg (273 lb 13 oz)   06/20/24 122 kg (270 lb)     History of chf managed by both cardiology and nephrology, on lasix 80 mg bid outpatient, patient states that he took 240 mg of lasix yesterday and again today.   Echo 4/2023: Systolic function is normal with an ejection fraction of 60-65%. Wall motion is within normal limits. There is grade I (mild) diastolic dysfunction   BNP normal  CXR: No acute cardiopulmonary disease.   Venous duplex-no DVT  Pedal pulses monitored with dopplers  Nephro: torsemide 60mg bid.  Monitor I&Os and daily weights  Continue peritoneal dialysis  Per nephrology, continue current treatment, patient and dialysis suggested.  Home O2 evaluation and overnight pulse oximetry done, does not qualify for oxygen.  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  Patient also has a history of CVA and paroxysmal atrial fibrillation, at this time, considering risk versus benefits, after discussion is done with the patient's, will consider to resume Coumadin.   INR is therapeutic, continue current treatment    * Cellulitis of right lower extremity  Assessment & Plan  Presented to ED with bilateral leg swelling x 4 days. Right leg more swollen and red over the last 2 days. Significant pain and having drainage in the right leg as well. NO fevers or chills.   No recent antibiotics outpatient  CT RLE: No fluid collection seen, cellulitic changes leg, No intramuscular collection, no lytic lesion or periosteal reaction  Procal slightly elevated 0.89, now down trending   BC 1/2 gram negative rods; Enterobacterales   Completed IV antibiotic.  Discussed with patient and his daughter on the bedside regarding PT OT recommendation that patient will benefit to go to rehab.  But patient does not want to wait for 2 weeks to get placement.  For that reason he wants to prefer to go home.                 VTE Pharmacologic Prophylaxis: VTE Score: 5 High Risk (Score >/= 5) - Pharmacological DVT Prophylaxis Ordered: warfarin (Coumadin). Sequential Compression Devices Ordered.    Mobility:   Basic Mobility Inpatient Raw Score: 16  JH-HLM Goal: 5: Stand one or more mins  JH-HLM Achieved: 3: Sit at edge of bed  JH-HLM Goal NOT achieved. Continue with multidisciplinary rounding and encourage appropriate mobility to improve upon JH-HLM goals.    Patient Centered Rounds: I performed bedside rounds with nursing staff today.   Discussions with Specialists or Other Care Team Provider: Nephrology    Education and Discussions with Family / Patient:  With patient.       Current Length of Stay: 12 day(s)  Current Patient Status: Inpatient   Certification Statement: The patient will continue to require additional inpatient hospital stay due to monitorable conditions  Discharge Plan:  Anticipate discharge in 24-48 hrs to home with home services.    Code Status: Level 3 - DNAR and DNI    Subjective:   Seen and evaluated during the run.  Resting comfortably.  Denies any significant complaint.  He still having some loose stool but frequency significantly improved.    Objective:     Vitals:   Temp (24hrs), Av.8 °F (36.6 °C), Min:97.4 °F (36.3 °C), Max:98.2 °F (36.8 °C)    Temp:  [97.4 °F (36.3 °C)-98.2 °F (36.8 °C)] 97.4 °F (36.3 °C)  HR:  [69-85] 85  Resp:  [18-19] 18  BP: ()/(50-61) 107/59  SpO2:  [92 %-98 %] 96 %  Body mass index is 35.3 kg/m².     Input and Output Summary (last 24 hours):     Intake/Output Summary (Last 24 hours) at 2024 1226  Last data filed at 2024 0829  Gross per 24 hour   Intake 230 ml   Output 1045 ml   Net -815 ml       Physical Exam:   Physical Exam  Vitals and nursing note reviewed.   Constitutional:       Appearance: Normal appearance. He is obese. He is not ill-appearing or diaphoretic.   Eyes:      General: No scleral icterus.        Left eye: No discharge.      Extraocular Movements: Extraocular movements intact.      Conjunctiva/sclera: Conjunctivae normal.      Pupils: Pupils are equal, round, and reactive to light.   Cardiovascular:      Rate and Rhythm: Normal rate.      Heart sounds: No murmur heard.     No friction rub. No gallop.   Pulmonary:      Effort: Pulmonary effort is normal. No respiratory distress.      Breath sounds: No stridor. No wheezing or rhonchi.   Abdominal:      General: There is no distension.      Palpations: There is no mass.      Tenderness: There is no abdominal tenderness.      Hernia: No hernia is present.      Comments: Peritoneal dialysis catheter in place   Musculoskeletal:      Right lower leg: No edema.      Left lower leg: No edema.   Neurological:      General: No focal deficit present.      Mental Status: He is alert and oriented to person, place, and time.      Cranial Nerves: No cranial nerve deficit.       Sensory: No sensory deficit.      Motor: No weakness.      Coordination: Coordination normal.          Additional Data:     Labs:  Results from last 7 days   Lab Units 08/04/24  0438   WBC Thousand/uL 8.31   HEMOGLOBIN g/dL 8.4*   HEMATOCRIT % 28.0*   PLATELETS Thousands/uL 165   SEGS PCT % 64   LYMPHO PCT % 25   MONO PCT % 6   EOS PCT % 3     Results from last 7 days   Lab Units 08/04/24  0438   SODIUM mmol/L 135   POTASSIUM mmol/L 3.5   CHLORIDE mmol/L 98   CO2 mmol/L 26   BUN mg/dL 50*   CREATININE mg/dL 6.65*   ANION GAP mmol/L 11   CALCIUM mg/dL 9.9   ALBUMIN g/dL 2.9*   TOTAL BILIRUBIN mg/dL 0.39   ALK PHOS U/L 99   ALT U/L 52   AST U/L 19   GLUCOSE RANDOM mg/dL 101     Results from last 7 days   Lab Units 08/04/24  0438   INR  2.64*     Results from last 7 days   Lab Units 08/04/24  1055 08/04/24  0728 08/03/24  2114 08/03/24  1546 08/03/24  1115 08/03/24  0716 08/02/24  2120 08/02/24  1559 08/02/24  1103 08/02/24  0736 08/01/24  2137 08/01/24  1635   POC GLUCOSE mg/dl 133 135 116 143* 146* 146* 121 124 150* 143* 125 137               Lines/Drains:  Invasive Devices       Peripheral Intravenous Line  Duration             Long-Dwell Peripheral IV (Midline) 07/24/24 Left Basilic 10 days              Line  Duration             Peritoneal Dialysis Catheter Column-disk Abdominal 58 days                          Imaging: No pertinent imaging reviewed.    Recent Cultures (last 7 days):         Last 24 Hours Medication List:   Current Facility-Administered Medications   Medication Dose Route Frequency Provider Last Rate    acetaminophen  650 mg Oral Q6H PRN Rachel Conn PA-C      aspirin  81 mg Oral Daily Rachel Conn PA-C      atorvastatin  40 mg Oral Daily With Dinner Rachel Conn PA-C      cinacalcet  30 mg Oral Daily With Breakfast Jignesh Hussein MD      ferrous sulfate  325 mg Oral Daily With Breakfast Jignesh Hussein MD      HYDROcodone-acetaminophen  1 tablet Oral Q6H PRN Rachel Koch  CHRISTA Conn      HYDROcodone-acetaminophen  2 tablet Oral Q6H PRN Rachel Conn PA-C      HYDROmorphone  1 mg Intravenous Q3H PRN GAGANDEEP Bernardo      insulin glargine  15 Units Subcutaneous Q12H VASU Rachel Conn PA-C      insulin lispro  1-6 Units Subcutaneous HS Rachel Conn PA-C      insulin lispro  1-6 Units Subcutaneous TID AC Nora Mtahew MD      levothyroxine  125 mcg Oral Early Morning Rachel Conn PA-C      metoprolol succinate  100 mg Oral BID Rachel Conn PA-C      midodrine  12.5 mg Oral TID AC Maria Eugenia Jane, DO      saccharomyces boulardii  250 mg Oral BID Moses Noel MD      simethicone  80 mg Oral 4x Daily PRN Rachel Conn PA-C      tamsulosin  0.4 mg Oral Daily With Dinner Rachel Conn PA-C      torsemide  60 mg Oral BID Maria Eugenia Jane, DO      warfarin  2 mg Oral Daily (warfarin) Moses Noel MD          Today, Patient Was Seen By: Moses Noel MD    **Please Note: This note may have been constructed using a voice recognition system.**

## 2024-08-04 NOTE — PLAN OF CARE
Problem: Prexisting or High Potential for Compromised Skin Integrity  Goal: Skin integrity is maintained or improved  Description: INTERVENTIONS:  - Identify patients at risk for skin breakdown  - Assess and monitor skin integrity  - Assess and monitor nutrition and hydration status  - Monitor labs   - Assess for incontinence   - Turn and reposition patient  - Assist with mobility/ambulation  - Relieve pressure over bony prominences  - Avoid friction and shearing  - Provide appropriate hygiene as needed including keeping skin clean and dry  - Evaluate need for skin moisturizer/barrier cream  - Collaborate with interdisciplinary team   - Patient/family teaching  - Consider wound care consult   Outcome: Progressing     Problem: METABOLIC, FLUID AND ELECTROLYTES - ADULT  Goal: Electrolytes maintained within normal limits  Description: INTERVENTIONS:  - Monitor labs and assess patient for signs and symptoms of electrolyte imbalances  - Administer electrolyte replacement as ordered  - Monitor response to electrolyte replacements, including repeat lab results as appropriate  - Instruct patient on fluid and nutrition as appropriate  Outcome: Progressing     Problem: SKIN/TISSUE INTEGRITY - ADULT  Goal: Incision(s), wounds(s) or drain site(s) healing without S/S of infection  Description: INTERVENTIONS  - Assess and document dressing, incision, wound bed, drain sites and surrounding tissue  - Provide patient and family education  - Perform skin care/dressing changes per wound  Outcome: Progressing     Problem: PAIN - ADULT  Goal: Verbalizes/displays adequate comfort level or baseline comfort level  Description: Interventions:  - Encourage patient to monitor pain and request assistance  - Assess pain using appropriate pain scale  - Administer analgesics based on type and severity of pain and evaluate response  - Implement non-pharmacological measures as appropriate and evaluate response  - Consider cultural and social  influences on pain and pain management  - Notify physician/advanced practitioner if interventions unsuccessful or patient reports new pain  Outcome: Progressing

## 2024-08-04 NOTE — CASE MANAGEMENT
Case Management Discharge Planning Note    Patient name Masoud Goregh  Location /-01 MRN [id number]  : 1955 Date 2024       Current Admission Date: 2024  Current Admission Diagnosis:Cellulitis of right lower extremity   Patient Active Problem List    Diagnosis Date Noted Date Diagnosed    Nocturnal hypoxia 2024     Elevated LFTs 2024     Patient on peritoneal dialysis ([facility]) 2024     Hypervolemia 2024     ESRD (end stage renal disease) ([facility]) 2024     Bacteremia 2024     Cellulitis of right lower extremity 2024     Atrial fibrillation ([facility]) 2024     Bilateral lower extremity edema 2023     Ureteral stone with hydronephrosis 2022     Cutaneous abscess of buttock 2022     Chronic kidney disease-mineral and bone disorder 2022     Type 2 diabetes mellitus with stage 4 chronic kidney disease, with long-term current use of insulin ([facility]) 07/15/2022     Obesity, morbid ([facility]) 07/15/2022     Secondary hyperparathyroidism of renal origin ([facility]) 07/15/2022     Stage 5 chronic kidney disease on peritoneal dialysis ([facility]) 07/15/2022     Factor 5 Leiden mutation, heterozygous ([facility]) 01/15/2022     Thrombocytopenia ([facility]) 2022     Angina at rest 2022     MI (myocardial infarction) ([facility]) 2022     History of PTCA 2022     Sleep apnea 2022     Smoking 2022     Anemia 2022     History of CVA (cerebrovascular accident) 2022     Acute on chronic diastolic HF (heart failure) ([facility]) 2022     HLD (hyperlipidemia) 2022     Lymphedema 2022     Hypothyroidism 2022       LOS (days): 12  Geometric Mean LOS (GMLOS) (days): 4.8  Days to GMLOS:-7.1     OBJECTIVE:  Risk of Unplanned Readmission Score: 26.33         Current admission status: Inpatient   Preferred Pharmacy:   [facility] - [place], [place] - [address]  [address]  [place] [address]  Phone:  [phone] Fax: [phone]    Primary Care Provider: Jignesh Baker DO    Primary Insurance: [facility] REP  Secondary Insurance:     DISCHARGE DETAILS:     Per provider pt does not need home 02.  Provider questioning whether Home Aides were set up.   [facility] is set up, however home aides is private pay, CM placed resources on the AVS for home health aides.

## 2024-08-04 NOTE — ASSESSMENT & PLAN NOTE
Lab Results   Component Value Date    EGFR 7 08/04/2024    EGFR 8 08/03/2024    EGFR 8 08/02/2024    CREATININE 6.65 (H) 08/04/2024    CREATININE 6.16 (H) 08/03/2024    CREATININE 5.95 (H) 08/02/2024     History of stage 5 ckd recently started on peritoneal dialysis, first treatment on 6/24. Had PD catheter placed on 6/7/2024. Was scheduled to do PD dialysis in clinic and then transition to home PD dialysis.   Last PD was done yesterday, patient currently doing every other day PD, goal for everyday per patient.   Nephro adjusting PD solution. decrease him to 2.5% 4 times daily regimen  Hypercalcemia: PTH, SPEP, UPEP, light chain ratio, vitamin D level, ACE, PTH related protein, adding sensipar 30 mg qd.   Avoid nephrotoxic medications, nsaids, hypotension  PT OT recommending acute rehab.  Discussed in details with patient in presence of case management regarding the recommendations.  Patient is peritoneal dialysis dependent, it will take about 2 weeks to find a place and arrange everything.  After hearing that, patient does not want to stay in the hospital for 2 weeks, prefers to go home with home health aide.  Management on board.

## 2024-08-04 NOTE — ASSESSMENT & PLAN NOTE
Wt Readings from Last 3 Encounters:   08/04/24 112 kg (246 lb 0.5 oz)   06/25/24 124 kg (273 lb 13 oz)   06/20/24 122 kg (270 lb)     History of chf managed by both cardiology and nephrology, on lasix 80 mg bid outpatient, patient states that he took 240 mg of lasix yesterday and again today.   Echo 4/2023: Systolic function is normal with an ejection fraction of 60-65%. Wall motion is within normal limits. There is grade I (mild) diastolic dysfunction   BNP normal  CXR: No acute cardiopulmonary disease.   Venous duplex-no DVT  Pedal pulses monitored with dopplers  Nephro: torsemide 60mg bid.  Monitor I&Os and daily weights  Continue peritoneal dialysis  Per nephrology, continue current treatment, patient and dialysis suggested.  Home O2 evaluation and overnight pulse oximetry done, does not qualify for oxygen.  Repeat chest x-ray looks normal

## 2024-08-05 VITALS
SYSTOLIC BLOOD PRESSURE: 102 MMHG | WEIGHT: 246.03 LBS | RESPIRATION RATE: 16 BRPM | TEMPERATURE: 97.6 F | HEART RATE: 68 BPM | HEIGHT: 70 IN | DIASTOLIC BLOOD PRESSURE: 55 MMHG | OXYGEN SATURATION: 98 % | BODY MASS INDEX: 35.22 KG/M2

## 2024-08-05 LAB
ALBUMIN SERPL BCG-MCNC: 2.9 G/DL (ref 3.5–5)
ALP SERPL-CCNC: 99 U/L (ref 34–104)
ALT SERPL W P-5'-P-CCNC: 40 U/L (ref 7–52)
ANION GAP SERPL CALCULATED.3IONS-SCNC: 12 MMOL/L (ref 4–13)
AST SERPL W P-5'-P-CCNC: 18 U/L (ref 13–39)
BASOPHILS # BLD AUTO: 0.05 THOUSANDS/ÂΜL (ref 0–0.1)
BASOPHILS NFR BLD AUTO: 1 % (ref 0–1)
BILIRUB SERPL-MCNC: 0.37 MG/DL (ref 0.2–1)
BUN SERPL-MCNC: 51 MG/DL (ref 5–25)
CALCIUM ALBUM COR SERPL-MCNC: 10.4 MG/DL (ref 8.3–10.1)
CALCIUM SERPL-MCNC: 9.5 MG/DL (ref 8.4–10.2)
CHLORIDE SERPL-SCNC: 97 MMOL/L (ref 96–108)
CO2 SERPL-SCNC: 26 MMOL/L (ref 21–32)
CREAT SERPL-MCNC: 6.88 MG/DL (ref 0.6–1.3)
EOSINOPHIL # BLD AUTO: 0.21 THOUSAND/ÂΜL (ref 0–0.61)
EOSINOPHIL NFR BLD AUTO: 3 % (ref 0–6)
ERYTHROCYTE [DISTWIDTH] IN BLOOD BY AUTOMATED COUNT: 17.5 % (ref 11.6–15.1)
GFR SERPL CREATININE-BSD FRML MDRD: 7 ML/MIN/1.73SQ M
GLUCOSE SERPL-MCNC: 101 MG/DL (ref 65–140)
GLUCOSE SERPL-MCNC: 124 MG/DL (ref 65–140)
GLUCOSE SERPL-MCNC: 97 MG/DL (ref 65–140)
HCT VFR BLD AUTO: 26.8 % (ref 36.5–49.3)
HGB BLD-MCNC: 8.4 G/DL (ref 12–17)
IMM GRANULOCYTES # BLD AUTO: 0.06 THOUSAND/UL (ref 0–0.2)
IMM GRANULOCYTES NFR BLD AUTO: 1 % (ref 0–2)
INR PPP: 2.62 (ref 0.85–1.19)
LYMPHOCYTES # BLD AUTO: 2 THOUSANDS/ÂΜL (ref 0.6–4.47)
LYMPHOCYTES NFR BLD AUTO: 26 % (ref 14–44)
MCH RBC QN AUTO: 31.5 PG (ref 26.8–34.3)
MCHC RBC AUTO-ENTMCNC: 31.3 G/DL (ref 31.4–37.4)
MCV RBC AUTO: 100 FL (ref 82–98)
MONOCYTES # BLD AUTO: 0.45 THOUSAND/ÂΜL (ref 0.17–1.22)
MONOCYTES NFR BLD AUTO: 6 % (ref 4–12)
NEUTROPHILS # BLD AUTO: 4.81 THOUSANDS/ÂΜL (ref 1.85–7.62)
NEUTS SEG NFR BLD AUTO: 63 % (ref 43–75)
NRBC BLD AUTO-RTO: 0 /100 WBCS
PLATELET # BLD AUTO: 162 THOUSANDS/UL (ref 149–390)
PMV BLD AUTO: 9.4 FL (ref 8.9–12.7)
POTASSIUM SERPL-SCNC: 3.4 MMOL/L (ref 3.5–5.3)
PROT SERPL-MCNC: 6.1 G/DL (ref 6.4–8.4)
PROTHROMBIN TIME: 28 SECONDS (ref 12.3–15)
PTH RELATED PROT SERPL-SCNC: <2 PMOL/L
RBC # BLD AUTO: 2.67 MILLION/UL (ref 3.88–5.62)
SODIUM SERPL-SCNC: 135 MMOL/L (ref 135–147)
WBC # BLD AUTO: 7.58 THOUSAND/UL (ref 4.31–10.16)

## 2024-08-05 PROCEDURE — 85610 PROTHROMBIN TIME: CPT | Performed by: FAMILY MEDICINE

## 2024-08-05 PROCEDURE — 99239 HOSP IP/OBS DSCHRG MGMT >30: CPT | Performed by: FAMILY MEDICINE

## 2024-08-05 PROCEDURE — 82948 REAGENT STRIP/BLOOD GLUCOSE: CPT

## 2024-08-05 PROCEDURE — 80053 COMPREHEN METABOLIC PANEL: CPT | Performed by: FAMILY MEDICINE

## 2024-08-05 PROCEDURE — 85025 COMPLETE CBC W/AUTO DIFF WBC: CPT | Performed by: FAMILY MEDICINE

## 2024-08-05 PROCEDURE — 99232 SBSQ HOSP IP/OBS MODERATE 35: CPT | Performed by: INTERNAL MEDICINE

## 2024-08-05 PROCEDURE — 3E1M39Z IRRIGATION OF PERITONEAL CAVITY USING DIALYSATE, PERCUTANEOUS APPROACH: ICD-10-PCS | Performed by: INTERNAL MEDICINE

## 2024-08-05 RX ORDER — POTASSIUM CHLORIDE 20 MEQ/1
40 TABLET, EXTENDED RELEASE ORAL ONCE
Status: COMPLETED | OUTPATIENT
Start: 2024-08-05 | End: 2024-08-05

## 2024-08-05 RX ORDER — FERROUS SULFATE 325(65) MG
325 TABLET ORAL
Qty: 30 TABLET | Refills: 0 | Status: SHIPPED | OUTPATIENT
Start: 2024-08-06 | End: 2024-09-05

## 2024-08-05 RX ORDER — WARFARIN SODIUM 2 MG/1
TABLET ORAL
Qty: 30 TABLET | Refills: 0 | Status: SHIPPED | OUTPATIENT
Start: 2024-08-05 | End: 2024-08-13

## 2024-08-05 RX ORDER — MIDODRINE HYDROCHLORIDE 5 MG/1
12.5 TABLET ORAL
Qty: 225 TABLET | Refills: 0 | Status: SHIPPED | OUTPATIENT
Start: 2024-08-05 | End: 2024-08-15

## 2024-08-05 RX ORDER — SIMETHICONE 80 MG
80 TABLET,CHEWABLE ORAL 4 TIMES DAILY PRN
Qty: 30 TABLET | Refills: 0 | Status: SHIPPED | OUTPATIENT
Start: 2024-08-05

## 2024-08-05 RX ORDER — POTASSIUM CHLORIDE 20 MEQ/1
20 TABLET, EXTENDED RELEASE ORAL ONCE
Qty: 1 TABLET | Refills: 0 | Status: SHIPPED | OUTPATIENT
Start: 2024-08-05 | End: 2024-08-13

## 2024-08-05 RX ORDER — CINACALCET 30 MG/1
30 TABLET, FILM COATED ORAL
Qty: 30 TABLET | Refills: 0 | Status: SHIPPED | OUTPATIENT
Start: 2024-08-06 | End: 2024-09-05

## 2024-08-05 RX ORDER — TORSEMIDE 20 MG/1
60 TABLET ORAL 2 TIMES DAILY
Qty: 180 TABLET | Refills: 0 | Status: SHIPPED | OUTPATIENT
Start: 2024-08-05 | End: 2024-08-13

## 2024-08-05 RX ORDER — POTASSIUM CHLORIDE 20 MEQ/1
20 TABLET, EXTENDED RELEASE ORAL ONCE
Status: COMPLETED | OUTPATIENT
Start: 2024-08-05 | End: 2024-08-05

## 2024-08-05 RX ADMIN — METOPROLOL SUCCINATE 100 MG: 100 TABLET, EXTENDED RELEASE ORAL at 08:41

## 2024-08-05 RX ADMIN — CINACALCET 30 MG: 30 TABLET, FILM COATED ORAL at 07:53

## 2024-08-05 RX ADMIN — POTASSIUM CHLORIDE 20 MEQ: 1500 TABLET, EXTENDED RELEASE ORAL at 12:05

## 2024-08-05 RX ADMIN — Medication 250 MG: at 08:40

## 2024-08-05 RX ADMIN — HYDROMORPHONE HYDROCHLORIDE 1 MG: 1 INJECTION, SOLUTION INTRAMUSCULAR; INTRAVENOUS; SUBCUTANEOUS at 03:21

## 2024-08-05 RX ADMIN — TORSEMIDE 60 MG: 20 TABLET ORAL at 08:39

## 2024-08-05 RX ADMIN — POTASSIUM CHLORIDE 40 MEQ: 1500 TABLET, EXTENDED RELEASE ORAL at 08:40

## 2024-08-05 RX ADMIN — ASPIRIN 81 MG: 81 TABLET, COATED ORAL at 08:40

## 2024-08-05 RX ADMIN — MIDODRINE HYDROCHLORIDE 12.5 MG: 5 TABLET ORAL at 07:53

## 2024-08-05 RX ADMIN — HYDROCODONE BITARTRATE AND ACETAMINOPHEN 2 TABLET: 5; 325 TABLET ORAL at 08:40

## 2024-08-05 RX ADMIN — INSULIN GLARGINE 15 UNITS: 100 INJECTION, SOLUTION SUBCUTANEOUS at 08:41

## 2024-08-05 RX ADMIN — MIDODRINE HYDROCHLORIDE 12.5 MG: 5 TABLET ORAL at 12:02

## 2024-08-05 RX ADMIN — LEVOTHYROXINE SODIUM 125 MCG: 125 TABLET ORAL at 05:53

## 2024-08-05 RX ADMIN — FERROUS SULFATE TAB 325 MG (65 MG ELEMENTAL FE) 325 MG: 325 (65 FE) TAB at 07:53

## 2024-08-05 NOTE — NURSING NOTE
Pt discharge instructions reviewed with patient. Renal diabetic diet reviewed with pt and fluid restriction. Medications reviewed with patient. Pt verb understanding, All questions answered. Pt awaiting family. Belongings packed at bedside.

## 2024-08-05 NOTE — ASSESSMENT & PLAN NOTE
Wt Readings from Last 3 Encounters:   08/04/24 112 kg (246 lb 0.5 oz)   06/25/24 124 kg (273 lb 13 oz)   06/20/24 122 kg (270 lb)     History of chf managed by both cardiology and nephrology, on lasix 80 mg bid outpatient, patient states that he took 240 mg of lasix yesterday and again today.   Echo 4/2023: Systolic function is normal with an ejection fraction of 60-65%. Wall motion is within normal limits. There is grade I (mild) diastolic dysfunction   BNP normal  CXR: No acute cardiopulmonary disease.   Venous duplex-no DVT  Pedal pulses monitored with dopplers  Nephro: torsemide 60mg bid.  Condition remained stable, will be discharged

## 2024-08-05 NOTE — ASSESSMENT & PLAN NOTE
Per nursing report, patient oxygen saturations drops overnight during the sleep.  Overnight pulse oximetry done, does not qualify for oxygen.  Repeat chest x-ray also normal.

## 2024-08-05 NOTE — CASE MANAGEMENT
Case Management Discharge Planning Note    Patient name Masoud Goregh  Location /-01 MRN [id number]  : 1955 Date 2024       Current Admission Date: 2024  Current Admission Diagnosis:Cellulitis of right lower extremity   Patient Active Problem List    Diagnosis Date Noted Date Diagnosed    Nocturnal hypoxia 2024     Elevated LFTs 2024     Patient on peritoneal dialysis ([facility]) 2024     Hypervolemia 2024     ESRD (end stage renal disease) ([facility]) 2024     Bacteremia 2024     Cellulitis of right lower extremity 2024     Atrial fibrillation ([facility]) 2024     Bilateral lower extremity edema 2023     Ureteral stone with hydronephrosis 2022     Cutaneous abscess of buttock 2022     Chronic kidney disease-mineral and bone disorder 2022     Type 2 diabetes mellitus with stage 4 chronic kidney disease, with long-term current use of insulin ([facility]) 07/15/2022     Obesity, morbid ([facility]) 07/15/2022     Secondary hyperparathyroidism of renal origin ([facility]) 07/15/2022     Stage 5 chronic kidney disease on peritoneal dialysis ([facility]) 07/15/2022     Factor 5 Leiden mutation, heterozygous ([facility]) 01/15/2022     Thrombocytopenia ([facility]) 2022     Angina at rest 2022     MI (myocardial infarction) ([facility]) 2022     History of PTCA 2022     Sleep apnea 2022     Smoking 2022     Anemia 2022     History of CVA (cerebrovascular accident) 2022     Acute on chronic diastolic HF (heart failure) ([facility]) 2022     HLD (hyperlipidemia) 2022     Lymphedema 2022     Hypothyroidism 2022       LOS (days): 13  Geometric Mean LOS (GMLOS) (days): 4.8  Days to GMLOS:-8     OBJECTIVE:  Risk of Unplanned Readmission Score: 26.62         Current admission status: Inpatient   Preferred Pharmacy:   [facility] - [place], [place] - [address]  [address]  [place] [address]  Phone:  [phone] Fax: [phone]    Primary Care Provider: Jignesh Baker DO    Primary Insurance: [employer]  Secondary Insurance:     DISCHARGE DETAILS:    Discharge planning discussed with:: patient            CM met with patient to review therapy recommendation for STR.  CM explained to patient [facility] facility who accepts PD would require patient to remain at [facility] for two weeks until staff trained. Patient declined.    CM inquired if patient would be willing to switch to HD temporarily and go to STR then resume HD when patient returns home. CM explained the num,nelly of hours of therapy patient would get each day at [facility] while on HD and patient again declined.     Patient indicated he desired to discharge home with [facility]. CM informed patient he is set up with [facility] and explained therapy will only be once or twice a week rather than daily at [facility]. Patient chose [facility].    CM discussed transport of patient with patient, offering WC transport home. Patient declined indicating he does not want to pay for transport. Patient reported he has one step up from curb then one step to get into apartment building where patient resides in first floor apartment. Patient indicated he lives by himself but family will be stopping by to assure patients safety first week home: daughter, son and grandson age 21. CM also discussed with patient potential of patient securing [employer] for home needs. Patient indicated his children spoke to him about this.     CM discussed with patient potential need for nocturnal O2 upon discharge and patient indicated MD informed him he does not need nocturnal O2.    CM spoke to patient at the bedside, reviewed DC IMM with patient and informed that patient can stay an additional 4 hours for reconsidering appealing the discharge as the medicare rights were review on the day of discharge. Pt verbalized understanding and feels ready to go home and does not intend to stay 4 hours to reconsider.  IMM  placed in bin for filing.       0910 CM researched patients home overnight O2 study to find patient's time in desat was 58 seconds and time spent <89% is 3mins, 5 seconds.  CM                                                                     IMM Given (Date):: 08/05/24  IMM Given to:: Patient  Family notified:: appeal rights provided to patient

## 2024-08-05 NOTE — DISCHARGE SUMMARY
[facility]  Discharge- Masoud VERGARA [facility] 1955, 68 y.o. male MRN: [id number]  Unit/Bed#: -01 Encounter: [id number]  Primary Care Provider: Jignesh Baker DO   Date and time admitted to hospital: 7/23/2024 12:40 PM    Nocturnal hypoxia  Assessment & Plan  Per nursing report, patient oxygen saturations drops overnight during the sleep.  Overnight pulse oximetry done, does not qualify for oxygen.  Repeat chest x-ray also normal.    Elevated LFTs  Assessment & Plan  No personal hx of cirrhosis  Likely the cause of the elevated inr, as patient has not gotten coumadin since prior to admission   RUQ US: Limited study as above. Grossly unremarkable albeit limited evaluation of the liver.Cholecystectomy. No biliary dilation. Small volume diffuse free fluid may represent ascites and/or fluid related to peritoneal dialysis.  Suspect hepatorenal, as patient has ESRD on PD  Liver enzymes is trending down.    Bacteremia  Assessment & Plan  1 out of 2 blood cultures positive for gram-negative rods, coming back as Enterobacterales  Second blood culture no growth at 4 days  ID consulted, recommending discontinue Unasyn and start Zosyn  Anticipate 10 days of therapy on zosyn to complete on 8/2/2024-antibiotic switched to cefepime since patient liver enzyme is elevated and suspected Zosyn-patient is finishing up antibiotic today.  Completed antibiotic at this time    Atrial fibrillation (HCC)  Assessment & Plan  Paroxysmal atrial fibrillation. sp ablation with Dr Alcala at [facility] 4/2024, took himself off Eliquis post procedure AMA;  has episodes on loop recorder of afib that are symptomatic, no longer taking amiodarone  Currently patient on coumadin, follows outpatient with coumadin clinic  Patient went into a fib rvr overnight on 7/26  Cardiology consulted - continue metoprolol and monitor on tele. Unable to tolerate amio and cannot take other anti arrhythmics due to esrd   Converted back into  NSR on 7/26  Continue Coumadin, INR is therapeutic.    Stage 5 chronic kidney disease on peritoneal dialysis (HCC)  Assessment & Plan  Lab Results   Component Value Date    EGFR 7 08/05/2024    EGFR 7 08/04/2024    EGFR 8 08/03/2024    CREATININE 6.88 (H) 08/05/2024    CREATININE 6.65 (H) 08/04/2024    CREATININE 6.16 (H) 08/03/2024     History of stage 5 ckd recently started on peritoneal dialysis, first treatment on 6/24. Had PD catheter placed on 6/7/2024. Was scheduled to do PD dialysis in clinic and then transition to home PD dialysis.   Last PD was done yesterday, patient currently doing every other day PD, goal for everyday per patient.   Nephro adjusting PD solution.   Current Rx 2.5% low calcium dianeal 2 L every 6 hours changes   Avoid nephrotoxic medications, nsaids, hypotension  PT OT recommending acute rehab.  Discussed in details with patient in presence of case management regarding the recommendations.  Patient is peritoneal dialysis dependent, it will take about 2 weeks to find a place and arrange everything.  After hearing that, patient does not want to stay in the hospital for 2 weeks, prefers to go home with home health aide.  Management on board.    Anemia  Assessment & Plan  Patient with history of anemia baseline hemoglobin around 9-10 per outpatient labs  On admission, patient with hemoglobin of 10.1, decreased to 7.5, possibly secondary to volume overload, no evidence of bleeding at this time.  CT right lower extremity without evidence of bleeding.  Iron panel showing low iron  Given one dose of epogen on 7/25  Stool occult positive x2  GI consulted  -initial plan is to proceed with EGD on 7/29/2024.  But since hemoglobin remained stable, and patient INR was supratherapeutic, GI recommending hold off EGD, outpatient workup.  INR remains subtherapeutic, per discussion has done with patient, prefers to start with 2 mg Coumadin since patient is frustrated with this Coumadin therapy for last 10  years.  Hemoglobin remained stable    Lymphedema  Assessment & Plan  With history of chronic lymphedema following with cardiology outpatient. Feels that his legs have been swelling more recently, right leg more swollen than left due to current cellulitis episode.   Transitioned from lasix infusion to torsemide 60mg bid   Vangie with patient and his daughter on the bedside regarding recommendation that patient will benefit to go to rehab.  But patient does not want to wait in the hospital for 2 weeks just to get placement.  Patient prefers to go home at this time.    Acute on chronic diastolic HF (heart failure) (HCC)  Assessment & Plan  Wt Readings from Last 3 Encounters:   08/04/24 112 kg (246 lb 0.5 oz)   06/25/24 124 kg (273 lb 13 oz)   06/20/24 122 kg (270 lb)     History of chf managed by both cardiology and nephrology, on lasix 80 mg bid outpatient, patient states that he took 240 mg of lasix yesterday and again today.   Echo 4/2023: Systolic function is normal with an ejection fraction of 60-65%. Wall motion is within normal limits. There is grade I (mild) diastolic dysfunction   BNP normal  CXR: No acute cardiopulmonary disease.   Venous duplex-no DVT  Pedal pulses monitored with dopplers  Nephro: torsemide 60mg bid.  Condition remained stable, will be discharged    History of CVA (cerebrovascular accident)  Assessment & Plan  Status post presumed cardioembolic CVA requiring tPA administration January 2022  Currently on aspirin and lipitor, continue  INR is therapeutic, continue current treatment    * Cellulitis of right lower extremity  Assessment & Plan  Presented to ED with bilateral leg swelling x 4 days. Right leg more swollen and red over the last 2 days. Significant pain and having drainage in the right leg as well. NO fevers or chills.   No recent antibiotics outpatient  CT RLE: No fluid collection seen, cellulitic changes leg, No intramuscular collection, no lytic lesion or periosteal reaction  Procal  slightly elevated 0.89, now down trending   BC 1/2 gram negative rods; Enterobacterales   Completed IV antibiotic.  Discussed with patient and his daughter on the bedside regarding PT OT recommendation that patient will benefit to go to rehab.  But patient does not want to wait for 2 weeks to get placement.  For that reason he wants to prefer to go home.          Medical Problems       Resolved Problems  Date Reviewed: 7/29/2024            Resolved    HTN (hypertension) 7/30/2024     Resolved by  Moses Noel MD        Discharging Physician / Practitioner: Moses Noel MD  PCP: Jignesh Baker DO  Admission Date:   Admission Orders (From admission, onward)       Ordered        07/23/24 1420  INPATIENT ADMISSION  Once                          Discharge Date: 08/05/24    Consultations During Hospital Stay:  Nephrology, gastroenterology, infectious disease, general surgery, wound care    Procedures Performed:   XR chest portable   Final Result by Neela Eugene MD (08/03 1359)      No acute cardiopulmonary disease.            Workstation performed: [id number]         US right upper quadrant   Final Result by Scott Salgado MD (07/29 0625)      Limited study as above.      Grossly unremarkable albeit limited evaluation of the liver.      Cholecystectomy. No biliary dilation.      Small volume diffuse free fluid may represent ascites and/or fluid related to peritoneal dialysis.      Atrophic right kidney.            Workstation performed: [id number]         XR chest portable   Final Result by Neela Eugene MD (07/26 1406)      Low lung volumes with vascular crowding. Mild pulmonary venous congestion not excluded.            Workstation performed: [id number]         CT lower extremity wo contrast right   Final Result by Janey Guzman MD (07/24 1414)      No fluid collection seen   Cellulitic changes leg   No intramuscular collection   No lytic lesion or periosteal reaction          Workstation performed: [id number]          VAS VENOUS DUPLEX - LOWER LIMB BILATERAL   Final Result by Conor Silva DO (07/23 1636)      XR chest portable   Final Result by Sedrick Jade MD (07/23 1344)      No acute cardiopulmonary disease.            Workstation performed: [id number]               Significant Findings / Test Results:   Lab Results   Component Value Date    WBC 7.58 08/05/2024    HGB 8.4 (L) 08/05/2024    HCT 26.8 (L) 08/05/2024     (H) 08/05/2024     08/05/2024     Lab Results   Component Value Date    SODIUM 135 08/05/2024    K 3.4 (L) 08/05/2024    CL 97 08/05/2024    CO2 26 08/05/2024    AGAP 12 08/05/2024    BUN 51 (H) 08/05/2024    CREATININE 6.88 (H) 08/05/2024    GLUC 101 08/05/2024    GLUF 101 (H) 01/08/2024    CALCIUM 9.5 08/05/2024    AST 18 08/05/2024    ALT 40 08/05/2024    ALKPHOS 99 08/05/2024    TP 6.1 (L) 08/05/2024    TBILI 0.37 08/05/2024    EGFR 7 08/05/2024     Collected Updated Procedure Result Status Patient Facility Result Comment    07/26/2024 1217 07/26/2024 1940 Occult blood x 3, stool [[id number]]    (Abnormal)   Stool from Rectum    Final result [facility] 1st specimen received 7/26/24 1222   3rd Specimen received 7/26/24 @ 1900    Component Value   Occult Blood, Stool #1 Positive Abnormal    Occult Blood, Stool #2 Positive Abnormal     Occult Blood, Stool #3 Negative    DATE SPECIMEN #1 7/26/24    DATE SPECIMEN #2 7/26/24    DATE SPECIMEN #3 7/26/2024 07/23/2024 1757 07/27/2024 0812 Wound culture and Gram stain [[id number]]    (Abnormal)   Wound from Leg, Right    Final result [facility]  Component Value   Wound Culture 4+ Growth of Pseudomonas aeruginosa Abnormal     If tobramycin required for treatment, contact Microbiology for susceptibilites.    4+ Growth of Enterococcus faecalis Abnormal     This organism has been edited. The previous result was Gamma Hemolytic  Streptococcus on 7/26/2024 at 1100 EDT.   Gram Stain Result No polys seen Abnormal     4+ Gram negative rods Abnormal        Susceptibility    Pseudomonas aeruginosa (1)    Antibiotic Interpretation Microscan  Method Status    ZID Performed  Yes  ASHOK Final    Aztreonam ($$$) Susceptible <=4 ug/ml ASHOK Final    Cefepime ($) Susceptible 4.00 ug/ml ASHOK Final    Ceftazidime ($$) Susceptible 4 ug/ml ASHOK Final    Ciprofloxacin ($) Susceptible <=0.25 ug/ml ASHOK Final    Levofloxacin ($) Susceptible 1.00 ug/ml ASHOK Final    Meropenem ($$) Susceptible <=1.00 ug/ml ASHOK Final    Piperacillin + Tazobactam ($$$) Susceptible <=8 ug/ml ASHOK Final    Enterococcus faecalis (2)    Antibiotic Interpretation Microscan  Method Status    ZID Performed  Yes  ASHOK Final    Ampicillin ($$) Susceptible <=2.00 ug/ml ASHOK Final    Vancomycin ($) Susceptible 0.50 ug/ml ASHOK Final     Condensed View         07/23/2024 1757 07/25/2024 1143 MRSA culture [[id number]]   Nares from Nose    Final result [facility]  Component Value   MRSA Culture Only No Methicillin Resistant Staphlyococcus aureus (MRSA) isolated             07/23/2024 1253 07/28/2024 1901 Blood culture #1 [[id number]]   Blood from Arm, Left    Final result [facility]  Component Value   Blood Culture No Growth After 5 Days.             07/23/2024 1253 07/26/2024 1228 Blood culture #2 [[id number]]    (Abnormal)   Blood from Arm, Right    Final result [facility]  Component Value   Blood Culture Providencia rettgeri Abnormal     If amikacin is required for treatment, contact Microbiology for susceptibilities.   Gram Stain Result Gram negative rods Abnormal     This is an appended report. These results have been appended to a previously preliminary verified report.       Susceptibility    Providencia rettgeri (1)    Antibiotic Interpretation Microscan  Method Status    ZID Performed  Yes  ASHOK Final    Amoxicillin +  Clavulanate Resistant <=8/4 ug/ml ASHOK Final    Ampicillin ($$) Resistant <=8.00 ug/ml ASHOK Final    Ampicillin + Sulbactam ($) Susceptible <=4/2 ug/ml ASHOK Final    Aztreonam ($$$) Susceptible <=4 ug/ml ASHOK Final    Cefazolin ($) Susceptible <=2.00 ug/ml ASHOK Final    Ciprofloxacin ($) Susceptible <=0.25 ug/ml ASHOK Final    Ertapenem ($$$) Susceptible <=0.5 ug/ml ASHOK Final    Gentamicin ($$) Intermediate 4 ug/ml ASHOK Final    Levofloxacin ($) Susceptible <=0.50 ug/ml ASHOK Final    Piperacillin + Tazobactam ($$$) Susceptible <=8 ug/ml ASHOK Final    Tetracycline Resistant >8 ug/ml ASHOK Final    Trimethoprim + Sulfamethoxazole ($$$) Susceptible <=0.5/9.5 ug/ml ASHOK Final          07/23/2024 1253 07/26/2024 1228 Blood Culture Identification Panel [[id number]]    (Abnormal)   Blood from Arm, Right    Final result [facility] Routine culture and susceptiblity to follow for confirmation.   Film Array panel tests for 11 gram positive organisms, 15 gram negative organisms, 7 yeast species and 10 resistance genes.             Incidental Findings:   As mentioned above  I reviewed the above mentioned incidental findings with the patient and/or family and they expressed understanding.    Test Results Pending at Discharge (will require follow up):   None     Outpatient Tests Requested:  Regular blood work during dialysis    Complications: None    Reason for Admission: Doximity swelling, right leg redness    Hospital Course:   Masoud Perry is a 68 y.o. male patient who originally presented to the hospital on 7/23/2024 due to patient with ESRD on peritoneal dialysis dependent, came in with lower extremity swelling and right leg swelling, patient also has lymphedema.  During the hospitalization, patient received IV antibiotic for cellulitis of left lower extremity.  Later on, patient blood cultures came back positive.  Infectious disease consulted, per recommendation, patient completed adequate dose of  antibiotic.    During the hospitalization, due to peritoneal dialysis dependent, patient evaluated by nephrology, dialysis orders adjusted per nephrology recommendation.    During the hospitalization, patient blood hemoglobin was running low, FOBT came back positive, gastroenterology consulted.  Coumadin remains on hold.  But since patient hemoglobin remained stable and no active bleeding noted, GI recommending outpatient follow-up.  Later on Coumadin resumed, hemoglobin remained stable, discharging INR remained therapeutic.    For his right lower extremity cellulitis, patient also evaluated by general surgery, recommending conservative management.    Patient also evaluated by wound care:  kin care plans:  1-Hydraguard to bilateral sacrum, buttock and heels BID and PRN  2-Elevate heels to offload pressure.  3-Ehob cushion in chair when out of bed.  4-Moisturize skin daily with skin nourishing cream.  5-Turn/reposition q2h or when medically stable for pressure re-distribution on skin.   6-Cleanse anterior right upper tibia with soap and water, apply skin nourishing cream to intact skin . Apply xeroform gauze to wound bed  cover DSD, ABD  with kelix and ace from pedal to below knee  Change Mon WED Fri and prn soil or dislodgment       Patient also evaluated by PT OT, recommendation rehab, patient is peritoneal dialysis dependent, case management was found, 1 facility was agreed to accept the patient but it will take 2 weeks to arrange everything.  Patient does not want to sit in the hospital for 2 weeks, rather prefer to go home.  Patient verbalized understanding agrees.  Patient daughter was at bedside during this conversation happened.    Patient remained hemodynamically stable at this time, patient is getting discharged back home with follow-up with PCP and other specialties as scheduled.    Please see above list of diagnoses and related plan for additional information.     Condition at Discharge:  "stable    Discharge Day Visit / Exam:   Subjective: Seen and evaluated during the run.  Resting comfortably but denies any significant complaint.  Vitals: Blood Pressure: 124/75 (08/04/24 2311)  Pulse: 68 (08/05/24 0200)  Temperature: 97.7 °F (36.5 °C) (08/05/24 0726)  Temp Source: Temporal (08/05/24 0726)  Respirations: 16 (08/04/24 2311)  Height: 5' 10\" (177.8 cm) (07/31/24 0852)  Weight - Scale: 112 kg (246 lb 0.5 oz) (dry weight) (08/04/24 0543)  SpO2: 98 % (08/05/24 0200)  Exam:   Physical Exam  Vitals and nursing note reviewed.   Constitutional:       Appearance: Normal appearance. He is obese. He is not ill-appearing or diaphoretic.   HENT:      Mouth/Throat:      Mouth: Mucous membranes are moist.      Pharynx: No oropharyngeal exudate or posterior oropharyngeal erythema.   Eyes:      General: No scleral icterus.        Left eye: No discharge.      Extraocular Movements: Extraocular movements intact.      Conjunctiva/sclera: Conjunctivae normal.      Pupils: Pupils are equal, round, and reactive to light.   Cardiovascular:      Rate and Rhythm: Normal rate.      Heart sounds:      No friction rub. No gallop.   Pulmonary:      Effort: Pulmonary effort is normal. No respiratory distress.      Breath sounds: No stridor. No wheezing or rhonchi.   Abdominal:      General: There is no distension.      Palpations: There is no mass.      Tenderness: There is no abdominal tenderness.      Hernia: No hernia is present.      Comments: Peritoneal dialysis catheter in place.   Musculoskeletal:         General: No tenderness.   Skin:     Coloration: Skin is not pale.      Findings: No lesion or rash.      Comments: Chronic venous stasis from lymphedema.   Neurological:      Mental Status: He is alert and oriented to person, place, and time.      Cranial Nerves: No cranial nerve deficit.      Sensory: No sensory deficit.      Motor: No weakness.      Coordination: Coordination normal.          Discussion with Family: " Updated  (daughter) via phone.    Discharge instructions/Information to patient and family:   See after visit summary for information provided to patient and family.      Provisions for Follow-Up Care:  See after visit summary for information related to follow-up care and any pertinent home health orders.      Mobility at time of Discharge:   Basic Mobility Inpatient Raw Score: 16  JH-HLM Goal: 5: Stand one or more mins  JH-HLM Achieved: 6: Walk 10 steps or more  HLM Goal achieved. Continue to encourage appropriate mobility.     Disposition:   Home with VNA Services (Reminder: Complete face to face encounter)    Planned Readmission: If condition get worse     Discharge Statement:   Greater than 50% of the total time was spent examining patient, answering all patient questions, arranging and discussing plan of care with patient as well as directly providing post-discharge instructions.  Additional time then spent on discharge activities.    Discharge Medications:  See after visit summary for reconciled discharge medications provided to patient and/or family.      **Please Note: This note may have been constructed using a voice recognition system**

## 2024-08-05 NOTE — PLAN OF CARE
Problem: Prexisting or High Potential for Compromised Skin Integrity  Goal: Skin integrity is maintained or improved  Description: INTERVENTIONS:  - Identify patients at risk for skin breakdown  - Assess and monitor skin integrity  - Assess and monitor nutrition and hydration status  - Monitor labs   - Assess for incontinence   - Turn and reposition patient  - Assist with mobility/ambulation  - Relieve pressure over bony prominences  - Avoid friction and shearing  - Provide appropriate hygiene as needed including keeping skin clean and dry  - Evaluate need for skin moisturizer/barrier cream  - Collaborate with interdisciplinary team   - Patient/family teaching  - Consider wound care consult   Outcome: Progressing     Problem: METABOLIC, FLUID AND ELECTROLYTES - ADULT  Goal: Electrolytes maintained within normal limits  Description: INTERVENTIONS:  - Monitor labs and assess patient for signs and symptoms of electrolyte imbalances  - Administer electrolyte replacement as ordered  - Monitor response to electrolyte replacements, including repeat lab results as appropriate  - Instruct patient on fluid and nutrition as appropriate  Outcome: Progressing     Problem: SKIN/TISSUE INTEGRITY - ADULT  Goal: Incision(s), wounds(s) or drain site(s) healing without S/S of infection  Description: INTERVENTIONS  - Assess and document dressing, incision, wound bed, drain sites and surrounding tissue  - Provide patient and family education  - Perform skin care/dressing changes per wound  Outcome: Progressing

## 2024-08-05 NOTE — NURSING NOTE
Dressing changes done to bilat lower extremities. Instructed patient on same and supplies given to pt to use on discharge. Verbalized understanding of dressing change. IV site d/c'd intact. Pt assisted with getting dressed. Awaiting family to pick pt up for discharge.

## 2024-08-05 NOTE — ASSESSMENT & PLAN NOTE
Paroxysmal atrial fibrillation. sp ablation with Dr Alcala at [facility] [place]2024, took himself off Eliquis post procedure AMA;  has episodes on loop recorder of afib that are symptomatic, no longer taking amiodarone  Currently patient on coumadin, follows outpatient with coumadin clinic  Patient went into a fib rvr overnight on 7/26  Cardiology consulted - continue metoprolol and monitor on tele. Unable to tolerate amio and cannot take other anti arrhythmics due to esrd   Converted back into NSR on 7/26  Continue Coumadin, INR is therapeutic.

## 2024-08-05 NOTE — ASSESSMENT & PLAN NOTE
Patient with history of anemia baseline hemoglobin around 9-10 per outpatient labs  On admission, patient with hemoglobin of 10.1, decreased to 7.5, possibly secondary to volume overload, no evidence of bleeding at this time.  CT right lower extremity without evidence of bleeding.  Iron panel showing low iron  Given one dose of epogen on 7/25  Stool occult positive x2  GI consulted  -initial plan is to proceed with EGD on 7/29/2024.  But since hemoglobin remained stable, and patient INR was supratherapeutic, GI recommending hold off EGD, outpatient workup.  INR remains subtherapeutic, per discussion has done with patient, prefers to start with 2 mg Coumadin since patient is frustrated with this Coumadin therapy for last 10 years.  Hemoglobin remained stable

## 2024-08-05 NOTE — ASSESSMENT & PLAN NOTE
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

## 2024-08-05 NOTE — PLAN OF CARE
Problem: Prexisting or High Potential for Compromised Skin Integrity  Goal: Skin integrity is maintained or improved  Description: INTERVENTIONS:  - Identify patients at risk for skin breakdown  - Assess and monitor skin integrity  - Assess and monitor nutrition and hydration status  - Monitor labs   - Assess for incontinence   - Turn and reposition patient  - Assist with mobility/ambulation  - Relieve pressure over bony prominences  - Avoid friction and shearing  - Provide appropriate hygiene as needed including keeping skin clean and dry  - Evaluate need for skin moisturizer/barrier cream  - Collaborate with interdisciplinary team   - Patient/family teaching  - Consider wound care consult   8/5/2024 0916 by Niecy George RN  Outcome: Progressing  8/5/2024 0916 by Niecy George RN  Outcome: Progressing     Problem: METABOLIC, FLUID AND ELECTROLYTES - ADULT  Goal: Electrolytes maintained within normal limits  Description: INTERVENTIONS:  - Monitor labs and assess patient for signs and symptoms of electrolyte imbalances  - Administer electrolyte replacement as ordered  - Monitor response to electrolyte replacements, including repeat lab results as appropriate  - Instruct patient on fluid and nutrition as appropriate  8/5/2024 0916 by Niecy George RN  Outcome: Progressing  8/5/2024 0916 by Niecy George RN  Outcome: Progressing  Goal: Fluid balance maintained  Description: INTERVENTIONS:  - Monitor labs   - Monitor I/O and WT  - Instruct patient on fluid and nutrition as appropriate  - Assess for signs & symptoms of volume excess or deficit  8/5/2024 0916 by Niecy George RN  Outcome: Progressing  8/5/2024 0916 by Niecy George RN  Outcome: Progressing  Goal: Glucose maintained within target range  Description: INTERVENTIONS:  - Monitor Blood Glucose as ordered  - Assess for signs and symptoms of hyperglycemia and hypoglycemia  - Administer ordered medications to maintain glucose within target range  -  Assess nutritional intake and initiate nutrition service referral as needed  8/5/2024 0916 by Niecy George RN  Outcome: Progressing  8/5/2024 0916 by Niecy George RN  Outcome: Progressing     Problem: SKIN/TISSUE INTEGRITY - ADULT  Goal: Incision(s), wounds(s) or drain site(s) healing without S/S of infection  Description: INTERVENTIONS  - Assess and document dressing, incision, wound bed, drain sites and surrounding tissue  - Provide patient and family education  - Perform skin care/dressing changes per wound  8/5/2024 0916 by Niecy George RN  Outcome: Progressing  8/5/2024 0916 by Niecy George RN  Outcome: Progressing     Problem: GENITOURINARY - ADULT  Goal: Maintains or returns to baseline urinary function  Description: INTERVENTIONS:  - Assess urinary function  - Encourage oral fluids to ensure adequate hydration if ordered  - Administer IV fluids as ordered to ensure adequate hydration  - Administer ordered medications as needed  - Offer frequent toileting  - Follow urinary retention protocol if ordered  8/5/2024 0916 by Niecy George RN  Outcome: Progressing  8/5/2024 0916 by Niecy George RN  Outcome: Progressing     Problem: PAIN - ADULT  Goal: Verbalizes/displays adequate comfort level or baseline comfort level  Description: Interventions:  - Encourage patient to monitor pain and request assistance  - Assess pain using appropriate pain scale  - Administer analgesics based on type and severity of pain and evaluate response  - Implement non-pharmacological measures as appropriate and evaluate response  - Consider cultural and social influences on pain and pain management  - Notify physician/advanced practitioner if interventions unsuccessful or patient reports new pain  8/5/2024 0916 by Niecy George RN  Outcome: Progressing  8/5/2024 0916 by Niecy George RN  Outcome: Progressing     Problem: INFECTION - ADULT  Goal: Absence or prevention of progression during  hospitalization  Description: INTERVENTIONS:  - Assess and monitor for signs and symptoms of infection  - Monitor lab/diagnostic results  - Monitor all insertion sites, i.e. indwelling lines, tubes, and drains  - Monitor endotracheal if appropriate and nasal secretions for changes in amount and color  - Belen appropriate cooling/warming therapies per order  - Administer medications as ordered  - Instruct and encourage patient and family to use good hand hygiene technique  - Identify and instruct in appropriate isolation precautions for identified infection/condition  8/5/2024 0916 by Niecy George RN  Outcome: Progressing  8/5/2024 0916 by Niecy George RN  Outcome: Progressing     Problem: SAFETY ADULT  Goal: Patient will remain free of falls  Description: INTERVENTIONS:  - Educate patient/family on patient safety including physical limitations  - Instruct patient to call for assistance with activity   - Consult OT/PT to assist with strengthening/mobility   - Keep Call bell within reach  - Keep bed low and locked with side rails adjusted as appropriate  - Keep care items and personal belongings within reach  - Initiate and maintain comfort rounds  - Make Fall Risk Sign visible to staff  - Offer Toileting every 2 Hours, in advance of need if unable to notify staff of need  - Initiate/Maintain bed/chair alarm for confusion, impulsivity, or noncompliance with notifying staff.  - Apply yellow socks and bracelet for high fall risk patients  - Consider moving patient to room near nurses station  8/5/2024 0916 by Niecy George RN  Outcome: Progressing  8/5/2024 0916 by Niecy George RN  Outcome: Progressing  Goal: Maintain or return to baseline ADL function  Description: INTERVENTIONS:  -  Assess patient's ability to carry out ADLs; assess patient's baseline for ADL function and identify physical deficits which impact ability to perform ADLs (bathing, care of mouth/teeth, toileting, grooming, dressing,  etc.)  - Assess/evaluate cause of self-care deficits   - Assess range of motion  - Assess patient's mobility; develop plan if impaired  - Assess patient's need for assistive devices and provide as appropriate  - Encourage maximum independence but intervene and supervise when necessary  - Involve family in performance of ADLs  - Assess for home care needs following discharge   - Consider OT consult to assist with ADL evaluation and planning for discharge  - Provide patient education as appropriate  8/5/2024 0916 by Niecy George RN  Outcome: Progressing  8/5/2024 0916 by Niecy George RN  Outcome: Progressing  Goal: Maintains/Returns to pre admission functional level  Description: INTERVENTIONS:  - Perform AM-PAC 6 Click Basic Mobility/ Daily Activity assessment daily.  - Set and communicate daily mobility goal to care team and patient/family/caregiver.   - Collaborate with rehabilitation services on mobility goals if consulted  - Perform Range of Motion 3 times a day.  - Reposition patient every 2 hours if unable to reposition self  - Out of bed for toileting if medically appropriate  - Record patient progress and toleration of activity level   8/5/2024 0916 by Niecy George RN  Outcome: Progressing  8/5/2024 0916 by Niecy George RN  Outcome: Progressing     Problem: DISCHARGE PLANNING  Goal: Discharge to home or other facility with appropriate resources  Description: INTERVENTIONS:  - Identify barriers to discharge w/patient and caregiver  - Arrange for needed discharge resources and transportation as appropriate  - Identify discharge learning needs (meds, wound care, etc.)  - Arrange for interpretive services to assist at discharge as needed  - Refer to Case Management Department for coordinating discharge planning if the patient needs post-hospital services based on physician/advanced practitioner order or complex needs related to functional status, cognitive ability, or social support system  8/5/2024  0916 by Niecy George RN  Outcome: Progressing  8/5/2024 0916 by Niecy George RN  Outcome: Progressing     Problem: Knowledge Deficit  Goal: Patient/family/caregiver demonstrates understanding of disease process, treatment plan, medications, and discharge instructions  Description: Complete learning assessment and assess knowledge base.  Interventions:  - Provide teaching at level of understanding  - Provide teaching via preferred learning methods  8/5/2024 0916 by Niecy George RN  Outcome: Progressing  8/5/2024 0916 by Nieyc George RN  Outcome: Progressing     Problem: Nutrition/Hydration-ADULT  Goal: Nutrient/Hydration intake appropriate for improving, restoring or maintaining nutritional needs  Description: Monitor and assess patient's nutrition/hydration status for malnutrition. Collaborate with interdisciplinary team and initiate plan and interventions as ordered.  Monitor patient's weight and dietary intake as ordered or per policy. Utilize nutrition screening tool and intervene as necessary. Determine patient's food preferences and provide high-protein, high-caloric foods as appropriate.     INTERVENTIONS:  - Monitor oral intake, urinary output, labs, and treatment plans  - Assess nutrition and hydration status and recommend course of action  - Evaluate amount of meals eaten  - Assist patient with eating if necessary   - Allow adequate time for meals  - Recommend/ encourage appropriate diets, oral nutritional supplements, and vitamin/mineral supplements  - Order, calculate, and assess calorie counts as needed  - Recommend, monitor, and adjust tube feedings and TPN/PPN based on assessed needs  - Assess need for intravenous fluids  - Provide specific nutrition/hydration education as appropriate  - Include patient/family/caregiver in decisions related to nutrition  8/5/2024 0916 by Niecy George RN  Outcome: Progressing  8/5/2024 0916 by Niecy George RN  Outcome: Progressing

## 2024-08-05 NOTE — PROGRESS NOTES
Progress Note - Nephrology   Masoud Perry 68 y.o. male MRN: [id number]  Unit/Bed#: -01 Encounter: [id number]    ASSESSMENT AND PLAN:  68year-old male with a history of chronic lymphedema/hypertension/CVA/atrial fibrillation/chronic diastolic CHF/BPH/diabetes mellitus type 2/nephrolithiasis/ESRD on PD presenting with worsening lower extremity edema     1.  ESRD on PD: Has been on CCPD now CAPD while hospitalized  -Current regimen is 2.5% low calcium diet now every 6 hours  - Weight is doing well at 112 kg from a presumed peak of approximately 120 kg     2.  Hypertension/volume:     Acute on chronic diastolic CHF with lower extremity edema: Part of this is lymphedema.  No evidence of DVT by venous duplex; there is cellulitis of right lower extremity  - Volume essentially euvolemic please see above       Relative hypotension: Improved most likely related to treating volume   TSH elevated free T4 T3 pending recommend adjustment per primary team  Cortisol acceptable at 13.2  Echocardiogram EF 75% mild to moderate TR otherwise no change  Treatment: Continue midodrine 12.5 mg 3 times a day for blood pressure hemodynamic support     3.  Electrolytes/acid-base: Replete borderline hypokalemia will need to be monitored closely as an outpatient     4.  MBD of ESRD:  - Hyperphosphatemia at goal at 4.9 off binders  - Hypercalcemia: Intermittent improved at 9.8/ionized calcium 1.35  - Workup: Vitamin D 25: 26.7 slightly low, vitamin D 1, 25: Normal at 21.1/PTH intact level 82.0 normal, PTH related protein negative; ACE level negative    Myeloma evaluation: Negative urine immunofixation/light chain ratio, serum immunofixation cannot rule out small monoclonal immunoglobulin recommended 3 to 6-month follow-up!!    In regards to treatment:  - Cinacalcet 30 mg daily but will need to monitor both calcium level and PTH intact level as an outpatient  - Low calcium bath with PD solution if possible     5.  Anemia of ESRD:  -  "Hemoglobin 8.4 improved  - 1 dose Epogen 20,000 units given earlier  - Hemoccult positive for EGD per GI: Followed by GI: No plans for EGD at the moment the stable hemoglobin they recommended follow-up as an outpatient  - Iron supplement: Cannot give intravenous iron with infection  - Transfuse as needed for hemoglobin less than 7.0 per primary service     6.  Ongoing problems:  - Enterobacter on blood culture with right lower extremity cellulitis per infectious disease: PD cell count only 8 no suggestion of peritonitis  - Atrial fibrillation per cardiology consultation  - Increase liver function studies: Resolved felt to be related to a medication       Related to outpatient Eddie Cranston in regards to above PD Zahida    Subjective:   Patient feels overall better  No dizziness or lightheadedness  No nausea vomiting mild loose stools  No chest pain or shortness of breath  Urinating fairly well    Objective:     Vitals: Blood pressure 124/75, pulse 68, temperature 97.7 °F (36.5 °C), temperature source Temporal, resp. rate 16, height 5' 10\" (1.778 m), weight 112 kg (246 lb 0.5 oz), SpO2 98%.,Body mass index is 35.3 kg/m².    Weight (last 2 days)       Date/Time Weight    08/04/24 0543 112 (246.03)     Weight: dry weight at 08/04/24 0543    08/04/24 0500 112 (246.03)     Weight: dry weight at 08/04/24 0500    08/03/24 0400 114 (250.88)              Intake/Output Summary (Last 24 hours) at 8/5/2024 0921  Last data filed at 8/5/2024 0850  Gross per 24 hour   Intake 960 ml   Output -75 ml   Net 1035 ml            Physical Exam: General:  No acute distress  Skin:  No acute rash  Eyes:  No scleral icterus and noninjected  ENT:  Moist mucous membranes  Neck:  Supple, no jugular venous distention, trachea midline, overall appearance is normal  Chest:  Clear to auscultation  CVS:  Regular rate and rhythm, without a rub or gallops  Abdomen:  Normal bowel sounds, soft and nontender and nondistended  Extremities: Lymphedema with " severe venous stasis changes right leg is wrapped, lymphedema and edema slightly worse on the right, and no cyanosis, no significant arthritic changes  Neuro:  No gross focality  Psych:  Alert and oriented and appropriate                Medications:    Scheduled Meds:  Current Facility-Administered Medications   Medication Dose Route Frequency Provider Last Rate    acetaminophen  650 mg Oral Q6H PRN Rachel Conn PA-C      aspirin  81 mg Oral Daily Rachel Conn PA-C      atorvastatin  40 mg Oral Daily With Dinner Rachel Conn PA-C      cinacalcet  30 mg Oral Daily With Breakfast Jignesh Hussein MD      ferrous sulfate  325 mg Oral Daily With Breakfast Jignesh Hussein MD      HYDROcodone-acetaminophen  1 tablet Oral Q6H PRN Rachel Conn PA-C      HYDROcodone-acetaminophen  2 tablet Oral Q6H PRN Rachel Conn PA-C      HYDROmorphone  1 mg Intravenous Q3H PRN GAGANDEEP Bernardo      insulin glargine  15 Units Subcutaneous Q12H VASU Rachel Conn PA-C      insulin lispro  1-6 Units Subcutaneous HS Rachel Conn PA-C      insulin lispro  1-6 Units Subcutaneous TID AC Nora Mathew MD      levothyroxine  125 mcg Oral Early Morning Rachel Conn PA-C      metoprolol succinate  100 mg Oral BID Rachel Conn PA-C      midodrine  12.5 mg Oral TID AC Maria Eugenia Lucinda, DO      potassium chloride  20 mEq Oral Once Jignesh Hussein MD      saccharomyces boulardii  250 mg Oral BID Moses Noel MD      simethicone  80 mg Oral 4x Daily PRN Rachel Conn PA-C      tamsulosin  0.4 mg Oral Daily With Dinner Rachel Conn PA-C      torsemide  60 mg Oral BID Maria Eugenia Jane, DO      warfarin  2 mg Oral Daily (warfarin) Moses Noel MD         PRN Meds:.  acetaminophen    HYDROcodone-acetaminophen    HYDROcodone-acetaminophen    HYDROmorphone    simethicone    Continuous Infusions:     Lab, Imaging and other studies: I have personally reviewed pertinent  "labs.  Laboratory Results:  Results from last 7 days   Lab Units 08/05/24  0551 08/04/24  0438 08/03/24  0451 08/02/24  0441 08/01/24  0604 07/31/24  0458 07/30/24  0648   WBC Thousand/uL 7.58 8.31 8.31 7.59 6.39 6.75 5.87   HEMOGLOBIN g/dL 8.4* 8.4* 8.9* 8.4* 8.2* 7.9* 8.0*   HEMATOCRIT % 26.8* 28.0* 29.8* 27.3* 26.9* 25.5* 26.4*   PLATELETS Thousands/uL 162 165 199 177 182 171 205   POTASSIUM mmol/L 3.4* 3.5 3.7 3.4* 3.2* 3.6 3.6   CHLORIDE mmol/L 97 98 101 102 100 102 100   CO2 mmol/L 26 26 26 27 26 26 29   BUN mg/dL 51* 50* 52* 53* 57* 61* 61*   CREATININE mg/dL 6.88* 6.65* 6.16* 5.95* 5.80* 6.13* 6.06*   CALCIUM mg/dL 9.5 9.9 10.3* 10.3* 9.9 9.4 9.8   MAGNESIUM mg/dL  --   --   --  1.9  --  2.0 2.2   PHOSPHORUS mg/dL  --   --   --   --   --   --  4.9*     Urinalysis:   Lab Results   Component Value Date    COLORU Yellow 07/23/2024    CLARITYU Clear 07/23/2024    SPECGRAV 1.020 07/23/2024    PHUR 5.5 07/23/2024    LEUKOCYTESUR Negative 07/23/2024    NITRITE Negative 07/23/2024    GLUCOSEU Negative 07/23/2024    KETONESU Negative 07/23/2024    BILIRUBINUR Negative 07/23/2024    BLOODU Large (A) 07/23/2024     ABGs: No results found for: \"PH\"  Radiology review:     Portions of the record may have been created with voice recognition software.  Occasional wrong word or \"sound a like\" substitutions may have occurred due to the inherent limitations of voice recognition software.  Read the chart carefully and recognize, using context, where substitutions have occurred.                    "

## 2024-08-05 NOTE — ASSESSMENT & PLAN NOTE
Lab Results   Component Value Date    EGFR 7 08/05/2024    EGFR 7 08/04/2024    EGFR 8 08/03/2024    CREATININE 6.88 (H) 08/05/2024    CREATININE 6.65 (H) 08/04/2024    CREATININE 6.16 (H) 08/03/2024     History of stage 5 ckd recently started on peritoneal dialysis, first treatment on 6/24. Had PD catheter placed on 6/7/2024. Was scheduled to do PD dialysis in clinic and then transition to home PD dialysis.   Last PD was done yesterday, patient currently doing every other day PD, goal for everyday per patient.   Nephro adjusting PD solution.   Current Rx 2.5% low calcium dianeal 2 L every 6 hours changes   Avoid nephrotoxic medications, nsaids, hypotension  PT OT recommending acute rehab.  Discussed in details with patient in presence of case management regarding the recommendations.  Patient is peritoneal dialysis dependent, it will take about 2 weeks to find a place and arrange everything.  After hearing that, patient does not want to stay in the hospital for 2 weeks, prefers to go home with home health aide.  Management on board.

## 2024-08-06 NOTE — UTILIZATION REVIEW
NOTIFICATION OF ADMISSION DISCHARGE   This is a Notification of Discharge from [facility]. Please be advised that this patient has been discharge from our facility. Below you will find the admission and discharge date and time including the patient’s disposition.   UTILIZATION REVIEW CONTACT:  Arleen Cotto  Utilization   Network Utilization Review Department  Phone: [phone] x carefully listen to the prompts. All voicemails are confidential.  Email: [email or web address]     ADMISSION INFORMATION  PRESENTATION DATE: 7/23/2024 12:40 PM  OBERVATION ADMISSION DATE: N/A  INPATIENT ADMISSION DATE: 7/23/24  2:20 PM   DISCHARGE DATE: 8/5/2024  2:51 PM   DISPOSITION:Home with Home Health Care    Network Utilization Review Department  ATTENTION: Please call with any questions or concerns to [phone] and carefully listen to the prompts so that you are directed to the right person. All voicemails are confidential.   For Discharge needs, contact Care Management DC Support Team at [phone] opt. 2  Send all requests for admission clinical reviews, approved or denied determinations and any other requests to dedicated fax number below belonging to the campus where the patient is receiving treatment. List of dedicated fax numbers for the Facilities:  FACILITY NAME UR FAX NUMBER   ADMISSION DENIALS (Administrative/Medical Necessity) [phone]   DISCHARGE SUPPORT TEAM ([facility]) [phone]   PARENT CHILD HEALTH (Maternity/NICU/Pediatrics) [phone]   [facility] [phone]   [facility] [phone]   [facility] [phone]   [facility] [phone]   [facility] [phone]   [facility] [phone]   [facility] [phone]   [facility] [phone]   [facility] [phone]   [facility] [phone]   [facility] [phone]   [facility] [phone]

## 2024-08-12 ENCOUNTER — APPOINTMENT (EMERGENCY)
Dept: RADIOLOGY | Facility: HOSPITAL | Age: 69
DRG: 291 | End: 2024-08-12
Payer: COMMERCIAL

## 2024-08-12 ENCOUNTER — HOSPITAL ENCOUNTER (INPATIENT)
Facility: HOSPITAL | Age: 69
LOS: 1 days | Discharge: HOME HEALTH | DRG: 291 | End: 2024-08-13
Attending: EMERGENCY MEDICINE | Admitting: FAMILY MEDICINE
Payer: COMMERCIAL

## 2024-08-12 DIAGNOSIS — Z79.4 TYPE 2 DIABETES MELLITUS WITH STAGE 4 CHRONIC KIDNEY DISEASE, WITH LONG-TERM CURRENT USE OF INSULIN (HCC): ICD-10-CM

## 2024-08-12 DIAGNOSIS — Z99.2 PATIENT ON PERITONEAL DIALYSIS (HCC): ICD-10-CM

## 2024-08-12 DIAGNOSIS — E87.70 HYPERVOLEMIA: ICD-10-CM

## 2024-08-12 DIAGNOSIS — N18.4 TYPE 2 DIABETES MELLITUS WITH STAGE 4 CHRONIC KIDNEY DISEASE, WITH LONG-TERM CURRENT USE OF INSULIN (HCC): ICD-10-CM

## 2024-08-12 DIAGNOSIS — I48.0 PAROXYSMAL ATRIAL FIBRILLATION (HCC): ICD-10-CM

## 2024-08-12 DIAGNOSIS — E11.22 TYPE 2 DIABETES MELLITUS WITH STAGE 4 CHRONIC KIDNEY DISEASE, WITH LONG-TERM CURRENT USE OF INSULIN (HCC): ICD-10-CM

## 2024-08-12 DIAGNOSIS — I89.0 CHRONIC ACQUIRED LYMPHEDEMA: Primary | ICD-10-CM

## 2024-08-12 DIAGNOSIS — I48.91 ATRIAL FIBRILLATION WITH RVR (HCC): ICD-10-CM

## 2024-08-12 PROBLEM — E87.20 LACTIC ACIDOSIS: Status: ACTIVE | Noted: 2024-08-12

## 2024-08-12 PROBLEM — E83.52 HYPERCALCEMIA: Status: ACTIVE | Noted: 2024-08-12

## 2024-08-12 PROBLEM — I95.9 HYPOTENSION: Status: ACTIVE | Noted: 2024-08-12

## 2024-08-12 LAB
2HR DELTA HS TROPONIN: 1 NG/L
4HR DELTA HS TROPONIN: 44 NG/L
ALBUMIN SERPL BCG-MCNC: 3.4 G/DL (ref 3.5–5)
ALP SERPL-CCNC: 122 U/L (ref 34–104)
ALT SERPL W P-5'-P-CCNC: 23 U/L (ref 7–52)
ANION GAP SERPL CALCULATED.3IONS-SCNC: 15 MMOL/L (ref 4–13)
APTT PPP: 86 SECONDS (ref 23–34)
AST SERPL W P-5'-P-CCNC: 28 U/L (ref 13–39)
ATRIAL RATE: 130 BPM
BASOPHILS # BLD AUTO: 0.07 THOUSANDS/ÂΜL (ref 0–0.1)
BASOPHILS NFR BLD AUTO: 1 % (ref 0–1)
BILIRUB SERPL-MCNC: 0.45 MG/DL (ref 0.2–1)
BNP SERPL-MCNC: 89 PG/ML (ref 0–100)
BUN SERPL-MCNC: 55 MG/DL (ref 5–25)
CALCIUM ALBUM COR SERPL-MCNC: 11.2 MG/DL (ref 8.3–10.1)
CALCIUM SERPL-MCNC: 10.7 MG/DL (ref 8.4–10.2)
CARDIAC TROPONIN I PNL SERPL HS: 31 NG/L
CARDIAC TROPONIN I PNL SERPL HS: 32 NG/L
CARDIAC TROPONIN I PNL SERPL HS: 75 NG/L
CHLORIDE SERPL-SCNC: 96 MMOL/L (ref 96–108)
CO2 SERPL-SCNC: 25 MMOL/L (ref 21–32)
CREAT SERPL-MCNC: 8.57 MG/DL (ref 0.6–1.3)
EOSINOPHIL # BLD AUTO: 0.14 THOUSAND/ÂΜL (ref 0–0.61)
EOSINOPHIL NFR BLD AUTO: 2 % (ref 0–6)
ERYTHROCYTE [DISTWIDTH] IN BLOOD BY AUTOMATED COUNT: 18.3 % (ref 11.6–15.1)
GFR SERPL CREATININE-BSD FRML MDRD: 5 ML/MIN/1.73SQ M
GLUCOSE SERPL-MCNC: 103 MG/DL (ref 65–140)
GLUCOSE SERPL-MCNC: 105 MG/DL (ref 65–140)
GLUCOSE SERPL-MCNC: 124 MG/DL (ref 65–140)
GLUCOSE SERPL-MCNC: 88 MG/DL (ref 65–140)
HCT VFR BLD AUTO: 32.2 % (ref 36.5–49.3)
HGB BLD-MCNC: 10.1 G/DL (ref 12–17)
IMM GRANULOCYTES # BLD AUTO: 0.04 THOUSAND/UL (ref 0–0.2)
IMM GRANULOCYTES NFR BLD AUTO: 1 % (ref 0–2)
INR PPP: 4.84 (ref 0.85–1.19)
LACTATE SERPL-SCNC: 1.6 MMOL/L (ref 0.5–2)
LACTATE SERPL-SCNC: 2.8 MMOL/L (ref 0.5–2)
LIPASE SERPL-CCNC: 17 U/L (ref 11–82)
LYMPHOCYTES # BLD AUTO: 1.29 THOUSANDS/ÂΜL (ref 0.6–4.47)
LYMPHOCYTES NFR BLD AUTO: 18 % (ref 14–44)
MAGNESIUM SERPL-MCNC: 2 MG/DL (ref 1.9–2.7)
MCH RBC QN AUTO: 32.1 PG (ref 26.8–34.3)
MCHC RBC AUTO-ENTMCNC: 31.4 G/DL (ref 31.4–37.4)
MCV RBC AUTO: 102 FL (ref 82–98)
MONOCYTES # BLD AUTO: 0.51 THOUSAND/ÂΜL (ref 0.17–1.22)
MONOCYTES NFR BLD AUTO: 7 % (ref 4–12)
NEUTROPHILS # BLD AUTO: 5.19 THOUSANDS/ÂΜL (ref 1.85–7.62)
NEUTS SEG NFR BLD AUTO: 71 % (ref 43–75)
NRBC BLD AUTO-RTO: 0 /100 WBCS
PLATELET # BLD AUTO: 154 THOUSANDS/UL (ref 149–390)
PMV BLD AUTO: 8.9 FL (ref 8.9–12.7)
POTASSIUM SERPL-SCNC: 3.6 MMOL/L (ref 3.5–5.3)
PR INTERVAL: 208 MS
PROT SERPL-MCNC: 7.2 G/DL (ref 6.4–8.4)
PROTHROMBIN TIME: 44.5 SECONDS (ref 12.3–15)
QRS AXIS: -39 DEGREES
QRSD INTERVAL: 152 MS
QT INTERVAL: 312 MS
QTC INTERVAL: 459 MS
RBC # BLD AUTO: 3.15 MILLION/UL (ref 3.88–5.62)
SODIUM SERPL-SCNC: 136 MMOL/L (ref 135–147)
T WAVE AXIS: 3 DEGREES
VENTRICULAR RATE: 130 BPM
WBC # BLD AUTO: 7.24 THOUSAND/UL (ref 4.31–10.16)

## 2024-08-12 PROCEDURE — 85610 PROTHROMBIN TIME: CPT | Performed by: EMERGENCY MEDICINE

## 2024-08-12 PROCEDURE — 71045 X-RAY EXAM CHEST 1 VIEW: CPT

## 2024-08-12 PROCEDURE — 85025 COMPLETE CBC W/AUTO DIFF WBC: CPT | Performed by: EMERGENCY MEDICINE

## 2024-08-12 PROCEDURE — 83735 ASSAY OF MAGNESIUM: CPT | Performed by: EMERGENCY MEDICINE

## 2024-08-12 PROCEDURE — 84484 ASSAY OF TROPONIN QUANT: CPT | Performed by: EMERGENCY MEDICINE

## 2024-08-12 PROCEDURE — 83690 ASSAY OF LIPASE: CPT | Performed by: EMERGENCY MEDICINE

## 2024-08-12 PROCEDURE — 96374 THER/PROPH/DIAG INJ IV PUSH: CPT

## 2024-08-12 PROCEDURE — 99223 1ST HOSP IP/OBS HIGH 75: CPT | Performed by: STUDENT IN AN ORGANIZED HEALTH CARE EDUCATION/TRAINING PROGRAM

## 2024-08-12 PROCEDURE — 85730 THROMBOPLASTIN TIME PARTIAL: CPT | Performed by: EMERGENCY MEDICINE

## 2024-08-12 PROCEDURE — 93005 ELECTROCARDIOGRAM TRACING: CPT

## 2024-08-12 PROCEDURE — 80053 COMPREHEN METABOLIC PANEL: CPT | Performed by: EMERGENCY MEDICINE

## 2024-08-12 PROCEDURE — 99223 1ST HOSP IP/OBS HIGH 75: CPT | Performed by: FAMILY MEDICINE

## 2024-08-12 PROCEDURE — 99223 1ST HOSP IP/OBS HIGH 75: CPT | Performed by: INTERNAL MEDICINE

## 2024-08-12 PROCEDURE — 93010 ELECTROCARDIOGRAM REPORT: CPT | Performed by: INTERNAL MEDICINE

## 2024-08-12 PROCEDURE — 83880 ASSAY OF NATRIURETIC PEPTIDE: CPT | Performed by: EMERGENCY MEDICINE

## 2024-08-12 PROCEDURE — 36415 COLL VENOUS BLD VENIPUNCTURE: CPT | Performed by: EMERGENCY MEDICINE

## 2024-08-12 PROCEDURE — 82948 REAGENT STRIP/BLOOD GLUCOSE: CPT

## 2024-08-12 PROCEDURE — 96361 HYDRATE IV INFUSION ADD-ON: CPT

## 2024-08-12 PROCEDURE — 3E1M39Z IRRIGATION OF PERITONEAL CAVITY USING DIALYSATE, PERCUTANEOUS APPROACH: ICD-10-PCS | Performed by: INTERNAL MEDICINE

## 2024-08-12 PROCEDURE — 99285 EMERGENCY DEPT VISIT HI MDM: CPT

## 2024-08-12 PROCEDURE — 83605 ASSAY OF LACTIC ACID: CPT | Performed by: EMERGENCY MEDICINE

## 2024-08-12 PROCEDURE — 99291 CRITICAL CARE FIRST HOUR: CPT | Performed by: EMERGENCY MEDICINE

## 2024-08-12 RX ORDER — ACETAMINOPHEN 325 MG/1
650 TABLET ORAL EVERY 6 HOURS PRN
Status: DISCONTINUED | OUTPATIENT
Start: 2024-08-12 | End: 2024-08-13 | Stop reason: HOSPADM

## 2024-08-12 RX ORDER — FERROUS SULFATE 325(65) MG
325 TABLET ORAL
Status: DISCONTINUED | OUTPATIENT
Start: 2024-08-13 | End: 2024-08-13 | Stop reason: HOSPADM

## 2024-08-12 RX ORDER — DILTIAZEM HYDROCHLORIDE 5 MG/ML
10 INJECTION INTRAVENOUS ONCE
Status: COMPLETED | OUTPATIENT
Start: 2024-08-12 | End: 2024-08-12

## 2024-08-12 RX ORDER — ALLOPURINOL 300 MG/1
300 TABLET ORAL DAILY
Status: DISCONTINUED | OUTPATIENT
Start: 2024-08-13 | End: 2024-08-13 | Stop reason: HOSPADM

## 2024-08-12 RX ORDER — METOPROLOL SUCCINATE 50 MG/1
50 TABLET, EXTENDED RELEASE ORAL 2 TIMES DAILY
Status: DISCONTINUED | OUTPATIENT
Start: 2024-08-12 | End: 2024-08-13 | Stop reason: HOSPADM

## 2024-08-12 RX ORDER — INSULIN GLARGINE 100 [IU]/ML
15 INJECTION, SOLUTION SUBCUTANEOUS
Status: DISCONTINUED | OUTPATIENT
Start: 2024-08-12 | End: 2024-08-13 | Stop reason: HOSPADM

## 2024-08-12 RX ORDER — METOPROLOL TARTRATE 1 MG/ML
2.5 INJECTION, SOLUTION INTRAVENOUS EVERY 6 HOURS PRN
Status: DISCONTINUED | OUTPATIENT
Start: 2024-08-12 | End: 2024-08-13 | Stop reason: HOSPADM

## 2024-08-12 RX ORDER — DILTIAZEM HYDROCHLORIDE 5 MG/ML
15 INJECTION INTRAVENOUS ONCE
Status: DISCONTINUED | OUTPATIENT
Start: 2024-08-12 | End: 2024-08-12

## 2024-08-12 RX ORDER — METOPROLOL SUCCINATE 50 MG/1
50 TABLET, EXTENDED RELEASE ORAL 2 TIMES DAILY
Status: DISCONTINUED | OUTPATIENT
Start: 2024-08-12 | End: 2024-08-12

## 2024-08-12 RX ORDER — TAMSULOSIN HYDROCHLORIDE 0.4 MG/1
0.4 CAPSULE ORAL
Status: DISCONTINUED | OUTPATIENT
Start: 2024-08-12 | End: 2024-08-13 | Stop reason: HOSPADM

## 2024-08-12 RX ORDER — HYDROCODONE BITARTRATE AND ACETAMINOPHEN 5; 325 MG/1; MG/1
2 TABLET ORAL EVERY 6 HOURS PRN
Status: DISCONTINUED | OUTPATIENT
Start: 2024-08-12 | End: 2024-08-13 | Stop reason: HOSPADM

## 2024-08-12 RX ORDER — MIDODRINE HYDROCHLORIDE 5 MG/1
15 TABLET ORAL
Status: DISCONTINUED | OUTPATIENT
Start: 2024-08-12 | End: 2024-08-13 | Stop reason: HOSPADM

## 2024-08-12 RX ORDER — METOPROLOL TARTRATE 1 MG/ML
5 INJECTION, SOLUTION INTRAVENOUS ONCE
Status: DISCONTINUED | OUTPATIENT
Start: 2024-08-12 | End: 2024-08-12

## 2024-08-12 RX ORDER — BUMETANIDE 0.25 MG/ML
1 INJECTION INTRAMUSCULAR; INTRAVENOUS ONCE
Status: DISCONTINUED | OUTPATIENT
Start: 2024-08-12 | End: 2024-08-13

## 2024-08-12 RX ORDER — CINACALCET 30 MG/1
30 TABLET, FILM COATED ORAL
Status: DISCONTINUED | OUTPATIENT
Start: 2024-08-13 | End: 2024-08-13 | Stop reason: HOSPADM

## 2024-08-12 RX ORDER — ATORVASTATIN CALCIUM 40 MG/1
40 TABLET, FILM COATED ORAL
Status: DISCONTINUED | OUTPATIENT
Start: 2024-08-12 | End: 2024-08-13 | Stop reason: HOSPADM

## 2024-08-12 RX ORDER — INSULIN LISPRO 100 [IU]/ML
1-6 INJECTION, SOLUTION INTRAVENOUS; SUBCUTANEOUS
Status: DISCONTINUED | OUTPATIENT
Start: 2024-08-12 | End: 2024-08-13 | Stop reason: HOSPADM

## 2024-08-12 RX ORDER — LEVOTHYROXINE SODIUM 125 UG/1
125 TABLET ORAL
Status: DISCONTINUED | OUTPATIENT
Start: 2024-08-12 | End: 2024-08-13 | Stop reason: HOSPADM

## 2024-08-12 RX ADMIN — HYDROCODONE BITARTRATE AND ACETAMINOPHEN 2 TABLET: 5; 325 TABLET ORAL at 21:38

## 2024-08-12 RX ADMIN — ATORVASTATIN CALCIUM 40 MG: 40 TABLET, FILM COATED ORAL at 16:18

## 2024-08-12 RX ADMIN — SODIUM CHLORIDE 500 ML: 0.9 INJECTION, SOLUTION INTRAVENOUS at 11:23

## 2024-08-12 RX ADMIN — DILTIAZEM HYDROCHLORIDE 10 MG: 5 INJECTION INTRAVENOUS at 12:02

## 2024-08-12 RX ADMIN — DILTIAZEM HYDROCHLORIDE 10 MG: 5 INJECTION INTRAVENOUS at 11:37

## 2024-08-12 RX ADMIN — MIDODRINE HYDROCHLORIDE 15 MG: 5 TABLET ORAL at 16:18

## 2024-08-12 RX ADMIN — METOPROLOL SUCCINATE 50 MG: 50 TABLET, EXTENDED RELEASE ORAL at 12:52

## 2024-08-12 RX ADMIN — TAMSULOSIN HYDROCHLORIDE 0.4 MG: 0.4 CAPSULE ORAL at 16:18

## 2024-08-12 RX ADMIN — METOPROLOL SUCCINATE 50 MG: 50 TABLET, EXTENDED RELEASE ORAL at 21:14

## 2024-08-12 RX ADMIN — MIDODRINE HYDROCHLORIDE 15 MG: 5 TABLET ORAL at 12:52

## 2024-08-12 NOTE — ASSESSMENT & PLAN NOTE
Lab Results   Component Value Date    EGFR 5 08/12/2024    EGFR 7 08/05/2024    EGFR 7 08/04/2024    CREATININE 8.57 (H) 08/12/2024    CREATININE 6.88 (H) 08/05/2024    CREATININE 6.65 (H) 08/04/2024   On PD  NEPHROLOGY consulted

## 2024-08-12 NOTE — ASSESSMENT & PLAN NOTE
History of paroxysmal atrial fibrillation  Underwent atrial fibrillation ablation by [facility] in April 2024.  He continues with paroxysmal a fib episodes.  Presented today with rapid fib vs flutter in the 130-160's s/p Cardiazem IV x 2 doses.  During my exam he spontaneously converted to sinus rhythm.  HR's have been acceptable overnight on metoprolol succinate 50 mg BID.  Continue warfarin with goal INR 2-3.  Continue metoprolol succinate 50 mg BID.  Already has follow up with [facility] arranged. Encouraged to keep his appointments.

## 2024-08-12 NOTE — ASSESSMENT & PLAN NOTE
Wt Readings from Last 3 Encounters:   08/12/24 110 kg (242 lb 4.6 oz)   08/04/24 112 kg (246 lb 0.5 oz)   06/25/24 124 kg (273 lb 13 oz)   Cxr with pulm edema  On PD   On torsemide 60 mg po bid at home  TTE 7/2024 ef 75 , mild to moderate TR  Place 1.8 liter FR  Nephrology consultation  Will give bumex 1mg iv x1  I and o  Daily weight  Last discharge weight 246 lb  Legs are at baseline

## 2024-08-12 NOTE — ASSESSMENT & PLAN NOTE
His legs are at baseline  He has no evidence of cellulitis and is evident of chronic venous stasis changes

## 2024-08-12 NOTE — ASSESSMENT & PLAN NOTE
Wt Readings from Last 3 Encounters:   08/12/24 110 kg (242 lb 4.6 oz)   08/04/24 112 kg (246 lb 0.5 oz)   06/25/24 124 kg (273 lb 13 oz)     Known HFpEF on torsemide 60 mg daily and peritoneal dialysis at home.  Echo 7/2024 shows preserved LVEF with mild-mod TR.  Overall euvolemic today.  Torsemide 60 mg daily resumed by nephrology. Diuretics managed by nephrology due to ESRD on dialysis.

## 2024-08-12 NOTE — H&P
[facility]  H&P  Name: Masoud Perry 68 y.o. male I MRN: [id number]  Unit/Bed#: ED 03 I Date of Admission: 8/12/2024   Date of Service: 8/12/2024 I Hospital Day: 0      Assessment & Plan   * Atrial fibrillation with RVR ([facility])  Assessment & Plan  Recent dc metoprolol by pcp due to hypotension. Midodrine went up to 15 mg po tid  Given cardizem in er  Asked cards to see   Discussed with gris bernal np cards- start toprol xl 50 mg po bid- hr was 92 hence no iv doses was given  Lopressor prn placed  Recent TTE normal ef- mild to moderate tr  Tele  Inr >4 hold coumadin    Chronic acquired lymphedema  Assessment & Plan  His legs are at baseline  He has no evidence of cellulitis and is evident of chronic venous stasis changes    Hypercalcemia  Assessment & Plan  In light of esrd and previous was on calcitriol- whic was stopped  Continue sensipar  Stop vitamin d    Lactic acidosis  Assessment & Plan  In light of esrd poor clearance- no evidence of infection. Hold fluids as he is volume overloaded- recheck    ESRD (end stage renal disease) ([facility])  Assessment & Plan  Lab Results   Component Value Date    EGFR 5 08/12/2024    EGFR 7 08/05/2024    EGFR 7 08/04/2024    CREATININE 8.57 (H) 08/12/2024    CREATININE 6.88 (H) 08/05/2024    CREATININE 6.65 (H) 08/04/2024   On PD  NEPHROLOGY consulted    Type 2 diabetes mellitus with stage 4 chronic kidney disease, with long-term current use of insulin ([facility])  Assessment & Plan  Lab Results   Component Value Date    HGBA1C 5.9 (H) 07/24/2024       Recent Labs     08/12/24  1302   POCGLU 105       Blood Sugar Average: Last 72 hrs:  (P) 105  On lantus 15 units bid  For now in controlled environment  place lantus 15 units at hs and iss    Acute on chronic diastolic HF (heart failure) ([facility])  Assessment & Plan  Wt Readings from Last 3 Encounters:   08/12/24 110 kg (242 lb 4.6 oz)   08/04/24 112 kg (246 lb 0.5 oz)   06/25/24 124 kg (273 lb 13 oz)   Cxr with pulm  edema  On PD   On torsemide 60 mg po bid at home  TTE 7/2024 ef 75 , mild to moderate TR  Place 1.8 liter FR  Nephrology consultation  Will give bumex 1mg iv x1  I and o  Daily weight  Last discharge weight 246 lb  Legs are at baseline                   VTE Pharmacologic Prophylaxis: VTE Score: 3 Moderate Risk (Score 3-4) - Pharmacological DVT Prophylaxis Contraindicated. Sequential Compression Devices Ordered.  Code Status: Level 1 - Full Code   Discussion with family: pt  Anticipated Length of Stay: Patient will be admitted on an inpatient basis with an anticipated length of stay of greater than 2 midnights secondary to afib with rvr and chf.    Total Time Spent on Date of Encounter in care of patient: >35 mins. This time was spent on one or more of the following: performing physical exam; counseling and coordination of care; obtaining or reviewing history; documenting in the medical record; reviewing/ordering tests, medications or procedures; communicating with other healthcare professionals and discussing with patient's family/caregivers.    Chief Complaint: palpitations     History of Present Illness:  Masoud Perry is a 68 y.o. male with a PMH of afib chf perotoneal dialysis  who presents with palpitations since 3 am. Recently taken of bblocker due to low blood pressures. Legs at baseline. No chest pain. Positive for sob ..    Review of Systems:  Review of Systems   Constitutional:  Negative for chills and fever.   HENT:  Negative for ear pain and sore throat.    Eyes:  Negative for pain and visual disturbance.   Respiratory:  Positive for shortness of breath. Negative for cough.    Cardiovascular:  Positive for palpitations. Negative for chest pain.   Gastrointestinal:  Negative for abdominal pain and vomiting.   Genitourinary:  Negative for dysuria and hematuria.   Musculoskeletal:  Negative for arthralgias and back pain.   Skin:  Negative for color change and rash.   Neurological:  Negative for seizures and  syncope.   All other systems reviewed and are negative.      Past Medical and Surgical History:   Past Medical History:   Diagnosis Date    Anemia in chronic kidney disease     Anticoagulated on Coumadin     Atrial fibrillation (HCC)     BPH (benign prostatic hyperplasia)     CAD (coronary artery disease)     CKD (chronic kidney disease), stage V (HCC)     Compartment syndrome of forearm (HCC)     Right, 2019    COPD (chronic obstructive pulmonary disease) (HCC)     CVA (cerebral vascular accident) (HCC)     Diastolic CHF (HCC)     grade 1 DD    DVT (deep venous thrombosis) (HCC)     Factor V deficiency (HCC)     Gout     Hyperlipidemia     Hypertension     Hypothyroidism     MI (myocardial infarction) (HCC)     x3 - 1999, 2010, after 2015    Morbid obesity (HCC)     Nephrolithiasis     Noncompliance with medications     SHAD (obstructive sleep apnea)     Peritoneal dialysis catheter in place (HCC)     Pulmonary embolism (HCC)     Secondary hyperparathyroidism of renal origin (HCC)     Spinal stenosis of lumbar region     Status post placement of implantable loop recorder     Tobacco dependence        Past Surgical History:   Procedure Laterality Date    CARDIAC ELECTROPHYSIOLOGY STUDY AND ABLATION  04/29/2024    CHOLECYSTECTOMY      CORONARY ANGIOPLASTY WITH STENT PLACEMENT      JOINT REPLACEMENT      bilateral knee    NE CYSTO/URETERO W/LITHOTRIPSY &INDWELL STENT INSRT Left 09/24/2022    Procedure: CYSTOSCOPY URETEROSCOPY WITH LITHOTRIPSY HOLMIUM LASER, AND INSERTION STENT URETERAL;  Surgeon: Lewis Dahl MD;  Location: [facility];  Service: Urology    NE LAPS INSERTION TUNNELED INTRAPERITONEAL CATHETER N/A 6/7/2024    Procedure: INSERTION PERITONEAL CATHETER DIALYSIS LAPAROSCOPIC;  Surgeon: Hiram Park DO;  Location: [facility];  Service: General    US GUIDED KIDNEY BIOPSY  08/05/2020       Meds/Allergies:  Prior to Admission medications    Medication Sig Start Date End Date Taking? Authorizing Provider    allopurinol (ZYLOPRIM) 300 mg tablet Take by mouth    Historical Provider, MD   Arginine 1000 MG TABS Take 3,000 mg by mouth One daily    Historical Provider, MD   ascorbic acid (VITAMIN C) 500 MG tablet Take 500 mg by mouth daily    Historical Provider, MD   aspirin (ECOTRIN LOW STRENGTH) 81 mg EC tablet Take 81 mg by mouth    Historical Provider, MD   atorvastatin (LIPITOR) 40 mg tablet Take 1 tablet (40 mg total) by mouth daily with dinner 1/15/22   Sheila Bronson PA-C   b complex vitamins capsule Take 1 capsule by mouth daily    Historical Provider, MD   cinacalcet (SENSIPAR) 30 mg tablet Take 1 tablet (30 mg total) by mouth daily with breakfast 8/6/24 9/5/24  Moses Noel MD   co-enzyme Q-10 30 MG capsule Take 100 mg by mouth    Historical Provider, MD   ergocalciferol (VITAMIN D2) 50,000 units Take 1 capsule (50,000 Units total) by mouth once a week 4/2/24   GAGANDEEP Browne   ferrous sulfate 325 (65 Fe) mg tablet Take 1 tablet (325 mg total) by mouth daily with breakfast 8/6/24 9/5/24  Moses Noel MD   HYDROcodone-acetaminophen (NORCO)  mg per tablet Take by mouth 9/13/21   Historical Provider, MD   insulin glargine (LANTUS) 100 units/mL subcutaneous injection Inject 15 Units under the skin every 12 (twelve) hours    Historical Provider, MD   insulin lispro (HumaLOG) 100 units/mL injection as needed    Historical Provider, MD   levothyroxine 125 mcg tablet Take by mouth    Historical Provider, MD   Magnesium 400 MG TABS Take by mouth 2/6/24   Historical Provider, MD   metoprolol succinate (TOPROL-XL) 100 mg 24 hr tablet Take 100 mg by mouth 2 (two) times a day 1/8/24   Historical Provider, MD   midodrine (PROAMATINE) 5 mg tablet Take 2.5 tablets (12.5 mg total) by mouth 3 (three) times a day before meals Please hold this medication if your systolic blood pressure is 120 or above 8/5/24 9/4/24  Moses Noel MD   nitroglycerin (NITROSTAT) 0.4 mg SL tablet PLEASE SEE ATTACHED FOR  DETAILED DIRECTIONS 12/9/22   Historical Provider, MD   potassium chloride (Klor-Con M20) 20 mEq tablet Take 1 tablet (20 mEq total) by mouth once for 1 dose 8/5/24 8/5/24  Moses Noel MD   simethicone (MYLICON) 80 mg chewable tablet Chew 1 tablet (80 mg total) 4 (four) times a day as needed for flatulence 8/5/24   Moses Noel MD   tamsulosin (FLOMAX) 0.4 mg Take 0.4 mg by mouth daily with dinner    Historical Provider, MD   torsemide (DEMADEX) 20 mg tablet Take 3 tablets (60 mg total) by mouth 2 (two) times a day 8/5/24 9/4/24  Moses Noel MD   vitamin A 2400 MCG (8000 UT) capsule Take 2,400 Units by mouth daily    Historical Provider, MD   vitamin E, tocopherol, 200 units capsule Take 200 Units by mouth daily 180 mg daily    Historical Provider, MD   warfarin (COUMADIN) 2 mg tablet Target INR range 2-3 8/5/24   Moses Noel MD     I have reviewed home medications using recent Epic encounter.    Allergies:   Allergies   Allergen Reactions    Carvedilol Shortness Of Breath    Saccharin - Food Allergy Diarrhea     GI intolerance, adamant does not want to receive    Sucralose - Food Allergy Diarrhea     GI intolerance, adamant does not want to receive    Amiodarone Dizziness    Aspartame - Food Allergy Diarrhea    Bumetanide GI Intolerance and Lightheadedness           Cephalexin Other (See Comments)     unknown    Ciprofloxacin Rash    Hydralazine Tachycardia    Lisinopril GI Intolerance           Metolazone Other (See Comments)     Metallic taste    Morphine Swelling     Of injection site    Nifedipine Lightheadedness    Strawberry Extract - Food Allergy Rash       Social History:  Marital Status:    Substance Use History:   Social History     Substance and Sexual Activity   Alcohol Use Not Currently     Social History     Tobacco Use   Smoking Status Every Day    Current packs/day: 0.50    Types: Cigarettes   Smokeless Tobacco Never     Social History     Substance and Sexual Activity  "  Drug Use Never       Family History:  Family History   Problem Relation Age of Onset    Kidney failure Mother     Cirrhosis Mother     Colon cancer Brother        Physical Exam:     Vitals:   Blood Pressure: 100/57 (08/12/24 1252)  Pulse: (!) 114 (08/12/24 1252)  Temperature: 97.6 °F (36.4 °C) (08/12/24 1110)  Temp Source: Temporal (08/12/24 1110)  Respirations: 18 (08/12/24 1200)  Height: 5' 10\" (177.8 cm) (08/12/24 1110)  Weight - Scale: 110 kg (242 lb 4.6 oz) (08/12/24 1110)  SpO2: 100 % (08/12/24 1245)    Physical Exam  Vitals and nursing note reviewed.   Constitutional:       General: He is not in acute distress.     Appearance: He is well-developed.   HENT:      Head: Normocephalic and atraumatic.   Eyes:      Conjunctiva/sclera: Conjunctivae normal.   Cardiovascular:      Rate and Rhythm: Normal rate. Rhythm irregular.      Heart sounds: No murmur heard.  Pulmonary:      Effort: Pulmonary effort is normal. No respiratory distress.      Breath sounds: Rales present.   Abdominal:      General: There is no distension.      Palpations: Abdomen is soft.      Tenderness: There is no abdominal tenderness.   Musculoskeletal:         General: Swelling (chronic lymphedema at baseline) present.      Cervical back: Neck supple.      Comments: Venous stasis changes    Skin:     General: Skin is warm and dry.      Capillary Refill: Capillary refill takes less than 2 seconds.   Neurological:      Mental Status: He is alert and oriented to person, place, and time.   Psychiatric:         Mood and Affect: Mood normal.          Additional Data:     Lab Results:  Results from last 7 days   Lab Units 08/12/24  1120   WBC Thousand/uL 7.24   HEMOGLOBIN g/dL 10.1*   HEMATOCRIT % 32.2*   PLATELETS Thousands/uL 154   SEGS PCT % 71   LYMPHO PCT % 18   MONO PCT % 7   EOS PCT % 2     Results from last 7 days   Lab Units 08/12/24  1120   SODIUM mmol/L 136   POTASSIUM mmol/L 3.6   CHLORIDE mmol/L 96   CO2 mmol/L 25   BUN mg/dL 55* "   CREATININE mg/dL 8.57*   ANION GAP mmol/L 15*   CALCIUM mg/dL 10.7*   ALBUMIN g/dL 3.4*   TOTAL BILIRUBIN mg/dL 0.45   ALK PHOS U/L 122*   ALT U/L 23   AST U/L 28   GLUCOSE RANDOM mg/dL 124     Results from last 7 days   Lab Units 08/12/24  1120   INR  4.84*     Results from last 7 days   Lab Units 08/12/24  1302   POC GLUCOSE mg/dl 105     Lab Results   Component Value Date    HGBA1C 5.9 (H) 07/24/2024    HGBA1C 6.0 01/31/2024    HGBA1C 6.2 07/14/2023     Results from last 7 days   Lab Units 08/12/24  1120   LACTIC ACID mmol/L 2.8*       Lines/Drains:  Invasive Devices       Peripheral Intravenous Line  Duration             Peripheral IV 08/12/24 Right Antecubital <1 day              Line  Duration             Peritoneal Dialysis Catheter Column-disk Abdominal 66 days                        Imaging: Reviewed radiology reports from this admission including: chest xray  XR chest 1 view portable   ED Interpretation by Antony Bonilla MD (08/12 1201)   CHF          EKG and Other Studies Reviewed on Admission:   EKG: Atrial fibrillation. .    ** Please Note: This note has been constructed using a voice recognition system. **

## 2024-08-12 NOTE — ASSESSMENT & PLAN NOTE
In light of esrd poor clearance- no evidence of infection. Hold fluids as he is volume overloaded- recheck

## 2024-08-12 NOTE — ED PROVIDER NOTES
History  Chief Complaint   Patient presents with    Palpitations     Pt reports palpitations starting at 0300- hx of afib, pt also feeling very weak, recovering from cellulitis      Patient complains of feeling weak since he was discharged from this hospital for leg cellulitis.  No longer taking antibiotics.  No fevers or chills.  No nausea vomiting or diarrhea.  States in the melanite he woke up with palpitations.  No chest pain.  Felt it beating fast and hard.  No shortness of breath.  History of A-fib.  Taking his medications.      History provided by:  Patient   used: No    Palpitations  Palpitations quality:  Fast  Onset quality:  Sudden  Duration:  8 hours  Timing:  Constant  Progression:  Unchanged  Chronicity:  New  Context: not anxiety, not blood loss, not exercise and not nicotine    Relieved by:  Nothing  Worsened by:  Nothing  Ineffective treatments:  None tried  Associated symptoms: weakness    Associated symptoms: no chest pain, no chest pressure, no cough, no diaphoresis, no dizziness, no malaise/fatigue, no nausea, no numbness, no shortness of breath and no vomiting        Prior to Admission Medications   Prescriptions Last Dose Informant Patient Reported? Taking?   Arginine 1000 MG TABS  Self Yes No   Sig: Take 3,000 mg by mouth One daily   HYDROcodone-acetaminophen (NORCO)  mg per tablet  Self Yes No   Sig: Take by mouth   Magnesium 400 MG TABS   Yes No   Sig: Take by mouth   allopurinol (ZYLOPRIM) 300 mg tablet  Self Yes No   Sig: Take by mouth   ascorbic acid (VITAMIN C) 500 MG tablet  Self Yes No   Sig: Take 500 mg by mouth daily   aspirin (ECOTRIN LOW STRENGTH) 81 mg EC tablet  Self Yes No   Sig: Take 81 mg by mouth   atorvastatin (LIPITOR) 40 mg tablet  Self No No   Sig: Take 1 tablet (40 mg total) by mouth daily with dinner   b complex vitamins capsule  Self Yes No   Sig: Take 1 capsule by mouth daily   cinacalcet (SENSIPAR) 30 mg tablet   No No   Sig: Take 1 tablet  (30 mg total) by mouth daily with breakfast   co-enzyme Q-10 30 MG capsule  Self Yes No   Sig: Take 100 mg by mouth   ergocalciferol (VITAMIN D2) 50,000 units   No No   Sig: Take 1 capsule (50,000 Units total) by mouth once a week   ferrous sulfate 325 (65 Fe) mg tablet   No No   Sig: Take 1 tablet (325 mg total) by mouth daily with breakfast   insulin glargine (LANTUS) 100 units/mL subcutaneous injection  Self Yes No   Sig: Inject 15 Units under the skin every 12 (twelve) hours   insulin lispro (HumaLOG) 100 units/mL injection  Self Yes No   Sig: as needed   levothyroxine 125 mcg tablet  Self Yes No   Sig: Take by mouth   metoprolol succinate (TOPROL-XL) 100 mg 24 hr tablet   Yes No   Sig: Take 100 mg by mouth 2 (two) times a day   midodrine (PROAMATINE) 5 mg tablet   No No   Sig: Take 2.5 tablets (12.5 mg total) by mouth 3 (three) times a day before meals Please hold this medication if your systolic blood pressure is 120 or above   nitroglycerin (NITROSTAT) 0.4 mg SL tablet  Self Yes No   Sig: PLEASE SEE ATTACHED FOR DETAILED DIRECTIONS   potassium chloride (Klor-Con M20) 20 mEq tablet   No No   Sig: Take 1 tablet (20 mEq total) by mouth once for 1 dose   simethicone (MYLICON) 80 mg chewable tablet   No No   Sig: Chew 1 tablet (80 mg total) 4 (four) times a day as needed for flatulence   tamsulosin (FLOMAX) 0.4 mg  Self Yes No   Sig: Take 0.4 mg by mouth daily with dinner   torsemide (DEMADEX) 20 mg tablet   No No   Sig: Take 3 tablets (60 mg total) by mouth 2 (two) times a day   vitamin A 2400 MCG (8000 UT) capsule  Self Yes No   Sig: Take 2,400 Units by mouth daily   vitamin E, tocopherol, 200 units capsule  Self Yes No   Sig: Take 200 Units by mouth daily 180 mg daily   warfarin (COUMADIN) 2 mg tablet   No No   Sig: Target INR range 2-3      Facility-Administered Medications: None       Past Medical History:   Diagnosis Date    Anemia in chronic kidney disease     Anticoagulated on Coumadin     Atrial  fibrillation (HCC)     BPH (benign prostatic hyperplasia)     CAD (coronary artery disease)     CKD (chronic kidney disease), stage V (HCC)     Compartment syndrome of forearm (HCC)     Right, 2019    COPD (chronic obstructive pulmonary disease) (HCC)     CVA (cerebral vascular accident) (HCC)     Diastolic CHF (HCC)     grade 1 DD    DVT (deep venous thrombosis) (HCC)     Factor V deficiency (HCC)     Gout     Hyperlipidemia     Hypertension     Hypothyroidism     MI (myocardial infarction) (HCC)     x3 - 1999, 2010, after 2015    Morbid obesity (HCC)     Nephrolithiasis     Noncompliance with medications     SHAD (obstructive sleep apnea)     Peritoneal dialysis catheter in place (HCC)     Pulmonary embolism (HCC)     Secondary hyperparathyroidism of renal origin (HCC)     Spinal stenosis of lumbar region     Status post placement of implantable loop recorder     Tobacco dependence        Past Surgical History:   Procedure Laterality Date    CARDIAC ELECTROPHYSIOLOGY STUDY AND ABLATION  04/29/2024    CHOLECYSTECTOMY      CORONARY ANGIOPLASTY WITH STENT PLACEMENT      JOINT REPLACEMENT      bilateral knee    MA CYSTO/URETERO W/LITHOTRIPSY &INDWELL STENT INSRT Left 09/24/2022    Procedure: CYSTOSCOPY URETEROSCOPY WITH LITHOTRIPSY HOLMIUM LASER, AND INSERTION STENT URETERAL;  Surgeon: Lewis Dahl MD;  Location: [facility];  Service: Urology    MA LAPS INSERTION TUNNELED INTRAPERITONEAL CATHETER N/A 6/7/2024    Procedure: INSERTION PERITONEAL CATHETER DIALYSIS LAPAROSCOPIC;  Surgeon: Hiram Park DO;  Location: [facility];  Service: General    US GUIDED KIDNEY BIOPSY  08/05/2020       Family History   Problem Relation Age of Onset    Kidney failure Mother     Cirrhosis Mother     Colon cancer Brother      I have reviewed and agree with the history as documented.    E-Cigarette/Vaping    E-Cigarette Use Never User      E-Cigarette/Vaping Substances     Social History     Tobacco Use    Smoking status: Every Day      Current packs/day: 0.50     Types: Cigarettes    Smokeless tobacco: Never   Vaping Use    Vaping status: Never Used   Substance Use Topics    Alcohol use: Not Currently    Drug use: Never       Review of Systems   Constitutional:  Negative for chills, diaphoresis, fever and malaise/fatigue.   HENT:  Negative for ear pain, hearing loss, sore throat, trouble swallowing and voice change.    Eyes:  Negative for pain and discharge.   Respiratory:  Negative for cough, shortness of breath and wheezing.    Cardiovascular:  Negative for chest pain and palpitations.   Gastrointestinal:  Negative for abdominal pain, blood in stool, constipation, diarrhea, nausea and vomiting.   Genitourinary:  Negative for dysuria, flank pain, frequency and hematuria.   Musculoskeletal:  Negative for joint swelling, neck pain and neck stiffness.   Skin:  Negative for rash and wound.   Neurological:  Positive for weakness. Negative for dizziness, seizures, syncope, facial asymmetry, numbness and headaches.   Psychiatric/Behavioral:  Negative for hallucinations, self-injury and suicidal ideas.    All other systems reviewed and are negative.      Physical Exam  Physical Exam  Vitals and nursing note reviewed.   Constitutional:       General: He is not in acute distress.     Appearance: He is well-developed.   HENT:      Head: Normocephalic and atraumatic.      Right Ear: External ear normal.      Left Ear: External ear normal.   Eyes:      General: No scleral icterus.        Right eye: No discharge.         Left eye: No discharge.      Extraocular Movements: Extraocular movements intact.      Conjunctiva/sclera: Conjunctivae normal.   Cardiovascular:      Rate and Rhythm: Tachycardia present. Rhythm irregular.      Heart sounds: Normal heart sounds. No murmur heard.  Pulmonary:      Effort: Pulmonary effort is normal.      Breath sounds: Normal breath sounds. No wheezing or rales.   Abdominal:      General: Bowel sounds are normal. There is no  distension.      Palpations: Abdomen is soft.      Tenderness: There is no abdominal tenderness. There is no guarding or rebound.   Musculoskeletal:         General: No deformity. Normal range of motion.      Cervical back: Normal range of motion and neck supple.      Right lower leg: Edema present.      Left lower leg: Edema present.   Skin:     General: Skin is warm and dry.      Findings: No rash.   Neurological:      General: No focal deficit present.      Mental Status: He is alert and oriented to person, place, and time.      Cranial Nerves: No cranial nerve deficit.   Psychiatric:         Mood and Affect: Mood normal.         Behavior: Behavior normal.         Thought Content: Thought content normal.         Judgment: Judgment normal.         Vital Signs  ED Triage Vitals [08/12/24 1110]   Temperature Pulse Respirations Blood Pressure SpO2   97.6 °F (36.4 °C) (!) 113 18 114/62 98 %      Temp Source Heart Rate Source Patient Position - Orthostatic VS BP Location FiO2 (%)   Temporal Monitor Sitting Right arm --      Pain Score       --           Vitals:    08/12/24 1110 08/12/24 1115 08/12/24 1130 08/12/24 1200   BP: 114/62 98/60 101/60 114/52   Pulse: (!) 113 (!) 135 (!) 138 (!) 134   Patient Position - Orthostatic VS: Sitting            Visual Acuity      ED Medications  Medications   sodium chloride 0.9 % bolus 500 mL (0 mL Intravenous Stopped 8/12/24 1210)   diltiazem (CARDIZEM) injection 10 mg (10 mg Intravenous Given 8/12/24 1137)   diltiazem (CARDIZEM) injection 10 mg (10 mg Intravenous Given 8/12/24 1202)       Diagnostic Studies  Results Reviewed       Procedure Component Value Units Date/Time    HS Troponin I 4hr [[id number]]     Lab Status: No result Specimen: Blood     HS Troponin I 2hr [[id number]]     Lab Status: No result Specimen: Blood     HS Troponin 0hr (reflex protocol) [[id number]]  (Normal) Collected: 08/12/24 1120    Lab Status: Final result Specimen: Blood from Arm, Right Updated: 08/12/24  1155     hs TnI 0hr 31 ng/L     B-Type Natriuretic Peptide(BNP) [[id number]]  (Normal) Collected: 08/12/24 1120    Lab Status: Final result Specimen: Blood from Arm, Right Updated: 08/12/24 1155     BNP 89 pg/mL     Comprehensive metabolic panel [[id number]]  (Abnormal) Collected: 08/12/24 1120    Lab Status: Final result Specimen: Blood from Arm, Right Updated: 08/12/24 1152     Sodium 136 mmol/L      Potassium 3.6 mmol/L      Chloride 96 mmol/L      CO2 25 mmol/L      ANION GAP 15 mmol/L      BUN 55 mg/dL      Creatinine 8.57 mg/dL      Glucose 124 mg/dL      Calcium 10.7 mg/dL      Corrected Calcium 11.2 mg/dL      AST 28 U/L      ALT 23 U/L      Alkaline Phosphatase 122 U/L      Total Protein 7.2 g/dL      Albumin 3.4 g/dL      Total Bilirubin 0.45 mg/dL      eGFR 5 ml/min/1.73sq m     Narrative:      [employer] Kid[employer] guidelines for Chronic Kidney Disease (CKD):     Stage 1 with normal or high GFR (GFR > 90 mL/min/1.73 square meters)    Stage 2 Mild CKD (GFR = 60-89 mL/min/1.73 square meters)    Stage 3A Moderate CKD (GFR = 45-59 mL/min/1.73 square meters)    Stage 3B Moderate CKD (GFR = 30-44 mL/min/1.73 square meters)    Stage 4 Severe CKD (GFR = 15-29 mL/min/1.73 square meters)    Stage 5 End Stage CKD (GFR <15 mL/min/1.73 square meters)  Note: GFR calculation is accurate only with a steady state creatinine    Magnesium [[id number]]  (Normal) Collected: 08/12/24 1120    Lab Status: Final result Specimen: Blood from Arm, Right Updated: 08/12/24 1152     Magnesium 2.0 mg/dL     Lipase [[id number]]  (Normal) Collected: 08/12/24 1120    Lab Status: Final result Specimen: Blood from Arm, Right Updated: 08/12/24 1152     Lipase 17 u/L     Lactic acid, plasma (w/reflex if result > 2.0) [[id number]]  (Abnormal) Collected: 08/12/24 1120    Lab Status: Final result Specimen: Blood from Arm, Right Updated: 08/12/24 1152     LACTIC ACID 2.8 mmol/L     Narrative:      Result may be elevated if tourniquet was  used during collection.    Lactic acid 2 Hours [[id number]]     Lab Status: No result Specimen: Blood     Protime-INR [[id number]]  (Abnormal) Collected: 08/12/24 1120    Lab Status: Final result Specimen: Blood from Arm, Right Updated: 08/12/24 1146     Protime 44.5 seconds      INR 4.84    Narrative:      INR Therapeutic Range    Indication                                             INR Range      Atrial Fibrillation                                               2.0-3.0  Hypercoagulable State                                    2.0.2.3  Left Ventricular Asist Device                            2.0-3.0  Mechanical Heart Valve                                  -    Aortic(with afib, MI, embolism, HF, LA enlargement,    and/or coagulopathy)                                     2.0-3.0 (2.5-3.5)     Mitral                                                             2.5-3.5  Prosthetic/Bioprosthetic Heart Valve               2.0-3.0  Venous thromboembolism (VTE: VT, PE        2.0-3.0    APTT [[id number]]  (Abnormal) Collected: 08/12/24 1120    Lab Status: Final result Specimen: Blood from Arm, Right Updated: 08/12/24 1146     PTT 86 seconds     CBC and differential [[id number]]  (Abnormal) Collected: 08/12/24 1120    Lab Status: Final result Specimen: Blood from Arm, Right Updated: 08/12/24 1129     WBC 7.24 Thousand/uL      RBC 3.15 Million/uL      Hemoglobin 10.1 g/dL      Hematocrit 32.2 %       fL      MCH 32.1 pg      MCHC 31.4 g/dL      RDW 18.3 %      MPV 8.9 fL      Platelets 154 Thousands/uL      nRBC 0 /100 WBCs      Segmented % 71 %      Immature Grans % 1 %      Lymphocytes % 18 %      Monocytes % 7 %      Eosinophils Relative 2 %      Basophils Relative 1 %      Absolute Neutrophils 5.19 Thousands/µL      Absolute Immature Grans 0.04 Thousand/uL      Absolute Lymphocytes 1.29 Thousands/µL      Absolute Monocytes 0.51 Thousand/µL      Eosinophils Absolute 0.14 Thousand/µL      Basophils Absolute 0.07  Thousands/µL                    XR chest 1 view portable   ED Interpretation by Antony Bonilla MD (08/12 1201)   CHF                 Procedures  ECG 12 Lead Documentation Only    Date/Time: 8/12/2024 11:34 AM    Performed by: Antony Bonilla MD  Authorized by: Antony Bonilla MD    ECG reviewed by me, the ED Provider: yes    Patient location:  ED  Rate:     ECG rate:  130  Rhythm:     Rhythm: atrial fibrillation    CriticalCare Time    Date/Time: 8/12/2024 12:01 PM    Performed by: Antony Bonilla MD  Authorized by: Antony Bonilla MD    Critical care provider statement:     Critical care time (minutes):  35    Critical care time was exclusive of:  Separately billable procedures and treating other patients and teaching time    Critical care was necessary to treat or prevent imminent or life-threatening deterioration of the following conditions:  Cardiac failure    Critical care was time spent personally by me on the following activities:  Evaluation of patient's response to treatment, examination of patient, review of old charts, re-evaluation of patient's condition, ordering and review of radiographic studies, ordering and review of laboratory studies and ordering and performing treatments and interventions           ED Course  ED Course as of 08/12/24 1212   Mon Aug 12, 2024   1201 Creatinine(!): 8.57  Peritoneal dialysis.                                 SBIRT 20yo+      Flowsheet Row Most Recent Value   Initial Alcohol Screen: US AUDIT-C     1. How often do you have a drink containing alcohol? 0 Filed at: 08/12/2024 1109   2. How many drinks containing alcohol do you have on a typical day you are drinking?  0 Filed at: 08/12/2024 1109   3a. Male UNDER 65: How often do you have five or more drinks on one occasion? 0 Filed at: 08/12/2024 1109   Audit-C Score 0 Filed at: 08/12/2024 1109   ERASTO: How many times in the past year have you...    Used an illegal drug or used a prescription medication for  non-medical reasons? Never Filed at: 08/12/2024 1109                      Medical Decision Making  Amount and/or Complexity of Data Reviewed  Labs: ordered. Decision-making details documented in ED Course.  Radiology: ordered and independent interpretation performed.    Risk  Prescription drug management.  Decision regarding hospitalization.                 Disposition  Final diagnoses:   Atrial fibrillation with RVR (HCC)     Time reflects when diagnosis was documented in both MDM as applicable and the Disposition within this note       Time User Action Codes Description Comment    8/12/2024 11:14 AM Antony Bonilla [I48.91] Atrial fibrillation with RVR (HCC)     8/12/2024 12:09 PM Nora Mathew [Z99.2] Patient on peritoneal dialysis (HCC)           ED Disposition       ED Disposition   Admit    Condition   Stable    Date/Time   Mon Aug 12, 2024 1210    Comment   Case was discussed with Dr. Rutledge and the patient's admission status was agreed to be Admission Status: inpatient status to the service of Dr. Mathew.               Follow-up Information    None         Patient's Medications   Discharge Prescriptions    No medications on file       No discharge procedures on file.    PDMP Review         Value Time User    PDMP Reviewed  Yes 8/5/2024 11:10 AM Moses Noel MD            ED Provider  Electronically Signed by             Antony Bonilla MD  08/12/24 1212

## 2024-08-12 NOTE — ASSESSMENT & PLAN NOTE
Recent dc metoprolol by pcp due to hypotension. Midodrine went up to 15 mg po tid  Given cardizem in er  Asked cards to see   Discussed with gris bernal np cards- start toprol xl 50 mg po bid- hr was 92 hence no iv doses was given  Lopressor prn placed  Recent TTE normal ef- mild to moderate tr  Tele  Inr >4 hold coumadin

## 2024-08-12 NOTE — ASSESSMENT & PLAN NOTE
In light of esrd and previous was on calcitriol- whic was stopped  Continue sensipar  Stop vitamin d

## 2024-08-12 NOTE — ED NOTES
Report called to ICU. PT med surge pt, going to ICU for PD. Pt has no s/s of distress, VS stable, A&Ox4, Id band in place.      Gabrielle Morton RN  08/12/24 [id number]

## 2024-08-12 NOTE — CONSULTS
Consult Cardiology    Masoud VERGARA [facility] 1955, 68 y.o. male MRN: [id number]    Unit/Bed#: ED 03 Encounter: [id number]    Attending Provider: Nora Mathew MD   Primary Care Provider: Jignesh Baker DO   Date admitted to hospital: 8/12/2024       Inpatient consult to Cardiology  Consult performed by: GAGANDEEP Womack  Consult ordered by: Nora Mathew MD          * Paroxysmal atrial fibrillation (HCC)  Assessment & Plan  History of paroxysmal atrial fibrillation  Underwent atrial fibrillation ablation by Celia HUNTER in April 2024.  He continues with paroxysmal a fib episodes.  Presented today with rapid fib vs flutter in the 130-160's s/p Cardiazem IV x 2 doses.  During my exam he spontaneously converted to sinus rhythm.  Resume metoprolol succinate 50 mg BID.  Continue warfarin with goal INR 2-3.  Optimize electrolytes for K+ >4, Mag >2.    Hypotension  Assessment & Plan  Ok to continue midodrine 15 mg TID, however, hold if SBP > 140    ESRD (end stage renal disease) ([facility])  Assessment & Plan  Lab Results   Component Value Date    EGFR 5 08/12/2024    EGFR 7 08/05/2024    EGFR 7 08/04/2024    CREATININE 8.57 (H) 08/12/2024    CREATININE 6.88 (H) 08/05/2024    CREATININE 6.65 (H) 08/04/2024     On peritoneal dialysis  Appreciate nephrology recommendations as far as diuresis.    Acute on chronic diastolic HF (heart failure) ([facility])  Assessment & Plan  Wt Readings from Last 3 Encounters:   08/12/24 110 kg (242 lb 4.6 oz)   08/04/24 112 kg (246 lb 0.5 oz)   06/25/24 124 kg (273 lb 13 oz)     Known HFpEF on torsemide 60 mg daily and peritoneal dialysis at home.  Echo 7/2024 shows preserved LVEF with mild-mod TR.  Appears mildly volume overloaded on exam, likely driving rapid rates.  Appreciate nephrology recs for diuresis given ESRD.  Strict I's/O's, standing daily weights, sodium/fluid restriction.        Physician Requesting Consult: Nora Mathew MD    Reason for Consult / Principal Problem:  "Rapid a fib      HPI: Masoud Perry is a 68 y.o. year old male who has a history of coronary artery disease with MI in 1999 and 2011, paroxysmal atrial fibrillation s/p ablation 4/2024 on warfarin with implantable loop recorder in place, CVA, ESRD on PD, HTN, ongoing tobacco use who follows with [facility].      Patient presents today 8/12/2024 to [facility] ER with palpitations. He noted onset of rapid heart rates at 3 am that woke him from sleep. He has been undergoing medication adjustments due to hypotension and states PCP stopped his metoprolol last week. Upon arrival ECG shows rapid atrial fib vs flutter in the 130's. HS trop 31. BNP 89. Chest xray shows mild vascular congestion.    At the time of my assessment he is resting on the ER stretcher. He denies any chest discomfort, reports palpitations/heart racing and some mild shortness of breath. He does appear to be retaining fluid but feels this is his \"normal\". He is receiving peritoneal dialysis at home and also takes torsemide 60 mg daily.       Review of Systems   Constitutional:  Negative for chills and fever.   HENT:  Negative for ear pain and sore throat.    Eyes:  Negative for pain and visual disturbance.   Respiratory:  Positive for shortness of breath. Negative for cough.    Cardiovascular:  Positive for palpitations and leg swelling. Negative for chest pain.   Gastrointestinal:  Negative for abdominal pain and vomiting.   Genitourinary:  Negative for dysuria and hematuria.   Musculoskeletal:  Negative for arthralgias and back pain.   Skin:  Negative for color change and rash.   Neurological:  Negative for seizures and syncope.   All other systems reviewed and are negative.       Historical Information     Past Medical History:   Diagnosis Date    Anemia in chronic kidney disease     Anticoagulated on Coumadin     Atrial fibrillation (HCC)     BPH (benign prostatic hyperplasia)     CAD (coronary artery disease)     CKD (chronic kidney disease), " stage V (HCC)     Compartment syndrome of forearm (HCC)     Right, 2019    COPD (chronic obstructive pulmonary disease) (HCC)     CVA (cerebral vascular accident) (HCC)     Diastolic CHF (HCC)     grade 1 DD    DVT (deep venous thrombosis) (HCC)     Factor V deficiency (HCC)     Gout     Hyperlipidemia     Hypertension     Hypothyroidism     MI (myocardial infarction) (HCC)     x3 - 1999, 2010, after 2015    Morbid obesity (HCC)     Nephrolithiasis     Noncompliance with medications     SHAD (obstructive sleep apnea)     Peritoneal dialysis catheter in place (HCC)     Pulmonary embolism (HCC)     Secondary hyperparathyroidism of renal origin (HCC)     Spinal stenosis of lumbar region     Status post placement of implantable loop recorder     Tobacco dependence      Past Surgical History:   Procedure Laterality Date    CARDIAC ELECTROPHYSIOLOGY STUDY AND ABLATION  04/29/2024    CHOLECYSTECTOMY      CORONARY ANGIOPLASTY WITH STENT PLACEMENT      JOINT REPLACEMENT      bilateral knee    ID CYSTO/URETERO W/LITHOTRIPSY &INDWELL STENT INSRT Left 09/24/2022    Procedure: CYSTOSCOPY URETEROSCOPY WITH LITHOTRIPSY HOLMIUM LASER, AND INSERTION STENT URETERAL;  Surgeon: Lewis Dahl MD;  Location:  MAIN OR;  Service: Urology    ID LAPS INSERTION TUNNELED INTRAPERITONEAL CATHETER N/A 6/7/2024    Procedure: INSERTION PERITONEAL CATHETER DIALYSIS LAPAROSCOPIC;  Surgeon: Hiram Park DO;  Location: [facility] MAIN OR;  Service: General    US GUIDED KIDNEY BIOPSY  08/05/2020     Social History     Substance and Sexual Activity   Alcohol Use Not Currently     Social History     Substance and Sexual Activity   Drug Use Never     Social History     Tobacco Use   Smoking Status Every Day    Current packs/day: 0.50    Types: Cigarettes   Smokeless Tobacco Never       Family History:   Family History   Problem Relation Age of Onset    Kidney failure Mother     Cirrhosis Mother     Colon cancer Brother        Meds/Allergies     current meds:    Current Facility-Administered Medications   Medication Dose Route Frequency    acetaminophen (TYLENOL) tablet 650 mg  650 mg Oral Q6H PRN    [START ON 8/13/2024] allopurinol (ZYLOPRIM) tablet 300 mg  300 mg Oral Daily    [START ON 8/13/2024] aspirin (ECOTRIN LOW STRENGTH) EC tablet 81 mg  81 mg Oral Daily    atorvastatin (LIPITOR) tablet 40 mg  40 mg Oral Daily With Dinner    bumetanide (BUMEX) injection 1 mg  1 mg Intravenous Once    [START ON 8/13/2024] cinacalcet (SENSIPAR) tablet 30 mg  30 mg Oral Daily With Breakfast    [START ON 8/13/2024] ferrous sulfate tablet 325 mg  325 mg Oral Daily With Breakfast    insulin glargine (LANTUS) subcutaneous injection 15 Units 0.15 mL  15 Units Subcutaneous HS    insulin lispro (HumALOG/ADMELOG) 100 units/mL subcutaneous injection 1-6 Units  1-6 Units Subcutaneous TID AC    levothyroxine tablet 125 mcg  125 mcg Oral Early Morning    metoprolol (LOPRESSOR) injection 2.5 mg  2.5 mg Intravenous Q6H PRN    metoprolol succinate (TOPROL-XL) 24 hr tablet 50 mg  50 mg Oral BID    midodrine (PROAMATINE) tablet 15 mg  15 mg Oral TID AC    tamsulosin (FLOMAX) capsule 0.4 mg  0.4 mg Oral Daily With Dinner          Allergies   Allergen Reactions    Carvedilol Shortness Of Breath    Saccharin - Food Allergy Diarrhea     GI intolerance, adamant does not want to receive    Sucralose - Food Allergy Diarrhea     GI intolerance, adamant does not want to receive    Amiodarone Dizziness    Aspartame - Food Allergy Diarrhea    Bumetanide GI Intolerance and Lightheadedness           Cephalexin Other (See Comments)     unknown    Ciprofloxacin Rash    Hydralazine Tachycardia    Lisinopril GI Intolerance           Metolazone Other (See Comments)     Metallic taste    Morphine Swelling     Of injection site    Nifedipine Lightheadedness    Strawberry Extract - Food Allergy Rash       Objective     Vitals: Blood pressure 107/52, pulse 100, temperature 97.6 °F (36.4 °C), temperature source Temporal,  "resp. rate 18, height 5' 10\" (1.778 m), weight 110 kg (242 lb 4.6 oz), SpO2 99%., Body mass index is 34.76 kg/m².    Orthostatic Blood Pressures      Flowsheet Row Most Recent Value   Blood Pressure 107/52 filed at 2024 1315   Patient Position - Orthostatic VS Sitting filed at 2024 1110            Systolic (24hrs), Av , Min:95 , Max:134     Diastolic (24hrs), Av, Min:52, Max:62        Intake/Output Summary (Last 24 hours) at 2024 1333  Last data filed at 2024 1210  Gross per 24 hour   Intake 500 ml   Output --   Net 500 ml       Weight (last 2 days)       Date/Time Weight    24 1110 110 (242.29)            Invasive Devices       Peripheral Intravenous Line  Duration             Peripheral IV 24 Right Antecubital <1 day              Line  Duration             Peritoneal Dialysis Catheter Column-disk Abdominal 66 days                    Physical Exam  Vitals and nursing note reviewed.   Constitutional:       General: He is not in acute distress.     Appearance: He is well-developed.      Comments: Appears pale, chronically ill   HENT:      Head: Normocephalic and atraumatic.   Eyes:      Conjunctiva/sclera: Conjunctivae normal.   Neck:      Vascular: JVD present.   Cardiovascular:      Rate and Rhythm: Tachycardia present. Rhythm irregular.      Heart sounds: No murmur heard.  Pulmonary:      Effort: Pulmonary effort is normal. No respiratory distress.      Breath sounds: Examination of the right-lower field reveals rales. Examination of the left-lower field reveals rales. Rales present.   Abdominal:      Palpations: Abdomen is soft.      Tenderness: There is no abdominal tenderness.   Musculoskeletal:         General: No swelling.      Cervical back: Neck supple.      Right lower le+ Edema present.      Left lower le+ Edema present.   Skin:     General: Skin is warm and dry.      Capillary Refill: Capillary refill takes less than 2 seconds.   Neurological:      " "Mental Status: He is alert.   Psychiatric:         Mood and Affect: Mood normal.              Laboratory Results:          CBC with diff:   Results from last 7 days   Lab Units 08/12/24  1120   WBC Thousand/uL 7.24   HEMOGLOBIN g/dL 10.1*   HEMATOCRIT % 32.2*   MCV fL 102*   PLATELETS Thousands/uL 154   RBC Million/uL 3.15*   MCH pg 32.1   MCHC g/dL 31.4   RDW % 18.3*   MPV fL 8.9   NRBC AUTO /100 WBCs 0         CMP:  Results from last 7 days   Lab Units 08/12/24  1120   POTASSIUM mmol/L 3.6   CHLORIDE mmol/L 96   CO2 mmol/L 25   BUN mg/dL 55*   CREATININE mg/dL 8.57*   CALCIUM mg/dL 10.7*   AST U/L 28   ALT U/L 23   ALK PHOS U/L 122*   EGFR ml/min/1.73sq m 5       BMP:  Results from last 7 days   Lab Units 08/12/24  1120   POTASSIUM mmol/L 3.6   CHLORIDE mmol/L 96   CO2 mmol/L 25   BUN mg/dL 55*   CREATININE mg/dL 8.57*   CALCIUM mg/dL 10.7*       BNP:    Recent Labs     08/12/24  1120   BNP 89     HS trop: 31    Magnesium:   Results from last 7 days   Lab Units 08/12/24  1120   MAGNESIUM mg/dL 2.0       Coags:   Results from last 7 days   Lab Units 08/12/24  1120   PTT seconds 86*   INR  4.84*       TSH:    12.236 on 7/30/2024    Lipid Profile:   Lab Results   Component Value Date    CHOLESTEROL 140 01/12/2022     Lab Results   Component Value Date    HDL 30 (L) 01/12/2022     Lab Results   Component Value Date    TRIG 177 (H) 01/12/2022     No results found for: \"NONHDLC\"     Cardiac testing:     Reviewed records from [facility].    Imaging: I have personally reviewed pertinent reports.      XR chest portable    Result Date: 8/3/2024  Narrative: XR CHEST PORTABLE INDICATION: CHF. COMPARISON: CXR 7/26/2024, renal CT 9/23/2022, CTA neck 1/11/2022. FINDINGS: Clear lungs. No pneumothorax or pleural effusion. Normal cardiomediastinal silhouette. Loop recorder in the left chest wall. Bones are unremarkable for age. Normal upper abdomen.     Impression: No acute cardiopulmonary disease. Workstation performed: " [id number]     Echo complete w/ contrast if indicated    Result Date: 7/29/2024  Narrative:   Left Ventricle: Left ventricle is normal in size with excellent systolic function.  Estimated LVEF is 75%.  Interventricular septum moves abnormally consistent with a bundle branch block.  Wall thickness appears to be mildly increased.   Tricuspid valve: Mild to moderate tricuspid regurgitation.   No significant differences compared to 2023 study report except that tricuspid regurg might be slightly worse.     US right upper quadrant    Result Date: 7/29/2024  Narrative: RIGHT UPPER QUADRANT ULTRASOUND INDICATION: elevated liver enzymes. COMPARISON: CT abdomen and pelvis 9/23/2022 TECHNIQUE: Real-time ultrasound of the right upper quadrant was performed with a curvilinear transducer with both volumetric sweeps and still imaging techniques. FINDINGS: Evaluation slightly limited by bowel gas and patient condition per sonographer. PANCREAS: Visualized portions of the pancreas are within normal limits. AORTA AND IVC: Visualized portions are normal for patient age. LIVER: Size: . The liver measures 17.3 cm in the midclavicular line. Contour: Surface contour is lobulated. Parenchyma: Echogenicity and echotexture are grossly within normal limits. No liver mass identified. Limited imaging of the main portal vein shows it to be patent and hepatopetal. BILIARY: Prior cholecystectomy. No intrahepatic biliary dilatation. CBD measures 4.0 mm. No choledocholithiasis. KIDNEY: Right kidney measures 7.7 x 4.5 x 5.6 cm. Volume 101.6 mL Atrophic. Otherwise unremarkable. ASCITES: Small volume diffuse free fluid may represent ascites and/or fluid related to peritoneal dialysis.     Impression: Limited study as above. Grossly unremarkable albeit limited evaluation of the liver. Cholecystectomy. No biliary dilation. Small volume diffuse free fluid may represent ascites and/or fluid related to peritoneal dialysis. Atrophic right kidney.  Workstation performed: [id number]     XR chest portable    Result Date: 7/26/2024  Narrative: XR CHEST PORTABLE INDICATION: Abnormal lung exam rule out CHF. COMPARISON: CXR 7/23/2024, renal CT 9/23/2022, CTA neck 1/11/2022. FINDINGS: Low lung volumes with vascular crowding. No pneumothorax or pleural effusion. Normal cardiomediastinal silhouette. Loop recorder in the left chest wall. Bones are unremarkable for age. Normal upper abdomen.     Impression: Low lung volumes with vascular crowding. Mild pulmonary venous congestion not excluded. Workstation performed: [id number]     CT lower extremity wo contrast right    Result Date: 7/24/2024  Narrative: CT right lower extremity without IV contrast INDICATION: right leg cellulitis. COMPARISON: None. TECHNIQUE: CT examination of the above was performed. This examination, like all CT scans performed in the [facility], was performed utilizing techniques to minimize radiation dose exposure, including the use of iterative reconstruction and automated exposure control software.  Multiplanar 2D reformatted images were created from the source data. Rad dose  950 mGy-cm FINDINGS: OSSEOUS STRUCTURES:  No fracture, dislocation or destructive osseous lesion. Knee arthroplasty seen. No lytic lesion or periosteal reaction seen VISUALIZED MUSCULATURE: There is no intramuscular collection seen SOFT TISSUES: Cellulitic changes in the leg seen OTHER PERTINENT FINDINGS:  None.     Impression: No fluid collection seen Cellulitic changes leg No intramuscular collection No lytic lesion or periosteal reaction Workstation performed: [id number]      VAS VENOUS DUPLEX - LOWER LIMB BILATERAL    Result Date: 7/23/2024  Narrative:  [facility] REPORT CLINICAL: Indications: Patient presents with BLE edema and pain x several days R>L Operative History: 2024-04-29 Cardiac electrophysiology study and ablation Coronary angioplasty with stent placement Risk Factors The patient has  history of Obesity, HTN, Diabetes (IDDM), Hyperlipidemia, CKD and smoking (current) 0.5 ppd.   CONCLUSION:  Impression: RIGHT LOWER LIMB: No evidence of acute or chronic deep vein thrombosis in the visualized segments. No evidence of superficial thrombophlebitis noted. Doppler evaluation shows a normal response to augmentation maneuvers. Popliteal, posterior tibial and anterior tibial arterial Doppler waveforms are triphasic/hyperemic.  LEFT LOWER LIMB: No evidence of acute or chronic deep vein thrombosis in the visualized segments. No evidence of superficial thrombophlebitis noted. Doppler evaluation shows a normal response to augmentation maneuvers. Popliteal, posterior tibial and anterior tibial arterial Doppler waveforms are triphasic/biphasic.  Technically difficult/limited study. The bilateral calf veins were poorly visualized on today's exam.  Technical findings were given to Dr. Francisco  SIGNATURE: Electronically Signed by: SERGIO MARTIN DO, RPVI on 2024-07-23 04:36:10 PM    XR chest portable    Result Date: 7/23/2024  Narrative: XR CHEST PORTABLE INDICATION: Leg swelling. COMPARISON: Chest radiograph June 25, 2024 FINDINGS: Left chest wall Loop recorder. Clear lungs. No pneumothorax or pleural effusion. Normal cardiomediastinal silhouette. Bones are unremarkable for age. Normal upper abdomen.     Impression: No acute cardiopulmonary disease. Workstation performed: [id number]     EKG reviewed personally: EKG: Atrial fibrillation vs flutter with ventricular rate 130 bpm, LAD. RBBB. Lateral infarct.     Telemetry: Atrial fibrillation with RVR in the 150's, during my exam he spontaneously converted to sinus rhythm, rate in the 90's      Code Status: Level 1 - Full Code

## 2024-08-12 NOTE — ASSESSMENT & PLAN NOTE
Lab Results   Component Value Date    HGBA1C 5.9 (H) 07/24/2024       Recent Labs     08/12/24  1302   POCGLU 105       Blood Sugar Average: Last 72 hrs:  (P) 105  On lantus 15 units bid  For now in controlled environment  place lantus 15 units at hs and iss

## 2024-08-12 NOTE — ASSESSMENT & PLAN NOTE
Lab Results   Component Value Date    EGFR 5 08/12/2024    EGFR 7 08/05/2024    EGFR 7 08/04/2024    CREATININE 8.57 (H) 08/12/2024    CREATININE 6.88 (H) 08/05/2024    CREATININE 6.65 (H) 08/04/2024     On peritoneal dialysis  Nephrology managing.

## 2024-08-13 VITALS
RESPIRATION RATE: 21 BRPM | WEIGHT: 244.71 LBS | OXYGEN SATURATION: 88 % | HEIGHT: 70 IN | BODY MASS INDEX: 35.03 KG/M2 | TEMPERATURE: 98.3 F | SYSTOLIC BLOOD PRESSURE: 100 MMHG | DIASTOLIC BLOOD PRESSURE: 51 MMHG | HEART RATE: 67 BPM

## 2024-08-13 LAB
ALBUMIN SERPL BCG-MCNC: 2.6 G/DL (ref 3.5–5)
ALP SERPL-CCNC: 97 U/L (ref 34–104)
ALT SERPL W P-5'-P-CCNC: 16 U/L (ref 7–52)
ANION GAP SERPL CALCULATED.3IONS-SCNC: 13 MMOL/L (ref 4–13)
AST SERPL W P-5'-P-CCNC: 16 U/L (ref 13–39)
BASOPHILS # BLD AUTO: 0.03 THOUSANDS/ÂΜL (ref 0–0.1)
BASOPHILS NFR BLD AUTO: 1 % (ref 0–1)
BILIRUB SERPL-MCNC: 0.4 MG/DL (ref 0.2–1)
BUN SERPL-MCNC: 56 MG/DL (ref 5–25)
CALCIUM ALBUM COR SERPL-MCNC: 10.8 MG/DL (ref 8.3–10.1)
CALCIUM SERPL-MCNC: 9.7 MG/DL (ref 8.4–10.2)
CHLORIDE SERPL-SCNC: 98 MMOL/L (ref 96–108)
CO2 SERPL-SCNC: 25 MMOL/L (ref 21–32)
CREAT SERPL-MCNC: 8.45 MG/DL (ref 0.6–1.3)
EOSINOPHIL # BLD AUTO: 0.25 THOUSAND/ÂΜL (ref 0–0.61)
EOSINOPHIL NFR BLD AUTO: 5 % (ref 0–6)
ERYTHROCYTE [DISTWIDTH] IN BLOOD BY AUTOMATED COUNT: 18.2 % (ref 11.6–15.1)
GFR SERPL CREATININE-BSD FRML MDRD: 5 ML/MIN/1.73SQ M
GLUCOSE SERPL-MCNC: 118 MG/DL (ref 65–140)
GLUCOSE SERPL-MCNC: 123 MG/DL (ref 65–140)
GLUCOSE SERPL-MCNC: 127 MG/DL (ref 65–140)
HCT VFR BLD AUTO: 25 % (ref 36.5–49.3)
HGB BLD-MCNC: 7.8 G/DL (ref 12–17)
IMM GRANULOCYTES # BLD AUTO: 0.02 THOUSAND/UL (ref 0–0.2)
IMM GRANULOCYTES NFR BLD AUTO: 0 % (ref 0–2)
INR PPP: 5.67 (ref 0.85–1.19)
LYMPHOCYTES # BLD AUTO: 1.36 THOUSANDS/ÂΜL (ref 0.6–4.47)
LYMPHOCYTES NFR BLD AUTO: 25 % (ref 14–44)
MAGNESIUM SERPL-MCNC: 1.8 MG/DL (ref 1.9–2.7)
MCH RBC QN AUTO: 31.8 PG (ref 26.8–34.3)
MCHC RBC AUTO-ENTMCNC: 31.2 G/DL (ref 31.4–37.4)
MCV RBC AUTO: 102 FL (ref 82–98)
MONOCYTES # BLD AUTO: 0.41 THOUSAND/ÂΜL (ref 0.17–1.22)
MONOCYTES NFR BLD AUTO: 7 % (ref 4–12)
NEUTROPHILS # BLD AUTO: 3.48 THOUSANDS/ÂΜL (ref 1.85–7.62)
NEUTS SEG NFR BLD AUTO: 62 % (ref 43–75)
NRBC BLD AUTO-RTO: 0 /100 WBCS
PLATELET # BLD AUTO: 119 THOUSANDS/UL (ref 149–390)
PMV BLD AUTO: 9 FL (ref 8.9–12.7)
POTASSIUM SERPL-SCNC: 3.3 MMOL/L (ref 3.5–5.3)
PROT SERPL-MCNC: 5.6 G/DL (ref 6.4–8.4)
PROTHROMBIN TIME: 50.2 SECONDS (ref 12.3–15)
RBC # BLD AUTO: 2.45 MILLION/UL (ref 3.88–5.62)
SODIUM SERPL-SCNC: 136 MMOL/L (ref 135–147)
WBC # BLD AUTO: 5.55 THOUSAND/UL (ref 4.31–10.16)

## 2024-08-13 PROCEDURE — 99239 HOSP IP/OBS DSCHRG MGMT >30: CPT | Performed by: INTERNAL MEDICINE

## 2024-08-13 PROCEDURE — 85025 COMPLETE CBC W/AUTO DIFF WBC: CPT | Performed by: FAMILY MEDICINE

## 2024-08-13 PROCEDURE — 83735 ASSAY OF MAGNESIUM: CPT | Performed by: FAMILY MEDICINE

## 2024-08-13 PROCEDURE — 85610 PROTHROMBIN TIME: CPT | Performed by: FAMILY MEDICINE

## 2024-08-13 PROCEDURE — 80053 COMPREHEN METABOLIC PANEL: CPT | Performed by: FAMILY MEDICINE

## 2024-08-13 PROCEDURE — 82948 REAGENT STRIP/BLOOD GLUCOSE: CPT

## 2024-08-13 PROCEDURE — 99232 SBSQ HOSP IP/OBS MODERATE 35: CPT | Performed by: INTERNAL MEDICINE

## 2024-08-13 PROCEDURE — 99232 SBSQ HOSP IP/OBS MODERATE 35: CPT | Performed by: STUDENT IN AN ORGANIZED HEALTH CARE EDUCATION/TRAINING PROGRAM

## 2024-08-13 RX ORDER — TORSEMIDE 100 MG/1
100 TABLET ORAL DAILY
Qty: 30 TABLET | Refills: 0 | Status: SHIPPED | OUTPATIENT
Start: 2024-08-14

## 2024-08-13 RX ORDER — TORSEMIDE 100 MG/1
100 TABLET ORAL DAILY
Status: DISCONTINUED | OUTPATIENT
Start: 2024-08-13 | End: 2024-08-13 | Stop reason: HOSPADM

## 2024-08-13 RX ORDER — INSULIN GLARGINE 100 [IU]/ML
15 INJECTION, SOLUTION SUBCUTANEOUS
Qty: 10 ML | Refills: 0 | Status: SHIPPED | OUTPATIENT
Start: 2024-08-13

## 2024-08-13 RX ORDER — METOPROLOL SUCCINATE 50 MG/1
50 TABLET, EXTENDED RELEASE ORAL 2 TIMES DAILY
Qty: 60 TABLET | Refills: 0 | Status: SHIPPED | OUTPATIENT
Start: 2024-08-13

## 2024-08-13 RX ADMIN — MIDODRINE HYDROCHLORIDE 15 MG: 5 TABLET ORAL at 11:49

## 2024-08-13 RX ADMIN — CINACALCET 30 MG: 30 TABLET, FILM COATED ORAL at 09:19

## 2024-08-13 RX ADMIN — ALLOPURINOL 300 MG: 300 TABLET ORAL at 09:19

## 2024-08-13 RX ADMIN — HYDROCODONE BITARTRATE AND ACETAMINOPHEN 2 TABLET: 5; 325 TABLET ORAL at 06:26

## 2024-08-13 RX ADMIN — MIDODRINE HYDROCHLORIDE 15 MG: 5 TABLET ORAL at 06:26

## 2024-08-13 RX ADMIN — ASPIRIN 81 MG: 81 TABLET, COATED ORAL at 09:19

## 2024-08-13 RX ADMIN — TORSEMIDE 100 MG: 100 TABLET ORAL at 12:42

## 2024-08-13 RX ADMIN — LEVOTHYROXINE SODIUM 125 MCG: 125 TABLET ORAL at 06:17

## 2024-08-13 RX ADMIN — METOPROLOL SUCCINATE 50 MG: 50 TABLET, EXTENDED RELEASE ORAL at 09:19

## 2024-08-13 RX ADMIN — FERROUS SULFATE TAB 325 MG (65 MG ELEMENTAL FE) 325 MG: 325 (65 FE) TAB at 09:20

## 2024-08-13 NOTE — UTILIZATION REVIEW
NOTIFICATION OF INPATIENT ADMISSION   AUTHORIZATION REQUEST   SERVICING FACILITY:   [place] [address]  Tax ID: [id number]  NPI: [id number] ATTENDING PROVIDER:  Attending Name and NPI#: Kvng Coreas Md [[id number]]  Address: [address] [place] [address]  Phone: [phone]   ADMISSION INFORMATION:  Place of Service: Inpatient [facility]  Place of Service Code: 21  Inpatient Admission Date/Time: 8/12/24 12:12 PM  Discharge Date/Time: 8/13/2024  1:12 PM  Admitting Diagnosis Code/Description:  Palpitations [R00.2]  Patient on peritoneal dialysis (HCC) [Z99.2]  Atrial fibrillation with RVR (HCC) [I48.91]     UTILIZATION REVIEW CONTACT:  Arleen Cotto, Utilization   Network Utilization Review Department  Phone: [phone]  Fax [phone]  Email: [email or web address]  Contact for approvals/pending authorizations, clinical reviews, and discharge.     PHYSICIAN ADVISORY SERVICES:  Medical Necessity Denial & Vcwa-ts-Omlp Review  Phone: [phone]  Fax: [phone]  Email: [email or web address]     DISCHARGE SUPPORT TEAM:  For Patients Discharge Needs & Updates  Phone: [phone] opt. 2 Fax: [phone]  Email: [email or web address]

## 2024-08-13 NOTE — PROGRESS NOTES
Pastoral Care Progress Note    2024  Patient: Masoud Perry : 1955  Admission Date & Time: 2024 1107  MRN: [id number] CSN: [id number]          CH provided support visit to pt in setting of frequent admissions. Pt received CH upright in bed, no family present.  Pt shared that he hopes to discharge today.  CH shared about her role and encouraged pt to ask for her if she could be of service.     24 1000   Clinical Encounter Type   Visited With Patient

## 2024-08-13 NOTE — UTILIZATION REVIEW
Initial Clinical Review    Admission: Date/Time/Statement:   Admission Orders (From admission, onward)       Ordered        08/12/24 1212  INPATIENT ADMISSION  Once                          Orders Placed This Encounter   Procedures    INPATIENT ADMISSION     Patient has peritoneal dialysis.     Standing Status:   Standing     Number of Occurrences:   1     Order Specific Question:   Level of Care     Answer:   Med Surg [16]     Order Specific Question:   Estimated length of stay     Answer:   More than 2 Midnights     Order Specific Question:   Certification     Answer:   I certify that inpatient services are medically necessary for this patient for a duration of greater than two midnights. See H&P and MD Progress Notes for additional information about the patient's course of treatment.     ED Arrival Information       Expected   8/12/2024 11:01    Arrival   8/12/2024 11:07    Acuity   Urgent              Means of arrival   Ambulance    Escorted by   [facility]    Service   Hospitalist    Admission type   Emergency              Arrival complaint   Palpitations/Weakness             Chief Complaint   Patient presents with    Palpitations     Pt reports palpitations starting at 0300- hx of afib, pt also feeling very weak, recovering from cellulitis        Initial Presentation: 68 y.o. male to ED via EMS from home  Present to ED with with palpitations and shortness of breath since 3 am. Recently taken of bblocker due to low blood pressures.    PMHX: afib, chf, perotoneal dialysis   Admitted to [facility] with DX: Paroxysmal atrial fibrillation   on exam:  tachy - irregular; RA sat 89% - placed on O2 @ 2L via nc sat 99%; lungs with rales; Cr 8.57; AG 15; albmin 3.4; INR 4.84; LA 2.8  Cxr with pulm edema   PLAN: tele monitoring; monitor labs; continuous pulse ox; Accuchecks with ssic; pain / nausea control; cardiology consult; hold coumadin; titrate O2; nephrology consult      Anticipated Length of Stay/Certification  Statement: Patient will be admitted on an inpatient basis with an anticipated length of stay of greater than 2 midnights secondary to afib with rvr and chf.       NEPHROLOGY CONSULT  ASSESSMENT AND PLAN: 68 y.o.  man  with PMH of ESRD on peritoneal dialysis, lymphedema, diabetes, p/w palpitations, was found to have A-fib with RVR.  Here for lower extremity edema nephrology is consulted for management of ESRD  ESRD on CCPD: Access: PD catheter. Renal Diet. Fluid restriction 1-1.5L/d. Adjust medications to GFR<10. Avoid opioids   CAPD: Times every 6 hours. Therapy fluid 8000ml. Exchanges every 6 hours, for. Fill volume 2 L. Dialysate fluid 1.5% for now due to hypotension  Volume status/hypertension: Volume:hypervolemia seems to be improved , still chronic LE edema. Blood pressure: 109/58, goal less than 140/90. Low-sodium diet. On diltiazem. Midodrine 15 mg 3 times daily. Metoprolol 50 for rate control. Resume torsemide 60 mg daily  CHF: Gentle UF on PD. Status post Bumex. Restart torsemide, as above       Date: 8/13/24      Day 2   Discharge Summary     Hospital Course: Masoud Perry is a 68 y.o. male patient who originally presented to the hospital on 8/12/2024 with past medical history significant atrial fibrillation on Coumadin, end-stage renal disease initially presented in A-fib with RVR.  Has since resolved with IV metoprolol.  Currently rate controlled.  INR noted to be supratherapeutic we will have Coumadin held until Friday.  Repeat INR Friday.  Will follow-up outpatient with primary care doctor     Disposition: Home /self      ED Triage Vitals   Temperature Pulse Respirations Blood Pressure SpO2 Pain Score   08/12/24 1110 08/12/24 1110 08/12/24 1110 08/12/24 1110 08/12/24 1110 08/12/24 1347   97.6 °F (36.4 °C) (!) 113 18 114/62 98 % 6     Weight (last 2 days) before discharge       Date/Time Weight    08/13/24 0500 111 (244.71)    08/12/24 1110 110 (242.29)            Vital Signs (last 3 days) before  discharge       Date/Time Temp Pulse Resp BP MAP (mmHg) SpO2 Calculated FIO2 (%) - Nasal Cannula Nasal Cannula O2 Flow Rate (L/min) O2 Device Patient Position - Orthostatic VS Mclean Coma Scale Score Pain    08/13/24 1114 98.3 °F (36.8 °C) 67 21 100/51 71 88 % -- -- None (Room air) Lying -- --    08/13/24 1000 -- 67 17 -- -- 91 % -- -- -- -- -- --    08/13/24 0800 98.1 °F (36.7 °C) 75 33 96/51 67 95 % -- -- -- -- 15 6 08/13/24 0626 -- -- -- -- -- -- -- -- -- -- -- 8 08/13/24 0300 98.1 °F (36.7 °C) 74 15 114/56 81 97 % 24 1 L/min Nasal cannula Lying -- --    08/12/24 2326 97.7 °F (36.5 °C) 62 16 115/59 83 95 % 24 1 L/min Nasal cannula Lying -- --    08/12/24 2138 -- -- -- -- -- -- -- -- -- -- -- 8 08/12/24 2113 -- 70 36 116/58 83 98 % 24 1 L/min Nasal cannula Lying -- --    08/12/24 2000 -- -- -- -- -- -- -- -- -- -- 15 --    08/12/24 1915 99 °F (37.2 °C) 71 18 117/55 79 99 % 24 1 L/min Nasal cannula Lying -- --    08/12/24 1530 -- -- -- -- -- -- -- -- -- -- 15 --    08/12/24 1522 98.2 °F (36.8 °C) 71 -- 111/59 79 97 % 24 1 L/min Nasal cannula Lying -- --    08/12/24 1347 98.8 °F (37.1 °C) 85 26 109/58 80 92 % 24 1 L/min Nasal cannula Sitting -- 6    08/12/24 1315 -- 100 -- 107/52 73 99 % 28 2 L/min Nasal cannula -- -- --    08/12/24 1300 -- 105 -- 117/55 77 100 % -- -- -- -- -- --    08/12/24 1252 -- 114 -- 100/57 -- -- -- -- -- -- -- --    08/12/24 1245 -- 93 -- 95/52 71 100 % -- -- -- -- -- --    08/12/24 1230 -- 144 -- 134/56 81 89 % -- -- -- -- -- --    08/12/24 1215 -- 99 -- 120/53 69 93 % -- -- -- -- -- --    08/12/24 1200 -- 134 18 114/52 75 97 % -- -- -- -- -- --    08/12/24 1130 -- 138 -- 101/60 72 97 % -- -- -- -- -- --    08/12/24 1129 -- -- -- -- -- -- -- -- -- -- 15 --    08/12/24 1115 -- 135 -- 98/60 73 100 % -- -- -- -- -- --    08/12/24 1110 97.6 °F (36.4 °C) 113 18 114/62 -- 98 % -- -- None (Room air) Sitting -- --              Pertinent Labs/Diagnostic Test Results:   Radiology:  XR chest  1 view portable   ED Interpretation by Antony Bonilla MD (08/12 1201)   CHF      Final Interpretation by Neela Eugene MD (08/12 1331)      No acute cardiopulmonary disease.            Workstation performed: [id number]           Cardiology:  ECG 12 lead   Final Result by Theodore Bone MD (08/12 1626)   Tachycardia of undetermined origin, possibly Atrial flutter   Left axis deviation   Right bundle branch block   Possible Lateral infarct (cited on or before 26-JUL-2024)   Abnormal ECG      Confirmed by Theodore Bone ([id number]) on 8/12/2024 4:26:18 PM        Results from last 7 days   Lab Units 08/13/24  0623 08/12/24  1120   WBC Thousand/uL 5.55 7.24   HEMOGLOBIN g/dL 7.8* 10.1*   HEMATOCRIT % 25.0* 32.2*   PLATELETS Thousands/uL 119* 154   TOTAL NEUT ABS Thousands/µL 3.48 5.19      Results from last 7 days   Lab Units 08/13/24  0623 08/12/24  1120   SODIUM mmol/L 136 136   POTASSIUM mmol/L 3.3* 3.6   CHLORIDE mmol/L 98 96   CO2 mmol/L 25 25   ANION GAP mmol/L 13 15*   BUN mg/dL 56* 55*   CREATININE mg/dL 8.45* 8.57*   EGFR ml/min/1.73sq m 5 5   CALCIUM mg/dL 9.7 10.7*   MAGNESIUM mg/dL 1.8* 2.0     Results from last 7 days   Lab Units 08/13/24  0623 08/12/24  1120   AST U/L 16 28   ALT U/L 16 23   ALK PHOS U/L 97 122*   TOTAL PROTEIN g/dL 5.6* 7.2   ALBUMIN g/dL 2.6* 3.4*   TOTAL BILIRUBIN mg/dL 0.40 0.45     Results from last 7 days   Lab Units 08/13/24  1149 08/13/24  0759 08/12/24  2132 08/12/24  1611 08/12/24  1302   POC GLUCOSE mg/dl 118 127 88 103 105     Results from last 7 days   Lab Units 08/13/24  0623 08/12/24  1120   GLUCOSE RANDOM mg/dL 123 124     Results from last 7 days   Lab Units 08/12/24  1622 08/12/24  1251 08/12/24  1120   HS TNI 0HR ng/L  --   --  31   HS TNI 2HR ng/L  --  32  --    HSTNI D2 ng/L  --  1  --    HS TNI 4HR ng/L 75*  --   --    HSTNI D4 ng/L 44*  --   --      Results from last 7 days   Lab Units 08/13/24  0623 08/12/24  1120   PROTIME seconds 50.2* 44.5*   INR  5.67* 4.84*    PTT seconds  --  86*     Results from last 7 days   Lab Units 08/12/24  1251 08/12/24  1120   LACTIC ACID mmol/L 1.6 2.8*     Results from last 7 days   Lab Units 08/12/24  1120   BNP pg/mL 89     Results from last 7 days   Lab Units 08/12/24  1120   LIPASE u/L 17     ED Treatment-Medication Administration from 08/12/2024 1107 to 08/12/2024 1342         Date/Time Order Dose Route Action     08/12/2024 1123 sodium chloride 0.9 % bolus 500 mL 500 mL Intravenous New Bag     08/12/2024 1137 diltiazem (CARDIZEM) injection 10 mg 10 mg Intravenous Given     08/12/2024 1202 diltiazem (CARDIZEM) injection 10 mg 10 mg Intravenous Given     08/12/2024 1252 metoprolol succinate (TOPROL-XL) 24 hr tablet 50 mg 50 mg Oral Given     08/12/2024 1252 midodrine (PROAMATINE) tablet 15 mg 15 mg Oral Given            Past Medical History:   Diagnosis Date    Anemia in chronic kidney disease     Anticoagulated on Coumadin     Atrial fibrillation ([facility])     BPH (benign prostatic hyperplasia)     CAD (coronary artery disease)     CKD (chronic kidney disease), stage V ([facility])     Compartment syndrome of forearm ([facility])     Right, 2019    COPD (chronic obstructive pulmonary disease) ([facility])     CVA (cerebral vascular accident) ([facility])     Diastolic CHF ([facility])     grade 1 DD    DVT (deep venous thrombosis) ([facility])     Factor V deficiency ([facility])     Gout     Hyperlipidemia     Hypertension     Hypothyroidism     MI (myocardial infarction) ([facility])     x3 - 1999, 2010, after 2015    Morbid obesity ([facility])     Nephrolithiasis     Noncompliance with medications     SHAD (obstructive sleep apnea)     Peritoneal dialysis catheter in place ([facility])     Pulmonary embolism ([facility])     Secondary hyperparathyroidism of renal origin ([facility])     Spinal stenosis of lumbar region     Status post placement of implantable loop recorder     Tobacco dependence      Present on Admission:   Paroxysmal atrial fibrillation ([facility])   Acute on chronic diastolic HF (heart failure) ([facility])   ESRD  (end stage renal disease) ([facility])      Admitting Diagnosis: Palpitations [R00.2]  Patient on peritoneal dialysis ([facility]) [Z99.2]  Atrial fibrillation with RVR ([facility]) [I48.91]    Age/Sex: 68 y.o. male    Admission Orders: SCDs; I/O; Daily wts; Consistent Carbohydrate Diet. Blood glucose checks ACHS. Fluid restriction 1800      Scheduled Medications:  allopurinol, 300 mg, Oral, Daily  aspirin, 81 mg, Oral, Daily  atorvastatin, 40 mg, Oral, Daily With Dinner  cinacalcet, 30 mg, Oral, Daily With Breakfast  ferrous sulfate, 325 mg, Oral, Daily With Breakfast  insulin glargine, 15 Units, Subcutaneous, HS  insulin lispro, 1-6 Units, Subcutaneous, TID AC  levothyroxine, 125 mcg, Oral, Early Morning  metoprolol succinate, 50 mg, Oral, BID  midodrine, 15 mg, Oral, TID AC  tamsulosin, 0.4 mg, Oral, Daily With Dinner  torsemide, 100 mg, Oral, Daily    Continuous Intravenous: None       PRN Meds:  acetaminophen, 650 mg, Oral, Q6H PRN  HYDROcodone-acetaminophen, 2 tablet, Oral, Q6H PRN  (8/12 recd x1)  (8/13 recd x1 so far today)   metoprolol, 2.5 mg, Intravenous, Q6H PRN        IP CONSULT TO NEPHROLOGY  IP CONSULT TO CARDIOLOGY  IP CONSULT TO CASE MANAGEMENT  IP CONSULT TO NUTRITION SERVICES    Network Utilization Review Department  ATTENTION: Please call with any questions or concerns to [phone] and carefully listen to the prompts so that you are directed to the right person. All voicemails are confidential.   For Discharge needs, contact Care Management DC Support Team at [phone] opt. 2  Send all requests for admission clinical reviews, approved or denied determinations and any other requests to dedicated fax number below belonging to the campus where the patient is receiving treatment. List of dedicated fax numbers for the Facilities:  FACILITY NAME UR FAX NUMBER   ADMISSION DENIALS (Administrative/Medical Necessity) [phone]   DISCHARGE SUPPORT TEAM (NETWORK) [phone]   [facility] CHILD [facility]  (Maternity/NICU/Pediatrics) [phone]   [facility] [phone]   [facility] [phone]   [facility] [phone]   [facility] [phone]   [facility] [phone]   [facility] [phone]   [facility] [phone]   [facility] [phone]   [facility] [phone]   [facility] [phone]   [facility] [phone]   [facility] [phone]

## 2024-08-13 NOTE — CASE MANAGEMENT
Case Management Assessment & Discharge Planning Note    Patient name Masoud Goregh  Location /-01 MRN [id number]  : 1955 Date 2024       Current Admission Date: 2024  Current Admission Diagnosis:Paroxysmal atrial fibrillation ([facility])   Patient Active Problem List    Diagnosis Date Noted Date Diagnosed    Lactic acidosis 2024     Hypercalcemia 2024     Chronic acquired lymphedema 2024     Hypotension 2024     Nocturnal hypoxia 2024     Elevated LFTs 2024     Patient on peritoneal dialysis ([facility]) 2024     Hypervolemia 2024     ESRD (end stage renal disease) ([facility]) 2024     Bacteremia 2024     Cellulitis of right lower extremity 2024     Paroxysmal atrial fibrillation ([facility]) 2024     Bilateral lower extremity edema 2023     Ureteral stone with hydronephrosis 2022     Cutaneous abscess of buttock 2022     Chronic kidney disease-mineral and bone disorder 2022     Type 2 diabetes mellitus with stage 4 chronic kidney disease, with long-term current use of insulin ([facility]) 07/15/2022     Obesity, morbid ([facility]) 07/15/2022     Secondary hyperparathyroidism of renal origin ([facility]) 07/15/2022     Stage 5 chronic kidney disease on peritoneal dialysis ([facility]) 07/15/2022     Factor 5 Leiden mutation, heterozygous ([facility]) 01/15/2022     Thrombocytopenia ([facility]) 2022     Angina at rest 2022     MI (myocardial infarction) ([facility]) 2022     History of PTCA 2022     Sleep apnea 2022     Smoking 2022     Anemia 2022     History of CVA (cerebrovascular accident) 2022     Acute on chronic diastolic HF (heart failure) ([facility]) 2022     HLD (hyperlipidemia) 2022     Lymphedema 2022     Hypothyroidism 2022       LOS (days): 1  Geometric Mean LOS (GMLOS) (days): 3.9  Days to GMLOS:3     OBJECTIVE:  PATIENT READMITTED TO HOSPITAL  Risk of Unplanned Readmission  Score: 30.06         Current admission status: Inpatient  Referral Reason:  (dc planning)    Preferred Pharmacy:   [facility] - [place], [place] - [address]  [address]  [place] [address]  Phone: [phone] Fax: [phone]    Primary Care Provider: Jignesh Baker DO    Primary Insurance: [employer] REP  Secondary Insurance:     CM met with patient at the bedside,baseline information  was obtained. CM discussed the role of CM in helping the patient develop a discharge plan and assist the patient in carry out their plan.      ASSESSMENT:  Active Health Care Proxies    There are no active Health Care Proxies on file.       Advance Directives  Does patient have a Health Care POA?: Yes  Does patient have Advance Directives?: Yes  Primary Contact: martinatimothy  (Son)  [phone]         Readmission Root Cause  30 Day Readmission: Yes  Who directed you to return to the hospital?: Self  Did you understand whom to contact if you had questions or problems?: Yes  Did you get your prescriptions before you left the hospital?: No  Reason:: Not preferred pharmacy  Were you able to get your prescriptions filled when you left the hospital?: Yes  Did you take your medications as prescribed?: Yes  Were you able to get to your follow-up appointments?: No (however spoke with office several times)  Reason:: Readmitted prior to appointment  During previous admission, was a post-acute recommendation made?: Yes  What post-acute resources were offered?: STR  Patient was readmitted due to: Rapid afib and recived IV Cardizem and converted. was here 7/23 to 8/15 for cellulitis of legs with HF and recommendation was to go for STR, however pt declined. Had difficulties finding a facility that takes PD, pt went home with [facility].  Action Plan: [facility] ELVIA referral was placed    Patient Information  Admitted from:: Home  Mental Status: Alert  During Assessment patient was accompanied by: Not  accompanied during assessment  Assessment information provided by:: Patient  Primary Caregiver: Self  Support Systems: Son, Daughter, Family members  County of Residence: [facility] [place]  What city do you live in[place]  Home entry access options. Select all that apply.: Stairs  Number of steps to enter home.: 2  Type of Current Residence: Apartment  Floor Level: 1  Upon entering residence, is there a bedroom on the main floor (no further steps)?: Yes  Upon entering residence, is there a bathroom on the main floor (no further steps)?: Yes  Living Arrangements: Lives Alone  Is patient a ?: No    Activities of Daily Living Prior to Admission  Functional Status: Assistance  Completes ADLs independently?: No  Level of ADL dependence: Assistance  Ambulates independently?: No  Level of ambulatory dependence: Assistance  Does patient use assisted devices?: Yes  Assisted Devices (DME) used: Walker, Straight Cane, Bedside Commode  Does patient currently own DME?: Yes  What DME does the patient currently own?: Bedside Commode, Straight Cane, Walker  Does patient have a history of Outpatient Therapy (PT/OT)?: No  Does the patient have a history of Short-Term Rehab?: No  Does patient have a history of HHC?: No  Does patient currently have HHC?: No    Current Home Health Care  Type of Current Home Care Services: Home PT, Nurse visit, Home OT    Patient Information Continued  Income Source: Pension/long-term  Does patient have prescription coverage?: Yes  Does patient receive dialysis treatments?: Yes (PD at home nightly exchanges with Dr SOREN Solis in [place])  Does patient have a history of substance abuse?: No  Does patient have a history of Mental Health Diagnosis?: No         Means of Transportation  Means of Transport to Appts:: Family transport      Social Determinants of Health (SDOH)      Flowsheet Row Most Recent Value   Housing Stability    In the last 12 months, was there a time when you were not able  "to pay the mortgage or rent on time? N   In the past 12 months, how many times have you moved where you were living? 0   At any time in the past 12 months, were you homeless or living in a shelter (including now)? N   Transportation Needs    In the past 12 months, has lack of transportation kept you from medical appointments or from getting medications? no   In the past 12 months, has lack of transportation kept you from meetings, work, or from getting things needed for daily living? No   Food Insecurity    Within the past 12 months, you worried that your food would run out before you got the money to buy more. Never true   Within the past 12 months, the food you bought just didn't last and you didn't have money to get more. Never true   Utilities    In the past 12 months has the electric, gas, oil, or water company threatened to shut off services in your home? No            DISCHARGE DETAILS:    Discharge planning discussed with:: patient  Freedom of Choice: Yes  Comments - Freedom of Choice: Discussed dc plans_ his choice is to go back home with [facility]. Per patients, \" things were getting better.\" Has family support  CM contacted family/caregiver?: No- see comments (declined)             Contacts  Contact Method: Phone    Requested Home Health Care         Is the patient interested in HHC at discharge?: Yes  Home Health Discipline requested:: Nursing, Occupational Therapy, Physical Therapy  Home Health Agency Name:: [facility] External Referral Reason (only applicable if external HHA name selected): Patient has established relationship with provider  Home Health Follow-Up Provider:: PCP  Home Health Services Needed:: Strengthening/Theraputic Exercises to Improve Function, Gait/ADL Training, Evaluate Functional Status and Safety (Cardiopulmonary assessment/ med management)  Homebound Criteria Met:: Uses an Assist Device (i.e. cane, walker, etc), Requires the Assistance of Another Person for Safe " Ambulation or to Leave the Home  Supporting Clincal Findings:: Fatigues Easliy in Short Distances    DME Referral Provided  Referral made for DME?: No    Other Referral/Resources/Interventions Provided:  Interventions: [facility]  Referral Comments: [facility] referral was made and Pending at this time            Discharge Destination Plan:: Home with Home Health Care  Transport at Discharge : Family

## 2024-08-13 NOTE — CASE MANAGEMENT
Case Management Discharge Planning Note    Patient name Masoud Goregh  Location /-01 MRN [id number]  : 1955 Date 2024       Current Admission Date: 2024  Current Admission Diagnosis:Paroxysmal atrial fibrillation ([facility])   Patient Active Problem List    Diagnosis Date Noted Date Diagnosed    Lactic acidosis 2024     Hypercalcemia 2024     Chronic acquired lymphedema 2024     Hypotension 2024     Nocturnal hypoxia 2024     Elevated LFTs 2024     Patient on peritoneal dialysis ([facility]) 2024     Hypervolemia 2024     ESRD (end stage renal disease) ([facility]) 2024     Bacteremia 2024     Cellulitis of right lower extremity 2024     Paroxysmal atrial fibrillation ([facility]) 2024     Bilateral lower extremity edema 2023     Ureteral stone with hydronephrosis 2022     Cutaneous abscess of buttock 2022     Chronic kidney disease-mineral and bone disorder 2022     Type 2 diabetes mellitus with stage 4 chronic kidney disease, with long-term current use of insulin ([facility]) 07/15/2022     Obesity, morbid ([facility]) 07/15/2022     Secondary hyperparathyroidism of renal origin ([facility]) 07/15/2022     Stage 5 chronic kidney disease on peritoneal dialysis ([facility]) 07/15/2022     Factor 5 Leiden mutation, heterozygous ([facility]) 01/15/2022     Thrombocytopenia ([facility]) 2022     Angina at rest 2022     MI (myocardial infarction) ([facility]) 2022     History of PTCA 2022     Sleep apnea 2022     Smoking 2022     Anemia 2022     History of CVA (cerebrovascular accident) 2022     Acute on chronic diastolic HF (heart failure) ([facility]) 2022     HLD (hyperlipidemia) 2022     Lymphedema 2022     Hypothyroidism 2022       LOS (days): 1  Geometric Mean LOS (GMLOS) (days): 3.9  Days to GMLOS:2.9     OBJECTIVE:  Risk of Unplanned Readmission Score: 30.06         Current admission status:  Inpatient   Preferred Pharmacy:   [facility] - [place], [place] - [address]  [address]  [place] [address]  Phone: [phone] Fax: [phone]    Primary Care Provider: Jignesh Baker DO    Primary Insurance: [employer] REP  Secondary Insurance:     DISCHARGE DETAILS:     Per provider patient is being dc today.  Carlos has accepted for resumption of services.  Carlos is aware of dc.

## 2024-08-13 NOTE — ASSESSMENT & PLAN NOTE
Recent dc metoprolol by pcp due to hypotension. Midodrine went up to 15 mg po tid  Given cardizem in er  Currently rate controlled  Continue metoprolol  Hold Coumadin on discharge given supratherapeutic INR  Will have repeat INR in 3 days

## 2024-08-13 NOTE — QUICK NOTE
This morning PD drain of 1.5% Dianeal only had 1100 mL of 2000 mL drained.  2000 mL of 2.5% Dianeal went into abdomen with no resistance.  Patient potentially reabsorbed 900 mL, will continue to follow clinically as the day progresses.  After discussion with overnight nurse, she does not believe the patient is volume overloaded at this time.    Marquise Santos

## 2024-08-13 NOTE — DISCHARGE SUMMARY
[facility]  Discharge- Masoud VERGARA Perry 1955, 68 y.o. male MRN: [id number]  Unit/Bed#: -01 Encounter: [id number]  Primary Care Provider: Jignesh Baker DO   Date and time admitted to hospital: 8/12/2024 11:07 AM    * Paroxysmal atrial fibrillation (HCC)  Assessment & Plan  Recent dc metoprolol by pcp due to hypotension. Midodrine went up to 15 mg po tid  Given cardizem in er  Currently rate controlled  Continue metoprolol  Hold Coumadin on discharge given supratherapeutic INR  Will have repeat INR in 3 days    Hypercalcemia  Assessment & Plan  In light of esrd and previous was on calcitriol- whic was stopped  Continue sensipar  Stop vitamin d    ESRD (end stage renal disease) ([facility])  Assessment & Plan  Lab Results   Component Value Date    EGFR 5 08/13/2024    EGFR 5 08/12/2024    EGFR 7 08/05/2024    CREATININE 8.45 (H) 08/13/2024    CREATININE 8.57 (H) 08/12/2024    CREATININE 6.88 (H) 08/05/2024   On PD  Appreciate nephro consultation    Type 2 diabetes mellitus with stage 4 chronic kidney disease, with long-term current use of insulin ([facility])  Assessment & Plan    On lantus 15 units bid  Continue sliding scale insulin  Monitor blood glucose    Acute on chronic diastolic HF (heart failure) ([facility])  Assessment & Plan  Wt Readings from Last 3 Encounters:   08/13/24 111 kg (244 lb 11.4 oz)   08/04/24 112 kg (246 lb 0.5 oz)   06/25/24 124 kg (273 lb 13 oz)   Cxr with pulm edema  On PD   On torsemide 60 mg po bid at home  TTE 7/2024 ef 75 , mild to moderate TR  Place 1.8 liter FR  Nephrology consultation  Will give bumex 1mg iv x1  I and o  Daily weight  Last discharge weight 246 lb  Legs are at baseline              Discharging Physician / Practitioner: Kvng Coreas MD  PCP: Jignesh Baker DO  Admission Date:   Admission Orders (From admission, onward)       Ordered        08/12/24 1212  INPATIENT ADMISSION  Once                          Discharge Date:  08/13/24    Medical Problems       Resolved Problems  Date Reviewed: 8/12/2024   None         Consultations During Hospital Stay:  Nephro, cardiology    Procedures Performed:   none    Significant Findings / Test Results:   XR chest 1 view portable    Result Date: 8/12/2024  Impression: No acute cardiopulmonary disease. Workstation performed: [id number]      Incidental Findings:   none     Test Results Pending at Discharge (will require follow up):   none     Outpatient Tests Requested:  none    Complications:  none    Reason for Admission: [facility] with RVR    Hospital Course:     Masoud Perry is a 68 y.o. male patient who originally presented to the hospital on 8/12/2024 with past medical history significant atrial fibrillation on Coumadin, end-stage renal disease initially presented in [facility] with RVR.  Has since resolved with IV metoprolol.  Currently rate controlled.  INR noted to be supratherapeutic we will have Coumadin held until Friday.  Repeat INR Friday.  Will follow-up outpatient with primary care doctor    Please see above list of diagnoses and related plan for additional information.     Condition at Discharge: stable     Discharge Day Visit / Exam:     Subjective: Denies chest pain, shortness breath, cough, fevers or any complaints at this time  V  Exam:   Physical Exam  Constitutional:       General: He is not in acute distress.     Appearance: He is well-developed. He is not diaphoretic.   HENT:      Head: Normocephalic and atraumatic.      Nose: Nose normal.      Mouth/Throat:      Pharynx: No oropharyngeal exudate.   Eyes:      General: No scleral icterus.     Conjunctiva/sclera: Conjunctivae normal.   Cardiovascular:      Rate and Rhythm: Normal rate and regular rhythm.      Heart sounds: Normal heart sounds. No murmur heard.     No friction rub. No gallop.   Pulmonary:      Effort: Pulmonary effort is normal. No respiratory distress.      Breath sounds: Normal breath sounds. No wheezing or rales.    Chest:      Chest wall: No tenderness.   Abdominal:      General: Bowel sounds are normal. There is no distension.      Palpations: Abdomen is soft.      Tenderness: There is no abdominal tenderness. There is no guarding.   Musculoskeletal:         General: No tenderness, deformity or edema. Normal range of motion.      Cervical back: Normal range of motion and neck supple.   Skin:     General: Skin is warm and dry.      Findings: No erythema.   Neurological:      Mental Status: He is alert. Mental status is at baseline.   Psychiatric:         Mood and Affect: Mood and affect normal.       (  Discussion with Family: pt, declined family update    Discharge instructions/Information to patient and family:   See after visit summary for information provided to patient and family.      Provisions for Follow-Up Care:  See after visit summary for information related to follow-up care and any pertinent home health orders.      Disposition:     Home    For Discharges to [facility] SNF:   Not Applicable to this Patient - Not Applicable to this Patient    Planned Readmission: none     Discharge Statement:  I spent 60 minutes discharging the patient. This time was spent on the day of discharge. I had direct contact with the patient on the day of discharge. Greater than 50% of the total time was spent examining patient, answering all patient questions, arranging and discussing plan of care with patient as well as directly providing post-discharge instructions.  Additional time then spent on discharge activities.    Discharge Medications:  See after visit summary for reconciled discharge medications provided to patient and family.      ** Please Note: This note has been constructed using a voice recognition system **

## 2024-08-13 NOTE — PROGRESS NOTES
Progress Note:Cardiology  Masoud VERGARA [facility] 1955, 68 y.o. male MRN: [id number]    Unit/Bed#: -01 Encounter: [id number]  Attending Physician: Kvng Coreas MD   Primary Care Provider: Jignesh Baker DO   Date admitted to hospital: 8/12/2024  Length of stay: 1         * Paroxysmal atrial fibrillation (HCC)  Assessment & Plan  History of paroxysmal atrial fibrillation  Underwent atrial fibrillation ablation by [facility] EP in April 2024.  He continues with paroxysmal a fib episodes.  Presented today with rapid fib vs flutter in the 130-160's s/p Cardiazem IV x 2 doses.  During my exam he spontaneously converted to sinus rhythm.  HR's have been acceptable overnight on metoprolol succinate 50 mg BID.  Continue warfarin with goal INR 2-3.  Continue metoprolol succinate 50 mg BID.  Already has follow up with [facility] Cardiology arranged. Encouraged to keep his appointments.     Hypotension  Assessment & Plan  Ok to continue midodrine 15 mg TID, however, hold if SBP > 140    ESRD (end stage renal disease) (formerly [facility])  Assessment & Plan  Lab Results   Component Value Date    EGFR 5 08/12/2024    EGFR 7 08/05/2024    EGFR 7 08/04/2024    CREATININE 8.57 (H) 08/12/2024    CREATININE 6.88 (H) 08/05/2024    CREATININE 6.65 (H) 08/04/2024     On peritoneal dialysis  Nephrology managing.    Acute on chronic diastolic HF (heart failure) (formerly [facility])  Assessment & Plan  Wt Readings from Last 3 Encounters:   08/12/24 110 kg (242 lb 4.6 oz)   08/04/24 112 kg (246 lb 0.5 oz)   06/25/24 124 kg (273 lb 13 oz)     Known HFpEF on torsemide 60 mg daily and peritoneal dialysis at home.  Echo 7/2024 shows preserved LVEF with mild-mod TR.  Overall euvolemic today.  Torsemide 60 mg daily resumed by nephrology. Diuretics managed by nephrology due to ESRD on dialysis.        Subjective:   Patient seen and examined.  No significant events overnight. Heart rates remain acceptable in sinus rhythm on telemetry overnight. Patient feels very well  "and wishing to go home. He already has follow up with his cardiologist arranged.    Review of Systems   Constitutional: Negative.   HENT: Negative.     Cardiovascular:  Positive for leg swelling (chronic). Negative for chest pain, dyspnea on exertion, irregular heartbeat, near-syncope, orthopnea and palpitations.   Respiratory:  Negative for cough and snoring.    Endocrine: Negative.    Skin: Negative.    Musculoskeletal: Negative.    Gastrointestinal: Negative.    Genitourinary: Negative.    Neurological: Negative.    Psychiatric/Behavioral: Negative.           Objective:     Vitals: Blood pressure 100/51, pulse 67, temperature 98.3 °F (36.8 °C), temperature source Temporal, resp. rate 21, height 5' 10\" (1.778 m), weight 111 kg (244 lb 11.4 oz), SpO2 (!) 88%., Body mass index is 35.11 kg/m².,     Orthostatic Blood Pressures      Flowsheet Row Most Recent Value   Blood Pressure 100/51 filed at 2024 1114   Patient Position - Orthostatic VS Lying filed at 2024 1114            Physical Exam  Vitals and nursing note reviewed.   Constitutional:       General: He is not in acute distress.     Appearance: He is well-developed.      Comments: Appears pale, chronically ill   HENT:      Head: Normocephalic and atraumatic.   Eyes:      Conjunctiva/sclera: Conjunctivae normal.   Neck:      Vascular: No JVD.   Cardiovascular:      Rate and Rhythm: Normal rate and regular rhythm.      Heart sounds: No murmur heard.  Pulmonary:      Effort: Pulmonary effort is normal. No respiratory distress.      Breath sounds: Normal breath sounds.      Comments: Breathing comfortably on room air  Abdominal:      Palpations: Abdomen is soft.      Tenderness: There is no abdominal tenderness.   Musculoskeletal:         General: No swelling.      Cervical back: Neck supple.      Right lower le+ Edema present.      Left lower le+ Edema present.   Skin:     General: Skin is warm and dry.      Capillary Refill: Capillary refill " takes less than 2 seconds.   Neurological:      Mental Status: He is alert.   Psychiatric:         Mood and Affect: Mood normal.            Intake/Output Summary (Last 24 hours) at 8/13/2024 1205  Last data filed at 8/13/2024 1000  Gross per 24 hour   Intake 800 ml   Output 0 ml   Net 800 ml       Weight (last 2 days)       Date/Time Weight    08/13/24 0500 111 (244.71)    08/12/24 1110 110 (242.29)               Medications:      Current Facility-Administered Medications:     acetaminophen (TYLENOL) tablet 650 mg, 650 mg, Oral, Q6H PRN, Nora Mathew MD    allopurinol (ZYLOPRIM) tablet 300 mg, 300 mg, Oral, Daily, Nora Mathew MD, 300 mg at 08/13/24 0919    aspirin (ECOTRIN LOW STRENGTH) EC tablet 81 mg, 81 mg, Oral, Daily, Nora Mathew MD, 81 mg at 08/13/24 0919    atorvastatin (LIPITOR) tablet 40 mg, 40 mg, Oral, Daily With Dinner, Nora Mathew MD, 40 mg at 08/12/24 1618    cinacalcet (SENSIPAR) tablet 30 mg, 30 mg, Oral, Daily With Breakfast, Nora Mathew MD, 30 mg at 08/13/24 0919    ferrous sulfate tablet 325 mg, 325 mg, Oral, Daily With Breakfast, Nora Mathew MD, 325 mg at 08/13/24 0920    HYDROcodone-acetaminophen (NORCO) 5-325 mg per tablet 2 tablet, 2 tablet, Oral, Q6H PRN, Faisal White DO, 2 tablet at 08/13/24 0626    insulin glargine (LANTUS) subcutaneous injection 15 Units 0.15 mL, 15 Units, Subcutaneous, HS, Nora Mathew MD    insulin lispro (HumALOG/ADMELOG) 100 units/mL subcutaneous injection 1-6 Units, 1-6 Units, Subcutaneous, TID AC **AND** Fingerstick Glucose (POCT), , , TID AC, Nora Mathew MD    levothyroxine tablet 125 mcg, 125 mcg, Oral, Early Morning, Nora Mathew MD, 125 mcg at 08/13/24 0617    metoprolol (LOPRESSOR) injection 2.5 mg, 2.5 mg, Intravenous, Q6H PRN, Nora Mathew MD    metoprolol succinate (TOPROL-XL) 24 hr tablet 50 mg, 50 mg, Oral, BID, Nora Mathew MD, 50 mg at 08/13/24 0919    midodrine (PROAMATINE) tablet 15 mg, 15 mg,  Oral, TID AC, Nora Mathew MD, 15 mg at 08/13/24 1149    tamsulosin (FLOMAX) capsule 0.4 mg, 0.4 mg, Oral, Daily With Dinner, Nroa Mathew MD, 0.4 mg at 08/12/24 1618    torsemide (DEMADEX) tablet 100 mg, 100 mg, Oral, Daily, Joselyn Reyes Bahamonde, MD     Labs & Results:        Results from last 7 days   Lab Units 08/13/24  0623 08/12/24  1120   WBC Thousand/uL 5.55 7.24   HEMOGLOBIN g/dL 7.8* 10.1*   HEMATOCRIT % 25.0* 32.2*   PLATELETS Thousands/uL 119* 154         Results from last 7 days   Lab Units 08/13/24  0623 08/12/24  1120   POTASSIUM mmol/L 3.3* 3.6   CHLORIDE mmol/L 98 96   CO2 mmol/L 25 25   BUN mg/dL 56* 55*   CREATININE mg/dL 8.45* 8.57*   CALCIUM mg/dL 9.7 10.7*   ALK PHOS U/L 97 122*   ALT U/L 16 23   AST U/L 16 28     Results from last 7 days   Lab Units 08/13/24  0623 08/12/24  1120   INR  5.67* 4.84*   PTT seconds  --  86*     Results from last 7 days   Lab Units 08/13/24  0623 08/12/24  1120   MAGNESIUM mg/dL 1.8* 2.0     Results from last 7 days   Lab Units 08/12/24  1120   BNP pg/mL 89      Telemetry: sinus rhythm, rate 70's. No events overnight.

## 2024-08-13 NOTE — PROGRESS NOTES
NEPHROLOGY PROGRESS NOTE   Masoud Perry 68 y.o. male MRN: [id number]  Unit/Bed#: -01 Encounter: [id number]  Reason for Consult: Management of ESRD    ASSESSMENT AND PLAN:  68 y.o.  man  with PMH of ESRD on peritoneal dialysis, lymphedema, diabetes, p/w palpitations, was found to have A-fib with RVR.  Here for lower extremity edema nephrology is consulted for management of ESRD  .     PLAN     #ESRD on CCPD  Access: PD catheter  Renal Diet  Fluid restriction 1-1.5L/d  Adjust medications to GFR<10  Avoid opioids      #CAPD   Times every 6 hours  Therapy fluid 8000ml  Fill volume 2 L  Dialysate fluid 1.5% for now due to hypotension  Patient retained around 900 mL with morning exchange  Patient will be discharged today, can continue cycler at home     #Volume status/hypertension:  Volume: Hypervolemia  Blood pressure: Tendency to hypotension, /51, goal less than 140/90  Low-sodium diet  On diltiazem  Midodrine 15 mg 3 times daily  Metoprolol 50 for rate control  Increase torsemide to 100 mg daily on discharge        #Secondary Hyperparasitoidism   Continue Cinacalcet 30 mg     # Anemia of Kidney Disease  Current hemoglobin: 7.8 g/dL, yesterday hemoglobin was 10.1?   Treatment:  EPO as an outpatient  Rule out bleeding  Transfuse for hemoglobin less than 7.0 per primary service       # A-fib with RVR :  On beta-blockers, diltiazem   Rate controlled   management as per primary team       # Chronic lymphedema  Not worsening  Management as per primary team        # CHF  Furosemide as above       SUBJECTIVE:  Patient seen and examined at bedside. No chest pain, shortness of breath, nausea, vomiting, abdominal pain or diarrhea.     OBJECTIVE:  Current Weight: Weight - Scale: 111 kg (244 lb 11.4 oz)  Vitals:    08/13/24 1114   BP: 100/51   Pulse: 67   Resp: 21   Temp: 98.3 °F (36.8 °C)   SpO2: (!) 88%       Intake/Output Summary (Last 24 hours) at 8/13/2024 1142  Last data filed at 8/13/2024 1000  Gross per 24 hour    Intake 800 ml   Output 0 ml   Net 800 ml     Wt Readings from Last 3 Encounters:   08/13/24 111 kg (244 lb 11.4 oz)   08/04/24 112 kg (246 lb 0.5 oz)   06/25/24 124 kg (273 lb 13 oz)     Temp Readings from Last 3 Encounters:   08/13/24 98.3 °F (36.8 °C) (Temporal)   08/05/24 97.6 °F (36.4 °C) (Temporal)   06/25/24 (!) 97.2 °F (36.2 °C)     BP Readings from Last 3 Encounters:   08/13/24 100/51   08/05/24 102/55   06/25/24 128/65     Pulse Readings from Last 3 Encounters:   08/13/24 67   08/05/24 68   06/25/24 84        General:  no acute distress at this time  Skin:  chronic skin changes   Eyes:  No scleral icterus and noninjected  ENT:  mucous membranes moist  Neck:  no carotid bruits  Chest:  Clear to auscultation percussion, good respiratory effort, no use of accessory respiratory muscles  CVS:  Regular rate and rhythm without rub   Abdomen:  soft and nontender   Extremities:lower extremity edema  Neuro:  No gross focality  Psych:  Alert , cooperative       Medications:    Current Facility-Administered Medications:     acetaminophen (TYLENOL) tablet 650 mg, 650 mg, Oral, Q6H PRN, Nora Mathew MD    allopurinol (ZYLOPRIM) tablet 300 mg, 300 mg, Oral, Daily, Nora Mathew MD, 300 mg at 08/13/24 0919    aspirin (ECOTRIN LOW STRENGTH) EC tablet 81 mg, 81 mg, Oral, Daily, Nora Mathew MD, 81 mg at 08/13/24 0919    atorvastatin (LIPITOR) tablet 40 mg, 40 mg, Oral, Daily With Dinner, Nora Mathew MD, 40 mg at 08/12/24 1618    bumetanide (BUMEX) injection 1 mg, 1 mg, Intravenous, Once, Nora Mathew MD    cinacalcet (SENSIPAR) tablet 30 mg, 30 mg, Oral, Daily With Breakfast, Nora Mathew MD, 30 mg at 08/13/24 0919    ferrous sulfate tablet 325 mg, 325 mg, Oral, Daily With Breakfast, Nora Mathew MD, 325 mg at 08/13/24 0920    HYDROcodone-acetaminophen (NORCO) 5-325 mg per tablet 2 tablet, 2 tablet, Oral, Q6H PRN, Faisal White DO, 2 tablet at 08/13/24 0626    insulin glargine (LANTUS)  "subcutaneous injection 15 Units 0.15 mL, 15 Units, Subcutaneous, HS, Nora Mathew MD    insulin lispro (HumALOG/ADMELOG) 100 units/mL subcutaneous injection 1-6 Units, 1-6 Units, Subcutaneous, TID AC **AND** Fingerstick Glucose (POCT), , , TID AC, Nora Mathew MD    levothyroxine tablet 125 mcg, 125 mcg, Oral, Early Morning, Nora Mathew MD, 125 mcg at 08/13/24 0617    metoprolol (LOPRESSOR) injection 2.5 mg, 2.5 mg, Intravenous, Q6H PRN, Nora Mathew MD    metoprolol succinate (TOPROL-XL) 24 hr tablet 50 mg, 50 mg, Oral, BID, Nora Mathew MD, 50 mg at 08/13/24 0919    midodrine (PROAMATINE) tablet 15 mg, 15 mg, Oral, TID AC, Nora Mathew MD, 15 mg at 08/13/24 0626    tamsulosin (FLOMAX) capsule 0.4 mg, 0.4 mg, Oral, Daily With Dinner, Nora Mathew MD, 0.4 mg at 08/12/24 1618    Laboratory Results:  Results from last 7 days   Lab Units 08/13/24  0623 08/12/24  1120   WBC Thousand/uL 5.55 7.24   HEMOGLOBIN g/dL 7.8* 10.1*   HEMATOCRIT % 25.0* 32.2*   PLATELETS Thousands/uL 119* 154   SODIUM mmol/L 136 136   POTASSIUM mmol/L 3.3* 3.6   CHLORIDE mmol/L 98 96   CO2 mmol/L 25 25   BUN mg/dL 56* 55*   CREATININE mg/dL 8.45* 8.57*   CALCIUM mg/dL 9.7 10.7*   MAGNESIUM mg/dL 1.8* 2.0       XR chest 1 view portable   ED Interpretation by Antony Bonilla MD (08/12 1201)   CHF      Final Result by Neela Eugene MD (08/12 1331)      No acute cardiopulmonary disease.            Workstation performed: [id number]             Portions of the record may have been created with voice recognition software. Occasional wrong word or \"sound a like\" substitutions may have occurred due to the inherent limitations of voice recognition software. Read the chart carefully and recognize, using context, where substitutions have occurred.    "

## 2024-08-13 NOTE — PLAN OF CARE
At 0400, PD treatment performed. Pt tends to drain slow and waited 1 hour to complete emptying. Results were only 1100ml of clear drainage out of 2000ml obtained. PD Manual treatment of 2.5% dextrose dwelled at 0515. Pt's lung sounds clear, dim. Do not appreciate edema on patient at this time. Danielle DO made aware.     Problem: Nutrition/Hydration-ADULT  Goal: Nutrient/Hydration intake appropriate for improving, restoring or maintaining nutritional needs  Description: Monitor and assess patient's nutrition/hydration status for malnutrition. Collaborate with interdisciplinary team and initiate plan and interventions as ordered.  Monitor patient's weight and dietary intake as ordered or per policy. Utilize nutrition screening tool and intervene as necessary. Determine patient's food preferences and provide high-protein, high-caloric foods as appropriate.     INTERVENTIONS:  - Monitor oral intake, urinary output, labs, and treatment plans  - Assess nutrition and hydration status and recommend course of action  - Evaluate amount of meals eaten  - Assist patient with eating if necessary   - Allow adequate time for meals  - Recommend/ encourage appropriate diets, oral nutritional supplements, and vitamin/mineral supplements  - Order, calculate, and assess calorie counts as needed  - Recommend, monitor, and adjust tube feedings and TPN/PPN based on assessed needs  - Assess need for intravenous fluids  - Provide specific nutrition/hydration education as appropriate  - Include patient/family/caregiver in decisions related to nutrition  Outcome: Progressing     Problem: Prexisting or High Potential for Compromised Skin Integrity  Goal: Skin integrity is maintained or improved  Description: INTERVENTIONS:  - Identify patients at risk for skin breakdown  - Assess and monitor skin integrity  - Assess and monitor nutrition and hydration status  - Monitor labs   - Assess for incontinence   - Turn and reposition patient  -  Assist with mobility/ambulation  - Relieve pressure over bony prominences  - Avoid friction and shearing  - Provide appropriate hygiene as needed including keeping skin clean and dry  - Evaluate need for skin moisturizer/barrier cream  - Collaborate with interdisciplinary team   - Patient/family teaching  - Consider wound care consult   Outcome: Progressing     Problem: PAIN - ADULT  Goal: Verbalizes/displays adequate comfort level or baseline comfort level  Description: Interventions:  - Encourage patient to monitor pain and request assistance  - Assess pain using appropriate pain scale  - Administer analgesics based on type and severity of pain and evaluate response  - Implement non-pharmacological measures as appropriate and evaluate response  - Consider cultural and social influences on pain and pain management  - Notify physician/advanced practitioner if interventions unsuccessful or patient reports new pain  Outcome: Progressing     Problem: INFECTION - ADULT  Goal: Absence or prevention of progression during hospitalization  Description: INTERVENTIONS:  - Assess and monitor for signs and symptoms of infection  - Monitor lab/diagnostic results  - Monitor all insertion sites, i.e. indwelling lines, tubes, and drains  - Monitor endotracheal if appropriate and nasal secretions for changes in amount and color  - [place] appropriate cooling/warming therapies per order  - Administer medications as ordered  - Instruct and encourage patient and family to use good hand hygiene technique  - Identify and instruct in appropriate isolation precautions for identified infection/condition  Outcome: Progressing  Goal: Absence of fever/infection during neutropenic period  Description: INTERVENTIONS:  - Monitor WBC    Outcome: Progressing     Problem: SAFETY ADULT  Goal: Patient will remain free of falls  Description: INTERVENTIONS:  - Educate patient/family on patient safety including physical limitations  - Instruct patient  to call for assistance with activity   - Consult OT/PT to assist with strengthening/mobility   - Keep Call bell within reach  - Keep bed low and locked with side rails adjusted as appropriate  - Keep care items and personal belongings within reach  - Initiate and maintain comfort rounds  - Make Fall Risk Sign visible to staff  - Offer Toileting every 2 Hours, in advance of need  - Initiate/Maintain bed/chair alarm  - Obtain necessary fall risk management equipment  - Apply yellow socks and bracelet for high fall risk patients  - Consider moving patient to room near nurses station  Outcome: Progressing  Goal: Maintain or return to baseline ADL function  Description: INTERVENTIONS:  -  Assess patient's ability to carry out ADLs; assess patient's baseline for ADL function and identify physical deficits which impact ability to perform ADLs (bathing, care of mouth/teeth, toileting, grooming, dressing, etc.)  - Assess/evaluate cause of self-care deficits   - Assess range of motion  - Assess patient's mobility; develop plan if impaired  - Assess patient's need for assistive devices and provide as appropriate  - Encourage maximum independence but intervene and supervise when necessary  - Involve family in performance of ADLs  - Assess for home care needs following discharge   - Consider OT consult to assist with ADL evaluation and planning for discharge  - Provide patient education as appropriate  Outcome: Progressing  Goal: Maintains/Returns to pre admission functional level  Description: INTERVENTIONS:  - Perform AM-PAC 6 Click Basic Mobility/ Daily Activity assessment daily.  - Set and communicate daily mobility goal to care team and patient/family/caregiver.   - Collaborate with rehabilitation services on mobility goals if consulted  - Perform Range of Motion 3 times a day.  - Reposition patient every 3 hours.  - Dangle patient 3 times a day  - Stand patient 3 times a day  - Ambulate patient 3 times a day  - Out of bed  to chair 3 times a day   - Out of bed for meals 3 times a day  - Out of bed for toileting  - Record patient progress and toleration of activity level   Outcome: Progressing     Problem: DISCHARGE PLANNING  Goal: Discharge to home or other facility with appropriate resources  Description: INTERVENTIONS:  - Identify barriers to discharge w/patient and caregiver  - Arrange for needed discharge resources and transportation as appropriate  - Identify discharge learning needs (meds, wound care, etc.)  - Arrange for interpretive services to assist at discharge as needed  - Refer to Case Management Department for coordinating discharge planning if the patient needs post-hospital services based on physician/advanced practitioner order or complex needs related to functional status, cognitive ability, or social support system  Outcome: Progressing     Problem: Knowledge Deficit  Goal: Patient/family/caregiver demonstrates understanding of disease process, treatment plan, medications, and discharge instructions  Description: Complete learning assessment and assess knowledge base.  Interventions:  - Provide teaching at level of understanding  - Provide teaching via preferred learning methods  Outcome: Progressing     Problem: CARDIOVASCULAR - ADULT  Goal: Maintains optimal cardiac output and hemodynamic stability  Description: INTERVENTIONS:  - Monitor I/O, vital signs and rhythm  - Monitor for S/S and trends of decreased cardiac output  - Administer and titrate ordered vasoactive medications to optimize hemodynamic stability  - Assess quality of pulses, skin color and temperature  - Assess for signs of decreased coronary artery perfusion  - Instruct patient to report change in severity of symptoms  Outcome: Progressing  Goal: Absence of cardiac dysrhythmias or at baseline rhythm  Description: INTERVENTIONS:  - Continuous cardiac monitoring, vital signs, obtain 12 lead EKG if ordered  - Administer antiarrhythmic and heart rate  control medications as ordered  - Monitor electrolytes and administer replacement therapy as ordered  Outcome: Progressing     Problem: METABOLIC, FLUID AND ELECTROLYTES - ADULT  Goal: Electrolytes maintained within normal limits  Description: INTERVENTIONS:  - Monitor labs and assess patient for signs and symptoms of electrolyte imbalances  - Administer electrolyte replacement as ordered  - Monitor response to electrolyte replacements, including repeat lab results as appropriate  - Instruct patient on fluid and nutrition as appropriate  Outcome: Progressing  Goal: Fluid balance maintained  Description: INTERVENTIONS:  - Monitor labs   - Monitor I/O and WT  - Instruct patient on fluid and nutrition as appropriate  - Assess for signs & symptoms of volume excess or deficit  Outcome: Progressing  Goal: Glucose maintained within target range  Description: INTERVENTIONS:  - Monitor Blood Glucose as ordered  - Assess for signs and symptoms of hyperglycemia and hypoglycemia  - Administer ordered medications to maintain glucose within target range  - Assess nutritional intake and initiate nutrition service referral as needed  Outcome: Progressing     Problem: SKIN/TISSUE INTEGRITY - ADULT  Goal: Skin Integrity remains intact(Skin Breakdown Prevention)  Description: Assess:  -Perform Quintin assessment every shift  -Clean and moisturize skin every q4hrs or as needed  -Inspect skin when repositioning, toileting, and assisting with ADLS  -Assess under medical devices  -Assess extremities for adequate circulation and sensation     Bed Management:  -Have minimal linens on bed & keep smooth, unwrinkled  -Change linens as needed when moist or perspiring  -Avoid sitting or lying in one position for more than 2 hours while in bed  -Keep HOB at 30 degrees     Toileting:  -Offer bedside commode  -Assess for incontinence every 2 hrs  -Use incontinent care products after each incontinent episode    Activity:  -Mobilize patient 3 times a  day  -Encourage activity and walks on unit  -Encourage or provide ROM exercises   -Turn and reposition patient every 2 Hours  -Use appropriate equipment to lift or move patient in bed  -Instruct/ Assist with weight shifting every 60 mins when out of bed in chair  -Consider limitation of chair time 2 hour intervals    Skin Care:  -Avoid use of baby powder, tape, friction and shearing, hot water or constrictive clothing  -Relieve pressure over bony prominences  -Do not massage red bony areas  Outcome: Progressing  Goal: Incision(s), wounds(s) or drain site(s) healing without S/S of infection  Description: INTERVENTIONS  - Assess and document dressing, incision, wound bed, drain sites and surrounding tissue  - Provide patient and family education  - Perform skin care/dressing changes every shift  Outcome: Progressing  Goal: Pressure injury heals and does not worsen  Description: Interventions:  - Implement low air loss mattress or specialty surface (Criteria met)  - Apply silicone foam dressing  - Instruct/assist with weight shifting every 60 minutes when in chair   - Limit chair time to 2 hour intervals  - Use special pressure reducing interventions such as waffle cushion when in chair   - Apply fecal or urinary incontinence containment device   - Perform passive or active ROM every 2 hrs  - Turn and reposition patient & offload bony prominences every 2 hours   - Utilize friction reducing device or surface for transfers   - Consider consults to  interdisciplinary teams  - Use incontinent care products after each incontinent episode  - Consider nutrition services referral as needed  Outcome: Progressing     Problem: MUSCULOSKELETAL - ADULT  Goal: Maintain or return mobility to safest level of function  Description: INTERVENTIONS:  - Assess patient's ability to carry out ADLs; assess patient's baseline for ADL function and identify physical deficits which impact ability to perform ADLs (bathing, care of mouth/teeth,  toileting, grooming, dressing, etc.)  - Assess/evaluate cause of self-care deficits   - Assess range of motion  - Assess patient's mobility  - Assess patient's need for assistive devices and provide as appropriate  - Encourage maximum independence but intervene and supervise when necessary  - Involve family in performance of ADLs  - Assess for home care needs following discharge   - Consider OT consult to assist with ADL evaluation and planning for discharge  - Provide patient education as appropriate  Outcome: Progressing  Goal: Maintain proper alignment of affected body part  Description: INTERVENTIONS:  - Support, maintain and protect limb and body alignment  - Provide patient/ family with appropriate education  Outcome: Progressing

## 2024-08-13 NOTE — PROGRESS NOTES
Patient:    MRN:  [id number]    Valentino Request ID:  [id number]    Level of care reserved:  Home Health Agency    Partner Reserved:  [place] [address] [phone]    Clinical needs requested:  occupational therapy, physical therapy, skilled nursing    Geography searched:  [phone]    Start of Service:    Request sent:  9:41am EDT on 8/13/2024 by Sharee Thompson    Partner reserved:  11:46am EDT on 8/13/2024 by Sharee Thompson    Choice list shared:  11:46am EDT on 8/13/2024 by Sharee Thompson

## 2024-08-13 NOTE — ASSESSMENT & PLAN NOTE
Wt Readings from Last 3 Encounters:   08/13/24 111 kg (244 lb 11.4 oz)   08/04/24 112 kg (246 lb 0.5 oz)   06/25/24 124 kg (273 lb 13 oz)   Cxr with pulm edema  On PD   On torsemide 60 mg po bid at home  TTE 7/2024 ef 75 , mild to moderate TR  Place 1.8 liter FR  Nephrology consultation  Will give bumex 1mg iv x1  I and o  Daily weight  Last discharge weight 246 lb  Legs are at baseline

## 2024-08-13 NOTE — ASSESSMENT & PLAN NOTE
Lab Results   Component Value Date    EGFR 5 08/13/2024    EGFR 5 08/12/2024    EGFR 7 08/05/2024    CREATININE 8.45 (H) 08/13/2024    CREATININE 8.57 (H) 08/12/2024    CREATININE 6.88 (H) 08/05/2024   On PD  Appreciate nephro consultation

## 2024-08-14 NOTE — UTILIZATION REVIEW
NOTIFICATION OF ADMISSION DISCHARGE   This is a Notification of Discharge from [facility]. Please be advised that this patient has been discharge from our facility. Below you will find the admission and discharge date and time including the patient’s disposition.   UTILIZATION REVIEW CONTACT:  Arleen Cotto  Utilization   Network Utilization Review Department  Phone: [phone] x carefully listen to the prompts. All voicemails are confidential.  Email: [email or web address]     ADMISSION INFORMATION  PRESENTATION DATE: 8/12/2024 11:07 AM  OBERVATION ADMISSION DATE: N/A  INPATIENT ADMISSION DATE: 8/12/24 12:12 PM   DISCHARGE DATE: 8/13/2024  1:12 PM   DISPOSITION:Home with Home Health Care    Network Utilization Review Department  ATTENTION: Please call with any questions or concerns to [phone] and carefully listen to the prompts so that you are directed to the right person. All voicemails are confidential.   For Discharge needs, contact Care Management DC Support Team at [phone] opt. 2  Send all requests for admission clinical reviews, approved or denied determinations and any other requests to dedicated fax number below belonging to the campus where the patient is receiving treatment. List of dedicated fax numbers for the Facilities:  FACILITY NAME UR FAX NUMBER   ADMISSION DENIALS (Administrative/Medical Necessity) [phone]   DISCHARGE SUPPORT TEAM ([facility]) [phone]   PARENT CHILD HEALTH (Maternity/NICU/Pediatrics) [phone]   [facility] [phone]   [facility] [phone]   [facility] [phone]   [facility] [phone]   [facility] [phone]   [facility] [phone]   [facility] [phone]   [facility]  [facility] [phone]   [facility] [phone]   [facility] [phone]   [facility] [phone]   [facility] [phone]

## 2024-08-15 DIAGNOSIS — I95.0 IDIOPATHIC HYPOTENSION: Primary | ICD-10-CM

## 2024-08-15 RX ORDER — MIDODRINE HYDROCHLORIDE 5 MG/1
15 TABLET ORAL
Qty: 270 TABLET | Refills: 1 | Status: SHIPPED | OUTPATIENT
Start: 2024-08-15

## 2024-08-15 NOTE — QUICK NOTE
Received a message that patient is concerned regarding some medication especially amiodarone.    I personally called the patient's pharmacy, I spoke with the pharmacist.  Per pharmacist, I prescription of amiodarone was sent on 6/25/2024 by Maria Eugenia Ortiz (patient's cardiology)-and patient picked up the medication.    Then I called the patient's and spoke with patient's directly via his cell phone number.  Per patient, he picked up the medication and he already clarified about this medication.    Also advised the patient to make sure and call patient's cardiologist office regarding this medications to double check.  Patient verbalized understanding agrees.

## 2024-08-21 DIAGNOSIS — N18.6 ESRD (END STAGE RENAL DISEASE) (HCC): ICD-10-CM

## 2024-08-21 RX ORDER — SEVELAMER CARBONATE 800 MG/1
800 TABLET, FILM COATED ORAL
Qty: 150 TABLET | Refills: 5 | Status: SHIPPED | OUTPATIENT
Start: 2024-08-21

## 2024-08-21 RX ORDER — CINACALCET 30 MG/1
30 TABLET, FILM COATED ORAL
Qty: 30 TABLET | Refills: 5 | Status: SHIPPED | OUTPATIENT
Start: 2024-08-21

## 2024-09-13 DIAGNOSIS — E87.6 HYPOKALEMIA: Primary | ICD-10-CM

## 2024-09-13 RX ORDER — POTASSIUM CHLORIDE 750 MG/1
10 CAPSULE, EXTENDED RELEASE ORAL 2 TIMES DAILY
Qty: 60 CAPSULE | Refills: 5 | Status: ON HOLD | OUTPATIENT
Start: 2024-09-13

## 2024-09-15 ENCOUNTER — APPOINTMENT (EMERGENCY)
Dept: CT IMAGING | Facility: HOSPITAL | Age: 69
DRG: 091 | End: 2024-09-15
Payer: COMMERCIAL

## 2024-09-15 ENCOUNTER — HOSPITAL ENCOUNTER (INPATIENT)
Facility: HOSPITAL | Age: 69
LOS: 8 days | Discharge: NURSING, REHAB OR LONG-TERM CARE | DRG: 091 | End: 2024-09-23
Attending: EMERGENCY MEDICINE | Admitting: FAMILY MEDICINE
Payer: COMMERCIAL

## 2024-09-15 ENCOUNTER — APPOINTMENT (EMERGENCY)
Dept: RADIOLOGY | Facility: HOSPITAL | Age: 69
DRG: 091 | End: 2024-09-15
Payer: COMMERCIAL

## 2024-09-15 DIAGNOSIS — N18.6 ESRD (END STAGE RENAL DISEASE) (HCC): ICD-10-CM

## 2024-09-15 DIAGNOSIS — L03.119 CELLULITIS, LEG: ICD-10-CM
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
## 2024-09-15 DIAGNOSIS — Z79.4 TYPE 2 DIABETES MELLITUS WITH STAGE 4 CHRONIC KIDNEY DISEASE, WITH LONG-TERM CURRENT USE OF INSULIN (HCC): ICD-10-CM

## 2024-09-15 DIAGNOSIS — Z99.2 PATIENT ON PERITONEAL DIALYSIS (HCC): ICD-10-CM

## 2024-09-15 DIAGNOSIS — N18.4 TYPE 2 DIABETES MELLITUS WITH STAGE 4 CHRONIC KIDNEY DISEASE, WITH LONG-TERM CURRENT USE OF INSULIN (HCC): ICD-10-CM

## 2024-09-15 DIAGNOSIS — R26.2 AMBULATORY DYSFUNCTION: ICD-10-CM

## 2024-09-15 DIAGNOSIS — E11.22 TYPE 2 DIABETES MELLITUS WITH STAGE 4 CHRONIC KIDNEY DISEASE, WITH LONG-TERM CURRENT USE OF INSULIN (HCC): ICD-10-CM

## 2024-09-15 PROBLEM — J44.9 COPD (CHRONIC OBSTRUCTIVE PULMONARY DISEASE) (HCC): Status: ACTIVE | Noted: 2018-12-31

## 2024-09-15 LAB
2HR DELTA HS TROPONIN: -3 NG/L
ALBUMIN SERPL BCG-MCNC: 2.5 G/DL (ref 3.5–5)
ALP SERPL-CCNC: 113 U/L (ref 34–104)
ALT SERPL W P-5'-P-CCNC: 15 U/L (ref 7–52)
ANION GAP SERPL CALCULATED.3IONS-SCNC: 9 MMOL/L (ref 4–13)
AST SERPL W P-5'-P-CCNC: 17 U/L (ref 13–39)
BASOPHILS # BLD AUTO: 0.04 THOUSANDS/ΜL (ref 0–0.1)
BASOPHILS NFR BLD AUTO: 1 % (ref 0–1)
BILIRUB SERPL-MCNC: 0.31 MG/DL (ref 0.2–1)
BUN SERPL-MCNC: 27 MG/DL (ref 5–25)
CALCIUM ALBUM COR SERPL-MCNC: 11 MG/DL (ref 8.3–10.1)
CALCIUM SERPL-MCNC: 9.8 MG/DL (ref 8.4–10.2)
CARDIAC TROPONIN I PNL SERPL HS: 20 NG/L
CARDIAC TROPONIN I PNL SERPL HS: 23 NG/L
CHLORIDE SERPL-SCNC: 94 MMOL/L (ref 96–108)
CO2 SERPL-SCNC: 31 MMOL/L (ref 21–32)
CREAT SERPL-MCNC: 5.68 MG/DL (ref 0.6–1.3)
EOSINOPHIL # BLD AUTO: 0.15 THOUSAND/ΜL (ref 0–0.61)
EOSINOPHIL NFR BLD AUTO: 3 % (ref 0–6)
ERYTHROCYTE [DISTWIDTH] IN BLOOD BY AUTOMATED COUNT: 15.9 % (ref 11.6–15.1)
GFR SERPL CREATININE-BSD FRML MDRD: 9 ML/MIN/1.73SQ M
GLUCOSE SERPL-MCNC: 95 MG/DL (ref 65–140)
HCT VFR BLD AUTO: 30.3 % (ref 36.5–49.3)
HGB BLD-MCNC: 9.5 G/DL (ref 12–17)
IMM GRANULOCYTES # BLD AUTO: 0.02 THOUSAND/UL (ref 0–0.2)
IMM GRANULOCYTES NFR BLD AUTO: 1 % (ref 0–2)
INR PPP: 2.52 (ref 0.85–1.19)
LYMPHOCYTES # BLD AUTO: 1.08 THOUSANDS/ΜL (ref 0.6–4.47)
LYMPHOCYTES NFR BLD AUTO: 24 % (ref 14–44)
MCH RBC QN AUTO: 31.9 PG (ref 26.8–34.3)
MCHC RBC AUTO-ENTMCNC: 31.4 G/DL (ref 31.4–37.4)
MCV RBC AUTO: 102 FL (ref 82–98)
MONOCYTES # BLD AUTO: 0.32 THOUSAND/ΜL (ref 0.17–1.22)
MONOCYTES NFR BLD AUTO: 7 % (ref 4–12)
NEUTROPHILS # BLD AUTO: 2.82 THOUSANDS/ΜL (ref 1.85–7.62)
NEUTS SEG NFR BLD AUTO: 64 % (ref 43–75)
NRBC BLD AUTO-RTO: 0 /100 WBCS
PLATELET # BLD AUTO: 126 THOUSANDS/UL (ref 149–390)
PMV BLD AUTO: 8.4 FL (ref 8.9–12.7)
POTASSIUM SERPL-SCNC: 3.2 MMOL/L (ref 3.5–5.3)
PROT SERPL-MCNC: 5.8 G/DL (ref 6.4–8.4)
PROTHROMBIN TIME: 27.3 SECONDS (ref 12.3–15)
RBC # BLD AUTO: 2.98 MILLION/UL (ref 3.88–5.62)
SODIUM SERPL-SCNC: 134 MMOL/L (ref 135–147)
WBC # BLD AUTO: 4.43 THOUSAND/UL (ref 4.31–10.16)

## 2024-09-15 PROCEDURE — 96374 THER/PROPH/DIAG INJ IV PUSH: CPT

## 2024-09-15 PROCEDURE — 72170 X-RAY EXAM OF PELVIS: CPT

## 2024-09-15 PROCEDURE — 74176 CT ABD & PELVIS W/O CONTRAST: CPT

## 2024-09-15 PROCEDURE — 73502 X-RAY EXAM HIP UNI 2-3 VIEWS: CPT

## 2024-09-15 PROCEDURE — 3E1M39Z IRRIGATION OF PERITONEAL CAVITY USING DIALYSATE, PERCUTANEOUS APPROACH: ICD-10-PCS | Performed by: INTERNAL MEDICINE

## 2024-09-15 PROCEDURE — 84484 ASSAY OF TROPONIN QUANT: CPT | Performed by: EMERGENCY MEDICINE

## 2024-09-15 PROCEDURE — 70450 CT HEAD/BRAIN W/O DYE: CPT

## 2024-09-15 PROCEDURE — 36415 COLL VENOUS BLD VENIPUNCTURE: CPT | Performed by: EMERGENCY MEDICINE

## 2024-09-15 PROCEDURE — 85610 PROTHROMBIN TIME: CPT | Performed by: EMERGENCY MEDICINE

## 2024-09-15 PROCEDURE — 99221 1ST HOSP IP/OBS SF/LOW 40: CPT | Performed by: FAMILY MEDICINE

## 2024-09-15 PROCEDURE — 85025 COMPLETE CBC W/AUTO DIFF WBC: CPT | Performed by: EMERGENCY MEDICINE

## 2024-09-15 PROCEDURE — 99285 EMERGENCY DEPT VISIT HI MDM: CPT | Performed by: EMERGENCY MEDICINE

## 2024-09-15 PROCEDURE — 71045 X-RAY EXAM CHEST 1 VIEW: CPT

## 2024-09-15 PROCEDURE — 80053 COMPREHEN METABOLIC PANEL: CPT | Performed by: EMERGENCY MEDICINE

## 2024-09-15 PROCEDURE — 99285 EMERGENCY DEPT VISIT HI MDM: CPT

## 2024-09-15 PROCEDURE — 71250 CT THORAX DX C-: CPT

## 2024-09-15 PROCEDURE — 72125 CT NECK SPINE W/O DYE: CPT

## 2024-09-15 PROCEDURE — 93005 ELECTROCARDIOGRAM TRACING: CPT

## 2024-09-15 RX ORDER — LANOLIN ALCOHOL/MO/W.PET/CERES
400 CREAM (GRAM) TOPICAL DAILY
Status: DISCONTINUED | OUTPATIENT
Start: 2024-09-16 | End: 2024-09-23 | Stop reason: HOSPADM

## 2024-09-15 RX ORDER — LEVOTHYROXINE SODIUM 125 UG/1
125 TABLET ORAL DAILY
Status: DISCONTINUED | OUTPATIENT
Start: 2024-09-16 | End: 2024-09-23 | Stop reason: HOSPADM

## 2024-09-15 RX ORDER — WARFARIN SODIUM 1 MG/1
1 TABLET ORAL
Status: ON HOLD | COMMUNITY
Start: 2024-08-27 | End: 2024-09-23

## 2024-09-15 RX ORDER — ATORVASTATIN CALCIUM 40 MG/1
40 TABLET, FILM COATED ORAL
Status: DISCONTINUED | OUTPATIENT
Start: 2024-09-16 | End: 2024-09-15

## 2024-09-15 RX ORDER — GENTAMICIN SULFATE 1 MG/G
CREAM TOPICAL DAILY
Status: DISCONTINUED | OUTPATIENT
Start: 2024-09-16 | End: 2024-09-23 | Stop reason: HOSPADM

## 2024-09-15 RX ORDER — ALLOPURINOL 300 MG/1
300 TABLET ORAL DAILY
Status: DISCONTINUED | OUTPATIENT
Start: 2024-09-16 | End: 2024-09-23 | Stop reason: HOSPADM

## 2024-09-15 RX ORDER — SEVELAMER HYDROCHLORIDE 800 MG/1
800 TABLET, FILM COATED ORAL
Status: DISCONTINUED | OUTPATIENT
Start: 2024-09-16 | End: 2024-09-18

## 2024-09-15 RX ORDER — MIDODRINE HYDROCHLORIDE 5 MG/1
15 TABLET ORAL
Status: DISCONTINUED | OUTPATIENT
Start: 2024-09-16 | End: 2024-09-15

## 2024-09-15 RX ORDER — METOPROLOL SUCCINATE 50 MG/1
50 TABLET, EXTENDED RELEASE ORAL 2 TIMES DAILY
Status: DISCONTINUED | OUTPATIENT
Start: 2024-09-15 | End: 2024-09-17

## 2024-09-15 RX ORDER — TAMSULOSIN HYDROCHLORIDE 0.4 MG/1
0.4 CAPSULE ORAL
Status: DISCONTINUED | OUTPATIENT
Start: 2024-09-16 | End: 2024-09-23 | Stop reason: HOSPADM

## 2024-09-15 RX ORDER — UBIDECARENONE 30 MG
100 CAPSULE ORAL DAILY
Status: DISCONTINUED | OUTPATIENT
Start: 2024-09-16 | End: 2024-09-23 | Stop reason: HOSPADM

## 2024-09-15 RX ORDER — ACETAMINOPHEN 325 MG/1
650 TABLET ORAL EVERY 6 HOURS PRN
Status: DISCONTINUED | OUTPATIENT
Start: 2024-09-15 | End: 2024-09-23 | Stop reason: HOSPADM

## 2024-09-15 RX ORDER — SIMETHICONE 80 MG
80 TABLET,CHEWABLE ORAL 4 TIMES DAILY PRN
Status: DISCONTINUED | OUTPATIENT
Start: 2024-09-15 | End: 2024-09-23 | Stop reason: HOSPADM

## 2024-09-15 RX ORDER — KETOROLAC TROMETHAMINE 30 MG/ML
30 INJECTION, SOLUTION INTRAMUSCULAR; INTRAVENOUS ONCE
Status: DISCONTINUED | OUTPATIENT
Start: 2024-09-15 | End: 2024-09-15

## 2024-09-15 RX ORDER — CINACALCET 30 MG/1
30 TABLET, FILM COATED ORAL
Status: DISCONTINUED | OUTPATIENT
Start: 2024-09-16 | End: 2024-09-23

## 2024-09-15 RX ORDER — POTASSIUM CHLORIDE 750 MG/1
10 TABLET, EXTENDED RELEASE ORAL DAILY
Status: DISCONTINUED | OUTPATIENT
Start: 2024-09-16 | End: 2024-09-17

## 2024-09-15 RX ORDER — WARFARIN SODIUM 1 MG/1
1 TABLET ORAL
Status: DISCONTINUED | OUTPATIENT
Start: 2024-09-15 | End: 2024-09-18

## 2024-09-15 RX ORDER — TORSEMIDE 100 MG/1
100 TABLET ORAL DAILY
Status: DISCONTINUED | OUTPATIENT
Start: 2024-09-16 | End: 2024-09-17

## 2024-09-15 RX ORDER — VITAMIN B COMPLEX
1 CAPSULE ORAL DAILY
Status: DISCONTINUED | OUTPATIENT
Start: 2024-09-16 | End: 2024-09-15 | Stop reason: CLARIF

## 2024-09-15 RX ORDER — FERROUS SULFATE 325(65) MG
325 TABLET ORAL
Status: DISCONTINUED | OUTPATIENT
Start: 2024-09-16 | End: 2024-09-23 | Stop reason: HOSPADM

## 2024-09-15 RX ORDER — ASCORBIC ACID 500 MG
500 TABLET ORAL DAILY
Status: DISCONTINUED | OUTPATIENT
Start: 2024-09-16 | End: 2024-09-23 | Stop reason: HOSPADM

## 2024-09-15 RX ORDER — NICOTINE 21 MG/24HR
1 PATCH, TRANSDERMAL 24 HOURS TRANSDERMAL DAILY
Status: DISCONTINUED | OUTPATIENT
Start: 2024-09-16 | End: 2024-09-19

## 2024-09-15 RX ORDER — MIDODRINE HYDROCHLORIDE 5 MG/1
15 TABLET ORAL
Status: DISCONTINUED | OUTPATIENT
Start: 2024-09-15 | End: 2024-09-23 | Stop reason: HOSPADM

## 2024-09-15 RX ORDER — ATORVASTATIN CALCIUM 40 MG/1
40 TABLET, FILM COATED ORAL
Status: DISCONTINUED | OUTPATIENT
Start: 2024-09-15 | End: 2024-09-23 | Stop reason: HOSPADM

## 2024-09-15 RX ORDER — INSULIN GLARGINE 100 [IU]/ML
15 INJECTION, SOLUTION SUBCUTANEOUS
Status: DISCONTINUED | OUTPATIENT
Start: 2024-09-15 | End: 2024-09-19

## 2024-09-15 RX ORDER — HYDROCODONE BITARTRATE AND ACETAMINOPHEN 5; 325 MG/1; MG/1
2 TABLET ORAL EVERY 6 HOURS PRN
Status: DISCONTINUED | OUTPATIENT
Start: 2024-09-15 | End: 2024-09-23 | Stop reason: HOSPADM

## 2024-09-15 RX ADMIN — MIDODRINE HYDROCHLORIDE 15 MG: 5 TABLET ORAL at 23:00

## 2024-09-15 RX ADMIN — KETOROLAC TROMETHAMINE 30 MG: 30 INJECTION, SOLUTION INTRAMUSCULAR at 18:30

## 2024-09-15 RX ADMIN — WARFARIN SODIUM 1 MG: 1 TABLET ORAL at 23:00

## 2024-09-15 RX ADMIN — ATORVASTATIN CALCIUM 40 MG: 40 TABLET, FILM COATED ORAL at 23:00

## 2024-09-15 NOTE — H&P
H&P - Hospitalist   Name: Masoud Perry 69 y.o. male I MRN: [id number]  Unit/Bed#: ED 01 I Date of Admission: 9/15/2024   Date of Service: 9/15/2024 I Hospital Day: 0     Assessment & Plan  Ambulatory dysfunction  Been weak for 2 weeks, difficulty getting out of a chair, and fell today hitting the back of his head.  At baseline he walks with a walker  PT/OT consult  Case management consultation for acute rehab  ESRD (end stage renal disease) (HCC)  On peritoneal dialysis.  Will consult nephrology.  I did have a discussion with the patient about why he is not on hemodialysis, patient states he was told that he would have to go to the dialysis clinic 4-5 times a week for 6 to 8 hours/day.  I am not certain about this, but at the patient did have hemodialysis patient would be able to find multiple acute rehabs that would accept him for for rehab.  An option would be to start hemodialysis here for rehab    Lab Results   Component Value Date    EGFR 9 09/15/2024    EGFR 5 08/13/2024    EGFR 5 08/12/2024    CREATININE 5.68 (H) 09/15/2024    CREATININE 8.45 (H) 08/13/2024    CREATININE 8.57 (H) 08/12/2024     Patient on peritoneal dialysis (HCC)  Consult nephrology.  Paroxysmal atrial fibrillation (HCC)  Continue metoprolol and currently on Coumadin.  He states that the Coumadin doses have been changing recently.  Currently not on his home med rec.  Current INR is 2.52, he is currently on a alternating schedule of Coumadin 1 mg and then 2 mg.  Will start with 1 mg daily to see how he does  Type 2 diabetes mellitus with stage 4 chronic kidney disease, with long-term current use of insulin ([facility])  Home regimen: Lantus 15 units at bedtime and then sliding scale.  Hospital regimen: Lantus 10 units at bedtime and insulin sliding scale    Lab Results   Component Value Date    HGBA1C 5.9 (H) 07/24/2024     Hypothyroidism  Continue levothyroxine  HLD (hyperlipidemia)  Continue statin  Hypotension  Continue midodrine 15 mg 3 times  daily  Secondary hyperparathyroidism of renal origin (HCC)  Continue Sensipar   Chronic acquired lymphedema  Continue torsemide  Gout  Continue allopurinol  History of CVA (cerebrovascular accident)  Continue aspirin and Coumadin  Obesity, morbid (HCC)  BMI of 37.14      Disposition  #1  PT/OT/ consultation/nutrition consultation  #2  Nephrology consultation  #3  CBC, CMP, mag, INR, in a.m.  #4  Defer to nephrology, may need more education on hemodialysis, as the patient believes that he would be in the dialysis clinic 6- 8 hours a day 4-5 times a week.        VTE Prophylaxis: Warfarin (Coumadin)  / sequential compression device   Code Status: Level 3 - DNAR and DNI       Anticipated Length of Stay:  Patient will be admitted on an Inpatient basis with an anticipated length of stay of greater than 2 midnights.   Justification for Hospital Stay: Please see detailed plans noted above.    Chief Complaint:     Fall/weakness  History of Present Illness:  Masoud Perry is a 69 y.o. male who has past medical history significant for peritoneal dialysis, atrial fibs on Coumadin, hypertension on midodrine, hyperlipidemia, hypothyroidism, hyperlipidemia, gout, ambulatory dysfunction walks with a walker comes in because he fell today.  He states he fell backwards.  Patient is on a blood thinner.  He tells me that has been getting weak for the last 2 weeks and can get out of his recliner.  He states he was told by providers to come to the hospital for rehab.      Review of Systems:    Constitutional:  Denies fever or chills   Eyes:  Denies change in visual acuity   HENT:  Denies nasal congestion or sore throat   Respiratory:  Denies cough or shortness of breath   Cardiovascular:  Denies chest pain or edema   GI:  Denies abdominal pain or bloody stools  :  Denies dysuria   Musculoskeletal: Weakness in the legs  Integument:  Denies rash   Neurologic:  Denies headache or sensory changes   Endocrine:  Denies polyuria or  polydipsia   Lymphatic:  Denies swollen glands   Psychiatric:  Denies depression or anxiety     Past Medical and Surgical History:   Past Medical History:   Diagnosis Date    Anemia in chronic kidney disease     Anticoagulated on Coumadin     Atrial fibrillation (HCC)     BPH (benign prostatic hyperplasia)     CAD (coronary artery disease)     CKD (chronic kidney disease), stage V (HCC)     Compartment syndrome of forearm (HCC)     Right, 2019    COPD (chronic obstructive pulmonary disease) (HCC)     CVA (cerebral vascular accident) (HCC)     Diastolic CHF (HCC)     grade 1 DD    DVT (deep venous thrombosis) (HCC)     Factor V deficiency (HCC)     Gout     Hyperlipidemia     Hypertension     Hypothyroidism     MI (myocardial infarction) (HCC)     x3 - 1999, 2010, after 2015    Morbid obesity (HCC)     Nephrolithiasis     Noncompliance with medications     SHAD (obstructive sleep apnea)     Peritoneal dialysis catheter in place (HCC)     Pulmonary embolism (HCC)     Secondary hyperparathyroidism of renal origin (HCC)     Spinal stenosis of lumbar region     Status post placement of implantable loop recorder     Tobacco dependence      Past Surgical History:   Procedure Laterality Date    CARDIAC ELECTROPHYSIOLOGY STUDY AND ABLATION  04/29/2024    CHOLECYSTECTOMY      CORONARY ANGIOPLASTY WITH STENT PLACEMENT      JOINT REPLACEMENT      bilateral knee    ID CYSTO/URETERO W/LITHOTRIPSY &INDWELL STENT INSRT Left 09/24/2022    Procedure: CYSTOSCOPY URETEROSCOPY WITH LITHOTRIPSY HOLMIUM LASER, AND INSERTION STENT URETERAL;  Surgeon: Lewis Dahl MD;  Location:  MAIN OR;  Service: Urology    ID LAPS INSERTION TUNNELED INTRAPERITONEAL CATHETER N/A 6/7/2024    Procedure: INSERTION PERITONEAL CATHETER DIALYSIS LAPAROSCOPIC;  Surgeon: Hiram Park DO;  Location: [facility];  Service: General    US GUIDED KIDNEY BIOPSY  08/05/2020       Meds/Allergies:  No current facility-administered medications on file prior to encounter.      Current Outpatient Medications on File Prior to Encounter   Medication Sig Dispense Refill    allopurinol (ZYLOPRIM) 300 mg tablet Take 300 mg by mouth daily      Arginine 1000 MG TABS Take 3,000 mg by mouth One daily      ascorbic acid (VITAMIN C) 500 MG tablet Take 500 mg by mouth daily      aspirin (ECOTRIN LOW STRENGTH) 81 mg EC tablet Take 81 mg by mouth daily      atorvastatin (LIPITOR) 40 mg tablet Take 1 tablet (40 mg total) by mouth daily with dinner 30 tablet 0    b complex vitamins capsule Take 1 capsule by mouth daily      cinacalcet (SENSIPAR) 30 mg tablet Take 1 tablet (30 mg total) by mouth daily with dinner 30 tablet 5    co-enzyme Q-10 30 MG capsule Take 100 mg by mouth      ergocalciferol (VITAMIN D2) 50,000 units Take 1 capsule (50,000 Units total) by mouth once a week 12 capsule 1    ferrous sulfate 325 (65 Fe) mg tablet Take 1 tablet (325 mg total) by mouth daily with breakfast 30 tablet 0    HYDROcodone-acetaminophen (NORCO)  mg per tablet Take 1 tablet by mouth every 6 (six) hours as needed for moderate pain      insulin glargine (LANTUS) 100 units/mL subcutaneous injection Inject 15 Units under the skin daily at bedtime 10 mL 0    insulin lispro (HumaLOG) 100 units/mL injection as needed for high blood sugar If his blood sugar gets above 250, he has a sliding scale      levothyroxine 125 mcg tablet Take 125 mcg by mouth daily      Magnesium 400 MG TABS Take 400 mg by mouth in the morning      metoprolol succinate (TOPROL-XL) 50 mg 24 hr tablet Take 1 tablet (50 mg total) by mouth 2 (two) times a day 60 tablet 0    midodrine (PROAMATINE) 5 mg tablet Take 3 tablets (15 mg total) by mouth 3 (three) times a day before meals 270 tablet 1    nitroglycerin (NITROSTAT) 0.4 mg SL tablet PLEASE SEE ATTACHED FOR DETAILED DIRECTIONS      potassium chloride (MICRO-K) 10 MEQ CR capsule Take 1 capsule (10 mEq total) by mouth 2 (two) times a day 60 capsule 5    sevelamer carbonate (RENVELA) 800 mg  tablet Take 1 tablet (800 mg total) by mouth 3 (three) times a day with meals And 1 tab with snacks/dark drinks 150 tablet 5    simethicone (MYLICON) 80 mg chewable tablet Chew 1 tablet (80 mg total) 4 (four) times a day as needed for flatulence 30 tablet 0    tamsulosin (FLOMAX) 0.4 mg Take 0.4 mg by mouth daily with dinner      torsemide (DEMADEX) 100 mg tablet Take 1 tablet (100 mg total) by mouth daily 30 tablet 0    vitamin A 2400 MCG (8000 UT) capsule Take 2,400 Units by mouth daily      vitamin E, tocopherol, 200 units capsule Take 200 Units by mouth daily 180 mg daily             Allergies:   Allergies   Allergen Reactions    Carvedilol Shortness Of Breath    Saccharin - Food Allergy Diarrhea     GI intolerance, adamant does not want to receive    Sucralose - Food Allergy Diarrhea     GI intolerance, adamant does not want to receive    Amiodarone Dizziness    Aspartame - Food Allergy Diarrhea    Bumetanide GI Intolerance and Lightheadedness           Cephalexin Other (See Comments)     unknown    Ciprofloxacin Rash    Hydralazine Tachycardia    Lisinopril GI Intolerance           Metolazone Other (See Comments)     Metallic taste    Morphine Swelling     Of injection site    Nifedipine Lightheadedness    Strawberry Extract - Food Allergy Rash       History:  Marital Status:      Substance Use History:   Social History     Substance and Sexual Activity   Alcohol Use Not Currently     Social History     Tobacco Use   Smoking Status Every Day    Current packs/day: 0.50    Types: Cigarettes   Smokeless Tobacco Never     Social History     Substance and Sexual Activity   Drug Use Never       Family History:  Family History   Problem Relation Age of Onset    Kidney failure Mother     Cirrhosis Mother     Colon cancer Brother        Physical Exam:     Vitals:   Blood Pressure: 104/56 (09/15/24 1830)  Pulse: 71 (09/15/24 1830)  Temperature: 98 °F (36.7 °C) (09/15/24 1555)  Temp Source: Temporal (09/15/24  1553)  Respirations: 17 (09/15/24 1830)  Weight - Scale: 117 kg (258 lb 13.1 oz) (09/15/24 1553)  SpO2: 92 % (09/15/24 1830)    Constitutional: Chronically ill-appearing  Eyes:  EOMI, No scleral icterus   HENT:   oropharynx moist, external ears normal, external nose normal   Respiratory:  No respiratory distress, no wheezing   Cardiovascular:  Normal rate, no murmurs   GI:  Soft, nondistended, no guarding peritoneal dialysis in place  :  No costovertebral angle tenderness   Musculoskeletal:  no tenderness, no deformities.   Integument: Chronic lymphedema of the bilateral lower extremities and chronic venous stasis  Neurologic:  Alert &awake, communicative, CN 2-12 normal,  no focal deficits noted   Psychiatric:  Speech and behavior appropriate       Lab Results: Reviewed radiology reports from this admission including: CT chest, CT abdomen/pelvis, and xray(s).    Results from last 7 days   Lab Units 09/15/24  1559   WBC Thousand/uL 4.43   HEMOGLOBIN g/dL 9.5*   HEMATOCRIT % 30.3*   PLATELETS Thousands/uL 126*   SEGS PCT % 64   LYMPHO PCT % 24   MONO PCT % 7   EOS PCT % 3     Results from last 7 days   Lab Units 09/15/24  1559   POTASSIUM mmol/L 3.2*   CHLORIDE mmol/L 94*   CO2 mmol/L 31   BUN mg/dL 27*   CREATININE mg/dL 5.68*   CALCIUM mg/dL 9.8   ALK PHOS U/L 113*   ALT U/L 15   AST U/L 17     Results from last 7 days   Lab Units 09/15/24  1559   INR  2.52*     @LABRCNTI(mg)@      Imaging: Reviewed radiology reports from this admission including: CT chest, CT abdomen/pelvis, and xray(s).    XR hip/pelv 2-3 vws left if performed    Result Date: 9/15/2024  Narrative: XR HIP/PELV 2-3 VWS LEFT  W PELVIS IF PERFORMED INDICATION: injury. COMPARISON: None FINDINGS: No acute fracture or dislocation. No significant hip degenerative changes. No lytic or blastic osseous lesion. Atherosclerotic calcifications. Otherwise unremarkable soft tissues. Unremarkable visualized lumbar spine.     Impression: No acute osseous  abnormality. Computerized Assisted Algorithm (CAA) may have been used to analyze all applicable images. Workstation performed: [id number]     XR Trauma pelvis ap only 1 or 2 vw    Result Date: 9/15/2024  Narrative: XR PELVIS AP ONLY 1 OR 2 VW INDICATION: TRAUMA. COMPARISON: None FINDINGS: No acute fracture or dislocation. No significant degenerative changes. No lytic or blastic osseous lesion. Peritoneal dialysis catheter left lower quadrant. Atherosclerotic calcifications. Degenerative changes in the visualized lower lumbar spine.     Impression: No acute osseous abnormality. Computerized Assisted Algorithm (CAA) may have been used to analyze all applicable images. Workstation performed: [id number]     XR Trauma chest portable    Result Date: 9/15/2024  Narrative: XR CHEST PORTABLE INDICATION: TRAUMA. COMPARISON: 8/12/2024 FINDINGS: Loop recorder left anterior chest wall. Mild bibasilar atelectasis. Small right pleural effusion, better seen by CT. Normal cardiomediastinal silhouette. Bones are unremarkable for age. Normal upper abdomen.     Impression: Small right pleural effusion. Mild bibasilar atelectasis. Workstation performed: [id number]     TRAUMA - CT chest abdomen pelvis wo contrast    Result Date: 9/15/2024  Narrative: CT CHEST, ABDOMEN AND PELVIS WITHOUT IV CONTRAST INDICATION: trauma. COMPARISON: Renal stone CT 9/23/2022 TECHNIQUE: CT examination of the chest, abdomen and pelvis was performed without intravenous contrast. Lack of IV contrast limits the sensitivity for pathology. Multiplanar 2D reformatted images were created from the source data. 3D reconstructions of the bony thorax were performed in order to improve sensitivity of evaluation for rib fractures. This examination, like all CT scans performed in the [facility], was performed utilizing techniques to minimize radiation dose exposure, including the use of iterative reconstruction and automated exposure control. Radiation dose  length product (DLP) for this visit: 1486.7 mGy-cm Enteric Contrast: Not administered. Lack of oral contrast limits the sensitivity for pathology within the bowel. FINDINGS: CHEST LUNGS: Bibasilar atelectatic changes. 8 mm subpleural nodule posterior left lower lobe (4/37). No tracheal or endobronchial lesion. PLEURA: Small right pleural effusion. HEART/GREAT VESSELS: Coronary artery calcifications. No thoracic aortic aneurysm. MEDIASTINUM AND ИВАН: Subcentimeter lymph node adjacent to the distal esophagus. 1 cm precarinal lymph node. CHEST WALL AND LOWER NECK: Loop recorder left anterior chest wall. ABDOMEN LIVER/BILIARY TREE: Unremarkable. GALLBLADDER: Post cholecystectomy. SPLEEN: Unremarkable. PANCREAS: Unremarkable. ADRENAL GLANDS: Unremarkable. KIDNEYS/URETERS: There are densities within the collecting systems of both kidneys some of which appears somewhat staghorn in appearance. However, no hydronephrosis seen. Bilateral renal cysts. STOMACH AND BOWEL: Colonic diverticulosis without findings of acute diverticulitis. APPENDIX: No findings to suggest appendicitis. ABDOMINOPELVIC CAVITY: There is a peritoneal dialysis catheter and ascites consistent with peritoneal dialysis. No gross pneumoperitoneum. No adenopathy. VESSELS: Atherosclerosis without abdominal aortic aneurysm. PELVIS REPRODUCTIVE ORGANS: Unremarkable for patient's age. URINARY BLADDER: Numerous small calculi. ABDOMINAL WALL/INGUINAL REGIONS: Unremarkable. BONES: No acute fracture or suspicious osseous lesion.     Impression: No findings of acute traumatic injury in the chest, abdomen or pelvis within the limits of unenhanced technique. Incidental 8 mm subpleural pulmonary nodule in the left lower lobe. Consider 6-month CT scan of the chest without contrast. Small right pleural effusion. Bilateral kidney stones. No hydronephrosis. Peritoneal dialysis catheter and ascites. The study was marked in EPIC for immediate notification. Workstation  performed: [id number]     TRAUMA - CT spine cervical wo contrast    Result Date: 9/15/2024  Narrative: CT CERVICAL SPINE - WITHOUT CONTRAST INDICATION:   TRAUMA. COMPARISON: 1/11/2022 TECHNIQUE:  CT examination of the cervical spine was performed without intravenous contrast.  Contiguous axial images were obtained. Multiplanar 2D reformatted images were created from the source data. Radiation dose length product (DLP) for this visit:  408.1 mGy-cm .  This examination, like all CT scans performed in the Counts include 234 beds at the [facility], was performed utilizing techniques to minimize radiation dose exposure, including the use of iterative reconstruction and automated exposure control. IMAGE QUALITY:  Diagnostic. FINDINGS: ALIGNMENT:  Normal alignment of the cervical spine. No subluxation. VERTEBRAE:  No fracture. DEGENERATIVE CHANGES:  Moderate multilevel cervical degenerative changes are noted. No critical central canal stenosis. PREVERTEBRAL AND PARASPINAL SOFT TISSUES: Unremarkable THORACIC INLET:  Normal.     Impression: No cervical spine fracture or traumatic malalignment. The study was marked in EPIC for immediate notification. Workstation performed: [id number]     TRAUMA - CT head wo contrast    Result Date: 9/15/2024  Narrative: CT BRAIN - WITHOUT CONTRAST INDICATION:   TRAUMA. COMPARISON: 1/12/2022 TECHNIQUE:  CT examination of the brain was performed.  Multiplanar 2D reformatted images were created from the source data. Radiation dose length product (DLP) for this visit:  957.35 mGy-cm .  This examination, like all CT scans performed in the Counts include 234 beds at the [facility], was performed utilizing techniques to minimize radiation dose exposure, including the use of iterative  reconstruction and automated exposure control. IMAGE QUALITY:  Diagnostic. FINDINGS: PARENCHYMA:  No intracranial mass, mass effect or midline shift. No CT signs of acute infarction.  No acute parenchymal hemorrhage. VENTRICLES AND EXTRA-AXIAL  SPACES:  Normal for the patient's age. VISUALIZED ORBITS: Normal visualized orbits. PARANASAL SINUSES: Normal visualized paranasal sinuses. CALVARIUM AND EXTRACRANIAL SOFT TISSUES:  Normal.     Impression: No acute intracranial abnormality. The study was marked in EPIC for immediate notification. Workstation performed: [id number]         Medical decision making: Low   Diagnosis addressed: 1 uncomplicated medical problem.  Data:   Reviewed  CBC, CMP, troponin, INR, A1c, CT head, chest, abdomen, x-rays  Ordered CBC, BMP, INR, mag  Reviewed external notes from cardiology and nephrology  Assessment by independent historian:  Discussion of management with ER provider: Patient with no injury on x-rays ct scans, needing placement          Risk:  Prescription drug management: Continue home meds  Discussion for hospitalization with ER provider: Patient requires physical therapy, Occupational Therapy and case management.  Difficult placement due to peritoneal dialysis                     Epic Records Reviewed as well as Records in Care Everywhere    ** Please Note: Dragon 360 Dictation voice to text software was used in the creation of this document. **

## 2024-09-15 NOTE — ED PROVIDER NOTES
Emergency Department Trauma Note  Masoud Perry 69 y.o. male MRN: [id number]  Unit/Bed#: ED 01/ED 01 Encounter: [id number]      Trauma Alert: Trauma Acuity: Trauma Evaluation  Model of Arrival:   via    Trauma Team: Current Providers  Attending Provider: Fermin Francisco MD  Attending Provider: Magdiel Holguin MD  Registered Nurse: Irena Johnson RN  Consultants:     None      History of Present Illness     Chief Complaint:   Chief Complaint   Patient presents with    Fall     Patient presents to the ED with complaints of lower back pain and left hip pain secondary to a fall that occurred today. The patient states he fell backwards. Denies striking his head. The patient reports taking Coumadin.      HPI:  Masoud Perry is a 69 y.o. male who presents with fall at home, left hip pain.  Mechanism:Details of Incident: Patient lost his balance, falling backwards. Denies headstrike, however reports taking Coumadin Injury Date: 09/15/24 Injury Time: 1355 Injury Occurence Location - Specify County: [facility]    Patient coming out of restroom lost balance, fell backward, not sure if he hit his head.  Complains of left hip pain.  Normally uses walker, is on Coumadin.      History provided by:  Patient   used: No    Fall  Mechanism of injury: fall    Injury location: Left hip.  Incident location:  Home  Fall:     Fall occurred:  Standing  Suspicion of alcohol use: no    Suspicion of drug use: no    Prior to arrival data:     Bystander interventions:  None    Patient ambulatory at scene: no      Blood loss:  None    Responsiveness at scene:  Alert    Orientation at scene:  Person, place, situation and time    Loss of consciousness: no      Amnesic to event: no      Airway interventions:  None    Immobilization:  None    Airway condition since incident:  Stable    Breathing condition since incident:  Stable    Circulation condition since incident:  Stable    Mental status condition since incident:   Stable    Disability condition since incident:  Stable  Associated symptoms: no abdominal pain, no back pain, no chest pain, no headaches, no hearing loss, no nausea, no neck pain, no seizures and no vomiting    Risk factors: anticoagulation therapy      Review of Systems   Constitutional:  Negative for chills and fever.   HENT:  Negative for ear pain, hearing loss, sore throat, trouble swallowing and voice change.    Eyes:  Negative for pain and discharge.   Respiratory:  Negative for cough, shortness of breath and wheezing.    Cardiovascular:  Negative for chest pain and palpitations.   Gastrointestinal:  Negative for abdominal pain, blood in stool, constipation, diarrhea, nausea and vomiting.   Genitourinary:  Negative for dysuria, flank pain, frequency and hematuria.   Musculoskeletal:  Negative for back pain, joint swelling, neck pain and neck stiffness.   Skin:  Negative for rash and wound.   Neurological:  Negative for dizziness, seizures, syncope, facial asymmetry and headaches.   Psychiatric/Behavioral:  Negative for hallucinations, self-injury and suicidal ideas.    All other systems reviewed and are negative.      Historical Information     Immunizations:   Immunization History   Administered Date(s) Administered    COVID-19 PFIZER VACCINE 0.3 ML IM 03/26/2021, 04/16/2021    INFLUENZA 10/07/2013, 11/14/2014, 10/12/2015, 10/20/2016, 09/28/2017, 10/01/2018, 10/18/2019, 11/19/2021, 10/03/2022    Influenza Quadrivalent Preservative Free 3 years and older IM 10/01/2019, 09/01/2020, 01/05/2024    Influenza, seasonal, injectable 10/07/2013, 11/14/2014    Pneumococcal Conjugate 13-Valent 11/04/2020    Pneumococcal Conjugate PCV 7 09/01/2019    Pneumococcal Polysaccharide PPV23 11/19/2021       Past Medical History:   Diagnosis Date    Anemia in chronic kidney disease     Anticoagulated on Coumadin     Atrial fibrillation (HCC)     BPH (benign prostatic hyperplasia)     CAD (coronary artery disease)     CKD  (chronic kidney disease), stage V (HCC)     Compartment syndrome of forearm (HCC)     Right, 2019    COPD (chronic obstructive pulmonary disease) (HCC)     CVA (cerebral vascular accident) (HCC)     Diastolic CHF (HCC)     grade 1 DD    DVT (deep venous thrombosis) (HCC)     Factor V deficiency (HCC)     Gout     Hyperlipidemia     Hypertension     Hypothyroidism     MI (myocardial infarction) (HCC)     x3 - 1999, 2010, after 2015    Morbid obesity (HCC)     Nephrolithiasis     Noncompliance with medications     SHAD (obstructive sleep apnea)     Peritoneal dialysis catheter in place (HCC)     Pulmonary embolism (HCC)     Secondary hyperparathyroidism of renal origin (HCC)     Spinal stenosis of lumbar region     Status post placement of implantable loop recorder     Tobacco dependence        Family History   Problem Relation Age of Onset    Kidney failure Mother     Cirrhosis Mother     Colon cancer Brother      Past Surgical History:   Procedure Laterality Date    CARDIAC ELECTROPHYSIOLOGY STUDY AND ABLATION  04/29/2024    CHOLECYSTECTOMY      CORONARY ANGIOPLASTY WITH STENT PLACEMENT      JOINT REPLACEMENT      bilateral knee    IA CYSTO/URETERO W/LITHOTRIPSY &INDWELL STENT INSRT Left 09/24/2022    Procedure: CYSTOSCOPY URETEROSCOPY WITH LITHOTRIPSY HOLMIUM LASER, AND INSERTION STENT URETERAL;  Surgeon: Lewis Dahl MD;  Location:  MAIN OR;  Service: Urology    IA LAPS INSERTION TUNNELED INTRAPERITONEAL CATHETER N/A 6/7/2024    Procedure: INSERTION PERITONEAL CATHETER DIALYSIS LAPAROSCOPIC;  Surgeon: Hiram Park DO;  Location: [facility];  Service: General    US GUIDED KIDNEY BIOPSY  08/05/2020     Social History     Tobacco Use    Smoking status: Every Day     Current packs/day: 0.50     Types: Cigarettes    Smokeless tobacco: Never   Vaping Use    Vaping status: Never Used   Substance Use Topics    Alcohol use: Not Currently    Drug use: Never     E-Cigarette/Vaping    E-Cigarette Use Never User       E-Cigarette/Vaping Substances       Family History:   Family History   Problem Relation Age of Onset    Kidney failure Mother     Cirrhosis Mother     Colon cancer Brother        Meds/Allergies   Prior to Admission Medications   Prescriptions Last Dose Informant Patient Reported? Taking?   Arginine 1000 MG TABS  Self Yes No   Sig: Take 3,000 mg by mouth One daily   HYDROcodone-acetaminophen (NORCO)  mg per tablet  Self Yes No   Sig: Take 1 tablet by mouth every 6 (six) hours as needed for moderate pain   Magnesium 400 MG TABS   Yes No   Sig: Take 400 mg by mouth in the morning   allopurinol (ZYLOPRIM) 300 mg tablet  Self Yes No   Sig: Take 300 mg by mouth daily   ascorbic acid (VITAMIN C) 500 MG tablet  Self Yes No   Sig: Take 500 mg by mouth daily   aspirin (ECOTRIN LOW STRENGTH) 81 mg EC tablet  Self Yes No   Sig: Take 81 mg by mouth daily   atorvastatin (LIPITOR) 40 mg tablet  Self No No   Sig: Take 1 tablet (40 mg total) by mouth daily with dinner   b complex vitamins capsule  Self Yes No   Sig: Take 1 capsule by mouth daily   cinacalcet (SENSIPAR) 30 mg tablet   No No   Sig: Take 1 tablet (30 mg total) by mouth daily with dinner   co-enzyme Q-10 30 MG capsule  Self Yes No   Sig: Take 100 mg by mouth   ergocalciferol (VITAMIN D2) 50,000 units   No No   Sig: Take 1 capsule (50,000 Units total) by mouth once a week   ferrous sulfate 325 (65 Fe) mg tablet   No No   Sig: Take 1 tablet (325 mg total) by mouth daily with breakfast   insulin glargine (LANTUS) 100 units/mL subcutaneous injection   No No   Sig: Inject 15 Units under the skin daily at bedtime   insulin lispro (HumaLOG) 100 units/mL injection  Self Yes No   Sig: as needed for high blood sugar If his blood sugar gets above 250, he has a sliding scale   levothyroxine 125 mcg tablet  Self Yes No   Sig: Take 125 mcg by mouth daily   metoprolol succinate (TOPROL-XL) 50 mg 24 hr tablet   No No   Sig: Take 1 tablet (50 mg total) by mouth 2 (two) times a  day   midodrine (PROAMATINE) 5 mg tablet   No No   Sig: Take 3 tablets (15 mg total) by mouth 3 (three) times a day before meals   nitroglycerin (NITROSTAT) 0.4 mg SL tablet  Self Yes No   Sig: PLEASE SEE ATTACHED FOR DETAILED DIRECTIONS   potassium chloride (MICRO-K) 10 MEQ CR capsule   No No   Sig: Take 1 capsule (10 mEq total) by mouth 2 (two) times a day   sevelamer carbonate (RENVELA) 800 mg tablet   No No   Sig: Take 1 tablet (800 mg total) by mouth 3 (three) times a day with meals And 1 tab with snacks/dark drinks   simethicone (MYLICON) 80 mg chewable tablet   No No   Sig: Chew 1 tablet (80 mg total) 4 (four) times a day as needed for flatulence   tamsulosin (FLOMAX) 0.4 mg  Self Yes No   Sig: Take 0.4 mg by mouth daily with dinner   torsemide (DEMADEX) 100 mg tablet   No No   Sig: Take 1 tablet (100 mg total) by mouth daily   vitamin A 2400 MCG (8000 UT) capsule  Self Yes No   Sig: Take 2,400 Units by mouth daily   vitamin E, tocopherol, 200 units capsule  Self Yes No   Sig: Take 200 Units by mouth daily 180 mg daily      Facility-Administered Medications: None       Allergies   Allergen Reactions    Carvedilol Shortness Of Breath    Saccharin - Food Allergy Diarrhea     GI intolerance, adamant does not want to receive    Sucralose - Food Allergy Diarrhea     GI intolerance, adamant does not want to receive    Amiodarone Dizziness    Aspartame - Food Allergy Diarrhea    Bumetanide GI Intolerance and Lightheadedness           Cephalexin Other (See Comments)     unknown    Ciprofloxacin Rash    Hydralazine Tachycardia    Lisinopril GI Intolerance           Metolazone Other (See Comments)     Metallic taste    Morphine Swelling     Of injection site    Nifedipine Lightheadedness    Strawberry Extract - Food Allergy Rash       PHYSICAL EXAM    PE limited by: Nothing    Objective   Vitals:   First set: Temperature: 98 °F (36.7 °C) (09/15/24 1553)  Pulse: 78 (09/15/24 1553)  Respirations: 18 (09/15/24  1553)  Blood Pressure: 101/57 (09/15/24 1553)  SpO2: 95 % (09/15/24 1553)    Primary Survey:   (A) Airway: Normal  (B) Breathing: Normal  (C) Circulation: Pulses:   Normal  (D) Disabliity: GCS 15  (E) Expose:  Completed    Secondary Survey: (Click on Physical Exam tab above)  Physical Exam  Vitals and nursing note reviewed.   Constitutional:       General: He is not in acute distress.     Appearance: He is well-developed.   HENT:      Head: Normocephalic and atraumatic.      Right Ear: External ear normal.      Left Ear: External ear normal.   Eyes:      General: No scleral icterus.        Right eye: No discharge.         Left eye: No discharge.      Extraocular Movements: Extraocular movements intact.      Conjunctiva/sclera: Conjunctivae normal.   Cardiovascular:      Rate and Rhythm: Normal rate and regular rhythm.      Heart sounds: Normal heart sounds. No murmur heard.  Pulmonary:      Effort: Pulmonary effort is normal.      Breath sounds: Normal breath sounds. No wheezing or rales.   Abdominal:      General: Bowel sounds are normal. There is no distension.      Palpations: Abdomen is soft.      Tenderness: There is no abdominal tenderness. There is no guarding or rebound.   Musculoskeletal:         General: No deformity. Normal range of motion.      Cervical back: Normal range of motion and neck supple.   Skin:     General: Skin is warm and dry.      Findings: No rash.   Neurological:      General: No focal deficit present.      Mental Status: He is alert and oriented to person, place, and time.      Cranial Nerves: No cranial nerve deficit.   Psychiatric:         Mood and Affect: Mood normal.         Behavior: Behavior normal.         Thought Content: Thought content normal.         Judgment: Judgment normal.         Cervical spine cleared by clinical criteria? No (imaging required)      Invasive Devices       Peripheral Intravenous Line  Duration             Peripheral IV 09/15/24 Left Antecubital <1 day     Peripheral IV 09/15/24 Right;Ventral (anterior) Forearm <1 day                    Lab Results:   Results Reviewed       Procedure Component Value Units Date/Time    HS Troponin I 2hr [[id number]]  (Normal) Collected: 09/15/24 1808    Lab Status: Final result Specimen: Blood from Arm, Left Updated: 09/15/24 1841     hs TnI 2hr 20 ng/L      Delta 2hr hsTnI -3 ng/L     HS Troponin I 4hr [[id number]]     Lab Status: No result Specimen: Blood     HS Troponin 0hr (reflex protocol) [[id number]]  (Normal) Collected: 09/15/24 1559    Lab Status: Final result Specimen: Blood from Arm, Left Updated: 09/15/24 1628     hs TnI 0hr 23 ng/L     Protime-INR [[id number]]  (Abnormal) Collected: 09/15/24 1559    Lab Status: Final result Specimen: Blood from Arm, Left Updated: 09/15/24 1621     Protime 27.3 seconds      INR 2.52    Narrative:      INR Therapeutic Range    Indication                                             INR Range      Atrial Fibrillation                                               2.0-3.0  Hypercoagulable State                                    2.0.2.3  Left Ventricular Asist Device                            2.0-3.0  Mechanical Heart Valve                                  -    Aortic(with afib, MI, embolism, HF, LA enlargement,    and/or coagulopathy)                                     2.0-3.0 (2.5-3.5)     Mitral                                                             2.5-3.5  Prosthetic/Bioprosthetic Heart Valve               2.0-3.0  Venous thromboembolism (VTE: VT, PE        2.0-3.0    Comprehensive metabolic panel [[id number]]  (Abnormal) Collected: 09/15/24 1559    Lab Status: Final result Specimen: Blood from Arm, Left Updated: 09/15/24 1620     Sodium 134 mmol/L      Potassium 3.2 mmol/L      Chloride 94 mmol/L      CO2 31 mmol/L      ANION GAP 9 mmol/L      BUN 27 mg/dL      Creatinine 5.68 mg/dL      Glucose 95 mg/dL      Calcium 9.8 mg/dL      Corrected Calcium 11.0 mg/dL      AST 17 U/L      ALT 15  U/L      Alkaline Phosphatase 113 U/L      Total Protein 5.8 g/dL      Albumin 2.5 g/dL      Total Bilirubin 0.31 mg/dL      eGFR 9 ml/min/1.73sq m     Narrative:      National Kidney Disease Foundation guidelines for Chronic Kidney Disease (CKD):     Stage 1 with normal or high GFR (GFR > 90 mL/min/1.73 square meters)    Stage 2 Mild CKD (GFR = 60-89 mL/min/1.73 square meters)    Stage 3A Moderate CKD (GFR = 45-59 mL/min/1.73 square meters)    Stage 3B Moderate CKD (GFR = 30-44 mL/min/1.73 square meters)    Stage 4 Severe CKD (GFR = 15-29 mL/min/1.73 square meters)    Stage 5 End Stage CKD (GFR <15 mL/min/1.73 square meters)  Note: GFR calculation is accurate only with a steady state creatinine    CBC and differential [[id number]]  (Abnormal) Collected: 09/15/24 1556    Lab Status: Final result Specimen: Blood from Arm, Left Updated: 09/15/24 1604     WBC 4.43 Thousand/uL      RBC 2.98 Million/uL      Hemoglobin 9.5 g/dL      Hematocrit 30.3 %       fL      MCH 31.9 pg      MCHC 31.4 g/dL      RDW 15.9 %      MPV 8.4 fL      Platelets 126 Thousands/uL      nRBC 0 /100 WBCs      Segmented % 64 %      Immature Grans % 1 %      Lymphocytes % 24 %      Monocytes % 7 %      Eosinophils Relative 3 %      Basophils Relative 1 %      Absolute Neutrophils 2.82 Thousands/µL      Absolute Immature Grans 0.02 Thousand/uL      Absolute Lymphocytes 1.08 Thousands/µL      Absolute Monocytes 0.32 Thousand/µL      Eosinophils Absolute 0.15 Thousand/µL      Basophils Absolute 0.04 Thousands/µL                    Imaging Studies:   Direct to CT: Yes  XR hip/pelv 2-3 vws left if performed   Final Result by Hiram Lubin MD (09/15 1706)      No acute osseous abnormality.         Computerized Assisted Algorithm (CAA) may have been used to analyze all applicable images.            Workstation performed: [id number]         TRAUMA - CT chest abdomen pelvis wo contrast   Final Result by Hiram Lubin MD (09/15 1702)       No findings of acute traumatic injury in the chest, abdomen or pelvis within the limits of unenhanced technique.   Incidental 8 mm subpleural pulmonary nodule in the left lower lobe. Consider 6-month CT scan of the chest without contrast.   Small right pleural effusion.   Bilateral kidney stones. No hydronephrosis.   Peritoneal dialysis catheter and ascites.      The study was marked in EPIC for immediate notification.               Workstation performed: [id number]         TRAUMA - CT head wo contrast   Final Result by Hiram Lubin MD (09/15 1638)      No acute intracranial abnormality.      The study was marked in EPIC for immediate notification.            Workstation performed: [id number]         TRAUMA - CT spine cervical wo contrast   Final Result by Hiram Lubin MD (09/15 1643)      No cervical spine fracture or traumatic malalignment.      The study was marked in EPIC for immediate notification.            Workstation performed: [id number]         XR Trauma chest portable   Final Result by Hiram Lubin MD (09/15 1704)      Small right pleural effusion. Mild bibasilar atelectasis.            Workstation performed: [id number]         XR Trauma pelvis ap only 1 or 2 vw   Final Result by Hiram Lubin MD (09/15 1705)      No acute osseous abnormality.         Computerized Assisted Algorithm (CAA) may have been used to analyze all applicable images.         Workstation performed: [id number]               Procedures  ECG 12 Lead Documentation Only    Date/Time: 9/15/2024 4:31 PM    Performed by: Fermin Francisco MD  Authorized by: Fermin Francisco MD    ECG reviewed by me, the ED Provider: yes    Patient location:  ED  Previous ECG:     Previous ECG:  Unavailable  Interpretation:     Interpretation: non-specific    Rate:     ECG rate:  71    ECG rate assessment: normal    Rhythm:     Rhythm: sinus rhythm    Ectopy:     Ectopy: PAC    QRS:     QRS axis:  Normal    QRS intervals:   Normal  Conduction:     Conduction: abnormal      Abnormal conduction: complete RBBB    ST segments:     ST segments:  Normal  T waves:     T waves: normal             ED Course  ED Course as of 09/15/24 1931   Sun Sep 15, 2024   1808 Patient failed ambulatory trial with walker.  Will require admission for placement for inpatient rehab.  Medicine contacted, awaiting response for inpatient admission for rehab placement           Medical Decision Making  Patient presents to the emergency department with fall, ambulatory dysfunction.      Based on the MSE and the history provided, patient may be at risk for traumatic injuries    Based on the work-up performed in the emergency department which includes physical examination, laboratory testing, imaging which may include advanced imaging as necessary such as CT scan or ultrasound, it is deemed that the patient will require admission to the hospital for treatment of ambulatory dysfunction, inability ambulate.    Presents after fall at home, trauma workup initiated due to anticoagulation.  Trauma imaging negative.  However, patient unable to ambulate with walker.  Review of prior medical record reveals that at last admission PT/OT recommended inpatient placement for rehab therapy but patient declined to wait in the hospital until placement could be procured.  Now patient returns having fallen at home due to generalized weakness, deconditioning, ambulatory dysfunction.  In the emergency room unable to ambulate with a walker.  Trauma imaging negative and trauma workup negative, will admit patient for inpatient placement for rehab.    Amount and/or Complexity of Data Reviewed  Labs: ordered. Decision-making details documented in ED Course.     Details: At patient baseline  Radiology: ordered and independent interpretation performed. Decision-making details documented in ED Course.     Details: Trauma imaging negative  ECG/medicine tests: ordered and independent interpretation  performed. Decision-making details documented in ED Course.     Details: Normal sinus rhythm rate 71 with PACs and right bundle    Risk  Decision regarding hospitalization.                Disposition  [facility] Transfer: No  Final diagnoses:   Fall, initial encounter   Ambulatory dysfunction     Time reflects when diagnosis was documented in both MDM as applicable and the Disposition within this note       Time User Action Codes Description Comment    9/15/2024  7:26 PM Fermin Francisco [W19.XXXA] Fall, initial encounter     9/15/2024  7:26 PM Fermin Francisco [R26.2] Ambulatory dysfunction           ED Disposition       ED Disposition   Admit    Condition   Stable    Date/Time   Sun Sep 15, 2024  7:26 PM    Comment                  Follow-up Information    None       Patient's Medications   Discharge Prescriptions    No medications on file     No discharge procedures on file.    PDMP Review         Value Time User    PDMP Reviewed  Yes 8/5/2024 11:10 AM Moses Noel MD            ED Provider  Electronically Signed by           Fermin Francisco MD  09/15/24 1932

## 2024-09-16 PROBLEM — E87.6 HYPOKALEMIA: Status: ACTIVE | Noted: 2024-09-16

## 2024-09-16 LAB
ALBUMIN SERPL BCG-MCNC: 2.2 G/DL (ref 3.5–5)
ALP SERPL-CCNC: 101 U/L (ref 34–104)
ALT SERPL W P-5'-P-CCNC: 13 U/L (ref 7–52)
ANION GAP SERPL CALCULATED.3IONS-SCNC: 8 MMOL/L (ref 4–13)
AST SERPL W P-5'-P-CCNC: 17 U/L (ref 13–39)
ATRIAL RATE: 71 BPM
BILIRUB SERPL-MCNC: 0.29 MG/DL (ref 0.2–1)
BUN SERPL-MCNC: 29 MG/DL (ref 5–25)
CALCIUM ALBUM COR SERPL-MCNC: 10.6 MG/DL (ref 8.3–10.1)
CALCIUM SERPL-MCNC: 9.2 MG/DL (ref 8.4–10.2)
CHLORIDE SERPL-SCNC: 96 MMOL/L (ref 96–108)
CO2 SERPL-SCNC: 31 MMOL/L (ref 21–32)
CREAT SERPL-MCNC: 5.78 MG/DL (ref 0.6–1.3)
ERYTHROCYTE [DISTWIDTH] IN BLOOD BY AUTOMATED COUNT: 15.8 % (ref 11.6–15.1)
GFR SERPL CREATININE-BSD FRML MDRD: 9 ML/MIN/1.73SQ M
GLUCOSE SERPL-MCNC: 89 MG/DL (ref 65–140)
HCT VFR BLD AUTO: 27.2 % (ref 36.5–49.3)
HGB BLD-MCNC: 8.5 G/DL (ref 12–17)
INR PPP: 2.98 (ref 0.85–1.19)
MAGNESIUM SERPL-MCNC: 1.5 MG/DL (ref 1.9–2.7)
MCH RBC QN AUTO: 32.2 PG (ref 26.8–34.3)
MCHC RBC AUTO-ENTMCNC: 31.3 G/DL (ref 31.4–37.4)
MCV RBC AUTO: 103 FL (ref 82–98)
P AXIS: 17 DEGREES
PLATELET # BLD AUTO: 127 THOUSANDS/UL (ref 149–390)
PMV BLD AUTO: 9.7 FL (ref 8.9–12.7)
POTASSIUM SERPL-SCNC: 2.7 MMOL/L (ref 3.5–5.3)
PR INTERVAL: 196 MS
PROT SERPL-MCNC: 5 G/DL (ref 6.4–8.4)
PROTHROMBIN TIME: 30.9 SECONDS (ref 12.3–15)
QRS AXIS: -15 DEGREES
QRSD INTERVAL: 168 MS
QT INTERVAL: 454 MS
QTC INTERVAL: 493 MS
RBC # BLD AUTO: 2.64 MILLION/UL (ref 3.88–5.62)
SODIUM SERPL-SCNC: 135 MMOL/L (ref 135–147)
T WAVE AXIS: 12 DEGREES
VENTRICULAR RATE: 71 BPM
WBC # BLD AUTO: 3.89 THOUSAND/UL (ref 4.31–10.16)

## 2024-09-16 PROCEDURE — 99232 SBSQ HOSP IP/OBS MODERATE 35: CPT | Performed by: FAMILY MEDICINE

## 2024-09-16 PROCEDURE — 85027 COMPLETE CBC AUTOMATED: CPT | Performed by: FAMILY MEDICINE

## 2024-09-16 PROCEDURE — 97167 OT EVAL HIGH COMPLEX 60 MIN: CPT

## 2024-09-16 PROCEDURE — 99222 1ST HOSP IP/OBS MODERATE 55: CPT | Performed by: INTERNAL MEDICINE

## 2024-09-16 PROCEDURE — 85610 PROTHROMBIN TIME: CPT | Performed by: FAMILY MEDICINE

## 2024-09-16 PROCEDURE — 80053 COMPREHEN METABOLIC PANEL: CPT | Performed by: FAMILY MEDICINE

## 2024-09-16 PROCEDURE — 83735 ASSAY OF MAGNESIUM: CPT | Performed by: FAMILY MEDICINE

## 2024-09-16 PROCEDURE — 97163 PT EVAL HIGH COMPLEX 45 MIN: CPT | Performed by: PHYSICAL THERAPIST

## 2024-09-16 RX ORDER — POTASSIUM CHLORIDE 1500 MG/1
40 TABLET, EXTENDED RELEASE ORAL ONCE
Status: COMPLETED | OUTPATIENT
Start: 2024-09-16 | End: 2024-09-16

## 2024-09-16 RX ORDER — LIDOCAINE 50 MG/G
1 PATCH TOPICAL DAILY
Status: DISCONTINUED | OUTPATIENT
Start: 2024-09-16 | End: 2024-09-23 | Stop reason: HOSPADM

## 2024-09-16 RX ADMIN — TAMSULOSIN HYDROCHLORIDE 0.4 MG: 0.4 CAPSULE ORAL at 15:36

## 2024-09-16 RX ADMIN — SEVELAMER HYDROCHLORIDE 800 MG: 800 TABLET, FILM COATED ORAL at 11:36

## 2024-09-16 RX ADMIN — MIDODRINE HYDROCHLORIDE 15 MG: 5 TABLET ORAL at 11:36

## 2024-09-16 RX ADMIN — SEVELAMER HYDROCHLORIDE 800 MG: 800 TABLET, FILM COATED ORAL at 08:08

## 2024-09-16 RX ADMIN — FERROUS SULFATE TAB 325 MG (65 MG ELEMENTAL FE) 325 MG: 325 (65 FE) TAB at 08:08

## 2024-09-16 RX ADMIN — MIDODRINE HYDROCHLORIDE 15 MG: 5 TABLET ORAL at 08:14

## 2024-09-16 RX ADMIN — LIDOCAINE 5% 1 PATCH: 700 PATCH TOPICAL at 11:36

## 2024-09-16 RX ADMIN — WARFARIN SODIUM 1 MG: 1 TABLET ORAL at 17:18

## 2024-09-16 RX ADMIN — METOPROLOL SUCCINATE 50 MG: 50 TABLET, EXTENDED RELEASE ORAL at 21:11

## 2024-09-16 RX ADMIN — INSULIN GLARGINE 15 UNITS: 100 INJECTION, SOLUTION SUBCUTANEOUS at 21:11

## 2024-09-16 RX ADMIN — ATORVASTATIN CALCIUM 40 MG: 40 TABLET, FILM COATED ORAL at 15:37

## 2024-09-16 RX ADMIN — HYDROCODONE BITARTRATE AND ACETAMINOPHEN 2 TABLET: 5; 325 TABLET ORAL at 21:11

## 2024-09-16 RX ADMIN — POTASSIUM CHLORIDE 40 MEQ: 1500 TABLET, EXTENDED RELEASE ORAL at 11:36

## 2024-09-16 RX ADMIN — HYDROCODONE BITARTRATE AND ACETAMINOPHEN 2 TABLET: 5; 325 TABLET ORAL at 00:40

## 2024-09-16 RX ADMIN — SEVELAMER HYDROCHLORIDE 800 MG: 800 TABLET, FILM COATED ORAL at 15:36

## 2024-09-16 RX ADMIN — METOPROLOL SUCCINATE 50 MG: 50 TABLET, EXTENDED RELEASE ORAL at 08:09

## 2024-09-16 RX ADMIN — GENTAMICIN SULFATE: 1 CREAM TOPICAL at 11:36

## 2024-09-16 RX ADMIN — HYDROCODONE BITARTRATE AND ACETAMINOPHEN 2 TABLET: 5; 325 TABLET ORAL at 08:13

## 2024-09-16 RX ADMIN — TORSEMIDE 100 MG: 100 TABLET ORAL at 08:08

## 2024-09-16 RX ADMIN — MIDODRINE HYDROCHLORIDE 15 MG: 5 TABLET ORAL at 15:36

## 2024-09-16 RX ADMIN — OXYCODONE HYDROCHLORIDE AND ACETAMINOPHEN 500 MG: 500 TABLET ORAL at 08:08

## 2024-09-16 RX ADMIN — MAGNESIUM GLUCONATE 500 MG ORAL TABLET 400 MG: 500 TABLET ORAL at 08:09

## 2024-09-16 RX ADMIN — LEVOTHYROXINE SODIUM 125 MCG: 125 TABLET ORAL at 08:09

## 2024-09-16 RX ADMIN — POTASSIUM CHLORIDE 10 MEQ: 750 TABLET, EXTENDED RELEASE ORAL at 08:09

## 2024-09-16 RX ADMIN — HYDROCODONE BITARTRATE AND ACETAMINOPHEN 2 TABLET: 5; 325 TABLET ORAL at 15:36

## 2024-09-16 RX ADMIN — ALLOPURINOL 300 MG: 300 TABLET ORAL at 08:09

## 2024-09-16 RX ADMIN — Medication 100 MG: at 08:09

## 2024-09-16 RX ADMIN — CINACALCET 30 MG: 30 TABLET, FILM COATED ORAL at 15:37

## 2024-09-16 RX ADMIN — ASPIRIN 81 MG: 81 TABLET, COATED ORAL at 08:09

## 2024-09-16 NOTE — QUICK NOTE
Received message from Dr. Magdiel Holguin, [facility] hospitalist requesting PD orders be placed for Masoud Perry.  Follows with Eddie Earl for PD.  Outpatient orders are for ED cycler use, although, no cycler available at [facility], therefore CAPD orders were entered 2.5L exchange volume every 6 hours using 2.5% dextrose.  Dextrose solution can be modified as needed to maintain  kg.  Also enter orders for gentamicin and daily PD exit site dressing changes.

## 2024-09-16 NOTE — PROGRESS NOTES
Progress Note - Hospitalist   Name: Masoud Perry 69 y.o. male I MRN: [id number]  Unit/Bed#: -01 I Date of Admission: 9/15/2024   Date of Service: 9/16/2024 I Hospital Day: 1    Assessment & Plan  Ambulatory dysfunction  Been weak for 2 weeks, difficulty getting out of a chair, and fell today hitting the back of his head.  At baseline he walks with a walker  PT/OT consult  Case management consultation for acute rehab  ESRD (end stage renal disease) (HCC)  On peritoneal dialysis.  Will consult nephrology.  I did have a discussion with the patient about why he is not on hemodialysis, patient states he was told that he would have to go to the dialysis clinic 4-5 times a week for 6 to 8 hours/day.  I am not certain about this, but at the patient did have hemodialysis patient would be able to find multiple acute rehabs that would accept him for for rehab.  An option would be to start hemodialysis here for rehab    This provider discussed with patient regarding hemodialysis.  Patient reports he prefers to stay with current treatment with peritoneal dialysis.    Lab Results   Component Value Date    EGFR 9 09/16/2024    EGFR 9 09/15/2024    EGFR 5 08/13/2024    CREATININE 5.78 (H) 09/16/2024    CREATININE 5.68 (H) 09/15/2024    CREATININE 8.45 (H) 08/13/2024     Patient on peritoneal dialysis (HCC)  Consult nephrology.  Paroxysmal atrial fibrillation (HCC)  Continue metoprolol and currently on Coumadin.  He states that the Coumadin doses have been changing recently.  Currently not on his home med rec.  Current INR is 2.52, he is currently on a alternating schedule of Coumadin 1 mg and then 2 mg.  Will start with 1 mg daily to see how he does  Type 2 diabetes mellitus with stage 4 chronic kidney disease, with long-term current use of insulin ([facility])  Home regimen: Lantus 15 units at bedtime and then sliding scale.  Hospital regimen: Lantus 10 units at bedtime and insulin sliding scale    Lab Results   Component Value  Date    HGBA1C 5.9 (H) 2024     Hypothyroidism  Continue levothyroxine  HLD (hyperlipidemia)  Continue statin  Hypotension  Continue midodrine 15 mg 3 times daily  Secondary hyperparathyroidism of renal origin (HCC)  Continue Sensipar   Chronic acquired lymphedema  Continue torsemide  Gout  Continue allopurinol  History of CVA (cerebrovascular accident)  Continue aspirin and Coumadin  Obesity, morbid (HCC)  BMI of 37.14    VTE Pharmacologic Prophylaxis: VTE Score: 5 High Risk (Score >/= 5) - Pharmacological DVT Prophylaxis Ordered: warfarin (Coumadin). Sequential Compression Devices Ordered.    Mobility:   Basic Mobility Inpatient Raw Score: 10  JH-HLM Goal: 4: Move to chair/commode  JH-HLM Achieved: 2: Bed activities/Dependent transfer  JH-HLM Goal NOT achieved. Continue with multidisciplinary rounding and encourage appropriate mobility to improve upon JH-HLM goals.    Patient Centered Rounds: I performed bedside rounds with nursing staff today.   Discussions with Specialists or Other Care Team Provider: Nephrology    Education and Discussions with Family / Patient:  With patient.     Current Length of Stay: 1 day(s)  Current Patient Status: Inpatient   Certification Statement: The patient will continue to require additional inpatient hospital stay due to monitorable conditions  Discharge Plan: Anticipate discharge in >72 hrs to rehab facility.    Code Status: Level 3 - DNAR and DNI    Subjective   Seen and evaluated regular.  Resting comfortably but denies any significant complaint.    Objective     Vitals:   Temp (24hrs), Av.7 °F (36.5 °C), Min:97.3 °F (36.3 °C), Max:98 °F (36.7 °C)    Temp:  [97.3 °F (36.3 °C)-98 °F (36.7 °C)] 97.5 °F (36.4 °C)  HR:  [63-78] 65  Resp:  [15-22] 22  BP: (101-126)/(54-72) 104/54  SpO2:  [83 %-99 %] 95 %  Body mass index is 35.58 kg/m².     Input and Output Summary (last 24 hours):     Intake/Output Summary (Last 24 hours) at 2024 0815  Last data filed at 2024  0615  Gross per 24 hour   Intake --   Output -760 ml   Net 760 ml       Physical Exam  Vitals and nursing note reviewed.   Constitutional:       Appearance: He is not ill-appearing or diaphoretic.   HENT:      Nose: Nose normal. No congestion.      Mouth/Throat:      Mouth: Mucous membranes are moist.      Pharynx: No oropharyngeal exudate or posterior oropharyngeal erythema.   Eyes:      General: No scleral icterus.        Right eye: No discharge.         Left eye: No discharge.      Pupils: Pupils are equal, round, and reactive to light.   Cardiovascular:      Rate and Rhythm: Normal rate.      Heart sounds:      No friction rub. No gallop.   Pulmonary:      Effort: Pulmonary effort is normal. No respiratory distress.      Breath sounds: No stridor. No wheezing or rhonchi.   Abdominal:      General: There is no distension.      Palpations: There is no mass.      Tenderness: There is no abdominal tenderness.      Hernia: No hernia is present.      Comments: Peritoneal dialysis catheter in place   Musculoskeletal:      Right lower leg: No edema.      Left lower leg: No edema.   Neurological:      Mental Status: He is alert and oriented to person, place, and time.      Cranial Nerves: No cranial nerve deficit.      Sensory: No sensory deficit.      Motor: No weakness.      Coordination: Coordination normal.          Lines/Drains:  Lines/Drains/Airways       Active Status       None                            Lab Results: I have reviewed the following results:   Results from last 7 days   Lab Units 09/16/24  0541 09/15/24  1559   WBC Thousand/uL 3.89* 4.43   HEMOGLOBIN g/dL 8.5* 9.5*   HEMATOCRIT % 27.2* 30.3*   PLATELETS Thousands/uL 127* 126*   SEGS PCT %  --  64   LYMPHO PCT %  --  24   MONO PCT %  --  7   EOS PCT %  --  3     Results from last 7 days   Lab Units 09/16/24  0541   SODIUM mmol/L 135   POTASSIUM mmol/L 2.7*   CHLORIDE mmol/L 96   CO2 mmol/L 31   BUN mg/dL 29*   CREATININE mg/dL 5.78*   ANION GAP  mmol/L 8   CALCIUM mg/dL 9.2   ALBUMIN g/dL 2.2*   TOTAL BILIRUBIN mg/dL 0.29   ALK PHOS U/L 101   ALT U/L 13   AST U/L 17   GLUCOSE RANDOM mg/dL 89     Results from last 7 days   Lab Units 09/16/24  0541   INR  2.98*                   Recent Cultures (last 7 days):         Imaging Review: No pertinent imaging studies reviewed.  Other Studies: No additional pertinent studies reviewed.    Last 24 Hours Medication List:     Current Facility-Administered Medications:     acetaminophen (TYLENOL) tablet 650 mg, Q6H PRN    allopurinol (ZYLOPRIM) tablet 300 mg, Daily    ascorbic acid (VITAMIN C) tablet 500 mg, Daily    aspirin (ECOTRIN LOW STRENGTH) EC tablet 81 mg, Daily    atorvastatin (LIPITOR) tablet 40 mg, Daily With Dinner    cinacalcet (SENSIPAR) tablet 30 mg, Daily With Dinner    co-enzyme Q-10 capsule 100 mg, Daily    ferrous sulfate tablet 325 mg, Daily With Breakfast    gentamicin (GARAMYCIN) 0.1 % cream, Daily    HYDROcodone-acetaminophen (NORCO) 5-325 mg per tablet 2 tablet, Q6H PRN    insulin glargine (LANTUS) subcutaneous injection 15 Units 0.15 mL, HS    levothyroxine tablet 125 mcg, Daily    magnesium Oxide (MAG-OX) tablet 400 mg, Daily    metoprolol succinate (TOPROL-XL) 24 hr tablet 50 mg, BID    midodrine (PROAMATINE) tablet 15 mg, TID AC    nicotine (NICODERM CQ) 14 mg/24hr TD 24 hr patch 1 patch, Daily    potassium chloride (Klor-Con M10) CR tablet 10 mEq, Daily    sevelamer (RENAGEL) tablet 800 mg, TID With Meals    simethicone (MYLICON) chewable tablet 80 mg, 4x Daily PRN    tamsulosin (FLOMAX) capsule 0.4 mg, Daily With Dinner    torsemide (DEMADEX) tablet 100 mg, Daily    warfarin (COUMADIN) tablet 1 mg, Daily (warfarin)    Administrative Statements   Today, Patient Was Seen By: Moses Noel MD      **Please Note: This note may have been constructed using a voice recognition system.**

## 2024-09-16 NOTE — UTILIZATION REVIEW
Initial Clinical Review    Admission: Date/Time/Statement:   Admission Orders (From admission, onward)       Ordered        09/15/24 1926  INPATIENT ADMISSION  Once                          Orders Placed This Encounter   Procedures    INPATIENT ADMISSION     Standing Status:   Standing     Number of Occurrences:   1     Order Specific Question:   Level of Care     Answer:   Med Surg [16]     Order Specific Question:   Estimated length of stay     Answer:   More than 2 Midnights     Order Specific Question:   Certification     Answer:   I certify that inpatient services are medically necessary for this patient for a duration of greater than two midnights. See H&P and MD Progress Notes for additional information about the patient's course of treatment.     ED Arrival Information       Expected   -    Arrival   9/15/2024 15:48    Acuity   Emergent              Means of arrival   Ambulance    Escorted by   [facility]    Service   Hospitalist    Admission type   Emergency              Arrival complaint   Fall             Chief Complaint   Patient presents with    Fall     Patient presents to the ED with complaints of lower back pain and left hip pain secondary to a fall that occurred today. The patient states he fell backwards. Denies striking his head. The patient reports taking Coumadin.        Initial Presentation: 69 y.o. male with hx CDK5/ ESRD  on peritoneal dialysis, atrial fib on Coumadin, HTN on midodrine, HLD, hypothyroidism, gout, ambulatory dysfunction (walks with a walker ) who presents to ED via EMS with L hip pain  s/p fall today. Pt fell backwards, denies head strike Pt reports weakness x 2 weeks with difficulty getting out of recliner .On exam, Chronic lymphedema of the bilateral lower extremities and chronic venous stasis . Alert &awake, communicative, CN 2-12 normal, no focal neuro deficits noted  . Labs : INR 2.52 , Creat 5.68, K 3.2  . Imaging shows no acute traumatic injuries. Pt given IV  Toradol in ED. Admitted as Inpatient to med surg with ambulatory dysfunction, PAF, CKD5. PLan- Nephrology consult for PD. PT/OT . Decrease Coumadin to 1 mg daily(was on 2 mg alt with 1 mg at home). Monitor INR. Lantus 10 units at bedtime and insulin sliding scale  . CBC, CMP, mag, INR, in a.m.    Anticipated Length of Stay/Certification Statement:  Patient will be admitted on an Inpatient basis with an anticipated length of stay of greater than 2 midnights.      Nephrology quick note-  Outpatient orders are for ED cycler use, although, no cycler available at [facility], therefore CAPD orders were entered 2.5L exchange volume every 6 hours using 2.5% dextrose.  Dextrose solution can be modified as needed to maintain  kg.  Also enter orders for gentamicin and daily PD exit site dressing changes.     Date: 9/16   Day 2:   Medicine-Provider discussed with patient regarding changing from PD to HD . Patient reports he prefers to stay with current treatment with peritoneal dialysis. Creat 5.78 today Peritoneal dialysis catheter in place . Pt w/o significant complaint . Await PT/OT . Anticipate discharge in >72 hrs to rehab facility.    Nephrology- K 2.7 today, give extra 40 mEq KCl on top of the daily dose of 10 mEq KCl which was ordered.  Hgb 8.5. BMP in am . Continue phosphate binders with meals . Continue cincalcet for secondary hyperparathyroidism  .Pt will be on CAPD with total volume 2000 cc fills 4 exchanges a day of dextrose 2.5% solution . Monitor wt  PT/OT- Level II, Moderate, rehab resource intensity . Per PT,  needed minAx1 for supine<>sit. Pt noted 6/10 pain at start of evaluation, but was in 10/10 pain in standing. Needed maxAx2 with RW for sit<>stand, not able to take steps due to high pain levels . AM-PAC Basic Mobility Inpatient Short Form Raw Score is 12. A Raw score of less than or equal to 16 suggests the patient may benefit from discharge to post-acute rehabilitation services     Date: 9/17   Day 3:  Has surpassed a 2nd midnight with active treatments and services.  Pt continues on CAPD: Continue 2 L 2.5% every 6 hours via PD cath . Pt appears euvolemic, BP low normal  on Midodrine . Pt reports some nausea. Pt extremely weak and c/o L leg an d back pain making ambulation difficult. K low- repletion increased to 40 mg daily  Mag low- repleted .hgb 8.6. Obtain TIBC panel, ferritin.  . Hypercalcemia somewhat chronic but improving on Cinacalcet will check PTH intact level.Looking for skilled facility that we will do PD . .[facility] has place 25 STR referrals and 10 acute rehab referrals  . Patient has been denied for ARC at this time. Recommending slower pace, longer length of stay for rehab. Labs in am .       ED Triage Vitals [09/15/24 1553]   Temperature Pulse Respirations Blood Pressure SpO2 Pain Score   98 °F (36.7 °C) 78 18 101/57 95 % 6     Weight (last 2 days)       Date/Time Weight    09/17/24 0600 117 (257.72)    09/16/24 0600 116 (255.07)    09/15/24 1553 117 (258.82)            Vital Signs (last 3 days)       Date/Time Temp Pulse Resp BP MAP (mmHg) SpO2 Calculated FIO2 (%) - Nasal Cannula Nasal Cannula O2 Flow Rate (L/min) O2 Device Patient Position - Orthostatic VS Abel Coma Scale Score Pain    09/17/24 1200 -- 65 -- -- -- 90 % -- -- -- -- -- --    09/17/24 0926 -- -- -- -- -- -- -- -- -- -- -- 8    09/17/24 0905 -- -- -- -- -- -- -- -- -- -- 15 --    09/17/24 0712 97.6 °F (36.4 °C) 60 20 99/55 74 100 % -- -- Nasal cannula Lying -- --    09/17/24 0600 -- 64 -- -- -- 98 % -- -- -- -- -- --    09/17/24 0316 -- -- -- -- -- -- -- -- -- -- -- 9    09/17/24 0000 -- 63 -- -- -- 99 % -- -- -- -- -- --    09/16/24 2111 -- -- -- -- -- -- -- -- -- -- -- 8 09/16/24 2000 -- -- -- -- -- -- -- -- -- -- 15 --    09/16/24 1950 97.8 °F (36.6 °C) 72 21 108/55 76 96 % 28 2 L/min Nasal cannula Lying -- --    09/16/24 1800 -- 73 -- -- -- 98 % -- -- -- -- -- --    09/16/24 1536 -- -- -- -- -- -- -- -- -- -- -- 8 09/16/24  1506 98.3 °F (36.8 °C) 77 -- 103/51 73 91 % -- -- None (Room air) Lying -- --    09/16/24 1200 -- 70 -- -- -- 89 % -- -- -- -- -- --    09/16/24 1028 -- -- -- -- -- -- -- -- -- -- -- 6 09/16/24 1019 -- -- -- 101/57 77 91 % -- -- None (Room air) Sitting -- --    09/16/24 1012 -- -- -- -- -- -- -- -- -- -- -- 6 09/16/24 0900 -- -- -- -- -- -- -- -- -- -- 15 --    09/16/24 0813 -- -- -- -- -- -- -- -- -- -- -- 8 09/16/24 0713 97.5 °F (36.4 °C) 65 22 104/54 75 95 % 28 2 L/min Nasal cannula Lying -- --    09/16/24 0615 -- 65 -- -- -- 99 % -- -- -- -- -- --    09/16/24 0600 -- 66 -- -- -- 95 % -- -- -- -- -- --    09/16/24 0530 -- 65 -- -- -- 98 % -- -- -- -- -- --    09/16/24 0320 -- 65 -- -- -- 94 % 28 2 L/min Nasal cannula -- -- --    09/16/24 0315 -- 63 -- -- -- 83 % -- -- -- -- -- --    09/16/24 0030 98 °F (36.7 °C) 67 20 126/58 -- 91 % -- -- -- -- -- --    09/16/24 0000 -- 68 -- -- -- 91 % -- -- -- -- -- --    09/15/24 2330 -- 70 -- -- -- 91 % -- -- -- -- -- --    09/15/24 2133 -- 73 -- 106/57 79 93 % -- -- -- -- -- --    09/15/24 2104 -- -- -- -- -- -- -- -- -- -- -- 8    09/15/24 2030 -- -- -- -- -- -- -- -- -- -- 15 4    09/15/24 2018 97.6 °F (36.4 °C) 77 20 114/58 82 95 % -- -- -- -- -- --    09/15/24 1930 97.3 °F (36.3 °C) 72 16 114/72 -- 94 % -- -- None (Room air) -- 15 --    09/15/24 1830 -- 71 17 104/56 -- 92 % -- -- None (Room air) -- 15 9    09/15/24 1730 -- 70 18 107/58 -- 99 % -- -- Nasal cannula -- 15 --    09/15/24 1630 -- 70 15 103/55 -- 98 % -- -- Nasal cannula Lying 15 --    09/15/24 1600 -- -- -- -- -- -- -- -- Nasal cannula -- -- --    09/15/24 1555 98 °F (36.7 °C) 78 18 101/57 -- 96 % -- -- Nasal cannula Lying 15 6    09/15/24 1553 98 °F (36.7 °C) 78 18 101/57 -- 95 % 28 2 L/min Nasal cannula Lying -- 6              Pertinent Labs/Diagnostic Test Results:   Radiology:  XR hip/pelv 2-3 vws left if performed   Final Interpretation by Hiram Lubin MD (09/15 1706)      No acute  osseous abnormality.         Computerized Assisted Algorithm (CAA) may have been used to analyze all applicable images.            Workstation performed: [id number]         TRAUMA - CT chest abdomen pelvis wo contrast   Final Interpretation by Hiram Lubin MD (09/15 1702)      No findings of acute traumatic injury in the chest, abdomen or pelvis within the limits of unenhanced technique.   Incidental 8 mm subpleural pulmonary nodule in the left lower lobe. Consider 6-month CT scan of the chest without contrast.   Small right pleural effusion.   Bilateral kidney stones. No hydronephrosis.   Peritoneal dialysis catheter and ascites.      The study was marked in EPIC for immediate notification.               Workstation performed: [id number]         TRAUMA - CT head wo contrast   Final Interpretation by Hiram Lubin MD (09/15 1637)      No acute intracranial abnormality.      The study was marked in EPIC for immediate notification.            Workstation performed: [id number]         TRAUMA - CT spine cervical wo contrast   Final Interpretation by Hiram Lubin MD (09/15 1643)      No cervical spine fracture or traumatic malalignment.      The study was marked in EPIC for immediate notification.            Workstation performed: [id number]         XR Trauma chest portable   Final Interpretation by Hiram Lubin MD (09/15 1701)      Small right pleural effusion. Mild bibasilar atelectasis.            Workstation performed: [id number]         XR Trauma pelvis ap only 1 or 2 vw   Final Interpretation by Hiram Lubin MD (09/15 9743)      No acute osseous abnormality.         Computerized Assisted Algorithm (CAA) may have been used to analyze all applicable images.         Workstation performed: [id number]           Cardiology:  ECG 12 lead   Final Result by Becky Holden MD (09/16 5966)   Sinus rhythm with Premature supraventricular complexes   Right bundle branch block   Abnormal ECG    When compared with ECG of 12-AUG-2024 11:29,   Sinus rhythm has replaced Atrial fibrillation   Rate is slower      Confirmed by Becky Holden ([id number]) on 9/16/2024 7:39:26 AM        GI:  No orders to display           Results from last 7 days   Lab Units 09/17/24  0626 09/16/24  0541 09/15/24  1559   WBC Thousand/uL 4.17* 3.89* 4.43   HEMOGLOBIN g/dL 8.6* 8.5* 9.5*   HEMATOCRIT % 28.3* 27.2* 30.3*   PLATELETS Thousands/uL 112* 127* 126*   TOTAL NEUT ABS Thousands/µL 2.31  --  2.82         Results from last 7 days   Lab Units 09/17/24  0626 09/16/24  0541 09/15/24  1559   SODIUM mmol/L 135 135 134*   POTASSIUM mmol/L 2.9* 2.7* 3.2*   CHLORIDE mmol/L 97 96 94*   CO2 mmol/L 31 31 31   ANION GAP mmol/L 7 8 9   BUN mg/dL 28* 29* 27*   CREATININE mg/dL 5.61* 5.78* 5.68*   EGFR ml/min/1.73sq m 9 9 9   CALCIUM mg/dL 8.9 9.2 9.8   MAGNESIUM mg/dL 1.7* 1.5*  --      Results from last 7 days   Lab Units 09/17/24  0626 09/16/24  0541 09/15/24  1559   AST U/L 18 17 17   ALT U/L 13 13 15   ALK PHOS U/L 100 101 113*   TOTAL PROTEIN g/dL 5.1* 5.0* 5.8*   ALBUMIN g/dL 2.2* 2.2* 2.5*   TOTAL BILIRUBIN mg/dL 0.25 0.29 0.31         Results from last 7 days   Lab Units 09/17/24  0626 09/16/24  0541 09/15/24  1559   GLUCOSE RANDOM mg/dL 96 89 95           Results from last 7 days   Lab Units 09/15/24  1808 09/15/24  1559   HS TNI 0HR ng/L  --  23   HS TNI 2HR ng/L 20  --    HSTNI D2 ng/L -3  --          Results from last 7 days   Lab Units 09/16/24  0541 09/15/24  1559   PROTIME seconds 30.9* 27.3*   INR  2.98* 2.52*             ED Treatment-Medication Administration from 09/15/2024 1548 to 09/15/2024 2017         Date/Time Order Dose Route Action     09/15/2024 1830 ketorolac (TORADOL) injection 30 mg 30 mg Intravenous Given            Past Medical History:   Diagnosis Date    Anemia in chronic kidney disease     Anticoagulated on Coumadin     Atrial fibrillation (HCC)     BPH (benign prostatic hyperplasia)     CAD (coronary artery  disease)     CKD (chronic kidney disease), stage V ([facility])     Compartment syndrome of forearm ([facility])     Right, 2019    COPD (chronic obstructive pulmonary disease) ([facility])     CVA (cerebral vascular accident) ([facility])     Diastolic CHF (HCC)     grade 1 DD    DVT (deep venous thrombosis) ([facility])     Factor V deficiency (HCC)     Gout     Hyperlipidemia     Hypertension     Hypothyroidism     MI (myocardial infarction) ([facility])     x3 - 1999, 2010, after 2015    Morbid obesity ([facility])     Nephrolithiasis     Noncompliance with medications     SHAD (obstructive sleep apnea)     Peritoneal dialysis catheter in place ([facility])     Pulmonary embolism ([facility])     Secondary hyperparathyroidism of renal origin ([facility])     Spinal stenosis of lumbar region     Status post placement of implantable loop recorder     Tobacco dependence      Present on Admission:   HLD (hyperlipidemia)   Hypothyroidism   Secondary hyperparathyroidism of renal origin ([facility])   Paroxysmal atrial fibrillation ([facility])   ESRD (end stage renal disease) ([facility])   Hypotension   Chronic acquired lymphedema   Ambulatory dysfunction   Gout   Obesity, morbid ([facility])      Admitting Diagnosis: Multiple injuries [T07.XXXA]  ESRD (end stage renal disease) ([facility]) [N18.6]  Patient on peritoneal dialysis ([facility]) [Z99.2]  Ambulatory dysfunction [R26.2]  Age/Sex: 69 y.o. male  Admission Orders:  Scheduled Medications:  allopurinol, 300 mg, Oral, Daily  ascorbic acid, 500 mg, Oral, Daily  aspirin, 81 mg, Oral, Daily  atorvastatin, 40 mg, Oral, Daily With Dinner  cinacalcet, 30 mg, Oral, Daily With Dinner  co-enzyme Q-10, 100 mg, Oral, Daily  ferrous sulfate, 325 mg, Oral, Daily With Breakfast  gentamicin, , Topical, Daily  insulin glargine, 15 Units, Subcutaneous, HS  levothyroxine, 125 mcg, Oral, Daily  lidocaine, 1 patch, Topical, Daily  magnesium Oxide, 400 mg, Oral, Daily  metoprolol succinate, 50 mg, Oral, BID  midodrine, 15 mg, Oral, TID AC  nicotine, 1 patch, Transdermal, Daily  potassium  chloride, 40 mEq, Oral, Q6H  sevelamer, 800 mg, Oral, TID With Meals  tamsulosin, 0.4 mg, Oral, Daily With Dinner  torsemide, 100 mg, Oral, Daily  warfarin, 1 mg, Oral, Daily (warfarin)    magnesium sulfate 2 g/50 mL IVPB (premix) 2 g  Dose: 2 g  Freq: Once Route: IV  Last Dose: 2 g (09/17/24 0923)  Start: 09/17/24 0900 End: 09/17/24 1123  potassium chloride (Klor-Con M10) CR tablet 10 mEq  Dose: 10 mEq  Freq: Daily Route: PO  Start: 09/16/24 0900 End: 09/17/24 0757      Continuous IV Infusions:     PRN Meds:  acetaminophen, 650 mg, Oral, Q6H PRN  HYDROcodone-acetaminophen, 2 tablet, Oral, Q6H PRN x4 9/16, x 2 9/17   simethicone, 80 mg, Oral, 4x Daily PRN    Level 3 carb diet    SCD   OOB as maribel       IP CONSULT TO NEPHROLOGY  IP CONSULT TO NUTRITION SERVICES  IP CONSULT TO CASE MANAGEMENT    Network Utilization Review Department  ATTENTION: Please call with any questions or concerns to [phone] and carefully listen to the prompts so that you are directed to the right person. All voicemails are confidential.   For Discharge needs, contact Care Management DC Support Team at [phone] opt. [phone]  Send all requests for admission clinical reviews, approved or denied determinations and any other requests to dedicated fax number below belonging to the campus where the patient is receiving treatment. List of dedicated fax numbers for the Facilities:  FACILITY NAME UR FAX NUMBER   ADMISSION DENIALS (Administrative/Medical Necessity) [phone]   DISCHARGE SUPPORT TEAM (NETWORK) [phone]   PARENT CHILD HEALTH (Maternity/NICU/Pediatrics) [phone]   [facility] [phone]   [facility] [phone]   [facility] [phone]   [facility] [phone]   Critical access hospital [phone]   [facility] [phone]   [facility] [phone]    [facility] [phone]   [facility] [phone]   [facility] [phone]   [facility] [phone]   [facility] [phone]

## 2024-09-16 NOTE — MALNUTRITION/BMI
This medical record reflects one or more clinical indicators suggestive of malnutrition.    Malnutrition Findings:   Adult Malnutrition type: Chronic illness  Adult Degree of Malnutrition: Malnutrition of moderate degree  Malnutrition Characteristics: Inadequate energy, Muscle loss                360 Statement: malnutrition of moderate degree in setting of chronic illness, evidenced by <75% energy intake compared to estimated needs for > 1 month, muscle wasting/indented temples, protruding clavicles, treated with diet, PT refusing supplements currently    BMI Findings:           Body mass index is 35.58 kg/m².     See Nutrition note dated 9/16/24 for additional details.  Completed nutrition assessment is viewable in the nutrition documentation.

## 2024-09-16 NOTE — PLAN OF CARE
Problem: OCCUPATIONAL THERAPY ADULT  Goal: Performs self-care activities at highest level of function for planned discharge setting.  See evaluation for individualized goals.  Description: Treatment Interventions: ADL retraining, Visual perceptual retraining, Functional transfer training, UE strengthening/ROM, Endurance training, Cognitive reorientation, Patient/family training, Equipment evaluation/education, Fine motor coordination activities, Neuromuscular reeducation, Compensatory technique education, UE splinting, Cardiac education, Continued evaluation, Energy conservation, Activityengagement          See flowsheet documentation for full assessment, interventions and recommendations.   Note: Limitation: Decreased ADL status, Decreased endurance, Decreased high-level ADLs, Decreased self-care trans, Decreased Safe judgement during ADL  Prognosis: Good  Assessment: Pt is a 69 y.o. male, admitted to [facility] 9/15/2024 d/t experiencing weakness and fall at home. Dx: ambulatory dysfunction. Pt with PMHx impacting their performance during ADL tasks, including: A-Fib, BPH, CKD, COPD, CVA, CHF, DVT, Factor V, hyperlipidemia, hypertension, hypothyroidism, MI, morbid obesity, nephrolithiasis, SHAD, PE, spinal stenosis. Prior to admission to the hospital Pt was performing ADLs without physical assistance. IADLs without physical assistance. Functional transfers/ambulation without physical assistance. Cognitive status was PTA was Intact. OT order placed to assess Pt's ADLs, cognitive status, and performance during functional tasks in order to maximize safety and independence while making most appropriate d/c recommendations. PT/OT co-evaluation completed at this time d/t significant mobility deficits and safety concerns. Pt's clinical presentation is currently unstable/unpredictable given new onset deficits that effect Pt's occupational performance and ability to safely return to OF including decrease activity tolerance,  decrease standing balance, decrease performance during ADL tasks, increased pain, decrease generalized strength, and decrease performance during functional transfers combined with medical complications of pain impacting overall mobility status, abnormal renal lab values, abnormal H&H, abnormal CBC, abnormal potassium values, edema/swelling, wounds, decreased skin integrity, and need for input for mobility technique/safety. Personal factors affecting Pt at time of initial evaluation include: step(s) to enter environment, limited home support, past experience, inability to perform IADLs, inability to perform ADLs, inability to ambulate household distances, and recent fall(s)/fall history. Pt will benefit from continued skilled OT services to address deficits as defined above and to maximize level independence/participation during ADLs and functional tasks to facilitate return toward PLOF and improved quality of life. From an occupational therapy standpoint, recommendation at time of d/c would be Level II: Moderate Resource Intensity Therapy.     Rehab Resource Intensity Level, OT: II (Moderate Resource Intensity)

## 2024-09-16 NOTE — PROGRESS NOTES
Progress Note -     Name: Masoud Perry 69 y.o. male I MRN: [id number]  Unit/Bed#: -01 I Date of Admission: 9/15/2024   Date of Service: 9/16/2024 I Hospital Day: 1     This SmartSection is not supported in the current .     Pastoral Care Progress Note  CH initiated support visit to pt in setting of frequent admissions.  Pt shared that he believes he might go to rehab after he is discharged. Pt shared that he doesn't like the idea of rehab but he realizes that he is currently fairly weak and struggling to care for himself. He lives alone and has children but he shared that these children and grandchildren are fairly busy and not available to help him with ADL's.    CH provided empathetic listening and support.        Chaplaincy Interventions Utilized:   Empowerment: Encouraged assertiveness    Exploration: Explored relational needs & resources     Relationship Building: Listened empathically    Chaplaincy Outcomes Achieved:  Identified priorities    Spiritual Coping Strategies Utilized:   Connectedness       09/16/24 1100   Clinical Encounter Type   Visited With Patient

## 2024-09-16 NOTE — OCCUPATIONAL THERAPY NOTE
Occupational Therapy Evaluation     Patient Name: Masoud Perry  Today's Date: 9/16/2024  Problem List  Principal Problem:    Ambulatory dysfunction  Active Problems:    History of CVA (cerebrovascular accident)    HLD (hyperlipidemia)    Hypothyroidism    Type 2 diabetes mellitus with stage 4 chronic kidney disease, with long-term current use of insulin (HCC)    Obesity, morbid (HCC)    Secondary hyperparathyroidism of renal origin (HCC)    Paroxysmal atrial fibrillation (HCC)    Patient on peritoneal dialysis (HCC)    ESRD (end stage renal disease) (HCC)    Chronic acquired lymphedema    Hypotension    Gout    Hypokalemia    Past Medical History  Past Medical History:   Diagnosis Date    Anemia in chronic kidney disease     Anticoagulated on Coumadin     Atrial fibrillation (HCC)     BPH (benign prostatic hyperplasia)     CAD (coronary artery disease)     CKD (chronic kidney disease), stage V (HCC)     Compartment syndrome of forearm (HCC)     Right, 2019    COPD (chronic obstructive pulmonary disease) (HCC)     CVA (cerebral vascular accident) (HCC)     Diastolic CHF (HCC)     grade 1 DD    DVT (deep venous thrombosis) (HCC)     Factor V deficiency (HCC)     Gout     Hyperlipidemia     Hypertension     Hypothyroidism     MI (myocardial infarction) (HCC)     x3 - 1999, 2010, after 2015    Morbid obesity (HCC)     Nephrolithiasis     Noncompliance with medications     SHAD (obstructive sleep apnea)     Peritoneal dialysis catheter in place (HCC)     Pulmonary embolism (HCC)     Secondary hyperparathyroidism of renal origin (HCC)     Spinal stenosis of lumbar region     Status post placement of implantable loop recorder     Tobacco dependence      Past Surgical History  Past Surgical History:   Procedure Laterality Date    CARDIAC ELECTROPHYSIOLOGY STUDY AND ABLATION  04/29/2024    CHOLECYSTECTOMY      CORONARY ANGIOPLASTY WITH STENT PLACEMENT      JOINT REPLACEMENT      bilateral knee    MT CYSTO/URETERO  "W/LITHOTRIPSY &INDWELL STENT INSRT Left 09/24/2022    Procedure: CYSTOSCOPY URETEROSCOPY WITH LITHOTRIPSY HOLMIUM LASER, AND INSERTION STENT URETERAL;  Surgeon: Lewis Dahl MD;  Location: [facility];  Service: Urology    AK LAPS INSERTION TUNNELED INTRAPERITONEAL CATHETER N/A 6/7/2024    Procedure: INSERTION PERITONEAL CATHETER DIALYSIS LAPAROSCOPIC;  Surgeon: Hiram Park DO;  Location: [facility];  Service: General    US GUIDED KIDNEY BIOPSY  08/05/2020 09/16/24 1012   Note Type   Note type Evaluation   Pain Assessment   Pain Assessment Tool 0-10   Pain Score 6   Pain Location/Orientation Orientation: Left;Location: Hip   Restrictions/Precautions   Other Precautions Chair Alarm;Bed Alarm;Fall Risk;Pain   Home Living   Type of Home Apartment  (2 ISELA no HR)   Home Layout One level;Performs ADLs on one level;Able to live on main level with bedroom/bathroom   Bathroom Shower/Tub Tub/shower unit   Bathroom Toilet Standard   Home Equipment Walker;Cane   Additional Comments Pt reports living in an apartment alone. RW use at baseline.   Prior Function   Level of [place] Independent with ADLs;Independent with functional mobility;Independent with IADLS   Lives With Alone   Receives Help From Family   IADLs Independent with meal prep;Independent with medication management;Family/Friend/Other provides transportation   Falls in the last 6 months 1 to 4   Subjective   Subjective \"I wasn't able to get up off the chair the last two weeks\"   ADL   UB Dressing Assistance 5  Supervision/Setup   UB Dressing Deficit Setup;Verbal cueing;Increased time to complete   LB Dressing Assistance 2  Maximal Assistance   LB Dressing Deficit Setup;Requires assistive device for steadying;Verbal cueing;Increased time to complete;Don/doff R sock;Don/doff L sock   Additional Comments UB ADLs @ S/set-up while seated at EOB. LB ADLs @ Max A. Pt required assist to don socks while seated at EOB.   Bed Mobility   Supine to Sit 4  Minimal " assistance   Additional items Assist x 1;Increased time required;HOB elevated   Additional Comments Pt supine in bed at beginning of session. Supine to sit @ Min A for trunk management.   Transfers   Sit to Stand 2  Maximal assistance   Additional items Assist x 2;Increased time required;Verbal cues   Stand to Sit 2  Maximal assistance   Additional items Assist x 2;Increased time required;Verbal cues   Stand pivot Unable to assess   Additional Comments STS from elevated EOB to RW @ Max A x2. Unable to advance steps to chair d/t increased back pain.   Balance   Static Sitting Good   Dynamic Sitting Fair +   Static Standing Poor -   Activity Tolerance   Activity Tolerance Patient limited by pain   Medical Staff Made Aware Spoke with PT Pedro   Nurse Made Aware Spoke with RN Gracie   RUE Assessment   RUE Assessment WFL   LUE Assessment   LUE Assessment WFL   Hand Function   Gross Motor Coordination Functional   Fine Motor Coordination Functional   Cognition   Overall Cognitive Status WFL   Arousal/Participation Alert;Responsive;Cooperative   Attention Within functional limits   Orientation Level Oriented X4   Memory Within functional limits   Following Commands Follows one step commands without difficulty   Assessment   Limitation Decreased ADL status;Decreased endurance;Decreased high-level ADLs;Decreased self-care trans;Decreased Safe judgement during ADL   Prognosis Good   Assessment Pt is a 69 y.o. male, admitted to [facility] 9/15/2024 d/t experiencing weakness and fall at home. Dx: ambulatory dysfunction. Pt with PMHx impacting their performance during ADL tasks, including: A-Fib, BPH, CKD, COPD, CVA, CHF, DVT, Factor V, hyperlipidemia, hypertension, hypothyroidism, MI, morbid obesity, nephrolithiasis, SHAD, PE, spinal stenosis. Prior to admission to the hospital Pt was performing ADLs without physical assistance. IADLs without physical assistance. Functional transfers/ambulation without physical assistance. Cognitive  status was PTA was Intact. OT order placed to assess Pt's ADLs, cognitive status, and performance during functional tasks in order to maximize safety and independence while making most appropriate d/c recommendations. PT/OT co-evaluation completed at this time d/t significant mobility deficits and safety concerns. Pt's clinical presentation is currently unstable/unpredictable given new onset deficits that effect Pt's occupational performance and ability to safely return to PLOF including decrease activity tolerance, decrease standing balance, decrease performance during ADL tasks, increased pain, decrease generalized strength, and decrease performance during functional transfers combined with medical complications of pain impacting overall mobility status, abnormal renal lab values, abnormal H&H, abnormal CBC, abnormal potassium values, edema/swelling, wounds, decreased skin integrity, and need for input for mobility technique/safety. Personal factors affecting Pt at time of initial evaluation include: step(s) to enter environment, limited home support, past experience, inability to perform IADLs, inability to perform ADLs, inability to ambulate household distances, and recent fall(s)/fall history. Pt will benefit from continued skilled OT services to address deficits as defined above and to maximize level independence/participation during ADLs and functional tasks to facilitate return toward PLOF and improved quality of life. From an occupational therapy standpoint, recommendation at time of d/c would be Level II: Moderate Resource Intensity Therapy.   Plan   Treatment Interventions ADL retraining;Visual perceptual retraining;Functional transfer training;UE strengthening/ROM;Endurance training;Cognitive reorientation;Patient/family training;Equipment evaluation/education;Fine motor coordination activities;Neuromuscular reeducation;Compensatory technique education;UE splinting;Cardiac education;Continued  Right arm; evaluation;Energy conservation;Activityengagement   Goal Expiration Date 09/30/24   OT Frequency 3-5x/wk   Discharge Recommendation   Rehab Resource Intensity Level, OT II (Moderate Resource Intensity)   AM-PAC Daily Activity Inpatient   Lower Body Dressing 2   Bathing 2   Toileting 2   Upper Body Dressing 3   Grooming 3   Eating 4   Daily Activity Raw Score 16   Daily Activity Standardized Score (Calc for Raw Score >=11) 35.96   AM-PAC Applied Cognition Inpatient   Following a Speech/Presentation 3   Understanding Ordinary Conversation 4   Taking Medications 3   Remembering Where Things Are Placed or Put Away 4   Remembering List of 4-5 Errands 3   Taking Care of Complicated Tasks 3   Applied Cognition Raw Score 20   Applied Cognition Standardized Score 41.76   End of Consult   Patient Position at End of Consult Supine;All needs within reach;Bed/Chair alarm activated     The patient's raw score on the AM-PAC Daily Activity Inpatient Short Form is 16. A raw score of less than 19 suggests the patient may benefit from discharge to post-acute rehabilitation services. Please refer to the recommendation of the Occupational Therapist for safe discharge planning.    Pt goals to be met by 9/30/2024    Pt will demonstrate ability to complete grooming/hygiene tasks @ Mod I after set-up.  Pt will demonstrate ability to complete supine<>sit @ Mod I in order to increase safety and independence during ADL tasks.  Pt will demonstrate ability to complete UB ADLs including washing/dressing @ Mod I in order to increase performance and participation during meaningful tasks  Pt will demonstrate ability to complete LB dressing @ Min A in order to increase safety and independence during meaningful tasks.   Pt will demonstrate ability to complete toileting tasks including CM and pericare @ Min A in order to increase safety and independence during meaningful tasks.  Pt will demonstrate ability to complete EOB, chair, toilet/commode  transfers @ Min A in order to increase performance and participation during functional tasks.  Pt will demonstrate ability to stand for 3-5 minutes while maintaining Fair + balance with use of RW for UB support PRN.  Pt will demonstrate ability to tolerate 30-35 minute OT session with no vc'ing for deep breathing or use of energy conservation techniques in order to increase activity tolerance during functional tasks.   Pt will demonstrate Good carryover of use of energy conservation/compensatory strategies during ADLs and functional tasks in order to increase safety and reduce risk for falls.   Pt will demonstrate Good attention and participation in continued evaluation of functional ambulation house hold distances in order to assist with safe d/c planning.  Pt will attend to continued cognitive assessments 100% of the time in order to provide most appropriate d/c recommendations.   Pt will follow 100% simple 2-step commands and be A&O x4 consistently with environmental cues to increase participation in functional activities.   Pt will identify 3 areas of interest/hobbies and 1 intervention on how to incorporate into daily life in order to increase interaction with environment and peers as well as increase participation in meaningful tasks.   Pt will demonstrate 100% carryover of BUE HEP in order to increase BUE MS and increase performance during functional tasks upon d/c home.    Anitra Saldivar, OTR/L

## 2024-09-16 NOTE — CASE MANAGEMENT
Case Management Discharge Planning Note    Patient name Masoud Goregh  Location /-01 MRN [id number]  : 1955 Date 2024       Current Admission Date: 9/15/2024  Current Admission Diagnosis:Ambulatory dysfunction   Patient Active Problem List    Diagnosis Date Noted Date Diagnosed    Hypokalemia 2024     Ambulatory dysfunction 09/15/2024     Lactic acidosis 2024     Hypercalcemia 2024     Chronic acquired lymphedema 2024     Hypotension 2024     Nocturnal hypoxia 2024     Elevated LFTs 2024     Patient on peritoneal dialysis (formerly [facility]) 2024     Hypervolemia 2024     ESRD (end stage renal disease) (formerly [facility]) 2024     Bacteremia 2024     Cellulitis of right lower extremity 2024     Paroxysmal atrial fibrillation (formerly [facility]) 2024     Bilateral lower extremity edema 2023     Ureteral stone with hydronephrosis 2022     Cutaneous abscess of buttock 2022     Chronic kidney disease-mineral and bone disorder 2022     Type 2 diabetes mellitus with stage 4 chronic kidney disease, with long-term current use of insulin (formerly [facility]) 07/15/2022     Obesity, morbid (formerly [facility]) 07/15/2022     Secondary hyperparathyroidism of renal origin (formerly [facility]) 07/15/2022     Stage 5 chronic kidney disease on peritoneal dialysis (formerly [facility]) 07/15/2022     Factor 5 Leiden mutation, heterozygous (formerly [facility]) 01/15/2022     Thrombocytopenia (formerly [facility]) 2022     Angina at rest 2022     MI (myocardial infarction) (formerly [facility]) 2022     History of PTCA 2022     Sleep apnea 2022     Smoking 2022     Anemia 2022     History of CVA (cerebrovascular accident) 2022     Acute on chronic diastolic HF (heart failure) (formerly [facility]) 2022     HLD (hyperlipidemia) 2022     Lymphedema 2022     Hypothyroidism 2022     COPD (chronic obstructive pulmonary disease) (formerly [facility]) 2018     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  (HCC) 06/29/2016     Gastroesophageal reflux disease 06/29/2016     Gout 06/29/2016     Personal history of pulmonary embolism 06/29/2016     ED (erectile dysfunction) of organic origin 01/14/2014     History of thromboembolism of vein 01/10/2013       LOS (days): 1  Geometric Mean LOS (GMLOS) (days): 4.5  Days to GMLOS:3.7     OBJECTIVE:  Risk of Unplanned Readmission Score: 37.12         Current admission status: Inpatient   Preferred Pharmacy:   [facility] - [place], [place] - [address]  [address] [facility]  [place] [address]  Phone: [phone] Fax: [phone]    Primary Care Provider: Jignesh Baker DO    Primary Insurance: [employer]  Secondary Insurance:     DISCHARGE DETAILS:              STR has been recommended for pt at time of discharge. Pt is agreeable to STR.  CM placing blanket [facility]

## 2024-09-16 NOTE — PLAN OF CARE
Problem: PAIN - ADULT  Goal: Verbalizes/displays adequate comfort level or baseline comfort level  Description: Interventions:  - Encourage patient to monitor pain and request assistance  - Assess pain using appropriate pain scale  - Administer analgesics based on type and severity of pain and evaluate response  - Implement non-pharmacological measures as appropriate and evaluate response  - Consider cultural and social influences on pain and pain management  - Notify physician/advanced practitioner if interventions unsuccessful or patient reports new pain  Outcome: Progressing     Problem: INFECTION - ADULT  Goal: Absence or prevention of progression during hospitalization  Description: INTERVENTIONS:  - Assess and monitor for signs and symptoms of infection  - Monitor lab/diagnostic results  - Monitor all insertion sites, i.e. indwelling lines, tubes, and drains  - Administer medications as ordered  - Instruct and encourage patient and family to use good hand hygiene technique  - Identify and instruct in appropriate isolation precautions for identified infection/condition  Outcome: Progressing     Problem: SAFETY ADULT  Goal: Patient will remain free of falls  Description: INTERVENTIONS:  - Educate patient/family on patient safety including physical limitations  - Instruct patient to call for assistance with activity   - Consult OT/PT to assist with strengthening/mobility   - Keep Call bell within reach  - Keep bed low and locked with side rails adjusted as appropriate  - Keep care items and personal belongings within reach  - Initiate and maintain comfort rounds  - Make Fall Risk Sign visible to staff  - Offer Toileting every 2 Hours, in advance of need  - Initiate/Maintain bed alarm  - Obtain necessary fall risk management equipment: yellow bracelet, yellow socks, bed alarm  - Apply yellow socks and bracelet for high fall risk patients  - Consider moving patient to room near nurses station  Outcome:  Progressing  Goal: Maintain or return to baseline ADL function  Description: INTERVENTIONS:  -  Assess patient's ability to carry out ADLs; assess patient's baseline for ADL function and identify physical deficits which impact ability to perform ADLs (bathing, care of mouth/teeth, toileting, grooming, dressing, etc.)  - Assess/evaluate cause of self-care deficits   - Assess range of motion  - Assess patient's mobility; develop plan if impaired  - Assess patient's need for assistive devices and provide as appropriate  - Encourage maximum independence but intervene and supervise when necessary  - Involve family in performance of ADLs  - Assess for home care needs following discharge   - Consider OT consult to assist with ADL evaluation and planning for discharge  - Provide patient education as appropriate  Outcome: Progressing  Goal: Maintains/Returns to pre admission functional level  Description: INTERVENTIONS:  - Perform AM-PAC 6 Click Basic Mobility/ Daily Activity assessment daily.  - Set and communicate daily mobility goal to care team and patient/family/caregiver.   - Collaborate with rehabilitation services on mobility goals if consulted  - Reposition patient every 2 hours.  - Out of bed for toileting  - Record patient progress and toleration of activity level   Outcome: Progressing

## 2024-09-16 NOTE — PLAN OF CARE
Problem: PAIN - ADULT  Goal: Verbalizes/displays adequate comfort level or baseline comfort level  Description: Interventions:  - Encourage patient to monitor pain and request assistance  - Assess pain using appropriate pain scale  - Administer analgesics based on type and severity of pain and evaluate response  - Implement non-pharmacological measures as appropriate and evaluate response  - Consider cultural and social influences on pain and pain management  - Notify physician/advanced practitioner if interventions unsuccessful or patient reports new pain  Outcome: Progressing     Problem: INFECTION - ADULT  Goal: Absence or prevention of progression during hospitalization  Description: INTERVENTIONS:  - Assess and monitor for signs and symptoms of infection  - Monitor lab/diagnostic results  - Monitor all insertion sites, i.e. indwelling lines, tubes, and drains  - Administer medications as ordered  - Instruct and encourage patient and family to use good hand hygiene technique  - Identify and instruct in appropriate isolation precautions for identified infection/condition  Outcome: Progressing     Problem: SAFETY ADULT  Goal: Patient will remain free of falls  Description: INTERVENTIONS:  - Educate patient/family on patient safety including physical limitations  - Instruct patient to call for assistance with activity   - Consult OT/PT to assist with strengthening/mobility   - Keep Call bell within reach  - Keep bed low and locked with side rails adjusted as appropriate  - Keep care items and personal belongings within reach  - Initiate and maintain comfort rounds  - Make Fall Risk Sign visible to staff  - Offer Toileting every 2 Hours, in advance of need  - Initiate/Maintain bed alarm  - Obtain necessary fall risk management equipment: yellow bracelet, yellow socks, bed alarm  - Apply yellow socks and bracelet for high fall risk patients  - Consider moving patient to room near nurses station  Outcome:  Progressing

## 2024-09-16 NOTE — SEPSIS NOTE
Sepsis Note   Masoud Perry 69 y.o. male MRN: [id number]  Unit/Bed#: -01 Encounter: [id number]       Initial Sepsis Screening       Row Name 09/16/24 0741                Is the patient's history suggestive of a new or worsening infection? No  -SG                  User Key  (r) = Recorded By, (t) = Taken By, (c) = Cosigned By      Initials Name Provider Type    TIFFANIE Conn PA-C Physician Assistant                        Body mass index is 35.58 kg/m².  Wt Readings from Last 1 Encounters:   09/16/24 116 kg (255 lb 1.2 oz)     IBW (Ideal Body Weight): 75.3 kg    Ideal body weight: 75.3 kg (166 lb 0.1 oz)  Adjusted ideal body weight: 91.5 kg (201 lb 10.1 oz)    Received sepsis alert for patient. Patient with leukopenia and respirations of 22. No evidence of infection at this time.

## 2024-09-16 NOTE — CONSULTS
Consultation - Nephrology   Masoud Perry 69 y.o. male MRN: [id number]  Unit/Bed#: -01 Encounter: [id number]      Assessment & Plan     Assessment / Plan:    1.  Renal    The patient has end-stage renal disease is on nocturnal cycler PD at home and that modality is not present in the hospital.  He is going to be undergoing dialysis with the modality of CAPD or continuous ambulatory peritoneal dialysis.  Labs done this morning show hypokalemia with potassium of 2.7.  His hemoglobin is 8.5.    The patient will be on CAPD with total volume 2000 cc fills 4 exchanges a day of dextrose 2.5% solution.    Monitor weights and laboratory values  Monitor PD flowsheet.    Patient has hypokalemia so we will give extra 40 mEq KCl on top of the daily dose of 10 mEq KCl which was ordered.    BMP a.m.  Continue phosphate binders with meals  Continue cincalcet for secondary hyperparathyroidism    2.  Status post fall    The patient does not appear to have any medical issues that led to this he feels he was fully conscious did not have any symptoms and was just weak in his legs.  He has been in and out of the hospital frequently the last couple months he is likely debilitated.    Fortunately no fractures related to his trauma.    PT OT to see patient and then will help determine disposition and potential need for rehab.      History of Present Illness   Physician Requesting Consult: Moses Noel MD  Reason for Consult / Principal Problem: End-stage renal disease on peritoneal dialysis  Hx and PE limited by:   HPI: Masoud Perry is a 69 y.o. year old male who has history of end-stage renal disease receives nocturnal cycler PD.  He has been hospitalized a few times in the last couple months 1 being prolonged from the end of July till the beginning of August for cellulitis.  He then presented for A-fib and was admitted overnight a few days ago and then he was at home says that he just was weak and he fell.  Patient is referred  over for admission and we are seeing him for PD management.  Thorough imaging by trauma service revealed no acute osseous injuries or other trauma related to the fall.  History obtained from chart review and the patient.    Inpatient consult to Nephrology  Consult performed by: Shaan Azar MD  Consult ordered by: Magdiel Holguin MD          Review of Systems   Constitutional:  Positive for activity change and fatigue. Negative for chills, diaphoresis and fever.   HENT: Negative.     Eyes: Negative.    Respiratory:  Negative for cough, chest tightness, shortness of breath and wheezing.    Cardiovascular:  Negative for chest pain and palpitations.   Gastrointestinal:  Negative for abdominal pain, diarrhea, nausea and vomiting.   Neurological:  Negative for dizziness, seizures, syncope and headaches.   Psychiatric/Behavioral:  Negative for agitation, behavioral problems, confusion and decreased concentration.        Historical Information   Patient Active Problem List   Diagnosis    History of CVA (cerebrovascular accident)    Acute on chronic diastolic HF (heart failure) ([facility])    HLD (hyperlipidemia)    Lymphedema    Hypothyroidism    Anemia    Thrombocytopenia ([facility])    Angina at rest    MI (myocardial infarction) ([facility])    History of PTCA    Sleep apnea    Smoking    Factor 5 Leiden mutation, heterozygous ([facility])    Type 2 diabetes mellitus with stage 4 chronic kidney disease, with long-term current use of insulin ([facility])    Obesity, morbid ([facility])    Secondary hyperparathyroidism of renal origin ([facility])    Stage 5 chronic kidney disease on peritoneal dialysis ([facility])    Chronic kidney disease-mineral and bone disorder    Ureteral stone with hydronephrosis    Cutaneous abscess of buttock    Bilateral lower extremity edema    Cellulitis of right lower extremity    Paroxysmal atrial fibrillation ([facility])    Patient on peritoneal dialysis ([facility])    Hypervolemia    ESRD (end stage renal disease) ([facility])    Bacteremia    Elevated  LFTs    Nocturnal hypoxia    Lactic acidosis    Hypercalcemia    Chronic acquired lymphedema    Hypotension    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 (HCC)    COPD (chronic obstructive pulmonary disease) (HCC)    ED (erectile dysfunction) of organic origin    Gastroesophageal reflux disease    Gout    History of thromboembolism of vein    Personal history of pulmonary embolism    Ambulatory dysfunction     Past Medical History:   Diagnosis Date    Anemia in chronic kidney disease     Anticoagulated on Coumadin     Atrial fibrillation (HCC)     BPH (benign prostatic hyperplasia)     CAD (coronary artery disease)     CKD (chronic kidney disease), stage V (HCC)     Compartment syndrome of forearm (HCC)     Right, 2019    COPD (chronic obstructive pulmonary disease) (HCC)     CVA (cerebral vascular accident) (HCC)     Diastolic CHF (HCC)     grade 1 DD    DVT (deep venous thrombosis) (HCC)     Factor V deficiency (HCC)     Gout     Hyperlipidemia     Hypertension     Hypothyroidism     MI (myocardial infarction) ([facility])     x3 - 1999, 2010, after 2015    Morbid obesity (HCC)     Nephrolithiasis     Noncompliance with medications     SHAD (obstructive sleep apnea)     Peritoneal dialysis catheter in place (HCC)     Pulmonary embolism (HCC)     Secondary hyperparathyroidism of renal origin (HCC)     Spinal stenosis of lumbar region     Status post placement of implantable loop recorder     Tobacco dependence      Past Surgical History:   Procedure Laterality Date    CARDIAC ELECTROPHYSIOLOGY STUDY AND ABLATION  04/29/2024    CHOLECYSTECTOMY      CORONARY ANGIOPLASTY WITH STENT PLACEMENT      JOINT REPLACEMENT      bilateral knee    AL CYSTO/URETERO W/LITHOTRIPSY &INDWELL STENT INSRT Left 09/24/2022    Procedure: CYSTOSCOPY URETEROSCOPY WITH LITHOTRIPSY HOLMIUM LASER, AND INSERTION STENT URETERAL;  Surgeon: Lewis Dahl MD;  Location: [facility];  Service: Urology    AL LAPS INSERTION TUNNELED INTRAPERITONEAL  CATHETER N/A 6/7/2024    Procedure: INSERTION PERITONEAL CATHETER DIALYSIS LAPAROSCOPIC;  Surgeon: Hiram Park DO;  Location: [facility];  Service: General    US GUIDED KIDNEY BIOPSY  08/05/2020     Social History   Social History     Substance and Sexual Activity   Alcohol Use Not Currently     Social History     Substance and Sexual Activity   Drug Use Never     Social History     Tobacco Use   Smoking Status Every Day    Current packs/day: 0.50    Types: Cigarettes   Smokeless Tobacco Never     Family History   Problem Relation Age of Onset    Kidney failure Mother     Cirrhosis Mother     Colon cancer Brother        Meds/Allergies   current meds:   Current Facility-Administered Medications:     acetaminophen (TYLENOL) tablet 650 mg, Q6H PRN    allopurinol (ZYLOPRIM) tablet 300 mg, Daily    ascorbic acid (VITAMIN C) tablet 500 mg, Daily    aspirin (ECOTRIN LOW STRENGTH) EC tablet 81 mg, Daily    atorvastatin (LIPITOR) tablet 40 mg, Daily With Dinner    cinacalcet (SENSIPAR) tablet 30 mg, Daily With Dinner    co-enzyme Q-10 capsule 100 mg, Daily    ferrous sulfate tablet 325 mg, Daily With Breakfast    gentamicin (GARAMYCIN) 0.1 % cream, Daily    HYDROcodone-acetaminophen (NORCO) 5-325 mg per tablet 2 tablet, Q6H PRN    insulin glargine (LANTUS) subcutaneous injection 15 Units 0.15 mL, HS    levothyroxine tablet 125 mcg, Daily    magnesium Oxide (MAG-OX) tablet 400 mg, Daily    metoprolol succinate (TOPROL-XL) 24 hr tablet 50 mg, BID    midodrine (PROAMATINE) tablet 15 mg, TID AC    nicotine (NICODERM CQ) 14 mg/24hr TD 24 hr patch 1 patch, Daily    potassium chloride (Klor-Con M10) CR tablet 10 mEq, Daily    sevelamer (RENAGEL) tablet 800 mg, TID With Meals    simethicone (MYLICON) chewable tablet 80 mg, 4x Daily PRN    tamsulosin (FLOMAX) capsule 0.4 mg, Daily With Dinner    torsemide (DEMADEX) tablet 100 mg, Daily    warfarin (COUMADIN) tablet 1 mg, Daily (warfarin)  Allergies   Allergen Reactions     "Carvedilol Shortness Of Breath    Saccharin - Food Allergy Diarrhea     GI intolerance, louis does not want to receive    Sucralose - Food Allergy Diarrhea     GI intolerance, adamant does not want to receive    Amiodarone Dizziness    Aspartame - Food Allergy Diarrhea    Bumetanide GI Intolerance and Lightheadedness           Cephalexin Other (See Comments)     unknown    Ciprofloxacin Rash    Hydralazine Tachycardia    Lisinopril GI Intolerance           Metolazone Other (See Comments)     Metallic taste    Morphine Swelling     Of injection site    Nifedipine Lightheadedness    Strawberry Extract - Food Allergy Rash       Objective     Intake/Output Summary (Last 24 hours) at 9/16/2024 0944  Last data filed at 9/16/2024 0801  Gross per 24 hour   Intake 240 ml   Output -660 ml   Net 900 ml     Body mass index is 35.58 kg/m².    Invasive Devices:        PHYSICAL EXAM:  /54 (BP Location: Left arm)   Pulse 65   Temp 97.5 °F (36.4 °C) (Temporal)   Resp 22   Ht 5' 11\" (1.803 m)   Wt 116 kg (255 lb 1.2 oz)   SpO2 95%   BMI 35.58 kg/m²     Physical Exam  Constitutional:       General: He is not in acute distress.     Appearance: He is not toxic-appearing.   HENT:      Head: Normocephalic and atraumatic.      Nose: Nose normal.      Mouth/Throat:      Mouth: Mucous membranes are moist.   Eyes:      General: No scleral icterus.     Extraocular Movements: Extraocular movements intact.   Cardiovascular:      Rate and Rhythm: Normal rate and regular rhythm.      Heart sounds:      No friction rub. No gallop.   Pulmonary:      Effort: Pulmonary effort is normal. No respiratory distress.      Breath sounds: No wheezing or rales.   Abdominal:      General: Bowel sounds are normal. There is no distension.      Palpations: Abdomen is soft.      Tenderness: There is no abdominal tenderness.   Musculoskeletal:      Cervical back: Normal range of motion and neck supple.   Skin:     Comments: Chronic hyperpigmentation " distal lower extremities.   Neurological:      Mental Status: He is alert and oriented to person, place, and time.   Psychiatric:         Mood and Affect: Mood normal.         Behavior: Behavior normal.           Current Weight: Weight - Scale: 116 kg (255 lb 1.2 oz)  First Weight: Weight - Scale: 117 kg (258 lb 13.1 oz)    Lab Results:    Results from last 7 days   Lab Units 09/16/24  0541   WBC Thousand/uL 3.89*   HEMOGLOBIN g/dL 8.5*   HEMATOCRIT % 27.2*   PLATELETS Thousands/uL 127*     Results from last 7 days   Lab Units 09/16/24  0541   POTASSIUM mmol/L 2.7*   CHLORIDE mmol/L 96   CO2 mmol/L 31   BUN mg/dL 29*   CREATININE mg/dL 5.78*   CALCIUM mg/dL 9.2     Results from last 7 days   Lab Units 09/16/24  0541   POTASSIUM mmol/L 2.7*   CHLORIDE mmol/L 96   CO2 mmol/L 31   BUN mg/dL 29*   CREATININE mg/dL 5.78*   CALCIUM mg/dL 9.2   ALK PHOS U/L 101   ALT U/L 13   AST U/L 17

## 2024-09-16 NOTE — CASE MANAGEMENT
Case Management Assessment & Discharge Planning Note    Patient name Masoud Goregh  Location /-01 MRN [id number]  : 1955 Date 2024       Current Admission Date: 9/15/2024  Current Admission Diagnosis:Ambulatory dysfunction   Patient Active Problem List    Diagnosis Date Noted Date Diagnosed    Ambulatory dysfunction 09/15/2024     Lactic acidosis 2024     Hypercalcemia 2024     Chronic acquired lymphedema 2024     Hypotension 2024     Nocturnal hypoxia 2024     Elevated LFTs 2024     Patient on peritoneal dialysis ([facility]) 2024     Hypervolemia 2024     ESRD (end stage renal disease) ([facility]) 2024     Bacteremia 2024     Cellulitis of right lower extremity 2024     Paroxysmal atrial fibrillation ([facility]) 2024     Bilateral lower extremity edema 2023     Ureteral stone with hydronephrosis 2022     Cutaneous abscess of buttock 2022     Chronic kidney disease-mineral and bone disorder 2022     Type 2 diabetes mellitus with stage 4 chronic kidney disease, with long-term current use of insulin ([facility]) 07/15/2022     Obesity, morbid ([facility]) 07/15/2022     Secondary hyperparathyroidism of renal origin ([facility]) 07/15/2022     Stage 5 chronic kidney disease on peritoneal dialysis ([facility]) 07/15/2022     Factor 5 Leiden mutation, heterozygous ([facility]) 01/15/2022     Thrombocytopenia ([facility]) 2022     Angina at rest 2022     MI (myocardial infarction) ([facility]) 2022     History of PTCA 2022     Sleep apnea 2022     Smoking 2022     Anemia 2022     History of CVA (cerebrovascular accident) 2022     Acute on chronic diastolic HF (heart failure) ([facility]) 2022     HLD (hyperlipidemia) 2022     Lymphedema 2022     Hypothyroidism 2022     COPD (chronic obstructive pulmonary disease) ([facility]) 2018     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 ([facility])  06/29/2016     Gastroesophageal reflux disease 06/29/2016     Gout 06/29/2016     Personal history of pulmonary embolism 06/29/2016     ED (erectile dysfunction) of organic origin 01/14/2014     History of thromboembolism of vein 01/10/2013       LOS (days): 1  Geometric Mean LOS (GMLOS) (days):   Days to GMLOS:     OBJECTIVE:    Risk of Unplanned Readmission Score: 36.47         Current admission status: Inpatient       Preferred Pharmacy:   [facility] - [facility] [place], [place] - [address]  [address]   [place] [address]  Phone: [phone] Fax: [phone]    Primary Care Provider: Jignesh Baker DO    Primary Insurance: [employer]  Secondary Insurance:     ASSESSMENT:  Active Health Care Proxies    There are no active Health Care Proxies on file.       Advance Directives  Does patient have a Health Care POA?: Yes (timothy flood)  Does patient have Advance Directives?: Yes  Advance Directives: Power of  for health care, Power of  for finance  Primary Contact: timothy flood              Patient Information  Admitted from:: Home  Mental Status: Alert  During Assessment patient was accompanied by: Not accompanied during assessment  Assessment information provided by:: Patient  Primary Caregiver: Family  Caregiver's Name:: timothy flood  Caregiver's Relationship to Patient:: Family Member  Support Systems: Son, Family members  County of Residence: [facility]  Home entry access options. Select all that apply.: Stairs  Number of steps to enter home.: 2  Type of Current Residence: Apartment  Floor Level: 1  Upon entering residence, is there a bedroom on the main floor (no further steps)?: Yes  Upon entering residence, is there a bathroom on the main floor (no further steps)?: Yes  Living Arrangements: Lives Alone    Activities of Daily Living Prior to Admission  Functional Status: Independent  Completes ADLs independently?: Yes  Ambulates independently?: Yes  Does patient use  assisted devices?: Yes  Assisted Devices (DME) used: Walker, Straight Cane  Does patient currently own DME?: Yes  What DME does the patient currently own?: Bedside Commode, Straight Cane, Walker  Does patient have a history of Outpatient Therapy (PT/OT)?: No  Does the patient have a history of Short-Term Rehab?: No  Does patient have a history of HHC?: Yes ([facility])  Does patient currently have HHC?: Yes ([facility])    Current Home Health Care  Type of Current Home Care Services: Nurse visit, Home OT, Home PT  Current Home Health Agency:: [facility]  Current Home Health Follow-Up Provider:: PCP    Patient Information Continued  Income Source: SSI/SSD  Does patient have prescription coverage?: Yes  Does patient receive dialysis treatments?: Yes (PD, at home thru [facility])  Does patient have a history of substance abuse?: No  Does patient have a history of Mental Health Diagnosis?: No         Means of Transportation  Means of Transport to Appts:: Family transport      Social Determinants of Health (SDOH)      Flowsheet Row Most Recent Value   Housing Stability    In the last 12 months, was there a time when you were not able to pay the mortgage or rent on time? N   In the past 12 months, how many times have you moved where you were living? 0   At any time in the past 12 months, were you homeless or living in a shelter (including now)? N   Transportation Needs    In the past 12 months, has lack of transportation kept you from medical appointments or from getting medications? no   In the past 12 months, has lack of transportation kept you from meetings, work, or from getting things needed for daily living? No   Food Insecurity    Within the past 12 months, you worried that your food would run out before you got the money to buy more. Never true   Within the past 12 months, the food you bought just didn't last and you didn't have money to get more. Never true   Utilities    In the past 12 months has the electric, gas, oil,  or water company threatened to shut off services in your home? No            DISCHARGE DETAILS:    Discharge planning discussed with:: patient  Freedom of Choice: Yes  Comments - Freedom of Choice: pt from home, wishes to return home, await therapy notes  CM contacted family/caregiver?: No- see comments (declined)             Contacts  Patient Contacts: remigio Paulson  Relationship to Patient:: Family    Requested Home Health Care         Is the patient interested in HHC at discharge?: Yes  Home Health Agency Name:: [facility]    DME Referral Provided  Referral made for DME?: No         Would you like to participate in our [facility] service program?  : No - Declined                    Pt from home, lives alone.  Pt sts his son, Masoud , checks in on him daily. Pt sts he fell at home in the bathroom, no precipitating factors.  Pt uses a walker for ambulation.   Pt with ESRD, pt with PD at home.  Pt follows with Dr SANTIAGO at [facility].

## 2024-09-16 NOTE — PLAN OF CARE
Problem: PHYSICAL THERAPY ADULT  Goal: Performs mobility at highest level of function for planned discharge setting.  See evaluation for individualized goals.  Description: Treatment/Interventions: ADL retraining, Functional transfer training, LE strengthening/ROM, Elevations, Therapeutic exercise, Endurance training, Bed mobility, Gait training, OT, Family, Spoke to nursing  Equipment Recommended: Walker (pt owns)       See flowsheet documentation for full assessment, interventions and recommendations.  Note: Prognosis: Good  Problem List: Decreased strength, Decreased range of motion, Decreased endurance, Impaired balance, Decreased mobility, Pain  Assessment: Pt is 69 y.o. male seen for PT evaluation s/p admit to  [facility]  on 9/15/2024 w/ Ambulatory dysfunction and a fall. PT consulted to assess pt's functional mobility and d/c needs. Order placed for PT eval and tx, w/ up and OOB as tolerated order. Comorbidities affecting pt's physical performance at time of assessment include: A-Fib, BPH, CKD, COPD, CVA, CHF, DVT, Factor V, hyperlipidemia, hypertension, hypothyroidism, MI, morbid obesity, nephrolithiasis, SHAD, PE, spinal stenosis. PTA, pt was  living alone using a RW for ambulation, but states the has not been able to get out of his chair for the last two weeks . Personal factors affecting pt at time of IE include: ambulating w/ assistive device, positive fall history, and lives alone . Please find objective findings from PT assessment regarding body systems outlined above with impairments and limitations including weakness, decreased ROM, impaired balance, decreased endurance, gait deviations, pain, decreased activity tolerance, decreased functional mobility tolerance, and fall risk. Pt was greeted in supine at start of session. Pt needed minAx1 for supine<>sit. Pt noted 6/10 pain at start of evaluation, but was in 10/10 pain in standing. Vitals taken during evaluation, please see flowsheet for objective data.  Pt needed maxAx2 with RW for sit<>stand, not able to take steps due to high pain levels. Pt was let supine in bed with alarm on and call bell within reach.  Pt to benefit from continued PT tx to address deficits as defined above and maximize level of functional independent mobility and consistency. From PT/mobility standpoint, recommendation at time of d/c would be post acute rehabilitation services pending progress in order to facilitate return to PLOF. Based on pt presentation and impaired function, pt would benefit from level II, (moderate resource intensity) at D/C. RN updated, CM to follow.  Barriers to Discharge: Inaccessible home environment, Decreased caregiver support  Barriers to Discharge Comments: steps and lives alone  Rehab Resource Intensity Level, PT: II (Moderate Resource Intensity)    See flowsheet documentation for full assessment.

## 2024-09-16 NOTE — PHYSICAL THERAPY NOTE
PT EVALUATION    Pt. Name: Masoud VERGARA Perry  Pt. Age: 69 y.o.  MRN: [id number]  LENGTH OF STAY: 1    Patient Active Problem List   Diagnosis    History of CVA (cerebrovascular accident)    Acute on chronic diastolic HF (heart failure) (HCC)    HLD (hyperlipidemia)    Lymphedema    Hypothyroidism    Anemia    Thrombocytopenia (HCC)    Angina at rest    MI (myocardial infarction) (HCC)    History of PTCA    Sleep apnea    Smoking    Factor 5 Leiden mutation, heterozygous (HCC)    Type 2 diabetes mellitus with stage 4 chronic kidney disease, with long-term current use of insulin (HCC)    Obesity, morbid (HCC)    Secondary hyperparathyroidism of renal origin (HCC)    Stage 5 chronic kidney disease on peritoneal dialysis (HCC)    Chronic kidney disease-mineral and bone disorder    Ureteral stone with hydronephrosis    Cutaneous abscess of buttock    Bilateral lower extremity edema    Cellulitis of right lower extremity    Paroxysmal atrial fibrillation (HCC)    Patient on peritoneal dialysis (HCC)    Hypervolemia    ESRD (end stage renal disease) (HCC)    Bacteremia    Elevated LFTs    Nocturnal hypoxia    Lactic acidosis    Hypercalcemia    Chronic acquired lymphedema    Hypotension    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 (HCC)    COPD (chronic obstructive pulmonary disease) (HCC)    ED (erectile dysfunction) of organic origin    Gastroesophageal reflux disease    Gout    History of thromboembolism of vein    Personal history of pulmonary embolism    Ambulatory dysfunction    Hypokalemia       Admitting Diagnoses:   Multiple injuries [T07.XXXA]  ESRD (end stage renal disease) (HCC) [N18.6]  Patient on peritoneal dialysis (HCC) [Z99.2]  Ambulatory dysfunction [R26.2]    Past Medical History:   Diagnosis Date    Anemia in chronic kidney disease     Anticoagulated on Coumadin     Atrial fibrillation (HCC)     BPH (benign prostatic hyperplasia)     CAD (coronary artery disease)     CKD (chronic kidney  disease), stage V (HCC)     Compartment syndrome of forearm (HCC)     Right, 2019    COPD (chronic obstructive pulmonary disease) (HCC)     CVA (cerebral vascular accident) (HCC)     Diastolic CHF (HCC)     grade 1 DD    DVT (deep venous thrombosis) (HCC)     Factor V deficiency (HCC)     Gout     Hyperlipidemia     Hypertension     Hypothyroidism     MI (myocardial infarction) (HCC)     x3 - 1999, 2010, after 2015    Morbid obesity (HCC)     Nephrolithiasis     Noncompliance with medications     SHAD (obstructive sleep apnea)     Peritoneal dialysis catheter in place (HCC)     Pulmonary embolism (HCC)     Secondary hyperparathyroidism of renal origin (HCC)     Spinal stenosis of lumbar region     Status post placement of implantable loop recorder     Tobacco dependence        Past Surgical History:   Procedure Laterality Date    CARDIAC ELECTROPHYSIOLOGY STUDY AND ABLATION  04/29/2024    CHOLECYSTECTOMY      CORONARY ANGIOPLASTY WITH STENT PLACEMENT      JOINT REPLACEMENT      bilateral knee    NE CYSTO/URETERO W/LITHOTRIPSY &INDWELL STENT INSRT Left 09/24/2022    Procedure: CYSTOSCOPY URETEROSCOPY WITH LITHOTRIPSY HOLMIUM LASER, AND INSERTION STENT URETERAL;  Surgeon: Lewis Dahl MD;  Location: [facility];  Service: Urology    NE LAPS INSERTION TUNNELED INTRAPERITONEAL CATHETER N/A 6/7/2024    Procedure: INSERTION PERITONEAL CATHETER DIALYSIS LAPAROSCOPIC;  Surgeon: Hiram Park DO;  Location: [facility];  Service: General    US GUIDED KIDNEY BIOPSY  08/05/2020       Imaging Studies:  XR hip/pelv 2-3 vws left if performed   Final Result by Hiram Lubin MD (09/15 1706)      No acute osseous abnormality.         Computerized Assisted Algorithm (CAA) may have been used to analyze all applicable images.            Workstation performed: [id number]         TRAUMA - CT chest abdomen pelvis wo contrast   Final Result by Hiram Lubin MD (09/15 1702)      No findings of acute traumatic injury in the chest,  abdomen or pelvis within the limits of unenhanced technique.   Incidental 8 mm subpleural pulmonary nodule in the left lower lobe. Consider 6-month CT scan of the chest without contrast.   Small right pleural effusion.   Bilateral kidney stones. No hydronephrosis.   Peritoneal dialysis catheter and ascites.      The study was marked in EPIC for immediate notification.               Workstation performed: [id number]         TRAUMA - CT head wo contrast   Final Result by Hiram Lubin MD (09/15 1638)      No acute intracranial abnormality.      The study was marked in EPIC for immediate notification.            Workstation performed: [id number]         TRAUMA - CT spine cervical wo contrast   Final Result by Hiram Lubin MD (09/15 1643)      No cervical spine fracture or traumatic malalignment.      The study was marked in EPIC for immediate notification.            Workstation performed: [id number]         XR Trauma chest portable   Final Result by Hiram Lubin MD (09/15 1703)      Small right pleural effusion. Mild bibasilar atelectasis.            Workstation performed: [id number]         XR Trauma pelvis ap only 1 or 2 vw   Final Result by Hiram Lubin MD (09/15 1700)      No acute osseous abnormality.         Computerized Assisted Algorithm (CAA) may have been used to analyze all applicable images.         Workstation performed: [id number]               09/16/24 1028   PT Last Visit   PT Visit Date 09/16/24   Note Type   Note type Evaluation   Pain Assessment   Pain Assessment Tool 0-10   Pain Score 6   Pain Location/Orientation Orientation: Left;Location: Hip;Location: Back   Hospital Pain Intervention(s) Repositioned;Ambulation/increased activity;Emotional support;Elevated;Rest   Restrictions/Precautions   Weight Bearing Precautions Per Order No   Other Precautions Pain;Fall Risk;Chair Alarm;Bed Alarm   Home Living   Type of Home Apartment  (2 ISELA, no HR)   Home Layout One level;Able  to live on main level with bedroom/bathroom;Performs ADLs on one level;Stairs to enter without rails   Bathroom Shower/Tub Tub/shower unit   Bathroom Toilet Standard   Bathroom Equipment Toilet raiser   Home Equipment Walker;Cane   Additional Comments Pt reports living in apartment alone. Uses RW at baseline   Prior Function   Level of [place] Independent with ADLs;Independent with functional mobility;Independent with IADLS   Lives With Alone   Receives Help From Family   IADLs Independent with meal prep;Independent with medication management;Family/Friend/Other provides transportation   Falls in the last 6 months 1 to 4  (1)   Vocational Retired   General   Family/Caregiver Present No   Cognition   Overall Cognitive Status WFL   Arousal/Participation Alert   Attention Attends with cues to redirect   Orientation Level Oriented X4   Following Commands Follows one step commands without difficulty   RUE Assessment   RUE Assessment   (see OT note)   LUE Assessment   LUE Assessment   (see OT note)   RLE Assessment   RLE Assessment X  (grossly 4-/5 in MMT LE. Hemosiderin staining in BL LE,  increased dryness in lower legs)   LLE Assessment   LLE Assessment X  (grossly 4-/5 in MMT LE. Hemosiderin staining in BL LE, increased dryness in lower legs. Two small skin tears open in lower leg- no weeping present)   Light Touch   RLE Light Touch Grossly intact   LLE Light Touch Grossly intact   Bed Mobility   Supine to Sit 4  Minimal assistance   Additional items Assist x 1;Increased time required;Verbal cues   Sit to Supine 4  Minimal assistance   Additional items Assist x 1;Increased time required;Verbal cues   Additional Comments Pt supine in bed at beginning of session. Supine to sit @ Min A for trunk management.   Transfers   Sit to Stand 2  Maximal assistance   Additional items Assist x 2;Increased time required;Verbal cues   Stand to Sit 2  Maximal assistance   Additional items Assist x 2;Increased time required;Verbal  cues   Stand pivot Unable to assess   Additional Comments STS from elevated EOB to RW @ Max A x2. Unable to advance steps to chair d/t increased back pain. VITALS BP seated /57 (+) dizzy, BP taken after a few minutes of seated rest /52 (+) dizzyness. 10/10 pain in back in standing   Ambulation/Elevation   Ambulation/Elevation Additional Comments unable to assess ambulation and gait pattern due to high pain levels and safety. will continue to assess in future sessions   Balance   Static Sitting Fair +   Dynamic Sitting Fair   Static Standing Fair -   Dynamic Standing Poor +   Ambulatory Zero   Endurance Deficit   Endurance Deficit Yes   Endurance Deficit Description fatigue and pain   Activity Tolerance   Activity Tolerance Patient limited by pain   Medical Staff Made Aware DOUGIE Ayoub RN   Nurse Made Aware yes   Assessment   Prognosis Good   Problem List Decreased strength;Decreased range of motion;Decreased endurance;Impaired balance;Decreased mobility;Pain   Assessment Pt is 69 y.o. male seen for PT evaluation s/p admit to  [facility] [place]  on 9/15/2024 w/ Ambulatory dysfunction and a fall. PT consulted to assess pt's functional mobility and d/c needs. Order placed for PT eval and tx, w/ up and OOB as tolerated order. Comorbidities affecting pt's physical performance at time of assessment include: A-Fib, BPH, CKD, COPD, CVA, CHF, DVT, Factor V, hyperlipidemia, hypertension, hypothyroidism, MI, morbid obesity, nephrolithiasis, SHAD, PE, spinal stenosis. PTA, pt was  living alone using a RW for ambulation, but states the has not been able to get out of his chair for the last two weeks . Personal factors affecting pt at time of IE include: ambulating w/ assistive device, positive fall history, and lives alone . Please find objective findings from PT assessment regarding body systems outlined above with impairments and limitations including weakness, decreased ROM, impaired balance, decreased endurance, gait  deviations, pain, decreased activity tolerance, decreased functional mobility tolerance, and fall risk. Pt was greeted in supine at start of session. Pt needed minAx1 for supine<>sit. Pt noted 6/10 pain at start of evaluation, but was in 10/10 pain in standing. Vitals taken during evaluation, please see flowsheet for objective data. Pt needed maxAx2 with RW for sit<>stand, not able to take steps due to high pain levels. Pt was let supine in bed with alarm on and call bell within reach.  Pt to benefit from continued PT tx to address deficits as defined above and maximize level of functional independent mobility and consistency. From PT/mobility standpoint, recommendation at time of d/c would be post acute rehabilitation services pending progress in order to facilitate return to PLOF. Based on pt presentation and impaired function, pt would benefit from level II, (moderate resource intensity) at D/C. RN updated, CM to follow.   Barriers to Discharge Inaccessible home environment;Decreased caregiver support   Barriers to Discharge Comments steps and lives alone   Goals   Patient Goals to get stronger   STG Expiration Date 09/30/24   Short Term Goal #1 1).  Pt will perform bed mobility with Wally demonstrating appropriate technique 100% of the time in order to improve function.2)  Perform all transfers with Wally demonstrating safe and appropriate technique 100% of the time in order to improve ability to negotiate safely in home environment.3) Amb with least restrictive AD > 150'x1 with S in order to demonstrate ability to negotiate in home environment.4)  Improve overall strength and balance 1/2 grade in order to optimize ability to perform functional tasks and reduce fall risk.5) Increase activity tolerance to 45 minutes in order to improve endurance to functional tasks.6)  Negotiate stairs using most appropriate technique and S in order to be able to negotiate safely in home environment. 7) PT for ongoing patient and  family/caregiver education, DME needs and d/c planning in order to promote highest level of function in least restrictive environment.   Plan   Treatment/Interventions ADL retraining;Functional transfer training;LE strengthening/ROM;Elevations;Therapeutic exercise;Endurance training;Bed mobility;Gait training;OT;Family;Spoke to nursing   PT Frequency 3-5x/wk   Discharge Recommendation   Rehab Resource Intensity Level, PT II (Moderate Resource Intensity)   Equipment Recommended Walker  (pt owns)   Additional Comments The patient's AM-PAC Basic Mobility Inpatient Short Form Raw Score is 12. A Raw score of less than or equal to 16 suggests the patient may benefit from discharge to post-acute rehabilitation services. Please also refer to the recommendation of the Physical Therapist for safe discharge planning.   AM-PAC Basic Mobility Inpatient   Turning in Flat Bed Without Bedrails 3   Lying on Back to Sitting on Edge of Flat Bed Without Bedrails 3   Moving Bed to Chair 2   Standing Up From Chair Using Arms 2   Walk in Room 1   Climb 3-5 Stairs With Railing 1   Basic Mobility Inpatient Raw Score 12   Basic Mobility Standardized Score 32.23   [facility] Highest Level Of Mobility   -[facility] Goal 4: Move to chair/commode   -[facility] Achieved 5: Stand (1 or more minutes)   End of Consult   Patient Position at End of Consult Supine;Bed/Chair alarm activated;All needs within reach   End of Consult Comments pt stable, in supine with alarm on and call bell within reach. RN updated     Hx/personal factors: co-morbidities, dec caregiver support, home alone, advanced age, mutliple lines, use of AD, pain, h/o of falls, and fall risk  Examination: dec mobility, dec balance, dec endurance, dec amb, risk for falls, pain, assessed body system, balance, endurance, amb, D/C disposition & fall risk, impairements in locomotion, musculoskeletal, balance, endurance, posture, coordination  Clinical: unpredictable (ongoing medical status, risk  for falls, and pain mgt)  Complexity: high     Ashtyn Kennedy, PT

## 2024-09-17 PROBLEM — E44.0 MODERATE PROTEIN-CALORIE MALNUTRITION (HCC): Status: ACTIVE | Noted: 2024-09-17

## 2024-09-17 LAB
ALBUMIN SERPL BCG-MCNC: 2.2 G/DL (ref 3.5–5)
ALP SERPL-CCNC: 100 U/L (ref 34–104)
ALT SERPL W P-5'-P-CCNC: 13 U/L (ref 7–52)
ANION GAP SERPL CALCULATED.3IONS-SCNC: 7 MMOL/L (ref 4–13)
AST SERPL W P-5'-P-CCNC: 18 U/L (ref 13–39)
BASOPHILS # BLD AUTO: 0.02 THOUSANDS/ΜL (ref 0–0.1)
BASOPHILS NFR BLD AUTO: 1 % (ref 0–1)
BILIRUB SERPL-MCNC: 0.25 MG/DL (ref 0.2–1)
BUN SERPL-MCNC: 28 MG/DL (ref 5–25)
CALCIUM ALBUM COR SERPL-MCNC: 10.3 MG/DL (ref 8.3–10.1)
CALCIUM SERPL-MCNC: 8.9 MG/DL (ref 8.4–10.2)
CHLORIDE SERPL-SCNC: 97 MMOL/L (ref 96–108)
CO2 SERPL-SCNC: 31 MMOL/L (ref 21–32)
CREAT SERPL-MCNC: 5.61 MG/DL (ref 0.6–1.3)
EOSINOPHIL # BLD AUTO: 0.18 THOUSAND/ΜL (ref 0–0.61)
EOSINOPHIL NFR BLD AUTO: 4 % (ref 0–6)
ERYTHROCYTE [DISTWIDTH] IN BLOOD BY AUTOMATED COUNT: 15.9 % (ref 11.6–15.1)
GFR SERPL CREATININE-BSD FRML MDRD: 9 ML/MIN/1.73SQ M
GLUCOSE SERPL-MCNC: 124 MG/DL (ref 65–140)
GLUCOSE SERPL-MCNC: 96 MG/DL (ref 65–140)
HCT VFR BLD AUTO: 28.3 % (ref 36.5–49.3)
HGB BLD-MCNC: 8.6 G/DL (ref 12–17)
IMM GRANULOCYTES # BLD AUTO: 0.01 THOUSAND/UL (ref 0–0.2)
IMM GRANULOCYTES NFR BLD AUTO: 0 % (ref 0–2)
LYMPHOCYTES # BLD AUTO: 1.32 THOUSANDS/ΜL (ref 0.6–4.47)
LYMPHOCYTES NFR BLD AUTO: 32 % (ref 14–44)
MAGNESIUM SERPL-MCNC: 1.7 MG/DL (ref 1.9–2.7)
MCH RBC QN AUTO: 31.4 PG (ref 26.8–34.3)
MCHC RBC AUTO-ENTMCNC: 30.4 G/DL (ref 31.4–37.4)
MCV RBC AUTO: 103 FL (ref 82–98)
MONOCYTES # BLD AUTO: 0.33 THOUSAND/ΜL (ref 0.17–1.22)
MONOCYTES NFR BLD AUTO: 8 % (ref 4–12)
NEUTROPHILS # BLD AUTO: 2.31 THOUSANDS/ΜL (ref 1.85–7.62)
NEUTS SEG NFR BLD AUTO: 55 % (ref 43–75)
NRBC BLD AUTO-RTO: 0 /100 WBCS
PLATELET # BLD AUTO: 112 THOUSANDS/UL (ref 149–390)
PMV BLD AUTO: 8.5 FL (ref 8.9–12.7)
POTASSIUM SERPL-SCNC: 2.9 MMOL/L (ref 3.5–5.3)
PROT SERPL-MCNC: 5.1 G/DL (ref 6.4–8.4)
RBC # BLD AUTO: 2.74 MILLION/UL (ref 3.88–5.62)
SODIUM SERPL-SCNC: 135 MMOL/L (ref 135–147)
WBC # BLD AUTO: 4.17 THOUSAND/UL (ref 4.31–10.16)

## 2024-09-17 PROCEDURE — 82948 REAGENT STRIP/BLOOD GLUCOSE: CPT

## 2024-09-17 PROCEDURE — 83735 ASSAY OF MAGNESIUM: CPT | Performed by: INTERNAL MEDICINE

## 2024-09-17 PROCEDURE — 99232 SBSQ HOSP IP/OBS MODERATE 35: CPT | Performed by: FAMILY MEDICINE

## 2024-09-17 PROCEDURE — 85025 COMPLETE CBC W/AUTO DIFF WBC: CPT | Performed by: FAMILY MEDICINE

## 2024-09-17 PROCEDURE — 99232 SBSQ HOSP IP/OBS MODERATE 35: CPT | Performed by: INTERNAL MEDICINE

## 2024-09-17 PROCEDURE — 80053 COMPREHEN METABOLIC PANEL: CPT | Performed by: FAMILY MEDICINE

## 2024-09-17 RX ORDER — POTASSIUM CHLORIDE 1500 MG/1
40 TABLET, EXTENDED RELEASE ORAL EVERY 6 HOURS
Status: COMPLETED | OUTPATIENT
Start: 2024-09-17 | End: 2024-09-17

## 2024-09-17 RX ORDER — TORSEMIDE 100 MG/1
100 TABLET ORAL DAILY
Status: DISCONTINUED | OUTPATIENT
Start: 2024-09-18 | End: 2024-09-21

## 2024-09-17 RX ORDER — MAGNESIUM SULFATE HEPTAHYDRATE 40 MG/ML
2 INJECTION, SOLUTION INTRAVENOUS ONCE
Status: COMPLETED | OUTPATIENT
Start: 2024-09-17 | End: 2024-09-17

## 2024-09-17 RX ORDER — METOPROLOL SUCCINATE 50 MG/1
50 TABLET, EXTENDED RELEASE ORAL 2 TIMES DAILY
Status: DISCONTINUED | OUTPATIENT
Start: 2024-09-17 | End: 2024-09-18

## 2024-09-17 RX ADMIN — CINACALCET 30 MG: 30 TABLET, FILM COATED ORAL at 15:59

## 2024-09-17 RX ADMIN — ASPIRIN 81 MG: 81 TABLET, COATED ORAL at 09:22

## 2024-09-17 RX ADMIN — HYDROCODONE BITARTRATE AND ACETAMINOPHEN 2 TABLET: 5; 325 TABLET ORAL at 15:59

## 2024-09-17 RX ADMIN — OXYCODONE HYDROCHLORIDE AND ACETAMINOPHEN 500 MG: 500 TABLET ORAL at 09:22

## 2024-09-17 RX ADMIN — HYDROCODONE BITARTRATE AND ACETAMINOPHEN 2 TABLET: 5; 325 TABLET ORAL at 09:26

## 2024-09-17 RX ADMIN — MAGNESIUM SULFATE HEPTAHYDRATE 2 G: 40 INJECTION, SOLUTION INTRAVENOUS at 09:23

## 2024-09-17 RX ADMIN — MIDODRINE HYDROCHLORIDE 15 MG: 5 TABLET ORAL at 11:57

## 2024-09-17 RX ADMIN — LIDOCAINE 5% 1 PATCH: 700 PATCH TOPICAL at 12:28

## 2024-09-17 RX ADMIN — MIDODRINE HYDROCHLORIDE 15 MG: 5 TABLET ORAL at 15:59

## 2024-09-17 RX ADMIN — POTASSIUM CHLORIDE 40 MEQ: 1500 TABLET, EXTENDED RELEASE ORAL at 17:54

## 2024-09-17 RX ADMIN — INSULIN GLARGINE 15 UNITS: 100 INJECTION, SOLUTION SUBCUTANEOUS at 21:37

## 2024-09-17 RX ADMIN — GENTAMICIN SULFATE 1 APPLICATION: 1 CREAM TOPICAL at 09:23

## 2024-09-17 RX ADMIN — METOPROLOL SUCCINATE 50 MG: 50 TABLET, EXTENDED RELEASE ORAL at 14:32

## 2024-09-17 RX ADMIN — LEVOTHYROXINE SODIUM 125 MCG: 125 TABLET ORAL at 09:22

## 2024-09-17 RX ADMIN — TORSEMIDE 100 MG: 100 TABLET ORAL at 09:22

## 2024-09-17 RX ADMIN — HYDROCODONE BITARTRATE AND ACETAMINOPHEN 2 TABLET: 5; 325 TABLET ORAL at 21:37

## 2024-09-17 RX ADMIN — FERROUS SULFATE TAB 325 MG (65 MG ELEMENTAL FE) 325 MG: 325 (65 FE) TAB at 06:42

## 2024-09-17 RX ADMIN — HYDROCODONE BITARTRATE AND ACETAMINOPHEN 2 TABLET: 5; 325 TABLET ORAL at 03:16

## 2024-09-17 RX ADMIN — SEVELAMER HYDROCHLORIDE 800 MG: 800 TABLET, FILM COATED ORAL at 11:57

## 2024-09-17 RX ADMIN — MIDODRINE HYDROCHLORIDE 15 MG: 5 TABLET ORAL at 06:42

## 2024-09-17 RX ADMIN — SEVELAMER HYDROCHLORIDE 800 MG: 800 TABLET, FILM COATED ORAL at 15:59

## 2024-09-17 RX ADMIN — WARFARIN SODIUM 1 MG: 1 TABLET ORAL at 17:54

## 2024-09-17 RX ADMIN — MAGNESIUM GLUCONATE 500 MG ORAL TABLET 400 MG: 500 TABLET ORAL at 09:23

## 2024-09-17 RX ADMIN — ATORVASTATIN CALCIUM 40 MG: 40 TABLET, FILM COATED ORAL at 15:59

## 2024-09-17 RX ADMIN — SEVELAMER HYDROCHLORIDE 800 MG: 800 TABLET, FILM COATED ORAL at 06:42

## 2024-09-17 RX ADMIN — ALLOPURINOL 300 MG: 300 TABLET ORAL at 09:22

## 2024-09-17 RX ADMIN — POTASSIUM CHLORIDE 40 MEQ: 1500 TABLET, EXTENDED RELEASE ORAL at 11:57

## 2024-09-17 RX ADMIN — TAMSULOSIN HYDROCHLORIDE 0.4 MG: 0.4 CAPSULE ORAL at 15:59

## 2024-09-17 RX ADMIN — Medication 100 MG: at 09:22

## 2024-09-17 NOTE — ARC ADMISSION
Referral reviewed with ARC physician.  Patient has been denied for ARC at this time.  Recommending slower pace, longer length of stay for rehab.  CM has been updated.

## 2024-09-17 NOTE — PROGRESS NOTES
Progress Note - Hospitalist   Name: Masoud Perry 69 y.o. male I MRN: [id number]  Unit/Bed#: -01 I Date of Admission: 9/15/2024   Date of Service: 9/17/2024 I Hospital Day: 2    Assessment & Plan  Ambulatory dysfunction  Been weak for 2 weeks, difficulty getting out of a chair, and fell today hitting the back of his head.  At baseline he walks with a walker  PT/OT consult- needs rehab patient agreeble  Case management consultation for acute rehab  ESRD (end stage renal disease) (HCC)  On peritoneal dialysis.  Will consult nephrology.  I did have a discussion with the patient about why he is not on hemodialysis, patient states he was told that he would have to go to the dialysis clinic 4-5 times a week for 6 to 8 hours/day.  I am not certain about this, but at the patient did have hemodialysis patient would be able to find multiple acute rehabs that would accept him for for rehab.  An option would be to start hemodialysis here for rehab    This provider discussed with patient regarding hemodialysis.  Patient reports he prefers to stay with current treatment with peritoneal dialysis.    Lab Results   Component Value Date    EGFR 9 09/17/2024    EGFR 9 09/16/2024    EGFR 9 09/15/2024    CREATININE 5.61 (H) 09/17/2024    CREATININE 5.78 (H) 09/16/2024    CREATININE 5.68 (H) 09/15/2024     Patient on peritoneal dialysis (HCC)  Consult nephrology.  Paroxysmal atrial fibrillation ([facility])  Continue metoprolol and currently on Coumadin.  He states that the Coumadin doses have been changing recently.  Currently not on his home med rec.  Current INR is 2.52, he is currently on a alternating schedule of Coumadin 1 mg and then 2 mg.  Will start with 1 mg daily to see how he does  Type 2 diabetes mellitus with stage 4 chronic kidney disease, with long-term current use of insulin ([facility])  Currently lantus 15 units hs     Lab Results   Component Value Date    HGBA1C 5.9 (H) 07/24/2024     Hypothyroidism  Continue  levothyroxine  HLD (hyperlipidemia)  Continue statin  Hypotension  Continue midodrine 15 mg 3 times daily  Secondary hyperparathyroidism of renal origin (HCC)  Continue Sensipar   Chronic acquired lymphedema  Continue torsemide  Gout  Continue allopurinol  History of CVA (cerebrovascular accident)  Continue aspirin and Coumadin- recheck inr in am   Obesity, morbid (HCC)  BMI of 37.14  Anemia  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 seems base above 8  Hypokalemia  Replaced and also mag per nephrology   Moderate protein-calorie malnutrition (HCC)  Malnutrition Findings:   Adult Malnutrition type: Chronic illness  Adult Degree of Malnutrition: Malnutrition of moderate degree  Malnutrition Characteristics: Inadequate energy, Muscle loss                  360 Statement: malnutrition of moderate degree in setting of chronic illness, evidenced by <75% energy intake compared to estimated needs for > 1 month, muscle wasting/indented temples, protruding clavicles, treated with diet, PT refusing supplements currently    BMI Findings:           Body mass index is 35.94 kg/m².       VTE Pharmacologic Prophylaxis: VTE Score: 5 High Risk (Score >/= 5) - Pharmacological DVT Prophylaxis Ordered: warfarin (Coumadin). Sequential Compression Devices Ordered.    Mobility:   Basic Mobility Inpatient Raw Score: 12  JH-HLM Goal: 4: Move to chair/commode  JH-HLM Achieved: 3: Sit at edge of bed  JH-HLM Goal NOT achieved. Continue with multidisciplinary rounding and encourage appropriate mobility to improve upon JH-HLM goals.    Patient Centered Rounds: I performed bedside rounds with nursing staff today.   Discussions with Specialists or Other Care Team Provider: nephrology     Education and Discussions with Family / Patient: pt will update family     Current Length of Stay: 2 day(s)  Current Patient Status: Inpatient   Certification Statement: The patient will continue to require additional inpatient hospital stay due to rehab   Discharge Plan:  Anticipate discharge in 48 hrs to rehab facility.    Code Status: Level 3 - DNAR and DNI    Subjective   Seen and examined no cp or sob    Objective     Vitals:   Temp (24hrs), Av.9 °F (36.6 °C), Min:97.6 °F (36.4 °C), Max:98.3 °F (36.8 °C)    Temp:  [97.6 °F (36.4 °C)-98.3 °F (36.8 °C)] 97.6 °F (36.4 °C)  HR:  [60-77] 65  Resp:  [20-21] 20  BP: ()/(51-55) 99/55  SpO2:  [90 %-100 %] 90 %  Body mass index is 35.94 kg/m².     Input and Output Summary (last 24 hours):     Intake/Output Summary (Last 24 hours) at 2024 1327  Last data filed at 2024 1201  Gross per 24 hour   Intake 460 ml   Output 350 ml   Net 110 ml       Physical Exam  Constitutional:       Appearance: He is well-developed.   HENT:      Head: Normocephalic and atraumatic.   Eyes:      Pupils: Pupils are equal, round, and reactive to light.   Cardiovascular:      Rate and Rhythm: Normal rate and regular rhythm.      Heart sounds: Normal heart sounds.   Pulmonary:      Effort: Pulmonary effort is normal. No respiratory distress.      Breath sounds: Normal breath sounds. No wheezing or rales.   Abdominal:      General: Bowel sounds are normal. There is no distension.      Palpations: Abdomen is soft.   Musculoskeletal:         General: Swelling present. Normal range of motion.      Cervical back: Normal range of motion.   Skin:     General: Skin is warm.   Neurological:      Mental Status: He is alert and oriented to person, place, and time.      Deep Tendon Reflexes: Reflexes are normal and symmetric.      Comments: cranial nerve 2-12 are normal.  Normal neurological exam          Lines/Drains:  Lines/Drains/Airways       Active Status       None                            Lab Results: I have reviewed the following results: CBC/BMP:   .     24  0626   WBC 4.17*   HGB 8.6*   HCT 28.3*   *   SODIUM 135   K 2.9*   CL 97   CO2 31   BUN 28*   CREATININE 5.61*   GLUC 96   MG 1.7*       Results from last 7 days   Lab Units  09/17/24  0626   WBC Thousand/uL 4.17*   HEMOGLOBIN g/dL 8.6*   HEMATOCRIT % 28.3*   PLATELETS Thousands/uL 112*   SEGS PCT % 55   LYMPHO PCT % 32   MONO PCT % 8   EOS PCT % 4     Results from last 7 days   Lab Units 09/17/24  0626   SODIUM mmol/L 135   POTASSIUM mmol/L 2.9*   CHLORIDE mmol/L 97   CO2 mmol/L 31   BUN mg/dL 28*   CREATININE mg/dL 5.61*   ANION GAP mmol/L 7   CALCIUM mg/dL 8.9   ALBUMIN g/dL 2.2*   TOTAL BILIRUBIN mg/dL 0.25   ALK PHOS U/L 100   ALT U/L 13   AST U/L 18   GLUCOSE RANDOM mg/dL 96     Results from last 7 days   Lab Units 09/16/24  0541   INR  2.98*                   Recent Cultures (last 7 days):         Imaging Review: Reviewed radiology reports from this admission including: xray(s).  Other Studies: No additional pertinent studies reviewed.    Last 24 Hours Medication List:     Current Facility-Administered Medications:     acetaminophen (TYLENOL) tablet 650 mg, Q6H PRN    allopurinol (ZYLOPRIM) tablet 300 mg, Daily    ascorbic acid (VITAMIN C) tablet 500 mg, Daily    aspirin (ECOTRIN LOW STRENGTH) EC tablet 81 mg, Daily    atorvastatin (LIPITOR) tablet 40 mg, Daily With Dinner    cinacalcet (SENSIPAR) tablet 30 mg, Daily With Dinner    co-enzyme Q-10 capsule 100 mg, Daily    ferrous sulfate tablet 325 mg, Daily With Breakfast    gentamicin (GARAMYCIN) 0.1 % cream, Daily    HYDROcodone-acetaminophen (NORCO) 5-325 mg per tablet 2 tablet, Q6H PRN    insulin glargine (LANTUS) subcutaneous injection 15 Units 0.15 mL, HS    levothyroxine tablet 125 mcg, Daily    lidocaine (LIDODERM) 5 % patch 1 patch, Daily    magnesium Oxide (MAG-OX) tablet 400 mg, Daily    metoprolol succinate (TOPROL-XL) 24 hr tablet 50 mg, BID    midodrine (PROAMATINE) tablet 15 mg, TID AC    nicotine (NICODERM CQ) 14 mg/24hr TD 24 hr patch 1 patch, Daily    potassium chloride (Klor-Con M20) CR tablet 40 mEq, Q6H    sevelamer (RENAGEL) tablet 800 mg, TID With Meals    simethicone (MYLICON) chewable tablet 80 mg, 4x  Daily PRN    tamsulosin (FLOMAX) capsule 0.4 mg, Daily With Dinner    [START ON 9/18/2024] torsemide (DEMADEX) tablet 100 mg, Daily    warfarin (COUMADIN) tablet 1 mg, Daily (warfarin)    Administrative Statements   Today, Patient Was Seen By: Nora Mathew MD  I have spent a total time of >35 minutes in caring for this patient on the day of the visit/encounter including Diagnostic results, Reviewing / ordering tests, medicine, procedures  , and Obtaining or reviewing history  .    **Please Note: This note may have been constructed using a voice recognition system.**

## 2024-09-17 NOTE — UTILIZATION REVIEW
NOTIFICATION OF INPATIENT ADMISSION   AUTHORIZATION REQUEST   SERVICING FACILITY:   [place] [address]  Tax ID: [id number]  NPI: [id number] ATTENDING PROVIDER:  Attending Name and NPI#: Nora Mathew Md [[id number]]  Address: [address] [place] [address]  Phone: [phone]   ADMISSION INFORMATION:  Place of Service: Inpatient Acute [facility]  Place of Service Code: 21  Inpatient Admission Date/Time: 9/15/24  7:27 PM  Discharge Date/Time: No discharge date for patient encounter.  Admitting Diagnosis Code/Description:  Multiple injuries [T07.XXXA]  ESRD (end stage renal disease) ([facility]) [N18.6]  Patient on peritoneal dialysis (HCC) [Z99.2]  Ambulatory dysfunction [R26.2]     UTILIZATION REVIEW CONTACT:  Arleen Cotto, Utilization   Network Utilization Review Department  Phone: [phone]  Fax [phone]  Email: [email or web address]  Contact for approvals/pending authorizations, clinical reviews, and discharge.     PHYSICIAN ADVISORY SERVICES:  Medical Necessity Denial & Dikl-eu-Zepo Review  Phone: [phone]  Fax: [phone]  Email: [email or web address]     DISCHARGE SUPPORT TEAM:  For Patients Discharge Needs & Updates  Phone: [phone] opt. 2 Fax: [phone]  Email: [email or web address]

## 2024-09-17 NOTE — PROGRESS NOTES
"Progress Note - Nephrology   Masoud Perry 69 y.o. male MRN: [id number]  Unit/Bed#: -01 Encounter: [id number]    ASSESSMENT AND PLAN:  1. ESRD:  CAPD: Continue 2 L 2.5% every 6 hours  Access: PD catheter  Continue same doing well no changes  In the future potentially to consider intermittent hemodialysis may make placement easier will discuss with the patient    2. Volume/blood pressure:  Volume: Appears euvolemic  Blood pressure: Low normal maintained on midodrine      3. Electrolytes:  Hypokalemia replete and check magnesium    4. MBD of ESRD:  Hyperphosphatemia: Will check on sevelamer 800 mg 3 times a day  Hypercalcemia somewhat chronic but improving on Cinacalcet will check PTH intact level please see below  Secondary hyperparathyroidism: On Cinacalcet 30 mg daily check level  Hypomagnesemia replete    5. Anemia of ESRD:  Current hemoglobin: 8.6  Treatment: Will check with the outpatient unit if he gets JUAN FRANCISCO given his history of CVA  Check iron levels  Transfuse for hemoglobin less than 7.0 per primary service      6. Reason for hospitalization:  Ambulatory dysfunction: PT OT per primary service also looking for skilled facility that we will do PD    7. Other major problems:  Hypothyroidism on levothyroxine  Diabetes mellitus type 2 insulin per primary service  Dyslipidemia on statin  Chronic acquired lymphedema on diuretics along with COPD  Gout on allopurinol  History of CVA on aspirin and Coumadin  Morbid obesity          Subjective:   Patient with some nausea no vomiting no diarrhea  No chest pain or shortness of breath  Extremely weak and also left leg left back pain making ambulation difficult      Objective:     Vitals: Blood pressure 99/55, pulse 60, temperature 97.6 °F (36.4 °C), temperature source Temporal, resp. rate 20, height 5' 11\" (1.803 m), weight 117 kg (257 lb 11.5 oz), SpO2 100%.,Body mass index is 35.94 kg/m².    Weight (last 2 days)       Date/Time Weight    09/17/24 0600 117 (257.72) "    09/16/24 0600 116 (255.07)    09/15/24 1553 117 (258.82)              Intake/Output Summary (Last 24 hours) at 9/17/2024 0837  Last data filed at 9/17/2024 0600  Gross per 24 hour   Intake 240 ml   Output -200 ml   Net 440 ml            Physical Exam: General:  No acute distress  Skin: No acute rash  Eyes:  No scleral icterus and noninjected  ENT:  Moist mucous membranes  Neck:  Supple, no jugular venous distention, trachea midline, overall appearance is normal  Chest:  Clear to auscultation  CVS:  Regular rate and rhythm, without a rub or gallops  Abdomen:  Normal bowel sounds, soft and nontender and nondistended/PD catheter site clean and dry there  Extremities: Lymphedema unchanged with severe venous stasis changes but overall quite good for this patient, and no cyanosis, no significant arthritic changes  Neuro:  No gross focality  Psych:  Alert and oriented and appropriate                Medications:    Scheduled Meds:  Current Facility-Administered Medications   Medication Dose Route Frequency Provider Last Rate    acetaminophen  650 mg Oral Q6H PRN Magdiel Holguin MD      allopurinol  300 mg Oral Daily Magdiel Holguin MD      ascorbic acid  500 mg Oral Daily Magdiel Holguin MD      aspirin  81 mg Oral Daily Magdiel Holguin MD      atorvastatin  40 mg Oral Daily With Dinner Magdiel Holguin MD      cinacalcet  30 mg Oral Daily With Dinner Magdiel Holguin MD      co-enzyme Q-10  100 mg Oral Daily Magdiel Holguin MD      ferrous sulfate  325 mg Oral Daily With Breakfast Magdiel Holguin MD      gentamicin   Topical Daily GAGANDEEP Lin      HYDROcodone-acetaminophen  2 tablet Oral Q6H PRN Magdiel Holguin MD      insulin glargine  15 Units Subcutaneous HS Magdiel Holguin MD      levothyroxine  125 mcg Oral Daily Magdiel Holguin MD      lidocaine  1 patch Topical Daily Moses Noel MD      magnesium Oxide  400 mg Oral Daily Magdiel Mooney  "MD Ezio      metoprolol succinate  50 mg Oral BID Magdiel Holguin MD      midodrine  15 mg Oral TID AC Magdiel Holguin MD      nicotine  1 patch Transdermal Daily Magdiel Holguin MD      potassium chloride  40 mEq Oral Q6H Jignesh Hussein MD      sevelamer  800 mg Oral TID With Meals Magdiel Holguin MD      simethicone  80 mg Oral 4x Daily PRN Magdiel Holguin MD      tamsulosin  0.4 mg Oral Daily With Dinner Magdiel Holguin MD      torsemide  100 mg Oral Daily Magdiel Holguin MD      warfarin  1 mg Oral Daily (warfarin) Magdiel Holguin MD         PRN Meds:.  acetaminophen    HYDROcodone-acetaminophen    simethicone    Continuous Infusions:     Lab, Imaging and other studies: I have personally reviewed pertinent labs.  Laboratory Results:  Results from last 7 days   Lab Units 09/17/24  0626 09/16/24  0541 09/15/24  1559   WBC Thousand/uL 4.17* 3.89* 4.43   HEMOGLOBIN g/dL 8.6* 8.5* 9.5*   HEMATOCRIT % 28.3* 27.2* 30.3*   PLATELETS Thousands/uL 112* 127* 126*   POTASSIUM mmol/L 2.9* 2.7* 3.2*   CHLORIDE mmol/L 97 96 94*   CO2 mmol/L 31 31 31   BUN mg/dL 28* 29* 27*   CREATININE mg/dL 5.61* 5.78* 5.68*   CALCIUM mg/dL 8.9 9.2 9.8   MAGNESIUM mg/dL 1.7* 1.5*  --      Urinalysis:   Lab Results   Component Value Date    COLORU Yellow 07/23/2024    CLARITYU Clear 07/23/2024    SPECGRAV 1.020 07/23/2024    PHUR 5.5 07/23/2024    LEUKOCYTESUR Negative 07/23/2024    NITRITE Negative 07/23/2024    GLUCOSEU Negative 07/23/2024    KETONESU Negative 07/23/2024    BILIRUBINUR Negative 07/23/2024    BLOODU Large (A) 07/23/2024     ABGs: No results found for: \"PH\"  Radiology review:     Portions of the record may have been created with voice recognition software.  Occasional wrong word or \"sound a like\" substitutions may have occurred due to the inherent limitations of voice recognition software.  Read the chart carefully and recognize, using context, where substitutions have " occurred.

## 2024-09-17 NOTE — PROGRESS NOTES
Patient:    MRN:  [id number]    [facility] Request ID:  [id number]    Level of care reserved:  Skilled Nursing Facility    Partner Reserved:  [facility], [place] [address] [phone]    Clinical needs requested:    Geography searched:  50 miles around [id number]    Start of Service:    Request sent:  2:41pm EDT on 9/16/2024 by Amber Lemons    Partner reserved:  4:27pm EDT on 9/17/2024 by Amber Lemons    Choice list shared:

## 2024-09-17 NOTE — CASE MANAGEMENT
Case Management Discharge Planning Note    Patient name Masoud Goregh  Location /-01 MRN [id number]  : 1955 Date 2024       Current Admission Date: 9/15/2024  Current Admission Diagnosis:Ambulatory dysfunction   Patient Active Problem List    Diagnosis Date Noted Date Diagnosed    Hypokalemia 2024     Ambulatory dysfunction 09/15/2024     Lactic acidosis 2024     Hypercalcemia 2024     Chronic acquired lymphedema 2024     Hypotension 2024     Nocturnal hypoxia 2024     Elevated LFTs 2024     Patient on peritoneal dialysis ([facility]) 2024     Hypervolemia 2024     ESRD (end stage renal disease) ([facility]) 2024     Bacteremia 2024     Cellulitis of right lower extremity 2024     Paroxysmal atrial fibrillation ([facility]) 2024     Bilateral lower extremity edema 2023     Ureteral stone with hydronephrosis 2022     Cutaneous abscess of buttock 2022     Chronic kidney disease-mineral and bone disorder 2022     Type 2 diabetes mellitus with stage 4 chronic kidney disease, with long-term current use of insulin ([facility]) 07/15/2022     Obesity, morbid ([facility]) 07/15/2022     Secondary hyperparathyroidism of renal origin ([facility]) 07/15/2022     Stage 5 chronic kidney disease on peritoneal dialysis ([facility]) 07/15/2022     Factor 5 Leiden mutation, heterozygous ([facility]) 01/15/2022     Thrombocytopenia ([facility]) 2022     Angina at rest 2022     MI (myocardial infarction) ([facility]) 2022     History of PTCA 2022     Sleep apnea 2022     Smoking 2022     Anemia 2022     History of CVA (cerebrovascular accident) 2022     Acute on chronic diastolic HF (heart failure) ([facility]) 2022     HLD (hyperlipidemia) 2022     Lymphedema 2022     Hypothyroidism 2022     COPD (chronic obstructive pulmonary disease) ([facility]) 2018     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  (HCC) 06/29/2016     Gastroesophageal reflux disease 06/29/2016     Gout 06/29/2016     Personal history of pulmonary embolism 06/29/2016     ED (erectile dysfunction) of organic origin 01/14/2014     History of thromboembolism of vein 01/10/2013       LOS (days): 2  Geometric Mean LOS (GMLOS) (days): 4.5  Days to GMLOS:3     OBJECTIVE:  Risk of Unplanned Readmission Score: 37.47         Current admission status: Inpatient   Preferred Pharmacy:   [facility] - [place], [place] - [address]  [address]   [place] [address]  Phone: [phone] Fax: [phone]    Primary Care Provider: Jignesh Baker DO    Primary Insurance: [employer]  Secondary Insurance:     DISCHARGE DETAILS:              CM placing 25 STR referrals in [facility], as of this AM, 17 STR have declined pt and 8 STR are still reviewing  CM placed 10 acute rehab referrals in [facility], as of this AM, 4 acute have declined pt and 6 are still reviewing  CM will continue to follow         1147 the following acute rehab can not accept pt:  [facility]  [facility]  [facility]    The following still reviewing:  [facility]    The following STR can ot accept pt:   [facility]  [facility]  [facility]  [facility]  [facility]  [facility]  [facility] has accepted pt at time of discharge.  As per Facility, they have a contract with [facility] and can accommodate pts PD.

## 2024-09-18 PROBLEM — L03.90 CELLULITIS: Status: ACTIVE | Noted: 2024-09-18

## 2024-09-18 PROBLEM — E87.20 METABOLIC ACIDOSIS: Status: ACTIVE | Noted: 2024-09-18

## 2024-09-18 LAB
ANION GAP SERPL CALCULATED.3IONS-SCNC: 5 MMOL/L (ref 4–13)
BASOPHILS # BLD AUTO: 0.02 THOUSANDS/ΜL (ref 0–0.1)
BASOPHILS NFR BLD AUTO: 1 % (ref 0–1)
BUN SERPL-MCNC: 27 MG/DL (ref 5–25)
CA-I BLD-SCNC: 1.2 MMOL/L (ref 1.12–1.32)
CALCIUM SERPL-MCNC: 8.6 MG/DL (ref 8.4–10.2)
CHLORIDE SERPL-SCNC: 99 MMOL/L (ref 96–108)
CO2 SERPL-SCNC: 31 MMOL/L (ref 21–32)
CREAT SERPL-MCNC: 5.14 MG/DL (ref 0.6–1.3)
EOSINOPHIL # BLD AUTO: 0.23 THOUSAND/ΜL (ref 0–0.61)
EOSINOPHIL NFR BLD AUTO: 5 % (ref 0–6)
ERYTHROCYTE [DISTWIDTH] IN BLOOD BY AUTOMATED COUNT: 15.9 % (ref 11.6–15.1)
FERRITIN SERPL-MCNC: 63 NG/ML (ref 24–336)
GFR SERPL CREATININE-BSD FRML MDRD: 10 ML/MIN/1.73SQ M
GLUCOSE SERPL-MCNC: 104 MG/DL (ref 65–140)
GLUCOSE SERPL-MCNC: 126 MG/DL (ref 65–140)
GLUCOSE SERPL-MCNC: 126 MG/DL (ref 65–140)
GLUCOSE SERPL-MCNC: 97 MG/DL (ref 65–140)
HCT VFR BLD AUTO: 27.4 % (ref 36.5–49.3)
HGB BLD-MCNC: 8.4 G/DL (ref 12–17)
IMM GRANULOCYTES # BLD AUTO: 0.01 THOUSAND/UL (ref 0–0.2)
IMM GRANULOCYTES NFR BLD AUTO: 0 % (ref 0–2)
INR PPP: 3.48 (ref 0.85–1.19)
IRON SATN MFR SERPL: 8 % (ref 15–50)
IRON SERPL-MCNC: 14 UG/DL (ref 50–212)
LYMPHOCYTES # BLD AUTO: 1.3 THOUSANDS/ΜL (ref 0.6–4.47)
LYMPHOCYTES NFR BLD AUTO: 30 % (ref 14–44)
MAGNESIUM SERPL-MCNC: 2 MG/DL (ref 1.9–2.7)
MCH RBC QN AUTO: 31.6 PG (ref 26.8–34.3)
MCHC RBC AUTO-ENTMCNC: 30.7 G/DL (ref 31.4–37.4)
MCV RBC AUTO: 103 FL (ref 82–98)
MONOCYTES # BLD AUTO: 0.3 THOUSAND/ΜL (ref 0.17–1.22)
MONOCYTES NFR BLD AUTO: 7 % (ref 4–12)
NEUTROPHILS # BLD AUTO: 2.54 THOUSANDS/ΜL (ref 1.85–7.62)
NEUTS SEG NFR BLD AUTO: 57 % (ref 43–75)
NRBC BLD AUTO-RTO: 0 /100 WBCS
PHOSPHATE SERPL-MCNC: 3.4 MG/DL (ref 2.3–4.1)
PLATELET # BLD AUTO: 130 THOUSANDS/UL (ref 149–390)
PMV BLD AUTO: 10.1 FL (ref 8.9–12.7)
POTASSIUM SERPL-SCNC: 3.4 MMOL/L (ref 3.5–5.3)
PROTHROMBIN TIME: 34.7 SECONDS (ref 12.3–15)
PTH-INTACT SERPL-MCNC: 95.2 PG/ML (ref 12–88)
RBC # BLD AUTO: 2.66 MILLION/UL (ref 3.88–5.62)
SODIUM SERPL-SCNC: 135 MMOL/L (ref 135–147)
TIBC SERPL-MCNC: 186 UG/DL (ref 250–450)
UIBC SERPL-MCNC: 172 UG/DL (ref 155–355)
WBC # BLD AUTO: 4.4 THOUSAND/UL (ref 4.31–10.16)

## 2024-09-18 PROCEDURE — 99232 SBSQ HOSP IP/OBS MODERATE 35: CPT | Performed by: INTERNAL MEDICINE

## 2024-09-18 PROCEDURE — 84100 ASSAY OF PHOSPHORUS: CPT | Performed by: INTERNAL MEDICINE

## 2024-09-18 PROCEDURE — 83550 IRON BINDING TEST: CPT | Performed by: INTERNAL MEDICINE

## 2024-09-18 PROCEDURE — 80048 BASIC METABOLIC PNL TOTAL CA: CPT | Performed by: INTERNAL MEDICINE

## 2024-09-18 PROCEDURE — 82948 REAGENT STRIP/BLOOD GLUCOSE: CPT

## 2024-09-18 PROCEDURE — 82330 ASSAY OF CALCIUM: CPT | Performed by: INTERNAL MEDICINE

## 2024-09-18 PROCEDURE — 83970 ASSAY OF PARATHORMONE: CPT | Performed by: INTERNAL MEDICINE

## 2024-09-18 PROCEDURE — 99232 SBSQ HOSP IP/OBS MODERATE 35: CPT | Performed by: FAMILY MEDICINE

## 2024-09-18 PROCEDURE — 85610 PROTHROMBIN TIME: CPT | Performed by: FAMILY MEDICINE

## 2024-09-18 PROCEDURE — 83735 ASSAY OF MAGNESIUM: CPT | Performed by: INTERNAL MEDICINE

## 2024-09-18 PROCEDURE — 97530 THERAPEUTIC ACTIVITIES: CPT

## 2024-09-18 PROCEDURE — 85025 COMPLETE CBC W/AUTO DIFF WBC: CPT | Performed by: INTERNAL MEDICINE

## 2024-09-18 PROCEDURE — 97535 SELF CARE MNGMENT TRAINING: CPT

## 2024-09-18 PROCEDURE — 83540 ASSAY OF IRON: CPT | Performed by: INTERNAL MEDICINE

## 2024-09-18 PROCEDURE — 36556 INSERT NON-TUNNEL CV CATH: CPT

## 2024-09-18 PROCEDURE — 82728 ASSAY OF FERRITIN: CPT | Performed by: INTERNAL MEDICINE

## 2024-09-18 RX ORDER — VANCOMYCIN HYDROCHLORIDE 1 G/200ML
1000 INJECTION, SOLUTION INTRAVENOUS DAILY PRN
Status: DISCONTINUED | OUTPATIENT
Start: 2024-09-19 | End: 2024-09-19

## 2024-09-18 RX ORDER — POTASSIUM CHLORIDE 1500 MG/1
40 TABLET, EXTENDED RELEASE ORAL DAILY
Status: DISCONTINUED | OUTPATIENT
Start: 2024-09-19 | End: 2024-09-23 | Stop reason: HOSPADM

## 2024-09-18 RX ORDER — METOPROLOL SUCCINATE 25 MG/1
25 TABLET, EXTENDED RELEASE ORAL 2 TIMES DAILY
Status: DISCONTINUED | OUTPATIENT
Start: 2024-09-18 | End: 2024-09-23 | Stop reason: HOSPADM

## 2024-09-18 RX ORDER — POTASSIUM CHLORIDE 1500 MG/1
40 TABLET, EXTENDED RELEASE ORAL ONCE
Status: COMPLETED | OUTPATIENT
Start: 2024-09-18 | End: 2024-09-18

## 2024-09-18 RX ORDER — POTASSIUM CHLORIDE 1500 MG/1
20 TABLET, EXTENDED RELEASE ORAL ONCE
Status: COMPLETED | OUTPATIENT
Start: 2024-09-18 | End: 2024-09-18

## 2024-09-18 RX ORDER — INSULIN LISPRO 100 [IU]/ML
2-12 INJECTION, SOLUTION INTRAVENOUS; SUBCUTANEOUS
Status: DISCONTINUED | OUTPATIENT
Start: 2024-09-18 | End: 2024-09-23 | Stop reason: HOSPADM

## 2024-09-18 RX ADMIN — FERROUS SULFATE TAB 325 MG (65 MG ELEMENTAL FE) 325 MG: 325 (65 FE) TAB at 08:51

## 2024-09-18 RX ADMIN — ALLOPURINOL 300 MG: 300 TABLET ORAL at 08:51

## 2024-09-18 RX ADMIN — POTASSIUM CHLORIDE 40 MEQ: 1500 TABLET, EXTENDED RELEASE ORAL at 08:50

## 2024-09-18 RX ADMIN — MIDODRINE HYDROCHLORIDE 15 MG: 5 TABLET ORAL at 11:54

## 2024-09-18 RX ADMIN — ASPIRIN 81 MG: 81 TABLET, COATED ORAL at 08:51

## 2024-09-18 RX ADMIN — TORSEMIDE 100 MG: 100 TABLET ORAL at 08:51

## 2024-09-18 RX ADMIN — LEVOTHYROXINE SODIUM 125 MCG: 125 TABLET ORAL at 06:09

## 2024-09-18 RX ADMIN — INSULIN GLARGINE 15 UNITS: 100 INJECTION, SOLUTION SUBCUTANEOUS at 22:22

## 2024-09-18 RX ADMIN — OXYCODONE HYDROCHLORIDE AND ACETAMINOPHEN 500 MG: 500 TABLET ORAL at 08:50

## 2024-09-18 RX ADMIN — HYDROCODONE BITARTRATE AND ACETAMINOPHEN 2 TABLET: 5; 325 TABLET ORAL at 17:11

## 2024-09-18 RX ADMIN — MIDODRINE HYDROCHLORIDE 15 MG: 5 TABLET ORAL at 17:03

## 2024-09-18 RX ADMIN — POTASSIUM CHLORIDE 20 MEQ: 1500 TABLET, EXTENDED RELEASE ORAL at 12:00

## 2024-09-18 RX ADMIN — TAMSULOSIN HYDROCHLORIDE 0.4 MG: 0.4 CAPSULE ORAL at 17:03

## 2024-09-18 RX ADMIN — METOPROLOL SUCCINATE 25 MG: 25 TABLET, EXTENDED RELEASE ORAL at 20:26

## 2024-09-18 RX ADMIN — MAGNESIUM GLUCONATE 500 MG ORAL TABLET 400 MG: 500 TABLET ORAL at 08:51

## 2024-09-18 RX ADMIN — ATORVASTATIN CALCIUM 40 MG: 40 TABLET, FILM COATED ORAL at 17:03

## 2024-09-18 RX ADMIN — VANCOMYCIN HYDROCHLORIDE 1750 MG: 1 INJECTION, POWDER, LYOPHILIZED, FOR SOLUTION INTRAVENOUS at 17:02

## 2024-09-18 RX ADMIN — LIDOCAINE 5% 1 PATCH: 700 PATCH TOPICAL at 11:55

## 2024-09-18 RX ADMIN — IRON SUCROSE 300 MG: 20 INJECTION, SOLUTION INTRAVENOUS at 11:54

## 2024-09-18 RX ADMIN — GENTAMICIN SULFATE: 1 CREAM TOPICAL at 08:51

## 2024-09-18 RX ADMIN — Medication 100 MG: at 08:51

## 2024-09-18 RX ADMIN — MIDODRINE HYDROCHLORIDE 15 MG: 5 TABLET ORAL at 06:09

## 2024-09-18 RX ADMIN — METOPROLOL SUCCINATE 25 MG: 25 TABLET, EXTENDED RELEASE ORAL at 08:51

## 2024-09-18 RX ADMIN — HYDROCODONE BITARTRATE AND ACETAMINOPHEN 2 TABLET: 5; 325 TABLET ORAL at 06:09

## 2024-09-18 RX ADMIN — SEVELAMER HYDROCHLORIDE 800 MG: 800 TABLET, FILM COATED ORAL at 08:51

## 2024-09-18 RX ADMIN — CINACALCET 30 MG: 30 TABLET, FILM COATED ORAL at 17:03

## 2024-09-18 NOTE — CASE MANAGEMENT
[facility] received request for authorization from Care Manager.  Authorization request submitted for: SNF  Facility Name:[facility]   NPI:[id number]  Facility MD:Marily Cardoso     NPI: [id number]  Authorization initiated by contacting insurance:  [employer]  Via: [employer] Portal   Clinicals submitted via [employer]train attachment   Pending Reference #:[id number]     Care Manager notified: Amber Lemons      Updates to authorization status will be noted in chart. Please reach out to CM for updates on any clinical information.

## 2024-09-18 NOTE — PROGRESS NOTES
Progress Note - Nephrology   Name: Masoud Perry 69 y.o. male I MRN: [id number]  Unit/Bed#: -01 I Date of Admission: 9/15/2024   Date of Service: 9/18/2024 I Hospital Day: 3     Assessment & Plan  ESRD (end stage renal disease) (HCC)  Lab Results   Component Value Date    EGFR 10 09/18/2024    EGFR 9 09/17/2024    EGFR 9 09/16/2024    CREATININE 5.14 (H) 09/18/2024    CREATININE 5.61 (H) 09/17/2024    CREATININE 5.78 (H) 09/16/2024   Continue CAPD 2 L to 0.5% every 6 hours tolerating  Access PD catheter  [facility]waiting placement [facility] [place]  Hypotension  Chronic maintain on midodrine  Chronic acquired lymphedema  Chronic maintained on PD/torsemide 100 mg daily  Hypokalemia  Replete and daily dose  Anemia  Current hemoglobin 8.4 stable  Given intravenous iron with low iron saturation and ferritin/continue oral iron as well  CVA was thought to be cardioembolic from 2022 potential use of JUAN FRANCISCO agent if needed if anticoagulated: Currently on aspirin and Coumadin.  Will discuss with the patient to make sure he agrees risk would be very small at this juncture.  Secondary hyperparathyroidism of renal origin (HCC)  Patient on Cinacalcet with a history of hypercalcemia: Workup in the past was negative calcium now normal at 8.6: The recommendation was repeat myeloma evaluation in 3 to 6 months as of August 2024 otherwise negative  Hyperphosphatemia doing well on sevelamer in fact I am going to hold because phosphorus only 3.4  Replete hypomagnesemia on oral magnesium oxide status post IV repletion  Ambulatory dysfunction     Other major problems:  Hypothyroidism on levothyroxine  Diabetes mellitus type 2 insulin per primary service  Dyslipidemia on statin  Chronic acquired lymphedema on diuretics along with COPD  Gout on allopurinol  History of CVA on aspirin and Coumadin  Morbid obesity       I have reviewed the nephrology recommendations including continuing current PD  replating potassium, with SLIM attending, and we are in agreement with renal plan including the information outlined above.     History of Present Illness   Brief History of Admission -69-year-old male with a history of ESRD on CAPD/diabetes mellitus type 2/dyslipidemia/COPD/lymphedema/gout/CVA/morbid obesity who presented with ambulatory dysfunction who we are asked to see regarding ESRD    Overall feeling well no chest pain shortness of breath  No GI symptoms including diarrhea  PD going well    Objective      Temp:  [97.9 °F (36.6 °C)-98.8 °F (37.1 °C)] 97.9 °F (36.6 °C)  HR:  [63-72] 63  Resp:  [18-21] 19  BP: ()/(51-58) 100/55  O2 Device: Nasal cannula         Vitals:    09/17/24 0600 09/18/24 0600   Weight: 117 kg (257 lb 11.5 oz) 119 kg (262 lb 5.6 oz)     I/O last 24 hours:  In: 340 [P.O.:240; IV Piggyback:100]  Out: 675 [Urine:475; Other:200]  Lines/Drains/Airways       Active Status       None                  Physical Exam: General:  No acute distress  Skin:  No acute rash  Eyes:  No scleral icterus and noninjected  ENT:  Moist mucous membranes  Neck:  Supple, no jugular venous distention, trachea midline, overall appearance is normal  Chest: Slight right basilar crackles  CVS:  Regular rate and rhythm, without a rub or gallops  Abdomen:  Normal bowel sounds, soft and nontender and mildly distended with fluid wave  Extremities: Chronic lymphedema and severe venous stasis changes and no cyanosis, no significant arthritic changes  Neuro:  No gross focality  Psych:  Alert and oriented and appropriate    Medications:    Current Facility-Administered Medications:     acetaminophen (TYLENOL) tablet 650 mg, 650 mg, Oral, Q6H PRN, Magdiel Holguin MD    allopurinol (ZYLOPRIM) tablet 300 mg, 300 mg, Oral, Daily, Magdiel Holguin MD, 300 mg at 09/17/24 0922    ascorbic acid (VITAMIN C) tablet 500 mg, 500 mg, Oral, Daily, Magdiel Holguin MD, 500 mg at 09/17/24 0922    aspirin (ECOTRIN LOW  STRENGTH) EC tablet 81 mg, 81 mg, Oral, Daily, Magdiel Holguin MD, 81 mg at 09/17/24 0922    atorvastatin (LIPITOR) tablet 40 mg, 40 mg, Oral, Daily With Dinner, Magdiel Holguin MD, 40 mg at 09/17/24 1559    cinacalcet (SENSIPAR) tablet 30 mg, 30 mg, Oral, Daily With Dinner, Magdiel Holguin MD, 30 mg at 09/17/24 1559    co-enzyme Q-10 capsule 100 mg, 100 mg, Oral, Daily, Magdiel Holguin MD, 100 mg at 09/17/24 0922    ferrous sulfate tablet 325 mg, 325 mg, Oral, Daily With Breakfast, Magdiel Holguin MD, 325 mg at 09/17/24 0642    gentamicin (GARAMYCIN) 0.1 % cream, , Topical, Daily, GAGANDEEP Lin, 1 Application at 09/17/24 0923    HYDROcodone-acetaminophen (NORCO) 5-325 mg per tablet 2 tablet, 2 tablet, Oral, Q6H PRN, Magdiel Holguin MD, 2 tablet at 09/18/24 0609    insulin glargine (LANTUS) subcutaneous injection 15 Units 0.15 mL, 15 Units, Subcutaneous, HS, Magdiel Holguin MD, 15 Units at 09/17/24 2137    levothyroxine tablet 125 mcg, 125 mcg, Oral, Daily, Magdiel Holguin MD, 125 mcg at 09/18/24 0609    lidocaine (LIDODERM) 5 % patch 1 patch, 1 patch, Topical, Daily, Moses Noel MD, 1 patch at 09/17/24 1228    magnesium Oxide (MAG-OX) tablet 400 mg, 400 mg, Oral, Daily, Magdiel Holguin MD, 400 mg at 09/17/24 0923    metoprolol succinate (TOPROL-XL) 24 hr tablet 25 mg, 25 mg, Oral, BID, Nora Mathew MD    midodrine (PROAMATINE) tablet 15 mg, 15 mg, Oral, TID AC, Magdiel Holguin MD, 15 mg at 09/18/24 0609    nicotine (NICODERM CQ) 14 mg/24hr TD 24 hr patch 1 patch, 1 patch, Transdermal, Daily, Magdiel Vinicio Holguin MD    potassium chloride (Klor-Con M20) CR tablet 20 mEq, 20 mEq, Oral, Once, Jignesh Hussein MD    potassium chloride (Klor-Con M20) CR tablet 40 mEq, 40 mEq, Oral, Once, Jignesh Hussein MD    [START ON 9/19/2024] potassium chloride (Klor-Con M20) CR tablet 40 mEq, 40 mEq, Oral, Daily, Jignesh Hussein MD    sevelamer (RENAGEL) tablet  "800 mg, 800 mg, Oral, TID With Meals, Magdiel Holguin MD, 800 mg at 09/17/24 1559    simethicone (MYLICON) chewable tablet 80 mg, 80 mg, Oral, 4x Daily PRN, Magdiel Holguin MD    tamsulosin (FLOMAX) capsule 0.4 mg, 0.4 mg, Oral, Daily With Dinner, Magdiel Holguin MD, 0.4 mg at 09/17/24 1559    torsemide (DEMADEX) tablet 100 mg, 100 mg, Oral, Daily, Nora Mathew MD      Lab Results: I have reviewed the following results:   Results from last 7 days   Lab Units 09/18/24  0631 09/17/24  0626 09/16/24  0541 09/15/24  1559   WBC Thousand/uL 4.40 4.17* 3.89* 4.43   HEMOGLOBIN g/dL 8.4* 8.6* 8.5* 9.5*   HEMATOCRIT % 27.4* 28.3* 27.2* 30.3*   PLATELETS Thousands/uL 130* 112* 127* 126*   POTASSIUM mmol/L 3.4* 2.9* 2.7* 3.2*   CHLORIDE mmol/L 99 97 96 94*   CO2 mmol/L 31 31 31 31   BUN mg/dL 27* 28* 29* 27*   CREATININE mg/dL 5.14* 5.61* 5.78* 5.68*   CALCIUM mg/dL 8.6 8.9 9.2 9.8   MAGNESIUM mg/dL 2.0 1.7* 1.5*  --    PHOSPHORUS mg/dL 3.4  --   --   --    ALBUMIN g/dL  --  2.2* 2.2* 2.5*       Administrative Statements     Portions of the record may have been created with voice recognition software. Occasional wrong word or \"sound a like\" substitutions may have occurred due to the inherent limitations of voice recognition software. Read the chart carefully and recognize, using context, where substitutions have occurred.If you have any questions, please contact the dictating provider.  "

## 2024-09-18 NOTE — PROGRESS NOTES
Progress Note - Hospitalist   Name: Masoud Perry 69 y.o. male I MRN: [id number]  Unit/Bed#: -01 I Date of Admission: 9/15/2024   Date of Service: 9/18/2024 I Hospital Day: 3    Assessment & Plan  Ambulatory dysfunction  Been weak for 2 weeks, difficulty getting out of a chair, and fell today hitting the back of his head.  At baseline he walks with a walker  PT/OT consult- needs rehab patient agreeble  Case management consultation for acute rehab  ESRD (end stage renal disease) (HCC)  On peritoneal dialysis.  Will consult nephrology.  I did have a discussion with the patient about why he is not on hemodialysis, patient states he was told that he would have to go to the dialysis clinic 4-5 times a week for 6 to 8 hours/day.  I am not certain about this, but at the patient did have hemodialysis patient would be able to find multiple acute rehabs that would accept him for for rehab.  An option would be to start hemodialysis here for rehab    This provider discussed with patient regarding hemodialysis.  Patient reports he prefers to stay with current treatment with peritoneal dialysis.    Lab Results   Component Value Date    EGFR 10 09/18/2024    EGFR 9 09/17/2024    EGFR 9 09/16/2024    CREATININE 5.14 (H) 09/18/2024    CREATININE 5.61 (H) 09/17/2024    CREATININE 5.78 (H) 09/16/2024     Patient on peritoneal dialysis (HCC)  Consult nephrology.  Paroxysmal atrial fibrillation (HCC)  Continue metoprolol and currently on Coumadin.  He states that the Coumadin doses have been changing recently.  Currently not on his home med rec.  Inr 3.48 hold coumadin  Type 2 diabetes mellitus with stage 4 chronic kidney disease, with long-term current use of insulin (HCC)  Currently lantus 15 units hs , iss    Lab Results   Component Value Date    HGBA1C 5.9 (H) 07/24/2024     Hypothyroidism  Continue levothyroxine  HLD (hyperlipidemia)  Continue statin  Hypotension  Continue midodrine 15 mg 3 times daily  Secondary  hyperparathyroidism of renal origin (HCC)  Continue Sensipar   Chronic acquired lymphedema  Continue torsemide  Gout  Continue allopurinol  History of CVA (cerebrovascular accident)  Continue aspirin hold Coumadin as INR supratherapeutic he might need to be on a different regimen as he is very sensitive to Coumadin  Obesity, morbid (HCC)  BMI of 37.14  Anemia  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 seems base above 8 - venofer 3 doses given to patient by nephrology  Hypokalemia  Replaced and also mag per nephrology   Moderate protein-calorie malnutrition (HCC)  Malnutrition Findings:   Adult Malnutrition type: Chronic illness  Adult Degree of Malnutrition: Malnutrition of moderate degree  Malnutrition Characteristics: Inadequate energy, Muscle loss                  360 Statement: malnutrition of moderate degree in setting of chronic illness, evidenced by <75% energy intake compared to estimated needs for > 1 month, muscle wasting/indented temples, protruding clavicles, treated with diet, PT refusing supplements currently    BMI Findings:           Body mass index is 36.59 kg/m².     Cellulitis  Warm tender open wounds- start vanco    VTE Pharmacologic Prophylaxis: VTE Score: 5 High Risk (Score >/= 5) - Pharmacological DVT Prophylaxis Contraindicated. Sequential Compression Devices Ordered.    Mobility:   Basic Mobility Inpatient Raw Score: 14  JH-HLM Goal: 4: Move to chair/commode  JH-HLM Achieved: 2: Bed activities/Dependent transfer  JH-HLM Goal NOT achieved. Continue with multidisciplinary rounding and encourage appropriate mobility to improve upon JH-HLM goals.    Patient Centered Rounds: I performed bedside rounds with nursing staff today.   Discussions with Specialists or Other Care Team Provider: discussed with nephro     Education and Discussions with Family / Patient: pt    Current Length of Stay: 3 day(s)  Current Patient Status: Inpatient   Certification Statement: The patient will continue to require additional  inpatient hospital stay due to placement   Discharge Plan: Anticipate discharge tomorrow to rehab facility.    Code Status: Level 3 - DNAR and DNI    Subjective   Seen and examined left leg pain    Objective     Vitals:   Temp (24hrs), Av.3 °F (36.8 °C), Min:97.9 °F (36.6 °C), Max:98.8 °F (37.1 °C)    Temp:  [97.9 °F (36.6 °C)-98.8 °F (37.1 °C)] 97.9 °F (36.6 °C)  HR:  [63-72] 63  Resp:  [18-21] 19  BP: ()/(51-58) 100/55  SpO2:  [90 %-99 %] 99 %  Body mass index is 36.59 kg/m².     Input and Output Summary (last 24 hours):     Intake/Output Summary (Last 24 hours) at 2024 1151  Last data filed at 2024 0900  Gross per 24 hour   Intake 360 ml   Output 575 ml   Net -215 ml       Physical Exam  Constitutional:       Appearance: He is well-developed.   HENT:      Head: Normocephalic and atraumatic.   Eyes:      Pupils: Pupils are equal, round, and reactive to light.   Cardiovascular:      Rate and Rhythm: Normal rate and regular rhythm.      Heart sounds: Normal heart sounds.   Pulmonary:      Effort: Pulmonary effort is normal. No respiratory distress.      Breath sounds: Normal breath sounds. No wheezing or rales.   Abdominal:      General: Bowel sounds are normal. There is no distension.      Palpations: Abdomen is soft.   Musculoskeletal:         General: Swelling (left has warmth/ and tenderness) present.      Cervical back: Normal range of motion.   Skin:     General: Skin is warm.   Neurological:      Mental Status: He is alert and oriented to person, place, and time.      Deep Tendon Reflexes: Reflexes are normal and symmetric.      Comments: cranial nerve 2-12 are normal.  Normal neurological exam          Lines/Drains:  Lines/Drains/Airways       Active Status       None                            Lab Results: I have reviewed the following results: CBC/BMP:   .     24  0631   WBC 4.40   HGB 8.4*   HCT 27.4*   *   SODIUM 135   K 3.4*   CL 99   CO2 31   BUN 27*   CREATININE 5.14*    GLUC 97   CAIONIZED 1.20   MG 2.0   PHOS 3.4       Results from last 7 days   Lab Units 09/18/24  0631   WBC Thousand/uL 4.40   HEMOGLOBIN g/dL 8.4*   HEMATOCRIT % 27.4*   PLATELETS Thousands/uL 130*   SEGS PCT % 57   LYMPHO PCT % 30   MONO PCT % 7   EOS PCT % 5     Results from last 7 days   Lab Units 09/18/24  0631 09/17/24  0626   SODIUM mmol/L 135 135   POTASSIUM mmol/L 3.4* 2.9*   CHLORIDE mmol/L 99 97   CO2 mmol/L 31 31   BUN mg/dL 27* 28*   CREATININE mg/dL 5.14* 5.61*   ANION GAP mmol/L 5 7   CALCIUM mg/dL 8.6 8.9   ALBUMIN g/dL  --  2.2*   TOTAL BILIRUBIN mg/dL  --  0.25   ALK PHOS U/L  --  100   ALT U/L  --  13   AST U/L  --  18   GLUCOSE RANDOM mg/dL 97 96     Results from last 7 days   Lab Units 09/18/24  0631   INR  3.48*     Results from last 7 days   Lab Units 09/17/24  2129   POC GLUCOSE mg/dl 124               Recent Cultures (last 7 days):         Imaging Review: Reviewed radiology reports from this admission including: chest xray.  Other Studies: EKG was reviewed.     Last 24 Hours Medication List:     Current Facility-Administered Medications:     acetaminophen (TYLENOL) tablet 650 mg, Q6H PRN    allopurinol (ZYLOPRIM) tablet 300 mg, Daily    ascorbic acid (VITAMIN C) tablet 500 mg, Daily    aspirin (ECOTRIN LOW STRENGTH) EC tablet 81 mg, Daily    atorvastatin (LIPITOR) tablet 40 mg, Daily With Dinner    cinacalcet (SENSIPAR) tablet 30 mg, Daily With Dinner    co-enzyme Q-10 capsule 100 mg, Daily    ferrous sulfate tablet 325 mg, Daily With Breakfast    gentamicin (GARAMYCIN) 0.1 % cream, Daily    HYDROcodone-acetaminophen (NORCO) 5-325 mg per tablet 2 tablet, Q6H PRN    insulin glargine (LANTUS) subcutaneous injection 15 Units 0.15 mL, HS    insulin lispro (HumALOG/ADMELOG) 100 units/mL subcutaneous injection 2-12 Units, TID AC **AND** Fingerstick Glucose (POCT), TID AC    iron sucrose (VENOFER) 300 mg in sodium chloride 0.9 % 250 mL IVPB, Daily    levothyroxine tablet 125 mcg, Daily     lidocaine (LIDODERM) 5 % patch 1 patch, Daily    magnesium Oxide (MAG-OX) tablet 400 mg, Daily    metoprolol succinate (TOPROL-XL) 24 hr tablet 25 mg, BID    midodrine (PROAMATINE) tablet 15 mg, TID AC    nicotine (NICODERM CQ) 14 mg/24hr TD 24 hr patch 1 patch, Daily    potassium chloride (Klor-Con M20) CR tablet 20 mEq, Once    [START ON 9/19/2024] potassium chloride (Klor-Con M20) CR tablet 40 mEq, Daily    simethicone (MYLICON) chewable tablet 80 mg, 4x Daily PRN    tamsulosin (FLOMAX) capsule 0.4 mg, Daily With Dinner    torsemide (DEMADEX) tablet 100 mg, Daily    vancomycin (VANCOCIN) 1,750 mg in sodium chloride 0.9 % 500 mL IVPB, Q24H    Administrative Statements   Today, Patient Was Seen By: Nora Mathew MD  I have spent a total time of >35 minutes in caring for this patient on the day of the visit/encounter including Diagnostic results, Reviewing / ordering tests, medicine, procedures  , Obtaining or reviewing history  , and Communicating with other healthcare professionals .    **Please Note: This note may have been constructed using a voice recognition system.**

## 2024-09-18 NOTE — PROGRESS NOTES
Masoud Perry is a 69 y.o. male who is currently ordered Vancomycin IV with management by the Pharmacy Consult service.  Relevant clinical data and objective / subjective history reviewed.  Vancomycin Assessment:  Indication and Goal AUC/Trough: Soft tissue (goal -600, trough >10)  Clinical Status: stable  Micro:   No cultures pending  Renal Function:  SCr: 5.14 mg/dL  CrCl: 17.3 mL/min  Renal replacement: Intermittent PD  Days of Therapy: 1  Current Dose: new start; 1,750 mg IV once  Vancomycin Plan: Pulse Dosing  New Dosin,000 mg IV daily PRN random level < or = 15  Next Level: 24 at 0600  Renal Function Monitoring: Daily BMP and UOP  Pharmacy will continue to follow closely for s/sx of nephrotoxicity, infusion reactions and appropriateness of therapy.  BMP and CBC will be ordered per protocol. We will continue to follow the patient’s culture results and clinical progress daily.    Jie Jewell, Pharmacist

## 2024-09-18 NOTE — PLAN OF CARE
Problem: PAIN - ADULT  Goal: Verbalizes/displays adequate comfort level or baseline comfort level  Description: Interventions:  - Encourage patient to monitor pain and request assistance  - Assess pain using appropriate pain scale  - Administer analgesics based on type and severity of pain and evaluate response  - Implement non-pharmacological measures as appropriate and evaluate response  - Consider cultural and social influences on pain and pain management  - Notify physician/advanced practitioner if interventions unsuccessful or patient reports new pain  Outcome: Progressing     Problem: INFECTION - ADULT  Goal: Absence or prevention of progression during hospitalization  Description: INTERVENTIONS:  - Assess and monitor for signs and symptoms of infection  - Monitor lab/diagnostic results  - Monitor all insertion sites, i.e. indwelling lines, tubes, and drains  - Administer medications as ordered  - Instruct and encourage patient and family to use good hand hygiene technique  - Identify and instruct in appropriate isolation precautions for identified infection/condition  Outcome: Progressing     Problem: SAFETY ADULT  Goal: Patient will remain free of falls  Description: INTERVENTIONS:  - Educate patient/family on patient safety including physical limitations  - Instruct patient to call for assistance with activity   - Consult OT/PT to assist with strengthening/mobility   - Keep Call bell within reach  - Keep bed low and locked with side rails adjusted as appropriate  - Keep care items and personal belongings within reach  - Initiate and maintain comfort rounds  - Make Fall Risk Sign visible to staff  - Offer Toileting every 2 Hours, in advance of need  - Initiate/Maintain bed alarm  - Obtain necessary fall risk management equipment: yellow bracelet, yellow socks, bed alarm  - Apply yellow socks and bracelet for high fall risk patients  - Consider moving patient to room near nurses station  Outcome:  Progressing  Goal: Maintain or return to baseline ADL function  Description: INTERVENTIONS:  -  Assess patient's ability to carry out ADLs; assess patient's baseline for ADL function and identify physical deficits which impact ability to perform ADLs (bathing, care of mouth/teeth, toileting, grooming, dressing, etc.)  - Assess/evaluate cause of self-care deficits   - Assess range of motion  - Assess patient's mobility; develop plan if impaired  - Assess patient's need for assistive devices and provide as appropriate  - Encourage maximum independence but intervene and supervise when necessary  - Involve family in performance of ADLs  - Assess for home care needs following discharge   - Consider OT consult to assist with ADL evaluation and planning for discharge  - Provide patient education as appropriate  Outcome: Progressing  Goal: Maintains/Returns to pre admission functional level  Description: INTERVENTIONS:  - Perform AM-PAC 6 Click Basic Mobility/ Daily Activity assessment daily.  - Set and communicate daily mobility goal to care team and patient/family/caregiver.   - Collaborate with rehabilitation services on mobility goals if consulted  - Reposition patient every 2 hours.  - Out of bed for toileting  - Record patient progress and toleration of activity level   Outcome: Progressing     Problem: DISCHARGE PLANNING  Goal: Discharge to home or other facility with appropriate resources  Description: INTERVENTIONS:  - Identify barriers to discharge w/patient and caregiver  - Arrange for needed discharge resources and transportation as appropriate  - Identify discharge learning needs (meds, wound care, etc.)  - Arrange for interpretive services to assist at discharge as needed  - Refer to Case Management Department for coordinating discharge planning if the patient needs post-hospital services based on physician/advanced practitioner order or complex needs related to functional status, cognitive ability, or social  support system  Outcome: Progressing     Problem: Knowledge Deficit  Goal: Patient/family/caregiver demonstrates understanding of disease process, treatment plan, medications, and discharge instructions  Description: Complete learning assessment and assess knowledge base.  Interventions:  - Provide teaching at level of understanding  - Provide teaching via preferred learning methods  Outcome: Progressing     Problem: NEUROSENSORY - ADULT  Goal: Achieves stable or improved neurological status  Description: INTERVENTIONS  - Monitor and report changes in neurological status  - Monitor vital signs such as temperature, blood pressure, glucose, and any other labs ordered     Outcome: Progressing     Problem: CARDIOVASCULAR - ADULT  Goal: Maintains optimal cardiac output and hemodynamic stability  Description: INTERVENTIONS:  - Monitor I/O, vital signs and rhythm  - Monitor for S/S and trends of decreased cardiac output  - Administer and titrate ordered vasoactive medications to optimize hemodynamic stability  - Assess quality of pulses, skin color and temperature  - Assess for signs of decreased coronary artery perfusion  - Instruct patient to report change in severity of symptoms  Outcome: Progressing  Goal: Absence of cardiac dysrhythmias or at baseline rhythm  Description: INTERVENTIONS:  - Continuous cardiac monitoring, vital signs, obtain 12 lead EKG if ordered  - Administer antiarrhythmic and heart rate control medications as ordered  - Monitor electrolytes and administer replacement therapy as ordered  Outcome: Progressing     Problem: RESPIRATORY - ADULT  Goal: Achieves optimal ventilation and oxygenation  Description: INTERVENTIONS:  - Assess for changes in respiratory status  - Assess for changes in mentation and behavior  - Position to facilitate oxygenation and minimize respiratory effort  - Oxygen administered by appropriate delivery if ordered  - Initiate smoking cessation education as indicated  - Encourage  broncho-pulmonary hygiene including cough, deep breathe, Incentive Spirometry  - Assess the need for suctioning and aspirate as needed  - Assess and instruct to report SOB or any respiratory difficulty  - Respiratory Therapy support as indicated  Outcome: Progressing     Problem: GASTROINTESTINAL - ADULT  Goal: Minimal or absence of nausea and/or vomiting  Description: INTERVENTIONS:  - Administer IV fluids if ordered to ensure adequate hydration  - Maintain NPO status until nausea and vomiting are resolved  - Nasogastric tube if ordered  - Administer ordered antiemetic medications as needed  - Provide nonpharmacologic comfort measures as appropriate  - Advance diet as tolerated, if ordered  - Consider nutrition services referral to assist patient with adequate nutrition and appropriate food choices  Outcome: Progressing  Goal: Maintains adequate nutritional intake  Description: INTERVENTIONS:  - Monitor percentage of each meal consumed  - Identify factors contributing to decreased intake, treat as appropriate  - Assist with meals as needed  - Monitor I&O, weight, and lab values if indicated  - Obtain nutrition services referral as needed  Outcome: Progressing  Goal: Oral mucous membranes remain intact  Description: INTERVENTIONS  - Assess oral mucosa and hygiene practices  - Implement preventative oral hygiene regimen  - Implement oral medicated treatments as ordered  - Initiate Nutrition services referral as needed  Outcome: Progressing     Problem: GENITOURINARY - ADULT  Goal: Maintains or returns to baseline urinary function  Description: INTERVENTIONS:  - Assess urinary function  - Encourage oral fluids to ensure adequate hydration if ordered  - Administer IV fluids as ordered to ensure adequate hydration  - Administer ordered medications as needed  - Offer frequent toileting  - Follow urinary retention protocol if ordered  Outcome: Progressing     Problem: METABOLIC, FLUID AND ELECTROLYTES - ADULT  Goal:  Electrolytes maintained within normal limits  Description: INTERVENTIONS:  - Monitor labs and assess patient for signs and symptoms of electrolyte imbalances  - Administer electrolyte replacement as ordered  - Monitor response to electrolyte replacements, including repeat lab results as appropriate  - Instruct patient on fluid and nutrition as appropriate  Outcome: Progressing  Goal: Fluid balance maintained  Description: INTERVENTIONS:  - Monitor labs   - Monitor I/O and WT  - Instruct patient on fluid and nutrition as appropriate  - Assess for signs & symptoms of volume excess or deficit  Outcome: Progressing  Goal: Glucose maintained within target range  Description: INTERVENTIONS:  - Monitor Blood Glucose as ordered  - Assess for signs and symptoms of hyperglycemia and hypoglycemia  - Administer ordered medications to maintain glucose within target range  - Assess nutritional intake and initiate nutrition service referral as needed  Outcome: Progressing     Problem: SKIN/TISSUE INTEGRITY - ADULT  Goal: Skin Integrity remains intact(Skin Breakdown Prevention)  Description: Assess:  -Perform Quintin assessment every shift  -Clean and moisturize skin every day  -Inspect skin when repositioning, toileting, and assisting with ADLS  -Assess extremities for adequate circulation and sensation     Bed Management:  -Have minimal linens on bed & keep smooth, unwrinkled  -Change linens as needed when moist or perspiring  -Avoid sitting or lying in one position for more than 2 hours while in bed  -Keep HOB at 30degrees     Toileting:  -Offer bedside commode  -Assess for incontinence every 2hr    Activity:  -Encourage activity and walks on unit  -Encourage or provide ROM exercises   -Turn and reposition patient every 2 Hours  -Use appropriate equipment to lift or move patient in bed  -Instruct/ Assist with weight shifting every 2hr when out of bed in chair    Skin Care:  -Avoid use of baby powder, tape, friction and shearing,  hot water or constrictive clothing  -Relieve pressure over bony prominences using mepilex  -Do not massage red bony areas    Outcome: Progressing  Goal: Incision(s), wounds(s) or drain site(s) healing without S/S of infection  Description: INTERVENTIONS  - Assess and document dressing, incision, wound bed, drain sites and surrounding tissue  - Provide patient and family education  - Perform skin care/dressing changes every day/prn  Outcome: Progressing     Problem: HEMATOLOGIC - ADULT  Goal: Maintains hematologic stability  Description: INTERVENTIONS  - Assess for signs and symptoms of bleeding or hemorrhage  - Monitor labs  - Administer supportive blood products/factors as ordered and appropriate  Outcome: Progressing     Problem: MUSCULOSKELETAL - ADULT  Goal: Maintain or return mobility to safest level of function  Description: INTERVENTIONS:  - Assess patient's ability to carry out ADLs; assess patient's baseline for ADL function and identify physical deficits which impact ability to perform ADLs (bathing, care of mouth/teeth, toileting, grooming, dressing, etc.)  - Assess/evaluate cause of self-care deficits   - Assess range of motion  - Assess patient's mobility  - Assess patient's need for assistive devices and provide as appropriate  - Encourage maximum independence but intervene and supervise when necessary  - Involve family in performance of ADLs  - Assess for home care needs following discharge   - Consider OT consult to assist with ADL evaluation and planning for discharge  - Provide patient education as appropriate  Outcome: Progressing     Problem: Prexisting or High Potential for Compromised Skin Integrity  Goal: Skin integrity is maintained or improved  Description: INTERVENTIONS:  - Identify patients at risk for skin breakdown  - Assess and monitor skin integrity  - Assess and monitor nutrition and hydration status  - Monitor labs   - Assess for incontinence   - Turn and reposition patient  - Assist  with mobility/ambulation  - Relieve pressure over bony prominences  - Avoid friction and shearing  - Provide appropriate hygiene as needed including keeping skin clean and dry  - Evaluate need for skin moisturizer/barrier cream  - Collaborate with interdisciplinary team   - Patient/family teaching  - Consider wound care consult   Outcome: Progressing     Problem: Nutrition/Hydration-ADULT  Goal: Nutrient/Hydration intake appropriate for improving, restoring or maintaining nutritional needs  Description: Monitor and assess patient's nutrition/hydration status for malnutrition. Collaborate with interdisciplinary team and initiate plan and interventions as ordered.  Monitor patient's weight and dietary intake as ordered or per policy. Utilize nutrition screening tool and intervene as necessary. Determine patient's food preferences and provide high-protein, high-caloric foods as appropriate.     INTERVENTIONS:  - Monitor oral intake, urinary output, labs, and treatment plans  - Assess nutrition and hydration status and recommend course of action  - Evaluate amount of meals eaten  - Assist patient with eating if necessary   - Allow adequate time for meals  - Recommend/ encourage appropriate diets, oral nutritional supplements, and vitamin/mineral supplements  - Order, calculate, and assess calorie counts as needed  - Recommend, monitor, and adjust tube feedings and TPN/PPN based on assessed needs  - Assess need for intravenous fluids  - Provide specific nutrition/hydration education as appropriate  - Include patient/family/caregiver in decisions related to nutrition  Outcome: Progressing

## 2024-09-18 NOTE — PROCEDURES
Venous Access Line Insertion    Date/Time: 9/18/2024 4:47 PM    Performed by: Ben Celestin PA-C  Authorized by: Ben Celestin PA-C    Patient location:  Bedside  Consent:     Consent obtained:  Verbal    Consent given by:  Patient    Risks discussed:  Arterial puncture, bleeding, infection, incorrect placement and nerve damage  Pre-procedure details:     Hand hygiene: Hand hygiene performed prior to insertion      Sterile barrier technique: All elements of maximal sterile technique followed      Skin preparation:  2% chlorhexidine    Skin preparation agent: Skin preparation agent completely dried prior to procedure    Procedure details:     Complex Venous Access Line Type: Midline      Complex Venous Access Line Indications: new site      Orientation:  Right    Location:  Basilic    Approach: percutaneous technique used      Ultrasound image availability:  Not saved    Sterile ultrasound techniques: Sterile gel and sterile probe covers were used      Number of attempts:  2    Successful placement: yes      Landmarks identified: yes      Vessel of catheter tip end:  Sherlock 3CG confirmed  Anesthesia (see MAR for exact dosages):     Anesthesia method:  None  Post-procedure details:     Post-procedure:  Dressing applied and securement device placed    Assessment:  Blood return through all ports and free fluid flow    Post-procedure complications: none      Patient tolerance of procedure:  Tolerated well, no immediate complications

## 2024-09-18 NOTE — PLAN OF CARE
Problem: OCCUPATIONAL THERAPY ADULT  Goal: Performs self-care activities at highest level of function for planned discharge setting.  See evaluation for individualized goals.  Description: Treatment Interventions: ADL retraining, Visual perceptual retraining, Functional transfer training, UE strengthening/ROM, Endurance training, Cognitive reorientation, Patient/family training, Equipment evaluation/education, Fine motor coordination activities, Neuromuscular reeducation, Compensatory technique education, UE splinting, Cardiac education, Continued evaluation, Energy conservation, Activityengagement          See flowsheet documentation for full assessment, interventions and recommendations.   Outcome: Progressing  Note: Limitation: Decreased ADL status, Decreased endurance, Decreased high-level ADLs, Decreased self-care trans, Decreased Safe judgement during ADL  Prognosis: Good  Assessment: Pt completed OT tx session #1 focused on ADL performance and functional mobility. Pt alert and agreeable to participate. Pt demonstrated improvements in transfer status this session but continues to be limited by pain and self-limiting behaviors. Pt currently completing UB ADLs @ S/set-up and LB ADLs @ Max A. Pt demonstrating Fair participation and Fair potential to achieve goals but is currently demonstrating deficits in decrease activity tolerance, decrease standing balance, decrease performance during ADL tasks, increased pain, decrease generalized strength, decrease activity engagement, and decrease performance during functional transfers. Continue to recommend Level II: Moderate Resource Intensity Therapy upon discharge to increase safety and independence in ADL tasks and functional mobility.     Rehab Resource Intensity Level, OT: II (Moderate Resource Intensity)

## 2024-09-18 NOTE — CASE MANAGEMENT
Case Management Discharge Planning Note    Patient name Masoud Goregh  Location /-01 MRN [id number]  : 1955 Date 2024       Current Admission Date: 9/15/2024  Current Admission Diagnosis:Ambulatory dysfunction   Patient Active Problem List    Diagnosis Date Noted Date Diagnosed    Cellulitis 2024     Moderate protein-calorie malnutrition (HCC) 2024     Hypokalemia 2024     Ambulatory dysfunction 09/15/2024     Lactic acidosis 2024     Hypercalcemia 2024     Chronic acquired lymphedema 2024     Hypotension 2024     Nocturnal hypoxia 2024     Elevated LFTs 2024     Patient on peritoneal dialysis (Formerly [facility]) 2024     Hypervolemia 2024     ESRD (end stage renal disease) ([facility]ly [facility]) 2024     Bacteremia 2024     Cellulitis of right lower extremity 2024     Paroxysmal atrial fibrillation ([facility]) 2024     Bilateral lower extremity edema 2023     Ureteral stone with hydronephrosis 2022     Cutaneous abscess of buttock 2022     Chronic kidney disease-mineral and bone disorder 2022     Type 2 diabetes mellitus with stage 4 chronic kidney disease, with long-term current use of insulin ([facility]) 07/15/2022     Obesity, morbid ([facility]ly [facility]) 07/15/2022     Secondary hyperparathyroidism of renal origin ([facility]) 07/15/2022     Stage 5 chronic kidney disease on peritoneal dialysis ([facility]) 07/15/2022     Factor 5 Leiden mutation, heterozygous ([facility]) 01/15/2022     Thrombocytopenia ([facility]) 2022     Angina at rest 2022     MI (myocardial infarction) ([facility]) 2022     History of PTCA 2022     Sleep apnea 2022     Smoking 2022     Anemia 2022     History of CVA (cerebrovascular accident) 2022     Acute on chronic diastolic HF (heart failure) ([facility]) 2022     HLD (hyperlipidemia) 2022     Lymphedema 2022     Hypothyroidism 2022     COPD (chronic obstructive pulmonary  disease) (HCC) 12/31/2018     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 (HCC) 06/29/2016     Gastroesophageal reflux disease 06/29/2016     Gout 06/29/2016     Personal history of pulmonary embolism 06/29/2016     ED (erectile dysfunction) of organic origin 01/14/2014     History of thromboembolism of vein 01/10/2013       LOS (days): 3  Geometric Mean LOS (GMLOS) (days): 4.5  Days to GMLOS:1.8     OBJECTIVE:  Risk of Unplanned Readmission Score: 41.74         Current admission status: Inpatient   Preferred Pharmacy:   [facility] [place] - [address]  [address] [place] [address]  Phone: [phone] Fax: [phone]    Primary Care Provider: Jignesh Baker DO    Primary Insurance: [employer]  Secondary Insurance:     DISCHARGE DETAILS:           As per [facility], they are requesting pts bring his PD cycler with him to their facility.  As per request, CM LM for pts daughter, Lucas, informing her that family will need to make arrangements to bring pts cycler to the hospital             CM initiating prior authorization through pts insurance    CM awaiting confirmation from [facility] as to when their staff will be completing training on PD at their facility.

## 2024-09-18 NOTE — PLAN OF CARE
Problem: PHYSICAL THERAPY ADULT  Goal: Performs mobility at highest level of function for planned discharge setting.  See evaluation for individualized goals.  Description: Treatment/Interventions: ADL retraining, Functional transfer training, LE strengthening/ROM, Elevations, Therapeutic exercise, Endurance training, Bed mobility, Gait training, OT, Family, Spoke to nursing  Equipment Recommended: Walker (pt owns)       See flowsheet documentation for full assessment, interventions and recommendations.  Outcome: Progressing  Note: Prognosis: Fair  Problem List: Decreased strength, Decreased range of motion, Decreased endurance, Impaired balance, Decreased mobility, Pain  Assessment: Pt tolerates tx session poorly.  He reports dizziness in standing position at the RW.  BP taken upon sitting immediately (112/54).  Pt also limited by his pain level with activity; stating that his L LE is very sensitive and highly painful.  SpO2 remains >92% throughout on room air.  Barriers to Discharge: Other (Comment)  Barriers to Discharge Comments: stairs; lives alone; questionable insight; pain  Rehab Resource Intensity Level, PT: II (Moderate Resource Intensity)    See flowsheet documentation for full assessment.

## 2024-09-18 NOTE — OCCUPATIONAL THERAPY NOTE
Occupational Therapy Progress Note     Patient Name: Masoud Perry  Today's Date: 9/18/2024  Problem List  Principal Problem:    Ambulatory dysfunction  Active Problems:    History of CVA (cerebrovascular accident)    HLD (hyperlipidemia)    Hypothyroidism    Anemia    Type 2 diabetes mellitus with stage 4 chronic kidney disease, with long-term current use of insulin (HCC)    Obesity, morbid (HCC)    Secondary hyperparathyroidism of renal origin (HCC)    Paroxysmal atrial fibrillation (HCC)    Patient on peritoneal dialysis (HCC)    ESRD (end stage renal disease) (HCC)    Chronic acquired lymphedema    Hypotension    Gout    Hypokalemia    Moderate protein-calorie malnutrition (HCC)    Cellulitis     09/18/24 1010   Note Type   Note Type Treatment   Pain Assessment   Pain Assessment Tool FLACC   Pain Location/Orientation Location: Back;Orientation: Left;Location: Hip   Pain Rating: FLACC (Rest) - Face 0   Pain Rating: FLACC (Rest) - Legs 0   Pain Rating: FLACC (Rest) - Activity 0   Pain Rating: FLACC (Rest) - Cry 0   Pain Rating: FLACC (Rest) - Consolability 0   Score: FLACC (Rest) 0   Pain Rating: FLACC (Activity) - Face 1   Pain Rating: FLACC (Activity) - Legs 0   Pain Rating: FLACC (Activity) - Activity 1   Pain Rating: FLACC (Activity) - Cry 1   Pain Rating: FLACC (Activity) - Consolability 0   Score: FLACC (Activity) 3   Restrictions/Precautions   Other Precautions Bed Alarm;Fall Risk;Pain   ADL   UB Dressing Assistance 5  Supervision/Setup   UB Dressing Deficit Setup;Verbal cueing;Increased time to complete   UB Dressing Comments Pt able to doff/don hospital gown while seated at EOB   LB Dressing Assistance 2  Maximal Assistance   LB Dressing Deficit Setup;Requires assistive device for steadying;Verbal cueing;Increased time to complete   LB Dressing Comments Pt required assist to don/off socks while seated at EOB, unable to complete simulated clothing management while standing with BUE support on RW   Bed  Mobility   Supine to Sit 5  Supervision   Additional items HOB elevated;Increased time required;Bedrails   Sit to Supine 3  Moderate assistance   Additional items Assist x 1;Increased time required;LE management   Additional Comments Pt supine in bed at beginning of session. Supine to sit @ S. After stand, sit to supine @ Mod A for BLE management.   Transfers   Sit to Stand   (SBA)   Additional items Increased time required;Verbal cues   Stand to Sit   (SBA)   Additional items Increased time required;Verbal cues   Stand pivot Unable to assess   Additional Comments STS from elevated EOB to RW @ SBA. Pt able to maintain standing for approximately 1 minute @ SBA, unable to advance steps d/t pain.   Therapeutic Excerise-Strength   UE Strength No  (Pt declined to complete)   Cognition   Overall Cognitive Status WFL   Arousal/Participation Alert;Responsive;Cooperative   Attention Attends with cues to redirect   Orientation Level Oriented X4   Memory Within functional limits   Following Commands Follows one step commands without difficulty   Comments Pt with self-limiting behaviors and pain limiting progress   Activity Tolerance   Activity Tolerance Patient limited by pain   Medical Staff Made Aware Spoke with PT Leonides and RN Shayan   Assessment   Assessment Pt completed OT tx session #1 focused on ADL performance and functional mobility. Pt alert and agreeable to participate. Pt demonstrated improvements in transfer status this session but continues to be limited by pain and self-limiting behaviors. Pt currently completing UB ADLs @ S/set-up and LB ADLs @ Max A. Pt demonstrating Fair participation and Fair potential to achieve goals but is currently demonstrating deficits in decrease activity tolerance, decrease standing balance, decrease performance during ADL tasks, increased pain, decrease generalized strength, decrease activity engagement, and decrease performance during functional transfers. Continue to recommend  Level II: Moderate Resource Intensity Therapy upon discharge to increase safety and independence in ADL tasks and functional mobility.   Plan   Treatment Interventions ADL retraining;Functional transfer training   Goal Expiration Date 09/30/24   OT Treatment Day 1   OT Frequency 3-5x/wk   Discharge Recommendation   Rehab Resource Intensity Level, OT II (Moderate Resource Intensity)   AM-PAC Daily Activity Inpatient   Lower Body Dressing 2   Bathing 2   Toileting 2   Upper Body Dressing 3   Grooming 3   Eating 4   Daily Activity Raw Score 16   Daily Activity Standardized Score (Calc for Raw Score >=11) 35.96   AM-PAC Applied Cognition Inpatient   Following a Speech/Presentation 3   Understanding Ordinary Conversation 4   Taking Medications 3   Remembering Where Things Are Placed or Put Away 4   Remembering List of 4-5 Errands 3   Taking Care of Complicated Tasks 3   Applied Cognition Raw Score 20   Applied Cognition Standardized Score 41.76   End of Consult   Patient Position at End of Consult Supine;All needs within reach;Bed/Chair alarm activated     The patient's raw score on the AM-PAC Daily Activity Inpatient Short Form is 16. A raw score of less than 19 suggests the patient may benefit from discharge to post-acute rehabilitation services. Please refer to the recommendation of the Occupational Therapist for safe discharge planning.    Pt goals to be met by 9/30/2024     Pt will demonstrate ability to complete grooming/hygiene tasks @ Mod I after set-up.  Pt will demonstrate ability to complete supine<>sit @ Mod I in order to increase safety and independence during ADL tasks.  Pt will demonstrate ability to complete UB ADLs including washing/dressing @ Mod I in order to increase performance and participation during meaningful tasks  Pt will demonstrate ability to complete LB dressing @ Min A in order to increase safety and independence during meaningful tasks.   Pt will demonstrate ability to complete toileting  tasks including CM and pericare @ Min A in order to increase safety and independence during meaningful tasks.  Pt will demonstrate ability to complete EOB, chair, toilet/commode transfers @ Min A in order to increase performance and participation during functional tasks.  Pt will demonstrate ability to stand for 3-5 minutes while maintaining Fair + balance with use of RW for UB support PRN.  Pt will demonstrate ability to tolerate 30-35 minute OT session with no vc'ing for deep breathing or use of energy conservation techniques in order to increase activity tolerance during functional tasks.   Pt will demonstrate Good carryover of use of energy conservation/compensatory strategies during ADLs and functional tasks in order to increase safety and reduce risk for falls.   Pt will demonstrate Good attention and participation in continued evaluation of functional ambulation house hold distances in order to assist with safe d/c planning.  Pt will attend to continued cognitive assessments 100% of the time in order to provide most appropriate d/c recommendations.   Pt will follow 100% simple 2-step commands and be A&O x4 consistently with environmental cues to increase participation in functional activities.   Pt will identify 3 areas of interest/hobbies and 1 intervention on how to incorporate into daily life in order to increase interaction with environment and peers as well as increase participation in meaningful tasks.   Pt will demonstrate 100% carryover of BUE HEP in order to increase BUE MS and increase performance during functional tasks upon d/c home.    Anitra Saldivar, OTR/L

## 2024-09-18 NOTE — PHYSICAL THERAPY NOTE
" PHYSICAL THERAPY TREATMENT NOTE      NAME:  Masoud Perry  DATE: 09/18/24    Length Of Stay: 3  Performed at least 2 patient identifiers during session: Name and Birthday        TREATMENT FLOW SHEET:       09/18/24 1009   PT Last Visit   PT Visit Date 09/18/24   Note Type   Note Type Treatment   Pain Assessment   Pain Assessment Tool FLACC   Pain Location/Orientation Location: Back;Orientation: Left;Location: Hip   Pain Rating: FLACC (Rest) - Face 0   Pain Rating: FLACC (Rest) - Legs 0   Pain Rating: FLACC (Rest) - Activity 0   Pain Rating: FLACC (Rest) - Cry 0   Pain Rating: FLACC (Rest) - Consolability 0   Score: FLACC (Rest) 0   Pain Rating: FLACC (Activity) - Face 1   Pain Rating: FLACC (Activity) - Legs 0   Pain Rating: FLACC (Activity) - Activity 0   Pain Rating: FLACC (Activity) - Cry 1   Pain Rating: FLACC (Activity) - Consolability 0   Score: FLACC (Activity) 2   Restrictions/Precautions   Weight Bearing Precautions Per Order No   Other Precautions Bed Alarm;Fall Risk;Pain;O2;Telemetry   General   Chart Reviewed Yes   Response to Previous Treatment Patient reporting fatigue but able to participate.   Family/Caregiver Present No   Cognition   Overall Cognitive Status WFL   Arousal/Participation Alert   Orientation Level Oriented X4   Following Commands Follows one step commands without difficulty   Bed Mobility   Supine to Sit 5  Supervision   Additional items HOB elevated;Increased time required;Verbal cues   Sit to Supine 3  Moderate assistance   Additional items Assist x 1;Increased time required;Verbal cues;LE management   Transfers   Sit to Stand   (SBA)   Additional items Increased time required;Verbal cues  (Pt asking, \"Do I stand up on my own or do you lift me up?\")   Stand to Sit   (SBA)   Additional items Increased time required;Verbal cues   Stand pivot   (pt declines attempting due to his pain level)   Additional Comments Static standing at RW for 1 minute with SBA   Ambulation/Elevation   Gait " Assistance Not tested  (pt declines attempting)   Balance   Static Sitting Fair +   Dynamic Sitting Fair   Static Standing Fair -   Endurance Deficit   Endurance Deficit Yes   Activity Tolerance   Activity Tolerance Patient limited by fatigue;Patient limited by pain   Medical Staff Made Aware DOUGIE Ayoub   Nurse Made Aware PAYAM Downey   Exercises   Ankle Pumps Sitting;10 reps;AROM;Bilateral   Assessment   Prognosis Fair   Assessment Pt tolerates tx session poorly.  He reports dizziness in standing position at the RW.  BP taken upon sitting immediately (112/54).  Pt also limited by his pain level with activity; stating that his L LE is very sensitive and highly painful.  SpO2 remains >92% throughout on room air.   Barriers to Discharge Other (Comment)   Barriers to Discharge Comments stairs; lives alone; questionable insight; pain   Goals   STG Expiration Date 09/30/24   PT Treatment Day 1   Plan   Progress Slow progress, decreased activity tolerance   PT Frequency 3-5x/wk   Discharge Recommendation   Rehab Resource Intensity Level, PT II (Moderate Resource Intensity)   AM-PAC Basic Mobility Inpatient   Turning in Flat Bed Without Bedrails 3   Lying on Back to Sitting on Edge of Flat Bed Without Bedrails 2   Moving Bed to Chair 3   Standing Up From Chair Using Arms 3   Walk in Room 1   Climb 3-5 Stairs With Railing 1   Basic Mobility Inpatient Raw Score 13   Basic Mobility Standardized Score 33.99   [facility] Highest Level Of Mobility   -[facility] Goal 4: Move to chair/commode   -[facility] Achieved 5: Stand (1 or more minutes)   Education   Education Provided Mobility training   Patient Reinforcement needed   End of Consult   Patient Position at End of Consult Supine;All needs within reach   End of Consult Comments pt unwilling to get into recliner chair; SpO2 93% on RA       The patient's AM-PAC Basic Mobility Inpatient Short Form Raw Score is 13. A Raw score of less than or equal to 16 suggests the patient may benefit from  discharge to post-acute rehabilitation services. Please also refer to the recommendation of the Physical Therapist for safe discharge planning.         Leonides Toure, PT,DPT

## 2024-09-19 LAB
ANION GAP SERPL CALCULATED.3IONS-SCNC: 4 MMOL/L (ref 4–13)
APPEARANCE FLD: CLEAR
BASOPHILS # BLD AUTO: 0.03 THOUSANDS/ΜL (ref 0–0.1)
BASOPHILS NFR BLD AUTO: 1 % (ref 0–1)
BASOPHILS NFR FLD MANUAL: 1 %
BUN SERPL-MCNC: 26 MG/DL (ref 5–25)
CALCIUM SERPL-MCNC: 8.4 MG/DL (ref 8.4–10.2)
CHLORIDE SERPL-SCNC: 102 MMOL/L (ref 96–108)
CO2 SERPL-SCNC: 30 MMOL/L (ref 21–32)
COLOR FLD: COLORLESS
CREAT SERPL-MCNC: 4.84 MG/DL (ref 0.6–1.3)
EOSINOPHIL # BLD AUTO: 0.24 THOUSAND/ΜL (ref 0–0.61)
EOSINOPHIL NFR BLD AUTO: 5 % (ref 0–6)
EOSINOPHIL NFR FLD MANUAL: 29 %
ERYTHROCYTE [DISTWIDTH] IN BLOOD BY AUTOMATED COUNT: 15.8 % (ref 11.6–15.1)
GFR SERPL CREATININE-BSD FRML MDRD: 11 ML/MIN/1.73SQ M
GLUCOSE SERPL-MCNC: 127 MG/DL (ref 65–140)
GLUCOSE SERPL-MCNC: 132 MG/DL (ref 65–140)
GLUCOSE SERPL-MCNC: 78 MG/DL (ref 65–140)
GLUCOSE SERPL-MCNC: 91 MG/DL (ref 65–140)
GLUCOSE SERPL-MCNC: 96 MG/DL (ref 65–140)
HCT VFR BLD AUTO: 26.5 % (ref 36.5–49.3)
HGB BLD-MCNC: 8.2 G/DL (ref 12–17)
IMM GRANULOCYTES # BLD AUTO: 0.02 THOUSAND/UL (ref 0–0.2)
IMM GRANULOCYTES NFR BLD AUTO: 0 % (ref 0–2)
INR PPP: 3.48 (ref 0.85–1.19)
LYMPHOCYTES # BLD AUTO: 1.18 THOUSANDS/ΜL (ref 0.6–4.47)
LYMPHOCYTES NFR BLD AUTO: 23 % (ref 14–44)
LYMPHOCYTES NFR BLD AUTO: 3 %
MCH RBC QN AUTO: 31.9 PG (ref 26.8–34.3)
MCHC RBC AUTO-ENTMCNC: 30.9 G/DL (ref 31.4–37.4)
MCV RBC AUTO: 103 FL (ref 82–98)
MONO+MESO NFR FLD MANUAL: 3 %
MONOCYTES # BLD AUTO: 0.3 THOUSAND/ΜL (ref 0.17–1.22)
MONOCYTES NFR BLD AUTO: 55 %
MONOCYTES NFR BLD AUTO: 6 % (ref 4–12)
NEUTROPHILS # BLD AUTO: 3.39 THOUSANDS/ΜL (ref 1.85–7.62)
NEUTS SEG NFR BLD AUTO: 65 % (ref 43–75)
NEUTS SEG NFR BLD AUTO: 9 %
NRBC BLD AUTO-RTO: 0 /100 WBCS
PLATELET # BLD AUTO: 106 THOUSANDS/UL (ref 149–390)
PMV BLD AUTO: 8.4 FL (ref 8.9–12.7)
POTASSIUM SERPL-SCNC: 3.6 MMOL/L (ref 3.5–5.3)
PROTHROMBIN TIME: 34.7 SECONDS (ref 12.3–15)
RBC # BLD AUTO: 2.57 MILLION/UL (ref 3.88–5.62)
SITE: NORMAL
SODIUM SERPL-SCNC: 136 MMOL/L (ref 135–147)
TOTAL CELLS COUNTED SPEC: 100
VANCOMYCIN SERPL-MCNC: 14.5 UG/ML (ref 10–20)
WBC # BLD AUTO: 5.16 THOUSAND/UL (ref 4.31–10.16)
WBC # FLD MANUAL: 100 /UL

## 2024-09-19 PROCEDURE — 82948 REAGENT STRIP/BLOOD GLUCOSE: CPT

## 2024-09-19 PROCEDURE — 89051 BODY FLUID CELL COUNT: CPT | Performed by: INTERNAL MEDICINE

## 2024-09-19 PROCEDURE — 87070 CULTURE OTHR SPECIMN AEROBIC: CPT | Performed by: INTERNAL MEDICINE

## 2024-09-19 PROCEDURE — 99232 SBSQ HOSP IP/OBS MODERATE 35: CPT | Performed by: FAMILY MEDICINE

## 2024-09-19 PROCEDURE — 80048 BASIC METABOLIC PNL TOTAL CA: CPT | Performed by: FAMILY MEDICINE

## 2024-09-19 PROCEDURE — 90945 DIALYSIS ONE EVALUATION: CPT | Performed by: INTERNAL MEDICINE

## 2024-09-19 PROCEDURE — 85025 COMPLETE CBC W/AUTO DIFF WBC: CPT | Performed by: FAMILY MEDICINE

## 2024-09-19 PROCEDURE — 80202 ASSAY OF VANCOMYCIN: CPT | Performed by: FAMILY MEDICINE

## 2024-09-19 PROCEDURE — 85610 PROTHROMBIN TIME: CPT | Performed by: FAMILY MEDICINE

## 2024-09-19 PROCEDURE — 87205 SMEAR GRAM STAIN: CPT | Performed by: INTERNAL MEDICINE

## 2024-09-19 RX ORDER — VANCOMYCIN HYDROCHLORIDE 1 G/200ML
1000 INJECTION, SOLUTION INTRAVENOUS DAILY PRN
Status: DISCONTINUED | OUTPATIENT
Start: 2024-09-20 | End: 2024-09-20

## 2024-09-19 RX ORDER — INSULIN GLARGINE 100 [IU]/ML
10 INJECTION, SOLUTION SUBCUTANEOUS
Status: DISCONTINUED | OUTPATIENT
Start: 2024-09-19 | End: 2024-09-23 | Stop reason: HOSPADM

## 2024-09-19 RX ORDER — VANCOMYCIN HYDROCHLORIDE 1 G/200ML
10 INJECTION, SOLUTION INTRAVENOUS ONCE
Status: COMPLETED | OUTPATIENT
Start: 2024-09-19 | End: 2024-09-19

## 2024-09-19 RX ADMIN — METOPROLOL SUCCINATE 25 MG: 25 TABLET, EXTENDED RELEASE ORAL at 08:10

## 2024-09-19 RX ADMIN — VANCOMYCIN HYDROCHLORIDE 1000 MG: 1 INJECTION, SOLUTION INTRAVENOUS at 08:11

## 2024-09-19 RX ADMIN — OXYCODONE HYDROCHLORIDE AND ACETAMINOPHEN 500 MG: 500 TABLET ORAL at 08:10

## 2024-09-19 RX ADMIN — POTASSIUM CHLORIDE 40 MEQ: 1500 TABLET, EXTENDED RELEASE ORAL at 08:10

## 2024-09-19 RX ADMIN — ALLOPURINOL 300 MG: 300 TABLET ORAL at 08:11

## 2024-09-19 RX ADMIN — HYDROCODONE BITARTRATE AND ACETAMINOPHEN 2 TABLET: 5; 325 TABLET ORAL at 19:57

## 2024-09-19 RX ADMIN — INSULIN GLARGINE 10 UNITS: 100 INJECTION, SOLUTION SUBCUTANEOUS at 22:29

## 2024-09-19 RX ADMIN — MAGNESIUM GLUCONATE 500 MG ORAL TABLET 400 MG: 500 TABLET ORAL at 08:11

## 2024-09-19 RX ADMIN — METOPROLOL SUCCINATE 25 MG: 25 TABLET, EXTENDED RELEASE ORAL at 20:00

## 2024-09-19 RX ADMIN — MIDODRINE HYDROCHLORIDE 15 MG: 5 TABLET ORAL at 15:36

## 2024-09-19 RX ADMIN — CINACALCET 30 MG: 30 TABLET, FILM COATED ORAL at 15:36

## 2024-09-19 RX ADMIN — LEVOTHYROXINE SODIUM 125 MCG: 125 TABLET ORAL at 06:12

## 2024-09-19 RX ADMIN — TORSEMIDE 100 MG: 100 TABLET ORAL at 08:11

## 2024-09-19 RX ADMIN — HYDROCODONE BITARTRATE AND ACETAMINOPHEN 2 TABLET: 5; 325 TABLET ORAL at 13:27

## 2024-09-19 RX ADMIN — MIDODRINE HYDROCHLORIDE 15 MG: 5 TABLET ORAL at 11:44

## 2024-09-19 RX ADMIN — ASPIRIN 81 MG: 81 TABLET, COATED ORAL at 08:11

## 2024-09-19 RX ADMIN — Medication 100 MG: at 08:10

## 2024-09-19 RX ADMIN — MIDODRINE HYDROCHLORIDE 15 MG: 5 TABLET ORAL at 08:17

## 2024-09-19 RX ADMIN — HYDROCODONE BITARTRATE AND ACETAMINOPHEN 2 TABLET: 5; 325 TABLET ORAL at 00:27

## 2024-09-19 RX ADMIN — ATORVASTATIN CALCIUM 40 MG: 40 TABLET, FILM COATED ORAL at 15:36

## 2024-09-19 RX ADMIN — TAMSULOSIN HYDROCHLORIDE 0.4 MG: 0.4 CAPSULE ORAL at 15:36

## 2024-09-19 RX ADMIN — GENTAMICIN SULFATE: 1 CREAM TOPICAL at 08:12

## 2024-09-19 RX ADMIN — FERROUS SULFATE TAB 325 MG (65 MG ELEMENTAL FE) 325 MG: 325 (65 FE) TAB at 08:11

## 2024-09-19 RX ADMIN — HYDROCODONE BITARTRATE AND ACETAMINOPHEN 2 TABLET: 5; 325 TABLET ORAL at 06:28

## 2024-09-19 NOTE — PROGRESS NOTES
Masoud Perry is a 69 y.o. male who is currently ordered Vancomycin IV with management by the Pharmacy Consult service.  Relevant clinical data and objective / subjective history reviewed.  Vancomycin Assessment:  Indication and Goal AUC/Trough: Soft tissue (goal -600, trough >10)  Clinical Status: stable  Micro:   No cultures pending  Renal Function:  SCr: 4.84 mg/dL  CrCl: 18.3 mL/min  Renal replacement: Intermittent PD  Days of Therapy: 2  Current Dose: 1,000 mg IV daily PRN random level < or = 15  Vancomycin Plan: Pulse Dosing  New Dosin,000 mg IV once today  Next Level: 24 at 0600  Renal Function Monitoring: Daily BMP and UOP  Pharmacy will continue to follow closely for s/sx of nephrotoxicity, infusion reactions and appropriateness of therapy.  BMP and CBC will be ordered per protocol. We will continue to follow the patient’s culture results and clinical progress daily.    Jie Jewell, Pharmacist

## 2024-09-19 NOTE — PROGRESS NOTES
Progress Note - Hospitalist   Name: Masoud Perry 69 y.o. male I MRN: [id number]  Unit/Bed#: -01 I Date of Admission: 9/15/2024   Date of Service: 9/19/2024 I Hospital Day: 4    Assessment & Plan  Ambulatory dysfunction  Been weak for 2 weeks, difficulty getting out of a chair, and fell today hitting the back of his head.  At baseline he walks with a walker  PT/OT consult- needs rehab patient agreeble  Case management consultation for acute rehab  ESRD (end stage renal disease) (HCC)  On peritoneal dialysis.  Will consult nephrology.  I did have a discussion with the patient about why he is not on hemodialysis, patient states he was told that he would have to go to the dialysis clinic 4-5 times a week for 6 to 8 hours/day.  I am not certain about this, but at the patient did have hemodialysis patient would be able to find multiple acute rehabs that would accept him for for rehab.  An option would be to start hemodialysis here for rehab    This provider discussed with patient regarding hemodialysis.  Patient reports he prefers to stay with current treatment with peritoneal dialysis.    Lab Results   Component Value Date    EGFR 11 09/19/2024    EGFR 10 09/18/2024    EGFR 9 09/17/2024    CREATININE 4.84 (H) 09/19/2024    CREATININE 5.14 (H) 09/18/2024    CREATININE 5.61 (H) 09/17/2024     Patient on peritoneal dialysis (HCC)  Nephrology  Paroxysmal atrial fibrillation ([facility], located within [facility])  Continue metoprolol and currently on Coumadin.  He states that the Coumadin doses have been changing recently.  Currently not on his home med rec.  Inr 3.48 hold coumadin  Type 2 diabetes mellitus with stage 4 chronic kidney disease, with long-term current use of insulin ([facility], located [facility])  Fasting is 78 decrease Lantus to 10 units at night continue sliding scale    Lab Results   Component Value Date    HGBA1C 5.9 (H) 07/24/2024     Hypothyroidism  Continue levothyroxine  HLD (hyperlipidemia)  Continue statin  Hypotension  Continue midodrine 15 mg 3  times daily  Secondary hyperparathyroidism of renal origin (HCC)  Continue Sensipar   Chronic acquired lymphedema  Continue torsemide  Gout  Continue allopurinol  History of CVA (cerebrovascular accident)  Continue aspirin hold Coumadin as INR supratherapeutic he might need to be on a different regimen as he is very sensitive to Coumadin  Obesity, morbid (HCC)  BMI of 37.14  Anemia  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 seems base above 8 - venofer 3 doses given to patient by nephrology  Hypokalemia  Resolved post replacement by nephrology  Moderate protein-calorie malnutrition (HCC)  Malnutrition Findings:   Adult Malnutrition type: Chronic illness  Adult Degree of Malnutrition: Malnutrition of moderate degree  Malnutrition Characteristics: Inadequate energy, Muscle loss                  360 Statement: malnutrition of moderate degree in setting of chronic illness, evidenced by <75% energy intake compared to estimated needs for > 1 month, muscle wasting/indented temples, protruding clavicles, treated with diet, PT refusing supplements currently    BMI Findings:           Body mass index is 36.28 kg/m².     Cellulitis  Warm tender open wounds- started vanco 9/18 today the erythema and tenderness improved.    VTE Pharmacologic Prophylaxis: VTE Score: 5 High Risk (Score >/= 5) - Pharmacological DVT Prophylaxis Contraindicated. Sequential Compression Devices Ordered.    Mobility:   Basic Mobility Inpatient Raw Score: 13  JH-HLM Goal: 4: Move to chair/commode  JH-HLM Achieved: 2: Bed activities/Dependent transfer  JH-HLM Goal NOT achieved. Continue with multidisciplinary rounding and encourage appropriate mobility to improve upon JH-HLM goals.    Patient Centered Rounds: I performed bedside rounds with nursing staff today.   Discussions with Specialists or Other Care Team Provider: cm    Education and Discussions with Family / Patient: pt    Current Length of Stay: 4 day(s)  Current Patient Status: Inpatient   Certification  Statement: The patient will continue to require additional inpatient hospital stay due to left lower extremity cellulitis  Discharge Plan: Anticipate discharge tomorrow to rehab facility.    Code Status: Level 3 - DNAR and DNI    Subjective   Patient seen and examined no chest pain or shortness of breath leg pain is better    Objective     Vitals:   Temp (24hrs), Av °F (36.7 °C), Min:97.8 °F (36.6 °C), Max:98.1 °F (36.7 °C)    Temp:  [97.8 °F (36.6 °C)-98.1 °F (36.7 °C)] 98.1 °F (36.7 °C)  HR:  [69-78] 71  Resp:  [16-18] 18  BP: ()/(51-56) 105/52  SpO2:  [92 %-100 %] 100 %  Body mass index is 36.28 kg/m².     Input and Output Summary (last 24 hours):     Intake/Output Summary (Last 24 hours) at 2024 1116  Last data filed at 2024 0801  Gross per 24 hour   Intake 1230.05 ml   Output 775 ml   Net 455.05 ml       Physical Exam  Constitutional:       Appearance: He is well-developed.   HENT:      Head: Normocephalic and atraumatic.   Eyes:      Pupils: Pupils are equal, round, and reactive to light.   Cardiovascular:      Rate and Rhythm: Normal rate and regular rhythm.      Heart sounds: Normal heart sounds.   Pulmonary:      Effort: Pulmonary effort is normal. No respiratory distress.      Breath sounds: Normal breath sounds. No wheezing or rales.   Abdominal:      General: Bowel sounds are normal. There is no distension.      Palpations: Abdomen is soft.   Musculoskeletal:         General: Swelling (Mild) present. Normal range of motion.      Cervical back: Normal range of motion.      Comments: Left shin erythema is improving tenderness is improved as well   Skin:     General: Skin is warm.   Neurological:      Mental Status: He is alert and oriented to person, place, and time.      Deep Tendon Reflexes: Reflexes are normal and symmetric.      Comments: cranial nerve 2-12 are normal.  Normal neurological exam          Lines/Drains:  Lines/Drains/Airways       Active Status       None                             Lab Results: I have reviewed the following results: CBC/BMP:   .     09/19/24  0625   WBC 5.16   HGB 8.2*   HCT 26.5*   *   SODIUM 136   K 3.6      CO2 30   BUN 26*   CREATININE 4.84*   GLUC 96       Results from last 7 days   Lab Units 09/19/24  0625   WBC Thousand/uL 5.16   HEMOGLOBIN g/dL 8.2*   HEMATOCRIT % 26.5*   PLATELETS Thousands/uL 106*   SEGS PCT % 65   LYMPHO PCT % 23   MONO PCT % 6   EOS PCT % 5     Results from last 7 days   Lab Units 09/19/24  0625 09/18/24  0631 09/17/24  0626   SODIUM mmol/L 136   < > 135   POTASSIUM mmol/L 3.6   < > 2.9*   CHLORIDE mmol/L 102   < > 97   CO2 mmol/L 30   < > 31   BUN mg/dL 26*   < > 28*   CREATININE mg/dL 4.84*   < > 5.61*   ANION GAP mmol/L 4   < > 7   CALCIUM mg/dL 8.4   < > 8.9   ALBUMIN g/dL  --   --  2.2*   TOTAL BILIRUBIN mg/dL  --   --  0.25   ALK PHOS U/L  --   --  100   ALT U/L  --   --  13   AST U/L  --   --  18   GLUCOSE RANDOM mg/dL 96   < > 96    < > = values in this interval not displayed.     Results from last 7 days   Lab Units 09/19/24  0625   INR  3.48*     Results from last 7 days   Lab Units 09/19/24  0746 09/18/24  2155 09/18/24  1645 09/18/24  1202 09/17/24  2129   POC GLUCOSE mg/dl 78 126 126 104 124               Recent Cultures (last 7 days):         Imaging Review: No pertinent imaging studies reviewed.  Other Studies: No additional pertinent studies reviewed.    Last 24 Hours Medication List:     Current Facility-Administered Medications:     acetaminophen (TYLENOL) tablet 650 mg, Q6H PRN    allopurinol (ZYLOPRIM) tablet 300 mg, Daily    ascorbic acid (VITAMIN C) tablet 500 mg, Daily    aspirin (ECOTRIN LOW STRENGTH) EC tablet 81 mg, Daily    atorvastatin (LIPITOR) tablet 40 mg, Daily With Dinner    cinacalcet (SENSIPAR) tablet 30 mg, Daily With Dinner    co-enzyme Q-10 capsule 100 mg, Daily    ferrous sulfate tablet 325 mg, Daily With Breakfast    gentamicin (GARAMYCIN) 0.1 % cream, Daily     HYDROcodone-acetaminophen (NORCO) 5-325 mg per tablet 2 tablet, Q6H PRN    insulin glargine (LANTUS) subcutaneous injection 10 Units 0.1 mL, HS    insulin lispro (HumALOG/ADMELOG) 100 units/mL subcutaneous injection 2-12 Units, TID AC **AND** Fingerstick Glucose (POCT), TID AC    levothyroxine tablet 125 mcg, Daily    lidocaine (LIDODERM) 5 % patch 1 patch, Daily    magnesium Oxide (MAG-OX) tablet 400 mg, Daily    metoprolol succinate (TOPROL-XL) 24 hr tablet 25 mg, BID    midodrine (PROAMATINE) tablet 15 mg, TID AC    potassium chloride (Klor-Con M20) CR tablet 40 mEq, Daily    simethicone (MYLICON) chewable tablet 80 mg, 4x Daily PRN    tamsulosin (FLOMAX) capsule 0.4 mg, Daily With Dinner    torsemide (DEMADEX) tablet 100 mg, Daily    [START ON 9/20/2024] vancomycin (VANCOCIN) IVPB (premix in dextrose) 1,000 mg 200 mL, Daily PRN    Administrative Statements   Today, Patient Was Seen By: Nora Mathew MD  I have spent a total time of >35 minutes in caring for this patient on the day of the visit/encounter including Diagnostic results, Reviewing / ordering tests, medicine, procedures  , Obtaining or reviewing history  , and Communicating with other healthcare professionals .    **Please Note: This note may have been constructed using a voice recognition system.**

## 2024-09-19 NOTE — PLAN OF CARE
Problem: PAIN - ADULT  Goal: Verbalizes/displays adequate comfort level or baseline comfort level  Description: Interventions:  - Encourage patient to monitor pain and request assistance  - Assess pain using appropriate pain scale  - Administer analgesics based on type and severity of pain and evaluate response  - Implement non-pharmacological measures as appropriate and evaluate response  - Consider cultural and social influences on pain and pain management  - Notify physician/advanced practitioner if interventions unsuccessful or patient reports new pain  Outcome: Progressing     Problem: INFECTION - ADULT  Goal: Absence or prevention of progression during hospitalization  Description: INTERVENTIONS:  - Assess and monitor for signs and symptoms of infection  - Monitor lab/diagnostic results  - Monitor all insertion sites, i.e. indwelling lines, tubes, and drains  - Administer medications as ordered  - Instruct and encourage patient and family to use good hand hygiene technique  - Identify and instruct in appropriate isolation precautions for identified infection/condition  Outcome: Progressing     Problem: SAFETY ADULT  Goal: Patient will remain free of falls  Description: INTERVENTIONS:  - Educate patient/family on patient safety including physical limitations  - Instruct patient to call for assistance with activity   - Consult OT/PT to assist with strengthening/mobility   - Keep Call bell within reach  - Keep bed low and locked with side rails adjusted as appropriate  - Keep care items and personal belongings within reach  - Initiate and maintain comfort rounds  - Make Fall Risk Sign visible to staff  - Offer Toileting every 2 Hours, in advance of need  - Initiate/Maintain bed alarm  - Obtain necessary fall risk management equipment: yellow bracelet, yellow socks, bed alarm  - Apply yellow socks and bracelet for high fall risk patients  - Consider moving patient to room near nurses station  Outcome:  Progressing  Goal: Maintain or return to baseline ADL function  Description: INTERVENTIONS:  -  Assess patient's ability to carry out ADLs; assess patient's baseline for ADL function and identify physical deficits which impact ability to perform ADLs (bathing, care of mouth/teeth, toileting, grooming, dressing, etc.)  - Assess/evaluate cause of self-care deficits   - Assess range of motion  - Assess patient's mobility; develop plan if impaired  - Assess patient's need for assistive devices and provide as appropriate  - Encourage maximum independence but intervene and supervise when necessary  - Involve family in performance of ADLs  - Assess for home care needs following discharge   - Consider OT consult to assist with ADL evaluation and planning for discharge  - Provide patient education as appropriate  Outcome: Progressing  Goal: Maintains/Returns to pre admission functional level  Description: INTERVENTIONS:  - Perform AM-PAC 6 Click Basic Mobility/ Daily Activity assessment daily.  - Set and communicate daily mobility goal to care team and patient/family/caregiver.   - Collaborate with rehabilitation services on mobility goals if consulted  - Reposition patient every 2 hours.  - Out of bed for toileting  - Record patient progress and toleration of activity level   Outcome: Progressing     Problem: DISCHARGE PLANNING  Goal: Discharge to home or other facility with appropriate resources  Description: INTERVENTIONS:  - Identify barriers to discharge w/patient and caregiver  - Arrange for needed discharge resources and transportation as appropriate  - Identify discharge learning needs (meds, wound care, etc.)  - Arrange for interpretive services to assist at discharge as needed  - Refer to Case Management Department for coordinating discharge planning if the patient needs post-hospital services based on physician/advanced practitioner order or complex needs related to functional status, cognitive ability, or social  support system  Outcome: Progressing     Problem: Knowledge Deficit  Goal: Patient/family/caregiver demonstrates understanding of disease process, treatment plan, medications, and discharge instructions  Description: Complete learning assessment and assess knowledge base.  Interventions:  - Provide teaching at level of understanding  - Provide teaching via preferred learning methods  Outcome: Progressing     Problem: NEUROSENSORY - ADULT  Goal: Achieves stable or improved neurological status  Description: INTERVENTIONS  - Monitor and report changes in neurological status  - Monitor vital signs such as temperature, blood pressure, glucose, and any other labs ordered     Outcome: Progressing     Problem: CARDIOVASCULAR - ADULT  Goal: Maintains optimal cardiac output and hemodynamic stability  Description: INTERVENTIONS:  - Monitor I/O, vital signs and rhythm  - Monitor for S/S and trends of decreased cardiac output  - Administer and titrate ordered vasoactive medications to optimize hemodynamic stability  - Assess quality of pulses, skin color and temperature  - Assess for signs of decreased coronary artery perfusion  - Instruct patient to report change in severity of symptoms  Outcome: Progressing  Goal: Absence of cardiac dysrhythmias or at baseline rhythm  Description: INTERVENTIONS:  - Continuous cardiac monitoring, vital signs, obtain 12 lead EKG if ordered  - Administer antiarrhythmic and heart rate control medications as ordered  - Monitor electrolytes and administer replacement therapy as ordered  Outcome: Progressing     Problem: RESPIRATORY - ADULT  Goal: Achieves optimal ventilation and oxygenation  Description: INTERVENTIONS:  - Assess for changes in respiratory status  - Assess for changes in mentation and behavior  - Position to facilitate oxygenation and minimize respiratory effort  - Oxygen administered by appropriate delivery if ordered  - Initiate smoking cessation education as indicated  - Encourage  broncho-pulmonary hygiene including cough, deep breathe, Incentive Spirometry  - Assess the need for suctioning and aspirate as needed  - Assess and instruct to report SOB or any respiratory difficulty  - Respiratory Therapy support as indicated  Outcome: Progressing     Problem: GASTROINTESTINAL - ADULT  Goal: Minimal or absence of nausea and/or vomiting  Description: INTERVENTIONS:  - Administer IV fluids if ordered to ensure adequate hydration  - Maintain NPO status until nausea and vomiting are resolved  - Nasogastric tube if ordered  - Administer ordered antiemetic medications as needed  - Provide nonpharmacologic comfort measures as appropriate  - Advance diet as tolerated, if ordered  - Consider nutrition services referral to assist patient with adequate nutrition and appropriate food choices  Outcome: Progressing  Goal: Maintains adequate nutritional intake  Description: INTERVENTIONS:  - Monitor percentage of each meal consumed  - Identify factors contributing to decreased intake, treat as appropriate  - Assist with meals as needed  - Monitor I&O, weight, and lab values if indicated  - Obtain nutrition services referral as needed  Outcome: Progressing  Goal: Oral mucous membranes remain intact  Description: INTERVENTIONS  - Assess oral mucosa and hygiene practices  - Implement preventative oral hygiene regimen  - Implement oral medicated treatments as ordered  - Initiate Nutrition services referral as needed  Outcome: Progressing     Problem: METABOLIC, FLUID AND ELECTROLYTES - ADULT  Goal: Electrolytes maintained within normal limits  Description: INTERVENTIONS:  - Monitor labs and assess patient for signs and symptoms of electrolyte imbalances  - Administer electrolyte replacement as ordered  - Monitor response to electrolyte replacements, including repeat lab results as appropriate  - Instruct patient on fluid and nutrition as appropriate  Outcome: Progressing  Goal: Fluid balance  maintained  Description: INTERVENTIONS:  - Monitor labs   - Monitor I/O and WT  - Instruct patient on fluid and nutrition as appropriate  - Assess for signs & symptoms of volume excess or deficit  Outcome: Progressing  Goal: Glucose maintained within target range  Description: INTERVENTIONS:  - Monitor Blood Glucose as ordered  - Assess for signs and symptoms of hyperglycemia and hypoglycemia  - Administer ordered medications to maintain glucose within target range  - Assess nutritional intake and initiate nutrition service referral as needed  Outcome: Progressing     Problem: SKIN/TISSUE INTEGRITY - ADULT  Goal: Skin Integrity remains intact(Skin Breakdown Prevention)  Description: Assess:  -Perform Quintin assessment every shift  -Clean and moisturize skin every day  -Inspect skin when repositioning, toileting, and assisting with ADLS  -Assess extremities for adequate circulation and sensation     Bed Management:  -Have minimal linens on bed & keep smooth, unwrinkled  -Change linens as needed when moist or perspiring  -Avoid sitting or lying in one position for more than 2 hours while in bed  -Keep HOB at 30degrees     Toileting:  -Offer bedside commode  -Assess for incontinence every 2hr    Activity:  -Encourage activity and walks on unit  -Encourage or provide ROM exercises   -Turn and reposition patient every 2 Hours  -Use appropriate equipment to lift or move patient in bed  -Instruct/ Assist with weight shifting every 2hr when out of bed in chair    Skin Care:  -Avoid use of baby powder, tape, friction and shearing, hot water or constrictive clothing  -Relieve pressure over bony prominences using mepilex  -Do not massage red bony areas    Outcome: Progressing  Goal: Incision(s), wounds(s) or drain site(s) healing without S/S of infection  Description: INTERVENTIONS  - Assess and document dressing, incision, wound bed, drain sites and surrounding tissue  - Provide patient and family education  - Perform skin  care/dressing changes every day/prn  Outcome: Progressing     Problem: MUSCULOSKELETAL - ADULT  Goal: Maintain or return mobility to safest level of function  Description: INTERVENTIONS:  - Assess patient's ability to carry out ADLs; assess patient's baseline for ADL function and identify physical deficits which impact ability to perform ADLs (bathing, care of mouth/teeth, toileting, grooming, dressing, etc.)  - Assess/evaluate cause of self-care deficits   - Assess range of motion  - Assess patient's mobility  - Assess patient's need for assistive devices and provide as appropriate  - Encourage maximum independence but intervene and supervise when necessary  - Involve family in performance of ADLs  - Assess for home care needs following discharge   - Consider OT consult to assist with ADL evaluation and planning for discharge  - Provide patient education as appropriate  Outcome: Progressing     Problem: Prexisting or High Potential for Compromised Skin Integrity  Goal: Skin integrity is maintained or improved  Description: INTERVENTIONS:  - Identify patients at risk for skin breakdown  - Assess and monitor skin integrity  - Assess and monitor nutrition and hydration status  - Monitor labs   - Assess for incontinence   - Turn and reposition patient  - Assist with mobility/ambulation  - Relieve pressure over bony prominences  - Avoid friction and shearing  - Provide appropriate hygiene as needed including keeping skin clean and dry  - Evaluate need for skin moisturizer/barrier cream  - Collaborate with interdisciplinary team   - Patient/family teaching  - Consider wound care consult   Outcome: Progressing     Problem: Nutrition/Hydration-ADULT  Goal: Nutrient/Hydration intake appropriate for improving, restoring or maintaining nutritional needs  Description: Monitor and assess patient's nutrition/hydration status for malnutrition. Collaborate with interdisciplinary team and initiate plan and interventions as ordered.   Monitor patient's weight and dietary intake as ordered or per policy. Utilize nutrition screening tool and intervene as necessary. Determine patient's food preferences and provide high-protein, high-caloric foods as appropriate.     INTERVENTIONS:  - Monitor oral intake, urinary output, labs, and treatment plans  - Assess nutrition and hydration status and recommend course of action  - Evaluate amount of meals eaten  - Assist patient with eating if necessary   - Allow adequate time for meals  - Recommend/ encourage appropriate diets, oral nutritional supplements, and vitamin/mineral supplements  - Order, calculate, and assess calorie counts as needed  - Recommend, monitor, and adjust tube feedings and TPN/PPN based on assessed needs  - Assess need for intravenous fluids  - Provide specific nutrition/hydration education as appropriate  - Include patient/family/caregiver in decisions related to nutrition  Outcome: Progressing

## 2024-09-19 NOTE — PLAN OF CARE
Problem: PAIN - ADULT  Goal: Verbalizes/displays adequate comfort level or baseline comfort level  Description: Interventions:  - Encourage patient to monitor pain and request assistance  - Assess pain using appropriate pain scale  - Administer analgesics based on type and severity of pain and evaluate response  - Implement non-pharmacological measures as appropriate and evaluate response  - Consider cultural and social influences on pain and pain management  - Notify physician/advanced practitioner if interventions unsuccessful or patient reports new pain  Outcome: Progressing     Problem: INFECTION - ADULT  Goal: Absence or prevention of progression during hospitalization  Description: INTERVENTIONS:  - Assess and monitor for signs and symptoms of infection  - Monitor lab/diagnostic results  - Monitor all insertion sites, i.e. indwelling lines, tubes, and drains  - Administer medications as ordered  - Instruct and encourage patient and family to use good hand hygiene technique  - Identify and instruct in appropriate isolation precautions for identified infection/condition  Outcome: Progressing

## 2024-09-19 NOTE — CASE MANAGEMENT
[facility] has received APPROVED authorization.  Insurance:   [employer]  Called in by Rep: Liseth   Authorization received for: SNF  Facility: [facility]   Authorization #: [id number]   Start of Care: 09/19  Next Review Date: 09/23  Continued Stay Care Coordinator:   Jessica reynoso# [phone]  Submit next review to: [phone]     Care Manager notified: Yulia Wilcox   Please reach out to CM for updates on any clinical information.

## 2024-09-19 NOTE — PROGRESS NOTES
Progress Note - Nephrology   Name: Masoud Perry 69 y.o. male I MRN: [id number]  Unit/Bed#: -01 I Date of Admission: 9/15/2024   Date of Service: 9/19/2024 I Hospital Day: 4     Assessment & Plan  ESRD (end stage renal disease) (HCC)  Lab Results   Component Value Date    EGFR 11 09/19/2024    EGFR 10 09/18/2024    EGFR 9 09/17/2024    CREATININE 4.84 (H) 09/19/2024    CREATININE 5.14 (H) 09/18/2024    CREATININE 5.61 (H) 09/17/2024   Continue CAPD 2 L to 2.5% every 6 hours tolerating  Access PD catheter  Awaiting placement apparently will cover PD at [facility] in [place]      PD procedure note:  -Present during PD exchange slightly more dense than usual although not overtly cloudy  - Continue 2 L - 2.5% every 6 hours as tolerated  - Will order peritonitis evaluation just to be sure although he has no abdominal pain.  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  Will discuss with the patient to make sure he agrees risk would be very small at this juncture.  Secondary hyperparathyroidism of renal origin (HCC)  Patient on Cinacalcet with a history of hypercalcemia: Workup in the past was negative calcium now normal at 8.6: The recommendation was repeat myeloma evaluation in 3 to 6 months as of August 2024 otherwise negative  Hyperphosphatemia sevelamer on hold will recheck in a.m.  Monitor magnesium status post repletion  Cellulitis  Left pretibial region being treated with antibiotics per primary service  Ambulatory dysfunction  Per primary service going for rehab     Other major  problems:  Hypothyroidism on levothyroxine  Diabetes mellitus type 2 insulin per primary service  Dyslipidemia on statin  Chronic acquired lymphedema on diuretics along with COPD  Gout on allopurinol  History of CVA on aspirin and Coumadin  Morbid obesity     Patient on peritoneal dialysis (HCC)  Please see above      I have reviewed the nephrology recommendations including recommendation rule out peritonitis with cell count and Gram stain, with SLIM attending, and we are in agreement with renal plan including the information outlined above.     History of Present Illness   Brief History of Admission - 69-year-old male with a history of ESRD on CAPD/diabetes mellitus type 2/dyslipidemia/COPD/lymphedema/gout/CVA/morbid obesity who presented with ambulatory dysfunction who we are asked to see regarding ESRD     Patient is asymptomatic  No chest pain or shortness of breath  No nausea vomiting or diarrhea  Tolerating PD without abdominal pain    Objective      Temp:  [97.8 °F (36.6 °C)-98.1 °F (36.7 °C)] 98.1 °F (36.7 °C)  HR:  [69-78] 76  Resp:  [16-18] 18  BP: ()/(51-56) 105/52  O2 Device: None (Room air)         Vitals:    09/18/24 0600 09/19/24 0600   Weight: 119 kg (262 lb 5.6 oz) 118 kg (260 lb 2.3 oz)     I/O last 24 hours:  In: 1470.1 [P.O.:720; IV Piggyback:750.1]  Out: 1475 [Urine:975; Other:500]  Lines/Drains/Airways       Active Status       None                  Physical Exam: General:  No acute distress  Skin:  No acute rash  Eyes:  No scleral icterus and noninjected  ENT:  Moist mucous membranes  Neck:  Supple, no jugular venous distention, trachea midline, overall appearance is normal  Chest:  Clear to auscultation  CVS:  Regular rate and rhythm, without a rub or gallops  Abdomen:  Normal bowel sounds, soft and nontender and only minimally distended with possible fluid  Extremities: Chronic lymphedema unchanged; left pretibial region bandaged and no cyanosis, no significant arthritic  changes  Neuro:  No gross focality  Psych:  Alert and oriented and appropriate    Medications:    Current Facility-Administered Medications:     acetaminophen (TYLENOL) tablet 650 mg, 650 mg, Oral, Q6H PRN, Magdiel Holguin MD    allopurinol (ZYLOPRIM) tablet 300 mg, 300 mg, Oral, Daily, Magdiel Holguin MD, 300 mg at 09/19/24 0811    ascorbic acid (VITAMIN C) tablet 500 mg, 500 mg, Oral, Daily, Magdiel Holguin MD, 500 mg at 09/19/24 0810    aspirin (ECOTRIN LOW STRENGTH) EC tablet 81 mg, 81 mg, Oral, Daily, Magdiel Holguin MD, 81 mg at 09/19/24 0811    atorvastatin (LIPITOR) tablet 40 mg, 40 mg, Oral, Daily With Dinner, Magdiel Holguin MD, 40 mg at 09/18/24 1703    cinacalcet (SENSIPAR) tablet 30 mg, 30 mg, Oral, Daily With Dinner, Magdiel Holguin MD, 30 mg at 09/18/24 1703    co-enzyme Q-10 capsule 100 mg, 100 mg, Oral, Daily, Magdiel Holguin MD, 100 mg at 09/19/24 0810    ferrous sulfate tablet 325 mg, 325 mg, Oral, Daily With Breakfast, Magdiel Holguin MD, 325 mg at 09/19/24 0811    gentamicin (GARAMYCIN) 0.1 % cream, , Topical, Daily, GAGANDEEP Lin, Given at 09/19/24 0812    HYDROcodone-acetaminophen (NORCO) 5-325 mg per tablet 2 tablet, 2 tablet, Oral, Q6H PRN, Magdiel Holguin MD, 2 tablet at 09/19/24 0628    insulin glargine (LANTUS) subcutaneous injection 15 Units 0.15 mL, 15 Units, Subcutaneous, HS, Magdiel Holguin MD, 15 Units at 09/18/24 2222    insulin lispro (HumALOG/ADMELOG) 100 units/mL subcutaneous injection 2-12 Units, 2-12 Units, Subcutaneous, TID AC **AND** Fingerstick Glucose (POCT), , , TID AC, Nora Mathew MD    iron sucrose (VENOFER) 300 mg in sodium chloride 0.9 % 250 mL IVPB, 300 mg, Intravenous, Daily, Jignesh Hussein MD, Stopped at 09/18/24 1324    levothyroxine tablet 125 mcg, 125 mcg, Oral, Daily, Magdiel Holguin MD, 125 mcg at 09/19/24 0612    lidocaine (LIDODERM) 5 % patch 1 patch, 1 patch, Topical, Daily, Moses  CAROLINA Noel MD, 1 patch at 09/18/24 1155    magnesium Oxide (MAG-OX) tablet 400 mg, 400 mg, Oral, Daily, Magdiel Holguin MD, 400 mg at 09/19/24 0811    metoprolol succinate (TOPROL-XL) 24 hr tablet 25 mg, 25 mg, Oral, BID, Nora Mathew MD, 25 mg at 09/19/24 0810    midodrine (PROAMATINE) tablet 15 mg, 15 mg, Oral, TID AC, Magdiel Holguin MD, 15 mg at 09/19/24 0817    nicotine (NICODERM CQ) 14 mg/24hr TD 24 hr patch 1 patch, 1 patch, Transdermal, Daily, Magdiel Holguin MD    potassium chloride (Klor-Con M20) CR tablet 40 mEq, 40 mEq, Oral, Daily, Jignesh Hussein MD, 40 mEq at 09/19/24 0810    simethicone (MYLICON) chewable tablet 80 mg, 80 mg, Oral, 4x Daily PRN, Magdiel Holguin MD    tamsulosin (FLOMAX) capsule 0.4 mg, 0.4 mg, Oral, Daily With Dinner, Magdiel Holguin MD, 0.4 mg at 09/18/24 1703    torsemide (DEMADEX) tablet 100 mg, 100 mg, Oral, Daily, Nora Mathew MD, 100 mg at 09/19/24 0811    vancomycin (VANCOCIN) IVPB (premix in dextrose) 1,000 mg 200 mL, 10 mg/kg (Adjusted), Intravenous, Once, Nora Mathew MD, Last Rate: 200 mL/hr at 09/19/24 0811, 1,000 mg at 09/19/24 0811    [START ON 9/20/2024] vancomycin (VANCOCIN) IVPB (premix in dextrose) 1,000 mg 200 mL, 1,000 mg, Intravenous, Daily PRN, Noar Mathew MD      Lab Results: I have reviewed the following results:   Results from last 7 days   Lab Units 09/19/24  0625 09/18/24  0631 09/17/24  0626 09/16/24  0541 09/15/24  1559   WBC Thousand/uL 5.16 4.40 4.17* 3.89* 4.43   HEMOGLOBIN g/dL 8.2* 8.4* 8.6* 8.5* 9.5*   HEMATOCRIT % 26.5* 27.4* 28.3* 27.2* 30.3*   PLATELETS Thousands/uL 106* 130* 112* 127* 126*   POTASSIUM mmol/L 3.6 3.4* 2.9* 2.7* 3.2*   CHLORIDE mmol/L 102 99 97 96 94*   CO2 mmol/L 30 31 31 31 31   BUN mg/dL 26* 27* 28* 29* 27*   CREATININE mg/dL 4.84* 5.14* 5.61* 5.78* 5.68*   CALCIUM mg/dL 8.4 8.6 8.9 9.2 9.8   MAGNESIUM mg/dL  --  2.0 1.7* 1.5*  --    PHOSPHORUS mg/dL  --  3.4  --   --   --    ALBUMIN g/dL   "--   --  2.2* 2.2* 2.5*       Administrative Statements     Portions of the record may have been created with voice recognition software. Occasional wrong word or \"sound a like\" substitutions may have occurred due to the inherent limitations of voice recognition software. Read the chart carefully and recognize, using context, where substitutions have occurred.If you have any questions, please contact the dictating provider.  "

## 2024-09-19 NOTE — CASE MANAGEMENT
Case Management Discharge Planning Note    Patient name Masoud Goregh  Location /-01 MRN [id number]  : 1955 Date 2024       Current Admission Date: 9/15/2024  Current Admission Diagnosis:Ambulatory dysfunction   Patient Active Problem List    Diagnosis Date Noted Date Diagnosed    Cellulitis 2024     Moderate protein-calorie malnutrition (HCC) 2024     Hypokalemia 2024     Ambulatory dysfunction 09/15/2024     Lactic acidosis 2024     Hypercalcemia 2024     Chronic acquired lymphedema 2024     Hypotension 2024     Nocturnal hypoxia 2024     Elevated LFTs 2024     Patient on peritoneal dialysis ([facility]) 2024     Hypervolemia 2024     ESRD (end stage renal disease) ([facility]) 2024     Bacteremia 2024     Cellulitis of right lower extremity 2024     Paroxysmal atrial fibrillation ([facility]) 2024     Bilateral lower extremity edema 2023     Ureteral stone with hydronephrosis 2022     Cutaneous abscess of buttock 2022     Chronic kidney disease-mineral and bone disorder 2022     Type 2 diabetes mellitus with stage 4 chronic kidney disease, with long-term current use of insulin ([facility]) 07/15/2022     Obesity, morbid ([facility]) 07/15/2022     Secondary hyperparathyroidism of renal origin ([facility]) 07/15/2022     Stage 5 chronic kidney disease on peritoneal dialysis ([facility]) 07/15/2022     Factor 5 Leiden mutation, heterozygous ([facility]) 01/15/2022     Thrombocytopenia ([facility]) 2022     Angina at rest 2022     MI (myocardial infarction) ([facility]) 2022     History of PTCA 2022     Sleep apnea 2022     Smoking 2022     Anemia 2022     History of CVA (cerebrovascular accident) 2022     Acute on chronic diastolic HF (heart failure) ([facility]) 2022     HLD (hyperlipidemia) 2022     Lymphedema 2022     Hypothyroidism 2022     COPD (chronic obstructive pulmonary  disease) (HCC) 12/31/2018     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 (HCC) 06/29/2016     Gastroesophageal reflux disease 06/29/2016     Gout 06/29/2016     Personal history of pulmonary embolism 06/29/2016     ED (erectile dysfunction) of organic origin 01/14/2014     History of thromboembolism of vein 01/10/2013       LOS (days): 4  Geometric Mean LOS (GMLOS) (days): 4.5  Days to GMLOS:0.8     OBJECTIVE:  Risk of Unplanned Readmission Score: 42.1         Current admission status: Inpatient   Preferred Pharmacy:   [facility] - [facility] [place], [place] - [address]  [address] [place] [address]  Phone: [phone] Fax: [phone]    Primary Care Provider: Jignesh Baker DO    Primary Insurance: [employer] REP  Secondary Insurance:     DISCHARGE DETAILS:              CM messaged [facility] in [place] letting them know auth received and good until 9/23/24.  CM to follow for STR staff training to determine STR acceptance for patient.

## 2024-09-19 NOTE — CASE MANAGEMENT
Case Management Discharge Planning Note    Patient name Masoud Goregh  Location /-01 MRN [id number]  : 1955 Date 2024       Current Admission Date: 9/15/2024  Current Admission Diagnosis:Ambulatory dysfunction   Patient Active Problem List    Diagnosis Date Noted Date Diagnosed    Cellulitis 2024     Moderate protein-calorie malnutrition (HCC) 2024     Hypokalemia 2024     Ambulatory dysfunction 09/15/2024     Lactic acidosis 2024     Hypercalcemia 2024     Chronic acquired lymphedema 2024     Hypotension 2024     Nocturnal hypoxia 2024     Elevated LFTs 2024     Patient on peritoneal dialysis ([facility]) 2024     Hypervolemia 2024     ESRD (end stage renal disease) ([facility]) 2024     Bacteremia 2024     Cellulitis of right lower extremity 2024     Paroxysmal atrial fibrillation ([facility]) 2024     Bilateral lower extremity edema 2023     Ureteral stone with hydronephrosis 2022     Cutaneous abscess of buttock 2022     Chronic kidney disease-mineral and bone disorder 2022     Type 2 diabetes mellitus with stage 4 chronic kidney disease, with long-term current use of insulin ([facility]) 07/15/2022     Obesity, morbid ([facility]) 07/15/2022     Secondary hyperparathyroidism of renal origin ([facility]) 07/15/2022     Stage 5 chronic kidney disease on peritoneal dialysis ([facility]) 07/15/2022     Factor 5 Leiden mutation, heterozygous ([facility]) 01/15/2022     Thrombocytopenia ([facility]) 2022     Angina at rest 2022     MI (myocardial infarction) ([facility]) 2022     History of PTCA 2022     Sleep apnea 2022     Smoking 2022     Anemia 2022     History of CVA (cerebrovascular accident) 2022     Acute on chronic diastolic HF (heart failure) ([facility]) 2022     HLD (hyperlipidemia) 2022     Lymphedema 2022     Hypothyroidism 2022     COPD (chronic obstructive pulmonary  disease) (HCC) 12/31/2018     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 (HCC) 06/29/2016     Gastroesophageal reflux disease 06/29/2016     Gout 06/29/2016     Personal history of pulmonary embolism 06/29/2016     ED (erectile dysfunction) of organic origin 01/14/2014     History of thromboembolism of vein 01/10/2013       LOS (days): 4  Geometric Mean LOS (GMLOS) (days): 4.5  Days to GMLOS:0.9     OBJECTIVE:  Risk of Unplanned Readmission Score: 42.1         Current admission status: Inpatient   Preferred Pharmacy:   [facility] - [place], [place] - [address]  [address] [place] [address]  Phone: [phone] Fax: [phone]    Primary Care Provider: Jignesh Baker DO    Primary Insurance: [employer] REP  Secondary Insurance:     DISCHARGE DETAILS:                                                                                                               Facility Insurance Auth Number: [id number]

## 2024-09-20 PROBLEM — E87.1 HYPONATREMIA: Status: ACTIVE | Noted: 2024-09-20

## 2024-09-20 PROBLEM — E87.6 HYPOKALEMIA: Status: RESOLVED | Noted: 2024-09-16 | Resolved: 2024-09-20

## 2024-09-20 LAB
ANION GAP SERPL CALCULATED.3IONS-SCNC: 3 MMOL/L (ref 4–13)
BASOPHILS # BLD AUTO: 0.02 THOUSANDS/ΜL (ref 0–0.1)
BASOPHILS NFR BLD AUTO: 0 % (ref 0–1)
BUN SERPL-MCNC: 26 MG/DL (ref 5–25)
CALCIUM SERPL-MCNC: 8.5 MG/DL (ref 8.4–10.2)
CHLORIDE SERPL-SCNC: 100 MMOL/L (ref 96–108)
CO2 SERPL-SCNC: 31 MMOL/L (ref 21–32)
CREAT SERPL-MCNC: 4.59 MG/DL (ref 0.6–1.3)
EOSINOPHIL # BLD AUTO: 0.1 THOUSAND/ΜL (ref 0–0.61)
EOSINOPHIL NFR BLD AUTO: 1 % (ref 0–6)
ERYTHROCYTE [DISTWIDTH] IN BLOOD BY AUTOMATED COUNT: 15.7 % (ref 11.6–15.1)
GFR SERPL CREATININE-BSD FRML MDRD: 11 ML/MIN/1.73SQ M
GLUCOSE SERPL-MCNC: 105 MG/DL (ref 65–140)
GLUCOSE SERPL-MCNC: 113 MG/DL (ref 65–140)
GLUCOSE SERPL-MCNC: 120 MG/DL (ref 65–140)
GLUCOSE SERPL-MCNC: 133 MG/DL (ref 65–140)
GLUCOSE SERPL-MCNC: 94 MG/DL (ref 65–140)
HCT VFR BLD AUTO: 27.1 % (ref 36.5–49.3)
HGB BLD-MCNC: 8.3 G/DL (ref 12–17)
IMM GRANULOCYTES # BLD AUTO: 0.04 THOUSAND/UL (ref 0–0.2)
IMM GRANULOCYTES NFR BLD AUTO: 0 % (ref 0–2)
INR PPP: 3.49 (ref 0.85–1.19)
LYMPHOCYTES # BLD AUTO: 0.95 THOUSANDS/ΜL (ref 0.6–4.47)
LYMPHOCYTES NFR BLD AUTO: 10 % (ref 14–44)
MAGNESIUM SERPL-MCNC: 1.8 MG/DL (ref 1.9–2.7)
MCH RBC QN AUTO: 31.6 PG (ref 26.8–34.3)
MCHC RBC AUTO-ENTMCNC: 30.6 G/DL (ref 31.4–37.4)
MCV RBC AUTO: 103 FL (ref 82–98)
MONOCYTES # BLD AUTO: 0.37 THOUSAND/ΜL (ref 0.17–1.22)
MONOCYTES NFR BLD AUTO: 4 % (ref 4–12)
NEUTROPHILS # BLD AUTO: 8.02 THOUSANDS/ΜL (ref 1.85–7.62)
NEUTS SEG NFR BLD AUTO: 85 % (ref 43–75)
NRBC BLD AUTO-RTO: 0 /100 WBCS
PHOSPHATE SERPL-MCNC: 2.8 MG/DL (ref 2.3–4.1)
PLATELET # BLD AUTO: 109 THOUSANDS/UL (ref 149–390)
PMV BLD AUTO: 8.7 FL (ref 8.9–12.7)
POTASSIUM SERPL-SCNC: 3.9 MMOL/L (ref 3.5–5.3)
PROTHROMBIN TIME: 34.8 SECONDS (ref 12.3–15)
RBC # BLD AUTO: 2.63 MILLION/UL (ref 3.88–5.62)
SODIUM SERPL-SCNC: 134 MMOL/L (ref 135–147)
VANCOMYCIN SERPL-MCNC: 20.5 UG/ML (ref 10–20)
WBC # BLD AUTO: 9.5 THOUSAND/UL (ref 4.31–10.16)

## 2024-09-20 PROCEDURE — 84100 ASSAY OF PHOSPHORUS: CPT | Performed by: INTERNAL MEDICINE

## 2024-09-20 PROCEDURE — 85025 COMPLETE CBC W/AUTO DIFF WBC: CPT | Performed by: INTERNAL MEDICINE

## 2024-09-20 PROCEDURE — 80048 BASIC METABOLIC PNL TOTAL CA: CPT | Performed by: INTERNAL MEDICINE

## 2024-09-20 PROCEDURE — 97530 THERAPEUTIC ACTIVITIES: CPT

## 2024-09-20 PROCEDURE — 85610 PROTHROMBIN TIME: CPT | Performed by: FAMILY MEDICINE

## 2024-09-20 PROCEDURE — 82948 REAGENT STRIP/BLOOD GLUCOSE: CPT

## 2024-09-20 PROCEDURE — 99232 SBSQ HOSP IP/OBS MODERATE 35: CPT | Performed by: INTERNAL MEDICINE

## 2024-09-20 PROCEDURE — 80202 ASSAY OF VANCOMYCIN: CPT | Performed by: FAMILY MEDICINE

## 2024-09-20 PROCEDURE — 99232 SBSQ HOSP IP/OBS MODERATE 35: CPT | Performed by: FAMILY MEDICINE

## 2024-09-20 PROCEDURE — 83735 ASSAY OF MAGNESIUM: CPT | Performed by: INTERNAL MEDICINE

## 2024-09-20 RX ORDER — MAGNESIUM SULFATE HEPTAHYDRATE 40 MG/ML
2 INJECTION, SOLUTION INTRAVENOUS ONCE
Status: COMPLETED | OUTPATIENT
Start: 2024-09-20 | End: 2024-09-20

## 2024-09-20 RX ORDER — DOCUSATE SODIUM 100 MG/1
100 CAPSULE, LIQUID FILLED ORAL 2 TIMES DAILY
Status: DISCONTINUED | OUTPATIENT
Start: 2024-09-20 | End: 2024-09-23 | Stop reason: HOSPADM

## 2024-09-20 RX ORDER — METOCLOPRAMIDE HYDROCHLORIDE 5 MG/ML
10 INJECTION INTRAMUSCULAR; INTRAVENOUS ONCE
Status: COMPLETED | OUTPATIENT
Start: 2024-09-20 | End: 2024-09-20

## 2024-09-20 RX ORDER — VANCOMYCIN HYDROCHLORIDE 1 G/200ML
1000 INJECTION, SOLUTION INTRAVENOUS DAILY PRN
Status: DISCONTINUED | OUTPATIENT
Start: 2024-09-21 | End: 2024-09-21

## 2024-09-20 RX ORDER — BISACODYL 10 MG
10 SUPPOSITORY, RECTAL RECTAL DAILY
Status: DISCONTINUED | OUTPATIENT
Start: 2024-09-21 | End: 2024-09-23 | Stop reason: HOSPADM

## 2024-09-20 RX ORDER — ONDANSETRON 2 MG/ML
4 INJECTION INTRAMUSCULAR; INTRAVENOUS EVERY 6 HOURS PRN
Status: DISCONTINUED | OUTPATIENT
Start: 2024-09-20 | End: 2024-09-23 | Stop reason: HOSPADM

## 2024-09-20 RX ORDER — POLYETHYLENE GLYCOL 3350 17 G/17G
17 POWDER, FOR SOLUTION ORAL DAILY PRN
Status: DISCONTINUED | OUTPATIENT
Start: 2024-09-20 | End: 2024-09-23 | Stop reason: HOSPADM

## 2024-09-20 RX ADMIN — MAGNESIUM SULFATE HEPTAHYDRATE 2 G: 40 INJECTION, SOLUTION INTRAVENOUS at 08:47

## 2024-09-20 RX ADMIN — HYDROCODONE BITARTRATE AND ACETAMINOPHEN 2 TABLET: 5; 325 TABLET ORAL at 23:15

## 2024-09-20 RX ADMIN — MIDODRINE HYDROCHLORIDE 15 MG: 5 TABLET ORAL at 08:46

## 2024-09-20 RX ADMIN — LEVOTHYROXINE SODIUM 125 MCG: 125 TABLET ORAL at 06:04

## 2024-09-20 RX ADMIN — CINACALCET 30 MG: 30 TABLET, FILM COATED ORAL at 17:16

## 2024-09-20 RX ADMIN — FERROUS SULFATE TAB 325 MG (65 MG ELEMENTAL FE) 325 MG: 325 (65 FE) TAB at 08:45

## 2024-09-20 RX ADMIN — METOPROLOL SUCCINATE 25 MG: 25 TABLET, EXTENDED RELEASE ORAL at 20:35

## 2024-09-20 RX ADMIN — TORSEMIDE 100 MG: 100 TABLET ORAL at 08:46

## 2024-09-20 RX ADMIN — OXYCODONE HYDROCHLORIDE AND ACETAMINOPHEN 500 MG: 500 TABLET ORAL at 08:45

## 2024-09-20 RX ADMIN — METOCLOPRAMIDE 10 MG: 5 INJECTION, SOLUTION INTRAMUSCULAR; INTRAVENOUS at 08:29

## 2024-09-20 RX ADMIN — POTASSIUM CHLORIDE 40 MEQ: 1500 TABLET, EXTENDED RELEASE ORAL at 08:45

## 2024-09-20 RX ADMIN — ATORVASTATIN CALCIUM 40 MG: 40 TABLET, FILM COATED ORAL at 17:16

## 2024-09-20 RX ADMIN — METOPROLOL SUCCINATE 25 MG: 25 TABLET, EXTENDED RELEASE ORAL at 08:45

## 2024-09-20 RX ADMIN — MAGNESIUM GLUCONATE 500 MG ORAL TABLET 400 MG: 500 TABLET ORAL at 08:46

## 2024-09-20 RX ADMIN — HYDROCODONE BITARTRATE AND ACETAMINOPHEN 2 TABLET: 5; 325 TABLET ORAL at 08:51

## 2024-09-20 RX ADMIN — TAMSULOSIN HYDROCHLORIDE 0.4 MG: 0.4 CAPSULE ORAL at 17:16

## 2024-09-20 RX ADMIN — MIDODRINE HYDROCHLORIDE 15 MG: 5 TABLET ORAL at 17:00

## 2024-09-20 RX ADMIN — DOCUSATE SODIUM 100 MG: 100 CAPSULE, LIQUID FILLED ORAL at 17:16

## 2024-09-20 RX ADMIN — MIDODRINE HYDROCHLORIDE 15 MG: 5 TABLET ORAL at 12:03

## 2024-09-20 RX ADMIN — GENTAMICIN SULFATE: 1 CREAM TOPICAL at 08:47

## 2024-09-20 RX ADMIN — Medication 100 MG: at 08:45

## 2024-09-20 RX ADMIN — HYDROCODONE BITARTRATE AND ACETAMINOPHEN 2 TABLET: 5; 325 TABLET ORAL at 17:18

## 2024-09-20 RX ADMIN — ASPIRIN 81 MG: 81 TABLET, COATED ORAL at 08:45

## 2024-09-20 RX ADMIN — INSULIN GLARGINE 10 UNITS: 100 INJECTION, SOLUTION SUBCUTANEOUS at 22:18

## 2024-09-20 RX ADMIN — ALLOPURINOL 300 MG: 300 TABLET ORAL at 08:45

## 2024-09-20 NOTE — PROGRESS NOTES
Masoud Perry is a 69 y.o. male who is currently ordered Vancomycin IV with management by the Pharmacy Consult service.  Relevant clinical data and objective / subjective history reviewed.  Vancomycin Assessment:  Indication and Goal AUC/Trough: Soft tissue (goal -600, trough >10)  Clinical Status: stable  Micro:   pending  Renal Function:  SCr: 4.59 mg/dL  CrCl: 19.2 mL/min  Renal replacement: PD  Days of Therapy: 3  Current Dose: no dose today level therapeutic  Vancomycin Plan:  New Dosinmg iv daily prn level< or=15  Next Level: 24 0600  Renal Function Monitoring: Daily BMP and UOP  Pharmacy will continue to follow closely for s/sx of nephrotoxicity, infusion reactions and appropriateness of therapy.  BMP and CBC will be ordered per protocol. We will continue to follow the patient’s culture results and clinical progress daily.    Jesus Butler, Pharmacist

## 2024-09-20 NOTE — CASE MANAGEMENT
Case Management Discharge Planning Note    Patient name Masoud Goregh  Location /-01 MRN [id number]  : 1955 Date 2024       Current Admission Date: 9/15/2024  Current Admission Diagnosis:Ambulatory dysfunction   Patient Active Problem List    Diagnosis Date Noted Date Diagnosed    Hyponatremia 2024     Cellulitis 2024     Moderate protein-calorie malnutrition (HCC) 2024     Hypokalemia 2024     Ambulatory dysfunction 09/15/2024     Lactic acidosis 2024     Hypercalcemia 2024     Chronic acquired lymphedema 2024     Hypotension 2024     Nocturnal hypoxia 2024     Elevated LFTs 2024     Patient on peritoneal dialysis ([facility]) 2024     Hypervolemia 2024     ESRD (end stage renal disease) ([facility]) 2024     Bacteremia 2024     Cellulitis of right lower extremity 2024     Paroxysmal atrial fibrillation ([facility]) 2024     Bilateral lower extremity edema 2023     Ureteral stone with hydronephrosis 2022     Cutaneous abscess of buttock 2022     Chronic kidney disease-mineral and bone disorder 2022     Type 2 diabetes mellitus with stage 4 chronic kidney disease, with long-term current use of insulin ([facility]) 07/15/2022     Obesity, morbid ([facility]) 07/15/2022     Secondary hyperparathyroidism of renal origin ([facility]) 07/15/2022     Stage 5 chronic kidney disease on peritoneal dialysis ([facility]) 07/15/2022     Factor 5 Leiden mutation, heterozygous ([facility]) 01/15/2022     Thrombocytopenia ([facility]) 2022     Angina at rest 2022     MI (myocardial infarction) ([facility]) 2022     History of PTCA 2022     Sleep apnea 2022     Smoking 2022     Anemia 2022     History of CVA (cerebrovascular accident) 2022     Acute on chronic diastolic HF (heart failure) ([facility]) 2022     HLD (hyperlipidemia) 2022     Lymphedema 2022     Hypothyroidism 2022     COPD  (chronic obstructive pulmonary disease) (HCC) 12/31/2018     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 (HCC) 06/29/2016     Gastroesophageal reflux disease 06/29/2016     Gout 06/29/2016     Personal history of pulmonary embolism 06/29/2016     ED (erectile dysfunction) of organic origin 01/14/2014     History of thromboembolism of vein 01/10/2013       LOS (days): 5  Geometric Mean LOS (GMLOS) (days): 4.5  Days to GMLOS:-0.1     OBJECTIVE:  Risk of Unplanned Readmission Score: 41.3         Current admission status: Inpatient   Preferred Pharmacy:   [facility] - [place] - [address]  [address]  [place] [address]  Phone: [phone] Fax: [phone]    Primary Care Provider: Jignesh Baker DO    Primary Insurance: [employer] REP  Secondary Insurance:     DISCHARGE DETAILS:        0955 CM received update from [facility]. The center needs one more PD training on Monday from [facility], for all required staff to be trained.  Facility waiting for training time for Monday and will let CM know when facility can accept patient after final training.     CM to follow.

## 2024-09-20 NOTE — PHYSICAL THERAPY NOTE
PHYSICAL THERAPY TREATMENT NOTE      NAME:  Masoud Perry  DATE: 09/20/24    Length Of Stay: 5  Performed at least 2 patient identifiers during session: Name and Birthday        TREATMENT FLOW SHEET:       09/20/24 1515   PT Last Visit   PT Visit Date 09/20/24   Note Type   Note Type Treatment   Pain Assessment   Pain Assessment Tool FLACC   Pain Location/Orientation Location: Buttocks;Location: Back;Orientation: Left;Orientation: Lower;Location: Leg   Pain Rating: FLACC (Rest) - Face 1   Pain Rating: FLACC (Rest) - Legs 0   Pain Rating: FLACC (Rest) - Activity 0   Pain Rating: FLACC (Rest) - Cry 1   Pain Rating: FLACC (Rest) - Consolability 0   Score: FLACC (Rest) 2   Pain Rating: FLACC (Activity) - Face 1   Pain Rating: FLACC (Activity) - Legs 0   Pain Rating: FLACC (Activity) - Activity 1   Pain Rating: FLACC (Activity) - Cry 1   Pain Rating: FLACC (Activity) - Consolability 0   Score: FLACC (Activity) 3   Restrictions/Precautions   Weight Bearing Precautions Per Order No   Other Precautions Bed Alarm;Fall Risk;Pain   General   Chart Reviewed Yes   Response to Previous Treatment Patient reporting fatigue but able to participate.   Family/Caregiver Present No   Cognition   Overall Cognitive Status WFL   Arousal/Participation Alert   Orientation Level Oriented X4   Following Commands Follows one step commands without difficulty   Bed Mobility   Sit to Supine 3  Moderate assistance   Additional items Assist x 1;Increased time required;Verbal cues;LE management   Transfers   Sit to Stand 4  Minimal assistance   Additional items Assist x 1;Increased time required;Verbal cues   Stand to Sit   (SBA)   Additional items Increased time required;Verbal cues   Stand pivot   (SBA)   Additional items Assist x 1;Increased time required;Verbal cues   Toilet transfer 4  Minimal assistance   Additional items Assist x 1;Increased time required;Commode   Additional Comments RW   Ambulation/Elevation   Gait Assistance Not tested  (pt  "declines)   Balance   Static Sitting Good   Dynamic Sitting Fair   Static Standing Fair   Dynamic Standing Fair -   Endurance Deficit   Endurance Deficit Yes   Activity Tolerance   Activity Tolerance Patient limited by fatigue;Patient limited by pain   Nurse Made Aware Yes   Assessment   Prognosis Fair   Problem List Decreased strength;Decreased range of motion;Decreased endurance;Impaired balance;Decreased mobility;Pain   Assessment Pt reports being very limited by buttock pain from constipation.  He does state that his L LE pain is improving.  Pt declines ambulation again this date due to fatigue from having a \"difficult bowel movement.\"  No dizziness or lightheadedness reported.   Barriers to Discharge Other (Comment)   Barriers to Discharge Comments stairs, lives alone, pain, limited activity tolerance   Goals   STG Expiration Date 09/30/24   PT Treatment Day 2   Plan   Progress Slow progress, decreased activity tolerance   PT Frequency 3-5x/wk   Discharge Recommendation   Rehab Resource Intensity Level, PT II (Moderate Resource Intensity)   AM-PAC Basic Mobility Inpatient   Turning in Flat Bed Without Bedrails 3   Lying on Back to Sitting on Edge of Flat Bed Without Bedrails 2   Moving Bed to Chair 3   Standing Up From Chair Using Arms 3   Walk in Room 1   Climb 3-5 Stairs With Railing 1   Basic Mobility Inpatient Raw Score 13   Basic Mobility Standardized Score 33.99   [facility] Highest Level Of Mobility   -[facility] Goal 4: Move to chair/commode   -[facility] Achieved 4: Move to chair/commode   Education   Education Provided Mobility training   Patient Reinforcement needed   End of Consult   Patient Position at End of Consult Supine;Bed/Chair alarm activated;All needs within reach   End of Consult Comments pt declines having heels floated or LEs elevated       The patient's AM-PAC Basic Mobility Inpatient Short Form Raw Score is 13. A Raw score of less than or equal to 16 suggests the patient may benefit from " discharge to post-acute rehabilitation services. Please also refer to the recommendation of the Physical Therapist for safe discharge planning.         Leonides Toure, PT,DPT

## 2024-09-20 NOTE — PROGRESS NOTES
Progress Note - Nephrology   Name: Masoud Perry 69 y.o. male I MRN: [id number]  Unit/Bed#: -01 I Date of Admission: 9/15/2024   Date of Service: 9/20/2024 I Hospital Day: 5     Assessment & Plan  ESRD (end stage renal disease) (HCC)  Lab Results   Component Value Date    EGFR 11 09/20/2024    EGFR 11 09/19/2024    EGFR 10 09/18/2024    CREATININE 4.59 (H) 09/20/2024    CREATININE 4.84 (H) 09/19/2024    CREATININE 5.14 (H) 09/18/2024   Continue CAPD 2 L to 2.5% every 6 hours tolerating  Access PD catheter  Awaiting placement apparently will cover PD at [facility] in [place]  PD cell count 100/culture thus far negative no evidence of peritonitis!      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  Discussed with patient he would potentially be willing to go on JUAN FRANCISCO agent only if needed for the moment try to avoid.  Secondary hyperparathyroidism of renal origin (HCC)  Patient on Cinacalcet with a history of hypercalcemia: Workup in the past was negative calcium now normal at 8.6: The recommendation was repeat myeloma evaluation in 3 to 6 months as of August 2024 otherwise negative  Phosphorus: Remains normal continue off binders  Replete hypomagnesemia  Cellulitis  Left pretibial region being treated with antibiotics per primary service  Ambulatory dysfunction  Per primary service going for rehab     Other major problems:  Hypothyroidism on levothyroxine  Diabetes mellitus type 2 insulin per primary service  Dyslipidemia on statin  Chronic acquired lymphedema  on diuretics along with COPD  Gout on allopurinol  History of CVA on aspirin and Coumadin  Morbid obesity     Patient on peritoneal dialysis (HCC)  Please see above      I have reviewed the nephrology recommendations including continuing current CAPD regimen while in the hospital for ESRD, with SLIM attending, and we are in agreement with renal plan including the information outlined above.     History of Present Illness   Brief History of Admission - 69-year-old male with a history of ESRD on CAPD/diabetes mellitus type 2/dyslipidemia/COPD/lymphedema/gout/CVA/morbid obesity who presented with ambulatory dysfunction who we are asked to see regarding ESRD        Patient feeling fairly well just tired  No chest pain or shortness of breath  No nausea vomiting or diarrhea  PD going well    Objective      Temp:  [97.2 °F (36.2 °C)-98.4 °F (36.9 °C)] 97.2 °F (36.2 °C)  HR:  [70-87] 84  Resp:  [15-20] 15  BP: (104-115)/(56-65) 115/59  O2 Device: Nasal cannula         Vitals:    09/19/24 0600 09/20/24 0708   Weight: 118 kg (260 lb 2.3 oz) 117 kg (258 lb 13.1 oz)     I/O last 24 hours:  In: 440 [P.O.:240; IV Piggyback:200]  Out: -1250 [Urine:550]  Lines/Drains/Airways       Active Status       None                    Physical Exam: General:  No acute distress  Skin:  No acute rash  Eyes:  No scleral icterus and noninjected  ENT:  Moist mucous membranes  Neck:  Supple, no jugular venous distention, trachea midline, overall appearance is normal  Chest: Slight inspiratory expiratory rhonchi slight crackles at the right base unchanged  CVS:  Regular rate and rhythm, without a rub or gallops  Abdomen:  Normal bowel sounds, soft and nontender and slightly distended with fluid wave  Extremities: Lymphedema without change, left pretibial region wrapped; severe venous stasis changes unchanged and no cyanosis, no significant arthritic changes  Neuro:  No gross focality  Psych:  Alert and oriented and  appropriate    Medications:    Current Facility-Administered Medications:     acetaminophen (TYLENOL) tablet 650 mg, 650 mg, Oral, Q6H PRN, Magdiel Holguin MD    allopurinol (ZYLOPRIM) tablet 300 mg, 300 mg, Oral, Daily, Magdiel Holguin MD, 300 mg at 09/20/24 0845    ascorbic acid (VITAMIN C) tablet 500 mg, 500 mg, Oral, Daily, Magdiel Holguin MD, 500 mg at 09/20/24 0845    aspirin (ECOTRIN LOW STRENGTH) EC tablet 81 mg, 81 mg, Oral, Daily, Magdiel Holguin MD, 81 mg at 09/20/24 0845    atorvastatin (LIPITOR) tablet 40 mg, 40 mg, Oral, Daily With Dinner, Magdiel Holguin MD, 40 mg at 09/19/24 1536    cinacalcet (SENSIPAR) tablet 30 mg, 30 mg, Oral, Daily With Dinner, Magdiel Holguin MD, 30 mg at 09/19/24 1536    co-enzyme Q-10 capsule 100 mg, 100 mg, Oral, Daily, Magdiel Holguin MD, 100 mg at 09/20/24 0845    ferrous sulfate tablet 325 mg, 325 mg, Oral, Daily With Breakfast, Magdiel Holguin MD, 325 mg at 09/20/24 0845    gentamicin (GARAMYCIN) 0.1 % cream, , Topical, Daily, GAGANDEEP Lin, Given at 09/20/24 0847    HYDROcodone-acetaminophen (NORCO) 5-325 mg per tablet 2 tablet, 2 tablet, Oral, Q6H PRN, Magdiel Holguin MD, 2 tablet at 09/20/24 0851    insulin glargine (LANTUS) subcutaneous injection 10 Units 0.1 mL, 10 Units, Subcutaneous, HS, Nora Mathew MD, 10 Units at 09/19/24 2229    insulin lispro (HumALOG/ADMELOG) 100 units/mL subcutaneous injection 2-12 Units, 2-12 Units, Subcutaneous, TID AC **AND** Fingerstick Glucose (POCT), , , TID AC, Nora Mathew MD    levothyroxine tablet 125 mcg, 125 mcg, Oral, Daily, Magdiel Holguin MD, 125 mcg at 09/20/24 0604    lidocaine (LIDODERM) 5 % patch 1 patch, 1 patch, Topical, Daily, Moses Noel MD, 1 patch at 09/18/24 1155    magnesium Oxide (MAG-OX) tablet 400 mg, 400 mg, Oral, Daily, Magdiel Holguin MD, 400 mg at 09/20/24 0846    magnesium sulfate 2 g/50 mL IVPB (premix) 2 g, 2 g,  "Intravenous, Once, Jignesh Hussein MD, Last Rate: 25 mL/hr at 09/20/24 0847, 2 g at 09/20/24 0847    metoprolol succinate (TOPROL-XL) 24 hr tablet 25 mg, 25 mg, Oral, BID, Nora Mathew MD, 25 mg at 09/20/24 0845    midodrine (PROAMATINE) tablet 15 mg, 15 mg, Oral, TID AC, Magdiel Holguin MD, 15 mg at 09/20/24 0846    potassium chloride (Klor-Con M20) CR tablet 40 mEq, 40 mEq, Oral, Daily, Jignesh Hussein MD, 40 mEq at 09/20/24 0845    simethicone (MYLICON) chewable tablet 80 mg, 80 mg, Oral, 4x Daily PRN, Magdiel Holguin MD    tamsulosin (FLOMAX) capsule 0.4 mg, 0.4 mg, Oral, Daily With Dinner, Magdiel Holguin MD, 0.4 mg at 09/19/24 1536    torsemide (DEMADEX) tablet 100 mg, 100 mg, Oral, Daily, Nora Mathew MD, 100 mg at 09/20/24 0846    [START ON 9/21/2024] vancomycin (VANCOCIN) IVPB (premix in dextrose) 1,000 mg 200 mL, 1,000 mg, Intravenous, Daily PRN, Nora Mathew MD      Lab Results: I have reviewed the following results:   Results from last 7 days   Lab Units 09/20/24  0603 09/19/24  0625 09/18/24  0631 09/17/24  0626 09/16/24  0541 09/15/24  1559   WBC Thousand/uL 9.50 5.16 4.40 4.17* 3.89* 4.43   HEMOGLOBIN g/dL 8.3* 8.2* 8.4* 8.6* 8.5* 9.5*   HEMATOCRIT % 27.1* 26.5* 27.4* 28.3* 27.2* 30.3*   PLATELETS Thousands/uL 109* 106* 130* 112* 127* 126*   POTASSIUM mmol/L 3.9 3.6 3.4* 2.9* 2.7* 3.2*   CHLORIDE mmol/L 100 102 99 97 96 94*   CO2 mmol/L 31 30 31 31 31 31   BUN mg/dL 26* 26* 27* 28* 29* 27*   CREATININE mg/dL 4.59* 4.84* 5.14* 5.61* 5.78* 5.68*   CALCIUM mg/dL 8.5 8.4 8.6 8.9 9.2 9.8   MAGNESIUM mg/dL 1.8*  --  2.0 1.7* 1.5*  --    PHOSPHORUS mg/dL 2.8  --  3.4  --   --   --    ALBUMIN g/dL  --   --   --  2.2* 2.2* 2.5*       Administrative Statements     Portions of the record may have been created with voice recognition software. Occasional wrong word or \"sound a like\" substitutions may have occurred due to the inherent limitations of voice recognition software. Read the " chart carefully and recognize, using context, where substitutions have occurred.If you have any questions, please contact the dictating provider.

## 2024-09-20 NOTE — PLAN OF CARE
"  Problem: PHYSICAL THERAPY ADULT  Goal: Performs mobility at highest level of function for planned discharge setting.  See evaluation for individualized goals.  Description: Treatment/Interventions: ADL retraining, Functional transfer training, LE strengthening/ROM, Elevations, Therapeutic exercise, Endurance training, Bed mobility, Gait training, OT, Family, Spoke to nursing  Equipment Recommended: Walker (pt owns)       See flowsheet documentation for full assessment, interventions and recommendations.  Outcome: Not Progressing  Note: Prognosis: Fair  Problem List: Decreased strength, Decreased range of motion, Decreased endurance, Impaired balance, Decreased mobility, Pain  Assessment: Pt reports being very limited by buttock pain from constipation.  He does state that his L LE pain is improving.  Pt declines ambulation again this date due to fatigue from having a \"difficult bowel movement.\"  No dizziness or lightheadedness reported.  Barriers to Discharge: Other (Comment)  Barriers to Discharge Comments: stairs, lives alone, pain, limited activity tolerance  Rehab Resource Intensity Level, PT: II (Moderate Resource Intensity)    See flowsheet documentation for full assessment.        "

## 2024-09-20 NOTE — CASE MANAGEMENT
Case Management Discharge Planning Note    Patient name Masoud Goregh  Location /-01 MRN [id number]  : 1955 Date 2024       Current Admission Date: 9/15/2024  Current Admission Diagnosis:Ambulatory dysfunction   Patient Active Problem List    Diagnosis Date Noted Date Diagnosed    Cellulitis 2024     Moderate protein-calorie malnutrition (HCC) 2024     Hypokalemia 2024     Ambulatory dysfunction 09/15/2024     Lactic acidosis 2024     Hypercalcemia 2024     Chronic acquired lymphedema 2024     Hypotension 2024     Nocturnal hypoxia 2024     Elevated LFTs 2024     Patient on peritoneal dialysis ([facility]) 2024     Hypervolemia 2024     ESRD (end stage renal disease) ([facility]) 2024     Bacteremia 2024     Cellulitis of right lower extremity 2024     Paroxysmal atrial fibrillation ([facility]) 2024     Bilateral lower extremity edema 2023     Ureteral stone with hydronephrosis 2022     Cutaneous abscess of buttock 2022     Chronic kidney disease-mineral and bone disorder 2022     Type 2 diabetes mellitus with stage 4 chronic kidney disease, with long-term current use of insulin ([facility]) 07/15/2022     Obesity, morbid ([facility]) 07/15/2022     Secondary hyperparathyroidism of renal origin ([facility]) 07/15/2022     Stage 5 chronic kidney disease on peritoneal dialysis ([facility]) 07/15/2022     Factor 5 Leiden mutation, heterozygous ([facility]) 01/15/2022     Thrombocytopenia ([facility]) 2022     Angina at rest 2022     MI (myocardial infarction) ([facility]) 2022     History of PTCA 2022     Sleep apnea 2022     Smoking 2022     Anemia 2022     History of CVA (cerebrovascular accident) 2022     Acute on chronic diastolic HF (heart failure) ([facility]) 2022     HLD (hyperlipidemia) 2022     Lymphedema 2022     Hypothyroidism 2022     COPD (chronic obstructive pulmonary  disease) (HCC) 12/31/2018     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 (HCC) 06/29/2016     Gastroesophageal reflux disease 06/29/2016     Gout 06/29/2016     Personal history of pulmonary embolism 06/29/2016     ED (erectile dysfunction) of organic origin 01/14/2014     History of thromboembolism of vein 01/10/2013       LOS (days): 5  Geometric Mean LOS (GMLOS) (days): 4.5  Days to GMLOS:0     OBJECTIVE:  Risk of Unplanned Readmission Score: 41.3         Current admission status: Inpatient   Preferred Pharmacy:   [facility] - [place], [place] - [address]  [address]   [place] [address]  Phone: [phone] Fax: [phone]    Primary Care Provider: Jignesh Baker DO    Primary Insurance: [employer] REP  Secondary Insurance:     DISCHARGE DETAILS:     CM contacted daughter, Lucas, to confirm cycler dropped at patients room for patients admission to [facility] once staff trained and facility can accept. Staff to be trained today at facility, CM hopeful for weekend admission but CM needs confirmation from facility. CM to follow for availability of facility acceptance.     0900 CM met with patient to let him know staff being trained today at [facility] facility. As soon as CM made aware of when facility can accept, CM will let patient know. CM to follow.

## 2024-09-20 NOTE — PROGRESS NOTES
Progress Note - Hospitalist   Name: Masoud Perry 69 y.o. male I MRN: [id number]  Unit/Bed#: -01 I Date of Admission: 9/15/2024   Date of Service: 9/20/2024 I Hospital Day: 5    Assessment & Plan  Ambulatory dysfunction  Been weak for 2 weeks, difficulty getting out of a chair, and fell today hitting the back of his head.  At baseline he walks with a walker  PT/OT consult- needs rehab patient agreeble  Case management consultation for acute rehab  ESRD (end stage renal disease) (HCC)  On peritoneal dialysis.  Will consult nephrology.  I did have a discussion with the patient about why he is not on hemodialysis, patient states he was told that he would have to go to the dialysis clinic 4-5 times a week for 6 to 8 hours/day.  I am not certain about this, but at the patient did have hemodialysis patient would be able to find multiple acute rehabs that would accept him for for rehab.  An option would be to start hemodialysis here for rehab    This provider discussed with patient regarding hemodialysis.  Patient reports he prefers to stay with current treatment with peritoneal dialysis.    Lab Results   Component Value Date    EGFR 11 09/20/2024    EGFR 11 09/19/2024    EGFR 10 09/18/2024    CREATININE 4.59 (H) 09/20/2024    CREATININE 4.84 (H) 09/19/2024    CREATININE 5.14 (H) 09/18/2024     Patient on peritoneal dialysis (HCC)  Nephrology  Paroxysmal atrial fibrillation ([facility])  Continue metoprolol and currently on Coumadin.  He states that the Coumadin doses have been changing recently.  Currently not on his home med rec.  Inr 3.48 hold coumadin  Type 2 diabetes mellitus with stage 4 chronic kidney disease, with long-term current use of insulin ([facility])  Fasting is 78 decrease Lantus to 10 units at night continue sliding scale    Lab Results   Component Value Date    HGBA1C 5.9 (H) 07/24/2024     Hypothyroidism  Continue levothyroxine  HLD (hyperlipidemia)  Continue statin  Hypotension  Continue midodrine 15 mg 3  times daily blood pressure stable  Secondary hyperparathyroidism of renal origin (HCC)  Continue Sensipar   Chronic acquired lymphedema  Continue torsemide  Gout  Continue allopurinol  History of CVA (cerebrovascular accident)  Continue aspirin hold Coumadin as INR supratherapeutic he might need to be on a different regimen as he is very sensitive to Coumadin  Obesity, morbid (HCC)  BMI of 37.14  Anemia  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 seems base above 8 - venofer 3 doses given to patient by nephrology  Moderate protein-calorie malnutrition (HCC)  Malnutrition Findings:   Adult Malnutrition type: Chronic illness  Adult Degree of Malnutrition: Malnutrition of moderate degree  Malnutrition Characteristics: Inadequate energy, Muscle loss                  360 Statement: malnutrition of moderate degree in setting of chronic illness, evidenced by <75% energy intake compared to estimated needs for > 1 month, muscle wasting/indented temples, protruding clavicles, treated with diet, PT refusing supplements currently    BMI Findings:           Body mass index is 36.1 kg/m².     Cellulitis  Warm tender open wounds- started vanco 9/18 today the erythema and tenderness improved.  Will treat for 7 days total  Hyponatremia      VTE Pharmacologic Prophylaxis: VTE Score: 5 High Risk (Score >/= 5) - Pharmacological DVT Prophylaxis Contraindicated. Sequential Compression Devices Ordered.    Mobility:   Basic Mobility Inpatient Raw Score: 13  JH-HLM Goal: 4: Move to chair/commode  JH-HLM Achieved: 2: Bed activities/Dependent transfer  JH-HLM Goal NOT achieved. Continue with multidisciplinary rounding and encourage appropriate mobility to improve upon JH-HLM goals.    Patient Centered Rounds: I performed bedside rounds with nursing staff today.   Discussions with Specialists or Other Care Team Provider: Nephrology    Education and Discussions with Family / Patient: Patient no new updates with family    Current Length of Stay: 5  day(s)  Current Patient Status: Inpatient   Certification Statement: The patient will continue to require additional inpatient hospital stay due to left lower extremity cellulitis and placement  Discharge Plan: Anticipate discharge in 48-72 hrs to rehab facility.    Code Status: Level 3 - DNAR and DNI    Subjective   Patient seen and examined had some nausea and dry heaving    Objective     Vitals:   Temp (24hrs), Av.9 °F (36.6 °C), Min:97.2 °F (36.2 °C), Max:98.4 °F (36.9 °C)    Temp:  [97.2 °F (36.2 °C)-98.4 °F (36.9 °C)] 97.2 °F (36.2 °C)  HR:  [74-87] 82  Resp:  [15-20] 18  BP: (104-115)/(56-65) 115/59  SpO2:  [95 %-100 %] 98 %  Body mass index is 36.1 kg/m².     Input and Output Summary (last 24 hours):     Intake/Output Summary (Last 24 hours) at 2024 1222  Last data filed at 2024 0720  Gross per 24 hour   Intake 120 ml   Output -950 ml   Net 1070 ml       Physical Exam  Constitutional:       Appearance: He is well-developed.   HENT:      Head: Normocephalic and atraumatic.   Eyes:      Pupils: Pupils are equal, round, and reactive to light.   Cardiovascular:      Rate and Rhythm: Normal rate and regular rhythm.      Heart sounds: Normal heart sounds.   Pulmonary:      Effort: Pulmonary effort is normal. No respiratory distress.      Breath sounds: Normal breath sounds. No wheezing or rales.   Abdominal:      General: Bowel sounds are normal.      Palpations: Abdomen is soft.   Musculoskeletal:         General: Swelling (Chronic mild venous stasis changes left lower extremity tenderness is better erythema and warmth is better) present. Normal range of motion.      Cervical back: Normal range of motion.   Skin:     General: Skin is warm.   Neurological:      Mental Status: He is alert and oriented to person, place, and time.      Deep Tendon Reflexes: Reflexes are normal and symmetric.      Comments: cranial nerve 2-12 are normal.  Normal neurological exam           Lines/Drains:  Lines/Drains/Airways       Active Status       None                            Lab Results: I have reviewed the following results: CBC/BMP:   .     09/20/24  0603   WBC 9.50   HGB 8.3*   HCT 27.1*   *   SODIUM 134*   K 3.9      CO2 31   BUN 26*   CREATININE 4.59*   GLUC 113   MG 1.8*   PHOS 2.8    , Creatinine Clearance: Estimated Creatinine Clearance: 19.2 mL/min (A) (by C-G formula based on SCr of 4.59 mg/dL (H)).   Results from last 7 days   Lab Units 09/20/24  0603   WBC Thousand/uL 9.50   HEMOGLOBIN g/dL 8.3*   HEMATOCRIT % 27.1*   PLATELETS Thousands/uL 109*   SEGS PCT % 85*   LYMPHO PCT % 10*   MONO PCT % 4   EOS PCT % 1     Results from last 7 days   Lab Units 09/20/24  0603 09/18/24  0631 09/17/24  0626   SODIUM mmol/L 134*   < > 135   POTASSIUM mmol/L 3.9   < > 2.9*   CHLORIDE mmol/L 100   < > 97   CO2 mmol/L 31   < > 31   BUN mg/dL 26*   < > 28*   CREATININE mg/dL 4.59*   < > 5.61*   ANION GAP mmol/L 3*   < > 7   CALCIUM mg/dL 8.5   < > 8.9   ALBUMIN g/dL  --   --  2.2*   TOTAL BILIRUBIN mg/dL  --   --  0.25   ALK PHOS U/L  --   --  100   ALT U/L  --   --  13   AST U/L  --   --  18   GLUCOSE RANDOM mg/dL 113   < > 96    < > = values in this interval not displayed.     Results from last 7 days   Lab Units 09/20/24  0603   INR  3.49*     Results from last 7 days   Lab Units 09/20/24  1120 09/20/24  0711 09/19/24  2059 09/19/24  1543 09/19/24  1148 09/19/24  0746 09/18/24  2155 09/18/24  1645 09/18/24  1202 09/17/24  2129   POC GLUCOSE mg/dl 133 94 127 132 91 78 126 126 104 124               Recent Cultures (last 7 days):   Results from last 7 days   Lab Units 09/19/24  0828   GRAM STAIN RESULT  No organisms seen       Imaging Review: No pertinent imaging studies reviewed.  Other Studies: No additional pertinent studies reviewed.    Last 24 Hours Medication List:     Current Facility-Administered Medications:     acetaminophen (TYLENOL) tablet 650 mg, Q6H PRN    allopurinol  (ZYLOPRIM) tablet 300 mg, Daily    ascorbic acid (VITAMIN C) tablet 500 mg, Daily    aspirin (ECOTRIN LOW STRENGTH) EC tablet 81 mg, Daily    atorvastatin (LIPITOR) tablet 40 mg, Daily With Dinner    cinacalcet (SENSIPAR) tablet 30 mg, Daily With Dinner    co-enzyme Q-10 capsule 100 mg, Daily    ferrous sulfate tablet 325 mg, Daily With Breakfast    gentamicin (GARAMYCIN) 0.1 % cream, Daily    HYDROcodone-acetaminophen (NORCO) 5-325 mg per tablet 2 tablet, Q6H PRN    insulin glargine (LANTUS) subcutaneous injection 10 Units 0.1 mL, HS    insulin lispro (HumALOG/ADMELOG) 100 units/mL subcutaneous injection 2-12 Units, TID AC **AND** Fingerstick Glucose (POCT), TID AC    levothyroxine tablet 125 mcg, Daily    lidocaine (LIDODERM) 5 % patch 1 patch, Daily    magnesium Oxide (MAG-OX) tablet 400 mg, Daily    metoprolol succinate (TOPROL-XL) 24 hr tablet 25 mg, BID    midodrine (PROAMATINE) tablet 15 mg, TID AC    ondansetron (ZOFRAN) injection 4 mg, Q6H PRN    potassium chloride (Klor-Con M20) CR tablet 40 mEq, Daily    simethicone (MYLICON) chewable tablet 80 mg, 4x Daily PRN    tamsulosin (FLOMAX) capsule 0.4 mg, Daily With Dinner    torsemide (DEMADEX) tablet 100 mg, Daily    [START ON 9/21/2024] vancomycin (VANCOCIN) IVPB (premix in dextrose) 1,000 mg 200 mL, Daily PRN    Administrative Statements   Today, Patient Was Seen By: Nora Mathew MD  I have spent a total time of >35 minutes in caring for this patient on the day of the visit/encounter including Diagnostic results, Reviewing / ordering tests, medicine, procedures  , and Communicating with other healthcare professionals .    **Please Note: This note may have been constructed using a voice recognition system.**

## 2024-09-21 PROBLEM — Z79.899 MEDICATION MANAGEMENT: Status: ACTIVE | Noted: 2024-09-21

## 2024-09-21 LAB
ANION GAP SERPL CALCULATED.3IONS-SCNC: 3 MMOL/L (ref 4–13)
APPEARANCE FLD: CLEAR
ATRIAL RATE: 70 BPM
BASOPHILS NFR FLD MANUAL: 1 %
BUN SERPL-MCNC: 29 MG/DL (ref 5–25)
CA-I BLD-SCNC: 1.21 MMOL/L (ref 1.12–1.32)
CALCIUM SERPL-MCNC: 8.4 MG/DL (ref 8.4–10.2)
CHLORIDE SERPL-SCNC: 100 MMOL/L (ref 96–108)
CO2 SERPL-SCNC: 31 MMOL/L (ref 21–32)
COLOR FLD: COLORLESS
CREAT SERPL-MCNC: 4.48 MG/DL (ref 0.6–1.3)
EOSINOPHIL NFR FLD MANUAL: 21 %
GFR SERPL CREATININE-BSD FRML MDRD: 12 ML/MIN/1.73SQ M
GLUCOSE SERPL-MCNC: 107 MG/DL (ref 65–140)
GLUCOSE SERPL-MCNC: 111 MG/DL (ref 65–140)
GLUCOSE SERPL-MCNC: 117 MG/DL (ref 65–140)
GLUCOSE SERPL-MCNC: 117 MG/DL (ref 65–140)
GLUCOSE SERPL-MCNC: 98 MG/DL (ref 65–140)
HISTIOCYTES NFR FLD: 27 %
INR PPP: 2.93 (ref 0.85–1.19)
LYMPHOCYTES NFR BLD AUTO: 14 %
MAGNESIUM SERPL-MCNC: 2.2 MG/DL (ref 1.9–2.7)
MONO+MESO NFR FLD MANUAL: 3 %
MONOCYTES NFR BLD AUTO: 18 %
NEUTS SEG NFR BLD AUTO: 16 %
P AXIS: 14 DEGREES
PHOSPHATE SERPL-MCNC: 2.7 MG/DL (ref 2.3–4.1)
POTASSIUM SERPL-SCNC: 4.1 MMOL/L (ref 3.5–5.3)
PR INTERVAL: 178 MS
PROTHROMBIN TIME: 30.5 SECONDS (ref 12.3–15)
QRS AXIS: -32 DEGREES
QRSD INTERVAL: 152 MS
QT INTERVAL: 424 MS
QTC INTERVAL: 457 MS
SITE: NORMAL
SODIUM SERPL-SCNC: 134 MMOL/L (ref 135–147)
T WAVE AXIS: 17 DEGREES
TOTAL CELLS COUNTED SPEC: 100
VANCOMYCIN SERPL-MCNC: 16.7 UG/ML (ref 10–20)
VENTRICULAR RATE: 70 BPM
WBC # FLD MANUAL: 55 /UL

## 2024-09-21 PROCEDURE — 80048 BASIC METABOLIC PNL TOTAL CA: CPT | Performed by: FAMILY MEDICINE

## 2024-09-21 PROCEDURE — 89051 BODY FLUID CELL COUNT: CPT | Performed by: INTERNAL MEDICINE

## 2024-09-21 PROCEDURE — 99232 SBSQ HOSP IP/OBS MODERATE 35: CPT | Performed by: FAMILY MEDICINE

## 2024-09-21 PROCEDURE — 80202 ASSAY OF VANCOMYCIN: CPT | Performed by: FAMILY MEDICINE

## 2024-09-21 PROCEDURE — 83735 ASSAY OF MAGNESIUM: CPT | Performed by: FAMILY MEDICINE

## 2024-09-21 PROCEDURE — 84100 ASSAY OF PHOSPHORUS: CPT | Performed by: INTERNAL MEDICINE

## 2024-09-21 PROCEDURE — 85610 PROTHROMBIN TIME: CPT | Performed by: FAMILY MEDICINE

## 2024-09-21 PROCEDURE — 87081 CULTURE SCREEN ONLY: CPT | Performed by: FAMILY MEDICINE

## 2024-09-21 PROCEDURE — 93005 ELECTROCARDIOGRAM TRACING: CPT

## 2024-09-21 PROCEDURE — 82330 ASSAY OF CALCIUM: CPT | Performed by: INTERNAL MEDICINE

## 2024-09-21 PROCEDURE — 82948 REAGENT STRIP/BLOOD GLUCOSE: CPT

## 2024-09-21 PROCEDURE — 99232 SBSQ HOSP IP/OBS MODERATE 35: CPT | Performed by: INTERNAL MEDICINE

## 2024-09-21 RX ORDER — FUROSEMIDE 80 MG
80 TABLET ORAL DAILY
Status: DISCONTINUED | OUTPATIENT
Start: 2024-09-21 | End: 2024-09-23 | Stop reason: HOSPADM

## 2024-09-21 RX ORDER — VANCOMYCIN HYDROCHLORIDE 1 G/200ML
10 INJECTION, SOLUTION INTRAVENOUS DAILY PRN
Status: DISCONTINUED | OUTPATIENT
Start: 2024-09-22 | End: 2024-09-22

## 2024-09-21 RX ORDER — WARFARIN SODIUM 1 MG/1
0.5 TABLET ORAL
Status: DISCONTINUED | OUTPATIENT
Start: 2024-09-21 | End: 2024-09-23 | Stop reason: HOSPADM

## 2024-09-21 RX ADMIN — FUROSEMIDE 80 MG: 80 TABLET ORAL at 08:31

## 2024-09-21 RX ADMIN — MIDODRINE HYDROCHLORIDE 15 MG: 5 TABLET ORAL at 13:01

## 2024-09-21 RX ADMIN — METOPROLOL SUCCINATE 25 MG: 25 TABLET, EXTENDED RELEASE ORAL at 08:32

## 2024-09-21 RX ADMIN — ASPIRIN 81 MG: 81 TABLET, COATED ORAL at 08:32

## 2024-09-21 RX ADMIN — MIDODRINE HYDROCHLORIDE 15 MG: 5 TABLET ORAL at 17:00

## 2024-09-21 RX ADMIN — Medication 100 MG: at 08:32

## 2024-09-21 RX ADMIN — POTASSIUM CHLORIDE 40 MEQ: 1500 TABLET, EXTENDED RELEASE ORAL at 08:31

## 2024-09-21 RX ADMIN — GENTAMICIN SULFATE: 1 CREAM TOPICAL at 08:31

## 2024-09-21 RX ADMIN — HYDROCODONE BITARTRATE AND ACETAMINOPHEN 2 TABLET: 5; 325 TABLET ORAL at 17:11

## 2024-09-21 RX ADMIN — INSULIN GLARGINE 10 UNITS: 100 INJECTION, SOLUTION SUBCUTANEOUS at 21:54

## 2024-09-21 RX ADMIN — FERROUS SULFATE TAB 325 MG (65 MG ELEMENTAL FE) 325 MG: 325 (65 FE) TAB at 08:30

## 2024-09-21 RX ADMIN — METOPROLOL SUCCINATE 25 MG: 25 TABLET, EXTENDED RELEASE ORAL at 21:54

## 2024-09-21 RX ADMIN — WARFARIN SODIUM 0.5 MG: 1 TABLET ORAL at 17:00

## 2024-09-21 RX ADMIN — ATORVASTATIN CALCIUM 40 MG: 40 TABLET, FILM COATED ORAL at 17:07

## 2024-09-21 RX ADMIN — CINACALCET 30 MG: 30 TABLET, FILM COATED ORAL at 17:07

## 2024-09-21 RX ADMIN — HYDROCODONE BITARTRATE AND ACETAMINOPHEN 2 TABLET: 5; 325 TABLET ORAL at 06:28

## 2024-09-21 RX ADMIN — TAMSULOSIN HYDROCHLORIDE 0.4 MG: 0.4 CAPSULE ORAL at 17:07

## 2024-09-21 RX ADMIN — ALLOPURINOL 300 MG: 300 TABLET ORAL at 08:32

## 2024-09-21 RX ADMIN — MIDODRINE HYDROCHLORIDE 15 MG: 5 TABLET ORAL at 06:28

## 2024-09-21 RX ADMIN — MAGNESIUM GLUCONATE 500 MG ORAL TABLET 400 MG: 500 TABLET ORAL at 08:32

## 2024-09-21 RX ADMIN — DOCUSATE SODIUM 100 MG: 100 CAPSULE, LIQUID FILLED ORAL at 08:32

## 2024-09-21 RX ADMIN — OXYCODONE HYDROCHLORIDE AND ACETAMINOPHEN 500 MG: 500 TABLET ORAL at 08:32

## 2024-09-21 RX ADMIN — LEVOTHYROXINE SODIUM 125 MCG: 125 TABLET ORAL at 06:28

## 2024-09-21 NOTE — PROGRESS NOTES
Masoud Perry is a 69 y.o. male who is currently ordered Vancomycin IV with management by the Pharmacy Consult service.  Relevant clinical data and objective / subjective history reviewed.  Vancomycin Assessment:  Indication and Goal AUC/Trough: Soft tissue (goal -600, trough >10), Soft tissue (goal random level < or = 15)  Clinical Status: stable  Micro:     Renal Function:  SCr: 4.48 mg/dL  CrCl: 19.6 mL/min  Renal replacement: PD  Days of Therapy: 4  Current Dose: 1000 mg IV daily prn random level < or = 15; no dose today  Vancomycin Plan:  New Dosin mg IV daily prn random level < or = 15  Next Level: 24 at 0600  Renal Function Monitoring: Daily BMP and UOP  Pharmacy will continue to follow closely for s/sx of nephrotoxicity, infusion reactions and appropriateness of therapy.  BMP and CBC will be ordered per protocol. We will continue to follow the patient’s culture results and clinical progress daily.    Reyna Cobos, Pharmacist

## 2024-09-21 NOTE — PROGRESS NOTES
Progress Note - Hospitalist   Name: Masoud Perry 69 y.o. male I MRN: [id number]  Unit/Bed#: -01 I Date of Admission: 9/15/2024   Date of Service: 9/21/2024 I Hospital Day: 6    Assessment & Plan  Ambulatory dysfunction  Been weak for 2 weeks, difficulty getting out of a chair, and fell today hitting the back of his head.  At baseline he walks with a walker  PT/OT consult- needs rehab patient agreeble  Case management consultation for acute rehab  ESRD (end stage renal disease) (HCC)  On peritoneal dialysis.  Will consult nephrology.  I did have a discussion with the patient about why he is not on hemodialysis, patient states he was told that he would have to go to the dialysis clinic 4-5 times a week for 6 to 8 hours/day.  I am not certain about this, but at the patient did have hemodialysis patient would be able to find multiple acute rehabs that would accept him for for rehab.  An option would be to start hemodialysis here for rehab    This provider discussed with patient regarding hemodialysis.  Patient reports he prefers to stay with current treatment with peritoneal dialysis.    Lab Results   Component Value Date    EGFR 12 09/21/2024    EGFR 11 09/20/2024    EGFR 11 09/19/2024    CREATININE 4.48 (H) 09/21/2024    CREATININE 4.59 (H) 09/20/2024    CREATININE 4.84 (H) 09/19/2024   Torsemide changed to lasix by nephrology today secondary to suspicious that torsemide could have kept the INR high  Patient on peritoneal dialysis (HCC)  Nephrology he is fluid culture from the dialysis catheter fluid is negative  Paroxysmal atrial fibrillation (HCC)  Continue metoprolol and currently on Coumadin.  He states that the Coumadin doses have been changing recently.  Currently not on his home med rec.  INR is down to 2.9 will restart Coumadin but at a lower dose of 0.5 mg daily and observe now that his torsemide which would possibly affect the INR was changed to Lasix by nephrology  Type 2 diabetes mellitus with stage  4 chronic kidney disease, with long-term current use of insulin ([facility])  Sugars stable continue Lantus 10 units at night and sliding scale    Lab Results   Component Value Date    HGBA1C 5.9 (H) 07/24/2024     Hypothyroidism  Continue levothyroxine  HLD (hyperlipidemia)  Continue statin  Hypotension  Continue midodrine 15 mg 3 times daily blood pressure stable  Secondary hyperparathyroidism of renal origin ([facility])  Continue Sensipar   Chronic acquired lymphedema  Continue torsemide  Gout  Continue allopurinol  History of CVA (cerebrovascular accident)  Continue aspirin , INR is down to 3 he was going up with 1 mg of Coumadin which was giving it daily but the torsemide could affect INR as discussed with nephrology hence he was changed to Lasix currently will restart him on 0.5 mg of Coumadin daily and observe for his INR   Obesity, morbid ([facility])  BMI of 37.14  Anemia  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 seems base above 8 - venofer 3 doses given to patient by nephrology will repeat hemoglobin tomorrow  Moderate protein-calorie malnutrition (HCC)  Malnutrition Findings:   Adult Malnutrition type: Chronic illness  Adult Degree of Malnutrition: Malnutrition of moderate degree  Malnutrition Characteristics: Inadequate energy, Muscle loss                  360 Statement: malnutrition of moderate degree in setting of chronic illness, evidenced by <75% energy intake compared to estimated needs for > 1 month, muscle wasting/indented temples, protruding clavicles, treated with diet, PT refusing supplements currently    BMI Findings:           Body mass index is 35.73 kg/m².     Cellulitis  Warm tender open wounds- started vanco 9/18 today the erythema and tenderness improved.  Will treat for 7 days total  Hyponatremia    Medication management      VTE Pharmacologic Prophylaxis: VTE Score: 5 High Risk (Score >/= 5) - Pharmacological DVT Prophylaxis Ordered: warfarin (Coumadin). Sequential Compression Devices Ordered.    Mobility:   Basic  Mobility Inpatient Raw Score: 13  JH-HLM Goal: 4: Move to chair/commode  JH-HLM Achieved: 2: Bed activities/Dependent transfer  JH-HLM Goal NOT achieved. Continue with multidisciplinary rounding and encourage appropriate mobility to improve upon JH-HLM goals.    Patient Centered Rounds: I performed bedside rounds with nursing staff today.   Discussions with Specialists or Other Care Team Provider: marina    Education and Discussions with Family / Patient: pt no new updates for family    Current Length of Stay: 6 day(s)  Current Patient Status: Inpatient   Certification Statement: The patient will continue to require additional inpatient hospital stay due to awaiting rehab placement  Discharge Plan: Anticipate discharge in 48 hrs to rehab facility.    Code Status: Level 3 - DNAR and DNI    Subjective   Patient seen and examined no nausea no vomiting no chest pain or shortness of breath    Objective     Vitals:   Temp (24hrs), Av.2 °F (36.8 °C), Min:97.2 °F (36.2 °C), Max:98.8 °F (37.1 °C)    Temp:  [97.2 °F (36.2 °C)-98.8 °F (37.1 °C)] 98.2 °F (36.8 °C)  HR:  [70-80] 70  Resp:  [15-16] 16  BP: (104-126)/(53-60) 104/54  SpO2:  [93 %-100 %] 99 %  Body mass index is 35.73 kg/m².     Input and Output Summary (last 24 hours):     Intake/Output Summary (Last 24 hours) at 2024 1015  Last data filed at 2024 0840  Gross per 24 hour   Intake 345 ml   Output 1300 ml   Net -955 ml       Physical Exam  Constitutional:       Appearance: He is well-developed.   HENT:      Head: Normocephalic and atraumatic.   Eyes:      Pupils: Pupils are equal, round, and reactive to light.   Cardiovascular:      Rate and Rhythm: Normal rate and regular rhythm.      Heart sounds: Normal heart sounds.   Pulmonary:      Effort: Pulmonary effort is normal. No respiratory distress.      Breath sounds: Normal breath sounds. No wheezing or rales.   Abdominal:      General: Bowel sounds are normal. There is no distension.      Palpations:  Abdomen is soft.   Musculoskeletal:         General: Swelling (Chronic lymphedema with venous stasis changes erythema of the left lower extremity has improved significantly and still has the pain and the warmth) present. Normal range of motion.      Cervical back: Normal range of motion.   Skin:     General: Skin is warm.   Neurological:      Mental Status: He is alert and oriented to person, place, and time.      Deep Tendon Reflexes: Reflexes are normal and symmetric.      Comments: cranial nerve 2-12 are normal.  Normal neurological exam          Lines/Drains:  Lines/Drains/Airways       Active Status       None                            Lab Results: I have reviewed the following results: CBC/BMP:   .     09/21/24  0624 09/21/24  0843   SODIUM 134*  --    K 4.1  --      --    CO2 31  --    BUN 29*  --    CREATININE 4.48*  --    GLUC 98  --    CAIONIZED  --  1.21   MG 2.2  --    PHOS 2.7  --        Results from last 7 days   Lab Units 09/20/24  0603   WBC Thousand/uL 9.50   HEMOGLOBIN g/dL 8.3*   HEMATOCRIT % 27.1*   PLATELETS Thousands/uL 109*   SEGS PCT % 85*   LYMPHO PCT % 10*   MONO PCT % 4   EOS PCT % 1     Results from last 7 days   Lab Units 09/21/24  0624 09/18/24  0631 09/17/24  0626   SODIUM mmol/L 134*   < > 135   POTASSIUM mmol/L 4.1   < > 2.9*   CHLORIDE mmol/L 100   < > 97   CO2 mmol/L 31   < > 31   BUN mg/dL 29*   < > 28*   CREATININE mg/dL 4.48*   < > 5.61*   ANION GAP mmol/L 3*   < > 7   CALCIUM mg/dL 8.4   < > 8.9   ALBUMIN g/dL  --   --  2.2*   TOTAL BILIRUBIN mg/dL  --   --  0.25   ALK PHOS U/L  --   --  100   ALT U/L  --   --  13   AST U/L  --   --  18   GLUCOSE RANDOM mg/dL 98   < > 96    < > = values in this interval not displayed.     Results from last 7 days   Lab Units 09/21/24  0624   INR  2.93*     Results from last 7 days   Lab Units 09/21/24  0709 09/20/24  2124 09/20/24  1629 09/20/24  1120 09/20/24  0711 09/19/24  2059 09/19/24  1543 09/19/24  1148 09/19/24  0746  09/18/24  2155 09/18/24  1645 09/18/24  1202   POC GLUCOSE mg/dl 111 120 105 133 94 127 132 91 78 126 126 104               Recent Cultures (last 7 days):   Results from last 7 days   Lab Units 09/19/24  0828   GRAM STAIN RESULT  No organisms seen   BODY FLUID CULTURE, STERILE  No growth       Imaging Review: No pertinent imaging studies reviewed.  Other Studies: No additional pertinent studies reviewed.    Last 24 Hours Medication List:     Current Facility-Administered Medications:     acetaminophen (TYLENOL) tablet 650 mg, Q6H PRN    allopurinol (ZYLOPRIM) tablet 300 mg, Daily    ascorbic acid (VITAMIN C) tablet 500 mg, Daily    aspirin (ECOTRIN LOW STRENGTH) EC tablet 81 mg, Daily    atorvastatin (LIPITOR) tablet 40 mg, Daily With Dinner    bisacodyl (DULCOLAX) rectal suppository 10 mg, Daily    cinacalcet (SENSIPAR) tablet 30 mg, Daily With Dinner    co-enzyme Q-10 capsule 100 mg, Daily    docusate sodium (COLACE) capsule 100 mg, BID    ferrous sulfate tablet 325 mg, Daily With Breakfast    furosemide (LASIX) tablet 80 mg, Daily    gentamicin (GARAMYCIN) 0.1 % cream, Daily    HYDROcodone-acetaminophen (NORCO) 5-325 mg per tablet 2 tablet, Q6H PRN    insulin glargine (LANTUS) subcutaneous injection 10 Units 0.1 mL, HS    insulin lispro (HumALOG/ADMELOG) 100 units/mL subcutaneous injection 2-12 Units, TID AC **AND** Fingerstick Glucose (POCT), TID AC    levothyroxine tablet 125 mcg, Daily    lidocaine (LIDODERM) 5 % patch 1 patch, Daily    magnesium Oxide (MAG-OX) tablet 400 mg, Daily    metoprolol succinate (TOPROL-XL) 24 hr tablet 25 mg, BID    midodrine (PROAMATINE) tablet 15 mg, TID AC    ondansetron (ZOFRAN) injection 4 mg, Q6H PRN    polyethylene glycol (MIRALAX) packet 17 g, Daily PRN    potassium chloride (Klor-Con M20) CR tablet 40 mEq, Daily    simethicone (MYLICON) chewable tablet 80 mg, 4x Daily PRN    tamsulosin (FLOMAX) capsule 0.4 mg, Daily With Dinner    [START ON 9/22/2024] vancomycin (VANCOCIN)  IVPB (premix in dextrose) 1,000 mg 200 mL, Daily PRN    warfarin (COUMADIN) tablet 0.5 mg, Before Dinner    Administrative Statements   Today, Patient Was Seen By: Nora Mathew MD  I have spent a total time of >35 minutes in caring for this patient on the day of the visit/encounter including Documenting in the medical record, Reviewing / ordering tests, medicine, procedures  , and Communicating with other healthcare professionals .    **Please Note: This note may have been constructed using a voice recognition system.**

## 2024-09-21 NOTE — PROGRESS NOTES
NEPHROLOGY HOSPITAL PROGRESS NOTE   Masoud Perry 69 y.o. male MRN: [id number]  Unit/Bed#: -01 Encounter: [id number]  Reason for Consult: End-stage renal disease on hemodialysis and fluid management.    Brief History of Admission -this is a 69-year-old male with a history of end-stage renal disease on CAPD, diabetes who presents with ambulatory dysfunction.  Other past medical history includes prior CVA, lymphedema, gout, morbid obesity.    Assessment & Plan  ESRD (end stage renal disease) ([facility])  Lab Results   Component Value Date    EGFR 12 09/21/2024    EGFR 11 09/20/2024    EGFR 11 09/19/2024    CREATININE 4.48 (H) 09/21/2024    CREATININE 4.59 (H) 09/20/2024    CREATININE 4.84 (H) 09/19/2024   The patient is currently on CAPD and were utilizing 2.5% exchanges 2 L every 6 hours.  He is approximately cumulative -400 cc over the past 24 hours with regards to PD exchanges.  He examines to be euvolemic and his weight that has been obtained the same time each morning approximately is down about 6 pounds from what he was on September 18 from 262-256 pounds.  Continue his CAPD.  Will check repeat fluid cell count with  his afternoon exchange.  Hypotension  He has a history of chronic hypotension and will maintain midodrine at current dosing.  His blood pressure levels have remained stable on current medication dosing with midodrine.  Chronic acquired lymphedema  He has a history of chronic lymphedema and would also maintain oral diuretic but change to Bumex as there is an interaction between torsemide and Coumadin.  Weight is decreased as noted above.   Hyponatremia  He has mild hyponatremia that is improved in the 134 on September 21 and a glucose is 98.  Continue to monitor with fluid restriction maintaining PD.    Anemia  He has a history of anemia and his most recent hemoglobin is 8.3.  He has macrocytic indices with MCV of 103.  Hemoglobin levels of the past few days have been in the mid 8 range.  He had  received 1 dose of IV iron before the patient was started on intravenous vancomycin for lower extremity cellulitis.  Check CBC today.  He does have a history of a CVA but it was felt to be cardioembolic in 2022.  The patient is currently anticoagulated.  Recheck CBC would not be unreasonable to consider initiating JUAN FRANCISCO.    Secondary hyperparathyroidism of renal origin ([facility])  The patient on Cinacalcet with a history of hypercalcemia: Workup in the past was negative calcium now normal at 8.6: Intact PTH from September 18 is 95.2.  Phosphorus on September 18 is 3.4.  Current calcium uncorrected is 8.4.  From September 17 it is noted that an albumin was 2.2 with a corrected calcium of 10.3.  Check ionized calcium, check EKG to evaluate QT interval.  It was noted from prior notes that there was a recommendation to repeat a myeloma evaluation in 3 to 6 months need to clarify when that needs to be performed.  Watching phosphorus off of binders given most recent level 3.4.    Cellulitis  Left pretibial region being treated with antibiotics per primary service; patient is on intravenous vancomycin.  Last vancomycin level 16.7 on September 21.  Ambulatory dysfunction  Per primary service going for rehab    Patient on peritoneal dialysis ([facility])  Please see above with regards to plan of care; maintaining CAPD.    Type 2 diabetes mellitus with stage 4 chronic kidney disease, with long-term current use of insulin ([facility])  Lab Results   Component Value Date    HGBA1C 5.9 (H) 07/24/2024       Recent Labs     09/20/24  0711 09/20/24  1120 09/20/24  1629 09/20/24  2124   POCGLU 94 133 105 120   Following glucose levels with the patient being on CAPD.    Blood Sugar Average: Last 72 hrs:  (P) [id number]    Medication management  Please note that torsemide can increase medication levels of Coumadin.  I will change his diuretic dosing to Lasix daily. Allergies list Bumex as allergy causing lightheadedness and GI upset. Given the  "direct interaction with Torsemide and Coumadin, need to adjust loop diuretic. I communicated with the [facility] hospitalist regarding this via [employer] secure chat.       Portions of the record may have been created with voice recognition software. Occasional wrong word or \"sound a like\" substitutions may have occurred due to the inherent limitations of voice recognition software. Read the chart carefully and recognize, using context, where substitutions have occurred.If you have any questions, please contact the dictating provider.        VIRTUAL CARE DOCUMENTATION:     1. This service was provided via Telemedicine using [employer]     2. Parties in the room with patient during teleconsult RN    3. Confidentiality My office door was closed     4. Participants No one else was in the room    5. Patient acknowledged consent and understanding of privacy and security of the  Telemedicine consult. I informed the patient that I have reviewed their record in Epic and presented the opportunity for them to ask any questions regarding the visit today.  The patient agreed to participate.    6. Time spent including review of the patient record, visit and interview with the patient's nurse at bedside and the patient, documentation of complex medical decision making, review of medication record and patient record, creation of assessment and plan, total time approximately 35 minutes.              SUBJECTIVE / 24H INTERVAL HISTORY:  Mr. Perry is seen in follow-up for end-stage renal disease on peritoneal dialysis.  Patient was seen with the patient's nurse in the room.  I spoke with the patient's nurse at bedside this morning.  Patient was in the midst of a PD exchange.  No issues with regards to the peritoneal dialysis.  The patient does have a history of lymphedema but his lower extremities seem to be much improved.  He denies any chest pressure or shortness of breath.  He is awaiting placement at a rehab center in [place] which " likely will occur early this coming week.  Systolic blood pressure range on September 20 was anywhere from 108-126 and pulse oximetry is 95% on room air.  His weight via bed scale was 256 which is lower than he had been as high as 262 pounds around the same time on September 18.  He denies any cramping or dizziness.  He has only been able to stand up to use the bedside commode.  He has not been ambulating much.  He is not on O2 at baseline it was noted that overnight his pulse oximetry seem to decrease and he seems to do this every night where he is just requiring 2 L at night but seems to be okay during the day.    OBJECTIVE:  Current Weight: Weight - Scale: 116 kg (256 lb 2.8 oz)  Vitals:    09/20/24 2249 09/21/24 0238 09/21/24 0550 09/21/24 0645   BP: 117/56  113/53    BP Location: Left arm  Left arm    Pulse: 77 74 77    Resp: 15  15    Temp: 98.8 °F (37.1 °C)      TempSrc: Temporal      SpO2: 94% 96% 98%    Weight:    116 kg (256 lb 2.8 oz)   Height:           Intake/Output Summary (Last 24 hours) at 9/21/2024 0657  Last data filed at 9/21/2024 0635  Gross per 24 hour   Intake 290 ml   Output 700 ml   Net -410 ml     General: Patient is not in any acute distress.  He is resting comfortably and head of bed is elevated approximately 30 degrees.  HEENT: Normocephalic atraumatic  Neck: Appears to be supple  Pulmonary: There is no conversational dyspnea/no accessory muscle use/no respiratory distress noted  Abdomen: PD catheter appears patent and the patient is currently in the midst of a PD exchange with CAPD and there is clear effluent noted.  Neurologic: No focal deficits noted  Extremities: There is lower extremity edema with venous stasis changes noted bilaterally.  I have had the opportunity to see the patient in the hospital from a prior admission and his lower extremity swelling seems to be much improved than what I remember from prior.  Psych: Patient is able answer questions  appropriately.  Medications:    Current Facility-Administered Medications:     acetaminophen (TYLENOL) tablet 650 mg, 650 mg, Oral, Q6H PRN, Magdiel Holguin MD    allopurinol (ZYLOPRIM) tablet 300 mg, 300 mg, Oral, Daily, Magdiel Holguin MD, 300 mg at 09/20/24 0845    ascorbic acid (VITAMIN C) tablet 500 mg, 500 mg, Oral, Daily, Magdiel Holguin MD, 500 mg at 09/20/24 0845    aspirin (ECOTRIN LOW STRENGTH) EC tablet 81 mg, 81 mg, Oral, Daily, Magdiel Holguin MD, 81 mg at 09/20/24 0845    atorvastatin (LIPITOR) tablet 40 mg, 40 mg, Oral, Daily With Dinner, Magdiel Holguin MD, 40 mg at 09/20/24 1716    bisacodyl (DULCOLAX) rectal suppository 10 mg, 10 mg, Rectal, Daily, Thea Rutledge MD    cinacalcet (SENSIPAR) tablet 30 mg, 30 mg, Oral, Daily With Dinner, Magdiel Holguin MD, 30 mg at 09/20/24 1716    co-enzyme Q-10 capsule 100 mg, 100 mg, Oral, Daily, Magdiel Holguin MD, 100 mg at 09/20/24 0845    docusate sodium (COLACE) capsule 100 mg, 100 mg, Oral, BID, Thea Rutledge MD, 100 mg at 09/20/24 1716    ferrous sulfate tablet 325 mg, 325 mg, Oral, Daily With Breakfast, Magdiel Holguin MD, 325 mg at 09/20/24 0845    gentamicin (GARAMYCIN) 0.1 % cream, , Topical, Daily, GAGANDEEP Lin, Given at 09/20/24 0847    HYDROcodone-acetaminophen (NORCO) 5-325 mg per tablet 2 tablet, 2 tablet, Oral, Q6H PRN, Magdiel Holguin MD, 2 tablet at 09/21/24 0628    insulin glargine (LANTUS) subcutaneous injection 10 Units 0.1 mL, 10 Units, Subcutaneous, HS, Nora Mathew MD, 10 Units at 09/20/24 2218    insulin lispro (HumALOG/ADMELOG) 100 units/mL subcutaneous injection 2-12 Units, 2-12 Units, Subcutaneous, TID AC **AND** Fingerstick Glucose (POCT), , , TID AC, Nora Mathew MD    levothyroxine tablet 125 mcg, 125 mcg, Oral, Daily, Magdiel Vinicio Holguin MD, 125 mcg at 09/21/24 0628    lidocaine (LIDODERM) 5 % patch 1 patch, 1 patch, Topical, Daily, Moses Noel MD, 1 patch  at 09/18/24 1155    magnesium Oxide (MAG-OX) tablet 400 mg, 400 mg, Oral, Daily, Magdiel Holguin MD, 400 mg at 09/20/24 0846    metoprolol succinate (TOPROL-XL) 24 hr tablet 25 mg, 25 mg, Oral, BID, Nora Mathew MD, 25 mg at 09/20/24 2035    midodrine (PROAMATINE) tablet 15 mg, 15 mg, Oral, TID AC, Magdiel Holguin MD, 15 mg at 09/21/24 0628    ondansetron (ZOFRAN) injection 4 mg, 4 mg, Intravenous, Q6H PRN, Nora Mathew MD    polyethylene glycol (MIRALAX) packet 17 g, 17 g, Oral, Daily PRN, Thea Rutledge MD    potassium chloride (Klor-Con M20) CR tablet 40 mEq, 40 mEq, Oral, Daily, Jignesh Hussein MD, 40 mEq at 09/20/24 0845    simethicone (MYLICON) chewable tablet 80 mg, 80 mg, Oral, 4x Daily PRN, Magdiel Holguin MD    tamsulosin (FLOMAX) capsule 0.4 mg, 0.4 mg, Oral, Daily With Dinner, Magdiel Holguin MD, 0.4 mg at 09/20/24 1716    torsemide (DEMADEX) tablet 100 mg, 100 mg, Oral, Daily, Nora Mathew MD, 100 mg at 09/20/24 0846    vancomycin (VANCOCIN) IVPB (premix in dextrose) 1,000 mg 200 mL, 1,000 mg, Intravenous, Daily PRN, Nora Mathew MD    Laboratory Results:  Results from last 7 days   Lab Units 09/21/24  0624 09/20/24  0603 09/19/24  0625 09/18/24  0631 09/17/24  0626 09/16/24  0541 09/15/24  1559   WBC Thousand/uL  --  9.50 5.16 4.40 4.17* 3.89* 4.43   HEMOGLOBIN g/dL  --  8.3* 8.2* 8.4* 8.6* 8.5* 9.5*   HEMATOCRIT %  --  27.1* 26.5* 27.4* 28.3* 27.2* 30.3*   PLATELETS Thousands/uL  --  109* 106* 130* 112* 127* 126*   POTASSIUM mmol/L 4.1 3.9 3.6 3.4* 2.9* 2.7* 3.2*   CHLORIDE mmol/L 100 100 102 99 97 96 94*   CO2 mmol/L 31 31 30 31 31 31 31   BUN mg/dL 29* 26* 26* 27* 28* 29* 27*   CREATININE mg/dL 4.48* 4.59* 4.84* 5.14* 5.61* 5.78* 5.68*   CALCIUM mg/dL 8.4 8.5 8.4 8.6 8.9 9.2 9.8   MAGNESIUM mg/dL 2.2 1.8*  --  2.0 1.7* 1.5*  --    PHOSPHORUS mg/dL  --  2.8  --  3.4  --   --   --

## 2024-09-22 LAB
ANION GAP SERPL CALCULATED.3IONS-SCNC: 3 MMOL/L (ref 4–13)
BACTERIA SPEC BFLD CULT: NO GROWTH
BASOPHILS # BLD AUTO: 0.03 THOUSANDS/ΜL (ref 0–0.1)
BASOPHILS NFR BLD AUTO: 1 % (ref 0–1)
BUN SERPL-MCNC: 29 MG/DL (ref 5–25)
CA-I BLD-SCNC: 1.22 MMOL/L (ref 1.12–1.32)
CALCIUM SERPL-MCNC: 8.4 MG/DL (ref 8.4–10.2)
CHLORIDE SERPL-SCNC: 101 MMOL/L (ref 96–108)
CO2 SERPL-SCNC: 31 MMOL/L (ref 21–32)
CREAT SERPL-MCNC: 4.41 MG/DL (ref 0.6–1.3)
EOSINOPHIL # BLD AUTO: 0.26 THOUSAND/ΜL (ref 0–0.61)
EOSINOPHIL NFR BLD AUTO: 4 % (ref 0–6)
ERYTHROCYTE [DISTWIDTH] IN BLOOD BY AUTOMATED COUNT: 15.5 % (ref 11.6–15.1)
FOLATE SERPL-MCNC: 10.7 NG/ML
GFR SERPL CREATININE-BSD FRML MDRD: 12 ML/MIN/1.73SQ M
GLUCOSE SERPL-MCNC: 113 MG/DL (ref 65–140)
GLUCOSE SERPL-MCNC: 113 MG/DL (ref 65–140)
GLUCOSE SERPL-MCNC: 114 MG/DL (ref 65–140)
GLUCOSE SERPL-MCNC: 115 MG/DL (ref 65–140)
GLUCOSE SERPL-MCNC: 94 MG/DL (ref 65–140)
GRAM STN SPEC: NORMAL
HCT VFR BLD AUTO: 24.9 % (ref 36.5–49.3)
HGB BLD-MCNC: 7.6 G/DL (ref 12–17)
IMM GRANULOCYTES # BLD AUTO: 0.04 THOUSAND/UL (ref 0–0.2)
IMM GRANULOCYTES NFR BLD AUTO: 1 % (ref 0–2)
INR PPP: 2.57 (ref 0.85–1.19)
LYMPHOCYTES # BLD AUTO: 1.52 THOUSANDS/ΜL (ref 0.6–4.47)
LYMPHOCYTES NFR BLD AUTO: 25 % (ref 14–44)
MCH RBC QN AUTO: 31.5 PG (ref 26.8–34.3)
MCHC RBC AUTO-ENTMCNC: 30.5 G/DL (ref 31.4–37.4)
MCV RBC AUTO: 103 FL (ref 82–98)
MONOCYTES # BLD AUTO: 0.29 THOUSAND/ΜL (ref 0.17–1.22)
MONOCYTES NFR BLD AUTO: 5 % (ref 4–12)
MRSA NOSE QL CULT: NORMAL
NEUTROPHILS # BLD AUTO: 4.04 THOUSANDS/ΜL (ref 1.85–7.62)
NEUTS SEG NFR BLD AUTO: 64 % (ref 43–75)
NRBC BLD AUTO-RTO: 0 /100 WBCS
PLATELET # BLD AUTO: 119 THOUSANDS/UL (ref 149–390)
PMV BLD AUTO: 8.3 FL (ref 8.9–12.7)
POTASSIUM SERPL-SCNC: 4.1 MMOL/L (ref 3.5–5.3)
PROTHROMBIN TIME: 27.7 SECONDS (ref 12.3–15)
RBC # BLD AUTO: 2.41 MILLION/UL (ref 3.88–5.62)
SODIUM SERPL-SCNC: 135 MMOL/L (ref 135–147)
VANCOMYCIN SERPL-MCNC: 14.9 UG/ML (ref 10–20)
VIT B12 SERPL-MCNC: 1178 PG/ML (ref 180–914)
WBC # BLD AUTO: 6.18 THOUSAND/UL (ref 4.31–10.16)

## 2024-09-22 PROCEDURE — 80048 BASIC METABOLIC PNL TOTAL CA: CPT | Performed by: FAMILY MEDICINE

## 2024-09-22 PROCEDURE — 82746 ASSAY OF FOLIC ACID SERUM: CPT | Performed by: INTERNAL MEDICINE

## 2024-09-22 PROCEDURE — 82607 VITAMIN B-12: CPT | Performed by: INTERNAL MEDICINE

## 2024-09-22 PROCEDURE — 85025 COMPLETE CBC W/AUTO DIFF WBC: CPT | Performed by: INTERNAL MEDICINE

## 2024-09-22 PROCEDURE — 82948 REAGENT STRIP/BLOOD GLUCOSE: CPT

## 2024-09-22 PROCEDURE — 97530 THERAPEUTIC ACTIVITIES: CPT

## 2024-09-22 PROCEDURE — 97535 SELF CARE MNGMENT TRAINING: CPT

## 2024-09-22 PROCEDURE — 97116 GAIT TRAINING THERAPY: CPT

## 2024-09-22 PROCEDURE — 82330 ASSAY OF CALCIUM: CPT | Performed by: INTERNAL MEDICINE

## 2024-09-22 PROCEDURE — 80202 ASSAY OF VANCOMYCIN: CPT | Performed by: FAMILY MEDICINE

## 2024-09-22 PROCEDURE — 99232 SBSQ HOSP IP/OBS MODERATE 35: CPT | Performed by: FAMILY MEDICINE

## 2024-09-22 PROCEDURE — 85610 PROTHROMBIN TIME: CPT | Performed by: FAMILY MEDICINE

## 2024-09-22 PROCEDURE — 99232 SBSQ HOSP IP/OBS MODERATE 35: CPT | Performed by: INTERNAL MEDICINE

## 2024-09-22 RX ORDER — VANCOMYCIN HYDROCHLORIDE 1 G/200ML
10 INJECTION, SOLUTION INTRAVENOUS ONCE
Status: COMPLETED | OUTPATIENT
Start: 2024-09-22 | End: 2024-09-22

## 2024-09-22 RX ORDER — VANCOMYCIN HYDROCHLORIDE 1 G/200ML
10 INJECTION, SOLUTION INTRAVENOUS DAILY PRN
Status: DISCONTINUED | OUTPATIENT
Start: 2024-09-23 | End: 2024-09-23

## 2024-09-22 RX ADMIN — FUROSEMIDE 80 MG: 80 TABLET ORAL at 09:38

## 2024-09-22 RX ADMIN — HYDROCODONE BITARTRATE AND ACETAMINOPHEN 2 TABLET: 5; 325 TABLET ORAL at 18:20

## 2024-09-22 RX ADMIN — MIDODRINE HYDROCHLORIDE 15 MG: 5 TABLET ORAL at 16:20

## 2024-09-22 RX ADMIN — METOPROLOL SUCCINATE 25 MG: 25 TABLET, EXTENDED RELEASE ORAL at 09:38

## 2024-09-22 RX ADMIN — GENTAMICIN SULFATE 1 APPLICATION: 1 CREAM TOPICAL at 09:38

## 2024-09-22 RX ADMIN — INSULIN GLARGINE 10 UNITS: 100 INJECTION, SOLUTION SUBCUTANEOUS at 23:02

## 2024-09-22 RX ADMIN — HYDROCODONE BITARTRATE AND ACETAMINOPHEN 2 TABLET: 5; 325 TABLET ORAL at 00:04

## 2024-09-22 RX ADMIN — MIDODRINE HYDROCHLORIDE 15 MG: 5 TABLET ORAL at 06:03

## 2024-09-22 RX ADMIN — ASPIRIN 81 MG: 81 TABLET, COATED ORAL at 09:38

## 2024-09-22 RX ADMIN — TAMSULOSIN HYDROCHLORIDE 0.4 MG: 0.4 CAPSULE ORAL at 16:19

## 2024-09-22 RX ADMIN — MAGNESIUM GLUCONATE 500 MG ORAL TABLET 400 MG: 500 TABLET ORAL at 09:38

## 2024-09-22 RX ADMIN — ALLOPURINOL 300 MG: 300 TABLET ORAL at 09:38

## 2024-09-22 RX ADMIN — HYDROCODONE BITARTRATE AND ACETAMINOPHEN 2 TABLET: 5; 325 TABLET ORAL at 12:14

## 2024-09-22 RX ADMIN — LEVOTHYROXINE SODIUM 125 MCG: 125 TABLET ORAL at 06:00

## 2024-09-22 RX ADMIN — MIDODRINE HYDROCHLORIDE 15 MG: 5 TABLET ORAL at 12:14

## 2024-09-22 RX ADMIN — METOPROLOL SUCCINATE 25 MG: 25 TABLET, EXTENDED RELEASE ORAL at 20:37

## 2024-09-22 RX ADMIN — ATORVASTATIN CALCIUM 40 MG: 40 TABLET, FILM COATED ORAL at 16:21

## 2024-09-22 RX ADMIN — HYDROCODONE BITARTRATE AND ACETAMINOPHEN 2 TABLET: 5; 325 TABLET ORAL at 06:03

## 2024-09-22 RX ADMIN — CINACALCET 30 MG: 30 TABLET, FILM COATED ORAL at 16:20

## 2024-09-22 RX ADMIN — POTASSIUM CHLORIDE 40 MEQ: 1500 TABLET, EXTENDED RELEASE ORAL at 09:38

## 2024-09-22 RX ADMIN — VANCOMYCIN HYDROCHLORIDE 1000 MG: 1 INJECTION, SOLUTION INTRAVENOUS at 09:37

## 2024-09-22 RX ADMIN — DOCUSATE SODIUM 100 MG: 100 CAPSULE, LIQUID FILLED ORAL at 09:38

## 2024-09-22 RX ADMIN — WARFARIN SODIUM 0.5 MG: 1 TABLET ORAL at 16:20

## 2024-09-22 RX ADMIN — Medication 100 MG: at 09:38

## 2024-09-22 RX ADMIN — OXYCODONE HYDROCHLORIDE AND ACETAMINOPHEN 500 MG: 500 TABLET ORAL at 09:38

## 2024-09-22 NOTE — PROGRESS NOTES
NEPHROLOGY HOSPITAL PROGRESS NOTE   Masoud Perry 69 y.o. male MRN: [id number]  Unit/Bed#: -01 Encounter: [id number]  Reason for Consult: End-stage renal disease on hemodialysis and fluid management.     Brief History of Admission - This is a 69-year-old male with a history of end-stage renal disease on CAPD, diabetes who presents with ambulatory dysfunction. Other past medical history includes prior CVA, lymphedema, gout, morbid obesity.  He is awaiting rehab placement possibly on Monday at a rehab center in [facility].    Assessment & Plan  ESRD (end stage renal disease) (Formerly [facility])  Lab Results   Component Value Date    EGFR 12 09/22/2024    EGFR 12 09/21/2024    EGFR 11 09/20/2024    CREATININE 4.41 (H) 09/22/2024    CREATININE 4.48 (H) 09/21/2024    CREATININE 4.59 (H) 09/20/2024   The patient is currently on CAPD and were utilizing 2.5% exchanges 2 L every 6 hours.  As per my discussion with the patient's nurse via [employer] secure chat today on September 22, he is averaging 2400 cc per exchange.  He is hemodynamically stable will maintain current PD regimen for the next 24 hours.  He still examines to be euvolemic.  As noted, repeat fluid cell count from September 21 showed white cell count 55.  There has been no evidence of peritonitis in reviewing notes during the week.  Gram stain of the fluid was negative.  This was ordered during the week on September 19 as the fluid amount of drainage seemed to be more dense though not overtly cloudy.  Again the patient was not complaining of any abdominal discomfort.  Coincidentally, the patient was started on intravenous vancomycin on September 18 for lower extremity open wounds and lower extremity cellulitis and certainly the vancomycin he is getting intravenously would also cover for any potential peritonitis if it did exist for gram-positive organisms.  His weight remains stable in the mid 250 range via bed scale.  Maintain PD catheter care.  Again the effluent  today seem very clear when I saw this morning via telehealth medium and no fibrin noted.  Hypotension  He has a history of chronic hypotension and will maintain midodrine at current dosing.  His blood pressure levels have remained stable on current medication dosing with midodrine.  There was noted blood pressure range has been 109-113 on September 21.  Chronic acquired lymphedema  He has a history of chronic lymphedema and would also maintain oral diuretic but we changed his loop diuretic to Bumex on September 21 as there is an interaction between torsemide and Coumadin.  Weight is as noted above.   Hyponatremia  He has mild hyponatremia that is improved in the 134 on September 21 and sodium 135 on September 22.  The glucose level was 94 on September 22.  Continue to monitor with fluid restriction maintaining PD care and exchanges.    Anemia  He has a history of anemia and his most recent hemoglobin is 8.3.  He has macrocytic indices with MCV of 103.  Hemoglobin levels of the past few days have been in the mid 8 range.  He had received 1 dose of IV iron before the patient was started on intravenous vancomycin for lower extremity cellulitis.  Can restart IV iron once the patient is finished antibiotic course for cellulitis.  CBC today shows hemoglobin of 7.6 on September 22.  Check CBC today.  He does have a history of a CVA but it was felt to be cardioembolic in 2022.  The patient is currently anticoagulated.  The patient has a history of CVA but need to further evaluate if the patient would be a candidate for JUAN FRANCISCO at this point.  Will add IV iron load first.  Recheck B12 and folic acid levels.  Secondary hyperparathyroidism of renal origin (HCC)  The patient on Cinacalcet with a history of hypercalcemia: Workup in the past was negative calcium now normal at 8.6: Intact PTH from September 18 is 95.2.  Phosphorus on September 18 is 3.4.  Current calcium uncorrected is 8.4.  From September 17 it is noted that an  "albumin was 2.2 with a corrected calcium of 10.3. It was noted from prior notes that there was a recommendation to repeat a myeloma evaluation in 3 to 6 months need to clarify when that needs to be performed.  Watching phosphorus off of binders given most recent level 3.4.  Calcium level uncorrected on September 22 is 8.4.  EKG obtained on September 21 showed QTc 457.  Ionized calcium is pending.  The patient has been maintained on daily Cinacalcet 30 mg daily.    Cellulitis  Left pretibial region being treated with antibiotics per primary service; patient is on intravenous vancomycin.  Last vancomycin level 14.9 on September 22.  The patient is to be treated for 7-day course and was started on September 18.  Ambulatory dysfunction  Per primary service going for rehab    Patient on peritoneal dialysis (Formerly [facility])  Please see above with regards to plan of care; maintaining CAPD.    Type 2 diabetes mellitus with stage 4 chronic kidney disease, with long-term current use of insulin (Formerly [facility])  Lab Results   Component Value Date    HGBA1C 5.9 (H) 07/24/2024       Recent Labs     09/21/24  0709 09/21/24  1106 09/21/24  1624 09/21/24  2118   POCGLU 111 117 107 117   Following glucose levels with the patient being on CAPD.    Blood Sugar Average: Last 72 hrs:  (P) 111    Medication management  Please note that torsemide can increase medication levels of Coumadin.  I changed his torsemide to Bumex on September 21 given the interaction between torsemide and Coumadin.  I also communicated with the [facility] hospitalist on September 21 with regards to this.      Portions of the record may have been created with voice recognition software. Occasional wrong word or \"sound a like\" substitutions may have occurred due to the inherent limitations of voice recognition software. Read the chart carefully and recognize, using context, where substitutions have occurred.If you have any questions, please contact the dictating provider.      VIRTUAL " CARE DOCUMENTATION:     1. This service was provided via Telemedicine using Graphite Software Kit     2. Parties in the room with patient during teleconsult Patient only    3. Confidentiality My office door was closed     4. Participants No one else was in the room    5. Patient acknowledged consent and understanding of privacy and security of the  Telemedicine consult. I informed the patient that I have reviewed their record in Epic and presented the opportunity for them to ask any questions regarding the visit today.  The patient agreed to participate.    6. Time spent including brief interview with the patient, review of the medical record, documentation of detailed medical plan of care and medical decision making, brief communication with the nurse via epic secure chat.  Total time approximately 30 minutes.            SUBJECTIVE / 24H INTERVAL HISTORY:  Mr. Perry is seen in follow-up for CAPD.  Before seeing him this morning, I communicated with his nurse via epic secure chat..  He was seen this morning in the midst of CAPD exchange.  As per his nurse, he was averaging net -2.4 L drain with his exchanges which are 2 L-2.5% every 6 hours.  He denies any abdominal pain, nausea vomiting cramping, shortness of breath or chest pain.  Systolic blood pressure on September 21 average 104-110s systolic and last blood pressure this morning on September 22 is 108/53.  Pulse oximetry is 94% on room air.  He is currently draining clear PD effluent.  I had repeated a PD fluid cell count on September 21 and it was 55 white blood cells with no significant neutrophilic predominance.  I had just done that because on September 19 he had a PD fluid count of 100 white blood cells.  Again the patient is denying any abdominal pain and he is draining clear effluent.  The patient is currently being treated with intravenous vancomycin for lower extremity open wounds with the patient with a history of venous stasis dermatitis  lymphedema.    OBJECTIVE:  Current Weight: Weight - Scale: 117 kg (257 lb 15 oz)  Vitals:    09/22/24 0035 09/22/24 0600 09/22/24 0603 09/22/24 0630   BP:   108/53    BP Location:   Left arm    Pulse: 74  70 71   Resp:   16    Temp:       TempSrc:       SpO2: 94%  94% 93%   Weight:  117 kg (257 lb 15 oz)     Height:           Intake/Output Summary (Last 24 hours) at 9/22/2024 0649  Last data filed at 9/22/2024 0630  Gross per 24 hour   Intake 935 ml   Output 1775 ml   Net -840 ml     General: Patient is resting comfortably no acute distress.  HEENT: Normocephalic atraumatic  Neck: Supple   Pulmonary: Patient is lying supine and is not in any acute distress.  There is no conversational dyspnea or accessory muscle use noted.  Cardiac: Pulse ranges in the 70s  Extremities: Venous stasis dermatitis noted lower extremity edema improved from when I seen the patient before, he does have baseline lymphedema.  When the patient was seen by the hospitalist it was noted the patient had warm tender open wounds for which the patient had been started on vancomycin September 18.  Neurologic: No focal deficits.  Medications:    Current Facility-Administered Medications:     acetaminophen (TYLENOL) tablet 650 mg, 650 mg, Oral, Q6H PRN, Magdiel Holguin MD    allopurinol (ZYLOPRIM) tablet 300 mg, 300 mg, Oral, Daily, Magdiel Holguin MD, 300 mg at 09/21/24 0832    ascorbic acid (VITAMIN C) tablet 500 mg, 500 mg, Oral, Daily, Magdiel Holguin MD, 500 mg at 09/21/24 0832    aspirin (ECOTRIN LOW STRENGTH) EC tablet 81 mg, 81 mg, Oral, Daily, Magdiel Holguin MD, 81 mg at 09/21/24 0832    atorvastatin (LIPITOR) tablet 40 mg, 40 mg, Oral, Daily With Dinner, Magdiel Holguin MD, 40 mg at 09/21/24 1707    bisacodyl (DULCOLAX) rectal suppository 10 mg, 10 mg, Rectal, Daily, Thae Rutledge MD    cinacalcet (SENSIPAR) tablet 30 mg, 30 mg, Oral, Daily With Dinner, Magdiel Holguin MD, 30 mg at 09/21/24 6426    co-enzyme  Q-10 capsule 100 mg, 100 mg, Oral, Daily, Magdiel Holguin MD, 100 mg at 09/21/24 0832    docusate sodium (COLACE) capsule 100 mg, 100 mg, Oral, BID, Thea Rutledge MD, 100 mg at 09/21/24 0832    ferrous sulfate tablet 325 mg, 325 mg, Oral, Daily With Breakfast, Magdiel Holguin MD, 325 mg at 09/21/24 0830    furosemide (LASIX) tablet 80 mg, 80 mg, Oral, Daily, Ze Doty DO, 80 mg at 09/21/24 0831    gentamicin (GARAMYCIN) 0.1 % cream, , Topical, Daily, GAGANDEEP Lin, Given at 09/21/24 0831    HYDROcodone-acetaminophen (NORCO) 5-325 mg per tablet 2 tablet, 2 tablet, Oral, Q6H PRN, Magdiel Holguin MD, 2 tablet at 09/22/24 0603    insulin glargine (LANTUS) subcutaneous injection 10 Units 0.1 mL, 10 Units, Subcutaneous, HS, Nora Mathew MD, 10 Units at 09/21/24 2154    insulin lispro (HumALOG/ADMELOG) 100 units/mL subcutaneous injection 2-12 Units, 2-12 Units, Subcutaneous, TID AC **AND** Fingerstick Glucose (POCT), , , TID AC, Nora Mathew MD    levothyroxine tablet 125 mcg, 125 mcg, Oral, Daily, Magdiel Holguin MD, 125 mcg at 09/22/24 0600    lidocaine (LIDODERM) 5 % patch 1 patch, 1 patch, Topical, Daily, Moses Noel MD, 1 patch at 09/18/24 1155    magnesium Oxide (MAG-OX) tablet 400 mg, 400 mg, Oral, Daily, Magdiel Holguin MD, 400 mg at 09/21/24 0832    metoprolol succinate (TOPROL-XL) 24 hr tablet 25 mg, 25 mg, Oral, BID, Nora Mathew MD, 25 mg at 09/21/24 2154    midodrine (PROAMATINE) tablet 15 mg, 15 mg, Oral, TID AC, Magdiel Holguin MD, 15 mg at 09/22/24 0603    ondansetron (ZOFRAN) injection 4 mg, 4 mg, Intravenous, Q6H PRN, Nora Mathew MD    polyethylene glycol (MIRALAX) packet 17 g, 17 g, Oral, Daily PRN, Thea Rutledge MD    potassium chloride (Klor-Con M20) CR tablet 40 mEq, 40 mEq, Oral, Daily, Jignesh Hussein MD, 40 mEq at 09/21/24 0831    simethicone (MYLICON) chewable tablet 80 mg, 80 mg, Oral, 4x Daily PRN, Magdiel Holguin,  MD    tamsulosin (FLOMAX) capsule 0.4 mg, 0.4 mg, Oral, Daily With Dinner, Magdiel Holguin MD, 0.4 mg at 09/21/24 1707    vancomycin (VANCOCIN) IVPB (premix in dextrose) 1,000 mg 200 mL, 10 mg/kg (Adjusted), Intravenous, Daily PRN, Nora Mathew MD    warfarin (COUMADIN) tablet 0.5 mg, 0.5 mg, Oral, Before Dinner, Nora Mathew MD, 0.5 mg at 09/21/24 1700    Laboratory Results:  Results from last 7 days   Lab Units 09/22/24  0558 09/21/24  0624 09/20/24  0603 09/19/24  0625 09/18/24  0631 09/17/24  0626 09/16/24  0541 09/15/24  1559   WBC Thousand/uL 6.18  --  9.50 5.16 4.40 4.17* 3.89* 4.43   HEMOGLOBIN g/dL 7.6*  --  8.3* 8.2* 8.4* 8.6* 8.5* 9.5*   HEMATOCRIT % 24.9*  --  27.1* 26.5* 27.4* 28.3* 27.2* 30.3*   PLATELETS Thousands/uL 119*  --  109* 106* 130* 112* 127* 126*   POTASSIUM mmol/L 4.1 4.1 3.9 3.6 3.4* 2.9* 2.7* 3.2*   CHLORIDE mmol/L 101 100 100 102 99 97 96 94*   CO2 mmol/L 31 31 31 30 31 31 31 31   BUN mg/dL 29* 29* 26* 26* 27* 28* 29* 27*   CREATININE mg/dL 4.41* 4.48* 4.59* 4.84* 5.14* 5.61* 5.78* 5.68*   CALCIUM mg/dL 8.4 8.4 8.5 8.4 8.6 8.9 9.2 9.8   MAGNESIUM mg/dL  --  2.2 1.8*  --  2.0 1.7* 1.5*  --    PHOSPHORUS mg/dL  --  2.7 2.8  --  3.4  --   --   --

## 2024-09-22 NOTE — OCCUPATIONAL THERAPY NOTE
Occupational Therapy Progress Note     Patient Name: Masoud Perry  Today's Date: 9/22/2024  Problem List  Principal Problem:    Ambulatory dysfunction  Active Problems:    History of CVA (cerebrovascular accident)    HLD (hyperlipidemia)    Hypothyroidism    Anemia    Type 2 diabetes mellitus with stage 4 chronic kidney disease, with long-term current use of insulin (HCC)    Obesity, morbid (HCC)    Secondary hyperparathyroidism of renal origin (HCC)    Paroxysmal atrial fibrillation (HCC)    Patient on peritoneal dialysis (HCC)    ESRD (end stage renal disease) (HCC)    Chronic acquired lymphedema    Hypotension    Gout    Moderate protein-calorie malnutrition (HCC)    Cellulitis    Hyponatremia    Medication management        09/22/24 0913   Note Type   Note Type Treatment   Pain Assessment   Pain Assessment Tool 0-10   Pain Score No Pain   Restrictions/Precautions   Other Precautions Chair Alarm;Bed Alarm;Fall Risk   ADL   LB Dressing Assistance 4  Minimal Assistance   LB Dressing Deficit Setup;Supervision/safety;Increased time to complete;Don/doff R shoe;Don/doff L shoe   LB Dressing Comments Pt requiring Ernesto to don slip on shoes. Based on abilities assessed, pt demo ability to complete majority of LB dressing with maxA due to decreased distal reach.   Bed Mobility   Supine to Sit 4  Minimal assistance   Additional items Assist x 1;Increased time required;Verbal cues   Additional Comments Pt completed supine to sit bed mobility with Ernesto with HOB elevated and use of bed rails for trunk managment. Cuing required for sequencing.   Transfers   Sit to Stand 4  Minimal assistance   Additional items Assist x 1;Increased time required;Verbal cues   Stand to Sit   (Steadying assist)   Additional items Increased time required;Verbal cues   Stand pivot   (Steadying assist)   Additional items Increased time required;Verbal cues   Additional Comments Pt completed all functional transfers using RW. Pt completed STS from  EOB with Ernesto with bed elevated. Pt declining to attempt transfer without bed elevated. Pt completed EOB<>recliner SPT with steadying assist and cuing for safe RW management. Pt declining to participate in any other standing.   Functional Mobility   Additional Comments Pt declining to participate in any functional mobility this date.   Therapeutic Excerise-Strength   UE Strength No  (Patient declining)   Cognition   Overall Cognitive Status WFL   Arousal/Participation Alert;Responsive   Attention Attends with cues to redirect   Orientation Level Oriented X4   Memory Within functional limits   Following Commands Follows one step commands without difficulty   Comments Self limiting   Activity Tolerance   Activity Tolerance   (Self limiting)   Medical Staff Made Aware RN   Assessment   Assessment Pt seen for OT treatment day 2. Pt requiring encouragement for participation in session focused on bed mobility, ADLs and functional transfers. Pt completing bed mobility with Ernesto. Ernesto required to don slip on shoes while sitting EOB. Based on functional abilities assessed, pt demo ability to complete UB ADLs with SUP/SBA and LB ADLs with Ernesto-maxA. Pt completed STS transfer from EOB with Ernesto and SPT with steadying assist using RW. Pt limited by self limiting behaviors and decreased motivation to participate. Pt requires additional OT services to increase strength, endurance and balance for improved IND during ADLs, functional transfers and mobility. Discharge recommendation remains Level II (moderate resource intensity) therapy. Pt sitting in recliner with chair alarm activated, call bell placed within reach and all needs met following session.   Plan   Treatment Interventions ADL retraining;Functional transfer training;UE strengthening/ROM;Endurance training;Patient/family training;Energy conservation;Activityengagement   Goal Expiration Date 09/30/24   OT Treatment Day 2   OT Frequency 3-5x/wk   Discharge Recommendation    Rehab Resource Intensity Level, OT II (Moderate Resource Intensity)   AM-PAC Daily Activity Inpatient   Lower Body Dressing 2   Bathing 2   Toileting 2   Upper Body Dressing 2   Grooming 3   Eating 4   Daily Activity Raw Score 15   Daily Activity Standardized Score (Calc for Raw Score >=11) 34.69   AM-PAC Applied Cognition Inpatient   Following a Speech/Presentation 3   Understanding Ordinary Conversation 4   Taking Medications 3   Remembering Where Things Are Placed or Put Away 4   Remembering List of 4-5 Errands 3   Taking Care of Complicated Tasks 3   Applied Cognition Raw Score 20   Applied Cognition Standardized Score 41.76     Pt goals to be met by 9/30/2024     Pt will demonstrate ability to complete grooming/hygiene tasks @ Mod I after set-up.  Pt will demonstrate ability to complete supine<>sit @ Mod I in order to increase safety and independence during ADL tasks.  Pt will demonstrate ability to complete UB ADLs including washing/dressing @ Mod I in order to increase performance and participation during meaningful tasks  Pt will demonstrate ability to complete LB dressing @ Min A in order to increase safety and independence during meaningful tasks.   Pt will demonstrate ability to complete toileting tasks including CM and pericare @ Min A in order to increase safety and independence during meaningful tasks.  Pt will demonstrate ability to complete EOB, chair, toilet/commode transfers @ Min A in order to increase performance and participation during functional tasks.  Pt will demonstrate ability to stand for 3-5 minutes while maintaining Fair + balance with use of RW for UB support PRN.  Pt will demonstrate ability to tolerate 30-35 minute OT session with no vc'ing for deep breathing or use of energy conservation techniques in order to increase activity tolerance during functional tasks.   Pt will demonstrate Good carryover of use of energy conservation/compensatory strategies during ADLs and functional  tasks in order to increase safety and reduce risk for falls.   Pt will demonstrate Good attention and participation in continued evaluation of functional ambulation house hold distances in order to assist with safe d/c planning.  Pt will attend to continued cognitive assessments 100% of the time in order to provide most appropriate d/c recommendations.   Pt will follow 100% simple 2-step commands and be A&O x4 consistently with environmental cues to increase participation in functional activities.   Pt will identify 3 areas of interest/hobbies and 1 intervention on how to incorporate into daily life in order to increase interaction with environment and peers as well as increase participation in meaningful tasks.   Pt will demonstrate 100% carryover of BUE HEP in order to increase BUE MS and increase performance during functional tasks upon d/c home.    Keyla Vargas, OTR/L

## 2024-09-22 NOTE — PLAN OF CARE
Problem: OCCUPATIONAL THERAPY ADULT  Goal: Performs self-care activities at highest level of function for planned discharge setting.  See evaluation for individualized goals.  Description: Treatment Interventions: ADL retraining, Visual perceptual retraining, Functional transfer training, UE strengthening/ROM, Endurance training, Cognitive reorientation, Patient/family training, Equipment evaluation/education, Fine motor coordination activities, Neuromuscular reeducation, Compensatory technique education, UE splinting, Cardiac education, Continued evaluation, Energy conservation, Activityengagement          See flowsheet documentation for full assessment, interventions and recommendations.   9/22/2024 0940 by Keyla Vargas OT  Outcome: Progressing  Note: Limitation: Decreased ADL status, Decreased endurance, Decreased high-level ADLs, Decreased self-care trans, Decreased Safe judgement during ADL  Prognosis: Good  Assessment: Pt seen for OT treatment day 2. Pt requiring encouragement for participation in session focused on bed mobility, ADLs and functional transfers. Pt completing bed mobility with Ernesto. Ernesto required to don slip on shoes while sitting EOB. Based on functional abilities assessed, pt demo ability to complete UB ADLs with SUP/SBA and LB ADLs with Ernesto-maxA. Pt completed STS transfer from EOB with Ernesto and SPT with steadying assist using RW. Pt limited by self limiting behaviors and decreased motivation to participate. Pt requires additional OT services to increase strength, endurance and balance for improved IND during ADLs, functional transfers and mobility. Discharge recommendation remains Level II (moderate resource intensity) therapy. Pt sitting in recliner with chair alarm activated, call bell placed within reach and all needs met following session.     Rehab Resource Intensity Level, OT: II (Moderate Resource Intensity)       9/22/2024 0939 by Keyla Vargas OT  Note: Limitation: Decreased  ADL status, Decreased endurance, Decreased high-level ADLs, Decreased self-care trans, Decreased Safe judgement during ADL  Prognosis: Good  Assessment: Pt seen for OT treatment day 2. Pt requiring encouragement for participation in session focused on bed mobility, ADLs and functional transfers. Pt completing bed mobility with Ernesto. Ernesto required to don slip on shoes while sitting EOB. Based on functional abilities assessed, pt demo ability to complete UB ADLs with SUP/SBA and LB ADLs with Ernesto-maxA. Pt completed STS transfer from EOB with Ernesto and SPT with steadying assist using RW. Pt limited by self limiting behaviors and decreased motivation to participate. Pt requires additional OT services to increase strength, endurance and balance for improved IND during ADLs, functional transfers and mobility. Discharge recommendation remains Level II (moderate resource intensity) therapy. Pt sitting in recliner with chair alarm activated, call bell placed within reach and all needs met following session.     Rehab Resource Intensity Level, OT: II (Moderate Resource Intensity)

## 2024-09-22 NOTE — PROGRESS NOTES
Masoud Perry is a 69 y.o. male who is currently ordered Vancomycin IV with management by the Pharmacy Consult service.  Relevant clinical data and objective / subjective history reviewed.  Vancomycin Assessment:  Indication and Goal AUC/Trough: Soft tissue (goal -600, trough >10), Soft tissue (goal random level < or = 15)  Clinical Status: stable  Micro:     Renal Function:  SCr: 4.41 mg/dL  CrCl: 20 mL/min  Renal replacement: PD  Days of Therapy: 5  Current Dose: 1000 mg IV daily prn random level < or = 15; dose given today  Vancomycin Plan:  New Dosin mg IV daily prn random level < or = 15  Next Level: 24 at 0600  Renal Function Monitoring: Daily BMP and UOP  Pharmacy will continue to follow closely for s/sx of nephrotoxicity, infusion reactions and appropriateness of therapy.  BMP and CBC will be ordered per protocol. We will continue to follow the patient’s culture results and clinical progress daily.    Reyna Cobos, Pharmacist

## 2024-09-22 NOTE — PLAN OF CARE
Problem: PAIN - ADULT  Goal: Verbalizes/displays adequate comfort level or baseline comfort level  Description: Interventions:  - Encourage patient to monitor pain and request assistance  - Assess pain using appropriate pain scale  - Administer analgesics based on type and severity of pain and evaluate response  - Implement non-pharmacological measures as appropriate and evaluate response  - Consider cultural and social influences on pain and pain management  - Notify physician/advanced practitioner if interventions unsuccessful or patient reports new pain  Outcome: Progressing     Problem: INFECTION - ADULT  Goal: Absence or prevention of progression during hospitalization  Description: INTERVENTIONS:  - Assess and monitor for signs and symptoms of infection  - Monitor lab/diagnostic results  - Monitor all insertion sites, i.e. indwelling lines, tubes, and drains  - Administer medications as ordered  - Instruct and encourage patient and family to use good hand hygiene technique  - Identify and instruct in appropriate isolation precautions for identified infection/condition  Outcome: Progressing     Problem: SAFETY ADULT  Goal: Patient will remain free of falls  Description: INTERVENTIONS:  - Educate patient/family on patient safety including physical limitations  - Instruct patient to call for assistance with activity   - Consult OT/PT to assist with strengthening/mobility   - Keep Call bell within reach  - Keep bed low and locked with side rails adjusted as appropriate  - Keep care items and personal belongings within reach  - Initiate and maintain comfort rounds  - Make Fall Risk Sign visible to staff  - Offer Toileting every 2 Hours, in advance of need  - Initiate/Maintain bed alarm  - Obtain necessary fall risk management equipment: yellow bracelet, yellow socks, bed alarm  - Apply yellow socks and bracelet for high fall risk patients  - Consider moving patient to room near nurses station  Outcome:  Progressing  Goal: Maintain or return to baseline ADL function  Description: INTERVENTIONS:  -  Assess patient's ability to carry out ADLs; assess patient's baseline for ADL function and identify physical deficits which impact ability to perform ADLs (bathing, care of mouth/teeth, toileting, grooming, dressing, etc.)  - Assess/evaluate cause of self-care deficits   - Assess range of motion  - Assess patient's mobility; develop plan if impaired  - Assess patient's need for assistive devices and provide as appropriate  - Encourage maximum independence but intervene and supervise when necessary  - Involve family in performance of ADLs  - Assess for home care needs following discharge   - Consider OT consult to assist with ADL evaluation and planning for discharge  - Provide patient education as appropriate  Outcome: Progressing  Goal: Maintains/Returns to pre admission functional level  Description: INTERVENTIONS:  - Perform AM-PAC 6 Click Basic Mobility/ Daily Activity assessment daily.  - Set and communicate daily mobility goal to care team and patient/family/caregiver.   - Collaborate with rehabilitation services on mobility goals if consulted  - Reposition patient every 2 hours.  - Out of bed for toileting  - Record patient progress and toleration of activity level   Outcome: Progressing     Problem: DISCHARGE PLANNING  Goal: Discharge to home or other facility with appropriate resources  Description: INTERVENTIONS:  - Identify barriers to discharge w/patient and caregiver  - Arrange for needed discharge resources and transportation as appropriate  - Identify discharge learning needs (meds, wound care, etc.)  - Arrange for interpretive services to assist at discharge as needed  - Refer to Case Management Department for coordinating discharge planning if the patient needs post-hospital services based on physician/advanced practitioner order or complex needs related to functional status, cognitive ability, or social  support system  Outcome: Progressing     Problem: Knowledge Deficit  Goal: Patient/family/caregiver demonstrates understanding of disease process, treatment plan, medications, and discharge instructions  Description: Complete learning assessment and assess knowledge base.  Interventions:  - Provide teaching at level of understanding  - Provide teaching via preferred learning methods  Outcome: Progressing     Problem: NEUROSENSORY - ADULT  Goal: Achieves stable or improved neurological status  Description: INTERVENTIONS  - Monitor and report changes in neurological status  - Monitor vital signs such as temperature, blood pressure, glucose, and any other labs ordered     Outcome: Progressing     Problem: CARDIOVASCULAR - ADULT  Goal: Maintains optimal cardiac output and hemodynamic stability  Description: INTERVENTIONS:  - Monitor I/O, vital signs and rhythm  - Monitor for S/S and trends of decreased cardiac output  - Administer and titrate ordered vasoactive medications to optimize hemodynamic stability  - Assess quality of pulses, skin color and temperature  - Assess for signs of decreased coronary artery perfusion  - Instruct patient to report change in severity of symptoms  Outcome: Progressing  Goal: Absence of cardiac dysrhythmias or at baseline rhythm  Description: INTERVENTIONS:  - Continuous cardiac monitoring, vital signs, obtain 12 lead EKG if ordered  - Administer antiarrhythmic and heart rate control medications as ordered  - Monitor electrolytes and administer replacement therapy as ordered  Outcome: Progressing     Problem: RESPIRATORY - ADULT  Goal: Achieves optimal ventilation and oxygenation  Description: INTERVENTIONS:  - Assess for changes in respiratory status  - Assess for changes in mentation and behavior  - Position to facilitate oxygenation and minimize respiratory effort  - Oxygen administered by appropriate delivery if ordered  - Initiate smoking cessation education as indicated  - Encourage  broncho-pulmonary hygiene including cough, deep breathe, Incentive Spirometry  - Assess the need for suctioning and aspirate as needed  - Assess and instruct to report SOB or any respiratory difficulty  - Respiratory Therapy support as indicated  Outcome: Progressing     Problem: GASTROINTESTINAL - ADULT  Goal: Minimal or absence of nausea and/or vomiting  Description: INTERVENTIONS:  - Administer IV fluids if ordered to ensure adequate hydration  - Maintain NPO status until nausea and vomiting are resolved  - Nasogastric tube if ordered  - Administer ordered antiemetic medications as needed  - Provide nonpharmacologic comfort measures as appropriate  - Advance diet as tolerated, if ordered  - Consider nutrition services referral to assist patient with adequate nutrition and appropriate food choices  Outcome: Progressing  Goal: Maintains adequate nutritional intake  Description: INTERVENTIONS:  - Monitor percentage of each meal consumed  - Identify factors contributing to decreased intake, treat as appropriate  - Assist with meals as needed  - Monitor I&O, weight, and lab values if indicated  - Obtain nutrition services referral as needed  Outcome: Progressing  Goal: Oral mucous membranes remain intact  Description: INTERVENTIONS  - Assess oral mucosa and hygiene practices  - Implement preventative oral hygiene regimen  - Implement oral medicated treatments as ordered  - Initiate Nutrition services referral as needed  Outcome: Progressing

## 2024-09-22 NOTE — PHYSICAL THERAPY NOTE
"   PHYSICAL THERAPY TREATMENT NOTE  NAME:  Masoud Perry  DATE: 09/22/24    Length Of Stay: 7  Performed at least 2 patient identifiers during session: Name and Birthday    TREATMENT FLOW SHEET:    09/22/24 1133   PT Last Visit   PT Visit Date 09/22/24   Note Type   Note Type Treatment for insurance authorization   Pain Assessment   Pain Assessment Tool 0-10   Pain Score No Pain   Restrictions/Precautions   Weight Bearing Precautions Per Order No   Other Precautions Chair Alarm;Bed Alarm;Fall Risk;Multiple lines   General   Chart Reviewed Yes   Response to Previous Treatment Patient with no complaints from previous session.   Family/Caregiver Present No   Cognition   Overall Cognitive Status WFL   Arousal/Participation Alert;Responsive   Attention Attends with cues to redirect   Orientation Level Oriented X4   Memory Within functional limits   Following Commands Follows one step commands without difficulty   Subjective   Subjective \"I need to use the bathroom, hurry up\"   Bed Mobility   Sit to Supine 4  Minimal assistance   Additional items Increased time required;Verbal cues;LE management   Additional Comments Pt completed sit > supine with use of leg , increased time and VC's at min A x 1 overall.   Transfers   Sit to Stand 4  Minimal assistance   Additional items Assist x 1;Increased time required;Verbal cues   Stand to Sit   (steadying A)   Additional items Assist x 1;Increased time required;Verbal cues   Stand pivot   (steadying A)   Additional items Assist x 1;Increased time required;Verbal cues   Toilet transfer 4  Minimal assistance   Additional items Assist x 1;Increased time required;Verbal cues;Commode   Additional Comments pt completed all functional transfers with use of Rw for UB support.  Sit > stand from bedside chair as well as BSC @ min A x 1 with increased time and VC's for hand placement.  Stand > sit and SPT steadying A x 1 with RW, increased time and cues for sequencing/hand placement. "   Ambulation/Elevation   Gait pattern Improper Weight shift;Wide FREDO;Decreased foot clearance;Short stride;Excessively slow;Decreased heel strike;Decreased hip extension   Gait Assistance   (steadying A)   Additional items Assist x 1;Verbal cues   Assistive Device Rolling walker   Distance Patient ambulated 22' and 10' in room with use of Rw at steadying A x 1 with increased time and VC's for proper Ad management, weight shift and foot clearance.   Balance   Static Sitting Good   Dynamic Sitting Fair +   Static Standing Fair   Dynamic Standing Fair -   Ambulatory Fair -   Endurance Deficit   Endurance Deficit Yes   Endurance Deficit Description Pt noted to be BENITEZ with actiivty.  Pt on RA t/o session with SPO2 98%.  Pt also reported mild dizziness follwing transfer/ambulation.  BP assessed at 103/66.  HT    Activity Tolerance   Activity Tolerance Patient limited by fatigue   Medical Staff Made Aware Spoke with OTKeyla   Nurse Made Aware Spoke with RNGale   Assessment   Prognosis Good   Problem List Decreased strength;Decreased endurance;Impaired balance;Decreased mobility;Impaired judgement;Decreased safety awareness;Obesity;Decreased skin integrity;Pain   Barriers to Discharge Other (Comment);Decreased caregiver support   Barriers to Discharge Comments Stairs, lives alone, increased A required, decreased activity tolerance   Goals   PT Treatment Day 3   Plan   Treatment/Interventions ADL retraining;Functional transfer training;LE strengthening/ROM;Elevations;Therapeutic exercise;Endurance training;Patient/family training;Equipment eval/education;Bed mobility;Gait training;Compensatory technique education;Spoke to nursing;OT   Progress Progressing toward goals   PT Frequency 3-5x/wk   Discharge Recommendation   Rehab Resource Intensity Level, PT II (Moderate Resource Intensity)   Equipment Recommended Walker  (pt owns)   AM-PAC Basic Mobility Inpatient   Turning in Flat Bed Without Bedrails 3   Lying on  Back to Sitting on Edge of Flat Bed Without Bedrails 3   Moving Bed to Chair 3   Standing Up From Chair Using Arms 3   Walk in Room 3   Climb 3-5 Stairs With Railing 1   Basic Mobility Inpatient Raw Score 16   Basic Mobility Standardized Score 38.32   [facility] Highest Level Of Mobility   -[facility] Goal 5: Stand one or more mins   -[facility] Achieved 6: Walk 10 steps or more   Education   Education Provided Mobility training;Assistive device   Patient Reinforcement needed   End of Consult   Patient Position at End of Consult Supine;Bed/Chair alarm activated;All needs within reach       Patient seen for skilled PT session 3 this date with interventions to include therapeutic activity for bed mobility, functional transfer training, balance and endurance progression, vitals monitoring/assessment in response to activity and gait training with use of RW.  Pt noted to demonstrate positive progress toward goals this date with decreased overall level of A required for bed mobility as well as improved functional activity tolerance along with increased participation with ambulation.  Pt c/o mild dizziness following second STS/bout of ambulation as well as mild BENITEZ noted.  BP assessed at 103/66, Spo2 98% on RA and  bpm.  Pt supine in bed with alarm intact and all needs within reach at end of session.  RN present to initiate peritoneal dialysis.  Pt should cont skilled PT during his acute stay as he is functioning below his baseline f/b transition to level II/ moderate resources via PAR following D/C.  The patient's AM-PAC Basic Mobility Inpatient Short Form Raw Score is 16. A Raw score of less than or equal to 16 suggests the patient may benefit from discharge to post-acute rehabilitation services. Please also refer to the recommendation of the Physical Therapist for safe discharge planning.         Sheila Sandoval, PT, MSPT

## 2024-09-22 NOTE — PROGRESS NOTES
Progress Note - Hospitalist   Name: Masoud Perry 69 y.o. male I MRN: [id number]  Unit/Bed#: -01 I Date of Admission: 9/15/2024   Date of Service: 9/22/2024 I Hospital Day: 7    Assessment & Plan  Ambulatory dysfunction  Been weak for 2 weeks, difficulty getting out of a chair, and fell today hitting the back of his head.  At baseline he walks with a walker  PT/OT consult- needs rehab patient agreeble  Case management consultation for acute rehab  ESRD (end stage renal disease) (HCC)  On peritoneal dialysis.  Will consult nephrology.  I did have a discussion with the patient about why he is not on hemodialysis, patient states he was told that he would have to go to the dialysis clinic 4-5 times a week for 6 to 8 hours/day.  I am not certain about this, but at the patient did have hemodialysis patient would be able to find multiple acute rehabs that would accept him for for rehab.  An option would be to start hemodialysis here for rehab    This provider discussed with patient regarding hemodialysis.  Patient reports he prefers to stay with current treatment with peritoneal dialysis.    Lab Results   Component Value Date    EGFR 12 09/22/2024    EGFR 12 09/21/2024    EGFR 11 09/20/2024    CREATININE 4.41 (H) 09/22/2024    CREATININE 4.48 (H) 09/21/2024    CREATININE 4.59 (H) 09/20/2024   Torsemide changed to lasix by nephrology today secondary to suspicious that torsemide could have kept the INR high  Patient on peritoneal dialysis ([facility])  Nephrology he is fluid culture from the dialysis catheter fluid is negative  Paroxysmal atrial fibrillation ([facility])  Continue metoprolol and currently on Coumadin.  He states that the Coumadin doses have been changing recently.  Currently not on his home med rec.  Continue Coumadin 0.5 mg daily for now and as of today INR still remains therapeutic.  Type 2 diabetes mellitus with stage 4 chronic kidney disease, with long-term current use of insulin ([facility])  Sugars stable continue  Lantus 10 units at night and sliding scale.  Sugars have been controlled    Lab Results   Component Value Date    HGBA1C 5.9 (H) 07/24/2024     Hypothyroidism  Continue levothyroxine  HLD (hyperlipidemia)  Continue statin  Hypotension  Continue midodrine 15 mg 3 times daily blood pressure stable  Secondary hyperparathyroidism of renal origin (HCC)  Continue Sensipar   Chronic acquired lymphedema  Continue torsemide  Gout  Continue allopurinol  History of CVA (cerebrovascular accident)  Continue aspirin , INR is down to 3 he was going up with 1 mg of Coumadin which was giving it daily but the torsemide could affect INR as discussed with nephrology hence he was changed to Lasix currently will restart him on 0.5 mg of Coumadin daily and observe for his INR INR remains therapeutic  Obesity, morbid (HCC)  BMI of 37.14  Anemia  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 seems base above 8 -he status post 3 doses of Venofer.  Hemoglobin dropped to 7.6.  Will reevaluate tomorrow if drops further we will need a blood transfusion continue iron p.o.  Moderate protein-calorie malnutrition (HCC)  Malnutrition Findings:   Adult Malnutrition type: Chronic illness  Adult Degree of Malnutrition: Malnutrition of moderate degree  Malnutrition Characteristics: Inadequate energy, Muscle loss                  360 Statement: malnutrition of moderate degree in setting of chronic illness, evidenced by <75% energy intake compared to estimated needs for > 1 month, muscle wasting/indented temples, protruding clavicles, treated with diet, PT refusing supplements currently    BMI Findings:           Body mass index is 35.98 kg/m².     Cellulitis  Warm tender open wounds- started vanco 9/18 today the erythema and tenderness improved.  Will treat for 3/7 days total  Hyponatremia  Sodium is normal today at 135  Medication management      VTE Pharmacologic Prophylaxis: VTE Score: 5 High Risk (Score >/= 5) - Pharmacological DVT Prophylaxis Ordered: warfarin  (Coumadin). Sequential Compression Devices Ordered.    Mobility:   Basic Mobility Inpatient Raw Score: 13  JH-HLM Goal: 4: Move to chair/commode  JH-HLM Achieved: 2: Bed activities/Dependent transfer  JH-HLM Goal NOT achieved. Continue with multidisciplinary rounding and encourage appropriate mobility to improve upon JH-HLM goals.    Patient Centered Rounds: I performed bedside rounds with nursing staff today.   Discussions with Specialists or Other Care Team Provider: Reviewed nephrology input    Education and Discussions with Family / Patient: Patient declined call to .     Current Length of Stay: 7 day(s)  Current Patient Status: Inpatient   Certification Statement: The patient will continue to require additional inpatient hospital stay due to secondary to placement  Discharge Plan: Anticipate discharge in 48 hrs to rehab facility.    Code Status: Level 3 - DNAR and DNI    Subjective   Patient seen and examined no complaints    Objective     Vitals:   Temp (24hrs), Av.2 °F (36.8 °C), Min:98.1 °F (36.7 °C), Max:98.4 °F (36.9 °C)    Temp:  [98.1 °F (36.7 °C)-98.4 °F (36.9 °C)] 98.1 °F (36.7 °C)  HR:  [70-78] 74  Resp:  [16-18] 17  BP: (102-110)/(53-56) 102/53  SpO2:  [93 %-95 %] 94 %  Body mass index is 35.98 kg/m².     Input and Output Summary (last 24 hours):     Intake/Output Summary (Last 24 hours) at 2024 0920  Last data filed at 2024 0630  Gross per 24 hour   Intake 760 ml   Output 1575 ml   Net -815 ml       Physical Exam  Constitutional:       Appearance: He is well-developed.   HENT:      Head: Normocephalic and atraumatic.   Eyes:      Pupils: Pupils are equal, round, and reactive to light.   Cardiovascular:      Rate and Rhythm: Normal rate and regular rhythm.      Heart sounds: Normal heart sounds.   Pulmonary:      Effort: Pulmonary effort is normal. No respiratory distress.      Breath sounds: Normal breath sounds. No wheezing or rales.   Abdominal:      General: Bowel  sounds are normal.      Palpations: Abdomen is soft.   Musculoskeletal:         General: Swelling (Chronic lymphedema tenderness warmth and erythema has improved erythema is actually gone) present. Normal range of motion.      Cervical back: Normal range of motion.   Skin:     General: Skin is warm.   Neurological:      Mental Status: He is alert and oriented to person, place, and time.      Deep Tendon Reflexes: Reflexes are normal and symmetric.      Comments: cranial nerve 2-12 are normal.  Normal neurological exam          Lines/Drains:  Lines/Drains/Airways       Active Status       None                            Lab Results: I have reviewed the following results: CBC/BMP:   .     09/22/24  0558   WBC 6.18   HGB 7.6*   HCT 24.9*   *   SODIUM 135   K 4.1      CO2 31   BUN 29*   CREATININE 4.41*   GLUC 94       Results from last 7 days   Lab Units 09/22/24  0558   WBC Thousand/uL 6.18   HEMOGLOBIN g/dL 7.6*   HEMATOCRIT % 24.9*   PLATELETS Thousands/uL 119*   SEGS PCT % 64   LYMPHO PCT % 25   MONO PCT % 5   EOS PCT % 4     Results from last 7 days   Lab Units 09/22/24  0558 09/18/24  0631 09/17/24  0626   SODIUM mmol/L 135   < > 135   POTASSIUM mmol/L 4.1   < > 2.9*   CHLORIDE mmol/L 101   < > 97   CO2 mmol/L 31   < > 31   BUN mg/dL 29*   < > 28*   CREATININE mg/dL 4.41*   < > 5.61*   ANION GAP mmol/L 3*   < > 7   CALCIUM mg/dL 8.4   < > 8.9   ALBUMIN g/dL  --   --  2.2*   TOTAL BILIRUBIN mg/dL  --   --  0.25   ALK PHOS U/L  --   --  100   ALT U/L  --   --  13   AST U/L  --   --  18   GLUCOSE RANDOM mg/dL 94   < > 96    < > = values in this interval not displayed.     Results from last 7 days   Lab Units 09/22/24  0558   INR  2.57*     Results from last 7 days   Lab Units 09/22/24  0721 09/21/24  2118 09/21/24  1624 09/21/24  1106 09/21/24  0709 09/20/24  2124 09/20/24  1629 09/20/24  1120 09/20/24  0711 09/19/24 2059 09/19/24  1543 09/19/24  1148   POC GLUCOSE mg/dl 114 117 107 117 111 120 105 133  94 127 132 91               Recent Cultures (last 7 days):   Results from last 7 days   Lab Units 09/19/24  0828   GRAM STAIN RESULT  No organisms seen   BODY FLUID CULTURE, STERILE  No growth       Imaging Review: No pertinent imaging studies reviewed.  Other Studies: No additional pertinent studies reviewed.    Last 24 Hours Medication List:     Current Facility-Administered Medications:     acetaminophen (TYLENOL) tablet 650 mg, Q6H PRN    allopurinol (ZYLOPRIM) tablet 300 mg, Daily    ascorbic acid (VITAMIN C) tablet 500 mg, Daily    aspirin (ECOTRIN LOW STRENGTH) EC tablet 81 mg, Daily    atorvastatin (LIPITOR) tablet 40 mg, Daily With Dinner    bisacodyl (DULCOLAX) rectal suppository 10 mg, Daily    cinacalcet (SENSIPAR) tablet 30 mg, Daily With Dinner    co-enzyme Q-10 capsule 100 mg, Daily    docusate sodium (COLACE) capsule 100 mg, BID    ferrous sulfate tablet 325 mg, Daily With Breakfast    furosemide (LASIX) tablet 80 mg, Daily    gentamicin (GARAMYCIN) 0.1 % cream, Daily    HYDROcodone-acetaminophen (NORCO) 5-325 mg per tablet 2 tablet, Q6H PRN    insulin glargine (LANTUS) subcutaneous injection 10 Units 0.1 mL, HS    insulin lispro (HumALOG/ADMELOG) 100 units/mL subcutaneous injection 2-12 Units, TID AC **AND** Fingerstick Glucose (POCT), TID AC    levothyroxine tablet 125 mcg, Daily    lidocaine (LIDODERM) 5 % patch 1 patch, Daily    magnesium Oxide (MAG-OX) tablet 400 mg, Daily    metoprolol succinate (TOPROL-XL) 24 hr tablet 25 mg, BID    midodrine (PROAMATINE) tablet 15 mg, TID AC    ondansetron (ZOFRAN) injection 4 mg, Q6H PRN    polyethylene glycol (MIRALAX) packet 17 g, Daily PRN    potassium chloride (Klor-Con M20) CR tablet 40 mEq, Daily    simethicone (MYLICON) chewable tablet 80 mg, 4x Daily PRN    tamsulosin (FLOMAX) capsule 0.4 mg, Daily With Dinner    vancomycin (VANCOCIN) IVPB (premix in dextrose) 1,000 mg 200 mL, Once    [START ON 9/23/2024] vancomycin (VANCOCIN) IVPB (premix in  dextrose) 1,000 mg 200 mL, Daily PRN    warfarin (COUMADIN) tablet 0.5 mg, Before Dinner    Administrative Statements   Today, Patient Was Seen By: Nora Mathew MD  I have spent a total time of >35 minutes in caring for this patient on the day of the visit/encounter including Impressions, Documenting in the medical record, and Communicating with other healthcare professionals .    **Please Note: This note may have been constructed using a voice recognition system.**

## 2024-09-22 NOTE — PLAN OF CARE
Problem: PHYSICAL THERAPY ADULT  Goal: Performs mobility at highest level of function for planned discharge setting.  See evaluation for individualized goals.  Description: Treatment/Interventions: ADL retraining, Functional transfer training, LE strengthening/ROM, Elevations, Therapeutic exercise, Endurance training, Bed mobility, Gait training, OT, Family, Spoke to nursing  Equipment Recommended: Walker (pt owns)       See flowsheet documentation for full assessment, interventions and recommendations.  Outcome: Progressing  Note: Prognosis: Good  Problem List: Decreased strength, Decreased endurance, Impaired balance, Decreased mobility, Impaired judgement, Decreased safety awareness, Obesity, Decreased skin integrity, Pain  Assessment: Patient seen for skilled PT session 3 this date with interventions to include therapeutic activity for bed mobility, functional transfer training, balance and endurance progression, vitals monitoring/assessment in response to activity and gait training with use of RW.  Pt noted to demonstrate positive progress toward goals this date with decreased overall level of A required for bed mobility as well as improved functional activity tolerance along with increased participation with ambulation.  Pt c/o mild dizziness following second STS/bout of ambulation as well as mild BENITEZ noted.  BP assessed at 103/66, Spo2 98% on RA and  bpm.  Pt supine in bed with alarm intact and all needs within reach at end of session.  RN present to initiate peritoneal dialysis.  Pt should cont skilled PT during his acute stay as he is functioning below his baseline f/b transition to level II/ moderate resources via PAR following D/C.  Barriers to Discharge: Other (Comment), Decreased caregiver support  Barriers to Discharge Comments: Stairs, lives alone, increased A required, decreased activity tolerance  Rehab Resource Intensity Level, PT: II (Moderate Resource Intensity)    See flowsheet  documentation for full assessment.

## 2024-09-23 ENCOUNTER — TELEPHONE (OUTPATIENT)
Dept: OTHER | Facility: OTHER | Age: 69
End: 2024-09-23

## 2024-09-23 VITALS
HEIGHT: 71 IN | HEART RATE: 86 BPM | BODY MASS INDEX: 35.52 KG/M2 | SYSTOLIC BLOOD PRESSURE: 96 MMHG | TEMPERATURE: 97.8 F | RESPIRATION RATE: 16 BRPM | OXYGEN SATURATION: 95 % | WEIGHT: 253.75 LBS | DIASTOLIC BLOOD PRESSURE: 55 MMHG

## 2024-09-23 LAB
ANION GAP SERPL CALCULATED.3IONS-SCNC: 3 MMOL/L (ref 4–13)
ATRIAL RATE: 70 BPM
BASOPHILS # BLD AUTO: 0.04 THOUSANDS/ΜL (ref 0–0.1)
BASOPHILS NFR BLD AUTO: 1 % (ref 0–1)
BUN SERPL-MCNC: 28 MG/DL (ref 5–25)
CALCIUM SERPL-MCNC: 8.5 MG/DL (ref 8.4–10.2)
CHLORIDE SERPL-SCNC: 102 MMOL/L (ref 96–108)
CO2 SERPL-SCNC: 31 MMOL/L (ref 21–32)
CREAT SERPL-MCNC: 4.45 MG/DL (ref 0.6–1.3)
EOSINOPHIL # BLD AUTO: 0.27 THOUSAND/ΜL (ref 0–0.61)
EOSINOPHIL NFR BLD AUTO: 5 % (ref 0–6)
ERYTHROCYTE [DISTWIDTH] IN BLOOD BY AUTOMATED COUNT: 15.5 % (ref 11.6–15.1)
GFR SERPL CREATININE-BSD FRML MDRD: 12 ML/MIN/1.73SQ M
GLUCOSE SERPL-MCNC: 87 MG/DL (ref 65–140)
GLUCOSE SERPL-MCNC: 98 MG/DL (ref 65–140)
HCT VFR BLD AUTO: 25.3 % (ref 36.5–49.3)
HGB BLD-MCNC: 7.7 G/DL (ref 12–17)
IMM GRANULOCYTES # BLD AUTO: 0.04 THOUSAND/UL (ref 0–0.2)
IMM GRANULOCYTES NFR BLD AUTO: 1 % (ref 0–2)
INR PPP: 2.42 (ref 0.85–1.19)
LYMPHOCYTES # BLD AUTO: 1.69 THOUSANDS/ΜL (ref 0.6–4.47)
LYMPHOCYTES NFR BLD AUTO: 31 % (ref 14–44)
MCH RBC QN AUTO: 31.8 PG (ref 26.8–34.3)
MCHC RBC AUTO-ENTMCNC: 30.4 G/DL (ref 31.4–37.4)
MCV RBC AUTO: 105 FL (ref 82–98)
MONOCYTES # BLD AUTO: 0.3 THOUSAND/ΜL (ref 0.17–1.22)
MONOCYTES NFR BLD AUTO: 6 % (ref 4–12)
NEUTROPHILS # BLD AUTO: 3.06 THOUSANDS/ΜL (ref 1.85–7.62)
NEUTS SEG NFR BLD AUTO: 56 % (ref 43–75)
NRBC BLD AUTO-RTO: 0 /100 WBCS
P AXIS: 14 DEGREES
PLATELET # BLD AUTO: 145 THOUSANDS/UL (ref 149–390)
PMV BLD AUTO: 9 FL (ref 8.9–12.7)
POTASSIUM SERPL-SCNC: 4.2 MMOL/L (ref 3.5–5.3)
PR INTERVAL: 178 MS
PROTHROMBIN TIME: 26.4 SECONDS (ref 12.3–15)
QRS AXIS: -32 DEGREES
QRSD INTERVAL: 152 MS
QT INTERVAL: 424 MS
QTC INTERVAL: 457 MS
RBC # BLD AUTO: 2.42 MILLION/UL (ref 3.88–5.62)
SODIUM SERPL-SCNC: 136 MMOL/L (ref 135–147)
T WAVE AXIS: 17 DEGREES
VANCOMYCIN SERPL-MCNC: 20.7 UG/ML (ref 10–20)
VENTRICULAR RATE: 70 BPM
WBC # BLD AUTO: 5.4 THOUSAND/UL (ref 4.31–10.16)

## 2024-09-23 PROCEDURE — 99232 SBSQ HOSP IP/OBS MODERATE 35: CPT | Performed by: INTERNAL MEDICINE

## 2024-09-23 PROCEDURE — 85610 PROTHROMBIN TIME: CPT | Performed by: FAMILY MEDICINE

## 2024-09-23 PROCEDURE — 99239 HOSP IP/OBS DSCHRG MGMT >30: CPT | Performed by: FAMILY MEDICINE

## 2024-09-23 PROCEDURE — 82948 REAGENT STRIP/BLOOD GLUCOSE: CPT

## 2024-09-23 PROCEDURE — 80048 BASIC METABOLIC PNL TOTAL CA: CPT | Performed by: FAMILY MEDICINE

## 2024-09-23 PROCEDURE — 85025 COMPLETE CBC W/AUTO DIFF WBC: CPT | Performed by: FAMILY MEDICINE

## 2024-09-23 PROCEDURE — 99232 SBSQ HOSP IP/OBS MODERATE 35: CPT | Performed by: FAMILY MEDICINE

## 2024-09-23 PROCEDURE — 93010 ELECTROCARDIOGRAM REPORT: CPT | Performed by: INTERNAL MEDICINE

## 2024-09-23 PROCEDURE — 80202 ASSAY OF VANCOMYCIN: CPT | Performed by: FAMILY MEDICINE

## 2024-09-23 RX ORDER — ACETAMINOPHEN 325 MG/1
650 TABLET ORAL EVERY 6 HOURS PRN
Start: 2024-09-23

## 2024-09-23 RX ORDER — FUROSEMIDE 80 MG
80 TABLET ORAL DAILY
Start: 2024-09-24

## 2024-09-23 RX ORDER — CINACALCET 30 MG/1
30 TABLET, FILM COATED ORAL 3 TIMES WEEKLY
Start: 2024-09-25

## 2024-09-23 RX ORDER — VANCOMYCIN HYDROCHLORIDE 1 G/200ML
10 INJECTION, SOLUTION INTRAVENOUS DAILY PRN
Status: DISCONTINUED | OUTPATIENT
Start: 2024-09-24 | End: 2024-09-23 | Stop reason: HOSPADM

## 2024-09-23 RX ORDER — INSULIN LISPRO 100 [IU]/ML
2-12 INJECTION, SOLUTION INTRAVENOUS; SUBCUTANEOUS
Start: 2024-09-23

## 2024-09-23 RX ORDER — WARFARIN SODIUM 1 MG/1
0.5 TABLET ORAL
Start: 2024-09-23

## 2024-09-23 RX ORDER — DOCUSATE SODIUM 100 MG/1
100 CAPSULE, LIQUID FILLED ORAL 2 TIMES DAILY
Start: 2024-09-23

## 2024-09-23 RX ORDER — AMOXICILLIN AND CLAVULANATE POTASSIUM 250; 125 MG/1; MG/1
1 TABLET, FILM COATED ORAL EVERY 12 HOURS SCHEDULED
Start: 2024-09-23 | End: 2024-09-27

## 2024-09-23 RX ORDER — HYDROCODONE BITARTRATE AND ACETAMINOPHEN 5; 325 MG/1; MG/1
2 TABLET ORAL EVERY 6 HOURS PRN
Qty: 20 TABLET | Refills: 0 | Status: SHIPPED | OUTPATIENT
Start: 2024-09-23 | End: 2024-10-03

## 2024-09-23 RX ORDER — INSULIN GLARGINE 100 [IU]/ML
10 INJECTION, SOLUTION SUBCUTANEOUS
Start: 2024-09-23

## 2024-09-23 RX ORDER — POTASSIUM CHLORIDE 1500 MG/1
40 TABLET, EXTENDED RELEASE ORAL DAILY
Start: 2024-09-24

## 2024-09-23 RX ORDER — CINACALCET 30 MG/1
30 TABLET, FILM COATED ORAL 3 TIMES WEEKLY
Status: DISCONTINUED | OUTPATIENT
Start: 2024-09-25 | End: 2024-09-23 | Stop reason: HOSPADM

## 2024-09-23 RX ORDER — METOPROLOL SUCCINATE 25 MG/1
25 TABLET, EXTENDED RELEASE ORAL 2 TIMES DAILY
Start: 2024-09-23

## 2024-09-23 RX ADMIN — TAMSULOSIN HYDROCHLORIDE 0.4 MG: 0.4 CAPSULE ORAL at 16:09

## 2024-09-23 RX ADMIN — ALLOPURINOL 300 MG: 300 TABLET ORAL at 08:41

## 2024-09-23 RX ADMIN — Medication 100 MG: at 08:41

## 2024-09-23 RX ADMIN — MIDODRINE HYDROCHLORIDE 15 MG: 5 TABLET ORAL at 16:09

## 2024-09-23 RX ADMIN — MAGNESIUM GLUCONATE 500 MG ORAL TABLET 400 MG: 500 TABLET ORAL at 08:41

## 2024-09-23 RX ADMIN — GENTAMICIN SULFATE 1 APPLICATION: 1 CREAM TOPICAL at 08:43

## 2024-09-23 RX ADMIN — MIDODRINE HYDROCHLORIDE 15 MG: 5 TABLET ORAL at 12:22

## 2024-09-23 RX ADMIN — LEVOTHYROXINE SODIUM 125 MCG: 125 TABLET ORAL at 06:08

## 2024-09-23 RX ADMIN — POTASSIUM CHLORIDE 40 MEQ: 1500 TABLET, EXTENDED RELEASE ORAL at 08:41

## 2024-09-23 RX ADMIN — OXYCODONE HYDROCHLORIDE AND ACETAMINOPHEN 500 MG: 500 TABLET ORAL at 08:41

## 2024-09-23 RX ADMIN — FUROSEMIDE 80 MG: 80 TABLET ORAL at 08:41

## 2024-09-23 RX ADMIN — HYDROCODONE BITARTRATE AND ACETAMINOPHEN 2 TABLET: 5; 325 TABLET ORAL at 12:22

## 2024-09-23 RX ADMIN — ATORVASTATIN CALCIUM 40 MG: 40 TABLET, FILM COATED ORAL at 16:09

## 2024-09-23 RX ADMIN — HYDROCODONE BITARTRATE AND ACETAMINOPHEN 2 TABLET: 5; 325 TABLET ORAL at 06:08

## 2024-09-23 RX ADMIN — MIDODRINE HYDROCHLORIDE 15 MG: 5 TABLET ORAL at 06:08

## 2024-09-23 RX ADMIN — ASPIRIN 81 MG: 81 TABLET, COATED ORAL at 08:41

## 2024-09-23 RX ADMIN — METOPROLOL SUCCINATE 25 MG: 25 TABLET, EXTENDED RELEASE ORAL at 08:41

## 2024-09-23 RX ADMIN — FERROUS SULFATE TAB 325 MG (65 MG ELEMENTAL FE) 325 MG: 325 (65 FE) TAB at 08:41

## 2024-09-23 RX ADMIN — HYDROCODONE BITARTRATE AND ACETAMINOPHEN 2 TABLET: 5; 325 TABLET ORAL at 00:19

## 2024-09-23 RX ADMIN — WARFARIN SODIUM 0.5 MG: 1 TABLET ORAL at 16:09

## 2024-09-23 NOTE — PROGRESS NOTES
"Progress Note - Nephrology   Name: Masoud Perry 69 y.o. male I MRN: [id number]  Unit/Bed#: -01 I Date of Admission: 9/15/2024   Date of Service: 9/23/2024 I Hospital Day: 8     Assessment & Plan  ESRD (end stage renal disease) ([facility])  Lab Results   Component Value Date    EGFR 12 09/23/2024    EGFR 12 09/22/2024    EGFR 12 09/21/2024    CREATININE 4.45 (H) 09/23/2024    CREATININE 4.41 (H) 09/22/2024    CREATININE 4.48 (H) 09/21/2024   1 episode of a cloudy fluid he had a cell count and culture done WBC count was 55, his fluid has been clear since then, no fevers, no chills, no abdominal pain  He is currently on CAPD with 2 L fills every 6 hours and 2.5% dextrose solutions  Ultrafiltration has been acceptable  Dynamics have been acceptable  Weights are slightly up at 115 kg from his \"baseline of 112 but overall he is stable  Continuing on current CAPD prescription  Hypotension  His blood pressures were reviewed, his maps have been above 65  His blood pressures are 96/55 to 110/53  Currently on midodrine 15 mg 3 times daily which we can continue  Chronic acquired lymphedema  Now on furosemide 80 mg daily  Monitor urine output and residual renal function  Hyponatremia  Sodium level is now improved to 136  Anemia  The patient is on Coumadin for A-fib no documented history of thrombosis  Remains on vancomycin  Based on problem list patient has a history of thrombophilia due to acquired protein C deficiency as well as a factor V Leiden mutation would need hematology evaluation prior to initiating any JUAN FRANCISCO  Can replete iron once off antibiotics and may be transfusion dependent continue to monitor  Secondary hyperparathyroidism of renal origin (HCC)  Continue to monitor phosphorus  Thelma on Cinacalcet  PTH 95.2 on September 18 with a calcium of 8.5  Will decrease Cinacalcet to 3 times weekly    Cellulitis  Remains on vancomycin  Remains therapeutic at 20.7  Targeting a range of 15-20  Ambulatory dysfunction  Per " "primary service going for rehab    Patient on peritoneal dialysis (HCC)  As above continuing peritoneal dialysis    Type 2 diabetes mellitus with stage 4 chronic kidney disease, with long-term current use of insulin (HCC)  Lab Results   Component Value Date    HGBA1C 5.9 (H) 07/24/2024       Recent Labs     09/22/24  1206 09/22/24  1634 09/22/24  2115 09/23/24  0723   POCGLU 115 113 113 87   Following glucose levels with the patient being on CAPD.    Blood Sugar Average: Last 72 hrs:  (P) [id number]    Medication management  Doses of torsemide was changed because of the interaction with current Coumadin to furosemide  Paroxysmal atrial fibrillation (HCC)  Rate control with metoprolol  Anticoagulation with Coumadin     Ok for discharge from Nephrology service perspective.  I discussed overall plan with Dr. Mathew of [facility] and we were in agreement with continuing CAPD    Portions of the record may have been created with voice recognition software. Occasional wrong word or \"sound a like\" substitutions may have occurred due to the inherent limitations of voice recognition software. Read the chart carefully and recognize, using context, where substitutions have occurred.If you have any questions, please contact the dictating provider.    SUBJECTIVE:    The patient was seen today.  He is resting comfortably and he has no acute complaints.  Tolerating his dialysis.  States his liquids have been clear    12 point review of systems was otherwise negative besides what is mentioned above.    Medications:    Current Facility-Administered Medications:     acetaminophen (TYLENOL) tablet 650 mg, 650 mg, Oral, Q6H PRN, Magdiel Holguin MD    allopurinol (ZYLOPRIM) tablet 300 mg, 300 mg, Oral, Daily, Magdiel Holguin MD, 300 mg at 09/23/24 0841    ascorbic acid (VITAMIN C) tablet 500 mg, 500 mg, Oral, Daily, Magdiel Holguin MD, 500 mg at 09/23/24 0841    aspirin (ECOTRIN LOW STRENGTH) EC tablet 81 mg, 81 mg, " Oral, Daily, Magdiel Holguin MD, 81 mg at 09/23/24 0841    atorvastatin (LIPITOR) tablet 40 mg, 40 mg, Oral, Daily With Dinner, Magdiel Holguin MD, 40 mg at 09/22/24 1621    bisacodyl (DULCOLAX) rectal suppository 10 mg, 10 mg, Rectal, Daily, Thea Rutledge MD    cinacalcet (SENSIPAR) tablet 30 mg, 30 mg, Oral, Daily With Dinner, Magdiel Holguin MD, 30 mg at 09/22/24 1620    co-enzyme Q-10 capsule 100 mg, 100 mg, Oral, Daily, Magdiel Holguin MD, 100 mg at 09/23/24 0841    docusate sodium (COLACE) capsule 100 mg, 100 mg, Oral, BID, Thea Rutledge MD, 100 mg at 09/22/24 0938    ferrous sulfate tablet 325 mg, 325 mg, Oral, Daily With Breakfast, Magdiel Holguin MD, 325 mg at 09/23/24 0841    furosemide (LASIX) tablet 80 mg, 80 mg, Oral, Daily, Ze Doty DO, 80 mg at 09/23/24 0841    gentamicin (GARAMYCIN) 0.1 % cream, , Topical, Daily, GAGANDEEP Lin, 1 Application at 09/23/24 0843    HYDROcodone-acetaminophen (NORCO) 5-325 mg per tablet 2 tablet, 2 tablet, Oral, Q6H PRN, Magdiel Holguin MD, 2 tablet at 09/23/24 0608    insulin glargine (LANTUS) subcutaneous injection 10 Units 0.1 mL, 10 Units, Subcutaneous, HS, Nora Mathew MD, 10 Units at 09/22/24 2302    insulin lispro (HumALOG/ADMELOG) 100 units/mL subcutaneous injection 2-12 Units, 2-12 Units, Subcutaneous, TID AC **AND** Fingerstick Glucose (POCT), , , TID AC, Nora Mathew MD    levothyroxine tablet 125 mcg, 125 mcg, Oral, Daily, Magdiel Holguin MD, 125 mcg at 09/23/24 0608    lidocaine (LIDODERM) 5 % patch 1 patch, 1 patch, Topical, Daily, Moses Noel MD, 1 patch at 09/18/24 1155    magnesium Oxide (MAG-OX) tablet 400 mg, 400 mg, Oral, Daily, Magdiel Holguin MD, 400 mg at 09/23/24 0841    metoprolol succinate (TOPROL-XL) 24 hr tablet 25 mg, 25 mg, Oral, BID, Nora Mathew MD, 25 mg at 09/23/24 0841    midodrine (PROAMATINE) tablet 15 mg, 15 mg, Oral, TID AC, Magdiel Holguin MD, 15 mg  at 09/23/24 0608    ondansetron (ZOFRAN) injection 4 mg, 4 mg, Intravenous, Q6H PRN, Nora Mathew MD    polyethylene glycol (MIRALAX) packet 17 g, 17 g, Oral, Daily PRN, Thea Rutledge MD    potassium chloride (Klor-Con M20) CR tablet 40 mEq, 40 mEq, Oral, Daily, Jignesh Hussein MD, 40 mEq at 09/23/24 0841    simethicone (MYLICON) chewable tablet 80 mg, 80 mg, Oral, 4x Daily PRN, Magdiel Holguin MD    tamsulosin (FLOMAX) capsule 0.4 mg, 0.4 mg, Oral, Daily With Dinner, Magdiel Holguin MD, 0.4 mg at 09/22/24 1619    [START ON 9/24/2024] vancomycin (VANCOCIN) IVPB (premix in dextrose) 1,000 mg 200 mL, 10 mg/kg (Adjusted), Intravenous, Daily PRN, Nora Mathew MD    warfarin (COUMADIN) tablet 0.5 mg, 0.5 mg, Oral, Before Dinner, Nora Mathew MD, 0.5 mg at 09/22/24 1620    OBJECTIVE:    Vitals:    09/22/24 2033 09/23/24 0210 09/23/24 0715 09/23/24 0724   BP: 105/52 110/56  96/55   BP Location: Left arm Left arm  Left arm   Pulse: 79 66  70   Resp: 16 14  16   Temp:  97.5 °F (36.4 °C)  97.8 °F (36.6 °C)   TempSrc:  Oral  Temporal   SpO2:  (!) 86%  98%   Weight:   115 kg (253 lb 12 oz)    Height:            Temp:  [97.5 °F (36.4 °C)-99.1 °F (37.3 °C)] 97.8 °F (36.6 °C)  HR:  [66-87] 70  Resp:  [14-16] 16  BP: ()/(52-66) 96/55  SpO2:  [86 %-98 %] 98 %     Body mass index is 35.39 kg/m².    Weight (last 2 days)       Date/Time Weight    09/23/24 0715 115 (253.75)    09/22/24 0600 117 (257.94)    09/21/24 0645 116 (256.17)            I/O last 3 completed shifts:  In: 700 [P.O.:700]  Out: 2675 [Urine:425; Other:2250]    I/O this shift:  In: -   Out: -500       Physical exam:    General: no acute distress, cooperative  Eyes: conjunctivae pink, anicteric sclerae  ENT: lips and mucous membranes moist, no exudates, normal external ears  Neck: ROM intact, no JVD  Chest: No respiratory distress, no accessory muscle use  CVS: normal rate, non pericardial friction rub  Abdomen: soft, non-tender, non-distended,  normoactive bowel sounds  Extremities: no edema of both legs  Skin: no rash  Neuro: awake, alert, oriented, grossly intact  Psych:  Pleasant affect    Invasive Devices:      Lab Results:   Results from last 7 days   Lab Units 09/23/24  0527 09/22/24  0558 09/21/24  0624 09/20/24  0603 09/19/24  0625 09/18/24  0631 09/17/24  0626   WBC Thousand/uL 5.40 6.18  --  9.50 5.16 4.40 4.17*   HEMOGLOBIN g/dL 7.7* 7.6*  --  8.3* 8.2* 8.4* 8.6*   HEMATOCRIT % 25.3* 24.9*  --  27.1* 26.5* 27.4* 28.3*   PLATELETS Thousands/uL 145* 119*  --  109* 106* 130* 112*   POTASSIUM mmol/L 4.2 4.1 4.1 3.9 3.6 3.4* 2.9*   CHLORIDE mmol/L 102 101 100 100 102 99 97   CO2 mmol/L 31 31 31 31 30 31 31   BUN mg/dL 28* 29* 29* 26* 26* 27* 28*   CREATININE mg/dL 4.45* 4.41* 4.48* 4.59* 4.84* 5.14* 5.61*   CALCIUM mg/dL 8.5 8.4 8.4 8.5 8.4 8.6 8.9   MAGNESIUM mg/dL  --   --  2.2 1.8*  --  2.0 1.7*   PHOSPHORUS mg/dL  --   --  2.7 2.8  --  3.4  --    ALK PHOS U/L  --   --   --   --   --   --  100   ALT U/L  --   --   --   --   --   --  13   AST U/L  --   --   --   --   --   --  18

## 2024-09-23 NOTE — CASE MANAGEMENT
Case Management Discharge Planning Note    Patient name Masoud Goregh  Location /-01 MRN [id number]  : 1955 Date 2024       Current Admission Date: 9/15/2024  Current Admission Diagnosis:Ambulatory dysfunction   Patient Active Problem List    Diagnosis Date Noted Date Diagnosed    Medication management 2024     Hyponatremia 2024     Cellulitis 2024     Moderate protein-calorie malnutrition (HCC) 2024     Ambulatory dysfunction 09/15/2024     Lactic acidosis 2024     Hypercalcemia 2024     Chronic acquired lymphedema 2024     Hypotension 2024     Nocturnal hypoxia 2024     Elevated LFTs 2024     Patient on peritoneal dialysis ([facility]) 2024     Hypervolemia 2024     ESRD (end stage renal disease) ([facility]) 2024     Bacteremia 2024     Cellulitis of right lower extremity 2024     Paroxysmal atrial fibrillation ([facility]) 2024     Bilateral lower extremity edema 2023     Ureteral stone with hydronephrosis 2022     Cutaneous abscess of buttock 2022     Chronic kidney disease-mineral and bone disorder 2022     Type 2 diabetes mellitus with stage 4 chronic kidney disease, with long-term current use of insulin ([facility]) 07/15/2022     Obesity, morbid ([facility]) 07/15/2022     Secondary hyperparathyroidism of renal origin ([facility]) 07/15/2022     Stage 5 chronic kidney disease on peritoneal dialysis ([facility]) 07/15/2022     Factor 5 Leiden mutation, heterozygous ([facility]) 01/15/2022     Thrombocytopenia ([facility]) 2022     Angina at rest 2022     MI (myocardial infarction) ([facility]) 2022     History of PTCA 2022     Sleep apnea 2022     Smoking 2022     Anemia 2022     History of CVA (cerebrovascular accident) 2022     Acute on chronic diastolic HF (heart failure) ([facility]) 2022     HLD (hyperlipidemia) 2022     Lymphedema 2022     Hypothyroidism 2022      COPD (chronic obstructive pulmonary disease) ([facility]) 12/31/2018     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 (HCC) 06/29/2016     Gastroesophageal reflux disease 06/29/2016     Gout 06/29/2016     Personal history of pulmonary embolism 06/29/2016     ED (erectile dysfunction) of organic origin 01/14/2014     History of thromboembolism of vein 01/10/2013       LOS (days): 8  Geometric Mean LOS (GMLOS) (days): 4.5  Days to GMLOS:-3.3     OBJECTIVE:  Risk of Unplanned Readmission Score: 43.57         Current admission status: Inpatient   Preferred Pharmacy:   [facility] - [place], [place] - [address]  [address]  [place] [address]  Phone: [phone] Fax: [phone]    Primary Care Provider: Jignesh Baker DO    [facility]  Secondary Insurance:     DISCHARGE DETAILS:                                                                                                               Facility Insurance Auth Number: [id number]

## 2024-09-23 NOTE — PROGRESS NOTES
Progress Note - Hospitalist   Name: Masoud Perry 69 y.o. male I MRN: [id number]  Unit/Bed#: -01 I Date of Admission: 9/15/2024   Date of Service: 9/23/2024 I Hospital Day: 8    Assessment & Plan  Ambulatory dysfunction  Been weak for 2 weeks, difficulty getting out of a chair, and fell today hitting the back of his head.  At baseline he walks with a walker  PT/OT consult- needs rehab patient agreeble  Case management consultation for acute rehab  ESRD (end stage renal disease) (HCC)  On peritoneal dialysis.  Will consult nephrology.  I did have a discussion with the patient about why he is not on hemodialysis, patient states he was told that he would have to go to the dialysis clinic 4-5 times a week for 6 to 8 hours/day.  I am not certain about this, but at the patient did have hemodialysis patient would be able to find multiple acute rehabs that would accept him for for rehab.  An option would be to start hemodialysis here for rehab    This provider discussed with patient regarding hemodialysis.  Patient reports he prefers to stay with current treatment with peritoneal dialysis.    Lab Results   Component Value Date    EGFR 12 09/23/2024    EGFR 12 09/22/2024    EGFR 12 09/21/2024    CREATININE 4.45 (H) 09/23/2024    CREATININE 4.41 (H) 09/22/2024    CREATININE 4.48 (H) 09/21/2024   Torsemide changed to lasix by nephrology today secondary to suspicious that torsemide could have kept the INR high  Patient on peritoneal dialysis ([facility])  Nephrology he is fluid culture from the dialysis catheter fluid is negative  Paroxysmal atrial fibrillation ([facility])  Continue metoprolol and currently on Coumadin.  He states that the Coumadin doses have been changing recently.  Currently not on his home med rec.  Continue Coumadin 0.5 mg daily for now and as of today INR still remains therapeutic.  Type 2 diabetes mellitus with stage 4 chronic kidney disease, with long-term current use of insulin ([facility])  Sugars stable continue  Lantus 10 units at night and sliding scale.  Sugars have been controlled    Lab Results   Component Value Date    HGBA1C 5.9 (H) 07/24/2024     Hypothyroidism  Continue levothyroxine  HLD (hyperlipidemia)  Continue statin  Hypotension  Continue midodrine 15 mg 3 times daily blood pressure stable  Secondary hyperparathyroidism of renal origin (HCC)  Continue Sensipar   Chronic acquired lymphedema  Continue torsemide  Gout  Continue allopurinol  History of CVA (cerebrovascular accident)  Continue aspirin , INR is down to 3 he was going up with 1 mg of Coumadin which was giving it daily but the torsemide could affect INR as discussed with nephrology hence he was changed to Lasix currently will restart him on 0.5 mg of Coumadin daily and observe for his INR INR remains therapeutic  Obesity, morbid (HCC)  BMI of 37.14  Anemia  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 seems base above 8 -he status post 3 doses of Venofer.  Hemoglobin dropped to 7.7.  continue po iron   Moderate protein-calorie malnutrition (HCC)  Malnutrition Findings:   Adult Malnutrition type: Chronic illness  Adult Degree of Malnutrition: Malnutrition of moderate degree  Malnutrition Characteristics: Inadequate energy, Muscle loss                  360 Statement: malnutrition of moderate degree in setting of chronic illness, evidenced by <75% energy intake compared to estimated needs for > 1 month, muscle wasting/indented temples, protruding clavicles, treated with diet, PT refusing supplements currently    BMI Findings:           Body mass index is 35.39 kg/m².     Cellulitis  Warm tender open wounds- started vanco 9/18 today the erythema and tenderness improved.  Will treat for 4/7 days total  Hyponatremia  Sodium is normal today at 136  Medication management      VTE Pharmacologic Prophylaxis: VTE Score: 5 High Risk (Score >/= 5) - Pharmacological DVT Prophylaxis Ordered: warfarin (Coumadin). Sequential Compression Devices Ordered.    Mobility:   Basic Mobility  Inpatient Raw Score: 16  JH-HLM Goal: 5: Stand one or more mins  JH-HLM Achieved: 6: Walk 10 steps or more  JH-HLM Goal NOT achieved. Continue with multidisciplinary rounding and encourage appropriate mobility to improve upon JH-HLM goals.    Patient Centered Rounds: I performed bedside rounds with nursing staff today.   Discussions with Specialists or Other Care Team Provider: nephro ,cm     Education and Discussions with Family / Patient: pt no new updates for family     Current Length of Stay: 8 day(s)  Current Patient Status: Inpatient   Certification Statement: The patient will continue to require additional inpatient hospital stay due to await rehab placement   Discharge Plan: Anticipate discharge in 24-48 hrs to rehab facility.    Code Status: Level 3 - DNAR and DNI    Subjective   Seen and examined no new complaints     Objective     Vitals:   Temp (24hrs), Av.1 °F (36.7 °C), Min:97.5 °F (36.4 °C), Max:99.1 °F (37.3 °C)    Temp:  [97.5 °F (36.4 °C)-99.1 °F (37.3 °C)] 97.8 °F (36.6 °C)  HR:  [66-87] 70  Resp:  [14-16] 16  BP: ()/(52-66) 96/55  SpO2:  [86 %-98 %] 98 %  Body mass index is 35.39 kg/m².     Input and Output Summary (last 24 hours):     Intake/Output Summary (Last 24 hours) at 2024 0935  Last data filed at 2024 0725  Gross per 24 hour   Intake 120 ml   Output 1000 ml   Net -880 ml       Physical Exam  Constitutional:       Appearance: He is well-developed.   HENT:      Head: Normocephalic and atraumatic.   Eyes:      Pupils: Pupils are equal, round, and reactive to light.   Cardiovascular:      Rate and Rhythm: Normal rate and regular rhythm.      Heart sounds: Normal heart sounds.   Pulmonary:      Effort: Pulmonary effort is normal. No respiratory distress.      Breath sounds: Normal breath sounds. No wheezing or rales.   Abdominal:      General: Bowel sounds are normal. There is no distension.      Palpations: Abdomen is soft.   Musculoskeletal:         General: Swelling  (chronic at baseline) present. Normal range of motion.      Cervical back: Normal range of motion.   Skin:     General: Skin is warm.   Neurological:      Mental Status: He is alert and oriented to person, place, and time.      Deep Tendon Reflexes: Reflexes are normal and symmetric.      Comments: cranial nerve 2-12 are normal.  Normal neurological exam          Lines/Drains:  Lines/Drains/Airways       Active Status       None                            Lab Results: I have reviewed the following results: CBC/BMP:   .     09/22/24  0948 09/23/24  0527   WBC  --  5.40   HGB  --  7.7*   HCT  --  25.3*   PLT  --  145*   SODIUM  --  136   K  --  4.2   CL  --  102   CO2  --  31   BUN  --  28*   CREATININE  --  4.45*   GLUC  --  98   CAIONIZED 1.22  --        Results from last 7 days   Lab Units 09/23/24  0527   WBC Thousand/uL 5.40   HEMOGLOBIN g/dL 7.7*   HEMATOCRIT % 25.3*   PLATELETS Thousands/uL 145*   SEGS PCT % 56   LYMPHO PCT % 31   MONO PCT % 6   EOS PCT % 5     Results from last 7 days   Lab Units 09/23/24  0527 09/18/24  0631 09/17/24  0626   SODIUM mmol/L 136   < > 135   POTASSIUM mmol/L 4.2   < > 2.9*   CHLORIDE mmol/L 102   < > 97   CO2 mmol/L 31   < > 31   BUN mg/dL 28*   < > 28*   CREATININE mg/dL 4.45*   < > 5.61*   ANION GAP mmol/L 3*   < > 7   CALCIUM mg/dL 8.5   < > 8.9   ALBUMIN g/dL  --   --  2.2*   TOTAL BILIRUBIN mg/dL  --   --  0.25   ALK PHOS U/L  --   --  100   ALT U/L  --   --  13   AST U/L  --   --  18   GLUCOSE RANDOM mg/dL 98   < > 96    < > = values in this interval not displayed.     Results from last 7 days   Lab Units 09/23/24  0527   INR  2.42*     Results from last 7 days   Lab Units 09/23/24  0723 09/22/24  2115 09/22/24  1634 09/22/24  1206 09/22/24  0721 09/21/24  2118 09/21/24  1624 09/21/24  1106 09/21/24  0709 09/20/24  2124 09/20/24  1629 09/20/24  1120   POC GLUCOSE mg/dl 87 113 113 115 114 117 107 117 111 120 105 133               Recent Cultures (last 7 days):   Results from  last 7 days   Lab Units 09/19/24  0828   GRAM STAIN RESULT  No organisms seen   BODY FLUID CULTURE, STERILE  No growth       Imaging Review: No pertinent imaging studies reviewed.  Other Studies: No additional pertinent studies reviewed.    Last 24 Hours Medication List:     Current Facility-Administered Medications:     acetaminophen (TYLENOL) tablet 650 mg, Q6H PRN    allopurinol (ZYLOPRIM) tablet 300 mg, Daily    ascorbic acid (VITAMIN C) tablet 500 mg, Daily    aspirin (ECOTRIN LOW STRENGTH) EC tablet 81 mg, Daily    atorvastatin (LIPITOR) tablet 40 mg, Daily With Dinner    bisacodyl (DULCOLAX) rectal suppository 10 mg, Daily    cinacalcet (SENSIPAR) tablet 30 mg, Daily With Dinner    co-enzyme Q-10 capsule 100 mg, Daily    docusate sodium (COLACE) capsule 100 mg, BID    ferrous sulfate tablet 325 mg, Daily With Breakfast    furosemide (LASIX) tablet 80 mg, Daily    gentamicin (GARAMYCIN) 0.1 % cream, Daily    HYDROcodone-acetaminophen (NORCO) 5-325 mg per tablet 2 tablet, Q6H PRN    insulin glargine (LANTUS) subcutaneous injection 10 Units 0.1 mL, HS    insulin lispro (HumALOG/ADMELOG) 100 units/mL subcutaneous injection 2-12 Units, TID AC **AND** Fingerstick Glucose (POCT), TID AC    levothyroxine tablet 125 mcg, Daily    lidocaine (LIDODERM) 5 % patch 1 patch, Daily    magnesium Oxide (MAG-OX) tablet 400 mg, Daily    metoprolol succinate (TOPROL-XL) 24 hr tablet 25 mg, BID    midodrine (PROAMATINE) tablet 15 mg, TID AC    ondansetron (ZOFRAN) injection 4 mg, Q6H PRN    polyethylene glycol (MIRALAX) packet 17 g, Daily PRN    potassium chloride (Klor-Con M20) CR tablet 40 mEq, Daily    simethicone (MYLICON) chewable tablet 80 mg, 4x Daily PRN    tamsulosin (FLOMAX) capsule 0.4 mg, Daily With Dinner    [START ON 9/24/2024] vancomycin (VANCOCIN) IVPB (premix in dextrose) 1,000 mg 200 mL, Daily PRN    warfarin (COUMADIN) tablet 0.5 mg, Before Dinner    Administrative Statements   Today, Patient Was Seen By: Nora  MD [place]  I have spent a total time of >35 minutes in caring for this patient on the day of the visit/encounter including Documenting in the medical record, Reviewing / ordering tests, medicine, procedures  , and Communicating with other healthcare professionals .    **Please Note: This note may have been constructed using a voice recognition system.**

## 2024-09-23 NOTE — CASE MANAGEMENT
Case Management Discharge Planning Note    Patient name Masoud Goregh  Location /-01 MRN [id number]  : 1955 Date 2024       Current Admission Date: 9/15/2024  Current Admission Diagnosis:Ambulatory dysfunction   Patient Active Problem List    Diagnosis Date Noted Date Diagnosed    Medication management 2024     Hyponatremia 2024     Cellulitis 2024     Moderate protein-calorie malnutrition (HCC) 2024     Ambulatory dysfunction 09/15/2024     Lactic acidosis 2024     Hypercalcemia 2024     Chronic acquired lymphedema 2024     Hypotension 2024     Nocturnal hypoxia 2024     Elevated LFTs 2024     Patient on peritoneal dialysis ([facility]) 2024     Hypervolemia 2024     ESRD (end stage renal disease) ([facility]) 2024     Bacteremia 2024     Cellulitis of right lower extremity 2024     Paroxysmal atrial fibrillation ([facility]) 2024     Bilateral lower extremity edema 2023     Ureteral stone with hydronephrosis 2022     Cutaneous abscess of buttock 2022     Chronic kidney disease-mineral and bone disorder 2022     Type 2 diabetes mellitus with stage 4 chronic kidney disease, with long-term current use of insulin ([facility]) 07/15/2022     Obesity, morbid ([facility]) 07/15/2022     Secondary hyperparathyroidism of renal origin ([facility]) 07/15/2022     Stage 5 chronic kidney disease on peritoneal dialysis ([facility]) 07/15/2022     Factor 5 Leiden mutation, heterozygous ([facility]) 01/15/2022     Thrombocytopenia ([facility]) 2022     Angina at rest 2022     MI (myocardial infarction) ([facility]) 2022     History of PTCA 2022     Sleep apnea 2022     Smoking 2022     Anemia 2022     History of CVA (cerebrovascular accident) 2022     Acute on chronic diastolic HF (heart failure) ([facility]) 2022     HLD (hyperlipidemia) 2022     Lymphedema 2022     Hypothyroidism 2022      COPD (chronic obstructive pulmonary disease) ([facility]) 12/31/2018     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 ([facility]) 06/29/2016     Gastroesophageal reflux disease 06/29/2016     Gout 06/29/2016     Personal history of pulmonary embolism 06/29/2016     ED (erectile dysfunction) of organic origin 01/14/2014     History of thromboembolism of vein 01/10/2013       LOS (days): 8  Geometric Mean LOS (GMLOS) (days): 4.5  Days to GMLOS:-3.2     OBJECTIVE:  Risk of Unplanned Readmission Score: 43.81         Current admission status: Inpatient   Preferred Pharmacy:   [facility] - [place], [place] - [address]  [address]  [place] [address]  Phone: [phone] Fax: [phone]    Primary Care Provider: Jignesh Baker DO    Primary Insurance: [employer]  Secondary Insurance:     DISCHARGE DETAILS:      CM requested CM support to initiate a new auth, as we were waiting for teaching to be done at the facility.  LCD was today.  Notified by [facility] they can accept today.  Updated auth is needed at this time.  Nursing is aware the exchanger and 2 boxes of solutions will be needed on transport.    Discharge planning discussed with:: patient  Freedom of Choice: Yes  Comments - Freedom of Choice: Patient is aware he can be dc today, the rehab facility is ready. waiting on new auth       Other Referral/Resources/Interventions Provided:  Referral Comments: facility can accept pt today- need an updated auth     CM spoke to patient at the bedside, reviewed DC IMM with patient and informed that patient can stay an additional 4 hours for reconsidering appealing the discharge as the medicare rights were review on the day of discharge. Pt verbalized understanding and feels ready for dc and not intend to stay 4 hours to reconsider.          Discharge Destination Plan:: SNF ([facility])  Transport at Discharge : Wheelchair van Discussed with Patient  there may be an  out of pocket expense for transport.           [facility] [address] [place] [address]    Report Phone: [phone]    Fax: [phone]

## 2024-09-23 NOTE — CASE MANAGEMENT
Called Liseth @ [facility] ([phone]) to see if auth SOC can be updated. Since pt will likely not admit today, new auth is required. Liseth voided previous auth. New auth etienne[employer]ontse.    [facility] received request for authorization from Care Manager.  Authorization request submitted for: [facility]  Facility Name:[facility]   NPI:[id number]  Facility MD:Marily Cardoso     NPI: [id number]  Authorization initiated by contacting insurance:  [employer]  Via: [employer] Portal   Clinicals submitted via [employer] attachment   Pending Reference #: [id number]    Care Manager notified: Martha Thompson    Updates to authorization status will be noted in chart. Please reach out to CM for updates on any clinical information.

## 2024-09-23 NOTE — PROGRESS NOTES
Masoud Perry is a 69 y.o. male who is currently ordered Vancomycin IV with management by the Pharmacy Consult service.  Relevant clinical data and objective / subjective history reviewed.  Vancomycin Assessment:  Indication and Goal AUC/Trough: Soft tissue (goal -600, trough >10), Soft tissue (goal random level < or = 15)  Clinical Status: stable  Micro:     Renal Function:  SCr: 4.45 mg/dL  CrCl: 19.6 mL/min  Renal replacement: PD  Days of Therapy: 6  Current Dose: 1,000 mg IV daily PRN random level < or = 15; no dose today  Vancomycin Plan: Pulse Dosing  New Dosing: continue 1,000 mg IV daily PRN random level < or = 15  Next Level: 9/24/24 at 0600  Renal Function Monitoring: Daily BMP and UOP  Pharmacy will continue to follow closely for s/sx of nephrotoxicity, infusion reactions and appropriateness of therapy.  BMP and CBC will be ordered per protocol. We will continue to follow the patient’s culture results and clinical progress daily.    Jie Jewell, Pharmacist

## 2024-09-23 NOTE — ASSESSMENT & PLAN NOTE
Other major problems:  Hypothyroidism on levothyroxine  Diabetes mellitus type 2 insulin per primary service  Dyslipidemia on statin  Chronic acquired lymphedema on diuretics along with COPD  Gout on allopurinol  History of CVA on aspirin and Coumadin  Morbid obesity     
"Lab Results   Component Value Date    EGFR 12 09/23/2024    EGFR 12 09/22/2024    EGFR 12 09/21/2024    CREATININE 4.45 (H) 09/23/2024    CREATININE 4.41 (H) 09/22/2024    CREATININE 4.48 (H) 09/21/2024   1 episode of a cloudy fluid he had a cell count and culture done WBC count was 55, his fluid has been clear since then, no fevers, no chills, no abdominal pain  He is currently on CAPD with 2 L fills every 6 hours and 2.5% dextrose solutions  Ultrafiltration has been acceptable  Dynamics have been acceptable  Weights are slightly up at 115 kg from his \"baseline of 112 but overall he is stable  Continuing on current CAPD prescription  "
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 seems base above 8  
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 seems base above 8 - venofer 3 doses given to patient by nephrology  
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 seems base above 8 - venofer 3 doses given to patient by nephrology will repeat hemoglobin tomorrow  
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 seems base above 8 -he status post 3 doses of Venofer.  Hemoglobin dropped to 7.6.  Will reevaluate tomorrow if drops further we will need a blood transfusion continue iron p.o.  
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 seems base above 8 -he status post 3 doses of Venofer.  Hemoglobin dropped to 7.7.  continue po iron   
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 seems base above 8 -he status post 3 doses of Venofer.  Hemoglobin dropped to 7.7.  continue po iron   
As above continuing peritoneal dialysis    
BMI of 37.14  
Been weak for 2 weeks, difficulty getting out of a chair, and fell today hitting the back of his head.  At baseline he walks with a walker  PT/OT consult  Case management consultation for acute rehab  
Been weak for 2 weeks, difficulty getting out of a chair, and fell today hitting the back of his head.  At baseline he walks with a walker  PT/OT consult  Case management consultation for acute rehab  
Been weak for 2 weeks, difficulty getting out of a chair, and fell today hitting the back of his head.  At baseline he walks with a walker  PT/OT consult- needs rehab patient agreeble  Case management consultation for acute rehab  
Chronic maintain on midodrine  
Chronic maintained on PD/torsemide 100 mg daily  
Consult nephrology.  
Continue Sensipar   
Continue allopurinol  
Continue aspirin , INR is down to 3 he was going up with 1 mg of Coumadin which was giving it daily but the torsemide could affect INR as discussed with nephrology hence he was changed to Lasix currently will restart him on 0.5 mg of Coumadin daily and observe for his INR   
Continue aspirin , INR is down to 3 he was going up with 1 mg of Coumadin which was giving it daily but the torsemide could affect INR as discussed with nephrology hence he was changed to Lasix currently will restart him on 0.5 mg of Coumadin daily and observe for his INR INR remains therapeutic  
Continue aspirin and Coumadin  
Continue aspirin and Coumadin  
Continue aspirin and Coumadin- recheck inr in am   
Continue aspirin hold Coumadin as INR supratherapeutic he might need to be on a different regimen as he is very sensitive to Coumadin  
Continue levothyroxine  
Continue metoprolol and currently on Coumadin.  He states that the Coumadin doses have been changing recently.  Currently not on his home med rec.  Continue Coumadin 0.5 mg daily for now and as of today INR still remains therapeutic.  
Continue metoprolol and currently on Coumadin.  He states that the Coumadin doses have been changing recently.  Currently not on his home med rec.  Current INR is 2.52, he is currently on a alternating schedule of Coumadin 1 mg and then 2 mg.  Will start with 1 mg daily to see how he does  
Continue metoprolol and currently on Coumadin.  He states that the Coumadin doses have been changing recently.  Currently not on his home med rec.  INR is down to 2.9 will restart Coumadin but at a lower dose of 0.5 mg daily and observe now that his torsemide which would possibly affect the INR was changed to Lasix by nephrology  
Continue metoprolol and currently on Coumadin.  He states that the Coumadin doses have been changing recently.  Currently not on his home med rec.  Inr 3.48 hold coumadin  
Continue midodrine 15 mg 3 times daily  
Continue midodrine 15 mg 3 times daily blood pressure stable  
Continue statin  
Continue to monitor phosphorus  Thelma on Cinacalcet  PTH 95.2 on September 18 with a calcium of 8.5  Will decrease Cinacalcet to 3 times weekly    
Continue torsemide  
Current hemoglobin 8.2 stable  Will stop intravenous iron given possible cellulitis  CVA was thought to be cardioembolic from 2022 potential use of JUAN FRANCISCO agent if needed if anticoagulated: Currently on aspirin and Coumadin.  Will discuss with the patient to make sure he agrees risk would be very small at this juncture.  
Current hemoglobin 8.3 stable  Oral iron only got a dose of intravenous prior to the infection  CVA was thought to be cardioembolic from 2022 potential use of JUAN FRANCISCO agent if needed if anticoagulated: Currently on aspirin and Coumadin.  Discussed with patient he would potentially be willing to go on JUAN FRANCISCO agent only if needed for the moment try to avoid.  
Current hemoglobin 8.4 stable  Given intravenous iron with low iron saturation and ferritin/continue oral iron as well  CVA was thought to be cardioembolic from 2022 potential use of JUAN FRANCISCO agent if needed if anticoagulated: Currently on aspirin and Coumadin.  Will discuss with the patient to make sure he agrees risk would be very small at this juncture.  
Currently lantus 15 units hs     Lab Results   Component Value Date    HGBA1C 5.9 (H) 07/24/2024     
Currently lantus 15 units hs , iss    Lab Results   Component Value Date    HGBA1C 5.9 (H) 07/24/2024     
Doses of torsemide was changed because of the interaction with current Coumadin to furosemide  
Fasting is 78 decrease Lantus to 10 units at night continue sliding scale    Lab Results   Component Value Date    HGBA1C 5.9 (H) 07/24/2024     
Fasting is 78 decrease Lantus to 10 units at night continue sliding scale    Lab Results   Component Value Date    HGBA1C 5.9 (H) 07/24/2024     
He has a history of anemia and his most recent hemoglobin is 8.3.  He has macrocytic indices with MCV of 103.  Hemoglobin levels of the past few days have been in the mid 8 range.  He had received 1 dose of IV iron before the patient was started on intravenous vancomycin for lower extremity cellulitis.  Can restart IV iron once the patient is finished antibiotic course for cellulitis.  CBC today shows hemoglobin of 7.6 on September 22.  Check CBC today.  He does have a history of a CVA but it was felt to be cardioembolic in 2022.  The patient is currently anticoagulated.  The patient has a history of CVA but need to further evaluate if the patient would be a candidate for JUAN FRANCISCO at this point.  Will add IV iron load first.  Recheck B12 and folic acid levels.  
He has a history of anemia and his most recent hemoglobin is 8.3.  He has macrocytic indices with MCV of 103.  Hemoglobin levels of the past few days have been in the mid 8 range.  He had received 1 dose of IV iron before the patient was started on intravenous vancomycin for lower extremity cellulitis.  Check CBC today.  He does have a history of a CVA but it was felt to be cardioembolic in 2022.  The patient is currently anticoagulated.  Recheck CBC would not be unreasonable to consider initiating JUAN FRANCISCO.    
He has a history of chronic hypotension and will maintain midodrine at current dosing.  His blood pressure levels have remained stable on current medication dosing with midodrine.  
He has a history of chronic hypotension and will maintain midodrine at current dosing.  His blood pressure levels have remained stable on current medication dosing with midodrine.  There was noted blood pressure range has been 109-113 on September 21.  
He has a history of chronic lymphedema and would also maintain oral diuretic but change to Bumex as there is an interaction between torsemide and Coumadin.  Weight is decreased as noted above.   
He has a history of chronic lymphedema and would also maintain oral diuretic but we changed his loop diuretic to Bumex on September 21 as there is an interaction between torsemide and Coumadin.  Weight is as noted above.   
He has mild hyponatremia that is improved in the 134 on September 21 and a glucose is 98.  Continue to monitor with fluid restriction maintaining PD.    
He has mild hyponatremia that is improved in the 134 on September 21 and sodium 135 on September 22.  The glucose level was 94 on September 22.  Continue to monitor with fluid restriction maintaining PD care and exchanges.    
His blood pressures were reviewed, his maps have been above 65  His blood pressures are 96/55 to 110/53  Currently on midodrine 15 mg 3 times daily which we can continue  
Home regimen: Lantus 15 units at bedtime and then sliding scale.  Hospital regimen: Lantus 10 units at bedtime and insulin sliding scale    Lab Results   Component Value Date    HGBA1C 5.9 (H) 07/24/2024     
Home regimen: Lantus 15 units at bedtime and then sliding scale.  Hospital regimen: Lantus 10 units at bedtime and insulin sliding scale    Lab Results   Component Value Date    HGBA1C 5.9 (H) 07/24/2024     
Improved: Continue daily dose  
Lab Results   Component Value Date    EGFR 10 09/18/2024    EGFR 9 09/17/2024    EGFR 9 09/16/2024    CREATININE 5.14 (H) 09/18/2024    CREATININE 5.61 (H) 09/17/2024    CREATININE 5.78 (H) 09/16/2024   Continue CAPD 2 L to 0.5% every 6 hours tolerating  Access PD catheter  Awaiting placement apparently will cover PD at [facility] [place]  
Lab Results   Component Value Date    EGFR 11 09/19/2024    EGFR 10 09/18/2024    EGFR 9 09/17/2024    CREATININE 4.84 (H) 09/19/2024    CREATININE 5.14 (H) 09/18/2024    CREATININE 5.61 (H) 09/17/2024   Continue CAPD 2 L to 2.5% every 6 hours tolerating  Access PD catheter  Awaiting placement apparently will cover PD at [facility] in [place]      PD procedure note:  -Present during PD exchange slightly more dense than usual although not overtly cloudy  - Continue 2 L - 2.5% every 6 hours as tolerated  - Will order peritonitis evaluation just to be sure although he has no abdominal pain.  This will include PD cell count and Gram stain and culture      
Lab Results   Component Value Date    EGFR 11 09/20/2024    EGFR 11 09/19/2024    EGFR 10 09/18/2024    CREATININE 4.59 (H) 09/20/2024    CREATININE 4.84 (H) 09/19/2024    CREATININE 5.14 (H) 09/18/2024   Continue CAPD 2 L to 2.5% every 6 hours tolerating  Access PD catheter  Awaiting placement apparently will cover PD at [facility] in [place]  PD cell count 100/culture thus far negative no evidence of peritonitis!      
Lab Results   Component Value Date    EGFR 12 09/21/2024    EGFR 11 09/20/2024    EGFR 11 09/19/2024    CREATININE 4.48 (H) 09/21/2024    CREATININE 4.59 (H) 09/20/2024    CREATININE 4.84 (H) 09/19/2024   The patient is currently on CAPD and were utilizing 2.5% exchanges 2 L every 6 hours.  He is approximately cumulative -400 cc over the past 24 hours with regards to PD exchanges.  He examines to be euvolemic and his weight that has been obtained the same time each morning approximately is down about 6 pounds from what he was on September 18 from 262-256 pounds.  Continue his CAPD.  Will check repeat fluid cell count with  his afternoon exchange.  
Lab Results   Component Value Date    EGFR 12 09/22/2024    EGFR 12 09/21/2024    EGFR 11 09/20/2024    CREATININE 4.41 (H) 09/22/2024    CREATININE 4.48 (H) 09/21/2024    CREATININE 4.59 (H) 09/20/2024   The patient is currently on CAPD and were utilizing 2.5% exchanges 2 L every 6 hours.  As per my discussion with the patient's nurse via [employer] secure chat today on September 22, he is averaging 2400 cc per exchange.  He is hemodynamically stable will maintain current PD regimen for the next 24 hours.  He still examines to be euvolemic.  As noted, repeat fluid cell count from September 21 showed white cell count 55.  There has been no evidence of peritonitis in reviewing notes during the week.  Gram stain of the fluid was negative.  This was ordered during the week on September 19 as the fluid amount of drainage seemed to be more dense though not overtly cloudy.  Again the patient was not complaining of any abdominal discomfort.  Coincidentally, the patient was started on intravenous vancomycin on September 18 for lower extremity open wounds and lower extremity cellulitis and certainly the vancomycin he is getting intravenously would also cover for any potential peritonitis if it did exist for gram-positive organisms.  His weight remains stable in the mid 250 range via bed scale.  Maintain PD catheter care.  Again the effluent today seem very clear when I saw this morning via telehealth medium and no fibrin noted.  
Lab Results   Component Value Date    HGBA1C 5.9 (H) 07/24/2024       Recent Labs     09/20/24  0711 09/20/24  1120 09/20/24  1629 09/20/24 2124   POCGLU 94 133 105 120   Following glucose levels with the patient being on CAPD.    Blood Sugar Average: Last 72 hrs:  (P) 112.7849121277642029    
Lab Results   Component Value Date    HGBA1C 5.9 (H) 07/24/2024       Recent Labs     09/21/24  0709 09/21/24  1106 09/21/24  1624 09/21/24 2118   POCGLU 111 117 107 117   Following glucose levels with the patient being on CAPD.    Blood Sugar Average: Last 72 hrs:  (P) 111    
Lab Results   Component Value Date    HGBA1C 5.9 (H) 07/24/2024       Recent Labs     09/22/24  1206 09/22/24  1634 09/22/24  2115 09/23/24  0723   POCGLU 115 113 113 87   Following glucose levels with the patient being on CAPD.    Blood Sugar Average: Last 72 hrs:  (P) 111.3356851654797345    
Left pretibial region being treated with antibiotics per primary service  
Left pretibial region being treated with antibiotics per primary service  
Left pretibial region being treated with antibiotics per primary service; patient is on intravenous vancomycin.  Last vancomycin level 14.9 on September 22.  The patient is to be treated for 7-day course and was started on September 18.  
Left pretibial region being treated with antibiotics per primary service; patient is on intravenous vancomycin.  Last vancomycin level 16.7 on September 21.  
Malnutrition Findings:   Adult Malnutrition type: Chronic illness  Adult Degree of Malnutrition: Malnutrition of moderate degree  Malnutrition Characteristics: Inadequate energy, Muscle loss                  360 Statement: malnutrition of moderate degree in setting of chronic illness, evidenced by <75% energy intake compared to estimated needs for > 1 month, muscle wasting/indented temples, protruding clavicles, treated with diet, PT refusing supplements currently    BMI Findings:           Body mass index is 35.39 kg/m².     
Malnutrition Findings:   Adult Malnutrition type: Chronic illness  Adult Degree of Malnutrition: Malnutrition of moderate degree  Malnutrition Characteristics: Inadequate energy, Muscle loss                  360 Statement: malnutrition of moderate degree in setting of chronic illness, evidenced by <75% energy intake compared to estimated needs for > 1 month, muscle wasting/indented temples, protruding clavicles, treated with diet, PT refusing supplements currently    BMI Findings:           Body mass index is 35.39 kg/m².     
Malnutrition Findings:   Adult Malnutrition type: Chronic illness  Adult Degree of Malnutrition: Malnutrition of moderate degree  Malnutrition Characteristics: Inadequate energy, Muscle loss                  360 Statement: malnutrition of moderate degree in setting of chronic illness, evidenced by <75% energy intake compared to estimated needs for > 1 month, muscle wasting/indented temples, protruding clavicles, treated with diet, PT refusing supplements currently    BMI Findings:           Body mass index is 35.73 kg/m².     
Malnutrition Findings:   Adult Malnutrition type: Chronic illness  Adult Degree of Malnutrition: Malnutrition of moderate degree  Malnutrition Characteristics: Inadequate energy, Muscle loss                  360 Statement: malnutrition of moderate degree in setting of chronic illness, evidenced by <75% energy intake compared to estimated needs for > 1 month, muscle wasting/indented temples, protruding clavicles, treated with diet, PT refusing supplements currently    BMI Findings:           Body mass index is 35.94 kg/m².     
Malnutrition Findings:   Adult Malnutrition type: Chronic illness  Adult Degree of Malnutrition: Malnutrition of moderate degree  Malnutrition Characteristics: Inadequate energy, Muscle loss                  360 Statement: malnutrition of moderate degree in setting of chronic illness, evidenced by <75% energy intake compared to estimated needs for > 1 month, muscle wasting/indented temples, protruding clavicles, treated with diet, PT refusing supplements currently    BMI Findings:           Body mass index is 35.98 kg/m².     
Malnutrition Findings:   Adult Malnutrition type: Chronic illness  Adult Degree of Malnutrition: Malnutrition of moderate degree  Malnutrition Characteristics: Inadequate energy, Muscle loss                  360 Statement: malnutrition of moderate degree in setting of chronic illness, evidenced by <75% energy intake compared to estimated needs for > 1 month, muscle wasting/indented temples, protruding clavicles, treated with diet, PT refusing supplements currently    BMI Findings:           Body mass index is 36.1 kg/m².     
Malnutrition Findings:   Adult Malnutrition type: Chronic illness  Adult Degree of Malnutrition: Malnutrition of moderate degree  Malnutrition Characteristics: Inadequate energy, Muscle loss                  360 Statement: malnutrition of moderate degree in setting of chronic illness, evidenced by <75% energy intake compared to estimated needs for > 1 month, muscle wasting/indented temples, protruding clavicles, treated with diet, PT refusing supplements currently    BMI Findings:           Body mass index is 36.28 kg/m².     
Malnutrition Findings:   Adult Malnutrition type: Chronic illness  Adult Degree of Malnutrition: Malnutrition of moderate degree  Malnutrition Characteristics: Inadequate energy, Muscle loss                  360 Statement: malnutrition of moderate degree in setting of chronic illness, evidenced by <75% energy intake compared to estimated needs for > 1 month, muscle wasting/indented temples, protruding clavicles, treated with diet, PT refusing supplements currently    BMI Findings:           Body mass index is 36.59 kg/m².     
Mild hyponatremia: Treat with fluid restriction most compatible ESRD  
Nephrology  
Nephrology  
Nephrology he is fluid culture from the dialysis catheter fluid is negative  
Now normal continue daily dose      
Now on furosemide 80 mg daily  Monitor urine output and residual renal function  
On peritoneal dialysis.  Will consult nephrology.  I did have a discussion with the patient about why he is not on hemodialysis, patient states he was told that he would have to go to the dialysis clinic 4-5 times a week for 6 to 8 hours/day.  I am not certain about this, but at the patient did have hemodialysis patient would be able to find multiple acute rehabs that would accept him for for rehab.  An option would be to start hemodialysis here for rehab    Lab Results   Component Value Date    EGFR 9 09/15/2024    EGFR 5 08/13/2024    EGFR 5 08/12/2024    CREATININE 5.68 (H) 09/15/2024    CREATININE 8.45 (H) 08/13/2024    CREATININE 8.57 (H) 08/12/2024     
On peritoneal dialysis.  Will consult nephrology.  I did have a discussion with the patient about why he is not on hemodialysis, patient states he was told that he would have to go to the dialysis clinic 4-5 times a week for 6 to 8 hours/day.  I am not certain about this, but at the patient did have hemodialysis patient would be able to find multiple acute rehabs that would accept him for for rehab.  An option would be to start hemodialysis here for rehab    This provider discussed with patient regarding hemodialysis.  Patient reports he prefers to stay with current treatment with peritoneal dialysis.    Lab Results   Component Value Date    EGFR 10 09/18/2024    EGFR 9 09/17/2024    EGFR 9 09/16/2024    CREATININE 5.14 (H) 09/18/2024    CREATININE 5.61 (H) 09/17/2024    CREATININE 5.78 (H) 09/16/2024     
On peritoneal dialysis.  Will consult nephrology.  I did have a discussion with the patient about why he is not on hemodialysis, patient states he was told that he would have to go to the dialysis clinic 4-5 times a week for 6 to 8 hours/day.  I am not certain about this, but at the patient did have hemodialysis patient would be able to find multiple acute rehabs that would accept him for for rehab.  An option would be to start hemodialysis here for rehab    This provider discussed with patient regarding hemodialysis.  Patient reports he prefers to stay with current treatment with peritoneal dialysis.    Lab Results   Component Value Date    EGFR 11 09/19/2024    EGFR 10 09/18/2024    EGFR 9 09/17/2024    CREATININE 4.84 (H) 09/19/2024    CREATININE 5.14 (H) 09/18/2024    CREATININE 5.61 (H) 09/17/2024     
On peritoneal dialysis.  Will consult nephrology.  I did have a discussion with the patient about why he is not on hemodialysis, patient states he was told that he would have to go to the dialysis clinic 4-5 times a week for 6 to 8 hours/day.  I am not certain about this, but at the patient did have hemodialysis patient would be able to find multiple acute rehabs that would accept him for for rehab.  An option would be to start hemodialysis here for rehab    This provider discussed with patient regarding hemodialysis.  Patient reports he prefers to stay with current treatment with peritoneal dialysis.    Lab Results   Component Value Date    EGFR 11 09/20/2024    EGFR 11 09/19/2024    EGFR 10 09/18/2024    CREATININE 4.59 (H) 09/20/2024    CREATININE 4.84 (H) 09/19/2024    CREATININE 5.14 (H) 09/18/2024     
On peritoneal dialysis.  Will consult nephrology.  I did have a discussion with the patient about why he is not on hemodialysis, patient states he was told that he would have to go to the dialysis clinic 4-5 times a week for 6 to 8 hours/day.  I am not certain about this, but at the patient did have hemodialysis patient would be able to find multiple acute rehabs that would accept him for for rehab.  An option would be to start hemodialysis here for rehab    This provider discussed with patient regarding hemodialysis.  Patient reports he prefers to stay with current treatment with peritoneal dialysis.    Lab Results   Component Value Date    EGFR 12 09/21/2024    EGFR 11 09/20/2024    EGFR 11 09/19/2024    CREATININE 4.48 (H) 09/21/2024    CREATININE 4.59 (H) 09/20/2024    CREATININE 4.84 (H) 09/19/2024   Torsemide changed to lasix by nephrology today secondary to suspicious that torsemide could have kept the INR high  
On peritoneal dialysis.  Will consult nephrology.  I did have a discussion with the patient about why he is not on hemodialysis, patient states he was told that he would have to go to the dialysis clinic 4-5 times a week for 6 to 8 hours/day.  I am not certain about this, but at the patient did have hemodialysis patient would be able to find multiple acute rehabs that would accept him for for rehab.  An option would be to start hemodialysis here for rehab    This provider discussed with patient regarding hemodialysis.  Patient reports he prefers to stay with current treatment with peritoneal dialysis.    Lab Results   Component Value Date    EGFR 12 09/22/2024    EGFR 12 09/21/2024    EGFR 11 09/20/2024    CREATININE 4.41 (H) 09/22/2024    CREATININE 4.48 (H) 09/21/2024    CREATININE 4.59 (H) 09/20/2024   Torsemide changed to lasix by nephrology today secondary to suspicious that torsemide could have kept the INR high  
On peritoneal dialysis.  Will consult nephrology.  I did have a discussion with the patient about why he is not on hemodialysis, patient states he was told that he would have to go to the dialysis clinic 4-5 times a week for 6 to 8 hours/day.  I am not certain about this, but at the patient did have hemodialysis patient would be able to find multiple acute rehabs that would accept him for for rehab.  An option would be to start hemodialysis here for rehab    This provider discussed with patient regarding hemodialysis.  Patient reports he prefers to stay with current treatment with peritoneal dialysis.    Lab Results   Component Value Date    EGFR 12 09/23/2024    EGFR 12 09/22/2024    EGFR 12 09/21/2024    CREATININE 4.45 (H) 09/23/2024    CREATININE 4.41 (H) 09/22/2024    CREATININE 4.48 (H) 09/21/2024   Torsemide changed to lasix by nephrology today secondary to suspicious that torsemide could have kept the INR high  
On peritoneal dialysis.  Will consult nephrology.  I did have a discussion with the patient about why he is not on hemodialysis, patient states he was told that he would have to go to the dialysis clinic 4-5 times a week for 6 to 8 hours/day.  I am not certain about this, but at the patient did have hemodialysis patient would be able to find multiple acute rehabs that would accept him for for rehab.  An option would be to start hemodialysis here for rehab    This provider discussed with patient regarding hemodialysis.  Patient reports he prefers to stay with current treatment with peritoneal dialysis.    Lab Results   Component Value Date    EGFR 12 09/23/2024    EGFR 12 09/22/2024    EGFR 12 09/21/2024    CREATININE 4.45 (H) 09/23/2024    CREATININE 4.41 (H) 09/22/2024    CREATININE 4.48 (H) 09/21/2024   Torsemide changed to lasix by nephrology today secondary to suspicious that torsemide could have kept the INR high  
On peritoneal dialysis.  Will consult nephrology.  I did have a discussion with the patient about why he is not on hemodialysis, patient states he was told that he would have to go to the dialysis clinic 4-5 times a week for 6 to 8 hours/day.  I am not certain about this, but at the patient did have hemodialysis patient would be able to find multiple acute rehabs that would accept him for for rehab.  An option would be to start hemodialysis here for rehab    This provider discussed with patient regarding hemodialysis.  Patient reports he prefers to stay with current treatment with peritoneal dialysis.    Lab Results   Component Value Date    EGFR 9 09/16/2024    EGFR 9 09/15/2024    EGFR 5 08/13/2024    CREATININE 5.78 (H) 09/16/2024    CREATININE 5.68 (H) 09/15/2024    CREATININE 8.45 (H) 08/13/2024     
On peritoneal dialysis.  Will consult nephrology.  I did have a discussion with the patient about why he is not on hemodialysis, patient states he was told that he would have to go to the dialysis clinic 4-5 times a week for 6 to 8 hours/day.  I am not certain about this, but at the patient did have hemodialysis patient would be able to find multiple acute rehabs that would accept him for for rehab.  An option would be to start hemodialysis here for rehab    This provider discussed with patient regarding hemodialysis.  Patient reports he prefers to stay with current treatment with peritoneal dialysis.    Lab Results   Component Value Date    EGFR 9 09/17/2024    EGFR 9 09/16/2024    EGFR 9 09/15/2024    CREATININE 5.61 (H) 09/17/2024    CREATININE 5.78 (H) 09/16/2024    CREATININE 5.68 (H) 09/15/2024     
Patient on Cinacalcet with a history of hypercalcemia: Workup in the past was negative calcium now normal at 8.6: The recommendation was repeat myeloma evaluation in 3 to 6 months as of August 2024 otherwise negative  Hyperphosphatemia doing well on sevelamer in fact I am going to hold because phosphorus only 3.4  Replete hypomagnesemia on oral magnesium oxide status post IV repletion  
Patient on Cinacalcet with a history of hypercalcemia: Workup in the past was negative calcium now normal at 8.6: The recommendation was repeat myeloma evaluation in 3 to 6 months as of August 2024 otherwise negative  Hyperphosphatemia sevelamer on hold will recheck in a.m.  Monitor magnesium status post repletion  
Patient on Cinacalcet with a history of hypercalcemia: Workup in the past was negative calcium now normal at 8.6: The recommendation was repeat myeloma evaluation in 3 to 6 months as of August 2024 otherwise negative  Phosphorus: Remains normal continue off binders  Replete hypomagnesemia  
Per primary service going for rehab    
Per primary service going for rehab     Other major problems:  Hypothyroidism on levothyroxine  Diabetes mellitus type 2 insulin per primary service  Dyslipidemia on statin  Chronic acquired lymphedema on diuretics along with COPD  Gout on allopurinol  History of CVA on aspirin and Coumadin  Morbid obesity     
Per primary service going for rehab     Other major problems:  Hypothyroidism on levothyroxine  Diabetes mellitus type 2 insulin per primary service  Dyslipidemia on statin  Chronic acquired lymphedema on diuretics along with COPD  Gout on allopurinol  History of CVA on aspirin and Coumadin  Morbid obesity     
Please note that torsemide can increase medication levels of Coumadin.  I changed his torsemide to Bumex on September 21 given the interaction between torsemide and Coumadin.  I also communicated with the [facility] hospitalist on September 21 with regards to this.    
Please note that torsemide can increase medication levels of Coumadin.  I will change his diuretic dosing to Lasix daily. Allergies list Bumex as allergy causing lightheadedness and GI upset. Given the direct interaction with Torsemide and Coumadin, need to adjust loop diuretic. I communicated with the [facility]ist regarding this via [facility] secure chat.   
Please see above    
Please see above    
Please see above with regards to plan of care; maintaining CAPD.    
Please see above with regards to plan of care; maintaining CAPD.    
Rate control with metoprolol  Anticoagulation with Coumadin  
Remains on vancomycin  Remains therapeutic at 20.7  Targeting a range of 15-20  
Replaced and also mag per nephrology   
Replaced and also mag per nephrology   
Replete and daily dose  
Resolved post replacement by nephrology  
Sodium is normal today at 135  
Sodium is normal today at 136  
Sodium is normal today at 136  
Sodium level is now improved to 136  
Sugars stable continue Lantus 10 units at night and sliding scale    Lab Results   Component Value Date    HGBA1C 5.9 (H) 07/24/2024     
Sugars stable continue Lantus 10 units at night and sliding scale.  Sugars have been controlled    Lab Results   Component Value Date    HGBA1C 5.9 (H) 07/24/2024     
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  
The patient on Cinacalcet with a history of hypercalcemia: Workup in the past was negative calcium now normal at 8.6: Intact PTH from September 18 is 95.2.  Phosphorus on September 18 is 3.4.  Current calcium uncorrected is 8.4.  From September 17 it is noted that an albumin was 2.2 with a corrected calcium of 10.3.  Check ionized calcium, check EKG to evaluate QT interval.  It was noted from prior notes that there was a recommendation to repeat a myeloma evaluation in 3 to 6 months need to clarify when that needs to be performed.  Watching phosphorus off of binders given most recent level 3.4.    
The patient on Cinacalcet with a history of hypercalcemia: Workup in the past was negative calcium now normal at 8.6: Intact PTH from September 18 is 95.2.  Phosphorus on September 18 is 3.4.  Current calcium uncorrected is 8.4.  From September 17 it is noted that an albumin was 2.2 with a corrected calcium of 10.3. It was noted from prior notes that there was a recommendation to repeat a myeloma evaluation in 3 to 6 months need to clarify when that needs to be performed.  Watching phosphorus off of binders given most recent level 3.4.  Calcium level uncorrected on September 22 is 8.4.  EKG obtained on September 21 showed QTc 457.  Ionized calcium is pending.  The patient has been maintained on daily Cinacalcet 30 mg daily.    
Warm tender open wounds- start vanco  
Warm tender open wounds- started vanco 9/18 today the erythema and tenderness improved.  
Warm tender open wounds- started vanco 9/18 today the erythema and tenderness improved.  Will treat for 3/7 days total  
Warm tender open wounds- started vanco 9/18 today the erythema and tenderness improved.  Will treat for 4/7 days total  
Warm tender open wounds- started vanco 9/18 today the erythema and tenderness improved.  Will treat for 4/7 days total and discharge will DC on Augmentin for 3 more days  
Warm tender open wounds- started vanco 9/18 today the erythema and tenderness improved.  Will treat for 7 days total  
Warm tender open wounds- started vanco 9/18 today the erythema and tenderness improved.  Will treat for 7 days total  
no

## 2024-09-23 NOTE — DISCHARGE SUMMARY
Discharge Summary - Hospitalist   Name: Masoud Perry 69 y.o. male I MRN: [id number]  Unit/Bed#: -01 I Date of Admission: 9/15/2024   Date of Service: 9/23/2024 I Hospital Day: 8     Assessment & Plan  Ambulatory dysfunction  Been weak for 2 weeks, difficulty getting out of a chair, and fell today hitting the back of his head.  At baseline he walks with a walker  PT/OT consult- needs rehab patient agreeble  Case management consultation for acute rehab  ESRD (end stage renal disease) ([facility])  On peritoneal dialysis.  Will consult nephrology.  I did have a discussion with the patient about why he is not on hemodialysis, patient states he was told that he would have to go to the dialysis clinic 4-5 times a week for 6 to 8 hours/day.  I am not certain about this, but at the patient did have hemodialysis patient would be able to find multiple acute rehabs that would accept him for for rehab.  An option would be to start hemodialysis here for rehab    This provider discussed with patient regarding hemodialysis.  Patient reports he prefers to stay with current treatment with peritoneal dialysis.    Lab Results   Component Value Date    EGFR 12 09/23/2024    EGFR 12 09/22/2024    EGFR 12 09/21/2024    CREATININE 4.45 (H) 09/23/2024    CREATININE 4.41 (H) 09/22/2024    CREATININE 4.48 (H) 09/21/2024   Torsemide changed to lasix by nephrology today secondary to suspicious that torsemide could have kept the INR high  Patient on peritoneal dialysis ([facility])  Nephrology he is fluid culture from the dialysis catheter fluid is negative  Paroxysmal atrial fibrillation ([facility])  Continue metoprolol and currently on Coumadin.  He states that the Coumadin doses have been changing recently.  Currently not on his home med rec.  Continue Coumadin 0.5 mg daily for now and as of today INR still remains therapeutic.  Type 2 diabetes mellitus with stage 4 chronic kidney disease, with long-term current use of insulin ([facility])  Sugars stable  continue Lantus 10 units at night and sliding scale.  Sugars have been controlled    Lab Results   Component Value Date    HGBA1C 5.9 (H) 07/24/2024     Hypothyroidism  Continue levothyroxine  HLD (hyperlipidemia)  Continue statin  Hypotension  Continue midodrine 15 mg 3 times daily blood pressure stable  Secondary hyperparathyroidism of renal origin (HCC)  Continue Sensipar   Chronic acquired lymphedema  Continue torsemide  Gout  Continue allopurinol  History of CVA (cerebrovascular accident)  Continue aspirin , INR is down to 3 he was going up with 1 mg of Coumadin which was giving it daily but the torsemide could affect INR as discussed with nephrology hence he was changed to Lasix currently will restart him on 0.5 mg of Coumadin daily and observe for his INR INR remains therapeutic  Obesity, morbid (HCC)  BMI of 37.14  Anemia  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 seems base above 8 -he status post 3 doses of Venofer.  Hemoglobin dropped to 7.7.  continue po iron   Moderate protein-calorie malnutrition (HCC)  Malnutrition Findings:   Adult Malnutrition type: Chronic illness  Adult Degree of Malnutrition: Malnutrition of moderate degree  Malnutrition Characteristics: Inadequate energy, Muscle loss                  360 Statement: malnutrition of moderate degree in setting of chronic illness, evidenced by <75% energy intake compared to estimated needs for > 1 month, muscle wasting/indented temples, protruding clavicles, treated with diet, PT refusing supplements currently    BMI Findings:           Body mass index is 35.39 kg/m².     Cellulitis  Warm tender open wounds- started vanco 9/18 today the erythema and tenderness improved.  Will treat for 4/7 days total and discharge will DC on Augmentin for 3 more days  Hyponatremia  Sodium is normal today at 136  Medication management         Medical Problems       Resolved Problems  Date Reviewed: 9/23/2024            Resolved    Hypokalemia 9/20/2024     Resolved by  Nora  MD [place]        Discharging Physician / Practitioner: Nora Mathew MD  PCP: Jignesh Baker DO  Admission Date:   Admission Orders (From admission, onward)       Ordered        09/15/24 1926  INPATIENT ADMISSION  Once                          Discharge Date: 09/23/24    Consultations During Hospital Stay:  Nephrology    Procedures Performed:   none    Significant Findings / Test Results:   Chest x-ray small right pleural effusion.  Mild basilar atelectasis  X-ray of the pelvis without any acute abnormality  CT head without acute normality  CT C-spine without acute abnormality  CT chest abdomen and pelvis- No findings of acute traumatic injury in the chest, abdomen or pelvis within the limits of unenhanced technique.  Incidental 8 mm subpleural pulmonary nodule in the left lower lobe. Consider 6-month CT scan of the chest without contrast.  Small right pleural effusion.  Bilateral kidney stones. No hydronephrosis.  Peritoneal dialysis catheter and ascites.  X-ray of left hips-no acute abnormality  Incidental Findings:   See above to be followed with PCP    Test Results Pending at Discharge (will require follow up):   None     Outpatient Tests Requested:  None    Complications: None    Reason for Admission: Status post fall    Hospital Course:   Masoud Perry is a 69 y.o. male patient who originally presented to the hospital on 9/15/2024 due to status post fall trauma series were negative for fracture.  Patient was followed by PT OT and needed to be placed to rehab unfortunately PT it was a long search.  Developed left lower extremity cellulitis for which vancomycin was started and has improved will be discharged on Augmentin for 3 more days.  Nephrology consulted for the continuous PD recommendation while inpatient.  Now his torsemide was switched to liquid Lasix because torsemide could increase his INR as discussed with nephrology.  His Coumadin was decreased down to 0.5 mg daily.          Please see  above list of diagnoses and related plan for additional information.     Condition at Discharge: stable    Discharge Day Visit / Exam:   * Please refer to separate progress note for these details *    Discussion with Family: pt    Discharge instructions/Information to patient and family:   See after visit summary for information provided to patient and family.      Provisions for Follow-Up Care:  See after visit summary for information related to follow-up care and any pertinent home health orders.      Mobility at time of Discharge:   Basic Mobility Inpatient Raw Score: 16  JH-HLM Goal: 5: Stand one or more mins  JH-HLM Achieved: 6: Walk 10 steps or more  HLM Goal achieved. Continue to encourage appropriate mobility.     Disposition:   Acute Rehab at [facility]    Planned Readmission: no    Discharge Medications:  See after visit summary for reconciled discharge medications provided to patient and/or family.      Administrative Statements   Discharge Statement:  I have spent a total time of >35 minutes in caring for this patient on the day of the visit/encounter. .    **Please Note: This note may have been constructed using a voice recognition system**

## 2024-09-23 NOTE — CASE MANAGEMENT
Case Management Discharge Planning Note    Patient name Masoud Goregh  Location /-01 MRN [id number]  : 1955 Date 2024       Current Admission Date: 9/15/2024  Current Admission Diagnosis:Ambulatory dysfunction   Patient Active Problem List    Diagnosis Date Noted Date Diagnosed    Medication management 2024     Hyponatremia 2024     Cellulitis 2024     Moderate protein-calorie malnutrition (HCC) 2024     Ambulatory dysfunction 09/15/2024     Lactic acidosis 2024     Hypercalcemia 2024     Chronic acquired lymphedema 2024     Hypotension 2024     Nocturnal hypoxia 2024     Elevated LFTs 2024     Patient on peritoneal dialysis ([facility]) 2024     Hypervolemia 2024     ESRD (end stage renal disease) ([facility]) 2024     Bacteremia 2024     Cellulitis of right lower extremity 2024     Paroxysmal atrial fibrillation ([facility]) 2024     Bilateral lower extremity edema 2023     Ureteral stone with hydronephrosis 2022     Cutaneous abscess of buttock 2022     Chronic kidney disease-mineral and bone disorder 2022     Type 2 diabetes mellitus with stage 4 chronic kidney disease, with long-term current use of insulin ([facility]) 07/15/2022     Obesity, morbid ([facility]) 07/15/2022     Secondary hyperparathyroidism of renal origin ([facility]) 07/15/2022     Stage 5 chronic kidney disease on peritoneal dialysis ([facility]) 07/15/2022     Factor 5 Leiden mutation, heterozygous ([facility]) 01/15/2022     Thrombocytopenia ([facility]) 2022     Angina at rest 2022     MI (myocardial infarction) ([facility]) 2022     History of PTCA 2022     Sleep apnea 2022     Smoking 2022     Anemia 2022     History of CVA (cerebrovascular accident) 2022     Acute on chronic diastolic HF (heart failure) ([facility]) 2022     HLD (hyperlipidemia) 2022     Lymphedema 2022     Hypothyroidism 2022      COPD (chronic obstructive pulmonary disease) (HCC) 12/31/2018     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 (HCC) 06/29/2016     Gastroesophageal reflux disease 06/29/2016     Gout 06/29/2016     Personal history of pulmonary embolism 06/29/2016     ED (erectile dysfunction) of organic origin 01/14/2014     History of thromboembolism of vein 01/10/2013       LOS (days): 8  Geometric Mean LOS (GMLOS) (days): 4.5  Days to GMLOS:-3.3     OBJECTIVE:  Risk of Unplanned Readmission Score: 43.57         Current admission status: Inpatient   Preferred Pharmacy:   [facility] - [place], [place] - [address]  [address] [facility] [place] [address]  Phone: [phone] Fax: [phone]    Primary Care Provider: Jignesh Baker DO    Primary Insurance: [employer] REP  Secondary Insurance:     DISCHARGE DETAILS:      New auth was obtained for [facility].  Auth placed in AIDIN for the facility.  Requesting a 4 PM  time- Nursing/ MD is aware.  Await confirmation of  time.

## 2024-09-23 NOTE — CASE MANAGEMENT
Case Management Discharge Planning Note    Patient name Masoud Goregh  Location /-01 MRN [id number]  : 1955 Date 2024       Current Admission Date: 9/15/2024  Current Admission Diagnosis:Ambulatory dysfunction   Patient Active Problem List    Diagnosis Date Noted Date Diagnosed    Medication management 2024     Hyponatremia 2024     Cellulitis 2024     Moderate protein-calorie malnutrition (HCC) 2024     Ambulatory dysfunction 09/15/2024     Lactic acidosis 2024     Hypercalcemia 2024     Chronic acquired lymphedema 2024     Hypotension 2024     Nocturnal hypoxia 2024     Elevated LFTs 2024     Patient on peritoneal dialysis ([facility]) 2024     Hypervolemia 2024     ESRD (end stage renal disease) ([facility]) 2024     Bacteremia 2024     Cellulitis of right lower extremity 2024     Paroxysmal atrial fibrillation ([facility]) 2024     Bilateral lower extremity edema 2023     Ureteral stone with hydronephrosis 2022     Cutaneous abscess of buttock 2022     Chronic kidney disease-mineral and bone disorder 2022     Type 2 diabetes mellitus with stage 4 chronic kidney disease, with long-term current use of insulin ([facility]) 07/15/2022     Obesity, morbid ([facility]) 07/15/2022     Secondary hyperparathyroidism of renal origin ([facility]) 07/15/2022     Stage 5 chronic kidney disease on peritoneal dialysis ([facility]) 07/15/2022     Factor 5 Leiden mutation, heterozygous ([facility]) 01/15/2022     Thrombocytopenia ([facility]) 2022     Angina at rest 2022     MI (myocardial infarction) ([facility]) 2022     History of PTCA 2022     Sleep apnea 2022     Smoking 2022     Anemia 2022     History of CVA (cerebrovascular accident) 2022     Acute on chronic diastolic HF (heart failure) ([facility]) 2022     HLD (hyperlipidemia) 2022     Lymphedema 2022     Hypothyroidism 2022      COPD (chronic obstructive pulmonary disease) (HCC) 12/31/2018     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 (HCC) 06/29/2016     Gastroesophageal reflux disease 06/29/2016     Gout 06/29/2016     Personal history of pulmonary embolism 06/29/2016     ED (erectile dysfunction) of organic origin 01/14/2014     History of thromboembolism of vein 01/10/2013       LOS (days): 8  Geometric Mean LOS (GMLOS) (days): 4.5  Days to GMLOS:-3.3     OBJECTIVE:  Risk of Unplanned Readmission Score: 43.57         Current admission status: Inpatient   Preferred Pharmacy:   [facility] - [place], [place] - [address]  [address] [facility] [place] [address]  Phone: [phone] Fax: [phone]    Primary Care Provider: Jignesh Baker DO    Primary Insurance: [employer] REP  Secondary Insurance:     DISCHARGE DETAILS:     Nursing/ provider and facility were informed of the  time.     Transported by (Company and Unit #): [employer]  ETA of Transport (Date): 09/23/24  ETA of Transport (Time): 1600

## 2024-09-23 NOTE — CASE MANAGEMENT
[facility] has received APPROVED authorization.  Insurance:   [employer]   Called in by Rep: Liseth CALDERON#   [phone]   Authorization received for: SNF  Facility: [facility]   Authorization #: [id number]   Start of Care: 9/23  Next Review Date: 9/26  Continued Stay Care Coordinator: Oly CALDERON#: [phone]  Submit next review to: [phone]     Care Manager notified:Martha Thompson    Please reach out to  for updates on any clinical information.

## 2024-09-23 NOTE — NURSING NOTE
Pt stood and pivoted onto wheelchair. Pt left with EMT from [facility]. Pt left with his personal dialysis cart which contained his PD cycler, two boxes of PD fluid, and a small bag with personal clothing and belongings.

## 2024-09-23 NOTE — CASE MANAGEMENT
Rec'd call from Liseth [employer] requesting clarification of the duration of the vancomycin and if its daily or more than once a day. Notified CMMartha to request information and will get back to [facility] [place] once information is received.      12:00- Rec'd response from CM stating med will be switched to PO on DC and the administering will be based on Vanco blood levels. Called Liseth @ [facility] and LM with this information.

## 2024-09-24 ENCOUNTER — NURSING HOME VISIT (OUTPATIENT)
Dept: GERIATRICS | Facility: OTHER | Age: 69
End: 2024-09-24
Payer: COMMERCIAL

## 2024-09-24 DIAGNOSIS — R26.2 AMBULATORY DYSFUNCTION: ICD-10-CM

## 2024-09-24 DIAGNOSIS — Z79.4 TYPE 2 DIABETES MELLITUS WITH STAGE 4 CHRONIC KIDNEY DISEASE, WITH LONG-TERM CURRENT USE OF INSULIN (HCC): ICD-10-CM

## 2024-09-24 DIAGNOSIS — N18.4 TYPE 2 DIABETES MELLITUS WITH STAGE 4 CHRONIC KIDNEY DISEASE, WITH LONG-TERM CURRENT USE OF INSULIN (HCC): ICD-10-CM

## 2024-09-24 DIAGNOSIS — I95.9 HYPOTENSION, UNSPECIFIED HYPOTENSION TYPE: ICD-10-CM

## 2024-09-24 DIAGNOSIS — R60.0 BILATERAL LOWER EXTREMITY EDEMA: ICD-10-CM

## 2024-09-24 DIAGNOSIS — Z99.2 ESRD ON PERITONEAL DIALYSIS (HCC): ICD-10-CM

## 2024-09-24 DIAGNOSIS — N25.81 SECONDARY HYPERPARATHYROIDISM OF RENAL ORIGIN (HCC): ICD-10-CM

## 2024-09-24 DIAGNOSIS — L03.115 CELLULITIS OF RIGHT LOWER EXTREMITY: ICD-10-CM

## 2024-09-24 DIAGNOSIS — E03.9 ACQUIRED HYPOTHYROIDISM: ICD-10-CM

## 2024-09-24 DIAGNOSIS — M10.9 GOUT, UNSPECIFIED CAUSE, UNSPECIFIED CHRONICITY, UNSPECIFIED SITE: ICD-10-CM

## 2024-09-24 DIAGNOSIS — E11.22 TYPE 2 DIABETES MELLITUS WITH STAGE 4 CHRONIC KIDNEY DISEASE, WITH LONG-TERM CURRENT USE OF INSULIN (HCC): ICD-10-CM

## 2024-09-24 DIAGNOSIS — E44.0 MODERATE PROTEIN-CALORIE MALNUTRITION (HCC): ICD-10-CM

## 2024-09-24 DIAGNOSIS — I48.0 PAROXYSMAL ATRIAL FIBRILLATION (HCC): Primary | ICD-10-CM

## 2024-09-24 DIAGNOSIS — N18.6 ESRD ON PERITONEAL DIALYSIS (HCC): ICD-10-CM

## 2024-09-24 DIAGNOSIS — D64.9 ANEMIA, UNSPECIFIED TYPE: ICD-10-CM

## 2024-09-24 DIAGNOSIS — E78.2 MIXED HYPERLIPIDEMIA: ICD-10-CM

## 2024-09-24 DIAGNOSIS — E66.01 OBESITY, MORBID (HCC): ICD-10-CM

## 2024-09-24 LAB — GLUCOSE SERPL-MCNC: 106 MG/DL (ref 65–140)

## 2024-09-24 PROCEDURE — 99305 1ST NF CARE MODERATE MDM 35: CPT | Performed by: FAMILY MEDICINE

## 2024-09-24 NOTE — ASSESSMENT & PLAN NOTE
Malnutrition Findings:                                 BMI Findings:           There is no height or weight on file to calculate BMI.     Nutrition consulted during hospital stay.

## 2024-09-24 NOTE — UTILIZATION REVIEW
NOTIFICATION OF ADMISSION DISCHARGE   This is a Notification of Discharge from [facility]. Please be advised that this patient has been discharge from our facility. Below you will find the admission and discharge date and time including the patient’s disposition.   UTILIZATION REVIEW CONTACT:  Arleen Cotto  Utilization   Network Utilization Review Department  Phone: [phone] x carefully listen to the prompts. All voicemails are confidential.  Email: [email or web address]     ADMISSION INFORMATION  PRESENTATION DATE: 9/15/2024  3:48 PM  OBERVATION ADMISSION DATE: N/A  INPATIENT ADMISSION DATE: 9/15/24  7:27 PM   DISCHARGE DATE: 9/23/2024  4:30 PM   DISPOSITION:Non [facility] SNF/TCU/SNU    Network Utilization Review Department  ATTENTION: Please call with any questions or concerns to [phone] and carefully listen to the prompts so that you are directed to the right person. All voicemails are confidential.   For Discharge needs, contact Care Management DC Support Team at [phone] opt. [phone]  Send all requests for admission clinical reviews, approved or denied determinations and any other requests to dedicated fax number below belonging to the campus where the patient is receiving treatment. List of dedicated fax numbers for the Facilities:  FACILITY NAME UR FAX NUMBER   ADMISSION DENIALS (Administrative/Medical Necessity) [phone]   DISCHARGE SUPPORT TEAM ([facility]) [phone]   PARENT CHILD HEALTH (Maternity/NICU/Pediatrics) [phone]   [facility] [phone]   [facility] [phone]   [facility] [phone]   [facility] [phone]   [facility] [phone]   [facility] [phone]   [facility] [phone]   [facility]  [place] [phone]   [facility] [phone]   [facility] [phone]   [facility] [phone]   [facility] [phone]

## 2024-09-24 NOTE — PROGRESS NOTES
[facility]  [address]  [place] [address]   [facility]  History and Physical    NAME: Masoud Perry  AGE: 69 y.o. SEX: male [phone]    DATE OF ENCOUNTER: 9/24/2024    Code status:  No CPR, DNR/DNI    Assessment and Plan     Problem List Items Addressed This Visit       HLD (hyperlipidemia)     Continue statin          Hypothyroidism     Lab Results   Component Value Date    [id number] 12.236 (H) 07/30/2024      Continue levothyroxine  125 mcg daily  Recheck TSH in 3 months         Anemia     Hb between 8.3-7.7  Had received Epogen 20,000 units on 7/25  ESRD on PD  S/P IV venofer 3 doses  Continue oral iron tablets  Recheck CBC in 1 week         Type 2 diabetes mellitus with stage 4 chronic kidney disease, with long-term current use of insulin (HCC)       Lab Results   Component Value Date    HGBA1C 5.9 (H) 07/24/2024       Continue Lantus 10 units at night          Obesity, morbid (HCC)     BMI 35.39  Recommended diet         Secondary hyperparathyroidism of renal origin (HCC)     Following Nephrology.  The patient has been maintained on daily Cinacalcet 30 mg daily.          Bilateral lower extremity edema     Chronic acquired lymphedema  Continue lasix 80 mg daily         Cellulitis of right lower extremity     Complete 3 days of oral antibiotic augmentin         Paroxysmal atrial fibrillation (HCC) - Primary     Pt following the cardiologist at [facility].  Maintained on metoprolol and Coumadin 0.5 mg-1 mg  daily depending on his INR  INR 2.42 on 09/23/24  Continue metoprolol and Coumadin  Monitor INR 2/week         ESRD on peritoneal dialysis (HCC)     ESRD on peritoneal dialysis.    Following nephrology.  Torsemide changed to lasix by nephrology secondary to suspicious that torsemide could have kept the INR high   Monitor renal function  Continue PD  Recheck BMP in 1 week      Lab Results   Component Value Date    EGFR 12 09/23/2024    EGFR 12 09/22/2024    EGFR  12 09/21/2024    CREATININE 4.45 (H) 09/23/2024    CREATININE 4.41 (H) 09/22/2024    CREATININE 4.48 (H) 09/21/2024               Hypotension     Blood pressure stable   Continue midodrine 15 mg 3 times daily          Gout     Continue allopurinol          Ambulatory dysfunction     At baseline he walks with a walker  PT/OT          Moderate protein-calorie malnutrition (HCC)     Malnutrition Findings:                                 BMI Findings:           There is no height or weight on file to calculate BMI.     Nutrition consulted during hospital stay.              All medications and routine orders were reviewed and updated as needed.    Plan discussed with: Patient    Chief Complaint     Seen for admission at Nursing Facility    History of Present Illness     Masoud Perry is a 69 years old gentleman with a past medical history of ESRD on peritoneal dialysis, atrial fibs on Coumadin, hypotension on midodrine, hyperlipidemia, hypothyroidism, hyperlipidemia, gout, ambulatory dysfunction and h/o CVA. He was admitted at [facility] for 8 days (from 09/15-09/23) after a fall s/s ambulatory dysfunction. Pt fell and hit the back of his head. He was on coumadin for A fib. Denied LOC. Trauma work up was negative.  INR was within therapeutic range during hospital stay. He uses a walker at baseline. Nephrology was consulted. PT/OT recommended rehab.       HISTORY:  Past Medical History:   Diagnosis Date    Anemia in chronic kidney disease     Anticoagulated on Coumadin     Atrial fibrillation (HCC)     BPH (benign prostatic hyperplasia)     CAD (coronary artery disease)     CKD (chronic kidney disease), stage V (HCC)     Compartment syndrome of forearm (HCC)     Right, 2019    COPD (chronic obstructive pulmonary disease) (HCC)     CVA (cerebral vascular accident) (HCC)     Diastolic CHF (HCC)     grade 1 DD    DVT (deep venous thrombosis) (HCC)     Factor V deficiency (HCC)     Gout     Hyperlipidemia     Hypertension      Hypothyroidism     MI (myocardial infarction) (HCC)     x3 - 1999, 2010, after 2015    Morbid obesity (HCC)     Nephrolithiasis     Noncompliance with medications     SHAD (obstructive sleep apnea)     Peritoneal dialysis catheter in place (HCC)     Pulmonary embolism (HCC)     Secondary hyperparathyroidism of renal origin (HCC)     Spinal stenosis of lumbar region     Status post placement of implantable loop recorder     Tobacco dependence      Family History   Problem Relation Age of Onset    Kidney failure Mother     Cirrhosis Mother     Colon cancer Brother      Social History     Socioeconomic History    Marital status:      Spouse name: Not on file    Number of children: Not on file    Years of education: Not on file    Highest education level: Not on file   Occupational History    Not on file   Tobacco Use    Smoking status: Every Day     Current packs/day: 0.50     Types: Cigarettes    Smokeless tobacco: Never   Vaping Use    Vaping status: Never Used   Substance and Sexual Activity    Alcohol use: Not Currently    Drug use: Never    Sexual activity: Not on file   Other Topics Concern    Not on file   Social History Narrative    Not on file     Social Determinants of Health     Financial Resource Strain: Not on file   Food Insecurity: No Food Insecurity (9/16/2024)    Hunger Vital Sign     Worried About Running Out of Food in the Last Year: Never true     Ran Out of Food in the Last Year: Never true   Transportation Needs: No Transportation Needs (9/16/2024)    PRAPARE - Transportation     Lack of Transportation (Medical): No     Lack of Transportation (Non-Medical): No   Physical Activity: Not on file   Stress: Not on file   Social Connections: Unknown (6/18/2024)    Received from [facility]    Social Connections     How often do you feel lonely or isolated from those around you? (Adult - for ages 18 years and over): Not on file   Intimate Partner Violence: Not on file   Housing Stability: Low Risk   (9/16/2024)    Housing Stability Vital Sign     Unable to Pay for Housing in the Last Year: No     Number of Times Moved in the Last Year: 0     Homeless in the Last Year: No       Allergies:  Allergies   Allergen Reactions    Carvedilol Shortness Of Breath    Saccharin - Food Allergy Diarrhea     GI intolerance, adamant does not want to receive    Sucralose - Food Allergy Diarrhea     GI intolerance, adamant does not want to receive    Amiodarone Dizziness    Aspartame - Food Allergy Diarrhea    Bumetanide GI Intolerance and Lightheadedness           Cephalexin Other (See Comments)     unknown    Ciprofloxacin Rash    Hydralazine Tachycardia    Lisinopril GI Intolerance           Metolazone Other (See Comments)     Metallic taste    Morphine Swelling     Of injection site    Nifedipine Lightheadedness    Strawberry Extract - Food Allergy Rash       Review of Systems     Review of Systems   Constitutional:  Positive for appetite change. Negative for chills and fever.        Poor appetite since starting PD 2 months ago   HENT:  Negative for ear pain, sore throat and trouble swallowing.    Eyes:  Negative for pain and visual disturbance.   Respiratory:  Negative for cough and shortness of breath.    Cardiovascular:  Negative for chest pain and palpitations.   Gastrointestinal:  Negative for abdominal distention, abdominal pain, diarrhea, nausea and vomiting.   Genitourinary:  Negative for difficulty urinating, dysuria and hematuria.   Musculoskeletal:  Negative for arthralgias and back pain.        Ambulatory dysfunction   Skin:  Negative for color change and rash.   Neurological:  Negative for dizziness, seizures, syncope and headaches.   Hematological:  Does not bruise/bleed easily.   Psychiatric/Behavioral:  Negative for confusion and hallucinations.    All other systems reviewed and are negative.      Medications and orders     All medications reviewed and updated in senior living EMR.      Objective     Vitals:    BP: 124/68 mmHg  9/24/2024 06:37   Temp:97 °F  9/24/2024 06:42 Pulse:68 bpm  9/24/2024 06:42 Weight:268.5 Lbs  9/23/2024 20:18   Resp:16 Breaths/min  9/24/2024 06:42 BS:125 mg/dL  9/23/2024 20:18 O2:97 %  9/24/2024 06:42 Pain:7  9/24/2024 08:19           Physical Exam  Constitutional:       General: He is not in acute distress.     Appearance: He is not toxic-appearing.   HENT:      Nose: Nose normal.      Mouth/Throat:      Mouth: Mucous membranes are moist.      Pharynx: Oropharynx is clear.   Cardiovascular:      Heart sounds: Normal heart sounds. No murmur heard.     No gallop.   Pulmonary:      Effort: No respiratory distress.      Breath sounds: Normal breath sounds. No wheezing.   Abdominal:      General: Bowel sounds are normal. There is no distension.      Palpations: Abdomen is soft.      Comments: PD catheter in place. Clean and dry   Musculoskeletal:         General: Swelling present.      Right lower leg: Edema present.      Left lower leg: Edema present.      Comments: Stasis dermatitis of bilateral lower extremities with small bullae   Skin:     General: Skin is warm and dry.      Capillary Refill: Capillary refill takes less than 2 seconds.      Coloration: Skin is pale.      Findings: Lesion present.      Comments: A black scab on the right toe   Neurological:      Mental Status: He is oriented to person, place, and time.         Pertinent Laboratory/Diagnostic Studies:   The following labs/studies were reviewed please see chart or hospital paperwork for details.    Component  Ref Range & Units 9/23/24 0527 9/22/24 0558 9/20/24 0603 9/19/24 0625 9/18/24 0631 9/17/24 0626 9/16/24 0541   WBC  4.31 - 10.16 Thousand/uL 5.40 6.18 9.50 5.16 4.40 4.17 Low  3.89 Low    RBC  3.88 - 5.62 Million/uL 2.42 Low  2.41 Low  2.63 Low  2.57 Low  2.66 Low  2.74 Low  2.64 Low    Hemoglobin  12.0 - 17.0 g/dL 7.7 Low  7.6 Low  8.3 Low  8.2 Low  8.4 Low  8.6 Low  8.5 Low    Hematocrit  36.5 - 49.3 % 25.3 Low  24.9 Low   27.1 Low  26.5 Low  27.4 Low  28.3 Low  27.2 Low    MCV  82 - 98 fL 105 High  103 High  103 High  103 High  103 High  103 High  103 High    MCH  26.8 - 34.3 pg 31.8 31.5 31.6 31.9 31.6 31.4 32.2   MCHC  31.4 - 37.4 g/dL 30.4 Low  30.5 Low  30.6 Low  30.9 Low  30.7 Low  30.4 Low  31.3 Low    RDW  11.6 - 15.1 % 15.5 High  15.5 High  15.7 High  15.8 High  15.9 High  15.9 High  15.8 High    MPV  8.9 - 12.7 fL 9.0 8.3 Low  8.7 Low  8.4 Low  10.1 8.5 Low  9.7   Platelets  149 - 390 Thousands/uL 145 Low  119 Low  109 Low  106 Low   Low  112 Low  127 Low                  Component  Ref Range & Units 9/23/24 0527 9/22/24 0558 9/21/24 0624 9/20/24 0603 9/19/24 0625 9/18/24 0631 9/17/24 0626   Sodium  135 - 147 mmol/L 136 135 134 Low  134 Low  136 135 135   Potassium  3.5 - 5.3 mmol/L 4.2 4.1 4.1 3.9 3.6 3.4 Low  2.9 Low    Chloride  96 - 108 mmol/L 102 101 100 100 102 99 97   CO2  21 - 32 mmol/L 31 31 31 31 30 31 31   ANION GAP  4 - 13 mmol/L 3 Low  3 Low  3 Low  3 Low  4 5 7   BUN  5 - 25 mg/dL 28 High  29 High  29 High  26 High  26 High  27 High  28 High    Creatinine  0.60 - 1.30 mg/dL 4.45 High  4.41 High  CM 4.48 High  CM 4.59 High  CM 4.84 High  CM 5.14 High  CM 5.61 High  CM   Comment: Standardized to IDMS reference method   Glucose  65 - 140 mg/dL 98 94 CM 98  CM 96 CM 97 CM 96 CM   Comment: If the patient is fasting, the ADA then defines impaired fasting glucose as > 100 mg/dL and diabetes as > or equal to 123 mg/dL.   Calcium  8.4 - 10.2 mg/dL 8.5 8.4 8.4 8.5 8.4 8.6 8.9   eGFR  ml/min/1.73sq m 12 12 12 11 11 10 9                    Component  Ref Range & Units 9/23/24 0527 9/22/24 0558 9/21/24 0624 9/20/24 0603 9/19/24 0625 9/18/24 0631 9/16/24 0541   Protime  12.3 - 15.0 seconds 26.4 High  27.7 High  30.5 High  34.8 High  34.7 High  34.7 High  30.9 High    INR  0.85 - 1.19 2.42 High  2.57 High  2.93 High  3.49 High  3.48 High  3.48 High  2.98 High           - Counseling Documentation: patient was  counseled regarding: diagnostic results, instructions for management, risk factor reductions, patient and family education, risks and benefits of treatment options, and importance of compliance with treatment

## 2024-09-24 NOTE — ASSESSMENT & PLAN NOTE
Lab Results   Component Value Date    HGBA1C 5.9 (H) 07/24/2024       Continue Lantus 10 units at night

## 2024-09-24 NOTE — ASSESSMENT & PLAN NOTE
Lab Results   Component Value Date    [id number] 12.236 (H) 07/30/2024      Continue levothyroxine  125 mcg daily  Recheck TSH in 3 months

## 2024-09-24 NOTE — ASSESSMENT & PLAN NOTE
Pt following the cardiologist at [facility].  Maintained on metoprolol and Coumadin 0.5 mg-1 mg  daily depending on his INR  INR 2.42 on 09/23/24  Continue metoprolol and Coumadin  Monitor INR 2/week

## 2024-09-24 NOTE — ASSESSMENT & PLAN NOTE
Hb between 8.3-7.7  Had received Epogen 20,000 units on 7/25  ESRD on PD  S/P IV venofer 3 doses  Continue oral iron tablets  Recheck CBC in 1 week

## 2024-09-24 NOTE — ASSESSMENT & PLAN NOTE
ESRD on peritoneal dialysis.    Following nephrology.  Torsemide changed to lasix by nephrology secondary to suspicious that torsemide could have kept the INR high   Monitor renal function  Continue PD  Recheck BMP in 1 week      Lab Results   Component Value Date    EGFR 12 09/23/2024    EGFR 12 09/22/2024    EGFR 12 09/21/2024    CREATININE 4.45 (H) 09/23/2024    CREATININE 4.41 (H) 09/22/2024    CREATININE 4.48 (H) 09/21/2024

## 2024-09-25 ENCOUNTER — NURSING HOME VISIT (OUTPATIENT)
Dept: WOUND CARE | Facility: HOSPITAL | Age: 69
End: 2024-09-25
Payer: COMMERCIAL

## 2024-09-25 DIAGNOSIS — Z79.4 TYPE 2 DIABETES MELLITUS WITH STAGE 4 CHRONIC KIDNEY DISEASE, WITH LONG-TERM CURRENT USE OF INSULIN (HCC): ICD-10-CM

## 2024-09-25 DIAGNOSIS — R26.2 AMBULATORY DYSFUNCTION: ICD-10-CM

## 2024-09-25 DIAGNOSIS — L97.519 DIABETIC ULCER OF RIGHT GREAT TOE (HCC): Primary | ICD-10-CM

## 2024-09-25 DIAGNOSIS — N18.4 TYPE 2 DIABETES MELLITUS WITH STAGE 4 CHRONIC KIDNEY DISEASE, WITH LONG-TERM CURRENT USE OF INSULIN (HCC): ICD-10-CM

## 2024-09-25 DIAGNOSIS — E11.22 TYPE 2 DIABETES MELLITUS WITH STAGE 4 CHRONIC KIDNEY DISEASE, WITH LONG-TERM CURRENT USE OF INSULIN (HCC): ICD-10-CM

## 2024-09-25 DIAGNOSIS — T14.8XXA HEMATOMA: ICD-10-CM

## 2024-09-25 DIAGNOSIS — E11.621 DIABETIC ULCER OF RIGHT GREAT TOE (HCC): Primary | ICD-10-CM

## 2024-09-25 DIAGNOSIS — R60.9 EDEMA, UNSPECIFIED TYPE: ICD-10-CM

## 2024-09-25 PROCEDURE — 99305 1ST NF CARE MODERATE MDM 35: CPT | Performed by: NURSE PRACTITIONER

## 2024-09-25 NOTE — ASSESSMENT & PLAN NOTE
A1C results reviewed with the patient today.   Lab Results   Component Value Date    HGBA1C 5.9 (H) 07/24/2024   Under the care of Senior care team  Patient's blood sugar for the past few days ranges from , goal is to maintain blood sugar lower than 200 to promote wound healing  Patient currently on insulin

## 2024-09-25 NOTE — ASSESSMENT & PLAN NOTE
Right great toe  Wound improved, decreasing wound size, purple, nonblanchable  Local wound care with moisturizing lotion  Continue to offload

## 2024-09-25 NOTE — PROGRESS NOTES
[facility] WOUND CARE MANAGEMENT   AND HYPERBARIC MEDICINE CENTER       Patient ID: Masoud Perry is a 69 y.o. male Date of Birth 1955     Location of Service: [facility]    Performed wound round with: Wound team     Chief Complaint : Left lower leg anterior and right great toe    Wound Instructions:  Wound: Left lower leg anterior  Discontinue previous wound order  Cleanse the wound bed with NSS   Apply non-sting skin prep to periwound area  Apply Betadine to wound bed, then cover with ABD and rolled gauze  Frequency : Daily and prn for soiling    Wound: Right great toe  Cleanse with normal saline solution  Apply moisturizing lotion daily    Ace wrap on bilateral lower legs, from base of the toes to proximal calf, on in a.m., off at night    Offload all wounds  Turn and reposition frequently  Instruct / Assist with weight shifting in wheelchair  Increase protein intake.  Monitor for any sign of infection or worsening, inform PCP or patient's primary physician in your facility.      Allergies  Carvedilol, Saccharin - food allergy, Sucralose - food allergy, Amiodarone, Aspartame - food allergy, Bumetanide, Cephalexin, Ciprofloxacin, Hydralazine, Lisinopril, Metolazone, Morphine, Nifedipine, and Strawberry extract - food allergy      Assessment & Plan:  1. Diabetic ulcer of right great toe (HCC)  Assessment & Plan:  Right great toe  Wound improved, decreasing wound size, purple, nonblanchable  Local wound care with moisturizing lotion  Continue to offload  2. Hematoma  Assessment & Plan:  Left lower leg anterior  Wound have 100% black necrotic tissue with surrounding hematoma, with no obvious sign of infection, denies pain on palpation  This wound could be hematoma versus calciphylaxis.  This type of wound commonly seen in patient with end-stage renal disease.  Local wound care with Betadine  Patient is on peritoneal dialysis  Will recommend to change to renal diet  Follow-up next week  3.  Ambulatory dysfunction  Assessment & Plan:  On short-term rehab  4. Type 2 diabetes mellitus with stage 4 chronic kidney disease, with long-term current use of insulin ([facility])  Assessment & Plan:   A1C results reviewed with the patient today.   Lab Results   Component Value Date    HGBA1C 5.9 (H) 07/24/2024   Under the care of Senior care team  Patient's blood sugar for the past few days ranges from , goal is to maintain blood sugar lower than 200 to promote wound healing  Patient currently on insulin  5. Edema, unspecified type  Assessment & Plan:  Edema on bilateral lower legs  Ordered mild compression with Ace wrap  Patient is on Lasix and peritoneal dialysis             Subjective:   September 25, 2024.  New consult for wound on the right first toe and left lower leg anterior.  Patient currently admitted at [facility].  Patient was referred by Senior care team.  Patient medical problem includes but not limited to type 2 diabetes and chronic kidney disease.  Patient was seen with the facility wound team.  I introduced myself to patient and patient agreed to be seen for wound consult.    Wound history: As per medical record review, the wound on the toe was discovered on September 21, 2024.  As per patient, he has recently had the wound on the left lower leg, not sure how he got the wound.  As per patient, he probably bumped it.    Received patient in bed, seems comfortable.  Denies pain.  Patient is ambulatory.  Patient is on peritoneal dialysis.        Review of Systems   Constitutional: Negative.    Respiratory: Negative.     Cardiovascular: Negative.    Skin:  Positive for wound.   Psychiatric/Behavioral: Negative.         Objective:    Physical Exam  Constitutional:       Appearance: Normal appearance.   Cardiovascular:      Rate and Rhythm: Normal rate.   Pulmonary:      Effort: Pulmonary effort is normal.   Musculoskeletal:      Comments: Large edema on bilateral lower legs  Pedal pulses  nonpalpable due to edema   Skin:     Findings: Lesion present.      Comments: Right great toe dorsal: Wound size is 1.6 x 1.2 cm.,  Purple, nonblanchable, with no obvious sign of infection    Left lower leg anterior: Wound size is 5.1 x 2.8 cm.,  100% eschar, small amount of serous drainage, periwound normal, with no obvious sign of infection   Neurological:      Mental Status: He is alert.              Procedures     Results from last 6 Months   Lab Units 07/23/24  8105   WOUND CULTURE  4+ Growth of Pseudomonas aeruginosa*  4+ Growth of Enterococcus faecalis*       Patient's care was coordinated with nursing facility staff. Recent vitals, labs and updated medications were reviewed on EMR or chart system of facility. Past Medical, surgical, social, medication and allergy history and patient's previous records were reviewed and updated as appropriate: Most up-to date information is available in the facility EMR where the patient is currently admitted.    Patient Active Problem List   Diagnosis    History of CVA (cerebrovascular accident)    Acute on chronic diastolic HF (heart failure) ([facility])    HLD (hyperlipidemia)    Lymphedema    Hypothyroidism    Anemia    Thrombocytopenia ([facility])    Angina at rest    MI (myocardial infarction) ([facility])    History of PTCA    Sleep apnea    Smoking    Factor 5 Leiden mutation, heterozygous ([facility])    Type 2 diabetes mellitus with stage 4 chronic kidney disease, with long-term current use of insulin ([facility])    Obesity, morbid ([facility])    Secondary hyperparathyroidism of renal origin ([facility])    Stage 5 chronic kidney disease on peritoneal dialysis ([facility])    Chronic kidney disease-mineral and bone disorder    Ureteral stone with hydronephrosis    Cutaneous abscess of buttock    Bilateral lower extremity edema    Cellulitis of right lower extremity    Paroxysmal atrial fibrillation ([facility])    Patient on peritoneal dialysis ([facility])    Hypervolemia    ESRD on peritoneal dialysis ([facility])    Bacteremia     Elevated LFTs    Nocturnal hypoxia    Lactic acidosis    Hypercalcemia    Chronic acquired lymphedema    Hypotension    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 (HCC)    COPD (chronic obstructive pulmonary disease) (HCC)    ED (erectile dysfunction) of organic origin    Gastroesophageal reflux disease    Gout    History of thromboembolism of vein    Personal history of pulmonary embolism    Ambulatory dysfunction    Moderate protein-calorie malnutrition (HCC)    Cellulitis    Hyponatremia    Medication management    Diabetic ulcer of right great toe (HCC)    Hematoma    Edema     Past Medical History:   Diagnosis Date    Anemia in chronic kidney disease     Anticoagulated on Coumadin     Atrial fibrillation (HCC)     BPH (benign prostatic hyperplasia)     CAD (coronary artery disease)     CKD (chronic kidney disease), stage V (HCC)     Compartment syndrome of forearm (HCC)     Right, 2019    COPD (chronic obstructive pulmonary disease) (HCC)     CVA (cerebral vascular accident) (HCC)     Diastolic CHF (HCC)     grade 1 DD    DVT (deep venous thrombosis) (HCC)     Factor V deficiency (HCC)     Gout     Hyperlipidemia     Hypertension     Hypothyroidism     MI (myocardial infarction) ([facility])     x3 - 1999, 2010, after 2015    Morbid obesity (HCC)     Nephrolithiasis     Noncompliance with medications     SHAD (obstructive sleep apnea)     Peritoneal dialysis catheter in place (HCC)     Pulmonary embolism (HCC)     Secondary hyperparathyroidism of renal origin (HCC)     Spinal stenosis of lumbar region     Status post placement of implantable loop recorder     Tobacco dependence      Past Surgical History:   Procedure Laterality Date    CARDIAC ELECTROPHYSIOLOGY STUDY AND ABLATION  04/29/2024    CHOLECYSTECTOMY      CORONARY ANGIOPLASTY WITH STENT PLACEMENT      JOINT REPLACEMENT      bilateral knee    NC CYSTO/URETERO W/LITHOTRIPSY &INDWELL STENT INSRT Left 09/24/2022    Procedure: CYSTOSCOPY URETEROSCOPY WITH  LITHOTRIPSY HOLMIUM LASER, AND INSERTION STENT URETERAL;  Surgeon: Lewis Dahl MD;  Location:  MAIN OR;  Service: Urology    VT LAPS INSERTION TUNNELED INTRAPERITONEAL CATHETER N/A 6/7/2024    Procedure: INSERTION PERITONEAL CATHETER DIALYSIS LAPAROSCOPIC;  Surgeon: Hiram Park DO;  Location: [facility];  Service: General    US GUIDED KIDNEY BIOPSY  08/05/2020     Social History     Socioeconomic History    Marital status:      Spouse name: None    Number of children: None    Years of education: None    Highest education level: None   Occupational History    None   Tobacco Use    Smoking status: Every Day     Current packs/day: 0.50     Types: Cigarettes    Smokeless tobacco: Never   Vaping Use    Vaping status: Never Used   Substance and Sexual Activity    Alcohol use: Not Currently    Drug use: Never    Sexual activity: None   Other Topics Concern    None   Social History Narrative    None     Social Determinants of Health     Financial Resource Strain: Not on file   Food Insecurity: No Food Insecurity (9/16/2024)    Hunger Vital Sign     Worried About Running Out of Food in the Last Year: Never true     Ran Out of Food in the Last Year: Never true   Transportation Needs: No Transportation Needs (9/16/2024)    PRAPARE - Transportation     Lack of Transportation (Medical): No     Lack of Transportation (Non-Medical): No   Physical Activity: Not on file   Stress: Not on file   Social Connections: Unknown (6/18/2024)    Received from [facility]    Social [place]     How often do you feel lonely or isolated from those around you? (Adult - for ages 18 years and over): Not on file   Intimate Partner Violence: Not on file   Housing Stability: Low Risk  (9/16/2024)    Housing Stability Vital Sign     Unable to Pay for Housing in the Last Year: No     Number of Times Moved in the Last Year: 0     Homeless in the Last Year: No        Current Outpatient Medications:     acetaminophen (TYLENOL) 325 mg tablet,  Take 2 tablets (650 mg total) by mouth every 6 (six) hours as needed for mild pain, headaches or fever, Disp: , Rfl:     allopurinol (ZYLOPRIM) 300 mg tablet, Take 300 mg by mouth daily, Disp: , Rfl:     amoxicillin-clavulanate (AUGMENTIN) 250-125 mg per tablet, Take 1 tablet by mouth every 12 (twelve) hours for 3 days, Disp: , Rfl:     aspirin (ECOTRIN LOW STRENGTH) 81 mg EC tablet, Take 81 mg by mouth daily, Disp: , Rfl:     atorvastatin (LIPITOR) 40 mg tablet, Take 1 tablet (40 mg total) by mouth daily with dinner, Disp: 30 tablet, Rfl: 0    cinacalcet (SENSIPAR) 30 mg tablet, Take 1 tablet (30 mg total) by mouth 3 (three) times a week Do not start before September 25, 2024., Disp: , Rfl:     docusate sodium (COLACE) 100 mg capsule, Take 1 capsule (100 mg total) by mouth 2 (two) times a day, Disp: , Rfl:     ferrous sulfate 325 (65 Fe) mg tablet, Take 1 tablet (325 mg total) by mouth daily with breakfast, Disp: 30 tablet, Rfl: 0    furosemide (LASIX) 80 mg tablet, Take 1 tablet (80 mg total) by mouth daily, Disp: , Rfl:     HYDROcodone-acetaminophen (NORCO) 5-325 mg per tablet, Take 2 tablets by mouth every 6 (six) hours as needed for pain for up to 10 days Max Daily Amount: 8 tablets, Disp: 20 tablet, Rfl: 0    insulin glargine (LANTUS) 100 units/mL subcutaneous injection, Inject 10 Units under the skin daily at bedtime, Disp: , Rfl:     insulin lispro (HumALOG/ADMELOG) 100 units/mL injection, Inject 2-12 Units under the skin 3 (three) times a day before meals, Disp: , Rfl:     levothyroxine 125 mcg tablet, Take 125 mcg by mouth daily, Disp: , Rfl:     Magnesium 400 MG TABS, Take 400 mg by mouth in the morning, Disp: , Rfl:     metoprolol succinate (TOPROL-XL) 25 mg 24 hr tablet, Take 1 tablet (25 mg total) by mouth 2 (two) times a day, Disp: , Rfl:     midodrine (PROAMATINE) 5 mg tablet, Take 3 tablets (15 mg total) by mouth 3 (three) times a day before meals, Disp: 270 tablet, Rfl: 1    potassium chloride  "(Klor-Con M20) 20 mEq tablet, Take 2 tablets (40 mEq total) by mouth daily, Disp: , Rfl:     simethicone (MYLICON) 80 mg chewable tablet, Chew 1 tablet (80 mg total) 4 (four) times a day as needed for flatulence, Disp: 30 tablet, Rfl: 0    tamsulosin (FLOMAX) 0.4 mg, Take 0.4 mg by mouth daily with dinner, Disp: , Rfl:     warfarin (COUMADIN) 1 mg tablet, Take 0.5 tablets (0.5 mg total) by mouth daily at bedtime, Disp: , Rfl:   Family History   Problem Relation Age of Onset    Kidney failure Mother     Cirrhosis Mother     Colon cancer Brother               Coordination of Care: Wound team aware of the treatment plan. Facility nurse will provide wound treatment and monitor the wound for any changes.     Patient / Staff education : Patient / Staff was given education on sign of infection and pressure ulcer prevention. Patient/ Staff verbalized understanding     Follow up :  Next week    Voice-recognition software may have been used in the preparation of this document. Occasional wrong word or \"sound-alike\" substitutions may have occurred due to the inherent limitations of voice recognition software. Interpretation should be guided by context.      GAGANDEEP Carlson  "

## 2024-09-25 NOTE — ASSESSMENT & PLAN NOTE
Left lower leg anterior  Wound have 100% black necrotic tissue with surrounding hematoma, with no obvious sign of infection, denies pain on palpation  This wound could be hematoma versus calciphylaxis.  This type of wound commonly seen in patient with end-stage renal disease.  Local wound care with Betadine  Patient is on peritoneal dialysis  Will recommend to change to renal diet  Follow-up next week

## 2024-09-25 NOTE — ASSESSMENT & PLAN NOTE
Edema on bilateral lower legs  Ordered mild compression with Ace wrap  Patient is on Lasix and peritoneal dialysis

## 2024-09-26 ENCOUNTER — NURSING HOME VISIT (OUTPATIENT)
Dept: GERIATRICS | Facility: OTHER | Age: 69
End: 2024-09-26
Payer: COMMERCIAL

## 2024-09-26 DIAGNOSIS — Z99.2 ESRD ON PERITONEAL DIALYSIS (HCC): Primary | ICD-10-CM

## 2024-09-26 DIAGNOSIS — N18.6 ESRD ON PERITONEAL DIALYSIS (HCC): Primary | ICD-10-CM

## 2024-09-26 DIAGNOSIS — I48.0 PAROXYSMAL ATRIAL FIBRILLATION (HCC): ICD-10-CM

## 2024-09-26 DIAGNOSIS — Z79.4 TYPE 2 DIABETES MELLITUS WITH STAGE 4 CHRONIC KIDNEY DISEASE, WITH LONG-TERM CURRENT USE OF INSULIN (HCC): ICD-10-CM

## 2024-09-26 DIAGNOSIS — L03.116 CELLULITIS OF LEFT LOWER EXTREMITY: ICD-10-CM

## 2024-09-26 DIAGNOSIS — N18.4 TYPE 2 DIABETES MELLITUS WITH STAGE 4 CHRONIC KIDNEY DISEASE, WITH LONG-TERM CURRENT USE OF INSULIN (HCC): ICD-10-CM

## 2024-09-26 DIAGNOSIS — E03.9 ACQUIRED HYPOTHYROIDISM: ICD-10-CM

## 2024-09-26 DIAGNOSIS — E11.22 TYPE 2 DIABETES MELLITUS WITH STAGE 4 CHRONIC KIDNEY DISEASE, WITH LONG-TERM CURRENT USE OF INSULIN (HCC): ICD-10-CM

## 2024-09-26 PROCEDURE — 99309 SBSQ NF CARE MODERATE MDM 30: CPT

## 2024-09-27 ENCOUNTER — TELEPHONE (OUTPATIENT)
Dept: OTHER | Facility: OTHER | Age: 69
End: 2024-09-27

## 2024-09-27 VITALS
RESPIRATION RATE: 18 BRPM | DIASTOLIC BLOOD PRESSURE: 69 MMHG | OXYGEN SATURATION: 98 % | HEART RATE: 74 BPM | BODY MASS INDEX: 34.9 KG/M2 | SYSTOLIC BLOOD PRESSURE: 110 MMHG | TEMPERATURE: 97.5 F | WEIGHT: 250.2 LBS

## 2024-09-27 NOTE — PROGRESS NOTES
[facility]  [address] [address]  [phone]  [facility]  Code 31 (STR)        NAME: Masoud Perry  AGE: 69 y.o. SEX: male CODE STATUS: No CPR    DATE OF ENCOUNTER: 9/27/2024    Assessment and Plan     1. ESRD on peritoneal dialysis (HCC)  Assessment & Plan:  Lab Results   Component Value Date    CREATININE 4.45 (H) 09/23/2024   Follows with Nephrology  Continue PD  Avoid nephrotoxins  Continue lasix  Monitor renal function  2. Paroxysmal atrial fibrillation (HCC)  Assessment & Plan:  HR controlled, 74  Denies CP/palpitations  Continue coumadin 0.5 mg, adjust as necessary  9/23/24 INR 2.42  Repeat INR 9/27/24  3. Acquired hypothyroidism  Assessment & Plan:  Lab Results   Component Value Date    SLS1VSFIOIMY 12.236 (H) 07/30/2024    TSH 2.89 07/08/2024   Continue levothyroxine 125 mcg  Monitor TSH   4. Type 2 diabetes mellitus with stage 4 chronic kidney disease, with long-term current use of insulin (HCC)  Assessment & Plan:    Lab Results   Component Value Date    HGBA1C 5.9 (H) 07/24/2024   BS controlled, 94  Continue accu-checks  Continue Glargine 10 units at HS  Avoid hypoglycemia  Encourage diabetic diet  5. Cellulitis of left lower extremity  Assessment & Plan:  Will complete Augmentin 9/27  Continue local wound care  Monitor for s/s of infection       All medications and routine orders were reviewed and updated as needed.    Chief Complaint     STR follow up visit  Patient's care was coordinated with nursing facility staff. Recent vitals, labs, and updated medications were review on [facility] system in facility.  Past Medical and Surgical History      Past Medical History:   Diagnosis Date    Anemia in chronic kidney disease     Anticoagulated on Coumadin     Atrial fibrillation (HCC)     BPH (benign prostatic hyperplasia)     CAD (coronary artery disease)     CKD (chronic kidney disease), stage V (HCC)     Compartment syndrome of forearm (HCC)     Right, 2019     COPD (chronic obstructive pulmonary disease) (HCC)     CVA (cerebral vascular accident) (HCC)     Diastolic CHF (HCC)     grade 1 DD    DVT (deep venous thrombosis) (HCC)     Factor V deficiency (HCC)     Gout     Hyperlipidemia     Hypertension     Hypothyroidism     MI (myocardial infarction) (HCC)     x3 - 1999, 2010, after 2015    Morbid obesity (HCC)     Nephrolithiasis     Noncompliance with medications     SHAD (obstructive sleep apnea)     Peritoneal dialysis catheter in place (HCC)     Pulmonary embolism (HCC)     Secondary hyperparathyroidism of renal origin (HCC)     Spinal stenosis of lumbar region     Status post placement of implantable loop recorder     Tobacco dependence      Past Surgical History:   Procedure Laterality Date    CARDIAC ELECTROPHYSIOLOGY STUDY AND ABLATION  04/29/2024    CHOLECYSTECTOMY      CORONARY ANGIOPLASTY WITH STENT PLACEMENT      JOINT REPLACEMENT      bilateral knee    OR CYSTO/URETERO W/LITHOTRIPSY &INDWELL STENT INSRT Left 09/24/2022    Procedure: CYSTOSCOPY URETEROSCOPY WITH LITHOTRIPSY HOLMIUM LASER, AND INSERTION STENT URETERAL;  Surgeon: Lewis Dahl MD;  Location:  MAIN OR;  Service: Urology    OR LAPS INSERTION TUNNELED INTRAPERITONEAL CATHETER N/A 6/7/2024    Procedure: INSERTION PERITONEAL CATHETER DIALYSIS LAPAROSCOPIC;  Surgeon: Hiram Park DO;  Location: [facility] MAIN OR;  Service: General    US GUIDED KIDNEY BIOPSY  08/05/2020     Allergies   Allergen Reactions    Carvedilol Shortness Of Breath    Saccharin - Food Allergy Diarrhea     GI intolerance, adamant does not want to receive    Sucralose - Food Allergy Diarrhea     GI intolerance, adamant does not want to receive    Amiodarone Dizziness    Aspartame - Food Allergy Diarrhea    Bumetanide GI Intolerance and Lightheadedness           Cephalexin Other (See Comments)     unknown    Ciprofloxacin Rash    Hydralazine Tachycardia    Lisinopril GI Intolerance           Metolazone Other (See Comments)     Metallic  taste    Morphine Swelling     Of injection site    Nifedipine Lightheadedness    Strawberry Extract - Food Allergy Rash          History of Present Illness     HPI  Masoud Perry is a 69 year old male, he is a STR patient of [facility] since 9/23/24. Past Medical Hx including but not limited to CHF, MI, SHAD, COPD, GERD, hypothyroidism, diabetes, CKD on PD, factor V, CVA, lymphedema. He was seen in collaboration with nursing for medical mgmt of acute and chronic medical conditions and STR follow up.     Hospital Course  Patient was admitted to the hospital 9/15/24 following fall. Patient ambulates with a walker at baseline and reports increased weakness over the last 2 weeks.  He was getting out of his chair and fell, hitting the back of his head. . Trauma work up was negative. Patient did develop LLE cellulitis and was treated with Vanco, then was switched to Augmentin for 3 more days. Torsemide was changed to lasix d/t suspicion that torsemide was causing INR to be elevated. His Coumadin was decreased to 0.5 mg. Patient was recommended for rehab and was discharged to [facility].     Rehab Course  Masoud was seen and examined at bedside today. Patient is a reliable historian and is AAO x 3. On exam patient is resting in bed and is in no distress. Patient on PD, tolerating well. He continues with therapy. Hoping to get stronger so her can return home.     Denies CP/SOB/N/V/D. Denies lightheadedness, dizziness, headaches, vision changes. Patient states they are eating well and staying hydrated. Denies any bowel or bladder issues. Per review of SNF records, Patient is eating 3 meals per day, consuming  %. Last documented BM 9/26/24. No concerns from nursing at this time.    The patient's allergies, past medical, surgical, social and family history were reviewed and unchanged.    Review of Systems     Review of Systems   Constitutional:  Positive for activity change. Negative for appetite change and fever.    HENT:  Negative for congestion.    Cardiovascular:  Positive for leg swelling. Negative for chest pain and palpitations.   Gastrointestinal:  Negative for abdominal pain, constipation, diarrhea, nausea and vomiting.   Genitourinary:  Negative for difficulty urinating.        PD   Musculoskeletal:  Negative for gait problem.   Skin: Negative.    Neurological:  Positive for weakness. Negative for dizziness and headaches.   Psychiatric/Behavioral:  Negative for sleep disturbance.          Objective     Vitals:   Vitals:    09/27/24 1145   BP: 110/69   Pulse: 74   Resp: 18   Temp: 97.5 °F (36.4 °C)   SpO2: 98%         Physical Exam  Constitutional:       General: He is not in acute distress.     Appearance: Normal appearance. He is not ill-appearing.   HENT:      Head: Normocephalic and atraumatic.   Eyes:      Conjunctiva/sclera: Conjunctivae normal.   Cardiovascular:      Rate and Rhythm: Normal rate and regular rhythm.      Heart sounds: Normal heart sounds.   Pulmonary:      Effort: No respiratory distress.      Breath sounds: Normal breath sounds. No wheezing.   Abdominal:      General: Bowel sounds are normal. There is no distension.      Tenderness: There is no abdominal tenderness.   Musculoskeletal:      Right lower leg: Edema present.      Left lower leg: Edema present.   Skin:     General: Skin is warm.   Neurological:      Mental Status: He is alert and oriented to person, place, and time.      Motor: Weakness present.      Gait: Gait abnormal.   Psychiatric:         Mood and Affect: Mood normal.         Behavior: Behavior normal.         Pertinent Laboratory/Diagnostic Studies:   Reviewed in facility chart-stable      Current Medications   Medications reviewed and updated see facility MAR for details.      Current Outpatient Medications:     aspirin (ECOTRIN LOW STRENGTH) 81 mg EC tablet, Take 81 mg by mouth daily, Disp: , Rfl:     insulin glargine (LANTUS) 100 units/mL subcutaneous injection, Inject 10  Units under the skin daily at bedtime, Disp: , Rfl:     levothyroxine 125 mcg tablet, Take 125 mcg by mouth daily, Disp: , Rfl:     potassium chloride (Klor-Con M20) 20 mEq tablet, Take 2 tablets (40 mEq total) by mouth daily, Disp: , Rfl:     acetaminophen (TYLENOL) 325 mg tablet, Take 2 tablets (650 mg total) by mouth every 6 (six) hours as needed for mild pain, headaches or fever, Disp: , Rfl:     allopurinol (ZYLOPRIM) 300 mg tablet, Take 300 mg by mouth daily, Disp: , Rfl:     atorvastatin (LIPITOR) 40 mg tablet, Take 1 tablet (40 mg total) by mouth daily with dinner, Disp: 30 tablet, Rfl: 0    cinacalcet (SENSIPAR) 30 mg tablet, Take 1 tablet (30 mg total) by mouth 3 (three) times a week Do not start before September 25, 2024., Disp: , Rfl:     docusate sodium (COLACE) 100 mg capsule, Take 1 capsule (100 mg total) by mouth 2 (two) times a day, Disp: , Rfl:     ferrous sulfate 325 (65 Fe) mg tablet, Take 1 tablet (325 mg total) by mouth daily with breakfast, Disp: 30 tablet, Rfl: 0    furosemide (LASIX) 80 mg tablet, Take 1 tablet (80 mg total) by mouth daily, Disp: , Rfl:     HYDROcodone-acetaminophen (NORCO) 5-325 mg per tablet, Take 2 tablets by mouth every 6 (six) hours as needed for pain for up to 10 days Max Daily Amount: 8 tablets, Disp: 20 tablet, Rfl: 0    insulin lispro (HumALOG/ADMELOG) 100 units/mL injection, Inject 2-12 Units under the skin 3 (three) times a day before meals, Disp: , Rfl:     Magnesium 400 MG TABS, Take 400 mg by mouth in the morning, Disp: , Rfl:     metoprolol succinate (TOPROL-XL) 25 mg 24 hr tablet, Take 1 tablet (25 mg total) by mouth 2 (two) times a day, Disp: , Rfl:     midodrine (PROAMATINE) 5 mg tablet, Take 3 tablets (15 mg total) by mouth 3 (three) times a day before meals, Disp: 270 tablet, Rfl: 1    simethicone (MYLICON) 80 mg chewable tablet, Chew 1 tablet (80 mg total) 4 (four) times a day as needed for flatulence, Disp: 30 tablet, Rfl: 0    tamsulosin (FLOMAX) 0.4  "mg, Take 0.4 mg by mouth daily with dinner, Disp: , Rfl:     warfarin (COUMADIN) 1 mg tablet, Take 0.5 tablets (0.5 mg total) by mouth daily at bedtime, Disp: , Rfl:      Please note:  Voice-recognition software may have been used in the preparation of this document.  Occasional wrong word or \"sound-alike\" substitutions may have occurred due to the inherent limitations of voice recognition software.  Interpretation should be guided by context.         GAGANDEEP Arteaga  9/27/2024  12:24 PM    "

## 2024-09-27 NOTE — ASSESSMENT & PLAN NOTE
Lab Results   Component Value Date    CREATININE 4.45 (H) 09/23/2024   Follows with Nephrology  Continue PD  Avoid nephrotoxins  Continue lasix  Monitor renal function

## 2024-09-27 NOTE — ASSESSMENT & PLAN NOTE
Lab Results   Component Value Date    HGBA1C 5.9 (H) 07/24/2024   BS controlled, 94  Continue accu-checks  Continue Glargine 10 units at HS  Avoid hypoglycemia  Encourage diabetic diet

## 2024-09-27 NOTE — ASSESSMENT & PLAN NOTE
Lab Results   Component Value Date    [id number] 12.236 (H) 07/30/2024    TSH 2.89 07/08/2024   Continue levothyroxine 125 mcg  Monitor TSH

## 2024-09-27 NOTE — ASSESSMENT & PLAN NOTE
HR controlled, 74  Denies CP/palpitations  Continue coumadin 0.5 mg, adjust as necessary  9/23/24 INR 2.42  Repeat INR 9/27/24

## 2024-09-27 NOTE — TELEPHONE ENCOUNTER
Mari called, requested a callback from on call provider to review recent results. Provider was paged via ESC

## 2024-09-28 NOTE — TELEPHONE ENCOUNTER
Mari called regarding patient. Per Mari, results were already addressed earlier today. Per Mari, she wanted on call to be paged and disregard initial page. Provider was paged via ESC

## 2024-09-30 ENCOUNTER — NURSING HOME VISIT (OUTPATIENT)
Dept: GERIATRICS | Facility: OTHER | Age: 69
End: 2024-09-30
Payer: COMMERCIAL

## 2024-09-30 VITALS
HEART RATE: 72 BPM | RESPIRATION RATE: 18 BRPM | OXYGEN SATURATION: 96 % | SYSTOLIC BLOOD PRESSURE: 132 MMHG | BODY MASS INDEX: 34.94 KG/M2 | TEMPERATURE: 97.7 F | WEIGHT: 250.5 LBS | DIASTOLIC BLOOD PRESSURE: 76 MMHG

## 2024-09-30 DIAGNOSIS — N18.6 ESRD ON PERITONEAL DIALYSIS (HCC): ICD-10-CM

## 2024-09-30 DIAGNOSIS — R26.2 AMBULATORY DYSFUNCTION: ICD-10-CM

## 2024-09-30 DIAGNOSIS — E11.22 TYPE 2 DIABETES MELLITUS WITH STAGE 4 CHRONIC KIDNEY DISEASE, WITH LONG-TERM CURRENT USE OF INSULIN (HCC): ICD-10-CM

## 2024-09-30 DIAGNOSIS — N18.4 TYPE 2 DIABETES MELLITUS WITH STAGE 4 CHRONIC KIDNEY DISEASE, WITH LONG-TERM CURRENT USE OF INSULIN (HCC): ICD-10-CM

## 2024-09-30 DIAGNOSIS — E44.0 MODERATE PROTEIN-CALORIE MALNUTRITION (HCC): ICD-10-CM

## 2024-09-30 DIAGNOSIS — Z99.2 ESRD ON PERITONEAL DIALYSIS (HCC): ICD-10-CM

## 2024-09-30 DIAGNOSIS — I95.9 HYPOTENSION, UNSPECIFIED HYPOTENSION TYPE: ICD-10-CM

## 2024-09-30 DIAGNOSIS — Z79.4 TYPE 2 DIABETES MELLITUS WITH STAGE 4 CHRONIC KIDNEY DISEASE, WITH LONG-TERM CURRENT USE OF INSULIN (HCC): ICD-10-CM

## 2024-09-30 DIAGNOSIS — I48.0 PAROXYSMAL ATRIAL FIBRILLATION (HCC): Primary | ICD-10-CM

## 2024-09-30 DIAGNOSIS — L03.116 CELLULITIS OF LEFT LOWER EXTREMITY: ICD-10-CM

## 2024-09-30 DIAGNOSIS — N18.6 ESRD (END STAGE RENAL DISEASE) (HCC): ICD-10-CM

## 2024-09-30 PROCEDURE — 99309 SBSQ NF CARE MODERATE MDM 30: CPT | Performed by: NURSE PRACTITIONER

## 2024-09-30 RX ORDER — DOCUSATE SODIUM 100 MG/1
100 CAPSULE, LIQUID FILLED ORAL 2 TIMES DAILY PRN
Start: 2024-09-30

## 2024-09-30 RX ORDER — METOPROLOL TARTRATE 25 MG/1
25 TABLET, FILM COATED ORAL EVERY 12 HOURS SCHEDULED
COMMUNITY

## 2024-09-30 RX ORDER — WARFARIN SODIUM 1 MG/1
1 TABLET ORAL
Start: 2024-09-30

## 2024-09-30 NOTE — ASSESSMENT & PLAN NOTE
Leg wrapped on exam dressing c/d/I  Gurmeet fever/chills  Gurmeet leg pain  Completed augmentin 9/27

## 2024-09-30 NOTE — ASSESSMENT & PLAN NOTE
HR regular and controlled on exam  Does get SOB with exertion- denies palpitations  Continue Metoprolol Tartrate 25 mg BID with hold parameters (Hold for SBP less than 100 or HR less than 60)  Continue coumadin for AC   Last INR 8 (9/27/24)  Continue Coumadin 1 mg daily   Next INR Wed 10/2

## 2024-09-30 NOTE — ASSESSMENT & PLAN NOTE
Continue PD  Continue lasix 80 mg daily   Continue Cinacalcet 30 mg  MWF  Monitor CBC and BMP- ordered for Wednesday  F/U with Nephrology   KUB done at bedside today- unremarkable - results faxed to Nephrology

## 2024-09-30 NOTE — ASSESSMENT & PLAN NOTE
Malnutrition Findings:       Patient states he has no appetite- ongoing for 3-4 weeks  Denies abdominal pain  Offered nutritional supplements- patient declined   Offered to speak with RD- states he believes he has already done so  Offered Mirtazapine- declined at this time---> feels use of iron also affecting his appetite due to changing bowel movements- he is requesting to stop iron supplements                           BMI Findings:           Body mass index is 34.94 kg/m².

## 2024-09-30 NOTE — ASSESSMENT & PLAN NOTE
Lab Results   Component Value Date    HGBA1C 5.9 (H) 07/24/2024     DM  Per geriatric guidelines, goal HgA1c is > 7.5 to prevent hypoglycemic event in the older adult with cognitive decline  A1c controlled  Continue Glargine 10 units Q HS  Accu Checks  Hypoglycemic protocol   Patient has poor appetite- adjust insulin accordingly     CKD  ESRD on PD  Follows with Nephrology   Recently torsemide changed to lasix by nephrology due to elevated INR thought to be due to torsemide as contributing factor   BMP ordered for Wed 10/2

## 2024-09-30 NOTE — ASSESSMENT & PLAN NOTE
Multifactorial in setting of ongoing weakness since prior to recent hospital stay, poor appetite with malnutrition, IDDM, ESRD on PD  Recent fall at home- hit back of head- trauma work up negaitive  Uses RW at baseline   Continue PT/OT  Fall Precautions  Ensure adequate nutrition/hydration   Monitor CBC/BMP    following for d/c planning

## 2024-09-30 NOTE — ASSESSMENT & PLAN NOTE
ESRD on PD  Hgb ranges 7.7-8.3  S/P Epogen on 7/25/24  S/P IV venofer x 3 doses while inpatient   Currently on Iron supplements----> Patient requesting to stop iron due to dark stools and feels it is causing loose stools   Reviewed risks vs benefits

## 2024-10-01 NOTE — PROGRESS NOTES
[facility]  [address] [facility] [address] [phone]  FACILITY: [facility]  Code 31 (STR)      NAME: Masoud Perry  AGE: 69 y.o. SEX: male CODE STATUS: No CPR    DATE OF ENCOUNTER: 9/30/2024    Assessment and Plan     1. Paroxysmal atrial fibrillation (HCC)  Assessment & Plan:  HR regular and controlled on exam  Does get SOB with exertion- denies palpitations  Continue Metoprolol Tartrate 25 mg BID with hold parameters (Hold for SBP less than 100 or HR less than 60)  Continue coumadin for AC   Last INR 8 (9/27/24)  Continue Coumadin 1 mg daily   Next INR Wed 10/2  Orders:  -     warfarin (COUMADIN) 1 mg tablet; Take 1 tablet (1 mg total) by mouth daily at bedtime  2. ESRD (end stage renal disease) (HCC)  -     docusate sodium (COLACE) 100 mg capsule; Take 1 capsule (100 mg total) by mouth 2 (two) times a day as needed for constipation  3. Hypotension, unspecified hypotension type  Assessment & Plan:  Bp stable   Continue midodrine 15 mg TID  Hold for SBP greater than 120  4. Type 2 diabetes mellitus with stage 4 chronic kidney disease, with long-term current use of insulin (HCC)  Assessment & Plan:    Lab Results   Component Value Date    HGBA1C 5.9 (H) 07/24/2024     DM  Per geriatric guidelines, goal HgA1c is > 7.5 to prevent hypoglycemic event in the older adult with cognitive decline  A1c controlled  Continue Glargine 10 units Q HS  Accu Checks  Hypoglycemic protocol   Patient has poor appetite- adjust insulin accordingly     CKD  ESRD on PD  Follows with Nephrology   Recently torsemide changed to lasix by nephrology due to elevated INR thought to be due to torsemide as contributing factor   BMP ordered for Wed 10/2  5. Moderate protein-calorie malnutrition (HCC)  Assessment & Plan:  Malnutrition Findings:       Patient states he has no appetite- ongoing for 3-4 weeks  Denies abdominal pain  Offered nutritional supplements- patient declined   Offered to speak with RD- states he  believes he has already done so  Offered Mirtazapine- declined at this time---> feels use of iron also affecting his appetite due to changing bowel movements- he is requesting to stop iron supplements                           BMI Findings:           Body mass index is 34.94 kg/m².     6. ESRD on peritoneal dialysis (HCC)  Assessment & Plan:  Continue PD  Continue lasix 80 mg daily   Continue Cinacalcet 30 mg  MWF  Monitor CBC and BMP- ordered for Wednesday  F/U with Nephrology   KUB done at bedside today- unremarkable - results faxed to Nephrology   7. Cellulitis of left lower extremity  Assessment & Plan:  Leg wrapped on exam dressing c/d/I  Deneis fever/chills  Deneis leg pain  Completed augmentin 9/27  8. Ambulatory dysfunction  Assessment & Plan:  Multifactorial in setting of ongoing weakness since prior to recent hospital stay, poor appetite with malnutrition, IDDM, ESRD on PD  Recent fall at home- hit back of head- trauma work up negaitive  Uses RW at baseline   Continue PT/OT  Fall Precautions  Ensure adequate nutrition/hydration   Monitor CBC/BMP    following for d/c planning        All medications and routine orders were reviewed and updated as needed.    Chief Complaint     STR follow up visit    Past Medical and Surgica History      Past Medical History:   Diagnosis Date    Anemia in chronic kidney disease     Anticoagulated on Coumadin     Atrial fibrillation ([facility])     BPH (benign prostatic hyperplasia)     CAD (coronary artery disease)     CKD (chronic kidney disease), stage V ([facility])     Compartment syndrome of forearm ([facility])     Right, 2019    COPD (chronic obstructive pulmonary disease) ([facility])     CVA (cerebral vascular accident) ([facility])     Diastolic CHF ([facility])     grade 1 DD    DVT (deep venous thrombosis) ([facility])     Factor V deficiency ([facility])     Gout     Hyperlipidemia     Hypertension     Hypothyroidism     MI (myocardial infarction) ([facility])     x3 - 1999, 2010, after 2015    Morbid obesity  (HCC)     Nephrolithiasis     Noncompliance with medications     SHAD (obstructive sleep apnea)     Peritoneal dialysis catheter in place (HCC)     Pulmonary embolism (HCC)     Secondary hyperparathyroidism of renal origin (HCC)     Spinal stenosis of lumbar region     Status post placement of implantable loop recorder     Tobacco dependence      Past Surgical History:   Procedure Laterality Date    CARDIAC ELECTROPHYSIOLOGY STUDY AND ABLATION  04/29/2024    CHOLECYSTECTOMY      CORONARY ANGIOPLASTY WITH STENT PLACEMENT      JOINT REPLACEMENT      bilateral knee    KS CYSTO/URETERO W/LITHOTRIPSY &INDWELL STENT INSRT Left 09/24/2022    Procedure: CYSTOSCOPY URETEROSCOPY WITH LITHOTRIPSY HOLMIUM LASER, AND INSERTION STENT URETERAL;  Surgeon: Lewis Dahl MD;  Location:  MAIN OR;  Service: Urology    KS LAPS INSERTION TUNNELED INTRAPERITONEAL CATHETER N/A 6/7/2024    Procedure: INSERTION PERITONEAL CATHETER DIALYSIS LAPAROSCOPIC;  Surgeon: Hiram Park DO;  Location: [facility];  Service: General    US GUIDED KIDNEY BIOPSY  08/05/2020     Allergies   Allergen Reactions    Carvedilol Shortness Of Breath    Saccharin - Food Allergy Diarrhea     GI intolerance, adamant does not want to receive    Sucralose - Food Allergy Diarrhea     GI intolerance, adamant does not want to receive    Amiodarone Dizziness    Aspartame - Food Allergy Diarrhea    Bumetanide GI Intolerance and Lightheadedness           Cephalexin Other (See Comments)     unknown    Ciprofloxacin Rash    Hydralazine Tachycardia    Lisinopril GI Intolerance           Metolazone Other (See Comments)     Metallic taste    Morphine Swelling     Of injection site    Nifedipine Lightheadedness    Strawberry Extract - Food Allergy Rash          History of Present Illness     Masoud Perry is a 69 y.o. male admitted to [facility] following hospital stay for generalized weakness. Patient has a past medical Hx including but not limited to ESRD on  "PD, Afib on coumadin, Factor 5 leiden, CHF, COPD, MI, DM, CVA, Lymphedema . Patient is seen in collaboration with nursing for medical mgmt and STR follow up.     Patient initially presented to hospital s/p fall. At baseline ambulates with RW but was feeling progressive weakness 2 weeks prior to presenting to the hospital. He was getting himself out of a chair when he fell back and hit his head. Fortunately, trauma workup was negative. He was noted to have LLE cellulitis and treated with IV antibiotics with transition to oral. He has known ESRD and is on PD. He also has been on coumadin for hx of Afib on coumadin. HIs INR was consistently elevated, this was thought to be due to interaction with torsemide, therefore his torsemide was switched to lasix. He was deconditioned and recommended d/c to STR.     Seen and examined at bedside today. Patient is a reliable historian.He is c/o more swelling/edema to Right side of abdomen. KUB ordered and unremarkable. He continues on lasix 80 mg daily and peritoneal dialysis. Unfortunately the STR does not do I/Os and can not ascertain based off intake and output how his fluid status is. He will need daily weights and labs to monitor. Patient has requested to have weights every 2 days. Patient states he has been getting himself to the bathroom with some difficulty but is having the majority of his difficulties getting off the toilet as he feels it is too low, he is requesting a toilet seat riser or bedside commode to use at rehab, nursing and rehab notified of request today. Otherwise he states he has a poor appetite which has been ongoing prior to recent hospital stay, he denies abdominal pain,fullness, denies urinary complaints or constipation. He states he just does not have an appetite. Offer nutritional supplements, he declined. He states the swelling in his legs have improved significantly since hospital stay. He states his stool is loose, states they are like \"[place] " "squirts\"  , he feels this is from iron supplements and he is requesting to stop this medication. He is agreeable to monitoring CBC. Otherwise he denies CP/SOB/N/V/D. Denies lightheadedness, dizziness, headaches, vision changes. Patient is actively participating in therapy. No concerns from nursing at this time.                        The patient's allergies, past medical, surgical, social and family history were reviewed and unchanged.    Review of Systems     Review of Systems   Constitutional:  Positive for activity change and appetite change.   Gastrointestinal:  Positive for abdominal distention.   Musculoskeletal:  Positive for gait problem.   Neurological:  Positive for weakness.   All other systems reviewed and are negative.        Objective     Vitals:   Vitals:    09/30/24 1137   BP: 132/76   Pulse: 72   Resp: 18   Temp: 97.7 °F (36.5 °C)   SpO2: 96%         Physical Exam  Vitals and nursing note reviewed.   Constitutional:       General: He is not in acute distress.     Appearance: Normal appearance. He is obese. He is ill-appearing.   HENT:      Head: Normocephalic and atraumatic.      Nose: No congestion or rhinorrhea.      Mouth/Throat:      Mouth: Mucous membranes are moist.   Eyes:      General: No scleral icterus.     Extraocular Movements: Extraocular movements intact.      Conjunctiva/sclera: Conjunctivae normal.      Pupils: Pupils are equal, round, and reactive to light.   Cardiovascular:      Rate and Rhythm: Normal rate and regular rhythm.      Pulses: Normal pulses.      Heart sounds: Normal heart sounds. No murmur heard.  Pulmonary:      Effort: Pulmonary effort is normal.      Breath sounds: Normal breath sounds. No wheezing, rhonchi or rales.   Abdominal:      General: Bowel sounds are normal. There is distension.      Palpations: Abdomen is soft.      Tenderness: There is no abdominal tenderness.   Musculoskeletal:         General: No swelling or signs of injury.      Right lower leg: " Edema present.      Left lower leg: Edema present.   Lymphadenopathy:      Cervical: No cervical adenopathy.   Skin:     General: Skin is warm and dry.      Findings: No erythema.      Comments: Severe PVD   Neurological:      Mental Status: He is alert and oriented to person, place, and time.      Sensory: No sensory deficit.      Motor: Weakness present.      Gait: Gait abnormal.   Psychiatric:         Mood and Affect: Mood normal.         Behavior: Behavior normal.         Pertinent Laboratory/Diagnostic Studies:     Reviewed in facility chart      Current Medications   Medications reviewed and updated see facility MAR for details.      Current Outpatient Medications:     acetaminophen (TYLENOL) 325 mg tablet, Take 2 tablets (650 mg total) by mouth every 6 (six) hours as needed for mild pain, headaches or fever, Disp: , Rfl:     allopurinol (ZYLOPRIM) 300 mg tablet, Take 300 mg by mouth daily, Disp: , Rfl:     atorvastatin (LIPITOR) 40 mg tablet, Take 1 tablet (40 mg total) by mouth daily with dinner, Disp: 30 tablet, Rfl: 0    cinacalcet (SENSIPAR) 30 mg tablet, Take 1 tablet (30 mg total) by mouth 3 (three) times a week Do not start before September 25, 2024., Disp: , Rfl:     docusate sodium (COLACE) 100 mg capsule, Take 1 capsule (100 mg total) by mouth 2 (two) times a day as needed for constipation, Disp: , Rfl:     furosemide (LASIX) 80 mg tablet, Take 1 tablet (80 mg total) by mouth daily, Disp: , Rfl:     HYDROcodone-acetaminophen (NORCO) 5-325 mg per tablet, Take 2 tablets by mouth every 6 (six) hours as needed for pain for up to 10 days Max Daily Amount: 8 tablets, Disp: 20 tablet, Rfl: 0    insulin glargine (LANTUS) 100 units/mL subcutaneous injection, Inject 10 Units under the skin daily at bedtime, Disp: , Rfl:     levothyroxine 125 mcg tablet, Take 125 mcg by mouth daily, Disp: , Rfl:     Magnesium 400 MG TABS, Take 400 mg by mouth in the morning, Disp: , Rfl:     metoprolol tartrate (LOPRESSOR) 25  "mg tablet, Take 25 mg by mouth every 12 (twelve) hours, Disp: , Rfl:     midodrine (PROAMATINE) 5 mg tablet, Take 3 tablets (15 mg total) by mouth 3 (three) times a day before meals, Disp: 270 tablet, Rfl: 1    potassium chloride (Klor-Con M20) 20 mEq tablet, Take 2 tablets (40 mEq total) by mouth daily, Disp: , Rfl:     simethicone (MYLICON) 80 mg chewable tablet, Chew 1 tablet (80 mg total) 4 (four) times a day as needed for flatulence, Disp: 30 tablet, Rfl: 0    warfarin (COUMADIN) 1 mg tablet, Take 1 tablet (1 mg total) by mouth daily at bedtime, Disp: , Rfl:     tamsulosin (FLOMAX) 0.4 mg, Take 0.4 mg by mouth daily with dinner, Disp: , Rfl:      Please note:  Voice-recognition software may have been used in the preparation of this document.  Occasional wrong word or \"sound-alike\" substitutions may have occurred due to the inherent limitations of voice recognition software.  Interpretation should be guided by context.         GAGANDEEP Haynes  9/30/2024  10:19 PM    "

## 2024-10-02 ENCOUNTER — NURSING HOME VISIT (OUTPATIENT)
Dept: WOUND CARE | Facility: HOSPITAL | Age: 69
End: 2024-10-02
Payer: COMMERCIAL

## 2024-10-02 DIAGNOSIS — T14.8XXA HEMATOMA: ICD-10-CM

## 2024-10-02 DIAGNOSIS — L97.801 VENOUS STASIS ULCER OF OTHER SITE LIMITED TO BREAKDOWN OF SKIN WITHOUT VARICOSE VEINS (HCC): ICD-10-CM

## 2024-10-02 DIAGNOSIS — I87.2 VENOUS STASIS ULCER OF OTHER SITE LIMITED TO BREAKDOWN OF SKIN WITHOUT VARICOSE VEINS (HCC): ICD-10-CM

## 2024-10-02 DIAGNOSIS — L97.519 DIABETIC ULCER OF RIGHT GREAT TOE (HCC): Primary | ICD-10-CM

## 2024-10-02 DIAGNOSIS — E11.621 DIABETIC ULCER OF RIGHT GREAT TOE (HCC): Primary | ICD-10-CM

## 2024-10-02 PROBLEM — I83.009 STASIS ULCER (HCC): Status: ACTIVE | Noted: 2024-10-02

## 2024-10-02 PROBLEM — L97.909 STASIS ULCER (HCC): Status: ACTIVE | Noted: 2024-10-02

## 2024-10-02 PROCEDURE — 99309 SBSQ NF CARE MODERATE MDM 30: CPT | Performed by: NURSE PRACTITIONER

## 2024-10-02 NOTE — PROGRESS NOTES
[facility] WOUND CARE MANAGEMENT   AND HYPERBARIC MEDICINE CENTER       Patient ID: Masoud Perry is a 69 y.o. male Date of Birth 1955     Location of Service: [facility]    Performed wound round with: Wound team     Chief Complaint : Left lower leg anterior and right great toe, right lower leg    Wound Instructions:  Wound: Left lower leg anterior superior wound  Discontinue previous wound order  Cleanse the wound bed with NSS   Apply non-sting skin prep to periwound area  Apply Betadine to wound bed, then cover with ABD and rolled gauze  Frequency : Daily and prn for soiling    Wound: Left lower leg inferior wound and right lower leg anterior  Cleanse with normal saline solution  Apply Skin-Prep to periwound area  Apply Xeroform to wound bed and cover with ABD and rolled gauze for leg, bordered gauze for the right lower leg  Daily and as needed for soiling    Wound: Right great toe  Cleanse with normal saline solution  Apply moisturizing lotion daily    Ace wrap on bilateral lower legs, from base of the toes to proximal calf, on in a.m., off at night    Offload all wounds  Turn and reposition frequently  Instruct / Assist with weight shifting in wheelchair  Increase protein intake.  Monitor for any sign of infection or worsening, inform PCP or patient's primary physician in your facility.      Allergies  Carvedilol, Saccharin - food allergy, Sucralose - food allergy, Amiodarone, Aspartame - food allergy, Bumetanide, Cephalexin, Ciprofloxacin, Hydralazine, Lisinopril, Metolazone, Morphine, Nifedipine, and Strawberry extract - food allergy      Assessment & Plan:  1. Diabetic ulcer of right great toe (HCC)  Assessment & Plan:  Right great toe  Wound is stable, 100% epithelial, with no obvious sign of infection  Local wound care with moisturizing lotion  Continue to offload  2. Hematoma  Assessment & Plan:  Left lower leg anterior  Wound improved, decreasing wound size.  Wound is divided into 2  small wounds.  The superior wound is covered with eschar, the inferior wound is 100% granulation.  This wound could be hematoma versus calciphylaxis.  This type of wound commonly seen in patient with end-stage renal disease.  Local wound care with Betadine for the wound located on the superior left lower leg.  Xeroform for wound located on the inferior lower leg  Patient is on peritoneal dialysis  Will recommend to change to renal diet  Follow-up next week  3. Venous stasis ulcer of other site limited to breakdown of skin without varicose veins (HCC)  Assessment & Plan:  Right lower leg anterior  Partial-thickness, with no obvious sign of infection  Local wound care with Xeroform  Continue to offload  Follow-up next week           Subjective:   September 25, 2024.  New consult for wound on the right first toe and left lower leg anterior.  Patient currently admitted at [facility].  Patient was referred by Senior care team.  Patient medical problem includes but not limited to type 2 diabetes and chronic kidney disease.  Patient was seen with the facility wound team.  I introduced myself to patient and patient agreed to be seen for wound consult.    Wound history: As per medical record review, the wound on the toe was discovered on September 21, 2024.  As per patient, he has recently had the wound on the left lower leg, not sure how he got the wound.  As per patient, he probably bumped it.    Received patient in bed, seems comfortable.  Denies pain.  Patient is ambulatory.  Patient is on peritoneal dialysis.    October 2, 2024.  Follow-up for wound on the right first toe and left lower leg anterior, and new consult for wound on the right lower leg anterior.  Received patient in bed, seems comfortable.  Denies pain.        Review of Systems   Constitutional: Negative.    Respiratory: Negative.     Cardiovascular: Negative.    Skin:  Positive for wound.   Psychiatric/Behavioral: Negative.          Objective:    Physical Exam  Constitutional:       Appearance: Normal appearance.   Cardiovascular:      Rate and Rhythm: Normal rate.   Pulmonary:      Effort: Pulmonary effort is normal.   Musculoskeletal:      Comments: Large edema on bilateral lower legs  Pedal pulses nonpalpable due to edema   Skin:     Findings: Lesion present.      Comments: Right great toe dorsal: Wound size is 0.9 x 0.8 cm.,  100% epithelial, no drainage, no obvious sign of infection    Left lower leg anterior: Wound size is 1.4 x 1.4 x 0.1 cm.,  50% eschar, 50% granulation, small amount of serous drainage, periwound is normal, with no obvious sign of infection, this is 2 separate wound, the wound on the superior is covered with eschar and the wound on the inferior is granulation    Right lower leg anterior wound size is 3.7 x 2.4 x 0.1 cm.,  100% epithelial, moderate amount of serous drainage, with no obvious sign of infection   Neurological:      Mental Status: He is alert.              Procedures     Results from last 6 Months   Lab Units 07/23/24  1757   WOUND CULTURE  4+ Growth of Pseudomonas aeruginosa*  4+ Growth of Enterococcus faecalis*       Patient's care was coordinated with nursing facility staff. Recent vitals, labs and updated medications were reviewed on EMR or chart system of facility. Past Medical, surgical, social, medication and allergy history and patient's previous records were reviewed and updated as appropriate: Most up-to date information is available in the facility EMR where the patient is currently admitted.    Patient Active Problem List   Diagnosis    History of CVA (cerebrovascular accident)    Acute on chronic diastolic HF (heart failure) ([facility])    HLD (hyperlipidemia)    Lymphedema    Hypothyroidism    Anemia    Thrombocytopenia ([facility])    Angina at rest ([facility])    MI (myocardial infarction) ([facility])    History of PTCA    Sleep apnea    Smoking    Factor 5 Leiden mutation, heterozygous ([facility])    Type 2  diabetes mellitus with stage 4 chronic kidney disease, with long-term current use of insulin (HCC)    Obesity, morbid (HCC)    Secondary hyperparathyroidism of renal origin (HCC)    Stage 5 chronic kidney disease on peritoneal dialysis (HCC)    Chronic kidney disease-mineral and bone disorder    Ureteral stone with hydronephrosis    Cutaneous abscess of buttock    Bilateral lower extremity edema    Cellulitis of right lower extremity    Paroxysmal atrial fibrillation (HCC)    Patient on peritoneal dialysis (HCC)    Hypervolemia    ESRD on peritoneal dialysis (HCC)    Bacteremia    Elevated LFTs    Nocturnal hypoxia    Lactic acidosis    Hypercalcemia    Chronic acquired lymphedema    Hypotension    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 (HCC)    COPD (chronic obstructive pulmonary disease) (HCC)    ED (erectile dysfunction) of organic origin    Gastroesophageal reflux disease    Gout    History of thromboembolism of vein    Personal history of pulmonary embolism    Ambulatory dysfunction    Moderate protein-calorie malnutrition (HCC)    Cellulitis    Hyponatremia    Medication management    Diabetic ulcer of right great toe (HCC)    Hematoma    Edema    Stasis ulcer (HCC)     Past Medical History:   Diagnosis Date    Anemia in chronic kidney disease     Anticoagulated on Coumadin     Atrial fibrillation (HCC)     BPH (benign prostatic hyperplasia)     CAD (coronary artery disease)     CKD (chronic kidney disease), stage V (HCC)     Compartment syndrome of forearm (HCC)     Right, 2019    COPD (chronic obstructive pulmonary disease) (HCC)     CVA (cerebral vascular accident) (HCC)     Diastolic CHF (HCC)     grade 1 DD    DVT (deep venous thrombosis) (HCC)     Factor V deficiency (HCC)     Gout     Hyperlipidemia     Hypertension     Hypothyroidism     MI (myocardial infarction) (HCC)     x3 - 1999, 2010, after 2015    Morbid obesity (HCC)     Nephrolithiasis     Noncompliance with medications     SHAD  (obstructive sleep apnea)     Peritoneal dialysis catheter in place (HCC)     Pulmonary embolism (HCC)     Secondary hyperparathyroidism of renal origin (HCC)     Spinal stenosis of lumbar region     Status post placement of implantable loop recorder     Tobacco dependence      Past Surgical History:   Procedure Laterality Date    CARDIAC ELECTROPHYSIOLOGY STUDY AND ABLATION  04/29/2024    CHOLECYSTECTOMY      CORONARY ANGIOPLASTY WITH STENT PLACEMENT      JOINT REPLACEMENT      bilateral knee    NM CYSTO/URETERO W/LITHOTRIPSY &INDWELL STENT INSRT Left 09/24/2022    Procedure: CYSTOSCOPY URETEROSCOPY WITH LITHOTRIPSY HOLMIUM LASER, AND INSERTION STENT URETERAL;  Surgeon: Lewis Dahl MD;  Location:  MAIN OR;  Service: Urology    NM LAPS INSERTION TUNNELED INTRAPERITONEAL CATHETER N/A 6/7/2024    Procedure: INSERTION PERITONEAL CATHETER DIALYSIS LAPAROSCOPIC;  Surgeon: Hiram Park DO;  Location: [facility];  Service: General    US GUIDED KIDNEY BIOPSY  08/05/2020     Social History     Socioeconomic History    Marital status:      Spouse name: None    Number of children: None    Years of education: None    Highest education level: None   Occupational History    None   Tobacco Use    Smoking status: Every Day     Current packs/day: 0.50     Types: Cigarettes    Smokeless tobacco: Never   Vaping Use    Vaping status: Never Used   Substance and Sexual Activity    Alcohol use: Not Currently    Drug use: Never    Sexual activity: None   Other Topics Concern    None   Social History Narrative    None     Social Determinants of Health     Financial Resource Strain: Not on file   Food Insecurity: No Food Insecurity (9/16/2024)    Hunger Vital Sign     Worried About Running Out of Food in the Last Year: Never true     Ran Out of Food in the Last Year: Never true   Transportation Needs: No Transportation Needs (9/16/2024)    PRAPARE - Transportation     Lack of Transportation (Medical): No     Lack of  Transportation (Non-Medical): No   Physical Activity: Not on file   Stress: Not on file   Social Connections: Unknown (6/18/2024)    Received from [email or web address]    Social [employer]     How often do you feel lonely or isolated from those around you? (Adult - for ages 18 years and over): Not on file   Intimate Partner Violence: Not on file   Housing Stability: Low Risk  (9/16/2024)    Housing Stability Vital Sign     Unable to Pay for Housing in the Last Year: No     Number of Times Moved in the Last Year: 0     Homeless in the Last Year: No        Current Outpatient Medications:     acetaminophen (TYLENOL) 325 mg tablet, Take 2 tablets (650 mg total) by mouth every 6 (six) hours as needed for mild pain, headaches or fever, Disp: , Rfl:     allopurinol (ZYLOPRIM) 300 mg tablet, Take 300 mg by mouth daily, Disp: , Rfl:     atorvastatin (LIPITOR) 40 mg tablet, Take 1 tablet (40 mg total) by mouth daily with dinner, Disp: 30 tablet, Rfl: 0    cinacalcet (SENSIPAR) 30 mg tablet, Take 1 tablet (30 mg total) by mouth 3 (three) times a week Do not start before September 25, 2024., Disp: , Rfl:     docusate sodium (COLACE) 100 mg capsule, Take 1 capsule (100 mg total) by mouth 2 (two) times a day as needed for constipation, Disp: , Rfl:     furosemide (LASIX) 80 mg tablet, Take 1 tablet (80 mg total) by mouth daily, Disp: , Rfl:     HYDROcodone-acetaminophen (NORCO) 5-325 mg per tablet, Take 2 tablets by mouth every 6 (six) hours as needed for pain for up to 10 days Max Daily Amount: 8 tablets, Disp: 20 tablet, Rfl: 0    insulin glargine (LANTUS) 100 units/mL subcutaneous injection, Inject 10 Units under the skin daily at bedtime, Disp: , Rfl:     levothyroxine 125 mcg tablet, Take 125 mcg by mouth daily, Disp: , Rfl:     Magnesium 400 MG TABS, Take 400 mg by mouth in the morning, Disp: , Rfl:     metoprolol tartrate (LOPRESSOR) 25 mg tablet, Take 25 mg by mouth every 12 (twelve) hours, Disp: , Rfl:     midodrine (PROAMATINE) 5  "mg tablet, Take 3 tablets (15 mg total) by mouth 3 (three) times a day before meals, Disp: 270 tablet, Rfl: 1    potassium chloride (Klor-Con M20) 20 mEq tablet, Take 2 tablets (40 mEq total) by mouth daily, Disp: , Rfl:     simethicone (MYLICON) 80 mg chewable tablet, Chew 1 tablet (80 mg total) 4 (four) times a day as needed for flatulence, Disp: 30 tablet, Rfl: 0    tamsulosin (FLOMAX) 0.4 mg, Take 0.4 mg by mouth daily with dinner, Disp: , Rfl:     warfarin (COUMADIN) 1 mg tablet, Take 1 tablet (1 mg total) by mouth daily at bedtime, Disp: , Rfl:   Family History   Problem Relation Age of Onset    Kidney failure Mother     Cirrhosis Mother     Colon cancer Brother               Coordination of Care: Wound team aware of the treatment plan. Facility nurse will provide wound treatment and monitor the wound for any changes.     Patient / Staff education : Patient / Staff was given education on sign of infection and pressure ulcer prevention. Patient/ Staff verbalized understanding     Follow up :  Next week    Voice-recognition software may have been used in the preparation of this document. Occasional wrong word or \"sound-alike\" substitutions may have occurred due to the inherent limitations of voice recognition software. Interpretation should be guided by context.      GAGANDEEP Carlson  "

## 2024-10-02 NOTE — ASSESSMENT & PLAN NOTE
Right great toe  Wound is stable, 100% epithelial, with no obvious sign of infection  Local wound care with moisturizing lotion  Continue to offload

## 2024-10-02 NOTE — ASSESSMENT & PLAN NOTE
Left lower leg anterior  Wound improved, decreasing wound size.  Wound is divided into 2 small wounds.  The superior wound is covered with eschar, the inferior wound is 100% granulation.  This wound could be hematoma versus calciphylaxis.  This type of wound commonly seen in patient with end-stage renal disease.  Local wound care with Betadine for the wound located on the superior left lower leg.  Xeroform for wound located on the inferior lower leg  Patient is on peritoneal dialysis  Will recommend to change to renal diet  Follow-up next week

## 2024-10-02 NOTE — ASSESSMENT & PLAN NOTE
Right lower leg anterior  Partial-thickness, with no obvious sign of infection  Local wound care with Xeroform  Continue to offload  Follow-up next week

## 2024-10-04 DIAGNOSIS — R10.84 GENERALIZED ABDOMINAL PAIN: Primary | ICD-10-CM

## 2024-10-07 ENCOUNTER — NURSING HOME VISIT (OUTPATIENT)
Dept: GERIATRICS | Facility: OTHER | Age: 69
End: 2024-10-07
Payer: COMMERCIAL

## 2024-10-07 VITALS
DIASTOLIC BLOOD PRESSURE: 78 MMHG | TEMPERATURE: 96.5 F | OXYGEN SATURATION: 97 % | RESPIRATION RATE: 18 BRPM | HEART RATE: 78 BPM | SYSTOLIC BLOOD PRESSURE: 116 MMHG | WEIGHT: 260 LBS | BODY MASS INDEX: 36.26 KG/M2

## 2024-10-07 DIAGNOSIS — I48.0 PAROXYSMAL ATRIAL FIBRILLATION (HCC): Primary | ICD-10-CM

## 2024-10-07 DIAGNOSIS — I95.9 HYPOTENSION, UNSPECIFIED HYPOTENSION TYPE: ICD-10-CM

## 2024-10-07 DIAGNOSIS — R26.2 AMBULATORY DYSFUNCTION: ICD-10-CM

## 2024-10-07 DIAGNOSIS — Z79.4 TYPE 2 DIABETES MELLITUS WITH STAGE 4 CHRONIC KIDNEY DISEASE, WITH LONG-TERM CURRENT USE OF INSULIN (HCC): ICD-10-CM

## 2024-10-07 DIAGNOSIS — N18.4 TYPE 2 DIABETES MELLITUS WITH STAGE 4 CHRONIC KIDNEY DISEASE, WITH LONG-TERM CURRENT USE OF INSULIN (HCC): ICD-10-CM

## 2024-10-07 DIAGNOSIS — E03.9 ACQUIRED HYPOTHYROIDISM: ICD-10-CM

## 2024-10-07 DIAGNOSIS — Z99.2 ESRD ON PERITONEAL DIALYSIS (HCC): ICD-10-CM

## 2024-10-07 DIAGNOSIS — N18.6 ESRD ON PERITONEAL DIALYSIS (HCC): ICD-10-CM

## 2024-10-07 DIAGNOSIS — E11.22 TYPE 2 DIABETES MELLITUS WITH STAGE 4 CHRONIC KIDNEY DISEASE, WITH LONG-TERM CURRENT USE OF INSULIN (HCC): ICD-10-CM

## 2024-10-07 PROCEDURE — 99309 SBSQ NF CARE MODERATE MDM 30: CPT

## 2024-10-07 NOTE — ASSESSMENT & PLAN NOTE
Lab Results   Component Value Date    [id number] 12.236 (H) 07/30/2024    TSH 2.89 07/08/2024   Continue levothyroxine 125 mcg  Monitor TSH

## 2024-10-07 NOTE — ASSESSMENT & PLAN NOTE
Multifactorial in the setting of ESRD on PD, falls, acute/chronic medical conditions  Ambulates with walker at baseline  Continue PT/OT  Fall Precautions  Ensure adequate nutrition/hydration   Monitor CBC/BMP    following for d/c planning

## 2024-10-07 NOTE — ASSESSMENT & PLAN NOTE
Lab Results   Component Value Date    HGBA1C 5.9 (H) 07/24/2024   BS controlled, 115  Continue accu-checks  Will decrease Glargine to 8 units at HS as BS trending   Avoid hypoglycemia  Encourage diabetic diet

## 2024-10-07 NOTE — PROGRESS NOTES
[facility]  [address] [address]  [phone]  [place] [facility]  Code 31 (STR)        NAME: Masoud Perry  AGE: 69 y.o. SEX: male CODE STATUS: No CPR    DATE OF ENCOUNTER: 10/7/2024    Assessment and Plan     1. Paroxysmal atrial fibrillation (HCC)  Assessment & Plan:  HR controlled, 78  Denies CP/palpitation  Continue Metoprolol 25 mg BID with hold parameters  10/3/24 INR was 1.9  Continue Coumadin 1 mg at HS  Repeat INR 10/11/24  2. Hypotension, unspecified hypotension type  Assessment & Plan:  BP stable, 116/78   Continue Midodrine 15 mg TID  Hold for SBP >120  3. Acquired hypothyroidism  Assessment & Plan:  Lab Results   Component Value Date    [id number] 12.236 (H) 07/30/2024    TSH 2.89 07/08/2024   Continue levothyroxine 125 mcg  Monitor TSH   4. Type 2 diabetes mellitus with stage 4 chronic kidney disease, with long-term current use of insulin (Formerly [facility])  Assessment & Plan:    Lab Results   Component Value Date    HGBA1C 5.9 (H) 07/24/2024   BS controlled, 115  Continue accu-checks  Will decrease Glargine to 8 units at HS as BS trending   Avoid hypoglycemia  Encourage diabetic diet  5. ESRD on peritoneal dialysis (Formerly [facility])  Assessment & Plan:  Continue PD  Continue lasix 80 mg daily   Continue Cinacalcet 30 mg  MWF  Monitor CBC, BMP  F/U with Nephrology     6. Ambulatory dysfunction  Assessment & Plan:  Multifactorial in the setting of ESRD on PD, falls, acute/chronic medical conditions  Ambulates with walker at baseline  Continue PT/OT  Fall Precautions  Ensure adequate nutrition/hydration   Monitor CBC/BMP    following for d/c planning         All medications and routine orders were reviewed and updated as needed.    Chief Complaint     STR follow up visit  Patient's care was coordinated with nursing facility staff. Recent vitals, labs, and updated medications were review on [facility] system in facility.  Past Medical and Surgical History      Past  Medical History:   Diagnosis Date    Anemia in chronic kidney disease     Anticoagulated on Coumadin     Atrial fibrillation (HCC)     BPH (benign prostatic hyperplasia)     CAD (coronary artery disease)     CKD (chronic kidney disease), stage V (HCC)     Compartment syndrome of forearm (HCC)     Right, 2019    COPD (chronic obstructive pulmonary disease) (HCC)     CVA (cerebral vascular accident) (HCC)     Diastolic CHF (HCC)     grade 1 DD    DVT (deep venous thrombosis) (HCC)     Factor V deficiency (HCC)     Gout     Hyperlipidemia     Hypertension     Hypothyroidism     MI (myocardial infarction) (HCC)     x3 - 1999, 2010, after 2015    Morbid obesity (HCC)     Nephrolithiasis     Noncompliance with medications     SHAD (obstructive sleep apnea)     Peritoneal dialysis catheter in place (HCC)     Pulmonary embolism (HCC)     Secondary hyperparathyroidism of renal origin (HCC)     Spinal stenosis of lumbar region     Status post placement of implantable loop recorder     Tobacco dependence      Past Surgical History:   Procedure Laterality Date    CARDIAC ELECTROPHYSIOLOGY STUDY AND ABLATION  04/29/2024    CHOLECYSTECTOMY      CORONARY ANGIOPLASTY WITH STENT PLACEMENT      JOINT REPLACEMENT      bilateral knee    IL CYSTO/URETERO W/LITHOTRIPSY &INDWELL STENT INSRT Left 09/24/2022    Procedure: CYSTOSCOPY URETEROSCOPY WITH LITHOTRIPSY HOLMIUM LASER, AND INSERTION STENT URETERAL;  Surgeon: Lewis Dahl MD;  Location:  MAIN OR;  Service: Urology    IL LAPS INSERTION TUNNELED INTRAPERITONEAL CATHETER N/A 6/7/2024    Procedure: INSERTION PERITONEAL CATHETER DIALYSIS LAPAROSCOPIC;  Surgeon: Hiram Park DO;  Location: [facility];  Service: General    US GUIDED KIDNEY BIOPSY  08/05/2020     Allergies   Allergen Reactions    Carvedilol Shortness Of Breath    Saccharin - Food Allergy Diarrhea     GI intolerance, adamant does not want to receive    Sucralose - Food Allergy Diarrhea     GI intolerance, adamant does not  want to receive    Amiodarone Dizziness    Aspartame - Food Allergy Diarrhea    Bumetanide GI Intolerance and Lightheadedness           Cephalexin Other (See Comments)     unknown    Ciprofloxacin Rash    Hydralazine Tachycardia    Lisinopril GI Intolerance           Metolazone Other (See Comments)     Metallic taste    Morphine Swelling     Of injection site    Nifedipine Lightheadedness    Strawberry Extract - Food Allergy Rash          History of Present Illness     HPI  Masoud Perry is a 69 year old male, he is a STR patient of [facility] since 9/23/24. Past Medical Hx including but not limited to CHF, MI, SHAD, COPD, GERD, hypothyroidism, diabetes, CKD on PD, factor V, CVA, lymphedema. He was seen in collaboration with nursing for medical mgmt of acute and chronic medical conditions and STR follow up.     Hospital Course  Patient was admitted to the hospital 9/15/24 following fall. Patient ambulates with a walker at baseline and reports increased weakness over the last 2 weeks.  He was getting out of his chair and fell, hitting the back of his head. Trauma work up was negative. Patient did develop LLE cellulitis and was treated with Vanco, then was switched to Augmentin for 3 more days. Torsemide was changed to lasix d/t suspicion that torsemide was causing INR to be elevated. His Coumadin was decreased to 0.5 mg. Patient was recommended for rehab and was discharged to [facility].     Rehab Course  Masoud was seen and examined at bedside today. Patient is a reliable historian and is AAO x 3. On exam patient is resting in bed and reports having some SOB. CXR was done 10/3 and was negative. Facility  to arrange CT scan to verify PD catheter placement per nephrology request. Patient continues with supplemental oxygen at 2 L 95-97%.     Denies CP/SOB/N/V/D. Denies lightheadedness, dizziness, headaches, vision changes. Patient states they are eating well and staying hydrated. Denies any bowel or bladder issues.  Per review of SNF records, Patient is eating 3 meals per day, consuming  %. Last documented BM 10/5/24. No concerns from nursing at this time.    The patient's allergies, past medical, surgical, social and family history were reviewed and unchanged.    Review of Systems     Review of Systems   Constitutional:  Positive for activity change. Negative for appetite change and fever.   HENT:  Negative for congestion.    Respiratory:  Positive for shortness of breath.    Cardiovascular:  Positive for leg swelling. Negative for chest pain and palpitations.   Gastrointestinal:  Negative for abdominal pain, constipation, diarrhea, nausea and vomiting.   Genitourinary:  Negative for difficulty urinating.        PD   Musculoskeletal:  Negative for gait problem.   Skin: Negative.    Neurological:  Positive for weakness. Negative for dizziness and headaches.   Psychiatric/Behavioral:  Negative for sleep disturbance.          Objective     Vitals:   Vitals:    10/07/24 1301   BP: 116/78   Pulse: 78   Resp: 18   Temp: (!) 96.5 °F (35.8 °C)   SpO2: 97%           Physical Exam  Constitutional:       General: He is not in acute distress.     Appearance: Normal appearance. He is not ill-appearing.   HENT:      Head: Normocephalic and atraumatic.   Eyes:      Conjunctiva/sclera: Conjunctivae normal.   Cardiovascular:      Rate and Rhythm: Normal rate and regular rhythm.      Heart sounds: Normal heart sounds.   Pulmonary:      Effort: No respiratory distress.      Breath sounds: Normal breath sounds. No wheezing.   Abdominal:      General: Bowel sounds are normal. There is no distension.      Tenderness: There is no abdominal tenderness.   Musculoskeletal:      Right lower leg: Edema present.      Left lower leg: Edema present.   Skin:     General: Skin is warm.   Neurological:      Mental Status: He is alert and oriented to person, place, and time.      Motor: Weakness present.      Gait: Gait abnormal.   Psychiatric:          Mood and Affect: Mood normal.         Behavior: Behavior normal.         Pertinent Laboratory/Diagnostic Studies:   Reviewed in facility chart-stable      Current Medications   Medications reviewed and updated see facility MAR for details.      Current Outpatient Medications:     acetaminophen (TYLENOL) 325 mg tablet, Take 2 tablets (650 mg total) by mouth every 6 (six) hours as needed for mild pain, headaches or fever, Disp: , Rfl:     allopurinol (ZYLOPRIM) 300 mg tablet, Take 300 mg by mouth daily, Disp: , Rfl:     atorvastatin (LIPITOR) 40 mg tablet, Take 1 tablet (40 mg total) by mouth daily with dinner, Disp: 30 tablet, Rfl: 0    cinacalcet (SENSIPAR) 30 mg tablet, Take 1 tablet (30 mg total) by mouth 3 (three) times a week Do not start before September 25, 2024., Disp: , Rfl:     docusate sodium (COLACE) 100 mg capsule, Take 1 capsule (100 mg total) by mouth 2 (two) times a day as needed for constipation, Disp: , Rfl:     furosemide (LASIX) 80 mg tablet, Take 1 tablet (80 mg total) by mouth daily, Disp: , Rfl:     insulin glargine (LANTUS) 100 units/mL subcutaneous injection, Inject 10 Units under the skin daily at bedtime, Disp: , Rfl:     levothyroxine 125 mcg tablet, Take 125 mcg by mouth daily, Disp: , Rfl:     Magnesium 400 MG TABS, Take 400 mg by mouth in the morning, Disp: , Rfl:     metoprolol tartrate (LOPRESSOR) 25 mg tablet, Take 25 mg by mouth every 12 (twelve) hours, Disp: , Rfl:     midodrine (PROAMATINE) 5 mg tablet, Take 3 tablets (15 mg total) by mouth 3 (three) times a day before meals, Disp: 270 tablet, Rfl: 1    potassium chloride (Klor-Con M20) 20 mEq tablet, Take 2 tablets (40 mEq total) by mouth daily, Disp: , Rfl:     simethicone (MYLICON) 80 mg chewable tablet, Chew 1 tablet (80 mg total) 4 (four) times a day as needed for flatulence, Disp: 30 tablet, Rfl: 0    tamsulosin (FLOMAX) 0.4 mg, Take 0.4 mg by mouth daily with dinner, Disp: , Rfl:     warfarin (COUMADIN) 1 mg tablet, Take 1  "tablet (1 mg total) by mouth daily at bedtime, Disp: , Rfl:      Please note:  Voice-recognition software may have been used in the preparation of this document.  Occasional wrong word or \"sound-alike\" substitutions may have occurred due to the inherent limitations of voice recognition software.  Interpretation should be guided by context.         GAGANDEEP Arteaga  10/7/2024  2:16 PM    "

## 2024-10-07 NOTE — ASSESSMENT & PLAN NOTE
HR controlled, 78  Denies CP/palpitation  Continue Metoprolol 25 mg BID with hold parameters  10/3/24 INR was 1.9  Continue Coumadin 1 mg at HS  Repeat INR 10/11/24

## 2024-10-07 NOTE — ASSESSMENT & PLAN NOTE
Continue PD  Continue lasix 80 mg daily   Continue Cinacalcet 30 mg  MWF  Monitor CBC, BMP  F/U with Nephrology

## 2024-10-08 DIAGNOSIS — R52 PAIN: Primary | ICD-10-CM

## 2024-10-08 RX ORDER — HYDROCODONE BITARTRATE AND ACETAMINOPHEN 5; 325 MG/1; MG/1
2 TABLET ORAL EVERY 6 HOURS PRN
Qty: 112 TABLET | Refills: 0 | Status: SHIPPED | OUTPATIENT
Start: 2024-10-08

## 2024-10-09 ENCOUNTER — NURSING HOME VISIT (OUTPATIENT)
Dept: WOUND CARE | Facility: HOSPITAL | Age: 69
End: 2024-10-09
Payer: COMMERCIAL

## 2024-10-09 DIAGNOSIS — E11.621 DIABETIC ULCER OF RIGHT GREAT TOE (HCC): Primary | ICD-10-CM

## 2024-10-09 DIAGNOSIS — T14.8XXA HEMATOMA: ICD-10-CM

## 2024-10-09 DIAGNOSIS — I87.2 VENOUS STASIS ULCER OF OTHER SITE LIMITED TO BREAKDOWN OF SKIN WITHOUT VARICOSE VEINS (HCC): ICD-10-CM

## 2024-10-09 DIAGNOSIS — L97.519 DIABETIC ULCER OF RIGHT GREAT TOE (HCC): Primary | ICD-10-CM

## 2024-10-09 DIAGNOSIS — L97.801 VENOUS STASIS ULCER OF OTHER SITE LIMITED TO BREAKDOWN OF SKIN WITHOUT VARICOSE VEINS (HCC): ICD-10-CM

## 2024-10-09 PROCEDURE — 99309 SBSQ NF CARE MODERATE MDM 30: CPT | Performed by: NURSE PRACTITIONER

## 2024-10-09 NOTE — ASSESSMENT & PLAN NOTE
Right lower leg anterior  Partial-thickness, with no obvious sign of infection, improved  Local wound care with Xeroform -continue.  Patient skin is sensitive, instead of using bordered gauze, use ABD and rolled gauze to cover the Xeroform  Continue to offload  Follow-up next week

## 2024-10-09 NOTE — ASSESSMENT & PLAN NOTE
Right great toe  Wound improved, decreasing wound size, 100% epithelial  Local wound care with moisturizing lotion  Continue to offload

## 2024-10-09 NOTE — ASSESSMENT & PLAN NOTE
Left lower leg anterior  Wound improved, decreasing wound size.  Wound is divided into 2 small wounds.  The superior wound is covered with eschar, the inferior wound is 100% epithelial  This wound could be hematoma versus calciphylaxis.  This type of wound commonly seen in patient with end-stage renal disease.  Local wound care with Betadine for the wound located on the superior left lower leg.  Xeroform for wound located on the inferior lower leg  Patient is on peritoneal dialysis  Will recommend to change to renal diet  Follow-up next week

## 2024-10-10 ENCOUNTER — HOSPITAL ENCOUNTER (OUTPATIENT)
Dept: CT IMAGING | Facility: HOSPITAL | Age: 69
End: 2024-10-10
Attending: INTERNAL MEDICINE
Payer: COMMERCIAL

## 2024-10-10 DIAGNOSIS — R10.84 GENERALIZED ABDOMINAL PAIN: ICD-10-CM

## 2024-10-10 PROCEDURE — 74176 CT ABD & PELVIS W/O CONTRAST: CPT

## 2024-10-14 ENCOUNTER — NURSING HOME VISIT (OUTPATIENT)
Dept: GERIATRICS | Facility: OTHER | Age: 69
End: 2024-10-14
Payer: COMMERCIAL

## 2024-10-14 VITALS
RESPIRATION RATE: 16 BRPM | HEART RATE: 65 BPM | DIASTOLIC BLOOD PRESSURE: 75 MMHG | SYSTOLIC BLOOD PRESSURE: 145 MMHG | TEMPERATURE: 97.8 F | BODY MASS INDEX: 35.29 KG/M2 | WEIGHT: 253 LBS | OXYGEN SATURATION: 97 %

## 2024-10-14 DIAGNOSIS — N18.6 ESRD ON PERITONEAL DIALYSIS (HCC): Primary | ICD-10-CM

## 2024-10-14 DIAGNOSIS — Z99.2 ESRD ON PERITONEAL DIALYSIS (HCC): Primary | ICD-10-CM

## 2024-10-14 DIAGNOSIS — I50.33 ACUTE ON CHRONIC DIASTOLIC HF (HEART FAILURE) (HCC): ICD-10-CM

## 2024-10-14 DIAGNOSIS — I48.0 PAROXYSMAL ATRIAL FIBRILLATION (HCC): ICD-10-CM

## 2024-10-14 DIAGNOSIS — Z79.4 TYPE 2 DIABETES MELLITUS WITH STAGE 4 CHRONIC KIDNEY DISEASE, WITH LONG-TERM CURRENT USE OF INSULIN (HCC): ICD-10-CM

## 2024-10-14 DIAGNOSIS — R26.2 AMBULATORY DYSFUNCTION: ICD-10-CM

## 2024-10-14 DIAGNOSIS — E11.22 TYPE 2 DIABETES MELLITUS WITH STAGE 4 CHRONIC KIDNEY DISEASE, WITH LONG-TERM CURRENT USE OF INSULIN (HCC): ICD-10-CM

## 2024-10-14 DIAGNOSIS — N18.4 TYPE 2 DIABETES MELLITUS WITH STAGE 4 CHRONIC KIDNEY DISEASE, WITH LONG-TERM CURRENT USE OF INSULIN (HCC): ICD-10-CM

## 2024-10-14 PROCEDURE — 99309 SBSQ NF CARE MODERATE MDM 30: CPT

## 2024-10-14 NOTE — ASSESSMENT & PLAN NOTE
HR controlled, 65  Denies CP/palpitation  Continue Metoprolol 25 mg BID with hold parameters  10/11/24 INR was 2.1  Continue Coumadin 1 mg at HS  Repeat INR 10/16/24

## 2024-10-14 NOTE — ASSESSMENT & PLAN NOTE
Lab Results   Component Value Date    HGBA1C 5.9 (H) 07/24/2024   BS controlled, 111  Continue accu-checks  Will decrease Glargine to 8 units at HS as BS trending   Avoid hypoglycemia  Encourage diabetic diet

## 2024-10-14 NOTE — PROGRESS NOTES
[facility]  [address] [id number] [phone]  [place] [facility]  Code 31 (STR)        NAME: Masoud Perry  AGE: 69 y.o. SEX: male CODE STATUS: No CPR    DATE OF ENCOUNTER: 10/14/2024    Assessment and Plan     1. ESRD on peritoneal dialysis (HCC)  Assessment & Plan:  Continue PD  Continue lasix 80 mg daily   Continue Cinacalcet 30 mg  MWF  Monitor CBC, BMP  F/U with Nephrology     2. Ambulatory dysfunction  Assessment & Plan:  Multifactorial in the setting of ESRD on PD, falls, acute/chronic medical conditions  Ambulates with walker at baseline  Continue PT/OT  Fall Precautions  Ensure adequate nutrition/hydration   Monitor CBC/BMP    following for d/c planning    3. Paroxysmal atrial fibrillation ([facility])  Assessment & Plan:  HR controlled, 65  Denies CP/palpitation  Continue Metoprolol 25 mg BID with hold parameters  10/11/24 INR was 2.1  Continue Coumadin 1 mg at HS  Repeat INR 10/16/24  4. Acute on chronic diastolic HF (heart failure) ([facility])  Assessment & Plan:  Wt Readings from Last 3 Encounters:   10/14/24 115 kg (253 lb)   10/07/24 118 kg (260 lb)   09/30/24 114 kg (250 lb 8 oz)   Weight stable  Continue Lasix 80 mg daily  On PD  Follow up with nephrology, cardiiology          5. Type 2 diabetes mellitus with stage 4 chronic kidney disease, with long-term current use of insulin ([facility])  Assessment & Plan:    Lab Results   Component Value Date    HGBA1C 5.9 (H) 07/24/2024   BS controlled, 111  Continue accu-checks  Will decrease Glargine to 8 units at HS as BS trending   Avoid hypoglycemia  Encourage diabetic diet       All medications and routine orders were reviewed and updated as needed.    Chief Complaint     STR follow up visit  Patient's care was coordinated with nursing facility staff. Recent vitals, labs, and updated medications were review on [facility] system in facility.  Past Medical and Surgical History      Past Medical History:   Diagnosis Date     Anemia in chronic kidney disease     Anticoagulated on Coumadin     Atrial fibrillation (HCC)     BPH (benign prostatic hyperplasia)     CAD (coronary artery disease)     CKD (chronic kidney disease), stage V (HCC)     Compartment syndrome of forearm (HCC)     Right, 2019    COPD (chronic obstructive pulmonary disease) (HCC)     CVA (cerebral vascular accident) (HCC)     Diastolic CHF (HCC)     grade 1 DD    DVT (deep venous thrombosis) (HCC)     Factor V deficiency (HCC)     Gout     Hyperlipidemia     Hypertension     Hypothyroidism     MI (myocardial infarction) (HCC)     x3 - 1999, 2010, after 2015    Morbid obesity (HCC)     Nephrolithiasis     Noncompliance with medications     SHAD (obstructive sleep apnea)     Peritoneal dialysis catheter in place (HCC)     Pulmonary embolism (HCC)     Secondary hyperparathyroidism of renal origin (HCC)     Spinal stenosis of lumbar region     Status post placement of implantable loop recorder     Tobacco dependence      Past Surgical History:   Procedure Laterality Date    CARDIAC ELECTROPHYSIOLOGY STUDY AND ABLATION  04/29/2024    CHOLECYSTECTOMY      CORONARY ANGIOPLASTY WITH STENT PLACEMENT      JOINT REPLACEMENT      bilateral knee    DE CYSTO/URETERO W/LITHOTRIPSY &INDWELL STENT INSRT Left 09/24/2022    Procedure: CYSTOSCOPY URETEROSCOPY WITH LITHOTRIPSY HOLMIUM LASER, AND INSERTION STENT URETERAL;  Surgeon: Lewis Dahl MD;  Location:  MAIN OR;  Service: Urology    DE LAPS INSERTION TUNNELED INTRAPERITONEAL CATHETER N/A 6/7/2024    Procedure: INSERTION PERITONEAL CATHETER DIALYSIS LAPAROSCOPIC;  Surgeon: Hiram Park DO;  Location: [facility];  Service: General    US GUIDED KIDNEY BIOPSY  08/05/2020     Allergies   Allergen Reactions    Carvedilol Shortness Of Breath    Saccharin - Food Allergy Diarrhea     GI intolerance, adamant does not want to receive    Sucralose - Food Allergy Diarrhea     GI intolerance, adamant does not want to receive    Amiodarone  Dizziness    Aspartame - Food Allergy Diarrhea    Bumetanide GI Intolerance and Lightheadedness           Cephalexin Other (See Comments)     unknown    Ciprofloxacin Rash    Hydralazine Tachycardia    Lisinopril GI Intolerance           Metolazone Other (See Comments)     Metallic taste    Morphine Swelling     Of injection site    Nifedipine Lightheadedness    Strawberry Extract - Food Allergy Rash          History of Present Illness     SANDHYA Perry is a 69 year old male, he is a STR patient of [facility] since 9/23/24. Past Medical Hx including but not limited to CHF, MI, SHAD, COPD, GERD, hypothyroidism, diabetes, CKD on PD, factor V, CVA, lymphedema. He was seen in collaboration with nursing for medical mgmt of acute and chronic medical conditions and STR follow up.     Hospital Course  Patient was admitted to the hospital 9/15/24 following fall. Patient ambulates with a walker at baseline and reports increased weakness over the last 2 weeks.  He was getting out of his chair and fell, hitting the back of his head. Trauma work up was negative. Patient did develop LLE cellulitis and was treated with Vanco, then was switched to Augmentin for 3 more days. Torsemide was changed to lasix d/t suspicion that torsemide was causing INR to be elevated. His Coumadin was decreased to 0.5 mg. Patient was recommended for rehab and was discharged to [facility].     Rehab Course  Masoud was seen and examined at bedside today. Patient is a reliable historian and is AAO x 3. On exam patient is resting in bed and is in no distress. Denies SOB, O2 94% on RA, patient apply's O2 when feeling SOB. Some noncompliance with therapy, per patient this was d/t PD and time spent getting his CT scan last week.  CT scan results still pending.     Denies CP/SOB/N/V/D. Denies lightheadedness, dizziness, headaches, vision changes. Patient states they are eating well and staying hydrated. Denies any bowel or bladder issues. Per review of  SNF records, Patient is eating 3 meals per day, consuming  %. Last documented BM 10/13/24. No concerns from nursing at this time.    The patient's allergies, past medical, surgical, social and family history were reviewed and unchanged.    Review of Systems     Review of Systems   Constitutional:  Positive for activity change. Negative for appetite change and fever.   HENT:  Negative for congestion.    Respiratory:  Positive for shortness of breath.    Cardiovascular:  Positive for leg swelling. Negative for chest pain and palpitations.   Gastrointestinal:  Positive for abdominal distention. Negative for abdominal pain, constipation, diarrhea, nausea and vomiting.        Fluid retention near PD catheter, CT pending.   Genitourinary:  Negative for difficulty urinating.        PD   Musculoskeletal:  Negative for gait problem.   Skin:  Positive for color change.        PVD discoloration b/l LE   Neurological:  Positive for weakness. Negative for dizziness and headaches.   Psychiatric/Behavioral:  Negative for sleep disturbance.          Objective     Vitals:   Vitals:    10/14/24 1039   BP: 145/75   Pulse: 65   Resp: 16   Temp: 97.8 °F (36.6 °C)   SpO2: 97%           Physical Exam  Constitutional:       General: He is not in acute distress.     Appearance: Normal appearance. He is not ill-appearing.   HENT:      Head: Normocephalic and atraumatic.   Eyes:      Conjunctiva/sclera: Conjunctivae normal.   Cardiovascular:      Rate and Rhythm: Normal rate and regular rhythm.      Heart sounds: Normal heart sounds.   Pulmonary:      Effort: No respiratory distress.      Breath sounds: Normal breath sounds. No wheezing.   Abdominal:      General: Bowel sounds are normal. There is no distension.      Tenderness: There is no abdominal tenderness.   Musculoskeletal:      Right lower leg: Edema present.      Left lower leg: Edema present.   Skin:     General: Skin is warm.   Neurological:      Mental Status: He is alert  and oriented to person, place, and time.      Motor: Weakness present.      Gait: Gait abnormal.   Psychiatric:         Mood and Affect: Mood normal.         Behavior: Behavior normal.         Pertinent Laboratory/Diagnostic Studies:   Reviewed in facility chart-stable      Current Medications   Medications reviewed and updated see facility MAR for details.      Current Outpatient Medications:     acetaminophen (TYLENOL) 325 mg tablet, Take 2 tablets (650 mg total) by mouth every 6 (six) hours as needed for mild pain, headaches or fever, Disp: , Rfl:     allopurinol (ZYLOPRIM) 300 mg tablet, Take 300 mg by mouth daily, Disp: , Rfl:     atorvastatin (LIPITOR) 40 mg tablet, Take 1 tablet (40 mg total) by mouth daily with dinner, Disp: 30 tablet, Rfl: 0    cinacalcet (SENSIPAR) 30 mg tablet, Take 1 tablet (30 mg total) by mouth 3 (three) times a week Do not start before September 25, 2024., Disp: , Rfl:     docusate sodium (COLACE) 100 mg capsule, Take 1 capsule (100 mg total) by mouth 2 (two) times a day as needed for constipation, Disp: , Rfl:     furosemide (LASIX) 80 mg tablet, Take 1 tablet (80 mg total) by mouth daily, Disp: , Rfl:     HYDROcodone-acetaminophen (Norco) 5-325 mg per tablet, Take 2 tablets by mouth every 6 (six) hours as needed for pain Max Daily Amount: 8 tablets, Disp: 112 tablet, Rfl: 0    insulin glargine (LANTUS) 100 units/mL subcutaneous injection, Inject 10 Units under the skin daily at bedtime, Disp: , Rfl:     levothyroxine 125 mcg tablet, Take 125 mcg by mouth daily, Disp: , Rfl:     Magnesium 400 MG TABS, Take 400 mg by mouth in the morning, Disp: , Rfl:     metoprolol tartrate (LOPRESSOR) 25 mg tablet, Take 25 mg by mouth every 12 (twelve) hours, Disp: , Rfl:     midodrine (PROAMATINE) 5 mg tablet, Take 3 tablets (15 mg total) by mouth 3 (three) times a day before meals, Disp: 270 tablet, Rfl: 1    potassium chloride (Klor-Con M20) 20 mEq tablet, Take 2 tablets (40 mEq total) by mouth  "daily, Disp: , Rfl:     simethicone (MYLICON) 80 mg chewable tablet, Chew 1 tablet (80 mg total) 4 (four) times a day as needed for flatulence, Disp: 30 tablet, Rfl: 0    tamsulosin (FLOMAX) 0.4 mg, Take 0.4 mg by mouth daily with dinner, Disp: , Rfl:     warfarin (COUMADIN) 1 mg tablet, Take 1 tablet (1 mg total) by mouth daily at bedtime, Disp: , Rfl:      Please note:  Voice-recognition software may have been used in the preparation of this document.  Occasional wrong word or \"sound-alike\" substitutions may have occurred due to the inherent limitations of voice recognition software.  Interpretation should be guided by context.         GAGANDEEP Arteaga  10/14/2024  10:59 AM    "

## 2024-10-14 NOTE — ASSESSMENT & PLAN NOTE
Wt Readings from Last 3 Encounters:   10/14/24 115 kg (253 lb)   10/07/24 118 kg (260 lb)   09/30/24 114 kg (250 lb 8 oz)   Weight stable  Continue Lasix 80 mg daily  On PD  Follow up with nephrology, cardiiology

## 2024-10-15 ENCOUNTER — TELEPHONE (OUTPATIENT)
Dept: NEPHROLOGY | Facility: CLINIC | Age: 69
End: 2024-10-15

## 2024-10-15 NOTE — TELEPHONE ENCOUNTER
I called and spoke with Masoud[facility] Nurse [facility] regarding the following:    ----- Message from Marquise Santos DO sent at 10/14/2024  9:13 PM EDT -----  Please contact both the patient and the [facility] unit regarding these findings.  No significant findings to explain abdominal pain noted.    She verbalized an understanding.      I called and spoke with Altagracia Earl. The nurse is aware of CT results. I faxed the results to the facility.

## 2024-10-16 ENCOUNTER — NURSING HOME VISIT (OUTPATIENT)
Dept: WOUND CARE | Facility: HOSPITAL | Age: 69
End: 2024-10-16
Payer: COMMERCIAL

## 2024-10-16 DIAGNOSIS — T14.8XXA HEMATOMA: ICD-10-CM

## 2024-10-16 DIAGNOSIS — E11.621 DIABETIC ULCER OF RIGHT GREAT TOE (HCC): Primary | ICD-10-CM

## 2024-10-16 DIAGNOSIS — L97.519 DIABETIC ULCER OF RIGHT GREAT TOE (HCC): Primary | ICD-10-CM

## 2024-10-16 DIAGNOSIS — L97.801 VENOUS STASIS ULCER OF OTHER SITE LIMITED TO BREAKDOWN OF SKIN WITHOUT VARICOSE VEINS (HCC): ICD-10-CM

## 2024-10-16 DIAGNOSIS — I87.2 VENOUS STASIS ULCER OF OTHER SITE LIMITED TO BREAKDOWN OF SKIN WITHOUT VARICOSE VEINS (HCC): ICD-10-CM

## 2024-10-16 PROCEDURE — 99308 SBSQ NF CARE LOW MDM 20: CPT | Performed by: NURSE PRACTITIONER

## 2024-10-17 ENCOUNTER — NURSING HOME VISIT (OUTPATIENT)
Dept: GERIATRICS | Facility: OTHER | Age: 69
End: 2024-10-17
Payer: COMMERCIAL

## 2024-10-17 VITALS
OXYGEN SATURATION: 96 % | RESPIRATION RATE: 18 BRPM | TEMPERATURE: 96.8 F | HEART RATE: 82 BPM | SYSTOLIC BLOOD PRESSURE: 114 MMHG | DIASTOLIC BLOOD PRESSURE: 75 MMHG

## 2024-10-17 DIAGNOSIS — E03.9 ACQUIRED HYPOTHYROIDISM: Primary | ICD-10-CM

## 2024-10-17 DIAGNOSIS — Z79.4 TYPE 2 DIABETES MELLITUS WITH STAGE 4 CHRONIC KIDNEY DISEASE, WITH LONG-TERM CURRENT USE OF INSULIN (HCC): ICD-10-CM

## 2024-10-17 DIAGNOSIS — Z99.2 ESRD ON PERITONEAL DIALYSIS (HCC): ICD-10-CM

## 2024-10-17 DIAGNOSIS — I95.9 HYPOTENSION, UNSPECIFIED HYPOTENSION TYPE: ICD-10-CM

## 2024-10-17 DIAGNOSIS — E11.22 TYPE 2 DIABETES MELLITUS WITH STAGE 4 CHRONIC KIDNEY DISEASE, WITH LONG-TERM CURRENT USE OF INSULIN (HCC): ICD-10-CM

## 2024-10-17 DIAGNOSIS — I50.33 ACUTE ON CHRONIC DIASTOLIC HF (HEART FAILURE) (HCC): ICD-10-CM

## 2024-10-17 DIAGNOSIS — N18.4 TYPE 2 DIABETES MELLITUS WITH STAGE 4 CHRONIC KIDNEY DISEASE, WITH LONG-TERM CURRENT USE OF INSULIN (HCC): ICD-10-CM

## 2024-10-17 DIAGNOSIS — N18.6 ESRD ON PERITONEAL DIALYSIS (HCC): ICD-10-CM

## 2024-10-17 DIAGNOSIS — I48.0 PAROXYSMAL ATRIAL FIBRILLATION (HCC): ICD-10-CM

## 2024-10-17 PROCEDURE — 99316 NF DSCHRG MGMT 30 MIN+: CPT

## 2024-10-17 RX ORDER — INSULIN GLARGINE 100 [IU]/ML
8 INJECTION, SOLUTION SUBCUTANEOUS
Start: 2024-10-17

## 2024-10-17 NOTE — ASSESSMENT & PLAN NOTE
Right lower leg anterior  Partial-thickness, wound improved, almost healed  Change to moisturizing lotion daily  Continue to offload  Sign off. Facility staff will continue to provide treatment and monitor the wound. Can reconsult wound nurse practitioner if wound failed to heal or worsened.

## 2024-10-17 NOTE — ASSESSMENT & PLAN NOTE
HR controlled, 82  Goal INR 2-3  INR 10/17/24 2.9  Continue coumadin 1 mg at HS  f/u with PCP for coumadin management   Denies CP/palpitation  Continue Metoprolol 25 mg BID with hold parameters

## 2024-10-17 NOTE — PROGRESS NOTES
[facility]  [address] [address]  [phone]  DISCHARGE SUMMARY  Facility: [facility]  Code 31    NAME: Masoud Perry  AGE: 69 y.o. SEX: male   CODE STATUS: No CPR    DATE OF ADMISSION: 9/23/24   DATE OF DISCHARGE: 10/19/2024   DISCHARGE DISPOSITION: Stable for discharge to home with family support and home health PT/OT/SN services.   Reason for Admission: Patient was admitted to [facility] for rehabilitation after hospitalization for fall.    Past Medical and Surgical History:   Past Medical History:   Diagnosis Date    Anemia in chronic kidney disease     Anticoagulated on Coumadin     Atrial fibrillation (HCC)     BPH (benign prostatic hyperplasia)     CAD (coronary artery disease)     CKD (chronic kidney disease), stage V (HCC)     Compartment syndrome of forearm (HCC)     Right, 2019    COPD (chronic obstructive pulmonary disease) (HCC)     CVA (cerebral vascular accident) (HCC)     Diastolic CHF (HCC)     grade 1 DD    DVT (deep venous thrombosis) (HCC)     Factor V deficiency (HCC)     Gout     Hyperlipidemia     Hypertension     Hypothyroidism     MI (myocardial infarction) (HCC)     x3 - 1999, 2010, after 2015    Morbid obesity (HCC)     Nephrolithiasis     Noncompliance with medications     SHAD (obstructive sleep apnea)     Peritoneal dialysis catheter in place (HCC)     Pulmonary embolism (HCC)     Secondary hyperparathyroidism of renal origin (HCC)     Spinal stenosis of lumbar region     Status post placement of implantable loop recorder     Tobacco dependence       Past Surgical History:   Procedure Laterality Date    CARDIAC ELECTROPHYSIOLOGY STUDY AND ABLATION  04/29/2024    CHOLECYSTECTOMY      CORONARY ANGIOPLASTY WITH STENT PLACEMENT      JOINT REPLACEMENT      bilateral knee    FL CYSTO/URETERO W/LITHOTRIPSY &INDWELL STENT INSRT Left 09/24/2022    Procedure: CYSTOSCOPY URETEROSCOPY WITH LITHOTRIPSY HOLMIUM LASER, AND INSERTION STENT URETERAL;  Surgeon: Lewis Dahl  MD;  Location: [facility];  Service: Urology    HI LAPS INSERTION TUNNELED INTRAPERITONEAL CATHETER N/A 6/7/2024    Procedure: INSERTION PERITONEAL CATHETER DIALYSIS LAPAROSCOPIC;  Surgeon: Hiram Park DO;  Location: [facility];  Service: General    US GUIDED KIDNEY BIOPSY  08/05/2020       Course of stay:   HPI  Masoud Perry is a 69 year old male, he is a STR patient of [facility] since 9/23/24. Past Medical Hx including but not limited to CHF, MI, SHAD, COPD, GERD, hypothyroidism, diabetes, CKD on PD, factor V, CVA, lymphedema. He was seen in collaboration with nursing for medical mgmt of acute and chronic medical conditions and STR follow up.      Hospital Course  Patient was admitted to the hospital 9/15/24 following fall. Patient ambulates with a walker at baseline and reports increased weakness over the last 2 weeks.  He was getting out of his chair and fell, hitting the back of his head. Trauma work up was negative. Patient did develop LLE cellulitis and was treated with Vanco, then was switched to Augmentin for 3 more days. Torsemide was changed to lasix d/t suspicion that torsemide was causing INR to be elevated. His Coumadin was decreased to 0.5 mg. Patient was recommended for rehab and was discharged to [facility].      Rehab Course  Masoud was seen and examined at bedside today. Patient is a reliable historian and is AAO x 3. On exam patient is resting in bed and is in no distress. Denies SOB, O2 96% on RA, patient apply's O2 when feeling SOB.  CT results negative for acute findings.  Denies CP/SOB/N/V/D. Denies lightheadedness, dizziness, headaches, vision changes. Patient states they are eating well and staying hydrated. Denies any bowel or bladder issues. Per review of SNF records, Patient is eating 3 meals per day, consuming  %. Last documented BM 10/16/24. No concerns from nursing at this time.  During the patients stay at [facility], he received skilled nursing care, PT, OT, social service  support, dietician support, and medical management.  Pt scheduled to be discharged on 10/19/2024.  ROS:  Review of Systems   Constitutional:  Positive for activity change. Negative for appetite change and fever.   HENT:  Negative for congestion.    Respiratory:  Positive for shortness of breath.    Cardiovascular:  Positive for leg swelling. Negative for chest pain and palpitations.   Gastrointestinal:  Negative for abdominal pain, constipation, diarrhea, nausea and vomiting.   Genitourinary:  Negative for difficulty urinating.        PD   Musculoskeletal:  Negative for gait problem.   Skin:  Positive for color change.        PVD discoloration b/l LE   Neurological:  Positive for weakness. Negative for dizziness and headaches.   Psychiatric/Behavioral:  Negative for sleep disturbance.        PHYSICAL EXAM:  VITALS:   Vitals:    10/17/24 0948   BP: 114/75   Pulse: 82   Resp: 18   Temp: (!) 96.8 °F (36 °C)   SpO2: 96%        Physical Exam  Constitutional:       General: He is not in acute distress.     Appearance: Normal appearance. He is not ill-appearing.   HENT:      Head: Normocephalic and atraumatic.   Eyes:      Conjunctiva/sclera: Conjunctivae normal.   Cardiovascular:      Rate and Rhythm: Normal rate and regular rhythm.      Heart sounds: Normal heart sounds.   Pulmonary:      Effort: No respiratory distress.      Breath sounds: Normal breath sounds. No wheezing.   Abdominal:      General: Bowel sounds are normal. There is no distension.      Tenderness: There is no abdominal tenderness.   Musculoskeletal:      Right lower leg: Edema present.      Left lower leg: Edema present.   Skin:     General: Skin is warm.   Neurological:      Mental Status: He is alert and oriented to person, place, and time.      Motor: Weakness present.      Gait: Gait abnormal.   Psychiatric:         Mood and Affect: Mood normal.         Behavior: Behavior normal.         Admission Diagnoses:   1. Acquired hypothyroidism  Assessment  & Plan:  Lab Results   Component Value Date    DFB3MVJTPWCM 12.236 (H) 07/30/2024    TSH 2.89 07/08/2024   Continue levothyroxine 125 mcg  Monitor TSH   2. Type 2 diabetes mellitus with stage 4 chronic kidney disease, with long-term current use of insulin ([facility])  Assessment & Plan:    Lab Results   Component Value Date    HGBA1C 5.9 (H) 07/24/2024   BS controlled, 128  Continue accu-checks  continue Glargine to 8 units at HS  Avoid hypoglycemia  Encourage diabetic diet  Orders:  -     insulin glargine (LANTUS) 100 units/mL subcutaneous injection; Inject 8 Units under the skin daily at bedtime  3. Acute on chronic diastolic HF (heart failure) ([facility])  Assessment & Plan:  Wt Readings from Last 3 Encounters:   10/14/24 115 kg (253 lb)   10/07/24 118 kg (260 lb)   09/30/24 114 kg (250 lb 8 oz)   Weight stable  Continue Lasix 80 mg daily  On PD  Follow up with nephrology, cardiiology          4. Paroxysmal atrial fibrillation ([facility])  Assessment & Plan:  HR controlled, 82  Goal INR 2-3  INR 10/17/24 2.9  Continue coumadin 1 mg at HS  f/u with PCP for coumadin management   Denies CP/palpitation  Continue Metoprolol 25 mg BID with hold parameters  5. ESRD on peritoneal dialysis ([facility])  Assessment & Plan:  Continue PD  Continue lasix 80 mg daily   Continue Cinacalcet 30 mg  MWF  Monitor CBC, BMP  F/U with Nephrology     6. Hypotension, unspecified hypotension type  Assessment & Plan:  BP stable, 114/75  Continue Midodrine 15 mg TID  Hold for SBP >120       Follow-up Recommendations:    Outpatient Follow up with PCP in the next 2 weeks  Home Health PT/OT/SN services     Labs and testing performed during stay:  10/17/24   WBC COUNT, AUTOMATED 4.1 K/CU.MM 3.5-11.0  Final              RBC COUNT 2.9 M/CU.MM 3.6-5.6 L Final             HEMOGLOBIN 9.5 g/dL 12.1-17.1 L Final             HEMATOCRIT 31 PERCENT 36-51 L Final             MCH 32 pg 25-32  Final              fL  H Final             MCHC 31 g/dL 31-36  Final              RDW 16.0 % 11.6-14.4 H Final             PLATELETS, AUTOMATED 134 K/CU.-400  Final             MPV 9.9 fL 9.4-12.4  Final      CREATININE 4.40 mg/dL 0.60-1.50 H Final              GLUCOSE 123 mg/dL 65-99 H Final             UREA NITROGEN 26 mg/dL 8-23 H Final             SODIUM 136 mMOL/L 135-145  Final             POTASSIUM 3.8 mMOL/L 3.4-5.3  Final             CHLORIDE 97 mmol/L  L Final             UREA N / CREAT RATIO 5.9 RATIO 12-20 L Final             CO2 30 mmol/L 22-32  Final             OSMOLALITY 288 mOsm/kG 275-305  Final     The Serum Osmolality Reference Range has been  adjusted based on current information provided  by the [facility].        CALCIUM 8.4 mg/dL 8.4-10.2  Final             eGFR 14 See note >59 L Final         Discharge Medications: See discharge medication list which was reviewed and signed.      Current Outpatient Medications:     acetaminophen (TYLENOL) 325 mg tablet, Take 2 tablets (650 mg total) by mouth every 6 (six) hours as needed for mild pain, headaches or fever, Disp: , Rfl:     allopurinol (ZYLOPRIM) 300 mg tablet, Take 300 mg by mouth daily, Disp: , Rfl:     atorvastatin (LIPITOR) 40 mg tablet, Take 1 tablet (40 mg total) by mouth daily with dinner, Disp: 30 tablet, Rfl: 0    cinacalcet (SENSIPAR) 30 mg tablet, Take 1 tablet (30 mg total) by mouth 3 (three) times a week Do not start before September 25, 2024., Disp: , Rfl:     docusate sodium (COLACE) 100 mg capsule, Take 1 capsule (100 mg total) by mouth 2 (two) times a day as needed for constipation, Disp: , Rfl:     furosemide (LASIX) 80 mg tablet, Take 1 tablet (80 mg total) by mouth daily, Disp: , Rfl:     HYDROcodone-acetaminophen (Norco) 5-325 mg per tablet, Take 2 tablets by mouth every 6 (six) hours as needed for pain Max Daily Amount: 8 tablets, Disp: 112 tablet, Rfl: 0    insulin glargine (LANTUS) 100 units/mL subcutaneous injection, Inject 8 Units under the skin daily at bedtime, Disp: , Rfl:      "levothyroxine 125 mcg tablet, Take 125 mcg by mouth daily, Disp: , Rfl:     Magnesium 400 MG TABS, Take 400 mg by mouth in the morning, Disp: , Rfl:     metoprolol tartrate (LOPRESSOR) 25 mg tablet, Take 25 mg by mouth every 12 (twelve) hours, Disp: , Rfl:     midodrine (PROAMATINE) 5 mg tablet, Take 3 tablets (15 mg total) by mouth 3 (three) times a day before meals, Disp: 270 tablet, Rfl: 1    potassium chloride (Klor-Con M20) 20 mEq tablet, Take 2 tablets (40 mEq total) by mouth daily, Disp: , Rfl:     simethicone (MYLICON) 80 mg chewable tablet, Chew 1 tablet (80 mg total) 4 (four) times a day as needed for flatulence, Disp: 30 tablet, Rfl: 0    tamsulosin (FLOMAX) 0.4 mg, Take 0.4 mg by mouth daily with dinner, Disp: , Rfl:     warfarin (COUMADIN) 1 mg tablet, Take 1 tablet (1 mg total) by mouth daily at bedtime, Disp: , Rfl:      Discussion with patient/family and further instructions:  -Fall precautions  -Aspiration precautions  -Bleeding precautions  -Monitor for signs/symptoms of infection  -Medication list was reviewed and signed  -DME form was completed    Status at time of discharge: Stable     Billing based on time. Time spent on unit, 40 minutes. Time spent counseling pt on debility/condition, 30 minutes.    Please note:  Voice-recognition software may have been used in the preparation of this document.  Occasional wrong word or \"sound-alike\" substitutions may have occurred due to the inherent limitations of voice recognition software.  Interpretation should be guided by context.        GAGANDEEP Arteaga  10/17/2024   "

## 2024-10-17 NOTE — ASSESSMENT & PLAN NOTE
Lab Results   Component Value Date    HGBA1C 5.9 (H) 07/24/2024   BS controlled, 128  Continue accu-checks  continue Glargine to 8 units at HS  Avoid hypoglycemia  Encourage diabetic diet

## 2024-10-17 NOTE — ASSESSMENT & PLAN NOTE
Lab Results   Component Value Date    [id number] 12.236 (H) 07/30/2024    TSH 2.89 07/08/2024   Continue levothyroxine 125 mcg  Monitor TSH

## 2024-10-17 NOTE — PROGRESS NOTES
[facility] WOUND CARE MANAGEMENT   AND HYPERBARIC MEDICINE CENTER       Patient ID: Masoud Perry is a 69 y.o. male Date of Birth 1955     Location of Service: [facility]    Performed wound round with: Wound team     Chief Complaint : Left lower leg anterior and right great toe, right lower leg    Wound Instructions:  Wound: Left lower leg and right lower leg  Cleansed with normal saline solution  Apply moisturizing lotion daily    Wound: Right great toe  Cleanse with normal saline solution  Apply moisturizing lotion daily    Ace wrap on bilateral lower legs, from base of the toes to proximal calf, on in a.m., off at night    Offload all wounds  Turn and reposition frequently  Instruct / Assist with weight shifting in wheelchair  Increase protein intake.  Monitor for any sign of infection or worsening, inform PCP or patient's primary physician in your facility.      Allergies  Carvedilol, Saccharin - food allergy, Sucralose - food allergy, Amiodarone, Aspartame - food allergy, Bumetanide, Cephalexin, Ciprofloxacin, Hydralazine, Lisinopril, Metolazone, Morphine, Nifedipine, and Strawberry extract - food allergy      Assessment & Plan:  1. 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 Facility staff will continue to provide treatment and monitor the wound. Can reconsult wound nurse practitioner if wound failed to heal or worsened.     2. Hematoma  Assessment & Plan:  Left lower leg anterior  Wound improved, the superior wound is covered with stable eschar, the wound on the inferior lower leg is covered with 100% scab  This wound could be hematoma versus calciphylaxis.  This type of wound commonly seen in patient with end-stage renal disease.  Change to moisturizing lotion daily  Patient is on peritoneal dialysis  Will recommend to change to renal diet  Sign  off. Facility staff will continue to provide treatment and monitor the wound. Can reconsult wound nurse practitioner if wound failed to heal or worsened.     3. Venous stasis ulcer of other site limited to breakdown of skin without varicose veins (HCC)  Assessment & Plan:  Right lower leg anterior  Partial-thickness, wound improved, almost healed  Change to moisturizing lotion daily  Continue to offload  Sign off. Facility staff will continue to provide treatment and monitor the wound. Can reconsult wound nurse practitioner if wound failed to heal or worsened.                  Subjective:   September 25, 2024.  New consult for wound on the right first toe and left lower leg anterior.  Patient currently admitted at [facility].  Patient was referred by Senior care team.  Patient medical problem includes but not limited to type 2 diabetes and chronic kidney disease.  Patient was seen with the facility wound team.  I introduced myself to patient and patient agreed to be seen for wound consult.    Wound history: As per medical record review, the wound on the toe was discovered on September 21, 2024.  As per patient, he has recently had the wound on the left lower leg, not sure how he got the wound.  As per patient, he probably bumped it.    Received patient in bed, seems comfortable.  Denies pain.  Patient is ambulatory.  Patient is on peritoneal dialysis.    October 2, 2024.  Follow-up for wound on the right first toe and left lower leg anterior, and new consult for wound on the right lower leg anterior.  Received patient in bed, seems comfortable.  Denies pain.    October 9, 2024.  Follow-up for wound on the right great toe, left lower leg anterior, and right lower leg anterior.  Received patient in bed, denies pain.    October 16, 2024.  Follow-up for wound on the right great toe, left lower leg, and right lower leg.  Received patient in bed, seems comfortable.  Denies pain.        Review of Systems    Constitutional: Negative.    Respiratory: Negative.     Cardiovascular: Negative.    Skin:  Positive for wound.   Psychiatric/Behavioral: Negative.         Objective:    Physical Exam  Constitutional:       Appearance: Normal appearance.   Cardiovascular:      Rate and Rhythm: Normal rate.   Pulmonary:      Effort: Pulmonary effort is normal.   Musculoskeletal:      Right lower leg: Edema present.      Left lower leg: Edema present.      Comments: Large edema on bilateral lower legs  Pedal pulses nonpalpable due to edema   Skin:     Findings: Lesion present.      Comments: Right great toe: Partial-thickness, no drainage, no depth, no obvious sign of infection, small open wound    Left lower leg anterior superior: Wound size is 1.3 x 1.3 cm.,  Stable eschar, no drainage, no obvious sign of infection    Left lower leg anterior inferior: Wound size is 1.6 x 1.3 cm.,  100% scab, no drainage, no obvious sign of infection    Right lower leg: Wound size is 0.8 x 0.4 cm.,  100% epithelial, no drainage, no obvious sign of infection   Neurological:      Mental Status: He is alert.              Procedures     Results from last 6 Months   Lab Units 07/23/24  5133   WOUND CULTURE  4+ Growth of Pseudomonas aeruginosa*  4+ Growth of Enterococcus faecalis*       Patient's care was coordinated with nursing facility staff. Recent vitals, labs and updated medications were reviewed on EMR or chart system of facility. Past Medical, surgical, social, medication and allergy history and patient's previous records were reviewed and updated as appropriate: Most up-to date information is available in the facility EMR where the patient is currently admitted.    Patient Active Problem List   Diagnosis    History of CVA (cerebrovascular accident)    Acute on chronic diastolic HF (heart failure) ([facility])    HLD (hyperlipidemia)    Lymphedema    Hypothyroidism    Anemia    Thrombocytopenia ([facility])    Angina at rest ([facility])    MI (myocardial  infarction) (HCC)    History of PTCA    Sleep apnea    Smoking    Factor 5 Leiden mutation, heterozygous (HCC)    Type 2 diabetes mellitus with stage 4 chronic kidney disease, with long-term current use of insulin (HCC)    Obesity, morbid (HCC)    Secondary hyperparathyroidism of renal origin (HCC)    Stage 5 chronic kidney disease on peritoneal dialysis (HCC)    Chronic kidney disease-mineral and bone disorder    Ureteral stone with hydronephrosis    Cutaneous abscess of buttock    Bilateral lower extremity edema    Cellulitis of right lower extremity    Paroxysmal atrial fibrillation (HCC)    Patient on peritoneal dialysis (HCC)    Hypervolemia    ESRD on peritoneal dialysis (HCC)    Bacteremia    Elevated LFTs    Nocturnal hypoxia    Lactic acidosis    Hypercalcemia    Chronic acquired lymphedema    Hypotension    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 (HCC)    COPD (chronic obstructive pulmonary disease) (HCC)    ED (erectile dysfunction) of organic origin    Gastroesophageal reflux disease    Gout    History of thromboembolism of vein    Personal history of pulmonary embolism    Ambulatory dysfunction    Moderate protein-calorie malnutrition (HCC)    Cellulitis    Hyponatremia    Medication management    Diabetic ulcer of right great toe (HCC)    Hematoma    Edema    Stasis ulcer (HCC)     Past Medical History:   Diagnosis Date    Anemia in chronic kidney disease     Anticoagulated on Coumadin     Atrial fibrillation (HCC)     BPH (benign prostatic hyperplasia)     CAD (coronary artery disease)     CKD (chronic kidney disease), stage V (HCC)     Compartment syndrome of forearm (HCC)     Right, 2019    COPD (chronic obstructive pulmonary disease) (HCC)     CVA (cerebral vascular accident) (HCC)     Diastolic CHF (HCC)     grade 1 DD    DVT (deep venous thrombosis) (HCC)     Factor V deficiency (HCC)     Gout     Hyperlipidemia     Hypertension     Hypothyroidism     MI (myocardial infarction) (HCC)      x3 - 1999, 2010, after 2015    Morbid obesity (HCC)     Nephrolithiasis     Noncompliance with medications     SHAD (obstructive sleep apnea)     Peritoneal dialysis catheter in place (HCC)     Pulmonary embolism (HCC)     Secondary hyperparathyroidism of renal origin (HCC)     Spinal stenosis of lumbar region     Status post placement of implantable loop recorder     Tobacco dependence      Past Surgical History:   Procedure Laterality Date    CARDIAC ELECTROPHYSIOLOGY STUDY AND ABLATION  04/29/2024    CHOLECYSTECTOMY      CORONARY ANGIOPLASTY WITH STENT PLACEMENT      JOINT REPLACEMENT      bilateral knee    MO CYSTO/URETERO W/LITHOTRIPSY &INDWELL STENT INSRT Left 09/24/2022    Procedure: CYSTOSCOPY URETEROSCOPY WITH LITHOTRIPSY HOLMIUM LASER, AND INSERTION STENT URETERAL;  Surgeon: Lewis Dahl MD;  Location:  MAIN OR;  Service: Urology    MO LAPS INSERTION TUNNELED INTRAPERITONEAL CATHETER N/A 6/7/2024    Procedure: INSERTION PERITONEAL CATHETER DIALYSIS LAPAROSCOPIC;  Surgeon: Hiram Park DO;  Location: [facility];  Service: General    US GUIDED KIDNEY BIOPSY  08/05/2020     Social History     Socioeconomic History    Marital status:      Spouse name: None    Number of children: None    Years of education: None    Highest education level: None   Occupational History    None   Tobacco Use    Smoking status: Every Day     Current packs/day: 0.50     Types: Cigarettes    Smokeless tobacco: Never   Vaping Use    Vaping status: Never Used   Substance and Sexual Activity    Alcohol use: Not Currently    Drug use: Never    Sexual activity: None   Other Topics Concern    None   Social History Narrative    None     Social Determinants of Health     Financial Resource Strain: Not on file   Food Insecurity: No Food Insecurity (9/16/2024)    Hunger Vital Sign     Worried About Running Out of Food in the Last Year: Never true     Ran Out of Food in the Last Year: Never true   Transportation Needs: No  Transportation Needs (9/16/2024)    PRAPARE - Transportation     Lack of Transportation (Medical): No     Lack of Transportation (Non-Medical): No   Physical Activity: Not on file   Stress: Not on file   Social Connections: Unknown (6/18/2024)    Received from [employer]    Social [employer]     How often do you feel lonely or isolated from those around you? (Adult - for ages 18 years and over): Not on file   Intimate Partner Violence: Not on file   Housing Stability: Low Risk  (9/16/2024)    Housing Stability Vital Sign     Unable to Pay for Housing in the Last Year: No     Number of Times Moved in the Last Year: 0     Homeless in the Last Year: No        Current Outpatient Medications:     acetaminophen (TYLENOL) 325 mg tablet, Take 2 tablets (650 mg total) by mouth every 6 (six) hours as needed for mild pain, headaches or fever, Disp: , Rfl:     allopurinol (ZYLOPRIM) 300 mg tablet, Take 300 mg by mouth daily, Disp: , Rfl:     atorvastatin (LIPITOR) 40 mg tablet, Take 1 tablet (40 mg total) by mouth daily with dinner, Disp: 30 tablet, Rfl: 0    cinacalcet (SENSIPAR) 30 mg tablet, Take 1 tablet (30 mg total) by mouth 3 (three) times a week Do not start before September 25, 2024., Disp: , Rfl:     docusate sodium (COLACE) 100 mg capsule, Take 1 capsule (100 mg total) by mouth 2 (two) times a day as needed for constipation, Disp: , Rfl:     furosemide (LASIX) 80 mg tablet, Take 1 tablet (80 mg total) by mouth daily, Disp: , Rfl:     HYDROcodone-acetaminophen (Norco) 5-325 mg per tablet, Take 2 tablets by mouth every 6 (six) hours as needed for pain Max Daily Amount: 8 tablets, Disp: 112 tablet, Rfl: 0    insulin glargine (LANTUS) 100 units/mL subcutaneous injection, Inject 10 Units under the skin daily at bedtime, Disp: , Rfl:     levothyroxine 125 mcg tablet, Take 125 mcg by mouth daily, Disp: , Rfl:     Magnesium 400 MG TABS, Take 400 mg by mouth in the morning, Disp: , Rfl:     metoprolol tartrate (LOPRESSOR) 25  "mg tablet, Take 25 mg by mouth every 12 (twelve) hours, Disp: , Rfl:     midodrine (PROAMATINE) 5 mg tablet, Take 3 tablets (15 mg total) by mouth 3 (three) times a day before meals, Disp: 270 tablet, Rfl: 1    potassium chloride (Klor-Con M20) 20 mEq tablet, Take 2 tablets (40 mEq total) by mouth daily, Disp: , Rfl:     simethicone (MYLICON) 80 mg chewable tablet, Chew 1 tablet (80 mg total) 4 (four) times a day as needed for flatulence, Disp: 30 tablet, Rfl: 0    tamsulosin (FLOMAX) 0.4 mg, Take 0.4 mg by mouth daily with dinner, Disp: , Rfl:     warfarin (COUMADIN) 1 mg tablet, Take 1 tablet (1 mg total) by mouth daily at bedtime, Disp: , Rfl:   Family History   Problem Relation Age of Onset    Kidney failure Mother     Cirrhosis Mother     Colon cancer Brother               Coordination of Care: Wound team aware of the treatment plan. Facility nurse will provide wound treatment and monitor the wound for any changes.     Patient / Staff education : Patient / Staff was given education on sign of infection and pressure ulcer prevention. Patient/ Staff verbalized understanding     Follow up :  Sign off. Facility staff will continue to provide treatment and monitor the wound. Can reconsult wound nurse practitioner if wound failed to heal or worsened.       Voice-recognition software may have been used in the preparation of this document. Occasional wrong word or \"sound-alike\" substitutions may have occurred due to the inherent limitations of voice recognition software. Interpretation should be guided by context.      GAGANDEEP Carlson  "

## 2024-10-17 NOTE — ASSESSMENT & PLAN NOTE
Left lower leg anterior  Wound improved, the superior wound is covered with stable eschar, the wound on the inferior lower leg is covered with 100% scab  This wound could be hematoma versus calciphylaxis.  This type of wound commonly seen in patient with end-stage renal disease.  Change to moisturizing lotion daily  Patient is on peritoneal dialysis  Will recommend to change to renal diet  Sign off. Facility staff will continue to provide treatment and monitor the wound. Can reconsult wound nurse practitioner if wound failed to heal or worsened.

## 2024-10-17 NOTE — ASSESSMENT & PLAN NOTE
Right great toe  Wound is stable, almost healed   local wound care with moisturizing lotion  Continue to offload  Sign off. Facility staff will continue to provide treatment and monitor the wound. Can reconsult wound nurse practitioner if wound failed to heal or worsened.

## 2024-10-21 ENCOUNTER — TELEPHONE (OUTPATIENT)
Age: 69
End: 2024-10-21

## 2024-10-21 NOTE — TELEPHONE ENCOUNTER
Renee from [facility] called to inform the office that patient was discharge from the hospital on the  19 th and [facility] will be starting home care visits on the 22 nd.

## 2024-10-23 ENCOUNTER — TELEPHONE (OUTPATIENT)
Age: 69
End: 2024-10-23

## 2024-10-23 DIAGNOSIS — N18.6 ESRD (END STAGE RENAL DISEASE) (HCC): ICD-10-CM

## 2024-10-23 RX ORDER — CINACALCET 30 MG/1
30 TABLET, FILM COATED ORAL 3 TIMES WEEKLY
Qty: 30 TABLET | Refills: 5 | Status: SHIPPED | OUTPATIENT
Start: 2024-10-23

## 2024-10-23 RX ORDER — CINACALCET 30 MG/1
30 TABLET, FILM COATED ORAL 3 TIMES WEEKLY
Status: CANCELLED | OUTPATIENT
Start: 2024-10-23

## 2024-10-23 NOTE — TELEPHONE ENCOUNTER
Home health Nurse Susan states during med reconciliation patient states he takes sevelamer with meals. In patient's chart, sevelemar was discontinued by Dr. Holguin on 9-16. Asking if he should be taking. Also, patient states he has been taking   potassium gluconate 4 tabs bid instead of potassium chloride because he could not swallow potassium chloride. Also, refill needed for Sensipar. Please advise, thank you.

## 2024-10-23 NOTE — TELEPHONE ENCOUNTER
If he has been taking sevelamer all along, then he should continue to take the sevelamer with meals.  I am okay with potassium gluconate tablets.  I will send in for refill for Sensipar.

## 2024-10-24 ENCOUNTER — DOCUMENTATION (OUTPATIENT)
Dept: OTHER | Facility: HOSPITAL | Age: 69
End: 2024-10-24

## 2024-10-24 DIAGNOSIS — I95.3 HEMODIALYSIS-ASSOCIATED HYPOTENSION: Primary | ICD-10-CM

## 2024-10-24 DIAGNOSIS — R35.1 BPH ASSOCIATED WITH NOCTURIA: ICD-10-CM

## 2024-10-24 DIAGNOSIS — N40.1 BPH ASSOCIATED WITH NOCTURIA: ICD-10-CM

## 2024-10-24 RX ORDER — PYRIDOSTIGMINE BROMIDE 60 MG/1
60 TABLET ORAL 3 TIMES DAILY
Qty: 90 TABLET | Refills: 3 | Status: SHIPPED | OUTPATIENT
Start: 2024-10-24

## 2024-10-24 RX ORDER — FINASTERIDE 5 MG/1
5 TABLET, FILM COATED ORAL DAILY
Qty: 90 TABLET | Refills: 3 | Status: SHIPPED | OUTPATIENT
Start: 2024-10-24

## 2024-10-25 DIAGNOSIS — N18.6 ESRD (END STAGE RENAL DISEASE) (HCC): ICD-10-CM

## 2024-10-25 RX ORDER — POTASSIUM CHLORIDE 1500 MG/1
20 TABLET, EXTENDED RELEASE ORAL 3 TIMES DAILY
Qty: 90 TABLET | Refills: 3 | Status: SHIPPED | OUTPATIENT
Start: 2024-10-25

## 2024-10-29 ENCOUNTER — TELEPHONE (OUTPATIENT)
Age: 69
End: 2024-10-29

## 2024-10-29 NOTE — TELEPHONE ENCOUNTER
PCP office calling to ask if the patient gets labs done by our office or [facility]. Informed her it looks like we have ordered labs but they are probably ordered at [facility] as well. She will call [facility] for most recent labs  No further action needed

## 2024-11-20 ENCOUNTER — HOSPITAL ENCOUNTER (INPATIENT)
Facility: HOSPITAL | Age: 69
LOS: 21 days | Discharge: NURSING, REHAB OR LONG-TERM CARE | DRG: 177 | End: 2024-12-11
Attending: EMERGENCY MEDICINE | Admitting: FAMILY MEDICINE
Payer: COMMERCIAL

## 2024-11-20 ENCOUNTER — APPOINTMENT (EMERGENCY)
Dept: RADIOLOGY | Facility: HOSPITAL | Age: 69
DRG: 177 | End: 2024-11-20
Payer: COMMERCIAL

## 2024-11-20 DIAGNOSIS — A09 DIARRHEA OF INFECTIOUS ORIGIN: ICD-10-CM

## 2024-11-20 DIAGNOSIS — J96.91 RESPIRATORY FAILURE WITH HYPOXIA AND HYPERCAPNIA (HCC): Primary | ICD-10-CM

## 2024-11-20 DIAGNOSIS — I50.33 ACUTE ON CHRONIC DIASTOLIC HF (HEART FAILURE) (HCC): ICD-10-CM

## 2024-11-20 DIAGNOSIS — U07.1 COVID-19 VIRUS INFECTION: ICD-10-CM

## 2024-11-20 DIAGNOSIS — I50.9 CHF (CONGESTIVE HEART FAILURE) (HCC): ICD-10-CM

## 2024-11-20 DIAGNOSIS — R06.00 DYSPNEA: ICD-10-CM

## 2024-11-20 DIAGNOSIS — I95.3 HEMODIALYSIS-ASSOCIATED HYPOTENSION: ICD-10-CM

## 2024-11-20 DIAGNOSIS — E44.0 MODERATE PROTEIN-CALORIE MALNUTRITION (HCC): ICD-10-CM

## 2024-11-20 DIAGNOSIS — N18.6 ESRD (END STAGE RENAL DISEASE) (HCC): ICD-10-CM

## 2024-11-20 DIAGNOSIS — N18.6 ESRD ON DIALYSIS (HCC): ICD-10-CM

## 2024-11-20 DIAGNOSIS — J96.92 RESPIRATORY FAILURE WITH HYPOXIA AND HYPERCAPNIA (HCC): Primary | ICD-10-CM

## 2024-11-20 DIAGNOSIS — D69.6 THROMBOCYTOPENIA (HCC): ICD-10-CM

## 2024-11-20 DIAGNOSIS — F32.A DEPRESSION, UNSPECIFIED DEPRESSION TYPE: ICD-10-CM

## 2024-11-20 DIAGNOSIS — I48.0 PAROXYSMAL ATRIAL FIBRILLATION (HCC): ICD-10-CM

## 2024-11-20 DIAGNOSIS — Z71.89 GOALS OF CARE, COUNSELING/DISCUSSION: ICD-10-CM

## 2024-11-20 DIAGNOSIS — R19.7 DIARRHEA: ICD-10-CM

## 2024-11-20 DIAGNOSIS — R26.2 AMBULATORY DYSFUNCTION: ICD-10-CM

## 2024-11-20 DIAGNOSIS — Z99.2 ESRD ON DIALYSIS (HCC): ICD-10-CM

## 2024-11-20 DIAGNOSIS — R07.2 PRECORDIAL PAIN: ICD-10-CM

## 2024-11-20 DIAGNOSIS — Z99.2 ESRD (END STAGE RENAL DISEASE) ON DIALYSIS (HCC): ICD-10-CM

## 2024-11-20 DIAGNOSIS — R79.89 ELEVATED TROPONIN: ICD-10-CM

## 2024-11-20 DIAGNOSIS — R09.02 HYPOXIA: ICD-10-CM

## 2024-11-20 DIAGNOSIS — R52 PAIN: ICD-10-CM

## 2024-11-20 DIAGNOSIS — N18.6 ESRD (END STAGE RENAL DISEASE) ON DIALYSIS (HCC): ICD-10-CM

## 2024-11-20 PROBLEM — Z87.39 PERSONAL HISTORY OF GOUT: Status: ACTIVE | Noted: 2024-11-20

## 2024-11-20 LAB
ALBUMIN SERPL BCG-MCNC: 2.4 G/DL (ref 3.5–5)
ALP SERPL-CCNC: 68 U/L (ref 34–104)
ALT SERPL W P-5'-P-CCNC: 6 U/L (ref 7–52)
ANION GAP SERPL CALCULATED.3IONS-SCNC: 5 MMOL/L (ref 4–13)
APTT PPP: 65 SECONDS (ref 23–34)
AST SERPL W P-5'-P-CCNC: 18 U/L (ref 13–39)
BASE EX.OXY STD BLDV CALC-SCNC: 52.7 % (ref 60–80)
BASE EXCESS BLDV CALC-SCNC: -0.4 MMOL/L
BASOPHILS # BLD AUTO: 0 THOUSANDS/ÂΜL (ref 0–0.1)
BASOPHILS NFR BLD AUTO: 0 % (ref 0–1)
BILIRUB SERPL-MCNC: 0.3 MG/DL (ref 0.2–1)
BNP SERPL-MCNC: 301 PG/ML (ref 0–100)
BUN SERPL-MCNC: 16 MG/DL (ref 5–25)
CALCIUM ALBUM COR SERPL-MCNC: 10.2 MG/DL (ref 8.3–10.1)
CALCIUM SERPL-MCNC: 8.9 MG/DL (ref 8.4–10.2)
CARDIAC TROPONIN I PNL SERPL HS: 70 NG/L (ref ?–50)
CHLORIDE SERPL-SCNC: 99 MMOL/L (ref 96–108)
CO2 SERPL-SCNC: 28 MMOL/L (ref 21–32)
CREAT SERPL-MCNC: 2.9 MG/DL (ref 0.6–1.3)
CRP SERPL QL: 165.9 MG/L
EOSINOPHIL # BLD AUTO: 0 THOUSAND/ÂΜL (ref 0–0.61)
EOSINOPHIL NFR BLD AUTO: 0 % (ref 0–6)
ERYTHROCYTE [DISTWIDTH] IN BLOOD BY AUTOMATED COUNT: 14.6 % (ref 11.6–15.1)
ERYTHROCYTE [SEDIMENTATION RATE] IN BLOOD: 35 MM/HOUR (ref 0–19)
FLUAV AG UPPER RESP QL IA.RAPID: NEGATIVE
FLUBV AG UPPER RESP QL IA.RAPID: NEGATIVE
GFR SERPL CREATININE-BSD FRML MDRD: 20 ML/MIN/1.73SQ M
GLUCOSE SERPL-MCNC: 86 MG/DL (ref 65–140)
HCO3 BLDV-SCNC: 27.9 MMOL/L (ref 24–30)
HCT VFR BLD AUTO: 31.7 % (ref 36.5–49.3)
HGB BLD-MCNC: 9.8 G/DL (ref 12–17)
IMM GRANULOCYTES # BLD AUTO: 0.04 THOUSAND/UL (ref 0–0.2)
IMM GRANULOCYTES NFR BLD AUTO: 1 % (ref 0–2)
INR PPP: 2.65 (ref 0.85–1.19)
LACTATE SERPL-SCNC: 0.8 MMOL/L (ref 0.5–2)
LG PLATELETS BLD QL SMEAR: PRESENT
LYMPHOCYTES # BLD AUTO: 0.55 THOUSANDS/ÂΜL (ref 0.6–4.47)
LYMPHOCYTES NFR BLD AUTO: 8 % (ref 14–44)
MAGNESIUM SERPL-MCNC: 1.6 MG/DL (ref 1.9–2.7)
MCH RBC QN AUTO: 32.8 PG (ref 26.8–34.3)
MCHC RBC AUTO-ENTMCNC: 30.9 G/DL (ref 31.4–37.4)
MCV RBC AUTO: 106 FL (ref 82–98)
MONOCYTES # BLD AUTO: 0.23 THOUSAND/ÂΜL (ref 0.17–1.22)
MONOCYTES NFR BLD AUTO: 3 % (ref 4–12)
NEUTROPHILS # BLD AUTO: 6.29 THOUSANDS/ÂΜL (ref 1.85–7.62)
NEUTS SEG NFR BLD AUTO: 88 % (ref 43–75)
NRBC BLD AUTO-RTO: 0 /100 WBCS
O2 CT BLDV-SCNC: 8.3 ML/DL
PCO2 BLDV: 65.6 MM HG (ref 42–50)
PH BLDV: 7.25 [PH] (ref 7.3–7.4)
PHOSPHATE SERPL-MCNC: 3.7 MG/DL (ref 2.3–4.1)
PLATELET # BLD AUTO: 91 THOUSANDS/UL (ref 149–390)
PLATELET BLD QL SMEAR: ABNORMAL
PMV BLD AUTO: 10 FL (ref 8.9–12.7)
PO2 BLDV: 28.4 MM HG (ref 35–45)
POTASSIUM SERPL-SCNC: 3.2 MMOL/L (ref 3.5–5.3)
PROCALCITONIN SERPL-MCNC: 0.84 NG/ML
PROT SERPL-MCNC: 5.4 G/DL (ref 6.4–8.4)
PROTHROMBIN TIME: 28.3 SECONDS (ref 12.3–15)
RBC # BLD AUTO: 2.99 MILLION/UL (ref 3.88–5.62)
SARS-COV+SARS-COV-2 AG RESP QL IA.RAPID: POSITIVE
SODIUM SERPL-SCNC: 132 MMOL/L (ref 135–147)
WBC # BLD AUTO: 7.11 THOUSAND/UL (ref 4.31–10.16)

## 2024-11-20 PROCEDURE — 99285 EMERGENCY DEPT VISIT HI MDM: CPT | Performed by: EMERGENCY MEDICINE

## 2024-11-20 PROCEDURE — 99285 EMERGENCY DEPT VISIT HI MDM: CPT

## 2024-11-20 PROCEDURE — 83735 ASSAY OF MAGNESIUM: CPT | Performed by: EMERGENCY MEDICINE

## 2024-11-20 PROCEDURE — 87804 INFLUENZA ASSAY W/OPTIC: CPT | Performed by: EMERGENCY MEDICINE

## 2024-11-20 PROCEDURE — 85025 COMPLETE CBC W/AUTO DIFF WBC: CPT | Performed by: EMERGENCY MEDICINE

## 2024-11-20 PROCEDURE — 80053 COMPREHEN METABOLIC PANEL: CPT | Performed by: EMERGENCY MEDICINE

## 2024-11-20 PROCEDURE — 87811 SARS-COV-2 COVID19 W/OPTIC: CPT | Performed by: EMERGENCY MEDICINE

## 2024-11-20 PROCEDURE — 71045 X-RAY EXAM CHEST 1 VIEW: CPT

## 2024-11-20 PROCEDURE — 84484 ASSAY OF TROPONIN QUANT: CPT | Performed by: EMERGENCY MEDICINE

## 2024-11-20 PROCEDURE — 83880 ASSAY OF NATRIURETIC PEPTIDE: CPT | Performed by: EMERGENCY MEDICINE

## 2024-11-20 PROCEDURE — 87040 BLOOD CULTURE FOR BACTERIA: CPT | Performed by: EMERGENCY MEDICINE

## 2024-11-20 PROCEDURE — 85610 PROTHROMBIN TIME: CPT | Performed by: EMERGENCY MEDICINE

## 2024-11-20 PROCEDURE — 96374 THER/PROPH/DIAG INJ IV PUSH: CPT

## 2024-11-20 PROCEDURE — 84100 ASSAY OF PHOSPHORUS: CPT | Performed by: EMERGENCY MEDICINE

## 2024-11-20 PROCEDURE — 85730 THROMBOPLASTIN TIME PARTIAL: CPT | Performed by: EMERGENCY MEDICINE

## 2024-11-20 PROCEDURE — 83605 ASSAY OF LACTIC ACID: CPT | Performed by: EMERGENCY MEDICINE

## 2024-11-20 PROCEDURE — 85652 RBC SED RATE AUTOMATED: CPT | Performed by: EMERGENCY MEDICINE

## 2024-11-20 PROCEDURE — 86140 C-REACTIVE PROTEIN: CPT | Performed by: EMERGENCY MEDICINE

## 2024-11-20 PROCEDURE — 36415 COLL VENOUS BLD VENIPUNCTURE: CPT | Performed by: EMERGENCY MEDICINE

## 2024-11-20 PROCEDURE — 93005 ELECTROCARDIOGRAM TRACING: CPT

## 2024-11-20 PROCEDURE — 84145 PROCALCITONIN (PCT): CPT | Performed by: EMERGENCY MEDICINE

## 2024-11-20 PROCEDURE — 82805 BLOOD GASES W/O2 SATURATION: CPT | Performed by: EMERGENCY MEDICINE

## 2024-11-20 RX ORDER — FUROSEMIDE 10 MG/ML
80 INJECTION INTRAMUSCULAR; INTRAVENOUS ONCE
Status: COMPLETED | OUTPATIENT
Start: 2024-11-20 | End: 2024-11-20

## 2024-11-20 RX ORDER — IPRATROPIUM BROMIDE AND ALBUTEROL SULFATE .5; 3 MG/3ML; MG/3ML
2 SOLUTION RESPIRATORY (INHALATION) ONCE
Status: COMPLETED | OUTPATIENT
Start: 2024-11-20 | End: 2024-11-20

## 2024-11-20 RX ORDER — METHYLPREDNISOLONE SOD SUCC 125 MG
1 VIAL (EA) INJECTION ONCE
Status: COMPLETED | OUTPATIENT
Start: 2024-11-20 | End: 2024-11-20

## 2024-11-20 RX ADMIN — FUROSEMIDE 80 MG: 10 INJECTION, SOLUTION INTRAMUSCULAR; INTRAVENOUS at 22:40

## 2024-11-21 ENCOUNTER — APPOINTMENT (INPATIENT)
Dept: DIALYSIS | Facility: HOSPITAL | Age: 69
DRG: 177 | End: 2024-11-21
Payer: COMMERCIAL

## 2024-11-21 PROBLEM — N18.6 ANEMIA DUE TO CHRONIC KIDNEY DISEASE, ON CHRONIC DIALYSIS (HCC): Status: ACTIVE | Noted: 2024-11-21

## 2024-11-21 PROBLEM — Z99.2 ANEMIA DUE TO CHRONIC KIDNEY DISEASE, ON CHRONIC DIALYSIS (HCC): Status: ACTIVE | Noted: 2024-11-21

## 2024-11-21 PROBLEM — J96.01 ACUTE RESPIRATORY FAILURE WITH HYPOXIA AND HYPERCAPNIA (HCC): Status: ACTIVE | Noted: 2024-11-21

## 2024-11-21 PROBLEM — D63.1 ANEMIA DUE TO CHRONIC KIDNEY DISEASE, ON CHRONIC DIALYSIS (HCC): Status: ACTIVE | Noted: 2024-11-21

## 2024-11-21 PROBLEM — J96.02 ACUTE RESPIRATORY FAILURE WITH HYPOXIA AND HYPERCAPNIA (HCC): Status: ACTIVE | Noted: 2024-11-21

## 2024-11-21 PROBLEM — N18.6 ESRD (END STAGE RENAL DISEASE) ON DIALYSIS (HCC): Status: ACTIVE | Noted: 2024-11-21

## 2024-11-21 PROBLEM — Z99.2 ESRD (END STAGE RENAL DISEASE) ON DIALYSIS (HCC): Status: ACTIVE | Noted: 2024-11-21

## 2024-11-21 LAB
2HR DELTA HS TROPONIN: -7 NG/L
4HR DELTA HS TROPONIN: -11 NG/L
ALBUMIN SERPL BCG-MCNC: 2.4 G/DL (ref 3.5–5)
ALP SERPL-CCNC: 64 U/L (ref 34–104)
ALT SERPL W P-5'-P-CCNC: 6 U/L (ref 7–52)
ANION GAP SERPL CALCULATED.3IONS-SCNC: 8 MMOL/L (ref 4–13)
AST SERPL W P-5'-P-CCNC: 17 U/L (ref 13–39)
ATRIAL RATE: 77 BPM
BASOPHILS # BLD AUTO: 0.01 THOUSANDS/ÂΜL (ref 0–0.1)
BASOPHILS NFR BLD AUTO: 0 % (ref 0–1)
BILIRUB SERPL-MCNC: 0.27 MG/DL (ref 0.2–1)
BUN SERPL-MCNC: 19 MG/DL (ref 5–25)
CALCIUM ALBUM COR SERPL-MCNC: 10.3 MG/DL (ref 8.3–10.1)
CALCIUM SERPL-MCNC: 9 MG/DL (ref 8.4–10.2)
CARDIAC TROPONIN I PNL SERPL HS: 59 NG/L (ref ?–50)
CARDIAC TROPONIN I PNL SERPL HS: 63 NG/L (ref ?–50)
CHLORIDE SERPL-SCNC: 100 MMOL/L (ref 96–108)
CO2 SERPL-SCNC: 25 MMOL/L (ref 21–32)
CREAT SERPL-MCNC: 3.05 MG/DL (ref 0.6–1.3)
EOSINOPHIL # BLD AUTO: 0 THOUSAND/ÂΜL (ref 0–0.61)
EOSINOPHIL NFR BLD AUTO: 0 % (ref 0–6)
ERYTHROCYTE [DISTWIDTH] IN BLOOD BY AUTOMATED COUNT: 14.6 % (ref 11.6–15.1)
GFR SERPL CREATININE-BSD FRML MDRD: 19 ML/MIN/1.73SQ M
GLUCOSE SERPL-MCNC: 104 MG/DL (ref 65–140)
GLUCOSE SERPL-MCNC: 113 MG/DL (ref 65–140)
GLUCOSE SERPL-MCNC: 127 MG/DL (ref 65–140)
GLUCOSE SERPL-MCNC: 135 MG/DL (ref 65–140)
GLUCOSE SERPL-MCNC: 168 MG/DL (ref 65–140)
GLUCOSE SERPL-MCNC: 95 MG/DL (ref 65–140)
HCT VFR BLD AUTO: 32.9 % (ref 36.5–49.3)
HGB BLD-MCNC: 10 G/DL (ref 12–17)
IMM GRANULOCYTES # BLD AUTO: 0.04 THOUSAND/UL (ref 0–0.2)
IMM GRANULOCYTES NFR BLD AUTO: 1 % (ref 0–2)
LYMPHOCYTES # BLD AUTO: 0.2 THOUSANDS/ÂΜL (ref 0.6–4.47)
LYMPHOCYTES NFR BLD AUTO: 3 % (ref 14–44)
MCH RBC QN AUTO: 32.2 PG (ref 26.8–34.3)
MCHC RBC AUTO-ENTMCNC: 30.4 G/DL (ref 31.4–37.4)
MCV RBC AUTO: 106 FL (ref 82–98)
MONOCYTES # BLD AUTO: 0.07 THOUSAND/ÂΜL (ref 0.17–1.22)
MONOCYTES NFR BLD AUTO: 1 % (ref 4–12)
NEUTROPHILS # BLD AUTO: 5.95 THOUSANDS/ÂΜL (ref 1.85–7.62)
NEUTS SEG NFR BLD AUTO: 95 % (ref 43–75)
NRBC BLD AUTO-RTO: 0 /100 WBCS
P AXIS: 15 DEGREES
PLATELET # BLD AUTO: 80 THOUSANDS/UL (ref 149–390)
PMV BLD AUTO: 8.5 FL (ref 8.9–12.7)
POTASSIUM SERPL-SCNC: 3.7 MMOL/L (ref 3.5–5.3)
PR INTERVAL: 168 MS
PROCALCITONIN SERPL-MCNC: 0.87 NG/ML
PROT SERPL-MCNC: 5.3 G/DL (ref 6.4–8.4)
QRS AXIS: -28 DEGREES
QRSD INTERVAL: 156 MS
QT INTERVAL: 394 MS
QTC INTERVAL: 445 MS
RBC # BLD AUTO: 3.11 MILLION/UL (ref 3.88–5.62)
SODIUM SERPL-SCNC: 133 MMOL/L (ref 135–147)
T WAVE AXIS: -7 DEGREES
VENTRICULAR RATE: 77 BPM
WBC # BLD AUTO: 6.27 THOUSAND/UL (ref 4.31–10.16)

## 2024-11-21 PROCEDURE — 87081 CULTURE SCREEN ONLY: CPT | Performed by: NURSE PRACTITIONER

## 2024-11-21 PROCEDURE — 80053 COMPREHEN METABOLIC PANEL: CPT

## 2024-11-21 PROCEDURE — 99223 1ST HOSP IP/OBS HIGH 75: CPT | Performed by: INTERNAL MEDICINE

## 2024-11-21 PROCEDURE — 5A1D70Z PERFORMANCE OF URINARY FILTRATION, INTERMITTENT, LESS THAN 6 HOURS PER DAY: ICD-10-PCS | Performed by: INTERNAL MEDICINE

## 2024-11-21 PROCEDURE — 84145 PROCALCITONIN (PCT): CPT

## 2024-11-21 PROCEDURE — 85025 COMPLETE CBC W/AUTO DIFF WBC: CPT

## 2024-11-21 PROCEDURE — 82948 REAGENT STRIP/BLOOD GLUCOSE: CPT

## 2024-11-21 PROCEDURE — 84484 ASSAY OF TROPONIN QUANT: CPT

## 2024-11-21 PROCEDURE — 99223 1ST HOSP IP/OBS HIGH 75: CPT | Performed by: FAMILY MEDICINE

## 2024-11-21 PROCEDURE — 93010 ELECTROCARDIOGRAM REPORT: CPT | Performed by: INTERNAL MEDICINE

## 2024-11-21 RX ORDER — SIMETHICONE 80 MG
80 TABLET,CHEWABLE ORAL 4 TIMES DAILY PRN
Status: DISCONTINUED | OUTPATIENT
Start: 2024-11-21 | End: 2024-12-11 | Stop reason: HOSPADM

## 2024-11-21 RX ORDER — INSULIN LISPRO 100 [IU]/ML
1-5 INJECTION, SOLUTION INTRAVENOUS; SUBCUTANEOUS
Status: DISCONTINUED | OUTPATIENT
Start: 2024-11-21 | End: 2024-12-01

## 2024-11-21 RX ORDER — ONDANSETRON 2 MG/ML
4 INJECTION INTRAMUSCULAR; INTRAVENOUS EVERY 6 HOURS PRN
Status: DISCONTINUED | OUTPATIENT
Start: 2024-11-21 | End: 2024-12-11 | Stop reason: HOSPADM

## 2024-11-21 RX ORDER — TORSEMIDE 100 MG/1
100 TABLET ORAL DAILY
COMMUNITY
End: 2024-12-11

## 2024-11-21 RX ORDER — ARGININE 500 MG
1000 TABLET ORAL DAILY
COMMUNITY
End: 2024-12-11

## 2024-11-21 RX ORDER — MIDODRINE HYDROCHLORIDE 5 MG/1
15 TABLET ORAL
Status: DISCONTINUED | OUTPATIENT
Start: 2024-11-21 | End: 2024-12-11 | Stop reason: HOSPADM

## 2024-11-21 RX ORDER — FUROSEMIDE 10 MG/ML
40 INJECTION INTRAMUSCULAR; INTRAVENOUS 2 TIMES DAILY
Status: DISCONTINUED | OUTPATIENT
Start: 2024-11-21 | End: 2024-11-21

## 2024-11-21 RX ORDER — LEVOTHYROXINE SODIUM 125 UG/1
125 TABLET ORAL
Status: DISCONTINUED | OUTPATIENT
Start: 2024-11-21 | End: 2024-12-11 | Stop reason: HOSPADM

## 2024-11-21 RX ORDER — HYDROCODONE BITARTRATE AND ACETAMINOPHEN 5; 325 MG/1; MG/1
2 TABLET ORAL EVERY 6 HOURS PRN
Status: DISCONTINUED | OUTPATIENT
Start: 2024-11-21 | End: 2024-12-11 | Stop reason: HOSPADM

## 2024-11-21 RX ORDER — TAMSULOSIN HYDROCHLORIDE 0.4 MG/1
0.4 CAPSULE ORAL
COMMUNITY
End: 2024-12-11

## 2024-11-21 RX ORDER — TORSEMIDE 20 MG/1
60 TABLET ORAL 2 TIMES DAILY
Status: DISCONTINUED | OUTPATIENT
Start: 2024-11-21 | End: 2024-11-25

## 2024-11-21 RX ORDER — ACETAMINOPHEN 325 MG/1
650 TABLET ORAL EVERY 4 HOURS PRN
Status: DISCONTINUED | OUTPATIENT
Start: 2024-11-21 | End: 2024-12-11 | Stop reason: HOSPADM

## 2024-11-21 RX ORDER — ATORVASTATIN CALCIUM 40 MG/1
40 TABLET, FILM COATED ORAL
Status: DISCONTINUED | OUTPATIENT
Start: 2024-11-21 | End: 2024-12-11 | Stop reason: HOSPADM

## 2024-11-21 RX ORDER — PYRIDOSTIGMINE BROMIDE 60 MG/1
60 TABLET ORAL 3 TIMES DAILY
Status: DISCONTINUED | OUTPATIENT
Start: 2024-11-21 | End: 2024-12-11 | Stop reason: HOSPADM

## 2024-11-21 RX ORDER — PANTOPRAZOLE SODIUM 40 MG/1
40 TABLET, DELAYED RELEASE ORAL DAILY
COMMUNITY

## 2024-11-21 RX ORDER — INSULIN LISPRO 100 [IU]/ML
1-6 INJECTION, SOLUTION INTRAVENOUS; SUBCUTANEOUS
Status: DISCONTINUED | OUTPATIENT
Start: 2024-11-21 | End: 2024-12-11 | Stop reason: HOSPADM

## 2024-11-21 RX ORDER — CINACALCET 30 MG/1
30 TABLET, FILM COATED ORAL 3 TIMES WEEKLY
Status: DISCONTINUED | OUTPATIENT
Start: 2024-11-22 | End: 2024-11-21

## 2024-11-21 RX ORDER — SEVELAMER HYDROCHLORIDE 800 MG/1
800 TABLET, FILM COATED ORAL
Status: DISCONTINUED | OUTPATIENT
Start: 2024-11-21 | End: 2024-12-01

## 2024-11-21 RX ORDER — POTASSIUM CHLORIDE 1500 MG/1
20 TABLET, EXTENDED RELEASE ORAL DAILY
Status: DISCONTINUED | OUTPATIENT
Start: 2024-11-21 | End: 2024-11-26

## 2024-11-21 RX ORDER — DOCUSATE SODIUM 100 MG/1
100 CAPSULE, LIQUID FILLED ORAL 2 TIMES DAILY PRN
Status: DISCONTINUED | OUTPATIENT
Start: 2024-11-21 | End: 2024-12-05

## 2024-11-21 RX ORDER — SEVELAMER HYDROCHLORIDE 800 MG/1
800 TABLET, FILM COATED ORAL
COMMUNITY
End: 2024-12-11

## 2024-11-21 RX ORDER — DEXAMETHASONE 6 MG/1
6 TABLET ORAL 2 TIMES DAILY WITH MEALS
COMMUNITY
End: 2024-12-11

## 2024-11-21 RX ORDER — POTASSIUM CHLORIDE 1500 MG/1
20 TABLET, EXTENDED RELEASE ORAL 3 TIMES DAILY
Status: DISCONTINUED | OUTPATIENT
Start: 2024-11-21 | End: 2024-11-21

## 2024-11-21 RX ORDER — WARFARIN SODIUM 1 MG/1
1 TABLET ORAL
Status: DISCONTINUED | OUTPATIENT
Start: 2024-11-21 | End: 2024-11-30

## 2024-11-21 RX ORDER — METOPROLOL TARTRATE 25 MG/1
25 TABLET, FILM COATED ORAL EVERY 12 HOURS SCHEDULED
Status: CANCELLED | OUTPATIENT
Start: 2024-11-21

## 2024-11-21 RX ORDER — COLCHICINE 0.6 MG/1
0.6 TABLET ORAL DAILY
COMMUNITY
End: 2024-12-11

## 2024-11-21 RX ORDER — NICOTINE 21 MG/24HR
1 PATCH, TRANSDERMAL 24 HOURS TRANSDERMAL DAILY
Status: DISCONTINUED | OUTPATIENT
Start: 2024-11-21 | End: 2024-11-21

## 2024-11-21 RX ORDER — METOPROLOL TARTRATE 25 MG/1
25 TABLET, FILM COATED ORAL EVERY 12 HOURS SCHEDULED
Status: DISCONTINUED | OUTPATIENT
Start: 2024-11-21 | End: 2024-12-11 | Stop reason: HOSPADM

## 2024-11-21 RX ORDER — DEXAMETHASONE SODIUM PHOSPHATE 10 MG/ML
6 INJECTION, SOLUTION INTRAMUSCULAR; INTRAVENOUS EVERY 24 HOURS
Status: COMPLETED | OUTPATIENT
Start: 2024-11-21 | End: 2024-11-30

## 2024-11-21 RX ORDER — FINASTERIDE 5 MG/1
5 TABLET, FILM COATED ORAL DAILY
Status: DISCONTINUED | OUTPATIENT
Start: 2024-11-21 | End: 2024-12-06

## 2024-11-21 RX ORDER — ALBUTEROL SULFATE 90 UG/1
1 INHALANT RESPIRATORY (INHALATION) DAILY
COMMUNITY

## 2024-11-21 RX ORDER — CINACALCET 30 MG/1
60 TABLET, FILM COATED ORAL 3 TIMES WEEKLY
Status: DISCONTINUED | OUTPATIENT
Start: 2024-11-22 | End: 2024-12-11 | Stop reason: HOSPADM

## 2024-11-21 RX ORDER — INSULIN LISPRO 100 [IU]/ML
0-10 INJECTION, SOLUTION INTRAVENOUS; SUBCUTANEOUS
COMMUNITY
End: 2024-12-11

## 2024-11-21 RX ADMIN — HYDROCODONE BITARTRATE AND ACETAMINOPHEN 2 TABLET: 5; 325 TABLET ORAL at 00:44

## 2024-11-21 RX ADMIN — TORSEMIDE 60 MG: 20 TABLET ORAL at 10:02

## 2024-11-21 RX ADMIN — REMDESIVIR 200 MG: 100 INJECTION, POWDER, LYOPHILIZED, FOR SOLUTION INTRAVENOUS at 03:08

## 2024-11-21 RX ADMIN — SEVELAMER HYDROCHLORIDE 800 MG: 800 TABLET, FILM COATED ORAL at 12:16

## 2024-11-21 RX ADMIN — METOPROLOL TARTRATE 25 MG: 25 TABLET, FILM COATED ORAL at 22:06

## 2024-11-21 RX ADMIN — SEVELAMER HYDROCHLORIDE 800 MG: 800 TABLET, FILM COATED ORAL at 17:25

## 2024-11-21 RX ADMIN — DOXYCYCLINE 100 MG: 100 INJECTION, POWDER, LYOPHILIZED, FOR SOLUTION INTRAVENOUS at 22:08

## 2024-11-21 RX ADMIN — HYDROCODONE BITARTRATE AND ACETAMINOPHEN 2 TABLET: 5; 325 TABLET ORAL at 10:33

## 2024-11-21 RX ADMIN — PYRIDOSTIGMINE BROMIDE 60 MG: 60 TABLET ORAL at 10:02

## 2024-11-21 RX ADMIN — FINASTERIDE 5 MG: 5 TABLET, FILM COATED ORAL at 10:02

## 2024-11-21 RX ADMIN — LEVOTHYROXINE SODIUM 125 MCG: 125 TABLET ORAL at 05:33

## 2024-11-21 RX ADMIN — PYRIDOSTIGMINE BROMIDE 60 MG: 60 TABLET ORAL at 22:07

## 2024-11-21 RX ADMIN — EPOETIN ALFA 5000 UNITS: 10000 SOLUTION INTRAVENOUS; SUBCUTANEOUS at 15:05

## 2024-11-21 RX ADMIN — WARFARIN SODIUM 1 MG: 1 TABLET ORAL at 22:06

## 2024-11-21 RX ADMIN — PYRIDOSTIGMINE BROMIDE 60 MG: 60 TABLET ORAL at 17:28

## 2024-11-21 RX ADMIN — DEXAMETHASONE SODIUM PHOSPHATE 6 MG: 10 INJECTION, SOLUTION INTRAMUSCULAR; INTRAVENOUS at 02:14

## 2024-11-21 RX ADMIN — HYDROCODONE BITARTRATE AND ACETAMINOPHEN 2 TABLET: 5; 325 TABLET ORAL at 22:21

## 2024-11-21 RX ADMIN — POTASSIUM CHLORIDE 20 MEQ: 1500 TABLET, EXTENDED RELEASE ORAL at 10:02

## 2024-11-21 RX ADMIN — MIDODRINE HYDROCHLORIDE 15 MG: 5 TABLET ORAL at 16:16

## 2024-11-21 RX ADMIN — MIDODRINE HYDROCHLORIDE 15 MG: 5 TABLET ORAL at 12:16

## 2024-11-21 RX ADMIN — POTASSIUM CHLORIDE 20 MEQ: 1500 TABLET, EXTENDED RELEASE ORAL at 00:44

## 2024-11-21 RX ADMIN — ATORVASTATIN CALCIUM 40 MG: 40 TABLET, FILM COATED ORAL at 17:25

## 2024-11-21 RX ADMIN — INSULIN LISPRO 1 UNITS: 100 INJECTION, SOLUTION INTRAVENOUS; SUBCUTANEOUS at 12:17

## 2024-11-21 RX ADMIN — MIDODRINE HYDROCHLORIDE 15 MG: 5 TABLET ORAL at 06:40

## 2024-11-21 NOTE — H&P
"H&P - Hospitalist   Name: Masoud Perry 69 y.o. male I MRN: [id number]  Unit/Bed#: -[facility] I Date of Admission: 11/20/2024   Date of Service: 11/21/2024 I Hospital Day: 1     Assessment & Plan  Acute respiratory failure with hypoxia and hypercapnia (HCC)  Presented 11/20 with SOB and COVID+ x 3 days.   Patient recently discharged 11/13 from [facility] following IV ABX for colitis  Given duo nebs and solu-medrol by EMS without improvement  VBG showed pCO2 65.6, pO2 28.4  On 2L NC since COVID dx. Increased to 4L today, O2 96%.  Respiratory protocol  Acute on chronic diastolic HF (heart failure) ([facility])  Wt Readings from Last 3 Encounters:   11/20/24 100 kg (220 lb 7.4 oz)   10/14/24 115 kg (253 lb)   10/07/24 118 kg (260 lb)   CXR on 11/20 appeared to be pulmonary vascular congestion     Given Lasix 80 mg in ED  Continue Lasix  Cardiology consult appreciated.  COVID-19  Presents from [place]. Tested positive 3 days ago when symptoms started  Started decadron and remdesevir  ESRD on peritoneal dialysis (HCC)  Continue Peritoneal Dialysis tomorrow (11/21)  Continue Cinacalcet 30 mg Mon-Wed-Fri  Magnesium 1.6 on arrival. Repleted. Monitor.  Monitor BMP  Nephrology consult appreciated.   Secondary hyperparathyroidism of renal origin (HCC)  Continue Cinacalcet on Mondays, Wednesdays, and Fridays  Hemodialysis-associated hypotension  Continue midodrine and pyridostigmine  HLD (hyperlipidemia)  Continue atorvastatin  Acquired hypothyroidism  Continue levothyroxine  Factor 5 Leiden mutation, heterozygous (HCC)  On Coumadin  Type 2 diabetes mellitus with stage 4 chronic kidney disease, with long-term current use of insulin (HCC)  Lab Results   Component Value Date    HGBA1C 5.0 11/02/2024       No results for input(s): \"POCGLU\" in the last 72 hours.    Blood Sugar Average: Last 72 hrs:    Sliding scale insulin. Hypoglycemia protocol.  Personal history of gout  Continue allopurinol  Elevated troponin  Trending      VTE " Pharmacologic Prophylaxis: VTE Score: 6 High Risk (Score >/= 5) - Pharmacological DVT Prophylaxis Ordered: warfarin (Coumadin). Sequential Compression Devices Ordered.  Code Status: Level 3 - DNAR and DNI   Discussion with family: Patient declined call to .     Anticipated Length of Stay: Patient will be admitted on an inpatient basis with an anticipated length of stay of greater than 2 midnights secondary to respiratory failure/chf.    History of Present Illness   Chief Complaint: ZHANNA Perry is a 69 y.o. male with a PMH of CHF, AFib on coumadin, Factor V Leiden, HTN, CKD on dialysis, lymphedema, history of CVA, T2DM who presents with SOB. He presents from [place] stating he tested positive for COVID-19 three days ago and has been SOB since. He was started on 2L O2 but his SOB has only worsened since. He was increased to 4L today. He was given duo nebs and solu-medrol by EMS and admits only mild improvement. He admits anorexia, rigors, rhinorrhea, pyrexia, cough with sputum production, nausea, diarrhea, myalgias, and malaise. He denies fever, CP.     Review of Systems   Constitutional:  Positive for appetite change and chills. Negative for fever.   HENT:  Positive for rhinorrhea and sore throat. Negative for ear pain.    Respiratory:  Positive for cough and shortness of breath.    Cardiovascular:  Negative for chest pain and palpitations.   Gastrointestinal:  Positive for diarrhea and nausea. Negative for constipation.   Endocrine: Negative for cold intolerance and heat intolerance.   Genitourinary:  Negative for difficulty urinating and dysuria.   Musculoskeletal:  Positive for myalgias.   Neurological:  Negative for syncope and headaches.   Psychiatric/Behavioral:  Negative for agitation and confusion.        Historical Information   Past Medical History:   Diagnosis Date    Anemia in chronic kidney disease     Anticoagulated on Coumadin     Atrial fibrillation (HCC)     BPH (benign  prostatic hyperplasia)     CAD (coronary artery disease)     CKD (chronic kidney disease), stage V (HCC)     Compartment syndrome of forearm (HCC)     Right, 2019    COPD (chronic obstructive pulmonary disease) (HCC)     CVA (cerebral vascular accident) (HCC)     Diastolic CHF (HCC)     grade 1 DD    DVT (deep venous thrombosis) (HCC)     Factor V deficiency (HCC)     Gout     Hyperlipidemia     Hypertension     Hypothyroidism     MI (myocardial infarction) (HCC)     x3 - 1999, 2010, after 2015    Morbid obesity (HCC)     Nephrolithiasis     Noncompliance with medications     SHAD (obstructive sleep apnea)     Peritoneal dialysis catheter in place (HCC)     Pulmonary embolism (HCC)     Secondary hyperparathyroidism of renal origin (HCC)     Spinal stenosis of lumbar region     Status post placement of implantable loop recorder     Tobacco dependence      Past Surgical History:   Procedure Laterality Date    CARDIAC ELECTROPHYSIOLOGY STUDY AND ABLATION  04/29/2024    CHOLECYSTECTOMY      CORONARY ANGIOPLASTY WITH STENT PLACEMENT      JOINT REPLACEMENT      bilateral knee    KY CYSTO/URETERO W/LITHOTRIPSY &INDWELL STENT INSRT Left 09/24/2022    Procedure: CYSTOSCOPY URETEROSCOPY WITH LITHOTRIPSY HOLMIUM LASER, AND INSERTION STENT URETERAL;  Surgeon: Lewis Dahl MD;  Location:  MAIN OR;  Service: Urology    KY LAPS INSERTION TUNNELED INTRAPERITONEAL CATHETER N/A 6/7/2024    Procedure: INSERTION PERITONEAL CATHETER DIALYSIS LAPAROSCOPIC;  Surgeon: Hiram Park DO;  Location: [facility] MAIN OR;  Service: General    US GUIDED KIDNEY BIOPSY  08/05/2020     Social History     Tobacco Use    Smoking status: Every Day     Current packs/day: 0.50     Types: Cigarettes    Smokeless tobacco: Never   Vaping Use    Vaping status: Never Used   Substance and Sexual Activity    Alcohol use: Not Currently    Drug use: Never    Sexual activity: Not on file     E-Cigarette/Vaping    E-Cigarette Use Never User      E-Cigarette/Vaping  Substances     Family History   Problem Relation Age of Onset    Kidney failure Mother     Cirrhosis Mother     Colon cancer Brother      Social History:  Marital Status:    Occupation: retired  Patient Pre-hospital Living Situation: Assisted Living  Patient Pre-hospital Level of Mobility: walks  Patient Pre-hospital Diet Restrictions: low salt, low carb    Meds/Allergies   I have reviewed home medications with patient personally.  Prior to Admission medications    Medication Sig Start Date End Date Taking? Authorizing Provider   acetaminophen (TYLENOL) 325 mg tablet Take 2 tablets (650 mg total) by mouth every 6 (six) hours as needed for mild pain, headaches or fever 9/23/24   Nora Mathew MD   allopurinol (ZYLOPRIM) 300 mg tablet Take 300 mg by mouth daily    Historical Provider, MD   atorvastatin (LIPITOR) 40 mg tablet Take 1 tablet (40 mg total) by mouth daily with dinner 1/15/22   Sheila Bronson PA-C   cinacalcet (SENSIPAR) 30 mg tablet Take 1 tablet (30 mg total) by mouth 3 (three) times a week 10/23/24   Marquise Santos DO   docusate sodium (COLACE) 100 mg capsule Take 1 capsule (100 mg total) by mouth 2 (two) times a day as needed for constipation 9/30/24   GAGANDEEP Haynes   finasteride (PROSCAR) 5 mg tablet Take 1 tablet (5 mg total) by mouth daily 10/24/24   Marquise Santos DO   HYDROcodone-acetaminophen (Norco) 5-325 mg per tablet Take 2 tablets by mouth every 6 (six) hours as needed for pain Max Daily Amount: 8 tablets 10/8/24   GAGANDEEP Arteaga   insulin glargine (LANTUS) 100 units/mL subcutaneous injection Inject 8 Units under the skin daily at bedtime 10/17/24   GAGANDEEP Arteaga   levothyroxine 125 mcg tablet Take 125 mcg by mouth daily    Historical Provider, MD   Magnesium 400 MG TABS Take 400 mg by mouth in the morning 2/6/24   Historical Provider, MD   metoprolol tartrate (LOPRESSOR) 25 mg tablet Take 25 mg by mouth every 12 (twelve) hours     Historical Provider, MD   midodrine (PROAMATINE) 5 mg tablet Take 3 tablets (15 mg total) by mouth 3 (three) times a day before meals 8/15/24   GAGANDEEP Browne   potassium chloride (Klor-Con M20) 20 mEq tablet Take 1 tablet (20 mEq total) by mouth 3 (three) times a day 10/25/24   Marquise Santos DO   pyridostigmine (Mestinon) 60 mg tablet Take 1 tablet (60 mg total) by mouth 3 (three) times a day 10/24/24   Marquise Santos DO   simethicone (MYLICON) 80 mg chewable tablet Chew 1 tablet (80 mg total) 4 (four) times a day as needed for flatulence 8/5/24   Moses Noel MD   warfarin (COUMADIN) 1 mg tablet Take 1 tablet (1 mg total) by mouth daily at bedtime 9/30/24   GAGANDEEP Haynes     Allergies   Allergen Reactions    Carvedilol Shortness Of Breath    Saccharin - Food Allergy Diarrhea     GI intolerance, adamant does not want to receive    Sucralose - Food Allergy Diarrhea     GI intolerance, adamant does not want to receive    Amiodarone Dizziness    Aspartame - Food Allergy Diarrhea    Bumetanide GI Intolerance and Lightheadedness           Cephalexin Other (See Comments)     unknown    Ciprofloxacin Rash    Hydralazine Tachycardia    Lisinopril GI Intolerance           Metolazone Other (See Comments)     Metallic taste    Morphine Swelling     Of injection site    Nifedipine Lightheadedness    Strawberry Extract - Food Allergy Rash       Objective :  Temp:  [97.8 °F (36.6 °C)] 97.8 °F (36.6 °C)  HR:  [71-76] 74  BP: (102-122)/(55-60) 116/59  Resp:  [16-22] 16  SpO2:  [96 %-100 %] 97 %  O2 Device: Nasal cannula  Nasal Cannula O2 Flow Rate (L/min):  [3 L/min-4 L/min] 4 L/min    Physical Exam  Vitals and nursing note reviewed.   Constitutional:       General: He is not in acute distress.     Appearance: He is well-developed. He is ill-appearing.   HENT:      Head: Normocephalic and atraumatic.   Eyes:      Extraocular Movements: Extraocular movements intact.      Conjunctiva/sclera: Conjunctivae  normal.   Cardiovascular:      Rate and Rhythm: Normal rate and regular rhythm.   Pulmonary:      Effort: Pulmonary effort is normal. No respiratory distress.      Breath sounds: Normal breath sounds.   Abdominal:      Palpations: Abdomen is soft.      Tenderness: There is no abdominal tenderness.   Musculoskeletal:         General: No deformity or signs of injury.   Skin:     General: Skin is warm and dry.      Coloration: Skin is pale.   Neurological:      Mental Status: He is alert and oriented to person, place, and time.   Psychiatric:         Mood and Affect: Mood normal.          Lines/Drains:            Lab Results: I have reviewed the following results:  Results from last 7 days   Lab Units 11/20/24 2133   WBC Thousand/uL 7.11   HEMOGLOBIN g/dL 9.8*   HEMATOCRIT % 31.7*   PLATELETS Thousands/uL 91*   SEGS PCT % 88*   LYMPHO PCT % 8*   MONO PCT % 3*   EOS PCT % 0     Results from last 7 days   Lab Units 11/20/24 2148   SODIUM mmol/L 132*   POTASSIUM mmol/L 3.2*   CHLORIDE mmol/L 99   CO2 mmol/L 28   BUN mg/dL 16   CREATININE mg/dL 2.90*   ANION GAP mmol/L 5   CALCIUM mg/dL 8.9   ALBUMIN g/dL 2.4*   TOTAL BILIRUBIN mg/dL 0.30   ALK PHOS U/L 68   ALT U/L 6*   AST U/L 18   GLUCOSE RANDOM mg/dL 86     Results from last 7 days   Lab Units 11/20/24  2133   INR  2.65*         Lab Results   Component Value Date    HGBA1C 5.0 11/02/2024    HGBA1C 5.9 (H) 07/24/2024    HGBA1C 6.0 01/31/2024     Results from last 7 days   Lab Units 11/20/24 2148 11/20/24 2133   LACTIC ACID mmol/L  --  0.8   PROCALCITONIN ng/ml 0.84*  --        Imaging Results Review: I reviewed radiology reports from this admission including: chest xray.  Other Study Results Review: EKG was reviewed.     Administrative Statements   I have spent a total time of 75 minutes in caring for this patient on the day of the visit/encounter including Diagnostic results, Documenting in the medical record, Reviewing / ordering tests, medicine, procedures  , and  Obtaining or reviewing history  .    ** Please Note: This note has been constructed using a voice recognition system. **

## 2024-11-21 NOTE — ASSESSMENT & PLAN NOTE
Wt Readings from Last 3 Encounters:   11/21/24 100 kg (220 lb 10.9 oz)   10/14/24 115 kg (253 lb)   10/07/24 118 kg (260 lb)     History of HFpEF  Echo 7/2024 with LVEF 75% with mild-mod TR  Chest xray shows pulmonary edema and possible pneumonia in the setting of Covid.  BNP minimally elevated.  He does not appear significantly overloaded on exam.  Will defer diuresis to nephrology. Undergoing hemodialysis.  Cardiology will sign off as nephrology is managing volume status.  Feel free to re-consult as needed

## 2024-11-21 NOTE — ASSESSMENT & PLAN NOTE
Now on IHD no longer on PD for the time being TTS at [facility]  Access: Right TDC clean and dry  Will obtain outpatient orders and plan for HD today: Challenge target weight  Patient's potassium has been low will monitor on potassium daily and adjust bath accordingly

## 2024-11-21 NOTE — ASSESSMENT & PLAN NOTE
Chest x-ray by my personal review looks like CHF will challenge target weight  Cardiology consultation  Continue torsemide 100 mg daily

## 2024-11-21 NOTE — PROGRESS NOTES
Patient:    MRN:  [id number]    [place] Request ID:  [id number]    Level of care reserved:  Skilled Nursing Facility    Partner Reserved:  [facility], [place] [id number] [phone]    Clinical needs requested:    Geography searched:  10 miles around [id number]    Start of Service:    Request sent:  3:06pm EST on 11/21/2024 by Loraine Copeland    Partner reserved:  4:18pm EST on 11/21/2024 by Loraine Copeland    Choice list shared:  4:17pm EST on 11/21/2024 by Loraine Copeland

## 2024-11-21 NOTE — ASSESSMENT & PLAN NOTE
Hemoglobin at goal of 10.0  To be started on Mircera outpatient will start with Epogen inpatient until 9  Thrombocytopenia platelet count 80K some degree of recent chronicity will monitor

## 2024-11-21 NOTE — ASSESSMENT & PLAN NOTE
History of paroxysmal atrial fibrillation  Underwent atrial fibrillation ablation by Imaxio DALE in April 2024.  Currently maintaining sinus rhythm.  Recently treated for recurrent a fib when beta blocker was held. Continue metoprolol as BP allows.  Continue warfarin with goal INR 2-3.

## 2024-11-21 NOTE — ASSESSMENT & PLAN NOTE
Wt Readings from Last 3 Encounters:   11/20/24 100 kg (220 lb 7.4 oz)   10/14/24 115 kg (253 lb)   10/07/24 118 kg (260 lb)   CXR on 11/20 appeared to be pulmonary vascular congestion     Given Lasix 80 mg in ED  Continue Lasix  Cardiology consult appreciated.

## 2024-11-21 NOTE — ASSESSMENT & PLAN NOTE
Continue Peritoneal Dialysis tomorrow (11/21)  Continue Cinacalcet 30 mg Mon-Wed-Fri  Magnesium 1.6 on arrival. Repleted. Monitor.  Monitor BMP  Nephrology consult appreciated.

## 2024-11-21 NOTE — CASE MANAGEMENT
Case Management Assessment & Discharge Planning Note    Patient name Masoud VERGARA [facility]  Location /-01 MRN [id number]  : 1955 Date 2024       Current Admission Date: 2024  Current Admission Diagnosis:Acute respiratory failure with hypoxia and hypercapnia (HCC)   Patient Active Problem List    Diagnosis Date Noted Date Diagnosed    Acute respiratory failure with hypoxia and hypercapnia (HCC) 2024     ESRD (end stage renal disease) on dialysis ([facility]) 2024     Anemia due to chronic kidney disease, on chronic dialysis ([facility]) 2024     Personal history of gout 2024     COVID-19 2024     Elevated troponin 2024     Stasis ulcer ([facility]) 10/02/2024     Diabetic ulcer of right great toe ([facility]) 2024     Hematoma 2024     Edema 2024     Medication management 2024     Hyponatremia 2024     Cellulitis 2024     Moderate protein-calorie malnutrition ([facility]) 2024     Ambulatory dysfunction 09/15/2024     Lactic acidosis 2024     Hypercalcemia 2024     Chronic acquired lymphedema 2024     Hemodialysis-associated hypotension 2024     Nocturnal hypoxia 2024     Elevated LFTs 2024     Patient on peritoneal dialysis ([facility]) 2024     Hypervolemia 2024     Bacteremia 2024     Cellulitis of right lower extremity 2024     Paroxysmal atrial fibrillation ([facility]) 2024     Bilateral lower extremity edema 2023     Ureteral stone with hydronephrosis 2022     Cutaneous abscess of buttock 2022     Chronic kidney disease-mineral and bone disorder 2022     Type 2 diabetes mellitus with stage 4 chronic kidney disease, with long-term current use of insulin (HCC) 07/15/2022     Obesity, morbid ([facility]) 07/15/2022     Secondary hyperparathyroidism of renal origin ([facility]) 07/15/2022     Stage 5 chronic kidney disease on peritoneal dialysis ([facility]) 07/15/2022     Factor 5 Leiden  mutation, heterozygous ([facility]) 01/15/2022     Thrombocytopenia ([facility]) 01/14/2022     Angina at rest ([facility]) 01/14/2022     MI (myocardial infarction) ([facility]) 01/14/2022     History of PTCA 01/14/2022     Sleep apnea 01/14/2022     Smoking 01/14/2022     Anemia 01/13/2022     History of CVA (cerebrovascular accident) 01/12/2022     Acute on chronic diastolic HF (heart failure) ([facility]) 01/12/2022     HLD (hyperlipidemia) 01/12/2022     Lymphedema 01/12/2022     Acquired hypothyroidism 01/12/2022     COPD (chronic obstructive pulmonary disease) ([facility]) 12/31/2018     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 ([facility]) 06/29/2016     Gastroesophageal reflux disease 06/29/2016     Gout 06/29/2016     Personal history of pulmonary embolism 06/29/2016     ED (erectile dysfunction) of organic origin 01/14/2014     History of thromboembolism of vein 01/10/2013       LOS (days): 1  Geometric Mean LOS (GMLOS) (days): 3.5  Days to GMLOS:2.8     OBJECTIVE:    Risk of Unplanned Readmission Score: 43.32         Current admission status: Inpatient  Referral Reason: Other (Post Acute Placement (specify))    Preferred Pharmacy:   [facility] [place], [place] - [address]  [address]  [place] [address]  Phone: [phone] Fax: [phone]    [facility] [address] [place] [address]  [address] [place] [address]  Phone: [phone] Fax: [phone]    PATIENT/FAMILY REPORTS NO PREFERRED PHARMACY  No address on file      Primary Care Provider: Jignesh Baker DO    Primary Insurance: [employer]  REP  Secondary Insurance:     ASSESSMENT:  Active Health Care Proxies    There are no active Health Care Proxies on file.       Advance Directives  Does patient have a Health Care POA?: Yes  Does patient have Advance Directives?: Yes  Advance Directives: Power of  for health care, Power of  for finance  Primary Contact: Masoud Perry- son         Julieta Root  Cause  30 Day Readmission: No    Patient Information  Admitted from:: Facility ([facility])  Mental Status: Alert  During Assessment patient was accompanied by: Not accompanied during assessment  Assessment information provided by:: Patient, Other - please comment ([place])  Primary Caregiver: Other (Comment) (Facility staff)  Caregiver's Name:: [facility]  Caregiver's Relationship to Patient:: Facility Staff  Caregiver's Telephone Number:: [phone]  Support Systems: Children, Family members  County [place] Residence: [facility]  What city do you live in?: [place]  Home entry access options. Select all that apply.: No steps to enter home  Type of Current Residence: Facility  Upon entering residence, is there a bedroom on the main floor (no further steps)?: Yes  Upon entering residence, is there a bathroom on the main floor (no further steps)?: Yes  Living Arrangements: Lives in Facility  Is patient a ?: No    Activities of Daily Living Prior to Admission  Functional Status: Total dependent  Completes ADLs independently?: No  Level of ADL dependence: Total Dependent  Ambulates independently?: No (Nicolás lift for all transfers)  Level of ambulatory dependence: Total Dependent  Does patient use assisted devices?: Yes  Assisted Devices (DME) used: Wheelchair  Does patient have a history of Outpatient Therapy (PT/OT)?: No  Does the patient have a history of Short-Term Rehab?: Yes ([facility])  Does patient have a history of HHC?: Yes ([facility])  Does patient currently have HHC?: No         Patient Information Continued  Income Source: Pension/intermediate  Does patient have prescription coverage?: Yes  Does patient receive dialysis treatments?: Yes ([facility] in [place]- Tues- Thurs- Saturday chair time 0920- transport provided by [facility])  Does patient have a history of substance abuse?: No  Does patient have a history of Mental Health Diagnosis?: No         Means of Transportation  Means of  Transport to [facility]:: Other (Comment) (facility transport)      Social Determinants of Health (SDOH)      Flowsheet Row Most Recent Value   Housing Stability    In the last 12 months, was there a time when you were not able to pay the mortgage or rent on time? N   In the past 12 months, how many times have you moved where you were living? 1   At any time in the past 12 months, were you homeless or living in a shelter (including now)? N   Transportation Needs    In the past 12 months, has lack of transportation kept you from medical appointments or from getting medications? no   In the past 12 months, has lack of transportation kept you from meetings, work, or from getting things needed for daily living? No   Food Insecurity    Within the past 12 months, you worried that your food would run out before you got the money to buy more. Never true   Within the past 12 months, the food you bought just didn't last and you didn't have money to get more. Never true   Utilities    In the past 12 months has the electric, gas, oil, or water company threatened to shut off services in your home? No            DISCHARGE DETAILS:    Discharge planning discussed with:: patient  Freedom of Choice: Yes  Comments - Freedom of Choice: [facility] at [place]  CM contacted family/caregiver?: Yes (Masoud Perry- son)             Contacts  Patient Contacts: Masoud Perry- son  Relationship to Patient:: Family  Contact Method: Phone  Phone Number: [phone]  Reason/Outcome: Discharge Planning              Other Referral/Resources/Interventions Provided:  Interventions: Short Term Rehab         CM met with pt to review role of CM and discuss any supports needed upon discharge. Baseline information obtained, pt resides at [facility] since 11/13/24, pt requires total assistance with ADLs, non ambulatory, jorge lift for all transfers. Pt on HD for ESRD at [facility] in [place] , dialysis days are Tues/Thurs/Saturday at 0920, transportation provided by  [facility].   Referral placed in aidin for [place] to return when medically stable.

## 2024-11-21 NOTE — MALNUTRITION/BMI
This medical record reflects one or more clinical indicators suggestive of malnutrition.    Malnutrition Findings:   Adult Malnutrition type: Chronic illness  Adult Degree of Malnutrition: Malnutrition of moderate degree  Malnutrition Characteristics: Inadequate energy, Weight loss                360 Statement: malnutrition of moderate degree in setting of chronic illness, evidenced by 7/11/24 263 lbs to 11/21/24 220.8 lbs (16% wt loss x 4 mo), poor po intake/<75% energy intake compared to estimated needs for > 1 month, treated with diet and supplements    BMI Findings:           Body mass index is 29.93 kg/m².     See Nutrition note dated 11/21/24 for additional details.  Completed nutrition assessment is viewable in the nutrition documentation.

## 2024-11-21 NOTE — PLAN OF CARE
Target UF Goal  2.5  L as tolerated. Patient dialyzing for 3 hours (per COVID results) on 4 K bath for serum K of  3.7  per protocol. Treatment plan reviewed with Nephrology.       Post-Dialysis RN Treatment Note    Blood Pressure:  Pre 98/61 mm/Hg  Post 96/62 mmHg   EDW  98.5 kg    Weight:  Pre 100 kg   Post 98.5 kg   Mode of weight measurement: Bed Scale   Volume Removed  1100 ml    Treatment duration 3 hours   NS given  Yes, 200 cc for hypotension during tx    Treatment shortened? No   Medications given during Rx Epogen 5,000 units IV   Estimated Kt/V  1.16   Access type: Permacath/TDC   Access Issues: No   Needle Gauge: N/A   Report called to primary nurse   Yes Huong RN at bedside    Dr. Hussein ordered to challenge patient.  Started at 2.5L total for a net of 2.0L, which would result in a 0.5L challenge, but BP did not tolerate.  Goal was decreased several times during tx, and NSS given as well for BP support.  Pt ended at EDW       Problem: METABOLIC, FLUID AND ELECTROLYTES - ADULT  Goal: Electrolytes maintained within normal limits  Description: INTERVENTIONS:  - Monitor labs and assess patient for signs and symptoms of electrolyte imbalances  - Administer electrolyte replacement as ordered  - Monitor response to electrolyte replacements, including repeat lab results as appropriate  - Instruct patient on fluid and nutrition as appropriate  Outcome: Progressing  Goal: Fluid balance maintained  Description: INTERVENTIONS:  - Monitor labs   - Monitor I/O and WT  - Instruct patient on fluid and nutrition as appropriate  - Assess for signs & symptoms of volume excess or deficit  Outcome: Progressing

## 2024-11-21 NOTE — QUICK NOTE
Per nurse, patient has some question and concern regarding his medication.    Evaluate the patient in presence of nurse.  Patient is concerned regarding his metoprolol dose to control his A-fib.    Discussed in details risk versus benefits and side effects since patient blood pressure is running soft.  Patient is still remain adamant and asking metoprolol for maintenance dose.    Metoprolol added with parameter.

## 2024-11-21 NOTE — WOUND OSTOMY CARE
Consult Note - Wound   Masoud Perry 69 y.o. male MRN: [id number]  Unit/Bed#: -01 Encounter: [id number]      History and Present Illness:  Presented to ED from SNF. Pt has a complex medical history and has been seen by wound care in the past. PMH Personal history of gout,covid 19,ESRD anemia,tyep 2 DM,lymphedema,obesity,CVA and Diabetic foot ulcer of right toe,stasis ulcer      Assessment Findings:   Seen for initial wound assessment. Pt is receiving HD at  this time, HE is covid positive. Resides at [facility] assist with care. Non ambulatory.   1)Chronic wounds to bilateral lower extremities.  Wounds are dry intact no evidence of lifting. Recently assessed by wound care and currently leaving open to air.Chronic hemosiderin staining. Hyperpigmentation and edema.  Recommend continuing ace wraps during the day.  2)Chronic wound to right  great toe noted 9/24 this area is decreased in size dry intact and open to air  3)Chronic hyperpigmentation to buttocks light purple pink, history of abscess to right buttocks     No induration, fluctuance, odor, warmth/temperature differences, redness, or purulence noted to the above noted wounds and skin areas assessed. New dressings applied per orders listed below. Patient tolerated well- no s/s of non-verbal pain or discomfort observed during the encounter. Bedside nurse aware of plan of care. See flow sheets for more detailed assessment findings.  Wound care will continue to follow, call or Secure Chat Message with questions.     Skin care Plan:  1-Cleanse sacro-buttocks with soap and water. Apply Silicone bordered foam. Darryn with T for treatment. Change every three days and PRN.   2-Turn/reposition q2h or when medically stable for pressure re-distribution on skin .  3-Elevate heels to offload pressure  4-Moisturize skin daily with skin nourishing cream  5-Ehob cushion in chair when out of bed.  6-Hydraguard to bilateral heels BID and PRN.   7-Cleanse bilateral  lower extremities with soap and water, apply skin nourishing lotion. Wrap LE with ace from pedal to below knee in am and remove at night.  8-Place Patient on ATR Turning and repositioning system      Wound 11/21/24 Pretibial Left (Active)   Wound Image   11/21/24 0000   Wound Description Dark edges;Dry 11/21/24 0000   Dressing Open to air 11/21/24 1007       Wound 11/21/24 Sacrum (Active)   Wound Image   11/21/24 0000   Wound Description Beefy red;Fragile 11/21/24 0000   Dressing Moisture barrier 11/21/24 1007       Call or Secure Chat with any questions  Wound Care will continue to follow weekly    Melissa LARRYN RN CWON

## 2024-11-21 NOTE — NURSING NOTE
IV infiltrated, MD aware. multiple attempts for new line unsuccessfully. ICU charge RN contacted for assistance.

## 2024-11-21 NOTE — UTILIZATION REVIEW
"Initial Clinical Review    Admission: Date/Time/Statement:   Admission Orders (From admission, onward)       Ordered        11/20/24 2241  INPATIENT ADMISSION  Once                          Orders Placed This Encounter   Procedures    INPATIENT ADMISSION     Standing Status:   Standing     Number of Occurrences:   1     Level of Care:   Med Surg [16]     Estimated length of stay:   More than 2 Midnights     Certification:   I certify that inpatient services are medically necessary for this patient for a duration of greater than two midnights. See H&P and MD Progress Notes for additional information about the patient's course of treatment.     ED Arrival Information       Expected   -    Arrival   11/20/2024 21:02    Acuity   Urgent              Means of arrival   Ambulance    Escorted by   [facility]    Service   Hospitalist    Admission type   Emergency              Arrival complaint   Covid +             Chief Complaint   Patient presents with    Shortness of Breath     Coming from [place], Pt states having a hard time breathin for 3 days since covid dx. Pt started on 2L NC at that time. Today increased difficulty breathing, pt had to get increased to 4L NC. Pt having decreased PO intake and overall states feeling \"shitty\"       Initial Presentation: 69 y.o. male to ED via EMS from   Present to ED with SOB. He presents from [place] stating he tested positive for COVID-19 three days ago and has been SOB since. He was started on 2L O2 but his SOB has only worsened since. He was increased to 4L today. He was given duo nebs and solu-medrol by EMS and admits only mild improvement. Endorses  anorexia, rigors, rhinorrhea, pyrexia, cough with sputum production, nausea, diarrhea, myalgias, and malaise.   PMHX: CHF, AFib on coumadin, Factor V Leiden, HTN, CKD on dialysis, lymphedema, history of CVA, T2DM   Admitted to [facility] with DX: Acute respiratory failure with hypoxia and hypercapnia   on exam: tachypnea; O2 " @ 4L via nc sat 96%; pain 9/10; ; Cr 2.90; Na 132; Mg 1.6; K 3.2; albumin 2.4; procal 0.84; VBG showed pCO2 65.6, pO2 28.4   CXR  pulmonary vascular congestion   PLAN: monitor labs; continuous pulse ox; fluid restriction; Accuchecks with ssic; cardiology consult; nephrology consult       Anticipated Length of Stay/Certification Statement: Patient will be admitted on an inpatient basis with an anticipated length of stay of greater than 2 midnights secondary to respiratory failure/chf.       Date: 11/21/24      Day 2   looks tired and sick, alert but drowsy. Having wet cough. Dialysis today. Hypotensive; O2 @ 4L via nc sat 95%; pain 10/10  Plan: start decadron iv; start remdesivir iv; monitor labs; continuous pulse ox; fluid restriction; Accuchecks with ssic; cardiology consult ; PT/ OT eval - tx      NEPHROLOGY CONSULT   ESRD (end stage renal disease) on dialysis: Now on IHD no longer on PD for the time being TTS at [facility]. Access: Right TDC clean and dry. Will obtain outpatient orders and plan for HD today: Challenge target weight. Patient's potassium has been low will monitor on potassium daily and adjust bath accordingly  Anemia due to chronic kidney disease, on chronic dialysis: Hemoglobin at goal of 10.0. To be started on Mircera outpatient will start with Epogen inpatient until 9. Thrombocytopenia platelet count 80K some degree of recent chronicity will monitor  Acute on chronic diastolic HF (heart failure): Chest x-ray by my personal review looks like CHF will challenge target weight. Cardiology consultation. Continue torsemide 100 mg daily      CARDIOLOGY CONSULT   Assessment: Paroxysmal atrial fibrillation status post ablation by Celia HUNTER in April 2024.  Remains in normal sinus rhythm.  No evidence of any acute myocardial necrosis. Admitted with COVID-pneumonia with questionable heart failure.  Echocardiogram from July 2024 showed normal ventricular systolic function with mild to moderate  TR.  BNP was borderline elevated.  Patient is not in any significant volume overload on exam.  End-stage renal disease on hemodialysis. Chronic hypotension requiring midodrine support.  Plan: No new cardiac recommendations at the present time.      Date: 11/22/24     Day 3: Has surpassed a 2nd midnight with active treatments and services.        ED Treatment-Medication Administration from 11/20/2024 2018 to 11/20/2024 2335         Date/Time Order Dose Route Action     11/20/2024 2106 methylPREDNISolone sodium succinate (FOR EMS ONLY) (Solu-MEDROL) 125 MG injection 125 mg 0 mg Does not apply Given to EMS     11/20/2024 2106 ipratropium-albuterol (FOR EMS ONLY) (DUO-NEB) 0.5-2.5 mg/3 mL inhalation solution 6 mL 0 mL Does not apply Given to EMS     11/20/2024 2240 furosemide (LASIX) injection 80 mg 80 mg Intravenous Given            Scheduled Medications:  atorvastatin, 40 mg, Oral, Daily With Dinner  [START ON 11/22/2024] cinacalcet, 60 mg, Oral, Once per day on Monday Wednesday Friday  dexamethasone, 6 mg, Intravenous, Q24H  epoetin domingo, 5,000 Units, Intravenous, Once per day on Tuesday Thursday Saturday  finasteride, 5 mg, Oral, Daily  insulin lispro, 1-5 Units, Subcutaneous, HS  insulin lispro, 1-6 Units, Subcutaneous, TID AC  levothyroxine, 125 mcg, Oral, Early Morning  midodrine, 15 mg, Oral, TID AC  nicotine, 7 mg, Transdermal, Daily  potassium chloride, 20 mEq, Oral, Daily  pyridostigmine, 60 mg, Oral, TID  remdesivir, 100 mg, Intravenous, Q24H  sevelamer, 800 mg, Oral, TID With Meals  torsemide, 60 mg, Oral, BID  warfarin, 1 mg, Oral, HS      Continuous IV Infusions: None       PRN Meds:  acetaminophen, 650 mg, Oral, Q4H PRN  docusate sodium, 100 mg, Oral, BID PRN  HYDROcodone-acetaminophen, 2 tablet, Oral, Q6H PRN  (11/21 recd x2)   ondansetron, 4 mg, Intravenous, Q6H PRN  simethicone, 80 mg, Oral, 4x Daily PRN      ED Triage Vitals [11/20/24 2107]   Temperature Pulse Respirations Blood Pressure SpO2 Pain  Score   97.8 °F (36.6 °C) 76 20 122/58 100 % 9     Weight (last 2 days)       Date/Time Weight    11/21/24 0600 100 (220.68)    11/20/24 2358 100 (220.46)    11/20/24 2107 100 (220.46)            Vital Signs (last 3 days)       Date/Time Temp Pulse Resp BP MAP (mmHg) SpO2 Calculated FIO2 (%) - Nasal Cannula Nasal Cannula O2 Flow Rate (L/min) O2 Device Patient Position - Orthostatic VS [place] Coma Scale Score Pain    11/21/24 1114 98.1 °F (36.7 °C) 57 22 94/55 66 99 % 28 2 L/min Nasal cannula Lying -- --    11/21/24 1033 -- -- -- -- -- -- -- -- -- -- -- 9    11/21/24 1007 -- 60 -- 101/58 -- -- 28 2 L/min Nasal cannula -- -- --    11/21/24 0809 -- 58 -- 97/54 75 -- -- -- -- Lying -- --    11/21/24 0729 -- -- -- 81/53 -- -- -- -- -- -- -- --    11/21/24 0728 97.7 °F (36.5 °C) -- 20 83/56 -- -- 36 4 L/min Nasal cannula Lying -- --    11/21/24 0635 -- 64 -- 92/48 -- 95 % 36 4 L/min Nasal cannula Lying -- --    11/21/24 0504 -- 60 17 -- -- 98 % -- -- -- -- -- --    11/21/24 0044 -- -- -- -- -- -- -- -- -- -- -- 10 - Worst Possible Pain    11/21/24 0000 -- -- -- -- -- -- 36 4 L/min Nasal cannula -- 15 --    11/20/24 2349 97.5 °F (36.4 °C) 57 18 133/58 72 95 % 36 4 L/min Nasal cannula Sitting -- 10 - Worst Possible Pain    11/20/24 2300 -- 74 16 116/59 82 97 % -- -- -- -- -- --    11/20/24 2230 -- 71 22 121/60 86 96 % -- -- -- -- -- --    11/20/24 2200 -- 76 21 114/56 81 -- 36 4 L/min -- -- -- --    11/20/24 2158 -- -- -- -- -- -- -- -- Nasal cannula -- -- --    11/20/24 2145 -- 75 18 102/55 75 -- -- -- -- Lying -- --    11/20/24 2130 -- -- -- -- -- -- -- -- -- -- 15 --    11/20/24 2107 97.8 °F (36.6 °C) 76 20 122/58 84 100 % 32 3 L/min Nasal cannula Sitting -- 9              Pertinent Labs/Diagnostic Test Results:   Radiology:  XR chest 1 view portable   ED Interpretation by Bennie Shoemaker DO (11/20 2225)   Moderate pulmonary vascular congestion small bilateral pleural effusions right greater than left      Final  Interpretation by Kenn Ross MD (11/21 0830)      Mild to moderate pulmonary edema with right pleural effusion.      Left lower lobe atelectasis. Pneumonia is not excluded.            Workstation performed: [id number]           Cardiology:  ECG 12 lead   Final Result by Theodore Bone MD (11/21 0736)   Sinus rhythm   Right bundle branch block   Possible Lateral infarct (cited on or before 21-Sep-2024)   Abnormal ECG   When compared with ECG of 21-Sep-2024 07:35,   No significant change since prior tracing      Confirmed by Theodore Bone ([id number]) on 11/21/2024 7:36:12 AM          Results from last 7 days   Lab Units 11/21/24  0502 11/20/24  2133   WBC Thousand/uL 6.27 7.11   HEMOGLOBIN g/dL 10.0* 9.8*   HEMATOCRIT % 32.9* 31.7*   PLATELETS Thousands/uL 80* 91*   TOTAL NEUT ABS Thousands/µL 5.95 6.29        Results from last 7 days   Lab Units 11/21/24  0502 11/20/24  2148   SODIUM mmol/L 133* 132*   POTASSIUM mmol/L 3.7 3.2*   CHLORIDE mmol/L 100 99   CO2 mmol/L 25 28   ANION GAP mmol/L 8 5   BUN mg/dL 19 16   CREATININE mg/dL 3.05* 2.90*   EGFR ml/min/1.73sq m 19 20   CALCIUM mg/dL 9.0 8.9   MAGNESIUM mg/dL  --  1.6*   PHOSPHORUS mg/dL  --  3.7     Results from last 7 days   Lab Units 11/21/24  0502 11/20/24  2148   AST U/L 17 18   ALT U/L 6* 6*   ALK PHOS U/L 64 68   TOTAL PROTEIN g/dL 5.3* 5.4*   ALBUMIN g/dL 2.4* 2.4*   TOTAL BILIRUBIN mg/dL 0.27 0.30     Results from last 7 days   Lab Units 11/21/24  1112 11/21/24  0728 11/21/24  0041   POC GLUCOSE mg/dl 168* 135 95     Results from last 7 days   Lab Units 11/21/24  0502 11/20/24  2148   GLUCOSE RANDOM mg/dL 127 86        Results from last 7 days   Lab Units 11/20/24  2133   PH MERCEDES  7.247*   PCO2 MERCEDES mm Hg 65.6*   PO2 MERCEDES mm Hg 28.4*   HCO3 MERCEDES mmol/L 27.9   BASE EXC MERCEDES mmol/L -0.4   O2 CONTENT MERCEDES ml/dL 8.3   O2 HGB, VENOUS % 52.7*        Results from last 7 days   Lab Units 11/21/24  0220 11/21/24  0024 11/20/24  2133   HS TNI 0HR ng/L  --   --  70*   HS  TNI 2HR ng/L  --  63*  --    HSTNI D2 ng/L  --  -7  --    HS TNI 4HR ng/L 59*  --   --    HSTNI D4 ng/L -11  --   --         Results from last 7 days   Lab Units 11/20/24 2133   PROTIME seconds 28.3*   INR  2.65*   PTT seconds 65*        Results from last 7 days   Lab Units 11/21/24  0502 11/20/24 2148   PROCALCITONIN ng/ml 0.87* 0.84*     Results from last 7 days   Lab Units 11/20/24 2133   LACTIC ACID mmol/L 0.8        Results from last 7 days   Lab Units 11/20/24 2133   BNP pg/mL 301*        Results from last 7 days   Lab Units 11/20/24 2148 11/20/24 2133   CRP mg/L 165.9*  --    SED RATE mm/hour  --  35*      Results from last 7 days   Lab Units 11/20/24 2133   BLOOD CULTURE  Received in Microbiology Lab. Culture in Progress.  Received in Microbiology Lab. Culture in Progress.        Past Medical History:   Diagnosis Date    Anemia in chronic kidney disease     Anticoagulated on Coumadin     Atrial fibrillation ([facility])     BPH (benign prostatic hyperplasia)     CAD (coronary artery disease)     CKD (chronic kidney disease), stage V ([facility])     Compartment syndrome of forearm ([facility])     Right, 2019    COPD (chronic obstructive pulmonary disease) ([facility])     CVA (cerebral vascular accident) ([facility])     Diastolic CHF ([facility])     grade 1 DD    DVT (deep venous thrombosis) ([facility])     Factor V deficiency ([facility])     Gout     Hyperlipidemia     Hypertension     Hypothyroidism     MI (myocardial infarction) ([facility])     x3 - 1999, 2010, after 2015    Morbid obesity ([facility])     Nephrolithiasis     Noncompliance with medications     SHAD (obstructive sleep apnea)     Peritoneal dialysis catheter in place ([facility])     Pulmonary embolism ([facility])     Secondary hyperparathyroidism of renal origin ([facility])     Spinal stenosis of lumbar region     Status post placement of implantable loop recorder     Tobacco dependence      Present on Admission:   Acute on chronic diastolic HF (heart failure) ([facility])   Factor 5 Leiden mutation, heterozygous ([facility])    Secondary hyperparathyroidism of renal origin (HCC)   Acquired hypothyroidism   Hemodialysis-associated hypotension   HLD (hyperlipidemia)   Personal history of gout   COVID-19   Elevated troponin   Acute respiratory failure with hypoxia and hypercapnia (HCC)   Paroxysmal atrial fibrillation (HCC)      Admitting Diagnosis: CHF (congestive heart failure) (HCC) [I50.9]  Dyspnea [R06.00]  SOB (shortness of breath) [R06.02]  Thrombocytopenia (HCC) [D69.6]  Hypoxia [R09.02]  ESRD (end stage renal disease) (HCC) [N18.6]  Elevated troponin [R79.89]  Respiratory failure with hypoxia and hypercapnia (HCC) [J96.91, J96.92]  COVID-19 virus infection [U07.1]  Age/Sex: 69 y.o. male    Network Utilization Review Department  ATTENTION: Please call with any questions or concerns to [phone] and carefully listen to the prompts so that you are directed to the right person. All voicemails are confidential.   For Discharge needs, contact Care Management DC Support Team at [phone] opt. [phone]  Send all requests for admission clinical reviews, approved or denied determinations and any other requests to dedicated fax number below belonging to the campus where the patient is receiving treatment. List of dedicated fax numbers for the Facilities:  FACILITY NAME UR FAX NUMBER   ADMISSION DENIALS (Administrative/Medical Necessity) [phone]   DISCHARGE SUPPORT TEAM (NETWORK) [phone]   PARENT CHILD HEALTH (Maternity/NICU/Pediatrics) [phone]   [facility] [phone]   [facility] [phone]   [facility] [phone]   [facility] [phone]   [facility] [phone]   [facility] [phone]   [facility] [phone]   [facility] [phone]   [facility] [phone]   [facility]  [facility] [phone]   [facility] [phone]   [facility] [phone]

## 2024-11-21 NOTE — CONSULTS
Consultation - Nephrology   Name: Masoud Peryr 69 y.o. male I MRN: [id number]  Unit/Bed#: -01 I Date of Admission: 11/20/2024   Date of Service: 11/21/2024 I Hospital Day: 1   Inpatient consult to Nephrology  Consult performed by: Jignesh Hussein MD  Consult ordered by: Chao Rios PA-C        Physician Requesting Evaluation: Moses Noel MD   Reason for Evaluation / Principal Problem: ESRD on HD    Assessment & Plan  ESRD (end stage renal disease) on dialysis (HCC)  Now on IHD no longer on PD for the time being TTS at [facility]  Access: Right TDC clean and dry  Will obtain outpatient orders and plan for HD today: Challenge target weight  Patient's potassium has been low will monitor on potassium daily and adjust bath accordingly  Acute respiratory failure with hypoxia and hypercapnia (HCC)  Secondary to COVID-19/volume overload please see below will challenge target weight  COVID-19  Please see above being treated for COVID-19 along with volume overload  Acute on chronic diastolic HF (heart failure) (HCC)  Chest x-ray by my personal review looks like CHF will challenge target weight  Cardiology consultation  Continue torsemide 100 mg daily    Hemodialysis-associated hypotension  Currently on midodrine 15 mg 3 times a day will continue  Secondary hyperparathyroidism of renal origin (HCC)  On sevelamer 800 mg 3 times a day for hyperphosphatemia  On Cinacalcet for history of hypercalcemia with secondary hyperparathyroidism of ESRD to continue  Anemia due to chronic kidney disease, on chronic dialysis (HCC)  Hemoglobin at goal of 10.0  To be started on Mircera outpatient will start with Epogen inpatient until 9  Thrombocytopenia platelet count 80K some degree of recent chronicity will monitor      I have reviewed the nephrology recommendations including HD management with ultrafiltration to challenge target weight with CHF, with SLIM, and we are in agreement with renal plan including the information  outlined above.     History of Present Illness   Masoud Perry is a 69 y.o. male with a PMH of ESRD now on IHD/CHF/atrial fibrillation on Coumadin/factor V Leyden deficiency/hypertension/CVA/diabetes mellitus type 2 who was admitted to [facility] after presenting with from skilled facility with a history of COVID-19 3 days prior to admission progressive shortness of breath with a cough requiring higher dose oxygen, DuoNebs and Solu-Medrol and EMS did not improve symptoms much. A renal consultation is requested today for assistance in the management of ESRD now on HD.    Review of Systems  General: Some chills somewhat chronic no temperature as far as the patient is aware  GI: Positive intermittent nausea and vomiting and some loose stools no abdominal pain  : Still urinates no dysuria hematuria  Cardiac: Chronic unchanged intermittent chest pain sometimes with cough, shortness of breath with a cough  Pulmonary: See HPI positive cough clear foamy urine  Neuro: Positive headaches    The rest review of systems is otherwise completely reviewed with the patient are negative    I have reviewed the patient's PMH, PSH, Social History, Family History, Meds, and Allergies  Historical Information   Past Medical History:   Diagnosis Date    Anemia in chronic kidney disease     Anticoagulated on Coumadin     Atrial fibrillation (HCC)     BPH (benign prostatic hyperplasia)     CAD (coronary artery disease)     CKD (chronic kidney disease), stage V (HCC)     Compartment syndrome of forearm (HCC)     Right, 2019    COPD (chronic obstructive pulmonary disease) (HCC)     CVA (cerebral vascular accident) (HCC)     Diastolic CHF (HCC)     grade 1 DD    DVT (deep venous thrombosis) (HCC)     Factor V deficiency (HCC)     Gout     Hyperlipidemia     Hypertension     Hypothyroidism     MI (myocardial infarction) (HCC)     x3 - 1999, 2010, after 2015    Morbid obesity (HCC)     Nephrolithiasis     Noncompliance with medications     SHAD  (obstructive sleep apnea)     Peritoneal dialysis catheter in place (HCC)     Pulmonary embolism (HCC)     Secondary hyperparathyroidism of renal origin (HCC)     Spinal stenosis of lumbar region     Status post placement of implantable loop recorder     Tobacco dependence      Past Surgical History:   Procedure Laterality Date    CARDIAC ELECTROPHYSIOLOGY STUDY AND ABLATION  04/29/2024    CHOLECYSTECTOMY      CORONARY ANGIOPLASTY WITH STENT PLACEMENT      JOINT REPLACEMENT      bilateral knee    CA CYSTO/URETERO W/LITHOTRIPSY &INDWELL STENT INSRT Left 09/24/2022    Procedure: CYSTOSCOPY URETEROSCOPY WITH LITHOTRIPSY HOLMIUM LASER, AND INSERTION STENT URETERAL;  Surgeon: Lewis Dahl MD;  Location:  MAIN OR;  Service: Urology    CA LAPS INSERTION TUNNELED INTRAPERITONEAL CATHETER N/A 6/7/2024    Procedure: INSERTION PERITONEAL CATHETER DIALYSIS LAPAROSCOPIC;  Surgeon: Hiram Park DO;  Location: [facility] MAIN OR;  Service: General    US GUIDED KIDNEY BIOPSY  08/05/2020     Social History     Tobacco Use    Smoking status: Every Day     Current packs/day: 0.50     Types: Cigarettes    Smokeless tobacco: Never   Vaping Use    Vaping status: Never Used   Substance and Sexual Activity    Alcohol use: Not Currently    Drug use: Never    Sexual activity: Not on file     E-Cigarette/Vaping    E-Cigarette Use Never User      E-Cigarette/Vaping Substances     Family history non-contributory  Social History     Tobacco Use    Smoking status: Every Day     Current packs/day: 0.50     Types: Cigarettes    Smokeless tobacco: Never   Vaping Use    Vaping status: Never Used   Substance and Sexual Activity    Alcohol use: Not Currently    Drug use: Never    Sexual activity: Not on file       Current Facility-Administered Medications:     acetaminophen (TYLENOL) tablet 650 mg, Q4H PRN    atorvastatin (LIPITOR) tablet 40 mg, Daily With Dinner    [START ON 11/22/2024] cinacalcet (SENSIPAR) tablet 30 mg, Once per day on Monday Wednesday  Friday    dexamethasone (PF) (DECADRON) injection 6 mg, Q24H    docusate sodium (COLACE) capsule 100 mg, BID PRN    finasteride (PROSCAR) tablet 5 mg, Daily    furosemide (LASIX) injection 40 mg, BID    HYDROcodone-acetaminophen (NORCO) 5-325 mg per tablet 2 tablet, Q6H PRN    insulin lispro (HumALOG/ADMELOG) 100 units/mL subcutaneous injection 1-5 Units, HS    insulin lispro (HumALOG/ADMELOG) 100 units/mL subcutaneous injection 1-6 Units, TID AC **AND** Fingerstick Glucose (POCT), TID AC    levothyroxine tablet 125 mcg, Early Morning    midodrine (PROAMATINE) tablet 15 mg, TID AC    nicotine (NICODERM CQ) 7 mg/24hr TD 24 hr patch 7 mg, Daily    ondansetron (ZOFRAN) injection 4 mg, Q6H PRN    potassium chloride (Klor-Con M20) CR tablet 20 mEq, TID    pyridostigmine (MESTINON) tablet 60 mg, TID    [COMPLETED] remdesivir (Veklury) 200 mg in sodium chloride 0.9 % 290 mL IVPB, Q24H, Last Rate: Stopped (11/21/24 0345) **FOLLOWED BY** [START ON 11/22/2024] remdesivir (Veklury) 100 mg in sodium chloride 0.9 % 270 mL IVPB, Q24H    simethicone (MYLICON) chewable tablet 80 mg, 4x Daily PRN    warfarin (COUMADIN) tablet 1 mg, HS  Prior to Admission Medications   Prescriptions Last Dose Informant Patient Reported? Taking?   Aspirin 81 MG CAPS 11/20/2024  Yes Yes   Sig: Take 81 mg by mouth daily   FUROSEMIDE PO 11/20/2024  Yes Yes   Sig: Take 120 mg by mouth daily   FUROSEMIDE PO 11/20/2024  Yes Yes   Sig: Take 80 mg by mouth daily   HYDROcodone-acetaminophen (Norco) 5-325 mg per tablet 11/20/2024  No Yes   Sig: Take 2 tablets by mouth every 6 (six) hours as needed for pain Max Daily Amount: 8 tablets   MOLNUPIRAVIR PO 11/20/2024  Yes Yes   Sig: Take 800 mg by mouth 2 (two) times a day Covid take for 5 days start 11/18   Magnesium 400 MG TABS Not Taking  Yes No   Sig: Take 400 mg by mouth in the morning   Patient not taking: Reported on 11/21/2024   acetaminophen (TYLENOL) 325 mg tablet   No No   Sig: Take 2 tablets (650 mg  total) by mouth every 6 (six) hours as needed for mild pain, headaches or fever   albuterol (PROVENTIL HFA,VENTOLIN HFA) 90 mcg/act inhaler 11/20/2024  Yes Yes   Sig: Inhale 1 puff in the morning   allopurinol (ZYLOPRIM) 300 mg tablet 11/20/2024 Self Yes Yes   Sig: Take 300 mg by mouth daily   arginine 500 MG tablet 11/20/2024  Yes Yes   Sig: Take 1,000 mg by mouth daily   atorvastatin (LIPITOR) 40 mg tablet 11/20/2024 Self No Yes   Sig: Take 1 tablet (40 mg total) by mouth daily with dinner   cinacalcet (SENSIPAR) 30 mg tablet Not Taking  No No   Sig: Take 1 tablet (30 mg total) by mouth 3 (three) times a week   Patient not taking: Reported on 11/21/2024   colchicine (COLCRYS) 0.6 mg tablet 11/20/2024  Yes Yes   Sig: Take 0.6 mg by mouth daily   dexamethasone (DECADRON) 6 mg tablet 11/20/2024  Yes Yes   Sig: Take 6 mg by mouth 2 (two) times a day with meals Covid take for 10 days start 11/20   docusate sodium (COLACE) 100 mg capsule Not Taking  No No   Sig: Take 1 capsule (100 mg total) by mouth 2 (two) times a day as needed for constipation   Patient not taking: Reported on 11/21/2024   finasteride (PROSCAR) 5 mg tablet 11/20/2024  No Yes   Sig: Take 1 tablet (5 mg total) by mouth daily   insulin glargine (LANTUS) 100 units/mL subcutaneous injection 11/20/2024  No Yes   Sig: Inject 8 Units under the skin daily at bedtime   Patient taking differently: Inject 10 Units under the skin daily at bedtime   insulin lispro (HumALOG/ADMELOG) 100 units/mL injection 11/20/2024  Yes Yes   Sig: Inject 0-10 Units under the skin 4 (four) times a day (before meals and at bedtime) 0-150 =0 units  151-200 = 2 units  201-250 = 4 units  251-300 = 6 units  301-350 = 8 units  351-400 = 10 units   levothyroxine 125 mcg tablet 11/20/2024 Self Yes Yes   Sig: Take 125 mcg by mouth daily   metoprolol tartrate (LOPRESSOR) 25 mg tablet 11/20/2024  Yes Yes   Sig: Take 25 mg by mouth every 12 (twelve) hours   midodrine (PROAMATINE) 5 mg tablet  11/20/2024  No Yes   Sig: Take 3 tablets (15 mg total) by mouth 3 (three) times a day before meals   pantoprazole (PROTONIX) 40 mg tablet 11/20/2024  Yes Yes   Sig: Take 40 mg by mouth daily   potassium chloride (Klor-Con M20) 20 mEq tablet Not Taking  No No   Sig: Take 1 tablet (20 mEq total) by mouth 3 (three) times a day   Patient not taking: Reported on 11/21/2024   pyridostigmine (Mestinon) 60 mg tablet 11/20/2024  No Yes   Sig: Take 1 tablet (60 mg total) by mouth 3 (three) times a day   sevelamer (RENAGEL) 800 mg tablet 11/20/2024  Yes Yes   Sig: Take 800 mg by mouth 3 (three) times a day with meals   simethicone (MYLICON) 80 mg chewable tablet Not Taking  No No   Sig: Chew 1 tablet (80 mg total) 4 (four) times a day as needed for flatulence   Patient not taking: Reported on 11/21/2024   tamsulosin (FLOMAX) 0.4 mg 11/20/2024  Yes Yes   Sig: Take 0.4 mg by mouth daily with dinner   torsemide (DEMADEX) 100 mg tablet 11/20/2024  Yes Yes   Sig: Take 100 mg by mouth daily   warfarin (COUMADIN) 1 mg tablet 11/20/2024  No Yes   Sig: Take 1 tablet (1 mg total) by mouth daily at bedtime      Facility-Administered Medications: None     Carvedilol, Levaquin [levofloxacin], Saccharin - food allergy, Sucralose - food allergy, Amiodarone, Aspartame - food allergy, Bumetanide, Cephalexin, Ciprofloxacin, Hydralazine, Lisinopril, Metolazone, Morphine, Nifedipine, and Strawberry extract - food allergy    Objective :  Temp:  [97.5 °F (36.4 °C)-97.8 °F (36.6 °C)] 97.7 °F (36.5 °C)  HR:  [57-76] 58  BP: ()/(48-60) 97/54  Resp:  [16-22] 20  SpO2:  [95 %-100 %] 95 %  O2 Device: Nasal cannula  Nasal Cannula O2 Flow Rate (L/min):  [3 L/min-4 L/min] 4 L/min    Current Weight: Weight - Scale: 100 kg (220 lb 10.9 oz)  First Weight: Weight - Scale: 100 kg (220 lb 7.4 oz)  I/O         11/19 0701 11/20 0700 11/20 0701 11/21 0700 11/21 0701 11/22 0700    Urine (mL/kg/hr)  300 100 (0.7)    Stool  0     Total Output  300 100    Net   -300 -100           Unmeasured Stool Occurrence  1 x           Physical Exam  General: Well-developed well-nourished, no acute distress, but weak appearing  Skin:  No acute rash  Eyes: No scleral icterus and noninjected  ENT: Normocephalic/atraumatic moist mucous membranes  Neck:  Supple, no jugular venous distention, trachea midline, overall appearance is normal; no  carotid bruits, 1+ carotid upstroke  Back: No CVA tenderness spine midline  Chest: Bilateral crackles at the bases, but no dullness to percussion, good respiratory effort no use of accessory respiratory muscles  CVS:  Regular rate and rhythm, without a rub or gallops or murmurs, normal S3 and S4  Abdomen:  Normal bowel sounds, soft and nontender and nondistended; no overt hepatosplenomegaly or bruits appreciable; PD catheter site clean and dry  Extremities:  No clubbing, cyanosis significant wrinkling and venous stasis changes lower extremities minimal edema, poor distal pulses; no femoral bruits and no significant arthritic changes  Neuro:  No gross focality  Psych:  Alert and oriented and appropriate   Medications:    Current Facility-Administered Medications:     acetaminophen (TYLENOL) tablet 650 mg, 650 mg, Oral, Q4H PRN, Chao Rios PA-C    atorvastatin (LIPITOR) tablet 40 mg, 40 mg, Oral, Daily With Dinner, Chao Rios PA-C    [START ON 11/22/2024] cinacalcet (SENSIPAR) tablet 30 mg, 30 mg, Oral, Once per day on Monday Wednesday Friday, Chao Rios PA-C    dexamethasone (PF) (DECADRON) injection 6 mg, 6 mg, Intravenous, Q24H, Chao Rios PA-C, 6 mg at 11/21/24 0214    docusate sodium (COLACE) capsule 100 mg, 100 mg, Oral, BID PRN, Chao Rios PA-C    finasteride (PROSCAR) tablet 5 mg, 5 mg, Oral, Daily, Chao Rios PA-C    furosemide (LASIX) injection 40 mg, 40 mg, Intravenous, BID, Chao Rios PA-C    HYDROcodone-acetaminophen (NORCO) 5-325 mg per tablet 2 tablet, 2 tablet, Oral, Q6H PRN, Chao Rios PA-C, 2  tablet at 11/21/24 0044    insulin lispro (HumALOG/ADMELOG) 100 units/mL subcutaneous injection 1-5 Units, 1-5 Units, Subcutaneous, HS, Chao Rios PA-C    insulin lispro (HumALOG/ADMELOG) 100 units/mL subcutaneous injection 1-6 Units, 1-6 Units, Subcutaneous, TID AC **AND** Fingerstick Glucose (POCT), , , TID AC, Chao Rios PA-C    levothyroxine tablet 125 mcg, 125 mcg, Oral, Early Morning, Chao Rios PA-C, 125 mcg at 11/21/24 0533    midodrine (PROAMATINE) tablet 15 mg, 15 mg, Oral, TID AC, Chao Rios PA-C, 15 mg at 11/21/24 0640    nicotine (NICODERM CQ) 7 mg/24hr TD 24 hr patch 7 mg, 7 mg, Transdermal, Daily, Chao Rios PA-C    ondansetron (ZOFRAN) injection 4 mg, 4 mg, Intravenous, Q6H PRN, Chao Rios PA-C    potassium chloride (Klor-Con M20) CR tablet 20 mEq, 20 mEq, Oral, TID, Chao Rios PA-C, 20 mEq at 11/21/24 0044    pyridostigmine (MESTINON) tablet 60 mg, 60 mg, Oral, TID, Chao Rios PA-C    [COMPLETED] remdesivir (Veklury) 200 mg in sodium chloride 0.9 % 290 mL IVPB, 200 mg, Intravenous, Q24H, Stopped at 11/21/24 0345 **FOLLOWED BY** [START ON 11/22/2024] remdesivir (Veklury) 100 mg in sodium chloride 0.9 % 270 mL IVPB, 100 mg, Intravenous, Q24H, Chao Rios PA-C    simethicone (MYLICON) chewable tablet 80 mg, 80 mg, Oral, 4x Daily PRN, Chao Rios PA-C    warfarin (COUMADIN) tablet 1 mg, 1 mg, Oral, HS, Chao Rios PA-C      Lab Results: I have reviewed the following results:  Results from last 7 days   Lab Units 11/21/24  0502 11/20/24  2148 11/20/24  2133   WBC Thousand/uL 6.27  --  7.11   HEMOGLOBIN g/dL 10.0*  --  9.8*   HEMATOCRIT % 32.9*  --  31.7*   PLATELETS Thousands/uL 80*  --  91*   POTASSIUM mmol/L 3.7 3.2*  --    CHLORIDE mmol/L 100 99  --    CO2 mmol/L 25 28  --    BUN mg/dL 19 16  --    CREATININE mg/dL 3.05* 2.90*  --    CALCIUM mg/dL 9.0 8.9  --    MAGNESIUM mg/dL  --  1.6*  --    PHOSPHORUS mg/dL  --  3.7  --    ALBUMIN g/dL 2.4* 2.4*   "--        Administrative Statements     Portions of the record may have been created with voice recognition software. Occasional wrong word or \"sound a like\" substitutions may have occurred due to the inherent limitations of voice recognition software. Read the chart carefully and recognize, using context, where substitutions have occurred.If you have any questions, please contact the dictating provider.  "

## 2024-11-21 NOTE — ASSESSMENT & PLAN NOTE
Presented 11/20 with SOB and COVID+ x 3 days.   Patient recently discharged 11/13 from [facility] following IV ABX for colitis  Given duo nebs and solu-medrol by EMS without improvement  VBG showed pCO2 65.6, pO2 28.4  On 2L NC since COVID dx. Increased to 4L today, O2 96%.  Respiratory protocol

## 2024-11-21 NOTE — RESPIRATORY THERAPY NOTE
11/21/24 1342   Respiratory Assessment   Resp Comments Pt currently has O2 out of nose. spo2 89-93%. no distress noted, breathing unlabored.

## 2024-11-21 NOTE — DISCHARGE INSTR - OTHER ORDERS
Skin care Plan:  1-Cleanse sacro-buttocks with soap and water. Apply Silicone bordered foam. Darryn with T for treatment. Change every three days and PRN.   2-Turn/reposition q2h or when medically stable for pressure re-distribution on skin .  3-Elevate heels to offload pressure  4-Moisturize skin daily with skin nourishing cream  5-Ehob cushion in chair when out of bed.  6-Hydraguard to bilateral heels BID and PRN.   7-Cleanse bilateral lower extremities with soap and water, apply skin nourishing lotion. Wrap LE with ace from pedal to below knee in am and remove at night.  8-Place Patient on ATR Turning and repositioning system

## 2024-11-21 NOTE — CONSULTS
Consult Cardiology    Masoud Perry 1955, 69 y.o. male MRN: [id number]    Unit/Bed#: -01 Encounter: [id number]    Attending Provider: Moses Noel MD   Primary Care Provider: Jignesh Baker DO   Date admitted to hospital: 11/20/2024       Inpatient consult to Cardiology  Consult performed by: GAGANDEEP Womack  Consult ordered by: Chao Rios PA-C          Paroxysmal atrial fibrillation (HCC)  Assessment & Plan  History of paroxysmal atrial fibrillation  [facility].  Currently maintaining sinus rhythm.  Recently treated for recurrent a fib when beta blocker was held. Continue metoprolol as BP allows.  Continue warfarin with goal INR 2-3.    Acute on chronic diastolic HF (heart failure) (HCC)  Assessment & Plan  Wt Readings from Last 3 Encounters:   11/21/24 100 kg (220 lb 10.9 oz)   10/14/24 115 kg (253 lb)   10/07/24 118 kg (260 lb)     History of HFpEF  Echo 7/2024 with LVEF 75% with mild-mod TR  Chest xray shows pulmonary edema and possible pneumonia in the setting of Covid.  BNP minimally elevated.  He does not appear significantly overloaded on exam.  Will defer diuresis to nephrology. Undergoing hemodialysis.  Cardiology will sign off as nephrology is managing volume status.  Feel free to re-consult as needed          Elevated troponin  Assessment & Plan  HS trop minimally elevated but down-trending 70->59  Consistent with known ESRD.  Not diagnostic for ACS.      Hemodialysis-associated hypotension  Assessment & Plan  On midodrine 15 mg TID    * Acute respiratory failure with hypoxia and hypercapnia (HCC)  Assessment & Plan  Secondary to Covid infection and volume overload  Currently on 4 L NC  Monitor with diuresis        Physician Requesting Consult: Moses Noel MD    Reason for Consult / Principal Problem: HFpEF      HPI: Masoud Perry is a 69 y.o. year old male who has a history of CAD with MI in 1999 and  2011, PAF s/p ablation 4/2024 on warfarin with ILR, chronic hypotension on midodrine, HFpEF, CVA, ESRD on hemodialysis, HTN, ongoing tobacco use  who follows with [facility].          Patient presents to [facility] ER 11/20/2024 with respiratory insufficiency in the setting of Covid infection from the nursing facility where he resides. He was diagnosed with Covid infection earlier in the week and was requiring increased supplemental O2. At ER arrival he was on 4 L NC supplemental O2. ECG showed sinus rhythm with no acute changes. HS trop mildly elevated but down-trending at 70-> 63-> 59. BNP was minimally elevated at 301. CBC shows stable chronic anemia with hemoglobin 9.8. Chest xray shows evidence of vascular congestion with possible LLL pneumonia. He was given IV Lasix in the ER and admitted with respiratory protocol. Decadron and Remdesivir started. Nephrology consulted for management of hemodialysis.     At the time of my evaluation he is resting in bed. He appears to be breathing fairly comfortably on 4 L NC supplemental O2. He has a productive cough. He reports fatigue, lightheadedness, shortness of breath with chest discomfort. He is awaiting hemodialysis this morning.       Review of Systems   Constitutional:  Positive for fatigue and fever. Negative for chills.   HENT:  Negative for ear pain and sore throat.    Eyes:  Negative for pain and visual disturbance.   Respiratory:  Positive for cough and shortness of breath.    Cardiovascular:  Negative for chest pain and palpitations.   Gastrointestinal:  Negative for abdominal pain and vomiting.   Genitourinary:  Negative for dysuria and hematuria.   Musculoskeletal:  Negative for arthralgias and back pain.   Skin:  Negative for color change and rash.   Neurological:  Negative for seizures and syncope.   All other systems reviewed and are negative.       Historical Information     Past Medical History:   Diagnosis Date    Anemia in chronic kidney disease      Anticoagulated on Coumadin     Atrial fibrillation (HCC)     BPH (benign prostatic hyperplasia)     CAD (coronary artery disease)     CKD (chronic kidney disease), stage V (HCC)     Compartment syndrome of forearm (HCC)     Right, 2019    COPD (chronic obstructive pulmonary disease) (HCC)     CVA (cerebral vascular accident) (HCC)     Diastolic CHF (HCC)     grade 1 DD    DVT (deep venous thrombosis) (HCC)     Factor V deficiency (HCC)     Gout     Hyperlipidemia     Hypertension     Hypothyroidism     MI (myocardial infarction) (HCC)     x3 - 1999, 2010, after 2015    Morbid obesity (HCC)     Nephrolithiasis     Noncompliance with medications     SHAD (obstructive sleep apnea)     Peritoneal dialysis catheter in place (HCC)     Pulmonary embolism (HCC)     Secondary hyperparathyroidism of renal origin (HCC)     Spinal stenosis of lumbar region     Status post placement of implantable loop recorder     Tobacco dependence      Past Surgical History:   Procedure Laterality Date    CARDIAC ELECTROPHYSIOLOGY STUDY AND ABLATION  04/29/2024    CHOLECYSTECTOMY      CORONARY ANGIOPLASTY WITH STENT PLACEMENT      JOINT REPLACEMENT      bilateral knee    NJ CYSTO/URETERO W/LITHOTRIPSY &INDWELL STENT INSRT Left 09/24/2022    Procedure: CYSTOSCOPY URETEROSCOPY WITH LITHOTRIPSY HOLMIUM LASER, AND INSERTION STENT URETERAL;  Surgeon: Lewis Dahl MD;  Location:  MAIN OR;  Service: Urology    NJ LAPS INSERTION TUNNELED INTRAPERITONEAL CATHETER N/A 6/7/2024    Procedure: INSERTION PERITONEAL CATHETER DIALYSIS LAPAROSCOPIC;  Surgeon: Hiram Park DO;  Location: [facility];  Service: General    US GUIDED KIDNEY BIOPSY  08/05/2020     Social History     Substance and Sexual Activity   Alcohol Use Not Currently     Social History     Substance and Sexual Activity   Drug Use Never     Social History     Tobacco Use   Smoking Status Every Day    Current packs/day: 0.50    Types: Cigarettes   Smokeless Tobacco Never       Family  History:   Family History   Problem Relation Age of Onset    Kidney failure Mother     Cirrhosis Mother     Colon cancer Brother        Meds/Allergies     current meds:   Current Facility-Administered Medications:     acetaminophen (TYLENOL) tablet 650 mg, Q4H PRN    atorvastatin (LIPITOR) tablet 40 mg, Daily With Dinner    [START ON 11/22/2024] cinacalcet (SENSIPAR) tablet 60 mg, Once per day on Monday Wednesday Friday    dexamethasone (PF) (DECADRON) injection 6 mg, Q24H    docusate sodium (COLACE) capsule 100 mg, BID PRN    epoetin domingo (EPOGEN,PROCRIT) injection 5,000 Units, Once per day on Tuesday Thursday Saturday    finasteride (PROSCAR) tablet 5 mg, Daily    HYDROcodone-acetaminophen (NORCO) 5-325 mg per tablet 2 tablet, Q6H PRN    insulin lispro (HumALOG/ADMELOG) 100 units/mL subcutaneous injection 1-5 Units, HS    insulin lispro (HumALOG/ADMELOG) 100 units/mL subcutaneous injection 1-6 Units, TID AC **AND** Fingerstick Glucose (POCT), TID AC    levothyroxine tablet 125 mcg, Early Morning    midodrine (PROAMATINE) tablet 15 mg, TID AC    nicotine (NICODERM CQ) 7 mg/24hr TD 24 hr patch 7 mg, Daily    ondansetron (ZOFRAN) injection 4 mg, Q6H PRN    potassium chloride (Klor-Con M20) CR tablet 20 mEq, Daily    pyridostigmine (MESTINON) tablet 60 mg, TID    [COMPLETED] remdesivir (Veklury) 200 mg in sodium chloride 0.9 % 290 mL IVPB, Q24H, Last Rate: Stopped (11/21/24 0345) **FOLLOWED BY** [START ON 11/22/2024] remdesivir (Veklury) 100 mg in sodium chloride 0.9 % 270 mL IVPB, Q24H    sevelamer (RENAGEL) tablet 800 mg, TID With Meals    simethicone (MYLICON) chewable tablet 80 mg, 4x Daily PRN    torsemide (DEMADEX) tablet 60 mg, BID    warfarin (COUMADIN) tablet 1 mg, HS       Allergies   Allergen Reactions    Carvedilol Shortness Of Breath    Levaquin [Levofloxacin] Other (See Comments)     Unknown    Saccharin - Food Allergy Diarrhea     GI intolerance, adamant does not want to receive    Sucralose - Food  Allergy Diarrhea     GI intolerance, louis does not want to receive    Amiodarone Dizziness    Aspartame - Food Allergy Diarrhea    Bumetanide GI Intolerance and Lightheadedness           Cephalexin Other (See Comments)     unknown    Ciprofloxacin Rash    Hydralazine Tachycardia    Lisinopril GI Intolerance           Metolazone Other (See Comments)     Metallic taste    Morphine Swelling     Of injection site    Nifedipine Lightheadedness    Strawberry Extract - Food Allergy Rash       Objective     Vitals: Blood pressure 101/58, pulse 60, temperature 97.7 °F (36.5 °C), temperature source Temporal, resp. rate 20, height 6' (1.829 m), weight 100 kg (220 lb 10.9 oz), SpO2 95%., Body mass index is 29.93 kg/m².    Orthostatic Blood Pressures      Flowsheet Row Most Recent Value   Blood Pressure 101/58 filed at 2024 1007   Patient Position - Orthostatic VS Lying filed at 2024 0809            Systolic (24hrs), Av , Min:81 , Max:133     Diastolic (24hrs), Av, Min:48, Max:60        Intake/Output Summary (Last 24 hours) at 2024 1102  Last data filed at 2024 0900  Gross per 24 hour   Intake 240 ml   Output 400 ml   Net -160 ml       Weight (last 2 days)       Date/Time Weight    24 0600 100 (220.68)    24 2358 100 (220.46)    24 2107 100 (220.46)            Invasive Devices       Peripheral Intravenous Line  Duration             Peripheral IV 24 Left;Upper;Ventral (anterior) Arm <1 day              Line  Duration             Peritoneal Dialysis Catheter Column-disk Abdominal 167 days              Hemodialysis Catheter  Duration             Hemodialysis Catheter Subclavian -- days                    Physical Exam  Vitals and nursing note reviewed.   Constitutional:       General: He is not in acute distress.     Appearance: He is well-developed. He is ill-appearing.      Comments: Pale, chronically ill   HENT:      Head: Normocephalic and atraumatic.   Eyes:       Conjunctiva/sclera: Conjunctivae normal.   Neck:      Vascular: No JVD.   Cardiovascular:      Rate and Rhythm: Normal rate and regular rhythm.      Heart sounds: No murmur heard.  Pulmonary:      Effort: Pulmonary effort is normal. No respiratory distress.      Breath sounds: Rhonchi present.      Comments: 4 l NC  Abdominal:      Palpations: Abdomen is soft.      Tenderness: There is no abdominal tenderness.   Musculoskeletal:         General: No swelling.      Cervical back: Neck supple.      Right lower leg: No edema.      Left lower leg: No edema.   Skin:     General: Skin is warm and dry.      Capillary Refill: Capillary refill takes less than 2 seconds.   Neurological:      Mental Status: He is alert.   Psychiatric:         Mood and Affect: Mood normal.              Laboratory Results:          CBC with diff:   Results from last 7 days   Lab Units 11/21/24  0502 11/20/24 2133   WBC Thousand/uL 6.27 7.11   HEMOGLOBIN g/dL 10.0* 9.8*   HEMATOCRIT % 32.9* 31.7*   MCV fL 106* 106*   PLATELETS Thousands/uL 80* 91*   RBC Million/uL 3.11* 2.99*   MCH pg 32.2 32.8   MCHC g/dL 30.4* 30.9*   RDW % 14.6 14.6   MPV fL 8.5* 10.0   NRBC AUTO /100 WBCs 0 0         CMP:  Results from last 7 days   Lab Units 11/21/24  0502 11/20/24  2148   POTASSIUM mmol/L 3.7 3.2*   CHLORIDE mmol/L 100 99   CO2 mmol/L 25 28   BUN mg/dL 19 16   CREATININE mg/dL 3.05* 2.90*   CALCIUM mg/dL 9.0 8.9   AST U/L 17 18   ALT U/L 6* 6*   ALK PHOS U/L 64 68   EGFR ml/min/1.73sq m 19 20       BMP:  Results from last 7 days   Lab Units 11/21/24  0502 11/20/24  2148   POTASSIUM mmol/L 3.7 3.2*   CHLORIDE mmol/L 100 99   CO2 mmol/L 25 28   BUN mg/dL 19 16   CREATININE mg/dL 3.05* 2.90*   CALCIUM mg/dL 9.0 8.9       BNP:    Recent Labs     11/20/24 2133   *     HS trop: 70->63->59    Magnesium:   Results from last 7 days   Lab Units 11/20/24  2148   MAGNESIUM mg/dL 1.6*       Coags:   Results from last 7 days   Lab Units 11/20/24  2133   PTT  "seconds 65*   INR  2.65*     Lipid Profile:   Lab Results   Component Value Date    CHOLESTEROL 140 01/12/2022     Lab Results   Component Value Date    HDL 30 (L) 01/12/2022     Lab Results   Component Value Date    TRIG 177 (H) 01/12/2022     No results found for: \"NONHDLC\"     Cardiac testing:     [facility]        Imaging: Results Review Statement: I reviewed radiology reports from this admission including: chest xray, CT chest, and Echocardiogram.    XR chest 1 view portable  Result Date: 11/21/2024  Narrative: XR CHEST PORTABLE INDICATION: sob. Patient has confirmed COVID-19. COMPARISON: 9/15/2024 FINDINGS: Tunneled right internal jugular vein dialysis catheter new from the prior study. Mild to moderate pulmonary edema with right pleural effusion. Areas of confluent pulmonary edema. Elevation of the left hemidiaphragm with basilar consolidation likely atelectasis. Underlying pneumonia is not excluded. No pneumothorax. Obscured cardiac silhouette. Bones are unremarkable for age. Normal upper abdomen.     Impression: Mild to moderate pulmonary edema with right pleural effusion. Left lower lobe atelectasis. Pneumonia is not excluded. Workstation performed: [id number]     CT head wo contrast  Result Date: 11/13/2024  Narrative: HISTORY:  Stroke alert left arm weakness. TECHNIQUE: Axial 3 x 1 mm noncontrast CT images of the brain were obtained.  MPR reconstructions were created.  CT examination performed with dose lowering protocol in accordance with ALARA. COMPARISON: None. RESULTS: Mild motion compromise. Mild ventricular and sulcal prominence. Scattered periventricular white matter low density likely chronic ischemic change. No evidence of mass, hemorrhage nor positive mass effect. Suspect tiny chronic lacunar infarcts of the caudate heads. Circumferential calcification of the cavernous internal carotid arteries. Gray-white matter differentiation " preserved. Insular ribbons well demonstrated. No focal posterior fossa lesion. Bilateral vertebral artery calcification. Orbital contents within normal limits. Calvarium grossly intact.    Impression: IMPRESSION:   No evidence of acute stroke, hemorrhage, mass, or hydrocephalus on this noncontrast head CT. Mild chronic ischemic white matter and peripheral vascular arterial disease Workstation:[id number]    CT renal stone study abdomen pelvis wo contrast  Result Date: 11/11/2024  Narrative: History: Right lower quadrant abdominal pain. Follow up exam, continues to have abdominal pain. Exam: Nonenhanced CT of the abdomen and pelvis.   Technique: Using helical technique, axial images were obtained through the abdomen and pelvis. Coronal and sagittal reformations were performed.   Comparison: CT chest dated 11/2/2024 and CT abdomen pelvis dated 11/1/2024.     Abdomen: Lung Bases: Moderate bilateral pleural effusions and bilateral atelectasis are again seen. Coronary artery calcifications appear severe. Liver: I do not identify a hepatic mass on this noncontrast study. Gallbladder/Bile ducts: The patient is status post cholecystectomy. Spleen: The unenhanced spleen is unremarkable. Pancreas: The unenhanced pancreas is unremarkable. Adrenal glands: Normal. Kidneys/Ureters: Bilateral renal cysts are again seen. Bilateral renal cortical thinning is again noted. No hydronephrosis is identified. Bilateral nephrolithiasis is again noted including 0.6 calculus in the right renal pelvis and 1 cm calculus in the left renal pelvis. Bowel/Mesentery: No bowel obstruction is identified. Mild colonic diverticulosis is noted without findings of acute diverticulitis. The appendix measures 8-9 mm. There is small amount of free fluid adjacent to the appendix though decreased compared to prior study. Lymph nodes: Normal. Vessels: Severe calcifications are seen in the abdominal aorta and bilateral common iliac arteries. Abdominal Wall: Small  amount of free intraperitoneal fluid is slightly decreased compared to prior exam. No free intraperitoneal air is identified. Abdominal/pelvic catheter is noted. Subcutaneous edema is again noted. Pelvis: No pelvic mass is identified. Urinary Bladder: Urinary bladder calculi are again noted and measure up to 1 cm. There is mild circumferential urinary bladder wall thickening although the bladder is not well distended. Lymph nodes:  Normal.   Bones: Mild to moderate degenerative changes are again seen in the lumbar spine and imaged lower thoracic spine.      Impression: Impression: 1.  Bilateral nephrolithiasis, similar to prior CT abdomen. Stable calculi in the urinary bladder. 2.  8-9 mm appendix with small amount of free fluid adjacent to the appendix, decreased compared to prior study. 3.  Decrease in free intraperitoneal fluid. 4.  Moderate bilateral pleural effusions and bilateral atelectasis again noted. Subcutaneous edema again noted. 5.  Severe vascular calcifications including coronary artery calcifications. Workstation:[id number]    [employer] chest 1 view  Result Date: 11/11/2024  Narrative: Chest x-ray--AP erect view INDICATION: Follow-up. Comparison: CT chest 9 days ago, 11/2/2024. FINDINGS: The recent CT chest showed small pleural effusion bilaterally. Probably still present to some degree with mild hazy appearance at the lower lung fields. Could also reflect an element of mild persistent pulmonary edema. The lungs show satisfactory expansion, and with no infiltrate, or atelectasis. Unremarkable heart size. CT showed at least moderately severe coronary artery calcification. Unremarkable thoracic aorta.  Unremarkable esperanza and mediastinum. No adenopathy. Right-sided dual-lumen catheter now present with the distal tip at the lower SVC.    Impression: IMPRESSION: Portable chest x-ray.   The recent CT chest showed small pleural effusion bilaterally. Probably still present to some degree with mild hazy appearance at  the lower lung fields. Could also reflect an element of mild persistent pulmonary edema. Unremarkable heart size. CT showed at least moderately severe coronary artery calcification. Right-sided dual-lumen catheter now present. Workstation:[id number]    XR abdomen 1 view kub  Result Date: 11/11/2024  Narrative: Clinical: Abdominal pain. TECHNIQUE: Supine abdominal x-ray. COMPARISON: Abdominal x-ray dated 11/5/2024. FINDINGS: Catheter overlies the abdomen. Air is seen within normal-caliber stomach, small and large bowel. Small/moderate amount of contrast is seen within normal-caliber colon. Vascular calcifications appear severe.    Impression: IMPRESSION: No bowel obstruction identified. Small/moderate amount of residual colonic contrast. Workstation:[id number]    FL barium swallow ROUTINE esophagus  Result Date: 11/9/2024  Narrative: Clinical history: Dysphasia. Patient indicates he feels swallowed solids and liquids get stuck at the mid chest level and he eventually regurgitates them up. FINDINGS: Patient was unable to stand erect for the swallow and indicated he would not be able to swallow lying for fear of choking. Therefore the imaging study was performed with the patient supine on the fluoroscopy table tilted head-up approximately 45 degrees. This was the only imaging positioning tolerable to him for swallowing.  The barium solution available was strawberry flavored. Patient refused this as he indicated he is allergic to strawberries and strawberry flavorings The study was therefore performed with Gastrografin. Swallow was initiated without a delay upon prompting.  No aspiration or coughing demonstrated. No mass effect or stricture demonstrated in the esophagus. Esophagus appeared to distend normally with swallowing. No narrowing or stricture or hiatal hernia seen at the GE junction. There was, however, delayed peristaltic stripping of the swallowed contrast through the mid esophageal level with a delayed  propagation. This ultimately advanced through into the stomach with swallows of additional contrast or water. Patient did not vomit during this  procedure.    Impression: IMPRESSION: Findings suggestive of esophageal dysmotility. No obstructing mass effect or stricture demonstrated.   Esophagram was limited in its diagnostic capabilities due to patient's inability to drink barium and his limited mobility in regards to positioning. If symptoms persist, further characterization may be possible with endoscopy. Fluoroscopy Dose: 90.85 mGy  Cumulative Air Kerma Fluoroscopy Time: 2.2 minutes Workstation:[id number]     ULTRASOUND GUIDE VASCULAR ACCESS  Result Date: 11/7/2024  Narrative: Procedure:  Ultrasound and fluoroscopic guided placement of a right jugular tunneled central venous catheter Clinical History:  End-stage renal disease on peritoneal dialysis. Patient now needs access for hemodialysis. Discussion: Sedation time (minutes): 20 Versed (mg): 1.5 fentanyl (mcg): 75 Estimated Blood Loss (mL): Less than 10 Fluoroscopy time (minutes): 0.5 CAK Dose (mGy): 24 Informed consent for the procedure was obtained. The patient was examined and is in satisfactory condition for planned moderate sedation. Mallampati score 3. ASA physical status 3.  IV moderate sedation was given for the procedure under my direct supervision, with independent observation by the Radiology Nurse. After the procedure the patient was recovered and returned to baseline status, and was deemed fit for discharge from . A time-out was performed with all team members present and in agreement, confirming the correct patient, correct procedure, and correct side/site. Procedure Narrative: The right neck and chest were cleaned, prepped, draped in the usual sterile manner. ChloraPrep was utilized according to the 's directions. It was allowed to dry prior to applying sterile drapes. The skin was prepped using ChloraPrep, and allowed to dry before  sterile draping applied in the usual sterile fashion. Maximum sterile barrier technique was used, including use of a cap, mask, sterile gown, gloves, and full body drape, after recommended hand hygiene and aseptic techniques. Sterile ultrasound technique was used, including use of a sterile probe cover and sterile gel.  The right internal jugular vein was patent by ultrasound and an image of the vein was archived. Local anesthesia was obtained with 1% lidocaine .   Under direct ultrasound guidance access was gained into the internal jugular vein with a 21 gauge needle and a Mandrel wire. A small dermatotomy was made. A transitional dilator was placed and intravascular length measurements were obtained. A 19 cm tip to cuff Palindrome tunneled central venous hemodialysis catheter was prepared. The skin on the chest was anesthetized with 1% lidocaine and a small dermatotomy was made. A subcutaneous tunnel was created with a blunt tunneling device. The catheter was advanced through the subcutaneous tunnel and through a peel-away sheath under fluoroscopic guidance to the mid right atrium level.  The sheath was removed.  An image was obtained to document the final catheter tip position.   The catheter aspirated and flushed without difficulty. The catheter was anchored to the skin with 2-0 Ethilon suture. The neck incision was closed with topical skin adhesive.  The catheter was flushed and dressed and is ready for use.    Impression: Impression: Ultrasound and fluoroscopic guided placement of a right jugular tunneled central venous hemodialysis catheter. Workstation:[id number]    IR FLUOROSCOPY GUIDED CENTRAL VENOUS ACCESS  Result Date: 11/7/2024  Narrative: Procedure:  Ultrasound and fluoroscopic guided placement of a right jugular tunneled central venous catheter Clinical History:  End-stage renal disease on peritoneal dialysis. Patient now needs access for hemodialysis. Discussion: Sedation time (minutes): 20 Versed  (mg): 1.5 fentanyl (mcg): 75 Estimated Blood Loss (mL): Less than 10 Fluoroscopy time (minutes): 0.5 CAK Dose (mGy): 24 Informed consent for the procedure was obtained. The patient was examined and is in satisfactory condition for planned moderate sedation. Mallampati score 3. ASA physical status 3.  IV moderate sedation was given for the procedure under my direct supervision, with independent observation by the Radiology Nurse. After the procedure the patient was recovered and returned to baseline status, and was deemed fit for discharge from . A time-out was performed with all team members present and in agreement, confirming the correct patient, correct procedure, and correct side/site. Procedure Narrative: The right neck and chest were cleaned, prepped, draped in the usual sterile manner. ChloraPrep was utilized according to the 's directions. It was allowed to dry prior to applying sterile drapes. The skin was prepped using ChloraPrep, and allowed to dry before sterile draping applied in the usual sterile fashion. Maximum sterile barrier technique was used, including use of a cap, mask, sterile gown, gloves, and full body drape, after recommended hand hygiene and aseptic techniques. Sterile ultrasound technique was used, including use of a sterile probe cover and sterile gel.  The right internal jugular vein was patent by ultrasound and an image of the vein was archived. Local anesthesia was obtained with 1% lidocaine .   Under direct ultrasound guidance access was gained into the internal jugular vein with a 21 gauge needle and a Mandrel wire. A small dermatotomy was made. A transitional dilator was placed and intravascular length measurements were obtained. A 19 cm tip to cuff Palindrome tunneled central venous hemodialysis catheter was prepared. The skin on the chest was anesthetized with 1% lidocaine and a small dermatotomy was made. A subcutaneous tunnel was created with a blunt tunneling  device. The catheter was advanced through the subcutaneous tunnel and through a peel-away sheath under fluoroscopic guidance to the mid right atrium level.  The sheath was removed.  An image was obtained to document the final catheter tip position.   The catheter aspirated and flushed without difficulty. The catheter was anchored to the skin with 2-0 Ethilon suture. The neck incision was closed with topical skin adhesive.  The catheter was flushed and dressed and is ready for use.    Impression: Impression: Ultrasound and fluoroscopic guided placement of a right jugular tunneled central venous hemodialysis catheter. Workstation:[id number]    [employer] abdomen complete inc upright and/or decubitus  Result Date: 11/5/2024  Narrative: Abdomen: Supine and left lateral decubitus views Clinical History: Abdominal distention. Assess position of peritoneal dialysis catheter 4 supine and 2 left lateral decubitus radiographs of the abdomen were performed. Comparison is made to a prior portable abdominal radiograph from 11/3/2024. In the left lateral decubitus position, no free intraperitoneal air is seen. No gross gastric distention is seen. Air is seen within a few top normal small bowel segments predominantly within the lower quadrants and pelvis. No gross colonic distention is seen. Overall, there is no gross evidence of small bowel obstruction. Surgical clips are seen in the right upper quadrant. A new peritoneal dialysis catheter overlies the pelvis. The peripheral cortical portion of the catheter is located within the mid to lower pelvis centered to the right of the midline. Extensive vascular calcification is seen. Within the included lower thorax, a cardiac event recorder is again seen on the left. No gross basilar pulmonary consolidation or basilar pleural effusions are seen.    Impression: IMPRESSION: 1.  Nonspecific findings in the abdomen without gross evidence of bowel obstruction or free intraperitoneal air. 2.   Peritoneal dialysis catheter overlies the pelvis. Workstation:[id number]    XR abdomen 1 vw portable  Result Date: 11/3/2024  Narrative: Portable abdomen 11/3/2024 at 0817 INDICATION: Nausea and vomiting COMPARISON: Yesterday FINDINGS: Supine examination 3 images shows gas is present in nondilated bowel. Peritoneal dialysis catheter and surgical clips right upper quadrant. Vascular calcification. Spinal osteophytic change    Impression: IMPRESSION: No significant interval change Workstation:[id number]    XR abdomen obstruction series  Result Date: 11/2/2024  Narrative: Acute abdominal series INDICATION: Rectal impaction COMPARISON: CT yesterday FINDINGS: Supine and left lateral decubitus views the abdomen show gas is present throughout the bowel with only a small amount of visible fecal material in the left colon. Peritoneal dialysis catheter. Vascular calcification. Surgical clips right upper quadrant. An accompanying hypoventilatory view of the chest is noncontributory to the diagnostic efficacy of this examination for rectal impaction. Cardiac loop recorder.    Impression: IMPRESSION: No radiographic evidence of rectal impaction Workstation:[id number]    CT chest wo contrast  Result Date: 11/2/2024  Narrative: CT chest unenhanced INDICATION: Lung nodule PROCEDURE: Helical sections through the chest without contrast material COMPARISON: Abdominal CT yesterday FINDINGS: Respiratory motion artifact. Mild apical paraseptal emphysema. Dependent atelectasis of the lungs due to moderate-sized bilateral pleural effusions. Left lower lobe posterior subpleural nodule 0.8 cm (image 26). Vascular calcification including coronaries. No pericardial effusion or central airway lesion. Upper abdominal organs were evaluated yesterday. Bony structures similar to previous    Impression: IMPRESSION: Third spacing of fluid, perhaps due to heart failure. Left lower lobe nodule indeterminate. Consider follow-up per Fleischner Society criteria  CT examination performed with dose lowering protocol in accordance with ALARA. Workstation:[id number]    US thyroid  Result Date: 11/2/2024  Narrative: PROCEDURE INFORMATION: Exam: US Soft Tissue Head and Neck, Thyroid Exam date and time: 11/2/2024 7:44 AM Age: 69 years old Clinical indication: Abnormal findings; Abnormal radiologic study of neck; Patient HX: Possible left thyroid nodule or nodule inferior to left thyroid gland seen on recent CT scan. US recommended. ; Additional info: Incidetnal large nodule on imaging TECHNIQUE: Imaging protocol: Real-time ultrasound scan of the neck with image documentation. Exam focused on the thyroid. COMPARISON: US HEAD/NECK SUPERFICIAL 3/22/2021 12:51 PM FINDINGS: Right thyroid lobe: Right lobe: 4.5 x 1.5 x 1.1 cm. Normal. Left thyroid lobe: Left lobe: 3.8 x 1.2 x 1 cm. Normal. Isthmus: No nodules.     Impression: IMPRESSION: Normal thyroid gland. No thyroid or soft tissue nodules visualized.     CT renal stone study abdomen pelvis wo contrast  Result Date: 11/1/2024  Narrative: STUDY: UNENHANCED CT OF THE ABDOMEN AND PELVIS CLINICAL INFORMATION:   History: Abdominal pain, acute, nonlocalized constipation, r/o obstruction PROCEDURE: CT examination of the abdomen and pelvis was performed without intravenous contrast.  Enteric contrast was not administered. COMPARISON: Renal ultrasound dated 7/8/2020 FINDINGS: The lack of intravenous contrast limits evaluation of the viscera. LOWER THORAX: Small bilateral pleural effusions, right greater than left, with subjacent compressive atelectasis. LIVER: Normal in size and configuration. No suspicious mass within the limits of an unenhanced exam. BILE DUCTS: No intrahepatic or extrahepatic bile duct dilation. GALLBLADDER: Cholecystectomy. PANCREAS: Unremarkable. No main pancreatic duct dilation. SPLEEN: Unremarkable. ADRENAL GLANDS: Unremarkable. KIDNEYS/URETERS: Severe bilateral cortical atrophy. No hydroureteronephrosis. No renal  calculi. Bilateral hypoattenuating observations, largest within the left lower pole measuring 5.0 x 5.6 cm, favored to represent renal cysts. BLADDER: Suggestion of intraluminal calculi within the dependent portion of the urinary bladder. REPRODUCTIVE ORGANS: Normal size prostate. Symmetric seminal vesicles. BOWEL: No disproportionate dilation of the small or large bowel. The appendix was not visualized, however there is no acute inflammatory process in the right lower quadrant. Diverticulosis without findings for acute diverticulitis. There is fecal distention of the rectal vault with mild circumferential inflammatory stranding and rectal wall thickening favoring to represent underlying stercoral colitis. PERITONEUM/RETROPERITONEUM: Mild to moderate abdominopelvic ascites. Peritoneal dialysis catheter identified with distal tip coiled within the left lower quadrant. LYMPH NODES: No abdominal or pelvic lymphadenopathy. VESSELS: Atherosclerotic disease without aortic aneurysm.  Vascular patency cannot be reliably assessed without intravenous contrast. ABDOMINAL/PELVIC WALL: Diffuse anasarca. BONES: No acute osseous pelvic abnormality. Please see the separate dedicated CT report for evaluation of the thoracolumbar spine.    Impression: IMPRESSION: Faecal distention of the rectal vault with mild circumferential inflammatory stranding and rectal wall thickening favoring to represent underlying stercoral colitis. Other findings as above. CT examination performed with dose lowering protocol in accordance with [employer]. Workstation:[id number]    CT spine thoracic wo contrast  Result Date: 11/1/2024  Narrative: CT THORACIC AND LUMBAR SPINE HISTORY: Back trauma, no prior imaging (Age >= 16y) TECHNIQUE: 1.25 mm axial sections were obtained through the thoracic and lumbar spine. Coronal and sagittal reformations were performed.   Priors for comparison: None. FINDINGS: Within the visualized portions of the lungs: There is a 9 mm  LEFT lower lobe pleuroparenchymal pulmonary nodule (axial image 39 of series 2). There are partially visualized bilateral pleural effusions. Pulmonary emphysematous changes are noted. There is an incompletely visualized soft tissue nodule inferior to the visualized portion of the LEFT thyroid lobe measuring at least 2.1 cm. Thyroid ultrasound is recommended. There are coronary arterial calcifications. There are numerous abdominopelvic findings. Refer to dedicated CT abdomen and pelvis report. Multilevel spondylotic and facet degenerative changes are noted. There is no visible acute fracture involving the thoracic spine. There is preservation of thoracic spine alignment. There is no visible acute fracture involving the lumbar spine. There are mild multilevel lumbar listheses.     This examination consists of a data set which is optimized for evaluation of osseous structures only. This examination does not adequately assess ligamentous injury or laxity. There is poor evaluation of the contents of the spinal canal.    Impression: IMPRESSION: 1.  A 9 mm LEFT lower lobe pleuroparenchymal pulmonary nodule identified within the visualized lung fields on the thoracic spine CT. Per Fleischner society guidelines, a complete CT of the chest is recommended at this time. 2.  Partially visualized bilateral pleural effusions. 3.  Pulmonary emphysematous changes. 4.  Incompletely visualized soft tissue nodule inferior to the visualized portion of the LEFT thyroid lobe measuring at least 2.1 cm. Per Endocrine Society and American College of Radiology recommendations, further evaluation is recommended with thyroid ultrasound given nodule size greater than or equal to 1.5 cm. 5.  Coronary arterial calcifications. 6.  Spondylosis. 7.  No evidence of acute osseous injury to the thoracolumbar spine. Continue to follow the trauma spine imaging protocol. CT examination performed with dose lowering protocol in accordance with [facility].  Workstation:[id number]    CT spine lumbar wo contrast  Result Date: 11/1/2024  Narrative: CT THORACIC AND LUMBAR SPINE HISTORY: Back trauma, no prior imaging (Age >= 16y) TECHNIQUE: 1.25 mm axial sections were obtained through the thoracic and lumbar spine. Coronal and sagittal reformations were performed.   Priors for comparison: None. FINDINGS: Within the visualized portions of the lungs: There is a 9 mm LEFT lower lobe pleuroparenchymal pulmonary nodule (axial image 39 of series 2). There are partially visualized bilateral pleural effusions. Pulmonary emphysematous changes are noted. There is an incompletely visualized soft tissue nodule inferior to the visualized portion of the LEFT thyroid lobe measuring at least 2.1 cm. Thyroid ultrasound is recommended. There are coronary arterial calcifications. There are numerous abdominopelvic findings. Refer to dedicated CT abdomen and pelvis report. Multilevel spondylotic and facet degenerative changes are noted. There is no visible acute fracture involving the thoracic spine. There is preservation of thoracic spine alignment. There is no visible acute fracture involving the lumbar spine. There are mild multilevel lumbar listheses.     This examination consists of a data set which is optimized for evaluation of osseous structures only. This examination does not adequately assess ligamentous injury or laxity. There is poor evaluation of the contents of the spinal canal.    Impression: IMPRESSION: 1.  A 9 mm LEFT lower lobe pleuroparenchymal pulmonary nodule identified within the visualized lung fields on the thoracic spine CT. Per Fleischner society guidelines, a complete CT of the chest is recommended at this time. 2.  Partially visualized bilateral pleural effusions. 3.  Pulmonary emphysematous changes. 4.  Incompletely visualized soft tissue nodule inferior to the visualized portion of the LEFT thyroid lobe measuring at least 2.1 cm. Per Endocrine Society and American  College of Radiology recommendations, further evaluation is recommended with thyroid ultrasound given nodule size greater than or equal to 1.5 cm. 5.  Coronary arterial calcifications. 6.  Spondylosis. 7.  No evidence of acute osseous injury to the thoracolumbar spine. Continue to follow the trauma spine imaging protocol. CT examination performed with dose lowering protocol in accordance with ALARA. Workstation:[id number]      EKG reviewed personally: EKG: Sinus rhythm. RBBB. Possible lateral infarct. No change from prior.       Code Status: Level 3 - DNAR and DNI

## 2024-11-21 NOTE — OCCUPATIONAL THERAPY NOTE
Occupational Therapy         Patient Name: Masoud Perry  Today's Date: 11/21/2024 11/21/24 0941   Note Type   Note type Cancelled Session   Cancel Reasons Medical status       Chart reviewed. Attempted to see pt for OT session this AM.  PT Leonides spoke with RN and MD. At this time, OT session cancelled due to pt's hypotension & medical status.  Dr. Noel reporting to hold until tomorrow.  Will follow and see patient as medically appropriate at a later time.     Umang Sanchez OTR/L

## 2024-11-21 NOTE — ED PROVIDER NOTES
Time reflects when diagnosis was documented in both MDM as applicable and the Disposition within this note       Time User Action Codes Description Comment    11/20/2024 10:11 PM Remaley, Bennie Add [U07.1] COVID-19 virus infection     11/20/2024 10:11 PM Remaley, Bennie Add [R09.02] Hypoxia     11/20/2024 10:11 PM Remaley, Bennie Add [R06.00] Dyspnea     11/20/2024 10:11 PM Remaley, Bennie Add [N18.6] ESRD (end stage renal disease) ([facility])     11/20/2024 10:11 PM Remaley, Bennie Add [D69.6] Thrombocytopenia ([facility])     11/20/2024 10:11 PM Remaley, Bennie Add [R79.89] Elevated troponin     11/20/2024 10:26 PM Remaley, Bennie Add [I50.9] CHF (congestive heart failure) ([facility])     11/20/2024 10:38 PM Remaley, Bennie Add [J96.91,  J96.92] Respiratory failure with hypoxia and hypercapnia ([facility])     11/20/2024 10:38 PM Remaley, Bennie Modify [U07.1] COVID-19 virus infection     11/20/2024 10:38 PM Remaley, Bennie Modify [J96.91,  J96.92] Respiratory failure with hypoxia and hypercapnia ([facility])           ED Disposition       ED Disposition   Admit    Condition   Stable    Date/Time   Wed Nov 20, 2024 10:26 PM    Comment   Case was discussed with Citlali and the patient's admission status was agreed to be Admission Status: inpatient status to the service of Dr. Manrique.               Assessment & Plan       Medical Decision Making  Problems Addressed:  CHF (congestive heart failure) (HCC): acute illness or injury  COVID-19 virus infection: acute illness or injury  Dyspnea: acute illness or injury  Elevated troponin: acute illness or injury  ESRD (end stage renal disease) (HCC): acute illness or injury  Hypoxia: acute illness or injury  Respiratory failure with hypoxia and hypercapnia (HCC): acute illness or injury  Thrombocytopenia (HCC): acute illness or injury    Amount and/or Complexity of Data Reviewed  Labs: ordered. Decision-making details documented in ED Course.  Radiology: ordered and independent interpretation performed. Decision-making  "details documented in ED Course.  ECG/medicine tests: ordered and independent interpretation performed. Decision-making details documented in ED Course.    Risk  Prescription drug management.  Decision regarding hospitalization.        ED Course as of 11/20/24 2242 Wed Nov 20, 2024 2241 Spoke with Citlali cohn advanced practitioner on-call reviewed case and findings in the emergency department and management thus far accepts for admission on behalf of Dr. Manrique.         Medications   methylPREDNISolone sodium succinate (FOR EMS ONLY) (Solu-MEDROL) 125 MG injection 125 mg (0 mg Does not apply Given to EMS 11/20/24 2106)   ipratropium-albuterol (FOR EMS ONLY) (DUO-NEB) 0.5-2.5 mg/3 mL inhalation solution 6 mL (0 mL Does not apply Given to EMS 11/20/24 2106)   furosemide (LASIX) injection 80 mg (80 mg Intravenous Given 11/20/24 2240)       ED Risk Strat Scores                                               History of Present Illness       Chief Complaint   Patient presents with    Shortness of Breath     Coming from Center, Pt states having a hard time breathin for 3 days since covid dx. Pt started on 2L NC at that time. Today increased difficulty breathing, pt had to get increased to 4L NC. Pt having decreased PO intake and overall states feeling \"shitty\"       Past Medical History:   Diagnosis Date    Anemia in chronic kidney disease     Anticoagulated on Coumadin     Atrial fibrillation (HCC)     BPH (benign prostatic hyperplasia)     CAD (coronary artery disease)     CKD (chronic kidney disease), stage V ([facility])     Compartment syndrome of forearm ([facility])     Right, 2019    COPD (chronic obstructive pulmonary disease) ([facility])     CVA (cerebral vascular accident) ([facility])     Diastolic CHF ([facility])     grade 1 DD    DVT (deep venous thrombosis) ([facility])     Factor V deficiency ([facility])     Gout     Hyperlipidemia     Hypertension     Hypothyroidism     MI (myocardial infarction) ([facility])     x3 - 1999, 2010, after 2015    " Morbid obesity (HCC)     Nephrolithiasis     Noncompliance with medications     SHAD (obstructive sleep apnea)     Peritoneal dialysis catheter in place (HCC)     Pulmonary embolism (HCC)     Secondary hyperparathyroidism of renal origin (HCC)     Spinal stenosis of lumbar region     Status post placement of implantable loop recorder     Tobacco dependence       Past Surgical History:   Procedure Laterality Date    CARDIAC ELECTROPHYSIOLOGY STUDY AND ABLATION  04/29/2024    CHOLECYSTECTOMY      CORONARY ANGIOPLASTY WITH STENT PLACEMENT      JOINT REPLACEMENT      bilateral knee    LA CYSTO/URETERO W/LITHOTRIPSY &INDWELL STENT INSRT Left 09/24/2022    Procedure: CYSTOSCOPY URETEROSCOPY WITH LITHOTRIPSY HOLMIUM LASER, AND INSERTION STENT URETERAL;  Surgeon: Lewis Dahl MD;  Location:  MAIN OR;  Service: Urology    LA LAPS INSERTION TUNNELED INTRAPERITONEAL CATHETER N/A 6/7/2024    Procedure: INSERTION PERITONEAL CATHETER DIALYSIS LAPAROSCOPIC;  Surgeon: Hiram Park DO;  Location: [facility];  Service: General    US GUIDED KIDNEY BIOPSY  08/05/2020      Family History   Problem Relation Age of Onset    Kidney failure Mother     Cirrhosis Mother     Colon cancer Brother       Social History     Tobacco Use    Smoking status: Every Day     Current packs/day: 0.50     Types: Cigarettes    Smokeless tobacco: Never   Vaping Use    Vaping status: Never Used   Substance Use Topics    Alcohol use: Not Currently    Drug use: Never      E-Cigarette/Vaping    E-Cigarette Use Never User       E-Cigarette/Vaping Substances      I have reviewed and agree with the history as documented.       Shortness of Breath  Associated symptoms: cough    Associated symptoms: no abdominal pain, no chest pain, no fever and no vomiting        Review of Systems   Constitutional:  Positive for activity change, appetite change and fatigue. Negative for fever.   HENT:  Positive for congestion.    Respiratory:  Positive for cough and shortness of  breath.    Cardiovascular:  Negative for chest pain.   Gastrointestinal:  Negative for abdominal pain, nausea and vomiting.   All other systems reviewed and are negative.          Objective       ED Triage Vitals [11/20/24 2107]   Temperature Pulse Blood Pressure Respirations SpO2 Patient Position - Orthostatic VS   97.8 °F (36.6 °C) 76 122/58 20 100 % Sitting      Temp Source Heart Rate Source BP Location FiO2 (%) Pain Score    Temporal Monitor Left arm -- 9      Vitals      Date and Time Temp Pulse SpO2 Resp BP Pain Score FACES Pain Rating User   11/20/24 2230 -- 71 96 % 22 121/60 -- -- MD   11/20/24 2200 -- 76 -- 21 114/56 -- -- MD   11/20/24 2145 -- 75 -- 18 102/55 -- -- MD   11/20/24 2107 97.8 °F (36.6 °C) 76 100 % 20 122/58 9 -- SR            Physical Exam  Vitals and nursing note reviewed.   Constitutional:       General: He is not in acute distress.     Appearance: Normal appearance.   HENT:      Head: Normocephalic and atraumatic.      Nose: Nose normal.   Eyes:      Conjunctiva/sclera: Conjunctivae normal.   Cardiovascular:      Rate and Rhythm: Normal rate and regular rhythm.   Pulmonary:      Effort: Pulmonary effort is normal. No respiratory distress.      Breath sounds: Rhonchi present.   Skin:     General: Skin is dry.   Neurological:      General: No focal deficit present.      Mental Status: He is alert and oriented to person, place, and time.         Results Reviewed       Procedure Component Value Units Date/Time    B-Type Natriuretic Peptide(BNP) [[id number]]  (Abnormal) Collected: 11/20/24 2133    Lab Status: Final result Specimen: Blood from Arm, Left Updated: 11/20/24 2237      pg/mL     C-reactive protein [[id number]]  (Abnormal) Collected: 11/20/24 2148    Lab Status: Final result Specimen: Blood from Arm, Left Updated: 11/20/24 2229     .9 mg/L     Comprehensive metabolic panel [[id number]]  (Abnormal) Collected: 11/20/24 2148    Lab Status: Final result Specimen: Blood from Arm,  Left Updated: 11/20/24 2229     Sodium 132 mmol/L      Potassium 3.2 mmol/L      Chloride 99 mmol/L      CO2 28 mmol/L      ANION GAP 5 mmol/L      BUN 16 mg/dL      Creatinine 2.90 mg/dL      Glucose 86 mg/dL      Calcium 8.9 mg/dL      Corrected Calcium 10.2 mg/dL      AST 18 U/L      ALT 6 U/L      Alkaline Phosphatase 68 U/L      Total Protein 5.4 g/dL      Albumin 2.4 g/dL      Total Bilirubin 0.30 mg/dL      eGFR 20 ml/min/1.73sq m     Narrative:      National Kid[employer] Disease Foundation guidelines for Chronic Kidney Disease (CKD):     Stage 1 with normal or high GFR (GFR > 90 mL/min/1.73 square meters)    Stage 2 Mild CKD (GFR = 60-89 mL/min/1.73 square meters)    Stage 3A Moderate CKD (GFR = 45-59 mL/min/1.73 square meters)    Stage 3B Moderate CKD (GFR = 30-44 mL/min/1.73 square meters)    Stage 4 Severe CKD (GFR = 15-29 mL/min/1.73 square meters)    Stage 5 End Stage CKD (GFR <15 mL/min/1.73 square meters)  Note: GFR calculation is accurate only with a steady state creatinine    Magnesium [[id number]]  (Abnormal) Collected: 11/20/24 2148    Lab Status: Final result Specimen: Blood from Arm, Left Updated: 11/20/24 2229     Magnesium 1.6 mg/dL     Phosphorus [[id number]]  (Normal) Collected: 11/20/24 2148    Lab Status: Final result Specimen: Blood from Arm, Left Updated: 11/20/24 2229     Phosphorus 3.7 mg/dL     HS Troponin I 2hr [[id number]]     Lab Status: No result Specimen: Blood     HS Troponin 0hr (reflex protocol) [[id number]]  (Abnormal) Collected: 11/20/24 2133    Lab Status: Final result Specimen: Blood from Arm, Left Updated: 11/20/24 2211     hs TnI 0hr 70 ng/L     Smear Review(Phlebs Do Not Order) [[id number]]  (Abnormal) Collected: 11/20/24 2133    Lab Status: Final result Specimen: Blood from Arm, Left Updated: 11/20/24 2209     Platelet Estimate Decreased     Large Platelet Present    CBC and differential [[id number]]  (Abnormal) Collected: 11/20/24 5248    Lab Status: Final result Specimen: Blood  from Arm, Left Updated: 11/20/24 2209     WBC 7.11 Thousand/uL      RBC 2.99 Million/uL      Hemoglobin 9.8 g/dL      Hematocrit 31.7 %       fL      MCH 32.8 pg      MCHC 30.9 g/dL      RDW 14.6 %      MPV 10.0 fL      Platelets 91 Thousands/uL      nRBC 0 /100 WBCs      Segmented % 88 %      Immature Grans % 1 %      Lymphocytes % 8 %      Monocytes % 3 %      Eosinophils Relative 0 %      Basophils Relative 0 %      Absolute Neutrophils 6.29 Thousands/µL      Absolute Immature Grans 0.04 Thousand/uL      Absolute Lymphocytes 0.55 Thousands/µL      Absolute Monocytes 0.23 Thousand/µL      Eosinophils Absolute 0.00 Thousand/µL      Basophils Absolute 0.00 Thousands/µL     Narrative:      This is an appended report.  These results have been appended to a previously verified report.    FLU/COVID Rapid Antigen (30 min. TAT) - Preferred screening test in ED [[id number]]  (Abnormal) Collected: 11/20/24 2133    Lab Status: Final result Specimen: Nares from Nose Updated: 11/20/24 2207     SARS COV Rapid Antigen Positive     Influenza A Rapid Antigen Negative     Influenza B Rapid Antigen Negative    Narrative:      This test has been performed using the [employer] 2 FLU+SARS Antigen test under the Emergency Use Authorization (EUA). This test has been validated by the  and verified by the performing laboratory. The Veronique uses lateral flow immunofluorescent sandwich assay to detect SARS-COV, Influenza A and Influenza B Antigen.     The [employer] 2 SARS Antigen test does not differentiate between SARS-CoV and SARS-CoV-2.     Negative results are presumptive and may be confirmed with a molecular assay, if necessary, for patient management. Negative results do not rule out SARS-CoV-2 or influenza infection and should not be used as the sole basis for treatment or patient management decisions. A negative test result may occur if the level of antigen in a sample is below the limit of detection of this  test.     Positive results are indicative of the presence of viral antigens, but do not rule out bacterial infection or co-infection with other viruses.     All test results should be used as an adjunct to clinical observations and other information available to the provider.    FOR PEDIATRIC PATIENTS - copy/paste COVID Guidelines URL to browser: [email or web address]    Lactic acid, plasma (w/reflex if result > 2.0) [[id number]]  (Normal) Collected: 11/20/24 2133    Lab Status: Final result Specimen: Blood from Arm, Left Updated: 11/20/24 2205     LACTIC ACID 0.8 mmol/L     Narrative:      Result may be elevated if tourniquet was used during collection.    Protime-INR [[id number]]  (Abnormal) Collected: 11/20/24 2133    Lab Status: Final result Specimen: Blood from Arm, Left Updated: 11/20/24 2204     Protime 28.3 seconds      INR 2.65    Narrative:      INR Therapeutic Range    Indication                                             INR Range      Atrial Fibrillation                                               2.0-3.0  Hypercoagulable State                                    2.0.2.3  Left Ventricular Asist Device                            2.0-3.0  Mechanical Heart Valve                                  -    Aortic(with afib, MI, embolism, HF, LA enlargement,    and/or coagulopathy)                                     2.0-3.0 (2.5-3.5)     Mitral                                                             2.5-3.5  Prosthetic/Bioprosthetic Heart Valve               2.0-3.0  Venous thromboembolism (VTE: VT, PE        2.0-3.0    APTT [[id number]]  (Abnormal) Collected: 11/20/24 2133    Lab Status: Final result Specimen: Blood from Arm, Left Updated: 11/20/24 2204     PTT 65 seconds     Sedimentation rate, automated [[id number]]  (Abnormal) Collected: 11/20/24 2133    Lab Status: Final result Specimen: Blood from Arm, Left Updated: 11/20/24 2158     Sed Rate 35 mm/hour      Procalcitonin [[id number]] Collected: 11/20/24 2148    Lab Status: In process Specimen: Blood from Arm, Left Updated: 11/20/24 2152    Blood gas, venous [[id number]]  (Abnormal) Collected: 11/20/24 2133    Lab Status: Final result Specimen: Blood from Arm, Left Updated: 11/20/24 2147     pH, Mickey 7.247     pCO2, Mickey 65.6 mm Hg      pO2, Mickey 28.4 mm Hg      HCO3, Mickey 27.9 mmol/L      Base Excess, Mickey -0.4 mmol/L      O2 Content, Mickey 8.3 ml/dL      O2 HGB, VENOUS 52.7 %     Blood culture #2 [[id number]] Collected: 11/20/24 2133    Lab Status: In process Specimen: Blood from Arm, Left Updated: 11/20/24 2145    Blood culture #1 [[id number]] Collected: 11/20/24 2133    Lab Status: In process Specimen: Blood from Arm, Left Updated: 11/20/24 2145            XR chest 1 view portable   ED Interpretation by Bennie Shoemaker DO (11/20 2225)   Moderate pulmonary vascular congestion small bilateral pleural effusions right greater than left          ECG 12 Lead Documentation Only    Date/Time: 11/20/2024 9:26 PM    Performed by: Bennie Shoemaker DO  Authorized by: Bennie Shoemaker DO    ECG reviewed by me, the ED Provider: yes    Patient location:  ED  Previous ECG:     Comparison to cardiac monitor: Yes    Quality:     Tracing quality:  Limited by artifact  Rate:     ECG rate:  77    ECG rate assessment: normal    Rhythm:     Rhythm: sinus rhythm    QRS:     QRS axis:  Left    QRS intervals:  Wide  Conduction:     Conduction: abnormal      Abnormal conduction: complete RBBB    ST segments:     ST segments:  Non-specific  T waves:     T waves: non-specific        ED Medication and Procedure Management   Prior to Admission Medications   Prescriptions Last Dose Informant Patient Reported? Taking?   HYDROcodone-acetaminophen (Norco) 5-325 mg per tablet   No No   Sig: Take 2 tablets by mouth every 6 (six) hours as needed for pain Max Daily Amount: 8 tablets   Magnesium 400 MG TABS   Yes No   Sig: Take 400 mg by mouth in the morning    acetaminophen (TYLENOL) 325 mg tablet   No No   Sig: Take 2 tablets (650 mg total) by mouth every 6 (six) hours as needed for mild pain, headaches or fever   allopurinol (ZYLOPRIM) 300 mg tablet  Self Yes No   Sig: Take 300 mg by mouth daily   atorvastatin (LIPITOR) 40 mg tablet  Self No No   Sig: Take 1 tablet (40 mg total) by mouth daily with dinner   cinacalcet (SENSIPAR) 30 mg tablet   No No   Sig: Take 1 tablet (30 mg total) by mouth 3 (three) times a week   docusate sodium (COLACE) 100 mg capsule   No No   Sig: Take 1 capsule (100 mg total) by mouth 2 (two) times a day as needed for constipation   finasteride (PROSCAR) 5 mg tablet   No No   Sig: Take 1 tablet (5 mg total) by mouth daily   insulin glargine (LANTUS) 100 units/mL subcutaneous injection   No No   Sig: Inject 8 Units under the skin daily at bedtime   levothyroxine 125 mcg tablet  Self Yes No   Sig: Take 125 mcg by mouth daily   metoprolol tartrate (LOPRESSOR) 25 mg tablet   Yes No   Sig: Take 25 mg by mouth every 12 (twelve) hours   midodrine (PROAMATINE) 5 mg tablet   No No   Sig: Take 3 tablets (15 mg total) by mouth 3 (three) times a day before meals   potassium chloride (Klor-Con M20) 20 mEq tablet   No No   Sig: Take 1 tablet (20 mEq total) by mouth 3 (three) times a day   pyridostigmine (Mestinon) 60 mg tablet   No No   Sig: Take 1 tablet (60 mg total) by mouth 3 (three) times a day   simethicone (MYLICON) 80 mg chewable tablet   No No   Sig: Chew 1 tablet (80 mg total) 4 (four) times a day as needed for flatulence   warfarin (COUMADIN) 1 mg tablet   No No   Sig: Take 1 tablet (1 mg total) by mouth daily at bedtime      Facility-Administered Medications: None     Patient's Medications   Discharge Prescriptions    No medications on file     No discharge procedures on file.  ED SEPSIS DOCUMENTATION   Time reflects when diagnosis was documented in both MDM as applicable and the Disposition within this note       Time User Action Codes  Description Comment    11/20/2024 10:11 PM Bennie Shoemaker Add [U07.1] COVID-19 virus infection     11/20/2024 10:11 PM Bennie Shoemaker Add [R09.02] Hypoxia     11/20/2024 10:11 PM Bennie Shoemaker Add [R06.00] Dyspnea     11/20/2024 10:11 PM Bennie Shoemaker Add [N18.6] ESRD (end stage renal disease) ([facility])     11/20/2024 10:11 PM Bennie Shoemaker Add [D69.6] Thrombocytopenia ([facility])     11/20/2024 10:11 PM Bennie Shoemaker Add [R79.89] Elevated troponin     11/20/2024 10:26 PM Bennie Shoemaker Add [I50.9] CHF (congestive heart failure) ([facility])     11/20/2024 10:38 PM Bennie Shoemaker Add [J96.91,  J96.92] Respiratory failure with hypoxia and hypercapnia ([facility])     11/20/2024 10:38 PM Bennie Shoemaker Modify [U07.1] COVID-19 virus infection     11/20/2024 10:38 PM Bennie Shoemaker Modify [J96.91,  J96.92] Respiratory failure with hypoxia and hypercapnia ([facility])                  Bennie Shoemaker, DO  11/20/24 2242       Bennie Shoemaker,   11/30/24 1458

## 2024-11-21 NOTE — PHYSICAL THERAPY NOTE
PHYSICAL THERAPY NOTE        Patient Name: Masoud Perry  Today's Date: 11/21/2024 11/21/24 0940   Note Type   Note type Cancelled Session;Evaluation   Cancel Reasons Medical status         Received order for PT consult. Chart reviewed. Pt admitted with diagnosis of CHF, acute respiratory failure, COVID. Spoke with RN and MD. At this time, PT session cancelled due to pt's hypotension & medical status.  Dr. Noel reporting to hold until tomorrow.  Will follow and see patient as medically appropriate at a later time.         Leonides Toure, PT

## 2024-11-21 NOTE — ASSESSMENT & PLAN NOTE
Presents from [place]. Tested positive 3 days ago when symptoms started  Started decadron and remdesevir

## 2024-11-21 NOTE — ASSESSMENT & PLAN NOTE
"Lab Results   Component Value Date    HGBA1C 5.0 11/02/2024       No results for input(s): \"POCGLU\" in the last 72 hours.    Blood Sugar Average: Last 72 hrs:    Sliding scale insulin. Hypoglycemia protocol.  "

## 2024-11-21 NOTE — ASSESSMENT & PLAN NOTE
HS trop minimally elevated but down-trending 70->59  Consistent with known ESRD.  Not diagnostic for ACS.

## 2024-11-21 NOTE — ASSESSMENT & PLAN NOTE
On sevelamer 800 mg 3 times a day for hyperphosphatemia  On Cinacalcet for history of hypercalcemia with secondary hyperparathyroidism of ESRD to continue

## 2024-11-22 PROBLEM — D61.818 PANCYTOPENIA (HCC): Status: ACTIVE | Noted: 2022-01-13

## 2024-11-22 PROBLEM — R06.89 ACUTE RESPIRATORY INSUFFICIENCY: Status: ACTIVE | Noted: 2024-11-21

## 2024-11-22 LAB
ALBUMIN SERPL BCG-MCNC: 2.2 G/DL (ref 3.5–5)
ALP SERPL-CCNC: 62 U/L (ref 34–104)
ALT SERPL W P-5'-P-CCNC: 6 U/L (ref 7–52)
ANION GAP SERPL CALCULATED.3IONS-SCNC: 6 MMOL/L (ref 4–13)
ANISOCYTOSIS BLD QL SMEAR: PRESENT
AST SERPL W P-5'-P-CCNC: 15 U/L (ref 13–39)
BASOPHILS # BLD MANUAL: 0 THOUSAND/UL (ref 0–0.1)
BASOPHILS NFR MAR MANUAL: 0 % (ref 0–1)
BILIRUB SERPL-MCNC: 0.28 MG/DL (ref 0.2–1)
BUN SERPL-MCNC: 17 MG/DL (ref 5–25)
CALCIUM ALBUM COR SERPL-MCNC: 10.3 MG/DL (ref 8.3–10.1)
CALCIUM SERPL-MCNC: 8.9 MG/DL (ref 8.4–10.2)
CHLORIDE SERPL-SCNC: 96 MMOL/L (ref 96–108)
CO2 SERPL-SCNC: 28 MMOL/L (ref 21–32)
CREAT SERPL-MCNC: 2.24 MG/DL (ref 0.6–1.3)
EOSINOPHIL # BLD MANUAL: 0 THOUSAND/UL (ref 0–0.4)
EOSINOPHIL NFR BLD MANUAL: 0 % (ref 0–6)
ERYTHROCYTE [DISTWIDTH] IN BLOOD BY AUTOMATED COUNT: 14.7 % (ref 11.6–15.1)
FERRITIN SERPL-MCNC: 470 NG/ML (ref 24–336)
GFR SERPL CREATININE-BSD FRML MDRD: 28 ML/MIN/1.73SQ M
GLUCOSE SERPL-MCNC: 104 MG/DL (ref 65–140)
GLUCOSE SERPL-MCNC: 117 MG/DL (ref 65–140)
GLUCOSE SERPL-MCNC: 74 MG/DL (ref 65–140)
GLUCOSE SERPL-MCNC: 81 MG/DL (ref 65–140)
GLUCOSE SERPL-MCNC: 92 MG/DL (ref 65–140)
HCT VFR BLD AUTO: 30.2 % (ref 36.5–49.3)
HGB BLD-MCNC: 9.3 G/DL (ref 12–17)
INR PPP: 2.41 (ref 0.85–1.19)
IRON SERPL-MCNC: 54 UG/DL (ref 50–212)
LYMPHOCYTES # BLD AUTO: 0.13 THOUSAND/UL (ref 0.6–4.47)
LYMPHOCYTES # BLD AUTO: 2 % (ref 14–44)
MAGNESIUM SERPL-MCNC: 1.6 MG/DL (ref 1.9–2.7)
MCH RBC QN AUTO: 32 PG (ref 26.8–34.3)
MCHC RBC AUTO-ENTMCNC: 30.8 G/DL (ref 31.4–37.4)
MCV RBC AUTO: 104 FL (ref 82–98)
MONOCYTES # BLD AUTO: 0 THOUSAND/UL (ref 0–1.22)
MONOCYTES NFR BLD: 0 % (ref 4–12)
MRSA NOSE QL CULT: NORMAL
NEUTROPHILS # BLD MANUAL: 6.29 THOUSAND/UL (ref 1.85–7.62)
NEUTS SEG NFR BLD AUTO: 98 % (ref 43–75)
OVALOCYTES BLD QL SMEAR: PRESENT
PHOSPHATE SERPL-MCNC: 2.9 MG/DL (ref 2.3–4.1)
PLATELET # BLD AUTO: 90 THOUSANDS/UL (ref 149–390)
PLATELET BLD QL SMEAR: ABNORMAL
PMV BLD AUTO: 8.9 FL (ref 8.9–12.7)
POTASSIUM SERPL-SCNC: 4.2 MMOL/L (ref 3.5–5.3)
PROT SERPL-MCNC: 4.9 G/DL (ref 6.4–8.4)
PROTHROMBIN TIME: 26.3 SECONDS (ref 12.3–15)
RBC # BLD AUTO: 2.91 MILLION/UL (ref 3.88–5.62)
RBC MORPH BLD: PRESENT
SODIUM SERPL-SCNC: 130 MMOL/L (ref 135–147)
UIBC SERPL-MCNC: <55 UG/DL (ref 155–355)
WBC # BLD AUTO: 6.42 THOUSAND/UL (ref 4.31–10.16)

## 2024-11-22 PROCEDURE — 84100 ASSAY OF PHOSPHORUS: CPT | Performed by: INTERNAL MEDICINE

## 2024-11-22 PROCEDURE — 99232 SBSQ HOSP IP/OBS MODERATE 35: CPT | Performed by: FAMILY MEDICINE

## 2024-11-22 PROCEDURE — 85027 COMPLETE CBC AUTOMATED: CPT | Performed by: INTERNAL MEDICINE

## 2024-11-22 PROCEDURE — 99233 SBSQ HOSP IP/OBS HIGH 50: CPT | Performed by: INTERNAL MEDICINE

## 2024-11-22 PROCEDURE — 83550 IRON BINDING TEST: CPT | Performed by: INTERNAL MEDICINE

## 2024-11-22 PROCEDURE — 82728 ASSAY OF FERRITIN: CPT | Performed by: INTERNAL MEDICINE

## 2024-11-22 PROCEDURE — 85610 PROTHROMBIN TIME: CPT | Performed by: FAMILY MEDICINE

## 2024-11-22 PROCEDURE — 80053 COMPREHEN METABOLIC PANEL: CPT | Performed by: FAMILY MEDICINE

## 2024-11-22 PROCEDURE — 87493 C DIFF AMPLIFIED PROBE: CPT | Performed by: NURSE PRACTITIONER

## 2024-11-22 PROCEDURE — 85007 BL SMEAR W/DIFF WBC COUNT: CPT | Performed by: INTERNAL MEDICINE

## 2024-11-22 PROCEDURE — 83735 ASSAY OF MAGNESIUM: CPT

## 2024-11-22 PROCEDURE — 83540 ASSAY OF IRON: CPT | Performed by: INTERNAL MEDICINE

## 2024-11-22 PROCEDURE — 97167 OT EVAL HIGH COMPLEX 60 MIN: CPT

## 2024-11-22 PROCEDURE — 82948 REAGENT STRIP/BLOOD GLUCOSE: CPT

## 2024-11-22 PROCEDURE — 97163 PT EVAL HIGH COMPLEX 45 MIN: CPT

## 2024-11-22 RX ADMIN — PYRIDOSTIGMINE BROMIDE 60 MG: 60 TABLET ORAL at 08:59

## 2024-11-22 RX ADMIN — LEVOTHYROXINE SODIUM 125 MCG: 125 TABLET ORAL at 06:19

## 2024-11-22 RX ADMIN — SEVELAMER HYDROCHLORIDE 800 MG: 800 TABLET, FILM COATED ORAL at 12:11

## 2024-11-22 RX ADMIN — SEVELAMER HYDROCHLORIDE 800 MG: 800 TABLET, FILM COATED ORAL at 08:55

## 2024-11-22 RX ADMIN — DEXAMETHASONE SODIUM PHOSPHATE 6 MG: 10 INJECTION, SOLUTION INTRAMUSCULAR; INTRAVENOUS at 01:42

## 2024-11-22 RX ADMIN — WARFARIN SODIUM 1 MG: 1 TABLET ORAL at 21:12

## 2024-11-22 RX ADMIN — MIDODRINE HYDROCHLORIDE 15 MG: 5 TABLET ORAL at 12:11

## 2024-11-22 RX ADMIN — HYDROCODONE BITARTRATE AND ACETAMINOPHEN 2 TABLET: 5; 325 TABLET ORAL at 21:11

## 2024-11-22 RX ADMIN — FINASTERIDE 5 MG: 5 TABLET, FILM COATED ORAL at 09:02

## 2024-11-22 RX ADMIN — CINACALCET 60 MG: 30 TABLET, FILM COATED ORAL at 08:55

## 2024-11-22 RX ADMIN — METOPROLOL TARTRATE 25 MG: 25 TABLET, FILM COATED ORAL at 21:12

## 2024-11-22 RX ADMIN — REMDESIVIR 100 MG: 100 INJECTION, POWDER, LYOPHILIZED, FOR SOLUTION INTRAVENOUS at 01:42

## 2024-11-22 RX ADMIN — METOPROLOL TARTRATE 25 MG: 25 TABLET, FILM COATED ORAL at 08:55

## 2024-11-22 RX ADMIN — PYRIDOSTIGMINE BROMIDE 60 MG: 60 TABLET ORAL at 21:19

## 2024-11-22 RX ADMIN — DOXYCYCLINE 100 MG: 100 INJECTION, POWDER, LYOPHILIZED, FOR SOLUTION INTRAVENOUS at 08:56

## 2024-11-22 RX ADMIN — TORSEMIDE 60 MG: 20 TABLET ORAL at 17:25

## 2024-11-22 RX ADMIN — MIDODRINE HYDROCHLORIDE 15 MG: 5 TABLET ORAL at 06:19

## 2024-11-22 RX ADMIN — POTASSIUM CHLORIDE 20 MEQ: 1500 TABLET, EXTENDED RELEASE ORAL at 08:55

## 2024-11-22 RX ADMIN — ATORVASTATIN CALCIUM 40 MG: 40 TABLET, FILM COATED ORAL at 17:25

## 2024-11-22 RX ADMIN — DOXYCYCLINE 100 MG: 100 INJECTION, POWDER, LYOPHILIZED, FOR SOLUTION INTRAVENOUS at 21:16

## 2024-11-22 RX ADMIN — TORSEMIDE 60 MG: 20 TABLET ORAL at 08:55

## 2024-11-22 RX ADMIN — PYRIDOSTIGMINE BROMIDE 60 MG: 60 TABLET ORAL at 17:25

## 2024-11-22 NOTE — ASSESSMENT & PLAN NOTE
Lab Results   Component Value Date    HGBA1C 5.0 11/02/2024       Recent Labs     11/21/24  1646 11/21/24  2149 11/22/24  0651 11/22/24  1127   POCGLU 104 113 117 92       Blood Sugar Average: Last 72 hrs:  (P) [id number]  Sliding scale insulin. Hypoglycemia protocol.

## 2024-11-22 NOTE — PLAN OF CARE
Problem: PHYSICAL THERAPY ADULT  Goal: Performs mobility at highest level of function for planned discharge setting.  See evaluation for individualized goals.  Description: Treatment/Interventions: ADL retraining, Functional transfer training, LE strengthening/ROM, Elevations, Therapeutic exercise, Endurance training, Patient/family training, Equipment eval/education, Bed mobility, Gait training, Compensatory technique education, Spoke to nursing, Spoke to case management, OT          See flowsheet documentation for full assessment, interventions and recommendations.  Note: Prognosis: Fair  Problem List: Decreased strength, Decreased endurance, Impaired balance, Decreased mobility, Impaired judgement, Decreased safety awareness, Obesity, Pain  Assessment: Pt is a 69 y.o. male seen for PT evaluation s/p admission to [facility] on 11/20/2024 with Acute respiratory failure with hypoxia and hypercapnia (HCC).  Order placed for PT services.  Upon evaluation: Pt is presenting with impaired functional mobility due to pain, decreased strength, decreased ROM, decreased endurance, impaired balance, gait deviations, decreased safety awareness, impaired judgment, fall risk, and LE edema requiring  moderate of 1 to 2 assistance for bed mobility and minimal of 2 assistance for transfers. Pt's clinical presentation is currently unpredictable given the functional mobility deficits above, especially weakness, decreased ROM, edema of extremities, decreased endurance, impaired balance, pain, decreased activity tolerance, decreased functional mobility tolerance, decreased safety awareness, and impaired judgement, coupled with fall risks as indicated by AM-PAC 6-Clicks: 8/24 as well as hx of falls, impaired balance, polypharmacy, impaired judgement, decreased safety awareness, and obesity and combined with medical complications of pain impacting overall mobility status, abnormal H&H, abnormal sodium values, new  onset O2 use, multiple readmissions, need for input for mobility technique/safety, and Mild to moderate pulmonary edema with right pleural effusion, Left lower lobe atelectasis, abnormal magnesium, Covid-19, acute respiratory failure with hypoxia and hypercapnia, acute CHF .  Pt's PMHx and comorbidities that may affect physical performance and progress include: CHF and ESRD on HD now (was on PD), hemodialysis associated hypotension, Factor 5 Leiden mutation, DM, CKD, gout, Afib , HTN, lymphedema, BPH, CAD, COPD, CVA, h/o DVT/PE, obesity, spinal stenosis, h/o MI, anemia . Personal factors affecting pt at time of IE include: inaccessible home environment, limited home support, inability to perform IADLs, inability to perform ADLs, inability to navigate level surfaces without external assistance, inability to ambulate household distances, limited insight into impairments, and recent fall(s)/fall history. Pt will benefit from continued skilled PT services to address deficits as defined above and to maximize level of functional mobility to facilitate return toward PLOF and improved QOL. From PT/mobility standpoint, recommendation at time of d/c would be Level II (Moderate Resource Intensity in order to reduce fall risk and maximize pt's functional independence and consistency with mobility. Recommend trial  Barriers to Discharge: Decreased caregiver support, Inaccessible home environment  Barriers to Discharge Comments: requires assistance to complete mobility, decreased insight to deficits, inc fall risk  Rehab Resource Intensity Level, PT: II (Moderate Resource Intensity)    See flowsheet documentation for full assessment.

## 2024-11-22 NOTE — ASSESSMENT & PLAN NOTE
Lab Results   Component Value Date    EGFR 28 11/22/2024    EGFR 19 11/21/2024    EGFR 20 11/20/2024    CREATININE 2.24 (H) 11/22/2024    CREATININE 3.05 (H) 11/21/2024    CREATININE 2.90 (H) 11/20/2024

## 2024-11-22 NOTE — PHYSICAL THERAPY NOTE
PHYSICAL THERAPY EVALUATION  NAME:  Masoud Perry  DATE: 11/22/24    AGE:   69 y.o.  Mrn:   [id number]  ADMIT DX:  CHF (congestive heart failure) (HCC) [I50.9]  Dyspnea [R06.00]  SOB (shortness of breath) [R06.02]  Thrombocytopenia (HCC) [D69.6]  Hypoxia [R09.02]  ESRD (end stage renal disease) (HCC) [N18.6]  Elevated troponin [R79.89]  Respiratory failure with hypoxia and hypercapnia (HCC) [J96.91, J96.92]  COVID-19 virus infection [U07.1]    Past Medical History:   Diagnosis Date    Anemia in chronic kidney disease     Anticoagulated on Coumadin     Atrial fibrillation (HCC)     BPH (benign prostatic hyperplasia)     CAD (coronary artery disease)     CKD (chronic kidney disease), stage V (HCC)     Compartment syndrome of forearm (HCC)     Right, 2019    COPD (chronic obstructive pulmonary disease) (HCC)     CVA (cerebral vascular accident) (HCC)     Diastolic CHF (HCC)     grade 1 DD    DVT (deep venous thrombosis) (HCC)     Factor V deficiency (HCC)     Gout     Hyperlipidemia     Hypertension     Hypothyroidism     MI (myocardial infarction) (HCC)     x3 - 1999, 2010, after 2015    Morbid obesity (HCC)     Nephrolithiasis     Noncompliance with medications     SHAD (obstructive sleep apnea)     Peritoneal dialysis catheter in place (HCC)     Pulmonary embolism (HCC)     Secondary hyperparathyroidism of renal origin (HCC)     Spinal stenosis of lumbar region     Status post placement of implantable loop recorder     Tobacco dependence      Length Of Stay: 2  Performed at least 2 patient identifiers during session: Name and Birthday  PHYSICAL THERAPY EVALUATION :    11/22/24 0907   PT Last Visit   PT Visit Date 11/22/24   Note Type   Note type Evaluation   Pain Assessment   Pain Assessment Tool FLACC   Pain Score   (9 to 10)   Pain Location/Orientation Orientation: Lower;Orientation: Mid;Orientation: Upper   Pain Rating: FLACC (Rest) - Face 0   Pain Rating: FLACC (Rest) - Legs 0   Pain Rating: FLACC (Rest) -  Activity 0   Pain Rating: FLACC (Rest) - Cry 1   Pain Rating: FLACC (Rest) - Consolability 0   Score: FLACC (Rest) 1   Pain Rating: FLACC (Activity) - Face 1   Pain Rating: FLACC (Activity) - Legs 0   Pain Rating: FLACC (Activity) - Activity 0   Pain Rating: FLACC (Activity) - Cry 1   Pain Rating: FLACC (Activity) - Consolability 0   Score: FLACC (Activity) 2   Restrictions/Precautions   Other Precautions Chair Alarm;Bed Alarm;Fall Risk;Pain;Multiple lines;Limb alert;Airborne/isolation;Contact/isolation  (right limb alert)   Home Living   Type of Home Apartment  (2 ISELA no HRs)   Home Layout One level;Performs ADLs on one level;Able to live on main level with bedroom/bathroom   Bathroom Shower/Tub Tub/shower unit   Bathroom Toilet   (BSC over toilet)   Bathroom Equipment Shower chair   Home Equipment Walker;Cane;Wheelchair-manual;Reacher   Additional Comments Reports living in an apartment with 2 ISELA no HRs and using RW for mobility back in early september.   Prior Function   Level of [place] Needs assistance with IADLS;Independent with ADLs;Independent with functional mobility   Lives With Alone   Receives Help From Family   IADLs Family/Friend/Other provides transportation;Family/Friend/Other provides meals;Family/Friend/Other provides medication management   Falls in the last 6 months 1 to 4   Comments Reports since hospitalizations has been requiring assistance with mobility, ADLs and IADLs, but was independent with mobility and ADLs in early September. Is hopeful to get back to his apartment.   General   Additional Pertinent History Pt admitted 9/15/24 to 9/23/24 at this facility and discharged to [facility] acute [facility] 9/23/24 to 10/17/24. Discharged to home and then was admitted to OSH with fecal impaction from 11/1/24 to 11/13/24  where hew started HD on 11/8/24 and discharged to [facility]. Now is + Covid. inc B LE edema   Cognition   Orientation Level Oriented X4   Following Commands  "Follows one step commands without difficulty   Comments pt resistant to participation, requires reassurance and encouragement, education on imprtance of mobility. pt with decreased insight to deficits.   Subjective   Subjective \"They weren't ready for me at [facility].\"   RLE Assessment   RLE Assessment   (ankle DF/PF WFL, knee ext ~-10 deg sitting EOB, hip flexion < 90 deg. strength 2-/5 hip, 3-/5 knee and 3/5 ankle)   LLE Assessment   LLE Assessment   (ankle DF/PF WFL, knee ext ~-10 deg sitting EOB, hip flexion < 90 deg. strength 2-/5 hip, 3-/5 knee and 3/5 ankle)   Coordination   Rapid Alternating Movements Intact   Light Touch   RLE Light Touch Grossly intact   LLE Light Touch Grossly intact   Bed Mobility   Supine to Sit 3  Moderate assistance   Additional items Assist x 1;Increased time required;Verbal cues;LE management  (trunk management)   Sit to Supine 3  Moderate assistance   Additional items Assist x 2;Increased time required;Verbal cues;LE management  (trunk management)   Additional Comments HOB elevated > 30 degrees. use of bedrail. modAx1 to complete with manual cues for trunk and LE management. returned to supine with modAx2.   Transfers   Sit to Stand 4  Minimal assistance   Additional items Assist x 2;Increased time required;Verbal cues   Stand to Sit 4  Minimal assistance   Additional items Assist x 2;Increased time required;Verbal cues   Stand pivot   (pt declined)   Additional Comments RW. verbal cues for hand placement for safety with RW. miNAx2 to achieve standing. static standing with minAx1-2. attempted to have patient lateral step to HOB however delcined. declined sitting OOB in relciner chair.   Balance   Static Sitting Fair   Dynamic Sitting Fair -   Static Standing Poor +   Endurance Deficit   Endurance Deficit Yes   Endurance Deficit Description fatigue. Spo2 95% on room air as patient had O2 removed upon arrival to room. SpO2 >/= 91% throughout. O2 at 1 lpm NC reapplied at end of " "session. RN aware.   Activity Tolerance   Activity Tolerance Patient limited by fatigue;Patient limited by pain   Medical Staff Made Aware Umang CONSTANTINO   Nurse Made Aware Sveta HARE   Assessment   Prognosis Fair   Problem List Decreased strength;Decreased endurance;Impaired balance;Decreased mobility;Impaired judgement;Decreased safety awareness;Obesity;Pain   Barriers to Discharge Decreased caregiver support;Inaccessible home environment   Barriers to Discharge Comments requires assistance to complete mobility, decreased insight to deficits, inc fall risk   Goals   Patient Goals \"Go home\"   STG Expiration Date 12/06/24   PT Treatment Day 0   Plan   Treatment/Interventions ADL retraining;Functional transfer training;LE strengthening/ROM;Elevations;Therapeutic exercise;Endurance training;Patient/family training;Equipment eval/education;Bed mobility;Gait training;Compensatory technique education;Spoke to nursing;Spoke to case management;OT   PT Frequency 3-5x/wk   Discharge Recommendation   Rehab Resource Intensity Level, PT II (Moderate Resource Intensity)   Additional Comments return to PAR upon discharge   AM-PAC Basic Mobility Inpatient   Turning in Flat Bed Without Bedrails 2   Lying on Back to Sitting on Edge of Flat Bed Without Bedrails 2   Moving Bed to Chair 1   Standing Up From Chair Using Arms 1   Walk in Room 1   Climb 3-5 Stairs With Railing 1   Basic Mobility Inpatient Raw Score 8   Turning Head Towards Sound 4   Follow Simple Instructions 4   Low Function Basic Mobility Raw Score  16   Low Function Basic Mobility Standardized Score  25.72   [facility] Highest Level Of Mobility   -HL Goal 3: Sit at edge of bed   -HLM Achieved 3: Sit at edge of bed   End of Consult   Patient Position at End of Consult Bedside chair;Bed/Chair alarm activated;All needs within reach       Pt requires PT/OT co-eval due to medical complexity, safety concerns, fall risk, significant assistance with mobility and/or " cognitive-behavioral impairments.    (Please find full objective findings from PT assessment regarding body systems outlined above).     Assessment: Pt is a 69 y.o. male seen for PT evaluation s/p admission to [facility] on 11/20/2024 with Acute respiratory failure with hypoxia and hypercapnia (HCC).  Order placed for PT services.  Upon evaluation: Pt is presenting with impaired functional mobility due to pain, decreased strength, decreased ROM, decreased endurance, impaired balance, gait deviations, decreased safety awareness, impaired judgment, fall risk, and LE edema requiring  moderate of 1 to 2 assistance for bed mobility and minimal of 2 assistance for transfers. Pt's clinical presentation is currently unpredictable given the functional mobility deficits above, especially weakness, decreased ROM, edema of extremities, decreased endurance, impaired balance, pain, decreased activity tolerance, decreased functional mobility tolerance, decreased safety awareness, and impaired judgement, coupled with fall risks as indicated by AM-PAC 6-Clicks: 8/24 as well as hx of falls, impaired balance, polypharmacy, impaired judgement, decreased safety awareness, and obesity and combined with medical complications of pain impacting overall mobility status, abnormal H&H, abnormal sodium values, new onset O2 use, multiple readmissions, need for input for mobility technique/safety, and Mild to moderate pulmonary edema with right pleural effusion, Left lower lobe atelectasis, abnormal magnesium, Covid-19, acute respiratory failure with hypoxia and hypercapnia, acute CHF .  Pt's PMHx and comorbidities that may affect physical performance and progress include: CHF and ESRD on HD now (was on PD), hemodialysis associated hypotension, Factor 5 Leiden mutation, DM, CKD, gout, Afib , HTN, lymphedema, BPH, CAD, COPD, CVA, h/o DVT/PE, obesity, spinal stenosis, h/o MI, anemia . Personal factors affecting pt at time of IE  include: inaccessible home environment, limited home support, inability to perform IADLs, inability to perform ADLs, inability to navigate level surfaces without external assistance, inability to ambulate household distances, limited insight into impairments, and recent fall(s)/fall history. Pt will benefit from continued skilled PT services to address deficits as defined above and to maximize level of functional mobility to facilitate return toward PLOF and improved QOL. From PT/mobility standpoint, recommendation at time of d/c would be Level II (Moderate Resource Intensity in order to reduce fall risk and maximize pt's functional independence and consistency with mobility. Recommend trial with walker next 1-2 sessions and ther ex next 1-2 sessions.       The patient's AM-PAC Basic Mobility Inpatient Short Form Raw Score is 8. A Raw score of less than or equal to 16 suggests the patient may benefit from discharge to post-acute rehabilitation services. Please also refer to the recommendation of the Physical Therapist for safe discharge planning.       Goals: Pt will: Perform bed mobility tasks with consistent min A of 1 to reposition in bed and prepare for transfers. Pt will perform transfers with consistent min A of 1 to decrease burden of care, decrease risk for falls, and improve activity tolerance and prepare for ambulation. Pt will ambulate with RW for >/= 15' with  consistent min A of 1  to decrease burden of care, decrease risk for falls, improve activity tolerance, and improve gait quality and to access home environment. Pt will complete 1 step with RW with consistent modA of 1 to return to home with ISELA and improve activity tolerance. Pt will participate in objective balance assessment to determine baseline fall risk. Pt will increase B LE strength >/= 1/2 MMT grade to facilitate functional mobility.      Carlie Dan, PT,DPT

## 2024-11-22 NOTE — ASSESSMENT & PLAN NOTE
Presented 11/20 with SOB and COVID+ x 3 days.   Patient recently discharged 11/13 from [facility] following IV ABX for colitis  Given duo nebs and solu-medrol by EMS without improvement  VBG showed pCO2 65.6, pO2 28.4  On 2L NC since COVID dx. Increased to 4L today, O2 96%.  Respiratory protocol

## 2024-11-22 NOTE — ASSESSMENT & PLAN NOTE
Lab Results   Component Value Date    EGFR 28 11/22/2024    EGFR 19 11/21/2024    EGFR 20 11/20/2024    CREATININE 2.24 (H) 11/22/2024    CREATININE 3.05 (H) 11/21/2024    CREATININE 2.90 (H) 11/20/2024   Continue to monitor while remain on anticoagulation with Coumadin

## 2024-11-22 NOTE — PLAN OF CARE
Problem: Prexisting or High Potential for Compromised Skin Integrity  Goal: Skin integrity is maintained or improved  Description: INTERVENTIONS:  - Identify patients at risk for skin breakdown  - Assess and monitor skin integrity  - Assess and monitor nutrition and hydration status  - Monitor labs   - Assess for incontinence   - Turn and reposition patient  - Assist with mobility/ambulation  - Relieve pressure over bony prominences  - Avoid friction and shearing  - Provide appropriate hygiene as needed including keeping skin clean and dry  - Evaluate need for skin moisturizer/barrier cream  - Collaborate with interdisciplinary team   - Patient/family teaching  - Consider wound care consult   Outcome: Progressing     Problem: PAIN - ADULT  Goal: Verbalizes/displays adequate comfort level or baseline comfort level  Description: Interventions:  - Encourage patient to monitor pain and request assistance  - Assess pain using appropriate pain scale  - Administer analgesics based on type and severity of pain and evaluate response  - Implement non-pharmacological measures as appropriate and evaluate response  - Consider cultural and social influences on pain and pain management  - Notify physician/advanced practitioner if interventions unsuccessful or patient reports new pain  Outcome: Progressing     Problem: INFECTION - ADULT  Goal: Absence or prevention of progression during hospitalization  Description: INTERVENTIONS:  - Assess and monitor for signs and symptoms of infection  - Monitor lab/diagnostic results  - Monitor all insertion sites, i.e. indwelling lines, tubes, and drains  - Monitor endotracheal if appropriate and nasal secretions for changes in amount and color  - [place] appropriate cooling/warming therapies per order  - Administer medications as ordered  - Instruct and encourage patient and family to use good hand hygiene technique  - Identify and instruct in appropriate isolation precautions for  identified infection/condition  Outcome: Progressing     Problem: CARDIOVASCULAR - ADULT  Goal: Maintains optimal cardiac output and hemodynamic stability  Description: INTERVENTIONS:  - Monitor I/O, vital signs and rhythm  - Monitor for S/S and trends of decreased cardiac output  - Administer and titrate ordered vasoactive medications to optimize hemodynamic stability  - Assess quality of pulses, skin color and temperature  - Assess for signs of decreased coronary artery perfusion  - Instruct patient to report change in severity of symptoms  Outcome: Progressing     Problem: RESPIRATORY - ADULT  Goal: Achieves optimal ventilation and oxygenation  Description: INTERVENTIONS:  - Assess for changes in respiratory status  - Assess for changes in mentation and behavior  - Position to facilitate oxygenation and minimize respiratory effort  - Oxygen administered by appropriate delivery if ordered  - Initiate smoking cessation education as indicated  - Encourage broncho-pulmonary hygiene including cough, deep breathe, Incentive Spirometry  - Assess the need for suctioning and aspirate as needed  - Assess and instruct to report SOB or any respiratory difficulty  - Respiratory Therapy support as indicated  Outcome: Progressing     Problem: METABOLIC, FLUID AND ELECTROLYTES - ADULT  Goal: Electrolytes maintained within normal limits  Description: INTERVENTIONS:  - Monitor labs and assess patient for signs and symptoms of electrolyte imbalances  - Administer electrolyte replacement as ordered  - Monitor response to electrolyte replacements, including repeat lab results as appropriate  - Instruct patient on fluid and nutrition as appropriate  Outcome: Progressing     Problem: METABOLIC, FLUID AND ELECTROLYTES - ADULT  Goal: Fluid balance maintained  Description: INTERVENTIONS:  - Monitor labs   - Monitor I/O and WT  - Instruct patient on fluid and nutrition as appropriate  - Assess for signs & symptoms of volume excess or  deficit  Outcome: Progressing

## 2024-11-22 NOTE — PLAN OF CARE
Problem: OCCUPATIONAL THERAPY ADULT  Goal: Performs self-care activities at highest level of function for planned discharge setting.  See evaluation for individualized goals.  Description: Treatment Interventions: ADL retraining, Visual perceptual retraining, Functional transfer training, Activityengagement, Energy conservation, Compensatory technique education, Patient/family training          See flowsheet documentation for full assessment, interventions and recommendations.   Note: Limitation: Decreased ADL status, Decreased UE ROM, Decreased UE strength, Decreased Safe judgement during ADL, Decreased endurance, Decreased self-care trans, Decreased high-level ADLs  Prognosis: Fair  Assessment: Pt is a 69 y.o. male, admitted to [facility] 11/20/2024 d/t experiencing shortness of breath. Dx: acute respiratory insufficiency. Pt with PMHx impacting their performance during ADL tasks, including: anemia, Afib, BPH, CAD, CKD, compartment syndrome R forearm, COPD, CVA, DVT, Gout, MI, obesity, SHAD, pulmonary embolism, spinal stenosis.. Prior to admission to the hospital Pt was performing ADLs with physical assistance. IADLs with physical assistance. Functional transfers/ambulation with physical assistance. Cognitive status was PTA was WFL. OT order placed to assess Pt's ADLs, cognitive status, and performance during functional tasks in order to maximize safety and independence while making most appropriate d/c recommendations. PT/OT co-evaluation completed at this time d/t significant mobility deficits and safety concerns. Pt's clinical presentation is currently unstable/unpredictable given new onset deficits that effect Pt's occupational performance and ability to safely return to PLOF including decrease activity tolerance, decrease standing balance, decrease sitting balance, decrease performance during ADL tasks, decrease safety awareness , decrease generalized strength, decrease activity engagement, decrease performance  during functional transfers, limited insight to deficits, and inability to express needs combined with medical complications of hypotension, poor blood pressure control, pain impacting overall mobility status, edema/swelling, wounds, and need for input for mobility technique/safety. Personal factors affecting Pt at time of initial evaluation include: inability to perform IADLs, inability to perform ADLs, inability to ambulate household distances, inability to navigate community distances, limited insight into impairments, and decreased initiation and engagement. Pt will benefit from continued skilled OT services to address deficits as defined above and to maximize level independence/participation during ADLs and functional tasks to facilitate return toward PLOF and improved quality of life. From an occupational therapy standpoint, recommendation at time of d/c would be Level II: Moderate Resource Intensity.     Rehab Resource Intensity Level, OT: II (Moderate Resource Intensity)

## 2024-11-22 NOTE — PROGRESS NOTES
Progress Note - Hospitalist   Name: Masoud Perry 69 y.o. male I MRN: [id number]  Unit/Bed#: [facility] I Date of Admission: 11/20/2024   Date of Service: 11/22/2024 I Hospital Day: 2    Assessment & Plan  Acute respiratory insufficiency  Presented 11/20 with SOB and COVID+ x 3 days.   Patient recently discharged 11/13 from [facility] following IV ABX for colitis  Given duo nebs and solu-medrol by EMS without improvement  VBG showed pCO2 65.6, pO2 28.4  On 2L NC since COVID dx. Increased to 4L today, O2 96%.  Respiratory protocol  Acute on chronic diastolic HF (heart failure) ([facility])  Wt Readings from Last 3 Encounters:   11/22/24 98.5 kg (217 lb 2.5 oz)   10/14/24 115 kg (253 lb)   10/07/24 118 kg (260 lb)   CXR on 11/20 appeared to be pulmonary vascular congestion     Given Lasix 80 mg in ED  Patient is a dialysis basis, received hemodialysis on [facility], last dialysis on 11/21/2024  Also nephrology added torsemide 60 mg twice daily  COVID-19  Presents from [facility]. Tested positive 3 days ago when symptoms started  Started decadron and remdesevir, doxycycline  Patient already on Coumadin, INR therapeutic, continue  Secondary hyperparathyroidism of renal origin (HCC)  Continue Cinacalcet on Mondays, Wednesdays, and Fridays  Hemodialysis-associated hypotension  Continue midodrine and pyridostigmine  HLD (hyperlipidemia)  Continue atorvastatin  Acquired hypothyroidism  Continue levothyroxine  Factor 5 Leiden mutation, heterozygous (HCC)  On Coumadin-INR therapeutic, continue  Type 2 diabetes mellitus with stage 4 chronic kidney disease, with long-term current use of insulin ([facility])  Lab Results   Component Value Date    HGBA1C 5.0 11/02/2024       Recent Labs     11/21/24  1646 11/21/24  2149 11/22/24  0651 11/22/24  1127   POCGLU 104 113 117 92       Blood Sugar Average: Last 72 hrs:  (P) [id number]  Sliding scale insulin. Hypoglycemia protocol.  Personal history of gout  Continue allopurinol  Elevated  troponin  Trending  Paroxysmal atrial fibrillation (HCC)  Continue metoprolol if blood pressure tolerates  Continue Coumadin, maintain electrolytes  ESRD (end stage renal disease) on dialysis ([facility])  Lab Results   Component Value Date    EGFR 28 11/22/2024    EGFR 19 11/21/2024    EGFR 20 11/20/2024    CREATININE 2.24 (H) 11/22/2024    CREATININE 3.05 (H) 11/21/2024    CREATININE 2.90 (H) 11/20/2024     Anemia due to chronic kidney disease, on chronic dialysis ([facility])  Lab Results   Component Value Date    EGFR 28 11/22/2024    EGFR 19 11/21/2024    EGFR 20 11/20/2024    CREATININE 2.24 (H) 11/22/2024    CREATININE 3.05 (H) 11/21/2024    CREATININE 2.90 (H) 11/20/2024   Continue to monitor while remain on anticoagulation with Coumadin  Pancytopenia (HCC)  Multifactorial in origin, continue to monitor    VTE Pharmacologic Prophylaxis: VTE Score: 6 High Risk (Score >/= 5) - Pharmacological DVT Prophylaxis Ordered: warfarin (Coumadin). Sequential Compression Devices Ordered.    Mobility:   Basic Mobility Inpatient Raw Score: 11  JH-HLM Goal: 4: Move to chair/commode  JH-HLM Achieved: 3: Sit at edge of bed  JH-HLM Goal NOT achieved. Continue with multidisciplinary rounding and encourage appropriate mobility to improve upon JH-HLM goals.    Patient Centered Rounds: I performed bedside rounds with nursing staff today.   Discussions with Specialists or Other Care Team Provider: Nephrology    Education and Discussions with Family / Patient: Updated  (son) via phone.    Current Length of Stay: 2 day(s)  Current Patient Status: Inpatient   Certification Statement: The patient will continue to require additional inpatient hospital stay due to monitorable conditions  Discharge Plan: Anticipate discharge in >72 hrs to rehab facility.    Code Status: Level 3 - DNAR and DNI    Subjective   Seen and evaluated during the run.  Lying on bed, looks tired.    Objective :  Temp:  [97.5 °F (36.4 °C)] 97.5 °F (36.4 °C)  HR:   [60-83] 70  BP: ()/(48-77) 121/53  Resp:  [20] 20  SpO2:  [92 %-98 %] 95 %  O2 Device: Nasal cannula  Nasal Cannula O2 Flow Rate (L/min):  [1 L/min] 1 L/min    Body mass index is 29.45 kg/m².     Input and Output Summary (last 24 hours):     Intake/Output Summary (Last 24 hours) at 11/22/2024 1300  Last data filed at 11/22/2024 0900  Gross per 24 hour   Intake 1250 ml   Output 1900 ml   Net -650 ml       Physical Exam  Vitals and nursing note reviewed.   Constitutional:       Appearance: He is not ill-appearing or diaphoretic.   Eyes:      General: No scleral icterus.        Left eye: No discharge.      Extraocular Movements: Extraocular movements intact.      Pupils: Pupils are equal, round, and reactive to light.   Cardiovascular:      Rate and Rhythm: Normal rate.      Heart sounds:      No friction rub. No gallop.   Pulmonary:      Effort: Pulmonary effort is normal. No respiratory distress.      Breath sounds: No stridor. Rales present. No wheezing or rhonchi.   Chest:       Abdominal:      General: There is no distension.      Palpations: There is no mass.      Tenderness: There is no abdominal tenderness.      Hernia: No hernia is present.   Musculoskeletal:      Right lower leg: No edema.      Left lower leg: No edema.   Neurological:      Mental Status: He is alert and oriented to person, place, and time.      Cranial Nerves: No cranial nerve deficit.      Sensory: No sensory deficit.      Motor: No weakness.      Coordination: Coordination normal.           Lines/Drains:  Lines/Drains/Airways       Active Status       Name Placement date Placement time Site Days    HD Permanent Double Catheter 10/14/24  1420  Subclavian  38                            Lab Results: I have reviewed the following results:   Results from last 7 days   Lab Units 11/22/24  0625 11/21/24  0502   WBC Thousand/uL 6.42 6.27   HEMOGLOBIN g/dL 9.3* 10.0*   HEMATOCRIT % 30.2* 32.9*   PLATELETS Thousands/uL 90* 80*   SEGS PCT %  --   95*   LYMPHO PCT % 2* 3*   MONO PCT % 0* 1*   EOS PCT % 0 0     Results from last 7 days   Lab Units 11/22/24  0625   SODIUM mmol/L 130*   POTASSIUM mmol/L 4.2   CHLORIDE mmol/L 96   CO2 mmol/L 28   BUN mg/dL 17   CREATININE mg/dL 2.24*   ANION GAP mmol/L 6   CALCIUM mg/dL 8.9   ALBUMIN g/dL 2.2*   TOTAL BILIRUBIN mg/dL 0.28   ALK PHOS U/L 62   ALT U/L 6*   AST U/L 15   GLUCOSE RANDOM mg/dL 104     Results from last 7 days   Lab Units 11/22/24  0625   INR  2.41*     Results from last 7 days   Lab Units 11/22/24  1127 11/22/24  0651 11/21/24  2149 11/21/24  1646 11/21/24  1112 11/21/24  0728 11/21/24  0041   POC GLUCOSE mg/dl 92 117 113 104 168* 135 95         Results from last 7 days   Lab Units 11/21/24  0502 11/20/24  2148 11/20/24  2133   LACTIC ACID mmol/L  --   --  0.8   PROCALCITONIN ng/ml 0.87* 0.84*  --        Recent Cultures (last 7 days):   Results from last 7 days   Lab Units 11/20/24  2133   BLOOD CULTURE  No Growth at 24 hrs.  No Growth at 24 hrs.       Imaging Results Review: No pertinent imaging studies reviewed.  Other Study Results Review: No additional pertinent studies reviewed.    Last 24 Hours Medication List:     Current Facility-Administered Medications:     acetaminophen (TYLENOL) tablet 650 mg, Q4H PRN    atorvastatin (LIPITOR) tablet 40 mg, Daily With Dinner    cinacalcet (SENSIPAR) tablet 60 mg, Once per day on Monday Wednesday Friday    dexamethasone (PF) (DECADRON) injection 6 mg, Q24H    docusate sodium (COLACE) capsule 100 mg, BID PRN    doxycycline (VIBRAMYCIN) 100 mg in sodium chloride 0.9 % 100 mL IVPB, Q12H, Last Rate: 100 mg (11/22/24 0856)    epoetin domingo (EPOGEN,PROCRIT) injection 5,000 Units, Once per day on Tuesday Thursday Saturday    finasteride (PROSCAR) tablet 5 mg, Daily    HYDROcodone-acetaminophen (NORCO) 5-325 mg per tablet 2 tablet, Q6H PRN    insulin lispro (HumALOG/ADMELOG) 100 units/mL subcutaneous injection 1-5 Units, HS    insulin lispro (HumALOG/ADMELOG) 100  units/mL subcutaneous injection 1-6 Units, TID AC **AND** Fingerstick Glucose (POCT), TID AC    levothyroxine tablet 125 mcg, Early Morning    metoprolol tartrate (LOPRESSOR) tablet 25 mg, Q12H VASU    midodrine (PROAMATINE) tablet 15 mg, TID AC    nicotine (NICODERM CQ) 7 mg/24hr TD 24 hr patch 7 mg, Daily    ondansetron (ZOFRAN) injection 4 mg, Q6H PRN    potassium chloride (Klor-Con M20) CR tablet 20 mEq, Daily    pyridostigmine (MESTINON) tablet 60 mg, TID    [COMPLETED] remdesivir (Veklury) 200 mg in sodium chloride 0.9 % 290 mL IVPB, Q24H, Last Rate: Stopped (11/21/24 0345) **FOLLOWED BY** remdesivir (Veklury) 100 mg in sodium chloride 0.9 % 270 mL IVPB, Q24H    sevelamer (RENAGEL) tablet 800 mg, TID With Meals    simethicone (MYLICON) chewable tablet 80 mg, 4x Daily PRN    torsemide (DEMADEX) tablet 60 mg, BID    warfarin (COUMADIN) tablet 1 mg, HS    Administrative Statements   Today, Patient Was Seen By: Moses Noel MD      **Please Note: This note may have been constructed using a voice recognition system.**

## 2024-11-22 NOTE — PLAN OF CARE
Problem: Prexisting or High Potential for Compromised Skin Integrity  Goal: Skin integrity is maintained or improved  Description: INTERVENTIONS:  - Identify patients at risk for skin breakdown  - Assess and monitor skin integrity  - Assess and monitor nutrition and hydration status  - Monitor labs   - Assess for incontinence   - Turn and reposition patient  - Assist with mobility/ambulation  - Relieve pressure over bony prominences  - Avoid friction and shearing  - Provide appropriate hygiene as needed including keeping skin clean and dry  - Evaluate need for skin moisturizer/barrier cream  - Collaborate with interdisciplinary team   - Patient/family teaching  - Consider wound care consult   Outcome: Progressing     Problem: PAIN - ADULT  Goal: Verbalizes/displays adequate comfort level or baseline comfort level  Description: Interventions:  - Encourage patient to monitor pain and request assistance  - Assess pain using appropriate pain scale  - Administer analgesics based on type and severity of pain and evaluate response  - Implement non-pharmacological measures as appropriate and evaluate response  - Consider cultural and social influences on pain and pain management  - Notify physician/advanced practitioner if interventions unsuccessful or patient reports new pain  Outcome: Progressing     Problem: INFECTION - ADULT  Goal: Absence or prevention of progression during hospitalization  Description: INTERVENTIONS:  - Assess and monitor for signs and symptoms of infection  - Monitor lab/diagnostic results  - Monitor all insertion sites, i.e. indwelling lines, tubes, and drains  - Monitor endotracheal if appropriate and nasal secretions for changes in amount and color  - [place] appropriate cooling/warming therapies per order  - Administer medications as ordered  - Instruct and encourage patient and family to use good hand hygiene technique  - Identify and instruct in appropriate isolation precautions for  identified infection/condition  Outcome: Progressing  Goal: Absence of fever/infection during neutropenic period  Description: INTERVENTIONS:  - Monitor WBC    Outcome: Progressing     Problem: SAFETY ADULT  Goal: Patient will remain free of falls  Description: INTERVENTIONS:  - Educate patient/family on patient safety including physical limitations  - Instruct patient to call for assistance with activity   - Consult OT/PT to assist with strengthening/mobility   - Keep Call bell within reach  - Keep bed low and locked with side rails adjusted as appropriate  - Keep care items and personal belongings within reach  - Initiate and maintain comfort rounds  - Make Fall Risk Sign visible to staff  - Offer Toileting every 2 Hours, in advance of need  - Initiate/Maintain bed/chair alarm  - Obtain necessary fall risk management equipment  - Apply yellow socks and bracelet for high fall risk patients  - Consider moving patient to room near nurses station  Outcome: Progressing  Goal: Maintain or return to baseline ADL function  Description: INTERVENTIONS:  -  Assess patient's ability to carry out ADLs; assess patient's baseline for ADL function and identify physical deficits which impact ability to perform ADLs (bathing, care of mouth/teeth, toileting, grooming, dressing, etc.)  - Assess/evaluate cause of self-care deficits   - Assess range of motion  - Assess patient's mobility; develop plan if impaired  - Assess patient's need for assistive devices and provide as appropriate  - Encourage maximum independence but intervene and supervise when necessary  - Involve family in performance of ADLs  - Assess for home care needs following discharge   - Consider OT consult to assist with ADL evaluation and planning for discharge  - Provide patient education as appropriate  Outcome: Progressing  Goal: Maintains/Returns to pre admission functional level  Description: INTERVENTIONS:  - Perform AM-PAC 6 Click Basic Mobility/ Daily  Activity assessment daily.  - Set and communicate daily mobility goal to care team and patient/family/caregiver.   - Collaborate with rehabilitation services on mobility goals if consulted  - Perform Range of Motion 3 times a day.  - Reposition patient every 2 hours.  - Dangle patient 3 times a day  - Stand patient 3 times a day  - Ambulate patient 3 times a day  - Out of bed to chair 3 times a day   - Out of bed for meals 3 times a day  - Out of bed for toileting  - Record patient progress and toleration of activity level   Outcome: Progressing     Problem: DISCHARGE PLANNING  Goal: Discharge to home or other facility with appropriate resources  Description: INTERVENTIONS:  - Identify barriers to discharge w/patient and caregiver  - Arrange for needed discharge resources and transportation as appropriate  - Identify discharge learning needs (meds, wound care, etc.)  - Arrange for interpretive services to assist at discharge as needed  - Refer to Case Management Department for coordinating discharge planning if the patient needs post-hospital services based on physician/advanced practitioner order or complex needs related to functional status, cognitive ability, or social support system  Outcome: Progressing     Problem: Knowledge Deficit  Goal: Patient/family/caregiver demonstrates understanding of disease process, treatment plan, medications, and discharge instructions  Description: Complete learning assessment and assess knowledge base.  Interventions:  - Provide teaching at level of understanding  - Provide teaching via preferred learning methods  Outcome: Progressing     Problem: CARDIOVASCULAR - ADULT  Goal: Maintains optimal cardiac output and hemodynamic stability  Description: INTERVENTIONS:  - Monitor I/O, vital signs and rhythm  - Monitor for S/S and trends of decreased cardiac output  - Administer and titrate ordered vasoactive medications to optimize hemodynamic stability  - Assess quality of pulses,  skin color and temperature  - Assess for signs of decreased coronary artery perfusion  - Instruct patient to report change in severity of symptoms  Outcome: Progressing  Goal: Absence of cardiac dysrhythmias or at baseline rhythm  Description: INTERVENTIONS:  - Continuous cardiac monitoring, vital signs, obtain 12 lead EKG if ordered  - Administer antiarrhythmic and heart rate control medications as ordered  - Monitor electrolytes and administer replacement therapy as ordered  Outcome: Progressing     Problem: RESPIRATORY - ADULT  Goal: Achieves optimal ventilation and oxygenation  Description: INTERVENTIONS:  - Assess for changes in respiratory status  - Assess for changes in mentation and behavior  - Position to facilitate oxygenation and minimize respiratory effort  - Oxygen administered by appropriate delivery if ordered  - Initiate smoking cessation education as indicated  - Encourage broncho-pulmonary hygiene including cough, deep breathe, Incentive Spirometry  - Assess the need for suctioning and aspirate as needed  - Assess and instruct to report SOB or any respiratory difficulty  - Respiratory Therapy support as indicated  Outcome: Progressing     Problem: METABOLIC, FLUID AND ELECTROLYTES - ADULT  Goal: Electrolytes maintained within normal limits  Description: INTERVENTIONS:  - Monitor labs and assess patient for signs and symptoms of electrolyte imbalances  - Administer electrolyte replacement as ordered  - Monitor response to electrolyte replacements, including repeat lab results as appropriate  - Instruct patient on fluid and nutrition as appropriate  Outcome: Progressing  Goal: Fluid balance maintained  Description: INTERVENTIONS:  - Monitor labs   - Monitor I/O and WT  - Instruct patient on fluid and nutrition as appropriate  - Assess for signs & symptoms of volume excess or deficit  Outcome: Progressing  Goal: Glucose maintained within target range  Description: INTERVENTIONS:  - Monitor Blood  Glucose as ordered  - Assess for signs and symptoms of hyperglycemia and hypoglycemia  - Administer ordered medications to maintain glucose within target range  - Assess nutritional intake and initiate nutrition service referral as needed  Outcome: Progressing     Problem: Nutrition/Hydration-ADULT  Goal: Nutrient/Hydration intake appropriate for improving, restoring or maintaining nutritional needs  Description: Monitor and assess patient's nutrition/hydration status for malnutrition. Collaborate with interdisciplinary team and initiate plan and interventions as ordered.  Monitor patient's weight and dietary intake as ordered or per policy. Utilize nutrition screening tool and intervene as necessary. Determine patient's food preferences and provide high-protein, high-caloric foods as appropriate.     INTERVENTIONS:  - Monitor oral intake, urinary output, labs, and treatment plans  - Assess nutrition and hydration status and recommend course of action  - Evaluate amount of meals eaten  - Assist patient with eating if necessary   - Allow adequate time for meals  - Recommend/ encourage appropriate diets, oral nutritional supplements, and vitamin/mineral supplements  - Order, calculate, and assess calorie counts as needed  - Recommend, monitor, and adjust tube feedings and TPN/PPN based on assessed needs  - Assess need for intravenous fluids  - Provide specific nutrition/hydration education as appropriate  - Include patient/family/caregiver in decisions related to nutrition  Outcome: Progressing

## 2024-11-22 NOTE — OCCUPATIONAL THERAPY NOTE
Occupational Therapy Evaluation     Patient Name: Masoud Perry  Today's Date: 11/22/2024  Problem List  Principal Problem:    Acute respiratory insufficiency  Active Problems:    Acute on chronic diastolic HF (heart failure) (HCC)    HLD (hyperlipidemia)    Acquired hypothyroidism    Pancytopenia (HCC)    Factor 5 Leiden mutation, heterozygous (HCC)    Type 2 diabetes mellitus with stage 4 chronic kidney disease, with long-term current use of insulin (HCC)    Secondary hyperparathyroidism of renal origin (HCC)    Paroxysmal atrial fibrillation (HCC)    Hemodialysis-associated hypotension    Personal history of gout    COVID-19    Elevated troponin    ESRD (end stage renal disease) on dialysis (HCC)    Anemia due to chronic kidney disease, on chronic dialysis (HCC)    Past Medical History  Past Medical History:   Diagnosis Date    Anemia in chronic kidney disease     Anticoagulated on Coumadin     Atrial fibrillation (HCC)     BPH (benign prostatic hyperplasia)     CAD (coronary artery disease)     CKD (chronic kidney disease), stage V (HCC)     Compartment syndrome of forearm (HCC)     Right, 2019    COPD (chronic obstructive pulmonary disease) (HCC)     CVA (cerebral vascular accident) (HCC)     Diastolic CHF (HCC)     grade 1 DD    DVT (deep venous thrombosis) (HCC)     Factor V deficiency (HCC)     Gout     Hyperlipidemia     Hypertension     Hypothyroidism     MI (myocardial infarction) (HCC)     x3 - 1999, 2010, after 2015    Morbid obesity (HCC)     Nephrolithiasis     Noncompliance with medications     SHAD (obstructive sleep apnea)     Peritoneal dialysis catheter in place (HCC)     Pulmonary embolism (HCC)     Secondary hyperparathyroidism of renal origin (HCC)     Spinal stenosis of lumbar region     Status post placement of implantable loop recorder     Tobacco dependence      Past Surgical History  Past Surgical History:   Procedure Laterality Date    CARDIAC ELECTROPHYSIOLOGY STUDY AND ABLATION   04/29/2024    CHOLECYSTECTOMY      CORONARY ANGIOPLASTY WITH STENT PLACEMENT      JOINT REPLACEMENT      bilateral knee    KY CYSTO/URETERO W/LITHOTRIPSY &INDWELL STENT INSRT Left 09/24/2022    Procedure: CYSTOSCOPY URETEROSCOPY WITH LITHOTRIPSY HOLMIUM LASER, AND INSERTION STENT URETERAL;  Surgeon: Lewis Dahl MD;  Location: [facility];  Service: Urology    KY LAPS INSERTION TUNNELED INTRAPERITONEAL CATHETER N/A 6/7/2024    Procedure: INSERTION PERITONEAL CATHETER DIALYSIS LAPAROSCOPIC;  Surgeon: Hiram Park DO;  Location: [facility];  Service: General    US GUIDED KIDNEY BIOPSY  08/05/2020 11/22/24 0913   OT Last Visit   OT Visit Date 11/22/24   Note Type   Note type Evaluation   Pain Assessment   Pain Assessment Tool FLACC   Pain Location/Orientation Location: Back   Pain Rating: FLACC (Rest) - Face 0   Pain Rating: FLACC (Rest) - Legs 0   Pain Rating: FLACC (Rest) - Activity 0   Pain Rating: FLACC (Rest) - Cry 1   Pain Rating: FLACC (Rest) - Consolability 0   Score: FLACC (Rest) 1   Pain Rating: FLACC (Activity) - Face 1   Pain Rating: FLACC (Activity) - Legs 0   Pain Rating: FLACC (Activity) - Activity 0   Pain Rating: FLACC (Activity) - Cry 1   Pain Rating: FLACC (Activity) - Consolability 0   Score: FLACC (Activity) 2   Restrictions/Precautions   Other Precautions Chair Alarm;Bed Alarm;Fall Risk;Pain;Multiple lines;Limb alert;Contact/isolation;Airborne/isolation   Home Living   Type of Home Apartment  (2 ISELA no HRs)   Home Layout One level;Performs ADLs on one level;Able to live on main level with bedroom/bathroom   Bathroom Shower/Tub Tub/shower unit   Bathroom Toilet   (BSC over toilet)   Bathroom Equipment Shower chair   Home Equipment Walker;Cane;Wheelchair-manual;Reacher   Additional Comments Reports living in an apartment with 2 ISELA no HRs and using RW for mobility back in early september   Prior Function   Level of [place] Needs assistance with IADLS;Independent with ADLs;Independent  with functional mobility   Lives With Alone   Receives Help From Family   IADLs Family/Friend/Other provides transportation;Family/Friend/Other provides meals;Family/Friend/Other provides medication management   Falls in the last 6 months 1 to 4   Comments Reports since hospitalizations has been requiring assistance with mobility, ADLs and IADLs, but was independent with mobility and ADLs in early September. Is hopeful to get back to his apartment.   ADL   Where Assessed Edge of bed   UB Dressing Assistance 3  Moderate Assistance   LB Dressing Assistance 1  Total Assistance   Bed Mobility   Supine to Sit 3  Moderate assistance   Additional items Assist x 1;Increased time required;Verbal cues;LE management   Sit to Supine 3  Moderate assistance   Additional items Assist x 2;Increased time required;Verbal cues;LE management   Additional Comments HOB elevated > 30 degrees. use of bedrail. modAx1 to complete with manual cues for trunk and LE management. returned to supine with modAx2.   Transfers   Sit to Stand 4  Minimal assistance   Additional items Assist x 2;Increased time required;Verbal cues   Stand to Sit 4  Minimal assistance   Additional items Assist x 2;Increased time required;Verbal cues   Stand pivot   (pt declined)   Additional Comments RW. verbal cues for hand placement for safety with RW. miNAx2 to achieve standing. static standing with minAx1-2. attempted to have patient lateral step to HOB however delcined. declined sitting OOB in relciner chair.   Balance   Static Sitting Fair   Dynamic Sitting Fair -   Static Standing Poor +   Activity Tolerance   Activity Tolerance Patient limited by fatigue;Patient limited by pain   Medical Staff Made Aware PT Carlie   Nurse Made Aware PAYAM Bangura   RUE Assessment   RUE Assessment X  (generalized weakness)   RUE Overall AROM   R Shoulder Flexion 0-80   RUE Strength   RUE Overall Strength Deficits  (generalized weakness)   LUE Assessment   LUE Assessment X   LUE  Overall AROM   L Shoulder Flexion 0-60   LUE Strength   LUE Overall Strength Deficits  (generalized weakness)   Hand Function   Gross Motor Coordination Functional   Fine Motor Coordination Functional   Hand Function Comments R hand dominant   Cognition   Overall Cognitive Status WFL   Arousal/Participation Alert;Uncooperative   Attention Within functional limits   Orientation Level Oriented X4   Memory Within functional limits   Following Commands Follows one step commands with increased time or repetition   Comments pt resistant to participation, requires reassurance and encouragement, education on imprtance of mobility. pt with decreased insight to deficits.   Assessment   Limitation Decreased ADL status;Decreased UE ROM;Decreased UE strength;Decreased Safe judgement during ADL;Decreased endurance;Decreased self-care trans;Decreased high-level ADLs   Prognosis Fair   Assessment Pt is a 69 y.o. male, admitted to [facility] 11/20/2024 d/t experiencing shortness of breath. Dx: acute respiratory insufficiency. Pt with PMHx impacting their performance during ADL tasks, including: anemia, Afib, BPH, CAD, CKD, compartment syndrome R forearm, COPD, CVA, DVT, Gout, MI, obesity, SHAD, pulmonary embolism, spinal stenosis.. Prior to admission to the hospital Pt was performing ADLs with physical assistance. IADLs with physical assistance. Functional transfers/ambulation with physical assistance. Cognitive status was PTA was WFL. OT order placed to assess Pt's ADLs, cognitive status, and performance during functional tasks in order to maximize safety and independence while making most appropriate d/c recommendations. PT/OT co-evaluation completed at this time d/t significant mobility deficits and safety concerns. Pt's clinical presentation is currently unstable/unpredictable given new onset deficits that effect Pt's occupational performance and ability to safely return to OF including decrease activity tolerance, decrease standing  balance, decrease sitting balance, decrease performance during ADL tasks, decrease safety awareness , decrease generalized strength, decrease activity engagement, decrease performance during functional transfers, limited insight to deficits, and inability to express needs combined with medical complications of hypotension, poor blood pressure control, pain impacting overall mobility status, edema/swelling, wounds, and need for input for mobility technique/safety. Personal factors affecting Pt at time of initial evaluation include: inability to perform IADLs, inability to perform ADLs, inability to ambulate household distances, inability to navigate community distances, limited insight into impairments, and decreased initiation and engagement. Pt will benefit from continued skilled OT services to address deficits as defined above and to maximize level independence/participation during ADLs and functional tasks to facilitate return toward PLOF and improved quality of life. From an occupational therapy standpoint, recommendation at time of d/c would be Level II: Moderate Resource Intensity.   Plan   Treatment Interventions ADL retraining;Visual perceptual retraining;Functional transfer training;Activityengagement;Energy conservation;Compensatory technique education;Patient/family training   Goal Expiration Date 12/06/24   OT Frequency 2-3x/wk   Discharge Recommendation   Rehab Resource Intensity Level, OT II (Moderate Resource Intensity)   AM-PAC Daily Activity Inpatient   Lower Body Dressing 1   Bathing 2   Toileting 2   Upper Body Dressing 2   Grooming 2   Eating 3   Daily Activity Raw Score 12   Daily Activity Standardized Score (Calc for Raw Score >=11) 30.6   AM-PAC Applied Cognition Inpatient   Following a Speech/Presentation 4   Understanding Ordinary Conversation 4   Taking Medications 3   Remembering Where Things Are Placed or Put Away 3   Remembering List of 4-5 Errands 2   Taking Care of Complicated Tasks 2    Applied Cognition Raw Score 18   Applied Cognition Standardized Score 38.07   End of Consult   Education Provided Yes   Patient Position at End of Consult Supine;Bed/Chair alarm activated;All needs within reach   Nurse Communication Nurse aware of consult       The patient's raw score on the AM-PAC Daily Activity Inpatient Short Form is 12. A raw score of less than 19 suggests the patient may benefit from discharge to post-acute rehabilitation services. Please refer to the recommendation of the Occupational Therapist for safe discharge planning.    Pt goals to be met by 12/6/2024    Pt will demonstrate ability to complete grooming/hygiene tasks @ independent after set-up.  Pt will demonstrate ability to complete supine<>sit @ Min A in order to increase safety and independence during ADL tasks.  Pt will demonstrate ability to complete UB ADLs including washing/dressing @ independent in order to increase performance and participation during meaningful tasks  Pt will demonstrate ability to complete LB dressing @ Min A in order to increase safety and independence during meaningful tasks.   Pt will demonstrate ability to nain/doff socks/shoes while sitting EOB @ Min A in order to increase safety and independence during meaningful tasks.   Pt will demonstrate ability to complete toileting tasks including CM and pericare @ Min A in order to increase safety and independence during meaningful tasks.  Pt will demonstrate ability to complete EOB, chair, toilet/commode transfers @ Min A in order to increase performance and participation during functional tasks.  Pt will demonstrate ability to stand for 3 minutes while maintaining fair balance with use of rolling walker for UB support PRN.  Pt will demonstrate ability to tolerate 30-35 minute OT session with no vc'ing for deep breathing or use of energy conservation techniques in order to increase activity tolerance during functional tasks.   Pt will demonstrate Good carryover  of use of energy conservation/compensatory strategies during ADLs and functional tasks in order to increase safety and reduce risk for falls.   Pt will demonstrate Good attention and participation in continued evaluation of functional ambulation house hold distances in order to assist with safe d/c planning.  Pt will attend to continued cognitive assessments 100% of the time in order to provide most appropriate d/c recommendations.   Pt will follow 100% simple 2-step commands and be A&O x4 consistently with environmental cues to increase participation in functional activities.   Pt will identify 3 areas of interest/hobbies and 1 intervention on how to incorporate into daily life in order to increase interaction with environment and peers as well as increase participation in meaningful tasks.   Pt will demonstrate 100% carryover of BUE HEP in order to increase BUE MS and increase performance during functional tasks upon d/c home.    Umang Sanchez, OTR/L

## 2024-11-22 NOTE — ASSESSMENT & PLAN NOTE
Wt Readings from Last 3 Encounters:   11/22/24 98.5 kg (217 lb 2.5 oz)   10/14/24 115 kg (253 lb)   10/07/24 118 kg (260 lb)   CXR on 11/20 appeared to be pulmonary vascular congestion     Given Lasix 80 mg in ED  Patient is a dialysis basis, received hemodialysis on TTS, last dialysis on 11/21/2024  Also nephrology added torsemide 60 mg twice daily

## 2024-11-22 NOTE — CASE MANAGEMENT
Case Management Discharge Planning Note    Patient name Masoud VERGARA [facility]  Location /-01 MRN [id number]  : 1955 Date 2024       Current Admission Date: 2024  Current Admission Diagnosis:Acute respiratory failure with hypoxia and hypercapnia (HCC)   Patient Active Problem List    Diagnosis Date Noted Date Diagnosed    Acute respiratory failure with hypoxia and hypercapnia (HCC) 2024     ESRD (end stage renal disease) on dialysis ([facility]) 2024     Anemia due to chronic kidney disease, on chronic dialysis ([facility]) 2024     Personal history of gout 2024     COVID-19 2024     Elevated troponin 2024     Stasis ulcer ([facility]) 10/02/2024     Diabetic ulcer of right great toe ([facility]) 2024     Hematoma 2024     Edema 2024     Medication management 2024     Hyponatremia 2024     Cellulitis 2024     Moderate protein-calorie malnutrition (HCC) 2024     Ambulatory dysfunction 09/15/2024     Lactic acidosis 2024     Hypercalcemia 2024     Chronic acquired lymphedema 2024     Hemodialysis-associated hypotension 2024     Nocturnal hypoxia 2024     Elevated LFTs 2024     Patient on peritoneal dialysis (HCC) 2024     Hypervolemia 2024     Bacteremia 2024     Cellulitis of right lower extremity 2024     Paroxysmal atrial fibrillation ([facility]) 2024     Bilateral lower extremity edema 2023     Ureteral stone with hydronephrosis 2022     Cutaneous abscess of buttock 2022     Chronic kidney disease-mineral and bone disorder 2022     Type 2 diabetes mellitus with stage 4 chronic kidney disease, with long-term current use of insulin (HCC) 07/15/2022     Obesity, morbid (HCC) 07/15/2022     Secondary hyperparathyroidism of renal origin (HCC) 07/15/2022     Stage 5 chronic kidney disease on peritoneal dialysis (HCC) 07/15/2022     Factor 5 Leiden mutation,  heterozygous ([facility]) 01/15/2022     Thrombocytopenia ([facility]) 01/14/2022     Angina at rest ([facility]) 01/14/2022     MI (myocardial infarction) ([facility]) 01/14/2022     History of PTCA 01/14/2022     Sleep apnea 01/14/2022     Smoking 01/14/2022     Pancytopenia ([facility]) 01/13/2022     History of CVA (cerebrovascular accident) 01/12/2022     Acute on chronic diastolic HF (heart failure) ([facility]) 01/12/2022     HLD (hyperlipidemia) 01/12/2022     Lymphedema 01/12/2022     Acquired hypothyroidism 01/12/2022     COPD (chronic obstructive pulmonary disease) ([facility]) 12/31/2018     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 ([facility]) 06/29/2016     Gastroesophageal reflux disease 06/29/2016     Gout 06/29/2016     Personal history of pulmonary embolism 06/29/2016     ED (erectile dysfunction) of organic origin 01/14/2014     History of thromboembolism of vein 01/10/2013       LOS (days): 2  Geometric Mean LOS (GMLOS) (days): 3.5  Days to GMLOS:2.1     OBJECTIVE:  Risk of Unplanned Readmission Score: 43.93         Current admission status: Inpatient   Preferred Pharmacy:   [facility] - [place], [place] - [address]  [address]  [place] [address]  Phone: [phone] Fax: [phone]    [place], [place] - [address] [place] [address]  [address] [place] [address]  Phone: [phone] Fax: [phone]    PATIENT/FAMILY REPORTS NO PREFERRED PHARMACY  No address on file      Primary Care Provider: Jignesh Baker DO    Primary Insurance: [employer] REP  Secondary Insurance:     DISCHARGE DETAILS:        CM received message from [facility], they are able to accept pt back to their facility when medically stable, pt is not a bed hold at [facility] and will require a new insurance prior auth for STR to return.

## 2024-11-22 NOTE — ASSESSMENT & PLAN NOTE
Presents from [facility]. Tested positive 3 days ago when symptoms started  Started decadron and remdesevir, doxycycline  Patient already on Coumadin, INR therapeutic, continue

## 2024-11-23 ENCOUNTER — APPOINTMENT (INPATIENT)
Dept: DIALYSIS | Facility: HOSPITAL | Age: 69
DRG: 177 | End: 2024-11-23
Attending: INTERNAL MEDICINE
Payer: COMMERCIAL

## 2024-11-23 PROBLEM — Z91.89 ELECTROLYTE IMBALANCE RISK: Status: ACTIVE | Noted: 2024-11-23

## 2024-11-23 LAB
ALBUMIN SERPL BCG-MCNC: 2.2 G/DL (ref 3.5–5)
ALP SERPL-CCNC: 60 U/L (ref 34–104)
ALT SERPL W P-5'-P-CCNC: 6 U/L (ref 7–52)
ANION GAP SERPL CALCULATED.3IONS-SCNC: 7 MMOL/L (ref 4–13)
AST SERPL W P-5'-P-CCNC: 14 U/L (ref 13–39)
BASOPHILS # BLD AUTO: 0 THOUSANDS/ÂΜL (ref 0–0.1)
BASOPHILS NFR BLD AUTO: 0 % (ref 0–1)
BILIRUB SERPL-MCNC: 0.28 MG/DL (ref 0.2–1)
BUN SERPL-MCNC: 27 MG/DL (ref 5–25)
CALCIUM ALBUM COR SERPL-MCNC: 10.2 MG/DL (ref 8.3–10.1)
CALCIUM SERPL-MCNC: 8.8 MG/DL (ref 8.4–10.2)
CHLORIDE SERPL-SCNC: 97 MMOL/L (ref 96–108)
CO2 SERPL-SCNC: 28 MMOL/L (ref 21–32)
CREAT SERPL-MCNC: 2.63 MG/DL (ref 0.6–1.3)
EOSINOPHIL # BLD AUTO: 0 THOUSAND/ÂΜL (ref 0–0.61)
EOSINOPHIL NFR BLD AUTO: 0 % (ref 0–6)
ERYTHROCYTE [DISTWIDTH] IN BLOOD BY AUTOMATED COUNT: 14.7 % (ref 11.6–15.1)
GFR SERPL CREATININE-BSD FRML MDRD: 23 ML/MIN/1.73SQ M
GLUCOSE SERPL-MCNC: 100 MG/DL (ref 65–140)
GLUCOSE SERPL-MCNC: 105 MG/DL (ref 65–140)
GLUCOSE SERPL-MCNC: 73 MG/DL (ref 65–140)
GLUCOSE SERPL-MCNC: 83 MG/DL (ref 65–140)
GLUCOSE SERPL-MCNC: 94 MG/DL (ref 65–140)
HCT VFR BLD AUTO: 30.7 % (ref 36.5–49.3)
HGB BLD-MCNC: 9.8 G/DL (ref 12–17)
IMM GRANULOCYTES # BLD AUTO: 0.02 THOUSAND/UL (ref 0–0.2)
IMM GRANULOCYTES NFR BLD AUTO: 1 % (ref 0–2)
INR PPP: 2.68 (ref 0.85–1.19)
LYMPHOCYTES # BLD AUTO: 0.31 THOUSANDS/ÂΜL (ref 0.6–4.47)
LYMPHOCYTES NFR BLD AUTO: 7 % (ref 14–44)
MCH RBC QN AUTO: 32.2 PG (ref 26.8–34.3)
MCHC RBC AUTO-ENTMCNC: 31.9 G/DL (ref 31.4–37.4)
MCV RBC AUTO: 101 FL (ref 82–98)
MONOCYTES # BLD AUTO: 0.1 THOUSAND/ÂΜL (ref 0.17–1.22)
MONOCYTES NFR BLD AUTO: 2 % (ref 4–12)
NEUTROPHILS # BLD AUTO: 3.82 THOUSANDS/ÂΜL (ref 1.85–7.62)
NEUTS SEG NFR BLD AUTO: 90 % (ref 43–75)
NRBC BLD AUTO-RTO: 0 /100 WBCS
PLATELET # BLD AUTO: 118 THOUSANDS/UL (ref 149–390)
PMV BLD AUTO: 9.6 FL (ref 8.9–12.7)
POTASSIUM SERPL-SCNC: 3.9 MMOL/L (ref 3.5–5.3)
PROT SERPL-MCNC: 4.9 G/DL (ref 6.4–8.4)
PROTHROMBIN TIME: 28.5 SECONDS (ref 12.3–15)
RBC # BLD AUTO: 3.04 MILLION/UL (ref 3.88–5.62)
SODIUM SERPL-SCNC: 132 MMOL/L (ref 135–147)
WBC # BLD AUTO: 4.25 THOUSAND/UL (ref 4.31–10.16)

## 2024-11-23 PROCEDURE — 99233 SBSQ HOSP IP/OBS HIGH 50: CPT | Performed by: FAMILY MEDICINE

## 2024-11-23 PROCEDURE — 80053 COMPREHEN METABOLIC PANEL: CPT | Performed by: FAMILY MEDICINE

## 2024-11-23 PROCEDURE — 82948 REAGENT STRIP/BLOOD GLUCOSE: CPT

## 2024-11-23 PROCEDURE — 94668 MNPJ CHEST WALL SBSQ: CPT

## 2024-11-23 PROCEDURE — 5A1D70Z PERFORMANCE OF URINARY FILTRATION, INTERMITTENT, LESS THAN 6 HOURS PER DAY: ICD-10-PCS | Performed by: INTERNAL MEDICINE

## 2024-11-23 PROCEDURE — 85610 PROTHROMBIN TIME: CPT | Performed by: FAMILY MEDICINE

## 2024-11-23 PROCEDURE — 94664 DEMO&/EVAL PT USE INHALER: CPT

## 2024-11-23 PROCEDURE — 90935 HEMODIALYSIS ONE EVALUATION: CPT | Performed by: INTERNAL MEDICINE

## 2024-11-23 PROCEDURE — 85027 COMPLETE CBC AUTOMATED: CPT | Performed by: FAMILY MEDICINE

## 2024-11-23 PROCEDURE — 99222 1ST HOSP IP/OBS MODERATE 55: CPT | Performed by: GENERAL PRACTICE

## 2024-11-23 PROCEDURE — NC001 PR NO CHARGE: Performed by: STUDENT IN AN ORGANIZED HEALTH CARE EDUCATION/TRAINING PROGRAM

## 2024-11-23 RX ORDER — METOCLOPRAMIDE HYDROCHLORIDE 5 MG/ML
10 INJECTION INTRAMUSCULAR; INTRAVENOUS ONCE
Status: COMPLETED | OUTPATIENT
Start: 2024-11-23 | End: 2024-11-23

## 2024-11-23 RX ORDER — CALCIUM CARBONATE 500 MG/1
500 TABLET, CHEWABLE ORAL DAILY PRN
Status: DISCONTINUED | OUTPATIENT
Start: 2024-11-23 | End: 2024-12-11 | Stop reason: HOSPADM

## 2024-11-23 RX ADMIN — HYDROCODONE BITARTRATE AND ACETAMINOPHEN 2 TABLET: 5; 325 TABLET ORAL at 08:38

## 2024-11-23 RX ADMIN — ONDANSETRON 4 MG: 2 INJECTION INTRAMUSCULAR; INTRAVENOUS at 08:38

## 2024-11-23 RX ADMIN — ONDANSETRON 4 MG: 2 INJECTION INTRAMUSCULAR; INTRAVENOUS at 21:09

## 2024-11-23 RX ADMIN — DOXYCYCLINE 100 MG: 100 INJECTION, POWDER, LYOPHILIZED, FOR SOLUTION INTRAVENOUS at 11:53

## 2024-11-23 RX ADMIN — LEVOTHYROXINE SODIUM 125 MCG: 125 TABLET ORAL at 05:59

## 2024-11-23 RX ADMIN — WARFARIN SODIUM 1 MG: 1 TABLET ORAL at 21:09

## 2024-11-23 RX ADMIN — PYRIDOSTIGMINE BROMIDE 60 MG: 60 TABLET ORAL at 12:10

## 2024-11-23 RX ADMIN — DEXAMETHASONE SODIUM PHOSPHATE 6 MG: 10 INJECTION, SOLUTION INTRAMUSCULAR; INTRAVENOUS at 02:06

## 2024-11-23 RX ADMIN — MIDODRINE HYDROCHLORIDE 15 MG: 5 TABLET ORAL at 17:19

## 2024-11-23 RX ADMIN — METOPROLOL TARTRATE 25 MG: 25 TABLET, FILM COATED ORAL at 21:12

## 2024-11-23 RX ADMIN — PYRIDOSTIGMINE BROMIDE 60 MG: 60 TABLET ORAL at 21:07

## 2024-11-23 RX ADMIN — POTASSIUM CHLORIDE 20 MEQ: 1500 TABLET, EXTENDED RELEASE ORAL at 12:09

## 2024-11-23 RX ADMIN — METOCLOPRAMIDE HYDROCHLORIDE 10 MG: 5 INJECTION INTRAMUSCULAR; INTRAVENOUS at 13:25

## 2024-11-23 RX ADMIN — MIDODRINE HYDROCHLORIDE 15 MG: 5 TABLET ORAL at 08:43

## 2024-11-23 RX ADMIN — DOXYCYCLINE 100 MG: 100 INJECTION, POWDER, LYOPHILIZED, FOR SOLUTION INTRAVENOUS at 23:59

## 2024-11-23 RX ADMIN — MIDODRINE HYDROCHLORIDE 15 MG: 5 TABLET ORAL at 12:03

## 2024-11-23 RX ADMIN — REMDESIVIR 100 MG: 100 INJECTION, POWDER, LYOPHILIZED, FOR SOLUTION INTRAVENOUS at 02:06

## 2024-11-23 RX ADMIN — ATORVASTATIN CALCIUM 40 MG: 40 TABLET, FILM COATED ORAL at 17:23

## 2024-11-23 RX ADMIN — PYRIDOSTIGMINE BROMIDE 60 MG: 60 TABLET ORAL at 17:23

## 2024-11-23 RX ADMIN — FINASTERIDE 5 MG: 5 TABLET, FILM COATED ORAL at 12:09

## 2024-11-23 NOTE — UTILIZATION REVIEW
NOTIFICATION OF INPATIENT ADMISSION   AUTHORIZATION REQUEST   SERVICING FACILITY:   [place] [address]  Tax ID: [id number]  NPI: [id number] ATTENDING PROVIDER:  Attending Name and NPI#: Moses Noel Md [[id number]]  Address: [address] [place] [address]  Phone: [phone]   ADMISSION INFORMATION:  Place of Service: Inpatient Acute [facility]  Place of Service Code: 21  Inpatient Admission Date/Time: 11/20/24 10:41 PM  Discharge Date/Time: No discharge date for patient encounter.  Admitting Diagnosis Code/Description:  CHF (congestive heart failure) (HCC) [I50.9]  Dyspnea [R06.00]  SOB (shortness of breath) [R06.02]  Thrombocytopenia (HCC) [D69.6]  Hypoxia [R09.02]  ESRD (end stage renal disease) (HCC) [N18.6]  Elevated troponin [R79.89]  Respiratory failure with hypoxia and hypercapnia (HCC) [J96.91, J96.92]  COVID-19 virus infection [U07.1]     UTILIZATION REVIEW CONTACT:  Arleen Cotto, Utilization   Network Utilization Review Department  Phone: [phone]  Fax [phone]  Email: [email or web address]  Contact for approvals/pending authorizations, clinical reviews, and discharge.     PHYSICIAN ADVISORY SERVICES:  Medical Necessity Denial & [place] Review  Phone: [phone]  Fax: [phone]  Email: [email or web address]     DISCHARGE SUPPORT TEAM:  For Patients Discharge Needs & Updates  Phone: [phone] opt. 2 Fax: [phone]  Email: [email or web address]      bed mobility training/gait training/transfer training

## 2024-11-23 NOTE — PLAN OF CARE
Problem: Prexisting or High Potential for Compromised Skin Integrity  Goal: Skin integrity is maintained or improved  Description: INTERVENTIONS:  - Identify patients at risk for skin breakdown  - Assess and monitor skin integrity  - Assess and monitor nutrition and hydration status  - Monitor labs   - Assess for incontinence   - Turn and reposition patient  - Assist with mobility/ambulation  - Relieve pressure over bony prominences  - Avoid friction and shearing  - Provide appropriate hygiene as needed including keeping skin clean and dry  - Evaluate need for skin moisturizer/barrier cream  - Collaborate with interdisciplinary team   - Patient/family teaching  - Consider wound care consult   Outcome: Progressing     Problem: PAIN - ADULT  Goal: Verbalizes/displays adequate comfort level or baseline comfort level  Description: Interventions:  - Encourage patient to monitor pain and request assistance  - Assess pain using appropriate pain scale  - Administer analgesics based on type and severity of pain and evaluate response  - Implement non-pharmacological measures as appropriate and evaluate response  - Consider cultural and social influences on pain and pain management  - Notify physician/advanced practitioner if interventions unsuccessful or patient reports new pain  Outcome: Progressing     Problem: INFECTION - ADULT  Goal: Absence or prevention of progression during hospitalization  Description: INTERVENTIONS:  - Assess and monitor for signs and symptoms of infection  - Monitor lab/diagnostic results  - Monitor all insertion sites, i.e. indwelling lines, tubes, and drains  - Monitor endotracheal if appropriate and nasal secretions for changes in amount and color  - [place] appropriate cooling/warming therapies per order  - Administer medications as ordered  - Instruct and encourage patient and family to use good hand hygiene technique  - Identify and instruct in appropriate isolation precautions for  identified infection/condition  Outcome: Progressing     Problem: SAFETY ADULT  Goal: Patient will remain free of falls  Description: INTERVENTIONS:  - Educate patient/family on patient safety including physical limitations  - Instruct patient to call for assistance with activity   - Consult OT/PT to assist with strengthening/mobility   - Keep Call bell within reach  - Keep bed low and locked with side rails adjusted as appropriate  - Keep care items and personal belongings within reach  - Initiate and maintain comfort rounds  - Make Fall Risk Sign visible to staff  - Offer Toileting every 2 Hours, in advance of need  - Initiate/Maintain bed/chair alarm  - Obtain necessary fall risk management equipment  - Apply yellow socks and bracelet for high fall risk patients  - Consider moving patient to room near nurses station  Outcome: Progressing

## 2024-11-23 NOTE — PROGRESS NOTES
Progress Note - Nephrology   Name: Masoud Perry 69 y.o. male I MRN: [id number]  Unit/Bed#: -01 I Date of Admission: 11/20/2024   Date of Service: 11/22/2024 I Hospital Day: 2    Assessment & Plan  ESRD (end stage renal disease) on dialysis (HCC)  -Is currently off peritoneal dialysis while at facility.  Is on hemodialysis at [facility] tomorrow,  - Access-right tunneled dialysis catheter  - Also has peritoneal dialysis catheter  - Last dialysis 11/21 with ultrafiltration 1.1 L.  Torsemide adjusted to 60 mg twice daily.  - 11/22-no urgent indication for dialysis.  Sodium borderline low we will continue ultrafiltrate as tolerates.  Potassium appropriate.  Magnesium and phosphorus appropriate.    Plan  - Dialysis Saturday  Acute respiratory insufficiency  -Treatment for COVID-19 per primary team.  Also there is concern for volume overload and patient was ultrafiltrated and on torsemide  COVID-19  -Treatment of COVID-19 per primary team  Acute on chronic diastolic HF (heart failure) (HCC)  -Ultrafiltrate on dialysis as tolerates  -Cardiology on board for atrial fibrillation with history of prior ablation.  Currently in normal sinus rhythm.  Hemodialysis-associated hypotension  -On midodrine 3 times daily  Secondary hyperparathyroidism of renal origin (HCC)  -Continue sevelamer and Cinacalcet  Anemia due to chronic kidney disease, on chronic dialysis (HCC)  -Hold Epogen in the setting of COVID-19 infection  Pancytopenia (HCC)  -Per primary team  Paroxysmal atrial fibrillation (HCC)  -Per primary team    I have reviewed the nephrology recommendations including dialysis tomorrow, with primary team attending, and we are in agreement with renal plan including the information outlined above.     Subjective   Brief History of Admission -69-year-old male with a past medical history of ESRD on hemodialysis, CHF, A-fib, factor V Leiden deficiency, hypertension, CVA, diabetes who initially presents to [facility] from skilled  facility with concern for COVID-19 with progressively worsening shortness of breath and cough and oxygen requirements increasing.  Nephrology is consulted for ESRD.  To note, patient was recently discharged from [facility] for colitis admission    Patient appears weak currently.  He is not in respiratory distress.  1 L nasal cannula improved.  Blood pressures improving 120 systolic.  Afebrile.    Objective :  Temp:  [97 °F (36.1 °C)-97.5 °F (36.4 °C)] 97 °F (36.1 °C)  HR:  [54-55] 54  BP: (116-155)/(53-80) 121/63  Resp:  [18-20] 18  SpO2:  [95 %-96 %] 96 %  O2 Device: None (Room air)  Nasal Cannula O2 Flow Rate (L/min):  [1 L/min] 1 L/min    Current Weight: Weight - Scale: 98.5 kg (217 lb 2.5 oz)  First Weight: Weight - Scale: 100 kg (220 lb 7.4 oz)  I/O         11/21 0701  11/22 0700 11/22 0701  11/23 0700    P.O. 360 480    I.V. (mL/kg) 450 (4.6)     Other 200     Total Intake(mL/kg) 1010 (10.3) 480 (4.9)    Urine (mL/kg/hr) 525 (0.2) 650 (0.5)    Other 1600     Stool 0 0    Total Output 2125 650    Net -1115 -170          Unmeasured Stool Occurrence 1 x 2 x          Physical Exam  Patient is not in respiratory distress.  Abdomen soft.  Nontender.  Tunneled dialysis catheter without erythema or drainage.  1-2+ edema lower extremities.  Medications:    Current Facility-Administered Medications:     acetaminophen (TYLENOL) tablet 650 mg, 650 mg, Oral, Q4H PRN, Chao Rios PA-C    atorvastatin (LIPITOR) tablet 40 mg, 40 mg, Oral, Daily With Dinner, Chao Rios PA-C, 40 mg at 11/22/24 1725    cinacalcet (SENSIPAR) tablet 60 mg, 60 mg, Oral, Once per day on Monday Wednesday Friday, Jignesh Hussein MD, 60 mg at 11/22/24 0855    dexamethasone (PF) (DECADRON) injection 6 mg, 6 mg, Intravenous, Q24H, Chao Rios PA-C, 6 mg at 11/22/24 0142    docusate sodium (COLACE) capsule 100 mg, 100 mg, Oral, BID PRN, Chao Rios PA-C    doxycycline (VIBRAMYCIN) 100 mg in sodium chloride 0.9 % 100 mL IVPB, 100 mg, Intravenous,  Q12H, Moses Noel MD, Last Rate: 100 mL/hr at 11/22/24 0856, 100 mg at 11/22/24 0856    epoetin domingo (EPOGEN,PROCRIT) injection 5,000 Units, 5,000 Units, Intravenous, Once per day on Tuesday Thursday Saturday, Jignesh Hussein MD, 5,000 Units at 11/21/24 1505    finasteride (PROSCAR) tablet 5 mg, 5 mg, Oral, Daily, Chao Rios PA-C, 5 mg at 11/22/24 0902    HYDROcodone-acetaminophen (NORCO) 5-325 mg per tablet 2 tablet, 2 tablet, Oral, Q6H PRN, Chao Rios PA-C, 2 tablet at 11/21/24 2221    insulin lispro (HumALOG/ADMELOG) 100 units/mL subcutaneous injection 1-5 Units, 1-5 Units, Subcutaneous, HS, Chao Rios PA-C    insulin lispro (HumALOG/ADMELOG) 100 units/mL subcutaneous injection 1-6 Units, 1-6 Units, Subcutaneous, TID AC, 1 Units at 11/21/24 1217 **AND** Fingerstick Glucose (POCT), , , TID AC, Chao Rios PA-C    levothyroxine tablet 125 mcg, 125 mcg, Oral, Early Morning, Chao Rios PA-C, 125 mcg at 11/22/24 0619    metoprolol tartrate (LOPRESSOR) tablet 25 mg, 25 mg, Oral, Q12H VASU, Moses Noel MD, 25 mg at 11/22/24 0855    midodrine (PROAMATINE) tablet 15 mg, 15 mg, Oral, TID AC, Chao Rios PA-C, 15 mg at 11/22/24 1211    nicotine (NICODERM CQ) 7 mg/24hr TD 24 hr patch 7 mg, 7 mg, Transdermal, Daily, Chao Rios PA-C    ondansetron (ZOFRAN) injection 4 mg, 4 mg, Intravenous, Q6H PRN, Chao Rios PA-C    potassium chloride (Klor-Con M20) CR tablet 20 mEq, 20 mEq, Oral, Daily, Jignesh Hussein MD, 20 mEq at 11/22/24 0855    pyridostigmine (MESTINON) tablet 60 mg, 60 mg, Oral, TID, Chao Rios PA-C, 60 mg at 11/22/24 1725    [COMPLETED] remdesivir (Veklury) 200 mg in sodium chloride 0.9 % 290 mL IVPB, 200 mg, Intravenous, Q24H, Stopped at 11/21/24 0345 **FOLLOWED BY** remdesivir (Veklury) 100 mg in sodium chloride 0.9 % 270 mL IVPB, 100 mg, Intravenous, Q24H, Chao Rios PA-C, 100 mg at 11/22/24 0142    sevelamer (RENAGEL) tablet 800 mg, 800 mg, Oral, TID With  "Meals, Jignesh Hussein MD, 800 mg at 11/22/24 1211    simethicone (MYLICON) chewable tablet 80 mg, 80 mg, Oral, 4x Daily PRN, Chao Rios PA-C    torsemide (DEMADEX) tablet 60 mg, 60 mg, Oral, BID, Jignesh Hussein MD, 60 mg at 11/22/24 1725    warfarin (COUMADIN) tablet 1 mg, 1 mg, Oral, HS, Chao Rios PA-C, 1 mg at 11/21/24 2206      Lab Results: I have reviewed the following results:  Results from last 7 days   Lab Units 11/22/24  0625 11/21/24  0502 11/20/24  2148 11/20/24  2133   WBC Thousand/uL 6.42 6.27  --  7.11   HEMOGLOBIN g/dL 9.3* 10.0*  --  9.8*   HEMATOCRIT % 30.2* 32.9*  --  31.7*   PLATELETS Thousands/uL 90* 80*  --  91*   POTASSIUM mmol/L 4.2 3.7 3.2*  --    CHLORIDE mmol/L 96 100 99  --    CO2 mmol/L 28 25 28  --    BUN mg/dL 17 19 16  --    CREATININE mg/dL 2.24* 3.05* 2.90*  --    CALCIUM mg/dL 8.9 9.0 8.9  --    MAGNESIUM mg/dL 1.6*  --  1.6*  --    PHOSPHORUS mg/dL 2.9  --  3.7  --    ALBUMIN g/dL 2.2* 2.4* 2.4*  --        Administrative Statements     Portions of the record may have been created with voice recognition software. Occasional wrong word or \"sound a like\" substitutions may have occurred due to the inherent limitations of voice recognition software. Read the chart carefully and recognize, using context, where substitutions have occurred.If you have any questions, please contact the dictating provider.  "

## 2024-11-23 NOTE — PROGRESS NOTES
Progress Note - Nephrology   Name: Masoud Perry 69 y.o. male I MRN: [id number]  Unit/Bed#: -01 I Date of Admission: 11/20/2024   Date of Service: 11/23/2024 I Hospital Day: 3       Brief History of Admission -69-year-old male with a past medical history of ESRD on hemodialysis, CHF, A-fib, factor V Leiden deficiency, hypertension, CVA, diabetes who initially presents to [facility] from [facility] facility with concern for COVID-19 with progressively worsening shortness of breath and cough and oxygen requirements increasing.  Nephrology is consulted for ESRD.  To note, patient was recently discharged from [facility] for colitis admission.     HEMODIALYSIS PROCEDURE NOTE  The patient was seen and examined on hemodialysis.  Time: 4 hours  Sodium: 135 Blood flow: 400   Dialyzer: F160 Potassium: 3 Dialysate flow: 500   Access: RIJ CVC Bicarbonate: 35 Ultrafiltration goal: to TW   Medications on HD: na     Assessment & Plan  ESRD (end stage renal disease) on dialysis ([facility])  Previously on peritoneal dialysis currently off while at facility   Currently on maintenance hemodialysis at [facility]  Seen on dialysis and tolerating well  Current target weight 98.5  Last dialysis 11/21 UF -1.1 L torsemide previously adjusted to 60 mg twice daily  Access-right tunneled dialysis catheter-site clean dry and intact   Also has peritoneal dialysis catheter-clean dry and intact no erythema or drainage  Plan  Currently undergoing hemodialysis without issues  We will plan holiday schedule with next HD treatment come Monday  Continue to UF as BP allows and adjust target weight as needed for volume management  Check BMP on Monday  Hemodialysis-associated hypotension  Blood pressure continues to fluctuate  Remains on midodrine 3 times daily  No Change in current treatment  Anemia due to chronic kidney disease, on chronic dialysis ([facility])  Current hemoglobin 9.8 today appears stable  Hold Epogen in the setting of COVID-19 infection  Continue to  monitor  Recommend transfusion for hemoglobin less than 7  Secondary hyperparathyroidism of renal origin (HCC)  Continue with Cinacalcet 60 mg 3 times a week  Continues on sevelamer 800 mg tablet 3 times daily with meals for hyperphosphatemia  Sinew to monitor PTH and phosphorus as outpatient  Electrolyte imbalance risk  Hyponatremia-likely volume mediated in the setting of ER SD  Current sodium 132  Continue to UF as BP allows for volume management  Acute on chronic diastolic HF (heart failure) (HCC)  Ultrafiltrate on dialysis as tolerates and adjust target weight as needed  Cardiology following for atrial fibrillation with history of prior ablation.  Currently in normal sinus rhythm.  Acute respiratory insufficiency  Treatment for COVID-19 per primary team.    Also there is concern for volume overload and patient was ultrafiltrated and on torsemide  Continue to monitor and treat as needed  COVID-19  Treatment of COVID-19 per primary team  Pancytopenia (HCC)  Per primary team  Paroxysmal atrial fibrillation (HCC)  Per primary team    Overall plan and recommendations has been reviewed with primary team and I agree with the plan as stated below  Plan hemodialysis today and will UF as BP allows just target weight as needed for volume management  Hemodialysis on Monday due to holiday schedule  BMP Monday    Review of Systems  Patient seen and examined at bedside.  Patient reports not feeling well.  Having some nausea  Review of Systems   Constitutional:  Positive for fatigue. Negative for activity change, appetite change, chills, diaphoresis and fever.   HENT: Negative.  Negative for congestion and facial swelling.    Respiratory: Negative.     Cardiovascular:  Positive for leg swelling.   Gastrointestinal:  Positive for nausea.   Endocrine: Negative.    Genitourinary: Negative.    Musculoskeletal: Negative.    Skin: Negative.    Allergic/Immunologic: Negative.    Neurological: Negative.    Hematological: Negative.     Psychiatric/Behavioral: Negative.           Physical Exam:  Current Weight: Weight - Scale: 97.4 kg (214 lb 11.7 oz)  Vitals:    11/23/24 0830 11/23/24 0900 11/23/24 0915 11/23/24 0930   BP: 113/62 106/64 (!) 89/50 97/62   BP Location:       Pulse: 61 65 62 60   Resp: 16 16 16 16   Temp:       TempSrc:       SpO2:       Weight:       Height:           Physical Exam  Vitals and nursing note reviewed.   Constitutional:       Appearance: He is ill-appearing.   HENT:      Head: Normocephalic and atraumatic.      Nose: Nose normal.      Mouth/Throat:      Mouth: Mucous membranes are dry.   Eyes:      Conjunctiva/sclera: Conjunctivae normal.   Neck:      Comments: Right IJ CVC site clean dry and intact  Cardiovascular:      Rate and Rhythm: Normal rate and regular rhythm.      Pulses: Normal pulses.      Heart sounds: Normal heart sounds.   Pulmonary:      Effort: Pulmonary effort is normal.      Breath sounds: Normal breath sounds.      Comments: Coarse and decreased  Abdominal:      General: Bowel sounds are normal.      Palpations: Abdomen is soft.   Musculoskeletal:      Cervical back: Normal range of motion and neck supple.      Right lower leg: Edema present.      Left lower leg: Edema present.   Skin:     General: Skin is warm and dry.   Neurological:      General: No focal deficit present.      Mental Status: He is alert and oriented to person, place, and time.   Psychiatric:         Mood and Affect: Mood normal.         Behavior: Behavior normal.            Medications:    Current Facility-Administered Medications:     acetaminophen (TYLENOL) tablet 650 mg, 650 mg, Oral, Q4H PRN, Chao Rios PA-C    atorvastatin (LIPITOR) tablet 40 mg, 40 mg, Oral, Daily With Dinner, Chao Rios PA-C, 40 mg at 11/22/24 1725    cinacalcet (SENSIPAR) tablet 60 mg, 60 mg, Oral, Once per day on Monday Wednesday Friday, Jignesh Hussein MD, 60 mg at 11/22/24 0855    dexamethasone (PF) (DECADRON) injection 6 mg, 6 mg,  Intravenous, Q24H, Chao Rios PA-C, 6 mg at 11/23/24 0206    docusate sodium (COLACE) capsule 100 mg, 100 mg, Oral, BID PRN, Chao Rios PA-C    doxycycline (VIBRAMYCIN) 100 mg in sodium chloride 0.9 % 100 mL IVPB, 100 mg, Intravenous, Q12H, Moses Noel MD, Last Rate: 100 mL/hr at 11/22/24 2116, 100 mg at 11/22/24 2116    finasteride (PROSCAR) tablet 5 mg, 5 mg, Oral, Daily, Chao Rios PA-C, 5 mg at 11/22/24 0902    HYDROcodone-acetaminophen (NORCO) 5-325 mg per tablet 2 tablet, 2 tablet, Oral, Q6H PRN, Chao Rios PA-C, 2 tablet at 11/23/24 0838    insulin lispro (HumALOG/ADMELOG) 100 units/mL subcutaneous injection 1-5 Units, 1-5 Units, Subcutaneous, HS, Chao Rios PA-C    insulin lispro (HumALOG/ADMELOG) 100 units/mL subcutaneous injection 1-6 Units, 1-6 Units, Subcutaneous, TID AC, 1 Units at 11/21/24 1217 **AND** Fingerstick Glucose (POCT), , , TID AC, Chao Rios PA-C    levothyroxine tablet 125 mcg, 125 mcg, Oral, Early Morning, Chao Rios PA-C, 125 mcg at 11/23/24 0559    metoprolol tartrate (LOPRESSOR) tablet 25 mg, 25 mg, Oral, Q12H VASU, Moses Noel MD, 25 mg at 11/22/24 2112    midodrine (PROAMATINE) tablet 15 mg, 15 mg, Oral, TID AC, Chao Rios PA-C, 15 mg at 11/23/24 0843    nicotine (NICODERM CQ) 7 mg/24hr TD 24 hr patch 7 mg, 7 mg, Transdermal, Daily, Chao Rios PA-C    ondansetron (ZOFRAN) injection 4 mg, 4 mg, Intravenous, Q6H PRN, Chao Rios PA-C, 4 mg at 11/23/24 0838    potassium chloride (Klor-Con M20) CR tablet 20 mEq, 20 mEq, Oral, Daily, Jignesh Hussein MD, 20 mEq at 11/22/24 0855    pyridostigmine (MESTINON) tablet 60 mg, 60 mg, Oral, TID, Chao Rios PA-C, 60 mg at 11/22/24 2119    [COMPLETED] remdesivir (Veklury) 200 mg in sodium chloride 0.9 % 290 mL IVPB, 200 mg, Intravenous, Q24H, Stopped at 11/21/24 0345 **FOLLOWED BY** remdesivir (Veklury) 100 mg in sodium chloride 0.9 % 270 mL IVPB, 100 mg, Intravenous, Q24H, Chao  "CHRISTA Rios, Last Rate: 540 mL/hr at 11/23/24 0206, 100 mg at 11/23/24 0206    sevelamer (RENAGEL) tablet 800 mg, 800 mg, Oral, TID With Meals, Jignesh Hussein MD, 800 mg at 11/22/24 1211    simethicone (MYLICON) chewable tablet 80 mg, 80 mg, Oral, 4x Daily PRN, Chao Rios PA-C    torsemide (DEMADEX) tablet 60 mg, 60 mg, Oral, BID, Jignesh Hussein MD, 60 mg at 11/22/24 1725    warfarin (COUMADIN) tablet 1 mg, 1 mg, Oral, HS, Chao Rios PA-C, 1 mg at 11/22/24 2112    Laboratory Results:  Results from last 7 days   Lab Units 11/23/24  0828 11/22/24  0625 11/21/24  0502 11/20/24  2148 11/20/24  2133   WBC Thousand/uL 4.25* 6.42 6.27  --  7.11   HEMOGLOBIN g/dL 9.8* 9.3* 10.0*  --  9.8*   HEMATOCRIT % 30.7* 30.2* 32.9*  --  31.7*   PLATELETS Thousands/uL 118* 90* 80*  --  91*   SODIUM mmol/L 132* 130* 133* 132*  --    POTASSIUM mmol/L 3.9 4.2 3.7 3.2*  --    CHLORIDE mmol/L 97 96 100 99  --    CO2 mmol/L 28 28 25 28  --    BUN mg/dL 27* 17 19 16  --    CREATININE mg/dL 2.63* 2.24* 3.05* 2.90*  --    CALCIUM mg/dL 8.8 8.9 9.0 8.9  --    MAGNESIUM mg/dL  --  1.6*  --  1.6*  --    PHOSPHORUS mg/dL  --  2.9  --  3.7  --        Medical records have been reviewed through [facility] and care everywhere for this patient encounter    Portions of the record may have been created with voice recognition software. Occasional wrong word or \"sound a like\" substitutions may have occurred due to the inherent limitations of voice recognition software. Read the chart carefully and recognize,   "

## 2024-11-23 NOTE — PROGRESS NOTES
Progress Note - Hospitalist   Name: Masoud Perry 69 y.o. male I MRN: [id number]  Unit/Bed#: MS [facility] I Date of Admission: 11/20/2024   Date of Service: 11/23/2024 I Hospital Day: 3    Assessment & Plan  Acute respiratory insufficiency  Presented 11/20 with SOB and COVID+ x 3 days.   Patient recently discharged 11/13 from [facility] following IV ABX for colitis  Given duo nebs and solu-medrol by EMS without improvement  On 2L NC since COVID dx. Increased to 4L today, O2 96%.  Respiratory protocol continue treatment for COVID and wean off oxygen as tolerated.  Acute on chronic diastolic HF (heart failure) (Formerly [facility])  Wt Readings from Last 3 Encounters:   11/23/24 97.4 kg (214 lb 11.7 oz)   10/14/24 115 kg (253 lb)   10/07/24 118 kg (260 lb)   CXR on 11/20 appeared to be pulmonary vascular congestion     Given Lasix 80 mg in ED  Patient is a dialysis basis, received hemodialysis on TTS, last dialysis on 11/21/2024  Also nephrology added torsemide 60 mg twice daily  Patient will receive dialysis.  COVID-19  Presents from [place]. Tested positive 3 days ago when symptoms started  Started decadron and remdesevir, doxycycline  Patient already on Coumadin, INR therapeutic, continue  On clinical exam, patient is very fatigued and tired and sleepy but arousable and answer questions.  Secondary hyperparathyroidism of renal origin (HCC)  Continue Cinacalcet on Mondays, Wednesdays, and Fridays  Hemodialysis-associated hypotension  Continue midodrine and pyridostigmine  HLD (hyperlipidemia)  Continue atorvastatin  Acquired hypothyroidism  Continue levothyroxine  Factor 5 Leiden mutation, heterozygous (HCC)  On Coumadin-INR therapeutic, continue  Type 2 diabetes mellitus with stage 4 chronic kidney disease, with long-term current use of insulin (HCC)  Lab Results   Component Value Date    HGBA1C 5.0 11/02/2024       Recent Labs     11/22/24  1127 11/22/24  1653 11/22/24  2056 11/23/24  0718   POCGLU 92 81 74 105       Blood Sugar  Average: Last 72 hrs:  (P) 108.4  Sliding scale insulin. Hypoglycemia protocol.  Personal history of gout  Continue allopurinol  Elevated troponin  Trending  Paroxysmal atrial fibrillation (HCC)  Continue metoprolol if blood pressure tolerates  Continue Coumadin, maintain electrolytes  ESRD (end stage renal disease) on dialysis (HCC)  Lab Results   Component Value Date    EGFR 28 11/22/2024    EGFR 19 11/21/2024    EGFR 20 11/20/2024    CREATININE 2.24 (H) 11/22/2024    CREATININE 3.05 (H) 11/21/2024    CREATININE 2.90 (H) 11/20/2024     Anemia due to chronic kidney disease, on chronic dialysis (HCC)  Lab Results   Component Value Date    EGFR 28 11/22/2024    EGFR 19 11/21/2024    EGFR 20 11/20/2024    CREATININE 2.24 (H) 11/22/2024    CREATININE 3.05 (H) 11/21/2024    CREATININE 2.90 (H) 11/20/2024   Continue to monitor while remain on anticoagulation with Coumadin  Pancytopenia (HCC)  Multifactorial in origin, continue to monitor    VTE Pharmacologic Prophylaxis: VTE Score: 6 High Risk (Score >/= 5) - Pharmacological DVT Prophylaxis Ordered: warfarin (Coumadin). Sequential Compression Devices Ordered.    Mobility:   Basic Mobility Inpatient Raw Score: 11  JH-HLM Goal: 4: Move to chair/commode  JH-HLM Achieved: 3: Sit at edge of bed  JH-HLM Goal NOT achieved. Continue with multidisciplinary rounding and encourage appropriate mobility to improve upon JH-HLM goals.    Patient Centered Rounds: I performed bedside rounds with nursing staff today.   Discussions with Specialists or Other Care Team Provider: Nephrology    Education and Discussions with Family / Patient: Updated  (daughter) on bedside    Current Length of Stay: 3 day(s)  Current Patient Status: Inpatient   Certification Statement: The patient will continue to require additional inpatient hospital stay due to monitorable function  Discharge Plan: Anticipate discharge in 48-72 hrs to rehab facility.    Code Status: Level 3 - DNAR and  DNI    Subjective   Seen and evaluated during the run.  He still feeling tired and coughing.  Having diarrhea.  Patient looks very drowsy and sleepy but arousable and answer questions.  Complaining of epigastric pain.    Objective :  Temp:  [97 °F (36.1 °C)-97.7 °F (36.5 °C)] 97 °F (36.1 °C)  HR:  [54-74] 63  BP: (107-155)/(53-80) 116/65  Resp:  [16-20] 16  SpO2:  [95 %-99 %] 99 %  O2 Device: Nasal cannula  Nasal Cannula O2 Flow Rate (L/min):  [1 L/min] 1 L/min    Body mass index is 29.12 kg/m².     Input and Output Summary (last 24 hours):     Intake/Output Summary (Last 24 hours) at 11/23/2024 0829  Last data filed at 11/23/2024 0800  Gross per 24 hour   Intake 780 ml   Output 1325 ml   Net -545 ml       Physical Exam  Vitals and nursing note reviewed.   Constitutional:       Appearance: He is ill-appearing. He is not diaphoretic.   Eyes:      General: No scleral icterus.        Left eye: No discharge.      Extraocular Movements: Extraocular movements intact.      Pupils: Pupils are equal, round, and reactive to light.   Cardiovascular:      Rate and Rhythm: Normal rate.      Heart sounds: No murmur heard.     No friction rub. No gallop.   Pulmonary:      Breath sounds: Rhonchi present. No wheezing or rales.   Abdominal:      General: There is no distension.      Palpations: There is no mass.      Tenderness: There is no abdominal tenderness.      Hernia: No hernia is present.   Musculoskeletal:      Right lower leg: No edema.   Skin:     Findings: No erythema or rash.   Neurological:      Mental Status: He is alert and oriented to person, place, and time.      Cranial Nerves: No cranial nerve deficit.      Sensory: No sensory deficit.      Motor: No weakness.      Coordination: Coordination normal.           Lines/Drains:  Lines/Drains/Airways       Active Status       Name Placement date Placement time Site Days    HD Permanent Double Catheter 10/14/24  1420  Subclavian  39                            Lab  Results: I have reviewed the following results:   Results from last 7 days   Lab Units 11/22/24  0625 11/21/24  0502   WBC Thousand/uL 6.42 6.27   HEMOGLOBIN g/dL 9.3* 10.0*   HEMATOCRIT % 30.2* 32.9*   PLATELETS Thousands/uL 90* 80*   SEGS PCT %  --  95*   LYMPHO PCT % 2* 3*   MONO PCT % 0* 1*   EOS PCT % 0 0     Results from last 7 days   Lab Units 11/22/24  0625   SODIUM mmol/L 130*   POTASSIUM mmol/L 4.2   CHLORIDE mmol/L 96   CO2 mmol/L 28   BUN mg/dL 17   CREATININE mg/dL 2.24*   ANION GAP mmol/L 6   CALCIUM mg/dL 8.9   ALBUMIN g/dL 2.2*   TOTAL BILIRUBIN mg/dL 0.28   ALK PHOS U/L 62   ALT U/L 6*   AST U/L 15   GLUCOSE RANDOM mg/dL 104     Results from last 7 days   Lab Units 11/22/24  0625   INR  2.41*     Results from last 7 days   Lab Units 11/23/24  0718 11/22/24  2056 11/22/24  1653 11/22/24  1127 11/22/24  0651 11/21/24  2149 11/21/24  1646 11/21/24  1112 11/21/24  0728 11/21/24  0041   POC GLUCOSE mg/dl 105 74 81 92 117 113 104 168* 135 95         Results from last 7 days   Lab Units 11/21/24  0502 11/20/24  2148 11/20/24  2133   LACTIC ACID mmol/L  --   --  0.8   PROCALCITONIN ng/ml 0.87* 0.84*  --        Recent Cultures (last 7 days):   Results from last 7 days   Lab Units 11/20/24  2133   BLOOD CULTURE  No Growth at 24 hrs.  No Growth at 24 hrs.       Imaging Results Review: No pertinent imaging studies reviewed.  Other Study Results Review: No additional pertinent studies reviewed.    Last 24 Hours Medication List:     Current Facility-Administered Medications:     acetaminophen (TYLENOL) tablet 650 mg, Q4H PRN    atorvastatin (LIPITOR) tablet 40 mg, Daily With Dinner    cinacalcet (SENSIPAR) tablet 60 mg, Once per day on Monday Wednesday Friday    dexamethasone (PF) (DECADRON) injection 6 mg, Q24H    docusate sodium (COLACE) capsule 100 mg, BID PRN    doxycycline (VIBRAMYCIN) 100 mg in sodium chloride 0.9 % 100 mL IVPB, Q12H, Last Rate: 100 mg (11/22/24 2116)    finasteride (PROSCAR) tablet 5 mg,  Daily    HYDROcodone-acetaminophen (NORCO) 5-325 mg per tablet 2 tablet, Q6H PRN    insulin lispro (HumALOG/ADMELOG) 100 units/mL subcutaneous injection 1-5 Units, HS    insulin lispro (HumALOG/ADMELOG) 100 units/mL subcutaneous injection 1-6 Units, TID AC **AND** Fingerstick Glucose (POCT), TID AC    levothyroxine tablet 125 mcg, Early Morning    metoprolol tartrate (LOPRESSOR) tablet 25 mg, Q12H VASU    midodrine (PROAMATINE) tablet 15 mg, TID AC    nicotine (NICODERM CQ) 7 mg/24hr TD 24 hr patch 7 mg, Daily    ondansetron (ZOFRAN) injection 4 mg, Q6H PRN    potassium chloride (Klor-Con M20) CR tablet 20 mEq, Daily    pyridostigmine (MESTINON) tablet 60 mg, TID    [COMPLETED] remdesivir (Veklury) 200 mg in sodium chloride 0.9 % 290 mL IVPB, Q24H, Last Rate: Stopped (11/21/24 0345) **FOLLOWED BY** remdesivir (Veklury) 100 mg in sodium chloride 0.9 % 270 mL IVPB, Q24H, Last Rate: 100 mg (11/23/24 0206)    sevelamer (RENAGEL) tablet 800 mg, TID With Meals    simethicone (MYLICON) chewable tablet 80 mg, 4x Daily PRN    torsemide (DEMADEX) tablet 60 mg, BID    warfarin (COUMADIN) tablet 1 mg, HS    Administrative Statements   Today, Patient Was Seen By: Moses Noel MD      **Please Note: This note may have been constructed using a voice recognition system.**

## 2024-11-23 NOTE — ASSESSMENT & PLAN NOTE
Hyponatremia-likely volume mediated in the setting of ER SD  Current sodium 132  Continue to UF as BP allows for volume management

## 2024-11-23 NOTE — ASSESSMENT & PLAN NOTE
-Treatment for COVID-19 per primary team.  Also there is concern for volume overload and patient was ultrafiltrated and on torsemide

## 2024-11-23 NOTE — ASSESSMENT & PLAN NOTE
Presented 11/20 with SOB and COVID+ x 3 days.   Patient recently discharged 11/13 from [facility] following IV ABX for colitis  Given duo nebs and solu-medrol by EMS without improvement  On 2L NC since COVID dx. Increased to 4L today, O2 96%.  Respiratory protocol continue treatment for COVID and wean off oxygen as tolerated.

## 2024-11-23 NOTE — CONSULTS
TeleConsultation - Behavioral Health   Name: Masoud Perry 69 y.o. male I MRN: [id number]  Unit/Bed#: -01 I Date of Admission: 11/20/2024   Date of Service: 11/23/2024 I Hospital Day: 3  Inpatient consult to Psychiatry  Consult performed by: Nicole Lopez MD  Consult ordered by: Moses Noel MD        Physician Requesting Consult: Moses Noel MD  Principal Problem:Acute respiratory insufficiency  Reason for Consult:  Psych Evaluation     Assessment & Plan   Adjustment Disorder     TREATMENT PLAN RECOMMENDATIONS:  Medications: Consider Mirtazapine 7.5 mg qhs for sleep and appetite  PRN for sleep: Per Above  PRN for agitation: N/A     Informed consent for the above medication has been obtained including discussion of the risks, benefits and alternatives: Yes    Disposition: The patient does not currently meet criteria for inpatient psychiatric hospitalization. They deny thoughts or plans for suicide and denies homicidal thoughts or intent. They are not unable to care for themselves due to a mental illness and/or acute psychosis. They are able to adequately participate in care planning with primary team. No criteria for an involuntary psychiatric commitment exists at this time.    Suicide Precautions can be discontinued     Legal Status Recommendation:  Voluntary     Multiple Antipsychotic Review: N/A    Psychotherapy/Psychoeducation: N/A to this case.    Other/Medical Work Up and/or treatment modality recommendations: N/A to this case.    Patient Caregiver/Family Education: N/A    Follow-up: Re-consult PRN    Report regarding the above Assessment and Treatment plan was provided to: Dr. Noel     History of Present Illness      Patient reports that he is tired of being in the hospital for the last 6 months and that he is not feeling suicidal rather just very frustrated. Patient states that he is very frustrated as he has been shifted from hospital to nursing home and back 3x and that what is upsetting  him. Patient reports that he is not sleeping well and that he has no appetite. Patient also reports that he has a reaction to cold water and warm water would be better. Patient denied any current SI/HI/AVH or other acute psychiatric complaints.       Psychiatric Review Of Systems:  sleep: yes  appetite changes: yes  weight changes: yes  energy/anergy: yes  interest/pleasure/anhedonia: yes  somatic symptoms: no  anxiety/panic: no  calderon: no  guilty/hopeless: no  self injurious behavior/risky behavior: no    Historical Information   Past Psychiatric History:   Psychiatric Hospitalizations:   No history of past inpatient psychiatric admissions  Outpatient Treatment History:   Denied   Suicide Attempts:   None  History of self-harm:   None  Violence History:   no  Past Psychiatric medication trials: Denied    Substance Abuse History: Denied       Family Psychiatric History: Denied       Social History:  Education: high school diploma/GED  Learning Disabilities:  Denied   Marital history: single  Children: Yes   Living arrangement, social support:  Nursing Home .  Occupational History: retired  Functioning Relationships: good support system.  Other Pertinent History: None    Traumatic History:   Abuse: None  Other Traumatic Events: none    I have reviewed the patient's PMH, PSH, Social History, Family History, Meds, and Allergies    Meds/Allergies   Current Facility-Administered Medications   Medication Dose Route Frequency Provider Last Rate    acetaminophen  650 mg Oral Q4H PRN Chao Rios PA-C      atorvastatin  40 mg Oral Daily With Dinner Chao Rios PA-C      calcium carbonate  500 mg Oral Daily PRN Moses Noel MD      cinacalcet  60 mg Oral Once per day on Monday Wednesday Friday Jignesh Hussein MD      dexamethasone  6 mg Intravenous Q24H Chao Rios PA-C      docusate sodium  100 mg Oral BID PRN Chao Rios PA-C      doxycycline  100 mg Intravenous Q12H Moses Noel  mg (11/23/24  1153)    finasteride  5 mg Oral Daily Chao Rios PA-C      HYDROcodone-acetaminophen  2 tablet Oral Q6H PRN Chao Rios PA-C      insulin lispro  1-5 Units Subcutaneous HS Chao Rios PA-C      insulin lispro  1-6 Units Subcutaneous TID AC Chao Rios PA-C      levothyroxine  125 mcg Oral Early Morning Chao Rios PA-C      metoprolol tartrate  25 mg Oral Q12H [facility] Moses Noel MD      midodrine  15 mg Oral TID AC Chao Rios PA-C      nicotine  7 mg Transdermal Daily Chao Rios PA-C      ondansetron  4 mg Intravenous Q6H PRN Chao Rios PA-C      potassium chloride  20 mEq Oral Daily Jignesh Hussein MD      pyridostigmine  60 mg Oral TID Chao Rios PA-C      remdesivir  100 mg Intravenous Q24H Chao Rios PA-C 100 mg (11/23/24 0206)    sevelamer  800 mg Oral TID With Meals Jignesh Hussein MD      simethicone  80 mg Oral 4x Daily PRN Chao Rios PA-C      torsemide  60 mg Oral BID Jignesh Hussein MD      warfarin  1 mg Oral HS Chao Rios PA-C        Allergies   Allergen Reactions    Carvedilol Shortness Of Breath    Levaquin [Levofloxacin] Other (See Comments)     Unknown    Saccharin - Food Allergy Diarrhea     GI intolerance, adamant does not want to receive    Sucralose - Food Allergy Diarrhea     GI intolerance, adamant does not want to receive    Amiodarone Dizziness    Aspartame - Food Allergy Diarrhea    Bumetanide GI Intolerance and Lightheadedness           Cephalexin Other (See Comments)     unknown    Ciprofloxacin Rash    Hydralazine Tachycardia    Lisinopril GI Intolerance           Metolazone Other (See Comments)     Metallic taste    Morphine Swelling     Of injection site    Nifedipine Lightheadedness    Strawberry Extract - Food Allergy Rash       Objective :  Temp:  [97 °F (36.1 °C)-97.7 °F (36.5 °C)] 97 °F (36.1 °C)  HR:  [54-74] 58  BP: ()/(50-72) 117/66  Resp:  [16-18] 18  SpO2:  [94 %-99 %] 97 %  O2 Device: Nasal cannula  Nasal Cannula  "O2 Flow Rate (L/min):  [0.5 L/min-1 L/min] 0.5 L/min    Mental Status Evaluation:  Appearance:  age appropriate   Behavior:  normal   Speech:  normal pitch and normal volume   Mood:  irritable   Affect:  constricted   Language: naming objects   Thought Process:  normal   Associations intact associations   Thought Content:  normal   Perceptual Disturbances: None   Risk Potential: Suicidal Ideations none  Homicidal Ideations none  Potential for Aggression No   Sensorium:  person, place, and time/date   Cognition:  recent and remote memory grossly intact   Consciousness:  alert    Attention: attention span and concentration were age appropriate   Intellect: within normal limits   Fund of Knowledge: awareness of current events: President    Insight:  limited   Judgment: limited   Muscle Strength:  Muscle Tone: normal  NFT  normal   Gait/Station: normal gait/station   Motor Activity: no abnormal movements     Lab Results: I have reviewed the following lab results:   .     11/23/24  0828   WBC 4.25*   HGB 9.8*   HCT 30.7*   *   SODIUM 132*   K 3.9   CL 97   CO2 28   BUN 27*   CREATININE 2.63*   GLUC 100   AST 14   ALT 6*   ALB 2.2*   TBILI 0.28   ALKPHOS 60          Lipid Profile:   Lab Results   Component Value Date    CHOLESTEROL 140 01/12/2022    HDL 30 (L) 01/12/2022    TRIG 177 (H) 01/12/2022    LDLCALC 75 01/12/2022   Thyroid Studies:   Lab Results   Component Value Date    ZCM6SKHQUXOD 12.236 (H) 07/30/2024    FREET4 0.80 07/30/2024     Ammonia: No results found for: \"AMMONIA\"  Drug Levels: No results found for: \"VALPROICTOT\", \"VALPROICACID\", \"LITHIUM\", \"CARBAMAZEPIN\", \"CLOZAPINE\", \"NCLOZIP\"    Imaging Results Review: No pertinent imaging studies reviewed.  Other Study Results Review: No additional pertinent studies reviewed.    Code Status: Level 3 - DNAR and DNI  Advance Directive and Living Will:      Power of :    POLST:      Screenings:   1. Nutrition Assessment (completed by Staff): "   Nutrition  Diet Type: Renal  2. Pain Screening  Pain Assessment  Pain Assessment Tool: 0-10  Pain Score: 10  Pain Location/Orientation: Location: Abdomen  Pain Radiating Towards: no  Pain Onset/Description: Onset: Ongoing  Effect of Pain on Daily Activities: rest  Patient's Stated Pain Goal: No pain  Hospital Pain Intervention(s): Medication (See MAR)  Pain Rating: FLACC (Rest) - Face: No particular expression or smile  Pain Rating: FLACC (Rest) - Legs: Normal position or relaxed  Pain Rating: FLACC (Rest) - Activity: Lying quietly, normal position, moves easily  Pain Rating: FLACC (Rest) - Cry: No cry (awake or asleep)  Pain Rating: FLACC (Rest) - Consolability: Content, relaxed  Score: FLACC (Rest): 0  Pain Rating: FLACC (Activity) - Face: No particular expression or smile  Pain Rating: FLACC (Activity) - Legs: Normal position or relaxed  Pain Rating: FLACC (Activity) - Activity: Lying quietly, normal position, moves easily  Pain Rating: FLACC (Activity) - Cry: No cry (awake or asleep)  Pain Rating: FLACC (Activity) - Consolability: Content, relaxed  Score: FLACC (Activity): 0  3. Suicide Screening  ED Crisis Suicide Risk Assessment:      C-SSRS Screening (Nursing Assessment - recent):    C-SSRS Screening (Nursing Assessment - since last contact):      Suicide Risk Assessment completed by the Consultant: Based on today's assessment, Masoud presents the following risk of harm to self: moderate.    Administrative Statements   VIRTUAL CARE DOCUMENTATION:     1. This service was provided via Telemedicine using [facility]     2. Parties in the room with patient during teleconsult Patient only    3. Confidentiality My office door was closed     4. Participants No one else was in the room    5. Patient acknowledged consent and understanding of privacy and security of the  Telemedicine consult. I informed the patient that I have reviewed their record in Epic and presented the opportunity for them to ask any questions  regarding the visit today.  The patient agreed to participate.    6. I have spent a total time of 30 minutes in caring for this patient on the day of the visit/encounter including Obtaining or reviewing history  , not including the time spent for establishing the audio/video connection.

## 2024-11-23 NOTE — ASSESSMENT & PLAN NOTE
Ultrafiltrate on dialysis as tolerates and adjust target weight as needed  Cardiology following for atrial fibrillation with history of prior ablation.  Currently in normal sinus rhythm.

## 2024-11-23 NOTE — ASSESSMENT & PLAN NOTE
Continue with Cinacalcet 60 mg 3 times a week  Continues on sevelamer 800 mg tablet 3 times daily with meals for hyperphosphatemia  Sinew to monitor PTH and phosphorus as outpatient

## 2024-11-23 NOTE — ASSESSMENT & PLAN NOTE
Current hemoglobin 9.8 today appears stable  Hold Epogen in the setting of COVID-19 infection  Continue to monitor  Recommend transfusion for hemoglobin less than 7

## 2024-11-23 NOTE — PLAN OF CARE
Post-Dialysis RN Treatment Note    Blood Pressure:  Pre-116/65 mm/Hg  Post-102/52 mmHg   EDW: 98.5 kg    Weight:  Pre-99.6 kg   Post-99.4 kg (with pump on bed)   Mode of weight measurement: Bed Scale   Volume Removed:  104 ml    Treatment duration: 180 minutes    NS given:  Yes 200 ml due to hypotension during tx   Treatment shortened: NO   Medications given during Rx: Midodrine 15 mg   Estimated Kt/V: 0.94   Access type: Permacath/TDC   Needle Gauge: N/A   Access Issues: reversed lines and flushed    Report called to primary nurse:  YES-Ghada Simth RN        Goal- remove 1 L as tolerated using 3 K+ bath over 3 hr hd tx.    Problem: METABOLIC, FLUID AND ELECTROLYTES - ADULT  Goal: Electrolytes maintained within normal limits  Description: INTERVENTIONS:  - Monitor labs and assess patient for signs and symptoms of electrolyte imbalances  - Administer electrolyte replacement as ordered  - Monitor response to electrolyte replacements, including repeat lab results as appropriate  - Instruct patient on fluid and nutrition as appropriate  Outcome: Progressing

## 2024-11-23 NOTE — PROGRESS NOTES
"Patient refuses repositioning or weight shifting overnight. Only agreeable to roll for incontinence cares. Patient denies wanting to die, states \"I dont want to die, I just want to be left alone to sleep\". Patient continues to refuse cares or repositioning. Offered ace wrapping to BLE, adamantly refuses. Education provided on all aspects, pt continues to refuse.  "

## 2024-11-23 NOTE — ASSESSMENT & PLAN NOTE
EXCUSE FOR WORK      2017        Re: Dane Neil   [facility] [id number]          Please excuse Dane Neil,  1979 from work on 2017 AM due to office visit.            SIGNATURE:___________________________________________,   2017  Mirella Pinedo PA-C  [address] [address] [phone]   -Continue sevelamer and Cinacalcet

## 2024-11-23 NOTE — ASSESSMENT & PLAN NOTE
-Is currently off peritoneal dialysis while at facility.  Is on hemodialysis at [facility] tomorrow,  - Access-right tunneled dialysis catheter  - Also has peritoneal dialysis catheter  - Last dialysis 11/21 with ultrafiltration 1.1 L.  Torsemide adjusted to 60 mg twice daily.  - 11/22-no urgent indication for dialysis.  Sodium borderline low we will continue ultrafiltrate as tolerates.  Potassium appropriate.  Magnesium and phosphorus appropriate.    Plan  - Dialysis Saturday

## 2024-11-23 NOTE — ASSESSMENT & PLAN NOTE
Presents from [place]. Tested positive 3 days ago when symptoms started  Started decadron and remdesevir, doxycycline  Patient already on Coumadin, INR therapeutic, continue  On clinical exam, patient is very fatigued and tired and sleepy but arousable and answer questions.

## 2024-11-23 NOTE — ASSESSMENT & PLAN NOTE
Previously on peritoneal dialysis currently off while at facility   Currently on maintenance hemodialysis at [facility]  Seen on dialysis and tolerating well  Current target weight 98.5  Last dialysis 11/21 UF -1.1 L torsemide previously adjusted to 60 mg twice daily  Access-right tunneled dialysis catheter-site clean dry and intact   Also has peritoneal dialysis catheter-clean dry and intact no erythema or drainage  Plan  Currently undergoing hemodialysis without issues  We will plan holiday schedule with next HD treatment come Monday  Continue to UF as BP allows and adjust target weight as needed for volume management  Check BMP on Monday

## 2024-11-23 NOTE — ASSESSMENT & PLAN NOTE
Wt Readings from Last 3 Encounters:   11/23/24 97.4 kg (214 lb 11.7 oz)   10/14/24 115 kg (253 lb)   10/07/24 118 kg (260 lb)   CXR on 11/20 appeared to be pulmonary vascular congestion     Given Lasix 80 mg in ED  Patient is a dialysis basis, received hemodialysis on TTS, last dialysis on 11/21/2024  Also nephrology added torsemide 60 mg twice daily  Patient will receive dialysis.

## 2024-11-23 NOTE — ASSESSMENT & PLAN NOTE
Blood pressure continues to fluctuate  Remains on midodrine 3 times daily  No Change in current treatment

## 2024-11-23 NOTE — ASSESSMENT & PLAN NOTE
Lab Results   Component Value Date    HGBA1C 5.0 11/02/2024       Recent Labs     11/22/24  1127 11/22/24  1653 11/22/24 2056 11/23/24  0718   POCGLU 92 81 74 105       Blood Sugar Average: Last 72 hrs:  (P) 108.4  Sliding scale insulin. Hypoglycemia protocol.

## 2024-11-23 NOTE — ASSESSMENT & PLAN NOTE
-Ultrafiltrate on dialysis as tolerates  -Cardiology on board for atrial fibrillation with history of prior ablation.  Currently in normal sinus rhythm.

## 2024-11-23 NOTE — UTILIZATION REVIEW
Continued Stay Review    SEE INITIAL REVIEW AT BOTTOM      Date: 11/22/24                         Current Patient Class: Inpatient  Current Level of Care: Med Surg    HPI:69 y.o. male initially admitted on 11/20/24 11/22  Day 3: Has surpassed a 2nd midnight with active treatments and services. Internal Medicine: COVID-19. Continue decadron, Remdesivir and IV doxycycline. Already on Coumadin, INR therapeutic, continue. Maintain electrolytes.  Continue metoprolol if blood pressure tolerates. Exam: AOX3. Rales present.  Nephrology:   Last dialysis 11/21 with ultrafiltration 1.1 L.  Torsemide adjusted to 60 mg twice daily.  11/22-no urgent indication for dialysis.  Sodium borderline low we will continue ultrafiltrate as tolerates.  Potassium appropriate.  Magnesium and phosphorus appropriate. Blood pressure improving. Exam:    Patient appears weak. Tunneled dialysis catheter without erythema or drainage. 1-2+ edema lower extremities. Physical Therapy discharge recommendation is Rehab Resource Intensity Level II PT, Moderate Resource Intensity.       Medications:   Scheduled Medications:    atorvastatin, 40 mg, Oral, Daily With Dinner  cinacalcet, 60 mg, Oral, Once per day on Monday Wednesday Friday  dexamethasone, 6 mg, Intravenous, Q24H  doxycycline, 100 mg, Intravenous, Q12H  finasteride, 5 mg, Oral, Daily  insulin lispro, 1-5 Units, Subcutaneous, HS  insulin lispro, 1-6 Units, Subcutaneous, TID AC  levothyroxine, 125 mcg, Oral, Early Morning  metoprolol tartrate, 25 mg, Oral, Q12H VASU  midodrine, 15 mg, Oral, TID AC  nicotine, 7 mg, Transdermal, Daily  potassium chloride, 20 mEq, Oral, Daily  pyridostigmine, 60 mg, Oral, TID  remdesivir, 100 mg, Intravenous, Q24H  sevelamer, 800 mg, Oral, TID With Meals  torsemide, 60 mg, Oral, BID  warfarin, 1 mg, Oral, HS      Continuous IV Infusions: None.      PRN Meds:      acetaminophen, 650 mg, Oral, Q4H PRN  docusate sodium, 100 mg, Oral, BID  PRN  HYDROcodone-acetaminophen, 2 tablet, Oral, Q6H PRN x 1 dose 11/22   ondansetron, 4 mg, Intravenous, Q6H PRN  simethicone, 80 mg, Oral, 4x Daily PRN      Discharge Plan:  TBD    Vital Signs (last 3 days)       Date/Time Temp Pulse Resp BP MAP (mmHg) SpO2 Calculated FIO2 (%) - Nasal Cannula Nasal Cannula O2 Flow Rate (L/min) O2 Device Patient Position - Orthostatic VS [place] Coma Scale Score Pain    11/22/24 2253 97.7 °F (36.5 °C) 66 18 146/70 87 98 % 24 1 L/min Nasal cannula Lying -- --    11/22/24 2111 -- -- -- -- -- -- -- -- -- -- -- 10 - Worst Possible Pain    11/22/24 2046 -- 54 18 121/63 85 96 % -- -- None (Room air) Sitting -- --    11/22/24 2039 -- -- -- -- -- 96 % 24 1 L/min Nasal cannula -- 15 5    11/22/24 1500 97 °F (36.1 °C) 55 20 155/80 -- -- -- -- -- -- -- --    11/22/24 1211 -- -- -- 121/53 84 -- -- -- -- Lying -- --    11/22/24 0904 -- -- -- -- -- 95 % 24 1 L/min Nasal cannula -- 15 No Pain    11/22/24 0647 97.5 °F (36.4 °C) -- 20 130/67 -- -- -- -- -- Lying -- --    11/21/24 2221 -- -- -- -- -- -- -- -- -- -- -- 5    11/21/24 2206 -- -- -- -- -- -- 24 1 L/min Nasal cannula -- 15 5    11/21/24 2152 97.5 °F (36.4 °C) -- 20 116/55 74 -- -- -- -- -- -- --    11/21/24 1724 -- 70 -- 96/62 -- -- -- -- -- -- -- --    11/21/24 1720 -- 66 20 96/62 73 -- -- -- -- Lying -- --    11/21/24 1716 -- 62 20 93/54 66 -- -- -- -- Lying -- --    11/21/24 1700 -- 67 20 81/49 61 -- -- -- -- Lying -- --    11/21/24 1630 -- 60 20 90/51 65 -- -- -- -- Lying -- --    11/21/24 1615 -- 83 20 103/77 77 -- -- -- -- Lying -- --    11/21/24 1600 -- 67 20 79/48 60 -- -- -- -- Lying -- --    11/21/24 1530 -- 62 20 86/52 65 -- -- -- -- Lying -- --    11/21/24 1500 -- 62 20 89/51 65 -- -- -- -- Lying -- --    11/21/24 1430 -- 60 20 90/51 65 -- -- -- -- Lying -- --    11/21/24 1415 -- 60 20 100/62 74 -- -- -- -- Lying -- --    11/21/24 1409 -- 60 20 98/61 73 -- -- -- -- Lying -- --    11/21/24 1355 -- -- -- -- -- 98 % 24 1 L/min  Nasal cannula -- -- --    11/21/24 1342 -- -- -- -- -- 92 % -- -- None (Room air) -- -- --          Weight (last 2 days)       Date/Time Weight    11/23/24 0600 97.4 (214.73)    11/22/24 0600 98.5 (217.15)    11/21/24 0600 100 (220.68)            Pertinent Labs/Diagnostic Results:       Radiology:  XR chest 1 view portable   ED Interpretation by Bennie Shoemaker DO (11/20 2225)   Moderate pulmonary vascular congestion small bilateral pleural effusions right greater than left      Final Interpretation by Kenn Ross MD (11/21 0830)      Mild to moderate pulmonary edema with right pleural effusion.      Left lower lobe atelectasis. Pneumonia is not excluded.            Workstation performed: [id number]           Cardiology:      ECG 12 lead   Final Result by Theodore Bone MD (11/21 0736)   Sinus rhythm   Right bundle branch block   Possible Lateral infarct (cited on or before 21-Sep-2024)   Abnormal ECG   When compared with ECG of 21-Sep-2024 07:35,   No significant change since prior tracing      Confirmed by Theodore Bone ([id number]) on 11/21/2024 7:36:12 AM              Results from last 7 days   Lab Units 11/22/24  0625 11/21/24  0502 11/20/24  2133   WBC Thousand/uL 6.42 6.27 7.11   HEMOGLOBIN g/dL 9.3* 10.0* 9.8*   HEMATOCRIT % 30.2* 32.9* 31.7*   PLATELETS Thousands/uL 90* 80* 91*   TOTAL NEUT ABS Thousands/µL  --  5.95 6.29         Results from last 7 days   Lab Units 11/22/24  0625 11/21/24  0502 11/20/24  2148   SODIUM mmol/L 130* 133* 132*   POTASSIUM mmol/L 4.2 3.7 3.2*   CHLORIDE mmol/L 96 100 99   CO2 mmol/L 28 25 28   ANION GAP mmol/L 6 8 5   BUN mg/dL 17 19 16   CREATININE mg/dL 2.24* 3.05* 2.90*   EGFR ml/min/1.73sq m 28 19 20   CALCIUM mg/dL 8.9 9.0 8.9   MAGNESIUM mg/dL 1.6*  --  1.6*   PHOSPHORUS mg/dL 2.9  --  3.7     Results from last 7 days   Lab Units 11/22/24  0625 11/21/24  0502 11/20/24  2148   AST U/L 15 17 18   ALT U/L 6* 6* 6*   ALK PHOS U/L 62 64 68   TOTAL PROTEIN g/dL 4.9* 5.3* 5.4*    ALBUMIN g/dL 2.2* 2.4* 2.4*   TOTAL BILIRUBIN mg/dL 0.28 0.27 0.30     Results from last 7 days   Lab Units 11/22/24 2056 11/22/24  1653 11/22/24  1127 11/22/24  0651 11/21/24 2149 11/21/24  1646 11/21/24  1112 11/21/24  0728 11/21/24  0041   POC GLUCOSE mg/dl 74 81 92 117 113 104 168* 135 95     Results from last 7 days   Lab Units 11/22/24 0625 11/21/24  0502 11/20/24 2148   GLUCOSE RANDOM mg/dL 104 127 86         Results from last 7 days   Lab Units 11/20/24 2133   PH MERCEDES  7.247*   PCO2 MERCEDES mm Hg 65.6*   PO2 MERCEDES mm Hg 28.4*   HCO3 MERCEDES mmol/L 27.9   BASE EXC MERCEDES mmol/L -0.4   O2 CONTENT MERCEDES ml/dL 8.3   O2 HGB, VENOUS % 52.7*             Results from last 7 days   Lab Units 11/21/24  0220 11/21/24  0024 11/20/24 2133   HS TNI 0HR ng/L  --   --  70*   HS TNI 2HR ng/L  --  63*  --    HSTNI D2 ng/L  --  -7  --    HS TNI 4HR ng/L 59*  --   --    HSTNI D4 ng/L -11  --   --          Results from last 7 days   Lab Units 11/22/24 0625 11/20/24 2133   PROTIME seconds 26.3* 28.3*   INR  2.41* 2.65*   PTT seconds  --  65*         Results from last 7 days   Lab Units 11/21/24  0502 11/20/24 2148   PROCALCITONIN ng/ml 0.87* 0.84*     Results from last 7 days   Lab Units 11/20/24 2133   LACTIC ACID mmol/L 0.8             Results from last 7 days   Lab Units 11/20/24 2133   BNP pg/mL 301*     Results from last 7 days   Lab Units 11/22/24 0625   FERRITIN ng/mL 470*   IRON ug/dL 54             Results from last 7 days   Lab Units 11/20/24 2148 11/20/24 2133   CRP mg/L 165.9*  --    SED RATE mm/hour  --  35*                 Results from last 7 days   Lab Units 11/20/24  4038   BLOOD CULTURE  No Growth at 48 hrs.  No Growth at 48 hrs.                   Network Utilization Review Department  ATTENTION: Please call with any questions or concerns to [phone] and carefully listen to the prompts so that you are directed to the right person. All voicemails are confidential.   For Discharge needs, contact Care  Management DC Support Team at [phone] opt. 2  Send all requests for admission clinical reviews, approved or denied determinations and any other requests to dedicated fax number below belonging to the campus where the patient is receiving treatment. List of dedicated fax numbers for the Facilities:  FACILITY NAME UR FAX NUMBER   ADMISSION DENIALS (Administrative/Medical Necessity) [phone]   DISCHARGE SUPPORT TEAM (NETWORK) [phone]   PARENT CHILD HEALTH (Maternity/NICU/Pediatrics) [phone]   [facility] [phone]   [facility] [phone]   [facility] [phone]   [facility] [phone]   [facility] [phone]   [facility] [phone]   [facility] [phone]   [facility] [phone]   [facility] [phone]   [facility] [phone]   [facility] [phone]   [facility] [phone]

## 2024-11-23 NOTE — TELEMEDICINE
Tele-Consultation - Behavioral Health   Name: Masoud Perry 69 y.o. male I MRN: [id number]  Unit/Bed#: -01 I Date of Admission: 11/20/2024   Date of Service: 11/23/2024 I Hospital Day: 3   Consults   Writer attempted to connect with [employer] kit but was unable to due to technical difficulties with the platform's inability to enable video or microphone capabilities. Attempted to contact [facility] IT for assistance without resolution and consult will be assigned to a new provider/physician as a result. Primary Team and NOK aware.

## 2024-11-23 NOTE — ASSESSMENT & PLAN NOTE
Treatment for COVID-19 per primary team.    Also there is concern for volume overload and patient was ultrafiltrated and on torsemide  Continue to monitor and treat as needed

## 2024-11-24 PROBLEM — A09 DIARRHEA OF INFECTIOUS ORIGIN: Status: ACTIVE | Noted: 2024-11-24

## 2024-11-24 LAB
ALBUMIN SERPL BCG-MCNC: 2.3 G/DL (ref 3.5–5)
ALP SERPL-CCNC: 62 U/L (ref 34–104)
ALT SERPL W P-5'-P-CCNC: 6 U/L (ref 7–52)
ANION GAP SERPL CALCULATED.3IONS-SCNC: 7 MMOL/L (ref 4–13)
AST SERPL W P-5'-P-CCNC: 17 U/L (ref 13–39)
BASOPHILS # BLD AUTO: 0 THOUSANDS/ÂΜL (ref 0–0.1)
BASOPHILS NFR BLD AUTO: 0 % (ref 0–1)
BILIRUB SERPL-MCNC: 0.35 MG/DL (ref 0.2–1)
BUN SERPL-MCNC: 20 MG/DL (ref 5–25)
C DIFF TOX A+B STL QL IA: NEGATIVE
C DIFF TOX GENS STL QL NAA+PROBE: POSITIVE
CALCIUM ALBUM COR SERPL-MCNC: 10.5 MG/DL (ref 8.3–10.1)
CALCIUM SERPL-MCNC: 9.1 MG/DL (ref 8.4–10.2)
CHLORIDE SERPL-SCNC: 96 MMOL/L (ref 96–108)
CO2 SERPL-SCNC: 28 MMOL/L (ref 21–32)
CREAT SERPL-MCNC: 2.11 MG/DL (ref 0.6–1.3)
EOSINOPHIL # BLD AUTO: 0.02 THOUSAND/ÂΜL (ref 0–0.61)
EOSINOPHIL NFR BLD AUTO: 0 % (ref 0–6)
ERYTHROCYTE [DISTWIDTH] IN BLOOD BY AUTOMATED COUNT: 14.7 % (ref 11.6–15.1)
GFR SERPL CREATININE-BSD FRML MDRD: 30 ML/MIN/1.73SQ M
GLUCOSE SERPL-MCNC: 103 MG/DL (ref 65–140)
GLUCOSE SERPL-MCNC: 111 MG/DL (ref 65–140)
GLUCOSE SERPL-MCNC: 76 MG/DL (ref 65–140)
GLUCOSE SERPL-MCNC: 82 MG/DL (ref 65–140)
GLUCOSE SERPL-MCNC: 93 MG/DL (ref 65–140)
HCT VFR BLD AUTO: 33.5 % (ref 36.5–49.3)
HGB BLD-MCNC: 10.3 G/DL (ref 12–17)
IMM GRANULOCYTES # BLD AUTO: 0.02 THOUSAND/UL (ref 0–0.2)
IMM GRANULOCYTES NFR BLD AUTO: 0 % (ref 0–2)
INR PPP: 3.06 (ref 0.85–1.19)
LYMPHOCYTES # BLD AUTO: 0.36 THOUSANDS/ÂΜL (ref 0.6–4.47)
LYMPHOCYTES NFR BLD AUTO: 7 % (ref 14–44)
MCH RBC QN AUTO: 31.7 PG (ref 26.8–34.3)
MCHC RBC AUTO-ENTMCNC: 30.7 G/DL (ref 31.4–37.4)
MCV RBC AUTO: 103 FL (ref 82–98)
MONOCYTES # BLD AUTO: 0.14 THOUSAND/ÂΜL (ref 0.17–1.22)
MONOCYTES NFR BLD AUTO: 3 % (ref 4–12)
NEUTROPHILS # BLD AUTO: 4.67 THOUSANDS/ÂΜL (ref 1.85–7.62)
NEUTS SEG NFR BLD AUTO: 90 % (ref 43–75)
NRBC BLD AUTO-RTO: 0 /100 WBCS
PLATELET # BLD AUTO: 106 THOUSANDS/UL (ref 149–390)
PMV BLD AUTO: 9.4 FL (ref 8.9–12.7)
POTASSIUM SERPL-SCNC: 3.7 MMOL/L (ref 3.5–5.3)
PROCALCITONIN SERPL-MCNC: 0.35 NG/ML
PROT SERPL-MCNC: 5 G/DL (ref 6.4–8.4)
PROTHROMBIN TIME: 31.5 SECONDS (ref 12.3–15)
RBC # BLD AUTO: 3.25 MILLION/UL (ref 3.88–5.62)
SODIUM SERPL-SCNC: 131 MMOL/L (ref 135–147)
WBC # BLD AUTO: 5.21 THOUSAND/UL (ref 4.31–10.16)

## 2024-11-24 PROCEDURE — 80053 COMPREHEN METABOLIC PANEL: CPT | Performed by: FAMILY MEDICINE

## 2024-11-24 PROCEDURE — 94668 MNPJ CHEST WALL SBSQ: CPT

## 2024-11-24 PROCEDURE — 85610 PROTHROMBIN TIME: CPT | Performed by: FAMILY MEDICINE

## 2024-11-24 PROCEDURE — 82948 REAGENT STRIP/BLOOD GLUCOSE: CPT

## 2024-11-24 PROCEDURE — 99233 SBSQ HOSP IP/OBS HIGH 50: CPT | Performed by: INTERNAL MEDICINE

## 2024-11-24 PROCEDURE — 85027 COMPLETE CBC AUTOMATED: CPT | Performed by: FAMILY MEDICINE

## 2024-11-24 PROCEDURE — 84145 PROCALCITONIN (PCT): CPT | Performed by: FAMILY MEDICINE

## 2024-11-24 PROCEDURE — 99233 SBSQ HOSP IP/OBS HIGH 50: CPT | Performed by: FAMILY MEDICINE

## 2024-11-24 RX ORDER — HYDROMORPHONE HCL/PF 1 MG/ML
0.5 SYRINGE (ML) INJECTION ONCE
Status: COMPLETED | OUTPATIENT
Start: 2024-11-24 | End: 2024-11-24

## 2024-11-24 RX ORDER — ALBUMIN (HUMAN) 12.5 G/50ML
25 SOLUTION INTRAVENOUS EVERY 12 HOURS
Status: COMPLETED | OUTPATIENT
Start: 2024-11-24 | End: 2024-11-25

## 2024-11-24 RX ORDER — VANCOMYCIN HYDROCHLORIDE 125 MG/1
125 CAPSULE ORAL EVERY 6 HOURS SCHEDULED
Status: DISCONTINUED | OUTPATIENT
Start: 2024-11-24 | End: 2024-12-01

## 2024-11-24 RX ORDER — DOXYCYCLINE 100 MG/1
100 CAPSULE ORAL EVERY 12 HOURS SCHEDULED
Status: DISPENSED | OUTPATIENT
Start: 2024-11-24 | End: 2024-11-27

## 2024-11-24 RX ADMIN — VANCOMYCIN HYDROCHLORIDE 125 MG: 125 CAPSULE ORAL at 23:06

## 2024-11-24 RX ADMIN — ATORVASTATIN CALCIUM 40 MG: 40 TABLET, FILM COATED ORAL at 16:59

## 2024-11-24 RX ADMIN — METOPROLOL TARTRATE 25 MG: 25 TABLET, FILM COATED ORAL at 08:37

## 2024-11-24 RX ADMIN — VANCOMYCIN HYDROCHLORIDE 125 MG: 125 CAPSULE ORAL at 17:00

## 2024-11-24 RX ADMIN — ALBUMIN (HUMAN) 25 G: 0.25 INJECTION, SOLUTION INTRAVENOUS at 11:42

## 2024-11-24 RX ADMIN — POTASSIUM CHLORIDE 20 MEQ: 1500 TABLET, EXTENDED RELEASE ORAL at 08:37

## 2024-11-24 RX ADMIN — DEXAMETHASONE SODIUM PHOSPHATE 6 MG: 10 INJECTION, SOLUTION INTRAMUSCULAR; INTRAVENOUS at 01:55

## 2024-11-24 RX ADMIN — ALBUMIN (HUMAN) 25 G: 0.25 INJECTION, SOLUTION INTRAVENOUS at 23:06

## 2024-11-24 RX ADMIN — PYRIDOSTIGMINE BROMIDE 60 MG: 60 TABLET ORAL at 08:39

## 2024-11-24 RX ADMIN — VANCOMYCIN HYDROCHLORIDE 125 MG: 125 CAPSULE ORAL at 11:32

## 2024-11-24 RX ADMIN — DOXYCYCLINE 100 MG: 100 CAPSULE ORAL at 08:37

## 2024-11-24 RX ADMIN — HYDROCODONE BITARTRATE AND ACETAMINOPHEN 2 TABLET: 5; 325 TABLET ORAL at 23:05

## 2024-11-24 RX ADMIN — LEVOTHYROXINE SODIUM 125 MCG: 125 TABLET ORAL at 06:13

## 2024-11-24 RX ADMIN — FINASTERIDE 5 MG: 5 TABLET, FILM COATED ORAL at 08:37

## 2024-11-24 RX ADMIN — DOXYCYCLINE 100 MG: 100 CAPSULE ORAL at 22:26

## 2024-11-24 RX ADMIN — REMDESIVIR 100 MG: 100 INJECTION, POWDER, LYOPHILIZED, FOR SOLUTION INTRAVENOUS at 01:54

## 2024-11-24 RX ADMIN — METOPROLOL TARTRATE 25 MG: 25 TABLET, FILM COATED ORAL at 22:26

## 2024-11-24 RX ADMIN — PYRIDOSTIGMINE BROMIDE 60 MG: 60 TABLET ORAL at 17:01

## 2024-11-24 RX ADMIN — HYDROCODONE BITARTRATE AND ACETAMINOPHEN 2 TABLET: 5; 325 TABLET ORAL at 16:59

## 2024-11-24 RX ADMIN — PYRIDOSTIGMINE BROMIDE 60 MG: 60 TABLET ORAL at 22:26

## 2024-11-24 RX ADMIN — MIDODRINE HYDROCHLORIDE 15 MG: 5 TABLET ORAL at 16:59

## 2024-11-24 RX ADMIN — TORSEMIDE 60 MG: 20 TABLET ORAL at 08:38

## 2024-11-24 RX ADMIN — HYDROMORPHONE HYDROCHLORIDE 0.5 MG: 1 INJECTION, SOLUTION INTRAMUSCULAR; INTRAVENOUS; SUBCUTANEOUS at 12:13

## 2024-11-24 RX ADMIN — HYDROCODONE BITARTRATE AND ACETAMINOPHEN 2 TABLET: 5; 325 TABLET ORAL at 08:45

## 2024-11-24 NOTE — PLAN OF CARE
Problem: Prexisting or High Potential for Compromised Skin Integrity  Goal: Skin integrity is maintained or improved  Description: INTERVENTIONS:  - Identify patients at risk for skin breakdown  - Assess and monitor skin integrity  - Assess and monitor nutrition and hydration status  - Monitor labs   - Assess for incontinence   - Turn and reposition patient  - Assist with mobility/ambulation  - Relieve pressure over bony prominences  - Avoid friction and shearing  - Provide appropriate hygiene as needed including keeping skin clean and dry  - Evaluate need for skin moisturizer/barrier cream  - Collaborate with interdisciplinary team   - Patient/family teaching  - Consider wound care consult   Outcome: Progressing     Problem: PAIN - ADULT  Goal: Verbalizes/displays adequate comfort level or baseline comfort level  Description: Interventions:  - Encourage patient to monitor pain and request assistance  - Assess pain using appropriate pain scale  - Administer analgesics based on type and severity of pain and evaluate response  - Implement non-pharmacological measures as appropriate and evaluate response  - Consider cultural and social influences on pain and pain management  - Notify physician/advanced practitioner if interventions unsuccessful or patient reports new pain  Outcome: Progressing     Problem: INFECTION - ADULT  Goal: Absence or prevention of progression during hospitalization  Description: INTERVENTIONS:  - Assess and monitor for signs and symptoms of infection  - Monitor lab/diagnostic results  - Monitor all insertion sites, i.e. indwelling lines, tubes, and drains  - Monitor endotracheal if appropriate and nasal secretions for changes in amount and color  - Graham appropriate cooling/warming therapies per order  - Administer medications as ordered  - Instruct and encourage patient and family to use good hand hygiene technique  - Identify and instruct in appropriate isolation precautions for  identified infection/condition  Outcome: Progressing  Goal: Absence of fever/infection during neutropenic period  Description: INTERVENTIONS:  - Monitor WBC    Outcome: Progressing     Problem: SAFETY ADULT  Goal: Patient will remain free of falls  Description: INTERVENTIONS:  - Educate patient/family on patient safety including physical limitations  - Instruct patient to call for assistance with activity   - Consult OT/PT to assist with strengthening/mobility   - Keep Call bell within reach  - Keep bed low and locked with side rails adjusted as appropriate  - Keep care items and personal belongings within reach  - Initiate and maintain comfort rounds  - Make Fall Risk Sign visible to staff  - Offer Toileting every 2 Hours, in advance of need  - Initiate/Maintain bed/chair alarm  - Obtain necessary fall risk management equipment  - Apply yellow socks and bracelet for high fall risk patients  - Consider moving patient to room near nurses station  Outcome: Progressing  Goal: Maintain or return to baseline ADL function  Description: INTERVENTIONS:  -  Assess patient's ability to carry out ADLs; assess patient's baseline for ADL function and identify physical deficits which impact ability to perform ADLs (bathing, care of mouth/teeth, toileting, grooming, dressing, etc.)  - Assess/evaluate cause of self-care deficits   - Assess range of motion  - Assess patient's mobility; develop plan if impaired  - Assess patient's need for assistive devices and provide as appropriate  - Encourage maximum independence but intervene and supervise when necessary  - Involve family in performance of ADLs  - Assess for home care needs following discharge   - Consider OT consult to assist with ADL evaluation and planning for discharge  - Provide patient education as appropriate  Outcome: Progressing  Goal: Maintains/Returns to pre admission functional level  Description: INTERVENTIONS:  - Perform AM-PAC 6 Click Basic Mobility/ Daily  Activity assessment daily.  - Set and communicate daily mobility goal to care team and patient/family/caregiver.   - Collaborate with rehabilitation services on mobility goals if consulted  - Perform Range of Motion 3 times a day.  - Reposition patient every 2 hours.  - Dangle patient 3 times a day  - Stand patient 3 times a day  - Ambulate patient 3 times a day  - Out of bed to chair 3 times a day   - Out of bed for meals 3 times a day  - Out of bed for toileting  - Record patient progress and toleration of activity level   Outcome: Progressing     Problem: DISCHARGE PLANNING  Goal: Discharge to home or other facility with appropriate resources  Description: INTERVENTIONS:  - Identify barriers to discharge w/patient and caregiver  - Arrange for needed discharge resources and transportation as appropriate  - Identify discharge learning needs (meds, wound care, etc.)  - Arrange for interpretive services to assist at discharge as needed  - Refer to Case Management Department for coordinating discharge planning if the patient needs post-hospital services based on physician/advanced practitioner order or complex needs related to functional status, cognitive ability, or social support system  Outcome: Progressing     Problem: Knowledge Deficit  Goal: Patient/family/caregiver demonstrates understanding of disease process, treatment plan, medications, and discharge instructions  Description: Complete learning assessment and assess knowledge base.  Interventions:  - Provide teaching at level of understanding  - Provide teaching via preferred learning methods  Outcome: Progressing     Problem: CARDIOVASCULAR - ADULT  Goal: Maintains optimal cardiac output and hemodynamic stability  Description: INTERVENTIONS:  - Monitor I/O, vital signs and rhythm  - Monitor for S/S and trends of decreased cardiac output  - Administer and titrate ordered vasoactive medications to optimize hemodynamic stability  - Assess quality of pulses,  skin color and temperature  - Assess for signs of decreased coronary artery perfusion  - Instruct patient to report change in severity of symptoms  Outcome: Progressing  Goal: Absence of cardiac dysrhythmias or at baseline rhythm  Description: INTERVENTIONS:  - Continuous cardiac monitoring, vital signs, obtain 12 lead EKG if ordered  - Administer antiarrhythmic and heart rate control medications as ordered  - Monitor electrolytes and administer replacement therapy as ordered  Outcome: Progressing     Problem: RESPIRATORY - ADULT  Goal: Achieves optimal ventilation and oxygenation  Description: INTERVENTIONS:  - Assess for changes in respiratory status  - Assess for changes in mentation and behavior  - Position to facilitate oxygenation and minimize respiratory effort  - Oxygen administered by appropriate delivery if ordered  - Initiate smoking cessation education as indicated  - Encourage broncho-pulmonary hygiene including cough, deep breathe, Incentive Spirometry  - Assess the need for suctioning and aspirate as needed  - Assess and instruct to report SOB or any respiratory difficulty  - Respiratory Therapy support as indicated  Outcome: Progressing     Problem: METABOLIC, FLUID AND ELECTROLYTES - ADULT  Goal: Electrolytes maintained within normal limits  Description: INTERVENTIONS:  - Monitor labs and assess patient for signs and symptoms of electrolyte imbalances  - Administer electrolyte replacement as ordered  - Monitor response to electrolyte replacements, including repeat lab results as appropriate  - Instruct patient on fluid and nutrition as appropriate  Outcome: Progressing  Goal: Fluid balance maintained  Description: INTERVENTIONS:  - Monitor labs   - Monitor I/O and WT  - Instruct patient on fluid and nutrition as appropriate  - Assess for signs & symptoms of volume excess or deficit  Outcome: Progressing  Goal: Glucose maintained within target range  Description: INTERVENTIONS:  - Monitor Blood  Glucose as ordered  - Assess for signs and symptoms of hyperglycemia and hypoglycemia  - Administer ordered medications to maintain glucose within target range  - Assess nutritional intake and initiate nutrition service referral as needed  Outcome: Progressing     Problem: Nutrition/Hydration-ADULT  Goal: Nutrient/Hydration intake appropriate for improving, restoring or maintaining nutritional needs  Description: Monitor and assess patient's nutrition/hydration status for malnutrition. Collaborate with interdisciplinary team and initiate plan and interventions as ordered.  Monitor patient's weight and dietary intake as ordered or per policy. Utilize nutrition screening tool and intervene as necessary. Determine patient's food preferences and provide high-protein, high-caloric foods as appropriate.     INTERVENTIONS:  - Monitor oral intake, urinary output, labs, and treatment plans  - Assess nutrition and hydration status and recommend course of action  - Evaluate amount of meals eaten  - Assist patient with eating if necessary   - Allow adequate time for meals  - Recommend/ encourage appropriate diets, oral nutritional supplements, and vitamin/mineral supplements  - Order, calculate, and assess calorie counts as needed  - Recommend, monitor, and adjust tube feedings and TPN/PPN based on assessed needs  - Assess need for intravenous fluids  - Provide specific nutrition/hydration education as appropriate  - Include patient/family/caregiver in decisions related to nutrition  Outcome: Progressing

## 2024-11-24 NOTE — ASSESSMENT & PLAN NOTE
BP acceptable but can run low on dialysis  Remains on midodrine 3 times daily  No Change in current treatment

## 2024-11-24 NOTE — ASSESSMENT & PLAN NOTE
Continue metoprolol if blood pressure tolerates  Continue Coumadin, maintain electrolytes-INR supratherapeutic, remain on hold

## 2024-11-24 NOTE — ASSESSMENT & PLAN NOTE
Previously on peritoneal dialysis and currently off while at facility   Currently on maintenance hemodialysis at [facility]  Status post hemodialysis yesterday for UF -104 ml  Current target weight 98.5  On torsemide 2 times daily  Access-right tunneled dialysis catheter-site clean dry and intact   Also has peritoneal dialysis catheter-clean dry and intact no erythema or drainage  Plan  Hemodialysis tomorrow holiday schedule  Continue to UF as BP allows and adjust target weight as needed for volume management  Hypoalbuminemia-given diarrhea will give albumin 25% / 25 g every 12 hours x 2 today  Evaluate patient in a.m. for ongoing need  Check BMP on Monday

## 2024-11-24 NOTE — ASSESSMENT & PLAN NOTE
Presents from [facility]. Tested positive 3 days ago when symptoms started  Started decadron and remdesevir, doxycycline  Patient already on Coumadin, INR is supratherapeutic, 3.06, remain on hold  On clinical exam, patient is very fatigued and tired and sleepy but arousable and answer questions.

## 2024-11-24 NOTE — ASSESSMENT & PLAN NOTE
Continue with Cinacalcet 60 mg 3 times a week  Continues on sevelamer 800 mg tablet 3 times daily with meals for hyperphosphatemia  Will check phosphorus in a.m. given recent phosphorus level of 2.9-sevelamer may need to be adjusted

## 2024-11-24 NOTE — PLAN OF CARE
Problem: Prexisting or High Potential for Compromised Skin Integrity  Goal: Skin integrity is maintained or improved  Description: INTERVENTIONS:  - Identify patients at risk for skin breakdown  - Assess and monitor skin integrity  - Assess and monitor nutrition and hydration status  - Monitor labs   - Assess for incontinence   - Turn and reposition patient  - Assist with mobility/ambulation  - Relieve pressure over bony prominences  - Avoid friction and shearing  - Provide appropriate hygiene as needed including keeping skin clean and dry  - Evaluate need for skin moisturizer/barrier cream  - Collaborate with interdisciplinary team   - Patient/family teaching  - Consider wound care consult   Outcome: Progressing     Problem: PAIN - ADULT  Goal: Verbalizes/displays adequate comfort level or baseline comfort level  Description: Interventions:  - Encourage patient to monitor pain and request assistance  - Assess pain using appropriate pain scale  - Administer analgesics based on type and severity of pain and evaluate response  - Implement non-pharmacological measures as appropriate and evaluate response  - Consider cultural and social influences on pain and pain management  - Notify physician/advanced practitioner if interventions unsuccessful or patient reports new pain  Outcome: Progressing     Problem: INFECTION - ADULT  Goal: Absence or prevention of progression during hospitalization  Description: INTERVENTIONS:  - Assess and monitor for signs and symptoms of infection  - Monitor lab/diagnostic results  - Monitor all insertion sites, i.e. indwelling lines, tubes, and drains  - Monitor endotracheal if appropriate and nasal secretions for changes in amount and color  - [place] appropriate cooling/warming therapies per order  - Administer medications as ordered  - Instruct and encourage patient and family to use good hand hygiene technique  - Identify and instruct in appropriate isolation precautions for  identified infection/condition  Outcome: Progressing  Goal: Absence of fever/infection during neutropenic period  Description: INTERVENTIONS:  - Monitor WBC    Outcome: Progressing     Problem: SAFETY ADULT  Goal: Patient will remain free of falls  Description: INTERVENTIONS:  - Educate patient/family on patient safety including physical limitations  - Instruct patient to call for assistance with activity   - Consult OT/PT to assist with strengthening/mobility   - Keep Call bell within reach  - Keep bed low and locked with side rails adjusted as appropriate  - Keep care items and personal belongings within reach  - Initiate and maintain comfort rounds  - Make Fall Risk Sign visible to staff  - Offer Toileting every 2 Hours, in advance of need  - Initiate/Maintain bed/chair alarm  - Obtain necessary fall risk management equipment  - Apply yellow socks and bracelet for high fall risk patients  - Consider moving patient to room near nurses station  Outcome: Progressing  Goal: Maintain or return to baseline ADL function  Description: INTERVENTIONS:  -  Assess patient's ability to carry out ADLs; assess patient's baseline for ADL function and identify physical deficits which impact ability to perform ADLs (bathing, care of mouth/teeth, toileting, grooming, dressing, etc.)  - Assess/evaluate cause of self-care deficits   - Assess range of motion  - Assess patient's mobility; develop plan if impaired  - Assess patient's need for assistive devices and provide as appropriate  - Encourage maximum independence but intervene and supervise when necessary  - Involve family in performance of ADLs  - Assess for home care needs following discharge   - Consider OT consult to assist with ADL evaluation and planning for discharge  - Provide patient education as appropriate  Outcome: Progressing  Goal: Maintains/Returns to pre admission functional level  Description: INTERVENTIONS:  - Perform AM-PAC 6 Click Basic Mobility/ Daily  Activity assessment daily.  - Set and communicate daily mobility goal to care team and patient/family/caregiver.   - Collaborate with rehabilitation services on mobility goals if consulted  - Perform Range of Motion 3 times a day.  - Reposition patient every 2 hours.  - Dangle patient 3 times a day  - Stand patient 3 times a day  - Ambulate patient 3 times a day  - Out of bed to chair 3 times a day   - Out of bed for meals 3 times a day  - Out of bed for toileting  - Record patient progress and toleration of activity level   Outcome: Progressing     Problem: DISCHARGE PLANNING  Goal: Discharge to home or other facility with appropriate resources  Description: INTERVENTIONS:  - Identify barriers to discharge w/patient and caregiver  - Arrange for needed discharge resources and transportation as appropriate  - Identify discharge learning needs (meds, wound care, etc.)  - Arrange for interpretive services to assist at discharge as needed  - Refer to Case Management Department for coordinating discharge planning if the patient needs post-hospital services based on physician/advanced practitioner order or complex needs related to functional status, cognitive ability, or social support system  Outcome: Progressing     Problem: Knowledge Deficit  Goal: Patient/family/caregiver demonstrates understanding of disease process, treatment plan, medications, and discharge instructions  Description: Complete learning assessment and assess knowledge base.  Interventions:  - Provide teaching at level of understanding  - Provide teaching via preferred learning methods  Outcome: Progressing     Problem: CARDIOVASCULAR - ADULT  Goal: Maintains optimal cardiac output and hemodynamic stability  Description: INTERVENTIONS:  - Monitor I/O, vital signs and rhythm  - Monitor for S/S and trends of decreased cardiac output  - Administer and titrate ordered vasoactive medications to optimize hemodynamic stability  - Assess quality of pulses,  skin color and temperature  - Assess for signs of decreased coronary artery perfusion  - Instruct patient to report change in severity of symptoms  Outcome: Progressing  Goal: Absence of cardiac dysrhythmias or at baseline rhythm  Description: INTERVENTIONS:  - Continuous cardiac monitoring, vital signs, obtain 12 lead EKG if ordered  - Administer antiarrhythmic and heart rate control medications as ordered  - Monitor electrolytes and administer replacement therapy as ordered  Outcome: Progressing     Problem: RESPIRATORY - ADULT  Goal: Achieves optimal ventilation and oxygenation  Description: INTERVENTIONS:  - Assess for changes in respiratory status  - Assess for changes in mentation and behavior  - Position to facilitate oxygenation and minimize respiratory effort  - Oxygen administered by appropriate delivery if ordered  - Initiate smoking cessation education as indicated  - Encourage broncho-pulmonary hygiene including cough, deep breathe, Incentive Spirometry  - Assess the need for suctioning and aspirate as needed  - Assess and instruct to report SOB or any respiratory difficulty  - Respiratory Therapy support as indicated  Outcome: Progressing     Problem: METABOLIC, FLUID AND ELECTROLYTES - ADULT  Goal: Electrolytes maintained within normal limits  Description: INTERVENTIONS:  - Monitor labs and assess patient for signs and symptoms of electrolyte imbalances  - Administer electrolyte replacement as ordered  - Monitor response to electrolyte replacements, including repeat lab results as appropriate  - Instruct patient on fluid and nutrition as appropriate  Outcome: Progressing  Goal: Fluid balance maintained  Description: INTERVENTIONS:  - Monitor labs   - Monitor I/O and WT  - Instruct patient on fluid and nutrition as appropriate  - Assess for signs & symptoms of volume excess or deficit  Outcome: Progressing  Goal: Glucose maintained within target range  Description: INTERVENTIONS:  - Monitor Blood  Glucose as ordered  - Assess for signs and symptoms of hyperglycemia and hypoglycemia  - Administer ordered medications to maintain glucose within target range  - Assess nutritional intake and initiate nutrition service referral as needed  Outcome: Progressing     Problem: Nutrition/Hydration-ADULT  Goal: Nutrient/Hydration intake appropriate for improving, restoring or maintaining nutritional needs  Description: Monitor and assess patient's nutrition/hydration status for malnutrition. Collaborate with interdisciplinary team and initiate plan and interventions as ordered.  Monitor patient's weight and dietary intake as ordered or per policy. Utilize nutrition screening tool and intervene as necessary. Determine patient's food preferences and provide high-protein, high-caloric foods as appropriate.     INTERVENTIONS:  - Monitor oral intake, urinary output, labs, and treatment plans  - Assess nutrition and hydration status and recommend course of action  - Evaluate amount of meals eaten  - Assist patient with eating if necessary   - Allow adequate time for meals  - Recommend/ encourage appropriate diets, oral nutritional supplements, and vitamin/mineral supplements  - Order, calculate, and assess calorie counts as needed  - Recommend, monitor, and adjust tube feedings and TPN/PPN based on assessed needs  - Assess need for intravenous fluids  - Provide specific nutrition/hydration education as appropriate  - Include patient/family/caregiver in decisions related to nutrition  Outcome: Progressing

## 2024-11-24 NOTE — PROGRESS NOTES
Progress Note - Hospitalist   Name: Masoud Perry 69 y.o. male I MRN: [id number]  Unit/Bed#: [facility]-01 I Date of Admission: 11/20/2024   Date of Service: 11/24/2024 I Hospital Day: 4    Assessment & Plan  Acute respiratory insufficiency  Patient is currently 1 L of oxygen  Continue current treatment  Patient looks very tired, also having diarrhea.  Will continue current treatment.  Acute on chronic diastolic HF (heart failure) ([facility])  Wt Readings from Last 3 Encounters:   11/24/24 98.3 kg (216 lb 11.4 oz)   10/14/24 115 kg (253 lb)   10/07/24 118 kg (260 lb)   CXR on 11/20 appeared to be pulmonary vascular congestion   Patient is a dialysis basis, received hemodialysis on TTS, last dialysis on 11/21/2024  Continue dialysis,  Dialysis on Monday.  COVID-19  Presents from [place]. Tested positive 3 days ago when symptoms started  Started decadron and remdesevir, doxycycline  Patient already on Coumadin, INR is supratherapeutic, 3.06, remain on hold  On clinical exam, patient is very fatigued and tired and sleepy but arousable and answer questions.  Diarrhea of infectious origin  Patient is having significant diarrhea, initially do suspect related to COVID.  Stool C. difficile panel showing positive for C. difficile PCR, Steffany negative  Since patient is having significant diarrhea, start treating dose of vancomycin for 10 days.  Secondary hyperparathyroidism of renal origin (HCC)  Continue Cinacalcet on Mondays, Wednesdays, and Fridays  Hemodialysis-associated hypotension  Continue midodrine and pyridostigmine  HLD (hyperlipidemia)  Continue atorvastatin  Acquired hypothyroidism  Continue levothyroxine  Factor 5 Leiden mutation, heterozygous (HCC)  On Coumadin-INR 3.06, Coumadin remain on hold  Type 2 diabetes mellitus with stage 4 chronic kidney disease, with long-term current use of insulin (HCC)  Lab Results   Component Value Date    HGBA1C 5.0 11/02/2024       Recent Labs     11/23/24  1220 11/23/24  1639  11/23/24 2104 11/24/24  0709   POCGLU 94 83 73 111       Blood Sugar Average: Last 72 hrs:  (P) [id number]  Sliding scale insulin. Hypoglycemia protocol.  Personal history of gout  Continue allopurinol  Elevated troponin  Trending  Paroxysmal atrial fibrillation (HCC)  Continue metoprolol if blood pressure tolerates  Continue Coumadin, maintain electrolytes-INR supratherapeutic, remain on hold  ESRD (end stage renal disease) on dialysis ([facility])  Lab Results   Component Value Date    EGFR 30 11/24/2024    EGFR 23 11/23/2024    EGFR 28 11/22/2024    CREATININE 2.11 (H) 11/24/2024    CREATININE 2.63 (H) 11/23/2024    CREATININE 2.24 (H) 11/22/2024     Anemia due to chronic kidney disease, on chronic dialysis ([facility])  Lab Results   Component Value Date    EGFR 30 11/24/2024    EGFR 23 11/23/2024    EGFR 28 11/22/2024    CREATININE 2.11 (H) 11/24/2024    CREATININE 2.63 (H) 11/23/2024    CREATININE 2.24 (H) 11/22/2024   Continue to monitor while remain on anticoagulation with Coumadin  Pancytopenia (HCC)  Multifactorial in origin, continue to monitor    VTE Pharmacologic Prophylaxis: VTE Score: 6 High Risk (Score >/= 5) - Pharmacological DVT Prophylaxis Ordered: warfarin (Coumadin). Sequential Compression Devices Ordered.    Mobility:   Basic Mobility Inpatient Raw Score: 11  JH-HLM Goal: 4: Move to chair/commode  JH-HLM Achieved: 2: Bed activities/Dependent transfer  JH-HLM Goal NOT achieved. Continue with multidisciplinary rounding and encourage appropriate mobility to improve upon JH-HLM goals.    Patient Centered Rounds: I performed bedside rounds with nursing staff today.   Discussions with Specialists or Other Care Team Provider: Monitor    Education and Discussions with Family / Patient: Updated  (daughter) via phone.    Current Length of Stay: 4 day(s)  Current Patient Status: Inpatient   Certification Statement: The patient will continue to require additional inpatient hospital stay due to  monitorable function  Discharge Plan: Anticipate discharge in 48-72 hrs to rehab facility.    Code Status: Level 3 - DNAR and DNI    Subjective   Seen and evaluated during the event.  Resting comfortably.  Still feeling tired and fatigued.    Objective :  Temp:  [97 °F (36.1 °C)-97.7 °F (36.5 °C)] 97 °F (36.1 °C)  HR:  [55-65] 61  BP: ()/(50-70) 126/70  Resp:  [16-19] 18  SpO2:  [94 %-97 %] 94 %  O2 Device: None (Room air)  Nasal Cannula O2 Flow Rate (L/min):  [0.5 L/min-1 L/min] 0.5 L/min    Body mass index is 29.39 kg/m².     Input and Output Summary (last 24 hours):     Intake/Output Summary (Last 24 hours) at 11/24/2024 0823  Last data filed at 11/24/2024 0519  Gross per 24 hour   Intake 600 ml   Output 829 ml   Net -229 ml       Physical Exam  Vitals and nursing note reviewed.   Constitutional:       Appearance: He is not ill-appearing or diaphoretic.   HENT:      Mouth/Throat:      Mouth: Mucous membranes are moist.   Eyes:      General: No scleral icterus.        Left eye: No discharge.      Extraocular Movements: Extraocular movements intact.      Conjunctiva/sclera: Conjunctivae normal.      Pupils: Pupils are equal, round, and reactive to light.   Cardiovascular:      Rate and Rhythm: Normal rate.      Heart sounds: No murmur heard.     No friction rub. No gallop.   Pulmonary:      Effort: Pulmonary effort is normal. No respiratory distress.      Breath sounds: No stridor. No wheezing or rhonchi.   Musculoskeletal:      Right lower leg: No edema.      Left lower leg: No edema.   Neurological:      General: No focal deficit present.      Mental Status: He is alert.      Cranial Nerves: No cranial nerve deficit.      Sensory: No sensory deficit.      Motor: No weakness.      Coordination: Coordination normal.         Lines/Drains:  Lines/Drains/Airways       Active Status       Name Placement date Placement time Site Days    HD Permanent Double Catheter 10/14/24  1420  Subclavian  40                             Lab Results: I have reviewed the following results:   Results from last 7 days   Lab Units 11/24/24  0504   WBC Thousand/uL 5.21   HEMOGLOBIN g/dL 10.3*   HEMATOCRIT % 33.5*   PLATELETS Thousands/uL 106*   SEGS PCT % 90*   LYMPHO PCT % 7*   MONO PCT % 3*   EOS PCT % 0     Results from last 7 days   Lab Units 11/24/24  0504   SODIUM mmol/L 131*   POTASSIUM mmol/L 3.7   CHLORIDE mmol/L 96   CO2 mmol/L 28   BUN mg/dL 20   CREATININE mg/dL 2.11*   ANION GAP mmol/L 7   CALCIUM mg/dL 9.1   ALBUMIN g/dL 2.3*   TOTAL BILIRUBIN mg/dL 0.35   ALK PHOS U/L 62   ALT U/L 6*   AST U/L 17   GLUCOSE RANDOM mg/dL 93     Results from last 7 days   Lab Units 11/24/24  0504   INR  3.06*     Results from last 7 days   Lab Units 11/24/24  0709 11/23/24  2104 11/23/24  1639 11/23/24  1220 11/23/24  0718 11/22/24  2056 11/22/24  1653 11/22/24  1127 11/22/24  0651 11/21/24  2149 11/21/24  1646 11/21/24  1112   POC GLUCOSE mg/dl 111 73 83 94 105 74 81 92 117 113 104 168*         Results from last 7 days   Lab Units 11/21/24  0502 11/20/24  2148 11/20/24  2133   LACTIC ACID mmol/L  --   --  0.8   PROCALCITONIN ng/ml 0.87* 0.84*  --        Recent Cultures (last 7 days):   Results from last 7 days   Lab Units 11/20/24  2133   BLOOD CULTURE  No Growth at 48 hrs.  No Growth at 48 hrs.       Imaging Results Review: No pertinent imaging studies reviewed.  Other Study Results Review: No additional pertinent studies reviewed.    Last 24 Hours Medication List:     Current Facility-Administered Medications:     acetaminophen (TYLENOL) tablet 650 mg, Q4H PRN    atorvastatin (LIPITOR) tablet 40 mg, Daily With Dinner    calcium carbonate (TUMS) chewable tablet 500 mg, Daily PRN    cinacalcet (SENSIPAR) tablet 60 mg, Once per day on Monday Wednesday Friday    dexamethasone (PF) (DECADRON) injection 6 mg, Q24H    docusate sodium (COLACE) capsule 100 mg, BID PRN    doxycycline (VIBRAMYCIN) 100 mg in sodium chloride 0.9 % 100 mL IVPB, Q12H, Last  Rate: 100 mg (11/23/24 1319)    finasteride (PROSCAR) tablet 5 mg, Daily    HYDROcodone-acetaminophen (NORCO) 5-325 mg per tablet 2 tablet, Q6H PRN    insulin lispro (HumALOG/ADMELOG) 100 units/mL subcutaneous injection 1-5 Units, HS    insulin lispro (HumALOG/ADMELOG) 100 units/mL subcutaneous injection 1-6 Units, TID AC **AND** Fingerstick Glucose (POCT), TID AC    levothyroxine tablet 125 mcg, Early Morning    metoprolol tartrate (LOPRESSOR) tablet 25 mg, Q12H VASU    midodrine (PROAMATINE) tablet 15 mg, TID AC    nicotine (NICODERM CQ) 7 mg/24hr TD 24 hr patch 7 mg, Daily    ondansetron (ZOFRAN) injection 4 mg, Q6H PRN    potassium chloride (Klor-Con M20) CR tablet 20 mEq, Daily    pyridostigmine (MESTINON) tablet 60 mg, TID    [COMPLETED] remdesivir (Veklury) 200 mg in sodium chloride 0.9 % 290 mL IVPB, Q24H, Last Rate: Stopped (11/21/24 0345) **FOLLOWED BY** remdesivir (Veklury) 100 mg in sodium chloride 0.9 % 270 mL IVPB, Q24H, Last Rate: 100 mg (11/23/24 0206)    sevelamer (RENAGEL) tablet 800 mg, TID With Meals    simethicone (MYLICON) chewable tablet 80 mg, 4x Daily PRN    torsemide (DEMADEX) tablet 60 mg, BID    [Held by provider] warfarin (COUMADIN) tablet 1 mg, HS    Administrative Statements   Today, Patient Was Seen By: Moses Noel MD      **Please Note: This note may have been constructed using a voice recognition system.**

## 2024-11-24 NOTE — ASSESSMENT & PLAN NOTE
Lab Results   Component Value Date    EGFR 30 11/24/2024    EGFR 23 11/23/2024    EGFR 28 11/22/2024    CREATININE 2.11 (H) 11/24/2024    CREATININE 2.63 (H) 11/23/2024    CREATININE 2.24 (H) 11/22/2024

## 2024-11-24 NOTE — ASSESSMENT & PLAN NOTE
Lab Results   Component Value Date    HGBA1C 5.0 11/02/2024       Recent Labs     11/23/24  1220 11/23/24  1639 11/23/24  2104 11/24/24  0709   POCGLU 94 83 73 111       Blood Sugar Average: Last 72 hrs:  (P) [id number]  Sliding scale insulin. Hypoglycemia protocol.

## 2024-11-24 NOTE — ASSESSMENT & PLAN NOTE
Hyponatremia-likely volume mediated in the setting of ER SD  Current sodium 131  Continue to UF as BP allows for volume management

## 2024-11-24 NOTE — ASSESSMENT & PLAN NOTE
Patient is having significant diarrhea, initially do suspect related to COVID.  Stool C. difficile panel showing positive for C. difficile PCR, Steffany negative  Since patient is having significant diarrhea, start treating dose of vancomycin for 10 days.

## 2024-11-24 NOTE — ASSESSMENT & PLAN NOTE
Current hemoglobin 10.3 today  Hold Epogen in the setting of COVID-19 infection  Continue to monitor  Recommend transfusion for hemoglobin less than 7

## 2024-11-24 NOTE — ASSESSMENT & PLAN NOTE
Wt Readings from Last 3 Encounters:   11/24/24 98.3 kg (216 lb 11.4 oz)   10/14/24 115 kg (253 lb)   10/07/24 118 kg (260 lb)   CXR on 11/20 appeared to be pulmonary vascular congestion   Patient is a dialysis basis, received hemodialysis on TTS, last dialysis on 11/21/2024  Continue dialysis,  Dialysis on Monday.

## 2024-11-24 NOTE — PROGRESS NOTES
The doxycycline has / have been converted to Oral per [facility] IV-to-PO Auto-Conversion Protocol for Adults as approved by the Pharmacy and Therapeutics Committee. The patient met all eligible criteria:  1) Age = 18 years old   2) Received at least one dose of the IV form   3) Receiving at least one other scheduled oral/enteral medication   4) Tolerating an oral/enteral diet   and did not have any exclusions:   1) Critical care patient   2) Active GI bleed (IF assessing H2RAs or PPIs)   3) Continuous tube feeding (IF assessing cipro, doxycycline, levofloxacin, minocycline, rifampin, or voriconazole)   4) Receiving PO vancomycin (IF assessing metronidazole)   5) Persistent nausea and/or vomiting   6) Ileus or gastrointestinal obstruction   7) Arnaldo/nasogastric tube set for continuous suction   8) Specific order not to automatically convert to PO (in the order's comments or if discussed in the most recent Infectious Disease or primary team's progress notes).

## 2024-11-24 NOTE — PROGRESS NOTES
Progress Note - Nephrology   Name: Masoud Perry 69 y.o. male I MRN: [id number]  Unit/Bed#: -01 I Date of Admission: 11/20/2024   Date of Service: 11/24/2024 I Hospital Day: 4       Brief History of Admission -69-year-old male with a past medical history of ESRD on hemodialysis, CHF, A-fib, factor V Leiden deficiency, hypertension, CVA, diabetes who initially presents to [facility] from [facility] facility with concern for COVID-19 with progressively worsening shortness of breath and cough and oxygen requirements increasing.  Nephrology is consulted for ESRD.  To note, patient was recently discharged from [facility] for colitis admission.   Assessment & Plan  ESRD (end stage renal disease) on dialysis ([facility])  Previously on peritoneal dialysis and currently off while at facility   [facility]  Status post hemodialysis yesterday for UF -104 ml  Current target weight 98.5  On torsemide 2 times daily  Access-right tunneled dialysis catheter-site clean dry and intact   Also has peritoneal dialysis catheter-clean dry and intact no erythema or drainage  Plan  Hemodialysis tomorrow holiday schedule  Continue to UF as BP allows and adjust target weight as needed for volume management  Hypoalbuminemia-given diarrhea will give albumin 25% / 25 g every 12 hours x 2 today  Evaluate patient in a.m. for ongoing need  Check BMP on Monday  Hemodialysis-associated hypotension  BP acceptable but can run low on dialysis  Remains on midodrine 3 times daily  No Change in current treatment  Anemia due to chronic kidney disease, on chronic dialysis ([facility])  Current hemoglobin 10.3 today  Hold Epogen in the setting of COVID-19 infection  Continue to monitor  Recommend transfusion for hemoglobin less than 7  Secondary hyperparathyroidism of renal origin ([facility])  Continue with Cinacalcet 60 mg 3 times a week  Continues on sevelamer 800 mg tablet 3 times daily with meals for hyperphosphatemia  Will check phosphorus in  a.m. given recent phosphorus level of 2.9-sevelamer may need to be adjusted  Electrolyte imbalance risk  Hyponatremia-likely volume mediated in the setting of ER SD  Current sodium 131  Continue to UF as BP allows for volume management  Acute on chronic diastolic HF (heart failure) (HCC)  Ultrafiltrate on dialysis as tolerates and adjust target weight as needed  Cardiology following for atrial fibrillation with history of prior ablation.  Currently in normal sinus rhythm.  Acute respiratory insufficiency  Treatment for COVID-19 per primary team.    Also there is concern for volume overload and patient was ultrafiltrated and on torsemide-status improving  Continue to monitor and treat as needed  COVID-19  Treatment of COVID-19 per primary team  Pancytopenia (HCC)  Per primary team  Paroxysmal atrial fibrillation (HCC)  Per primary team    Overall plan and recommendations has been reviewed with primary team and I agree with the plan as stated below  And hemodialysis tomorrow due to holiday schedule  No change in current treatment  With having diarrhea and decreased oral intake and decreased albumin-give 25% / 25 g every 12 hours x 2 today  IUH patient in a.m. for ongoing albumin need  BMP and phosphorus in a.m.    Review of Systems  Patient seen and examined at bedside.  Patient denies chest pain, shortness of breath, dizziness lightheadedness nausea or vomiting.  Reported having diarrhea not eating much reports feeling tired and weak.  Frustrated for being in the hospital  Review of Systems   Constitutional:  Positive for appetite change and fatigue. Negative for activity change, chills, diaphoresis and fever.   HENT: Negative.  Negative for congestion and facial swelling.    Respiratory: Negative.     Cardiovascular: Negative.    Gastrointestinal:  Positive for diarrhea.   Endocrine: Negative.    Genitourinary: Negative.    Musculoskeletal: Negative.    Skin: Negative.    Allergic/Immunologic: Negative.     Neurological:  Positive for weakness.   Hematological: Negative.    Psychiatric/Behavioral: Negative.           Physical Exam:  Current Weight: Weight - Scale: 98.3 kg (216 lb 11.4 oz)  Vitals:    11/23/24 2224 11/24/24 0600 11/24/24 0700 11/24/24 0845   BP: 141/58  126/70    BP Location: Left arm  Left arm    Pulse: 58  61    Resp: 19  18    Temp: 97.7 °F (36.5 °C)  (!) 97 °F (36.1 °C)    TempSrc:   Temporal    SpO2:   94% 94%   Weight:  98.3 kg (216 lb 11.4 oz)     Height:           Physical Exam  Vitals and nursing note reviewed.   Constitutional:       Appearance: He is obese.      Comments: No acute distress, chronically ill-appearing   HENT:      Head: Normocephalic and atraumatic.      Nose: Nose normal.      Mouth/Throat:      Mouth: Mucous membranes are dry.      Pharynx: Oropharynx is clear.   Eyes:      Extraocular Movements: Extraocular movements intact.      Conjunctiva/sclera: Conjunctivae normal.   Neck:      Comments: Right IJ CVC site clean dry and intact  Cardiovascular:      Rate and Rhythm: Normal rate and regular rhythm.      Pulses: Normal pulses.      Heart sounds: Normal heart sounds.   Pulmonary:      Effort: Pulmonary effort is normal.      Breath sounds: Normal breath sounds.      Comments: Coarse in the bases  Abdominal:      General: Bowel sounds are normal.      Palpations: Abdomen is soft.      Comments: PD catheter exit site clean dry and intact without erythema or drainage   Musculoskeletal:         General: Normal range of motion.      Cervical back: Normal range of motion and neck supple.      Right lower leg: Edema present.      Left lower leg: Edema present.      Comments: Poor muscle mass   Skin:     General: Skin is warm and dry.      Coloration: Skin is pale.   Neurological:      General: No focal deficit present.      Mental Status: He is alert and oriented to person, place, and time.   Psychiatric:         Mood and Affect: Mood normal.         Behavior: Behavior normal.             Medications:    Current Facility-Administered Medications:     acetaminophen (TYLENOL) tablet 650 mg, 650 mg, Oral, Q4H PRN, Chao Rios PA-C    atorvastatin (LIPITOR) tablet 40 mg, 40 mg, Oral, Daily With Dinner, Chao Rios PA-C, 40 mg at 11/23/24 1723    calcium carbonate (TUMS) chewable tablet 500 mg, 500 mg, Oral, Daily PRN, Moses Noel MD    cinacalcet (SENSIPAR) tablet 60 mg, 60 mg, Oral, Once per day on Monday Wednesday Friday, Jignesh Hussein MD, 60 mg at 11/22/24 0855    dexamethasone (PF) (DECADRON) injection 6 mg, 6 mg, Intravenous, Q24H, Chao Rios PA-C, 6 mg at 11/24/24 0155    docusate sodium (COLACE) capsule 100 mg, 100 mg, Oral, BID PRN, Chao Rios PA-C    doxycycline hyclate (VIBRAMYCIN) capsule 100 mg, 100 mg, Oral, Q12H VASU, Moses Noel MD, 100 mg at 11/24/24 0837    finasteride (PROSCAR) tablet 5 mg, 5 mg, Oral, Daily, Chao Rios PA-C, 5 mg at 11/24/24 0837    HYDROcodone-acetaminophen (NORCO) 5-325 mg per tablet 2 tablet, 2 tablet, Oral, Q6H PRN, Chao Rios PA-C, 2 tablet at 11/24/24 0845    insulin lispro (HumALOG/ADMELOG) 100 units/mL subcutaneous injection 1-5 Units, 1-5 Units, Subcutaneous, HS, Chao Rios PA-C    insulin lispro (HumALOG/ADMELOG) 100 units/mL subcutaneous injection 1-6 Units, 1-6 Units, Subcutaneous, TID AC, 1 Units at 11/21/24 1217 **AND** Fingerstick Glucose (POCT), , , TID AC, Chao Rios PA-C    levothyroxine tablet 125 mcg, 125 mcg, Oral, Early Morning, Chao Rios PA-C, 125 mcg at 11/24/24 0613    metoprolol tartrate (LOPRESSOR) tablet 25 mg, 25 mg, Oral, Q12H VASU, Moses Noel MD, 25 mg at 11/24/24 0837    midodrine (PROAMATINE) tablet 15 mg, 15 mg, Oral, TID AC, Chao Rios PA-C, 15 mg at 11/23/24 1719    nicotine (NICODERM CQ) 7 mg/24hr TD 24 hr patch 7 mg, 7 mg, Transdermal, Daily, Chao Rios PA-C    ondansetron (ZOFRAN) injection 4 mg, 4 mg, Intravenous, Q6H PRN, Chao Rios PA-C, 4  "mg at 11/23/24 2109    potassium chloride (Klor-Con M20) CR tablet 20 mEq, 20 mEq, Oral, Daily, Jignesh Hussein MD, 20 mEq at 11/24/24 0837    pyridostigmine (MESTINON) tablet 60 mg, 60 mg, Oral, TID, Chao Rios PA-C, 60 mg at 11/24/24 0839    [COMPLETED] remdesivir (Veklury) 200 mg in sodium chloride 0.9 % 290 mL IVPB, 200 mg, Intravenous, Q24H, Stopped at 11/21/24 0345 **FOLLOWED BY** remdesivir (Veklury) 100 mg in sodium chloride 0.9 % 270 mL IVPB, 100 mg, Intravenous, Q24H, Chao Rios PA-C, Last Rate: 540 mL/hr at 11/23/24 0206, 100 mg at 11/24/24 0154    sevelamer (RENAGEL) tablet 800 mg, 800 mg, Oral, TID With Meals, Jignesh Hussein MD, 800 mg at 11/22/24 1211    simethicone (MYLICON) chewable tablet 80 mg, 80 mg, Oral, 4x Daily PRN, Chao Rios PA-C    torsemide (DEMADEX) tablet 60 mg, 60 mg, Oral, BID, Jignesh Hussein MD, 60 mg at 11/24/24 0838    [Held by provider] warfarin (COUMADIN) tablet 1 mg, 1 mg, Oral, HS, Chao Rios PA-C, 1 mg at 11/23/24 2109    Laboratory Results:  Results from last 7 days   Lab Units 11/24/24  0504 11/23/24  0828 11/22/24  0625 11/21/24  0502 11/20/24  2148 11/20/24  2133   WBC Thousand/uL 5.21 4.25* 6.42 6.27  --  7.11   HEMOGLOBIN g/dL 10.3* 9.8* 9.3* 10.0*  --  9.8*   HEMATOCRIT % 33.5* 30.7* 30.2* 32.9*  --  31.7*   PLATELETS Thousands/uL 106* 118* 90* 80*  --  91*   SODIUM mmol/L 131* 132* 130* 133* 132*  --    POTASSIUM mmol/L 3.7 3.9 4.2 3.7 3.2*  --    CHLORIDE mmol/L 96 97 96 100 99  --    CO2 mmol/L 28 28 28 25 28  --    BUN mg/dL 20 27* 17 19 16  --    CREATININE mg/dL 2.11* 2.63* 2.24* 3.05* 2.90*  --    CALCIUM mg/dL 9.1 8.8 8.9 9.0 8.9  --    MAGNESIUM mg/dL  --   --  1.6*  --  1.6*  --    PHOSPHORUS mg/dL  --   --  2.9  --  3.7  --        Medical records have been reviewed through [facility] and care everywhere for this patient encounter    Portions of the record may have been created with voice recognition software. Occasional wrong word or \"sound " "a like\" substitutions may have occurred due to the inherent limitations of voice recognition software. Read the chart carefully and recognize,   "

## 2024-11-24 NOTE — ASSESSMENT & PLAN NOTE
Patient is currently 1 L of oxygen  Continue current treatment  Patient looks very tired, also having diarrhea.  Will continue current treatment.

## 2024-11-24 NOTE — ASSESSMENT & PLAN NOTE
Lab Results   Component Value Date    EGFR 30 11/24/2024    EGFR 23 11/23/2024    EGFR 28 11/22/2024    CREATININE 2.11 (H) 11/24/2024    CREATININE 2.63 (H) 11/23/2024    CREATININE 2.24 (H) 11/22/2024   Continue to monitor while remain on anticoagulation with Coumadin

## 2024-11-24 NOTE — ASSESSMENT & PLAN NOTE
Treatment for COVID-19 per primary team.    Also there is concern for volume overload and patient was ultrafiltrated and on torsemide-status improving  Continue to monitor and treat as needed

## 2024-11-24 NOTE — ASSESSMENT & PLAN NOTE
"Subjective   Patient ID: Jena Wynn is a 42 y.o. female who presents for Annual Exam and Follow-up (Chronic Anemia).    Patient presents today for annual physical exam and to follow up on chronic anemia. Current treatment includes Ferrous Sulfate 325 mg.        Review of Systems   Constitutional:  Negative for chills and fever.   HENT:  Negative for congestion, ear pain, nosebleeds, rhinorrhea and sore throat.    Respiratory:  Negative for cough, shortness of breath and wheezing.    Cardiovascular:  Negative for chest pain, palpitations and leg swelling.   Gastrointestinal:  Negative for abdominal pain, constipation, diarrhea and vomiting.   Genitourinary:  Negative for dysuria, frequency and hematuria.   Neurological:  Negative for dizziness, tremors, numbness and headaches.       Objective   /58 (BP Location: Left arm, Patient Position: Sitting)   Pulse 62   Temp 36.4 °C (97.6 °F)   Resp 14   Ht 1.626 m (5' 4\")   Wt 60.1 kg (132 lb 6.4 oz)   LMP 12/31/2023   SpO2 98%   BMI 22.73 kg/m²     Physical Exam  Constitutional:       General: She is not in acute distress.     Appearance: Normal appearance.   HENT:      Head: Normocephalic and atraumatic.      Mouth/Throat:      Mouth: Mucous membranes are moist.      Pharynx: Oropharynx is clear. No oropharyngeal exudate or posterior oropharyngeal erythema.   Eyes:      General: No scleral icterus.     Extraocular Movements: Extraocular movements intact.      Pupils: Pupils are equal, round, and reactive to light.   Cardiovascular:      Rate and Rhythm: Normal rate and regular rhythm.      Pulses: Normal pulses.      Heart sounds: No murmur heard.     No friction rub. No gallop.   Pulmonary:      Effort: Pulmonary effort is normal.      Breath sounds: No wheezing, rhonchi or rales.   Skin:     General: Skin is warm.      Coloration: Skin is not jaundiced or pale.      Findings: No erythema or rash.   Neurological:      General: No focal deficit present.     " On Coumadin-INR 3.06, Coumadin remain on hold    Mental Status: She is alert and oriented to person, place, and time.      Cranial Nerves: No cranial nerve deficit.      Sensory: No sensory deficit.      Coordination: Coordination normal.      Gait: Gait normal.         Assessment/Plan   Problem List Items Addressed This Visit       Intractable chronic migraine without aura and without status migrainosus     Improving based on symptoms and exam.  Continue established treatment plan and follow-up at least yearly.          Healthcare maintenance - Primary     Recommend low-cholesterol diet, low-fat diet and low-salt diet.  The need for lifelong dietary compliance in order to reduce cardiac risk is recommended.  We will also recommend regular exercise program to improve lipid balance and overall health.  Recommend decreasing fat and cholesterol in diet, increasing aerobic exercise with a goal of 4 or more days per week         Relevant Orders    CBC and Auto Differential (Completed)    Lipid Panel    Chronic anemia     We will wean her off the Ferrous Sulfate by having her take 1 a day for 2 weeks then 1 every other day for 2 weeks then stop and we will recheck her blood count in 3 months.         Relevant Orders    CBC and Auto Differential (Completed)    CBC and Auto Differential    Iron and TIBC    Ferritin     Scribe Attestation  By signing my name below, IKenroy, Kusumibkaykay   attest that this documentation has been prepared under the direction and in the presence of Tanmay Ocampo MD.

## 2024-11-25 ENCOUNTER — APPOINTMENT (INPATIENT)
Dept: DIALYSIS | Facility: HOSPITAL | Age: 69
DRG: 177 | End: 2024-11-25
Payer: COMMERCIAL

## 2024-11-25 ENCOUNTER — APPOINTMENT (INPATIENT)
Dept: CT IMAGING | Facility: HOSPITAL | Age: 69
DRG: 177 | End: 2024-11-25
Payer: COMMERCIAL

## 2024-11-25 LAB
ALBUMIN SERPL BCG-MCNC: 3 G/DL (ref 3.5–5)
ALP SERPL-CCNC: 55 U/L (ref 34–104)
ALT SERPL W P-5'-P-CCNC: 8 U/L (ref 7–52)
ANION GAP SERPL CALCULATED.3IONS-SCNC: 8 MMOL/L (ref 4–13)
AST SERPL W P-5'-P-CCNC: 17 U/L (ref 13–39)
BACTERIA UR QL AUTO: NORMAL /HPF
BASOPHILS # BLD AUTO: 0 THOUSANDS/ÂΜL (ref 0–0.1)
BASOPHILS NFR BLD AUTO: 0 % (ref 0–1)
BILIRUB SERPL-MCNC: 0.4 MG/DL (ref 0.2–1)
BILIRUB UR QL STRIP: NEGATIVE
BUN SERPL-MCNC: 32 MG/DL (ref 5–25)
CALCIUM ALBUM COR SERPL-MCNC: 10.5 MG/DL (ref 8.3–10.1)
CALCIUM SERPL-MCNC: 9.7 MG/DL (ref 8.4–10.2)
CHLORIDE SERPL-SCNC: 97 MMOL/L (ref 96–108)
CLARITY UR: CLEAR
CO2 SERPL-SCNC: 29 MMOL/L (ref 21–32)
COLOR UR: YELLOW
CREAT SERPL-MCNC: 2.65 MG/DL (ref 0.6–1.3)
EOSINOPHIL # BLD AUTO: 0 THOUSAND/ÂΜL (ref 0–0.61)
EOSINOPHIL NFR BLD AUTO: 0 % (ref 0–6)
ERYTHROCYTE [DISTWIDTH] IN BLOOD BY AUTOMATED COUNT: 14.8 % (ref 11.6–15.1)
GFR SERPL CREATININE-BSD FRML MDRD: 23 ML/MIN/1.73SQ M
GLUCOSE SERPL-MCNC: 108 MG/DL (ref 65–140)
GLUCOSE SERPL-MCNC: 110 MG/DL (ref 65–140)
GLUCOSE SERPL-MCNC: 73 MG/DL (ref 65–140)
GLUCOSE SERPL-MCNC: 75 MG/DL (ref 65–140)
GLUCOSE SERPL-MCNC: 97 MG/DL (ref 65–140)
GLUCOSE UR STRIP-MCNC: NEGATIVE MG/DL
HCT VFR BLD AUTO: 29.9 % (ref 36.5–49.3)
HGB BLD-MCNC: 9.5 G/DL (ref 12–17)
HGB UR QL STRIP.AUTO: ABNORMAL
IMM GRANULOCYTES # BLD AUTO: 0.03 THOUSAND/UL (ref 0–0.2)
IMM GRANULOCYTES NFR BLD AUTO: 1 % (ref 0–2)
INR PPP: 4.41 (ref 0.85–1.19)
KETONES UR STRIP-MCNC: ABNORMAL MG/DL
LEUKOCYTE ESTERASE UR QL STRIP: NEGATIVE
LYMPHOCYTES # BLD AUTO: 0.31 THOUSANDS/ÂΜL (ref 0.6–4.47)
LYMPHOCYTES NFR BLD AUTO: 9 % (ref 14–44)
MCH RBC QN AUTO: 31.8 PG (ref 26.8–34.3)
MCHC RBC AUTO-ENTMCNC: 31.8 G/DL (ref 31.4–37.4)
MCV RBC AUTO: 100 FL (ref 82–98)
MONOCYTES # BLD AUTO: 0.09 THOUSAND/ÂΜL (ref 0.17–1.22)
MONOCYTES NFR BLD AUTO: 3 % (ref 4–12)
NEUTROPHILS # BLD AUTO: 3.19 THOUSANDS/ÂΜL (ref 1.85–7.62)
NEUTS SEG NFR BLD AUTO: 87 % (ref 43–75)
NITRITE UR QL STRIP: NEGATIVE
NON-SQ EPI CELLS URNS QL MICRO: NORMAL /HPF
NRBC BLD AUTO-RTO: 0 /100 WBCS
PH UR STRIP.AUTO: 6 [PH]
PLATELET # BLD AUTO: 108 THOUSANDS/UL (ref 149–390)
PMV BLD AUTO: 10.1 FL (ref 8.9–12.7)
POTASSIUM SERPL-SCNC: 3.9 MMOL/L (ref 3.5–5.3)
PROT SERPL-MCNC: 5.2 G/DL (ref 6.4–8.4)
PROT UR STRIP-MCNC: ABNORMAL MG/DL
PROTHROMBIN TIME: 41.5 SECONDS (ref 12.3–15)
RBC # BLD AUTO: 2.99 MILLION/UL (ref 3.88–5.62)
RBC #/AREA URNS AUTO: NORMAL /HPF
SODIUM SERPL-SCNC: 134 MMOL/L (ref 135–147)
SP GR UR STRIP.AUTO: 1.02 (ref 1–1.03)
UROBILINOGEN UR QL STRIP.AUTO: 0.2 E.U./DL
WBC # BLD AUTO: 3.62 THOUSAND/UL (ref 4.31–10.16)
WBC #/AREA URNS AUTO: NORMAL /HPF

## 2024-11-25 PROCEDURE — 74176 CT ABD & PELVIS W/O CONTRAST: CPT

## 2024-11-25 PROCEDURE — 71250 CT THORAX DX C-: CPT

## 2024-11-25 PROCEDURE — 85025 COMPLETE CBC W/AUTO DIFF WBC: CPT | Performed by: FAMILY MEDICINE

## 2024-11-25 PROCEDURE — 85610 PROTHROMBIN TIME: CPT | Performed by: FAMILY MEDICINE

## 2024-11-25 PROCEDURE — 99232 SBSQ HOSP IP/OBS MODERATE 35: CPT | Performed by: INTERNAL MEDICINE

## 2024-11-25 PROCEDURE — 99233 SBSQ HOSP IP/OBS HIGH 50: CPT | Performed by: FAMILY MEDICINE

## 2024-11-25 PROCEDURE — 81001 URINALYSIS AUTO W/SCOPE: CPT | Performed by: FAMILY MEDICINE

## 2024-11-25 PROCEDURE — 80053 COMPREHEN METABOLIC PANEL: CPT | Performed by: FAMILY MEDICINE

## 2024-11-25 PROCEDURE — 82948 REAGENT STRIP/BLOOD GLUCOSE: CPT

## 2024-11-25 RX ORDER — GENTAMICIN SULFATE 1 MG/G
CREAM TOPICAL DAILY
Status: DISCONTINUED | OUTPATIENT
Start: 2024-11-26 | End: 2024-12-11 | Stop reason: HOSPADM

## 2024-11-25 RX ADMIN — POTASSIUM CHLORIDE 20 MEQ: 1500 TABLET, EXTENDED RELEASE ORAL at 13:28

## 2024-11-25 RX ADMIN — METOPROLOL TARTRATE 25 MG: 25 TABLET, FILM COATED ORAL at 20:15

## 2024-11-25 RX ADMIN — CINACALCET 60 MG: 30 TABLET, FILM COATED ORAL at 13:30

## 2024-11-25 RX ADMIN — TORSEMIDE 60 MG: 20 TABLET ORAL at 13:30

## 2024-11-25 RX ADMIN — PYRIDOSTIGMINE BROMIDE 60 MG: 60 TABLET ORAL at 23:56

## 2024-11-25 RX ADMIN — METOPROLOL TARTRATE 25 MG: 25 TABLET, FILM COATED ORAL at 13:28

## 2024-11-25 RX ADMIN — VANCOMYCIN HYDROCHLORIDE 125 MG: 125 CAPSULE ORAL at 13:30

## 2024-11-25 RX ADMIN — VANCOMYCIN HYDROCHLORIDE 125 MG: 125 CAPSULE ORAL at 18:45

## 2024-11-25 RX ADMIN — REMDESIVIR 100 MG: 100 INJECTION, POWDER, LYOPHILIZED, FOR SOLUTION INTRAVENOUS at 01:51

## 2024-11-25 RX ADMIN — SEVELAMER HYDROCHLORIDE 800 MG: 800 TABLET, FILM COATED ORAL at 13:30

## 2024-11-25 RX ADMIN — VANCOMYCIN HYDROCHLORIDE 125 MG: 125 CAPSULE ORAL at 23:56

## 2024-11-25 RX ADMIN — PYRIDOSTIGMINE BROMIDE 60 MG: 60 TABLET ORAL at 18:47

## 2024-11-25 RX ADMIN — ATORVASTATIN CALCIUM 40 MG: 40 TABLET, FILM COATED ORAL at 18:45

## 2024-11-25 RX ADMIN — DOXYCYCLINE 100 MG: 100 CAPSULE ORAL at 13:29

## 2024-11-25 RX ADMIN — DEXAMETHASONE SODIUM PHOSPHATE 6 MG: 10 INJECTION, SOLUTION INTRAMUSCULAR; INTRAVENOUS at 01:51

## 2024-11-25 RX ADMIN — FINASTERIDE 5 MG: 5 TABLET, FILM COATED ORAL at 13:28

## 2024-11-25 RX ADMIN — ONDANSETRON 4 MG: 2 INJECTION INTRAMUSCULAR; INTRAVENOUS at 18:39

## 2024-11-25 RX ADMIN — HYDROCODONE BITARTRATE AND ACETAMINOPHEN 2 TABLET: 5; 325 TABLET ORAL at 06:27

## 2024-11-25 RX ADMIN — HYDROCODONE BITARTRATE AND ACETAMINOPHEN 2 TABLET: 5; 325 TABLET ORAL at 13:29

## 2024-11-25 RX ADMIN — PYRIDOSTIGMINE BROMIDE 60 MG: 60 TABLET ORAL at 13:28

## 2024-11-25 RX ADMIN — DOXYCYCLINE 100 MG: 100 CAPSULE ORAL at 20:13

## 2024-11-25 RX ADMIN — VANCOMYCIN HYDROCHLORIDE 125 MG: 125 CAPSULE ORAL at 06:27

## 2024-11-25 RX ADMIN — MIDODRINE HYDROCHLORIDE 15 MG: 5 TABLET ORAL at 18:48

## 2024-11-25 RX ADMIN — MIDODRINE HYDROCHLORIDE 15 MG: 5 TABLET ORAL at 13:30

## 2024-11-25 RX ADMIN — HYDROCODONE BITARTRATE AND ACETAMINOPHEN 2 TABLET: 5; 325 TABLET ORAL at 20:13

## 2024-11-25 RX ADMIN — LEVOTHYROXINE SODIUM 125 MCG: 125 TABLET ORAL at 06:27

## 2024-11-25 NOTE — CASE MANAGEMENT
Case Management Discharge Planning Note    Patient name Masoud VERGARA [facility]  Location /-01 MRN [id number]  : 1955 Date 2024       Current Admission Date: 2024  Current Admission Diagnosis:Acute respiratory insufficiency   Patient Active Problem List    Diagnosis Date Noted Date Diagnosed    Diarrhea of infectious origin 2024     Electrolyte imbalance risk 2024     Acute respiratory insufficiency 2024     ESRD (end stage renal disease) on dialysis ([facility]) 2024     Anemia due to chronic kidney disease, on chronic dialysis ([facility]) 2024     Personal history of gout 2024     COVID-19 2024     Elevated troponin 2024     Stasis ulcer ([facility]) 10/02/2024     Diabetic ulcer of right great toe ([facility]) 2024     Hematoma 2024     Edema 2024     Medication management 2024     Hyponatremia 2024     Cellulitis 2024     Moderate protein-calorie malnutrition ([facility]) 2024     Ambulatory dysfunction 09/15/2024     Lactic acidosis 2024     Hypercalcemia 2024     Chronic acquired lymphedema 2024     Hemodialysis-associated hypotension 2024     Nocturnal hypoxia 2024     Elevated LFTs 2024     Patient on peritoneal dialysis ([facility]) 2024     Hypervolemia 2024     Bacteremia 2024     Cellulitis of right lower extremity 2024     Paroxysmal atrial fibrillation ([facility]) 2024     Bilateral lower extremity edema 2023     Ureteral stone with hydronephrosis 2022     Cutaneous abscess of buttock 2022     Chronic kidney disease-mineral and bone disorder 2022     Type 2 diabetes mellitus with stage 4 chronic kidney disease, with long-term current use of insulin ([facility]) 07/15/2022     Obesity, morbid ([facility]) 07/15/2022     Secondary hyperparathyroidism of renal origin ([facility]) 07/15/2022     Stage 5 chronic kidney disease on peritoneal dialysis ([facility]) 07/15/2022      Factor 5 Leiden mutation, heterozygous ([facility]) 01/15/2022     Thrombocytopenia ([facility]) 01/14/2022     Angina at rest ([facility]) 01/14/2022     MI (myocardial infarction) ([facility]) 01/14/2022     History of PTCA 01/14/2022     Sleep apnea 01/14/2022     Smoking 01/14/2022     Pancytopenia ([facility]) 01/13/2022     History of CVA (cerebrovascular accident) 01/12/2022     Acute on chronic diastolic HF (heart failure) ([facility]) 01/12/2022     HLD (hyperlipidemia) 01/12/2022     Lymphedema 01/12/2022     Acquired hypothyroidism 01/12/2022     COPD (chronic obstructive pulmonary disease) ([facility]) 12/31/2018     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 ([facility]) 06/29/2016     Gastroesophageal reflux disease 06/29/2016     Gout 06/29/2016     Personal history of pulmonary embolism 06/29/2016     ED (erectile dysfunction) of organic origin 01/14/2014     History of thromboembolism of vein 01/10/2013       LOS (days): 5  Geometric Mean LOS (GMLOS) (days): 4.6  Days to GMLOS:-0.1     OBJECTIVE:  Risk of Unplanned Readmission Score: 46.86         Current admission status: Inpatient   Preferred Pharmacy:   [facility] - [place], [place] - [address]  [address]  [place] [address]  Phone: [phone] Fax: [phone]    [facility] [address] [place] [address]  [address] [place] [address]  Phone: [phone] Fax: [phone]    PATIENT/FAMILY REPORTS NO PREFERRED PHARMACY  No address on file      Primary Care Provider: Jignesh Baker DO    Primary Insurance: [employer]  Secondary Insurance:     DISCHARGE DETAILS:        Chart reviewed aware of medical management. Case was discussed in multidisciplinary discharge meeting.  Clinical information supporting hospitalization: patient not medically cleared for discharge, pt continues on contact/ droplet precautions for + COVID, and + C-diff on vanco po, pt continues on HD, followed by nephrology.     Barriers to discharge:  -  medical management  Discharge plan: plan is to return to [facility], will need prior insurance auth for [facility]. Will need updated PT/OT notes when pt medically stable to start prior auth for [facility].     CM will continue to follow plan of care.

## 2024-11-25 NOTE — ASSESSMENT & PLAN NOTE
Wt Readings from Last 3 Encounters:   11/25/24 97.4 kg (214 lb 11.7 oz)   10/14/24 115 kg (253 lb)   10/07/24 118 kg (260 lb)   CXR on 11/20 appeared to be pulmonary vascular congestion   Patient is a dialysis basis, received hemodialysis on TTS, last dialysis on 11/21/2024  Continue dialysis,  Dialysis on Monday.

## 2024-11-25 NOTE — QUICK NOTE
Epic alert initiated for concerning for sepsis.    Patient is tachycardic.  Patient already has thrombocytopenia which is not new.  Receiving treatment for COVID.  Also started p.o. vancomycin since patient was having diarrhea and CT PCR positive, Luz negative.    No worsening infection noted on clinical exam.    Pending CT chest abdomen pelvis.  Will continue current treatment at this time.

## 2024-11-25 NOTE — ASSESSMENT & PLAN NOTE
Lab Results   Component Value Date    HGBA1C 5.0 11/02/2024       Recent Labs     11/24/24  1145 11/24/24  1617 11/24/24  2114 11/25/24  0711   POCGLU 103 82 76 110       Blood Sugar Average: Last 72 hrs:  (P) [id number]  Sliding scale insulin. Hypoglycemia protocol.

## 2024-11-25 NOTE — PROGRESS NOTES
Pt with poor po intake, 0% meal completions per nursing flowsheets. Pt dx with moderate malnutrition by colleague on 11/21. Will liberalize diet to regular noting BG WNL/P WNL/K WNL and optimize po intake/options available to pt, fluid restriction per MD. Continue with ensure compact. Consider banatrol supplement once po intake improves to help with diarrhea.

## 2024-11-25 NOTE — ASSESSMENT & PLAN NOTE
Hyponatremia  Stable Na at 134.     Hyperphosphatemia  Continue sevelamer 800 mg TID.   Phos is at goal.

## 2024-11-25 NOTE — ASSESSMENT & PLAN NOTE
Presents from [place]. Tested positive 3 days ago when symptoms started  Started decadron and remdesevir, doxycycline  Patient already on Coumadin, INR is supratherapeutic, 4.41, remain on hold  On clinical exam, patient is very fatigued and tired and sleepy but arousable and answer questions.

## 2024-11-25 NOTE — ASSESSMENT & PLAN NOTE
Patient is having significant diarrhea, initially do suspect related to COVID.  Stool C. difficile panel showing positive for C. difficile PCR, Steffany negative  Since patient is having significant diarrhea, start treating dose of vancomycin for 10 days.  Checking CT chest/abdome/pelvis without contrast

## 2024-11-25 NOTE — ASSESSMENT & PLAN NOTE
BP is controlled.  Rx: Midodrine 15 mg TID. Torsemide 60 mg BID, Metoprolol 25 mg BID.   Clinically appears hypovolemic.

## 2024-11-25 NOTE — PLAN OF CARE
Problem: Prexisting or High Potential for Compromised Skin Integrity  Goal: Skin integrity is maintained or improved  Description: INTERVENTIONS:  - Identify patients at risk for skin breakdown  - Assess and monitor skin integrity  - Assess and monitor nutrition and hydration status  - Monitor labs   - Assess for incontinence   - Turn and reposition patient  - Assist with mobility/ambulation  - Relieve pressure over bony prominences  - Avoid friction and shearing  - Provide appropriate hygiene as needed including keeping skin clean and dry  - Evaluate need for skin moisturizer/barrier cream  - Collaborate with interdisciplinary team   - Patient/family teaching  - Consider wound care consult   Outcome: Progressing     Problem: PAIN - ADULT  Goal: Verbalizes/displays adequate comfort level or baseline comfort level  Description: Interventions:  - Encourage patient to monitor pain and request assistance  - Assess pain using appropriate pain scale  - Administer analgesics based on type and severity of pain and evaluate response  - Implement non-pharmacological measures as appropriate and evaluate response  - Consider cultural and social influences on pain and pain management  - Notify physician/advanced practitioner if interventions unsuccessful or patient reports new pain  Outcome: Progressing     Problem: INFECTION - ADULT  Goal: Absence or prevention of progression during hospitalization  Description: INTERVENTIONS:  - Assess and monitor for signs and symptoms of infection  - Monitor lab/diagnostic results  - Monitor all insertion sites, i.e. indwelling lines, tubes, and drains  - Monitor endotracheal if appropriate and nasal secretions for changes in amount and color  - [place] appropriate cooling/warming therapies per order  - Administer medications as ordered  - Instruct and encourage patient and family to use good hand hygiene technique  - Identify and instruct in appropriate isolation precautions for  identified infection/condition  Outcome: Progressing  Goal: Absence of fever/infection during neutropenic period  Description: INTERVENTIONS:  - Monitor WBC    Outcome: Progressing     Problem: SAFETY ADULT  Goal: Patient will remain free of falls  Description: INTERVENTIONS:  - Educate patient/family on patient safety including physical limitations  - Instruct patient to call for assistance with activity   - Consult OT/PT to assist with strengthening/mobility   - Keep Call bell within reach  - Keep bed low and locked with side rails adjusted as appropriate  - Keep care items and personal belongings within reach  - Initiate and maintain comfort rounds  - Make Fall Risk Sign visible to staff  - Offer Toileting every 2 Hours, in advance of need  - Initiate/Maintain bed/chair alarm  - Obtain necessary fall risk management equipment  - Apply yellow socks and bracelet for high fall risk patients  - Consider moving patient to room near nurses station  Outcome: Progressing  Goal: Maintain or return to baseline ADL function  Description: INTERVENTIONS:  -  Assess patient's ability to carry out ADLs; assess patient's baseline for ADL function and identify physical deficits which impact ability to perform ADLs (bathing, care of mouth/teeth, toileting, grooming, dressing, etc.)  - Assess/evaluate cause of self-care deficits   - Assess range of motion  - Assess patient's mobility; develop plan if impaired  - Assess patient's need for assistive devices and provide as appropriate  - Encourage maximum independence but intervene and supervise when necessary  - Involve family in performance of ADLs  - Assess for home care needs following discharge   - Consider OT consult to assist with ADL evaluation and planning for discharge  - Provide patient education as appropriate  Outcome: Progressing  Goal: Maintains/Returns to pre admission functional level  Description: INTERVENTIONS:  - Perform AM-PAC 6 Click Basic Mobility/ Daily  Activity assessment daily.  - Set and communicate daily mobility goal to care team and patient/family/caregiver.   - Collaborate with rehabilitation services on mobility goals if consulted  - Perform Range of Motion 3 times a day.  - Reposition patient every 2 hours.  - Dangle patient 3 times a day  - Stand patient 3 times a day  - Ambulate patient 3 times a day  - Out of bed to chair 3 times a day   - Out of bed for meals 3 times a day  - Out of bed for toileting  - Record patient progress and toleration of activity level   Outcome: Progressing     Problem: DISCHARGE PLANNING  Goal: Discharge to home or other facility with appropriate resources  Description: INTERVENTIONS:  - Identify barriers to discharge w/patient and caregiver  - Arrange for needed discharge resources and transportation as appropriate  - Identify discharge learning needs (meds, wound care, etc.)  - Arrange for interpretive services to assist at discharge as needed  - Refer to Case Management Department for coordinating discharge planning if the patient needs post-hospital services based on physician/advanced practitioner order or complex needs related to functional status, cognitive ability, or social support system  Outcome: Progressing     Problem: Knowledge Deficit  Goal: Patient/family/caregiver demonstrates understanding of disease process, treatment plan, medications, and discharge instructions  Description: Complete learning assessment and assess knowledge base.  Interventions:  - Provide teaching at level of understanding  - Provide teaching via preferred learning methods  Outcome: Progressing     Problem: CARDIOVASCULAR - ADULT  Goal: Maintains optimal cardiac output and hemodynamic stability  Description: INTERVENTIONS:  - Monitor I/O, vital signs and rhythm  - Monitor for S/S and trends of decreased cardiac output  - Administer and titrate ordered vasoactive medications to optimize hemodynamic stability  - Assess quality of pulses,  skin color and temperature  - Assess for signs of decreased coronary artery perfusion  - Instruct patient to report change in severity of symptoms  Outcome: Progressing  Goal: Absence of cardiac dysrhythmias or at baseline rhythm  Description: INTERVENTIONS:  - Continuous cardiac monitoring, vital signs, obtain 12 lead EKG if ordered  - Administer antiarrhythmic and heart rate control medications as ordered  - Monitor electrolytes and administer replacement therapy as ordered  Outcome: Progressing     Problem: RESPIRATORY - ADULT  Goal: Achieves optimal ventilation and oxygenation  Description: INTERVENTIONS:  - Assess for changes in respiratory status  - Assess for changes in mentation and behavior  - Position to facilitate oxygenation and minimize respiratory effort  - Oxygen administered by appropriate delivery if ordered  - Initiate smoking cessation education as indicated  - Encourage broncho-pulmonary hygiene including cough, deep breathe, Incentive Spirometry  - Assess the need for suctioning and aspirate as needed  - Assess and instruct to report SOB or any respiratory difficulty  - Respiratory Therapy support as indicated  Outcome: Progressing     Problem: METABOLIC, FLUID AND ELECTROLYTES - ADULT  Goal: Electrolytes maintained within normal limits  Description: INTERVENTIONS:  - Monitor labs and assess patient for signs and symptoms of electrolyte imbalances  - Administer electrolyte replacement as ordered  - Monitor response to electrolyte replacements, including repeat lab results as appropriate  - Instruct patient on fluid and nutrition as appropriate  Outcome: Progressing  Goal: Fluid balance maintained  Description: INTERVENTIONS:  - Monitor labs   - Monitor I/O and WT  - Instruct patient on fluid and nutrition as appropriate  - Assess for signs & symptoms of volume excess or deficit  Outcome: Progressing  Goal: Glucose maintained within target range  Description: INTERVENTIONS:  - Monitor Blood  Glucose as ordered  - Assess for signs and symptoms of hyperglycemia and hypoglycemia  - Administer ordered medications to maintain glucose within target range  - Assess nutritional intake and initiate nutrition service referral as needed  Outcome: Progressing     Problem: Nutrition/Hydration-ADULT  Goal: Nutrient/Hydration intake appropriate for improving, restoring or maintaining nutritional needs  Description: Monitor and assess patient's nutrition/hydration status for malnutrition. Collaborate with interdisciplinary team and initiate plan and interventions as ordered.  Monitor patient's weight and dietary intake as ordered or per policy. Utilize nutrition screening tool and intervene as necessary. Determine patient's food preferences and provide high-protein, high-caloric foods as appropriate.     INTERVENTIONS:  - Monitor oral intake, urinary output, labs, and treatment plans  - Assess nutrition and hydration status and recommend course of action  - Evaluate amount of meals eaten  - Assist patient with eating if necessary   - Allow adequate time for meals  - Recommend/ encourage appropriate diets, oral nutritional supplements, and vitamin/mineral supplements  - Order, calculate, and assess calorie counts as needed  - Recommend, monitor, and adjust tube feedings and TPN/PPN based on assessed needs  - Assess need for intravenous fluids  - Provide specific nutrition/hydration education as appropriate  - Include patient/family/caregiver in decisions related to nutrition  Outcome: Progressing

## 2024-11-25 NOTE — PROGRESS NOTES
Progress Note - Hospitalist   Name: Masoud Perry 69 y.o. male I MRN: [id number]  Unit/Bed#: [facility]-01 I Date of Admission: 11/20/2024   Date of Service: 11/25/2024 I Hospital Day: 5    Assessment & Plan  Acute respiratory insufficiency  Patient is currently 1 L of oxygen  Continue current treatment  Patient looks very tired, also having diarrhea.  Will continue current treatment.  Acute on chronic diastolic HF (heart failure) ([facility])  Wt Readings from Last 3 Encounters:   11/25/24 97.4 kg (214 lb 11.7 oz)   10/14/24 115 kg (253 lb)   10/07/24 118 kg (260 lb)   CXR on 11/20 appeared to be pulmonary vascular congestion   Patient is a dialysis basis, received hemodialysis on TTS, last dialysis on 11/21/2024  Continue dialysis,  Dialysis on Monday.  COVID-19  Presents from [place]. Tested positive 3 days ago when symptoms started  Started decadron and remdesevir, doxycycline  Patient already on Coumadin, INR is supratherapeutic, 4.41, remain on hold  On clinical exam, patient is very fatigued and tired and sleepy but arousable and answer questions.  Diarrhea of infectious origin  Patient is having significant diarrhea, initially do suspect related to COVID.  Stool C. difficile panel showing positive for C. difficile PCR, Steffany negative  Since patient is having significant diarrhea, start treating dose of vancomycin for 10 days.  Checking CT chest/abdome/pelvis without contrast  Secondary hyperparathyroidism of renal origin (HCC)  Continue Cinacalcet on Mondays, Wednesdays, and Fridays  Hemodialysis-associated hypotension  Continue midodrine and pyridostigmine  HLD (hyperlipidemia)  Continue atorvastatin  Acquired hypothyroidism  Continue levothyroxine  Factor 5 Leiden mutation, heterozygous (HCC)  On Coumadin-INR 4.41, Coumadin remain on hold  Type 2 diabetes mellitus with stage 4 chronic kidney disease, with long-term current use of insulin (HCC)  Lab Results   Component Value Date    HGBA1C 5.0 11/02/2024       Recent  Labs     11/24/24  1145 11/24/24  1617 11/24/24  2114 11/25/24  0711   POCGLU 103 82 76 110       Blood Sugar Average: Last 72 hrs:  (P) [id number]  Sliding scale insulin. Hypoglycemia protocol.  Personal history of gout  Continue allopurinol  Elevated troponin  Trending  Paroxysmal atrial fibrillation (HCC)  Continue metoprolol if blood pressure tolerates  Continue Coumadin, maintain electrolytes-INR supratherapeutic, remain on hold  ESRD (end stage renal disease) on dialysis ([facility])  Lab Results   Component Value Date    EGFR 30 11/24/2024    EGFR 23 11/23/2024    EGFR 28 11/22/2024    CREATININE 2.11 (H) 11/24/2024    CREATININE 2.63 (H) 11/23/2024    CREATININE 2.24 (H) 11/22/2024     Anemia due to chronic kidney disease, on chronic dialysis ([facility])  Lab Results   Component Value Date    EGFR 30 11/24/2024    EGFR 23 11/23/2024    EGFR 28 11/22/2024    CREATININE 2.11 (H) 11/24/2024    CREATININE 2.63 (H) 11/23/2024    CREATININE 2.24 (H) 11/22/2024   Continue to monitor while remain on anticoagulation with Coumadin  Pancytopenia ([facility])  Multifactorial in origin, continue to monitor    VTE Pharmacologic Prophylaxis: VTE Score: 6  was on Coumadin, remained on hold due to supratherapeutic INR    Mobility:   Basic Mobility Inpatient Raw Score: 9  JH-HLM Goal: 3: Sit at edge of bed  JH-HLM Achieved: 2: Bed activities/Dependent transfer  JH-HLM Goal NOT achieved. Continue with multidisciplinary rounding and encourage appropriate mobility to improve upon JH-HLM goals.    Patient Centered Rounds: I performed bedside rounds with nursing staff today.   Discussions with Specialists or Other Care Team Provider: Nephrology    Education and Discussions with Family / Patient: Updated  (daughter) via phone.-Unable to reach patient's son over the phone.  Left voice message.    Current Length of Stay: 5 day(s)  Current Patient Status: Inpatient   Certification Statement: The patient will continue to require  additional inpatient hospital stay due to monitorable conditions  Discharge Plan: Anticipate discharge in 48-72 hrs to rehab facility.    Code Status: Level 3 - DNAR and DNI    Subjective   Seen and evaluated during the rounding.  Still feeling tired and fatigued.  He still having diarrhea.    Objective :  Temp:  [96.8 °F (36 °C)-97.3 °F (36.3 °C)] 96.8 °F (36 °C)  HR:  [57-70] 63  BP: (115-142)/(61-72) 126/69  Resp:  [16-19] 18  SpO2:  [92 %-96 %] 92 %  O2 Device: None (Room air)  Nasal Cannula O2 Flow Rate (L/min):  [1 L/min] 1 L/min    Body mass index is 29.12 kg/m².     Input and Output Summary (last 24 hours):     Intake/Output Summary (Last 24 hours) at 11/25/2024 0823  Last data filed at 11/25/2024 0629  Gross per 24 hour   Intake 100 ml   Output 725 ml   Net -625 ml       Physical Exam  Vitals and nursing note reviewed.   Constitutional:       Appearance: He is ill-appearing. He is not diaphoretic.   HENT:      Mouth/Throat:      Mouth: Mucous membranes are dry.      Pharynx: No oropharyngeal exudate or posterior oropharyngeal erythema.   Eyes:      Extraocular Movements: Extraocular movements intact.      Conjunctiva/sclera: Conjunctivae normal.      Pupils: Pupils are equal, round, and reactive to light.   Cardiovascular:      Rate and Rhythm: Normal rate.      Heart sounds: No murmur heard.     No friction rub. No gallop.   Pulmonary:      Effort: No respiratory distress.      Breath sounds: Rhonchi present. No wheezing.   Chest:      Chest wall: No tenderness.   Abdominal:      General: There is no distension.      Tenderness: There is abdominal tenderness. There is no guarding or rebound.      Hernia: No hernia is present.      Comments: Peritoneal dialysis catheter still in present.   Musculoskeletal:      Right lower leg: No edema.      Left lower leg: No edema.   Neurological:      Mental Status: He is alert and oriented to person, place, and time.      Cranial Nerves: No cranial nerve deficit.       Sensory: No sensory deficit.      Motor: No weakness.      Coordination: Coordination normal.         Lines/Drains:  Lines/Drains/Airways       Active Status       Name Placement date Placement time Site Days    HD Permanent Double Catheter 10/14/24  1420  Subclavian  41                            Lab Results: I have reviewed the following results:   Results from last 7 days   Lab Units 11/24/24  0504   WBC Thousand/uL 5.21   HEMOGLOBIN g/dL 10.3*   HEMATOCRIT % 33.5*   PLATELETS Thousands/uL 106*   SEGS PCT % 90*   LYMPHO PCT % 7*   MONO PCT % 3*   EOS PCT % 0     Results from last 7 days   Lab Units 11/24/24  0504   SODIUM mmol/L 131*   POTASSIUM mmol/L 3.7   CHLORIDE mmol/L 96   CO2 mmol/L 28   BUN mg/dL 20   CREATININE mg/dL 2.11*   ANION GAP mmol/L 7   CALCIUM mg/dL 9.1   ALBUMIN g/dL 2.3*   TOTAL BILIRUBIN mg/dL 0.35   ALK PHOS U/L 62   ALT U/L 6*   AST U/L 17   GLUCOSE RANDOM mg/dL 93     Results from last 7 days   Lab Units 11/24/24  0504   INR  3.06*     Results from last 7 days   Lab Units 11/25/24  0711 11/24/24  2114 11/24/24  1617 11/24/24  1145 11/24/24  0709 11/23/24  2104 11/23/24  1639 11/23/24  1220 11/23/24  0718 11/22/24  2056 11/22/24  1653 11/22/24  1127   POC GLUCOSE mg/dl 110 76 82 103 111 73 83 94 105 74 81 92         Results from last 7 days   Lab Units 11/24/24  0504 11/21/24  0502 11/20/24  2148 11/20/24  2133   LACTIC ACID mmol/L  --   --   --  0.8   PROCALCITONIN ng/ml 0.35* 0.87* 0.84*  --        Recent Cultures (last 7 days):   Results from last 7 days   Lab Units 11/22/24  2115 11/20/24  2133   BLOOD CULTURE   --  No Growth at 72 hrs.  No Growth at 72 hrs.   C DIFF TOXIN B BY PCR  Positive*  --        Imaging Results Review: No pertinent imaging studies reviewed.  Other Study Results Review: No additional pertinent studies reviewed.    Last 24 Hours Medication List:     Current Facility-Administered Medications:     acetaminophen (TYLENOL) tablet 650 mg, Q4H PRN    atorvastatin  (LIPITOR) tablet 40 mg, Daily With Dinner    calcium carbonate (TUMS) chewable tablet 500 mg, Daily PRN    cinacalcet (SENSIPAR) tablet 60 mg, Once per day on Monday Wednesday Friday    dexamethasone (PF) (DECADRON) injection 6 mg, Q24H    docusate sodium (COLACE) capsule 100 mg, BID PRN    doxycycline hyclate (VIBRAMYCIN) capsule 100 mg, Q12H VASU    finasteride (PROSCAR) tablet 5 mg, Daily    HYDROcodone-acetaminophen (NORCO) 5-325 mg per tablet 2 tablet, Q6H PRN    insulin lispro (HumALOG/ADMELOG) 100 units/mL subcutaneous injection 1-5 Units, HS    insulin lispro (HumALOG/ADMELOG) 100 units/mL subcutaneous injection 1-6 Units, TID AC **AND** Fingerstick Glucose (POCT), TID AC    levothyroxine tablet 125 mcg, Early Morning    metoprolol tartrate (LOPRESSOR) tablet 25 mg, Q12H VASU    midodrine (PROAMATINE) tablet 15 mg, TID AC    nicotine (NICODERM CQ) 7 mg/24hr TD 24 hr patch 7 mg, Daily    ondansetron (ZOFRAN) injection 4 mg, Q6H PRN    potassium chloride (Klor-Con M20) CR tablet 20 mEq, Daily    pyridostigmine (MESTINON) tablet 60 mg, TID    sevelamer (RENAGEL) tablet 800 mg, TID With Meals    simethicone (MYLICON) chewable tablet 80 mg, 4x Daily PRN    torsemide (DEMADEX) tablet 60 mg, BID    vancomycin (VANCOCIN) capsule 125 mg, Q6H VASU    [Held by provider] warfarin (COUMADIN) tablet 1 mg, HS    Administrative Statements   Today, Patient Was Seen By: Moses Noel MD      **Please Note: This note may have been constructed using a voice recognition system.**

## 2024-11-25 NOTE — PROGRESS NOTES
NEPHROLOGY HOSPITAL PROGRESS NOTE   Masoud VERGARA [facility] 69 y.o. male MRN: [id number]  Unit/Bed#: -01 Encounter: [id number]  Reason for Consult: ESRD on HD    Brief History of Admission - 68 yo male with HTN, DM, ESRD on HD, CHF, atrial fibrillation, factor V leiden, CVA now presenting to [facility] on 11/20/24 with shortness of breath and was diagnosed with COVID-19 infection.  Nephrology consulted for assistance with management of ESRD on HD.    Assessment & Plan  ESRD (end stage renal disease) on dialysis ([facility])  He was on PD but is doing intermittent HD now while at a facility.   [facility].   Access: R IJ permcath. PD catheter in place.   Electrolytes are stable.  HD schedule this week is Mon-Wed-Sat.   Completed HD earlier today.    Hemodialysis-associated hypotension  BP is controlled.  Rx: Midodrine 15 mg TID. Torsemide 60 mg BID, Metoprolol 25 mg BID.   Clinically appears hypovolemic.     Anemia due to chronic kidney disease, on chronic dialysis ([facility])  Hemoglobin is below goal but stable.  Hemoglobin 9.5 today.  Epogen being held in setting of COVID-19 infection.    Secondary hyperparathyroidism of renal origin ([facility])  Continue Sensipar 30 mg 3/week.   Ca level is stable.     Electrolyte imbalance risk  Hyponatremia  Stable Na at 134.     Hyperphosphatemia  Continue sevelamer 800 mg TID.   Phos is at goal.     Acute on chronic diastolic HF (heart failure) ([facility])  7/29/24 Echo: EF 75%, G1DD  Clinically hypovolemic on exam today.     COVID-19  Treatment of COVID-19 per primary team    Paroxysmal atrial fibrillation ([facility])  Currently on Metoprolol 25 mg BID.     Diarrhea of infectious origin  Continue oral vancomycin for C. difficile colitis.  Still with ongoing diarrhea.    TODAY's DISCUSSION / PLAN:  Completed HD today.   Run even on HD.   Appears hypovolemic clinically - stop Torsemide.   Add Gentamicin cream to PD cath exit site.   Continue Kdur.   Follow up CT CAP  If no pulmonary edema, then would start  isolyte at 75 cc/hr.     SUBJECTIVE / 24H INTERVAL HISTORY:  Tolerated HD earlier today.   Run even on HD.   Reports significant diarrhea.  + abdominal pain.     OBJECTIVE:  Current Weight: Weight - Scale: 97.4 kg (214 lb 11.7 oz)  Vitals:    11/25/24 1200 11/25/24 1230 11/25/24 1240 11/25/24 1500   BP: 116/69 115/65 118/72 129/70   BP Location:    Left arm   Pulse: 68 65 67 96   Resp: 18 18 18 16   Temp:   (!) 97.1 °F (36.2 °C) 97.9 °F (36.6 °C)   TempSrc:    Temporal   SpO2: 97% 97% 96% 91%   Weight:       Height:           Intake/Output Summary (Last 24 hours) at 11/25/2024 1631  Last data filed at 11/25/2024 1240  Gross per 24 hour   Intake 500 ml   Output 1276 ml   Net -776 ml     General: conscious, cooperative, no distress  Skin: dry  Eyes: pink conjunctivae  ENT: moist mucous membranes  Respiratory: equal chest expansion, clear breath sounds.  Cardiovascular: distinct heart sounds, normal rate, regular rhythm, no rub  Abdomen: soft, non distended, normal bowel sounds, PD catheter in place.   Extremities: + bilateral LE edema.   Genitourinary: no goldberg catheter.   Neuro: awake, alert.   Psych: appropriate affect    Medications:    Current Facility-Administered Medications:     acetaminophen (TYLENOL) tablet 650 mg, 650 mg, Oral, Q4H PRN, Chao Rios PA-C    atorvastatin (LIPITOR) tablet 40 mg, 40 mg, Oral, Daily With Dinner, Chao Rios PA-C, 40 mg at 11/24/24 1659    calcium carbonate (TUMS) chewable tablet 500 mg, 500 mg, Oral, Daily PRN, Moses Noel MD    cinacalcet (SENSIPAR) tablet 60 mg, 60 mg, Oral, Once per day on Monday Wednesday Friday, Jignesh Hussein MD, 60 mg at 11/25/24 1330    dexamethasone (PF) (DECADRON) injection 6 mg, 6 mg, Intravenous, Q24H, Chao Rios PA-C, 6 mg at 11/25/24 0151    docusate sodium (COLACE) capsule 100 mg, 100 mg, Oral, BID PRN, Chao Rios PA-C    doxycycline hyclate (VIBRAMYCIN) capsule 100 mg, 100 mg, Oral, Q12H VASU, Moses Noel MD, 100 mg at  11/25/24 1329    finasteride (PROSCAR) tablet 5 mg, 5 mg, Oral, Daily, Chao Rios PA-C, 5 mg at 11/25/24 1328    HYDROcodone-acetaminophen (NORCO) 5-325 mg per tablet 2 tablet, 2 tablet, Oral, Q6H PRN, Chao Rios PA-C, 2 tablet at 11/25/24 1329    insulin lispro (HumALOG/ADMELOG) 100 units/mL subcutaneous injection 1-5 Units, 1-5 Units, Subcutaneous, HS, Chao Rios PA-C    insulin lispro (HumALOG/ADMELOG) 100 units/mL subcutaneous injection 1-6 Units, 1-6 Units, Subcutaneous, TID AC, 1 Units at 11/21/24 1217 **AND** Fingerstick Glucose (POCT), , , TID AC, Chao Rios PA-C    levothyroxine tablet 125 mcg, 125 mcg, Oral, Early Morning, Chao Rios PA-C, 125 mcg at 11/25/24 0627    metoprolol tartrate (LOPRESSOR) tablet 25 mg, 25 mg, Oral, Q12H VASU, Moses Noel MD, 25 mg at 11/25/24 1328    midodrine (PROAMATINE) tablet 15 mg, 15 mg, Oral, TID AC, Chao Rios PA-C, 15 mg at 11/25/24 1330    nicotine (NICODERM CQ) 7 mg/24hr TD 24 hr patch 7 mg, 7 mg, Transdermal, Daily, Chao Rios PA-C    ondansetron (ZOFRAN) injection 4 mg, 4 mg, Intravenous, Q6H PRN, Chao Rios PA-C, 4 mg at 11/23/24 2109    potassium chloride (Klor-Con M20) CR tablet 20 mEq, 20 mEq, Oral, Daily, Jignesh Hussein MD, 20 mEq at 11/25/24 1328    pyridostigmine (MESTINON) tablet 60 mg, 60 mg, Oral, TID, Chao Rios PA-C, 60 mg at 11/25/24 1328    sevelamer (RENAGEL) tablet 800 mg, 800 mg, Oral, TID With Meals, Jignesh Hussein MD, 800 mg at 11/25/24 1330    simethicone (MYLICON) chewable tablet 80 mg, 80 mg, Oral, 4x Daily PRN, Chao Rios PA-C    torsemide (DEMADEX) tablet 60 mg, 60 mg, Oral, BID, Jignesh Hussein MD, 60 mg at 11/25/24 1330    vancomycin (VANCOCIN) capsule 125 mg, 125 mg, Oral, Q6H VASU, Moses Noel MD, 125 mg at 11/25/24 1330    [Held by provider] warfarin (COUMADIN) tablet 1 mg, 1 mg, Oral, HS, Chao Rios PA-C, 1 mg at 11/23/24 8739    Laboratory Results:  Results from last 7 days  "  Lab Units 11/25/24  0958 11/24/24  0504 11/23/24  0828 11/22/24  0625 11/21/24  0502 11/20/24  2148 11/20/24  2133   WBC Thousand/uL 3.62* 5.21 4.25* 6.42 6.27  --  7.11   HEMOGLOBIN g/dL 9.5* 10.3* 9.8* 9.3* 10.0*  --  9.8*   HEMATOCRIT % 29.9* 33.5* 30.7* 30.2* 32.9*  --  31.7*   PLATELETS Thousands/uL 108* 106* 118* 90* 80*  --  91*   POTASSIUM mmol/L 3.9 3.7 3.9 4.2 3.7 3.2*  --    CHLORIDE mmol/L 97 96 97 96 100 99  --    CO2 mmol/L 29 28 28 28 25 28  --    BUN mg/dL 32* 20 27* 17 19 16  --    CREATININE mg/dL 2.65* 2.11* 2.63* 2.24* 3.05* 2.90*  --    CALCIUM mg/dL 9.7 9.1 8.8 8.9 9.0 8.9  --    MAGNESIUM mg/dL  --   --   --  1.6*  --  1.6*  --    PHOSPHORUS mg/dL  --   --   --  2.9  --  3.7  --      Portions of the record may have been created with voice recognition software. Occasional wrong word or \"sound a like\" substitutions may have occurred due to the inherent limitations of voice recognition software. Read the chart carefully and recognize, using context, where substitutions have occurred.If you have any questions, please contact the dictating provider.    "

## 2024-11-25 NOTE — PLAN OF CARE
Pre-tx:  UF 0-1L as tolerated.  Pt below EDW and reports poor appetite.  BP stable to start HD.  4K bath per last serum K of 3.7.  Labs drawn prior to HD today.     Post-Dialysis RN Treatment Note    Blood Pressure:  Pre 129/76 mm/Hg  Post 118/72 mmHg   EDW:  98.5 kg    Weight:  Pre 97.4 kg   Post 97.2 kg   Mode of weight measurement: Bed Scale   Volume Removed: 200  ml    Treatment duration: 3 hours    NS given:  No    Treatment shortened No   Medications given during Rx: Not Applicable   Estimated Kt/V:     Access type: Permacath/TDC   Needle Gauge:    Access Issues: Lines reversed x 1 per protocol, max BFR with lines reversed 300, frequent AP alarming toward end of tx   Report called to primary nurse:   Yes               Problem: METABOLIC, FLUID AND ELECTROLYTES - ADULT  Goal: Electrolytes maintained within normal limits  Description: INTERVENTIONS:  - Monitor labs and assess patient for signs and symptoms of electrolyte imbalances  - Administer electrolyte replacement as ordered  - Monitor response to electrolyte replacements, including repeat lab results as appropriate  - Instruct patient on fluid and nutrition as appropriate  Outcome: Progressing  Goal: Fluid balance maintained  Description: INTERVENTIONS:  - Monitor labs   - Monitor I/O and WT  - Instruct patient on fluid and nutrition as appropriate  - Assess for signs & symptoms of volume excess or deficit  Outcome: Progressing

## 2024-11-25 NOTE — ASSESSMENT & PLAN NOTE
He was on PD but is doing intermittent HD now while at a facility.   [facility].   Access: R IJ permcath. PD catheter in place.   Electrolytes are stable.  HD schedule this week is Mon-Wed-Sat.   Completed HD earlier today.

## 2024-11-26 LAB
ALBUMIN SERPL BCG-MCNC: 3 G/DL (ref 3.5–5)
ALP SERPL-CCNC: 60 U/L (ref 34–104)
ALT SERPL W P-5'-P-CCNC: 8 U/L (ref 7–52)
ANION GAP SERPL CALCULATED.3IONS-SCNC: 7 MMOL/L (ref 4–13)
ANISOCYTOSIS BLD QL SMEAR: PRESENT
AST SERPL W P-5'-P-CCNC: 16 U/L (ref 13–39)
BACTERIA BLD CULT: NORMAL
BACTERIA BLD CULT: NORMAL
BASOPHILS # BLD MANUAL: 0 THOUSAND/UL (ref 0–0.1)
BASOPHILS NFR MAR MANUAL: 0 % (ref 0–1)
BILIRUB SERPL-MCNC: 0.51 MG/DL (ref 0.2–1)
BUN SERPL-MCNC: 25 MG/DL (ref 5–25)
CALCIUM ALBUM COR SERPL-MCNC: 10.4 MG/DL (ref 8.3–10.1)
CALCIUM SERPL-MCNC: 9.6 MG/DL (ref 8.4–10.2)
CHLORIDE SERPL-SCNC: 97 MMOL/L (ref 96–108)
CO2 SERPL-SCNC: 31 MMOL/L (ref 21–32)
CREAT SERPL-MCNC: 2.22 MG/DL (ref 0.6–1.3)
EOSINOPHIL # BLD MANUAL: 0 THOUSAND/UL (ref 0–0.4)
EOSINOPHIL NFR BLD MANUAL: 0 % (ref 0–6)
ERYTHROCYTE [DISTWIDTH] IN BLOOD BY AUTOMATED COUNT: 14.6 % (ref 11.6–15.1)
GFR SERPL CREATININE-BSD FRML MDRD: 28 ML/MIN/1.73SQ M
GLUCOSE SERPL-MCNC: 103 MG/DL (ref 65–140)
GLUCOSE SERPL-MCNC: 179 MG/DL (ref 65–140)
GLUCOSE SERPL-MCNC: 70 MG/DL (ref 65–140)
GLUCOSE SERPL-MCNC: 84 MG/DL (ref 65–140)
GLUCOSE SERPL-MCNC: 94 MG/DL (ref 65–140)
HCT VFR BLD AUTO: 34.9 % (ref 36.5–49.3)
HGB BLD-MCNC: 10.9 G/DL (ref 12–17)
INR PPP: 4.9 (ref 0.85–1.19)
LYMPHOCYTES # BLD AUTO: 0.68 THOUSAND/UL (ref 0.6–4.47)
LYMPHOCYTES # BLD AUTO: 9 % (ref 14–44)
MACROCYTES BLD QL AUTO: PRESENT
MCH RBC QN AUTO: 31.8 PG (ref 26.8–34.3)
MCHC RBC AUTO-ENTMCNC: 31.2 G/DL (ref 31.4–37.4)
MCV RBC AUTO: 102 FL (ref 82–98)
MICROCYTES BLD QL AUTO: PRESENT
MONOCYTES # BLD AUTO: 0.07 THOUSAND/UL (ref 0–1.22)
MONOCYTES NFR BLD: 1 % (ref 4–12)
NEUTROPHILS # BLD MANUAL: 6.03 THOUSAND/UL (ref 1.85–7.62)
NEUTS SEG NFR BLD AUTO: 89 % (ref 43–75)
PLATELET # BLD AUTO: 155 THOUSANDS/UL (ref 149–390)
PLATELET BLD QL SMEAR: ADEQUATE
PMV BLD AUTO: 9.1 FL (ref 8.9–12.7)
POTASSIUM SERPL-SCNC: 4.1 MMOL/L (ref 3.5–5.3)
PROT SERPL-MCNC: 5.5 G/DL (ref 6.4–8.4)
PROTHROMBIN TIME: 44.9 SECONDS (ref 12.3–15)
RBC # BLD AUTO: 3.43 MILLION/UL (ref 3.88–5.62)
RBC MORPH BLD: PRESENT
SODIUM SERPL-SCNC: 135 MMOL/L (ref 135–147)
VARIANT LYMPHS # BLD AUTO: 1 %
WBC # BLD AUTO: 6.77 THOUSAND/UL (ref 4.31–10.16)

## 2024-11-26 PROCEDURE — 85610 PROTHROMBIN TIME: CPT | Performed by: FAMILY MEDICINE

## 2024-11-26 PROCEDURE — 82948 REAGENT STRIP/BLOOD GLUCOSE: CPT

## 2024-11-26 PROCEDURE — 85027 COMPLETE CBC AUTOMATED: CPT | Performed by: FAMILY MEDICINE

## 2024-11-26 PROCEDURE — 80053 COMPREHEN METABOLIC PANEL: CPT | Performed by: FAMILY MEDICINE

## 2024-11-26 PROCEDURE — 99232 SBSQ HOSP IP/OBS MODERATE 35: CPT | Performed by: FAMILY MEDICINE

## 2024-11-26 PROCEDURE — 94760 N-INVAS EAR/PLS OXIMETRY 1: CPT

## 2024-11-26 PROCEDURE — 99232 SBSQ HOSP IP/OBS MODERATE 35: CPT | Performed by: INTERNAL MEDICINE

## 2024-11-26 PROCEDURE — 85007 BL SMEAR W/DIFF WBC COUNT: CPT | Performed by: FAMILY MEDICINE

## 2024-11-26 PROCEDURE — 97535 SELF CARE MNGMENT TRAINING: CPT

## 2024-11-26 PROCEDURE — 97530 THERAPEUTIC ACTIVITIES: CPT

## 2024-11-26 RX ORDER — DEXTROSE MONOHYDRATE AND SODIUM CHLORIDE 5; .45 G/100ML; G/100ML
40 INJECTION, SOLUTION INTRAVENOUS CONTINUOUS
Status: DISCONTINUED | OUTPATIENT
Start: 2024-11-26 | End: 2024-11-28

## 2024-11-26 RX ORDER — SODIUM CHLORIDE, SODIUM GLUCONATE, SODIUM ACETATE, POTASSIUM CHLORIDE, MAGNESIUM CHLORIDE, SODIUM PHOSPHATE, DIBASIC, AND POTASSIUM PHOSPHATE .53; .5; .37; .037; .03; .012; .00082 G/100ML; G/100ML; G/100ML; G/100ML; G/100ML; G/100ML; G/100ML
60 INJECTION, SOLUTION INTRAVENOUS CONTINUOUS
Status: DISCONTINUED | OUTPATIENT
Start: 2024-11-26 | End: 2024-11-26

## 2024-11-26 RX ADMIN — VANCOMYCIN HYDROCHLORIDE 125 MG: 125 CAPSULE ORAL at 17:42

## 2024-11-26 RX ADMIN — DEXTROSE AND SODIUM CHLORIDE 60 ML/HR: 5; .45 INJECTION, SOLUTION INTRAVENOUS at 22:24

## 2024-11-26 RX ADMIN — FINASTERIDE 5 MG: 5 TABLET, FILM COATED ORAL at 09:57

## 2024-11-26 RX ADMIN — PYRIDOSTIGMINE BROMIDE 60 MG: 60 TABLET ORAL at 17:42

## 2024-11-26 RX ADMIN — MIDODRINE HYDROCHLORIDE 15 MG: 5 TABLET ORAL at 06:37

## 2024-11-26 RX ADMIN — POTASSIUM CHLORIDE 20 MEQ: 1500 TABLET, EXTENDED RELEASE ORAL at 10:01

## 2024-11-26 RX ADMIN — METOPROLOL TARTRATE 25 MG: 25 TABLET, FILM COATED ORAL at 09:58

## 2024-11-26 RX ADMIN — SODIUM CHLORIDE, SODIUM GLUCONATE, SODIUM ACETATE, POTASSIUM CHLORIDE, MAGNESIUM CHLORIDE, SODIUM PHOSPHATE, DIBASIC, AND POTASSIUM PHOSPHATE 60 ML/HR: .53; .5; .37; .037; .03; .012; .00082 INJECTION, SOLUTION INTRAVENOUS at 12:23

## 2024-11-26 RX ADMIN — VANCOMYCIN HYDROCHLORIDE 125 MG: 125 CAPSULE ORAL at 12:23

## 2024-11-26 RX ADMIN — GENTAMICIN SULFATE: 1 CREAM TOPICAL at 12:23

## 2024-11-26 RX ADMIN — DEXAMETHASONE SODIUM PHOSPHATE 6 MG: 10 INJECTION, SOLUTION INTRAMUSCULAR; INTRAVENOUS at 00:00

## 2024-11-26 RX ADMIN — HYDROCODONE BITARTRATE AND ACETAMINOPHEN 2 TABLET: 5; 325 TABLET ORAL at 06:48

## 2024-11-26 RX ADMIN — HYDROCODONE BITARTRATE AND ACETAMINOPHEN 2 TABLET: 5; 325 TABLET ORAL at 17:45

## 2024-11-26 RX ADMIN — ATORVASTATIN CALCIUM 40 MG: 40 TABLET, FILM COATED ORAL at 17:43

## 2024-11-26 RX ADMIN — PYRIDOSTIGMINE BROMIDE 60 MG: 60 TABLET ORAL at 10:01

## 2024-11-26 RX ADMIN — VANCOMYCIN HYDROCHLORIDE 125 MG: 125 CAPSULE ORAL at 06:35

## 2024-11-26 RX ADMIN — INSULIN LISPRO 1 UNITS: 100 INJECTION, SOLUTION INTRAVENOUS; SUBCUTANEOUS at 12:36

## 2024-11-26 RX ADMIN — LEVOTHYROXINE SODIUM 125 MCG: 125 TABLET ORAL at 06:35

## 2024-11-26 RX ADMIN — DOXYCYCLINE 100 MG: 100 CAPSULE ORAL at 09:58

## 2024-11-26 NOTE — PHYSICAL THERAPY NOTE
PHYSICAL THERAPY TREATMENT NOTE  NAME:  Masoud Perry  DATE: 11/26/24    Length Of Stay: 6  Performed at least 2 patient identifiers during session: Name and ID bracelet    TREATMENT:      11/26/24 1043   PT Last Visit   PT Visit Date 11/26/24   Note Type   Note Type Treatment   Pain Assessment   Pain Assessment Tool 0-10   Pain Score 10 - Worst Possible Pain   Pain Location/Orientation Location: Back   Pain Onset/Description Onset: Ongoing;Descriptor: Aching   Effect of Pain on Daily Activities limits mobility and comfort   Patient's Stated Pain Goal No pain   Restrictions/Precautions   Weight Bearing Precautions Per Order No   Other Precautions Airborne/isolation;Contact/isolation;Chair Alarm;Bed Alarm;Multiple lines;Fall Risk;Limb alert;Pain   General   Family/Caregiver Present No   Cognition   Overall Cognitive Status WFL   Arousal/Participation Responsive   Attention Within functional limits   Orientation Level Oriented X4   Memory Within functional limits   Following Commands Follows one step commands without difficulty   Comments pt resistant to participation, requires reassurance and encouragement, education on imprtance of mobility. pt with decreased insight to deficits.   Bed Mobility   Rolling R 3  Moderate assistance   Additional items Assist x 1;Bedrails;Increased time required;Verbal cues;LE management   Rolling L 3  Moderate assistance   Additional items Assist x 1;Bedrails;Increased time required;Verbal cues   Supine to Sit 3  Moderate assistance   Additional items Assist x 2;HOB elevated;Bedrails;Increased time required;Verbal cues;LE management   Sit to Supine 3  Moderate assistance   Additional items Assist x 2;Increased time required;Verbal cues;LE management;Bedrails   Additional Comments multiple trials of rolling in bed for hygiene, patient incontinent of stool, sat at EOB x20-25 minutes to complete functional tasks   Transfers   Sit to Stand Unable to assess   Additional Comments pt refused  standing trial   Balance   Static Sitting Fair   Dynamic Sitting Fair -   Endurance Deficit   Endurance Deficit Yes   Activity Tolerance   Activity Tolerance Patient limited by fatigue;Patient limited by pain   Nurse Made Aware RN made aware of outcomes   Assessment   Prognosis Fair   Problem List Decreased strength;Decreased endurance;Impaired balance;Decreased mobility;Impaired judgement;Decreased safety awareness;Pain   Assessment Pt seen for PT treatment session this date with interventions consisting of therapeutic activity consisting of training: bed mobility, supine<>sit transfers, static sitting tolerance at EOB for 20-25  minutes w/ unilateral UE support, and vc and tactile cues for static sitting posture faciliation. Pt agreeable to PT treatment session upon arrival, pt found supine in bed w/ HOB elevated, responsive. In comparison to previous session, pt with no improvements as evidenced by pt declined OOB mobility . Post session: pt returned BTB, bed alarm engaged, all needs in reach, and RN notified of session findings/recommendations. Continue to recommend Level II (Moderate Resource Intensity at time of d/c in order to maximize pt's functional independence and safety w/ mobility. Pt continues to be functioning below baseline level. PT will continue to see pt during current hospitalization in order to address the deficits listed above and provide interventions consistent w/ POC in effort to achieve STGs.    Stacie Euceda, PTA   Barriers to Discharge Decreased caregiver support;Inaccessible home environment   Barriers to Discharge Comments requires assistance to complete mobility, decreased insight to deficits, inc fall risk   Goals   Patient Goals to go home   STG Expiration Date 12/06/24   Plan   Treatment/Interventions Functional transfer training;LE strengthening/ROM;Therapeutic exercise;Endurance training;Bed mobility;Gait training;Spoke to nursing   PT Frequency 3-5x/wk   Discharge  Recommendation   Rehab Resource Intensity Level, PT II (Moderate Resource Intensity)   AM-PAC Basic Mobility Inpatient   Turning in Flat Bed Without Bedrails 3   Lying on Back to Sitting on Edge of Flat Bed Without Bedrails 2   Moving Bed to Chair 1   Standing Up From Chair Using Arms 1   Walk in Room 1   Climb 3-5 Stairs With Railing 1   Basic Mobility Inpatient Raw Score 9   Turning Head Towards Sound 4   Follow Simple Instructions 4   Low Function Basic Mobility Raw Score  17   Low Function Basic Mobility Standardized Score  27.46   [facility] Highest Level Of Mobility   -[facility] Goal 3: Sit at edge of bed   -[facility] Achieved 3: Sit at edge of bed   End of Consult   Patient Position at End of Consult Supine;Bed/Chair alarm activated;All needs within reach         The patient's AM-PAC Basic Mobility Inpatient Short Form Raw Score is 9. A Raw score of less than or equal to 16 suggests the patient may benefit from discharge to post-acute rehabilitation services. Please also refer to the recommendation of the Physical Therapist for safe discharge planning.      Stacie Euceda, PTA,PTA

## 2024-11-26 NOTE — PROGRESS NOTES
NEPHROLOGY HOSPITAL PROGRESS NOTE   Masoud Perry 69 y.o. male MRN: [id number]  Unit/Bed#: -01 Encounter: [id number]  Reason for Consult: ESRD on HD    Brief History of Admission - 68 yo male with HTN, DM, ESRD on HD, CHF, atrial fibrillation, factor V leiden, CVA now presenting to [facility] [place] on 11/20/24 with shortness of breath and was diagnosed with COVID-19 infection.  Nephrology consulted for assistance with management of ESRD on HD.    Assessment & Plan  ESRD (end stage renal disease) on dialysis ([facility])  He was on PD but was changed to back up iHD during admission to [facility] in November 2024. Started iHD on 11/8/24.   [facility].   Access: R IJ permcath. PD catheter in place.   Electrolytes are stable.  Appears volume depleted.  Start Isolyte at 60 cc/h.  Next HD is tomorrow.     Hemodialysis-associated hypotension  BP is controlled.  Rx: Midodrine 15 mg TID. Metoprolol 25 mg BID.   Torsemide 60 mg BID stopped on 11/25/24 due to volume depletion.   Clinically appears hypovolemic. Start IVF.     Anemia due to chronic kidney disease, on chronic dialysis ([facility])  Hemoglobin is at goal.   Epogen being held in setting of COVID-19 infection.    Secondary hyperparathyroidism of renal origin ([facility])  Continue Sensipar 30 mg 3/week.   Ca level is stable.     Electrolyte imbalance risk  Hyponatremia  Stable Na at 135 today.     Hyperphosphatemia  Continue sevelamer 800 mg TID.   Phos is at goal.     Acute on chronic diastolic HF (heart failure) ([facility])  7/29/24 Echo: EF 75%, G1DD  Clinically hypovolemic on exam today.   As above, start IVF.     COVID-19  Treatment of COVID-19 per primary team    Paroxysmal atrial fibrillation ([facility])  Currently on Metoprolol 25 mg BID.     Diarrhea of infectious origin  Continue oral vancomycin for C. difficile colitis.  Still with diarrhea.       TODAY's DISCUSSION / PLAN:  Start isolyte at 60 cc/hr.   Next HD is tomorrow.     SUBJECTIVE / 24H INTERVAL HISTORY:  Reports that diarrhea is not  better.   + N&V.   Poor oral intake.   No CP or SOB.     OBJECTIVE:  Current Weight: Weight - Scale: 92.6 kg (204 lb 2.3 oz)  Vitals:    11/25/24 2313 11/26/24 0600 11/26/24 0637 11/26/24 0958   BP: 163/83  120/72    BP Location: Left arm      Pulse: 69   68   Resp: 18      Temp: 98.1 °F (36.7 °C)      TempSrc: Temporal      SpO2: 97%      Weight:  92.6 kg (204 lb 2.3 oz)     Height:           Intake/Output Summary (Last 24 hours) at 11/26/2024 1123  Last data filed at 11/26/2024 0631  Gross per 24 hour   Intake 420 ml   Output 1551 ml   Net -1131 ml     General: conscious, cooperative, no distress, looks weak.   Skin: dry  Eyes: pink conjunctivae  ENT: dry mucous membranes  Respiratory: equal chest expansion, clear breath sounds.  Cardiovascular: distinct heart sounds, normal rate, regular rhythm, no rub  Abdomen: soft, non tender, non distended, normal bowel sounds, PD cath in place.   Extremities: minimal LE edema with wrinkled skin.   Genitourinary: no goldberg catheter.   Neuro: awake, alert.   Psych: appropriate affect    Medications:    Current Facility-Administered Medications:     acetaminophen (TYLENOL) tablet 650 mg, 650 mg, Oral, Q4H PRN, Chao Rios PA-C    atorvastatin (LIPITOR) tablet 40 mg, 40 mg, Oral, Daily With Dinner, Chao Rios PA-C, 40 mg at 11/25/24 1845    calcium carbonate (TUMS) chewable tablet 500 mg, 500 mg, Oral, Daily PRN, Moses Noel MD    cinacalcet (SENSIPAR) tablet 60 mg, 60 mg, Oral, Once per day on Monday Wednesday Friday, Jignesh Hussein MD, 60 mg at 11/25/24 1330    dexamethasone (PF) (DECADRON) injection 6 mg, 6 mg, Intravenous, Q24H, Chao Rios PA-C, 6 mg at 11/26/24 0000    docusate sodium (COLACE) capsule 100 mg, 100 mg, Oral, BID PRN, Chao Rios PA-C    doxycycline hyclate (VIBRAMYCIN) capsule 100 mg, 100 mg, Oral, Q12H VASU, Moses Noel MD, 100 mg at 11/26/24 0958    finasteride (PROSCAR) tablet 5 mg, 5 mg, Oral, Daily, Chao Rios PA-C, 5 mg  at 11/26/24 0957    gentamicin (GARAMYCIN) 0.1 % cream, , Topical, Daily, Jesus Christine MD    HYDROcodone-acetaminophen (NORCO) 5-325 mg per tablet 2 tablet, 2 tablet, Oral, Q6H PRN, Chao Rios PA-C, 2 tablet at 11/26/24 0648    insulin lispro (HumALOG/ADMELOG) 100 units/mL subcutaneous injection 1-5 Units, 1-5 Units, Subcutaneous, HS, Chao Rios PA-C    insulin lispro (HumALOG/ADMELOG) 100 units/mL subcutaneous injection 1-6 Units, 1-6 Units, Subcutaneous, TID AC, 1 Units at 11/21/24 1217 **AND** Fingerstick Glucose (POCT), , , TID AC, Chao Rios PA-C    levothyroxine tablet 125 mcg, 125 mcg, Oral, Early Morning, Chao Rios PA-C, 125 mcg at 11/26/24 0635    metoprolol tartrate (LOPRESSOR) tablet 25 mg, 25 mg, Oral, Q12H VASU, Moses Noel MD, 25 mg at 11/26/24 0958    midodrine (PROAMATINE) tablet 15 mg, 15 mg, Oral, TID AC, Chao Rios PA-C, 15 mg at 11/26/24 0637    nicotine (NICODERM CQ) 7 mg/24hr TD 24 hr patch 7 mg, 7 mg, Transdermal, Daily, Chao Rios PA-C    ondansetron (ZOFRAN) injection 4 mg, 4 mg, Intravenous, Q6H PRN, Chao Rios PA-C, 4 mg at 11/25/24 1839    potassium chloride (Klor-Con M20) CR tablet 20 mEq, 20 mEq, Oral, Daily, Jignesh Hussein MD, 20 mEq at 11/26/24 1001    pyridostigmine (MESTINON) tablet 60 mg, 60 mg, Oral, TID, Chao Rios PA-C, 60 mg at 11/26/24 1001    sevelamer (RENAGEL) tablet 800 mg, 800 mg, Oral, TID With Meals, Jignesh Hussein MD, 800 mg at 11/25/24 1330    simethicone (MYLICON) chewable tablet 80 mg, 80 mg, Oral, 4x Daily PRN, Chao Rios PA-C    vancomycin (VANCOCIN) capsule 125 mg, 125 mg, Oral, Q6H [facility], Moses Noel MD, 125 mg at 11/26/24 0635    [Held by provider] warfarin (COUMADIN) tablet 1 mg, 1 mg, Oral, HS, Chao Rios PA-C, 1 mg at 11/23/24 2869    Laboratory Results:  Results from last 7 days   Lab Units 11/26/24  1008 11/25/24  0958 11/24/24  0504 11/23/24  0828 11/22/24  0625 11/21/24  0502  "11/20/24 2148 11/20/24  2133   WBC Thousand/uL 6.77 3.62* 5.21 4.25* 6.42 6.27  --  7.11   HEMOGLOBIN g/dL 10.9* 9.5* 10.3* 9.8* 9.3* 10.0*  --  9.8*   HEMATOCRIT % 34.9* 29.9* 33.5* 30.7* 30.2* 32.9*  --  31.7*   PLATELETS Thousands/uL 155 108* 106* 118* 90* 80*  --  91*   POTASSIUM mmol/L 4.1 3.9 3.7 3.9 4.2 3.7 3.2*  --    CHLORIDE mmol/L 97 97 96 97 96 100 99  --    CO2 mmol/L 31 29 28 28 28 25 28  --    BUN mg/dL 25 32* 20 27* 17 19 16  --    CREATININE mg/dL 2.22* 2.65* 2.11* 2.63* 2.24* 3.05* 2.90*  --    CALCIUM mg/dL 9.6 9.7 9.1 8.8 8.9 9.0 8.9  --    MAGNESIUM mg/dL  --   --   --   --  1.6*  --  1.6*  --    PHOSPHORUS mg/dL  --   --   --   --  2.9  --  3.7  --      Portions of the record may have been created with voice recognition software. Occasional wrong word or \"sound a like\" substitutions may have occurred due to the inherent limitations of voice recognition software. Read the chart carefully and recognize, using context, where substitutions have occurred.If you have any questions, please contact the dictating provider.    "

## 2024-11-26 NOTE — SPEECH THERAPY NOTE
Speech Language/Pathology  Attempted to see pt for bedside dysphagia assessment. Pt asked SLP to go away. Did not eat any lunch. RN reported pt taking pills without difficulty, suspects lack of PO intake not related to dysphagia. SLP can continue to follow as able/appropriate    Li Moore MS CCC-SLP  11/26/2024

## 2024-11-26 NOTE — ASSESSMENT & PLAN NOTE
Presents from [place]. Tested positive 3 days ago when symptoms started  Started decadron and remdesevir, doxycycline  Patient already on Coumadin, INR is supratherapeutic, remain on hold  On clinical exam, patient is very fatigued and tired and sleepy but arousable and answer questions.  Completed 7 days of doxycycline since imaging shows improvement, today is day 6/7  Patient also having diarrhea, and C. difficile showing carrier state, continue p.o. vancomycin  CT chest/abdomen/pelvis:1.  No identifiable acute abnormality to account for the patient's clinical presentation.   2.  Mild diffuse anasarca, improved since the prior CT.   3.  Bilateral nonobstructing renal and urinary bladder calculi.

## 2024-11-26 NOTE — ASSESSMENT & PLAN NOTE
Hyponatremia  Stable Na at 135 today.     Hyperphosphatemia  Continue sevelamer 800 mg TID.   Phos is at goal.

## 2024-11-26 NOTE — ASSESSMENT & PLAN NOTE
Lab Results   Component Value Date    HGBA1C 5.0 11/02/2024       Recent Labs     11/25/24  1116 11/25/24  1642 11/25/24  2106 11/26/24  0740   POCGLU 97 73 75 103       Blood Sugar Average: Last 72 hrs:  (P) [id number]  Sliding scale insulin. Hypoglycemia protocol.

## 2024-11-26 NOTE — ASSESSMENT & PLAN NOTE
Wt Readings from Last 3 Encounters:   11/26/24 92.6 kg (204 lb 2.3 oz)   10/14/24 115 kg (253 lb)   10/07/24 118 kg (260 lb)   CXR on 11/20 appeared to be pulmonary vascular congestion   Patient is a dialysis basis, received hemodialysis on TTS, last dialysis on 11/21/2024  Continue dialysis,  Dialysis on Monday.

## 2024-11-26 NOTE — ASSESSMENT & PLAN NOTE
Lab Results   Component Value Date    EGFR 23 11/25/2024    EGFR 30 11/24/2024    EGFR 23 11/23/2024    CREATININE 2.65 (H) 11/25/2024    CREATININE 2.11 (H) 11/24/2024    CREATININE 2.63 (H) 11/23/2024

## 2024-11-26 NOTE — PLAN OF CARE
Problem: Prexisting or High Potential for Compromised Skin Integrity  Goal: Skin integrity is maintained or improved  Description: INTERVENTIONS:  - Identify patients at risk for skin breakdown  - Assess and monitor skin integrity  - Assess and monitor nutrition and hydration status  - Monitor labs   - Assess for incontinence   - Turn and reposition patient  - Assist with mobility/ambulation  - Relieve pressure over bony prominences  - Avoid friction and shearing  - Provide appropriate hygiene as needed including keeping skin clean and dry  - Evaluate need for skin moisturizer/barrier cream  - Collaborate with interdisciplinary team   - Patient/family teaching  - Consider wound care consult   Outcome: Progressing     Problem: PAIN - ADULT  Goal: Verbalizes/displays adequate comfort level or baseline comfort level  Description: Interventions:  - Encourage patient to monitor pain and request assistance  - Assess pain using appropriate pain scale  - Administer analgesics based on type and severity of pain and evaluate response  - Implement non-pharmacological measures as appropriate and evaluate response  - Consider cultural and social influences on pain and pain management  - Notify physician/advanced practitioner if interventions unsuccessful or patient reports new pain  Outcome: Progressing     Problem: INFECTION - ADULT  Goal: Absence or prevention of progression during hospitalization  Description: INTERVENTIONS:  - Assess and monitor for signs and symptoms of infection  - Monitor lab/diagnostic results  - Monitor all insertion sites, i.e. indwelling lines, tubes, and drains  - Monitor endotracheal if appropriate and nasal secretions for changes in amount and color  - [place] appropriate cooling/warming therapies per order  - Administer medications as ordered  - Instruct and encourage patient and family to use good hand hygiene technique  - Identify and instruct in appropriate isolation precautions for  identified infection/condition  Outcome: Progressing  Goal: Absence of fever/infection during neutropenic period  Description: INTERVENTIONS:  - Monitor WBC    Outcome: Progressing     Problem: SAFETY ADULT  Goal: Patient will remain free of falls  Description: INTERVENTIONS:  - Educate patient/family on patient safety including physical limitations  - Instruct patient to call for assistance with activity   - Consult OT/PT to assist with strengthening/mobility   - Keep Call bell within reach  - Keep bed low and locked with side rails adjusted as appropriate  - Keep care items and personal belongings within reach  - Initiate and maintain comfort rounds  - Make Fall Risk Sign visible to staff  - Offer Toileting every 2 Hours, in advance of need  - Initiate/Maintain bed/chair alarm  - Obtain necessary fall risk management equipment  - Apply yellow socks and bracelet for high fall risk patients  - Consider moving patient to room near nurses station  Outcome: Progressing  Goal: Maintain or return to baseline ADL function  Description: INTERVENTIONS:  -  Assess patient's ability to carry out ADLs; assess patient's baseline for ADL function and identify physical deficits which impact ability to perform ADLs (bathing, care of mouth/teeth, toileting, grooming, dressing, etc.)  - Assess/evaluate cause of self-care deficits   - Assess range of motion  - Assess patient's mobility; develop plan if impaired  - Assess patient's need for assistive devices and provide as appropriate  - Encourage maximum independence but intervene and supervise when necessary  - Involve family in performance of ADLs  - Assess for home care needs following discharge   - Consider OT consult to assist with ADL evaluation and planning for discharge  - Provide patient education as appropriate  Outcome: Progressing  Goal: Maintains/Returns to pre admission functional level  Description: INTERVENTIONS:  - Perform AM-PAC 6 Click Basic Mobility/ Daily  Activity assessment daily.  - Set and communicate daily mobility goal to care team and patient/family/caregiver.   - Collaborate with rehabilitation services on mobility goals if consulted  - Perform Range of Motion 3 times a day.  - Reposition patient every 2 hours.  - Dangle patient 3 times a day  - Stand patient 3 times a day  - Ambulate patient 3 times a day  - Out of bed to chair 3 times a day   - Out of bed for meals 3 times a day  - Out of bed for toileting  - Record patient progress and toleration of activity level   Outcome: Progressing     Problem: DISCHARGE PLANNING  Goal: Discharge to home or other facility with appropriate resources  Description: INTERVENTIONS:  - Identify barriers to discharge w/patient and caregiver  - Arrange for needed discharge resources and transportation as appropriate  - Identify discharge learning needs (meds, wound care, etc.)  - Arrange for interpretive services to assist at discharge as needed  - Refer to Case Management Department for coordinating discharge planning if the patient needs post-hospital services based on physician/advanced practitioner order or complex needs related to functional status, cognitive ability, or social support system  Outcome: Progressing     Problem: Knowledge Deficit  Goal: Patient/family/caregiver demonstrates understanding of disease process, treatment plan, medications, and discharge instructions  Description: Complete learning assessment and assess knowledge base.  Interventions:  - Provide teaching at level of understanding  - Provide teaching via preferred learning methods  Outcome: Progressing     Problem: CARDIOVASCULAR - ADULT  Goal: Maintains optimal cardiac output and hemodynamic stability  Description: INTERVENTIONS:  - Monitor I/O, vital signs and rhythm  - Monitor for S/S and trends of decreased cardiac output  - Administer and titrate ordered vasoactive medications to optimize hemodynamic stability  - Assess quality of pulses,  skin color and temperature  - Assess for signs of decreased coronary artery perfusion  - Instruct patient to report change in severity of symptoms  Outcome: Progressing  Goal: Absence of cardiac dysrhythmias or at baseline rhythm  Description: INTERVENTIONS:  - Continuous cardiac monitoring, vital signs, obtain 12 lead EKG if ordered  - Administer antiarrhythmic and heart rate control medications as ordered  - Monitor electrolytes and administer replacement therapy as ordered  Outcome: Progressing     Problem: RESPIRATORY - ADULT  Goal: Achieves optimal ventilation and oxygenation  Description: INTERVENTIONS:  - Assess for changes in respiratory status  - Assess for changes in mentation and behavior  - Position to facilitate oxygenation and minimize respiratory effort  - Oxygen administered by appropriate delivery if ordered  - Initiate smoking cessation education as indicated  - Encourage broncho-pulmonary hygiene including cough, deep breathe, Incentive Spirometry  - Assess the need for suctioning and aspirate as needed  - Assess and instruct to report SOB or any respiratory difficulty  - Respiratory Therapy support as indicated  Outcome: Progressing

## 2024-11-26 NOTE — PLAN OF CARE
Problem: PHYSICAL THERAPY ADULT  Goal: Performs mobility at highest level of function for planned discharge setting.  See evaluation for individualized goals.  Description: Treatment/Interventions: ADL retraining, Functional transfer training, LE strengthening/ROM, Elevations, Therapeutic exercise, Endurance training, Patient/family training, Equipment eval/education, Bed mobility, Gait training, Compensatory technique education, Spoke to nursing, Spoke to case management, OT          See flowsheet documentation for full assessment, interventions and recommendations.  Outcome: Not Progressing  Note: Prognosis: Fair  Problem List: Decreased strength, Decreased endurance, Impaired balance, Decreased mobility, Impaired judgement, Decreased safety awareness, Pain  Assessment: Pt seen for PT treatment session this date with interventions consisting of therapeutic activity consisting of training: bed mobility, supine<>sit transfers, static sitting tolerance at EOB for 20-25  minutes w/ unilateral UE support, and vc and tactile cues for static sitting posture faciliation. Pt agreeable to PT treatment session upon arrival, pt found supine in bed w/ HOB elevated, responsive. In comparison to previous session, pt with no improvements as evidenced by pt declined OOB mobility . Post session: pt returned BTB, bed alarm engaged, all needs in reach, and RN notified of session findings/recommendations. Continue to recommend Level II (Moderate Resource Intensity at time of d/c in order to maximize pt's functional independence and safety w/ mobility. Pt continues to be functioning below baseline level. PT will continue to see pt during current hospitalization in order to address the deficits listed above and provide interventions consistent w/ POC in effort to achieve STGs.    Stacie Euceda, PTA  Barriers to Discharge: Decreased caregiver support, Inaccessible home environment  Barriers to Discharge Comments: requires  assistance to complete mobility, decreased insight to deficits, inc fall risk  Rehab Resource Intensity Level, PT: II (Moderate Resource Intensity)    See flowsheet documentation for full assessment.

## 2024-11-26 NOTE — PROGRESS NOTES
Progress Note - Hospitalist   Name: Masoud Perry 69 y.o. male I MRN: [id number]  Unit/Bed#: [facility]01 I Date of Admission: 11/20/2024   Date of Service: 11/26/2024 I Hospital Day: 6    Assessment & Plan  Acute respiratory insufficiency  Patient is currently 1 L of oxygen  Continue current treatment  Patient looks very tired, also having diarrhea.  Will continue current treatment.  CT chest/abdomen/pelvis:1.  No identifiable acute abnormality to account for the patient's clinical presentation.   2.  Mild diffuse anasarca, improved since the prior CT.   3.  Bilateral nonobstructing renal and urinary bladder calculi.   Acute on chronic diastolic HF (heart failure) (HCC)  Wt Readings from Last 3 Encounters:   11/26/24 92.6 kg (204 lb 2.3 oz)   10/14/24 115 kg (253 lb)   10/07/24 118 kg (260 lb)   CXR on 11/20 appeared to be pulmonary vascular congestion   Patient is a dialysis basis, received hemodialysis on TTS, last dialysis on 11/21/2024  Continue dialysis,  Dialysis on Monday.  COVID-19  Presents from [place]. Tested positive 3 days ago when symptoms started  Started decadron and remdesevir, doxycycline  Patient already on Coumadin, INR is supratherapeutic, remain on hold  On clinical exam, patient is very fatigued and tired and sleepy but arousable and answer questions.  Completed 7 days of doxycycline since imaging shows improvement, today is day 6/7  Patient also having diarrhea, and C. difficile showing carrier state, continue p.o. vancomycin  CT chest/abdomen/pelvis:1.  No identifiable acute abnormality to account for the patient's clinical presentation.   2.  Mild diffuse anasarca, improved since the prior CT.   3.  Bilateral nonobstructing renal and urinary bladder calculi.   Diarrhea of infectious origin  Patient is having significant diarrhea, initially do suspect related to COVID.  Stool C. difficile panel showing positive for C. difficile PCR, Steffany negative  Since patient is having significant diarrhea,  start treating dose of vancomycin for 10 days.  CT chest/abdome/pelvis without contrast:1.  No identifiable acute abnormality to account for the patient's clinical presentation.   2.  Mild diffuse anasarca, improved since the prior CT.   3.  Bilateral nonobstructing renal and urinary bladder calculi.     Per nephrology, patient placed on IV hydration  Secondary hyperparathyroidism of renal origin (HCC)  Continue Cinacalcet on Mondays, Wednesdays, and Fridays  Hemodialysis-associated hypotension  Continue midodrine and pyridostigmine  HLD (hyperlipidemia)  Continue atorvastatin  Acquired hypothyroidism  Continue levothyroxine  Factor 5 Leiden mutation, heterozygous ([facility])  Coumadin remain supratherapeutic, remain on hold  Type 2 diabetes mellitus with stage 4 chronic kidney disease, with long-term current use of insulin ([facility])  Lab Results   Component Value Date    HGBA1C 5.0 11/02/2024       Recent Labs     11/25/24  1116 11/25/24  1642 11/25/24  2106 11/26/24  0740   POCGLU 97 73 75 103       Blood Sugar Average: Last 72 hrs:  (P) [id number]  Sliding scale insulin. Hypoglycemia protocol.  Personal history of gout  Continue allopurinol  Elevated troponin  Trending  Paroxysmal atrial fibrillation ([facility])  Continue metoprolol if blood pressure tolerates  Continue Coumadin, maintain electrolytes-INR supratherapeutic, remain on hold  ESRD (end stage renal disease) on dialysis ([facility])  Lab Results   Component Value Date    EGFR 23 11/25/2024    EGFR 30 11/24/2024    EGFR 23 11/23/2024    CREATININE 2.65 (H) 11/25/2024    CREATININE 2.11 (H) 11/24/2024    CREATININE 2.63 (H) 11/23/2024     Anemia due to chronic kidney disease, on chronic dialysis ([facility])  Lab Results   Component Value Date    EGFR 23 11/25/2024    EGFR 30 11/24/2024    EGFR 23 11/23/2024    CREATININE 2.65 (H) 11/25/2024    CREATININE 2.11 (H) 11/24/2024    CREATININE 2.63 (H) 11/23/2024   Continue to monitor while remain on anticoagulation with  Coumadin  Pancytopenia (HCC)  Multifactorial in origin, continue to monitor    VTE Pharmacologic Prophylaxis: VTE Score: 6  patient was on Coumadin, remained on hold    Mobility:   Basic Mobility Inpatient Raw Score: 9  JH-HLM Goal: 3: Sit at edge of bed  JH-HLM Achieved: 2: Bed activities/Dependent transfer  JH-HLM Goal NOT achieved. Continue with multidisciplinary rounding and encourage appropriate mobility to improve upon JH-HLM goals.    Patient Centered Rounds: I performed bedside rounds with nursing staff today.   Discussions with Specialists or Other Care Team Provider: Nephrology    Education and Discussions with Family / Patient: Updated  (son) via phone.    Current Length of Stay: 6 day(s)  Current Patient Status: Inpatient   Certification Statement: The patient will continue to require additional inpatient hospital stay due to monitor above condition  Discharge Plan: Anticipate discharge in 48-72 hrs to rehab facility.    Code Status: Level 3 - DNAR and DNI    Subjective   Seen and evaluated during the run.  Still feeling tired and fatigued.    Objective :  Temp:  [97 °F (36.1 °C)-98.1 °F (36.7 °C)] 98.1 °F (36.7 °C)  HR:  [57-96] 69  BP: (115-163)/(65-89) 120/72  Resp:  [16-18] 18  SpO2:  [91 %-99 %] 97 %  O2 Device: None (Room air)    Body mass index is 27.69 kg/m².     Input and Output Summary (last 24 hours):     Intake/Output Summary (Last 24 hours) at 11/26/2024 0830  Last data filed at 11/26/2024 0631  Gross per 24 hour   Intake 620 ml   Output 1551 ml   Net -931 ml       Physical Exam  Vitals and nursing note reviewed.   Constitutional:       Appearance: He is ill-appearing. He is not diaphoretic.   HENT:      Mouth/Throat:      Pharynx: No oropharyngeal exudate or posterior oropharyngeal erythema.   Eyes:      General: No scleral icterus.        Left eye: No discharge.      Extraocular Movements: Extraocular movements intact.      Conjunctiva/sclera: Conjunctivae normal.       Pupils: Pupils are equal, round, and reactive to light.   Cardiovascular:      Rate and Rhythm: Normal rate.      Heart sounds:      No friction rub. No gallop.   Pulmonary:      Effort: Pulmonary effort is normal. No respiratory distress.      Breath sounds: No stridor. No wheezing or rhonchi.   Abdominal:      General: There is no distension.      Palpations: There is no mass.      Tenderness: There is no abdominal tenderness.      Hernia: No hernia is present.      Comments: Peritoneal dialysis catheter in place.   Musculoskeletal:      Right lower leg: No edema.      Left lower leg: No edema.   Neurological:      Mental Status: He is alert and oriented to person, place, and time.      Cranial Nerves: No cranial nerve deficit.      Sensory: No sensory deficit.      Motor: No weakness.      Coordination: Coordination normal.           Lines/Drains:  Lines/Drains/Airways       Active Status       Name Placement date Placement time Site Days    HD Permanent Double Catheter 10/14/24  1420  Subclavian  42                            Lab Results: I have reviewed the following results:   Results from last 7 days   Lab Units 11/25/24  0958   WBC Thousand/uL 3.62*   HEMOGLOBIN g/dL 9.5*   HEMATOCRIT % 29.9*   PLATELETS Thousands/uL 108*   SEGS PCT % 87*   LYMPHO PCT % 9*   MONO PCT % 3*   EOS PCT % 0     Results from last 7 days   Lab Units 11/25/24  0958   SODIUM mmol/L 134*   POTASSIUM mmol/L 3.9   CHLORIDE mmol/L 97   CO2 mmol/L 29   BUN mg/dL 32*   CREATININE mg/dL 2.65*   ANION GAP mmol/L 8   CALCIUM mg/dL 9.7   ALBUMIN g/dL 3.0*   TOTAL BILIRUBIN mg/dL 0.40   ALK PHOS U/L 55   ALT U/L 8   AST U/L 17   GLUCOSE RANDOM mg/dL 108     Results from last 7 days   Lab Units 11/25/24  0958   INR  4.41*     Results from last 7 days   Lab Units 11/26/24  0740 11/25/24  2106 11/25/24  1642 11/25/24  1116 11/25/24  0711 11/24/24  2114 11/24/24  1617 11/24/24  1145 11/24/24  0709 11/23/24  2104 11/23/24  1639 11/23/24  1220   POC  GLUCOSE mg/dl 103 75 73 97 110 76 82 103 111 73 83 94         Results from last 7 days   Lab Units 11/24/24  0504 11/21/24  0502 11/20/24 2148 11/20/24 2133   LACTIC ACID mmol/L  --   --   --  0.8   PROCALCITONIN ng/ml 0.35* 0.87* 0.84*  --        Recent Cultures (last 7 days):   Results from last 7 days   Lab Units 11/22/24 2115 11/20/24 2133   BLOOD CULTURE   --  No Growth After 4 Days.  No Growth After 4 Days.   C DIFF TOXIN B BY PCR  Positive*  --        Imaging Results Review: No pertinent imaging studies reviewed.  Other Study Results Review: No additional pertinent studies reviewed.    Last 24 Hours Medication List:     Current Facility-Administered Medications:     acetaminophen (TYLENOL) tablet 650 mg, Q4H PRN    atorvastatin (LIPITOR) tablet 40 mg, Daily With Dinner    calcium carbonate (TUMS) chewable tablet 500 mg, Daily PRN    cinacalcet (SENSIPAR) tablet 60 mg, Once per day on Monday Wednesday Friday    dexamethasone (PF) (DECADRON) injection 6 mg, Q24H    docusate sodium (COLACE) capsule 100 mg, BID PRN    doxycycline hyclate (VIBRAMYCIN) capsule 100 mg, Q12H VASU    finasteride (PROSCAR) tablet 5 mg, Daily    gentamicin (GARAMYCIN) 0.1 % cream, Daily    HYDROcodone-acetaminophen (NORCO) 5-325 mg per tablet 2 tablet, Q6H PRN    insulin lispro (HumALOG/ADMELOG) 100 units/mL subcutaneous injection 1-5 Units, HS    insulin lispro (HumALOG/ADMELOG) 100 units/mL subcutaneous injection 1-6 Units, TID AC **AND** Fingerstick Glucose (POCT), TID AC    levothyroxine tablet 125 mcg, Early Morning    metoprolol tartrate (LOPRESSOR) tablet 25 mg, Q12H VASU    midodrine (PROAMATINE) tablet 15 mg, TID AC    nicotine (NICODERM CQ) 7 mg/24hr TD 24 hr patch 7 mg, Daily    ondansetron (ZOFRAN) injection 4 mg, Q6H PRN    potassium chloride (Klor-Con M20) CR tablet 20 mEq, Daily    pyridostigmine (MESTINON) tablet 60 mg, TID    sevelamer (RENAGEL) tablet 800 mg, TID With Meals    simethicone (MYLICON) chewable tablet  80 mg, 4x Daily PRN    vancomycin (VANCOCIN) capsule 125 mg, Q6H VASU    [Held by provider] warfarin (COUMADIN) tablet 1 mg, HS    Administrative Statements   Today, Patient Was Seen By: Moses Noel MD      **Please Note: This note may have been constructed using a voice recognition system.**

## 2024-11-26 NOTE — ASSESSMENT & PLAN NOTE
Patient is having significant diarrhea, initially do suspect related to COVID.  Stool C. difficile panel showing positive for C. difficile PCR, Steffany negative  Since patient is having significant diarrhea, start treating dose of vancomycin for 10 days.  CT chest/abdome/pelvis without contrast:1.  No identifiable acute abnormality to account for the patient's clinical presentation.   2.  Mild diffuse anasarca, improved since the prior CT.   3.  Bilateral nonobstructing renal and urinary bladder calculi.     Per nephrology, patient placed on IV hydration

## 2024-11-26 NOTE — ASSESSMENT & PLAN NOTE
BP is controlled.  Rx: Midodrine 15 mg TID. Metoprolol 25 mg BID.   Torsemide 60 mg BID stopped on 11/25/24 due to volume depletion.   Clinically appears hypovolemic. Start IVF.

## 2024-11-26 NOTE — OCCUPATIONAL THERAPY NOTE
Occupational Therapy Treatment     Patient Name: Masoud Perry  Today's Date: 11/26/2024  Problem List  Principal Problem:    Acute respiratory insufficiency  Active Problems:    Acute on chronic diastolic HF (heart failure) (HCC)    HLD (hyperlipidemia)    Acquired hypothyroidism    Pancytopenia (HCC)    Factor 5 Leiden mutation, heterozygous (HCC)    Type 2 diabetes mellitus with stage 4 chronic kidney disease, with long-term current use of insulin (HCC)    Secondary hyperparathyroidism of renal origin (HCC)    Paroxysmal atrial fibrillation (HCC)    Hemodialysis-associated hypotension    Personal history of gout    COVID-19    Elevated troponin    ESRD (end stage renal disease) on dialysis (HCC)    Anemia due to chronic kidney disease, on chronic dialysis (HCC)    Electrolyte imbalance risk    Diarrhea of infectious origin    Past Medical History  Past Medical History:   Diagnosis Date    Anemia in chronic kidney disease     Anticoagulated on Coumadin     Atrial fibrillation (HCC)     BPH (benign prostatic hyperplasia)     CAD (coronary artery disease)     CKD (chronic kidney disease), stage V (HCC)     Compartment syndrome of forearm (HCC)     Right, 2019    COPD (chronic obstructive pulmonary disease) (HCC)     CVA (cerebral vascular accident) (HCC)     Diastolic CHF (HCC)     grade 1 DD    DVT (deep venous thrombosis) (HCC)     Factor V deficiency (HCC)     Gout     Hyperlipidemia     Hypertension     Hypothyroidism     MI (myocardial infarction) (HCC)     x3 - 1999, 2010, after 2015    Morbid obesity (HCC)     Nephrolithiasis     Noncompliance with medications     SHAD (obstructive sleep apnea)     Peritoneal dialysis catheter in place (HCC)     Pulmonary embolism (HCC)     Secondary hyperparathyroidism of renal origin (HCC)     Spinal stenosis of lumbar region     Status post placement of implantable loop recorder     Tobacco dependence      Past Surgical History  Past Surgical History:   Procedure  Laterality Date    CARDIAC ELECTROPHYSIOLOGY STUDY AND ABLATION  04/29/2024    CHOLECYSTECTOMY      CORONARY ANGIOPLASTY WITH STENT PLACEMENT      JOINT REPLACEMENT      bilateral knee    FL CYSTO/URETERO W/LITHOTRIPSY &INDWELL STENT INSRT Left 09/24/2022    Procedure: CYSTOSCOPY URETEROSCOPY WITH LITHOTRIPSY HOLMIUM LASER, AND INSERTION STENT URETERAL;  Surgeon: Lewis Dahl MD;  Location: [facility];  Service: Urology    FL LAPS INSERTION TUNNELED INTRAPERITONEAL CATHETER N/A 6/7/2024    Procedure: INSERTION PERITONEAL CATHETER DIALYSIS LAPAROSCOPIC;  Surgeon: Hiram Park DO;  Location: [facility];  Service: General    US GUIDED KIDNEY BIOPSY  08/05/2020 11/26/24 0950   OT Last Visit   OT Visit Date 11/26/24   Note Type   Note Type Treatment   Pain Assessment   Pain Assessment Tool 0-10   Pain Score 10 - Worst Possible Pain   Pain Location/Orientation Location: Back;Location: Groin;Location: Buttocks   Pain Onset/Description Onset: Ongoing;Descriptor: Aching;Descriptor: Discomfort   Restrictions/Precautions   Weight Bearing Precautions Per Order No   Other Precautions Contact/isolation;Airborne/isolation;Multiple lines;Fall Risk;Pain;Limb alert;Chair Alarm;Bed Alarm   ADL   Toileting Assistance  2  Maximal Assistance   Toileting Deficit Setup;Verbal cueing;Supervison/safety;Increased time to complete;Use of bedpan/urinal setup;Perineal hygiene;Steadying   Toileting Comments Increased time required due to incontinent of Bms in order to ensure proper hygiene.   Bed Mobility   Rolling R 3  Moderate assistance   Additional items Assist x 1;Increased time required;Bedrails;Verbal cues;LE management   Rolling L 3  Moderate assistance   Additional items Assist x 1;Bedrails;Increased time required;Verbal cues;LE management   Supine to Sit 3  Moderate assistance   Additional items Assist x 2;Increased time required;Verbal cues;LE management   Sit to Supine 3  Moderate assistance   Additional items Assist x  "2;Increased time required;Verbal cues;LE management;Bedrails   Additional Comments Pt sat at EOB for 20 minutes to complete functional tasks including taking medications and managing tray items, and beverage management w/ intermittent unilateral UE support. Pt reports feeling happy w/ sitting up and change of position.   Transfers   Sit to Stand Unable to assess   Additional Comments Deferred standing and limited by incontinent of BMs   Cognition   Overall Cognitive Status WFL   Arousal/Participation Responsive   Attention Within functional limits   Orientation Level Oriented X4   Memory Within functional limits   Following Commands Follows one step commands without difficulty   Comments Pt pleasant, visibly lethargic, requires max encouragement and positive reinforcement. Pt refusing to eat but agreeable to try cranberry juice \"later.\"    Activity Tolerance   Activity Tolerance Patient limited by fatigue   Medical Staff Made Aware Nursing present in room   Assessment   Assessment Pt seen for OT treatment session this date with interventions consisting of ADL and bed mobility training to improve safety and independence w/ ADL participation. Pt agreeable to OT treatment session upon arrival, pt supine in bed. Barriers during this session include pain, weakness, lethargy. Pt continues to be functioning below his baseline level, and remains limited 2* factors listed above and including weakness, decreased endurance, pain, decreased activity tolerance, and decreased strength. Pt prognosis for achieving goals is good/fair, pending pt progress with medical status and indicated by goal to return home after going to rehab, and ability to follow verbal cues. OT will continue to see pt during current hospitalization in order to address the deficits listed above and provide interventions consistent w/ POC in effort to achieve goals. Pt safely in bed at remainder of session. Based on pt's presentations and impairments, pt to " benefit from moderate resource intensity.    Plan   Treatment Interventions ADL retraining;Functional transfer training;Endurance training;UE strengthening/ROM;Continued evaluation;Energy conservation;Activityengagement;Equipment evaluation/education   Goal Expiration Date 12/06/24   OT Treatment Day 1   OT Frequency 2-3x/wk   Discharge Recommendation   Rehab Resource Intensity Level, OT II (Moderate Resource Intensity)   AM-PAC Daily Activity Inpatient   Lower Body Dressing 2   Bathing 2   Toileting 2   Upper Body Dressing 3   Grooming 3   Eating 3   Daily Activity Raw Score 15   Daily Activity Standardized Score (Calc for Raw Score >=11) 34.69   AM-PAC Applied Cognition Inpatient   Following a Speech/Presentation 4   Understanding Ordinary Conversation 4   Taking Medications 3   Remembering Where Things Are Placed or Put Away 3   Remembering List of 4-5 Errands 3   Taking Care of Complicated Tasks 4   Applied Cognition Raw Score 21   Applied Cognition Standardized Score 44.3   End of Consult   Education Provided Yes   Patient Position at End of Consult Supine     Nesha Castañeda MS, OTR/L

## 2024-11-26 NOTE — ASSESSMENT & PLAN NOTE
Lab Results   Component Value Date    EGFR 23 11/25/2024    EGFR 30 11/24/2024    EGFR 23 11/23/2024    CREATININE 2.65 (H) 11/25/2024    CREATININE 2.11 (H) 11/24/2024    CREATININE 2.63 (H) 11/23/2024   Continue to monitor while remain on anticoagulation with Coumadin

## 2024-11-26 NOTE — ASSESSMENT & PLAN NOTE
He was on PD but was changed to back up iHD during admission to [facility] in November 2024. Started iHD on 11/8/24.   Ramana DURON.   Access: R IJ permcath. PD catheter in place.   Electrolytes are stable.  Appears volume depleted.  Start Isolyte at 60 cc/h.  Next HD is tomorrow.

## 2024-11-26 NOTE — PLAN OF CARE
Problem: OCCUPATIONAL THERAPY ADULT  Goal: Performs self-care activities at highest level of function for planned discharge setting.  See evaluation for individualized goals.  Description: Treatment Interventions: ADL retraining, Visual perceptual retraining, Functional transfer training, Activityengagement, Energy conservation, Compensatory technique education, Patient/family training       See flowsheet documentation for full assessment, interventions and recommendations.   Outcome: Progressing  Note: Limitation: Decreased ADL status, Decreased UE ROM, Decreased UE strength, Decreased Safe judgement during ADL, Decreased endurance, Decreased self-care trans, Decreased high-level ADLs  Prognosis: Fair  Assessment: See note     Rehab Resource Intensity Level, OT: II (Moderate Resource Intensity)

## 2024-11-26 NOTE — PLAN OF CARE
Problem: Prexisting or High Potential for Compromised Skin Integrity  Goal: Skin integrity is maintained or improved  Description: INTERVENTIONS:  - Identify patients at risk for skin breakdown  - Assess and monitor skin integrity  - Assess and monitor nutrition and hydration status  - Monitor labs   - Assess for incontinence   - Turn and reposition patient  - Assist with mobility/ambulation  - Relieve pressure over bony prominences  - Avoid friction and shearing  - Provide appropriate hygiene as needed including keeping skin clean and dry  - Evaluate need for skin moisturizer/barrier cream  - Collaborate with interdisciplinary team   - Patient/family teaching  - Consider wound care consult   Outcome: Progressing     Problem: PAIN - ADULT  Goal: Verbalizes/displays adequate comfort level or baseline comfort level  Description: Interventions:  - Encourage patient to monitor pain and request assistance  - Assess pain using appropriate pain scale  - Administer analgesics based on type and severity of pain and evaluate response  - Implement non-pharmacological measures as appropriate and evaluate response  - Consider cultural and social influences on pain and pain management  - Notify physician/advanced practitioner if interventions unsuccessful or patient reports new pain  Outcome: Progressing     Problem: INFECTION - ADULT  Goal: Absence or prevention of progression during hospitalization  Description: INTERVENTIONS:  - Assess and monitor for signs and symptoms of infection  - Monitor lab/diagnostic results  - Monitor all insertion sites, i.e. indwelling lines, tubes, and drains  - Monitor endotracheal if appropriate and nasal secretions for changes in amount and color  - [place] appropriate cooling/warming therapies per order  - Administer medications as ordered  - Instruct and encourage patient and family to use good hand hygiene technique  - Identify and instruct in appropriate isolation precautions for  identified infection/condition  Outcome: Progressing  Goal: Absence of fever/infection during neutropenic period  Description: INTERVENTIONS:  - Monitor WBC    Outcome: Progressing     Problem: SAFETY ADULT  Goal: Patient will remain free of falls  Description: INTERVENTIONS:  - Educate patient/family on patient safety including physical limitations  - Instruct patient to call for assistance with activity   - Consult OT/PT to assist with strengthening/mobility   - Keep Call bell within reach  - Keep bed low and locked with side rails adjusted as appropriate  - Keep care items and personal belongings within reach  - Initiate and maintain comfort rounds  - Make Fall Risk Sign visible to staff  - Offer Toileting every 2 Hours, in advance of need  - Initiate/Maintain bed/chair alarm  - Obtain necessary fall risk management equipment  - Apply yellow socks and bracelet for high fall risk patients  - Consider moving patient to room near nurses station  Outcome: Progressing  Goal: Maintain or return to baseline ADL function  Description: INTERVENTIONS:  -  Assess patient's ability to carry out ADLs; assess patient's baseline for ADL function and identify physical deficits which impact ability to perform ADLs (bathing, care of mouth/teeth, toileting, grooming, dressing, etc.)  - Assess/evaluate cause of self-care deficits   - Assess range of motion  - Assess patient's mobility; develop plan if impaired  - Assess patient's need for assistive devices and provide as appropriate  - Encourage maximum independence but intervene and supervise when necessary  - Involve family in performance of ADLs  - Assess for home care needs following discharge   - Consider OT consult to assist with ADL evaluation and planning for discharge  - Provide patient education as appropriate  Outcome: Progressing  Goal: Maintains/Returns to pre admission functional level  Description: INTERVENTIONS:  - Perform AM-PAC 6 Click Basic Mobility/ Daily  Activity assessment daily.  - Set and communicate daily mobility goal to care team and patient/family/caregiver.   - Collaborate with rehabilitation services on mobility goals if consulted  - Perform Range of Motion 3 times a day.  - Reposition patient every 2 hours.  - Dangle patient 3 times a day  - Stand patient 3 times a day  - Ambulate patient 3 times a day  - Out of bed to chair 3 times a day   - Out of bed for meals 3 times a day  - Out of bed for toileting  - Record patient progress and toleration of activity level   Outcome: Progressing     Problem: DISCHARGE PLANNING  Goal: Discharge to home or other facility with appropriate resources  Description: INTERVENTIONS:  - Identify barriers to discharge w/patient and caregiver  - Arrange for needed discharge resources and transportation as appropriate  - Identify discharge learning needs (meds, wound care, etc.)  - Arrange for interpretive services to assist at discharge as needed  - Refer to Case Management Department for coordinating discharge planning if the patient needs post-hospital services based on physician/advanced practitioner order or complex needs related to functional status, cognitive ability, or social support system  Outcome: Progressing     Problem: Knowledge Deficit  Goal: Patient/family/caregiver demonstrates understanding of disease process, treatment plan, medications, and discharge instructions  Description: Complete learning assessment and assess knowledge base.  Interventions:  - Provide teaching at level of understanding  - Provide teaching via preferred learning methods  Outcome: Progressing     Problem: CARDIOVASCULAR - ADULT  Goal: Maintains optimal cardiac output and hemodynamic stability  Description: INTERVENTIONS:  - Monitor I/O, vital signs and rhythm  - Monitor for S/S and trends of decreased cardiac output  - Administer and titrate ordered vasoactive medications to optimize hemodynamic stability  - Assess quality of pulses,  skin color and temperature  - Assess for signs of decreased coronary artery perfusion  - Instruct patient to report change in severity of symptoms  Outcome: Progressing  Goal: Absence of cardiac dysrhythmias or at baseline rhythm  Description: INTERVENTIONS:  - Continuous cardiac monitoring, vital signs, obtain 12 lead EKG if ordered  - Administer antiarrhythmic and heart rate control medications as ordered  - Monitor electrolytes and administer replacement therapy as ordered  Outcome: Progressing     Problem: RESPIRATORY - ADULT  Goal: Achieves optimal ventilation and oxygenation  Description: INTERVENTIONS:  - Assess for changes in respiratory status  - Assess for changes in mentation and behavior  - Position to facilitate oxygenation and minimize respiratory effort  - Oxygen administered by appropriate delivery if ordered  - Initiate smoking cessation education as indicated  - Encourage broncho-pulmonary hygiene including cough, deep breathe, Incentive Spirometry  - Assess the need for suctioning and aspirate as needed  - Assess and instruct to report SOB or any respiratory difficulty  - Respiratory Therapy support as indicated  Outcome: Progressing     Problem: METABOLIC, FLUID AND ELECTROLYTES - ADULT  Goal: Electrolytes maintained within normal limits  Description: INTERVENTIONS:  - Monitor labs and assess patient for signs and symptoms of electrolyte imbalances  - Administer electrolyte replacement as ordered  - Monitor response to electrolyte replacements, including repeat lab results as appropriate  - Instruct patient on fluid and nutrition as appropriate  Outcome: Progressing  Goal: Fluid balance maintained  Description: INTERVENTIONS:  - Monitor labs   - Monitor I/O and WT  - Instruct patient on fluid and nutrition as appropriate  - Assess for signs & symptoms of volume excess or deficit  Outcome: Progressing  Goal: Glucose maintained within target range  Description: INTERVENTIONS:  - Monitor Blood  Glucose as ordered  - Assess for signs and symptoms of hyperglycemia and hypoglycemia  - Administer ordered medications to maintain glucose within target range  - Assess nutritional intake and initiate nutrition service referral as needed  Outcome: Progressing     Problem: Nutrition/Hydration-ADULT  Goal: Nutrient/Hydration intake appropriate for improving, restoring or maintaining nutritional needs  Description: Monitor and assess patient's nutrition/hydration status for malnutrition. Collaborate with interdisciplinary team and initiate plan and interventions as ordered.  Monitor patient's weight and dietary intake as ordered or per policy. Utilize nutrition screening tool and intervene as necessary. Determine patient's food preferences and provide high-protein, high-caloric foods as appropriate.     INTERVENTIONS:  - Monitor oral intake, urinary output, labs, and treatment plans  - Assess nutrition and hydration status and recommend course of action  - Evaluate amount of meals eaten  - Assist patient with eating if necessary   - Allow adequate time for meals  - Recommend/ encourage appropriate diets, oral nutritional supplements, and vitamin/mineral supplements  - Order, calculate, and assess calorie counts as needed  - Recommend, monitor, and adjust tube feedings and TPN/PPN based on assessed needs  - Assess need for intravenous fluids  - Provide specific nutrition/hydration education as appropriate  - Include patient/family/caregiver in decisions related to nutrition  Outcome: Progressing

## 2024-11-27 LAB
ANION GAP SERPL CALCULATED.3IONS-SCNC: 6 MMOL/L (ref 4–13)
BUN SERPL-MCNC: 30 MG/DL (ref 5–25)
CALCIUM SERPL-MCNC: 9.1 MG/DL (ref 8.4–10.2)
CHLORIDE SERPL-SCNC: 99 MMOL/L (ref 96–108)
CO2 SERPL-SCNC: 31 MMOL/L (ref 21–32)
CREAT SERPL-MCNC: 2.47 MG/DL (ref 0.6–1.3)
ERYTHROCYTE [DISTWIDTH] IN BLOOD BY AUTOMATED COUNT: 14.5 % (ref 11.6–15.1)
GFR SERPL CREATININE-BSD FRML MDRD: 25 ML/MIN/1.73SQ M
GLUCOSE SERPL-MCNC: 100 MG/DL (ref 65–140)
GLUCOSE SERPL-MCNC: 104 MG/DL (ref 65–140)
GLUCOSE SERPL-MCNC: 111 MG/DL (ref 65–140)
GLUCOSE SERPL-MCNC: 135 MG/DL (ref 65–140)
GLUCOSE SERPL-MCNC: 139 MG/DL (ref 65–140)
HCT VFR BLD AUTO: 33.2 % (ref 36.5–49.3)
HGB BLD-MCNC: 10.5 G/DL (ref 12–17)
INR PPP: 5.58 (ref 0.85–1.19)
MCH RBC QN AUTO: 31.5 PG (ref 26.8–34.3)
MCHC RBC AUTO-ENTMCNC: 31.6 G/DL (ref 31.4–37.4)
MCV RBC AUTO: 100 FL (ref 82–98)
PHOSPHATE SERPL-MCNC: 2.4 MG/DL (ref 2.3–4.1)
PLATELET # BLD AUTO: 114 THOUSANDS/UL (ref 149–390)
PMV BLD AUTO: 10.6 FL (ref 8.9–12.7)
POTASSIUM SERPL-SCNC: 4.2 MMOL/L (ref 3.5–5.3)
PROTHROMBIN TIME: 49.5 SECONDS (ref 12.3–15)
RBC # BLD AUTO: 3.33 MILLION/UL (ref 3.88–5.62)
SODIUM SERPL-SCNC: 136 MMOL/L (ref 135–147)
WBC # BLD AUTO: 5.93 THOUSAND/UL (ref 4.31–10.16)

## 2024-11-27 PROCEDURE — 84100 ASSAY OF PHOSPHORUS: CPT | Performed by: INTERNAL MEDICINE

## 2024-11-27 PROCEDURE — 99232 SBSQ HOSP IP/OBS MODERATE 35: CPT | Performed by: FAMILY MEDICINE

## 2024-11-27 PROCEDURE — 80048 BASIC METABOLIC PNL TOTAL CA: CPT | Performed by: INTERNAL MEDICINE

## 2024-11-27 PROCEDURE — 82948 REAGENT STRIP/BLOOD GLUCOSE: CPT

## 2024-11-27 PROCEDURE — 85027 COMPLETE CBC AUTOMATED: CPT | Performed by: INTERNAL MEDICINE

## 2024-11-27 PROCEDURE — 85610 PROTHROMBIN TIME: CPT | Performed by: FAMILY MEDICINE

## 2024-11-27 PROCEDURE — 99232 SBSQ HOSP IP/OBS MODERATE 35: CPT | Performed by: INTERNAL MEDICINE

## 2024-11-27 RX ADMIN — PYRIDOSTIGMINE BROMIDE 60 MG: 60 TABLET ORAL at 17:28

## 2024-11-27 RX ADMIN — DEXAMETHASONE SODIUM PHOSPHATE 6 MG: 10 INJECTION, SOLUTION INTRAMUSCULAR; INTRAVENOUS at 01:50

## 2024-11-27 RX ADMIN — METOPROLOL TARTRATE 25 MG: 25 TABLET, FILM COATED ORAL at 09:01

## 2024-11-27 RX ADMIN — HYDROCODONE BITARTRATE AND ACETAMINOPHEN 2 TABLET: 5; 325 TABLET ORAL at 05:18

## 2024-11-27 RX ADMIN — MIDODRINE HYDROCHLORIDE 15 MG: 5 TABLET ORAL at 09:00

## 2024-11-27 RX ADMIN — VANCOMYCIN HYDROCHLORIDE 125 MG: 125 CAPSULE ORAL at 12:38

## 2024-11-27 RX ADMIN — HYDROCODONE BITARTRATE AND ACETAMINOPHEN 2 TABLET: 5; 325 TABLET ORAL at 23:21

## 2024-11-27 RX ADMIN — PYRIDOSTIGMINE BROMIDE 60 MG: 60 TABLET ORAL at 09:00

## 2024-11-27 RX ADMIN — CINACALCET 60 MG: 30 TABLET, FILM COATED ORAL at 09:01

## 2024-11-27 RX ADMIN — ATORVASTATIN CALCIUM 40 MG: 40 TABLET, FILM COATED ORAL at 17:27

## 2024-11-27 RX ADMIN — GENTAMICIN SULFATE: 1 CREAM TOPICAL at 09:01

## 2024-11-27 RX ADMIN — FINASTERIDE 5 MG: 5 TABLET, FILM COATED ORAL at 09:00

## 2024-11-27 RX ADMIN — DEXTROSE AND SODIUM CHLORIDE 40 ML/HR: 5; .45 INJECTION, SOLUTION INTRAVENOUS at 17:25

## 2024-11-27 RX ADMIN — HYDROCODONE BITARTRATE AND ACETAMINOPHEN 2 TABLET: 5; 325 TABLET ORAL at 17:35

## 2024-11-27 RX ADMIN — VANCOMYCIN HYDROCHLORIDE 125 MG: 125 CAPSULE ORAL at 17:27

## 2024-11-27 RX ADMIN — DOXYCYCLINE 100 MG: 100 CAPSULE ORAL at 09:00

## 2024-11-27 NOTE — ASSESSMENT & PLAN NOTE
Presents from [place]. Tested positive 3 days ago when symptoms started  Started decadron and remdesevir, doxycycline  Patient already on Coumadin, INR is supratherapeutic, remain on hold  On clinical exam, patient is very fatigued and tired and sleepy but arousable and answer questions.  Completed treatment with doxycycline and remdesivir.  Patient also having diarrhea, and C. difficile showing carrier state, continue p.o. vancomycin  CT chest/abdomen/pelvis:1.  No identifiable acute abnormality to account for the patient's clinical presentation.   2.  Mild diffuse anasarca, improved since the prior CT.   3.  Bilateral nonobstructing renal and urinary bladder calculi.

## 2024-11-27 NOTE — SPEECH THERAPY NOTE
"Speech Language/Pathology  Attempted to see pt for dysphagia evaluation at lunch. Pt reclined in bed, lunch tray present. Pt declined all intake. Reported he's not hungry and his stomach can't handle food or drink right now and \"when I'm hungry I'll eat.\" Pt able to swallow pills whole, therefore suspect lack of intake not related to dysphagia. Will d/c order as pt unwilling to participate in assessment. Recommend to consider GI consult d/t lack of appetite vs psych.    Li Moore, MS CCC-SLP  11/27/2024    "

## 2024-11-27 NOTE — PROGRESS NOTES
Progress Note - Hospitalist   Name: Masoud Perry 69 y.o. male I MRN: [id number]  Unit/Bed#: [facility]-01 I Date of Admission: 11/20/2024   Date of Service: 11/27/2024 I Hospital Day: 7    Assessment & Plan  Acute respiratory insufficiency  Patient is currently 1 L of oxygen  Continue current treatment  Patient looks very tired, also having diarrhea.  Will continue current treatment.  CT chest/abdomen/pelvis:1.  No identifiable acute abnormality to account for the patient's clinical presentation.   2.  Mild diffuse anasarca, improved since the prior CT.   3.  Bilateral nonobstructing renal and urinary bladder calculi.   Acute on chronic diastolic HF (heart failure) (HCC)  Wt Readings from Last 3 Encounters:   11/27/24 93.3 kg (205 lb 11 oz)   10/14/24 115 kg (253 lb)   10/07/24 118 kg (260 lb)   CXR on 11/20 appeared to be pulmonary vascular congestion   Patient is a dialysis basis, received hemodialysis on TTS, last dialysis on 11/21/2024  Continue dialysis,  Dialysis on Monday.  COVID-19  Presents from [place]. Tested positive 3 days ago when symptoms started  Started decadron and remdesevir, doxycycline  Patient already on Coumadin, INR is supratherapeutic, remain on hold  On clinical exam, patient is very fatigued and tired and sleepy but arousable and answer questions.  Completed treatment with doxycycline and remdesivir.  Patient also having diarrhea, and C. difficile showing carrier state, continue p.o. vancomycin  CT chest/abdomen/pelvis:1.  No identifiable acute abnormality to account for the patient's clinical presentation.   2.  Mild diffuse anasarca, improved since the prior CT.   3.  Bilateral nonobstructing renal and urinary bladder calculi.   Diarrhea of infectious origin  Patient is having significant diarrhea, initially do suspect related to COVID.  Stool C. difficile panel showing positive for C. difficile PCR, Steffany negative  Since patient is having significant diarrhea, start treating dose of  vancomycin for 10 days.  CT chest/abdome/pelvis without contrast:1.  No identifiable acute abnormality to account for the patient's clinical presentation.   2.  Mild diffuse anasarca, improved since the prior CT.   3.  Bilateral nonobstructing renal and urinary bladder calculi.     Per nephrology, patient placed on IV hydration  Secondary hyperparathyroidism of renal origin (HCC)  Continue Cinacalcet on Mondays, Wednesdays, and Fridays  Hemodialysis-associated hypotension  Continue midodrine and pyridostigmine  HLD (hyperlipidemia)  Continue atorvastatin  Acquired hypothyroidism  Continue levothyroxine  Factor 5 Leiden mutation, heterozygous ([facility])  Coumadin remain supratherapeutic, remain on hold  Type 2 diabetes mellitus with stage 4 chronic kidney disease, with long-term current use of insulin ([facility])  Lab Results   Component Value Date    HGBA1C 5.0 11/02/2024       Recent Labs     11/26/24  1227 11/26/24  1638 11/26/24  2133 11/27/24  0740   POCGLU 179* 84 70 139       Blood Sugar Average: Last 72 hrs:  (P) [id number]  Sliding scale insulin. Hypoglycemia protocol.  Personal history of gout  Continue allopurinol  Elevated troponin  Trending  Paroxysmal atrial fibrillation ([facility])  Continue metoprolol if blood pressure tolerates  Continue Coumadin, maintain electrolytes-INR supratherapeutic, remain on hold-no active bleeding noted  ESRD (end stage renal disease) on dialysis ([facility])  Lab Results   Component Value Date    EGFR 25 11/27/2024    EGFR 28 11/26/2024    EGFR 23 11/25/2024    CREATININE 2.47 (H) 11/27/2024    CREATININE 2.22 (H) 11/26/2024    CREATININE 2.65 (H) 11/25/2024     Anemia due to chronic kidney disease, on chronic dialysis ([facility])  Lab Results   Component Value Date    EGFR 25 11/27/2024    EGFR 28 11/26/2024    EGFR 23 11/25/2024    CREATININE 2.47 (H) 11/27/2024    CREATININE 2.22 (H) 11/26/2024    CREATININE 2.65 (H) 11/25/2024   Continue to monitor while remain on anticoagulation with  Coumadin  Pancytopenia (HCC)  Multifactorial in origin, continue to monitor    VTE Pharmacologic Prophylaxis: VTE Score: 6  patient remain on supratherapeutic INR, Coumadin remain on hold    Mobility:   Basic Mobility Inpatient Raw Score: 9  JH-HLM Goal: 3: Sit at edge of bed  JH-HLM Achieved: 3: Sit at edge of bed  JH-HLM Goal achieved. Continue to encourage appropriate mobility.    Patient Centered Rounds: I performed bedside rounds with nursing staff today.   Discussions with Specialists or Other Care Team Provider: Nephrology    Education and Discussions with Family / Patient: Updated  (son and daughter) via phone.    Current Length of Stay: 7 day(s)  Current Patient Status: Inpatient   Certification Statement: The patient will continue to require additional inpatient hospital stay due to monitor above condition  Discharge Plan: Anticipate discharge in 48-72 hrs to rehab facility.    Code Status: Level 3 - DNAR and DNI    Subjective   Seen and evaluated during the rounding.  Still feeling tired and fatigued but slowly improving.  Still having diarrhea.    Objective :  Temp:  [97.2 °F (36.2 °C)-97.7 °F (36.5 °C)] 97.2 °F (36.2 °C)  HR:  [62-71] 71  BP: (125-162)/(71-83) 125/71  Resp:  [17-18] 17  SpO2:  [95 %-97 %] 95 %  O2 Device: None (Room air)    Body mass index is 27.9 kg/m².     Input and Output Summary (last 24 hours):     Intake/Output Summary (Last 24 hours) at 11/27/2024 0808  Last data filed at 11/26/2024 2025  Gross per 24 hour   Intake --   Output 200 ml   Net -200 ml       Physical Exam  Vitals and nursing note reviewed.   Constitutional:       Appearance: He is ill-appearing. He is not diaphoretic.   HENT:      Mouth/Throat:      Mouth: Mucous membranes are moist.      Pharynx: No oropharyngeal exudate or posterior oropharyngeal erythema.   Eyes:      General: No scleral icterus.        Left eye: No discharge.      Extraocular Movements: Extraocular movements intact.       Conjunctiva/sclera: Conjunctivae normal.      Pupils: Pupils are equal, round, and reactive to light.   Cardiovascular:      Rate and Rhythm: Normal rate.      Heart sounds: No murmur heard.     No friction rub. No gallop.   Pulmonary:      Effort: No respiratory distress.      Breath sounds: Rhonchi present. No wheezing or rales.   Chest:       Abdominal:      General: There is no distension.      Palpations: There is no mass.      Tenderness: There is no abdominal tenderness.      Hernia: No hernia is present.      Comments: Peritoneal dialysis catheter in place.   Musculoskeletal:      Right lower leg: No edema.      Left lower leg: No edema.   Neurological:      Mental Status: He is alert and oriented to person, place, and time.      Cranial Nerves: No cranial nerve deficit.      Sensory: No sensory deficit.      Motor: No weakness.      Coordination: Coordination normal.           Lines/Drains:  Lines/Drains/Airways       Active Status       Name Placement date Placement time Site Days    HD Permanent Double Catheter 10/14/24  1420  Subclavian  43                            Lab Results: I have reviewed the following results:   Results from last 7 days   Lab Units 11/27/24  0539 11/26/24  1008 11/25/24  0958   WBC Thousand/uL 5.93 6.77 3.62*   HEMOGLOBIN g/dL 10.5* 10.9* 9.5*   HEMATOCRIT % 33.2* 34.9* 29.9*   PLATELETS Thousands/uL 114* 155 108*   SEGS PCT %  --   --  87*   LYMPHO PCT %  --  9* 9*   MONO PCT %  --  1* 3*   EOS PCT %  --  0 0     Results from last 7 days   Lab Units 11/27/24  0539 11/26/24  1008   SODIUM mmol/L 136 135   POTASSIUM mmol/L 4.2 4.1   CHLORIDE mmol/L 99 97   CO2 mmol/L 31 31   BUN mg/dL 30* 25   CREATININE mg/dL 2.47* 2.22*   ANION GAP mmol/L 6 7   CALCIUM mg/dL 9.1 9.6   ALBUMIN g/dL  --  3.0*   TOTAL BILIRUBIN mg/dL  --  0.51   ALK PHOS U/L  --  60   ALT U/L  --  8   AST U/L  --  16   GLUCOSE RANDOM mg/dL 111 94     Results from last 7 days   Lab Units 11/27/24  0539   INR  5.58*      Results from last 7 days   Lab Units 11/27/24  0740 11/26/24  2133 11/26/24  1638 11/26/24  1227 11/26/24  0740 11/25/24  2106 11/25/24  1642 11/25/24  1116 11/25/24  0711 11/24/24  2114 11/24/24  1617 11/24/24  1145   POC GLUCOSE mg/dl 139 70 84 179* 103 75 73 97 110 76 82 103         Results from last 7 days   Lab Units 11/24/24  0504 11/21/24  0502 11/20/24  2148 11/20/24 2133   LACTIC ACID mmol/L  --   --   --  0.8   PROCALCITONIN ng/ml 0.35* 0.87* 0.84*  --        Recent Cultures (last 7 days):   Results from last 7 days   Lab Units 11/22/24 2115 11/20/24 2133   BLOOD CULTURE   --  No Growth After 5 Days.  No Growth After 5 Days.   C DIFF TOXIN B BY PCR  Positive*  --        Imaging Results Review: No pertinent imaging studies reviewed.  Other Study Results Review: No additional pertinent studies reviewed.    Last 24 Hours Medication List:     Current Facility-Administered Medications:     acetaminophen (TYLENOL) tablet 650 mg, Q4H PRN    atorvastatin (LIPITOR) tablet 40 mg, Daily With Dinner    calcium carbonate (TUMS) chewable tablet 500 mg, Daily PRN    cinacalcet (SENSIPAR) tablet 60 mg, Once per day on Monday Wednesday Friday    dexamethasone (PF) (DECADRON) injection 6 mg, Q24H    dextrose 5 % and sodium chloride 0.45 % infusion, Continuous, Last Rate: 60 mL/hr (11/26/24 2224)    docusate sodium (COLACE) capsule 100 mg, BID PRN    doxycycline hyclate (VIBRAMYCIN) capsule 100 mg, Q12H VASU    finasteride (PROSCAR) tablet 5 mg, Daily    gentamicin (GARAMYCIN) 0.1 % cream, Daily    HYDROcodone-acetaminophen (NORCO) 5-325 mg per tablet 2 tablet, Q6H PRN    insulin lispro (HumALOG/ADMELOG) 100 units/mL subcutaneous injection 1-5 Units, HS    insulin lispro (HumALOG/ADMELOG) 100 units/mL subcutaneous injection 1-6 Units, TID AC **AND** Fingerstick Glucose (POCT), TID AC    levothyroxine tablet 125 mcg, Early Morning    metoprolol tartrate (LOPRESSOR) tablet 25 mg, Q12H VASU    midodrine (PROAMATINE)  tablet 15 mg, TID AC    nicotine (NICODERM CQ) 7 mg/24hr TD 24 hr patch 7 mg, Daily    ondansetron (ZOFRAN) injection 4 mg, Q6H PRN    pyridostigmine (MESTINON) tablet 60 mg, TID    sevelamer (RENAGEL) tablet 800 mg, TID With Meals    simethicone (MYLICON) chewable tablet 80 mg, 4x Daily PRN    vancomycin (VANCOCIN) capsule 125 mg, Q6H VASU    [Held by provider] warfarin (COUMADIN) tablet 1 mg, HS    Administrative Statements   Today, Patient Was Seen By: Moses Noel MD      **Please Note: This note may have been constructed using a voice recognition system.**

## 2024-11-27 NOTE — ASSESSMENT & PLAN NOTE
Continue metoprolol if blood pressure tolerates  Continue Coumadin, maintain electrolytes-INR supratherapeutic, remain on hold-no active bleeding noted

## 2024-11-27 NOTE — ASSESSMENT & PLAN NOTE
Lab Results   Component Value Date    EGFR 25 11/27/2024    EGFR 28 11/26/2024    EGFR 23 11/25/2024    CREATININE 2.47 (H) 11/27/2024    CREATININE 2.22 (H) 11/26/2024    CREATININE 2.65 (H) 11/25/2024   Continue to monitor while remain on anticoagulation with Coumadin

## 2024-11-27 NOTE — PROGRESS NOTES
NEPHROLOGY HOSPITAL PROGRESS NOTE   Masoud Goregh 69 y.o. male MRN: [id number]  Unit/Bed#: -01 Encounter: [id number]  Reason for Consult: ESRD on HD    Brief History of Admission - 70 yo male with HTN, DM, ESRD on HD, CHF, atrial fibrillation, factor V leiden, CVA now presenting to [facility] on 11/20/24 with shortness of breath and was diagnosed with COVID-19 infection.  Nephrology consulted for assistance with management of ESRD on HD.    Assessment & Plan  ESRD (end stage renal disease) on dialysis ([facility])  He was on PD but was changed to back up iHD during admission to [facility] in November 2024. Started iHD on 11/8/24.   Ramana DURON.   Access: R IJ permcath. PD catheter in place.   Electrolytes are stable today.  He continues to appear hypovolemic.    Continue IVF but decrease rate to 40 cc/h.  Due to issues with logistics, unable to do HD today.   Next HD is planned for Friday.     Hemodialysis-associated hypotension  BP is controlled.  Rx: Midodrine 15 mg TID. Metoprolol 25 mg BID.   Torsemide 60 mg BID stopped on 11/25/24 due to volume depletion.   Clinically appears hypovolemic.   Continue IVF.     Anemia due to chronic kidney disease, on chronic dialysis ([facility])  Hemoglobin is at goal.   Epogen being held in setting of COVID-19 infection.    Secondary hyperparathyroidism of renal origin ([facility])  Continue Sensipar 30 mg 3/week.   Ca is stable.     Electrolyte imbalance risk  Hyponatremia  Stable Na at 136 today.     Hyperphosphatemia  Continue sevelamer 800 mg TID.   Phos is at goal.     Acute on chronic diastolic HF (heart failure) ([facility])  7/29/24 Echo: EF 75%, G1DD  Examines volume depleted.   Continue IVF.     COVID-19  Treatment of COVID-19 per primary team    Paroxysmal atrial fibrillation (HCC)  Currently on Metoprolol 25 mg BID.     Diarrhea of infectious origin  Continue oral vancomycin for C. difficile colitis.  Still with diarrhea.       TODAY's DISCUSSION / PLAN:  Unable to do HD today due to logistics.    Fortunately, no urgent need for dialysis today.  He continues to examine hypovolemic, continue IVF but decrease rate to 40 cc/h.  Next HD is planned for Friday. 11/29/24    SUBJECTIVE / 24H INTERVAL HISTORY:  Nausea and vomiting is better.  Diarrhea is not better.  No CP or SOB.     OBJECTIVE:  Current Weight: Weight - Scale: 93.3 kg (205 lb 11 oz)  Vitals:    11/26/24 2315 11/27/24 0539 11/27/24 0700 11/27/24 0811   BP: 138/82  125/71    BP Location:       Pulse: 67  71    Resp: 17      Temp:   (!) 97.2 °F (36.2 °C)    TempSrc:       SpO2: 95%   99%   Weight:  93.3 kg (205 lb 11 oz)     Height:           Intake/Output Summary (Last 24 hours) at 11/27/2024 1059  Last data filed at 11/26/2024 2025  Gross per 24 hour   Intake --   Output 200 ml   Net -200 ml     General: conscious, cooperative, no distress  Skin: dry  Eyes: pink conjunctivae  ENT: dry mucous membranes  Respiratory: equal chest expansion, clear breath sounds.  Cardiovascular: distinct heart sounds, normal rate, regular rhythm, no rub  Abdomen: soft, non tender, non distended, normal bowel sounds, PD cath in place.   Extremities: trace LE edema with wrinkled skin. Brawny changes.   Genitourinary: no goldberg catheter.   Neuro: awake, alert.   Psych: appropriate affect    Medications:    Current Facility-Administered Medications:     acetaminophen (TYLENOL) tablet 650 mg, 650 mg, Oral, Q4H PRN, Chao Rios PA-C    atorvastatin (LIPITOR) tablet 40 mg, 40 mg, Oral, Daily With Dinner, Chao Rios PA-C, 40 mg at 11/26/24 1743    calcium carbonate (TUMS) chewable tablet 500 mg, 500 mg, Oral, Daily PRN, Moses Noel MD    cinacalcet (SENSIPAR) tablet 60 mg, 60 mg, Oral, Once per day on Monday Wednesday Friday, Jignesh Hussein MD, 60 mg at 11/27/24 0901    dexamethasone (PF) (DECADRON) injection 6 mg, 6 mg, Intravenous, Q24H, Chao Rios PA-C, 6 mg at 11/27/24 0150    dextrose 5 % and sodium chloride 0.45 % infusion, 60 mL/hr, Intravenous,  Continuous, Magdiel Holguin MD, Last Rate: 60 mL/hr at 11/26/24 2224, 60 mL/hr at 11/26/24 2224    docusate sodium (COLACE) capsule 100 mg, 100 mg, Oral, BID PRN, Chao Rios PA-C    doxycycline hyclate (VIBRAMYCIN) capsule 100 mg, 100 mg, Oral, Q12H [facility], Moses Noel MD, 100 mg at 11/27/24 0900    finasteride (PROSCAR) tablet 5 mg, 5 mg, Oral, Daily, Chao Rios PA-C, 5 mg at 11/27/24 0900    gentamicin (GARAMYCIN) 0.1 % cream, , Topical, Daily, Jesus Christine MD, Given at 11/27/24 0901    HYDROcodone-acetaminophen (NORCO) 5-325 mg per tablet 2 tablet, 2 tablet, Oral, Q6H PRN, Chao Rios PA-C, 2 tablet at 11/27/24 0518    insulin lispro (HumALOG/ADMELOG) 100 units/mL subcutaneous injection 1-5 Units, 1-5 Units, Subcutaneous, HS, Chao Rios PA-C    insulin lispro (HumALOG/ADMELOG) 100 units/mL subcutaneous injection 1-6 Units, 1-6 Units, Subcutaneous, TID AC, 1 Units at 11/26/24 1236 **AND** Fingerstick Glucose (POCT), , , TID AC, Chao Rios PA-C    levothyroxine tablet 125 mcg, 125 mcg, Oral, Early Morning, Chao Rios PA-C, 125 mcg at 11/26/24 0635    metoprolol tartrate (LOPRESSOR) tablet 25 mg, 25 mg, Oral, Q12H [facility], Moses Noel MD, 25 mg at 11/27/24 0901    midodrine (PROAMATINE) tablet 15 mg, 15 mg, Oral, TID AC, Chao Rios PA-C, 15 mg at 11/27/24 0900    nicotine (NICODERM CQ) 7 mg/24hr TD 24 hr patch 7 mg, 7 mg, Transdermal, Daily, Chao Rios PA-C    ondansetron (ZOFRAN) injection 4 mg, 4 mg, Intravenous, Q6H PRN, Chao Rios PA-C, 4 mg at 11/25/24 1839    pyridostigmine (MESTINON) tablet 60 mg, 60 mg, Oral, TID, Chao Rios PA-C, 60 mg at 11/27/24 0900    sevelamer (RENAGEL) tablet 800 mg, 800 mg, Oral, TID With Meals, Jignesh Hussein MD, 800 mg at 11/25/24 1330    simethicone (MYLICON) chewable tablet 80 mg, 80 mg, Oral, 4x Daily PRN, Chao Rios PA-C    vancomycin (VANCOCIN) capsule 125 mg, 125 mg, Oral, Q6H VASU, Moses Noel MD, 125  "mg at 11/26/24 1742    [Held by provider] warfarin (COUMADIN) tablet 1 mg, 1 mg, Oral, HS, Chao Rios PA-C, 1 mg at 11/23/24 7865    Laboratory Results:  Results from last 7 days   Lab Units 11/27/24  0539 11/26/24  1008 11/25/24  0958 11/24/24  0504 11/23/24  0828 11/22/24  0625 11/21/24  0502 11/20/24  2148   WBC Thousand/uL 5.93 6.77 3.62* 5.21 4.25* 6.42 6.27  --    HEMOGLOBIN g/dL 10.5* 10.9* 9.5* 10.3* 9.8* 9.3* 10.0*  --    HEMATOCRIT % 33.2* 34.9* 29.9* 33.5* 30.7* 30.2* 32.9*  --    PLATELETS Thousands/uL 114* 155 108* 106* 118* 90* 80*  --    POTASSIUM mmol/L 4.2 4.1 3.9 3.7 3.9 4.2 3.7 3.2*   CHLORIDE mmol/L 99 97 97 96 97 96 100 99   CO2 mmol/L 31 31 29 28 28 28 25 28   BUN mg/dL 30* 25 32* 20 27* 17 19 16   CREATININE mg/dL 2.47* 2.22* 2.65* 2.11* 2.63* 2.24* 3.05* 2.90*   CALCIUM mg/dL 9.1 9.6 9.7 9.1 8.8 8.9 9.0 8.9   MAGNESIUM mg/dL  --   --   --   --   --  1.6*  --  1.6*   PHOSPHORUS mg/dL 2.4  --   --   --   --  2.9  --  3.7     Portions of the record may have been created with voice recognition software. Occasional wrong word or \"sound a like\" substitutions may have occurred due to the inherent limitations of voice recognition software. Read the chart carefully and recognize, using context, where substitutions have occurred.If you have any questions, please contact the dictating provider.    "

## 2024-11-27 NOTE — ASSESSMENT & PLAN NOTE
BP is controlled.  Rx: Midodrine 15 mg TID. Metoprolol 25 mg BID.   Torsemide 60 mg BID stopped on 11/25/24 due to volume depletion.   Clinically appears hypovolemic.   Continue IVF.

## 2024-11-27 NOTE — ASSESSMENT & PLAN NOTE
Wt Readings from Last 3 Encounters:   11/27/24 93.3 kg (205 lb 11 oz)   10/14/24 115 kg (253 lb)   10/07/24 118 kg (260 lb)   CXR on 11/20 appeared to be pulmonary vascular congestion   Patient is a dialysis basis, received hemodialysis on TTS, last dialysis on 11/21/2024  Continue dialysis,  Dialysis on Monday.

## 2024-11-27 NOTE — ASSESSMENT & PLAN NOTE
Lab Results   Component Value Date    HGBA1C 5.0 11/02/2024       Recent Labs     11/26/24  1227 11/26/24  1638 11/26/24  2133 11/27/24  0740   POCGLU 179* 84 70 139       Blood Sugar Average: Last 72 hrs:  (P) [id number]  Sliding scale insulin. Hypoglycemia protocol.

## 2024-11-27 NOTE — ASSESSMENT & PLAN NOTE
Patient is currently 1 L of oxygen  Continue current treatment  Patient looks very tired, also having diarrhea.  Will continue current treatment.  CT chest/abdomen/pelvis:1.  No identifiable acute abnormality to account for the patient's clinical presentation.   2.  Mild diffuse anasarca, improved since the prior CT.   3.  Bilateral nonobstructing renal and urinary bladder calculi.

## 2024-11-27 NOTE — PLAN OF CARE
Problem: PAIN - ADULT  Goal: Verbalizes/displays adequate comfort level or baseline comfort level  Description: Interventions:  - Encourage patient to monitor pain and request assistance  - Assess pain using appropriate pain scale  - Administer analgesics based on type and severity of pain and evaluate response  - Implement non-pharmacological measures as appropriate and evaluate response  - Consider cultural and social influences on pain and pain management  - Notify physician/advanced practitioner if interventions unsuccessful or patient reports new pain  Outcome: Progressing     Problem: Prexisting or High Potential for Compromised Skin Integrity  Goal: Skin integrity is maintained or improved  Description: INTERVENTIONS:  - Identify patients at risk for skin breakdown  - Assess and monitor skin integrity  - Assess and monitor nutrition and hydration status  - Monitor labs   - Assess for incontinence   - Turn and reposition patient  - Assist with mobility/ambulation  - Relieve pressure over bony prominences  - Avoid friction and shearing  - Provide appropriate hygiene as needed including keeping skin clean and dry  - Evaluate need for skin moisturizer/barrier cream  - Collaborate with interdisciplinary team   - Patient/family teaching  - Consider wound care consult   Outcome: Progressing     Problem: INFECTION - ADULT  Goal: Absence or prevention of progression during hospitalization  Description: INTERVENTIONS:  - Assess and monitor for signs and symptoms of infection  - Monitor lab/diagnostic results  - Monitor all insertion sites, i.e. indwelling lines, tubes, and drains  - Monitor endotracheal if appropriate and nasal secretions for changes in amount and color  - [place] appropriate cooling/warming therapies per order  - Administer medications as ordered  - Instruct and encourage patient and family to use good hand hygiene technique  - Identify and instruct in appropriate isolation precautions for  identified infection/condition  Outcome: Progressing  Goal: Absence of fever/infection during neutropenic period  Description: INTERVENTIONS:  - Monitor WBC    Outcome: Progressing     Problem: SAFETY ADULT  Goal: Patient will remain free of falls  Description: INTERVENTIONS:  - Educate patient/family on patient safety including physical limitations  - Instruct patient to call for assistance with activity   - Consult OT/PT to assist with strengthening/mobility   - Keep Call bell within reach  - Keep bed low and locked with side rails adjusted as appropriate  - Keep care items and personal belongings within reach  - Initiate and maintain comfort rounds  - Make Fall Risk Sign visible to staff  - Offer Toileting every 2 Hours, in advance of need  - Initiate/Maintain bed/chair alarm  - Obtain necessary fall risk management equipment  - Apply yellow socks and bracelet for high fall risk patients  - Consider moving patient to room near nurses station  Outcome: Progressing  Goal: Maintain or return to baseline ADL function  Description: INTERVENTIONS:  -  Assess patient's ability to carry out ADLs; assess patient's baseline for ADL function and identify physical deficits which impact ability to perform ADLs (bathing, care of mouth/teeth, toileting, grooming, dressing, etc.)  - Assess/evaluate cause of self-care deficits   - Assess range of motion  - Assess patient's mobility; develop plan if impaired  - Assess patient's need for assistive devices and provide as appropriate  - Encourage maximum independence but intervene and supervise when necessary  - Involve family in performance of ADLs  - Assess for home care needs following discharge   - Consider OT consult to assist with ADL evaluation and planning for discharge  - Provide patient education as appropriate  Outcome: Progressing  Goal: Maintains/Returns to pre admission functional level  Description: INTERVENTIONS:  - Perform AM-PAC 6 Click Basic Mobility/ Daily  Activity assessment daily.  - Set and communicate daily mobility goal to care team and patient/family/caregiver.   - Collaborate with rehabilitation services on mobility goals if consulted  - Perform Range of Motion 3 times a day.  - Reposition patient every 2 hours.  - Dangle patient 3 times a day  - Stand patient 3 times a day  - Ambulate patient 3 times a day  - Out of bed to chair 3 times a day   - Out of bed for meals 3 times a day  - Out of bed for toileting  - Record patient progress and toleration of activity level   Outcome: Progressing     Problem: DISCHARGE PLANNING  Goal: Discharge to home or other facility with appropriate resources  Description: INTERVENTIONS:  - Identify barriers to discharge w/patient and caregiver  - Arrange for needed discharge resources and transportation as appropriate  - Identify discharge learning needs (meds, wound care, etc.)  - Arrange for interpretive services to assist at discharge as needed  - Refer to Case Management Department for coordinating discharge planning if the patient needs post-hospital services based on physician/advanced practitioner order or complex needs related to functional status, cognitive ability, or social support system  Outcome: Progressing     Problem: Knowledge Deficit  Goal: Patient/family/caregiver demonstrates understanding of disease process, treatment plan, medications, and discharge instructions  Description: Complete learning assessment and assess knowledge base.  Interventions:  - Provide teaching at level of understanding  - Provide teaching via preferred learning methods  Outcome: Progressing     Problem: CARDIOVASCULAR - ADULT  Goal: Maintains optimal cardiac output and hemodynamic stability  Description: INTERVENTIONS:  - Monitor I/O, vital signs and rhythm  - Monitor for S/S and trends of decreased cardiac output  - Administer and titrate ordered vasoactive medications to optimize hemodynamic stability  - Assess quality of pulses,  skin color and temperature  - Assess for signs of decreased coronary artery perfusion  - Instruct patient to report change in severity of symptoms  Outcome: Progressing  Goal: Absence of cardiac dysrhythmias or at baseline rhythm  Description: INTERVENTIONS:  - Continuous cardiac monitoring, vital signs, obtain 12 lead EKG if ordered  - Administer antiarrhythmic and heart rate control medications as ordered  - Monitor electrolytes and administer replacement therapy as ordered  Outcome: Progressing     Problem: RESPIRATORY - ADULT  Goal: Achieves optimal ventilation and oxygenation  Description: INTERVENTIONS:  - Assess for changes in respiratory status  - Assess for changes in mentation and behavior  - Position to facilitate oxygenation and minimize respiratory effort  - Oxygen administered by appropriate delivery if ordered  - Initiate smoking cessation education as indicated  - Encourage broncho-pulmonary hygiene including cough, deep breathe, Incentive Spirometry  - Assess the need for suctioning and aspirate as needed  - Assess and instruct to report SOB or any respiratory difficulty  - Respiratory Therapy support as indicated  Outcome: Progressing     Problem: METABOLIC, FLUID AND ELECTROLYTES - ADULT  Goal: Electrolytes maintained within normal limits  Description: INTERVENTIONS:  - Monitor labs and assess patient for signs and symptoms of electrolyte imbalances  - Administer electrolyte replacement as ordered  - Monitor response to electrolyte replacements, including repeat lab results as appropriate  - Instruct patient on fluid and nutrition as appropriate  Outcome: Progressing  Goal: Fluid balance maintained  Description: INTERVENTIONS:  - Monitor labs   - Monitor I/O and WT  - Instruct patient on fluid and nutrition as appropriate  - Assess for signs & symptoms of volume excess or deficit  Outcome: Progressing  Goal: Glucose maintained within target range  Description: INTERVENTIONS:  - Monitor Blood  Glucose as ordered  - Assess for signs and symptoms of hyperglycemia and hypoglycemia  - Administer ordered medications to maintain glucose within target range  - Assess nutritional intake and initiate nutrition service referral as needed  Outcome: Progressing     Problem: Nutrition/Hydration-ADULT  Goal: Nutrient/Hydration intake appropriate for improving, restoring or maintaining nutritional needs  Description: Monitor and assess patient's nutrition/hydration status for malnutrition. Collaborate with interdisciplinary team and initiate plan and interventions as ordered.  Monitor patient's weight and dietary intake as ordered or per policy. Utilize nutrition screening tool and intervene as necessary. Determine patient's food preferences and provide high-protein, high-caloric foods as appropriate.     INTERVENTIONS:  - Monitor oral intake, urinary output, labs, and treatment plans  - Assess nutrition and hydration status and recommend course of action  - Evaluate amount of meals eaten  - Assist patient with eating if necessary   - Allow adequate time for meals  - Recommend/ encourage appropriate diets, oral nutritional supplements, and vitamin/mineral supplements  - Order, calculate, and assess calorie counts as needed  - Recommend, monitor, and adjust tube feedings and TPN/PPN based on assessed needs  - Assess need for intravenous fluids  - Provide specific nutrition/hydration education as appropriate  - Include patient/family/caregiver in decisions related to nutrition  Outcome: Progressing

## 2024-11-27 NOTE — ASSESSMENT & PLAN NOTE
Hyponatremia  Stable Na at 136 today.     Hyperphosphatemia  Continue sevelamer 800 mg TID.   Phos is at goal.

## 2024-11-27 NOTE — WOUND OSTOMY CARE
Progress Note - Wound   Masoud Perry 69 y.o. male MRN: [id number]  Unit/Bed#: -01 Encounter: [id number]      Seen for weekly wound assessment   Assessment:   1)POA stage one to bilateral buttocks painful non blanchable . Mixed etiology of moisture due to incontinence, pressure. Pt refused to be positioned off of buttocks, encouraged off loading to decrease pressure.   2)Scrotal erythema calazime applied  3)Left anterior tibia wound dry intact brown scab  Agreeable to assessment, reluctant to turn and reposition.           Wound 11/21/24 Pretibial Left (Active)   Wound Image   11/27/24 1558   Wound Description Intact;Dry;Brown 11/27/24 1558   Viviane-wound Assessment Edema;Erythema 11/27/24 1558   Wound Length (cm) 1 cm 11/27/24 1558   Wound Width (cm) 1 cm 11/27/24 1558   Wound Surface Area (cm^2) 1 cm^2 11/27/24 1558   Drainage Amount None 11/27/24 1558   Treatments Cleansed;Site care 11/27/24 1558   Dressing Open to air 11/27/24 1558   Patient Tolerance Tolerated poorly 11/27/24 1558       Wound 11/21/24 Pressure Injury Sacrum (Active)   Wound Image   11/27/24 1556   Wound Description Beefy red;Non-blanchable erythema;Pink 11/27/24 1556   Pressure Injury Stage 1 11/27/24 1556   Wound Length (cm) 18 cm 11/27/24 1556   Wound Width (cm) 14 cm 11/27/24 1556   Wound Depth (cm) 0.1 cm 11/27/24 1556   Wound Surface Area (cm^2) 252 cm^2 11/27/24 1556   Wound Volume (cm^3) 25.2 cm^3 11/27/24 1556   Calculated Wound Volume (cm^3) 25.2 cm^3 11/27/24 1556   Drainage Amount None 11/27/24 1556   Treatments Site care 11/27/24 1556   Dressing Protective barrier 11/27/24 1556   Dressing Changed Reinforced 11/27/24 1556   Patient Tolerance Tolerated poorly 11/27/24 1556   Dressing Status Remoistened 11/27/24 1556       Skin care Plan:  1-Cleanse sacro-buttocks with soap and water. Apply Silicone bordered foam. Darryn with T for treatment. Change every three days and PRN.   2-Turn/reposition q2h or when medically stable for  pressure re-distribution on skin .  3-Elevate heels to offload pressure  4-Moisturize skin daily with skin nourishing cream  5-Ehob cushion in chair when out of bed.  6-Hydraguard to bilateral heels BID and PRN.   7-Cleanse bilateral lower extremities with soap and water, apply skin nourishing lotion. Wrap LE with ace from pedal to below knee in am and remove at night.  8-Place Patient on ATR Turning and repositioning system    Call or Secure Chat  with any questions  Wound Care will continue to follow weekly    Melissa LARRYN Rn CWON

## 2024-11-27 NOTE — ASSESSMENT & PLAN NOTE
Lab Results   Component Value Date    EGFR 25 11/27/2024    EGFR 28 11/26/2024    EGFR 23 11/25/2024    CREATININE 2.47 (H) 11/27/2024    CREATININE 2.22 (H) 11/26/2024    CREATININE 2.65 (H) 11/25/2024

## 2024-11-27 NOTE — ASSESSMENT & PLAN NOTE
He was on PD but was changed to back up iHD during admission to [facility] in November 2024. Started iHD on 11/8/24.   Ramana DURON.   Access: R IJ permcath. PD catheter in place.   Electrolytes are stable today.  He continues to appear hypovolemic.    Continue IVF but decrease rate to 40 cc/h.  Due to issues with logistics, unable to do HD today.   Next HD is planned for Friday.

## 2024-11-28 PROBLEM — R79.89 ELEVATED TROPONIN: Status: RESOLVED | Noted: 2024-11-20 | Resolved: 2024-11-28

## 2024-11-28 LAB
ANION GAP SERPL CALCULATED.3IONS-SCNC: 4 MMOL/L (ref 4–13)
BUN SERPL-MCNC: 33 MG/DL (ref 5–25)
CALCIUM SERPL-MCNC: 8.8 MG/DL (ref 8.4–10.2)
CHLORIDE SERPL-SCNC: 99 MMOL/L (ref 96–108)
CO2 SERPL-SCNC: 32 MMOL/L (ref 21–32)
CREAT SERPL-MCNC: 2.78 MG/DL (ref 0.6–1.3)
GFR SERPL CREATININE-BSD FRML MDRD: 21 ML/MIN/1.73SQ M
GLUCOSE SERPL-MCNC: 119 MG/DL (ref 65–140)
GLUCOSE SERPL-MCNC: 137 MG/DL (ref 65–140)
GLUCOSE SERPL-MCNC: 138 MG/DL (ref 65–140)
GLUCOSE SERPL-MCNC: 94 MG/DL (ref 65–140)
GLUCOSE SERPL-MCNC: 99 MG/DL (ref 65–140)
INR PPP: 6.92 (ref 0.85–1.19)
POTASSIUM SERPL-SCNC: 3.8 MMOL/L (ref 3.5–5.3)
PROTHROMBIN TIME: 58.3 SECONDS (ref 12.3–15)
SODIUM SERPL-SCNC: 135 MMOL/L (ref 135–147)

## 2024-11-28 PROCEDURE — 99232 SBSQ HOSP IP/OBS MODERATE 35: CPT

## 2024-11-28 PROCEDURE — 82948 REAGENT STRIP/BLOOD GLUCOSE: CPT

## 2024-11-28 PROCEDURE — 80048 BASIC METABOLIC PNL TOTAL CA: CPT | Performed by: INTERNAL MEDICINE

## 2024-11-28 PROCEDURE — 85610 PROTHROMBIN TIME: CPT | Performed by: NURSE PRACTITIONER

## 2024-11-28 RX ORDER — PHYTONADIONE 5 MG/1
2.5 TABLET ORAL ONCE
Status: COMPLETED | OUTPATIENT
Start: 2024-11-28 | End: 2024-11-28

## 2024-11-28 RX ADMIN — PHYTONADIONE 2.5 MG: 5 TABLET ORAL at 09:36

## 2024-11-28 RX ADMIN — PYRIDOSTIGMINE BROMIDE 60 MG: 60 TABLET ORAL at 16:21

## 2024-11-28 RX ADMIN — DEXAMETHASONE SODIUM PHOSPHATE 6 MG: 10 INJECTION, SOLUTION INTRAMUSCULAR; INTRAVENOUS at 01:43

## 2024-11-28 RX ADMIN — VANCOMYCIN HYDROCHLORIDE 125 MG: 125 CAPSULE ORAL at 12:00

## 2024-11-28 RX ADMIN — VANCOMYCIN HYDROCHLORIDE 125 MG: 125 CAPSULE ORAL at 17:36

## 2024-11-28 RX ADMIN — MIDODRINE HYDROCHLORIDE 15 MG: 5 TABLET ORAL at 16:22

## 2024-11-28 RX ADMIN — METOPROLOL TARTRATE 25 MG: 25 TABLET, FILM COATED ORAL at 22:35

## 2024-11-28 RX ADMIN — DEXTROSE AND SODIUM CHLORIDE 40 ML/HR: 5; .45 INJECTION, SOLUTION INTRAVENOUS at 06:39

## 2024-11-28 RX ADMIN — HYDROCODONE BITARTRATE AND ACETAMINOPHEN 2 TABLET: 5; 325 TABLET ORAL at 09:36

## 2024-11-28 RX ADMIN — ATORVASTATIN CALCIUM 40 MG: 40 TABLET, FILM COATED ORAL at 16:22

## 2024-11-28 RX ADMIN — HYDROCODONE BITARTRATE AND ACETAMINOPHEN 2 TABLET: 5; 325 TABLET ORAL at 22:34

## 2024-11-28 NOTE — PLAN OF CARE
Problem: Prexisting or High Potential for Compromised Skin Integrity  Goal: Skin integrity is maintained or improved  Description: INTERVENTIONS:  - Identify patients at risk for skin breakdown  - Assess and monitor skin integrity  - Assess and monitor nutrition and hydration status  - Monitor labs   - Assess for incontinence   - Turn and reposition patient  - Assist with mobility/ambulation  - Relieve pressure over bony prominences  - Avoid friction and shearing  - Provide appropriate hygiene as needed including keeping skin clean and dry  - Evaluate need for skin moisturizer/barrier cream  - Collaborate with interdisciplinary team   - Patient/family teaching  - Consider wound care consult   Outcome: Progressing     Problem: PAIN - ADULT  Goal: Verbalizes/displays adequate comfort level or baseline comfort level  Description: Interventions:  - Encourage patient to monitor pain and request assistance  - Assess pain using appropriate pain scale  - Administer analgesics based on type and severity of pain and evaluate response  - Implement non-pharmacological measures as appropriate and evaluate response  - Consider cultural and social influences on pain and pain management  - Notify physician/advanced practitioner if interventions unsuccessful or patient reports new pain  Outcome: Progressing     Problem: INFECTION - ADULT  Goal: Absence or prevention of progression during hospitalization  Description: INTERVENTIONS:  - Assess and monitor for signs and symptoms of infection  - Monitor lab/diagnostic results  - Monitor all insertion sites, i.e. indwelling lines, tubes, and drains  - Monitor endotracheal if appropriate and nasal secretions for changes in amount and color  - [place] appropriate cooling/warming therapies per order  - Administer medications as ordered  - Instruct and encourage patient and family to use good hand hygiene technique  - Identify and instruct in appropriate isolation precautions for  identified infection/condition  Outcome: Progressing  Goal: Absence of fever/infection during neutropenic period  Description: INTERVENTIONS:  - Monitor WBC    Outcome: Progressing     Problem: SAFETY ADULT  Goal: Patient will remain free of falls  Description: INTERVENTIONS:  - Educate patient/family on patient safety including physical limitations  - Instruct patient to call for assistance with activity   - Consult OT/PT to assist with strengthening/mobility   - Keep Call bell within reach  - Keep bed low and locked with side rails adjusted as appropriate  - Keep care items and personal belongings within reach  - Initiate and maintain comfort rounds  - Make Fall Risk Sign visible to staff  - Offer Toileting every 2 Hours, in advance of need  - Initiate/Maintain bed/chair alarm  - Obtain necessary fall risk management equipment  - Apply yellow socks and bracelet for high fall risk patients  - Consider moving patient to room near nurses station  Outcome: Progressing  Goal: Maintain or return to baseline ADL function  Description: INTERVENTIONS:  -  Assess patient's ability to carry out ADLs; assess patient's baseline for ADL function and identify physical deficits which impact ability to perform ADLs (bathing, care of mouth/teeth, toileting, grooming, dressing, etc.)  - Assess/evaluate cause of self-care deficits   - Assess range of motion  - Assess patient's mobility; develop plan if impaired  - Assess patient's need for assistive devices and provide as appropriate  - Encourage maximum independence but intervene and supervise when necessary  - Involve family in performance of ADLs  - Assess for home care needs following discharge   - Consider OT consult to assist with ADL evaluation and planning for discharge  - Provide patient education as appropriate  Outcome: Progressing  Goal: Maintains/Returns to pre admission functional level  Description: INTERVENTIONS:  - Perform AM-PAC 6 Click Basic Mobility/ Daily  Activity assessment daily.  - Set and communicate daily mobility goal to care team and patient/family/caregiver.   - Collaborate with rehabilitation services on mobility goals if consulted  - Perform Range of Motion 3 times a day.  - Reposition patient every 2 hours.  - Dangle patient 3 times a day  - Stand patient 3 times a day  - Ambulate patient 3 times a day  - Out of bed to chair 3 times a day   - Out of bed for meals 3 times a day  - Out of bed for toileting  - Record patient progress and toleration of activity level   Outcome: Progressing     Problem: DISCHARGE PLANNING  Goal: Discharge to home or other facility with appropriate resources  Description: INTERVENTIONS:  - Identify barriers to discharge w/patient and caregiver  - Arrange for needed discharge resources and transportation as appropriate  - Identify discharge learning needs (meds, wound care, etc.)  - Arrange for interpretive services to assist at discharge as needed  - Refer to Case Management Department for coordinating discharge planning if the patient needs post-hospital services based on physician/advanced practitioner order or complex needs related to functional status, cognitive ability, or social support system  Outcome: Progressing     Problem: Knowledge Deficit  Goal: Patient/family/caregiver demonstrates understanding of disease process, treatment plan, medications, and discharge instructions  Description: Complete learning assessment and assess knowledge base.  Interventions:  - Provide teaching at level of understanding  - Provide teaching via preferred learning methods  Outcome: Progressing     Problem: CARDIOVASCULAR - ADULT  Goal: Maintains optimal cardiac output and hemodynamic stability  Description: INTERVENTIONS:  - Monitor I/O, vital signs and rhythm  - Monitor for S/S and trends of decreased cardiac output  - Administer and titrate ordered vasoactive medications to optimize hemodynamic stability  - Assess quality of pulses,  skin color and temperature  - Assess for signs of decreased coronary artery perfusion  - Instruct patient to report change in severity of symptoms  Outcome: Progressing  Goal: Absence of cardiac dysrhythmias or at baseline rhythm  Description: INTERVENTIONS:  - Continuous cardiac monitoring, vital signs, obtain 12 lead EKG if ordered  - Administer antiarrhythmic and heart rate control medications as ordered  - Monitor electrolytes and administer replacement therapy as ordered  Outcome: Progressing     Problem: RESPIRATORY - ADULT  Goal: Achieves optimal ventilation and oxygenation  Description: INTERVENTIONS:  - Assess for changes in respiratory status  - Assess for changes in mentation and behavior  - Position to facilitate oxygenation and minimize respiratory effort  - Oxygen administered by appropriate delivery if ordered  - Initiate smoking cessation education as indicated  - Encourage broncho-pulmonary hygiene including cough, deep breathe, Incentive Spirometry  - Assess the need for suctioning and aspirate as needed  - Assess and instruct to report SOB or any respiratory difficulty  - Respiratory Therapy support as indicated  Outcome: Progressing     Problem: METABOLIC, FLUID AND ELECTROLYTES - ADULT  Goal: Electrolytes maintained within normal limits  Description: INTERVENTIONS:  - Monitor labs and assess patient for signs and symptoms of electrolyte imbalances  - Administer electrolyte replacement as ordered  - Monitor response to electrolyte replacements, including repeat lab results as appropriate  - Instruct patient on fluid and nutrition as appropriate  Outcome: Progressing  Goal: Fluid balance maintained  Description: INTERVENTIONS:  - Monitor labs   - Monitor I/O and WT  - Instruct patient on fluid and nutrition as appropriate  - Assess for signs & symptoms of volume excess or deficit  Outcome: Progressing  Goal: Glucose maintained within target range  Description: INTERVENTIONS:  - Monitor Blood  Glucose as ordered  - Assess for signs and symptoms of hyperglycemia and hypoglycemia  - Administer ordered medications to maintain glucose within target range  - Assess nutritional intake and initiate nutrition service referral as needed  Outcome: Progressing     Problem: Nutrition/Hydration-ADULT  Goal: Nutrient/Hydration intake appropriate for improving, restoring or maintaining nutritional needs  Description: Monitor and assess patient's nutrition/hydration status for malnutrition. Collaborate with interdisciplinary team and initiate plan and interventions as ordered.  Monitor patient's weight and dietary intake as ordered or per policy. Utilize nutrition screening tool and intervene as necessary. Determine patient's food preferences and provide high-protein, high-caloric foods as appropriate.     INTERVENTIONS:  - Monitor oral intake, urinary output, labs, and treatment plans  - Assess nutrition and hydration status and recommend course of action  - Evaluate amount of meals eaten  - Assist patient with eating if necessary   - Allow adequate time for meals  - Recommend/ encourage appropriate diets, oral nutritional supplements, and vitamin/mineral supplements  - Order, calculate, and assess calorie counts as needed  - Recommend, monitor, and adjust tube feedings and TPN/PPN based on assessed needs  - Assess need for intravenous fluids  - Provide specific nutrition/hydration education as appropriate  - Include patient/family/caregiver in decisions related to nutrition  Outcome: Progressing

## 2024-11-28 NOTE — ASSESSMENT & PLAN NOTE
Patient having significant diarrhea-initially suspected secondary to COVID  CT abdomen pelvis negative  Stool C. difficile panel showing positive for C. difficile PCR, watson negative  Due to significant diarrhea being treated with oral vancomycin x 10 days

## 2024-11-28 NOTE — PROGRESS NOTES
Progress Note - Nephrology   Name: Masoud Perry 69 y.o. male I MRN: [id number]  Unit/Bed#: -01 I Date of Admission: 11/20/2024   Date of Service: 11/28/2024 I Hospital Day: 8    Assessment & Plan  ESRD (end stage renal disease) on dialysis ([facility])  He was on PD but was changed to back up iHD during admission to [facility] in November 2024. Started iHD on 11/8/24.   [facility].  Previous EDW 98.5 kg, being challenged.  Access: R IJ permcath. PD catheter in place.   Electrolytes are stable today, 11/28.  He continues to appear hypovolemic.  IVF discontinued.  Due to issues with logistics, unable to do HD today.   Next HD is planned for Friday, 11/29.     Hemodialysis-associated hypotension  BP is controlled, -140s past 24 hours on 11/28.  Rx: Midodrine 15 mg TID. Metoprolol 25 mg BID.   Torsemide 60 mg BID stopped on 11/25/24 due to volume depletion.   Clinically appears hypovolemic.     Anemia due to chronic kidney disease, on chronic dialysis ([facility])  Hemoglobin is at goal, 10.5 on 11/28.   Epogen being held in setting of COVID-19 infection.    Secondary hyperparathyroidism of renal origin ([facility])  Continue Sensipar 30 mg 3/week.   Ca is stable at 8.8 today, 11/28.     Electrolyte imbalance risk  Hyponatremia  Stable Na at 135 today, 11/28.     Hyperphosphatemia  Continue sevelamer 800 mg TID.   Phos is at goal, 2.4 on 11/27.     Acute on chronic diastolic HF (heart failure) ([facility])  7/29/24 Echo: EF 75%, G1DD  Examines volume euvolemic. IVF discontinued.     COVID-19  Treatment of COVID-19 per primary team    Paroxysmal atrial fibrillation ([facility])  Currently on Metoprolol 25 mg BID.     Diarrhea of infectious origin  Continue oral vancomycin for C. difficile colitis.  Still with diarrhea.     Pancytopenia ([facility])  Per primary team  Acute respiratory insufficiency  Treatment for COVID-19 per primary team.    Also there is concern for volume overload and patient was ultrafiltrated and on torsemide-status  "improving  Continue to monitor and treat as needed    I have reviewed the nephrology recommendations including hemodialysis schedule and volume status with hospiatlist, and we are in agreement with renal plan including the information outlined above.     Subjective   Brief History of Admission - -69-year-old male with a PMH of ESRD on hemodialysis, CHF, A-fib, factor V Leiden deficiency, HTN, CVA, diabetes who initially presents to [facility] 11/20 from [facility] facility with concern for COVID-19 with progressively worsening shortness of breath and cough and oxygen requirements increasing.  Nephrology is consulted for ESRD on HD.  To note, patient was recently discharged from [facility] for colitis admission.     Patient is examined resting in bed. States he feels \"terrible\". C/o cough, mild SOB, decreased appetite, nausea, and diarrhea.     Objective :  Temp:  [97 °F (36.1 °C)-97.5 °F (36.4 °C)] 97 °F (36.1 °C)  HR:  [74-82] 82  BP: (127-142)/(64-73) 127/64  Resp:  [19-20] 19  SpO2:  [96 %-98 %] 97 %  O2 Device: Nasal cannula  Nasal Cannula O2 Flow Rate (L/min):  [2 L/min] 2 L/min    Current Weight: Weight - Scale: 94 kg (207 lb 3.7 oz)  First Weight: Weight - Scale: 100 kg (220 lb 7.4 oz)  I/O         11/26 0701  11/27 0700 11/27 0701  11/28 0700 11/28 0701  11/29 0700    P.O.  0 0    I.V. (mL/kg)       Total Intake(mL/kg)  0 (0) 0 (0)    Urine (mL/kg/hr) 200 (0.1) 200 (0.1) 250 (0.3)    Other       Stool 0 0     Total Output 200 200 250    Net -200 -200 -250           Unmeasured Urine Occurrence 1 x 2 x     Unmeasured Stool Occurrence 1 x 1 x           Physical Exam  Vitals and nursing note reviewed.   Constitutional:       General: He is not in acute distress.     Appearance: He is well-developed and normal weight. He is ill-appearing.   HENT:      Head: Normocephalic and atraumatic.      Right Ear: External ear normal.      Left Ear: External ear normal.      Nose: Nose normal.      Mouth/Throat:      Mouth: Mucous membranes " are moist.      Pharynx: Oropharynx is clear.   Eyes:      Extraocular Movements: Extraocular movements intact.      Conjunctiva/sclera: Conjunctivae normal.      Pupils: Pupils are equal, round, and reactive to light.   Cardiovascular:      Rate and Rhythm: Normal rate and regular rhythm.      Heart sounds: Normal heart sounds. No murmur heard.  Pulmonary:      Effort: Pulmonary effort is normal. No respiratory distress.      Breath sounds: Rhonchi present.   Abdominal:      Palpations: Abdomen is soft.      Tenderness: There is no abdominal tenderness.   Musculoskeletal:         General: No swelling.      Cervical back: Neck supple.      Right lower leg: No edema.      Left lower leg: No edema.   Skin:     General: Skin is warm and dry.      Capillary Refill: Capillary refill takes less than 2 seconds.      Coloration: Skin is pale.   Neurological:      General: No focal deficit present.      Mental Status: He is alert and oriented to person, place, and time.   Psychiatric:         Mood and Affect: Mood normal.         Behavior: Behavior normal.         Medications:    Current Facility-Administered Medications:     acetaminophen (TYLENOL) tablet 650 mg, 650 mg, Oral, Q4H PRN, Chao Rios PA-C    atorvastatin (LIPITOR) tablet 40 mg, 40 mg, Oral, Daily With Dinner, Chao Rios PA-C, 40 mg at 11/27/24 1727    calcium carbonate (TUMS) chewable tablet 500 mg, 500 mg, Oral, Daily PRN, Moses Noel MD    cinacalcet (SENSIPAR) tablet 60 mg, 60 mg, Oral, Once per day on Monday Wednesday Friday, Jignesh Hussein MD, 60 mg at 11/27/24 0901    dexamethasone (PF) (DECADRON) injection 6 mg, 6 mg, Intravenous, Q24H, Chao Rios PA-C, 6 mg at 11/28/24 0143    docusate sodium (COLACE) capsule 100 mg, 100 mg, Oral, BID PRN, Chao Rios PA-C    finasteride (PROSCAR) tablet 5 mg, 5 mg, Oral, Daily, Chao Rios PA-C, 5 mg at 11/27/24 0900    gentamicin (GARAMYCIN) 0.1 % cream, , Topical, Daily, Jesus Locke  MD Emmett, Given at 11/27/24 0901    HYDROcodone-acetaminophen (NORCO) 5-325 mg per tablet 2 tablet, 2 tablet, Oral, Q6H PRN, Chao Rios PA-C, 2 tablet at 11/28/24 0936    insulin lispro (HumALOG/ADMELOG) 100 units/mL subcutaneous injection 1-5 Units, 1-5 Units, Subcutaneous, HS, Chao Rios PA-C    insulin lispro (HumALOG/ADMELOG) 100 units/mL subcutaneous injection 1-6 Units, 1-6 Units, Subcutaneous, TID AC, 1 Units at 11/26/24 1236 **AND** Fingerstick Glucose (POCT), , , TID AC, Chao Rios PA-C    levothyroxine tablet 125 mcg, 125 mcg, Oral, Early Morning, Chao Rios PA-C, 125 mcg at 11/26/24 0635    metoprolol tartrate (LOPRESSOR) tablet 25 mg, 25 mg, Oral, Q12H VASU, Moses Noel MD, 25 mg at 11/27/24 0901    midodrine (PROAMATINE) tablet 15 mg, 15 mg, Oral, TID AC, Chao Rios PA-C, 15 mg at 11/27/24 0900    nicotine (NICODERM CQ) 7 mg/24hr TD 24 hr patch 7 mg, 7 mg, Transdermal, Daily, Chao Rios PA-C    ondansetron (ZOFRAN) injection 4 mg, 4 mg, Intravenous, Q6H PRN, Chao Rios PA-C, 4 mg at 11/25/24 1839    pyridostigmine (MESTINON) tablet 60 mg, 60 mg, Oral, TID, Chao Rios PA-C, 60 mg at 11/27/24 1728    sevelamer (RENAGEL) tablet 800 mg, 800 mg, Oral, TID With Meals, Jignesh Hussein MD, 800 mg at 11/25/24 1330    simethicone (MYLICON) chewable tablet 80 mg, 80 mg, Oral, 4x Daily PRN, Chao Rios PA-C    vancomycin (VANCOCIN) capsule 125 mg, 125 mg, Oral, Q6H [facility], Moses Noel MD, 125 mg at 11/28/24 1200    [Held by provider] warfarin (COUMADIN) tablet 1 mg, 1 mg, Oral, HS, Chao Rios PA-C, 1 mg at 11/23/24 1101      Lab Results: I have reviewed the following results:  Results from last 7 days   Lab Units 11/28/24  0600 11/27/24  0539 11/26/24  1008 11/25/24  0958 11/24/24  0504 11/23/24  0828 11/22/24  0625   WBC Thousand/uL  --  5.93 6.77 3.62* 5.21 4.25* 6.42   HEMOGLOBIN g/dL  --  10.5* 10.9* 9.5* 10.3* 9.8* 9.3*   HEMATOCRIT %  --  33.2* 34.9*  "29.9* 33.5* 30.7* 30.2*   PLATELETS Thousands/uL  --  114* 155 108* 106* 118* 90*   POTASSIUM mmol/L 3.8 4.2 4.1 3.9 3.7 3.9 4.2   CHLORIDE mmol/L 99 99 97 97 96 97 96   CO2 mmol/L 32 31 31 29 28 28 28   BUN mg/dL 33* 30* 25 32* 20 27* 17   CREATININE mg/dL 2.78* 2.47* 2.22* 2.65* 2.11* 2.63* 2.24*   CALCIUM mg/dL 8.8 9.1 9.6 9.7 9.1 8.8 8.9   MAGNESIUM mg/dL  --   --   --   --   --   --  1.6*   PHOSPHORUS mg/dL  --  2.4  --   --   --   --  2.9   ALBUMIN g/dL  --   --  3.0* 3.0* 2.3* 2.2* 2.2*       Administrative Statements     Portions of the record may have been created with voice recognition software. Occasional wrong word or \"sound a like\" substitutions may have occurred due to the inherent limitations of voice recognition software. Read the chart carefully and recognize, using context, where substitutions have occurred.If you have any questions, please contact the dictating provider.  "

## 2024-11-28 NOTE — ASSESSMENT & PLAN NOTE
Wt Readings from Last 3 Encounters:   11/28/24 94 kg (207 lb 3.7 oz)   10/14/24 115 kg (253 lb)   10/07/24 118 kg (260 lb)   CXR on 11/20 appeared to be pulmonary vascular congestion   Patient is a dialysis basis, received hemodialysis on TTS  Nephrology following for dialysis while inpatient  Most recent session during hospital day 11/25 with plan to resume dialysis tomorrow 11/29  Appearing somewhat hypervolemic with lower extremity swelling and upper extremity swelling-low rate IV fluids discontinued 11/28

## 2024-11-28 NOTE — ASSESSMENT & PLAN NOTE
Patient with chronic anemia  Baseline hemoglobin around 8  During hospitalization patient's hemoglobin has been stable between 9 and 10  Monitor in the setting of high INR for any signs of bleeding-currently none

## 2024-11-28 NOTE — ASSESSMENT & PLAN NOTE
Hyponatremia  Stable Na at 135 today, 11/28.     Hyperphosphatemia  Continue sevelamer 800 mg TID.   Phos is at goal, 2.4 on 11/27.

## 2024-11-28 NOTE — ASSESSMENT & PLAN NOTE
Presents from [place]. Tested positive 3 days prior to admission when symptoms started  CT chest negative for any pathology  Started decadron and remdesevir, doxycycline  Patient already on Coumadin, INR is supratherapeutic, remain on hold  On clinical exam, patient is very fatigued and tired and sleepy but arousable and answer questions.  Completed treatment with doxycycline and remdesivir.  Patient also having diarrhea, and C. difficile showing carrier state, continue p.o. vancomycin  Currently 2 L nasal cannula oxygen being utilized

## 2024-11-28 NOTE — ASSESSMENT & PLAN NOTE
Patient is maintained on Coumadin PTA  INR is supratherapeutic-Coumadin on hold  Patient has not received dialysis since Monday 11/25-INR increasing from day to day  Given small dose of vitamin K today, continue to trend INR  Lab Results   Component Value Date    INR 6.92 (HH) 11/28/2024    INR 5.58 (HH) 11/27/2024    INR 4.90 (H) 11/26/2024

## 2024-11-28 NOTE — ASSESSMENT & PLAN NOTE
Lab Results   Component Value Date    HGBA1C 5.0 11/02/2024       Recent Labs     11/27/24  1213 11/27/24  1621 11/27/24 2037 11/28/24  0720   POCGLU 135 104 100 138       Blood Sugar Average: Last 72 hrs:  (P) 108.7623984545759332  Sliding scale insulin. Hypoglycemia protocol.

## 2024-11-28 NOTE — ASSESSMENT & PLAN NOTE
Continue metoprolol if blood pressure tolerates  On Coumadin for anticoagulation-supratherapeutic INR, on hold and small dose of vitamin K given due to increasingly rising INR

## 2024-11-28 NOTE — ASSESSMENT & PLAN NOTE
BP is controlled, -140s past 24 hours on 11/28.  Rx: Midodrine 15 mg TID. Metoprolol 25 mg BID.   Torsemide 60 mg BID stopped on 11/25/24 due to volume depletion.   Clinically appears hypovolemic.

## 2024-11-28 NOTE — ASSESSMENT & PLAN NOTE
With elevated troponin to 70 at time of presentation, trended down from there to 63, 59  Without chest pain or signs of ischemic change on EKG  Problem seems to have resolved

## 2024-11-28 NOTE — ASSESSMENT & PLAN NOTE
Lab Results   Component Value Date    EGFR 21 11/28/2024    EGFR 25 11/27/2024    EGFR 28 11/26/2024    CREATININE 2.78 (H) 11/28/2024    CREATININE 2.47 (H) 11/27/2024    CREATININE 2.22 (H) 11/26/2024   Typical regimen Tuesday Thursday Saturday  Most recently received in hospital on Monday 11/25 -plan for next session tomorrow 11/29  Nephrology following

## 2024-11-28 NOTE — PROGRESS NOTES
Progress Note - Hospitalist   Name: Masoud Perry 69 y.o. male I MRN: [id number]  Unit/Bed#: -01 I Date of Admission: 11/20/2024   Date of Service: 11/28/2024 I Hospital Day: 8    Assessment & Plan  Acute respiratory insufficiency  Oxygen needs have waxed and waned  CT chest abdomen pelvis with mild diffuse anasarca improved from prior CT, no specific pathology along  Continue to monitor volume status-was appearing hypovolemic secondary to diarrhea, was on IV fluids now.  Mildly hypervolemic  Currently requiring 2 L nasal cannula  Acute on chronic diastolic HF (heart failure) (HCC)  Wt Readings from Last 3 Encounters:   11/28/24 94 kg (207 lb 3.7 oz)   10/14/24 115 kg (253 lb)   10/07/24 118 kg (260 lb)   CXR on 11/20 appeared to be pulmonary vascular congestion   Patient is a dialysis basis, received hemodialysis on [facility]  Nephrology following for dialysis while inpatient  Most recent session during hospital day 11/25 with plan to resume dialysis tomorrow 11/29  Appearing somewhat hypervolemic with lower extremity swelling and upper extremity swelling-low rate IV fluids discontinued 11/28  COVID-19  Presents from [place]. Tested positive 3 days prior to admission when symptoms started  CT chest negative for any pathology  Started decadron and remdesevir, doxycycline  Patient already on Coumadin, INR is supratherapeutic, remain on hold  On clinical exam, patient is very fatigued and tired and sleepy but arousable and answer questions.  Completed treatment with doxycycline and remdesivir.  Patient also having diarrhea, and C. difficile showing carrier state, continue p.o. vancomycin  Currently 2 L nasal cannula oxygen being utilized  Diarrhea of infectious origin  Patient having significant diarrhea-initially suspected secondary to COVID  CT abdomen pelvis negative  Stool C. difficile panel showing positive for C. difficile PCR, watson negative  Due to significant diarrhea being treated with oral vancomycin x 10  days  Secondary hyperparathyroidism of renal origin (HCC)  Continue Cinacalcet on Mondays, Wednesdays, and Fridays  Hemodialysis-associated hypotension  Continue midodrine and pyridostigmine  HLD (hyperlipidemia)  Continue atorvastatin  Acquired hypothyroidism  Continue levothyroxine  Factor 5 Leiden mutation, heterozygous (HCC)  Patient is maintained on Coumadin PTA  INR is supratherapeutic-Coumadin on hold  Patient has not received dialysis since Monday 11/25-INR increasing from day to day  Given small dose of vitamin K today, continue to trend INR  Lab Results   Component Value Date    INR 6.92 (HH) 11/28/2024    INR 5.58 (HH) 11/27/2024    INR 4.90 (H) 11/26/2024     Type 2 diabetes mellitus with stage 4 chronic kidney disease, with long-term current use of insulin ([facility])  Lab Results   Component Value Date    HGBA1C 5.0 11/02/2024       Recent Labs     11/27/24  1213 11/27/24  1621 11/27/24  2037 11/28/24  0720   POCGLU 135 104 100 138       Blood Sugar Average: Last 72 hrs:  (P) 108.9424476086825640  Sliding scale insulin. Hypoglycemia protocol.  Personal history of gout  Continue allopurinol  Elevated troponin (Resolved: 11/28/2024)  With elevated troponin to 70 at time of presentation, trended down from there to 63, 59  Without chest pain or signs of ischemic change on EKG  Problem seems to have resolved  Paroxysmal atrial fibrillation (HCC)  Continue metoprolol if blood pressure tolerates  On Coumadin for anticoagulation-supratherapeutic INR, on hold and small dose of vitamin K given due to increasingly rising INR  ESRD (end stage renal disease) on dialysis ([facility])  Lab Results   Component Value Date    EGFR 21 11/28/2024    EGFR 25 11/27/2024    EGFR 28 11/26/2024    CREATININE 2.78 (H) 11/28/2024    CREATININE 2.47 (H) 11/27/2024    CREATININE 2.22 (H) 11/26/2024   Typical regimen Tuesday Thursday Saturday  Most recently received in hospital on Monday 11/25 -plan for next session tomorrow 11/29  Nephrology  following  Anemia due to chronic kidney disease, on chronic dialysis (HCC)  Patient with chronic anemia  Baseline hemoglobin around 8  During hospitalization patient's hemoglobin has been stable between 9 and 10  Monitor in the setting of high INR for any signs of bleeding-currently none  Pancytopenia (HCC)  Multifactorial in origin, continue to monitor  Stable    VTE Pharmacologic Prophylaxis: VTE Score: 6 High Risk (Score >/= 5) - Pharmacological DVT Prophylaxis Contraindicated. Sequential Compression Devices Ordered.    Mobility:   Basic Mobility Inpatient Raw Score: 9  -HLM Goal: 3: Sit at edge of bed  JH-HLM Achieved: 2: Bed activities/Dependent transfer  JH-HLM Goal NOT achieved. Continue with multidisciplinary rounding and encourage appropriate mobility to improve upon JH-HLM goals.    Patient Centered Rounds: I performed bedside rounds with nursing staff today.   Discussions with Specialists or Other Care Team Provider: None    Education and Discussions with Family / Patient:  Family via phone.     Current Length of Stay: 8 day(s)  Current Patient Status: Inpatient   Certification Statement: The patient will continue to require additional inpatient hospital stay due to acute respiratory insufficiency, CHF, need for dialysis  Discharge Plan: Anticipate discharge in 48-72 hrs to rehab facility.    Code Status: Level 3 - DNAR and DNI    Subjective   Seen and examined.  Patient endorses that he is feeling tired, does not have much of an appetite.  Otherwise denies any acute concerns, no feeling of shortness of breath or chest pain    Objective :  Temp:  [97.2 °F (36.2 °C)-97.8 °F (36.6 °C)] 97.2 °F (36.2 °C)  HR:  [70-75] 74  BP: (139-142)/(67-86) 139/73  Resp:  [17-20] 19  SpO2:  [96 %-98 %] 98 %  O2 Device: Nasal cannula  Nasal Cannula O2 Flow Rate (L/min):  [2 L/min] 2 L/min    Body mass index is 28.11 kg/m².     Input and Output Summary (last 24 hours):     Intake/Output Summary (Last 24 hours) at  11/28/2024 0832  Last data filed at 11/27/2024 2000  Gross per 24 hour   Intake 0 ml   Output 200 ml   Net -200 ml       Physical Exam  Vitals and nursing note reviewed.   Constitutional:       General: He is not in acute distress.     Appearance: Normal appearance. He is ill-appearing.   HENT:      Head: Normocephalic and atraumatic.      Nose: No congestion.      Mouth/Throat:      Mouth: Mucous membranes are moist.      Pharynx: Oropharynx is clear.   Eyes:      Conjunctiva/sclera: Conjunctivae normal.   Cardiovascular:      Rate and Rhythm: Normal rate and regular rhythm.      Pulses: Normal pulses.      Heart sounds: Normal heart sounds. No murmur heard.  Pulmonary:      Effort: Pulmonary effort is normal. No respiratory distress.      Comments: Diminished breath sounds bilaterally  Abdominal:      General: Bowel sounds are normal.      Palpations: Abdomen is soft.      Tenderness: There is no abdominal tenderness.   Musculoskeletal:         General: Swelling present. Normal range of motion.      Cervical back: Normal range of motion.      Right lower leg: Edema present.      Left lower leg: Edema present.   Skin:     General: Skin is warm and dry.   Neurological:      Mental Status: He is alert and oriented to person, place, and time.   Psychiatric:         Mood and Affect: Mood normal.         Behavior: Behavior normal.           Lines/Drains:  Lines/Drains/Airways       Active Status       Name Placement date Placement time Site Days    HD Permanent Double Catheter 10/14/24  1420  Subclavian  44                            Lab Results: I have reviewed the following results:   Results from last 7 days   Lab Units 11/27/24  0539 11/26/24  1008 11/25/24  0958   WBC Thousand/uL 5.93 6.77 3.62*   HEMOGLOBIN g/dL 10.5* 10.9* 9.5*   HEMATOCRIT % 33.2* 34.9* 29.9*   PLATELETS Thousands/uL 114* 155 108*   SEGS PCT %  --   --  87*   LYMPHO PCT %  --  9* 9*   MONO PCT %  --  1* 3*   EOS PCT %  --  0 0     Results from  last 7 days   Lab Units 11/28/24  0600 11/27/24  0539 11/26/24  1008   SODIUM mmol/L 135   < > 135   POTASSIUM mmol/L 3.8   < > 4.1   CHLORIDE mmol/L 99   < > 97   CO2 mmol/L 32   < > 31   BUN mg/dL 33*   < > 25   CREATININE mg/dL 2.78*   < > 2.22*   ANION GAP mmol/L 4   < > 7   CALCIUM mg/dL 8.8   < > 9.6   ALBUMIN g/dL  --   --  3.0*   TOTAL BILIRUBIN mg/dL  --   --  0.51   ALK PHOS U/L  --   --  60   ALT U/L  --   --  8   AST U/L  --   --  16   GLUCOSE RANDOM mg/dL 119   < > 94    < > = values in this interval not displayed.     Results from last 7 days   Lab Units 11/28/24  0600   INR  6.92*     Results from last 7 days   Lab Units 11/28/24  0720 11/27/24  2037 11/27/24  1621 11/27/24  1213 11/27/24  0740 11/26/24  2133 11/26/24  1638 11/26/24  1227 11/26/24  0740 11/25/24  2106 11/25/24  1642 11/25/24  1116   POC GLUCOSE mg/dl 138 100 104 135 139 70 84 179* 103 75 73 97         Results from last 7 days   Lab Units 11/24/24  0504   PROCALCITONIN ng/ml 0.35*       Recent Cultures (last 7 days):   Results from last 7 days   Lab Units 11/22/24  2115   C DIFF TOXIN B BY PCR  Positive*             Last 24 Hours Medication List:     Current Facility-Administered Medications:     acetaminophen (TYLENOL) tablet 650 mg, Q4H PRN    atorvastatin (LIPITOR) tablet 40 mg, Daily With Dinner    calcium carbonate (TUMS) chewable tablet 500 mg, Daily PRN    cinacalcet (SENSIPAR) tablet 60 mg, Once per day on Monday Wednesday Friday    dexamethasone (PF) (DECADRON) injection 6 mg, Q24H    dextrose 5 % and sodium chloride 0.45 % infusion, Continuous, Last Rate: 40 mL/hr (11/28/24 0639)    docusate sodium (COLACE) capsule 100 mg, BID PRN    finasteride (PROSCAR) tablet 5 mg, Daily    gentamicin (GARAMYCIN) 0.1 % cream, Daily    HYDROcodone-acetaminophen (NORCO) 5-325 mg per tablet 2 tablet, Q6H PRN    insulin lispro (HumALOG/ADMELOG) 100 units/mL subcutaneous injection 1-5 Units, HS    insulin lispro (HumALOG/ADMELOG) 100 units/mL  subcutaneous injection 1-6 Units, TID AC **AND** Fingerstick Glucose (POCT), TID AC    levothyroxine tablet 125 mcg, Early Morning    metoprolol tartrate (LOPRESSOR) tablet 25 mg, Q12H VASU    midodrine (PROAMATINE) tablet 15 mg, TID AC    nicotine (NICODERM CQ) 7 mg/24hr TD 24 hr patch 7 mg, Daily    ondansetron (ZOFRAN) injection 4 mg, Q6H PRN    pyridostigmine (MESTINON) tablet 60 mg, TID    sevelamer (RENAGEL) tablet 800 mg, TID With Meals    simethicone (MYLICON) chewable tablet 80 mg, 4x Daily PRN    vancomycin (VANCOCIN) capsule 125 mg, Q6H VASU    [Held by provider] warfarin (COUMADIN) tablet 1 mg, HS    Administrative Statements   Today, Patient Was Seen By: Theresa Butt PA-C      **Please Note: This note may have been constructed using a voice recognition system.**

## 2024-11-28 NOTE — ASSESSMENT & PLAN NOTE
He was on PD but was changed to back up iHD during admission to [facility] in November 2024. Started iHD on 11/8/24.   [facility].  Previous EDW 98.5 kg, being challenged.  Access: R IJ permcath. PD catheter in place.   Electrolytes are stable today, 11/28.  He continues to appear hypovolemic.  IVF discontinued.  Due to issues with logistics, unable to do HD today.   Next HD is planned for Friday, 11/29.      Refill request received from [employer] via fax for a refill of Lisinopril. Order pended.

## 2024-11-28 NOTE — ASSESSMENT & PLAN NOTE
Oxygen needs have waxed and waned  CT chest abdomen pelvis with mild diffuse anasarca improved from prior CT, no specific pathology along  Continue to monitor volume status-was appearing hypovolemic secondary to diarrhea, was on IV fluids now.  Mildly hypervolemic  Currently requiring 2 L nasal cannula

## 2024-11-29 ENCOUNTER — APPOINTMENT (INPATIENT)
Dept: DIALYSIS | Facility: HOSPITAL | Age: 69
DRG: 177 | End: 2024-11-29
Attending: INTERNAL MEDICINE
Payer: COMMERCIAL

## 2024-11-29 ENCOUNTER — APPOINTMENT (INPATIENT)
Dept: RADIOLOGY | Facility: HOSPITAL | Age: 69
DRG: 177 | End: 2024-11-29
Payer: COMMERCIAL

## 2024-11-29 PROBLEM — R07.2 PRECORDIAL PAIN: Status: ACTIVE | Noted: 2024-11-29

## 2024-11-29 LAB
ANION GAP SERPL CALCULATED.3IONS-SCNC: 5 MMOL/L (ref 4–13)
ATRIAL RATE: 117 BPM
BNP SERPL-MCNC: 103 PG/ML (ref 0–100)
BUN SERPL-MCNC: 39 MG/DL (ref 5–25)
CALCIUM SERPL-MCNC: 9 MG/DL (ref 8.4–10.2)
CARDIAC TROPONIN I PNL SERPL HS: 31 NG/L (ref 8–18)
CARDIAC TROPONIN I PNL SERPL HS: 35 NG/L (ref 8–18)
CHLORIDE SERPL-SCNC: 100 MMOL/L (ref 96–108)
CO2 SERPL-SCNC: 28 MMOL/L (ref 21–32)
CREAT SERPL-MCNC: 3.02 MG/DL (ref 0.6–1.3)
ERYTHROCYTE [DISTWIDTH] IN BLOOD BY AUTOMATED COUNT: 14.6 % (ref 11.6–15.1)
GFR SERPL CREATININE-BSD FRML MDRD: 19 ML/MIN/1.73SQ M
GLUCOSE SERPL-MCNC: 100 MG/DL (ref 65–140)
GLUCOSE SERPL-MCNC: 104 MG/DL (ref 65–140)
GLUCOSE SERPL-MCNC: 107 MG/DL (ref 65–140)
GLUCOSE SERPL-MCNC: 120 MG/DL (ref 65–140)
GLUCOSE SERPL-MCNC: 73 MG/DL (ref 65–140)
GLUCOSE SERPL-MCNC: 82 MG/DL (ref 65–140)
HCT VFR BLD AUTO: 31 % (ref 36.5–49.3)
HGB BLD-MCNC: 9.6 G/DL (ref 12–17)
INR PPP: 1.85 (ref 0.85–1.19)
LACTATE SERPL-SCNC: 1.1 MMOL/L (ref 0.5–2)
MCH RBC QN AUTO: 32 PG (ref 26.8–34.3)
MCHC RBC AUTO-ENTMCNC: 31 G/DL (ref 31.4–37.4)
MCV RBC AUTO: 103 FL (ref 82–98)
P AXIS: 31 DEGREES
PLATELET # BLD AUTO: 91 THOUSANDS/UL (ref 149–390)
PMV BLD AUTO: 10 FL (ref 8.9–12.7)
POTASSIUM SERPL-SCNC: 4.2 MMOL/L (ref 3.5–5.3)
PR INTERVAL: 186 MS
PROCALCITONIN SERPL-MCNC: 0.14 NG/ML
PROTHROMBIN TIME: 21.6 SECONDS (ref 12.3–15)
QRS AXIS: -32 DEGREES
QRSD INTERVAL: 132 MS
QT INTERVAL: 348 MS
QTC INTERVAL: 485 MS
RBC # BLD AUTO: 3 MILLION/UL (ref 3.88–5.62)
SODIUM SERPL-SCNC: 133 MMOL/L (ref 135–147)
T WAVE AXIS: -12 DEGREES
VENTRICULAR RATE: 117 BPM
WBC # BLD AUTO: 4.76 THOUSAND/UL (ref 4.31–10.16)

## 2024-11-29 PROCEDURE — 71045 X-RAY EXAM CHEST 1 VIEW: CPT

## 2024-11-29 PROCEDURE — 80048 BASIC METABOLIC PNL TOTAL CA: CPT

## 2024-11-29 PROCEDURE — NC001 PR NO CHARGE

## 2024-11-29 PROCEDURE — 99233 SBSQ HOSP IP/OBS HIGH 50: CPT | Performed by: FAMILY MEDICINE

## 2024-11-29 PROCEDURE — 83880 ASSAY OF NATRIURETIC PEPTIDE: CPT | Performed by: ANESTHESIOLOGY

## 2024-11-29 PROCEDURE — 93005 ELECTROCARDIOGRAM TRACING: CPT

## 2024-11-29 PROCEDURE — 85610 PROTHROMBIN TIME: CPT | Performed by: FAMILY MEDICINE

## 2024-11-29 PROCEDURE — 83605 ASSAY OF LACTIC ACID: CPT

## 2024-11-29 PROCEDURE — 85027 COMPLETE CBC AUTOMATED: CPT

## 2024-11-29 PROCEDURE — 84145 PROCALCITONIN (PCT): CPT | Performed by: ANESTHESIOLOGY

## 2024-11-29 PROCEDURE — 84484 ASSAY OF TROPONIN QUANT: CPT | Performed by: FAMILY MEDICINE

## 2024-11-29 PROCEDURE — 82948 REAGENT STRIP/BLOOD GLUCOSE: CPT

## 2024-11-29 PROCEDURE — 90935 HEMODIALYSIS ONE EVALUATION: CPT | Performed by: INTERNAL MEDICINE

## 2024-11-29 RX ORDER — GUAIFENESIN 600 MG/1
600 TABLET, EXTENDED RELEASE ORAL EVERY 12 HOURS SCHEDULED
Status: DISCONTINUED | OUTPATIENT
Start: 2024-11-29 | End: 2024-12-11 | Stop reason: HOSPADM

## 2024-11-29 RX ORDER — BENZONATATE 100 MG/1
100 CAPSULE ORAL 3 TIMES DAILY PRN
Status: DISCONTINUED | OUTPATIENT
Start: 2024-11-29 | End: 2024-12-11 | Stop reason: HOSPADM

## 2024-11-29 RX ADMIN — VANCOMYCIN HYDROCHLORIDE 125 MG: 125 CAPSULE ORAL at 13:01

## 2024-11-29 RX ADMIN — HYDROCODONE BITARTRATE AND ACETAMINOPHEN 2 TABLET: 5; 325 TABLET ORAL at 17:32

## 2024-11-29 RX ADMIN — VANCOMYCIN HYDROCHLORIDE 125 MG: 125 CAPSULE ORAL at 06:01

## 2024-11-29 RX ADMIN — FINASTERIDE 5 MG: 5 TABLET, FILM COATED ORAL at 13:01

## 2024-11-29 RX ADMIN — BENZONATATE 100 MG: 100 CAPSULE ORAL at 13:52

## 2024-11-29 RX ADMIN — WARFARIN SODIUM 1 MG: 1 TABLET ORAL at 22:51

## 2024-11-29 RX ADMIN — GUAIFENESIN 600 MG: 600 TABLET ORAL at 13:52

## 2024-11-29 RX ADMIN — VANCOMYCIN HYDROCHLORIDE 125 MG: 125 CAPSULE ORAL at 00:19

## 2024-11-29 RX ADMIN — LEVOTHYROXINE SODIUM 125 MCG: 125 TABLET ORAL at 06:01

## 2024-11-29 RX ADMIN — METOPROLOL TARTRATE 25 MG: 25 TABLET, FILM COATED ORAL at 22:51

## 2024-11-29 RX ADMIN — GUAIFENESIN 600 MG: 600 TABLET ORAL at 22:51

## 2024-11-29 RX ADMIN — PYRIDOSTIGMINE BROMIDE 60 MG: 60 TABLET ORAL at 22:52

## 2024-11-29 RX ADMIN — GENTAMICIN SULFATE: 1 CREAM TOPICAL at 08:02

## 2024-11-29 RX ADMIN — HYDROCODONE BITARTRATE AND ACETAMINOPHEN 2 TABLET: 5; 325 TABLET ORAL at 06:24

## 2024-11-29 RX ADMIN — DEXAMETHASONE SODIUM PHOSPHATE 6 MG: 10 INJECTION, SOLUTION INTRAMUSCULAR; INTRAVENOUS at 00:19

## 2024-11-29 RX ADMIN — CINACALCET 60 MG: 30 TABLET, FILM COATED ORAL at 13:01

## 2024-11-29 RX ADMIN — VANCOMYCIN HYDROCHLORIDE 125 MG: 125 CAPSULE ORAL at 17:14

## 2024-11-29 RX ADMIN — MIDODRINE HYDROCHLORIDE 15 MG: 5 TABLET ORAL at 17:14

## 2024-11-29 RX ADMIN — METOPROLOL TARTRATE 25 MG: 25 TABLET, FILM COATED ORAL at 12:47

## 2024-11-29 RX ADMIN — MIDODRINE HYDROCHLORIDE 15 MG: 5 TABLET ORAL at 13:00

## 2024-11-29 NOTE — ASSESSMENT & PLAN NOTE
Lab Results   Component Value Date    HGBA1C 5.0 11/02/2024       Recent Labs     11/28/24  0720 11/28/24  1158 11/28/24  1626 11/28/24  2237   POCGLU 138 137 99 94       Blood Sugar Average: Last 72 hrs:  (P) [id number]  Sliding scale insulin. Hypoglycemia protocol.   Name band;

## 2024-11-29 NOTE — ASSESSMENT & PLAN NOTE
He was on PD but was changed to back up iHD during admission to [facility] in November 2024. Started iHD on 11/8/24.   [facility].  Previous EDW 98.5 kg - challenged this hospital admission.   Access: R IJ permcatsherlyn. PD catheter in place.   Stable electrolytes today.   HD today since missed HD on Wednesday.   Next HD is tomorrow to place back on TTS schedule.

## 2024-11-29 NOTE — ASSESSMENT & PLAN NOTE
7/29/24 Echo: EF 75%, G1DD  Was fluid overloaded on admission and better now.   EDW is 98.5 kg - being challenged.   PreHD weight today is 93.8 kg.   Plan for 1 kg UF on HD.      see BTCM note

## 2024-11-29 NOTE — PROGRESS NOTES
Progress Note - Hospitalist   Name: Masoud Perry 69 y.o. male I MRN: [id number]  Unit/Bed#: -01 I Date of Admission: 11/20/2024   Date of Service: 11/29/2024 I Hospital Day: 9    Assessment & Plan  Acute respiratory insufficiency  Oxygen needs have waxed and waned  CT chest abdomen pelvis with mild diffuse anasarca improved from prior CT, no specific pathology along  Continue to monitor volume status-was appearing hypovolemic secondary to diarrhea, was on IV fluids now.  Mildly hypervolemic  Currently requiring 2 L nasal cannula  Acute on chronic diastolic HF (heart failure) (HCC)  Wt Readings from Last 3 Encounters:   11/29/24 93.8 kg (206 lb 12.7 oz)   10/14/24 115 kg (253 lb)   10/07/24 118 kg (260 lb)   CXR on 11/20 appeared to be pulmonary vascular congestion   Patient is a dialysis basis, received hemodialysis on [facility]  Nephrology following for dialysis while inpatient  Most recent session during hospital day 11/25 with plan to resume dialysis tomorrow 11/29  Patient was receiving gentle hydration per nephrology recommendation.  Later on discontinued.  Plan to proceed with dialysis today  COVID-19  Presents from [place]. Tested positive 3 days prior to admission when symptoms started  CT chest negative for any pathology  Started decadron and remdesevir, doxycycline  Patient already on Coumadin, INR is supratherapeutic, remain on hold  On clinical exam, patient is very fatigued and tired and sleepy but arousable and answer questions.  Completed treatment with doxycycline and remdesivir.  Patient also having diarrhea, and C. difficile showing carrier state, continue p.o. vancomycin  Currently 2 L nasal cannula oxygen being utilized  Diarrhea of infectious origin  Patient having significant diarrhea-initially suspected secondary to COVID  CT abdomen pelvis negative  Stool C. difficile panel showing positive for C. difficile PCR, watson negative  Due to significant diarrhea being treated with oral vancomycin x  10 days  Secondary hyperparathyroidism of renal origin (HCC)  Continue Cinacalcet on Mondays, Wednesdays, and Fridays  Hemodialysis-associated hypotension  Continue midodrine and pyridostigmine  HLD (hyperlipidemia)  Continue atorvastatin  Acquired hypothyroidism  Continue levothyroxine  Factor 5 Leiden mutation, heterozygous (HCC)  Patient is maintained on Coumadin PTA  INR is supratherapeutic-Coumadin on hold  Patient has not received dialysis since Monday 11/25-INR increasing from day to day  Patient is status post vitamin K therapy with 2.5 mg on 11/28/2024-recheck INR.  Monitor for bleeding precaution.  Lab Results   Component Value Date    INR 6.92 (HH) 11/28/2024    INR 5.58 (HH) 11/27/2024    INR 4.90 (H) 11/26/2024     Type 2 diabetes mellitus with stage 4 chronic kidney disease, with long-term current use of insulin ([facility])  Lab Results   Component Value Date    HGBA1C 5.0 11/02/2024       Recent Labs     11/28/24  0720 11/28/24  1158 11/28/24  1626 11/28/24  2237   POCGLU 138 137 99 94       Blood Sugar Average: Last 72 hrs:  (P) [id number]  Sliding scale insulin. Hypoglycemia protocol.  Personal history of gout  Continue allopurinol  Paroxysmal atrial fibrillation (HCC)  Continue metoprolol if blood pressure tolerates  On Coumadin for anticoagulation-supratherapeutic INR, on hold and small dose of vitamin K given due to increasingly rising INR  ESRD (end stage renal disease) on dialysis ([facility])  Lab Results   Component Value Date    EGFR 19 11/29/2024    EGFR 21 11/28/2024    EGFR 25 11/27/2024    CREATININE 3.02 (H) 11/29/2024    CREATININE 2.78 (H) 11/28/2024    CREATININE 2.47 (H) 11/27/2024   Typical regimen Tuesday Thursday Saturday  Most recently received in hospital on Monday 11/25 -plan for next session tomorrow 11/29  Nephrology following  Anemia due to chronic kidney disease, on chronic dialysis ([facility])  Patient with chronic anemia  Baseline hemoglobin around 8  During hospitalization  patient's hemoglobin has been stable between 9 and 10  Monitor in the setting of high INR for any signs of bleeding-currently none  Pancytopenia (HCC)  Multifactorial in origin, continue to monitor  Stable  Precordial pain  CHARIS 5  Rapid response called on this patient due to precordial chest pain.  Bedside EKG nonischemic, showing atrial fibrillation  -Troponin remain flat  - has chronic hypertension and blood pressure is running soft, for that reason unable to provide any Nitrostat  -Discussed the case with cardiology, per cardiology, related to A-fib.  Does not recommend any aggressive treatment, recommending to monitor.  Family member updated over the phone.  Chest x-ray did not reveal any cardia pulmonary disease acutely.      VTE Pharmacologic Prophylaxis: VTE Score: 6  was on Coumadin, remained on hold due to supratherapeutic INR    Mobility:   Basic Mobility Inpatient Raw Score: 9  JH-HLM Goal: 3: Sit at edge of bed  JH-HLM Achieved: 2: Bed activities/Dependent transfer  JH-HLM Goal NOT achieved. Continue with multidisciplinary rounding and encourage appropriate mobility to improve upon JH-HLM goals.    Patient Centered Rounds: I performed bedside rounds with nursing staff today.   Discussions with Specialists or Other Care Team Provider: Nephrology    Education and Discussions with Family / Patient: Updated  (daughter) via phone.    Current Length of Stay: 9 day(s)  Current Patient Status: Inpatient   Certification Statement: The patient will continue to require additional inpatient hospital stay due to monitor above condition  Discharge Plan: Anticipate discharge in 48-72 hrs to rehab facility.    Code Status: Level 3 - DNAR and DNI    Subjective   Seen and evaluated during the run.  Still feeling tired.    Objective :  Temp:  [97 °F (36.1 °C)-97.7 °F (36.5 °C)] 97.7 °F (36.5 °C)  HR:  [68-82] 68  BP: (127-153)/(64-76) 132/65  Resp:  [17-19] 17  SpO2:  [94 %-97 %] 94 %  O2 Device: Nasal  cannula  Nasal Cannula O2 Flow Rate (L/min):  [2 L/min] 2 L/min    Body mass index is 28.05 kg/m².     Input and Output Summary (last 24 hours):     Intake/Output Summary (Last 24 hours) at 11/29/2024 0826  Last data filed at 11/29/2024 0701  Gross per 24 hour   Intake 0 ml   Output 375 ml   Net -375 ml       Physical Exam  Vitals and nursing note reviewed.   Constitutional:       Appearance: He is ill-appearing. He is not diaphoretic.   Eyes:      General: No scleral icterus.        Left eye: No discharge.      Extraocular Movements: Extraocular movements intact.      Conjunctiva/sclera: Conjunctivae normal.      Pupils: Pupils are equal, round, and reactive to light.   Cardiovascular:      Rate and Rhythm: Normal rate.      Heart sounds:      No friction rub. No gallop.   Pulmonary:      Effort: No respiratory distress.      Breath sounds: No stridor. No wheezing or rhonchi.   Abdominal:      General: There is no distension.      Palpations: There is no mass.      Tenderness: There is no abdominal tenderness.      Hernia: No hernia is present.   Musculoskeletal:      Right lower leg: No edema.      Left lower leg: No edema.   Neurological:      Mental Status: He is alert and oriented to person, place, and time.      Cranial Nerves: No cranial nerve deficit.      Sensory: No sensory deficit.      Motor: No weakness.      Coordination: Coordination normal.         Lines/Drains:  Lines/Drains/Airways       Active Status       Name Placement date Placement time Site Days    HD Permanent Double Catheter 10/14/24  1420  Subclavian  45                            Lab Results: I have reviewed the following results:   Results from last 7 days   Lab Units 11/29/24  0623 11/27/24  0539 11/26/24  1008 11/25/24  0958   WBC Thousand/uL 4.76   < > 6.77 3.62*   HEMOGLOBIN g/dL 9.6*   < > 10.9* 9.5*   HEMATOCRIT % 31.0*   < > 34.9* 29.9*   PLATELETS Thousands/uL 91*   < > 155 108*   SEGS PCT %  --   --   --  87*   LYMPHO PCT %  --    --  9* 9*   MONO PCT %  --   --  1* 3*   EOS PCT %  --   --  0 0    < > = values in this interval not displayed.     Results from last 7 days   Lab Units 11/29/24  0623 11/27/24  0539 11/26/24  1008   SODIUM mmol/L 133*   < > 135   POTASSIUM mmol/L 4.2   < > 4.1   CHLORIDE mmol/L 100   < > 97   CO2 mmol/L 28   < > 31   BUN mg/dL 39*   < > 25   CREATININE mg/dL 3.02*   < > 2.22*   ANION GAP mmol/L 5   < > 7   CALCIUM mg/dL 9.0   < > 9.6   ALBUMIN g/dL  --   --  3.0*   TOTAL BILIRUBIN mg/dL  --   --  0.51   ALK PHOS U/L  --   --  60   ALT U/L  --   --  8   AST U/L  --   --  16   GLUCOSE RANDOM mg/dL 104   < > 94    < > = values in this interval not displayed.     Results from last 7 days   Lab Units 11/28/24  0600   INR  6.92*     Results from last 7 days   Lab Units 11/28/24  2237 11/28/24  1626 11/28/24  1158 11/28/24  0720 11/27/24  2037 11/27/24  1621 11/27/24  1213 11/27/24  0740 11/26/24  2133 11/26/24  1638 11/26/24  1227 11/26/24  0740   POC GLUCOSE mg/dl 94 99 137 138 100 104 135 139 70 84 179* 103         Results from last 7 days   Lab Units 11/24/24  0504   PROCALCITONIN ng/ml 0.35*       Recent Cultures (last 7 days):   Results from last 7 days   Lab Units 11/22/24  2115   C DIFF TOXIN B BY PCR  Positive*       Imaging Results Review: No pertinent imaging studies reviewed.  Other Study Results Review: No additional pertinent studies reviewed.    Last 24 Hours Medication List:     Current Facility-Administered Medications:     acetaminophen (TYLENOL) tablet 650 mg, Q4H PRN    atorvastatin (LIPITOR) tablet 40 mg, Daily With Dinner    calcium carbonate (TUMS) chewable tablet 500 mg, Daily PRN    cinacalcet (SENSIPAR) tablet 60 mg, Once per day on Monday Wednesday Friday    dexamethasone (PF) (DECADRON) injection 6 mg, Q24H    docusate sodium (COLACE) capsule 100 mg, BID PRN    finasteride (PROSCAR) tablet 5 mg, Daily    gentamicin (GARAMYCIN) 0.1 % cream, Daily    HYDROcodone-acetaminophen (NORCO) 5-325 mg  per tablet 2 tablet, Q6H PRN    insulin lispro (HumALOG/ADMELOG) 100 units/mL subcutaneous injection 1-5 Units, HS    insulin lispro (HumALOG/ADMELOG) 100 units/mL subcutaneous injection 1-6 Units, TID AC **AND** Fingerstick Glucose (POCT), TID AC    levothyroxine tablet 125 mcg, Early Morning    metoprolol tartrate (LOPRESSOR) tablet 25 mg, Q12H VASU    midodrine (PROAMATINE) tablet 15 mg, TID AC    nicotine (NICODERM CQ) 7 mg/24hr TD 24 hr patch 7 mg, Daily    ondansetron (ZOFRAN) injection 4 mg, Q6H PRN    pyridostigmine (MESTINON) tablet 60 mg, TID    sevelamer (RENAGEL) tablet 800 mg, TID With Meals    simethicone (MYLICON) chewable tablet 80 mg, 4x Daily PRN    vancomycin (VANCOCIN) capsule 125 mg, Q6H VASU    [Held by provider] warfarin (COUMADIN) tablet 1 mg, HS    Administrative Statements   Today, Patient Was Seen By: Moses Noel MD      **Please Note: This note may have been constructed using a voice recognition system.**

## 2024-11-29 NOTE — PROGRESS NOTES
NEPHROLOGY HOSPITAL PROGRESS NOTE   Masoud Perry 69 y.o. male MRN: [id number]  Unit/Bed#: -01 Encounter: [id number]  Reason for Consult: ESRD on HD    Brief History of Admission - 68 yo male with HTN, DM, ESRD on HD, CHF, atrial fibrillation, factor V leiden, CVA now presenting to [facility] on 11/20/24 with shortness of breath and was diagnosed with COVID-19 infection.  Nephrology consulted for assistance with management of ESRD on HD.    Assessment & Plan  ESRD (end stage renal disease) on dialysis (HCC)  He was on PD but was changed to back up iHD during admission to [facility] in November 2024. Started iHD on 11/8/24.   [facility].  Previous EDW 98.5 kg - challenged this hospital admission.   Access: R IJ permcath. PD catheter in place.   Stable electrolytes today.   HD today since missed HD on Wednesday.   Next HD is tomorrow to place back on TTS schedule.     Hemodialysis-associated hypotension  BP is at goal.   Rx: Midodrine 15 mg TID. Metoprolol 25 mg BID.   Torsemide 60 mg BID stopped on 11/25/24 due to volume depletion.   Would continue to keep off Torsemide for now given ongoing diarrhea.     Acute on chronic diastolic HF (heart failure) ([facility])  7/29/24 Echo: EF 75%, G1DD  Was fluid overloaded on admission and better now.   EDW is 98.5 kg - being challenged.   PreHD weight today is 93.8 kg.   Plan for 1 kg UF on HD.     Anemia due to chronic kidney disease, on chronic dialysis ([facility])  Hemoglobin is close to goal - 9.6 today.   Epogen being held in setting of COVID-19 infection.    Secondary hyperparathyroidism of renal origin ([facility])  Continue Sensipar 30 mg 3/week.   Ca is stable - 9.0 today.     Electrolyte imbalance risk  Hyponatremia  Na acceptable at 133 today.   Monitor with HD.     Hyperphosphatemia  Continue sevelamer 800 mg TID.   Phos is at goal, 2.4 on 11/27.     COVID-19  Treatment of COVID-19 per primary team    Paroxysmal atrial fibrillation ([facility])  Currently on Metoprolol 25 mg BID.     Diarrhea  of infectious origin  Continue oral vancomycin for C. difficile colitis.  Still with diarrhea.     Acute respiratory insufficiency  Treatment for COVID-19 per primary team.  There was concern for volume overload which is better now.        TODAY's DISCUSSION / PLAN:  HD today x 3 hours.   1 kg UF on HD today.   Plan for HD again tomorrow to place back on TTS schedule.   Keep off Torsemide for now given ongoing diarrhea and euvolemic status.     SUBJECTIVE / 24H INTERVAL HISTORY:  Continues to have diarrhea.   Oral intake remains poor.   Reports that butt is sore.   No CP or SOB.   Off IVF.     HEMODIALYSIS PROCEDURE NOTE  The patient was seen and examined on hemodialysis.  Time: 3 hours  Sodium: 138 Blood flow: 400   Dialyzer: F160 Potassium: 3 Dialysate flow: 1.5X   Access: R IJ permcath Bicarbonate: 35 Ultrafiltration goal: 1 kg   Medications on HD: none.      OBJECTIVE:  Current Weight: Weight - Scale: 93.8 kg (206 lb 12.7 oz)  Vitals:    11/29/24 0603 11/29/24 0800 11/29/24 0902 11/29/24 0915   BP: 132/65  137/74 133/79   BP Location:   Left arm Left arm   Pulse: 68  70 74   Resp:   18 18   Temp:       TempSrc:   Temporal    SpO2: 94% 94%     Weight:       Height:           Intake/Output Summary (Last 24 hours) at 11/29/2024 0929  Last data filed at 11/29/2024 0900  Gross per 24 hour   Intake 200 ml   Output 375 ml   Net -175 ml     General: conscious, cooperative, no distress  Skin: dry  Eyes: pink conjunctivae  ENT: dry mucous membranes  Respiratory: equal chest expansion, clear breath sounds.  Cardiovascular: distinct heart sounds, normal rate, regular rhythm, no rub  Abdomen: soft, non tender, non distended, normal bowel sounds, PD cath in place.   Extremities: (+) edema in dorsum of feet. Trace LE edema of legs with brawny changes.   Genitourinary: no goldberg catheter.   Neuro: awake, alert.   Psych: appropriate affect    Medications:    Current Facility-Administered Medications:     acetaminophen (TYLENOL)  tablet 650 mg, 650 mg, Oral, Q4H PRN, Chao Rios PA-C    atorvastatin (LIPITOR) tablet 40 mg, 40 mg, Oral, Daily With Dinner, Chao Rios PA-C, 40 mg at 11/28/24 1622    calcium carbonate (TUMS) chewable tablet 500 mg, 500 mg, Oral, Daily PRN, Moses Noel MD    cinacalcet (SENSIPAR) tablet 60 mg, 60 mg, Oral, Once per day on Monday Wednesday Friday, Jignesh Hussein MD, 60 mg at 11/27/24 0901    dexamethasone (PF) (DECADRON) injection 6 mg, 6 mg, Intravenous, Q24H, Chao Rios PA-C, 6 mg at 11/29/24 0019    docusate sodium (COLACE) capsule 100 mg, 100 mg, Oral, BID PRN, Chao Rios PA-C    finasteride (PROSCAR) tablet 5 mg, 5 mg, Oral, Daily, Chao Rios PA-C, 5 mg at 11/27/24 0900    gentamicin (GARAMYCIN) 0.1 % cream, , Topical, Daily, Jesus Christine MD, Given at 11/29/24 0802    HYDROcodone-acetaminophen (NORCO) 5-325 mg per tablet 2 tablet, 2 tablet, Oral, Q6H PRN, Chao Rios PA-C, 2 tablet at 11/29/24 0624    insulin lispro (HumALOG/ADMELOG) 100 units/mL subcutaneous injection 1-5 Units, 1-5 Units, Subcutaneous, HS, Chao Rios PA-C    insulin lispro (HumALOG/ADMELOG) 100 units/mL subcutaneous injection 1-6 Units, 1-6 Units, Subcutaneous, TID AC, 1 Units at 11/26/24 1236 **AND** Fingerstick Glucose (POCT), , , TID AC, Chao Rios PA-C    levothyroxine tablet 125 mcg, 125 mcg, Oral, Early Morning, Chao Rios PA-C, 125 mcg at 11/29/24 0601    metoprolol tartrate (LOPRESSOR) tablet 25 mg, 25 mg, Oral, Q12H VASU, Moses Noel MD, 25 mg at 11/28/24 2235    midodrine (PROAMATINE) tablet 15 mg, 15 mg, Oral, TID AC, Citlali S Trudy, CRNP, 15 mg at 11/28/24 1622    nicotine (NICODERM CQ) 7 mg/24hr TD 24 hr patch 7 mg, 7 mg, Transdermal, Daily, Chao Rios PA-C    ondansetron (ZOFRAN) injection 4 mg, 4 mg, Intravenous, Q6H PRN, Chao Rios PA-C, 4 mg at 11/25/24 1839    pyridostigmine (MESTINON) tablet 60 mg, 60 mg, Oral, TID, Chao Rios PA-C, 60 mg at  "11/28/24 1621    sevelamer (RENAGEL) tablet 800 mg, 800 mg, Oral, TID With Meals, Jignesh Hussein MD, 800 mg at 11/25/24 1330    simethicone (MYLICON) chewable tablet 80 mg, 80 mg, Oral, 4x Daily PRN, Chao Rios PA-C    vancomycin (VANCOCIN) capsule 125 mg, 125 mg, Oral, Q6H [facility], Moses Noel MD, 125 mg at 11/29/24 0601    [Held by provider] warfarin (COUMADIN) tablet 1 mg, 1 mg, Oral, HS, Chao Rios PA-C, 1 mg at 11/23/24 2109    Laboratory Results:  Results from last 7 days   Lab Units 11/29/24  0623 11/28/24  0600 11/27/24  0539 11/26/24  1008 11/25/24  0958 11/24/24  0504 11/23/24  0828   WBC Thousand/uL 4.76  --  5.93 6.77 3.62* 5.21 4.25*   HEMOGLOBIN g/dL 9.6*  --  10.5* 10.9* 9.5* 10.3* 9.8*   HEMATOCRIT % 31.0*  --  33.2* 34.9* 29.9* 33.5* 30.7*   PLATELETS Thousands/uL 91*  --  114* 155 108* 106* 118*   POTASSIUM mmol/L 4.2 3.8 4.2 4.1 3.9 3.7 3.9   CHLORIDE mmol/L 100 99 99 97 97 96 97   CO2 mmol/L 28 32 31 31 29 28 28   BUN mg/dL 39* 33* 30* 25 32* 20 27*   CREATININE mg/dL 3.02* 2.78* 2.47* 2.22* 2.65* 2.11* 2.63*   CALCIUM mg/dL 9.0 8.8 9.1 9.6 9.7 9.1 8.8   PHOSPHORUS mg/dL  --   --  2.4  --   --   --   --      Portions of the record may have been created with voice recognition software. Occasional wrong word or \"sound a like\" substitutions may have occurred due to the inherent limitations of voice recognition software. Read the chart carefully and recognize, using context, where substitutions have occurred.If you have any questions, please contact the dictating provider.    "

## 2024-11-29 NOTE — CASE MANAGEMENT
Case Management Discharge Planning Note    Patient name Masoud VERGARA [facility]  Location /-01 MRN [id number]  : 1955 Date 2024       Current Admission Date: 2024  Current Admission Diagnosis:Acute respiratory insufficiency   Patient Active Problem List    Diagnosis Date Noted Date Diagnosed    Diarrhea of infectious origin 2024     Electrolyte imbalance risk 2024     Acute respiratory insufficiency 2024     ESRD (end stage renal disease) on dialysis ([facility]) 2024     Anemia due to chronic kidney disease, on chronic dialysis ([facility]) 2024     Personal history of gout 2024     COVID-19 2024     Stasis ulcer ([facility]) 10/02/2024     Diabetic ulcer of right great toe ([facility]) 2024     Hematoma 2024     Edema 2024     Medication management 2024     Hyponatremia 2024     Cellulitis 2024     Moderate protein-calorie malnutrition ([facility]) 2024     Ambulatory dysfunction 09/15/2024     Lactic acidosis 2024     Hypercalcemia 2024     Chronic acquired lymphedema 2024     Hemodialysis-associated hypotension 2024     Nocturnal hypoxia 2024     Elevated LFTs 2024     Patient on peritoneal dialysis ([facility]) 2024     Hypervolemia 2024     Bacteremia 2024     Cellulitis of right lower extremity 2024     Paroxysmal atrial fibrillation ([facility]) 2024     Bilateral lower extremity edema 2023     Ureteral stone with hydronephrosis 2022     Cutaneous abscess of buttock 2022     Chronic kidney disease-mineral and bone disorder 2022     Type 2 diabetes mellitus with stage 4 chronic kidney disease, with long-term current use of insulin ([facility]) 07/15/2022     Obesity, morbid ([facility]) 07/15/2022     Secondary hyperparathyroidism of renal origin ([facility]) 07/15/2022     Stage 5 chronic kidney disease on peritoneal dialysis ([facility]) 07/15/2022     Factor 5 Leiden mutation,  heterozygous ([facility]) 01/15/2022     Thrombocytopenia ([facility]) 01/14/2022     Angina at rest ([facility]) 01/14/2022     MI (myocardial infarction) ([facility]) 01/14/2022     History of PTCA 01/14/2022     Sleep apnea 01/14/2022     Smoking 01/14/2022     Pancytopenia ([facility]) 01/13/2022     History of CVA (cerebrovascular accident) 01/12/2022     Acute on chronic diastolic HF (heart failure) ([facility]) 01/12/2022     HLD (hyperlipidemia) 01/12/2022     Lymphedema 01/12/2022     Acquired hypothyroidism 01/12/2022     COPD (chronic obstructive pulmonary disease) ([facility]) 12/31/2018     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 ([facility]) 06/29/2016     Gastroesophageal reflux disease 06/29/2016     Gout 06/29/2016     Personal history of pulmonary embolism 06/29/2016     ED (erectile dysfunction) of organic origin 01/14/2014     History of thromboembolism of vein 01/10/2013       LOS (days): 9  Geometric Mean LOS (GMLOS) (days): 4.6  Days to GMLOS:-4.1     OBJECTIVE:  Risk of Unplanned Readmission Score: 49.11         Current admission status: Inpatient   Preferred Pharmacy:   [facility] - [place], [place] - [address]  [address]  [place] [address]  Phone: [phone] Fax: [phone]    [place], [place] - [address] [place] [address]  [address] [place] [address]  Phone: [phone] Fax: [phone]    PATIENT/FAMILY REPORTS NO PREFERRED PHARMACY  No address on file      Primary Care Provider: Jignesh Baker DO    Primary Insurance: [employer]  Secondary Insurance:     DISCHARGE DETAILS:     Chart reviewed aware of medical management. Case was discussed in multidisciplinary discharge meeting.  Clinical information supporting hospitalization: patient admitted on 11/20/24 dx: Acute respiratory insufficiency, pt + COVID on contact and airborne precautions. Pt tested positive for C-diff on 11/22/24, continues on po vancomycin. Pt on dialysis for ESRD. Pt is not medically  cleared for discharge, pt c/o chest pain and RRT was called. Pt was placed on telemetry and pending troponin.      Barriers to discharge:  - medical management  Discharge plan: d/c plan is to return to [facility] when medically stable. CM needs to initiate prior auth for [place] when medically stable.      CM will continue to follow plan of care.

## 2024-11-29 NOTE — PHYSICAL THERAPY NOTE
PHYSICAL THERAPY NOTE          Patient Name: Masoud Perry  Today's Date: 11/29/2024 11/29/24 1345   Note Type   Note Type Cancelled Session   Cancel Reasons Medical status       Chart reviewed. Spoke with Dr. Noel who recommends holding at this time as patient was a rapid response due to c/o chest pain. Work up in progress. Will cancel at this time and see patient as medically appropriate at a later time.    Carlie Dan, PT,DPT

## 2024-11-29 NOTE — RESPIRATORY THERAPY NOTE
Respiratory Note     11/29/24 1330   Respiratory Assessment   Resp Comments Attended rapid response for pt who became hypotensive soon aftrer dialysis tx. Pt is awake, alert, talking with SpO2 96-99% on 2LPM NC. Respiratory intervention not needed.   O2 Device 2LPM NC

## 2024-11-29 NOTE — ASSESSMENT & PLAN NOTE
Wt Readings from Last 3 Encounters:   11/29/24 93.8 kg (206 lb 12.7 oz)   10/14/24 115 kg (253 lb)   10/07/24 118 kg (260 lb)   CXR on 11/20 appeared to be pulmonary vascular congestion   Patient is a dialysis basis, received hemodialysis on TTS  Nephrology following for dialysis while inpatient  Most recent session during hospital day 11/25 with plan to resume dialysis tomorrow 11/29  Patient was receiving gentle hydration per nephrology recommendation.  Later on discontinued.  Plan to proceed with dialysis today

## 2024-11-29 NOTE — ASSESSMENT & PLAN NOTE
Hyponatremia  Na acceptable at 133 today.   Monitor with HD.     Hyperphosphatemia  Continue sevelamer 800 mg TID.   Phos is at goal, 2.4 on 11/27.

## 2024-11-29 NOTE — ASSESSMENT & PLAN NOTE
Patient is maintained on Coumadin PTA  INR is supratherapeutic-Coumadin on hold  Patient has not received dialysis since Monday 11/25-INR increasing from day to day  Patient is status post vitamin K therapy with 2.5 mg on 11/28/2024-recheck INR.  Monitor for bleeding precaution.  Lab Results   Component Value Date    INR 6.92 (HH) 11/28/2024    INR 5.58 (HH) 11/27/2024    INR 4.90 (H) 11/26/2024

## 2024-11-29 NOTE — PLAN OF CARE
Target UF Goal  1.5  L as tolerated. Patient dialyzing for 3 hours on 3 K bath for serum K of  4.2  per protocol. Treatment plan reviewed with Nephrology.       Post-Dialysis RN Treatment Note    Blood Pressure:  Pre 137/74 mm/Hg  Post 127/54 mmHg   EDW  98.5 kg    Weight:  Pre 93.8 kg   Post 92.8 kg   Mode of weight measurement: Bed Scale   Volume Removed  1000 ml    Treatment duration 3 hours   NS given  No    Treatment shortened? No   Medications given during Rx None Reported   Estimated Kt/V  1.22   Access type: Permacath/TDC   Access Issues: No   Needle Gauge: N/A   Report called to primary nurse   Yes, Ghada RN via secure chat    Pt went into a burst of rapid a-fib in the last 25 mins of treatment, verified with auscultation.  Last roughly 30 seconds with minor chest discomfort per the patient, placed on 2L nasal cannula.  From that point until tx end, heart rate was variable from 60s to 110s.  Dr. Christine was notified.         Problem: METABOLIC, FLUID AND ELECTROLYTES - ADULT  Goal: Electrolytes maintained within normal limits  Description: INTERVENTIONS:  - Monitor labs and assess patient for signs and symptoms of electrolyte imbalances  - Administer electrolyte replacement as ordered  - Monitor response to electrolyte replacements, including repeat lab results as appropriate  - Instruct patient on fluid and nutrition as appropriate  Outcome: Progressing  Goal: Fluid balance maintained  Description: INTERVENTIONS:  - Monitor labs   - Monitor I/O and WT  - Instruct patient on fluid and nutrition as appropriate  - Assess for signs & symptoms of volume excess or deficit  Outcome: Progressing

## 2024-11-29 NOTE — ASSESSMENT & PLAN NOTE
Lab Results   Component Value Date    EGFR 19 11/29/2024    EGFR 21 11/28/2024    EGFR 25 11/27/2024    CREATININE 3.02 (H) 11/29/2024    CREATININE 2.78 (H) 11/28/2024    CREATININE 2.47 (H) 11/27/2024   Typical regimen Tuesday Thursday Saturday  Most recently received in hospital on Monday 11/25 -plan for next session tomorrow 11/29  Nephrology following

## 2024-11-29 NOTE — ASSESSMENT & PLAN NOTE
Treatment for COVID-19 per primary team.  There was concern for volume overload which is better now.

## 2024-11-29 NOTE — ASSESSMENT & PLAN NOTE
Presents from [facility]. Tested positive 3 days prior to admission when symptoms started  CT chest negative for any pathology  Started decadron and remdesevir, doxycycline  Patient already on Coumadin, INR is supratherapeutic, remain on hold  On clinical exam, patient is very fatigued and tired and sleepy but arousable and answer questions.  Completed treatment with doxycycline and remdesivir.  Patient also having diarrhea, and C. difficile showing carrier state, continue p.o. vancomycin  Currently 2 L nasal cannula oxygen being utilized

## 2024-11-29 NOTE — CONSULTS
History of Present Illness   Masoud Perry is a 69 y.o. male who presents with chest discomfort.  Patient has a known history of paroxysmal atrial fibrillation.  Notes when he goes into A-fib he gets palpitations, feels his heart racing and feels some chest pressure with it.  Once his A-fib resolved, the symptoms resolved as well.  He also notes some left rib cage precordial pain.  It lasted for a few seconds at a time before it resolves.  Worse with palpation.  He notes he has been short of breath for several days.  Ongoing treatment for his COVID pneumonia.  Has a history of chronic heart failure with normal ejection fraction and is being intermittently dialyzed to maintain his near euvolemia as possible.  He is on long-term warfarin therapy given his PAF and factor V Leiden mutation.  EKG done during his acute setting showed atrial fibrillation but without acute ischemic changes    Review of Systems  Review system positive for cough, congestion, shortness of breath, diarrhea, fatigue  I have reviewed the patient's PMH, PSH, Social History, Family History, Meds, and Allergies    Objective :  Temp:  [97.5 °F (36.4 °C)-97.7 °F (36.5 °C)] 97.7 °F (36.5 °C)  HR:  [] 71  BP: ()/(52-79) 116/70  Resp:  [17-20] 20  SpO2:  [94 %-100 %] 98 %  O2 Device: Nasal cannula  Nasal Cannula O2 Flow Rate (L/min):  [2 L/min] 2 L/min  Orthostatic Blood Pressures      Flowsheet Row Most Recent Value   Blood Pressure 116/70 filed at 11/29/2024 1523   Patient Position - Orthostatic VS Lying filed at 11/29/2024 1523          First Weight: Weight - Scale: 100 kg (220 lb 7.4 oz) (11/20/24 2107)  Vitals:    11/28/24 0603 11/29/24 0600   Weight: 94 kg (207 lb 3.7 oz) 93.8 kg (206 lb 12.7 oz)       Physical Exam    General: Chronically ill-appearing  Neck: Soft, supple, no JVD, no carotid bruits appreciated  Chest: Hemodialysis catheter right chest  Lungs: Mildly decreased breath sounds globally but without active wheezing or  rails  Abdomen soft, mildly tender, positive bowel sounds  Extremities mild edema with chronic venous stasis changes      Lab Results: I have reviewed the following results:CBC/BMP:   .     11/29/24  0623   WBC 4.76   HGB 9.6*   HCT 31.0*   PLT 91*   SODIUM 133*   K 4.2      CO2 28   BUN 39*   CREATININE 3.02*   GLUC 104    , Creatinine Clearance: Estimated Creatinine Clearance: 26.7 mL/min (A) (by C-G formula based on SCr of 3.02 mg/dL (H))., LFTs: No new results in last 24 hours. , PTT/INR:  .     11/29/24  0906   INR 1.85*    , Troponin,BNP:  .     11/29/24  0623   *    , Lactic Acid:   .     11/29/24  1328   LACTICACID 1.1    , TSH:     Results from last 7 days   Lab Units 11/29/24  0623 11/27/24  0539 11/26/24  1008   WBC Thousand/uL 4.76 5.93 6.77   HEMOGLOBIN g/dL 9.6* 10.5* 10.9*   HEMATOCRIT % 31.0* 33.2* 34.9*   PLATELETS Thousands/uL 91* 114* 155     Results from last 7 days   Lab Units 11/29/24  0623 11/28/24  0600 11/27/24  0539   POTASSIUM mmol/L 4.2 3.8 4.2   CHLORIDE mmol/L 100 99 99   CO2 mmol/L 28 32 31   BUN mg/dL 39* 33* 30*   CREATININE mg/dL 3.02* 2.78* 2.47*   CALCIUM mg/dL 9.0 8.8 9.1     Results from last 7 days   Lab Units 11/29/24  0906 11/28/24  0600 11/27/24  0539   INR  1.85* 6.92* 5.58*     Lab Results   Component Value Date    HGBA1C 5.0 11/02/2024     Lab Results   Component Value Date    CKTOTAL 22 (L) 06/25/2024    TROPONINI <0.02 06/09/2020       CT abd:  1.  No identifiable acute abnormality to account for the patient's clinical presentation.  2.  Mild diffuse anasarca, improved since the prior CT.  3.  Bilateral nonobstructing renal and urinary bladder calculi.    CXR: NAD    EKG: Likely atrial fibrillation with rapid ventricular response, right bundle branch block, no acute ischemic changes    Assessment and plan     Chest pain - pressure  Acute respiratory failure  End-stage renal disease on hemodialysis  Hemodialysis associated hypotension  Paroxysmal atrial  fibrillation  Anemia due to chronic kidney disease  COVID-19 infection  Diarrhea  CAD seen on CAT scan  Factor V Leiden mutation on long-term Coumadin  Diabetes mellitus type 2      Patient developed chest discomfort during his rapid atrial fibrillation.  EKG done that timeframe without acute ischemic changes.  Patient notes this is normal for him when he goes into atrial fibrillation..  Symptoms have now resolved.  Has a different chest pain now that is on his left lateral rib cage.  It is sharp in nature, worse with deep inspiration and worse to palpation.  Does not appear to be anginal in nature and lasts only for a few seconds at a time.  Troponin was drawn it was only 31, even in the setting of end-stage renal disease.  There is some CAD seen by CAT scan.  Can continue atorvastatin and metoprolol.  Chest pain symptoms appear to be due more to atrial fibrillation and not necessarily an unstable anginal or MI picture.  No further cardiac missions for now.  Can trend troponins but may be elevated regardless given his end-stage renal disease.  No current need for acute intervention cardiac standpoint.

## 2024-11-29 NOTE — QUICK NOTE
RRT called on this patient due to complaining of chest pain.    RRT team on bedside.  Bedside EKG nonsignificant, patient blood pressure is running soft.-Unable to provide any kind of Nitrostat due to concern of hypotension.    Patient has ongoing other comorbid conditions including recovering from COVID, also getting treatment for C. difficile since patient is diarrhea, patient is a dialysis bound.    Discussed with patient's regarding goals of care in presence of RRT team.  Patient is still remain on DNR/DNI.  And reports if time comes he wants to go naturally.    Pending troponin.  Placed on telemetry    Patient's son and daughter both updated over the phone-including possible triptan options and alternatives.  Verbalized understanding agrees.

## 2024-11-29 NOTE — OCCUPATIONAL THERAPY NOTE
Occupational Therapy         Patient Name: Masoud Perry  Today's Date: 11/29/2024 11/29/24 0908   Note Type   Note Type Cancelled Session   Cancel Reasons Medical status         Chart reviewed. Spoke with Dr. Noel who recommends holding at this time as patient was a rapid response due to c/o chest pain. Work up in progress. Will cancel at this time and see patient as medically appropriate at a later time.     Umang Sanchez, OTR/L

## 2024-11-29 NOTE — ASSESSMENT & PLAN NOTE
Oxygen needs have waxed and waned  CT chest abdomen pelvis with mild diffuse anasarca improved from prior CT, no specific pathology along  Continue to monitor volume status-was appearing hypovolemic secondary to diarrhea, was on IV fluids now.  Mildly hypervolemic  Currently requiring 2 L nasal cannula   36.5

## 2024-11-29 NOTE — ASSESSMENT & PLAN NOTE
CHARIS 5  Rapid response called on this patient due to precordial chest pain.  Bedside EKG nonischemic, showing atrial fibrillation  -Troponin remain flat  - has chronic hypertension and blood pressure is running soft, for that reason unable to provide any Nitrostat  -Discussed the case with cardiology, per cardiology, related to A-fib.  Does not recommend any aggressive treatment, recommending to monitor.  Family member updated over the phone.  Chest x-ray did not reveal any cardia pulmonary disease acutely.

## 2024-11-29 NOTE — PROGRESS NOTES
Pt continues with very poor oral intake/lack of appetite. 0% meal completions, refusing meals. Ensure untouched. Attempted discussion with pt though unavailable. Noted to be refusing trials with SLP.     Consider addition of appetite stimulant if medically appropriate given malnutrition and very poor po intake.   Continue regular diet, liberalize fluid restriction as able.   Can maintain supplements as ordered which would be appropriate for pt.

## 2024-11-29 NOTE — ASSESSMENT & PLAN NOTE
BP is at goal.   Rx: Midodrine 15 mg TID. Metoprolol 25 mg BID.   Torsemide 60 mg BID stopped on 11/25/24 due to volume depletion.   Would continue to keep off Torsemide for now given ongoing diarrhea.

## 2024-11-29 NOTE — PLAN OF CARE
Problem: Prexisting or High Potential for Compromised Skin Integrity  Goal: Skin integrity is maintained or improved  Description: INTERVENTIONS:  - Identify patients at risk for skin breakdown  - Assess and monitor skin integrity  - Assess and monitor nutrition and hydration status  - Monitor labs   - Assess for incontinence   - Turn and reposition patient  - Assist with mobility/ambulation  - Relieve pressure over bony prominences  - Avoid friction and shearing  - Provide appropriate hygiene as needed including keeping skin clean and dry  - Evaluate need for skin moisturizer/barrier cream  - Collaborate with interdisciplinary team   - Patient/family teaching  - Consider wound care consult   Outcome: Progressing     Problem: PAIN - ADULT  Goal: Verbalizes/displays adequate comfort level or baseline comfort level  Description: Interventions:  - Encourage patient to monitor pain and request assistance  - Assess pain using appropriate pain scale  - Administer analgesics based on type and severity of pain and evaluate response  - Implement non-pharmacological measures as appropriate and evaluate response  - Consider cultural and social influences on pain and pain management  - Notify physician/advanced practitioner if interventions unsuccessful or patient reports new pain  Outcome: Progressing     Problem: INFECTION - ADULT  Goal: Absence or prevention of progression during hospitalization  Description: INTERVENTIONS:  - Assess and monitor for signs and symptoms of infection  - Monitor lab/diagnostic results  - Monitor all insertion sites, i.e. indwelling lines, tubes, and drains  - Monitor endotracheal if appropriate and nasal secretions for changes in amount and color  - [place] appropriate cooling/warming therapies per order  - Administer medications as ordered  - Instruct and encourage patient and family to use good hand hygiene technique  - Identify and instruct in appropriate isolation precautions for  identified infection/condition  Outcome: Progressing  Goal: Absence of fever/infection during neutropenic period  Description: INTERVENTIONS:  - Monitor WBC    Outcome: Progressing     Problem: SAFETY ADULT  Goal: Patient will remain free of falls  Description: INTERVENTIONS:  - Educate patient/family on patient safety including physical limitations  - Instruct patient to call for assistance with activity   - Consult OT/PT to assist with strengthening/mobility   - Keep Call bell within reach  - Keep bed low and locked with side rails adjusted as appropriate  - Keep care items and personal belongings within reach  - Initiate and maintain comfort rounds  - Make Fall Risk Sign visible to staff  - Offer Toileting every 2 Hours, in advance of need  - Initiate/Maintain bed/chair alarm  - Obtain necessary fall risk management equipment  - Apply yellow socks and bracelet for high fall risk patients  - Consider moving patient to room near nurses station  Outcome: Progressing  Goal: Maintain or return to baseline ADL function  Description: INTERVENTIONS:  -  Assess patient's ability to carry out ADLs; assess patient's baseline for ADL function and identify physical deficits which impact ability to perform ADLs (bathing, care of mouth/teeth, toileting, grooming, dressing, etc.)  - Assess/evaluate cause of self-care deficits   - Assess range of motion  - Assess patient's mobility; develop plan if impaired  - Assess patient's need for assistive devices and provide as appropriate  - Encourage maximum independence but intervene and supervise when necessary  - Involve family in performance of ADLs  - Assess for home care needs following discharge   - Consider OT consult to assist with ADL evaluation and planning for discharge  - Provide patient education as appropriate  Outcome: Progressing  Goal: Maintains/Returns to pre admission functional level  Description: INTERVENTIONS:  - Perform AM-PAC 6 Click Basic Mobility/ Daily  Activity assessment daily.  - Set and communicate daily mobility goal to care team and patient/family/caregiver.   - Collaborate with rehabilitation services on mobility goals if consulted  - Perform Range of Motion 3 times a day.  - Reposition patient every 2 hours.  - Dangle patient 3 times a day  - Stand patient 3 times a day  - Ambulate patient 3 times a day  - Out of bed to chair 3 times a day   - Out of bed for meals 3 times a day  - Out of bed for toileting  - Record patient progress and toleration of activity level   Outcome: Progressing     Problem: DISCHARGE PLANNING  Goal: Discharge to home or other facility with appropriate resources  Description: INTERVENTIONS:  - Identify barriers to discharge w/patient and caregiver  - Arrange for needed discharge resources and transportation as appropriate  - Identify discharge learning needs (meds, wound care, etc.)  - Arrange for interpretive services to assist at discharge as needed  - Refer to Case Management Department for coordinating discharge planning if the patient needs post-hospital services based on physician/advanced practitioner order or complex needs related to functional status, cognitive ability, or social support system  Outcome: Progressing     Problem: Knowledge Deficit  Goal: Patient/family/caregiver demonstrates understanding of disease process, treatment plan, medications, and discharge instructions  Description: Complete learning assessment and assess knowledge base.  Interventions:  - Provide teaching at level of understanding  - Provide teaching via preferred learning methods  Outcome: Progressing     Problem: CARDIOVASCULAR - ADULT  Goal: Maintains optimal cardiac output and hemodynamic stability  Description: INTERVENTIONS:  - Monitor I/O, vital signs and rhythm  - Monitor for S/S and trends of decreased cardiac output  - Administer and titrate ordered vasoactive medications to optimize hemodynamic stability  - Assess quality of pulses,  skin color and temperature  - Assess for signs of decreased coronary artery perfusion  - Instruct patient to report change in severity of symptoms  Outcome: Progressing  Goal: Absence of cardiac dysrhythmias or at baseline rhythm  Description: INTERVENTIONS:  - Continuous cardiac monitoring, vital signs, obtain 12 lead EKG if ordered  - Administer antiarrhythmic and heart rate control medications as ordered  - Monitor electrolytes and administer replacement therapy as ordered  Outcome: Progressing     Problem: RESPIRATORY - ADULT  Goal: Achieves optimal ventilation and oxygenation  Description: INTERVENTIONS:  - Assess for changes in respiratory status  - Assess for changes in mentation and behavior  - Position to facilitate oxygenation and minimize respiratory effort  - Oxygen administered by appropriate delivery if ordered  - Initiate smoking cessation education as indicated  - Encourage broncho-pulmonary hygiene including cough, deep breathe, Incentive Spirometry  - Assess the need for suctioning and aspirate as needed  - Assess and instruct to report SOB or any respiratory difficulty  - Respiratory Therapy support as indicated  Outcome: Progressing     Problem: METABOLIC, FLUID AND ELECTROLYTES - ADULT  Goal: Electrolytes maintained within normal limits  Description: INTERVENTIONS:  - Monitor labs and assess patient for signs and symptoms of electrolyte imbalances  - Administer electrolyte replacement as ordered  - Monitor response to electrolyte replacements, including repeat lab results as appropriate  - Instruct patient on fluid and nutrition as appropriate  Outcome: Progressing  Goal: Fluid balance maintained  Description: INTERVENTIONS:  - Monitor labs   - Monitor I/O and WT  - Instruct patient on fluid and nutrition as appropriate  - Assess for signs & symptoms of volume excess or deficit  Outcome: Progressing  Goal: Glucose maintained within target range  Description: INTERVENTIONS:  - Monitor Blood  Glucose as ordered  - Assess for signs and symptoms of hyperglycemia and hypoglycemia  - Administer ordered medications to maintain glucose within target range  - Assess nutritional intake and initiate nutrition service referral as needed  Outcome: Progressing     Problem: Nutrition/Hydration-ADULT  Goal: Nutrient/Hydration intake appropriate for improving, restoring or maintaining nutritional needs  Description: Monitor and assess patient's nutrition/hydration status for malnutrition. Collaborate with interdisciplinary team and initiate plan and interventions as ordered.  Monitor patient's weight and dietary intake as ordered or per policy. Utilize nutrition screening tool and intervene as necessary. Determine patient's food preferences and provide high-protein, high-caloric foods as appropriate.     INTERVENTIONS:  - Monitor oral intake, urinary output, labs, and treatment plans  - Assess nutrition and hydration status and recommend course of action  - Evaluate amount of meals eaten  - Assist patient with eating if necessary   - Allow adequate time for meals  - Recommend/ encourage appropriate diets, oral nutritional supplements, and vitamin/mineral supplements  - Order, calculate, and assess calorie counts as needed  - Recommend, monitor, and adjust tube feedings and TPN/PPN based on assessed needs  - Assess need for intravenous fluids  - Provide specific nutrition/hydration education as appropriate  - Include patient/family/caregiver in decisions related to nutrition  Outcome: Progressing

## 2024-11-29 NOTE — RAPID RESPONSE
Goal Outcome Evaluation:      VSS. Pt denies pain, nausea, vomiting. Tolerating PO intake well. Reports SOB on exertion. Continues to be on 2LPM supplemental oxygen through NC to maintain oxygen saturation >88%. Denies any needs at this time.                                          Rapid Response Note  Masoud Perry 69 y.o. male MRN: [id number]  Unit/Bed#: -01 Encounter: [id number]    Rapid Response Notification(s):   Response called date/time:  11/29/2024 1:12 PM  Response team arrival date/time:  11/29/2024 1:13 PM  Response end date/time:  11/29/2024 1:22 PM  [facility]  [facility]  Rapid response location:  [facility] unit  Primary reason for rapid response call:  Chest pain    Rapid Response Intervention(s):   Airway:  None  Breathing:  None  Circulation:  None  Fluids administered:  None  Medications administered:  None       Assessment:   Costochondritis     Plan:   CXR  Follow up trop/BNP and LA  Tessalon perles   Mucinex   Consider ECHO if trops elevated      Rapid Response Outcome:   Transfer:  Remain on floor  Primary service notified of transfer: Yes    Code Status: Level 3 (DNAR and DNI)      Family notified: Primary team to notify Family Member       Background/Situation:   Masoud Perry is a 69 y.o. male who was an RRT due to chest pain. Patient noted to has been admitted due to COVID and Cdiff. HR 110s, /50s, sating 100% on 2L NC. HD today and removed 1L. EKG unremarkable from previous. CXR wet read revealing improvement of pleural effusion and atelectasis from a week ago. Chest pain reproducible when palpating over chest wall where he was explaining pain was located. Patient states he has been coughing hard for a couple days now with pain also noted in upper back. SLIM at bedside. Patient DNR/DNI. Low suspicion for worsening covid or cardiac in nature. See plan above    Review of Systems   Constitutional: Negative.    HENT: Negative.     Respiratory:  Positive for cough and shortness of breath.    Cardiovascular:  Positive for chest pain and leg swelling.   Gastrointestinal:  Negative for abdominal distention.   Endocrine: Negative.    Genitourinary:         HD patient   Musculoskeletal:  Positive for back pain.   Skin: Negative.    Allergic/Immunologic: Negative.     Neurological: Negative.    Hematological: Negative.    Psychiatric/Behavioral: Negative.         Objective:   Vitals:    11/29/24 1258 11/29/24 1316 11/29/24 1320 11/29/24 1320   BP: 106/62 101/52 102/55 102/55   BP Location:       Pulse: 76 78 83 83   Resp:       Temp:       TempSrc:       SpO2: 95% 99% 100%    Weight:       Height:         Physical Exam  Constitutional:       General: He is not in acute distress.     Appearance: Normal appearance. He is ill-appearing and toxic-appearing.   HENT:      Head: Normocephalic.      Mouth/Throat:      Mouth: Mucous membranes are dry.   Eyes:      Extraocular Movements: Extraocular movements intact.      Pupils: Pupils are equal, round, and reactive to light.   Cardiovascular:      Rate and Rhythm: Regular rhythm. Tachycardia present.      Pulses: Normal pulses.      Heart sounds: Normal heart sounds.   Pulmonary:      Effort: Pulmonary effort is normal.      Breath sounds: Normal breath sounds.   Musculoskeletal:      Right lower leg: Edema present.      Left lower leg: Edema present.   Skin:     General: Skin is dry.      Capillary Refill: Capillary refill takes less than 2 seconds.      Coloration: Skin is pale.   Neurological:      General: No focal deficit present.      Mental Status: He is alert and oriented to person, place, and time. Mental status is at baseline.   Psychiatric:      Comments: Withdrawn

## 2024-11-30 ENCOUNTER — APPOINTMENT (INPATIENT)
Dept: DIALYSIS | Facility: HOSPITAL | Age: 69
DRG: 177 | End: 2024-11-30
Attending: INTERNAL MEDICINE
Payer: COMMERCIAL

## 2024-11-30 PROBLEM — R30.0 DYSURIA: Status: ACTIVE | Noted: 2024-11-30

## 2024-11-30 LAB
ALBUMIN SERPL BCG-MCNC: 2.5 G/DL (ref 3.5–5)
ALP SERPL-CCNC: 56 U/L (ref 34–104)
ALT SERPL W P-5'-P-CCNC: 4 U/L (ref 7–52)
ANION GAP SERPL CALCULATED.3IONS-SCNC: 6 MMOL/L (ref 4–13)
ANISOCYTOSIS BLD QL SMEAR: PRESENT
AST SERPL W P-5'-P-CCNC: 11 U/L (ref 13–39)
BASO STIPL BLD QL SMEAR: PRESENT
BASOPHILS # BLD MANUAL: 0 THOUSAND/UL (ref 0–0.1)
BASOPHILS NFR MAR MANUAL: 0 % (ref 0–1)
BILIRUB SERPL-MCNC: 0.65 MG/DL (ref 0.2–1)
BILIRUB UR QL STRIP: ABNORMAL
BUN SERPL-MCNC: 22 MG/DL (ref 5–25)
CALCIUM ALBUM COR SERPL-MCNC: 9.8 MG/DL (ref 8.3–10.1)
CALCIUM SERPL-MCNC: 8.6 MG/DL (ref 8.4–10.2)
CHLORIDE SERPL-SCNC: 99 MMOL/L (ref 96–108)
CLARITY UR: CLEAR
CO2 SERPL-SCNC: 27 MMOL/L (ref 21–32)
COLOR UR: YELLOW
CREAT SERPL-MCNC: 2.06 MG/DL (ref 0.6–1.3)
EOSINOPHIL # BLD MANUAL: 0 THOUSAND/UL (ref 0–0.4)
EOSINOPHIL NFR BLD MANUAL: 0 % (ref 0–6)
ERYTHROCYTE [DISTWIDTH] IN BLOOD BY AUTOMATED COUNT: 14.5 % (ref 11.6–15.1)
GFR SERPL CREATININE-BSD FRML MDRD: 31 ML/MIN/1.73SQ M
GLUCOSE SERPL-MCNC: 73 MG/DL (ref 65–140)
GLUCOSE SERPL-MCNC: 77 MG/DL (ref 65–140)
GLUCOSE SERPL-MCNC: 81 MG/DL (ref 65–140)
GLUCOSE SERPL-MCNC: 84 MG/DL (ref 65–140)
GLUCOSE SERPL-MCNC: 86 MG/DL (ref 65–140)
GLUCOSE UR STRIP-MCNC: NEGATIVE MG/DL
HCT VFR BLD AUTO: 29.3 % (ref 36.5–49.3)
HGB BLD-MCNC: 9.2 G/DL (ref 12–17)
HGB UR QL STRIP.AUTO: NEGATIVE
HYPERCHROMIA BLD QL SMEAR: PRESENT
INR PPP: 1.19 (ref 0.85–1.19)
KETONES UR STRIP-MCNC: ABNORMAL MG/DL
LEUKOCYTE ESTERASE UR QL STRIP: NEGATIVE
LYMPHOCYTES # BLD AUTO: 0.35 THOUSAND/UL (ref 0.6–4.47)
LYMPHOCYTES # BLD AUTO: 6 % (ref 14–44)
MACROCYTES BLD QL AUTO: PRESENT
MCH RBC QN AUTO: 31.5 PG (ref 26.8–34.3)
MCHC RBC AUTO-ENTMCNC: 31.4 G/DL (ref 31.4–37.4)
MCV RBC AUTO: 100 FL (ref 82–98)
MONOCYTES # BLD AUTO: 0.15 THOUSAND/UL (ref 0–1.22)
MONOCYTES NFR BLD: 3 % (ref 4–12)
NEUTROPHILS # BLD MANUAL: 4.51 THOUSAND/UL (ref 1.85–7.62)
NEUTS BAND NFR BLD MANUAL: 1 % (ref 0–8)
NEUTS SEG NFR BLD AUTO: 89 % (ref 43–75)
NITRITE UR QL STRIP: NEGATIVE
OVALOCYTES BLD QL SMEAR: PRESENT
PH UR STRIP.AUTO: 6 [PH]
PLATELET # BLD AUTO: 75 THOUSANDS/UL (ref 149–390)
PLATELET BLD QL SMEAR: ABNORMAL
PMV BLD AUTO: 9.1 FL (ref 8.9–12.7)
POLYCHROMASIA BLD QL SMEAR: PRESENT
POTASSIUM SERPL-SCNC: 3.8 MMOL/L (ref 3.5–5.3)
PROT SERPL-MCNC: 4.5 G/DL (ref 6.4–8.4)
PROT UR STRIP-MCNC: ABNORMAL MG/DL
PROTHROMBIN TIME: 15.5 SECONDS (ref 12.3–15)
RBC # BLD AUTO: 2.92 MILLION/UL (ref 3.88–5.62)
SODIUM SERPL-SCNC: 132 MMOL/L (ref 135–147)
SP GR UR STRIP.AUTO: 1.01 (ref 1–1.03)
UROBILINOGEN UR QL STRIP.AUTO: 0.2 E.U./DL
VARIANT LYMPHS # BLD AUTO: 1 %
WBC # BLD AUTO: 5.01 THOUSAND/UL (ref 4.31–10.16)

## 2024-11-30 PROCEDURE — 80053 COMPREHEN METABOLIC PANEL: CPT | Performed by: FAMILY MEDICINE

## 2024-11-30 PROCEDURE — 85027 COMPLETE CBC AUTOMATED: CPT | Performed by: FAMILY MEDICINE

## 2024-11-30 PROCEDURE — 99232 SBSQ HOSP IP/OBS MODERATE 35: CPT | Performed by: FAMILY MEDICINE

## 2024-11-30 PROCEDURE — 99233 SBSQ HOSP IP/OBS HIGH 50: CPT

## 2024-11-30 PROCEDURE — 85007 BL SMEAR W/DIFF WBC COUNT: CPT | Performed by: FAMILY MEDICINE

## 2024-11-30 PROCEDURE — 81001 URINALYSIS AUTO W/SCOPE: CPT

## 2024-11-30 PROCEDURE — 82948 REAGENT STRIP/BLOOD GLUCOSE: CPT

## 2024-11-30 PROCEDURE — 85610 PROTHROMBIN TIME: CPT | Performed by: FAMILY MEDICINE

## 2024-11-30 RX ORDER — WARFARIN SODIUM 2 MG/1
2 TABLET ORAL
Status: DISCONTINUED | OUTPATIENT
Start: 2024-11-30 | End: 2024-12-10

## 2024-11-30 RX ADMIN — HYDROCODONE BITARTRATE AND ACETAMINOPHEN 2 TABLET: 5; 325 TABLET ORAL at 23:47

## 2024-11-30 RX ADMIN — MIDODRINE HYDROCHLORIDE 15 MG: 5 TABLET ORAL at 17:19

## 2024-11-30 RX ADMIN — PYRIDOSTIGMINE BROMIDE 60 MG: 60 TABLET ORAL at 23:15

## 2024-11-30 RX ADMIN — VANCOMYCIN HYDROCHLORIDE 125 MG: 125 CAPSULE ORAL at 05:59

## 2024-11-30 RX ADMIN — DEXAMETHASONE SODIUM PHOSPHATE 6 MG: 10 INJECTION, SOLUTION INTRAMUSCULAR; INTRAVENOUS at 00:51

## 2024-11-30 RX ADMIN — PYRIDOSTIGMINE BROMIDE 60 MG: 60 TABLET ORAL at 12:22

## 2024-11-30 RX ADMIN — VANCOMYCIN HYDROCHLORIDE 125 MG: 125 CAPSULE ORAL at 17:18

## 2024-11-30 RX ADMIN — PYRIDOSTIGMINE BROMIDE 60 MG: 60 TABLET ORAL at 17:18

## 2024-11-30 RX ADMIN — HYDROCODONE BITARTRATE AND ACETAMINOPHEN 2 TABLET: 5; 325 TABLET ORAL at 12:36

## 2024-11-30 RX ADMIN — SEVELAMER HYDROCHLORIDE 800 MG: 800 TABLET, FILM COATED ORAL at 12:23

## 2024-11-30 RX ADMIN — GUAIFENESIN 600 MG: 600 TABLET ORAL at 23:15

## 2024-11-30 RX ADMIN — GUAIFENESIN 600 MG: 600 TABLET ORAL at 12:23

## 2024-11-30 RX ADMIN — METOPROLOL TARTRATE 25 MG: 25 TABLET, FILM COATED ORAL at 23:27

## 2024-11-30 RX ADMIN — VANCOMYCIN HYDROCHLORIDE 125 MG: 125 CAPSULE ORAL at 23:15

## 2024-11-30 RX ADMIN — LEVOTHYROXINE SODIUM 125 MCG: 125 TABLET ORAL at 05:59

## 2024-11-30 RX ADMIN — FINASTERIDE 5 MG: 5 TABLET, FILM COATED ORAL at 12:25

## 2024-11-30 RX ADMIN — ATORVASTATIN CALCIUM 40 MG: 40 TABLET, FILM COATED ORAL at 17:18

## 2024-11-30 RX ADMIN — MIDODRINE HYDROCHLORIDE 15 MG: 5 TABLET ORAL at 06:05

## 2024-11-30 RX ADMIN — MIDODRINE HYDROCHLORIDE 15 MG: 5 TABLET ORAL at 12:23

## 2024-11-30 RX ADMIN — WARFARIN SODIUM 2 MG: 2 TABLET ORAL at 23:15

## 2024-11-30 RX ADMIN — VANCOMYCIN HYDROCHLORIDE 125 MG: 125 CAPSULE ORAL at 12:23

## 2024-11-30 RX ADMIN — VANCOMYCIN HYDROCHLORIDE 125 MG: 125 CAPSULE ORAL at 00:51

## 2024-11-30 RX ADMIN — METOPROLOL TARTRATE 25 MG: 25 TABLET, FILM COATED ORAL at 12:23

## 2024-11-30 NOTE — PROGRESS NOTES
Progress Note - Nephrology   Name: Masoud Perry 69 y.o. male I MRN: [id number]  Unit/Bed#: [facility]-01 I Date of Admission: 11/20/2024   Date of Service: 11/30/2024 I Hospital Day: 10    Assessment & Plan  ESRD (end stage renal disease) on dialysis (HCC)  He was on PD but was changed to back up iHD during admission to [facility] in November 2024. Started iHD on 11/8/24.   [facility].  Previous EDW 98.5 kg - challenged this hospital admission, most recent pot weight today 92.4 kg.   Access: R IJ permcath. PD catheter in place.   Sodium 132 mmol/L, otherwise electrolytes stable today, 11/30.   HD today for 1.25L UF. States he had mild abdominal cramping near end of tx.   Next HD is Tuesday, 12/3 on regular TTS schedule.     Hemodialysis-associated hypotension  BP is at goal, -134 on 11/30.   Rx: Midodrine 15 mg TID. Metoprolol 25 mg BID.   Torsemide 60 mg BID stopped on 11/25/24 due to volume depletion.   Would continue to hold Torsemide for now given ongoing diarrhea and anorexia.     Acute on chronic diastolic HF (heart failure) ([facility])  7/29/24 Echo: EF 75%, G1DD  Was fluid overloaded on admission and better now.   EDW is 98.5 kg - being challenged. Will need to adjust EDW OP.   BP stable, -134 today, 11/30.  PreHD weight today is 93.8 kg. Post weight 92.4 kg, 11/30.    Anemia due to chronic kidney disease, on chronic dialysis (HCC)  Hemoglobin is near goal - 9.2 today, 11/30.   Epogen being held in setting of COVID-19 infection.    Secondary hyperparathyroidism of renal origin (HCC)  Continue Sensipar 30 mg 3/week.   Ca is stable - 8.6 today.     Electrolyte imbalance risk  Hyponatremia  Na acceptable at 132 today.   Monitor with HD.     Hyperphosphatemia  Continue sevelamer 800 mg TID.   Phos is at goal, 2.4 on 11/27.     COVID-19  Treatment of COVID-19 per primary team    Paroxysmal atrial fibrillation (HCC)  Currently on Metoprolol 25 mg BID.     Diarrhea of infectious origin  Continue oral  "vancomycin for C. difficile colitis.  Still with diarrhea.     Acute respiratory insufficiency  Treatment for COVID-19 per primary team.  There was concern for volume overload which is better now.      Pancytopenia (HCC)  Per primary team  Precordial pain    Dysuria  Patient states he is experiencing dysuria which started with onset of COVID.  Recent UA unremarkable 11/25.  Patient states dysuria has worsened since that time.  Will repeat UA.    Subjective   Brief History of Admission -69-year-old male with a PMH of ESRD on hemodialysis, CHF, A-fib, factor V Leiden deficiency, HTN, CVA, diabetes who initially presents to [facility] 11/20 from [facility] nursing [facility] with concern for COVID-19 with progressively worsening shortness of breath and cough and oxygen requirements increasing.  Nephrology is consulted for ESRD on HD.  To note, patient was recently discharged from [facility] for colitis admission.      Patient is examined resting in bed via virtual visit with RN at bedside. States he continues to feel \"terrible\" with cough, mild SOB, decreased appetite, nausea, and diarrhea.       VIRTUAL CARE DOCUMENTATION:     1. This service was provided via Telemedicine using Teams Virtual Rounding      2. Parties in the room with patient during teleconsult RN    3. Confidentiality My office door was closed     4. Participants No one else was in the room    5. Patient acknowledged consent and understanding of privacy and security of the  Telemedicine consult. I informed the patient that I have reviewed their record in Epic and presented the opportunity for them to ask any questions regarding the visit today.  The patient agreed to participate.    6. Time spent 60       Objective :  Temp:  [97 °F (36.1 °C)-97.9 °F (36.6 °C)] 97.9 °F (36.6 °C)  HR:  [59-73] 64  BP: (101-178)/(55-96) 134/60  Resp:  [16-20] 18  SpO2:  [90 %-100 %] 100 %  O2 Device: Nasal cannula  Nasal Cannula O2 Flow Rate (L/min):  [1 L/min] 1 L/min    Current Weight: " Weight - Scale: 93.8 kg (206 lb 12.7 oz)  First Weight: Weight - Scale: 100 kg (220 lb 7.4 oz)  I/O         11/28 0701  11/29 0700 11/29 0701  11/30 0700 11/30 0701  12/01 0700    P.O. 0 0 0    I.V. (mL/kg)  450 (4.8) 450 (4.8)    Total Intake(mL/kg) 0 (0) 450 (4.8) 450 (4.8)    Urine (mL/kg/hr) 300 (0.1) 325 (0.1)     Other  1302 1850    Stool 0      Total Output 300 1627 1850    Net -300 -1177 -1400           Unmeasured Stool Occurrence 2 x            Physical Exam  Vitals and nursing note reviewed.   Constitutional:       General: He is not in acute distress.     Appearance: Normal appearance. He is well-developed and normal weight. He is ill-appearing.   HENT:      Head: Normocephalic and atraumatic.      Right Ear: External ear normal.      Left Ear: External ear normal.      Nose: Nose normal.      Mouth/Throat:      Mouth: Mucous membranes are moist.      Pharynx: Oropharynx is clear.   Eyes:      Extraocular Movements: Extraocular movements intact.      Conjunctiva/sclera: Conjunctivae normal.      Pupils: Pupils are equal, round, and reactive to light.   Cardiovascular:      Comments: Unable to assess with virtual visit, RN left room unable to comment on heart sounds  Pulmonary:      Effort: Pulmonary effort is normal.      Comments: Unable to assess via virtual visit, RN left room unable to comment on lung sounds  Abdominal:      Comments: Unable to assess via virtual visit. Continuing to have diarrhea.   Musculoskeletal:      Cervical back: Normal range of motion.      Right lower leg: No edema.      Left lower leg: No edema.      Comments: Unable to assess via virtual visit, patient states no edema noted   Skin:     Capillary Refill: Capillary refill takes less than 2 seconds.      Coloration: Skin is pale.   Neurological:      General: No focal deficit present.      Mental Status: He is alert and oriented to person, place, and time.   Psychiatric:         Mood and Affect: Mood normal.         Behavior:  Behavior normal.       Medications:    Current Facility-Administered Medications:     acetaminophen (TYLENOL) tablet 650 mg, 650 mg, Oral, Q4H PRN, Chao Rios PA-C    atorvastatin (LIPITOR) tablet 40 mg, 40 mg, Oral, Daily With Dinner, Chao Rios PA-C, 40 mg at 11/30/24 1718    benzonatate (TESSALON PERLES) capsule 100 mg, 100 mg, Oral, TID PRN, Miriam Tee MD, 100 mg at 11/29/24 1352    calcium carbonate (TUMS) chewable tablet 500 mg, 500 mg, Oral, Daily PRN, Moses Noel MD    cinacalcet (SENSIPAR) tablet 60 mg, 60 mg, Oral, Once per day on Monday Wednesday Friday, Jignesh Hussein MD, 60 mg at 11/29/24 1301    docusate sodium (COLACE) capsule 100 mg, 100 mg, Oral, BID PRN, Chao Rios PA-C    finasteride (PROSCAR) tablet 5 mg, 5 mg, Oral, Daily, Chao Rios PA-C, 5 mg at 11/30/24 1225    gentamicin (GARAMYCIN) 0.1 % cream, , Topical, Daily, Jesus Christine MD, Given at 11/29/24 0802    guaiFENesin (MUCINEX) 12 hr tablet 600 mg, 600 mg, Oral, Q12H VASU, Miriam Tee MD, 600 mg at 11/30/24 1223    HYDROcodone-acetaminophen (NORCO) 5-325 mg per tablet 2 tablet, 2 tablet, Oral, Q6H PRN, Chao Rios PA-C, 2 tablet at 11/30/24 1236    insulin lispro (HumALOG/ADMELOG) 100 units/mL subcutaneous injection 1-5 Units, 1-5 Units, Subcutaneous, HS, Chao Rios PA-C    insulin lispro (HumALOG/ADMELOG) 100 units/mL subcutaneous injection 1-6 Units, 1-6 Units, Subcutaneous, TID AC, 1 Units at 11/26/24 1236 **AND** Fingerstick Glucose (POCT), , , TID AC, Chao Rios PA-C    levothyroxine tablet 125 mcg, 125 mcg, Oral, Early Morning, Chao Rios PA-C, 125 mcg at 11/30/24 0559    metoprolol tartrate (LOPRESSOR) tablet 25 mg, 25 mg, Oral, Q12H [facility], Moses Noel MD, 25 mg at 11/30/24 1223    midodrine (PROAMATINE) tablet 15 mg, 15 mg, Oral, TID AC, Citlali Longx, CRNP, 15 mg at 11/30/24 1719    nicotine (NICODERM CQ) 7 mg/24hr TD 24 hr patch 7 mg, 7 mg, Transdermal, Daily, Chao  "CHRISTA Rios    ondansetron (ZOFRAN) injection 4 mg, 4 mg, Intravenous, Q6H PRN, Chao Rios PA-C, 4 mg at 11/25/24 1839    pyridostigmine (MESTINON) tablet 60 mg, 60 mg, Oral, TID, Chao Rios PA-C, 60 mg at 11/30/24 1718    sevelamer (RENAGEL) tablet 800 mg, 800 mg, Oral, TID With Meals, Jignesh Hussein MD, 800 mg at 11/30/24 1223    simethicone (MYLICON) chewable tablet 80 mg, 80 mg, Oral, 4x Daily PRN, Chao Rios PA-C    vancomycin (VANCOCIN) capsule 125 mg, 125 mg, Oral, Q6H [facility]AS[facility], Moses Noel MD, 125 mg at 11/30/24 1718    warfarin (COUMADIN) tablet 2 mg, 2 mg, Oral, HS, Moses Noel MD      Lab Results: I have reviewed the following results:  Results from last 7 days   Lab Units 11/30/24  0606 11/29/24  0623 11/28/24  0600 11/27/24  0539 11/26/24  1008 11/25/24  0958 11/24/24  0504   WBC Thousand/uL 5.01 4.76  --  5.93 6.77 3.62* 5.21   HEMOGLOBIN g/dL 9.2* 9.6*  --  10.5* 10.9* 9.5* 10.3*   HEMATOCRIT % 29.3* 31.0*  --  33.2* 34.9* 29.9* 33.5*   PLATELETS Thousands/uL 75* 91*  --  114* 155 108* 106*   POTASSIUM mmol/L 3.8 4.2 3.8 4.2 4.1 3.9 3.7   CHLORIDE mmol/L 99 100 99 99 97 97 96   CO2 mmol/L 27 28 32 31 31 29 28   BUN mg/dL 22 39* 33* 30* 25 32* 20   CREATININE mg/dL 2.06* 3.02* 2.78* 2.47* 2.22* 2.65* 2.11*   CALCIUM mg/dL 8.6 9.0 8.8 9.1 9.6 9.7 9.1   PHOSPHORUS mg/dL  --   --   --  2.4  --   --   --    ALBUMIN g/dL 2.5*  --   --   --  3.0* 3.0* 2.3*       Administrative Statements     Portions of the record may have been created with voice recognition software. Occasional wrong word or \"sound a like\" substitutions may have occurred due to the inherent limitations of voice recognition software. Read the chart carefully and recognize, using context, where substitutions have occurred.If you have any questions, please contact the dictating provider.  "

## 2024-11-30 NOTE — ASSESSMENT & PLAN NOTE
Lab Results   Component Value Date    HGBA1C 5.0 11/02/2024       Recent Labs     11/29/24  1314 11/29/24  1635 11/29/24  2104 11/30/24  0718   POCGLU 107 82 73 86       Blood Sugar Average: Last 72 hrs:  (P) [id number]  Sliding scale insulin. Hypoglycemia protocol.

## 2024-11-30 NOTE — ASSESSMENT & PLAN NOTE
CHARIS 5  Rapid response called on this patient due to precordial chest pain.  Bedside EKG nonischemic, showing atrial fibrillation  -Troponin remain flat  - has chronic hypertension and blood pressure is running soft, for that reason unable to provide any Nitrostat  -Discussed the case with cardiology, per cardiology, related to A-fib.  Does not recommend any aggressive treatment, recommending to monitor.  Family member updated over the phone.  Chest x-ray did not reveal any cardia pulmonary disease acutely.  Appreciate cardiology recommendation.

## 2024-11-30 NOTE — PLAN OF CARE
Target UF Goal 2 L as tolerated. Patient dialyzing for 3 hours on 4 K bath for serum K of  3.8  per protocol. Treatment plan reviewed with Nephrology.       Post-Dialysis RN Treatment Note    Blood Pressure:  Pre 163/82 mm/Hg  Post 112/96 mmHg   EDW  98.5 kg    Weight:  Pre 93.8 kg   Post 92.4 kg   Mode of weight measurement: Bed Scale   Volume Removed  1250 ml    Treatment duration 3 hours   NS given  No    Treatment shortened? No   Medications given during Rx None Reported   Estimated Kt/V  1.28   Access type: Permacath/TDC   Access Issues: Yes, describe: some venous resistance noted, but did not affect tx    Needle Gauge: N/A   Report called to primary nurse   Yes, Huong RN at bedside        Problem: METABOLIC, FLUID AND ELECTROLYTES - ADULT  Goal: Electrolytes maintained within normal limits  Description: INTERVENTIONS:  - Monitor labs and assess patient for signs and symptoms of electrolyte imbalances  - Administer electrolyte replacement as ordered  - Monitor response to electrolyte replacements, including repeat lab results as appropriate  - Instruct patient on fluid and nutrition as appropriate  Outcome: Progressing  Goal: Fluid balance maintained  Description: INTERVENTIONS:  - Monitor labs   - Monitor I/O and WT  - Instruct patient on fluid and nutrition as appropriate  - Assess for signs & symptoms of volume excess or deficit  Outcome: Progressing

## 2024-11-30 NOTE — ASSESSMENT & PLAN NOTE
Presents from [place]. Tested positive 3 days prior to admission when symptoms started  CT chest negative for any pathology  Started decadron and remdesevir, doxycycline  Patient already on Coumadin, INR is supratherapeutic, remain on hold  On clinical exam, patient is very fatigued and tired and sleepy but arousable and answer questions.  Completed treatment with doxycycline and remdesivir.  Patient also having diarrhea, and C. difficile showing carrier state, continue p.o. vancomycin  Currently 1-2 L nasal cannula oxygen being utilized

## 2024-11-30 NOTE — ASSESSMENT & PLAN NOTE
Patient is maintained on Coumadin PTA  INR is supratherapeutic-Coumadin on hold  Patient has not received dialysis since Monday 11/25-INR increasing from day to day  Patient is status post vitamin K therapy with 2.5 mg on 11/28/2024-INR is subtherapeutic.  Today INR is 1.19.  Patient Coumadin increased to 2 mg.  Lab Results   Component Value Date    INR 1.19 11/30/2024    INR 1.85 (H) 11/29/2024    INR 6.92 (HH) 11/28/2024

## 2024-11-30 NOTE — ASSESSMENT & PLAN NOTE
Continue metoprolol if blood pressure tolerates  During the hospitalization, at 1 point, patient INR was supratherapeutic, received 1 dose of vitamin K.  Post vitamin K therapy patient INR is subtherapeutic.  Patient restarted on Coumadin.

## 2024-11-30 NOTE — PLAN OF CARE
Problem: Prexisting or High Potential for Compromised Skin Integrity  Goal: Skin integrity is maintained or improved  Description: INTERVENTIONS:  - Identify patients at risk for skin breakdown  - Assess and monitor skin integrity  - Assess and monitor nutrition and hydration status  - Monitor labs   - Assess for incontinence   - Turn and reposition patient  - Assist with mobility/ambulation  - Relieve pressure over bony prominences  - Avoid friction and shearing  - Provide appropriate hygiene as needed including keeping skin clean and dry  - Evaluate need for skin moisturizer/barrier cream  - Collaborate with interdisciplinary team   - Patient/family teaching  - Consider wound care consult   Outcome: Progressing     Problem: PAIN - ADULT  Goal: Verbalizes/displays adequate comfort level or baseline comfort level  Description: Interventions:  - Encourage patient to monitor pain and request assistance  - Assess pain using appropriate pain scale  - Administer analgesics based on type and severity of pain and evaluate response  - Implement non-pharmacological measures as appropriate and evaluate response  - Consider cultural and social influences on pain and pain management  - Notify physician/advanced practitioner if interventions unsuccessful or patient reports new pain  Outcome: Progressing     Problem: INFECTION - ADULT  Goal: Absence or prevention of progression during hospitalization  Description: INTERVENTIONS:  - Assess and monitor for signs and symptoms of infection  - Monitor lab/diagnostic results  - Monitor all insertion sites, i.e. indwelling lines, tubes, and drains  - Monitor endotracheal if appropriate and nasal secretions for changes in amount and color  - [place] appropriate cooling/warming therapies per order  - Administer medications as ordered  - Instruct and encourage patient and family to use good hand hygiene technique  - Identify and instruct in appropriate isolation precautions for  identified infection/condition  Outcome: Progressing  Goal: Absence of fever/infection during neutropenic period  Description: INTERVENTIONS:  - Monitor WBC    Outcome: Progressing     Problem: SAFETY ADULT  Goal: Patient will remain free of falls  Description: INTERVENTIONS:  - Educate patient/family on patient safety including physical limitations  - Instruct patient to call for assistance with activity   - Consult OT/PT to assist with strengthening/mobility   - Keep Call bell within reach  - Keep bed low and locked with side rails adjusted as appropriate  - Keep care items and personal belongings within reach  - Initiate and maintain comfort rounds  - Make Fall Risk Sign visible to staff  - Offer Toileting every 2 Hours, in advance of need  - Initiate/Maintain bed/chair alarm  - Obtain necessary fall risk management equipment  - Apply yellow socks and bracelet for high fall risk patients  - Consider moving patient to room near nurses station  Outcome: Progressing  Goal: Maintain or return to baseline ADL function  Description: INTERVENTIONS:  -  Assess patient's ability to carry out ADLs; assess patient's baseline for ADL function and identify physical deficits which impact ability to perform ADLs (bathing, care of mouth/teeth, toileting, grooming, dressing, etc.)  - Assess/evaluate cause of self-care deficits   - Assess range of motion  - Assess patient's mobility; develop plan if impaired  - Assess patient's need for assistive devices and provide as appropriate  - Encourage maximum independence but intervene and supervise when necessary  - Involve family in performance of ADLs  - Assess for home care needs following discharge   - Consider OT consult to assist with ADL evaluation and planning for discharge  - Provide patient education as appropriate  Outcome: Progressing  Goal: Maintains/Returns to pre admission functional level  Description: INTERVENTIONS:  - Perform AM-PAC 6 Click Basic Mobility/ Daily  Activity assessment daily.  - Set and communicate daily mobility goal to care team and patient/family/caregiver.   - Collaborate with rehabilitation services on mobility goals if consulted  - Perform Range of Motion 3 times a day.  - Reposition patient every 2 hours.  - Dangle patient 3 times a day  - Stand patient 3 times a day  - Ambulate patient 3 times a day  - Out of bed to chair 3 times a day   - Out of bed for meals 3 times a day  - Out of bed for toileting  - Record patient progress and toleration of activity level   Outcome: Progressing     Problem: DISCHARGE PLANNING  Goal: Discharge to home or other facility with appropriate resources  Description: INTERVENTIONS:  - Identify barriers to discharge w/patient and caregiver  - Arrange for needed discharge resources and transportation as appropriate  - Identify discharge learning needs (meds, wound care, etc.)  - Arrange for interpretive services to assist at discharge as needed  - Refer to Case Management Department for coordinating discharge planning if the patient needs post-hospital services based on physician/advanced practitioner order or complex needs related to functional status, cognitive ability, or social support system  Outcome: Progressing     Problem: Knowledge Deficit  Goal: Patient/family/caregiver demonstrates understanding of disease process, treatment plan, medications, and discharge instructions  Description: Complete learning assessment and assess knowledge base.  Interventions:  - Provide teaching at level of understanding  - Provide teaching via preferred learning methods  Outcome: Progressing     Problem: CARDIOVASCULAR - ADULT  Goal: Maintains optimal cardiac output and hemodynamic stability  Description: INTERVENTIONS:  - Monitor I/O, vital signs and rhythm  - Monitor for S/S and trends of decreased cardiac output  - Administer and titrate ordered vasoactive medications to optimize hemodynamic stability  - Assess quality of pulses,  skin color and temperature  - Assess for signs of decreased coronary artery perfusion  - Instruct patient to report change in severity of symptoms  Outcome: Progressing  Goal: Absence of cardiac dysrhythmias or at baseline rhythm  Description: INTERVENTIONS:  - Continuous cardiac monitoring, vital signs, obtain 12 lead EKG if ordered  - Administer antiarrhythmic and heart rate control medications as ordered  - Monitor electrolytes and administer replacement therapy as ordered  Outcome: Progressing     Problem: RESPIRATORY - ADULT  Goal: Achieves optimal ventilation and oxygenation  Description: INTERVENTIONS:  - Assess for changes in respiratory status  - Assess for changes in mentation and behavior  - Position to facilitate oxygenation and minimize respiratory effort  - Oxygen administered by appropriate delivery if ordered  - Initiate smoking cessation education as indicated  - Encourage broncho-pulmonary hygiene including cough, deep breathe, Incentive Spirometry  - Assess the need for suctioning and aspirate as needed  - Assess and instruct to report SOB or any respiratory difficulty  - Respiratory Therapy support as indicated  Outcome: Progressing     Problem: METABOLIC, FLUID AND ELECTROLYTES - ADULT  Goal: Electrolytes maintained within normal limits  Description: INTERVENTIONS:  - Monitor labs and assess patient for signs and symptoms of electrolyte imbalances  - Administer electrolyte replacement as ordered  - Monitor response to electrolyte replacements, including repeat lab results as appropriate  - Instruct patient on fluid and nutrition as appropriate  Outcome: Progressing  Goal: Fluid balance maintained  Description: INTERVENTIONS:  - Monitor labs   - Monitor I/O and WT  - Instruct patient on fluid and nutrition as appropriate  - Assess for signs & symptoms of volume excess or deficit  Outcome: Progressing  Goal: Glucose maintained within target range  Description: INTERVENTIONS:  - Monitor Blood  Glucose as ordered  - Assess for signs and symptoms of hyperglycemia and hypoglycemia  - Administer ordered medications to maintain glucose within target range  - Assess nutritional intake and initiate nutrition service referral as needed  Outcome: Progressing     Problem: Nutrition/Hydration-ADULT  Goal: Nutrient/Hydration intake appropriate for improving, restoring or maintaining nutritional needs  Description: Monitor and assess patient's nutrition/hydration status for malnutrition. Collaborate with interdisciplinary team and initiate plan and interventions as ordered.  Monitor patient's weight and dietary intake as ordered or per policy. Utilize nutrition screening tool and intervene as necessary. Determine patient's food preferences and provide high-protein, high-caloric foods as appropriate.     INTERVENTIONS:  - Monitor oral intake, urinary output, labs, and treatment plans  - Assess nutrition and hydration status and recommend course of action  - Evaluate amount of meals eaten  - Assist patient with eating if necessary   - Allow adequate time for meals  - Recommend/ encourage appropriate diets, oral nutritional supplements, and vitamin/mineral supplements  - Order, calculate, and assess calorie counts as needed  - Recommend, monitor, and adjust tube feedings and TPN/PPN based on assessed needs  - Assess need for intravenous fluids  - Provide specific nutrition/hydration education as appropriate  - Include patient/family/caregiver in decisions related to nutrition  Outcome: Progressing

## 2024-11-30 NOTE — ASSESSMENT & PLAN NOTE
Oxygen needs have waxed and waned  CT chest abdomen pelvis with mild diffuse anasarca improved from prior CT, no specific pathology along  Continue to monitor volume status-was appearing hypovolemic secondary to diarrhea, was on IV fluids now.  Mildly hypervolemic  Currently requiring 1-2 L nasal cannula

## 2024-11-30 NOTE — PROGRESS NOTES
Progress Note - Hospitalist   Name: Masoud Perry 69 y.o. male I MRN: [id number]  Unit/Bed#: -01 I Date of Admission: 11/20/2024   Date of Service: 11/30/2024 I Hospital Day: 10    Assessment & Plan  Acute respiratory insufficiency  Oxygen needs have waxed and waned  CT chest abdomen pelvis with mild diffuse anasarca improved from prior CT, no specific pathology along  Continue to monitor volume status-was appearing hypovolemic secondary to diarrhea, was on IV fluids now.  Mildly hypervolemic  Currently requiring 1-2 L nasal cannula  Acute on chronic diastolic HF (heart failure) (HCC)  Wt Readings from Last 3 Encounters:   11/30/24 93.8 kg (206 lb 12.7 oz)   10/14/24 115 kg (253 lb)   10/07/24 118 kg (260 lb)   CXR on 11/20 appeared to be pulmonary vascular congestion   Patient is a dialysis basis, received hemodialysis on [facility]  Nephrology following for dialysis while inpatient  Most recent session during hospital day 11/25 with plan to resume dialysis tomorrow 11/29  Patient was receiving gentle hydration per nephrology recommendation.  Later on discontinued.   Status post dialysis with removal of 1 L on 11/29/2024.  COVID-19  Presents from [place]. Tested positive 3 days prior to admission when symptoms started  CT chest negative for any pathology  Started decadron and remdesevir, doxycycline  Patient already on Coumadin, INR is supratherapeutic, remain on hold  On clinical exam, patient is very fatigued and tired and sleepy but arousable and answer questions.  Completed treatment with doxycycline and remdesivir.  Patient also having diarrhea, and C. difficile showing carrier state, continue p.o. vancomycin  Currently 1-2 L nasal cannula oxygen being utilized  Diarrhea of infectious origin  Patient having significant diarrhea-initially suspected secondary to COVID  CT abdomen pelvis negative  Stool C. difficile panel showing positive for C. difficile PCR, watson negative  Due to significant diarrhea being  treated with oral vancomycin x 10 days  Secondary hyperparathyroidism of renal origin (HCC)  Continue Cinacalcet on Mondays, Wednesdays, and Fridays  Hemodialysis-associated hypotension  Continue midodrine and pyridostigmine  HLD (hyperlipidemia)  Continue atorvastatin  Acquired hypothyroidism  Continue levothyroxine  Factor 5 Leiden mutation, heterozygous ([facility])  Patient is maintained on Coumadin PTA  INR is supratherapeutic-Coumadin on hold  Patient has not received dialysis since Monday 11/25-INR increasing from day to day  Patient is status post vitamin K therapy with 2.5 mg on 11/28/2024-INR is subtherapeutic.  Today INR is 1.19.  Patient Coumadin increased to 2 mg.  Lab Results   Component Value Date    INR 1.19 11/30/2024    INR 1.85 (H) 11/29/2024    INR 6.92 (HH) 11/28/2024     Type 2 diabetes mellitus with stage 4 chronic kidney disease, with long-term current use of insulin ([facility])  Lab Results   Component Value Date    HGBA1C 5.0 11/02/2024       Recent Labs     11/29/24  1314 11/29/24  1635 11/29/24  2104 11/30/24  0718   POCGLU 107 82 73 86       Blood Sugar Average: Last 72 hrs:  (P) [id number]  Sliding scale insulin. Hypoglycemia protocol.  Personal history of gout  Continue allopurinol  Paroxysmal atrial fibrillation (HCC)  Continue metoprolol if blood pressure tolerates  During the hospitalization, at 1 point, patient INR was supratherapeutic, received 1 dose of vitamin K.  Post vitamin K therapy patient INR is subtherapeutic.  Patient restarted on Coumadin.  ESRD (end stage renal disease) on dialysis ([facility])  Lab Results   Component Value Date    EGFR 31 11/30/2024    EGFR 19 11/29/2024    EGFR 21 11/28/2024    CREATININE 2.06 (H) 11/30/2024    CREATININE 3.02 (H) 11/29/2024    CREATININE 2.78 (H) 11/28/2024   Typical regimen Tuesday Thursday Saturday  Most recently received in hospital on Monday 11/25 -plan for next session tomorrow 11/29  Nephrology following  Anemia due to chronic kidney  disease, on chronic dialysis (HCC)  Patient with chronic anemia  Baseline hemoglobin around 8  During hospitalization patient's hemoglobin has been stable between 9 and 10  Monitor in the setting of high INR for any signs of bleeding-currently none  Pancytopenia (HCC)  Multifactorial in origin, continue to monitor  Stable  Precordial pain  CHARIS 5  Rapid response called on this patient due to precordial chest pain.  Bedside EKG nonischemic, showing atrial fibrillation  -Troponin remain flat  - has chronic hypertension and blood pressure is running soft, for that reason unable to provide any Nitrostat  -Discussed the case with cardiology, per cardiology, related to A-fib.  Does not recommend any aggressive treatment, recommending to monitor.  Family member updated over the phone.  Chest x-ray did not reveal any cardia pulmonary disease acutely.  Appreciate cardiology recommendation.      VTE Pharmacologic Prophylaxis: VTE Score: 6 High Risk (Score >/= 5) - Pharmacological DVT Prophylaxis Ordered: warfarin (Coumadin). Sequential Compression Devices Ordered.    Mobility:   Basic Mobility Inpatient Raw Score: 9  JH-HLM Goal: 3: Sit at edge of bed  JH-HLM Achieved: 2: Bed activities/Dependent transfer  JH-HLM Goal NOT achieved. Continue with multidisciplinary rounding and encourage appropriate mobility to improve upon JH-HLM goals.    Patient Centered Rounds: I performed bedside rounds with nursing staff today.   Discussions with Specialists or Other Care Team Provider: None    Education and Discussions with Family / Patient: with patient    Current Length of Stay: 10 day(s)  Current Patient Status: Inpatient   Certification Statement: The patient will continue to require additional inpatient hospital stay due to monitorable conditions  Discharge Plan: Anticipate discharge in 48-72 hrs to rehab facility.    Code Status: Level 3 - DNAR and DNI    Subjective   Seen and evaluated during the run.  Feeling tired and fatigued  still having diarrhea.    Objective :  Temp:  [97 °F (36.1 °C)-97.7 °F (36.5 °C)] 97 °F (36.1 °C)  HR:  [] 59  BP: ()/(52-79) 134/72  Resp:  [16-20] 19  SpO2:  [90 %-100 %] 99 %  O2 Device: Nasal cannula  Nasal Cannula O2 Flow Rate (L/min):  [1 L/min] 1 L/min    Body mass index is 28.05 kg/m².     Input and Output Summary (last 24 hours):     Intake/Output Summary (Last 24 hours) at 11/30/2024 0812  Last data filed at 11/30/2024 0652  Gross per 24 hour   Intake 450 ml   Output 1552 ml   Net -1102 ml       Physical Exam  Vitals and nursing note reviewed.   Constitutional:       Appearance: He is ill-appearing. He is not diaphoretic.   HENT:      Mouth/Throat:      Pharynx: No oropharyngeal exudate or posterior oropharyngeal erythema.   Eyes:      General: No scleral icterus.        Left eye: No discharge.      Extraocular Movements: Extraocular movements intact.      Pupils: Pupils are equal, round, and reactive to light.   Cardiovascular:      Rate and Rhythm: Normal rate.      Heart sounds: No murmur heard.     No friction rub. No gallop.   Pulmonary:      Effort: No respiratory distress.      Breath sounds: No stridor. No wheezing or rhonchi.   Chest:       Abdominal:      General: There is no distension.      Palpations: There is no mass.      Tenderness: There is no abdominal tenderness.      Hernia: No hernia is present.      Comments: Peritoneal dialysis catheter in place.   Neurological:      Mental Status: He is alert and oriented to person, place, and time.      Cranial Nerves: No cranial nerve deficit.      Sensory: No sensory deficit.      Motor: No weakness.      Coordination: Coordination normal.         Lines/Drains:  Lines/Drains/Airways       Active Status       Name Placement date Placement time Site Days    HD Permanent Double Catheter 10/14/24  1420  Subclavian  46                      Telemetry:  Telemetry Orders (From admission, onward)               24 Hour Telemetry Monitoring   Continuous x 24 Hours (Telem)        Expiring   Question:  Reason for 24 Hour Telemetry  Answer:  PCI/EP study (including pacer and ICD implementation), Cardiac surgery, MI, abnormal cardiac cath, and chest pain- rule out MI                     Telemetry Reviewed: Normal Sinus Rhythm  Indication for Continued Telemetry Use: No indication for continued use. Will discontinue.                Lab Results: I have reviewed the following results:   Results from last 7 days   Lab Units 11/30/24  0606 11/26/24  1008 11/25/24  0958   WBC Thousand/uL 5.01   < > 3.62*   HEMOGLOBIN g/dL 9.2*   < > 9.5*   HEMATOCRIT % 29.3*   < > 29.9*   PLATELETS Thousands/uL 75*   < > 108*   BANDS PCT % 1  --   --    SEGS PCT %  --   --  87*   LYMPHO PCT % 6*   < > 9*   MONO PCT % 3*   < > 3*   EOS PCT % 0   < > 0    < > = values in this interval not displayed.     Results from last 7 days   Lab Units 11/30/24  0606   SODIUM mmol/L 132*   POTASSIUM mmol/L 3.8   CHLORIDE mmol/L 99   CO2 mmol/L 27   BUN mg/dL 22   CREATININE mg/dL 2.06*   ANION GAP mmol/L 6   CALCIUM mg/dL 8.6   ALBUMIN g/dL 2.5*   TOTAL BILIRUBIN mg/dL 0.65   ALK PHOS U/L 56   ALT U/L 4*   AST U/L 11*   GLUCOSE RANDOM mg/dL 81     Results from last 7 days   Lab Units 11/30/24  0606   INR  1.19     Results from last 7 days   Lab Units 11/30/24  0718 11/29/24  2104 11/29/24  1635 11/29/24  1314 11/29/24  1225 11/29/24  0832 11/28/24  2237 11/28/24  1626 11/28/24  1158 11/28/24  0720 11/27/24  2037 11/27/24  1621   POC GLUCOSE mg/dl 86 73 82 107 100 120 94 99 137 138 100 104         Results from last 7 days   Lab Units 11/29/24  1328 11/29/24  1313 11/24/24  0504   LACTIC ACID mmol/L 1.1  --   --    PROCALCITONIN ng/ml  --  0.14 0.35*       Recent Cultures (last 7 days):         Imaging Results Review: No pertinent imaging studies reviewed.  Other Study Results Review: No additional pertinent studies reviewed.    Last 24 Hours Medication List:     Current Facility-Administered  Medications:     acetaminophen (TYLENOL) tablet 650 mg, Q4H PRN    atorvastatin (LIPITOR) tablet 40 mg, Daily With Dinner    benzonatate (TESSALON PERLES) capsule 100 mg, TID PRN    calcium carbonate (TUMS) chewable tablet 500 mg, Daily PRN    cinacalcet (SENSIPAR) tablet 60 mg, Once per day on Monday Wednesday Friday    docusate sodium (COLACE) capsule 100 mg, BID PRN    finasteride (PROSCAR) tablet 5 mg, Daily    gentamicin (GARAMYCIN) 0.1 % cream, Daily    guaiFENesin (MUCINEX) 12 hr tablet 600 mg, Q12H VASU    HYDROcodone-acetaminophen (NORCO) 5-325 mg per tablet 2 tablet, Q6H PRN    insulin lispro (HumALOG/ADMELOG) 100 units/mL subcutaneous injection 1-5 Units, HS    insulin lispro (HumALOG/ADMELOG) 100 units/mL subcutaneous injection 1-6 Units, TID AC **AND** Fingerstick Glucose (POCT), TID AC    levothyroxine tablet 125 mcg, Early Morning    metoprolol tartrate (LOPRESSOR) tablet 25 mg, Q12H VASU    midodrine (PROAMATINE) tablet 15 mg, TID AC    nicotine (NICODERM CQ) 7 mg/24hr TD 24 hr patch 7 mg, Daily    ondansetron (ZOFRAN) injection 4 mg, Q6H PRN    pyridostigmine (MESTINON) tablet 60 mg, TID    sevelamer (RENAGEL) tablet 800 mg, TID With Meals    simethicone (MYLICON) chewable tablet 80 mg, 4x Daily PRN    vancomycin (VANCOCIN) capsule 125 mg, Q6H VASU    warfarin (COUMADIN) tablet 2 mg, HS    Administrative Statements   Today, Patient Was Seen By: Moses Noel MD      **Please Note: This note may have been constructed using a voice recognition system.**

## 2024-11-30 NOTE — ASSESSMENT & PLAN NOTE
Lab Results   Component Value Date    EGFR 31 11/30/2024    EGFR 19 11/29/2024    EGFR 21 11/28/2024    CREATININE 2.06 (H) 11/30/2024    CREATININE 3.02 (H) 11/29/2024    CREATININE 2.78 (H) 11/28/2024   Typical regimen Tuesday Thursday Saturday  Most recently received in hospital on Monday 11/25 -plan for next session tomorrow 11/29  Nephrology following

## 2024-11-30 NOTE — ASSESSMENT & PLAN NOTE
Wt Readings from Last 3 Encounters:   11/30/24 93.8 kg (206 lb 12.7 oz)   10/14/24 115 kg (253 lb)   10/07/24 118 kg (260 lb)   CXR on 11/20 appeared to be pulmonary vascular congestion   Patient is a dialysis basis, received hemodialysis on [facility]  Nephrology following for dialysis while inpatient  Most recent session during hospital day 11/25 with plan to resume dialysis tomorrow 11/29  Patient was receiving gentle hydration per nephrology recommendation.  Later on discontinued.   Status post dialysis with removal of 1 L on 11/29/2024.

## 2024-12-01 PROBLEM — R63.0 DECREASED APPETITE: Status: ACTIVE | Noted: 2024-12-01

## 2024-12-01 PROBLEM — R07.2 PRECORDIAL PAIN: Status: RESOLVED | Noted: 2024-11-29 | Resolved: 2024-12-01

## 2024-12-01 PROBLEM — Z71.89 GOALS OF CARE, COUNSELING/DISCUSSION: Status: ACTIVE | Noted: 2024-09-21

## 2024-12-01 LAB
ALBUMIN SERPL BCG-MCNC: 2.8 G/DL (ref 3.5–5)
ALP SERPL-CCNC: 59 U/L (ref 34–104)
ALT SERPL W P-5'-P-CCNC: 4 U/L (ref 7–52)
ANION GAP SERPL CALCULATED.3IONS-SCNC: 5 MMOL/L (ref 4–13)
AST SERPL W P-5'-P-CCNC: 11 U/L (ref 13–39)
BACTERIA UR QL AUTO: NORMAL /HPF
BASOPHILS # BLD AUTO: 0 THOUSANDS/ÂΜL (ref 0–0.1)
BASOPHILS NFR BLD AUTO: 0 % (ref 0–1)
BILIRUB SERPL-MCNC: 0.68 MG/DL (ref 0.2–1)
BUN SERPL-MCNC: 15 MG/DL (ref 5–25)
CALCIUM ALBUM COR SERPL-MCNC: 10.2 MG/DL (ref 8.3–10.1)
CALCIUM SERPL-MCNC: 9.2 MG/DL (ref 8.4–10.2)
CHLORIDE SERPL-SCNC: 99 MMOL/L (ref 96–108)
CO2 SERPL-SCNC: 31 MMOL/L (ref 21–32)
CREAT SERPL-MCNC: 1.86 MG/DL (ref 0.6–1.3)
EOSINOPHIL # BLD AUTO: 0.03 THOUSAND/ÂΜL (ref 0–0.61)
EOSINOPHIL NFR BLD AUTO: 1 % (ref 0–6)
ERYTHROCYTE [DISTWIDTH] IN BLOOD BY AUTOMATED COUNT: 14.3 % (ref 11.6–15.1)
GFR SERPL CREATININE-BSD FRML MDRD: 35 ML/MIN/1.73SQ M
GLUCOSE SERPL-MCNC: 73 MG/DL (ref 65–140)
GLUCOSE SERPL-MCNC: 75 MG/DL (ref 65–140)
GLUCOSE SERPL-MCNC: 75 MG/DL (ref 65–140)
GLUCOSE SERPL-MCNC: 81 MG/DL (ref 65–140)
GLUCOSE SERPL-MCNC: 85 MG/DL (ref 65–140)
HCT VFR BLD AUTO: 31.7 % (ref 36.5–49.3)
HGB BLD-MCNC: 9.8 G/DL (ref 12–17)
HYPERCHROMIA BLD QL SMEAR: PRESENT
IMM GRANULOCYTES # BLD AUTO: 0.05 THOUSAND/UL (ref 0–0.2)
IMM GRANULOCYTES NFR BLD AUTO: 1 % (ref 0–2)
INR PPP: 1.29 (ref 0.85–1.19)
LDH SERPL-CCNC: 131 U/L (ref 140–271)
LYMPHOCYTES # BLD AUTO: 1.04 THOUSAND/UL (ref 0.6–4.47)
LYMPHOCYTES # BLD AUTO: 1.05 THOUSANDS/ÂΜL (ref 0.6–4.47)
LYMPHOCYTES # BLD AUTO: 19 %
LYMPHOCYTES NFR BLD AUTO: 19 % (ref 14–44)
MCH RBC QN AUTO: 31.7 PG (ref 26.8–34.3)
MCHC RBC AUTO-ENTMCNC: 30.9 G/DL (ref 31.4–37.4)
MCV RBC AUTO: 103 FL (ref 82–98)
MONOCYTES # BLD AUTO: 0.11 THOUSAND/UL (ref 0–1.22)
MONOCYTES # BLD AUTO: 0.2 THOUSAND/ÂΜL (ref 0.17–1.22)
MONOCYTES NFR BLD AUTO: 2 % (ref 4–12)
MONOCYTES NFR BLD AUTO: 4 % (ref 4–12)
NEUTROPHILS # BLD AUTO: 4.12 THOUSANDS/ÂΜL (ref 1.85–7.62)
NEUTS BAND NFR BLD MANUAL: 1 % (ref 0–8)
NEUTS SEG # BLD: 4.31 THOUSAND/UL (ref 1.81–6.82)
NEUTS SEG NFR BLD AUTO: 75 % (ref 43–75)
NEUTS SEG NFR BLD AUTO: 78 %
NON-SQ EPI CELLS URNS QL MICRO: NORMAL /HPF
NRBC BLD AUTO-RTO: 0 /100 WBCS
OVALOCYTES BLD QL SMEAR: PRESENT
PHOSPHATE SERPL-MCNC: 1.9 MG/DL (ref 2.3–4.1)
PLATELET # BLD AUTO: 68 THOUSANDS/UL (ref 149–390)
PLATELET BLD QL SMEAR: ADEQUATE
PMV BLD AUTO: 9 FL (ref 8.9–12.7)
POTASSIUM SERPL-SCNC: 3.9 MMOL/L (ref 3.5–5.3)
PROT SERPL-MCNC: 4.8 G/DL (ref 6.4–8.4)
PROTHROMBIN TIME: 16.5 SECONDS (ref 12.3–15)
RBC # BLD AUTO: 3.09 MILLION/UL (ref 3.88–5.62)
RBC #/AREA URNS AUTO: NORMAL /HPF
RBC MORPH BLD: PRESENT
SODIUM SERPL-SCNC: 135 MMOL/L (ref 135–147)
TOTAL CELLS COUNTED SPEC: 100
WBC # BLD AUTO: 5.45 THOUSAND/UL (ref 4.31–10.16)
WBC #/AREA URNS AUTO: NORMAL /HPF

## 2024-12-01 PROCEDURE — 85025 COMPLETE CBC W/AUTO DIFF WBC: CPT | Performed by: FAMILY MEDICINE

## 2024-12-01 PROCEDURE — 85007 BL SMEAR W/DIFF WBC COUNT: CPT | Performed by: INTERNAL MEDICINE

## 2024-12-01 PROCEDURE — 99232 SBSQ HOSP IP/OBS MODERATE 35: CPT | Performed by: INTERNAL MEDICINE

## 2024-12-01 PROCEDURE — 83615 LACTATE (LD) (LDH) ENZYME: CPT | Performed by: INTERNAL MEDICINE

## 2024-12-01 PROCEDURE — 84100 ASSAY OF PHOSPHORUS: CPT | Performed by: INTERNAL MEDICINE

## 2024-12-01 PROCEDURE — 80053 COMPREHEN METABOLIC PANEL: CPT | Performed by: FAMILY MEDICINE

## 2024-12-01 PROCEDURE — 82948 REAGENT STRIP/BLOOD GLUCOSE: CPT

## 2024-12-01 PROCEDURE — 85610 PROTHROMBIN TIME: CPT | Performed by: FAMILY MEDICINE

## 2024-12-01 PROCEDURE — 99232 SBSQ HOSP IP/OBS MODERATE 35: CPT

## 2024-12-01 RX ORDER — VANCOMYCIN HYDROCHLORIDE 250 MG/1
500 CAPSULE ORAL EVERY 6 HOURS SCHEDULED
Status: DISCONTINUED | OUTPATIENT
Start: 2024-12-01 | End: 2024-12-02

## 2024-12-01 RX ADMIN — METOPROLOL TARTRATE 25 MG: 25 TABLET, FILM COATED ORAL at 08:56

## 2024-12-01 RX ADMIN — GUAIFENESIN 600 MG: 600 TABLET ORAL at 20:34

## 2024-12-01 RX ADMIN — SODIUM PHOSPHATE, MONOBASIC, MONOHYDRATE AND SODIUM PHOSPHATE, DIBASIC, ANHYDROUS 12 MMOL: 142; 276 INJECTION, SOLUTION INTRAVENOUS at 18:41

## 2024-12-01 RX ADMIN — METOPROLOL TARTRATE 25 MG: 25 TABLET, FILM COATED ORAL at 20:34

## 2024-12-01 RX ADMIN — MIDODRINE HYDROCHLORIDE 15 MG: 5 TABLET ORAL at 17:12

## 2024-12-01 RX ADMIN — LEVOTHYROXINE SODIUM 125 MCG: 125 TABLET ORAL at 06:32

## 2024-12-01 RX ADMIN — HYDROCODONE BITARTRATE AND ACETAMINOPHEN 2 TABLET: 5; 325 TABLET ORAL at 08:56

## 2024-12-01 RX ADMIN — VANCOMYCIN HYDROCHLORIDE 500 MG: 250 CAPSULE ORAL at 17:12

## 2024-12-01 RX ADMIN — MIDODRINE HYDROCHLORIDE 15 MG: 5 TABLET ORAL at 08:56

## 2024-12-01 RX ADMIN — HYDROCODONE BITARTRATE AND ACETAMINOPHEN 2 TABLET: 5; 325 TABLET ORAL at 17:17

## 2024-12-01 RX ADMIN — PYRIDOSTIGMINE BROMIDE 60 MG: 60 TABLET ORAL at 20:34

## 2024-12-01 RX ADMIN — FINASTERIDE 5 MG: 5 TABLET, FILM COATED ORAL at 08:57

## 2024-12-01 RX ADMIN — VANCOMYCIN HYDROCHLORIDE 125 MG: 125 CAPSULE ORAL at 06:32

## 2024-12-01 RX ADMIN — PYRIDOSTIGMINE BROMIDE 60 MG: 60 TABLET ORAL at 17:12

## 2024-12-01 RX ADMIN — ATORVASTATIN CALCIUM 40 MG: 40 TABLET, FILM COATED ORAL at 17:12

## 2024-12-01 RX ADMIN — PYRIDOSTIGMINE BROMIDE 60 MG: 60 TABLET ORAL at 08:58

## 2024-12-01 RX ADMIN — GUAIFENESIN 600 MG: 600 TABLET ORAL at 08:56

## 2024-12-01 NOTE — ASSESSMENT & PLAN NOTE
Oxygen needs have waxed and waned  CT chest abdomen pelvis with mild diffuse anasarca improved from prior CT, no specific pathology along  Monitor volume status  Currently 1- 2 L nasal cannula

## 2024-12-01 NOTE — ASSESSMENT & PLAN NOTE
BP is at goal, -134 on 11/30.   Rx: Midodrine 15 mg TID. Metoprolol 25 mg BID.   Torsemide 60 mg BID stopped on 11/25/24 due to volume depletion.   Would continue to hold Torsemide for now given ongoing diarrhea and anorexia.

## 2024-12-01 NOTE — ASSESSMENT & PLAN NOTE
Multifactorial in origin, continue to monitor  Platelet count has been steadily decreasing  Previously has supratherapeutic INR  Currently is subtherapeutic, continue to monitor INR and platelets and for any bleeding    Lab Results   Component Value Date    WBC 5.45 12/01/2024    RBC 3.09 (L) 12/01/2024    PLT 68 (L) 12/01/2024

## 2024-12-01 NOTE — ASSESSMENT & PLAN NOTE
Patient reporting ongoing dysuria since prior to admission  UA done 11/30 without any signs for acute cystitis  Is maintained on Proscar

## 2024-12-01 NOTE — ASSESSMENT & PLAN NOTE
Patient is maintained on Coumadin PTA  INR supratherapeutic at time of presentation and was placed on hold  INR trending upwards and dialysis was delayed for a few days so vitamin K was administered at 2.5 mg  Patient now with subtherapeutic INR  Continue to monitor while administering low-dose Coumadin  Also monitor platelets  Lab Results   Component Value Date    INR 1.29 (H) 12/01/2024    INR 1.19 11/30/2024    INR 1.85 (H) 11/29/2024

## 2024-12-01 NOTE — PLAN OF CARE
Problem: Prexisting or High Potential for Compromised Skin Integrity  Goal: Skin integrity is maintained or improved  Description: INTERVENTIONS:  - Identify patients at risk for skin breakdown  - Assess and monitor skin integrity  - Assess and monitor nutrition and hydration status  - Monitor labs   - Assess for incontinence   - Turn and reposition patient  - Assist with mobility/ambulation  - Relieve pressure over bony prominences  - Avoid friction and shearing  - Provide appropriate hygiene as needed including keeping skin clean and dry  - Evaluate need for skin moisturizer/barrier cream  - Collaborate with interdisciplinary team   - Patient/family teaching  - Consider wound care consult   Outcome: Progressing     Problem: PAIN - ADULT  Goal: Verbalizes/displays adequate comfort level or baseline comfort level  Description: Interventions:  - Encourage patient to monitor pain and request assistance  - Assess pain using appropriate pain scale  - Administer analgesics based on type and severity of pain and evaluate response  - Implement non-pharmacological measures as appropriate and evaluate response  - Consider cultural and social influences on pain and pain management  - Notify physician/advanced practitioner if interventions unsuccessful or patient reports new pain  Outcome: Progressing     Problem: INFECTION - ADULT  Goal: Absence or prevention of progression during hospitalization  Description: INTERVENTIONS:  - Assess and monitor for signs and symptoms of infection  - Monitor lab/diagnostic results  - Monitor all insertion sites, i.e. indwelling lines, tubes, and drains  - Monitor endotracheal if appropriate and nasal secretions for changes in amount and color  - [place] appropriate cooling/warming therapies per order  - Administer medications as ordered  - Instruct and encourage patient and family to use good hand hygiene technique  - Identify and instruct in appropriate isolation precautions for  identified infection/condition  Outcome: Progressing     Problem: SAFETY ADULT  Goal: Patient will remain free of falls  Description: INTERVENTIONS:  - Educate patient/family on patient safety including physical limitations  - Instruct patient to call for assistance with activity   - Consult OT/PT to assist with strengthening/mobility   - Keep Call bell within reach  - Keep bed low and locked with side rails adjusted as appropriate  - Keep care items and personal belongings within reach  - Initiate and maintain comfort rounds  - Make Fall Risk Sign visible to staff  - Offer Toileting every 2 Hours, in advance of need  - Initiate/Maintain bed/chair alarm  - Obtain necessary fall risk management equipment  - Apply yellow socks and bracelet for high fall risk patients  - Consider moving patient to room near nurses station  Outcome: Progressing

## 2024-12-01 NOTE — ASSESSMENT & PLAN NOTE
7/29 rapid response called secondary to precordial chest pain  Bedside EKG nonischemic showing atrial fibrillation  Troponins remain flat-also could be elevated secondary to CKD  Cardiology consulted-feel that chest pain is likely related to A-fib, not ACS  Do not recommend aggressive treatment  No further events on telemetry  Patient reports no further chest pain

## 2024-12-01 NOTE — ASSESSMENT & PLAN NOTE
He was on PD but was changed to back up iHD during admission to [facility] in November 2024. Started intermittent hemodialysis on 11/8/24.   [facility].  Previous EDW 98.5 kg - challenged this hospital admission, most recent post hemodialysis weight  92.4 kg from November 30  Access: R IJ permcath. PD catheter in place.   Sodium level has improved 235 today on December 1.  HD today for 1.25L UF. States he had mild abdominal cramping near end of tx.   Next HD is Tuesday, 12/3 on regular TTS schedule.   Labs on December 1 shows stability with potassium 3.9, CO2 31.  Corrected calcium is 10.2 which may reflect mild decreased effective arterial blood volume with dialysis.

## 2024-12-01 NOTE — ASSESSMENT & PLAN NOTE
Patient did have rapid response secondary to chest pain  Cardiology consulted-thought to be secondary to A-fib rather than any ACS  Can continue metoprolol if blood pressure tolerates -maintain on midodrine as well to aid in attention  Continue to trend INR with Coumadin

## 2024-12-01 NOTE — ASSESSMENT & PLAN NOTE
BP is at goal, -134 on 11/30.   Rx: Midodrine 15 mg TID. Metoprolol 25 mg BID.  Maintain metoprolol and midodrine at this time.  Patient is on metoprolol for rate control for paroxysmal atrial fibrillation.  Torsemide 60 mg BID stopped on 11/25/24 due to volume depletion.  Would continue to hold Torsemide for right now given ongoing diarrhea.

## 2024-12-01 NOTE — ASSESSMENT & PLAN NOTE
Hemoglobin is near goal - 9.2 today, 11/30.   Epogen being held in setting of COVID-19 infection.

## 2024-12-01 NOTE — ASSESSMENT & PLAN NOTE
Malnutrition Findings:   Adult Malnutrition type: Chronic illness  Adult Degree of Malnutrition: Malnutrition of moderate degree  Malnutrition Characteristics: Inadequate energy, Weight loss                  360 Statement: malnutrition of moderate degree in setting of chronic illness, evidenced by 7/11/24 263 lbs to 11/21/24 220.8 lbs (16% wt loss x 4 mo), poor po intake/<75% energy intake compared to estimated needs for > 1 month, treated with diet and supplements    BMI Findings:           Body mass index is 27.39 kg/m².   Appreciate nutrition assessment  Patient has declined at this time for addition of Remeron-will continue discussion

## 2024-12-01 NOTE — PROGRESS NOTES
NEPHROLOGY HOSPITAL PROGRESS NOTE   Masoud Perry 69 y.o. male MRN: [id number]  Unit/Bed#: -01 Encounter: [id number]  Reason for Consult: End-stage renal disease and hemodialysis    Brief History of Admission - 70 yo male with HTN, DM, ESRD on HD, CHF, atrial fibrillation, factor V leiden, CVA now presenting to [facility] on 11/20/24 with shortness of breath and was diagnosed with COVID-19 infection. Nephrology consulted for assistance with management of ESRD on HD.     Assessment & Plan  ESRD (end stage renal disease) on dialysis ([facility])  He was on PD but was changed to back up iHD during admission to [facility] in November 2024. Started intermittent hemodialysis on 11/8/24.   [facility].  Previous EDW 98.5 kg - challenged this hospital admission, most recent post hemodialysis weight  92.4 kg from November 30  Access: R IJ permcath. PD catheter in place.   Sodium level has improved 235 today on December 1.  HD today for 1.25L UF. States he had mild abdominal cramping near end of tx.   Next HD is Tuesday, 12/3 on regular TTS schedule.   Labs on December 1 shows stability with potassium 3.9, CO2 31.  Corrected calcium is 10.2 which may reflect mild decreased effective arterial blood volume with dialysis.    Hemodialysis-associated hypotension  BP is at goal, -134 on 11/30.   Rx: Midodrine 15 mg TID. Metoprolol 25 mg BID.  Maintain metoprolol and midodrine at this time.  Patient is on metoprolol for rate control for paroxysmal atrial fibrillation.  Torsemide 60 mg BID stopped on 11/25/24 due to volume depletion.  Would continue to hold Torsemide for right now given ongoing diarrhea.    Acute on chronic diastolic HF (heart failure) ([facility])  7/29/24 Echo: EF 75%, G1DD  Was fluid overloaded on admission and this has improved.  His new estimated dry weight is likely 92.4 kg.  This will need to be reviewed evaluated once diarrhea stops.  I think we may have reached his equilibration point currently with regards to his  dry weight.  Anemia due to chronic kidney disease, on chronic dialysis (HCC)  Hemoglobin is 9.8 today on the send first continue to monitor.  Epogen being held in setting of COVID-19 infection.    Secondary hyperparathyroidism of renal origin (HCC)  Continue Sensipar 30 mg 3/week.  ^ corrected calcium is 10.2.  Recheck phosphorus today as it had been 2.4 November 27.  If the phosphorus is still less than 4 would hold phosphorus binders.    COVID-19  Treatment of COVID-19 per primary team    Paroxysmal atrial fibrillation (HCC)  Currently on Metoprolol 25 mg BID.     Diarrhea of infectious origin  Continue oral vancomycin for C. difficile colitis.  Still with diarrhea.     Acute respiratory insufficiency  Treatment for COVID-19 per primary team.  There was concern for volume overload which is better now.      Pancytopenia (HCC)  As of today on December 1, white cell count has improved to 5.4 and hemoglobin is maintained at 9.8 which is improved.  Platelet count is decreased to 68.  There is no evidence of the patient being on heparin as the patient is on Coumadin for anticoagulation given history of PAF and patient being heterozygous for factor V Leiden mutation.  From a renal standpoint, check LDH and peripheral smear.  Continue to monitor.  Management per primary team.  Dysuria  Patient states he is experiencing dysuria which started with onset of COVID.  Recent UA unremarkable 11/25.  Repeat UA shows trace ketones negative leukocytes negative nitrite.  COVID itself may cause an inflammatory cystitis which can cause dysuria.  There is no evidence of UTI on urinalysis.      VIRTUAL CARE DOCUMENTATION:     1. This service was provided via Telemedicine using [employer]     2. Parties in the room with patient during teleconsult Patient only    3. Confidentiality My office door was closed     4. Participants No one else was in the room    5. Patient acknowledged consent and understanding of privacy and security of  the  Telemedicine consult. I informed the patient that I have reviewed their record in Epic and presented the opportunity for them to ask any questions regarding the visit today.  The patient agreed to participate.    6. Time spent including review of the medical record, documentation of medical complexity of care, interview with the patient, creation of plan of care, total time approximately 25 minutes.          SUBJECTIVE / 24H INTERVAL HISTORY:  Mr. Perry is seen in follow-up for end-stage renal disease on hemodialysis.  He last had hemodialysis on November 30 and 1.25 kg was removed.  He denies any acute shortness of breath.  He still having diarrhea about 3-4 episodes a day.  Systolic blood pressure has been anywhere from 115-130s systolic.  Patient is currently on 1 L nasal cannula and pulse oximetry is 97%.  Patient is currently being treated with oral vancomycin given it was noted the patient tested positive for C. difficile query C. difficile carrier state but patient being treated given significant diarrhea.    OBJECTIVE:  Current Weight: Weight - Scale: 91.6 kg (201 lb 15.1 oz)  Vitals:    12/01/24 0600 12/01/24 0632 12/01/24 0727 12/01/24 0734   BP:   130/58    BP Location:       Pulse:   67    Resp:       Temp:   97.7 °F (36.5 °C)    TempSrc:       SpO2:   97% 97%   Weight: 91.6 kg (201 lb 15.1 oz) 91.6 kg (201 lb 15.1 oz)     Height:           Intake/Output Summary (Last 24 hours) at 12/1/2024 1055  Last data filed at 12/1/2024 0734  Gross per 24 hour   Intake 370 ml   Output 2100 ml   Net -1730 ml     General: Patient is laying in bed no acute distress.  HEENT: Normocephalic atraumatic  Neck: Appears supple  Pulmonary: No increased work of breathing noted/no conversational dyspnea/no accessory muscle use noted  Cardiac: Pulse rates have been anywhere from 60 to 70s predominantly.  Extremities: Trace leg edema noted he did have a prior history of lymphedema.  Neurologic: No focal deficits  noted.  Medications:    Current Facility-Administered Medications:     acetaminophen (TYLENOL) tablet 650 mg, 650 mg, Oral, Q4H PRN, Chao Rios PA-C    atorvastatin (LIPITOR) tablet 40 mg, 40 mg, Oral, Daily With Dinner, Chao Rios PA-C, 40 mg at 11/30/24 1718    benzonatate (TESSALON PERLES) capsule 100 mg, 100 mg, Oral, TID PRN, Miriam Tee MD, 100 mg at 11/29/24 1352    calcium carbonate (TUMS) chewable tablet 500 mg, 500 mg, Oral, Daily PRN, Moses Noel MD    cinacalcet (SENSIPAR) tablet 60 mg, 60 mg, Oral, Once per day on Monday Wednesday Friday, Jignesh Hussein MD, 60 mg at 11/29/24 1301    docusate sodium (COLACE) capsule 100 mg, 100 mg, Oral, BID PRN, Chao Rios PA-C    finasteride (PROSCAR) tablet 5 mg, 5 mg, Oral, Daily, Chao Rios PA-C, 5 mg at 12/01/24 0857    gentamicin (GARAMYCIN) 0.1 % cream, , Topical, Daily, Jesus Christine MD, Given at 11/29/24 0802    guaiFENesin (MUCINEX) 12 hr tablet 600 mg, 600 mg, Oral, Q12H VASU, Miriam Tee MD, 600 mg at 12/01/24 0856    HYDROcodone-acetaminophen (NORCO) 5-325 mg per tablet 2 tablet, 2 tablet, Oral, Q6H PRN, Chao Rios PA-C, 2 tablet at 12/01/24 0856    insulin lispro (HumALOG/ADMELOG) 100 units/mL subcutaneous injection 1-6 Units, 1-6 Units, Subcutaneous, TID AC, 1 Units at 11/26/24 1236 **AND** Fingerstick Glucose (POCT), , , TID AC, Chao Rios PA-C    levothyroxine tablet 125 mcg, 125 mcg, Oral, Early Morning, Chao Rios PA-C, 125 mcg at 12/01/24 0632    metoprolol tartrate (LOPRESSOR) tablet 25 mg, 25 mg, Oral, Q12H VASU, Moses Noel, MD, 25 mg at 12/01/24 0856    midodrine (PROAMATINE) tablet 15 mg, 15 mg, Oral, TID AC, Citlali Yates, GAGANDEEP, 15 mg at 12/01/24 0856    nicotine (NICODERM CQ) 7 mg/24hr TD 24 hr patch 7 mg, 7 mg, Transdermal, Daily, Chao Rios PA-C    ondansetron (ZOFRAN) injection 4 mg, 4 mg, Intravenous, Q6H PRN, Choa Rios PA-C, 4 mg at 11/25/24 1839    pyridostigmine  "(MESTINON) tablet 60 mg, 60 mg, Oral, TID, Chao Rios PA-C, 60 mg at 12/01/24 0858    sevelamer (RENAGEL) tablet 800 mg, 800 mg, Oral, TID With Meals, Jignesh Hussein MD, 800 mg at 11/30/24 1223    simethicone (MYLICON) chewable tablet 80 mg, 80 mg, Oral, 4x Daily PRN, Chao Rios PA-C    vancomycin (VANCOCIN) capsule 500 mg, 500 mg, Oral, Q6H VASU, Theresa Butt PA-C    warfarin (COUMADIN) tablet 2 mg, 2 mg, Oral, HS, Moses Noel MD, 2 mg at 11/30/24 2315    Laboratory Results:  Results from last 7 days   Lab Units 12/01/24  0842 11/30/24  0606 11/29/24  0623 11/28/24  0600 11/27/24  0539 11/26/24  1008 11/25/24  0958   WBC Thousand/uL 5.45 5.01 4.76  --  5.93 6.77 3.62*   HEMOGLOBIN g/dL 9.8* 9.2* 9.6*  --  10.5* 10.9* 9.5*   HEMATOCRIT % 31.7* 29.3* 31.0*  --  33.2* 34.9* 29.9*   PLATELETS Thousands/uL 68* 75* 91*  --  114* 155 108*   POTASSIUM mmol/L 3.9 3.8 4.2 3.8 4.2 4.1 3.9   CHLORIDE mmol/L 99 99 100 99 99 97 97   CO2 mmol/L 31 27 28 32 31 31 29   BUN mg/dL 15 22 39* 33* 30* 25 32*   CREATININE mg/dL 1.86* 2.06* 3.02* 2.78* 2.47* 2.22* 2.65*   CALCIUM mg/dL 9.2 8.6 9.0 8.8 9.1 9.6 9.7   PHOSPHORUS mg/dL  --   --   --   --  2.4  --   --        Portions of the record may have been created with voice recognition software. Occasional wrong word or \"sound a like\" substitutions may have occurred due to the inherent limitations of voice recognition software. Read the chart carefully and recognize, using context, where substitutions have occurred.If you have any questions, please contact the dictating provider.   "

## 2024-12-01 NOTE — ASSESSMENT & PLAN NOTE
Presents from [place]. Tested positive 3 days prior to admission when symptoms started  Positive in our records on 11/20  CT chest negative for any pathology  Started decadron and remdesevir, doxycycline  Pleated therapy with L3  Was on Coumadin for anticoagulation/DVT prophylaxis  Patient also having diarrhea, and C. difficile showing carrier state -see under diarrhea of infectious origin for further treatment plan  Currently 1-2 L nasal cannula oxygen being utilized

## 2024-12-01 NOTE — ASSESSMENT & PLAN NOTE
Continue Sensipar 30 mg 3/week.  ^ corrected calcium is 10.2.  Recheck phosphorus today as it had been 2.4 November 27.  If the phosphorus is still less than 4 would hold phosphorus binders.

## 2024-12-01 NOTE — ASSESSMENT & PLAN NOTE
As of today on December 1, white cell count has improved to 5.4 and hemoglobin is maintained at 9.8 which is improved.  Platelet count is decreased to 68.  There is no evidence of the patient being on heparin as the patient is on Coumadin for anticoagulation given history of PAF and patient being heterozygous for factor V Leiden mutation.  From a renal standpoint, check LDH and peripheral smear.  Continue to monitor.  Management per primary team.

## 2024-12-01 NOTE — ASSESSMENT & PLAN NOTE
7/29/24 Echo: EF 75%, G1DD  Was fluid overloaded on admission and better now.   EDW is 98.5 kg - being challenged. Will need to adjust EDW OP.   BP stable, -134 today, 11/30.  PreHD weight today is 93.8 kg. Post weight 92.4 kg, 11/30.

## 2024-12-01 NOTE — ASSESSMENT & PLAN NOTE
Patient with chronic anemia  Baseline hemoglobin around 8  During hospitalization patient's hemoglobin has been stable between 9 and 10  Monitor for signs of bleeding in setting of low platelets

## 2024-12-01 NOTE — QUICK NOTE
Repeat phos level is 1.9. Stop phos binders and replace IV Na phos over 6 hours, Maintain regular diet and supplements to maximize oral intake and nutrition.

## 2024-12-01 NOTE — ASSESSMENT & PLAN NOTE
Hemoglobin is 9.8 today on the send first continue to monitor.  Epogen being held in setting of COVID-19 infection.

## 2024-12-01 NOTE — ASSESSMENT & PLAN NOTE
Patient and I had discussion today about his goals of care moving forward  He endorses that he has been feeling depressed and frustrated with his continued decline in health  He says that sometimes he wishes that he could just go home to pass away and be comfortable  He shares that he is not disagreeable for treatment but sometimes feels as though he is being tortured  Pain discussed possibilities of psychiatry consult for depression/trying recommendations made by prior psychiatrist for [place]  At this time patient declines  We also discussed the possibility of a palliative care consult to discuss further his goals of care and symptom management of his full body pain and anorexia  He states he would consider this may be willing to discuss with specialist  Will consult palliative care to have further discussion with patient tomorrow for goals of care planning

## 2024-12-01 NOTE — ASSESSMENT & PLAN NOTE
Patient states he is experiencing dysuria which started with onset of COVID.  Recent UA unremarkable 11/25.  Repeat UA shows trace ketones negative leukocytes negative nitrite.  COVID itself may cause an inflammatory cystitis which can cause dysuria.  There is no evidence of UTI on urinalysis.      [facility] CARE DOCUMENTATION:     1. This service was provided via Telemedicine using [employer]     2. Parties in the room with patient during teleconsult Patient only    3. Confidentiality My office door was closed     4. Participants No one else was in the room    5. Patient acknowledged consent and understanding of privacy and security of the  Telemedicine consult. I informed the patient that I have reviewed their record in Epic and presented the opportunity for them to ask any questions regarding the visit today.  The patient agreed to participate.    6. Time spent including review of the medical record, documentation of medical complexity of care, interview with the patient, creation of plan of care, total time approximately 25 minutes.

## 2024-12-01 NOTE — ASSESSMENT & PLAN NOTE
Patient states he is experiencing dysuria which started with onset of COVID.  Recent UA unremarkable 11/25.  Patient states dysuria has worsened since that time.  Will repeat UA.

## 2024-12-01 NOTE — PROGRESS NOTES
Progress Note - Hospitalist   Name: Masoud Perry 69 y.o. male I MRN: [id number]  Unit/Bed#: -01 I Date of Admission: 11/20/2024   Date of Service: 12/1/2024 I Hospital Day: 11    Assessment & Plan  Acute respiratory insufficiency  Oxygen needs have waxed and waned  CT chest abdomen pelvis with mild diffuse anasarca improved from prior CT, no specific pathology along  Monitor volume status  Currently 1- 2 L nasal cannula  Diarrhea of infectious origin  Patient having significant diarrhea-initially suspected secondary to COVID  C. difficile tested and showing positive PCR, EIA negative  CT abdomen pelvis negative for colitis   Started on oral vancomycin on 11/24 - 125 mg every 6 hours  Has had no improvement into diarrhea per patient  Will increase to 500 mg every 6 hours and consult infectious disease  Acute on chronic diastolic HF (heart failure) (HCC)  Wt Readings from Last 3 Encounters:   12/01/24 91.6 kg (201 lb 15.1 oz)   10/14/24 115 kg (253 lb)   10/07/24 118 kg (260 lb)   CXR on 11/20 appeared to be pulmonary vascular congestion   Patient is a dialysis basis, received hemodialysis on TTS  Most recent session 11/30 to resume On license of [facility] schedule  Torsemide is on hold secondary to ongoing diarrhea and anorexia  Appreciate continued nephrology follow-up  Monitor intake/output and daily weight  COVID-19  Presents from [place]. Tested positive 3 days prior to admission when symptoms started  Positive in our records on 11/20  CT chest negative for any pathology  Started decadron and remdesevir, doxycycline  Pleated therapy with L3  Was on Coumadin for anticoagulation/DVT prophylaxis  Patient also having diarrhea, and C. difficile showing carrier state -see under diarrhea of infectious origin for further treatment plan  Currently 1-2 L nasal cannula oxygen being utilized  Goals of care, counseling/discussion  Patient and I had discussion today about his goals of care moving forward  He endorses that he has been  feeling depressed and frustrated with his continued decline in health  He says that sometimes he wishes that he could just go home to pass away and be comfortable  He shares that he is not disagreeable for treatment but sometimes feels as though he is being tortured  Pain discussed possibilities of psychiatry consult for depression/trying recommendations made by prior psychiatrist for [place]  At this time patient declines  We also discussed the possibility of a palliative care consult to discuss further his goals of care and symptom management of his full body pain and anorexia  He states he would consider this may be willing to discuss with specialist  Will consult palliative care to have further discussion with patient tomorrow for goals of care planning  Factor 5 Leiden mutation, heterozygous (HCC)  Patient is maintained on Coumadin PTA  INR supratherapeutic at time of presentation and was placed on hold  INR trending upwards and dialysis was delayed for a few days so vitamin K was administered at 2.5 mg  Patient now with subtherapeutic INR  Continue to monitor while administering low-dose Coumadin  Also monitor platelets  Lab Results   Component Value Date    INR 1.29 (H) 12/01/2024    INR 1.19 11/30/2024    INR 1.85 (H) 11/29/2024     Secondary hyperparathyroidism of renal origin (HCC)  Continue Cinacalcet on Mondays, Wednesdays, and Fridays  Hemodialysis-associated hypotension  Continue midodrine and pyridostigmine  Type 2 diabetes mellitus with stage 4 chronic kidney disease, with long-term current use of insulin (HCC)  Lab Results   Component Value Date    HGBA1C 5.0 11/02/2024       Recent Labs     11/30/24  1113 11/30/24  1613 11/30/24  2101 12/01/24  0756   POCGLU 73 84 77 75       Blood Sugar Average: Last 72 hrs:  (P) [id number]  Sliding scale insulin. Hypoglycemia protocol  Blood glucose has been stable  Will discontinue nighttime checks  Paroxysmal atrial fibrillation (HCC)  Patient did have  rapid response secondary to chest pain  Cardiology consulted-thought to be secondary to A-fib rather than any ACS  Can continue metoprolol if blood pressure tolerates -maintain on midodrine as well to aid in attention  Continue to trend INR with Coumadin  ESRD (end stage renal disease) on dialysis (HCC)  Lab Results   Component Value Date    EGFR 35 12/01/2024    EGFR 31 11/30/2024    EGFR 19 11/29/2024    CREATININE 1.86 (H) 12/01/2024    CREATININE 2.06 (H) 11/30/2024    CREATININE 3.02 (H) 11/29/2024   Typical regimen Tuesday Thursday Saturday  Nephrology following  Most recent session 11/30 to resume prior schedule  Anemia due to chronic kidney disease, on chronic dialysis (HCC)  Patient with chronic anemia  Baseline hemoglobin around 8  During hospitalization patient's hemoglobin has been stable between 9 and 10  Monitor for signs of bleeding in setting of low platelets  Pancytopenia (HCC)  Multifactorial in origin, continue to monitor  Platelet count has been steadily decreasing  Previously has supratherapeutic INR  Currently is subtherapeutic, continue to monitor INR and platelets and for any bleeding    Lab Results   Component Value Date    WBC 5.45 12/01/2024    RBC 3.09 (L) 12/01/2024    PLT 68 (L) 12/01/2024       Precordial pain (Resolved: 12/1/2024)  7/29 rapid response called secondary to precordial chest pain  Bedside EKG nonischemic showing atrial fibrillation  Troponins remain flat-also could be elevated secondary to CKD  Cardiology consulted-feel that chest pain is likely related to A-fib, not ACS  Do not recommend aggressive treatment  No further events on telemetry  Patient reports no further chest pain  Moderate protein-calorie malnutrition (HCC)  Malnutrition Findings:   Adult Malnutrition type: Chronic illness  Adult Degree of Malnutrition: Malnutrition of moderate degree  Malnutrition Characteristics: Inadequate energy, Weight loss                  360 Statement: malnutrition of moderate  degree in setting of chronic illness, evidenced by 7/11/24 263 lbs to 11/21/24 220.8 lbs (16% wt loss x 4 mo), poor po intake/<75% energy intake compared to estimated needs for > 1 month, treated with diet and supplements    BMI Findings:           Body mass index is 27.39 kg/m².   Appreciate nutrition assessment  Patient has declined at this time for addition of Remeron-will continue discussion  Electrolyte imbalance risk    Dysuria  Patient reporting ongoing dysuria since prior to admission  UA done 11/30 without any signs for acute cystitis  Is maintained on Proscar    VTE Pharmacologic Prophylaxis: VTE Score: 6 High Risk (Score >/= 5) - Pharmacological DVT Prophylaxis Ordered: warfarin (Coumadin). Sequential Compression Devices Ordered.    Mobility:   Basic Mobility Inpatient Raw Score: 9  JH-HLM Goal: 3: Sit at edge of bed  JH-HLM Achieved: 2: Bed activities/Dependent transfer  JH-HLM Goal NOT achieved. Continue with multidisciplinary rounding and encourage appropriate mobility to improve upon JH-HLM goals.    Patient Centered Rounds: I performed bedside rounds with nursing staff today.   Discussions with Specialists or Other Care Team Provider: CM    Education and Discussions with Family / Patient: Updated  (family) via phone.    Current Length of Stay: 11 day(s)  Current Patient Status: Inpatient   Certification Statement: The patient will continue to require additional inpatient hospital stay due to monitoring of volume status, anorexia/poor oral intake and continued diarrhea  Discharge Plan: Anticipate discharge in >72 hrs to rehab facility.    Code Status: Level 3 - DNAR and DNI    Subjective   Seen and examined today.  Had long discussion with patient about his current health.  Patient endorses a lot of frustration regarding his decline.  He states he is continuing to have dysuria since prior to admission and he also has having a lot of diarrhea and has full body pain when being touched and  assessed.  He said the diarrhea has not gotten any better and he is not having any abdominal pain or cramping, he cannot control his bowels at this time.  Denies any numbness or tingling in his extremities or back pain.  He states he has no appetite and does not wish to eat anything, does not like the protein shakes.  He also declines any additional antidepressants at this time.     Objective :  Temp:  [97.3 °F (36.3 °C)-97.9 °F (36.6 °C)] 97.7 °F (36.5 °C)  HR:  [57-80] 67  BP: (105-152)/(54-96) 130/58  Resp:  [18-20] 20  SpO2:  [95 %-100 %] 97 %  O2 Device: Nasal cannula  Nasal Cannula O2 Flow Rate (L/min):  [1 L/min-2 L/min] 1 L/min    Body mass index is 27.39 kg/m².     Input and Output Summary (last 24 hours):     Intake/Output Summary (Last 24 hours) at 12/1/2024 1102  Last data filed at 12/1/2024 0734  Gross per 24 hour   Intake 370 ml   Output 2100 ml   Net -1730 ml       Physical Exam  Vitals and nursing note reviewed.   Constitutional:       General: He is not in acute distress.     Appearance: Normal appearance. He is ill-appearing.   HENT:      Head: Normocephalic and atraumatic.      Nose: No congestion.      Mouth/Throat:      Mouth: Mucous membranes are dry.      Pharynx: Oropharynx is clear.   Eyes:      Conjunctiva/sclera: Conjunctivae normal.   Cardiovascular:      Rate and Rhythm: Normal rate.      Pulses: Normal pulses.      Heart sounds: Normal heart sounds.   Pulmonary:      Effort: Pulmonary effort is normal. No respiratory distress.      Comments: Diminished effort  Abdominal:      General: Bowel sounds are normal.      Palpations: Abdomen is soft.      Tenderness: There is no abdominal tenderness.   Musculoskeletal:         General: Normal range of motion.      Cervical back: Normal range of motion.      Right lower leg: No edema.      Left lower leg: No edema.   Skin:     General: Skin is warm and dry.      Coloration: Skin is pale.   Neurological:      Mental Status: He is alert and  oriented to person, place, and time.   Psychiatric:         Mood and Affect: Mood is depressed.         Behavior: Behavior normal.           Lines/Drains:  Lines/Drains/Airways       Active Status       Name Placement date Placement time Site Days    HD Permanent Double Catheter 10/14/24  1420  Subclavian  47                            Lab Results: I have reviewed the following results:   Results from last 7 days   Lab Units 12/01/24  0842 11/30/24  0606   WBC Thousand/uL 5.45 5.01   HEMOGLOBIN g/dL 9.8* 9.2*   HEMATOCRIT % 31.7* 29.3*   PLATELETS Thousands/uL 68* 75*   BANDS PCT %  --  1   SEGS PCT % 75  --    LYMPHO PCT % 19 6*   MONO PCT % 4 3*   EOS PCT % 1 0     Results from last 7 days   Lab Units 12/01/24  0842   SODIUM mmol/L 135   POTASSIUM mmol/L 3.9   CHLORIDE mmol/L 99   CO2 mmol/L 31   BUN mg/dL 15   CREATININE mg/dL 1.86*   ANION GAP mmol/L 5   CALCIUM mg/dL 9.2   ALBUMIN g/dL 2.8*   TOTAL BILIRUBIN mg/dL 0.68   ALK PHOS U/L 59   ALT U/L 4*   AST U/L 11*   GLUCOSE RANDOM mg/dL 75     Results from last 7 days   Lab Units 12/01/24  0842   INR  1.29*     Results from last 7 days   Lab Units 12/01/24  0756 11/30/24  2101 11/30/24  1613 11/30/24  1113 11/30/24  0718 11/29/24  2104 11/29/24  1635 11/29/24  1314 11/29/24  1225 11/29/24  0832 11/28/24  2237 11/28/24  1626   POC GLUCOSE mg/dl 75 77 84 73 86 73 82 107 100 120 94 99         Results from last 7 days   Lab Units 11/29/24  1328 11/29/24  1313   LACTIC ACID mmol/L 1.1  --    PROCALCITONIN ng/ml  --  0.14       Recent Cultures (last 7 days):               Last 24 Hours Medication List:     Current Facility-Administered Medications:     acetaminophen (TYLENOL) tablet 650 mg, Q4H PRN    atorvastatin (LIPITOR) tablet 40 mg, Daily With Dinner    benzonatate (TESSALON PERLES) capsule 100 mg, TID PRN    calcium carbonate (TUMS) chewable tablet 500 mg, Daily PRN    cinacalcet (SENSIPAR) tablet 60 mg, Once per day on Monday Wednesday Friday    docusate sodium  (COLACE) capsule 100 mg, BID PRN    finasteride (PROSCAR) tablet 5 mg, Daily    gentamicin (GARAMYCIN) 0.1 % cream, Daily    guaiFENesin (MUCINEX) 12 hr tablet 600 mg, Q12H VASU    HYDROcodone-acetaminophen (NORCO) 5-325 mg per tablet 2 tablet, Q6H PRN    insulin lispro (HumALOG/ADMELOG) 100 units/mL subcutaneous injection 1-6 Units, TID AC **AND** Fingerstick Glucose (POCT), TID AC    levothyroxine tablet 125 mcg, Early Morning    metoprolol tartrate (LOPRESSOR) tablet 25 mg, Q12H VASU    midodrine (PROAMATINE) tablet 15 mg, TID AC    nicotine (NICODERM CQ) 7 mg/24hr TD 24 hr patch 7 mg, Daily    ondansetron (ZOFRAN) injection 4 mg, Q6H PRN    pyridostigmine (MESTINON) tablet 60 mg, TID    sevelamer (RENAGEL) tablet 800 mg, TID With Meals    simethicone (MYLICON) chewable tablet 80 mg, 4x Daily PRN    vancomycin (VANCOCIN) capsule 500 mg, Q6H VASU    warfarin (COUMADIN) tablet 2 mg, HS    Administrative Statements   Today, Patient Was Seen By: Theresa Butt PA-C      **Please Note: This note may have been constructed using a voice recognition system.**

## 2024-12-01 NOTE — ASSESSMENT & PLAN NOTE
Lab Results   Component Value Date    EGFR 35 12/01/2024    EGFR 31 11/30/2024    EGFR 19 11/29/2024    CREATININE 1.86 (H) 12/01/2024    CREATININE 2.06 (H) 11/30/2024    CREATININE 3.02 (H) 11/29/2024   Typical regimen Tuesday Thursday Saturday  Nephrology following  Most recent session 11/30 to resume prior schedule

## 2024-12-01 NOTE — ASSESSMENT & PLAN NOTE
7/29/24 Echo: EF 75%, G1DD  Was fluid overloaded on admission and this has improved.  His new estimated dry weight is likely 92.4 kg.  This will need to be reviewed evaluated once diarrhea stops.  I think we may have reached his equilibration point currently with regards to his dry weight.

## 2024-12-01 NOTE — ASSESSMENT & PLAN NOTE
Hyponatremia  Na acceptable at 132 today.   Monitor with HD.     Hyperphosphatemia  Continue sevelamer 800 mg TID.   Phos is at goal, 2.4 on 11/27.

## 2024-12-01 NOTE — ASSESSMENT & PLAN NOTE
Lab Results   Component Value Date    HGBA1C 5.0 11/02/2024       Recent Labs     11/30/24  1113 11/30/24  1613 11/30/24  2101 12/01/24  0756   POCGLU 73 84 77 75       Blood Sugar Average: Last 72 hrs:  (P) [id number]  Sliding scale insulin. Hypoglycemia protocol  Blood glucose has been stable  Will discontinue nighttime checks

## 2024-12-01 NOTE — ASSESSMENT & PLAN NOTE
He was on PD but was changed to back up iHD during admission to [facility] in November 2024. Started iHD on 11/8/24.   [facility] T[facility].  Previous EDW 98.5 kg - challenged this hospital admission, most recent pot weight today 92.4 kg.   Access: R IJ permcath. PD catheter in place.   Sodium 132 mmol/L, otherwise electrolytes stable today, 11/30.   HD today for 1.25L UF. States he had mild abdominal cramping near end of tx.   Next HD is Tuesday, 12/3 on regular TTS schedule.

## 2024-12-01 NOTE — ASSESSMENT & PLAN NOTE
Patient having significant diarrhea-initially suspected secondary to COVID  C. difficile tested and showing positive PCR, EIA negative  CT abdomen pelvis negative for colitis   Started on oral vancomycin on 11/24 - 125 mg every 6 hours  Has had no improvement into diarrhea per patient  Will increase to 500 mg every 6 hours and consult infectious disease

## 2024-12-01 NOTE — ASSESSMENT & PLAN NOTE
Wt Readings from Last 3 Encounters:   12/01/24 91.6 kg (201 lb 15.1 oz)   10/14/24 115 kg (253 lb)   10/07/24 118 kg (260 lb)   CXR on 11/20 appeared to be pulmonary vascular congestion   Patient is a dialysis basis, received hemodialysis on TTS  Most recent session 11/30 to resume [facility] schedule  Torsemide is on hold secondary to ongoing diarrhea and anorexia  Appreciate continued nephrology follow-up  Monitor intake/output and daily weight

## 2024-12-02 PROBLEM — E83.42 HYPOMAGNESEMIA: Status: ACTIVE | Noted: 2024-12-02

## 2024-12-02 PROBLEM — R10.13 EPIGASTRIC PAIN: Status: ACTIVE | Noted: 2024-12-02

## 2024-12-02 PROBLEM — R19.7 DIARRHEA: Status: ACTIVE | Noted: 2024-12-02

## 2024-12-02 LAB
ALBUMIN SERPL BCG-MCNC: 2.5 G/DL (ref 3.5–5)
ALP SERPL-CCNC: 56 U/L (ref 34–104)
ALT SERPL W P-5'-P-CCNC: 5 U/L (ref 7–52)
ANION GAP SERPL CALCULATED.3IONS-SCNC: 6 MMOL/L (ref 4–13)
AST SERPL W P-5'-P-CCNC: 11 U/L (ref 13–39)
BILIRUB SERPL-MCNC: 0.53 MG/DL (ref 0.2–1)
BUN SERPL-MCNC: 18 MG/DL (ref 5–25)
CALCIUM ALBUM COR SERPL-MCNC: 9.9 MG/DL (ref 8.3–10.1)
CALCIUM SERPL-MCNC: 8.7 MG/DL (ref 8.4–10.2)
CHLORIDE SERPL-SCNC: 101 MMOL/L (ref 96–108)
CO2 SERPL-SCNC: 29 MMOL/L (ref 21–32)
CREAT SERPL-MCNC: 2.31 MG/DL (ref 0.6–1.3)
CRP SERPL QL: 4.2 MG/L
ERYTHROCYTE [DISTWIDTH] IN BLOOD BY AUTOMATED COUNT: 14 % (ref 11.6–15.1)
GFR SERPL CREATININE-BSD FRML MDRD: 27 ML/MIN/1.73SQ M
GLUCOSE SERPL-MCNC: 59 MG/DL (ref 65–140)
GLUCOSE SERPL-MCNC: 60 MG/DL (ref 65–140)
GLUCOSE SERPL-MCNC: 67 MG/DL (ref 65–140)
GLUCOSE SERPL-MCNC: 67 MG/DL (ref 65–140)
GLUCOSE SERPL-MCNC: 71 MG/DL (ref 65–140)
GLUCOSE SERPL-MCNC: 77 MG/DL (ref 65–140)
HCT VFR BLD AUTO: 29.6 % (ref 36.5–49.3)
HGB BLD-MCNC: 9.1 G/DL (ref 12–17)
INR PPP: 1.95 (ref 0.85–1.19)
MAGNESIUM SERPL-MCNC: 1.5 MG/DL (ref 1.9–2.7)
MCH RBC QN AUTO: 31.5 PG (ref 26.8–34.3)
MCHC RBC AUTO-ENTMCNC: 30.7 G/DL (ref 31.4–37.4)
MCV RBC AUTO: 102 FL (ref 82–98)
PHOSPHATE SERPL-MCNC: 3.2 MG/DL (ref 2.3–4.1)
PLATELET # BLD AUTO: 52 THOUSANDS/UL (ref 149–390)
PMV BLD AUTO: 8.4 FL (ref 8.9–12.7)
POTASSIUM SERPL-SCNC: 3.5 MMOL/L (ref 3.5–5.3)
PROT SERPL-MCNC: 4.3 G/DL (ref 6.4–8.4)
PROTHROMBIN TIME: 22.5 SECONDS (ref 12.3–15)
RBC # BLD AUTO: 2.89 MILLION/UL (ref 3.88–5.62)
SODIUM SERPL-SCNC: 136 MMOL/L (ref 135–147)
WBC # BLD AUTO: 6.24 THOUSAND/UL (ref 4.31–10.16)

## 2024-12-02 PROCEDURE — 97530 THERAPEUTIC ACTIVITIES: CPT

## 2024-12-02 PROCEDURE — 82948 REAGENT STRIP/BLOOD GLUCOSE: CPT

## 2024-12-02 PROCEDURE — 99232 SBSQ HOSP IP/OBS MODERATE 35: CPT | Performed by: INTERNAL MEDICINE

## 2024-12-02 PROCEDURE — 85027 COMPLETE CBC AUTOMATED: CPT

## 2024-12-02 PROCEDURE — 80053 COMPREHEN METABOLIC PANEL: CPT

## 2024-12-02 PROCEDURE — 83993 ASSAY FOR CALPROTECTIN FECAL: CPT | Performed by: PHYSICIAN ASSISTANT

## 2024-12-02 PROCEDURE — 86140 C-REACTIVE PROTEIN: CPT | Performed by: PHYSICIAN ASSISTANT

## 2024-12-02 PROCEDURE — 94668 MNPJ CHEST WALL SBSQ: CPT

## 2024-12-02 PROCEDURE — 82653 EL-1 FECAL QUANTITATIVE: CPT | Performed by: PHYSICIAN ASSISTANT

## 2024-12-02 PROCEDURE — 85610 PROTHROMBIN TIME: CPT

## 2024-12-02 PROCEDURE — 97535 SELF CARE MNGMENT TRAINING: CPT

## 2024-12-02 PROCEDURE — 87338 HPYLORI STOOL AG IA: CPT | Performed by: PHYSICIAN ASSISTANT

## 2024-12-02 PROCEDURE — 83735 ASSAY OF MAGNESIUM: CPT

## 2024-12-02 PROCEDURE — 99232 SBSQ HOSP IP/OBS MODERATE 35: CPT

## 2024-12-02 PROCEDURE — 94760 N-INVAS EAR/PLS OXIMETRY 1: CPT

## 2024-12-02 PROCEDURE — 84100 ASSAY OF PHOSPHORUS: CPT | Performed by: INTERNAL MEDICINE

## 2024-12-02 PROCEDURE — 87046 STOOL CULTR AEROBIC BACT EA: CPT | Performed by: PHYSICIAN ASSISTANT

## 2024-12-02 PROCEDURE — 87506 IADNA-DNA/RNA PROBE TQ 6-11: CPT | Performed by: PHYSICIAN ASSISTANT

## 2024-12-02 RX ORDER — MAGNESIUM SULFATE HEPTAHYDRATE 40 MG/ML
2 INJECTION, SOLUTION INTRAVENOUS ONCE
Status: COMPLETED | OUTPATIENT
Start: 2024-12-02 | End: 2024-12-02

## 2024-12-02 RX ADMIN — CINACALCET 60 MG: 30 TABLET, FILM COATED ORAL at 08:46

## 2024-12-02 RX ADMIN — PYRIDOSTIGMINE BROMIDE 60 MG: 60 TABLET ORAL at 18:22

## 2024-12-02 RX ADMIN — GUAIFENESIN 600 MG: 600 TABLET ORAL at 08:46

## 2024-12-02 RX ADMIN — FINASTERIDE 5 MG: 5 TABLET, FILM COATED ORAL at 08:46

## 2024-12-02 RX ADMIN — MAGNESIUM SULFATE HEPTAHYDRATE 2 G: 40 INJECTION, SOLUTION INTRAVENOUS at 08:45

## 2024-12-02 RX ADMIN — METOPROLOL TARTRATE 25 MG: 25 TABLET, FILM COATED ORAL at 08:46

## 2024-12-02 RX ADMIN — MIDODRINE HYDROCHLORIDE 15 MG: 5 TABLET ORAL at 09:01

## 2024-12-02 RX ADMIN — MIDODRINE HYDROCHLORIDE 15 MG: 5 TABLET ORAL at 18:22

## 2024-12-02 RX ADMIN — PYRIDOSTIGMINE BROMIDE 60 MG: 60 TABLET ORAL at 08:47

## 2024-12-02 RX ADMIN — VANCOMYCIN HYDROCHLORIDE 500 MG: 250 CAPSULE ORAL at 05:00

## 2024-12-02 RX ADMIN — ATORVASTATIN CALCIUM 40 MG: 40 TABLET, FILM COATED ORAL at 18:22

## 2024-12-02 RX ADMIN — MIDODRINE HYDROCHLORIDE 15 MG: 5 TABLET ORAL at 12:41

## 2024-12-02 RX ADMIN — LEVOTHYROXINE SODIUM 125 MCG: 125 TABLET ORAL at 05:00

## 2024-12-02 RX ADMIN — HYDROCODONE BITARTRATE AND ACETAMINOPHEN 2 TABLET: 5; 325 TABLET ORAL at 18:30

## 2024-12-02 NOTE — ASSESSMENT & PLAN NOTE
Patient is maintained on Coumadin PTA  INR supratherapeutic at time of presentation and was placed on hold  INR trending upwards and dialysis was delayed for a few days so vitamin K was administered at 2.5 mg  Patient now with subtherapeutic INR  Continue to monitor while administering low-dose Coumadin -on hold due to rapid increase in INR  Also monitor platelets  Lab Results   Component Value Date    INR 1.95 (H) 12/02/2024    INR 1.29 (H) 12/01/2024    INR 1.19 11/30/2024

## 2024-12-02 NOTE — ASSESSMENT & PLAN NOTE
Multifactorial in origin, continue to monitor  Platelet count has been steadily decreasing with unclear etiology  Currently no signs of bleeding  Previously has supratherapeutic INR  Currently is subtherapeutic, continue to monitor INR and platelets and for any bleeding  Hold Coumadin due to INR being 1.9 from 1.2 in setting of low platelets    Lab Results   Component Value Date    WBC 6.24 12/02/2024    RBC 2.89 (L) 12/02/2024    PLT 52 (L) 12/02/2024

## 2024-12-02 NOTE — RESPIRATORY THERAPY NOTE
RT Protocol Note  Masoud Goregh 69 y.o. male MRN: [id number]  Unit/Bed#: -01 Encounter: [id number]    Assessment    Principal Problem:    Acute respiratory insufficiency  Active Problems:    Acute on chronic diastolic HF (heart failure) (HCC)    Pancytopenia (HCC)    Factor 5 Leiden mutation, heterozygous (HCC)    Type 2 diabetes mellitus with stage 4 chronic kidney disease, with long-term current use of insulin (HCC)    Secondary hyperparathyroidism of renal origin (HCC)    Paroxysmal atrial fibrillation (HCC)    Hemodialysis-associated hypotension    Moderate protein-calorie malnutrition (HCC)    Goals of care, counseling/discussion    COVID-19    ESRD (end stage renal disease) on dialysis (HCC)    Anemia due to chronic kidney disease, on chronic dialysis (HCC)    Electrolyte imbalance risk    Diarrhea of infectious origin    Dysuria           Past Medical History:   Diagnosis Date    Anemia in chronic kidney disease     Anticoagulated on Coumadin     Atrial fibrillation (HCC)     BPH (benign prostatic hyperplasia)     CAD (coronary artery disease)     CKD (chronic kidney disease), stage V (HCC)     Compartment syndrome of forearm (HCC)     Right, 2019    COPD (chronic obstructive pulmonary disease) (HCC)     CVA (cerebral vascular accident) (HCC)     Diastolic CHF (HCC)     grade 1 DD    DVT (deep venous thrombosis) (HCC)     Factor V deficiency (HCC)     Gout     Hyperlipidemia     Hypertension     Hypothyroidism     MI (myocardial infarction) (HCC)     x3 - 1999, 2010, after 2015    Morbid obesity (HCC)     Nephrolithiasis     Noncompliance with medications     SHAD (obstructive sleep apnea)     Peritoneal dialysis catheter in place (HCC)     Pulmonary embolism (HCC)     Secondary hyperparathyroidism of renal origin (HCC)     Spinal stenosis of lumbar region     Status post placement of implantable loop recorder     Tobacco dependence      Social History     Socioeconomic History    Marital status:       Spouse name: None    Number of children: None    Years of education: None    Highest education level: None   Occupational History    None   Tobacco Use    Smoking status: Every Day     Current packs/day: 0.50     Types: Cigarettes    Smokeless tobacco: Never   Vaping Use    Vaping status: Never Used   Substance and Sexual Activity    Alcohol use: Not Currently    Drug use: Never    Sexual activity: None   Other Topics Concern    None   Social History Narrative    None     Social Drivers of Health     Financial Resource Strain: Low Risk  (11/1/2024)    Received from [facility]    Overall Financial Resource Strain (CARDIA)     Difficulty of Paying Living Expenses: Not hard at all   Food Insecurity: No Food Insecurity (11/28/2024)    Nursing - Inadequate Food Risk Classification     Worried About Running Out of Food in the Last Year: Never true     Ran Out of Food in the Last Year: Never true     Ran Out of Food in the Last Year: 1   Transportation Needs: No Transportation Needs (11/28/2024)    Nursing - Transportation Risk Classification     Lack of Transportation: Not on file     Lack of Transportation: 2   Physical Activity: Not on file   Stress: Not on file   Social Connections: Unknown (6/18/2024)    Received from [employer]     How often do you feel lonely or isolated from those around you? (Adult - for ages 18 years and over): Not on file   Intimate Partner Violence: Unknown (11/28/2024)    Nursing IPS     Feels Physically and Emotionally Safe: Not on file     Physically Hurt by Someone: Not on file     Humiliated or Emotionally Abused by Someone: Not on file     Physically Hurt by Someone: 2     Hurt or Threatened by Someone: 2   Housing Stability: Unknown (11/28/2024)    Nursing: Inadequate Housing Risk Classification     Has Housing: Not on file     Worried About Losing Housing: Not on file     Unable to Get Utilities: Not on file     Unable to Pay for Housing in the  Last Year: 2     Has Housin       Subjective         Objective    Physical Exam:   Assessment Type: During-treatment  General Appearance: Alert, Awake  Respiratory Pattern: Normal  Chest Assessment: Chest expansion symmetrical  Bilateral Breath Sounds: Coarse  Cough: Non-productive  O2 Device: .5 L NC    Vitals:  Blood pressure 123/58, pulse 65, temperature 97.5 °F (36.4 °C), temperature source Temporal, resp. rate 18, height 6' (1.829 m), weight 90.7 kg (200 lb), SpO2 95%.          Imaging and other studies:     O2 Device: .5 L NC     Plan    Respiratory Plan: Mild Distress pathway        Resp Comments: Pt on .5L NC, Flutter valve done, non-productive cough noted. No SOB no dyspnea.

## 2024-12-02 NOTE — PLAN OF CARE
Problem: Prexisting or High Potential for Compromised Skin Integrity  Goal: Skin integrity is maintained or improved  Description: INTERVENTIONS:  - Identify patients at risk for skin breakdown  - Assess and monitor skin integrity  - Assess and monitor nutrition and hydration status  - Monitor labs   - Assess for incontinence   - Turn and reposition patient  - Assist with mobility/ambulation  - Relieve pressure over bony prominences  - Avoid friction and shearing  - Provide appropriate hygiene as needed including keeping skin clean and dry  - Evaluate need for skin moisturizer/barrier cream  - Collaborate with interdisciplinary team   - Patient/family teaching  - Consider wound care consult   Outcome: Progressing     Problem: PAIN - ADULT  Goal: Verbalizes/displays adequate comfort level or baseline comfort level  Description: Interventions:  - Encourage patient to monitor pain and request assistance  - Assess pain using appropriate pain scale  - Administer analgesics based on type and severity of pain and evaluate response  - Implement non-pharmacological measures as appropriate and evaluate response  - Consider cultural and social influences on pain and pain management  - Notify physician/advanced practitioner if interventions unsuccessful or patient reports new pain  Outcome: Progressing     Problem: INFECTION - ADULT  Goal: Absence or prevention of progression during hospitalization  Description: INTERVENTIONS:  - Assess and monitor for signs and symptoms of infection  - Monitor lab/diagnostic results  - Monitor all insertion sites, i.e. indwelling lines, tubes, and drains  - Monitor endotracheal if appropriate and nasal secretions for changes in amount and color  - [place] appropriate cooling/warming therapies per order  - Administer medications as ordered  - Instruct and encourage patient and family to use good hand hygiene technique  - Identify and instruct in appropriate isolation precautions for  identified infection/condition  Outcome: Progressing  Goal: Absence of fever/infection during neutropenic period  Description: INTERVENTIONS:  - Monitor WBC    Outcome: Progressing     Problem: SAFETY ADULT  Goal: Patient will remain free of falls  Description: INTERVENTIONS:  - Educate patient/family on patient safety including physical limitations  - Instruct patient to call for assistance with activity   - Consult OT/PT to assist with strengthening/mobility   - Keep Call bell within reach  - Keep bed low and locked with side rails adjusted as appropriate  - Keep care items and personal belongings within reach  - Initiate and maintain comfort rounds  - Make Fall Risk Sign visible to staff  - Offer Toileting every 2 Hours, in advance of need  - Initiate/Maintain bed/chair alarm  - Obtain necessary fall risk management equipment  - Apply yellow socks and bracelet for high fall risk patients  - Consider moving patient to room near nurses station  Outcome: Progressing  Goal: Maintain or return to baseline ADL function  Description: INTERVENTIONS:  -  Assess patient's ability to carry out ADLs; assess patient's baseline for ADL function and identify physical deficits which impact ability to perform ADLs (bathing, care of mouth/teeth, toileting, grooming, dressing, etc.)  - Assess/evaluate cause of self-care deficits   - Assess range of motion  - Assess patient's mobility; develop plan if impaired  - Assess patient's need for assistive devices and provide as appropriate  - Encourage maximum independence but intervene and supervise when necessary  - Involve family in performance of ADLs  - Assess for home care needs following discharge   - Consider OT consult to assist with ADL evaluation and planning for discharge  - Provide patient education as appropriate  Outcome: Progressing  Goal: Maintains/Returns to pre admission functional level  Description: INTERVENTIONS:  - Perform AM-PAC 6 Click Basic Mobility/ Daily  Activity assessment daily.  - Set and communicate daily mobility goal to care team and patient/family/caregiver.   - Collaborate with rehabilitation services on mobility goals if consulted  - Perform Range of Motion 3 times a day.  - Reposition patient every 2 hours.  - Dangle patient 3 times a day  - Stand patient 3 times a day  - Ambulate patient 3 times a day  - Out of bed to chair 3 times a day   - Out of bed for meals 3 times a day  - Out of bed for toileting  - Record patient progress and toleration of activity level   Outcome: Progressing     Problem: DISCHARGE PLANNING  Goal: Discharge to home or other facility with appropriate resources  Description: INTERVENTIONS:  - Identify barriers to discharge w/patient and caregiver  - Arrange for needed discharge resources and transportation as appropriate  - Identify discharge learning needs (meds, wound care, etc.)  - Arrange for interpretive services to assist at discharge as needed  - Refer to Case Management Department for coordinating discharge planning if the patient needs post-hospital services based on physician/advanced practitioner order or complex needs related to functional status, cognitive ability, or social support system  Outcome: Progressing     Problem: Knowledge Deficit  Goal: Patient/family/caregiver demonstrates understanding of disease process, treatment plan, medications, and discharge instructions  Description: Complete learning assessment and assess knowledge base.  Interventions:  - Provide teaching at level of understanding  - Provide teaching via preferred learning methods  Outcome: Progressing     Problem: CARDIOVASCULAR - ADULT  Goal: Maintains optimal cardiac output and hemodynamic stability  Description: INTERVENTIONS:  - Monitor I/O, vital signs and rhythm  - Monitor for S/S and trends of decreased cardiac output  - Administer and titrate ordered vasoactive medications to optimize hemodynamic stability  - Assess quality of pulses,  skin color and temperature  - Assess for signs of decreased coronary artery perfusion  - Instruct patient to report change in severity of symptoms  Outcome: Progressing  Goal: Absence of cardiac dysrhythmias or at baseline rhythm  Description: INTERVENTIONS:  - Continuous cardiac monitoring, vital signs, obtain 12 lead EKG if ordered  - Administer antiarrhythmic and heart rate control medications as ordered  - Monitor electrolytes and administer replacement therapy as ordered  Outcome: Progressing     Problem: RESPIRATORY - ADULT  Goal: Achieves optimal ventilation and oxygenation  Description: INTERVENTIONS:  - Assess for changes in respiratory status  - Assess for changes in mentation and behavior  - Position to facilitate oxygenation and minimize respiratory effort  - Oxygen administered by appropriate delivery if ordered  - Initiate smoking cessation education as indicated  - Encourage broncho-pulmonary hygiene including cough, deep breathe, Incentive Spirometry  - Assess the need for suctioning and aspirate as needed  - Assess and instruct to report SOB or any respiratory difficulty  - Respiratory Therapy support as indicated  Outcome: Progressing     Problem: METABOLIC, FLUID AND ELECTROLYTES - ADULT  Goal: Electrolytes maintained within normal limits  Description: INTERVENTIONS:  - Monitor labs and assess patient for signs and symptoms of electrolyte imbalances  - Administer electrolyte replacement as ordered  - Monitor response to electrolyte replacements, including repeat lab results as appropriate  - Instruct patient on fluid and nutrition as appropriate  Outcome: Progressing  Goal: Fluid balance maintained  Description: INTERVENTIONS:  - Monitor labs   - Monitor I/O and WT  - Instruct patient on fluid and nutrition as appropriate  - Assess for signs & symptoms of volume excess or deficit  Outcome: Progressing  Goal: Glucose maintained within target range  Description: INTERVENTIONS:  - Monitor Blood  Glucose as ordered  - Assess for signs and symptoms of hyperglycemia and hypoglycemia  - Administer ordered medications to maintain glucose within target range  - Assess nutritional intake and initiate nutrition service referral as needed  Outcome: Progressing     Problem: Nutrition/Hydration-ADULT  Goal: Nutrient/Hydration intake appropriate for improving, restoring or maintaining nutritional needs  Description: Monitor and assess patient's nutrition/hydration status for malnutrition. Collaborate with interdisciplinary team and initiate plan and interventions as ordered.  Monitor patient's weight and dietary intake as ordered or per policy. Utilize nutrition screening tool and intervene as necessary. Determine patient's food preferences and provide high-protein, high-caloric foods as appropriate.     INTERVENTIONS:  - Monitor oral intake, urinary output, labs, and treatment plans  - Assess nutrition and hydration status and recommend course of action  - Evaluate amount of meals eaten  - Assist patient with eating if necessary   - Allow adequate time for meals  - Recommend/ encourage appropriate diets, oral nutritional supplements, and vitamin/mineral supplements  - Order, calculate, and assess calorie counts as needed  - Recommend, monitor, and adjust tube feedings and TPN/PPN based on assessed needs  - Assess need for intravenous fluids  - Provide specific nutrition/hydration education as appropriate  - Include patient/family/caregiver in decisions related to nutrition  Outcome: Progressing

## 2024-12-02 NOTE — ASSESSMENT & PLAN NOTE
Admitted from [facility].  Tested positive around 11/17 with symptom onset.  CT chest negative for pneumonia.  Completed 5-day course of doxycycline and remdesivir and 10-day course of Decadron.  Supportive care per primary team

## 2024-12-02 NOTE — PLAN OF CARE
Problem: Prexisting or High Potential for Compromised Skin Integrity  Goal: Skin integrity is maintained or improved  Description: INTERVENTIONS:  - Identify patients at risk for skin breakdown  - Assess and monitor skin integrity  - Assess and monitor nutrition and hydration status  - Monitor labs   - Assess for incontinence   - Turn and reposition patient  - Assist with mobility/ambulation  - Relieve pressure over bony prominences  - Avoid friction and shearing  - Provide appropriate hygiene as needed including keeping skin clean and dry  - Evaluate need for skin moisturizer/barrier cream  - Collaborate with interdisciplinary team   - Patient/family teaching  - Consider wound care consult   Outcome: Progressing     Problem: PAIN - ADULT  Goal: Verbalizes/displays adequate comfort level or baseline comfort level  Description: Interventions:  - Encourage patient to monitor pain and request assistance  - Assess pain using appropriate pain scale  - Administer analgesics based on type and severity of pain and evaluate response  - Implement non-pharmacological measures as appropriate and evaluate response  - Consider cultural and social influences on pain and pain management  - Notify physician/advanced practitioner if interventions unsuccessful or patient reports new pain  Outcome: Progressing     Problem: INFECTION - ADULT  Goal: Absence or prevention of progression during hospitalization  Description: INTERVENTIONS:  - Assess and monitor for signs and symptoms of infection  - Monitor lab/diagnostic results  - Monitor all insertion sites, i.e. indwelling lines, tubes, and drains  - Monitor endotracheal if appropriate and nasal secretions for changes in amount and color  - [place] appropriate cooling/warming therapies per order  - Administer medications as ordered  - Instruct and encourage patient and family to use good hand hygiene technique  - Identify and instruct in appropriate isolation precautions for  identified infection/condition  Outcome: Progressing     Problem: CARDIOVASCULAR - ADULT  Goal: Maintains optimal cardiac output and hemodynamic stability  Description: INTERVENTIONS:  - Monitor I/O, vital signs and rhythm  - Monitor for S/S and trends of decreased cardiac output  - Administer and titrate ordered vasoactive medications to optimize hemodynamic stability  - Assess quality of pulses, skin color and temperature  - Assess for signs of decreased coronary artery perfusion  - Instruct patient to report change in severity of symptoms  Outcome: Progressing

## 2024-12-02 NOTE — PROGRESS NOTES
Progress Note - Hospitalist   Name: Masoud Perry 69 y.o. male I MRN: [id number]  Unit/Bed#: -01 I Date of Admission: 11/20/2024   Date of Service: 12/2/2024 I Hospital Day: 12    Assessment & Plan  Acute respiratory insufficiency  Oxygen needs have waxed and waned  CT chest abdomen pelvis with mild diffuse anasarca improved from prior CT, no specific pathology along  Monitor volume status  Currently on 0.5 L nasal cannula for comfort  Diarrhea of infectious origin  Patient having significant diarrhea-initially suspected secondary to COVID  C. difficile tested and showing positive PCR, EIA negative  CT abdomen pelvis negative for colitis   Started on oral vancomycin on 11/24 - 125 mg every 6 hours  Has had no improvement into diarrhea per patient  Will increase to 500 mg every 6 hours and consult infectious disease  Patient endorses slight improvement in diarrhea since starting increased dose of oral vancomycin  Acute on chronic diastolic HF (heart failure) (HCC)  Wt Readings from Last 3 Encounters:   12/02/24 90.7 kg (200 lb)   10/14/24 115 kg (253 lb)   10/07/24 118 kg (260 lb)   CXR on 11/20 appeared to be pulmonary vascular congestion   Patient is a dialysis basis, received hemodialysis on TTS  Most recent session 11/30 to resume [facility] schedule  Torsemide is on hold secondary to ongoing diarrhea and anorexia  Appreciate continued nephrology follow-up  Monitor intake/output and daily weight  COVID-19  Presents from [place]. Tested positive 3 days prior to admission when symptoms started  Positive in our records on 11/20  CT chest negative for any pathology  Started decadron and remdesevir, doxycycline  Has completed therapy with all 3  Was on Coumadin for anticoagulation/DVT prophylaxis  Patient also having diarrhea, and C. difficile showing carrier state -see under diarrhea of infectious origin for further treatment plan  Currently 0.5 L nasal cannula  Has completed quarantine for COVID-no longer needs  airborne precautions  Goals of care, counseling/discussion  Patient and I had discussion today about his goals of care moving forward  He endorses that he has been feeling depressed and frustrated with his continued decline in health  He says that sometimes he wishes that he could just go home to pass away and be comfortable  He shares that he is not disagreeable for treatment but sometimes feels as though he is being tortured  Pain discussed possibilities of psychiatry consult for depression/trying recommendations made by prior psychiatrist for [place]  At this time patient declines  We also discussed the possibility of a palliative care consult to discuss further his goals of care and symptom management of his full body pain and anorexia  He states he would consider this may be willing to discuss with specialist  Will consult palliative care to have further discussion with patient for goals of care planning  Factor 5 Leiden mutation, heterozygous (HCC)  Patient is maintained on Coumadin PTA  INR supratherapeutic at time of presentation and was placed on hold  INR trending upwards and dialysis was delayed for a few days so vitamin K was administered at 2.5 mg  Patient now with subtherapeutic INR  Continue to monitor while administering low-dose Coumadin -on hold due to rapid increase in INR  Also monitor platelets  Lab Results   Component Value Date    INR 1.95 (H) 12/02/2024    INR 1.29 (H) 12/01/2024    INR 1.19 11/30/2024     Secondary hyperparathyroidism of renal origin (HCC)  Currently on 60 mg p.o. 3 times weekly  Hemodialysis-associated hypotension  Continue midodrine and pyridostigmine  Type 2 diabetes mellitus with stage 4 chronic kidney disease, with long-term current use of insulin (HCC)  Lab Results   Component Value Date    HGBA1C 5.0 11/02/2024       Recent Labs     12/01/24  1642 12/01/24  2048 12/02/24  0813 12/02/24  1014   POCGLU 85 81 60* 67       Blood Sugar Average: Last 72 hrs:  (P)  [id number]  Sliding scale insulin. Hypoglycemia protocol  Blood glucose has been stable  Will discontinue nighttime checks  Paroxysmal atrial fibrillation (HCC)  Patient did have rapid response secondary to chest pain  Cardiology consulted-thought to be secondary to A-fib rather than any ACS  Can continue metoprolol if blood pressure tolerates -maintain on midodrine as well to aid in attention  Continue to trend INR with Coumadin   ESRD (end stage renal disease) on dialysis (HCC)  Lab Results   Component Value Date    EGFR 27 12/02/2024    EGFR 35 12/01/2024    EGFR 31 11/30/2024    CREATININE 2.31 (H) 12/02/2024    CREATININE 1.86 (H) 12/01/2024    CREATININE 2.06 (H) 11/30/2024   Typical regimen Tuesday Thursday Saturday  Nephrology following  Resume prior schedule  Anemia due to chronic kidney disease, on chronic dialysis (HCC)  Patient with chronic anemia  Baseline hemoglobin around 8  During hospitalization patient's hemoglobin has been stable between 9 and 10  Monitor for signs of bleeding in setting of low platelets  Pancytopenia (HCC)  Multifactorial in origin, continue to monitor  Platelet count has been steadily decreasing with unclear etiology  Currently no signs of bleeding  Previously has supratherapeutic INR  Currently is subtherapeutic, continue to monitor INR and platelets and for any bleeding  Hold Coumadin due to INR being 1.9 from 1.2 in setting of low platelets    Lab Results   Component Value Date    WBC 6.24 12/02/2024    RBC 2.89 (L) 12/02/2024    PLT 52 (L) 12/02/2024       Moderate protein-calorie malnutrition (HCC)  Malnutrition Findings:   Adult Malnutrition type: Chronic illness  Adult Degree of Malnutrition: Malnutrition of moderate degree  Malnutrition Characteristics: Inadequate energy, Weight loss                  360 Statement: malnutrition of moderate degree in setting of chronic illness, evidenced by 7/11/24 263 lbs to 11/21/24 220.8 lbs (16% wt loss x 4 mo), poor po  intake/<75% energy intake compared to estimated needs for > 1 month, treated with diet and supplements    BMI Findings:           Body mass index is 27.12 kg/m².   Appreciate nutrition assessment  Patient has declined at this time for addition of Remeron or Megace for appetite stimulation  Will start calorie count  Electrolyte imbalance risk    Dysuria  Patient reporting ongoing dysuria since prior to admission  UA done 11/30 without any signs for acute cystitis  Is maintained on Proscar  Hypomagnesemia      VTE Pharmacologic Prophylaxis: VTE Score: 6 High Risk (Score >/= 5) - Pharmacological DVT Prophylaxis Ordered: warfarin (Coumadin). Sequential Compression Devices Ordered.    Mobility:   Basic Mobility Inpatient Raw Score: 12  JH-HLM Goal: 4: Move to chair/commode  JH-HLM Achieved: 2: Bed activities/Dependent transfer  JH-HLM Goal NOT achieved. Continue with multidisciplinary rounding and encourage appropriate mobility to improve upon JH-HLM goals.    Patient Centered Rounds: I performed bedside rounds with nursing staff today.   Discussions with Specialists or Other Care Team Provider: CM    Education and Discussions with Family / Patient: Patient, patient request call to patient's son later in the day once he is done with work    Current Length of Stay: 12 day(s)  Current Patient Status: Inpatient   Certification Statement: The patient will continue to require additional inpatient hospital stay due to monitoring of volume status and diarrhea in setting of anorexia  Discharge Plan: Anticipate discharge in 48 hrs to rehab facility.    Code Status: Level 3 - DNAR and DNI    Subjective   Seen and examined.  Patient states that he feels the diarrhea is slightly less.  Feels his pain is slightly better.  Declines use of any antidepression agents or any Megace.     Objective :  Temp:  [97.2 °F (36.2 °C)-97.5 °F (36.4 °C)] 97.3 °F (36.3 °C)  HR:  [59-74] 70  BP: (116-161)/(58-83) 116/60  Resp:  [16-20] 18  SpO2:  [94  %-98 %] 97 %  O2 Device: Nasal cannula  Nasal Cannula O2 Flow Rate (L/min):  [0.5 L/min] 0.5 L/min    Body mass index is 27.12 kg/m².     Input and Output Summary (last 24 hours):     Intake/Output Summary (Last 24 hours) at 12/2/2024 1129  Last data filed at 12/2/2024 0430  Gross per 24 hour   Intake 160 ml   Output 100 ml   Net 60 ml       Physical Exam  Vitals and nursing note reviewed.   Constitutional:       General: He is not in acute distress.     Appearance: Normal appearance. He is ill-appearing.   HENT:      Head: Normocephalic and atraumatic.      Nose: No congestion.      Mouth/Throat:      Mouth: Mucous membranes are moist.      Pharynx: Oropharynx is clear.   Eyes:      Conjunctiva/sclera: Conjunctivae normal.   Cardiovascular:      Rate and Rhythm: Normal rate.      Pulses: Normal pulses.      Heart sounds: Normal heart sounds.   Pulmonary:      Effort: Pulmonary effort is normal. No respiratory distress.      Breath sounds: Normal breath sounds.   Abdominal:      General: Bowel sounds are normal.      Palpations: Abdomen is soft.      Tenderness: There is no abdominal tenderness.   Musculoskeletal:         General: Normal range of motion.      Cervical back: Normal range of motion.      Right lower leg: No edema.      Left lower leg: No edema.   Skin:     General: Skin is warm and dry.   Neurological:      Mental Status: He is alert and oriented to person, place, and time.   Psychiatric:         Mood and Affect: Mood normal.         Behavior: Behavior normal.           Lines/Drains:  Lines/Drains/Airways       Active Status       Name Placement date Placement time Site Days    HD Permanent Double Catheter 10/14/24  1420  Subclavian  48                            Lab Results: I have reviewed the following results:   Results from last 7 days   Lab Units 12/02/24  0458 12/01/24  0842   WBC Thousand/uL 6.24 5.45   HEMOGLOBIN g/dL 9.1* 9.8*   HEMATOCRIT % 29.6* 31.7*   PLATELETS Thousands/uL 52* 68*    BANDS PCT %  --  1   SEGS PCT %  --  75   LYMPHO PCT %  --  19  19   MONO PCT %  --  4  2*   EOS PCT %  --  1     Results from last 7 days   Lab Units 12/02/24  0458   SODIUM mmol/L 136   POTASSIUM mmol/L 3.5   CHLORIDE mmol/L 101   CO2 mmol/L 29   BUN mg/dL 18   CREATININE mg/dL 2.31*   ANION GAP mmol/L 6   CALCIUM mg/dL 8.7   ALBUMIN g/dL 2.5*   TOTAL BILIRUBIN mg/dL 0.53   ALK PHOS U/L 56   ALT U/L 5*   AST U/L 11*   GLUCOSE RANDOM mg/dL 71     Results from last 7 days   Lab Units 12/02/24  0458   INR  1.95*     Results from last 7 days   Lab Units 12/02/24  1014 12/02/24  0813 12/01/24  2048 12/01/24  1642 12/01/24  1141 12/01/24  0756 11/30/24  2101 11/30/24  1613 11/30/24  1113 11/30/24  0718 11/29/24  2104 11/29/24  1635   POC GLUCOSE mg/dl 67 60* 81 85 73 75 77 84 73 86 73 82         Results from last 7 days   Lab Units 11/29/24  1328 11/29/24  1313   LACTIC ACID mmol/L 1.1  --    PROCALCITONIN ng/ml  --  0.14       Recent Cultures (last 7 days):               Last 24 Hours Medication List:     Current Facility-Administered Medications:     acetaminophen (TYLENOL) tablet 650 mg, Q4H PRN    atorvastatin (LIPITOR) tablet 40 mg, Daily With Dinner    benzonatate (TESSALON PERLES) capsule 100 mg, TID PRN    calcium carbonate (TUMS) chewable tablet 500 mg, Daily PRN    cinacalcet (SENSIPAR) tablet 60 mg, Once per day on Monday Wednesday Friday    docusate sodium (COLACE) capsule 100 mg, BID PRN    finasteride (PROSCAR) tablet 5 mg, Daily    gentamicin (GARAMYCIN) 0.1 % cream, Daily    guaiFENesin (MUCINEX) 12 hr tablet 600 mg, Q12H VASU    HYDROcodone-acetaminophen (NORCO) 5-325 mg per tablet 2 tablet, Q6H PRN    insulin lispro (HumALOG/ADMELOG) 100 units/mL subcutaneous injection 1-6 Units, TID AC **AND** Fingerstick Glucose (POCT), TID AC    levothyroxine tablet 125 mcg, Early Morning    metoprolol tartrate (LOPRESSOR) tablet 25 mg, Q12H VASU    midodrine (PROAMATINE) tablet 15 mg, TID AC    nicotine (NICODERM  CQ) 7 mg/24hr TD 24 hr patch 7 mg, Daily    ondansetron (ZOFRAN) injection 4 mg, Q6H PRN    pyridostigmine (MESTINON) tablet 60 mg, TID    simethicone (MYLICON) chewable tablet 80 mg, 4x Daily PRN    vancomycin (VANCOCIN) capsule 500 mg, Q6H VASU    [Held by provider] warfarin (COUMADIN) tablet 2 mg, HS    Administrative Statements   Today, Patient Was Seen By: Theresa Butt PA-C      **Please Note: This note may have been constructed using a voice recognition system.**

## 2024-12-02 NOTE — ASSESSMENT & PLAN NOTE
7/29/24 Echo: EF 75%, G1DD  Was fluid overloaded on admission and this has improved.  His new estimated dry weight is likely 92.4 kg.    I think we may have reached his equilibration point currently with regards to his dry weight.  Will need to adjust outpatient target weight on discharge

## 2024-12-02 NOTE — ASSESSMENT & PLAN NOTE
Presents from [place]. Tested positive 3 days prior to admission when symptoms started  Positive in our records on 11/20  CT chest negative for any pathology  Started decadron and remdesevir, doxycycline  Has completed therapy with all 3  Was on Coumadin for anticoagulation/DVT prophylaxis  Patient also having diarrhea, and C. difficile showing carrier state -see under diarrhea of infectious origin for further treatment plan  Currently 0.5 L nasal cannula  Has completed quarantine for COVID-no longer needs airborne precautions

## 2024-12-02 NOTE — ASSESSMENT & PLAN NOTE
Patient states he is experiencing dysuria which started with onset of COVID.  Recent UA unremarkable 11/25.  Repeat UA shows trace ketones negative leukocytes negative nitrite.   Blood culture is negative, continue management per primary team

## 2024-12-02 NOTE — PROGRESS NOTES
VIRTUAL CARE DOCUMENTATION:     1. This service was provided via Telemedicine using [employer] TV Kit     2. Parties in the room with patient during teleconsult Patient only    3. Confidentiality My office door was closed     4. Participants No one else was in the room    5. Patient acknowledged consent and understanding of privacy and security of the  Telemedicine consult. I informed the patient that I have reviewed their record in Epic and presented the opportunity for them to ask any questions regarding the visit today.  The patient agreed to participate.    6. Time spent   20 mins including review of records and face-to-face encounter          NEPHROLOGY PROGRESS NOTE   Masoud Perry 69 y.o. male MRN: [id number]  Unit/Bed#: -01 Encounter: [id number]    ASSESSMENT & PLAN:  Assessment & Plan  ESRD (end stage renal disease) on dialysis ([facility])  He was on PD but was changed to back up iHD during admission to [facility] in November 2024. Started intermittent hemodialysis on 11/8/24.   [facility] TTS.  Previous EDW 98.5 kg - challenged this hospital admission, most recent post hemodialysis weight  92.4 kg from November 30th  Access: R IJ permcath. PD catheter in place.   Last hemodialysis was on 11/30, next hemodialysis treatment on Tuesday 12/3 regular TTS schedule.  Continue UF with HD.  Patient had some abdominal cramping during the last HD.  Continue to challenge target weight as tolerated    Hemodialysis-associated hypotension  BP is at goal, -134 on 11/30.   Rx: Midodrine 15 mg TID. Metoprolol 25 mg BID.  Maintain metoprolol and midodrine at this time.  Patient is on metoprolol for rate control for paroxysmal atrial fibrillation.  Torsemide 60 mg BID stopped on 11/25/24 due to volume depletion.  Would continue to hold Torsemide for right now given ongoing diarrhea.    Acute on chronic diastolic HF (heart failure) ([facility])  7/29/24 Echo: EF 75%, G1DD  Was fluid overloaded on admission and this has  improved.  His new estimated dry weight is likely 92.4 kg.    I think we may have reached his equilibration point currently with regards to his dry weight.  Will need to adjust outpatient target weight on discharge  Anemia due to chronic kidney disease, on chronic dialysis (HCC)  Hemoglobin is 9.8 today on the send first continue to monitor.  Epogen being held in setting of COVID-19 infection.    Secondary hyperparathyroidism of renal origin ([facility])  Continue Sensipar 30 mg 3/week.  ^ corrected calcium is  9.9 mg/dl    phosphorus level was found to be low at 1.9 on 12/1 and phosphorus binders were held and given phosphorus replacement, phosphorus level improved to normal range at 3.2 today, continue to hold binders for now.  Continue to hold at the time of discharge and monitor as outpatient    Pancytopenia ([facility])  WBC count improved but platelet count is still low at 52.  Hemoglobin dropped to 9.1 g/dL today.  LDH was mildly elevated at 131 peripheral smear showed 1% bands, no schistocytes.  Continue monitoring and management per primary team  Acute respiratory insufficiency  Treatment for COVID-19 per primary team.  There was concern for volume overload which is better now.      Diarrhea of infectious origin  CT abdomen pelvis was negative for colitis  Continue oral vancomycin for C. difficile colitis.  Still with diarrhea.     Dysuria  Patient states he is experiencing dysuria which started with onset of COVID.  Recent UA unremarkable 11/25.  Repeat UA shows trace ketones negative leukocytes negative nitrite.   Blood culture is negative, continue management per primary team      COVID-19  Treatment of COVID-19 per primary team.  Was tested positive 3 days prior to admission.    Paroxysmal atrial fibrillation (HCC)  Currently on Metoprolol 25 mg BID.     Electrolyte imbalance risk  Hyponatremia  Likely due to free water intake and impaired free water excretion in the setting of ESRD, sodium level improved to normal  range at 136 today.  Continue fluid restriction    Hypomagnesemia  Magnesium level is low at 1.5-replaced with 2 g IV magnesium sulfate.  Will hold off on oral magnesium in the setting of diarrhea.  Moderate protein-calorie malnutrition (HCC)  Malnutrition Findings:   Adult Malnutrition type: Chronic illness  Adult Degree of Malnutrition: Malnutrition of moderate degree  Malnutrition Characteristics: Inadequate energy, Weight loss                  360 Statement: malnutrition of moderate degree in setting of chronic illness, evidenced by 7/11/24 263 lbs to 11/21/24 220.8 lbs (16% wt loss x 4 mo), poor po intake/<75% energy intake compared to estimated needs for > 1 month, treated with diet and supplements    BMI Findings:           Body mass index is 27.12 kg/m².   -Continue management per primary team     Discussed with primary team regarding plan for next intermittent HD treatment tomorrow and agreed with the plan    SUBJECTIVE:  Patient denies any new complaints, no chest pain or shortness of breath, no nausea vomiting    OBJECTIVE:  Current Weight: Weight - Scale: 90.7 kg (200 lb)  Vitals:    12/02/24 0815   BP: 116/60   Pulse: 70   Resp: 18   Temp: (!) 97.3 °F (36.3 °C)   SpO2: 97%       Intake/Output Summary (Last 24 hours) at 12/2/2024 0957  Last data filed at 12/2/2024 0430  Gross per 24 hour   Intake 160 ml   Output 100 ml   Net 60 ml       Physical Exam  Vitals reviewed.   Constitutional:       General: He is not in acute distress.     Appearance: He is well-developed. He is not diaphoretic.   HENT:      Head: Normocephalic and atraumatic.      Mouth/Throat:      Mouth: Mucous membranes are moist.   Eyes:      General: No scleral icterus.  Neck:      Vascular: No JVD.   Pulmonary:      Effort: Pulmonary effort is normal. No respiratory distress.   Abdominal:      General: There is no distension.   Musculoskeletal:      Cervical back: Normal range of motion.      Right lower leg: No edema.      Left lower  leg: No edema.   Skin:     Findings: No rash.   Neurological:      Mental Status: He is alert and oriented to person, place, and time.   Psychiatric:         Mood and Affect: Mood normal.         Behavior: Behavior normal.         Thought Content: Thought content normal.            Medications:    Current Facility-Administered Medications:     acetaminophen (TYLENOL) tablet 650 mg, 650 mg, Oral, Q4H PRN, Chao Rios PA-C    atorvastatin (LIPITOR) tablet 40 mg, 40 mg, Oral, Daily With Dinner, Chao Rios PA-C, 40 mg at 12/01/24 1712    benzonatate (TESSALON PERLES) capsule 100 mg, 100 mg, Oral, TID PRN, Miriam Tee MD, 100 mg at 11/29/24 1352    calcium carbonate (TUMS) chewable tablet 500 mg, 500 mg, Oral, Daily PRN, Moses Noel MD    cinacalcet (SENSIPAR) tablet 60 mg, 60 mg, Oral, Once per day on Monday Wednesday Friday, Jignesh Hussein MD, 60 mg at 12/02/24 0846    docusate sodium (COLACE) capsule 100 mg, 100 mg, Oral, BID PRN, Chao Rios PA-C    finasteride (PROSCAR) tablet 5 mg, 5 mg, Oral, Daily, Chao Rios PA-C, 5 mg at 12/02/24 0846    gentamicin (GARAMYCIN) 0.1 % cream, , Topical, Daily, Jesus Christine MD, Given at 11/29/24 0802    guaiFENesin (MUCINEX) 12 hr tablet 600 mg, 600 mg, Oral, Q12H VASU, Miriam Tee MD, 600 mg at 12/02/24 0846    HYDROcodone-acetaminophen (NORCO) 5-325 mg per tablet 2 tablet, 2 tablet, Oral, Q6H PRN, Chao Rios PA-C, 2 tablet at 12/01/24 1717    insulin lispro (HumALOG/ADMELOG) 100 units/mL subcutaneous injection 1-6 Units, 1-6 Units, Subcutaneous, TID AC, 1 Units at 11/26/24 1236 **AND** Fingerstick Glucose (POCT), , , TID AC, Chao Rios PA-C    levothyroxine tablet 125 mcg, 125 mcg, Oral, Early Morning, Chao Rios PA-C, 125 mcg at 12/02/24 0500    magnesium sulfate 2 g/50 mL IVPB (premix) 2 g, 2 g, Intravenous, Once, Theresa Butt PA-C, Last Rate: 25 mL/hr at 12/02/24 0845, 2 g at 12/02/24 0845    metoprolol tartrate (LOPRESSOR)  "tablet 25 mg, 25 mg, Oral, Q12H [facility], Moses Noel MD, 25 mg at 12/02/24 0846    midodrine (PROAMATINE) tablet 15 mg, 15 mg, Oral, TID AC, Citlali Longx, CRNP, 15 mg at 12/02/24 0901    nicotine (NICODERM CQ) 7 mg/24hr TD 24 hr patch 7 mg, 7 mg, Transdermal, Daily, Chao Rios PA-C    ondansetron (ZOFRAN) injection 4 mg, 4 mg, Intravenous, Q6H PRN, Chao Rios PA-C, 4 mg at 11/25/24 1839    pyridostigmine (MESTINON) tablet 60 mg, 60 mg, Oral, TID, Chao Rios PA-C, 60 mg at 12/02/24 0847    simethicone (MYLICON) chewable tablet 80 mg, 80 mg, Oral, 4x Daily PRN, Chao Rios PA-C    vancomycin (VANCOCIN) capsule 500 mg, 500 mg, Oral, Q6H [facility], Theresa Butt PA-C, 500 mg at 12/02/24 0500    [Held by provider] warfarin (COUMADIN) tablet 2 mg, 2 mg, Oral, HS, Moses Noel MD, 2 mg at 11/30/24 2315    Invasive Devices:        Lab Results:   Results from last 7 days   Lab Units 12/02/24  0458 12/01/24  0842 11/30/24  0606 11/28/24  0600 11/27/24  0539   WBC Thousand/uL 6.24 5.45 5.01   < > 5.93   HEMOGLOBIN g/dL 9.1* 9.8* 9.2*   < > 10.5*   HEMATOCRIT % 29.6* 31.7* 29.3*   < > 33.2*   PLATELETS Thousands/uL 52* 68* 75*   < > 114*   POTASSIUM mmol/L 3.5 3.9 3.8   < > 4.2   CHLORIDE mmol/L 101 99 99   < > 99   CO2 mmol/L 29 31 27   < > 31   BUN mg/dL 18 15 22   < > 30*   CREATININE mg/dL 2.31* 1.86* 2.06*   < > 2.47*   CALCIUM mg/dL 8.7 9.2 8.6   < > 9.1   MAGNESIUM mg/dL 1.5*  --   --   --   --    PHOSPHORUS mg/dL 3.2 1.9*  --   --  2.4   ALK PHOS U/L 56 59 56  --   --    ALT U/L 5* 4* 4*  --   --    AST U/L 11* 11* 11*  --   --     < > = values in this interval not displayed.       Previous work up:         Portions of the record may have been created with voice recognition software. Occasional wrong word or \"sound a like\" substitutions may have occurred due to the inherent limitations of voice recognition software. Read the chart carefully and recognize, using context, where substitutions have " occurred.If you have any questions, please contact the dictating provider.

## 2024-12-02 NOTE — ASSESSMENT & PLAN NOTE
He was on PD but was changed to back up iHD during admission to [facility] in November 2024. Started intermittent hemodialysis on 11/8/24.   [facility] TTS.  Previous EDW 98.5 kg - challenged this hospital admission, most recent post hemodialysis weight  92.4 kg from November 30th  Access: R IJ permcath. PD catheter in place.   Last hemodialysis was on 11/30, next hemodialysis treatment on Tuesday 12/3 regular TTS schedule.  Continue UF with HD.  Patient had some abdominal cramping during the last HD.  Continue to challenge target weight as tolerated

## 2024-12-02 NOTE — ASSESSMENT & PLAN NOTE
Oxygen needs have waxed and waned  CT chest abdomen pelvis with mild diffuse anasarca improved from prior CT, no specific pathology along  Monitor volume status  Currently on 0.5 L nasal cannula for comfort

## 2024-12-02 NOTE — ASSESSMENT & PLAN NOTE
Plts markedly low at 52 on anemia of ESRD, LFTs not elevated.   -will continue to follow but consider hematology evaluation if this continues to drop

## 2024-12-02 NOTE — CONSULTS
Consultation - Infectious Disease   Name: Masoud Perry 69 y.o. male I MRN: [id number]  Unit/Bed#: -01 I Date of Admission: 11/20/2024   Date of Service: 12/2/2024 I Hospital Day: 12     VIRTUAL CARE DOCUMENTATION:     1. This service was provided via Telemedicine using [email or web address]     2. Parties in the room with patient during teleconsult Patient only    3. Confidentiality My office door was closed     4. Participants No one else was in the room    5. Patient acknowledged consent and understanding of privacy and security of the  Telemedicine consult. I informed the patient that I have reviewed their record in Epic and presented the opportunity for them to ask any questions regarding the visit today.  The patient agreed to participate.    6. I have spent a total time of 40 minutes in caring for this patient on the day of the visit/encounter including Impressions, Counseling / Coordination of care, Documenting in the medical record, Reviewing / ordering tests, medicine, procedures  , Obtaining or reviewing history  , and Communicating with other healthcare professionals , not including the time spent for establishing the audio/video connection.      Inpatient consult to Infectious Diseases  Consult performed by: Mohan Andrea PA-C  Consult ordered by: Theresa Butt PA-C      Physician Requesting Evaluation: Carey Manrique MD   Reason for Evaluation / Principal Problem: Diarrhea    Assessment & Plan  Diarrhea of infectious origin  Chronic diarrhea.  Patient endorses several months of diarrhea associated with anorexia and over 60 pound weight loss.  C. difficile PCR positive, EIA negative on 11/22, indicating colonization.  Diarrhea may be secondary to ongoing COVID infection though less likely given chronicity of symptoms.  Consider alternative infectious etiologies.  Consider functional etiology.  Initial CT abdomen pelvis without acute findings in abdomen or pelvis.  Discontinue p.o.  vancomycin  Consider workup for alternative etiologies of diarrhea  Consider GI consult and colonoscopy  Can check stool enteric PCR for completeness  COVID-19  Admitted from SNF.  Tested positive around 11/17 with symptom onset.  CT chest negative for pneumonia.  Completed 5-day course of doxycycline and remdesivir and 10-day course of Decadron.  Supportive care per primary team  Acute respiratory insufficiency  Likely multifactorial etiology in the setting of COVID, volume overload, symptomatic A-fib, ESRD.  Management per primary team.  Type 2 diabetes mellitus with stage 4 chronic kidney disease, with long-term current use of insulin ([facility])  A1c 5.0%.  Excellent control.  Recommend ongoing tight glycemic control.  ESRD (end stage renal disease) on dialysis ([facility])  On hemodialysis Tuesday, Thursday Saturday.  Nephrology is following.    I have discussed the above management plan in detail with the primary service.     Infectious Disease service signing off.    Antibiotics:  Day 9 p.o. Vanco    History of Present Illness   Masoud Perry is a 69 y.o. year old male who initially presented on 11/20 with respiratory insufficiency and COVID-19 diagnosed at nursing home 3 days prior.  Patient was started on 5-day course of doxycycline and remdesivir and 10-day course of Decadron which he completed.  He is now on minimal O2 and has no respiratory complaints.  Patient had positive C. difficile PCR on 11/22.  EIA was negative.  He was started on vancomycin p.o.  Symptoms did not improve and dose was increased.  He had no leukocytosis and remained afebrile.  Per further discussion with patient, diarrhea has been ongoing for several months.  States it is associated with poor appetite and over 60 pound weight loss.  He does have some lower abdominal cramping but this occurs without having bowel movements.  Denies consumption of undercooked foods or having food allergies.  States he has been eating very little.  Had colonoscopy  he states about 1 year ago in which she had precancerous polyps that were removed.  He also reports some dysuria which is chronic.  UA was unremarkable.    A complete review of systems is negative other than that noted in the HPI.    I have reviewed the patient's PMH, PSH, Social History, Family History, Meds, and Allergies    Objective :  Temp:  [97.2 °F (36.2 °C)-97.5 °F (36.4 °C)] 97.3 °F (36.3 °C)  HR:  [59-74] 70  BP: (116-161)/(58-83) 116/60  Resp:  [16-20] 18  SpO2:  [94 %-98 %] 97 %  O2 Device: Nasal cannula  Nasal Cannula O2 Flow Rate (L/min):  [0.5 L/min] 0.5 L/min    Physical exam:  General:  No acute distress.  Chronically ill-appearing.  Elderly.  Psychiatric:  Awake and alert.  Pleasant and cooperative.  HEENT: Mucous membranes moist.  Neck supple.  Head atraumatic normocephalic.  Cardiovascular: Regular rate and rhythm no murmur  Pulmonary: On 1 L nasal cannula O2  Abdomen:  Soft, nontender.  Obese.  Extremities:  No edema  Skin:  No rashes  : No suprapubic or CVA tenderness.      Lab Results: I have reviewed the following results:  Results from last 7 days   Lab Units 12/02/24  0458 12/01/24  0842 11/30/24  0606   WBC Thousand/uL 6.24 5.45 5.01   HEMOGLOBIN g/dL 9.1* 9.8* 9.2*   PLATELETS Thousands/uL 52* 68* 75*     Results from last 7 days   Lab Units 12/02/24  0458 12/01/24  0842 11/30/24  0606   SODIUM mmol/L 136 135 132*   POTASSIUM mmol/L 3.5 3.9 3.8   CHLORIDE mmol/L 101 99 99   CO2 mmol/L 29 31 27   BUN mg/dL 18 15 22   CREATININE mg/dL 2.31* 1.86* 2.06*   EGFR ml/min/1.73sq m 27 35 31   CALCIUM mg/dL 8.7 9.2 8.6   AST U/L 11* 11* 11*   ALT U/L 5* 4* 4*   ALK PHOS U/L 56 59 56   ALBUMIN g/dL 2.5* 2.8* 2.5*         Results from last 7 days   Lab Units 11/29/24  1313   PROCALCITONIN ng/ml 0.14                   Imaging Results Review: I reviewed radiology reports from this admission including: CT abdomen/pelvis.  Other Study Results Review: Other studies reviewed include: Stool studies

## 2024-12-02 NOTE — ASSESSMENT & PLAN NOTE
Malnutrition Findings:   Adult Malnutrition type: Chronic illness  Adult Degree of Malnutrition: Malnutrition of moderate degree  Malnutrition Characteristics: Inadequate energy, Weight loss                  360 Statement: malnutrition of moderate degree in setting of chronic illness, evidenced by 7/11/24 263 lbs to 11/21/24 220.8 lbs (16% wt loss x 4 mo), poor po intake/<75% energy intake compared to estimated needs for > 1 month, treated with diet and supplements    BMI Findings:           Body mass index is 27.12 kg/m².   Appreciate nutrition assessment  Patient has declined at this time for addition of Remeron or Megace for appetite stimulation  Will start calorie count

## 2024-12-02 NOTE — ASSESSMENT & PLAN NOTE
Lab Results   Component Value Date    EGFR 27 12/02/2024    EGFR 35 12/01/2024    EGFR 31 11/30/2024    CREATININE 2.31 (H) 12/02/2024    CREATININE 1.86 (H) 12/01/2024    CREATININE 2.06 (H) 11/30/2024   Typical regimen Tuesday Thursday Saturday  Nephrology following  Resume prior schedule

## 2024-12-02 NOTE — ASSESSMENT & PLAN NOTE
Likely multifactorial etiology in the setting of COVID, volume overload, symptomatic A-fib, ESRD.  Management per primary team.

## 2024-12-02 NOTE — CONSULTS
Consultation - Gastroenterology   Name: Masoud Perry 69 y.o. male I MRN: [id number]  Unit/Bed#: -01 I Date of Admission: 11/20/2024   Date of Service: 12/2/2024 I Hospital Day: 12   Inpatient consult to gastroenterology  Consult performed by: Puneet Cooper PA-C  Consult ordered by: Theresa Butt PA-C        Physician Requesting Evaluation: Carey Manrique MD   Reason for Evaluation / Principal Problem: diarrhea    Assessment & Plan  Diarrhea  Nocturnal BMs points towards an infectious vs inflammatory vs malabsorptive etiology. C diff colonized but not responding to vanco    -will broaden stool panel to look for other infectious causes as well as EPI. Will also check calpro but this may be elevated due to infection alone  -check celiac panel as well  -avoid excessive sugar intake as well as artificial sweeteners.  -offered colonoscopy and EGD- pt declined. Does not want invasive testing done. Would recommend waiting until COVID symptoms resolve prior to endoscopy  -when infection is ruled out, we could try antidiarrheals. Questran may be a safer option in the setting of C diff colonization compared to imodium or other similar agents.     Epigastric pain  On chronic narcotics for pain, may have a gastroparesis etiology    -check stool HP while doing other stool tests  -can look to start on pepcid tomorrow after sample is collected- avoiding PPI with C diff hx.  -ok for diet as tolerated from GI standpoint  Pancytopenia (HCC)  Plts markedly low at 52 on anemia of ESRD, LFTs not elevated.   -will continue to follow but consider hematology evaluation if this continues to drop      HPI: Masoud Perry is a 69 y.o. year old male with a PMHx of CHF, Afib on coumadin, Factor V Leiden, HTN, ESRD on peritoneal dialysis (currently on hemodialysis here), lymphedema, hx of CVA, T2DM  who presents with COVID and SOB requiring supplemental oxygen. GI consulted on hospital day 12 for persistent diarrhea.     He presented with  diarrhea initially attributed to covid but he endorses ongoing watery stools for several months now. He is having nocturnal BMs and numerous episodes of incontinence due to urgency. He had a small amount of incontinence during my visit while pressing on his abdomen. No bleeding reported. Stool testing colonized with C diff without active infection. He was treated with PO vanco from 11/22/24 until today when it was discontinued after ID consult since diarrhea has not improved.     Other symptoms include poor appetite and about 60 lb weight loss unintentionally, lower abdominal cramping unrelated to bowel movements, epigastric burning pain related to eating. He attributes the lack of appetite from abdominal pain and discomfort from the diarrhea. He is on chronic Norco at home.      He denies travel, changes in water, abx prior to symptoms developing. Denies any change in diet or heavy consumption of sugary or artificial sweeteners.     Colonoscopy reportedly about 10-15 years ago. Reports a hx of precancerous polyps. Done in [place] as far as he can remember    No prior EGD     CT earlier this admission without acute GI findings- does note numerous small hypodensities favoring cysts in the stomach/bowel section.    Endoscopic risk assessment:  Anticoagulation use: warfarin  Diabetes medication:n  CVS history: hx of stroke  Abdominal surgeries: ccy  Sleep apnea: yes  O2 use: 0.5 L currently    Past Medical History:   Diagnosis Date    Anemia in chronic kidney disease     Anticoagulated on Coumadin     Atrial fibrillation (HCC)     BPH (benign prostatic hyperplasia)     CAD (coronary artery disease)     CKD (chronic kidney disease), stage V (HCC)     Compartment syndrome of forearm (HCC)     Right, 2019    COPD (chronic obstructive pulmonary disease) (HCC)     CVA (cerebral vascular accident) (HCC)     Diastolic CHF (HCC)     grade 1 DD    DVT (deep venous thrombosis) (HCC)     Factor V deficiency (HCC)     Gout      Hyperlipidemia     Hypertension     Hypothyroidism     MI (myocardial infarction) (HCC)     x3 - 1999, 2010, after 2015    Morbid obesity (HCC)     Nephrolithiasis     Noncompliance with medications     SHAD (obstructive sleep apnea)     Peritoneal dialysis catheter in place (HCC)     Pulmonary embolism (HCC)     Secondary hyperparathyroidism of renal origin (HCC)     Spinal stenosis of lumbar region     Status post placement of implantable loop recorder     Tobacco dependence      Past Surgical History:   Procedure Laterality Date    CARDIAC ELECTROPHYSIOLOGY STUDY AND ABLATION  04/29/2024    CHOLECYSTECTOMY      CORONARY ANGIOPLASTY WITH STENT PLACEMENT      JOINT REPLACEMENT      bilateral knee    FL CYSTO/URETERO W/LITHOTRIPSY &INDWELL STENT INSRT Left 09/24/2022    Procedure: CYSTOSCOPY URETEROSCOPY WITH LITHOTRIPSY HOLMIUM LASER, AND INSERTION STENT URETERAL;  Surgeon: Lewis Dahl MD;  Location: [facility];  Service: Urology    FL LAPS INSERTION TUNNELED INTRAPERITONEAL CATHETER N/A 6/7/2024    Procedure: INSERTION PERITONEAL CATHETER DIALYSIS LAPAROSCOPIC;  Surgeon: Hiram Park DO;  Location: [facility];  Service: General    US GUIDED KIDNEY BIOPSY  08/05/2020     Social History   Social History     Substance and Sexual Activity   Alcohol Use Not Currently     Social History     Substance and Sexual Activity   Drug Use Never     Social History     Tobacco Use   Smoking Status Every Day    Current packs/day: 0.50    Types: Cigarettes   Smokeless Tobacco Never       Family History   Problem Relation Age of Onset    Kidney failure Mother     Cirrhosis Mother     Colon cancer Brother          Medications Prior to Admission:     albuterol (PROVENTIL HFA,VENTOLIN HFA) 90 mcg/act inhaler    allopurinol (ZYLOPRIM) 300 mg tablet    arginine 500 MG tablet    Aspirin 81 MG CAPS    atorvastatin (LIPITOR) 40 mg tablet    colchicine (COLCRYS) 0.6 mg tablet    dexamethasone (DECADRON) 6 mg tablet    finasteride (PROSCAR)  5 mg tablet    FUROSEMIDE PO    FUROSEMIDE PO    HYDROcodone-acetaminophen (Norco) 5-325 mg per tablet    insulin glargine (LANTUS) 100 units/mL subcutaneous injection    insulin lispro (HumALOG/ADMELOG) 100 units/mL injection    levothyroxine 125 mcg tablet    metoprolol tartrate (LOPRESSOR) 25 mg tablet    midodrine (PROAMATINE) 5 mg tablet    MOLNUPIRAVIR PO    pantoprazole (PROTONIX) 40 mg tablet    pyridostigmine (Mestinon) 60 mg tablet    sevelamer (RENAGEL) 800 mg tablet    tamsulosin (FLOMAX) 0.4 mg    torsemide (DEMADEX) 100 mg tablet    warfarin (COUMADIN) 1 mg tablet    acetaminophen (TYLENOL) 325 mg tablet    cinacalcet (SENSIPAR) 30 mg tablet    docusate sodium (COLACE) 100 mg capsule    Magnesium 400 MG TABS    potassium chloride (Klor-Con M20) 20 mEq tablet    simethicone (MYLICON) 80 mg chewable tablet    Current Facility-Administered Medications:     acetaminophen (TYLENOL) tablet 650 mg, Q4H PRN    atorvastatin (LIPITOR) tablet 40 mg, Daily With Dinner    benzonatate (TESSALON PERLES) capsule 100 mg, TID PRN    calcium carbonate (TUMS) chewable tablet 500 mg, Daily PRN    cinacalcet (SENSIPAR) tablet 60 mg, Once per day on Monday Wednesday Friday    docusate sodium (COLACE) capsule 100 mg, BID PRN    finasteride (PROSCAR) tablet 5 mg, Daily    gentamicin (GARAMYCIN) 0.1 % cream, Daily    guaiFENesin (MUCINEX) 12 hr tablet 600 mg, Q12H VASU    HYDROcodone-acetaminophen (NORCO) 5-325 mg per tablet 2 tablet, Q6H PRN    insulin lispro (HumALOG/ADMELOG) 100 units/mL subcutaneous injection 1-6 Units, TID AC **AND** Fingerstick Glucose (POCT), TID AC    levothyroxine tablet 125 mcg, Early Morning    metoprolol tartrate (LOPRESSOR) tablet 25 mg, Q12H VAUS    midodrine (PROAMATINE) tablet 15 mg, TID AC    nicotine (NICODERM CQ) 7 mg/24hr TD 24 hr patch 7 mg, Daily    ondansetron (ZOFRAN) injection 4 mg, Q6H PRN    pyridostigmine (MESTINON) tablet 60 mg, TID    simethicone (MYLICON) chewable tablet 80 mg, 4x  Daily PRN    [Held by provider] warfarin (COUMADIN) tablet 2 mg, HS  Allergies   Allergen Reactions    Carvedilol Shortness Of Breath    Levaquin [Levofloxacin] Other (See Comments)     Unknown    Saccharin - Food Allergy Diarrhea     GI intolerance, adamant does not want to receive    Sucralose - Food Allergy Diarrhea     GI intolerance, adamant does not want to receive    Amiodarone Dizziness    Aspartame - Food Allergy Diarrhea    Bumetanide GI Intolerance and Lightheadedness           Cephalexin Other (See Comments)     unknown    Ciprofloxacin Rash    Hydralazine Tachycardia    Lisinopril GI Intolerance           Metolazone Other (See Comments)     Metallic taste    Morphine Swelling     Of injection site    Nifedipine Lightheadedness    Strawberry Extract - Food Allergy Rash       Physical Exam  Constitutional:       General: He is not in acute distress.     Appearance: He is ill-appearing.   HENT:      Head: Normocephalic and atraumatic.   Eyes:      Conjunctiva/sclera: Conjunctivae normal.   Pulmonary:      Effort: Pulmonary effort is normal. No respiratory distress.   Abdominal:      General: Abdomen is flat. There is no distension.      Palpations: Abdomen is soft.      Tenderness: There is abdominal tenderness.   Skin:     General: Skin is warm and dry.   Neurological:      Mental Status: He is alert and oriented to person, place, and time.   Psychiatric:         Behavior: Behavior normal.       Most Recent Vital Signs:  Vitals:    12/02/24 0541 12/02/24 0729 12/02/24 0815 12/02/24 0900   BP:   116/60    BP Location:       Pulse:  65 70    Resp:   18    Temp:   (!) 97.3 °F (36.3 °C)    TempSrc:   Temporal    SpO2:  95% 97% 97%   Weight: 90.7 kg (200 lb)      Height:           Intake/Output Summary (Last 24 hours) at 12/2/2024 1426  Last data filed at 12/2/2024 0430  Gross per 24 hour   Intake 160 ml   Output 100 ml   Net 60 ml       LABS/IMAGING  Lab Results: I have reviewed all relevant lab results  during this hospitalization.    Imaging Studies:  I have reviewed all the relevant images during this hospitalizations    Counseling / Coordination of Care  Total time spent today 45 minutes. Greater than 50% of total time was spent with the patient and / or family counseling and / or coordination of care.    Puneet Cooper PA-C

## 2024-12-02 NOTE — ASSESSMENT & PLAN NOTE
Wt Readings from Last 3 Encounters:   12/02/24 90.7 kg (200 lb)   10/14/24 115 kg (253 lb)   10/07/24 118 kg (260 lb)   CXR on 11/20 appeared to be pulmonary vascular congestion   Patient is a dialysis basis, received hemodialysis on TTS  Most recent session 11/30 to resume [facility] schedule  Torsemide is on hold secondary to ongoing diarrhea and anorexia  Appreciate continued nephrology follow-up  Monitor intake/output and daily weight

## 2024-12-02 NOTE — NURSING NOTE
Blood sugar 60 this morning. Patient given 4 oz juice and 4 oz coffee with sugar in it per patient request.

## 2024-12-02 NOTE — ASSESSMENT & PLAN NOTE
WBC count improved but platelet count is still low at 52.  Hemoglobin dropped to 9.1 g/dL today.  LDH was mildly elevated at 131 peripheral smear showed 1% bands, no schistocytes.  Continue monitoring and management per primary team

## 2024-12-02 NOTE — ASSESSMENT & PLAN NOTE
Lab Results   Component Value Date    HGBA1C 5.0 11/02/2024       Recent Labs     12/01/24  1642 12/01/24  2048 12/02/24  0813 12/02/24  1014   POCGLU 85 81 60* 67       Blood Sugar Average: Last 72 hrs:  (P) [id number]  Sliding scale insulin. Hypoglycemia protocol  Blood glucose has been stable  Will discontinue nighttime checks

## 2024-12-02 NOTE — ASSESSMENT & PLAN NOTE
Magnesium level is low at 1.5-replaced with 2 g IV magnesium sulfate.  Will hold off on oral magnesium in the setting of diarrhea.

## 2024-12-02 NOTE — ASSESSMENT & PLAN NOTE
On chronic narcotics for pain, may have a gastroparesis etiology    -check stool HP while doing other stool tests  -can look to start on pepcid tomorrow after sample is collected- avoiding PPI with C diff hx.  -ok for diet as tolerated from GI standpoint

## 2024-12-02 NOTE — ASSESSMENT & PLAN NOTE
Nocturnal BMs points towards an infectious vs inflammatory vs malabsorptive etiology. C diff colonized but not responding to vanco    -will broaden stool panel to look for other infectious causes as well as EPI. Will also check calpro but this may be elevated due to infection alone  -check celiac panel as well  -avoid excessive sugar intake as well as artificial sweeteners.  -offered colonoscopy and EGD- pt declined. Does not want invasive testing done. Would recommend waiting until COVID symptoms resolve prior to endoscopy  -when infection is ruled out, we could try antidiarrheals. Questran may be a safer option in the setting of C diff colonization compared to imodium or other similar agents.

## 2024-12-02 NOTE — ASSESSMENT & PLAN NOTE
CT abdomen pelvis was negative for colitis  Continue oral vancomycin for C. difficile colitis.  Still with diarrhea.

## 2024-12-02 NOTE — CONSULTS
Consult received for malnutrition.     Author following with pt. Pt does meet criteria for chronic moderate malnutrition, see 11/21 colleague's note.     Pt continues with 0% meal completions. Low BG levels, offered a cookie and juice by RN though appears to be limited intake with these as well. Pt reporting he does not want to eat due to not being in a good mood after telehealth visit, had recent telehealth visit with palliative care. Poor intake does appear ongoing however. Pt reports he does not have a desire to eat anything, offered homemade milkshake which was declined. Pt ordered hot tea he originally wanted for lunch, now declining it. Pt reports he does not like the ensures and refusing these, will d/c ensure supplements. Pt refused lunch meal and asked for author to remove it from the room.     Calorie count alert in place/RN aware of calorie count order.   Likely benefit from ongoing GOC discussion.   Consider CH consult.

## 2024-12-02 NOTE — RESPIRATORY THERAPY NOTE
RT Protocol Note  Masoud VERGARA Perry 69 y.o. male MRN: [id number]  Unit/Bed#: -01 Encounter: [id number]    Assessment    Principal Problem:    Acute respiratory insufficiency  Active Problems:    Acute on chronic diastolic HF (heart failure) (HCC)    Pancytopenia (HCC)    Factor 5 Leiden mutation, heterozygous (HCC)    Type 2 diabetes mellitus with stage 4 chronic kidney disease, with long-term current use of insulin (HCC)    Secondary hyperparathyroidism of renal origin (HCC)    Paroxysmal atrial fibrillation (HCC)    Hemodialysis-associated hypotension    Moderate protein-calorie malnutrition (HCC)    Goals of care, counseling/discussion    COVID-19    ESRD (end stage renal disease) on dialysis (HCC)    Anemia due to chronic kidney disease, on chronic dialysis (HCC)    Electrolyte imbalance risk    Diarrhea of infectious origin    Dysuria    Hypomagnesemia             Past Medical History:   Diagnosis Date    Anemia in chronic kidney disease     Anticoagulated on Coumadin     Atrial fibrillation (HCC)     BPH (benign prostatic hyperplasia)     CAD (coronary artery disease)     CKD (chronic kidney disease), stage V (HCC)     Compartment syndrome of forearm (HCC)     Right, 2019    COPD (chronic obstructive pulmonary disease) (HCC)     CVA (cerebral vascular accident) (HCC)     Diastolic CHF (HCC)     grade 1 DD    DVT (deep venous thrombosis) (HCC)     Factor V deficiency (HCC)     Gout     Hyperlipidemia     Hypertension     Hypothyroidism     MI (myocardial infarction) (HCC)     x3 - 1999, 2010, after 2015    Morbid obesity (HCC)     Nephrolithiasis     Noncompliance with medications     SHAD (obstructive sleep apnea)     Peritoneal dialysis catheter in place (HCC)     Pulmonary embolism (HCC)     Secondary hyperparathyroidism of renal origin (HCC)     Spinal stenosis of lumbar region     Status post placement of implantable loop recorder     Tobacco dependence      Social History     Socioeconomic History     Marital status:      Spouse name: None    Number of children: None    Years of education: None    Highest education level: None   Occupational History    None   Tobacco Use    Smoking status: Every Day     Current packs/day: 0.50     Types: Cigarettes    Smokeless tobacco: Never   Vaping Use    Vaping status: Never Used   Substance and Sexual Activity    Alcohol use: Not Currently    Drug use: Never    Sexual activity: None   Other Topics Concern    None   Social History Narrative    None     Social Drivers of Health     Financial Resource Strain: Low Risk  (11/1/2024)    Received from [facility]    Overall Financial Resource Strain (CARDIA)     Difficulty of Paying Living Expenses: Not hard at all   Food Insecurity: No Food Insecurity (11/28/2024)    Nursing - Inadequate Food Risk Classification     Worried About Running Out of Food in the Last Year: Never true     Ran Out of Food in the Last Year: Never true     Ran Out of Food in the Last Year: 1   Transportation Needs: No Transportation Needs (11/28/2024)    Nursing - Transportation Risk Classification     Lack of Transportation: Not on file     Lack of Transportation: 2   Physical Activity: Not on file   Stress: Not on file   Social Connections: Unknown (6/18/2024)    Received from [facility]    Social Accessbio     How often do you feel lonely or isolated from those around you? (Adult - for ages 18 years and over): Not on file   Intimate Partner Violence: Unknown (11/28/2024)    Nursing IPS     Feels Physically and Emotionally Safe: Not on file     Physically Hurt by Someone: Not on file     Humiliated or Emotionally Abused by Someone: Not on file     Physically Hurt by Someone: 2     Hurt or Threatened by Someone: 2   Housing Stability: Unknown (11/28/2024)    Nursing: Inadequate Housing Risk Classification     Has Housing: Not on file     Worried About Losing Housing: Not on file     Unable to Get Utilities: Not on file     Unable  to Pay for Housing in the Last Year: 2     Has Housin       Subjective     Pt is able to do flutter valve on own and does very well throughout the day. No c/o of sob and dyspnea.     Objective    Physical Exam:   Assessment Type: During-treatment  General Appearance: Alert, Awake  Respiratory Pattern: Normal  Chest Assessment: Chest expansion symmetrical  Bilateral Breath Sounds: Coarse  Cough: Non-productive  O2 Device: .5 L NC    Vitals:  Blood pressure 116/60, pulse 70, temperature (!) 97.3 °F (36.3 °C), temperature source Temporal, resp. rate 18, height 6' (1.829 m), weight 90.7 kg (200 lb), SpO2 97%.          Imaging and other studies:     O2 Device: .5 L NC     Plan    Respiratory Plan: Mild Distress pathway        Resp Comments: Pt on .5L NC, Flutter valve done, non-productive cough noted. No SOB no dyspnea.

## 2024-12-02 NOTE — CONSULTS
Inpatient TeleConsultation - Palliative Care   Masoud Perry 69 y.o. male MRN: [id number]  Unit/Bed#: -01 Encounter: [id number]      VIRTUAL CARE DOCUMENTATION:     1. This service was provided via Telemedicine using [employer] cell [employer]     2. Parties in the room with patient during teleconsult RN (briefly at the start of visit)    3. Confidentiality My office door was closed     4. Participants No one else was in the room    5. Patient acknowledged consent and understanding of privacy and security of the  Telemedicine consult. I informed the patient that I have reviewed their record in Epic and presented the opportunity for them to ask any questions regarding the visit today.  The patient agreed to participate.    6. I have spent a total time of 45+ minutes in caring for this patient on the day of the visit/encounter including Risks and benefits of tx options, Patient and family education, Importance of tx compliance, Risk factor reductions, Counseling / Coordination of care, Documenting in the medical record, Reviewing / ordering tests, medicine, procedures  , Obtaining or reviewing history  , and Communicating with other healthcare professionals .         Patient Active Problem List   Diagnosis    History of CVA (cerebrovascular accident)    Acute on chronic diastolic HF (heart failure) (HCC)    HLD (hyperlipidemia)    Lymphedema    Acquired hypothyroidism    Pancytopenia (HCC)    Thrombocytopenia (HCC)    Angina at rest (HCC)    MI (myocardial infarction) (HCC)    History of PTCA    Sleep apnea    Smoking    Factor 5 Leiden mutation, heterozygous (HCC)    Type 2 diabetes mellitus with stage 4 chronic kidney disease, with long-term current use of insulin (HCC)    Obesity, morbid (HCC)    Secondary hyperparathyroidism of renal origin (HCC)    Stage 5 chronic kidney disease on peritoneal dialysis (HCC)    Chronic kidney disease-mineral and bone disorder    Ureteral stone with hydronephrosis    Cutaneous abscess of  buttock    Bilateral lower extremity edema    Cellulitis of right lower extremity    Paroxysmal atrial fibrillation (HCC)    Patient on peritoneal dialysis (HCC)    Hypervolemia    Bacteremia    Elevated LFTs    Nocturnal hypoxia    Lactic acidosis    Hypercalcemia    Chronic acquired lymphedema    Hemodialysis-associated hypotension    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 (HCC)    COPD (chronic obstructive pulmonary disease) (HCC)    ED (erectile dysfunction) of organic origin    Gastroesophageal reflux disease    Gout    History of thromboembolism of vein    Personal history of pulmonary embolism    Ambulatory dysfunction    Moderate protein-calorie malnutrition (HCC)    Cellulitis    Hyponatremia    Goals of care, counseling/discussion    Diabetic ulcer of right great toe (HCC)    Hematoma    Edema    Stasis ulcer (HCC)    Personal history of gout    COVID-19    Acute respiratory insufficiency    ESRD (end stage renal disease) on dialysis (HCC)    Anemia due to chronic kidney disease, on chronic dialysis (HCC)    Electrolyte imbalance risk    Diarrhea of infectious origin    Dysuria    Hypomagnesemia     Active issues specifically addressed today include:  Acute respiratory failure  COVID-19 infection  ESRD  HFpEF  Poor appetite  Moderate PCM  Depression  Diarrhea  Goals of care discussion  Palliative care encounter    Recommendations:  1. Symptom management -    - Endorses poor appetite for the past several months, depressed mood, and insomnia; is not agreeable to any medications for symptom management at this time   - Endorses chronic abdominal pain, exacerbated at this time by persistent diarrhea, chronically on hydrocodone-acetaminophen, ongoing management per primary care team    Controlled Substance Review  [place] or [place]  reviewed:     11/18/2024 11/15/2024 4 Hydrocodone-Acetamin  Mg 30.00 7 Ma Mis [id number] Con ([id number]) 0 42.86 MME [place]   11/14/2024 11/14/2024 4  Hydrocodone-Acetamin  Mg 10.00 2 Ma Mis [id number] Con ([id number]) 0 50.00 MME [place]   10/17/2024 10/17/2024 3 Hydrocodone-Acetamin  Mg 120.00 30 Tia Salinas [id number] Sti ([id number]) 0 40.00 MME [place]       2. Goals -    Code Status: Level 3 - DNAR and DNI - confirmed today with patient   Continue disease-directed cares. Patient is agreeable to any cares/help that he needs with DNR/DNI limits in place. He is agreeable to ongoing dialysis treatments. Is upset about frequent readmissions and skilled nursing facility stay, though reports he is agreeable to snf on discharge and he would be agreeable to return to the hospital if needs arose in the future.     Decisional apparatus:  Patient is competent on my exam today.  If competence is lost, patient's substitute decision maker would default to adult children by [place].  Reviewed [place] 169 with patient, who is understanding that his adult children (Anh ALLAN) would be HCR in the event he would be unable to independently make medical decisions.    Advance Directive and Living Will:    none on file  Power of :  none on file         We appreciate the invitation to be involved in this patient's care.  We will not be able to follow in person, but please do not hesitate to reach our on-call provider for acute symptom control concerns.  We can continue to support the primary team remotely via phone and [employer].  After hours and on weekends, please use our clinic answering service at [phone].     3.  Social support:  Patient's spouse is , has two adult children (Anh ALLAN) as well as grandchildren  Supportive listening provided  Normalized experience of patient/family  Provided anticipatory guidance  Encouraged self care    4.  Follow up  Palliative Medicine will sign off today as goals of care are clear and symptoms are managed per primary team at this time.  Please reach out via [email or web address] secure chat if questions or concerns  "arise.    5. Care Coordination  Reviewed case with GAGANDEEP Muhammad PA-C  Palliative and Supportive Care  Clinic/Answering Service: [phone]  You can find me on [employer] Secure Chat!       History of Present Illness     Inpatient consult to Palliative Care  Consult performed by: GAGANDEEP Rios  Consult ordered by: Theresa Butt PA-C        Reason for Consult / Principal Problem: \"Patient with several chronic comorbidities including ESRD on dialysis, CHF with worsening decline in  health over the past several months, weight loss, anorexia and depression. He is agreeable to further discussion of his goals of care and possible symptom management of full body pain/discomfort and anorexia.\"  Hx and PE limited by: none  HPI: Masoud Perry is a 69 y.o. year old male who presented 11/20/24 with shortness of breath from [facility].   Pertinent history includes ESRD on dialysis, secondary hyperparathyroidism, HFpEF, atrial fibrillation, Factor V Leiden, T2DM, prior CVA.  On review, patient is dependent at baseline for ADLs, jorge transfer, non-ambulatory. Noted to be COVID + 3 days prior to admission and experiencing progressive shortness of breath since that time, requiring supplemental oxygen via NC. Patient was treated with course of decadron, remdesevir, doxycycline. Oxygen requirements improved at this time. Also with + c. Difficile PCR on vancomycin, infectious disease consulted. Cardiology consulted for chronic atrial fibrillation and have since signed off. Nephrology consulted and following for ongoing HD treatments. Behavioral health consulted for SI, patient denied SI and expressed frustration with frequent admissions from hospital to rehab facility. At the time endorsed poor appetite and insomnia for which mirtazapine 7.5 mg qhs was recommended.    Patient seen resting in bed, in NAD. RN present at bedside for start of visit. Palliative care services introduced. Patient states he feels " "\"terrible.\" Endorses persistent diarrhea and episodes of dysuria. Dyspnea improved since admission, though continues to have infrequent episodes where he notes difficulty breathing. Patient endorses chronic pain, though notes at this time primary source of pain is his \"gut\" which is exacerbated by ongoing diarrhea. CT c/a/p without acute abnormalities. Patient reports he has not been eating or drinking well for several months, had only coffee for breakfast. Notes poor appetite started approximately 6 months ago, unclear cause. He denies nausea or altered taste sensation. We discussed options for appetite stimulants to which is he not agreeable. He feels he is already taking too many medications that are not helping and he is not open to considering any additional medications for mood or appetite. Extensive education provided regarding continued poor oral intake and risk of ongoing decline. Patient expressed understanding that continued poor intake limits his functional status and ability to progress towards hospital discharge, continues to decline available appetite stimulants at this point  Sleep is reportedly \"fair, could be better.\" Patient is very frustrated with his recurrent hospitalizations and recent nursing facility stay. He notes that previously was living at home and was relatively independent, he reports he cannot even recall when the last time he was out of bed. Discussed goals of care in the setting of chronic comorbidities and recurrent hospitalizations. Patient confirms DNR/DNI status today. He is treatment focused, stating \"I want the help that I need.\" He is agreeable to ongoing dialysis treatments and would be agreeable to rehospitalization in the future as needed. Further education provided regarding necessity of nutrition to align with treatment focused goals and ultimate goal for hospital discharge. He continues to decline options. Patient is , supported by his children (Anh " JR) as well as his grandchildren. He offers no additional questions or concerns at this time.      Historical Information   Past Medical History:   Diagnosis Date    Anemia in chronic kidney disease     Anticoagulated on Coumadin     Atrial fibrillation (HCC)     BPH (benign prostatic hyperplasia)     CAD (coronary artery disease)     CKD (chronic kidney disease), stage V (HCC)     Compartment syndrome of forearm (HCC)     Right, 2019    COPD (chronic obstructive pulmonary disease) (HCC)     CVA (cerebral vascular accident) (HCC)     Diastolic CHF (HCC)     grade 1 DD    DVT (deep venous thrombosis) (HCC)     Factor V deficiency (HCC)     Gout     Hyperlipidemia     Hypertension     Hypothyroidism     MI (myocardial infarction) (HCC)     x3 - 1999, 2010, after 2015    Morbid obesity (HCC)     Nephrolithiasis     Noncompliance with medications     SHAD (obstructive sleep apnea)     Peritoneal dialysis catheter in place (HCC)     Pulmonary embolism (HCC)     Secondary hyperparathyroidism of renal origin (HCC)     Spinal stenosis of lumbar region     Status post placement of implantable loop recorder     Tobacco dependence      Past Surgical History:   Procedure Laterality Date    CARDIAC ELECTROPHYSIOLOGY STUDY AND ABLATION  04/29/2024    CHOLECYSTECTOMY      CORONARY ANGIOPLASTY WITH STENT PLACEMENT      JOINT REPLACEMENT      bilateral knee    KS CYSTO/URETERO W/LITHOTRIPSY &INDWELL STENT INSRT Left 09/24/2022    Procedure: CYSTOSCOPY URETEROSCOPY WITH LITHOTRIPSY HOLMIUM LASER, AND INSERTION STENT URETERAL;  Surgeon: Lweis Dahl MD;  Location: [facility];  Service: Urology    KS LAPS INSERTION TUNNELED INTRAPERITONEAL CATHETER N/A 6/7/2024    Procedure: INSERTION PERITONEAL CATHETER DIALYSIS LAPAROSCOPIC;  Surgeon: Hiram Park DO;  Location: [facility];  Service: General    US GUIDED KIDNEY BIOPSY  08/05/2020     E-Cigarette/Vaping    E-Cigarette Use Never User      E-Cigarette/Vaping Substances     Social  History     Socioeconomic History    Marital status:      Spouse name: None    Number of children: None    Years of education: None    Highest education level: None   Occupational History    None   Tobacco Use    Smoking status: Every Day     Current packs/day: 0.50     Types: Cigarettes    Smokeless tobacco: Never   Vaping Use    Vaping status: Never Used   Substance and Sexual Activity    Alcohol use: Not Currently    Drug use: Never    Sexual activity: None   Other Topics Concern    None   Social History Narrative    None     Social Drivers of Health     Financial Resource Strain: Low Risk  (11/1/2024)    Received from [facility]    Overall Financial Resource Strain (CARDIA)     Difficulty of Paying Living Expenses: Not hard at all   Food Insecurity: No Food Insecurity (11/28/2024)    Nursing - Inadequate Food Risk Classification     Worried About Running Out of Food in the Last Year: Never true     Ran Out of Food in the Last Year: Never true     Ran Out of Food in the Last Year: 1   Transportation Needs: No Transportation Needs (11/28/2024)    Nursing - Transportation Risk Classification     Lack of Transportation: Not on file     Lack of Transportation: 2   Physical Activity: Not on file   Stress: Not on file   Social Connections: Unknown (6/18/2024)    Received from [facility]    Social SO[employer]     How often do you feel lonely or isolated from those around you? (Adult - for ages 18 years and over): Not on file   Intimate Partner Violence: Unknown (11/28/2024)    Nursing IPS     Feels Physically and Emotionally Safe: Not on file     Physically Hurt by Someone: Not on file     Humiliated or Emotionally Abused by Someone: Not on file     Physically Hurt by Someone: 2     Hurt or Threatened by Someone: 2   Housing Stability: Unknown (11/28/2024)    Nursing: Inadequate Housing Risk Classification     Has Housing: Not on file     Worried About Losing Housing: Not on file     Unable to  Get Utilities: Not on file     Unable to Pay for Housing in the Last Year: 2     Has Housin     Family History   Problem Relation Age of Onset    Kidney failure Mother     Cirrhosis Mother     Colon cancer Brother        Meds/Allergies   all current active meds have been reviewed and current meds:    Current Facility-Administered Medications:     acetaminophen (TYLENOL) tablet 650 mg, Q4H PRN    atorvastatin (LIPITOR) tablet 40 mg, Daily With Dinner    benzonatate (TESSALON PERLES) capsule 100 mg, TID PRN    calcium carbonate (TUMS) chewable tablet 500 mg, Daily PRN    cinacalcet (SENSIPAR) tablet 60 mg, Once per day on     docusate sodium (COLACE) capsule 100 mg, BID PRN    finasteride (PROSCAR) tablet 5 mg, Daily    gentamicin (GARAMYCIN) 0.1 % cream, Daily    guaiFENesin (MUCINEX) 12 hr tablet 600 mg, Q12H VASU    HYDROcodone-acetaminophen (NORCO) 5-325 mg per tablet 2 tablet, Q6H PRN    insulin lispro (HumALOG/ADMELOG) 100 units/mL subcutaneous injection 1-6 Units, TID AC **AND** Fingerstick Glucose (POCT), TID AC    levothyroxine tablet 125 mcg, Early Morning    metoprolol tartrate (LOPRESSOR) tablet 25 mg, Q12H VASU    midodrine (PROAMATINE) tablet 15 mg, TID AC    nicotine (NICODERM CQ) 7 mg/24hr TD 24 hr patch 7 mg, Daily    ondansetron (ZOFRAN) injection 4 mg, Q6H PRN    pyridostigmine (MESTINON) tablet 60 mg, TID    simethicone (MYLICON) chewable tablet 80 mg, 4x Daily PRN    [Held by provider] warfarin (COUMADIN) tablet 2 mg, HS    Palliative Care Medications:  none    Allergies   Allergen Reactions    Carvedilol Shortness Of Breath    Levaquin [Levofloxacin] Other (See Comments)     Unknown    Saccharin - Food Allergy Diarrhea     GI intolerance, adamant does not want to receive    Sucralose - Food Allergy Diarrhea     GI intolerance, adamant does not want to receive    Amiodarone Dizziness    Aspartame - Food Allergy Diarrhea    Bumetanide GI Intolerance and Lightheadedness            Cephalexin Other (See Comments)     unknown    Ciprofloxacin Rash    Hydralazine Tachycardia    Lisinopril GI Intolerance           Metolazone Other (See Comments)     Metallic taste    Morphine Swelling     Of injection site    Nifedipine Lightheadedness    Strawberry Extract - Food Allergy Rash       Objective     Physical Exam  Vitals and nursing note reviewed.   Constitutional:       General: He is awake. He is not in acute distress.     Appearance: He is ill-appearing.      Interventions: Nasal cannula in place.   Cardiovascular:      Rate and Rhythm: Normal rate.   Pulmonary:      Effort: No respiratory distress.      Comments: No conversational dyspnea  Neurological:      General: No focal deficit present.      Mental Status: He is alert and oriented to person, place, and time.   Psychiatric:         Attention and Perception: Attention normal.         Mood and Affect: Mood is depressed.         Thought Content: Thought content normal.       Lab Results: I have personally reviewed pertinent labs.  Imaging Studies: Results Review Statement: I reviewed radiology reports from this admission including: chest xray, CT chest, and CT abdomen/pelvis.    I have spent a total time of 45+ minutes in caring for this patient on the day of the visit/encounter including Risks and benefits of tx options, Patient and family education, Importance of tx compliance, Risk factor reductions, Counseling / Coordination of care, Documenting in the medical record, Reviewing / ordering tests, medicine, procedures  , Obtaining or reviewing history  , and Communicating with other healthcare professionals .

## 2024-12-02 NOTE — ASSESSMENT & PLAN NOTE
Continue Sensipar 30 mg 3/week.  ^ corrected calcium is  9.9 mg/dl    phosphorus level was found to be low at 1.9 on 12/1 and phosphorus binders were held and given phosphorus replacement, phosphorus level improved to normal range at 3.2 today, continue to hold binders for now.  Continue to hold at the time of discharge and monitor as outpatient

## 2024-12-02 NOTE — OCCUPATIONAL THERAPY NOTE
Occupational Therapy Progress Note     Patient Name: Masoud Perry  Today's Date: 12/2/2024  Problem List  Principal Problem:    Acute respiratory insufficiency  Active Problems:    Acute on chronic diastolic HF (heart failure) (HCC)    Pancytopenia (HCC)    Factor 5 Leiden mutation, heterozygous (HCC)    Type 2 diabetes mellitus with stage 4 chronic kidney disease, with long-term current use of insulin (HCC)    Secondary hyperparathyroidism of renal origin (HCC)    Paroxysmal atrial fibrillation (HCC)    Hemodialysis-associated hypotension    Moderate protein-calorie malnutrition (HCC)    Goals of care, counseling/discussion    COVID-19    ESRD (end stage renal disease) on dialysis (HCC)    Anemia due to chronic kidney disease, on chronic dialysis (HCC)    Electrolyte imbalance risk    Diarrhea of infectious origin    Dysuria    Hypomagnesemia    Diarrhea    Epigastric pain        12/02/24 1449   OT Last Visit   OT Visit Date 12/02/24   Note Type   Note Type Treatment   Pain Assessment   Pain Assessment Tool FLACC   Pain Rating: FLACC (Rest) - Face 0   Pain Rating: FLACC (Rest) - Legs 0   Pain Rating: FLACC (Rest) - Activity 0   Pain Rating: FLACC (Rest) - Cry 1   Pain Rating: FLACC (Rest) - Consolability 0   Score: FLACC (Rest) 1   Pain Rating: FLACC (Activity) - Face 1   Pain Rating: FLACC (Activity) - Legs 0   Pain Rating: FLACC (Activity) - Activity 0   Pain Rating: FLACC (Activity) - Cry 1   Pain Rating: FLACC (Activity) - Consolability 1   Score: FLACC (Activity) 3   Restrictions/Precautions   Weight Bearing Precautions Per Order No   Other Precautions Contact/isolation;Bed Alarm;Fall Risk;Pain   ADL   Where Assessed Supine, bed   Eating Assistance Unable to assess  (refuses)   Grooming Assistance 2  Maximal Assistance   UB Bathing Assistance 1  Total Assistance   LB Bathing Assistance 1  Total Assistance   UB Dressing Assistance 1  Total Assistance   LB Dressing Assistance 1  Total Assistance   Toileting  Assistance  1  Total Assistance   Bed Mobility   Rolling R 2  Maximal assistance   Additional items Assist x 1;Bedrails;Verbal cues;Increased time required   Rolling L 2  Maximal assistance   Additional items Assist x 1;Increased time required;Verbal cues;Bedrails   Supine to Sit 2  Maximal assistance   Additional items Assist x 2;HOB elevated;Verbal cues;Increased time required;Bedrails   Sit to Supine 2  Maximal assistance   Additional items Assist x 2;Increased time required;Verbal cues   Transfers   Sit to Stand Unable to assess   Additional items   (Pt refuses)   Stand pivot Unable to assess   Additional items   (Pt refuses)   Additional Comments Pt sitting EOB for about 10 minutes, BP improved during this time. Pt declined to complete stand pivot transfer to bedside commode   Functional Mobility   Additional Comments Pt not engaging in functional mobility at this time   Cognition   Overall Cognitive Status WFL   Arousal/Participation Uncooperative;Alert   Attention Within functional limits   Orientation Level Oriented X4   Memory Within functional limits   Following Commands Follows one step commands inconsistently   Activity Tolerance   Activity Tolerance Patient limited by fatigue;Patient limited by pain   Medical Staff Made Aware RN Keyla, PT Leonides   Assessment   Assessment Pt completed OT tx session #2 focused on toileting and bed mobility. Pt alert and agreeable to participate. PT/OT co-treat completed d/t significant mobility deficits and safety concerns. Pt demonstrated improvements in tolerance to sitting upright at edge of bed for ~10 minutes this session but continues to be limited by motivation, weakness, poor PO intake, and activity tolerance. Pt currently completing UB ADLs @ total A, LB ADLs @ total A, and unable to complete functional mobility. Pt maintained ?90% O2 sats on room air throughout. Pt demonstrating poor participation and poor potential to achieve goals but is currently  demonstrating deficits in decrease activity tolerance, decrease standing balance, decrease sitting balance, decrease performance during ADL tasks, decrease BUE ROM, decrease UB MS, increased pain, decrease generalized strength, decrease activity engagement, decrease performance during functional transfers, high fall risk, limited insight to deficits, inability to express needs, and decreased alertness. Continue to recommend moderate resource intensity upon discharge to increase safety and independence in ADL tasks and functional mobility.   Plan   Treatment Interventions ADL retraining;Visual perceptual retraining;Functional transfer training;UE strengthening/ROM;Endurance training;Patient/family training;Equipment evaluation/education;Energy conservation;Activityengagement;Compensatory technique education   Goal Expiration Date 12/06/24   OT Treatment Day 2   OT Frequency 2-3x/wk   Discharge Recommendation   Rehab Resource Intensity Level, OT II (Moderate Resource Intensity)   AM-PAC Daily Activity Inpatient   Lower Body Dressing 1   Bathing 1   Toileting 1   Upper Body Dressing 1   Grooming 1   Eating 1   Daily Activity Raw Score 6   Turning Head Towards Sound 3   Follow Simple Instructions 3   Low Function Daily Activity Raw Score 12   Low Function Daily Activity Standardized Score  21.38   AM-PAC Applied Cognition Inpatient   Following a Speech/Presentation 3   Understanding Ordinary Conversation 3   Taking Medications 3   Remembering Where Things Are Placed or Put Away 3   Remembering List of 4-5 Errands 3   Taking Care of Complicated Tasks 3   Applied Cognition Raw Score 18   Applied Cognition Standardized Score 38.07   End of Consult   Education Provided Yes   Patient Position at End of Consult Supine;All needs within reach   Nurse Communication Nurse aware of consult       The patient's raw score on the AM-PAC Daily Activity Inpatient Short Form is 6. A raw score of less than 19 suggests the patient may  benefit from discharge to post-acute rehabilitation services. Please refer to the recommendation of the Occupational Therapist for safe discharge planning.    Pt goals to be met by 12/6/2024     Pt will demonstrate ability to complete grooming/hygiene tasks @ independent after set-up.  Pt will demonstrate ability to complete supine<>sit @ Min A in order to increase safety and independence during ADL tasks.  Pt will demonstrate ability to complete UB ADLs including washing/dressing @ independent in order to increase performance and participation during meaningful tasks  Pt will demonstrate ability to complete LB dressing @ Min A in order to increase safety and independence during meaningful tasks.   Pt will demonstrate ability to nain/doff socks/shoes while sitting EOB @ Min A in order to increase safety and independence during meaningful tasks.   Pt will demonstrate ability to complete toileting tasks including CM and pericare @ Min A in order to increase safety and independence during meaningful tasks.  Pt will demonstrate ability to complete EOB, chair, toilet/commode transfers @ Min A in order to increase performance and participation during functional tasks.  Pt will demonstrate ability to stand for 3 minutes while maintaining fair balance with use of rolling walker for UB support PRN.  Pt will demonstrate ability to tolerate 30-35 minute OT session with no vc'ing for deep breathing or use of energy conservation techniques in order to increase activity tolerance during functional tasks.   Pt will demonstrate Good carryover of use of energy conservation/compensatory strategies during ADLs and functional tasks in order to increase safety and reduce risk for falls.   Pt will demonstrate Good attention and participation in continued evaluation of functional ambulation house hold distances in order to assist with safe d/c planning.  Pt will attend to continued cognitive assessments 100% of the time in order to  provide most appropriate d/c recommendations.   Pt will follow 100% simple 2-step commands and be A&O x4 consistently with environmental cues to increase participation in functional activities.   Pt will identify 3 areas of interest/hobbies and 1 intervention on how to incorporate into daily life in order to increase interaction with environment and peers as well as increase participation in meaningful tasks.   Pt will demonstrate 100% carryover of BUE HEP in order to increase BUE MS and increase performance during functional tasks upon d/c home.    Umang Sanchez, OTR/L

## 2024-12-02 NOTE — ASSESSMENT & PLAN NOTE
Hyponatremia  Likely due to free water intake and impaired free water excretion in the setting of ESRD, sodium level improved to normal range at 136 today.  Continue fluid restriction

## 2024-12-02 NOTE — NURSING NOTE
Patient blood sugar 59. He is refusing to eat breakfast or lunch. Patient agreeable to drink 4 oz juice and a chocolate cookie. Those were provided with patient and will recheck sugar.

## 2024-12-02 NOTE — PROGRESS NOTES
" Pastoral Care Progress Note  Ch initiated support visit to pt in setting of palliative care consult regarding goals. Pt received CH upright in bed, no family present.    Pt attests he is supported by his two children, however describes them as \"busy with their own lives\". Pt shared that he misses his wife who is . CH encouraged pt to lean on his kids if appropriate to help make medical decisions.    Pt shared about his theological beliefs regarding length of life \" God will take me when He wants\". Pt did not share any insight he is uses to continue maintaining life vs pursuing comfort care. Pt did share that he believes that sometimes a person must be told its ok to die, that without encouragement a person might resist death further than is reasonable. Pt attests that he is not resisting death but is very tired of living.    CH facilitated conversation with pt regarding his ability to enjoy life, or the hopes/goals. Pt shared that he is tired of medical interventions. Pt shared that he does not feel guilty about dying if he were to die. He shared he feels content that his children are well established.    Pt exhibited some distrust of the medical system, sharing past incidents that he feels the medical community did not help but harmed his family. Pt shared his current belief that he may be \"overmedicated\".     CH facilitated conversation about how the pt feels about hospice care, when he decides to pursue that type of care. Pt asked questions about what hospice care would entail, and whether it guaranteed he could return home. Pt shared he might consider hospice if he knew he wouldn't be in pain. CH shared information about aspects of hospice care.    CH provided empathetic listening and support.         Chaplaincy Interventions Utilized:   Empowerment: Clarified, confirmed, or reviewed information from treatment team , Encouraged self-care, Normalized experience of patient/family, and Provided grief " counseling    Exploration: Explored spiritual needs & resources    Chaplaincy Outcomes Achieved:  Expressed intermediate hope    Spiritual Coping Strategies Utilized:   Connectedness       12/02/24 1400   Clinical Encounter Type   Visited With Patient

## 2024-12-02 NOTE — ASSESSMENT & PLAN NOTE
Chronic diarrhea.  Patient endorses several months of diarrhea associated with anorexia and over 60 pound weight loss.  C. difficile PCR positive, EIA negative on 11/22, indicating colonization.  Diarrhea may be secondary to ongoing COVID infection though less likely given chronicity of symptoms.  Consider alternative infectious etiologies.  Consider functional etiology.  Initial CT abdomen pelvis without acute findings in abdomen or pelvis.  Discontinue p.o. vancomycin  Consider workup for alternative etiologies of diarrhea  Consider GI consult and colonoscopy  Can check stool enteric PCR for completeness

## 2024-12-02 NOTE — PHYSICAL THERAPY NOTE
" PHYSICAL THERAPY TREATMENT NOTE        NAME:  Masoud Perry  DATE: 12/02/24    Length Of Stay: 12  Performed at least 2 patient identifiers during session: Name and Birthday        TREATMENT FLOW SHEET:       12/02/24 1448   PT Last Visit   PT Visit Date 12/02/24   Note Type   Note Type Treatment   Pain Assessment   Pain Assessment Tool FLACC   Pain Location/Orientation   (\"ALL OVER!\")   Pain Rating: FLACC (Rest) - Face 0   Pain Rating: FLACC (Rest) - Legs 0   Pain Rating: FLACC (Rest) - Activity 0   Pain Rating: FLACC (Rest) - Cry 1   Pain Rating: FLACC (Rest) - Consolability 0   Score: FLACC (Rest) 1   Pain Rating: FLACC (Activity) - Face 1   Pain Rating: FLACC (Activity) - Legs 0   Pain Rating: FLACC (Activity) - Activity 0   Pain Rating: FLACC (Activity) - Cry 1   Pain Rating: FLACC (Activity) - Consolability 0   Score: FLACC (Activity) 2   Restrictions/Precautions   Weight Bearing Precautions Per Order No   Other Precautions Contact/isolation;Bed Alarm;Fall Risk;Pain   General   Chart Reviewed Yes   Response to Previous Treatment Patient reporting fatigue but able to participate.   Family/Caregiver Present No   Cognition   Arousal/Participation Alert   Orientation Level Oriented X4   Subjective   Subjective \"You can shove those numbers up your ass!\"   Bed Mobility   Rolling R 2  Maximal assistance   Additional items Increased time required;Verbal cues;Bedrails;Assist x 1   Rolling L 2  Maximal assistance   Additional items Increased time required;Verbal cues;Bedrails;Assist x 1   Supine to Sit 2  Maximal assistance   Additional items Assist x 2;Increased time required;HOB elevated;Verbal cues   Sit to Supine 2  Maximal assistance   Additional items Assist x 2;Increased time required;LE management  (& trunk)   Transfers   Sit to Stand Unable to assess   Additional items   (pt refuses)   Stand pivot Unable to assess   Additional items   (pt refuses)   Additional Comments pt sits EOB for about 10 minutes; PT " encourages pt to attempt stand and pivoting onto BSC however pt declines   Ambulation/Elevation   Gait Assistance Not tested   Balance   Static Sitting Fair   Dynamic Sitting Fair -   Endurance Deficit   Endurance Deficit Yes   Activity Tolerance   Activity Tolerance Patient limited by fatigue   Medical Staff Made Aware DOUGIE Heck   Nurse Made Aware Yes   Assessment   Prognosis Guarded   Problem List Decreased strength;Decreased endurance;Impaired balance;Decreased mobility;Impaired judgement;Decreased safety awareness;Pain;Obesity   Assessment Pt tolerates tx session poorly.  He is limited by fatigue, lightheadedness, pain, and bowel incontinence.   Barriers to Discharge Other (Comment)  (Barriers to home:  current assist level for mobility, lives alone)   Barriers to Discharge Comments no barriers to return to STR   Goals   STG Expiration Date 12/06/24   PT Treatment Day 1   Plan   Progress No functional improvements   PT Frequency 2-3x/wk   Discharge Recommendation   Rehab Resource Intensity Level, PT II (Moderate Resource Intensity)   AM-PAC Basic Mobility Inpatient   Turning in Flat Bed Without Bedrails 2   Lying on Back to Sitting on Edge of Flat Bed Without Bedrails 1   Moving Bed to Chair 1   Standing Up From Chair Using Arms 1   Walk in Room 1   Climb 3-5 Stairs With Railing 1   Basic Mobility Inpatient Raw Score 7   Turning Head Towards Sound 4   Follow Simple Instructions 3   Low Function Basic Mobility Raw Score  14   Low Function Basic Mobility Standardized Score  22.01   [facility] Highest Level Of Mobility   -HLM Goal 2: Bed activities/Dependent transfer   -HL Achieved 3: Sit at edge of bed   Education   Education Provided Mobility training   Patient Reinforcement needed   End of Consult   Patient Position at End of Consult Supine;Bed/Chair alarm activated;All needs within reach      12/02/24 1501 12/02/24 1508   Vitals   Pulse 78 78   Blood Pressure (!) 88/58 111/69   MAP (mmHg) 68 83   Patient  Position - Orthostatic VS Sitting  (immediately upon supine > sit transition) Sitting  (sitting upright EOB for 5 minutes)       The patient's AM-PAC Basic Mobility Inpatient Short Form Raw Score is 7. A Raw score of less than or equal to 16 suggests the patient may benefit from discharge to post-acute rehabilitation services. Please also refer to the recommendation of the Physical Therapist for safe discharge planning.         Leonides Toure, PT,DPT

## 2024-12-02 NOTE — ASSESSMENT & PLAN NOTE
Malnutrition Findings:   Adult Malnutrition type: Chronic illness  Adult Degree of Malnutrition: Malnutrition of moderate degree  Malnutrition Characteristics: Inadequate energy, Weight loss                  360 Statement: malnutrition of moderate degree in setting of chronic illness, evidenced by 7/11/24 263 lbs to 11/21/24 220.8 lbs (16% wt loss x 4 mo), poor po intake/<75% energy intake compared to estimated needs for > 1 month, treated with diet and supplements    BMI Findings:           Body mass index is 27.12 kg/m².   -Continue management per primary team

## 2024-12-02 NOTE — ASSESSMENT & PLAN NOTE
Patient and I had discussion today about his goals of care moving forward  He endorses that he has been feeling depressed and frustrated with his continued decline in health  He says that sometimes he wishes that he could just go home to pass away and be comfortable  He shares that he is not disagreeable for treatment but sometimes feels as though he is being tortured  Pain discussed possibilities of psychiatry consult for depression/trying recommendations made by prior psychiatrist for Tomy  At this time patient declines  We also discussed the possibility of a palliative care consult to discuss further his goals of care and symptom management of his full body pain and anorexia  He states he would consider this may be willing to discuss with specialist  Will consult palliative care to have further discussion with patient for goals of care planning

## 2024-12-02 NOTE — PLAN OF CARE
Problem: OCCUPATIONAL THERAPY ADULT  Goal: Performs self-care activities at highest level of function for planned discharge setting.  See evaluation for individualized goals.  Description: Treatment Interventions: ADL retraining, Visual perceptual retraining, Functional transfer training, Activityengagement, Energy conservation, Compensatory technique education, Patient/family training          See flowsheet documentation for full assessment, interventions and recommendations.   Outcome: Not Progressing  Note: Limitation: Decreased ADL status, Decreased UE ROM, Decreased UE strength, Decreased Safe judgement during ADL, Decreased endurance, Decreased self-care trans, Decreased high-level ADLs  Prognosis: Fair  Assessment: Pt completed OT tx session #2 focused on toileting and bed mobility. Pt alert and agreeable to participate. PT/OT co-treat completed d/t significant mobility deficits and safety concerns. Pt demonstrated improvements in tolerance to sitting upright at edge of bed for ~10 minutes this session but continues to be limited by motivation, weakness, poor PO intake, and activity tolerance. Pt currently completing UB ADLs @ total A, LB ADLs @ total A, and unable to complete functional mobility. Pt maintained ?90% O2 sats on room air throughout. Pt demonstrating poor participation and poor potential to achieve goals but is currently demonstrating deficits in decrease activity tolerance, decrease standing balance, decrease sitting balance, decrease performance during ADL tasks, decrease BUE ROM, decrease UB MS, increased pain, decrease generalized strength, decrease activity engagement, decrease performance during functional transfers, high fall risk, limited insight to deficits, inability to express needs, and decreased alertness. Continue to recommend moderate resource intensity upon discharge to increase safety and independence in ADL tasks and functional mobility.     Rehab Resource Intensity Level,  OT: II (Moderate Resource Intensity)

## 2024-12-03 ENCOUNTER — APPOINTMENT (INPATIENT)
Dept: DIALYSIS | Facility: HOSPITAL | Age: 69
DRG: 177 | End: 2024-12-03
Attending: INTERNAL MEDICINE
Payer: COMMERCIAL

## 2024-12-03 PROBLEM — Z51.5 PALLIATIVE CARE BY SPECIALIST: Status: ACTIVE | Noted: 2024-12-03

## 2024-12-03 LAB
ANION GAP SERPL CALCULATED.3IONS-SCNC: 5 MMOL/L (ref 4–13)
BUN SERPL-MCNC: 21 MG/DL (ref 5–25)
C COLI+JEJUNI TUF STL QL NAA+PROBE: NEGATIVE
CALCIUM SERPL-MCNC: 8.2 MG/DL (ref 8.4–10.2)
CHLORIDE SERPL-SCNC: 102 MMOL/L (ref 96–108)
CO2 SERPL-SCNC: 28 MMOL/L (ref 21–32)
CREAT SERPL-MCNC: 2.8 MG/DL (ref 0.6–1.3)
CRYPTOSP DNA SPEC QL NAA+PROBE: NEGATIVE
E HISTOLYT DNA SPEC QL NAA+PROBE: NEGATIVE
EC STX1+STX2 GENES STL QL NAA+PROBE: NEGATIVE
ERYTHROCYTE [DISTWIDTH] IN BLOOD BY AUTOMATED COUNT: 14.5 % (ref 11.6–15.1)
G LAMBLIA DNA SPEC QL NAA+PROBE: NEGATIVE
GFR SERPL CREATININE-BSD FRML MDRD: 21 ML/MIN/1.73SQ M
GLIADIN PEPTIDE IGA SER-ACNC: 0.5 U/ML
GLIADIN PEPTIDE IGA SER-ACNC: NEGATIVE
GLIADIN PEPTIDE IGG SER-ACNC: <0.4 U/ML
GLIADIN PEPTIDE IGG SER-ACNC: NEGATIVE
GLUCOSE SERPL-MCNC: 106 MG/DL (ref 65–140)
GLUCOSE SERPL-MCNC: 126 MG/DL (ref 65–140)
GLUCOSE SERPL-MCNC: 61 MG/DL (ref 65–140)
GLUCOSE SERPL-MCNC: 63 MG/DL (ref 65–140)
GLUCOSE SERPL-MCNC: 63 MG/DL (ref 65–140)
GLUCOSE SERPL-MCNC: 92 MG/DL (ref 65–140)
GLUCOSE SERPL-MCNC: 99 MG/DL (ref 65–140)
HCT VFR BLD AUTO: 27.6 % (ref 36.5–49.3)
HGB BLD-MCNC: 8.6 G/DL (ref 12–17)
IGA SERPL-MCNC: 181 MG/DL (ref 66–433)
INR PPP: 2.56 (ref 0.85–1.19)
MAGNESIUM SERPL-MCNC: 1.8 MG/DL (ref 1.9–2.7)
MCH RBC QN AUTO: 31.7 PG (ref 26.8–34.3)
MCHC RBC AUTO-ENTMCNC: 31.2 G/DL (ref 31.4–37.4)
MCV RBC AUTO: 102 FL (ref 82–98)
PHOSPHATE SERPL-MCNC: 2.6 MG/DL (ref 2.3–4.1)
PLATELET # BLD AUTO: 56 THOUSANDS/UL (ref 149–390)
PMV BLD AUTO: 10 FL (ref 8.9–12.7)
POTASSIUM SERPL-SCNC: 3.5 MMOL/L (ref 3.5–5.3)
PROTHROMBIN TIME: 27.5 SECONDS (ref 12.3–15)
RBC # BLD AUTO: 2.71 MILLION/UL (ref 3.88–5.62)
SALMONELLA SP SPAO STL QL NAA+PROBE: NEGATIVE
SHIGELLA SP+EIEC IPAH STL QL NAA+PROBE: NEGATIVE
SODIUM SERPL-SCNC: 135 MMOL/L (ref 135–147)
TTG IGA SER-ACNC: <0.5 U/ML
TTG IGA SER-ACNC: NEGATIVE
TTG IGG SER-ACNC: <0.8 U/ML
TTG IGG SER-ACNC: NEGATIVE
WBC # BLD AUTO: 5.77 THOUSAND/UL (ref 4.31–10.16)

## 2024-12-03 PROCEDURE — 83735 ASSAY OF MAGNESIUM: CPT

## 2024-12-03 PROCEDURE — 86258 DGP ANTIBODY EACH IG CLASS: CPT | Performed by: PHYSICIAN ASSISTANT

## 2024-12-03 PROCEDURE — 82948 REAGENT STRIP/BLOOD GLUCOSE: CPT

## 2024-12-03 PROCEDURE — 86364 TISS TRNSGLTMNASE EA IG CLAS: CPT | Performed by: PHYSICIAN ASSISTANT

## 2024-12-03 PROCEDURE — 99232 SBSQ HOSP IP/OBS MODERATE 35: CPT | Performed by: INTERNAL MEDICINE

## 2024-12-03 PROCEDURE — 85027 COMPLETE CBC AUTOMATED: CPT

## 2024-12-03 PROCEDURE — 85610 PROTHROMBIN TIME: CPT

## 2024-12-03 PROCEDURE — 99232 SBSQ HOSP IP/OBS MODERATE 35: CPT

## 2024-12-03 PROCEDURE — 82784 ASSAY IGA/IGD/IGG/IGM EACH: CPT | Performed by: PHYSICIAN ASSISTANT

## 2024-12-03 PROCEDURE — 84100 ASSAY OF PHOSPHORUS: CPT

## 2024-12-03 PROCEDURE — 80048 BASIC METABOLIC PNL TOTAL CA: CPT

## 2024-12-03 RX ORDER — DEXTROSE MONOHYDRATE 25 G/50ML
25 INJECTION, SOLUTION INTRAVENOUS ONCE
Status: COMPLETED | OUTPATIENT
Start: 2024-12-03 | End: 2024-12-03

## 2024-12-03 RX ORDER — MAGNESIUM SULFATE HEPTAHYDRATE 40 MG/ML
2 INJECTION, SOLUTION INTRAVENOUS ONCE
Status: COMPLETED | OUTPATIENT
Start: 2024-12-03 | End: 2024-12-03

## 2024-12-03 RX ADMIN — GENTAMICIN SULFATE: 1 CREAM TOPICAL at 09:53

## 2024-12-03 RX ADMIN — HYDROCODONE BITARTRATE AND ACETAMINOPHEN 2 TABLET: 5; 325 TABLET ORAL at 16:35

## 2024-12-03 RX ADMIN — GUAIFENESIN 600 MG: 600 TABLET ORAL at 07:46

## 2024-12-03 RX ADMIN — PYRIDOSTIGMINE BROMIDE 60 MG: 60 TABLET ORAL at 20:21

## 2024-12-03 RX ADMIN — HYDROCODONE BITARTRATE AND ACETAMINOPHEN 2 TABLET: 5; 325 TABLET ORAL at 07:35

## 2024-12-03 RX ADMIN — MAGNESIUM SULFATE HEPTAHYDRATE 2 G: 40 INJECTION, SOLUTION INTRAVENOUS at 09:52

## 2024-12-03 RX ADMIN — METOPROLOL TARTRATE 25 MG: 25 TABLET, FILM COATED ORAL at 07:45

## 2024-12-03 RX ADMIN — ATORVASTATIN CALCIUM 40 MG: 40 TABLET, FILM COATED ORAL at 16:29

## 2024-12-03 RX ADMIN — DEXTROSE MONOHYDRATE 25 ML: 25 INJECTION, SOLUTION INTRAVENOUS at 08:07

## 2024-12-03 RX ADMIN — PYRIDOSTIGMINE BROMIDE 60 MG: 60 TABLET ORAL at 16:29

## 2024-12-03 RX ADMIN — METOPROLOL TARTRATE 25 MG: 25 TABLET, FILM COATED ORAL at 20:21

## 2024-12-03 RX ADMIN — GUAIFENESIN 600 MG: 600 TABLET ORAL at 20:21

## 2024-12-03 RX ADMIN — MIDODRINE HYDROCHLORIDE 15 MG: 5 TABLET ORAL at 12:23

## 2024-12-03 RX ADMIN — LEVOTHYROXINE SODIUM 125 MCG: 125 TABLET ORAL at 07:50

## 2024-12-03 RX ADMIN — MIDODRINE HYDROCHLORIDE 15 MG: 5 TABLET ORAL at 16:29

## 2024-12-03 RX ADMIN — HYDROCODONE BITARTRATE AND ACETAMINOPHEN 2 TABLET: 5; 325 TABLET ORAL at 22:17

## 2024-12-03 NOTE — NURSING NOTE
Pt is refusing medications and repositioning at this time. Educated on the importance of repositioning. Provider made aware.

## 2024-12-03 NOTE — ASSESSMENT & PLAN NOTE
Wt Readings from Last 3 Encounters:   12/03/24 89.1 kg (196 lb 6.9 oz)   10/14/24 115 kg (253 lb)   10/07/24 118 kg (260 lb)

## 2024-12-03 NOTE — ASSESSMENT & PLAN NOTE
Hemoglobin trended down to 8.6 g/dL today    Epogen being held in setting of COVID-19 infection.

## 2024-12-03 NOTE — ASSESSMENT & PLAN NOTE
On chronic narcotics for pain, may have a gastroparesis etiology    -check stool HP while doing other stool tests  -offered pepcid for indigestion, pt declined this as well. Could try 10 mg daily if he is agreeable (renal dosing)  -ok for diet as tolerated from GI standpoint

## 2024-12-03 NOTE — ASSESSMENT & PLAN NOTE
Hyponatremia  Likely due to free water intake and impaired free water excretion in the setting of ESRD, sodium level improved to normal range at 135 today.  Continue fluid restriction

## 2024-12-03 NOTE — CASE MANAGEMENT
Case Management Discharge Planning Note    Patient name Masoud VERGARA [facility]  Location /-01 MRN [id number]  : 1955 Date 12/3/2024       Current Admission Date: 2024  Current Admission Diagnosis:Acute respiratory insufficiency   Patient Active Problem List    Diagnosis Date Noted Date Diagnosed    Hypomagnesemia 2024     Diarrhea 2024     Epigastric pain 2024     Dysuria 2024     Diarrhea of infectious origin 2024     Electrolyte imbalance risk 2024     Acute respiratory insufficiency 2024     ESRD (end stage renal disease) on dialysis ([facility]) 2024     Anemia due to chronic kidney disease, on chronic dialysis ([facility]) 2024     Personal history of gout 2024     COVID-19 2024     Stasis ulcer ([facility]) 10/02/2024     Diabetic ulcer of right great toe ([facility]) 2024     Hematoma 2024     Edema 2024     Goals of care, counseling/discussion 2024     Hyponatremia 2024     Cellulitis 2024     Moderate protein-calorie malnutrition (HCC) 2024     Ambulatory dysfunction 09/15/2024     Lactic acidosis 2024     Hypercalcemia 2024     Chronic acquired lymphedema 2024     Hemodialysis-associated hypotension 2024     Nocturnal hypoxia 2024     Elevated LFTs 2024     Patient on peritoneal dialysis ([facility]) 2024     Hypervolemia 2024     Bacteremia 2024     Cellulitis of right lower extremity 2024     Paroxysmal atrial fibrillation ([facility]) 2024     Bilateral lower extremity edema 2023     Ureteral stone with hydronephrosis 2022     Cutaneous abscess of buttock 2022     Chronic kidney disease-mineral and bone disorder 2022     Type 2 diabetes mellitus with stage 4 chronic kidney disease, with long-term current use of insulin (HCC) 07/15/2022     Obesity, morbid (HCC) 07/15/2022     Secondary hyperparathyroidism of renal origin ([facility])  07/15/2022     Stage 5 chronic kidney disease on peritoneal dialysis (Formerly [facility]) 07/15/2022     Factor 5 Leiden mutation, heterozygous ([facility]ly [facility]) 01/15/2022     Thrombocytopenia ([facility]) 01/14/2022     Angina at rest ([facility]) 01/14/2022     MI (myocardial infarction) ([facility]) 01/14/2022     History of PTCA 01/14/2022     Sleep apnea 01/14/2022     Smoking 01/14/2022     Pancytopenia ([facility]) 01/13/2022     History of CVA (cerebrovascular accident) 01/12/2022     Acute on chronic diastolic HF (heart failure) ([facility]) 01/12/2022     HLD (hyperlipidemia) 01/12/2022     Lymphedema 01/12/2022     Acquired hypothyroidism 01/12/2022     COPD (chronic obstructive pulmonary disease) ([facility]) 12/31/2018     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 ([facility]) 06/29/2016     Gastroesophageal reflux disease 06/29/2016     Gout 06/29/2016     Personal history of pulmonary embolism 06/29/2016     ED (erectile dysfunction) of organic origin 01/14/2014     History of thromboembolism of vein 01/10/2013       LOS (days): 13  Geometric Mean LOS (GMLOS) (days): 4.6  Days to GMLOS:-7.9     OBJECTIVE:  Risk of Unplanned Readmission Score: 51.05         Current admission status: Inpatient   Preferred Pharmacy:   [facility] - [place], [place] - [address]  [address] [place] [address]  Phone: [phone] Fax: [phone]    [address]  [address] [place] [address]  Phone: [phone] Fax: [phone]    PATIENT/FAMILY REPORTS NO PREFERRED PHARMACY  No address on file      Primary Care Provider: Jignesh Baker DO    Primary Insurance: [email or web address]  REP  Secondary Insurance:     DISCHARGE DETAILS:    Chart reviewed aware of medical management. Case was discussed in multidisciplinary discharge meeting.  Clinical information supporting hospitalization: patient admitted 11/20/24 dx: acute respiratory insufficiency, pt currently requiring oxygen at 0.5 LPM via nc  "continually. Pt is followed by respiratory. Pt tested + COVID on 11/20/24 and + C-diff 11/24/24, GI consulted, continues with diarrhea, pt declined colonoscopy and EGD. Followed by infectious disease. Palliative care consulted, pt continues with DNR/DNI, pt agreeable to ongoing dialysis treatments, and per palliative discussion pt is agreeable to return to hospital if needs arose in the future. Continue to monitor appetite poor    Barriers to discharge:  - medical management  Discharge plan: [place] able to accept patient back when medically stable. Pt will need prior insurance auth to return to [place].      CM will continue to follow plan of care.        1406-  CM consult placed, patient was re-consulted with palliative care, pt is agreeable with hospice consult. CM spoke with patient, he is agreeable to place a blanket referral in aidin to discuss hospice options. Patient wants to talk with hospice before making a decision about hospice. CM spoke with [place], if patient returns on hospice services cost per day is $ 368.13 per day, pt would be private pay, he is not a bed hold at the SNF. Patient is aware of the cost of $368.13 per day, pt verbalized understanding of cost. Pt wants to talk with hospice, blanket referral placed in aidin for hospice services.       1419- hospice choices discussed with patient, patient chose [facility], they have a contract with [place] and will come to hospital to speak with patient today to discuss hospice services. [facility] reserved in aidin.             1600- Anitra with [facility] at [facility] to discuss hospice options with patient at bedside.    1616- per [facility]- \" Anitra was there just now for a Q&A with Mr. Perry and he asked if she could come back tomorrow because of issues with his dialysis machine. We will plan to have someone there tomorrow morning to see him.\"  1634- son Masoud called updated on current status and hospice will see patient tomorrow " to discuss services. Son agreeable.

## 2024-12-03 NOTE — ASSESSMENT & PLAN NOTE
BP is at goal, -134 on 11/30.   Rx: Midodrine 15 mg TID. Metoprolol 25 mg BID.  Maintain metoprolol and midodrine at this time.  Patient is on metoprolol for rate control for paroxysmal atrial fibrillation.  Torsemide 60 mg BID stopped on 11/25/24 due to volume depletion.  Would continue to hold Torsemide for right now given ongoing diarrhea but would consider restarting at the time of discharge.

## 2024-12-03 NOTE — ASSESSMENT & PLAN NOTE
Lab Results   Component Value Date    EGFR 21 12/03/2024    EGFR 27 12/02/2024    EGFR 35 12/01/2024    CREATININE 2.80 (H) 12/03/2024    CREATININE 2.31 (H) 12/02/2024    CREATININE 1.86 (H) 12/01/2024

## 2024-12-03 NOTE — ASSESSMENT & PLAN NOTE
Active issues specifically addressed today include:  Acute respiratory failure  COVID-19 infection  ESRD  HFpEF  Poor appetite  Moderate PCM  Depression  Diarrhea  Goals of care discussion  Palliative care encounter    Social support  Patient is supported by his son and daughter  Supportive listening provided  Normalized experience of patient/family  Provided anxiety containment  Provided anticipatory guidance    Follow up  Palliative Care will continue to follow and goals of care discussions will be ongoing.   Please reach out to on-call provider via Epic Secure Chat  if questions or concerns arise.  Please do not hesitate to reach our on call provider through our clinic answering service at [phone] should you have acute symptom control concerns.

## 2024-12-03 NOTE — PROGRESS NOTES
Progress Note - Hospitalist   Name: Masoud Perry 69 y.o. male I MRN: [id number]  Unit/Bed#: -01 I Date of Admission: 11/20/2024   Date of Service: 12/3/2024 I Hospital Day: 13    Assessment & Plan  Acute respiratory insufficiency  Oxygen needs have waxed and waned  CT chest abdomen pelvis with mild diffuse anasarca improved from prior CT, no specific pathology along  Monitor volume status  Between 0.5 L nasal cannula for comfort and room air  Acute on chronic diastolic HF (heart failure) (HCC)  Wt Readings from Last 3 Encounters:   12/03/24 89.1 kg (196 lb 6.9 oz)   10/14/24 115 kg (253 lb)   10/07/24 118 kg (260 lb)   CXR on 11/20 appeared to be pulmonary vascular congestion   Patient is a dialysis basis, received hemodialysis on TTS  Most recent session 11/30 to resume Critical access hospital schedule  Torsemide is on hold secondary to ongoing diarrhea and anorexia  Appreciate continued nephrology follow-up  Monitor intake/output and daily weight  Acute exacerbation seems to have resolved-euvolemic and continue with dialysis  COVID-19  Presents from [place]. Tested positive 3 days prior to admission when symptoms started  Positive in our records on 11/20  CT chest negative for any pathology  Started decadron and remdesevir, doxycycline  Has completed therapy with all 3  Was on Coumadin for anticoagulation/DVT prophylaxis  Patient also having diarrhea, and C. difficile showing carrier state -see under diarrhea of infectious origin for further treatment plan  Currently 0.5 L nasal cannula/room air - fluctuating   Has completed quarantine for COVID-no longer needs airborne precautions  Goals of care, counseling/discussion  Patient and I had discussion today about his goals of care moving forward  He endorses that he has been feeling depressed and frustrated with his continued decline in health  He says that sometimes he wishes that he could just go home to pass away and be comfortable  He shares that he is not disagreeable for  treatment but sometimes feels as though he is being tortured  Pain discussed possibilities of psychiatry consult for depression/trying recommendations made by prior psychiatrist for Remeron  At this time patient declines  Palliative care consulted  12/2 at this time declines any additional medications.  States that he is frustrated with continued hospitalization though is agreeable to this and agreeable to discharge to rehab.  Wants to continue treatment focused care without invasive measures  12/3 requesting to reconsult with palliative care -patient expressing wishes to rediscuss hospice  Ongoing goals of care discussion  Factor 5 Leiden mutation, heterozygous (HCC)  Patient is maintained on Coumadin PTA  INR supratherapeutic at time of presentation and was placed on hold  INR trending upwards and dialysis was delayed for a few days so vitamin K was administered at 2.5 mg  Patient now with subtherapeutic INR  Continue to monitor while administering low-dose Coumadin -on hold due to rapid increase in INR  Also monitor platelets  Lab Results   Component Value Date    INR 2.56 (H) 12/03/2024    INR 1.95 (H) 12/02/2024    INR 1.29 (H) 12/01/2024     Secondary hyperparathyroidism of renal origin ([facility])  Currently on 60 mg p.o. 3 times weekly  Hemodialysis-associated hypotension  Continue midodrine and pyridostigmine  Type 2 diabetes mellitus with stage 4 chronic kidney disease, with long-term current use of insulin ([facility])  Lab Results   Component Value Date    HGBA1C 5.0 11/02/2024       Recent Labs     12/02/24  2103 12/03/24  0719 12/03/24  0759 12/03/24  0823   POCGLU 67 63* 61* 126       Blood Sugar Average: Last 72 hrs:  (P) 75.875  Sliding scale insulin. Hypoglycemia protocol  Patient with recurrent hypoglycemia secondary to poor oral intake  Today requiring amp of dextrose  We will continue to discuss patient's appetite with him and possible addition of appetite stimulants  Paroxysmal atrial fibrillation  (HCC)  Patient did have rapid response secondary to chest pain  Cardiology consulted-thought to be secondary to A-fib rather than any ACS  Can continue metoprolol if blood pressure tolerates -maintain on midodrine as well to aid in attention  Continue to trend INR with Coumadin   ESRD (end stage renal disease) on dialysis (HCC)  Lab Results   Component Value Date    EGFR 21 12/03/2024    EGFR 27 12/02/2024    EGFR 35 12/01/2024    CREATININE 2.80 (H) 12/03/2024    CREATININE 2.31 (H) 12/02/2024    CREATININE 1.86 (H) 12/01/2024   Typical regimen Tuesday Thursday Saturday  Nephrology following  Resume prior schedule  Anemia due to chronic kidney disease, on chronic dialysis (HCC)  Patient with chronic anemia  Baseline hemoglobin around 8  During hospitalization patient's hemoglobin has been stable between 9 and 10  Mild decrease today   Monitor for signs of bleeding in setting of low platelets  Pancytopenia (HCC)  Multifactorial in origin, continue to monitor  Platelet count has been steadily decreasing with unclear etiology  Currently no signs of bleeding  Previously has supratherapeutic INR  Currently is subtherapeutic, continue to monitor INR and platelets and for any bleeding  Hold Coumadin dosing  Platelets low but stable at 52-56    Lab Results   Component Value Date    WBC 5.77 12/03/2024    RBC 2.71 (L) 12/03/2024    PLT 56 (L) 12/03/2024    PLT 52 (L) 12/02/2024       Moderate protein-calorie malnutrition (HCC)  Malnutrition Findings:   Adult Malnutrition type: Chronic illness  Adult Degree of Malnutrition: Malnutrition of moderate degree  Malnutrition Characteristics: Inadequate energy, Weight loss                  360 Statement: malnutrition of moderate degree in setting of chronic illness, evidenced by 7/11/24 263 lbs to 11/21/24 220.8 lbs (16% wt loss x 4 mo), poor po intake/<75% energy intake compared to estimated needs for > 1 month, treated with diet and supplements    BMI Findings:           Body  mass index is 26.64 kg/m².   Appreciate nutrition assessment  Patient has declined at this time for addition of Remeron or Megace for appetite stimulation  Will start calorie count  Having hypoglycemia secondary to poor oral intake  Electrolyte imbalance risk    Dysuria  Patient reporting ongoing dysuria since prior to admission  UA done 11/30 without any signs for acute cystitis  Is maintained on Proscar  Hypomagnesemia    Diarrhea  Patient admitted having significant diarrhea-initially suspected infectious secondary to COVID versus C. difficile carrier status  Started on a course of oral vancomycin and completed-Per infectious disease do not feel that this is related to his diarrhea  Given lack of improvement in diarrhea-GI consulted  Ending further lab workup possible sources of diarrhea  If infectious etiology ruled out can trial Imodium versus Questran  Diarrhea is ongoing because of hypovolemia and for that torsemide is on hold  Epigastric pain      VTE Pharmacologic Prophylaxis: VTE Score: 6 High Risk (Score >/= 5) - Pharmacological DVT Prophylaxis Contraindicated. Sequential Compression Devices Ordered.    Mobility:   Basic Mobility Inpatient Raw Score: 7  JH-HLM Goal: 2: Bed activities/Dependent transfer  JH-HLM Achieved: 2: Bed activities/Dependent transfer  JH-HLM Goal achieved. Continue to encourage appropriate mobility.    Patient Centered Rounds: I performed bedside rounds with nursing staff today.   Discussions with Specialists or Other Care Team Provider: CM, GI    Education and Discussions with Family / Patient:  Discussed with patient.  Patient request call to son when he is off of work later today.     Current Length of Stay: 13 day(s)  Current Patient Status: Inpatient   Certification Statement: The patient will continue to require additional inpatient hospital stay due to continued lack of appetite/diarrhea contributing to hypovolemia and hypoglycemia  Discharge Plan: Anticipate discharge in  48-72 hrs to rehab facility.    Code Status: Level 3 - DNAR and DNI    Subjective   Seen and examined.  Patient endorses ongoing diarrhea though does not have any wishes to start antidiarrheal agents.  Ongoing lack of appetite with hypoglycemia.  Patient reports he will try to drink some broth today but overall does not have much of an appetite.  He is requesting to rediscuss hospice with palliative care     Objective :  Temp:  [97 °F (36.1 °C)-97.5 °F (36.4 °C)] 97 °F (36.1 °C)  HR:  [59-78] 70  BP: ()/(55-69) 120/55  Resp:  [19-20] 19  SpO2:  [91 %-100 %] 97 %  O2 Device: None (Room air)  Nasal Cannula O2 Flow Rate (L/min):  [0.5 L/min] 0.5 L/min    Body mass index is 26.64 kg/m².     Input and Output Summary (last 24 hours):     Intake/Output Summary (Last 24 hours) at 12/3/2024 0914  Last data filed at 12/2/2024 1952  Gross per 24 hour   Intake 240 ml   Output 100 ml   Net 140 ml       Physical Exam  Vitals and nursing note reviewed.   Constitutional:       General: He is not in acute distress.     Appearance: Normal appearance. He is ill-appearing.   HENT:      Head: Normocephalic and atraumatic.      Nose: No congestion.      Mouth/Throat:      Mouth: Mucous membranes are dry.      Pharynx: Oropharynx is clear.   Eyes:      Conjunctiva/sclera: Conjunctivae normal.   Cardiovascular:      Rate and Rhythm: Normal rate.      Pulses: Normal pulses.   Pulmonary:      Effort: Pulmonary effort is normal.   Abdominal:      General: Bowel sounds are normal.      Palpations: Abdomen is soft.      Tenderness: There is no abdominal tenderness.   Musculoskeletal:         General: Normal range of motion.      Cervical back: Normal range of motion.      Right lower leg: No edema.      Left lower leg: No edema.   Skin:     General: Skin is warm and dry.   Neurological:      Mental Status: He is alert and oriented to person, place, and time.   Psychiatric:         Mood and Affect: Mood is depressed.         Behavior:  Behavior normal.           Lines/Drains:  Lines/Drains/Airways       Active Status       Name Placement date Placement time Site Days    HD Permanent Double Catheter 10/14/24  1420  Subclavian  49                            Lab Results: I have reviewed the following results:   Results from last 7 days   Lab Units 12/03/24  0609 12/02/24  0458 12/01/24  0842   WBC Thousand/uL 5.77   < > 5.45   HEMOGLOBIN g/dL 8.6*   < > 9.8*   HEMATOCRIT % 27.6*   < > 31.7*   PLATELETS Thousands/uL 56*   < > 68*   BANDS PCT %  --   --  1   SEGS PCT %  --   --  75   LYMPHO PCT %  --   --  19  19   MONO PCT %  --   --  4  2*   EOS PCT %  --   --  1    < > = values in this interval not displayed.     Results from last 7 days   Lab Units 12/03/24  0609 12/02/24  0458   SODIUM mmol/L 135 136   POTASSIUM mmol/L 3.5 3.5   CHLORIDE mmol/L 102 101   CO2 mmol/L 28 29   BUN mg/dL 21 18   CREATININE mg/dL 2.80* 2.31*   ANION GAP mmol/L 5 6   CALCIUM mg/dL 8.2* 8.7   ALBUMIN g/dL  --  2.5*   TOTAL BILIRUBIN mg/dL  --  0.53   ALK PHOS U/L  --  56   ALT U/L  --  5*   AST U/L  --  11*   GLUCOSE RANDOM mg/dL 63* 71     Results from last 7 days   Lab Units 12/03/24  0609   INR  2.56*     Results from last 7 days   Lab Units 12/03/24  0823 12/03/24  0759 12/03/24  0719 12/02/24  2103 12/02/24  1618 12/02/24  1125 12/02/24  1014 12/02/24  0813 12/01/24  2048 12/01/24  1642 12/01/24  1141 12/01/24  0756   POC GLUCOSE mg/dl 126 61* 63* 67 77 59* 67 60* 81 85 73 75         Results from last 7 days   Lab Units 11/29/24  1328 11/29/24  1313   LACTIC ACID mmol/L 1.1  --    PROCALCITONIN ng/ml  --  0.14       Recent Cultures (last 7 days):               Last 24 Hours Medication List:     Current Facility-Administered Medications:     acetaminophen (TYLENOL) tablet 650 mg, Q4H PRN    atorvastatin (LIPITOR) tablet 40 mg, Daily With Dinner    benzonatate (TESSALON PERLES) capsule 100 mg, TID PRN    calcium carbonate (TUMS) chewable tablet 500 mg, Daily PRN     cinacalcet (SENSIPAR) tablet 60 mg, Once per day on Monday Wednesday Friday    docusate sodium (COLACE) capsule 100 mg, BID PRN    finasteride (PROSCAR) tablet 5 mg, Daily    gentamicin (GARAMYCIN) 0.1 % cream, Daily    guaiFENesin (MUCINEX) 12 hr tablet 600 mg, Q12H VASU    HYDROcodone-acetaminophen (NORCO) 5-325 mg per tablet 2 tablet, Q6H PRN    insulin lispro (HumALOG/ADMELOG) 100 units/mL subcutaneous injection 1-6 Units, TID AC **AND** Fingerstick Glucose (POCT), TID AC    levothyroxine tablet 125 mcg, Early Morning    magnesium sulfate 2 g/50 mL IVPB (premix) 2 g, Once    metoprolol tartrate (LOPRESSOR) tablet 25 mg, Q12H VASU    midodrine (PROAMATINE) tablet 15 mg, TID AC    nicotine (NICODERM CQ) 7 mg/24hr TD 24 hr patch 7 mg, Daily    ondansetron (ZOFRAN) injection 4 mg, Q6H PRN    pyridostigmine (MESTINON) tablet 60 mg, TID    simethicone (MYLICON) chewable tablet 80 mg, 4x Daily PRN    [Held by provider] warfarin (COUMADIN) tablet 2 mg, HS    Administrative Statements   Today, Patient Was Seen By: Theresa Butt PA-C      **Please Note: This note may have been constructed using a voice recognition system.**

## 2024-12-03 NOTE — ASSESSMENT & PLAN NOTE
Malnutrition Findings:   Adult Malnutrition type: Chronic illness  Adult Degree of Malnutrition: Malnutrition of moderate degree  Malnutrition Characteristics: Inadequate energy, Weight loss                  360 Statement: malnutrition of moderate degree in setting of chronic illness, evidenced by 7/11/24 263 lbs to 11/21/24 220.8 lbs (16% wt loss x 4 mo), poor po intake/<75% energy intake compared to estimated needs for > 1 month, treated with diet and supplements    BMI Findings:           Body mass index is 26.64 kg/m².   -Continue management per primary team

## 2024-12-03 NOTE — PROGRESS NOTES
Patient:    MRN:  [id number]    Valent[place] Request ID:  [id number]    Level of care reserved:  Hospice    Partner Reserved:  [facility], [place] [address] [phone]    Clinical needs requested:    Geography searched:  [phone]    Start of Service:    Request sent:  2:04pm EST on 12/3/2024 by Loraine Copeland    Partner reserved:  2:19pm EST on 12/3/2024 by Loraine Copeland    Choice list shared:  2:19pm EST on 12/3/2024 by Loraine Copeland

## 2024-12-03 NOTE — ASSESSMENT & PLAN NOTE
Malnutrition Findings:   Adult Malnutrition type: Chronic illness  Adult Degree of Malnutrition: Malnutrition of moderate degree  Malnutrition Characteristics: Inadequate energy, Weight loss                  360 Statement: malnutrition of moderate degree in setting of chronic illness, evidenced by 7/11/24 263 lbs to 11/21/24 220.8 lbs (16% wt loss x 4 mo), poor po intake/<75% energy intake compared to estimated needs for > 1 month, treated with diet and supplements    BMI Findings:           Body mass index is 26.64 kg/m².   Appreciate nutrition assessment  Patient has declined at this time for addition of Remeron or Megace for appetite stimulation  Will start calorie count  Having hypoglycemia secondary to poor oral intake

## 2024-12-03 NOTE — ASSESSMENT & PLAN NOTE
Continue Sensipar 30 mg 3/week.  ^ corrected calcium is  9.9 mg/dl    phosphorus level was found to be low at 1.9 on 12/1 and phosphorus binders were held and given phosphorus replacement, phosphorus level improved to normal range at 2.6 mg/dL today, continue to hold binders for now.  Continue to hold at the time of discharge and monitor as outpatient

## 2024-12-03 NOTE — ASSESSMENT & PLAN NOTE
Patient with chronic anemia  Baseline hemoglobin around 8  During hospitalization patient's hemoglobin has been stable between 9 and 10  Mild decrease today   Monitor for signs of bleeding in setting of low platelets

## 2024-12-03 NOTE — PROGRESS NOTES
Progress Note - Gastroenterology   Name: Masoud Perry 69 y.o. male I MRN: [id number]  Unit/Bed#: -01 I Date of Admission: 11/20/2024   Date of Service: 12/3/2024 I Hospital Day: 13     Assessment & Plan  Diarrhea  Nocturnal BMs points towards an infectious vs inflammatory vs malabsorptive etiology. C diff colonized but not responding to vanco    -awaiting labs  -avoid excessive sugar intake as well as artificial sweeteners.  -offered colonoscopy and EGD- pt declined. Does not want invasive testing done. Would recommend waiting until COVID symptoms resolve prior to endoscopy  -when infection is ruled out, we could try antidiarrheals. Questran may be a safer option in the setting of C diff colonization compared to imodium or other similar agents. Offered these to him at this time but he declined both- does not want to try a powder like questran, does not want to take a large pill like colestipol     Epigastric pain  On chronic narcotics for pain, may have a gastroparesis etiology    -check stool HP while doing other stool tests  -offered pepcid for indigestion, pt declined this as well. Could try 10 mg daily if he is agreeable (renal dosing)  -ok for diet as tolerated from GI standpoint  Pancytopenia (HCC)  Plts markedly low with anemia of ESRD, LFTs not elevated.   -will continue to follow but consider hematology evaluation if this continues to drop    Of note- pt wishes to speak to palliative about going on hospice. Message sent to primary team    HPI: Masoud Perry is a 69 y.o. year old male with a PMHx who presents with COVID and SOB requiring oxygen. GI consulted for chronic diarrhea.     Labs still pending. CRP down to 4.2 from >100 earlier this admission- likely due to known infection.      Symptoms unchanged, still having diarrhea with urgency. He is tolerating his diet. No bleeding or pain. He is off oxygen this AM.       Endoscopic risk assessment:  Anticoagulation use: warfarin  Diabetes  medication:n  CVS history: hx of stroke  Abdominal surgeries: ccy  Sleep apnea: yes  O2 use: 0.5 L currently      Medications Prior to Admission:     albuterol (PROVENTIL HFA,VENTOLIN HFA) 90 mcg/act inhaler    allopurinol (ZYLOPRIM) 300 mg tablet    arginine 500 MG tablet    Aspirin 81 MG CAPS    atorvastatin (LIPITOR) 40 mg tablet    colchicine (COLCRYS) 0.6 mg tablet    dexamethasone (DECADRON) 6 mg tablet    finasteride (PROSCAR) 5 mg tablet    FUROSEMIDE PO    FUROSEMIDE PO    HYDROcodone-acetaminophen (Norco) 5-325 mg per tablet    insulin glargine (LANTUS) 100 units/mL subcutaneous injection    insulin lispro (HumALOG/ADMELOG) 100 units/mL injection    levothyroxine 125 mcg tablet    metoprolol tartrate (LOPRESSOR) 25 mg tablet    midodrine (PROAMATINE) 5 mg tablet    MOLNUPIRAVIR PO    pantoprazole (PROTONIX) 40 mg tablet    pyridostigmine (Mestinon) 60 mg tablet    sevelamer (RENAGEL) 800 mg tablet    tamsulosin (FLOMAX) 0.4 mg    torsemide (DEMADEX) 100 mg tablet    warfarin (COUMADIN) 1 mg tablet    acetaminophen (TYLENOL) 325 mg tablet    cinacalcet (SENSIPAR) 30 mg tablet    docusate sodium (COLACE) 100 mg capsule    Magnesium 400 MG TABS    potassium chloride (Klor-Con M20) 20 mEq tablet    simethicone (MYLICON) 80 mg chewable tablet    Current Facility-Administered Medications:     acetaminophen (TYLENOL) tablet 650 mg, Q4H PRN    atorvastatin (LIPITOR) tablet 40 mg, Daily With Dinner    benzonatate (TESSALON PERLES) capsule 100 mg, TID PRN    calcium carbonate (TUMS) chewable tablet 500 mg, Daily PRN    cinacalcet (SENSIPAR) tablet 60 mg, Once per day on Monday Wednesday Friday    dextrose 50 % IV solution 25 mL, Once    docusate sodium (COLACE) capsule 100 mg, BID PRN    finasteride (PROSCAR) tablet 5 mg, Daily    gentamicin (GARAMYCIN) 0.1 % cream, Daily    guaiFENesin (MUCINEX) 12 hr tablet 600 mg, Q12H VASU    HYDROcodone-acetaminophen (NORCO) 5-325 mg per tablet 2 tablet, Q6H PRN    insulin  lispro (HumALOG/ADMELOG) 100 units/mL subcutaneous injection 1-6 Units, TID AC **AND** Fingerstick Glucose (POCT), TID AC    levothyroxine tablet 125 mcg, Early Morning    metoprolol tartrate (LOPRESSOR) tablet 25 mg, Q12H VASU    midodrine (PROAMATINE) tablet 15 mg, TID AC    nicotine (NICODERM CQ) 7 mg/24hr TD 24 hr patch 7 mg, Daily    ondansetron (ZOFRAN) injection 4 mg, Q6H PRN    pyridostigmine (MESTINON) tablet 60 mg, TID    simethicone (MYLICON) chewable tablet 80 mg, 4x Daily PRN    [Held by provider] warfarin (COUMADIN) tablet 2 mg, HS  Allergies   Allergen Reactions    Carvedilol Shortness Of Breath    Levaquin [Levofloxacin] Other (See Comments)     Unknown    Saccharin - Food Allergy Diarrhea     GI intolerance, adamant does not want to receive    Sucralose - Food Allergy Diarrhea     GI intolerance, adamant does not want to receive    Amiodarone Dizziness    Aspartame - Food Allergy Diarrhea    Bumetanide GI Intolerance and Lightheadedness           Cephalexin Other (See Comments)     unknown    Ciprofloxacin Rash    Hydralazine Tachycardia    Lisinopril GI Intolerance           Metolazone Other (See Comments)     Metallic taste    Morphine Swelling     Of injection site    Nifedipine Lightheadedness    Strawberry Extract - Food Allergy Rash       Physical Exam  Constitutional:       General: He is not in acute distress.     Appearance: He is ill-appearing.   HENT:      Head: Normocephalic and atraumatic.   Eyes:      Conjunctiva/sclera: Conjunctivae normal.   Pulmonary:      Effort: Pulmonary effort is normal.   Abdominal:      General: Abdomen is flat. There is no distension.      Palpations: Abdomen is soft.   Neurological:      Mental Status: He is alert and oriented to person, place, and time.   Psychiatric:         Mood and Affect: Mood normal.         Behavior: Behavior normal.       Most Recent Vital Signs:  Vitals:    12/03/24 0513 12/03/24 0532 12/03/24 0721 12/03/24 0750   BP: 135/66  120/55     Pulse: 65  70    Resp:   19    Temp:   (!) 97 °F (36.1 °C)    TempSrc:       SpO2: 99%   97%   Weight:  89.1 kg (196 lb 6.9 oz)     Height:           Intake/Output Summary (Last 24 hours) at 12/3/2024 0808  Last data filed at 12/2/2024 1952  Gross per 24 hour   Intake 240 ml   Output 100 ml   Net 140 ml       LABS/IMAGING  Lab Results: I have reviewed all relevant lab results during this hospitalization.    Imaging Studies:  I have reviewed all the relevant images during this hospitalizations    Counseling / Coordination of Care  Total time spent today 20 minutes. Greater than 50% of total time was spent with the patient and / or family counseling and / or coordination of care.    Puneet Cooper PA-C

## 2024-12-03 NOTE — ASSESSMENT & PLAN NOTE
Wt Readings from Last 3 Encounters:   12/03/24 89.1 kg (196 lb 6.9 oz)   10/14/24 115 kg (253 lb)   10/07/24 118 kg (260 lb)   CXR on 11/20 appeared to be pulmonary vascular congestion   Patient is a dialysis basis, received hemodialysis on TTS  Most recent session 11/30 to resume [facility] schedule  Torsemide is on hold secondary to ongoing diarrhea and anorexia  Appreciate continued nephrology follow-up  Monitor intake/output and daily weight  Acute exacerbation seems to have resolved-euvolemic and continue with dialysis

## 2024-12-03 NOTE — ASSESSMENT & PLAN NOTE
Lab Results   Component Value Date    HGBA1C 5.0 11/02/2024       Recent Labs     12/02/24  2103 12/03/24  0719 12/03/24  0759 12/03/24  0823   POCGLU 67 63* 61* 126       Blood Sugar Average: Last 72 hrs:  (P) 75.875  Sliding scale insulin. Hypoglycemia protocol  Patient with recurrent hypoglycemia secondary to poor oral intake  Today requiring amp of dextrose  We will continue to discuss patient's appetite with him and possible addition of appetite stimulants

## 2024-12-03 NOTE — ASSESSMENT & PLAN NOTE
"""OHTN OU; glaucoma suspect per large CDs OU. IOP 33/28. OCT RNFL done today.  PACHS thick "" Oxygen needs have waxed and waned  CT chest abdomen pelvis with mild diffuse anasarca improved from prior CT, no specific pathology along  Monitor volume status  Between 0.5 L nasal cannula for comfort and room air

## 2024-12-03 NOTE — PROGRESS NOTES
Progress Note - Nephrology   Name: Masoud Perry 69 y.o. male I MRN: [id number]  Unit/Bed#: -01 I Date of Admission: 11/20/2024   Date of Service: 12/3/2024 I Hospital Day: 13    Assessment & Plan  ESRD (end stage renal disease) on dialysis ([facility])  He was on PD but was changed to back up iHD during admission to [facility] in November 2024. Started intermittent hemodialysis on 11/8/24.  Still has PD catheter  [facility].  Previous EDW 98.5 kg - challenged this hospital admission, most recent post hemodialysis weight  92.4 kg from November 30th  Access: R IJ permcath. PD catheter in place.   Last hemodialysis was on 11/30, next hemodialysis treatment on Tuesday 12/3 regular TTS schedule.  Continue UF with HD.  Patient had some abdominal cramping during the last HD.  Continue to challenge target weight as tolerated.  Decrease target weight to 92 kg from prior target weight of 98 kg    Hemodialysis-associated hypotension  BP is at goal, -134 on 11/30.   Rx: Midodrine 15 mg TID. Metoprolol 25 mg BID.  Maintain metoprolol and midodrine at this time.  Patient is on metoprolol for rate control for paroxysmal atrial fibrillation.  Torsemide 60 mg BID stopped on 11/25/24 due to volume depletion.  Would continue to hold Torsemide for right now given ongoing diarrhea but would consider restarting at the time of discharge.    Acute on chronic diastolic HF (heart failure) ([facility])  7/29/24 Echo: EF 75%, G1DD  Was fluid overloaded on admission and this has improved.  His new estimated dry weight is likely 92.0 -92.5 kg.    I think we may have reached his equilibration point currently with regards to his dry weight.  Will need to adjust outpatient target weight on discharge  Anemia due to chronic kidney disease, on chronic dialysis ([facility])  Hemoglobin trended down to 8.6 g/dL today    Epogen being held in setting of COVID-19 infection.    Secondary hyperparathyroidism of renal origin ([facility])  Continue Sensipar 30 mg 3/week.  ^  corrected calcium is  9.9 mg/dl    phosphorus level was found to be low at 1.9 on 12/1 and phosphorus binders were held and given phosphorus replacement, phosphorus level improved to normal range at 2.6 mg/dL today, continue to hold binders for now.  Continue to hold at the time of discharge and monitor as outpatient    Pancytopenia (HCC)  WBC count improved but platelet count is still low at 56.  Hemoglobin dropped to  8.6 g/dL today.  LDH was mildly elevated at 131 peripheral smear showed 1% bands, no schistocytes.  Continue monitoring and management per primary team  Acute respiratory insufficiency  Treatment for COVID-19 per primary team.  There was concern for volume overload which is better now.      Dysuria  Patient states he is experiencing dysuria which started with onset of COVID.  Recent UA unremarkable 11/25.  Repeat UA shows trace ketones negative leukocytes negative nitrite.   Blood culture is negative, continue management per primary team      COVID-19  Treatment of COVID-19 per primary team.  Was tested positive 3 days prior to admission.    Paroxysmal atrial fibrillation (HCC)  Currently on Metoprolol 25 mg BID.     Electrolyte imbalance risk  Hyponatremia  Likely due to free water intake and impaired free water excretion in the setting of ESRD, sodium level improved to normal range at 135 today.  Continue fluid restriction    Hypomagnesemia  Magnesium level improved to 1.8 mg/dL today status post replacement given yesterday, will give another dose of IV magnesium sulfate today  Moderate protein-calorie malnutrition (HCC)  Malnutrition Findings:   Adult Malnutrition type: Chronic illness  Adult Degree of Malnutrition: Malnutrition of moderate degree  Malnutrition Characteristics: Inadequate energy, Weight loss                  360 Statement: malnutrition of moderate degree in setting of chronic illness, evidenced by 7/11/24 263 lbs to 11/21/24 220.8 lbs (16% wt loss x 4 mo), poor po intake/<75%  energy intake compared to estimated needs for > 1 month, treated with diet and supplements    BMI Findings:           Body mass index is 26.64 kg/m².   -Continue management per primary team  Goals of care, counseling/discussion  -Patient asked me about what would happen if dialysis would be stopped, discussed about risk of fluid overload electrolyte imbalance and ultimately death in 2 to 3 weeks.  Discussed that he may have to consider hospice if plan to stop dialysis. He did not want to stop at this time after discussion.   Diarrhea  Continue management per primary team.  Seen by GI.  As per GI note patient was declined colonoscopy and EGD and does not want any invasive testing done.    I have reviewed the nephrology recommendations including management of ESRD on HD with plan for hemodialysis treatment today, with primary team, and we are in agreement with renal plan including the information outlined above. Please contact the SecureChat role for the Nephrology service with any questions/concerns.    Subjective   No new complaints.  No chest pain or shortness of breath, no nausea vomiting.    Objective :  Temp:  [97 °F (36.1 °C)-97.5 °F (36.4 °C)] 97 °F (36.1 °C)  HR:  [59-78] 70  BP: ()/(55-69) 120/55  Resp:  [19-20] 19  SpO2:  [91 %-100 %] 97 %  O2 Device: None (Room air)  Nasal Cannula O2 Flow Rate (L/min):  [0.5 L/min] 0.5 L/min    Current Weight: Weight - Scale: 89.1 kg (196 lb 6.9 oz)  First Weight: Weight - Scale: 100 kg (220 lb 7.4 oz)  I/O         12/01 0701  12/02 0700 12/02 0701  12/03 0700 12/03 0701  12/04 0700    P.O. 60 240 90    I.V. (mL/kg)       IV Piggyback 100      Total Intake(mL/kg) 160 (1.8) 240 (2.7) 90 (1)    Urine (mL/kg/hr) 250 (0.1) 100 (0) 175 (0.5)    Other       Stool       Total Output 250 100 175    Net -90 +140 -85           Unmeasured Urine Occurrence 2 x 1 x     Unmeasured Stool Occurrence 2 x 1 x           Physical Exam   General:  Ill looking, awake.  Eyes: Conjunctivae  pink,  Sclera anicteric  ENT: lips and mucous membranes moist  Neck: supple   Chest: Clear to Auscultation both lungs,  no crackles, ronchus or wheezing.  CVS: S1 & S2 present, normal rate, regular rhythm, no murmur.  Abdomen: soft, non-tender, non-distended, Bowel sounds normoactive.  Has PD catheter  Extremities: Trace bilateral lower extremity edema with overlying skin thickening and hyperpigmentation  Skin: no rash  Neuro: awake, alert, oriented  Psych: Mood and affect appropriate    Medications:    Current Facility-Administered Medications:     acetaminophen (TYLENOL) tablet 650 mg, 650 mg, Oral, Q4H PRN, Chao Rios PA-C    atorvastatin (LIPITOR) tablet 40 mg, 40 mg, Oral, Daily With Dinner, Chao Rios PA-C, 40 mg at 12/02/24 1822    benzonatate (TESSALON PERLES) capsule 100 mg, 100 mg, Oral, TID PRN, Miriam Tee MD, 100 mg at 11/29/24 1352    calcium carbonate (TUMS) chewable tablet 500 mg, 500 mg, Oral, Daily PRN, Moses Noel MD    cinacalcet (SENSIPAR) tablet 60 mg, 60 mg, Oral, Once per day on Monday Wednesday Friday, Jignesh Hussein MD, 60 mg at 12/02/24 0846    docusate sodium (COLACE) capsule 100 mg, 100 mg, Oral, BID PRN, Chao Rios PA-C    finasteride (PROSCAR) tablet 5 mg, 5 mg, Oral, Daily, Chao Rios PA-C, 5 mg at 12/02/24 0846    gentamicin (GARAMYCIN) 0.1 % cream, , Topical, Daily, Jesus Christine MD, Given at 12/03/24 0953    guaiFENesin (MUCINEX) 12 hr tablet 600 mg, 600 mg, Oral, Q12H VASU, Miriam Tee MD, 600 mg at 12/03/24 0746    HYDROcodone-acetaminophen (NORCO) 5-325 mg per tablet 2 tablet, 2 tablet, Oral, Q6H PRN, Chao Rios PA-C, 2 tablet at 12/03/24 0735    insulin lispro (HumALOG/ADMELOG) 100 units/mL subcutaneous injection 1-6 Units, 1-6 Units, Subcutaneous, TID AC, 1 Units at 11/26/24 1236 **AND** Fingerstick Glucose (POCT), , , TID AC, Chao Rios PA-C    levothyroxine tablet 125 mcg, 125 mcg, Oral, Early Morning, Chao Rios PA-C,  "125 mcg at 12/03/24 0750    magnesium sulfate 2 g/50 mL IVPB (premix) 2 g, 2 g, Intravenous, Once, Theresa Butt PA-C, Last Rate: 25 mL/hr at 12/03/24 0952, 2 g at 12/03/24 0952    metoprolol tartrate (LOPRESSOR) tablet 25 mg, 25 mg, Oral, Q12H VASU, Moses Noel MD, 25 mg at 12/03/24 0745    midodrine (PROAMATINE) tablet 15 mg, 15 mg, Oral, TID AC, Citlali S Trudy, CRNP, 15 mg at 12/02/24 1822    nicotine (NICODERM CQ) 7 mg/24hr TD 24 hr patch 7 mg, 7 mg, Transdermal, Daily, Chao Rios PA-C    ondansetron (ZOFRAN) injection 4 mg, 4 mg, Intravenous, Q6H PRN, Chao Rios PA-C, 4 mg at 11/25/24 1839    pyridostigmine (MESTINON) tablet 60 mg, 60 mg, Oral, TID, Chao Rios PA-C, 60 mg at 12/02/24 1822    simethicone (MYLICON) chewable tablet 80 mg, 80 mg, Oral, 4x Daily PRN, Chao Rios PA-C    [Held by provider] warfarin (COUMADIN) tablet 2 mg, 2 mg, Oral, HS, Moses Noel MD, 2 mg at 11/30/24 2315      Lab Results: I have reviewed the following results:  Results from last 7 days   Lab Units 12/03/24  0609 12/02/24  0458 12/01/24  0842 11/30/24  0606 11/29/24  0623 11/28/24  0600 11/27/24  0539   WBC Thousand/uL 5.77 6.24 5.45 5.01 4.76  --  5.93   HEMOGLOBIN g/dL 8.6* 9.1* 9.8* 9.2* 9.6*  --  10.5*   HEMATOCRIT % 27.6* 29.6* 31.7* 29.3* 31.0*  --  33.2*   PLATELETS Thousands/uL 56* 52* 68* 75* 91*  --  114*   POTASSIUM mmol/L 3.5 3.5 3.9 3.8 4.2 3.8 4.2   CHLORIDE mmol/L 102 101 99 99 100 99 99   CO2 mmol/L 28 29 31 27 28 32 31   BUN mg/dL 21 18 15 22 39* 33* 30*   CREATININE mg/dL 2.80* 2.31* 1.86* 2.06* 3.02* 2.78* 2.47*   CALCIUM mg/dL 8.2* 8.7 9.2 8.6 9.0 8.8 9.1   MAGNESIUM mg/dL 1.8* 1.5*  --   --   --   --   --    PHOSPHORUS mg/dL 2.6 3.2 1.9*  --   --   --  2.4   ALBUMIN g/dL  --  2.5* 2.8* 2.5*  --   --   --        Administrative Statements     Portions of the record may have been created with voice recognition software. Occasional wrong word or \"sound a like\" substitutions may have " occurred due to the inherent limitations of voice recognition software. Read the chart carefully and recognize, using context, where substitutions have occurred.If you have any questions, please contact the dictating provider.

## 2024-12-03 NOTE — ASSESSMENT & PLAN NOTE
Patient admitted having significant diarrhea-initially suspected infectious secondary to COVID versus C. difficile carrier status  Started on a course of oral vancomycin and completed-Per infectious disease do not feel that this is related to his diarrhea  Given lack of improvement in diarrhea-GI consulted  Ending further lab workup possible sources of diarrhea  If infectious etiology ruled out can trial Imodium versus Questran  Diarrhea is ongoing because of hypovolemia and for that torsemide is on hold

## 2024-12-03 NOTE — ASSESSMENT & PLAN NOTE
Patient having significant diarrhea-initially suspected secondary to COVID  C. difficile tested and showing positive PCR, EIA negative  CT abdomen pelvis negative for colitis   Started on oral vancomycin on 11/24 - 125 mg every 6 hours  Has had no improvement into diarrhea per patient  Will increase to 500 mg every 6 hours and consult infectious disease  Patient endorses slight improvement in diarrhea since starting increased dose of oral vancomycin

## 2024-12-03 NOTE — PLAN OF CARE
Problem: Prexisting or High Potential for Compromised Skin Integrity  Goal: Skin integrity is maintained or improved  Description: INTERVENTIONS:  - Identify patients at risk for skin breakdown  - Assess and monitor skin integrity  - Assess and monitor nutrition and hydration status  - Monitor labs   - Assess for incontinence   - Turn and reposition patient  - Assist with mobility/ambulation  - Relieve pressure over bony prominences  - Avoid friction and shearing  - Provide appropriate hygiene as needed including keeping skin clean and dry  - Evaluate need for skin moisturizer/barrier cream  - Collaborate with interdisciplinary team   - Patient/family teaching  - Consider wound care consult   Outcome: Progressing     Problem: PAIN - ADULT  Goal: Verbalizes/displays adequate comfort level or baseline comfort level  Description: Interventions:  - Encourage patient to monitor pain and request assistance  - Assess pain using appropriate pain scale  - Administer analgesics based on type and severity of pain and evaluate response  - Implement non-pharmacological measures as appropriate and evaluate response  - Consider cultural and social influences on pain and pain management  - Notify physician/advanced practitioner if interventions unsuccessful or patient reports new pain  Outcome: Progressing     Problem: INFECTION - ADULT  Goal: Absence or prevention of progression during hospitalization  Description: INTERVENTIONS:  - Assess and monitor for signs and symptoms of infection  - Monitor lab/diagnostic results  - Monitor all insertion sites, i.e. indwelling lines, tubes, and drains  - Monitor endotracheal if appropriate and nasal secretions for changes in amount and color  - [place] appropriate cooling/warming therapies per order  - Administer medications as ordered  - Instruct and encourage patient and family to use good hand hygiene technique  - Identify and instruct in appropriate isolation precautions for  identified infection/condition  Outcome: Progressing  Goal: Absence of fever/infection during neutropenic period  Description: INTERVENTIONS:  - Monitor WBC    Outcome: Progressing     Problem: SAFETY ADULT  Goal: Patient will remain free of falls  Description: INTERVENTIONS:  - Educate patient/family on patient safety including physical limitations  - Instruct patient to call for assistance with activity   - Consult OT/PT to assist with strengthening/mobility   - Keep Call bell within reach  - Keep bed low and locked with side rails adjusted as appropriate  - Keep care items and personal belongings within reach  - Initiate and maintain comfort rounds  - Make Fall Risk Sign visible to staff  - Offer Toileting every 2 Hours, in advance of need  - Initiate/Maintain bed/chair alarm  - Obtain necessary fall risk management equipment  - Apply yellow socks and bracelet for high fall risk patients  - Consider moving patient to room near nurses station  Outcome: Progressing  Goal: Maintain or return to baseline ADL function  Description: INTERVENTIONS:  -  Assess patient's ability to carry out ADLs; assess patient's baseline for ADL function and identify physical deficits which impact ability to perform ADLs (bathing, care of mouth/teeth, toileting, grooming, dressing, etc.)  - Assess/evaluate cause of self-care deficits   - Assess range of motion  - Assess patient's mobility; develop plan if impaired  - Assess patient's need for assistive devices and provide as appropriate  - Encourage maximum independence but intervene and supervise when necessary  - Involve family in performance of ADLs  - Assess for home care needs following discharge   - Consider OT consult to assist with ADL evaluation and planning for discharge  - Provide patient education as appropriate  Outcome: Progressing  Goal: Maintains/Returns to pre admission functional level  Description: INTERVENTIONS:  - Perform AM-PAC 6 Click Basic Mobility/ Daily  Activity assessment daily.  - Set and communicate daily mobility goal to care team and patient/family/caregiver.   - Collaborate with rehabilitation services on mobility goals if consulted  - Perform Range of Motion 3 times a day.  - Reposition patient every 2 hours.  - Dangle patient 3 times a day  - Stand patient 3 times a day  - Ambulate patient 3 times a day  - Out of bed to chair 3 times a day   - Out of bed for meals 3 times a day  - Out of bed for toileting  - Record patient progress and toleration of activity level   Outcome: Progressing     Problem: DISCHARGE PLANNING  Goal: Discharge to home or other facility with appropriate resources  Description: INTERVENTIONS:  - Identify barriers to discharge w/patient and caregiver  - Arrange for needed discharge resources and transportation as appropriate  - Identify discharge learning needs (meds, wound care, etc.)  - Arrange for interpretive services to assist at discharge as needed  - Refer to Case Management Department for coordinating discharge planning if the patient needs post-hospital services based on physician/advanced practitioner order or complex needs related to functional status, cognitive ability, or social support system  Outcome: Progressing     Problem: Knowledge Deficit  Goal: Patient/family/caregiver demonstrates understanding of disease process, treatment plan, medications, and discharge instructions  Description: Complete learning assessment and assess knowledge base.  Interventions:  - Provide teaching at level of understanding  - Provide teaching via preferred learning methods  Outcome: Progressing     Problem: CARDIOVASCULAR - ADULT  Goal: Maintains optimal cardiac output and hemodynamic stability  Description: INTERVENTIONS:  - Monitor I/O, vital signs and rhythm  - Monitor for S/S and trends of decreased cardiac output  - Administer and titrate ordered vasoactive medications to optimize hemodynamic stability  - Assess quality of pulses,  skin color and temperature  - Assess for signs of decreased coronary artery perfusion  - Instruct patient to report change in severity of symptoms  Outcome: Progressing  Goal: Absence of cardiac dysrhythmias or at baseline rhythm  Description: INTERVENTIONS:  - Continuous cardiac monitoring, vital signs, obtain 12 lead EKG if ordered  - Administer antiarrhythmic and heart rate control medications as ordered  - Monitor electrolytes and administer replacement therapy as ordered  Outcome: Progressing     Problem: RESPIRATORY - ADULT  Goal: Achieves optimal ventilation and oxygenation  Description: INTERVENTIONS:  - Assess for changes in respiratory status  - Assess for changes in mentation and behavior  - Position to facilitate oxygenation and minimize respiratory effort  - Oxygen administered by appropriate delivery if ordered  - Initiate smoking cessation education as indicated  - Encourage broncho-pulmonary hygiene including cough, deep breathe, Incentive Spirometry  - Assess the need for suctioning and aspirate as needed  - Assess and instruct to report SOB or any respiratory difficulty  - Respiratory Therapy support as indicated  Outcome: Progressing     Problem: METABOLIC, FLUID AND ELECTROLYTES - ADULT  Goal: Electrolytes maintained within normal limits  Description: INTERVENTIONS:  - Monitor labs and assess patient for signs and symptoms of electrolyte imbalances  - Administer electrolyte replacement as ordered  - Monitor response to electrolyte replacements, including repeat lab results as appropriate  - Instruct patient on fluid and nutrition as appropriate  Outcome: Progressing  Goal: Fluid balance maintained  Description: INTERVENTIONS:  - Monitor labs   - Monitor I/O and WT  - Instruct patient on fluid and nutrition as appropriate  - Assess for signs & symptoms of volume excess or deficit  Outcome: Progressing  Goal: Glucose maintained within target range  Description: INTERVENTIONS:  - Monitor Blood  Glucose as ordered  - Assess for signs and symptoms of hyperglycemia and hypoglycemia  - Administer ordered medications to maintain glucose within target range  - Assess nutritional intake and initiate nutrition service referral as needed  Outcome: Progressing     Problem: Nutrition/Hydration-ADULT  Goal: Nutrient/Hydration intake appropriate for improving, restoring or maintaining nutritional needs  Description: Monitor and assess patient's nutrition/hydration status for malnutrition. Collaborate with interdisciplinary team and initiate plan and interventions as ordered.  Monitor patient's weight and dietary intake as ordered or per policy. Utilize nutrition screening tool and intervene as necessary. Determine patient's food preferences and provide high-protein, high-caloric foods as appropriate.     INTERVENTIONS:  - Monitor oral intake, urinary output, labs, and treatment plans  - Assess nutrition and hydration status and recommend course of action  - Evaluate amount of meals eaten  - Assist patient with eating if necessary   - Allow adequate time for meals  - Recommend/ encourage appropriate diets, oral nutritional supplements, and vitamin/mineral supplements  - Order, calculate, and assess calorie counts as needed  - Recommend, monitor, and adjust tube feedings and TPN/PPN based on assessed needs  - Assess need for intravenous fluids  - Provide specific nutrition/hydration education as appropriate  - Include patient/family/caregiver in decisions related to nutrition  Outcome: Progressing

## 2024-12-03 NOTE — PLAN OF CARE
Target UF Goal 2 L as tolerated. Patient dialyzing for 3 hours on 4 K bath for serum K of  3.5  per protocol. Treatment plan reviewed with Nephrology.       Post-Dialysis RN Treatment Note    Blood Pressure:  Pre 123/71 mm/Hg  Post 112/62 mmHg   EDW  92 kg    Weight:  Pre 89.2 kg   Post 88.0 kg   Mode of weight measurement: Bed Scale   Volume Removed  1200 ml    Treatment duration 3 hours   NS given  No    Treatment shortened? No   Medications given during Rx None Reported   Estimated Kt/V  1.14   Access type: Permacath/TDC   Access Issues: Yes, describe: ongoing arterial spasms, not relieved with flushing lumen; had to decrease BFR to 300 by end of tx    Needle Gauge: N/A   Report called to primary nurse   Yes, Ghada RN at bedside        Problem: METABOLIC, FLUID AND ELECTROLYTES - ADULT  Goal: Electrolytes maintained within normal limits  Description: INTERVENTIONS:  - Monitor labs and assess patient for signs and symptoms of electrolyte imbalances  - Administer electrolyte replacement as ordered  - Monitor response to electrolyte replacements, including repeat lab results as appropriate  - Instruct patient on fluid and nutrition as appropriate  Outcome: Progressing  Goal: Fluid balance maintained  Description: INTERVENTIONS:  - Monitor labs   - Monitor I/O and WT  - Instruct patient on fluid and nutrition as appropriate  - Assess for signs & symptoms of volume excess or deficit  Outcome: Progressing

## 2024-12-03 NOTE — PROGRESS NOTES
Progress Note - Palliative Care   Name: Masoud Perry 69 y.o. male I MRN: [id number]  Unit/Bed#: -01 I Date of Admission: 11/20/2024   Date of Service: 12/3/2024 I Hospital Day: 13      VIRTUAL CARE DOCUMENTATION:     1. This service was provided via Telemedicine using Teams Virtual Rounding      2. Parties in the room with patient during teleconsult Patient only    3. Confidentiality My office door was closed     4. Participants No one else was in the room    5. Patient acknowledged consent and understanding of privacy and security of the  Telemedicine consult. I informed the patient that I have reviewed their record in Epic and presented the opportunity for them to ask any questions regarding the visit today.  The patient agreed to participate.    6. Time spent 30 min      Assessment & Plan  Goals of care, counseling/discussion  Decisional apparatus:  Patient is competent on exam today. He has full insight and shows good understanding of medical diagnoses, treatment options and prognosis  If competency is lost, patient's substitute decision maker would default to adult children by [place] 169.  Advance Directive / Living Will / POLST / POA Forms: None on file    Goals  Level 3 code status.   Disease focused care.   DNR/DNI limits placed.   Reviewed hospice care upon patient's request at length today.   Patient strongly considering comfort care/hospice as he feels that life-prolonging measures are only prolonging his suffering.   Discussed hospice at SNF vs. Comfort care in the hospital. Patient is unsure and would like to consider further and discuss with his family.   Agreeable with hospice consult. Consult placed and CM notified.   Would like SNF placement following hospitalization. Does not feel he would be safe or have enough home support at this time.   Palliative care by specialist  Active issues specifically addressed today include:  Acute respiratory failure  COVID-19 infection  ESRD  HFpEF  Poor  appetite  Moderate PCM  Depression  Diarrhea  Goals of care discussion  Palliative care encounter    Social support  Patient is supported by his son and daughter  Supportive listening provided  Normalized experience of patient/family  Provided anxiety containment  Provided anticipatory guidance    Follow up  Palliative Care will continue to follow and goals of care discussions will be ongoing.   Please reach out to on-call provider via Epic Secure Chat  if questions or concerns arise.  Please do not hesitate to reach our on call provider through our clinic answering service at [phone] should you have acute symptom control concerns.      Subjective   Pt requesting further discussion regarding hospice today. States he has healthcare fatigue and is tired of being told what he needs to do. States feeling tired overall of his health issues. He feels that he is suffering each day physically and emotionally. Had extensive discussion regarding hospice and comoft care. Patient is considering but needs more time to think about it and discuss with family. Agreeable to hospice consult. Would not want to return home and prefers SNF placement upon discharge.       Objective :  Temp:  [97 °F (36.1 °C)-97.5 °F (36.4 °C)] 97 °F (36.1 °C)  HR:  [59-78] 70  BP: ()/(55-69) 120/55  Resp:  [19-20] 19  SpO2:  [91 %-100 %] 97 %  O2 Device: None (Room air)  Nasal Cannula O2 Flow Rate (L/min):  [0.5 L/min] 0.5 L/min    Physical Exam  Vitals reviewed.   Constitutional:       General: He is not in acute distress.  HENT:      Head: Normocephalic and atraumatic.   Pulmonary:      Effort: No respiratory distress.   Neurological:      Mental Status: He is alert and oriented to person, place, and time.   Psychiatric:         Mood and Affect: Mood normal.         Behavior: Behavior normal.           Lab Results: I have reviewed the following results:  Lab Results   Component Value Date/Time    SODIUM 135 12/03/2024 06:09 AM    SODIUM 135  11/13/2024 05:47 AM    K 3.5 12/03/2024 06:09 AM    K 3.8 11/13/2024 05:47 AM    BUN 21 12/03/2024 06:09 AM    BUN 10 11/13/2024 05:47 AM    CREATININE 2.80 (H) 12/03/2024 06:09 AM    CREATININE 2.95 (H) 11/13/2024 05:47 AM    GLUC 63 (L) 12/03/2024 06:09 AM    GLUC 73 11/13/2024 05:47 AM    CALCIUM 8.2 (L) 12/03/2024 06:09 AM    CALCIUM 8.8 11/13/2024 05:47 AM    AST 11 (L) 12/02/2024 04:58 AM    AST 14 11/02/2024 05:51 AM    ALT 5 (L) 12/02/2024 04:58 AM    ALT 9 11/02/2024 05:51 AM    ALB 2.5 (L) 12/02/2024 04:58 AM    ALB 2.0 (L) 11/13/2024 05:47 AM    TP 4.3 (L) 12/02/2024 04:58 AM    TP 4.7 (L) 11/02/2024 05:51 AM    EGFR 21 12/03/2024 06:09 AM    EGFR 22 (L) 11/13/2024 05:47 AM    EGFR 12 (L) 04/29/2024 10:55 AM    EGFR 16 (L) 06/11/2020 05:50 AM     Lab Results   Component Value Date/Time    HGB 8.6 (L) 12/03/2024 06:09 AM    WBC 5.77 12/03/2024 06:09 AM    PLT 56 (L) 12/03/2024 06:09 AM    INR 2.56 (H) 12/03/2024 06:09 AM    INR 4.5 11/13/2024 05:47 AM    PTT 65 (H) 11/20/2024 09:33 PM     Lab Results   Component Value Date/Time    CKR5LMEGUXMJ 12.236 (H) 07/30/2024 06:48 AM     Administrative Statements   I have spent a total time of 45 minutes in caring for this patient on the day of the visit/encounter including Prognosis, Risks and benefits of tx options, Patient and family education, Counseling / Coordination of care, Documenting in the medical record, Reviewing / ordering tests, medicine, procedures  , Obtaining or reviewing history  , and Communicating with other healthcare professionals . Topics discussed with the patient / family include psychosocial support, goals of care, hospice services, supportive listening, and anticipatory guidance. Cased discussed with GIA CARO RN.

## 2024-12-03 NOTE — ASSESSMENT & PLAN NOTE
Magnesium level improved to 1.8 mg/dL today status post replacement given yesterday, will give another dose of IV magnesium sulfate today

## 2024-12-03 NOTE — ASSESSMENT & PLAN NOTE
Lab Results   Component Value Date    EGFR 21 12/03/2024    EGFR 27 12/02/2024    EGFR 35 12/01/2024    CREATININE 2.80 (H) 12/03/2024    CREATININE 2.31 (H) 12/02/2024    CREATININE 1.86 (H) 12/01/2024   Typical regimen Tuesday Thursday Saturday  Nephrology following  Resume prior schedule

## 2024-12-03 NOTE — ASSESSMENT & PLAN NOTE
Multifactorial in origin, continue to monitor  Platelet count has been steadily decreasing with unclear etiology  Currently no signs of bleeding  Previously has supratherapeutic INR  Currently is subtherapeutic, continue to monitor INR and platelets and for any bleeding  Hold Coumadin dosing  Platelets low but stable at 52-56    Lab Results   Component Value Date    WBC 5.77 12/03/2024    RBC 2.71 (L) 12/03/2024    PLT 56 (L) 12/03/2024    PLT 52 (L) 12/02/2024

## 2024-12-03 NOTE — ASSESSMENT & PLAN NOTE
He was on PD but was changed to back up iHD during admission to [facility] in November 2024. Started intermittent hemodialysis on 11/8/24.  Still has PD catheter  [facility].  Previous EDW 98.5 kg - challenged this hospital admission, most recent post hemodialysis weight  92.4 kg from November 30th  Access: R IJ permcath. PD catheter in place.   Last hemodialysis was on 11/30, next hemodialysis treatment on Tuesday 12/3 regular TTS schedule.  Continue UF with HD.  Patient had some abdominal cramping during the last HD.  Continue to challenge target weight as tolerated.  Decrease target weight to 92 kg from prior target weight of 98 kg

## 2024-12-03 NOTE — ASSESSMENT & PLAN NOTE
WBC count improved but platelet count is still low at 56.  Hemoglobin dropped to  8.6 g/dL today.  LDH was mildly elevated at 131 peripheral smear showed 1% bands, no schistocytes.  Continue monitoring and management per primary team

## 2024-12-03 NOTE — ASSESSMENT & PLAN NOTE
Plts markedly low with anemia of ESRD, LFTs not elevated.   -will continue to follow but consider hematology evaluation if this continues to drop    Of note- pt wishes to speak to palliative about going on hospice. Message sent to primary team

## 2024-12-03 NOTE — ASSESSMENT & PLAN NOTE
Patient is maintained on Coumadin PTA  INR supratherapeutic at time of presentation and was placed on hold  INR trending upwards and dialysis was delayed for a few days so vitamin K was administered at 2.5 mg  Patient now with subtherapeutic INR  Continue to monitor while administering low-dose Coumadin -on hold due to rapid increase in INR  Also monitor platelets  Lab Results   Component Value Date    INR 2.56 (H) 12/03/2024    INR 1.95 (H) 12/02/2024    INR 1.29 (H) 12/01/2024

## 2024-12-03 NOTE — ASSESSMENT & PLAN NOTE
Decisional apparatus:  Patient is competent on exam today. He has full insight and shows good understanding of medical diagnoses, treatment options and prognosis  If competency is lost, patient's substitute decision maker would default to adult children by [place].  Advance Directive / Living Will / POLST / POA Forms: None on file    Goals  Level 3 code status.   Disease focused care.   DNR/DNI limits placed.   Reviewed hospice care upon patient's request at length today.   Patient strongly considering comfort care/hospice as he feels that life-prolonging measures are only prolonging his suffering.   Discussed hospice at SNF vs. Comfort care in the hospital. Patient is unsure and would like to consider further and discuss with his family.   Agreeable with hospice consult. Consult placed and CM notified.   Would like SNF placement following hospitalization. Does not feel he would be safe or have enough home support at this time.

## 2024-12-03 NOTE — ASSESSMENT & PLAN NOTE
Continue management per primary team.  Seen by GI.  As per GI note patient was declined colonoscopy and EGD and does not want any invasive testing done.

## 2024-12-03 NOTE — ASSESSMENT & PLAN NOTE
7/29/24 Echo: EF 75%, G1DD  Was fluid overloaded on admission and this has improved.  His new estimated dry weight is likely 92.0 -92.5 kg.    I think we may have reached his equilibration point currently with regards to his dry weight.  Will need to adjust outpatient target weight on discharge

## 2024-12-03 NOTE — ASSESSMENT & PLAN NOTE
Presents from [place]. Tested positive 3 days prior to admission when symptoms started  Positive in our records on 11/20  CT chest negative for any pathology  Started decadron and remdesevir, doxycycline  Has completed therapy with all 3  Was on Coumadin for anticoagulation/DVT prophylaxis  Patient also having diarrhea, and C. difficile showing carrier state -see under diarrhea of infectious origin for further treatment plan  Currently 0.5 L nasal cannula/room air - fluctuating   Has completed quarantine for COVID-no longer needs airborne precautions

## 2024-12-03 NOTE — ASSESSMENT & PLAN NOTE
Patient and I had discussion today about his goals of care moving forward  He endorses that he has been feeling depressed and frustrated with his continued decline in health  He says that sometimes he wishes that he could just go home to pass away and be comfortable  He shares that he is not disagreeable for treatment but sometimes feels as though he is being tortured  Pain discussed possibilities of psychiatry consult for depression/trying recommendations made by prior psychiatrist for [place]  At this time patient declines  Palliative care consulted  12/2 at this time declines any additional medications.  States that he is frustrated with continued hospitalization though is agreeable to this and agreeable to discharge to rehab.  Wants to continue treatment focused care without invasive measures  12/3 requesting to reconsult with palliative care -patient expressing wishes to rediscuss hospice  Ongoing goals of care discussion

## 2024-12-03 NOTE — ASSESSMENT & PLAN NOTE
Nocturnal BMs points towards an infectious vs inflammatory vs malabsorptive etiology. C diff colonized but not responding to vanco    -awaiting labs  -avoid excessive sugar intake as well as artificial sweeteners.  -offered colonoscopy and EGD- pt declined. Does not want invasive testing done. Would recommend waiting until COVID symptoms resolve prior to endoscopy  -when infection is ruled out, we could try antidiarrheals. Questran may be a safer option in the setting of C diff colonization compared to imodium or other similar agents. Offered these to him at this time but he declined both- does not want to try a powder like questran, does not want to take a large pill like colestipol

## 2024-12-03 NOTE — ASSESSMENT & PLAN NOTE
-Patient asked me about what would happen if dialysis would be stopped, discussed about risk of fluid overload electrolyte imbalance and ultimately death in 2 to 3 weeks.  Discussed that he may have to consider hospice if plan to stop dialysis. He did not want to stop at this time after discussion.

## 2024-12-04 PROBLEM — E83.39 HYPERPHOSPHATEMIA: Status: ACTIVE | Noted: 2024-12-04

## 2024-12-04 PROBLEM — E43 SEVERE PROTEIN-CALORIE MALNUTRITION (HCC): Status: ACTIVE | Noted: 2024-12-04

## 2024-12-04 PROBLEM — E83.42 HYPOMAGNESEMIA: Status: RESOLVED | Noted: 2024-12-02 | Resolved: 2024-12-04

## 2024-12-04 LAB
ANION GAP SERPL CALCULATED.3IONS-SCNC: 5 MMOL/L (ref 4–13)
BUN SERPL-MCNC: 11 MG/DL (ref 5–25)
CALCIUM SERPL-MCNC: 7.8 MG/DL (ref 8.4–10.2)
CALPROTECTIN STL-MCNC: 31.5 ÂΜG/G
CHLORIDE SERPL-SCNC: 100 MMOL/L (ref 96–108)
CO2 SERPL-SCNC: 30 MMOL/L (ref 21–32)
CREAT SERPL-MCNC: 1.93 MG/DL (ref 0.6–1.3)
ELASTASE PANC STL-MCNT: 172 UG/G
ERYTHROCYTE [DISTWIDTH] IN BLOOD BY AUTOMATED COUNT: 14.3 % (ref 11.6–15.1)
GFR SERPL CREATININE-BSD FRML MDRD: 34 ML/MIN/1.73SQ M
GLUCOSE SERPL-MCNC: 102 MG/DL (ref 65–140)
GLUCOSE SERPL-MCNC: 67 MG/DL (ref 65–140)
GLUCOSE SERPL-MCNC: 69 MG/DL (ref 65–140)
GLUCOSE SERPL-MCNC: 70 MG/DL (ref 65–140)
GLUCOSE SERPL-MCNC: 73 MG/DL (ref 65–140)
GLUCOSE SERPL-MCNC: 80 MG/DL (ref 65–140)
GLUCOSE SERPL-MCNC: 88 MG/DL (ref 65–140)
GLUCOSE SERPL-MCNC: 93 MG/DL (ref 65–140)
H PYLORI AG STL QL IA: NEGATIVE
HCT VFR BLD AUTO: 26.7 % (ref 36.5–49.3)
HGB BLD-MCNC: 8.5 G/DL (ref 12–17)
INR PPP: 2.52 (ref 0.85–1.19)
MAGNESIUM SERPL-MCNC: 2.1 MG/DL (ref 1.9–2.7)
MCH RBC QN AUTO: 32.1 PG (ref 26.8–34.3)
MCHC RBC AUTO-ENTMCNC: 31.8 G/DL (ref 31.4–37.4)
MCV RBC AUTO: 101 FL (ref 82–98)
PHOSPHATE SERPL-MCNC: 1.6 MG/DL (ref 2.3–4.1)
PLATELET # BLD AUTO: 62 THOUSANDS/UL (ref 149–390)
PMV BLD AUTO: 9.5 FL (ref 8.9–12.7)
POTASSIUM SERPL-SCNC: 3.4 MMOL/L (ref 3.5–5.3)
PROTHROMBIN TIME: 27.2 SECONDS (ref 12.3–15)
RBC # BLD AUTO: 2.65 MILLION/UL (ref 3.88–5.62)
SODIUM SERPL-SCNC: 135 MMOL/L (ref 135–147)
VIBRIO STL CULT: NORMAL
WBC # BLD AUTO: 6.31 THOUSAND/UL (ref 4.31–10.16)
YERSINIA STL CULT: NORMAL

## 2024-12-04 PROCEDURE — 99232 SBSQ HOSP IP/OBS MODERATE 35: CPT | Performed by: INTERNAL MEDICINE

## 2024-12-04 PROCEDURE — 85610 PROTHROMBIN TIME: CPT

## 2024-12-04 PROCEDURE — 84100 ASSAY OF PHOSPHORUS: CPT

## 2024-12-04 PROCEDURE — 80048 BASIC METABOLIC PNL TOTAL CA: CPT

## 2024-12-04 PROCEDURE — 85027 COMPLETE CBC AUTOMATED: CPT

## 2024-12-04 PROCEDURE — 83735 ASSAY OF MAGNESIUM: CPT

## 2024-12-04 PROCEDURE — 99232 SBSQ HOSP IP/OBS MODERATE 35: CPT

## 2024-12-04 PROCEDURE — 82948 REAGENT STRIP/BLOOD GLUCOSE: CPT

## 2024-12-04 RX ORDER — DEXTROSE MONOHYDRATE 25 G/50ML
25 INJECTION, SOLUTION INTRAVENOUS ONCE
Status: COMPLETED | OUTPATIENT
Start: 2024-12-04 | End: 2024-12-04

## 2024-12-04 RX ORDER — TORSEMIDE 20 MG/1
40 TABLET ORAL DAILY
Status: DISCONTINUED | OUTPATIENT
Start: 2024-12-04 | End: 2024-12-07

## 2024-12-04 RX ADMIN — LEVOTHYROXINE SODIUM 125 MCG: 125 TABLET ORAL at 06:09

## 2024-12-04 RX ADMIN — PYRIDOSTIGMINE BROMIDE 60 MG: 60 TABLET ORAL at 18:34

## 2024-12-04 RX ADMIN — CINACALCET 60 MG: 30 TABLET, FILM COATED ORAL at 11:40

## 2024-12-04 RX ADMIN — TORSEMIDE 40 MG: 20 TABLET ORAL at 11:40

## 2024-12-04 RX ADMIN — HYDROCODONE BITARTRATE AND ACETAMINOPHEN 2 TABLET: 5; 325 TABLET ORAL at 11:45

## 2024-12-04 RX ADMIN — MIDODRINE HYDROCHLORIDE 15 MG: 5 TABLET ORAL at 11:40

## 2024-12-04 RX ADMIN — METOPROLOL TARTRATE 25 MG: 25 TABLET, FILM COATED ORAL at 11:40

## 2024-12-04 RX ADMIN — GUAIFENESIN 600 MG: 600 TABLET ORAL at 11:40

## 2024-12-04 RX ADMIN — MIDODRINE HYDROCHLORIDE 15 MG: 5 TABLET ORAL at 18:34

## 2024-12-04 RX ADMIN — ATORVASTATIN CALCIUM 40 MG: 40 TABLET, FILM COATED ORAL at 18:34

## 2024-12-04 RX ADMIN — HYDROCODONE BITARTRATE AND ACETAMINOPHEN 2 TABLET: 5; 325 TABLET ORAL at 18:40

## 2024-12-04 RX ADMIN — DEXTROSE MONOHYDRATE 25 ML: 25 INJECTION, SOLUTION INTRAVENOUS at 08:31

## 2024-12-04 RX ADMIN — MIDODRINE HYDROCHLORIDE 15 MG: 5 TABLET ORAL at 06:09

## 2024-12-04 NOTE — ASSESSMENT & PLAN NOTE
Lab Results   Component Value Date    HGBA1C 5.0 11/02/2024       Recent Labs     12/03/24  2226 12/04/24  0723 12/04/24  0915 12/04/24  1137   POCGLU 99 69 102 88       Blood Sugar Average: Last 72 hrs:  (P) [id number]  Sliding scale insulin. Hypoglycemia protocol  Patient with recurrent hypoglycemia secondary to poor oral intake  Today requiring amp of dextrose  We will continue to discuss patient's appetite with him and possible addition of appetite stimulants

## 2024-12-04 NOTE — CASE MANAGEMENT
Case Management Discharge Planning Note    Patient name Masoud VERGARA [facility]  Location /-01 MRN [id number]  : 1955 Date 2024       Current Admission Date: 2024  Current Admission Diagnosis:Acute respiratory insufficiency   Patient Active Problem List    Diagnosis Date Noted Date Diagnosed    Palliative care by specialist 2024     Hypomagnesemia 2024     Diarrhea 2024     Epigastric pain 2024     Dysuria 2024     Diarrhea of infectious origin 2024     Electrolyte imbalance risk 2024     Acute respiratory insufficiency 2024     ESRD (end stage renal disease) on dialysis ([facility]) 2024     Anemia due to chronic kidney disease, on chronic dialysis ([facility]) 2024     Personal history of gout 2024     COVID-19 2024     Stasis ulcer ([facility]) 10/02/2024     Diabetic ulcer of right great toe ([facility]) 2024     Hematoma 2024     Edema 2024     Goals of care, counseling/discussion 2024     Hyponatremia 2024     Cellulitis 2024     Moderate protein-calorie malnutrition (HCC) 2024     Ambulatory dysfunction 09/15/2024     Lactic acidosis 2024     Hypercalcemia 2024     Chronic acquired lymphedema 2024     Hemodialysis-associated hypotension 2024     Nocturnal hypoxia 2024     Elevated LFTs 2024     Patient on peritoneal dialysis (HCC) 2024     Hypervolemia 2024     Bacteremia 2024     Cellulitis of right lower extremity 2024     Paroxysmal atrial fibrillation ([facility]) 2024     Bilateral lower extremity edema 2023     Ureteral stone with hydronephrosis 2022     Cutaneous abscess of buttock 2022     Chronic kidney disease-mineral and bone disorder 2022     Type 2 diabetes mellitus with stage 4 chronic kidney disease, with long-term current use of insulin (HCC) 07/15/2022     Obesity, morbid (HCC) 07/15/2022     Secondary  hyperparathyroidism of renal origin ([facility]) 07/15/2022     Stage 5 chronic kidney disease on peritoneal dialysis ([facility]) 07/15/2022     Factor 5 Leiden mutation, heterozygous ([facility]) 01/15/2022     Thrombocytopenia ([facility]) 01/14/2022     Angina at rest ([facility]) 01/14/2022     MI (myocardial infarction) ([facility]) 01/14/2022     History of PTCA 01/14/2022     Sleep apnea 01/14/2022     Smoking 01/14/2022     Pancytopenia ([facility]) 01/13/2022     History of CVA (cerebrovascular accident) 01/12/2022     Acute on chronic diastolic HF (heart failure) ([facility]) 01/12/2022     HLD (hyperlipidemia) 01/12/2022     Lymphedema 01/12/2022     Acquired hypothyroidism 01/12/2022     COPD (chronic obstructive pulmonary disease) ([facility]) 12/31/2018     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 ([facility]) 06/29/2016     Gastroesophageal reflux disease 06/29/2016     Gout 06/29/2016     Personal history of pulmonary embolism 06/29/2016     ED (erectile dysfunction) of organic origin 01/14/2014     History of thromboembolism of vein 01/10/2013       LOS (days): 14  Geometric Mean LOS (GMLOS) (days): 4.6  Days to GMLOS:-8.9     OBJECTIVE:  Risk of Unplanned Readmission Score: 52.67         Current admission status: Inpatient   Preferred Pharmacy:   [facility] - [place], [place] - [address]  [address] [place] [address]  Phone: [phone] Fax: [phone]    [place], [place] - [address] [place] [address]  Phone: [phone] Fax: [phone]    PATIENT/FAMILY REPORTS NO PREFERRED PHARMACY  No address on file      Primary Care Provider: Jignesh Baker DO    Primary Insurance: [facility] REP  Secondary Insurance:     DISCHARGE DETAILS:     Josselyn nurse with [facility] at [facility] to review hospice options with patient. Per josselyn with [facility], she completed questions and answers regarding hospice with patient. Pt declined a hospice evaluation, he wants to  "think about it before making any decisions regarding moving forward with hospice.              discussed hospice with patient, he is still undecided about hospice, patient stated he \"wants to keep everything the same, right now\". Discussed with patient when medically cleared for discharge, pt can return to [place] and if he decides he wants hospice services, [place] can make a referral at that time. Patient verbalized understanding.       Prior insurance auth for STR, initiated through  discharge support, pending approval. [place] updated.                                                                                                          "

## 2024-12-04 NOTE — ASSESSMENT & PLAN NOTE
Malnutrition Findings:   Adult Malnutrition type: Chronic illness  Adult Degree of Malnutrition: Malnutrition of moderate degree  Malnutrition Characteristics: Inadequate energy, Weight loss                  360 Statement: malnutrition of moderate degree in setting of chronic illness, evidenced by 7/11/24 263 lbs to 11/21/24 220.8 lbs (16% wt loss x 4 mo), poor po intake/<75% energy intake compared to estimated needs for > 1 month, treated with diet and supplements    BMI Findings:           Body mass index is 26.61 kg/m².   Appreciate nutrition assessment  Patient has declined at this time for addition of Remeron or Megace for appetite stimulation  Will start calorie count  Having hypoglycemia secondary to poor oral intake

## 2024-12-04 NOTE — ASSESSMENT & PLAN NOTE
Malnutrition Findings:   Adult Malnutrition type: Acute illness  Adult Degree of Malnutrition: Other severe protein calorie malnutrition  Malnutrition Characteristics: Inadequate energy, Weight loss                  360 Statement: Acute on chronic severe malnutrition related to refusal for po intake, poor appetite, condition as evidenced by 10.9% weight loss x 14 days (11/20 220lb, 12/3 196lb); < 50% estimated energy intake > 5 days. Treated with: Recommend continued GOC discussion. Continue with daily weights. Can consider oral supplements if pt willing to take po intake though refusing po intake. Consider appetite stimulant if medically appropriate/pt agreeable though pt previously refused. Consider EN feeds if within GOC.    BMI Findings:           Body mass index is 26.61 kg/m².   Continues to decline appetite stimulant  Would decline tube feeds or enteric feedings or any invasive measures  Continue goals of care discussion

## 2024-12-04 NOTE — PROGRESS NOTES
Calorie count ordered. Pt continues with 0% meal completions for all meals. Pt with low BG this AM, per nurse pt refused to drink juice to bring BG up. Pt meets criteria for acute severe malnutrition on top of chronic malnutrition, see malnutrition note. Will d/c calorie count.

## 2024-12-04 NOTE — ASSESSMENT & PLAN NOTE
Patient is maintained on Coumadin PTA  INR supratherapeutic at time of presentation and was placed on hold  INR trending upwards and dialysis was delayed for a few days so vitamin K was administered at 2.5 mg  Patient now with subtherapeutic INR  Continue to monitor while administering low-dose Coumadin -on hold due to rapid increase in INR  Also monitor platelets  Lab Results   Component Value Date    INR 2.52 (H) 12/04/2024    INR 2.56 (H) 12/03/2024    INR 1.95 (H) 12/02/2024

## 2024-12-04 NOTE — ASSESSMENT & PLAN NOTE
- On 12/3/2024: Patient asked me about what would happen if dialysis would be stopped, discussed about risk of fluid overload electrolyte imbalance and ultimately death in 2 to 3 weeks.  Discussed that he may have to consider hospice if plan to stop dialysis. He did not want to stop at this time after discussion.

## 2024-12-04 NOTE — ASSESSMENT & PLAN NOTE
He was on PD but was changed to back up iHD during admission to [facility] in November 2024. Started intermittent hemodialysis on 11/8/24.  Still has PD catheter  Ramana DURON.  Previous EDW 98.5 kg - challenged this hospital admission, most recent post hemodialysis weight  92.4 kg from November 30th  Access: R IJ permcatsherlyn. PD catheter in place.   Decrease target weight to 92 kg from prior target weight of 98 kg this hospital admission but after hemodialysis treatment on 12/3, postdialysis weight was 88 kg.  May need to lower target weight further depending on predialysis weight tomorrow.

## 2024-12-04 NOTE — QUICK NOTE
Patient's blood glucose 69. Patient offered cranberry juice and refusing that along with meds, meal, and assessment. States he wants to be left alone. Provider made aware and dextrose 50% ordered for administration.

## 2024-12-04 NOTE — ASSESSMENT & PLAN NOTE
WBC count improved but platelet count is still low at  62  Hemoglobin dropped to  8.6 g/dL today.  LDH was mildly elevated at 131 peripheral smear showed 1% bands, no schistocytes.  Continue monitoring and management per primary team

## 2024-12-04 NOTE — ASSESSMENT & PLAN NOTE
Malnutrition Findings:   Adult Malnutrition type: Chronic illness  Adult Degree of Malnutrition: Malnutrition of moderate degree  Malnutrition Characteristics: Inadequate energy, Weight loss                  360 Statement: malnutrition of moderate degree in setting of chronic illness, evidenced by 7/11/24 263 lbs to 11/21/24 220.8 lbs (16% wt loss x 4 mo), poor po intake/<75% energy intake compared to estimated needs for > 1 month, treated with diet and supplements    BMI Findings:           Body mass index is 26.61 kg/m².   -Continue management per primary team

## 2024-12-04 NOTE — PLAN OF CARE
Problem: Prexisting or High Potential for Compromised Skin Integrity  Goal: Skin integrity is maintained or improved  Description: INTERVENTIONS:  - Identify patients at risk for skin breakdown  - Assess and monitor skin integrity  - Assess and monitor nutrition and hydration status  - Monitor labs   - Assess for incontinence   - Turn and reposition patient  - Assist with mobility/ambulation  - Relieve pressure over bony prominences  - Avoid friction and shearing  - Provide appropriate hygiene as needed including keeping skin clean and dry  - Evaluate need for skin moisturizer/barrier cream  - Collaborate with interdisciplinary team   - Patient/family teaching  - Consider wound care consult   Outcome: Progressing     Problem: PAIN - ADULT  Goal: Verbalizes/displays adequate comfort level or baseline comfort level  Description: Interventions:  - Encourage patient to monitor pain and request assistance  - Assess pain using appropriate pain scale  - Administer analgesics based on type and severity of pain and evaluate response  - Implement non-pharmacological measures as appropriate and evaluate response  - Consider cultural and social influences on pain and pain management  - Notify physician/advanced practitioner if interventions unsuccessful or patient reports new pain  Outcome: Progressing     Problem: INFECTION - ADULT  Goal: Absence or prevention of progression during hospitalization  Description: INTERVENTIONS:  - Assess and monitor for signs and symptoms of infection  - Monitor lab/diagnostic results  - Monitor all insertion sites, i.e. indwelling lines, tubes, and drains  - Monitor endotracheal if appropriate and nasal secretions for changes in amount and color  - Dawson appropriate cooling/warming therapies per order  - Administer medications as ordered  - Instruct and encourage patient and family to use good hand hygiene technique  - Identify and instruct in appropriate isolation precautions for  identified infection/condition  Outcome: Progressing  Goal: Absence of fever/infection during neutropenic period  Description: INTERVENTIONS:  - Monitor WBC    Outcome: Progressing     Problem: SAFETY ADULT  Goal: Patient will remain free of falls  Description: INTERVENTIONS:  - Educate patient/family on patient safety including physical limitations  - Instruct patient to call for assistance with activity   - Consult OT/PT to assist with strengthening/mobility   - Keep Call bell within reach  - Keep bed low and locked with side rails adjusted as appropriate  - Keep care items and personal belongings within reach  - Initiate and maintain comfort rounds  - Make Fall Risk Sign visible to staff  - Offer Toileting every 2 Hours, in advance of need  - Initiate/Maintain bed/chair alarm  - Obtain necessary fall risk management equipment  - Apply yellow socks and bracelet for high fall risk patients  - Consider moving patient to room near nurses station  Outcome: Progressing  Goal: Maintain or return to baseline ADL function  Description: INTERVENTIONS:  -  Assess patient's ability to carry out ADLs; assess patient's baseline for ADL function and identify physical deficits which impact ability to perform ADLs (bathing, care of mouth/teeth, toileting, grooming, dressing, etc.)  - Assess/evaluate cause of self-care deficits   - Assess range of motion  - Assess patient's mobility; develop plan if impaired  - Assess patient's need for assistive devices and provide as appropriate  - Encourage maximum independence but intervene and supervise when necessary  - Involve family in performance of ADLs  - Assess for home care needs following discharge   - Consider OT consult to assist with ADL evaluation and planning for discharge  - Provide patient education as appropriate  Outcome: Progressing  Goal: Maintains/Returns to pre admission functional level  Description: INTERVENTIONS:  - Perform AM-PAC 6 Click Basic Mobility/ Daily  Activity assessment daily.  - Set and communicate daily mobility goal to care team and patient/family/caregiver.   - Collaborate with rehabilitation services on mobility goals if consulted  - Perform Range of Motion 3 times a day.  - Reposition patient every 2 hours.  - Dangle patient 3 times a day  - Stand patient 3 times a day  - Ambulate patient 3 times a day  - Out of bed to chair 3 times a day   - Out of bed for meals 3 times a day  - Out of bed for toileting  - Record patient progress and toleration of activity level   Outcome: Progressing     Problem: DISCHARGE PLANNING  Goal: Discharge to home or other facility with appropriate resources  Description: INTERVENTIONS:  - Identify barriers to discharge w/patient and caregiver  - Arrange for needed discharge resources and transportation as appropriate  - Identify discharge learning needs (meds, wound care, etc.)  - Arrange for interpretive services to assist at discharge as needed  - Refer to Case Management Department for coordinating discharge planning if the patient needs post-hospital services based on physician/advanced practitioner order or complex needs related to functional status, cognitive ability, or social support system  Outcome: Progressing     Problem: Knowledge Deficit  Goal: Patient/family/caregiver demonstrates understanding of disease process, treatment plan, medications, and discharge instructions  Description: Complete learning assessment and assess knowledge base.  Interventions:  - Provide teaching at level of understanding  - Provide teaching via preferred learning methods  Outcome: Progressing     Problem: CARDIOVASCULAR - ADULT  Goal: Maintains optimal cardiac output and hemodynamic stability  Description: INTERVENTIONS:  - Monitor I/O, vital signs and rhythm  - Monitor for S/S and trends of decreased cardiac output  - Administer and titrate ordered vasoactive medications to optimize hemodynamic stability  - Assess quality of pulses,  skin color and temperature  - Assess for signs of decreased coronary artery perfusion  - Instruct patient to report change in severity of symptoms  Outcome: Progressing  Goal: Absence of cardiac dysrhythmias or at baseline rhythm  Description: INTERVENTIONS:  - Continuous cardiac monitoring, vital signs, obtain 12 lead EKG if ordered  - Administer antiarrhythmic and heart rate control medications as ordered  - Monitor electrolytes and administer replacement therapy as ordered  Outcome: Progressing     Problem: RESPIRATORY - ADULT  Goal: Achieves optimal ventilation and oxygenation  Description: INTERVENTIONS:  - Assess for changes in respiratory status  - Assess for changes in mentation and behavior  - Position to facilitate oxygenation and minimize respiratory effort  - Oxygen administered by appropriate delivery if ordered  - Initiate smoking cessation education as indicated  - Encourage broncho-pulmonary hygiene including cough, deep breathe, Incentive Spirometry  - Assess the need for suctioning and aspirate as needed  - Assess and instruct to report SOB or any respiratory difficulty  - Respiratory Therapy support as indicated  Outcome: Progressing     Problem: METABOLIC, FLUID AND ELECTROLYTES - ADULT  Goal: Electrolytes maintained within normal limits  Description: INTERVENTIONS:  - Monitor labs and assess patient for signs and symptoms of electrolyte imbalances  - Administer electrolyte replacement as ordered  - Monitor response to electrolyte replacements, including repeat lab results as appropriate  - Instruct patient on fluid and nutrition as appropriate  Outcome: Progressing  Goal: Fluid balance maintained  Description: INTERVENTIONS:  - Monitor labs   - Monitor I/O and WT  - Instruct patient on fluid and nutrition as appropriate  - Assess for signs & symptoms of volume excess or deficit  Outcome: Progressing  Goal: Glucose maintained within target range  Description: INTERVENTIONS:  - Monitor Blood  Glucose as ordered  - Assess for signs and symptoms of hyperglycemia and hypoglycemia  - Administer ordered medications to maintain glucose within target range  - Assess nutritional intake and initiate nutrition service referral as needed  Outcome: Progressing     Problem: Nutrition/Hydration-ADULT  Goal: Nutrient/Hydration intake appropriate for improving, restoring or maintaining nutritional needs  Description: Monitor and assess patient's nutrition/hydration status for malnutrition. Collaborate with interdisciplinary team and initiate plan and interventions as ordered.  Monitor patient's weight and dietary intake as ordered or per policy. Utilize nutrition screening tool and intervene as necessary. Determine patient's food preferences and provide high-protein, high-caloric foods as appropriate.     INTERVENTIONS:  - Monitor oral intake, urinary output, labs, and treatment plans  - Assess nutrition and hydration status and recommend course of action  - Evaluate amount of meals eaten  - Assist patient with eating if necessary   - Allow adequate time for meals  - Recommend/ encourage appropriate diets, oral nutritional supplements, and vitamin/mineral supplements  - Order, calculate, and assess calorie counts as needed  - Recommend, monitor, and adjust tube feedings and TPN/PPN based on assessed needs  - Assess need for intravenous fluids  - Provide specific nutrition/hydration education as appropriate  - Include patient/family/caregiver in decisions related to nutrition  Outcome: Progressing

## 2024-12-04 NOTE — PLAN OF CARE
Problem: Prexisting or High Potential for Compromised Skin Integrity  Goal: Skin integrity is maintained or improved  Description: INTERVENTIONS:  - Identify patients at risk for skin breakdown  - Assess and monitor skin integrity  - Assess and monitor nutrition and hydration status  - Monitor labs   - Assess for incontinence   - Turn and reposition patient  - Assist with mobility/ambulation  - Relieve pressure over bony prominences  - Avoid friction and shearing  - Provide appropriate hygiene as needed including keeping skin clean and dry  - Evaluate need for skin moisturizer/barrier cream  - Collaborate with interdisciplinary team   - Patient/family teaching  - Consider wound care consult   Outcome: Progressing     Problem: PAIN - ADULT  Goal: Verbalizes/displays adequate comfort level or baseline comfort level  Description: Interventions:  - Encourage patient to monitor pain and request assistance  - Assess pain using appropriate pain scale  - Administer analgesics based on type and severity of pain and evaluate response  - Implement non-pharmacological measures as appropriate and evaluate response  - Consider cultural and social influences on pain and pain management  - Notify physician/advanced practitioner if interventions unsuccessful or patient reports new pain  Outcome: Progressing     Problem: INFECTION - ADULT  Goal: Absence or prevention of progression during hospitalization  Description: INTERVENTIONS:  - Assess and monitor for signs and symptoms of infection  - Monitor lab/diagnostic results  - Monitor all insertion sites, i.e. indwelling lines, tubes, and drains  - Monitor endotracheal if appropriate and nasal secretions for changes in amount and color  - [place] appropriate cooling/warming therapies per order  - Administer medications as ordered  - Instruct and encourage patient and family to use good hand hygiene technique  - Identify and instruct in appropriate isolation precautions for  identified infection/condition  Outcome: Progressing  Goal: Absence of fever/infection during neutropenic period  Description: INTERVENTIONS:  - Monitor WBC    Outcome: Progressing     Problem: SAFETY ADULT  Goal: Patient will remain free of falls  Description: INTERVENTIONS:  - Educate patient/family on patient safety including physical limitations  - Instruct patient to call for assistance with activity   - Consult OT/PT to assist with strengthening/mobility   - Keep Call bell within reach  - Keep bed low and locked with side rails adjusted as appropriate  - Keep care items and personal belongings within reach  - Initiate and maintain comfort rounds  - Make Fall Risk Sign visible to staff  - Offer Toileting every 2 Hours, in advance of need  - Initiate/Maintain bed/chair alarm  - Obtain necessary fall risk management equipment  - Apply yellow socks and bracelet for high fall risk patients  - Consider moving patient to room near nurses station  Outcome: Progressing  Goal: Maintain or return to baseline ADL function  Description: INTERVENTIONS:  -  Assess patient's ability to carry out ADLs; assess patient's baseline for ADL function and identify physical deficits which impact ability to perform ADLs (bathing, care of mouth/teeth, toileting, grooming, dressing, etc.)  - Assess/evaluate cause of self-care deficits   - Assess range of motion  - Assess patient's mobility; develop plan if impaired  - Assess patient's need for assistive devices and provide as appropriate  - Encourage maximum independence but intervene and supervise when necessary  - Involve family in performance of ADLs  - Assess for home care needs following discharge   - Consider OT consult to assist with ADL evaluation and planning for discharge  - Provide patient education as appropriate  Outcome: Progressing  Goal: Maintains/Returns to pre admission functional level  Description: INTERVENTIONS:  - Perform AM-PAC 6 Click Basic Mobility/ Daily  Activity assessment daily.  - Set and communicate daily mobility goal to care team and patient/family/caregiver.   - Collaborate with rehabilitation services on mobility goals if consulted  - Perform Range of Motion 3 times a day.  - Reposition patient every 2 hours.  - Dangle patient 3 times a day  - Stand patient 3 times a day  - Ambulate patient 3 times a day  - Out of bed to chair 3 times a day   - Out of bed for meals 3 times a day  - Out of bed for toileting  - Record patient progress and toleration of activity level   Outcome: Progressing     Problem: DISCHARGE PLANNING  Goal: Discharge to home or other facility with appropriate resources  Description: INTERVENTIONS:  - Identify barriers to discharge w/patient and caregiver  - Arrange for needed discharge resources and transportation as appropriate  - Identify discharge learning needs (meds, wound care, etc.)  - Arrange for interpretive services to assist at discharge as needed  - Refer to Case Management Department for coordinating discharge planning if the patient needs post-hospital services based on physician/advanced practitioner order or complex needs related to functional status, cognitive ability, or social support system  Outcome: Progressing     Problem: Knowledge Deficit  Goal: Patient/family/caregiver demonstrates understanding of disease process, treatment plan, medications, and discharge instructions  Description: Complete learning assessment and assess knowledge base.  Interventions:  - Provide teaching at level of understanding  - Provide teaching via preferred learning methods  Outcome: Progressing     Problem: CARDIOVASCULAR - ADULT  Goal: Maintains optimal cardiac output and hemodynamic stability  Description: INTERVENTIONS:  - Monitor I/O, vital signs and rhythm  - Monitor for S/S and trends of decreased cardiac output  - Administer and titrate ordered vasoactive medications to optimize hemodynamic stability  - Assess quality of pulses,  skin color and temperature  - Assess for signs of decreased coronary artery perfusion  - Instruct patient to report change in severity of symptoms  Outcome: Progressing  Goal: Absence of cardiac dysrhythmias or at baseline rhythm  Description: INTERVENTIONS:  - Continuous cardiac monitoring, vital signs, obtain 12 lead EKG if ordered  - Administer antiarrhythmic and heart rate control medications as ordered  - Monitor electrolytes and administer replacement therapy as ordered  Outcome: Progressing     Problem: RESPIRATORY - ADULT  Goal: Achieves optimal ventilation and oxygenation  Description: INTERVENTIONS:  - Assess for changes in respiratory status  - Assess for changes in mentation and behavior  - Position to facilitate oxygenation and minimize respiratory effort  - Oxygen administered by appropriate delivery if ordered  - Initiate smoking cessation education as indicated  - Encourage broncho-pulmonary hygiene including cough, deep breathe, Incentive Spirometry  - Assess the need for suctioning and aspirate as needed  - Assess and instruct to report SOB or any respiratory difficulty  - Respiratory Therapy support as indicated  Outcome: Progressing     Problem: METABOLIC, FLUID AND ELECTROLYTES - ADULT  Goal: Electrolytes maintained within normal limits  Description: INTERVENTIONS:  - Monitor labs and assess patient for signs and symptoms of electrolyte imbalances  - Administer electrolyte replacement as ordered  - Monitor response to electrolyte replacements, including repeat lab results as appropriate  - Instruct patient on fluid and nutrition as appropriate  Outcome: Progressing  Goal: Fluid balance maintained  Description: INTERVENTIONS:  - Monitor labs   - Monitor I/O and WT  - Instruct patient on fluid and nutrition as appropriate  - Assess for signs & symptoms of volume excess or deficit  Outcome: Progressing  Goal: Glucose maintained within target range  Description: INTERVENTIONS:  - Monitor Blood  Glucose as ordered  - Assess for signs and symptoms of hyperglycemia and hypoglycemia  - Administer ordered medications to maintain glucose within target range  - Assess nutritional intake and initiate nutrition service referral as needed  Outcome: Progressing     Problem: Nutrition/Hydration-ADULT  Goal: Nutrient/Hydration intake appropriate for improving, restoring or maintaining nutritional needs  Description: Monitor and assess patient's nutrition/hydration status for malnutrition. Collaborate with interdisciplinary team and initiate plan and interventions as ordered.  Monitor patient's weight and dietary intake as ordered or per policy. Utilize nutrition screening tool and intervene as necessary. Determine patient's food preferences and provide high-protein, high-caloric foods as appropriate.     INTERVENTIONS:  - Monitor oral intake, urinary output, labs, and treatment plans  - Assess nutrition and hydration status and recommend course of action  - Evaluate amount of meals eaten  - Assist patient with eating if necessary   - Allow adequate time for meals  - Recommend/ encourage appropriate diets, oral nutritional supplements, and vitamin/mineral supplements  - Order, calculate, and assess calorie counts as needed  - Recommend, monitor, and adjust tube feedings and TPN/PPN based on assessed needs  - Assess need for intravenous fluids  - Provide specific nutrition/hydration education as appropriate  - Include patient/family/caregiver in decisions related to nutrition  Outcome: Progressing

## 2024-12-04 NOTE — ASSESSMENT & PLAN NOTE
[facility] requesting refill rx    Disp Refills Start End    traZODone (DESYREL) 50 MG tablet 90 tablet 0 12/16/2021     Sig - Route: Take 1 tablet by mouth nightly as needed for Sleep. - Oral    Sent to pharmacy as: traZODone HCl 50 MG Oral Tablet (DESYREL)    Class: Eprescribe    Notes to Pharmacy: Fill when due.    E-Prescribing Status: Receipt confirmed by pharmacy (12/16/2021 11:36 AM CST)          Disp Refills Start End    DULoxetine (CYMBALTA) 30 MG capsule 30 capsule 0 4/4/2022     Sig: Take 1 capsule daily along with 60 mg (total 90 mg daily)    Sent to pharmacy as: DULoxetine HCl 30 MG Oral Capsule Delayed Release Particles (CYMBALTA)    Class: Eprescribe    E-Prescribing Status: Receipt confirmed by pharmacy (4/4/2022  2:40 PM CDT)    Prior authorization: Denied        Last appointment: 12/16/2021  Follow up advised 2 months, clinician/physician cancel x 1, NOS x 1  Appointment: not scheduled. Writer called pt, no answer, message left to call here.  Is it ok to refill with appointment reminder left with the pharmacy?   Hemoglobin trended down to 8.6 g/dL today    Epogen being held in setting of COVID-19 infection.

## 2024-12-04 NOTE — MALNUTRITION/BMI
This medical record reflects one or more clinical indicators suggestive of malnutrition.    Malnutrition Findings:   Adult Malnutrition type: Acute illness  Adult Degree of Malnutrition: Other severe protein calorie malnutrition  Malnutrition Characteristics: Inadequate energy, Weight loss                360 Statement: Acute on chronic severe malnutrition related to refusal for po intake, poor appetite, condition as evidenced by 10.9% weight loss x 14 days (11/20 220lb, 12/3 196lb); < 50% estimated energy intake > 5 days. Treated with: Recommend continued GOC discussion. Continue with daily weights. Can consider oral supplements if pt willing to take po intake though refusing po intake. Consider appetite stimulant if medically appropriate/pt agreeable though pt previously refused. Consider EN feeds if within GOC.    BMI Findings:           Body mass index is 26.61 kg/m².     See Nutrition note dated 12/4/24 for additional details.  Completed nutrition assessment is viewable in the nutrition documentation.

## 2024-12-04 NOTE — ASSESSMENT & PLAN NOTE
Multifactorial in origin, continue to monitor  Platelet count has been steadily decreasing with unclear etiology  Currently no signs of bleeding  Previously has supratherapeutic INR  Currently is subtherapeutic, continue to monitor INR and platelets and for any bleeding  Hold Coumadin dosing  Platelets low but stable at 52-56    Lab Results   Component Value Date    WBC 6.31 12/04/2024    RBC 2.65 (L) 12/04/2024    PLT 62 (L) 12/04/2024    PLT 56 (L) 12/03/2024

## 2024-12-04 NOTE — PROGRESS NOTES
Progress Note - Nephrology   Name: Masoud Perry 69 y.o. male I MRN: [id number]  Unit/Bed#: -01 I Date of Admission: 11/20/2024   Date of Service: 12/4/2024 I Hospital Day: 14    Assessment & Plan  ESRD (end stage renal disease) on dialysis ([facility])  He was on PD but was changed to back up iHD during admission to [facility] in November 2024. Started intermittent hemodialysis on 11/8/24.  Still has PD catheter  [facility].  Previous EDW 98.5 kg - challenged this hospital admission, most recent post hemodialysis weight  92.4 kg from November 30th  Access: R IJ permcath. PD catheter in place.   Decrease target weight to 92 kg from prior target weight of 98 kg this hospital admission but after hemodialysis treatment on 12/3, postdialysis weight was 88 kg.  May need to lower target weight further depending on predialysis weight tomorrow.    Hemodialysis-associated hypotension  BP stable and is at goal  Rx: Midodrine 15 mg TID. Metoprolol 25 mg BID.  Maintain metoprolol and midodrine at this time.  Patient is on metoprolol for rate control for paroxysmal atrial fibrillation.  Torsemide 60 mg BID stopped on 11/25/24 due to soft blood pressure/diarrhea but will restart torsemide at 40 mg daily and monitor  Continue to monitor blood pressure  Acute on chronic diastolic HF (heart failure) ([facility])  7/29/24 Echo: EF 75%, G1DD  Was fluid overloaded on admission and this has improved.  His new estimated dry weight is likely 92.0  kg   but his postdialysis weight after last treatment was 88 kg, may need to lower target weight further.  Restarted on oral torsemide 40 mg daily on 12/4 which is lower than home dose and monitor blood pressure and volume status  Anemia due to chronic kidney disease, on chronic dialysis ([facility])  Hemoglobin trended down to 8.6 g/dL today    Epogen being held in setting of COVID-19 infection.    Secondary hyperparathyroidism of renal origin ([facility])  Continue Sensipar 30 mg 3/week.  ^ corrected calcium is   9.9 mg/dl    phosphorus level was found to be low at 1.9 on 12/1 and phosphorus binders were held and given phosphorus replacement.  Phosphorus level again dropped to 1.6 likely due to poor oral intake.  Replaced phosphorus    Pancytopenia (HCC)  WBC count improved but platelet count is still low at  62  Hemoglobin dropped to  8.6 g/dL today.  LDH was mildly elevated at 131 peripheral smear showed 1% bands, no schistocytes.  Continue monitoring and management per primary team  Acute respiratory insufficiency  Treatment for COVID-19 per primary team.  There was concern for volume overload which is better now.      Dysuria  Patient states he is experiencing dysuria which started with onset of COVID.  Recent UA unremarkable 11/25.  Repeat UA shows trace ketones negative leukocytes negative nitrite.   Blood culture is negative, continue management per primary team      COVID-19  Treatment of COVID-19 per primary team.  Was tested positive 3 days prior to admission.    Paroxysmal atrial fibrillation (HCC)  Currently on Metoprolol 25 mg BID.     Electrolyte imbalance risk  Hyponatremia  Likely due to free water intake and impaired free water excretion in the setting of ESRD, sodium level improved to normal range at 135 today.  Continue fluid restriction    Hypophosphatemia  -Phosphorus level low at 1.6, replaced with oral Phos-Nak  Hypomagnesemia (Resolved: 12/4/2024)  Magnesium level improved to normal range status post replacement  Moderate protein-calorie malnutrition (HCC)  Malnutrition Findings:   Adult Malnutrition type: Chronic illness  Adult Degree of Malnutrition: Malnutrition of moderate degree  Malnutrition Characteristics: Inadequate energy, Weight loss                  360 Statement: malnutrition of moderate degree in setting of chronic illness, evidenced by 7/11/24 263 lbs to 11/21/24 220.8 lbs (16% wt loss x 4 mo), poor po intake/<75% energy intake compared to estimated needs for > 1 month, treated with  diet and supplements    BMI Findings:           Body mass index is 26.61 kg/m².   -Continue management per primary team  Goals of care, counseling/discussion  - On 12/3/2024: Patient asked me about what would happen if dialysis would be stopped, discussed about risk of fluid overload electrolyte imbalance and ultimately death in 2 to 3 weeks.  Discussed that he may have to consider hospice if plan to stop dialysis. He did not want to stop at this time after discussion.   Diarrhea  Continue management per primary team.  Seen by GI.  As per GI note patient was declined colonoscopy and EGD and does not want any invasive testing done.    I have reviewed the nephrology recommendations including plan for hemodialysis treatment tomorrow and regarding phosphorus replacement, with primary team, and we are in agreement with renal plan including the information outlined above.     Subjective   No new complaints.  No chest pain or shortness of breath, no nausea vomiting    Objective :  Temp:  [97 °F (36.1 °C)-98.6 °F (37 °C)] 97.2 °F (36.2 °C)  HR:  [67-94] 69  BP: (101-127)/(56-78) 123/56  Resp:  [16-18] 16  SpO2:  [94 %-99 %] 94 %  O2 Device: None (Room air)    Current Weight: Weight - Scale: 89 kg (196 lb 3.4 oz)  First Weight: Weight - Scale: 100 kg (220 lb 7.4 oz)  I/O         12/02 0701  12/03 0700 12/03 0701  12/04 0700 12/04 0701  12/05 0700    P.O. 240 240     I.V. (mL/kg)  450 (5.1)     IV Piggyback  50     Total Intake(mL/kg) 240 (2.7) 740 (8.3)     Urine (mL/kg/hr) 100 (0) 325 (0.2)     Other  1700     Total Output 100 2025     Net +140 -1285            Unmeasured Urine Occurrence 1 x      Unmeasured Stool Occurrence 1 x            Physical Exam   General:  Ill looking, awake.  Eyes: Conjunctivae pink,  Sclera anicteric  ENT: lips and mucous membranes moist  Neck: supple   Chest: Clear to Auscultation both lungs,  no crackles, ronchus or wheezing.  CVS: S1 & S2 present, normal rate, regular rhythm, no  murmur.  Abdomen: soft, non-tender, non-distended, Bowel sounds normoactive  Extremities: Trace edema both legs with thickening of skin over the legs with hyperpigmentation  Skin: no rash  Neuro: awake, alert, oriented  Psych: Mood and affect appropriate    Medications:    Current Facility-Administered Medications:     acetaminophen (TYLENOL) tablet 650 mg, 650 mg, Oral, Q4H PRN, Chao Rios PA-C    atorvastatin (LIPITOR) tablet 40 mg, 40 mg, Oral, Daily With Dinner, Chao Rios PA-C, 40 mg at 12/03/24 1629    benzonatate (TESSALON PERLES) capsule 100 mg, 100 mg, Oral, TID PRN, Miriam Tee MD, 100 mg at 11/29/24 1352    calcium carbonate (TUMS) chewable tablet 500 mg, 500 mg, Oral, Daily PRN, Moses Noel MD    cinacalcet (SENSIPAR) tablet 60 mg, 60 mg, Oral, Once per day on Monday Wednesday Friday, Jignesh Hussein MD, 60 mg at 12/02/24 0846    docusate sodium (COLACE) capsule 100 mg, 100 mg, Oral, BID PRN, Chao Rios PA-C    finasteride (PROSCAR) tablet 5 mg, 5 mg, Oral, Daily, Chao Rios PA-C, 5 mg at 12/02/24 0846    gentamicin (GARAMYCIN) 0.1 % cream, , Topical, Daily, Jesus Christine MD, Given at 12/03/24 0953    guaiFENesin (MUCINEX) 12 hr tablet 600 mg, 600 mg, Oral, Q12H VASU, Miriam Tee MD, 600 mg at 12/03/24 2021    HYDROcodone-acetaminophen (NORCO) 5-325 mg per tablet 2 tablet, 2 tablet, Oral, Q6H PRN, Chao Rios PA-C, 2 tablet at 12/03/24 2217    insulin lispro (HumALOG/ADMELOG) 100 units/mL subcutaneous injection 1-6 Units, 1-6 Units, Subcutaneous, TID AC, 1 Units at 11/26/24 1236 **AND** Fingerstick Glucose (POCT), , , TID AC, Chao Rios, PA-C    levothyroxine tablet 125 mcg, 125 mcg, Oral, Early Morning, Chao Rios PA-C, 125 mcg at 12/04/24 0609    metoprolol tartrate (LOPRESSOR) tablet 25 mg, 25 mg, Oral, Q12H [facility], Moses Noel MD, 25 mg at 12/03/24 2021    midodrine (PROAMATINE) tablet 15 mg, 15 mg, Oral, TID ACCitlali CRNP, 15 mg at  "12/04/24 0609    nicotine (NICODERM CQ) 7 mg/24hr TD 24 hr patch 7 mg, 7 mg, Transdermal, Daily, Chao Rios PA-C    ondansetron (ZOFRAN) injection 4 mg, 4 mg, Intravenous, Q6H PRN, Chao Rios PA-C, 4 mg at 11/25/24 1839    pyridostigmine (MESTINON) tablet 60 mg, 60 mg, Oral, TID, Chao Rios PA-C, 60 mg at 12/03/24 2021    simethicone (MYLICON) chewable tablet 80 mg, 80 mg, Oral, 4x Daily PRN, Chao Rios PA-C    [Held by provider] warfarin (COUMADIN) tablet 2 mg, 2 mg, Oral, HS, Moses Noel MD, 2 mg at 11/30/24 2315      Lab Results: I have reviewed the following results:  Results from last 7 days   Lab Units 12/04/24  0608 12/03/24  0609 12/02/24  0458 12/01/24  0842 11/30/24  0606 11/29/24  0623 11/28/24  0600   WBC Thousand/uL 6.31 5.77 6.24 5.45 5.01 4.76  --    HEMOGLOBIN g/dL 8.5* 8.6* 9.1* 9.8* 9.2* 9.6*  --    HEMATOCRIT % 26.7* 27.6* 29.6* 31.7* 29.3* 31.0*  --    PLATELETS Thousands/uL 62* 56* 52* 68* 75* 91*  --    POTASSIUM mmol/L 3.4* 3.5 3.5 3.9 3.8 4.2 3.8   CHLORIDE mmol/L 100 102 101 99 99 100 99   CO2 mmol/L 30 28 29 31 27 28 32   BUN mg/dL 11 21 18 15 22 39* 33*   CREATININE mg/dL 1.93* 2.80* 2.31* 1.86* 2.06* 3.02* 2.78*   CALCIUM mg/dL 7.8* 8.2* 8.7 9.2 8.6 9.0 8.8   MAGNESIUM mg/dL 2.1 1.8* 1.5*  --   --   --   --    PHOSPHORUS mg/dL 1.6* 2.6 3.2 1.9*  --   --   --    ALBUMIN g/dL  --   --  2.5* 2.8* 2.5*  --   --        Administrative Statements     Portions of the record may have been created with voice recognition software. Occasional wrong word or \"sound a like\" substitutions may have occurred due to the inherent limitations of voice recognition software. Read the chart carefully and recognize, using context, where substitutions have occurred.If you have any questions, please contact the dictating provider.  "

## 2024-12-04 NOTE — ASSESSMENT & PLAN NOTE
Oxygen needs have waxed and waned  CT chest abdomen pelvis with mild diffuse anasarca improved from prior CT, no specific pathology along  Monitor volume status  Between 0.5 L nasal cannula for comfort and room air

## 2024-12-04 NOTE — PROGRESS NOTES
Progress Note - Hospitalist   Name: Masoud Perry 69 y.o. male I MRN: [id number]  Unit/Bed#: -01 I Date of Admission: 11/20/2024   Date of Service: 12/4/2024 I Hospital Day: 14    Assessment & Plan  Acute respiratory insufficiency  Oxygen needs have waxed and waned  CT chest abdomen pelvis with mild diffuse anasarca improved from prior CT, no specific pathology along  Monitor volume status  Between 0.5 L nasal cannula for comfort and room air  Acute on chronic diastolic HF (heart failure) (HCC)  Wt Readings from Last 3 Encounters:   12/04/24 89 kg (196 lb 3.4 oz)   10/14/24 115 kg (253 lb)   10/07/24 118 kg (260 lb)   CXR on 11/20 appeared to be pulmonary vascular congestion   Patient is a dialysis basis, received hemodialysis on TTS  Most recent session 11/30 to resume [facility] schedule  Torsemide is on hold secondary to ongoing diarrhea and anorexia  Appreciate continued nephrology follow-up  Monitor intake/output and daily weight  Acute exacerbation seems to have resolved-euvolemic and continue with dialysis  COVID-19  Presents from [place]. Tested positive 3 days prior to admission when symptoms started  Positive in our records on 11/20  CT chest negative for any pathology  Started decadron and remdesevir, doxycycline  Has completed therapy with all 3  Was on Coumadin for anticoagulation/DVT prophylaxis  Patient also having diarrhea, and C. difficile showing carrier state -see under diarrhea of infectious origin for further treatment plan  Currently 0.5 L nasal cannula/room air - fluctuating   Has completed quarantine for COVID-no longer needs airborne precautions  Goals of care, counseling/discussion  Patient and I had discussion today about his goals of care moving forward  He endorses that he has been feeling depressed and frustrated with his continued decline in health  He says that sometimes he wishes that he could just go home to pass away and be comfortable  He shares that he is not disagreeable for  treatment but sometimes feels as though he is being tortured  Pain discussed possibilities of psychiatry consult for depression/trying recommendations made by prior psychiatrist for Remeron  At this time patient declines  Palliative care consulted  12/2 at this time declines any additional medications.  States that he is frustrated with continued hospitalization though is agreeable to this and agreeable to discharge to rehab.  Wants to continue treatment focused care without invasive measures  12/3 requesting to reconsult with palliative care -patient expressing wishes to rediscuss hospice  12/4 - patient discussed with hospice liaison, currently deferring formal evaluation until discussing with his son further  Ongoing goals of care discussion  Factor 5 Leiden mutation, heterozygous (HCC)  Patient is maintained on Coumadin PTA  INR supratherapeutic at time of presentation and was placed on hold  INR trending upwards and dialysis was delayed for a few days so vitamin K was administered at 2.5 mg  Patient now with subtherapeutic INR  Continue to monitor while administering low-dose Coumadin -on hold due to rapid increase in INR  Also monitor platelets  Lab Results   Component Value Date    INR 2.52 (H) 12/04/2024    INR 2.56 (H) 12/03/2024    INR 1.95 (H) 12/02/2024     Secondary hyperparathyroidism of renal origin ([facility])  Currently on 60 mg p.o. 3 times weekly  Hemodialysis-associated hypotension  Continue midodrine and pyridostigmine  Type 2 diabetes mellitus with stage 4 chronic kidney disease, with long-term current use of insulin (HCC)  Lab Results   Component Value Date    HGBA1C 5.0 11/02/2024       Recent Labs     12/03/24  2226 12/04/24  0723 12/04/24  0915 12/04/24  1137   POCGLU 99 69 102 88       Blood Sugar Average: Last 72 hrs:  (P) [id number]  Sliding scale insulin. Hypoglycemia protocol  Patient with recurrent hypoglycemia secondary to poor oral intake  Today requiring amp of dextrose  We  will continue to discuss patient's appetite with him and possible addition of appetite stimulants  Paroxysmal atrial fibrillation (HCC)  Patient did have rapid response secondary to chest pain  Cardiology consulted-thought to be secondary to A-fib rather than any ACS  Can continue metoprolol if blood pressure tolerates -maintain on midodrine as well to aid in attention  Continue to trend INR with Coumadin   ESRD (end stage renal disease) on dialysis (HCC)  Lab Results   Component Value Date    EGFR 34 12/04/2024    EGFR 21 12/03/2024    EGFR 27 12/02/2024    CREATININE 1.93 (H) 12/04/2024    CREATININE 2.80 (H) 12/03/2024    CREATININE 2.31 (H) 12/02/2024   Typical regimen Tuesday Thursday Saturday  Nephrology following  Resume prior schedule  Anemia due to chronic kidney disease, on chronic dialysis (HCC)  Patient with chronic anemia  Baseline hemoglobin around 8  During hospitalization patient's hemoglobin has been stable between 9 and 10  Mild decrease today   Monitor for signs of bleeding in setting of low platelets  Pancytopenia (HCC)  Multifactorial in origin, continue to monitor  Platelet count has been steadily decreasing with unclear etiology  Currently no signs of bleeding  Previously has supratherapeutic INR  Currently is subtherapeutic, continue to monitor INR and platelets and for any bleeding  Hold Coumadin dosing  Platelets low but stable at 52-56    Lab Results   Component Value Date    WBC 6.31 12/04/2024    RBC 2.65 (L) 12/04/2024    PLT 62 (L) 12/04/2024    PLT 56 (L) 12/03/2024       Electrolyte imbalance risk    Dysuria  Patient reporting ongoing dysuria since prior to admission  UA done 11/30 without any signs for acute cystitis  Is maintained on Proscar  Hypomagnesemia (Resolved: 12/4/2024)    Diarrhea  Patient admitted having significant diarrhea-initially suspected infectious secondary to COVID versus C. difficile carrier status  Started on a course of oral vancomycin and completed-Per  infectious disease do not feel that this is related to his diarrhea  Given lack of improvement in diarrhea-GI consulted  Ending further lab workup possible sources of diarrhea  If infectious etiology ruled out can trial Imodium versus Questran  Patient continues to deny wanting to start any other medications-would not like a powder and does not feel that he can handle a large pill.  GI feels that Imodium would not be a good option  Diarrhea is ongoing because of hypovolemia and for that torsemide is on hold  Epigastric pain    Palliative care by specialist    Hypophosphatemia    Severe protein-calorie malnutrition (HCC)  Malnutrition Findings:   Adult Malnutrition type: Acute illness  Adult Degree of Malnutrition: Other severe protein calorie malnutrition  Malnutrition Characteristics: Inadequate energy, Weight loss                  360 Statement: Acute on chronic severe malnutrition related to refusal for po intake, poor appetite, condition as evidenced by 10.9% weight loss x 14 days (11/20 220lb, 12/3 196lb); < 50% estimated energy intake > 5 days. Treated with: Recommend continued GOC discussion. Continue with daily weights. Can consider oral supplements if pt willing to take po intake though refusing po intake. Consider appetite stimulant if medically appropriate/pt agreeable though pt previously refused. Consider EN feeds if within GOC.    BMI Findings:           Body mass index is 26.61 kg/m².   Continues to decline appetite stimulant  Would decline tube feeds or enteric feedings or any invasive measures  Continue goals of care discussion    VTE Pharmacologic Prophylaxis: VTE Score: 6 High Risk (Score >/= 5) - Pharmacological DVT Prophylaxis Contraindicated. Sequential Compression Devices Ordered.    Mobility:   Basic Mobility Inpatient Raw Score: 7  JH-HLM Goal: 2: Bed activities/Dependent transfer  JH-HLM Achieved: 2: Bed activities/Dependent transfer  JH-HLM Goal achieved. Continue to encourage appropriate  mobility.    Patient Centered Rounds: I performed bedside rounds with nursing staff today.   Discussions with Specialists or Other Care Team Provider: CM, GI, Nephrology     Education and Discussions with Family / Patient: Updated  (son) via phone.    Current Length of Stay: 14 day(s)  Current Patient Status: Inpatient   Certification Statement: The patient will continue to require additional inpatient hospital stay due to pending palliative care discussion and optimization of nutrition  Discharge Plan: Anticipate discharge in 24-48 hrs to rehab facility.    Code Status: Level 3 - DNAR and DNI    Subjective   Seen and examined.  Patient continues to decline wanting to try any new medications.  States he still has no appetite, not eating any meals.  Is discussing hospice with his son this afternoon.     Objective :  Temp:  [97 °F (36.1 °C)-98.6 °F (37 °C)] 97.2 °F (36.2 °C)  HR:  [67-94] 82  BP: (101-127)/(56-78) 111/58  Resp:  [16-18] 16  SpO2:  [94 %-99 %] 96 %  O2 Device: None (Room air)    Body mass index is 26.61 kg/m².     Input and Output Summary (last 24 hours):     Intake/Output Summary (Last 24 hours) at 12/4/2024 1230  Last data filed at 12/4/2024 0643  Gross per 24 hour   Intake 550 ml   Output 1850 ml   Net -1300 ml       Physical Exam  Vitals and nursing note reviewed.   Constitutional:       General: He is not in acute distress.     Appearance: Normal appearance. He is ill-appearing.   HENT:      Head: Normocephalic and atraumatic.      Nose: No congestion.      Mouth/Throat:      Mouth: Mucous membranes are dry.      Pharynx: Oropharynx is clear.   Eyes:      Conjunctiva/sclera: Conjunctivae normal.   Cardiovascular:      Rate and Rhythm: Normal rate and regular rhythm.      Pulses: Normal pulses.      Heart sounds: Normal heart sounds. No murmur heard.  Pulmonary:      Effort: Pulmonary effort is normal. No respiratory distress.      Breath sounds: Normal breath sounds.   Abdominal:       General: Bowel sounds are normal.      Palpations: Abdomen is soft.      Tenderness: There is no abdominal tenderness.   Musculoskeletal:         General: Normal range of motion.      Cervical back: Normal range of motion.      Right lower leg: No edema.      Left lower leg: No edema.   Skin:     General: Skin is warm and dry.      Coloration: Skin is pale.   Neurological:      Mental Status: He is alert and oriented to person, place, and time.   Psychiatric:         Mood and Affect: Mood normal.         Behavior: Behavior normal.           Lines/Drains:  Lines/Drains/Airways       Active Status       Name Placement date Placement time Site Days    HD Permanent Double Catheter 10/14/24  1420  Subclavian  50                            Lab Results: I have reviewed the following results:   Results from last 7 days   Lab Units 12/04/24  0608 12/02/24  0458 12/01/24  0842   WBC Thousand/uL 6.31   < > 5.45   HEMOGLOBIN g/dL 8.5*   < > 9.8*   HEMATOCRIT % 26.7*   < > 31.7*   PLATELETS Thousands/uL 62*   < > 68*   BANDS PCT %  --   --  1   SEGS PCT %  --   --  75   LYMPHO PCT %  --   --  19  19   MONO PCT %  --   --  4  2*   EOS PCT %  --   --  1    < > = values in this interval not displayed.     Results from last 7 days   Lab Units 12/04/24  0608 12/03/24  0609 12/02/24  0458   SODIUM mmol/L 135   < > 136   POTASSIUM mmol/L 3.4*   < > 3.5   CHLORIDE mmol/L 100   < > 101   CO2 mmol/L 30   < > 29   BUN mg/dL 11   < > 18   CREATININE mg/dL 1.93*   < > 2.31*   ANION GAP mmol/L 5   < > 6   CALCIUM mg/dL 7.8*   < > 8.7   ALBUMIN g/dL  --   --  2.5*   TOTAL BILIRUBIN mg/dL  --   --  0.53   ALK PHOS U/L  --   --  56   ALT U/L  --   --  5*   AST U/L  --   --  11*   GLUCOSE RANDOM mg/dL 70   < > 71    < > = values in this interval not displayed.     Results from last 7 days   Lab Units 12/04/24  0608   INR  2.52*     Results from last 7 days   Lab Units 12/04/24  1137 12/04/24  0915 12/04/24  0723 12/03/24  2226 12/03/24  1619  12/03/24  1133 12/03/24  0823 12/03/24  0759 12/03/24  0719 12/02/24  2103 12/02/24  1618 12/02/24  1125   POC GLUCOSE mg/dl 88 102 69 99 92 106 126 61* 63* 67 77 59*         Results from last 7 days   Lab Units 11/29/24  1328 11/29/24  1313   LACTIC ACID mmol/L 1.1  --    PROCALCITONIN ng/ml  --  0.14       Recent Cultures (last 7 days):               Last 24 Hours Medication List:     Current Facility-Administered Medications:     acetaminophen (TYLENOL) tablet 650 mg, Q4H PRN    atorvastatin (LIPITOR) tablet 40 mg, Daily With Dinner    benzonatate (TESSALON PERLES) capsule 100 mg, TID PRN    calcium carbonate (TUMS) chewable tablet 500 mg, Daily PRN    cinacalcet (SENSIPAR) tablet 60 mg, Once per day on Monday Wednesday Friday    docusate sodium (COLACE) capsule 100 mg, BID PRN    finasteride (PROSCAR) tablet 5 mg, Daily    gentamicin (GARAMYCIN) 0.1 % cream, Daily    guaiFENesin (MUCINEX) 12 hr tablet 600 mg, Q12H VASU    HYDROcodone-acetaminophen (NORCO) 5-325 mg per tablet 2 tablet, Q6H PRN    insulin lispro (HumALOG/ADMELOG) 100 units/mL subcutaneous injection 1-6 Units, TID AC **AND** Fingerstick Glucose (POCT), TID AC    levothyroxine tablet 125 mcg, Early Morning    metoprolol tartrate (LOPRESSOR) tablet 25 mg, Q12H VASU    midodrine (PROAMATINE) tablet 15 mg, TID AC    nicotine (NICODERM CQ) 7 mg/24hr TD 24 hr patch 7 mg, Daily    ondansetron (ZOFRAN) injection 4 mg, Q6H PRN    potassium-sodium phosphates (PHOS-NAK) packet 1 packet, BID With Meals    pyridostigmine (MESTINON) tablet 60 mg, TID    simethicone (MYLICON) chewable tablet 80 mg, 4x Daily PRN    torsemide (DEMADEX) tablet 40 mg, Daily    [Held by provider] warfarin (COUMADIN) tablet 2 mg, HS    Administrative Statements   Today, Patient Was Seen By: Theresa Butt PA-C      **Please Note: This note may have been constructed using a voice recognition system.**

## 2024-12-04 NOTE — ASSESSMENT & PLAN NOTE
Patient and I had discussion today about his goals of care moving forward  He endorses that he has been feeling depressed and frustrated with his continued decline in health  He says that sometimes he wishes that he could just go home to pass away and be comfortable  He shares that he is not disagreeable for treatment but sometimes feels as though he is being tortured  Pain discussed possibilities of psychiatry consult for depression/trying recommendations made by prior psychiatrist for [place]omy  At this time patient declines  Palliative care consulted  12/2 at this time declines any additional medications.  States that he is frustrated with continued hospitalization though is agreeable to this and agreeable to discharge to rehab.  Wants to continue treatment focused care without invasive measures  12/3 requesting to reconsult with palliative care -patient expressing wishes to rediscuss hospice  12/4 - patient discussed with hospice liaison, currently deferring formal evaluation until discussing with his son further  Ongoing goals of care discussion

## 2024-12-04 NOTE — ASSESSMENT & PLAN NOTE
BP stable and is at goal  Rx: Midodrine 15 mg TID. Metoprolol 25 mg BID.  Maintain metoprolol and midodrine at this time.  Patient is on metoprolol for rate control for paroxysmal atrial fibrillation.  Torsemide 60 mg BID stopped on 11/25/24 due to soft blood pressure/diarrhea but will restart torsemide at 40 mg daily and monitor  Continue to monitor blood pressure

## 2024-12-04 NOTE — ASSESSMENT & PLAN NOTE
Patient admitted having significant diarrhea-initially suspected infectious secondary to COVID versus C. difficile carrier status  Started on a course of oral vancomycin and completed-Per infectious disease do not feel that this is related to his diarrhea  Given lack of improvement in diarrhea-GI consulted  Ending further lab workup possible sources of diarrhea  If infectious etiology ruled out can trial Imodium versus Questran  Patient continues to deny wanting to start any other medications-would not like a powder and does not feel that he can handle a large pill.  GI feels that Imodium would not be a good option  Diarrhea is ongoing because of hypovolemia and for that torsemide is on hold

## 2024-12-04 NOTE — ASSESSMENT & PLAN NOTE
Wt Readings from Last 3 Encounters:   12/04/24 89 kg (196 lb 3.4 oz)   10/14/24 115 kg (253 lb)   10/07/24 118 kg (260 lb)   CXR on 11/20 appeared to be pulmonary vascular congestion   Patient is a dialysis basis, received hemodialysis on TTS  Most recent session 11/30 to resume [facility] schedule  Torsemide is on hold secondary to ongoing diarrhea and anorexia  Appreciate continued nephrology follow-up  Monitor intake/output and daily weight  Acute exacerbation seems to have resolved-euvolemic and continue with dialysis

## 2024-12-04 NOTE — ASSESSMENT & PLAN NOTE
Presents from [place]. Tested positive 3 days prior to admission when symptoms started  Positive in our records on 11/20  CT chest negative for any pathology  Started decadron and remdesevir, doxycycline  Has completed therapy with all 3  Was on Coumadin for anticoagulation/DVT prophylaxis  Patient also having diarrhea, and C. difficile showing carrier state -see under diarrhea of infectious origin for further treatment plan  Currently 0.5 L nasal cannula/room air - fluctuating   Has completed quarantine for COVID-no longer needs airborne precautions

## 2024-12-04 NOTE — WOUND OSTOMY CARE
Progress Note - Wound   Masoud Perry 69 y.o. male MRN: [id number]  Unit/Bed#: -01 Encounter: [id number]        Assessment:   Seen for weekly wound assessment had consult with palliative today  Supine in bed.  1)Bilateral heels with preventative foam on, pt allowed me to remove and reapply to assess heels  2)Refused assessment of buttocks and sacrum, he is incont of stool. Previous assessment he had erythema to buttock and scrotum. He is also refusing meds and meals per LPN  3)Left lateral and anterior tibia dry intact scab open to air   4)Right plantar great toe chronic wound light purple non blancahble   5)Bilateral LE dry flaky hyperpigmentation with hemosiderin staining present      Wound 08/02/24 Pretibial Right (Active)   Wound Image   12/04/24 1033   Wound Description Dry;Intact;Brown 12/04/24 1033   Wound 09/21/24 Pressure Injury Toe D1, great Right;Plantar;Medial (Active)   Wound Image   12/04/24 1034   Wound Description Dry;Intact;Non-blanchable erythema;Light purple 12/04/24 1034   Pressure Injury Stage 2 12/04/24 1034   Wound Length (cm) 1 cm 12/04/24 1034   Wound Width (cm) 0.7 cm 12/04/24 1034   Wound Surface Area (cm^2) 0.7 cm^2 12/04/24 1034   Drainage Amount None 12/04/24 1034   Treatments Cleansed;Site care;Elevated 12/04/24 1034   Dressing Open to air 12/04/24 1034   Patient Tolerance Tolerated well 12/04/24 1034   Dressing Status Removed 12/04/24 1034       Wound 11/21/24 Pretibial Left (Active)   Wound Image   12/04/24 1033   Wound Length (cm) 2 cm 12/04/24 1033   Wound Width (cm) 2 cm 12/04/24 1033   Wound Surface Area (cm^2) 4 cm^2 12/04/24 1033   Wound 11/21/24 Pressure Injury Sacrum (Active)   Wound Description Beefy red 12/04/24 1155   Pressure Injury Stage 1 12/04/24 1155   Drainage Amount None 12/04/24 1155   Treatments Cleansed;Site care 12/04/24 1155   Dressing Foam, Silicon (eg. Allevyn, etc) 12/04/24 1155   Dressing Changed Changed 12/04/24 1155   Patient Tolerance Tolerated well  12/04/24 1155   Dressing Status Clean;Dry;Intact 12/04/24 1155     Call or Secure Chat with any questions  Wound Care will continue to follow weekly    Melissa LARRYN RN CWON

## 2024-12-04 NOTE — CASE MANAGEMENT
[facility] received request for authorization from Care Manager.  Authorization request submitted for: [facility]  Facility Name:[facility] NPI- [id number]   Facility MD: Dr. Ziggy Bradley   NPI: [id number]  Authorization initiated by contacting insurance:  [employer]   Via: [email or web address] Portal   Clinicals submitted via [email or web address] attachment   Pending Reference #: [id number]     Care Manager notified:Loraine Copeland      Updates to authorization status will be noted in chart. Please reach out to CM for updates on any clinical information.

## 2024-12-04 NOTE — ASSESSMENT & PLAN NOTE
7/29/24 Echo: EF 75%, G1DD  Was fluid overloaded on admission and this has improved.  His new estimated dry weight is likely 92.0  kg   but his postdialysis weight after last treatment was 88 kg, may need to lower target weight further.  Restarted on oral torsemide 40 mg daily on 12/4 which is lower than home dose and monitor blood pressure and volume status

## 2024-12-04 NOTE — ASSESSMENT & PLAN NOTE
Lab Results   Component Value Date    EGFR 34 12/04/2024    EGFR 21 12/03/2024    EGFR 27 12/02/2024    CREATININE 1.93 (H) 12/04/2024    CREATININE 2.80 (H) 12/03/2024    CREATININE 2.31 (H) 12/02/2024   Typical regimen Tuesday Thursday Saturday  Nephrology following  Resume prior schedule

## 2024-12-05 ENCOUNTER — APPOINTMENT (INPATIENT)
Dept: DIALYSIS | Facility: HOSPITAL | Age: 69
DRG: 177 | End: 2024-12-05
Attending: INTERNAL MEDICINE
Payer: COMMERCIAL

## 2024-12-05 LAB
ANION GAP SERPL CALCULATED.3IONS-SCNC: 6 MMOL/L (ref 4–13)
BUN SERPL-MCNC: 14 MG/DL (ref 5–25)
CALCIUM SERPL-MCNC: 8 MG/DL (ref 8.4–10.2)
CHLORIDE SERPL-SCNC: 100 MMOL/L (ref 96–108)
CO2 SERPL-SCNC: 29 MMOL/L (ref 21–32)
CREAT SERPL-MCNC: 2.66 MG/DL (ref 0.6–1.3)
ERYTHROCYTE [DISTWIDTH] IN BLOOD BY AUTOMATED COUNT: 14.2 % (ref 11.6–15.1)
GFR SERPL CREATININE-BSD FRML MDRD: 23 ML/MIN/1.73SQ M
GLUCOSE SERPL-MCNC: 67 MG/DL (ref 65–140)
GLUCOSE SERPL-MCNC: 69 MG/DL (ref 65–140)
GLUCOSE SERPL-MCNC: 72 MG/DL (ref 65–140)
GLUCOSE SERPL-MCNC: 72 MG/DL (ref 65–140)
GLUCOSE SERPL-MCNC: 76 MG/DL (ref 65–140)
GLUCOSE SERPL-MCNC: 84 MG/DL (ref 65–140)
GLUCOSE SERPL-MCNC: 91 MG/DL (ref 65–140)
HCT VFR BLD AUTO: 29.2 % (ref 36.5–49.3)
HGB BLD-MCNC: 9.2 G/DL (ref 12–17)
INR PPP: 2.44 (ref 0.85–1.19)
MCH RBC QN AUTO: 31.6 PG (ref 26.8–34.3)
MCHC RBC AUTO-ENTMCNC: 31.5 G/DL (ref 31.4–37.4)
MCV RBC AUTO: 100 FL (ref 82–98)
PHOSPHATE SERPL-MCNC: 2 MG/DL (ref 2.3–4.1)
PLATELET # BLD AUTO: 94 THOUSANDS/UL (ref 149–390)
PMV BLD AUTO: 9.3 FL (ref 8.9–12.7)
POTASSIUM SERPL-SCNC: 3.4 MMOL/L (ref 3.5–5.3)
PROTHROMBIN TIME: 26.6 SECONDS (ref 12.3–15)
RBC # BLD AUTO: 2.91 MILLION/UL (ref 3.88–5.62)
SODIUM SERPL-SCNC: 135 MMOL/L (ref 135–147)
WBC # BLD AUTO: 7.53 THOUSAND/UL (ref 4.31–10.16)

## 2024-12-05 PROCEDURE — 84100 ASSAY OF PHOSPHORUS: CPT | Performed by: INTERNAL MEDICINE

## 2024-12-05 PROCEDURE — 80048 BASIC METABOLIC PNL TOTAL CA: CPT | Performed by: INTERNAL MEDICINE

## 2024-12-05 PROCEDURE — 85610 PROTHROMBIN TIME: CPT

## 2024-12-05 PROCEDURE — 99232 SBSQ HOSP IP/OBS MODERATE 35: CPT

## 2024-12-05 PROCEDURE — 85027 COMPLETE CBC AUTOMATED: CPT

## 2024-12-05 PROCEDURE — 82948 REAGENT STRIP/BLOOD GLUCOSE: CPT

## 2024-12-05 PROCEDURE — 99232 SBSQ HOSP IP/OBS MODERATE 35: CPT | Performed by: INTERNAL MEDICINE

## 2024-12-05 RX ORDER — CHOLESTYRAMINE LIGHT 4 G/5.7G
4 POWDER, FOR SUSPENSION ORAL 2 TIMES DAILY
Status: DISCONTINUED | OUTPATIENT
Start: 2024-12-05 | End: 2024-12-07

## 2024-12-05 RX ADMIN — METOPROLOL TARTRATE 25 MG: 25 TABLET, FILM COATED ORAL at 09:46

## 2024-12-05 RX ADMIN — GUAIFENESIN 600 MG: 600 TABLET ORAL at 22:48

## 2024-12-05 RX ADMIN — TORSEMIDE 40 MG: 20 TABLET ORAL at 09:55

## 2024-12-05 RX ADMIN — PYRIDOSTIGMINE BROMIDE 60 MG: 60 TABLET ORAL at 16:40

## 2024-12-05 RX ADMIN — PYRIDOSTIGMINE BROMIDE 60 MG: 60 TABLET ORAL at 22:48

## 2024-12-05 RX ADMIN — PYRIDOSTIGMINE BROMIDE 60 MG: 60 TABLET ORAL at 09:47

## 2024-12-05 RX ADMIN — LEVOTHYROXINE SODIUM 125 MCG: 125 TABLET ORAL at 06:24

## 2024-12-05 RX ADMIN — CHOLESTYRAMINE LIGHT 4 G: 4 POWDER, FOR SUSPENSION ORAL at 18:40

## 2024-12-05 RX ADMIN — HYDROCODONE BITARTRATE AND ACETAMINOPHEN 2 TABLET: 5; 325 TABLET ORAL at 12:46

## 2024-12-05 RX ADMIN — GUAIFENESIN 600 MG: 600 TABLET ORAL at 09:47

## 2024-12-05 RX ADMIN — MIDODRINE HYDROCHLORIDE 15 MG: 5 TABLET ORAL at 12:30

## 2024-12-05 RX ADMIN — MIDODRINE HYDROCHLORIDE 15 MG: 5 TABLET ORAL at 16:39

## 2024-12-05 RX ADMIN — MIDODRINE HYDROCHLORIDE 15 MG: 5 TABLET ORAL at 06:24

## 2024-12-05 RX ADMIN — GENTAMICIN SULFATE 1 APPLICATION: 1 CREAM TOPICAL at 09:49

## 2024-12-05 RX ADMIN — HYDROCODONE BITARTRATE AND ACETAMINOPHEN 2 TABLET: 5; 325 TABLET ORAL at 06:32

## 2024-12-05 RX ADMIN — HYDROCODONE BITARTRATE AND ACETAMINOPHEN 2 TABLET: 5; 325 TABLET ORAL at 19:03

## 2024-12-05 NOTE — ASSESSMENT & PLAN NOTE
Continue Sensipar 30 mg 3/week.   Phosphorus level is still low at 2.0, continue oral phosphorus supplementation twice daily.  No need to restrict phosphorus in the diet

## 2024-12-05 NOTE — ASSESSMENT & PLAN NOTE
Malnutrition Findings:   Adult Malnutrition type: Acute illness  Adult Degree of Malnutrition: Other severe protein calorie malnutrition  Malnutrition Characteristics: Inadequate energy, Weight loss                  360 Statement: Acute on chronic severe malnutrition related to refusal for po intake, poor appetite, condition as evidenced by 10.9% weight loss x 14 days (11/20 220lb, 12/3 196lb); < 50% estimated energy intake > 5 days. Treated with: Recommend continued GOC discussion. Continue with daily weights. Can consider oral supplements if pt willing to take po intake though refusing po intake. Consider appetite stimulant if medically appropriate/pt agreeable though pt previously refused. Consider EN feeds if within GOC.    BMI Findings:           Body mass index is 26.31 kg/m².   -Continue management per primary team

## 2024-12-05 NOTE — ASSESSMENT & PLAN NOTE
Multifactorial in origin, continue to monitor  Platelet count has been steadily decreasing with unclear etiology  Currently no signs of bleeding  Previously has supratherapeutic INR  Currently is subtherapeutic, continue to monitor INR and platelets and for any bleeding  Hold Coumadin dosing  Platelets low but stable at 52-56    Lab Results   Component Value Date    WBC 7.53 12/05/2024    RBC 2.91 (L) 12/05/2024    PLT 94 (L) 12/05/2024    PLT 62 (L) 12/04/2024

## 2024-12-05 NOTE — ASSESSMENT & PLAN NOTE
Malnutrition Findings:   Adult Malnutrition type: Acute illness  Adult Degree of Malnutrition: Other severe protein calorie malnutrition  Malnutrition Characteristics: Inadequate energy, Weight loss                  360 Statement: Acute on chronic severe malnutrition related to refusal for po intake, poor appetite, condition as evidenced by 10.9% weight loss x 14 days (11/20 220lb, 12/3 196lb); < 50% estimated energy intake > 5 days. Treated with: Recommend continued GOC discussion. Continue with daily weights. Can consider oral supplements if pt willing to take po intake though refusing po intake. Consider appetite stimulant if medically appropriate/pt agreeable though pt previously refused. Consider EN feeds if within GOC.    BMI Findings:           Body mass index is 26.31 kg/m².   Continues to decline appetite stimulant  Would decline tube feeds or enteric feedings or any invasive measures  Continue goals of care discussion

## 2024-12-05 NOTE — PLAN OF CARE
Problem: Prexisting or High Potential for Compromised Skin Integrity  Goal: Skin integrity is maintained or improved  Description: INTERVENTIONS:  - Identify patients at risk for skin breakdown  - Assess and monitor skin integrity  - Assess and monitor nutrition and hydration status  - Monitor labs   - Assess for incontinence   - Turn and reposition patient  - Assist with mobility/ambulation  - Relieve pressure over bony prominences  - Avoid friction and shearing  - Provide appropriate hygiene as needed including keeping skin clean and dry  - Evaluate need for skin moisturizer/barrier cream  - Collaborate with interdisciplinary team   - Patient/family teaching  - Consider wound care consult   12/5/2024 1333 by Jennifer Alcantara RN  Outcome: Progressing  12/5/2024 1333 by Jennifer Alcantara RN  Outcome: Progressing     Problem: PAIN - ADULT  Goal: Verbalizes/displays adequate comfort level or baseline comfort level  Description: Interventions:  - Encourage patient to monitor pain and request assistance  - Assess pain using appropriate pain scale  - Administer analgesics based on type and severity of pain and evaluate response  - Implement non-pharmacological measures as appropriate and evaluate response  - Consider cultural and social influences on pain and pain management  - Notify physician/advanced practitioner if interventions unsuccessful or patient reports new pain  12/5/2024 1333 by Jennifer Alcantara RN  Outcome: Progressing  12/5/2024 1333 by Jennifer Alcantara RN  Outcome: Progressing     Problem: INFECTION - ADULT  Goal: Absence or prevention of progression during hospitalization  Description: INTERVENTIONS:  - Assess and monitor for signs and symptoms of infection  - Monitor lab/diagnostic results  - Monitor all insertion sites, i.e. indwelling lines, tubes, and drains  - Monitor endotracheal if appropriate and nasal secretions for changes in amount and color  - [place] appropriate cooling/warming therapies per order  -  Administer medications as ordered  - Instruct and encourage patient and family to use good hand hygiene technique  - Identify and instruct in appropriate isolation precautions for identified infection/condition  12/5/2024 1333 by Jennifer Alcantara RN  Outcome: Progressing  12/5/2024 1333 by Jennifer Alcantara RN  Outcome: Progressing  Goal: Absence of fever/infection during neutropenic period  Description: INTERVENTIONS:  - Monitor WBC    12/5/2024 1333 by Jennifer Alcantara RN  Outcome: Progressing  12/5/2024 1333 by Jennifer Alcantara RN  Outcome: Progressing     Problem: SAFETY ADULT  Goal: Patient will remain free of falls  Description: INTERVENTIONS:  - Educate patient/family on patient safety including physical limitations  - Instruct patient to call for assistance with activity   - Consult OT/PT to assist with strengthening/mobility   - Keep Call bell within reach  - Keep bed low and locked with side rails adjusted as appropriate  - Keep care items and personal belongings within reach  - Initiate and maintain comfort rounds  - Make Fall Risk Sign visible to staff  - Offer Toileting every 2 Hours, in advance of need  - Initiate/Maintain bed/chair alarm  - Obtain necessary fall risk management equipment  - Apply yellow socks and bracelet for high fall risk patients  - Consider moving patient to room near nurses station  12/5/2024 1333 by Jennifer Alcantara RN  Outcome: Progressing  12/5/2024 1333 by Jennifer Alcantara RN  Outcome: Progressing  Goal: Maintain or return to baseline ADL function  Description: INTERVENTIONS:  -  Assess patient's ability to carry out ADLs; assess patient's baseline for ADL function and identify physical deficits which impact ability to perform ADLs (bathing, care of mouth/teeth, toileting, grooming, dressing, etc.)  - Assess/evaluate cause of self-care deficits   - Assess range of motion  - Assess patient's mobility; develop plan if impaired  - Assess patient's need for assistive devices and provide as appropriate  -  Encourage maximum independence but intervene and supervise when necessary  - Involve family in performance of ADLs  - Assess for home care needs following discharge   - Consider OT consult to assist with ADL evaluation and planning for discharge  - Provide patient education as appropriate  12/5/2024 1333 by Jennifer Alcantara RN  Outcome: Progressing  12/5/2024 1333 by Jennifer Alcantara RN  Outcome: Progressing  Goal: Maintains/Returns to pre admission functional level  Description: INTERVENTIONS:  - Perform AM-PAC 6 Click Basic Mobility/ Daily Activity assessment daily.  - Set and communicate daily mobility goal to care team and patient/family/caregiver.   - Collaborate with rehabilitation services on mobility goals if consulted  - Perform Range of Motion 3 times a day.  - Reposition patient every 2 hours.  - Dangle patient 3 times a day  - Stand patient 3 times a day  - Ambulate patient 3 times a day  - Out of bed to chair 3 times a day   - Out of bed for meals 3 times a day  - Out of bed for toileting  - Record patient progress and toleration of activity level   12/5/2024 1333 by Jennifer Alcantara RN  Outcome: Progressing  12/5/2024 1333 by Jennifer Alcantara RN  Outcome: Progressing     Problem: DISCHARGE PLANNING  Goal: Discharge to home or other facility with appropriate resources  Description: INTERVENTIONS:  - Identify barriers to discharge w/patient and caregiver  - Arrange for needed discharge resources and transportation as appropriate  - Identify discharge learning needs (meds, wound care, etc.)  - Arrange for interpretive services to assist at discharge as needed  - Refer to Case Management Department for coordinating discharge planning if the patient needs post-hospital services based on physician/advanced practitioner order or complex needs related to functional status, cognitive ability, or social support system  12/5/2024 1333 by Jennifer Alcantara RN  Outcome: Progressing  12/5/2024 1333 by Jennifer Alcantara RN  Outcome:  Progressing     Problem: Knowledge Deficit  Goal: Patient/family/caregiver demonstrates understanding of disease process, treatment plan, medications, and discharge instructions  Description: Complete learning assessment and assess knowledge base.  Interventions:  - Provide teaching at level of understanding  - Provide teaching via preferred learning methods  12/5/2024 1333 by Jennifer Alcantara RN  Outcome: Progressing  12/5/2024 1333 by Jennifer Alcantara RN  Outcome: Progressing     Problem: CARDIOVASCULAR - ADULT  Goal: Maintains optimal cardiac output and hemodynamic stability  Description: INTERVENTIONS:  - Monitor I/O, vital signs and rhythm  - Monitor for S/S and trends of decreased cardiac output  - Administer and titrate ordered vasoactive medications to optimize hemodynamic stability  - Assess quality of pulses, skin color and temperature  - Assess for signs of decreased coronary artery perfusion  - Instruct patient to report change in severity of symptoms  12/5/2024 1333 by Jennifer Alcantara RN  Outcome: Progressing  12/5/2024 1333 by Jennifer Alcantara RN  Outcome: Progressing  Goal: Absence of cardiac dysrhythmias or at baseline rhythm  Description: INTERVENTIONS:  - Continuous cardiac monitoring, vital signs, obtain 12 lead EKG if ordered  - Administer antiarrhythmic and heart rate control medications as ordered  - Monitor electrolytes and administer replacement therapy as ordered  12/5/2024 1333 by Jennifer Alcantara RN  Outcome: Progressing  12/5/2024 1333 by Jennifer Alcantara RN  Outcome: Progressing     Problem: RESPIRATORY - ADULT  Goal: Achieves optimal ventilation and oxygenation  Description: INTERVENTIONS:  - Assess for changes in respiratory status  - Assess for changes in mentation and behavior  - Position to facilitate oxygenation and minimize respiratory effort  - Oxygen administered by appropriate delivery if ordered  - Initiate smoking cessation education as indicated  - Encourage broncho-pulmonary hygiene including  cough, deep breathe, Incentive Spirometry  - Assess the need for suctioning and aspirate as needed  - Assess and instruct to report SOB or any respiratory difficulty  - Respiratory Therapy support as indicated  12/5/2024 1333 by Jennifer Alcantara RN  Outcome: Progressing  12/5/2024 1333 by Jennifer Alcantara RN  Outcome: Progressing     Problem: METABOLIC, FLUID AND ELECTROLYTES - ADULT  Goal: Electrolytes maintained within normal limits  Description: INTERVENTIONS:  - Monitor labs and assess patient for signs and symptoms of electrolyte imbalances  - Administer electrolyte replacement as ordered  - Monitor response to electrolyte replacements, including repeat lab results as appropriate  - Instruct patient on fluid and nutrition as appropriate  12/5/2024 1333 by Jennifer Alcantara RN  Outcome: Progressing  12/5/2024 1333 by Jennifer Alcantara RN  Outcome: Progressing  Goal: Fluid balance maintained  Description: INTERVENTIONS:  - Monitor labs   - Monitor I/O and WT  - Instruct patient on fluid and nutrition as appropriate  - Assess for signs & symptoms of volume excess or deficit  12/5/2024 1333 by Jennifer Alcantara RN  Outcome: Progressing  12/5/2024 1333 by Jennifer Alcantara RN  Outcome: Progressing  Goal: Glucose maintained within target range  Description: INTERVENTIONS:  - Monitor Blood Glucose as ordered  - Assess for signs and symptoms of hyperglycemia and hypoglycemia  - Administer ordered medications to maintain glucose within target range  - Assess nutritional intake and initiate nutrition service referral as needed  12/5/2024 1333 by Jennifer Alcantara RN  Outcome: Progressing  12/5/2024 1333 by Jennifer Alcantara RN  Outcome: Progressing     Problem: Nutrition/Hydration-ADULT  Goal: Nutrient/Hydration intake appropriate for improving, restoring or maintaining nutritional needs  Description: Monitor and assess patient's nutrition/hydration status for malnutrition. Collaborate with interdisciplinary team and initiate plan and interventions as  ordered.  Monitor patient's weight and dietary intake as ordered or per policy. Utilize nutrition screening tool and intervene as necessary. Determine patient's food preferences and provide high-protein, high-caloric foods as appropriate.     INTERVENTIONS:  - Monitor oral intake, urinary output, labs, and treatment plans  - Assess nutrition and hydration status and recommend course of action  - Evaluate amount of meals eaten  - Assist patient with eating if necessary   - Allow adequate time for meals  - Recommend/ encourage appropriate diets, oral nutritional supplements, and vitamin/mineral supplements  - Order, calculate, and assess calorie counts as needed  - Recommend, monitor, and adjust tube feedings and TPN/PPN based on assessed needs  - Assess need for intravenous fluids  - Provide specific nutrition/hydration education as appropriate  - Include patient/family/caregiver in decisions related to nutrition  12/5/2024 1333 by Jennifer Alcantara RN  Outcome: Progressing  12/5/2024 1333 by Jennifer Alcantara, RN  Outcome: Progressing

## 2024-12-05 NOTE — ASSESSMENT & PLAN NOTE
7/29/24 Echo: EF 75%, G1DD  Was fluid overloaded on admission and this has improved.  His new estimated dry weight is likely 90.0  kg   but his postdialysis weight after last treatment was 88 kg, may need to lower target weight further.   Continue torsemide 40 mg daily, still with some lower extremity edema would recommend challenge target weight as tolerated

## 2024-12-05 NOTE — ASSESSMENT & PLAN NOTE
Patient is maintained on Coumadin PTA  INR supratherapeutic at time of presentation and was placed on hold  INR trending upwards and dialysis was delayed for a few days so vitamin K was administered at 2.5 mg  Patient now with subtherapeutic INR  Continue to monitor while administering low-dose Coumadin -on hold due to rapid increase in INR  Also monitor platelets  Lab Results   Component Value Date    INR 2.44 (H) 12/05/2024    INR 2.52 (H) 12/04/2024    INR 2.56 (H) 12/03/2024

## 2024-12-05 NOTE — ASSESSMENT & PLAN NOTE
Patient and I had discussion today about his goals of care moving forward  He endorses that he has been feeling depressed and frustrated with his continued decline in health  He says that sometimes he wishes that he could just go home to pass away and be comfortable  He shares that he is not disagreeable for treatment but sometimes feels as though he is being tortured  Pain discussed possibilities of psychiatry consult for depression/trying recommendations made by prior psychiatrist for [place]  At this time patient declines  Palliative care consulted  12/2 at this time declines any additional medications.  States that he is frustrated with continued hospitalization though is agreeable to this and agreeable to discharge to rehab.  Wants to continue treatment focused care without invasive measures  12/3 requesting to reconsult with palliative care -patient expressing wishes to rediscuss hospice  12/4 - patient discussed with hospice liaison, currently declining hospice and would like to continue dialysis but states that he still has a lot of decisions going on in his head.   Agreeable to palliative discussion and hopeful to have son present during this   Ongoing goals of care discussion

## 2024-12-05 NOTE — ASSESSMENT & PLAN NOTE
Treatment for COVID-19 per primary team.  Now off isolation precautions  There was concern for volume overload which is better now.  Continue oral torsemide and UF with HD

## 2024-12-05 NOTE — PLAN OF CARE
Problem: Prexisting or High Potential for Compromised Skin Integrity  Goal: Skin integrity is maintained or improved  Description: INTERVENTIONS:  - Identify patients at risk for skin breakdown  - Assess and monitor skin integrity  - Assess and monitor nutrition and hydration status  - Monitor labs   - Assess for incontinence   - Turn and reposition patient  - Assist with mobility/ambulation  - Relieve pressure over bony prominences  - Avoid friction and shearing  - Provide appropriate hygiene as needed including keeping skin clean and dry  - Evaluate need for skin moisturizer/barrier cream  - Collaborate with interdisciplinary team   - Patient/family teaching  - Consider wound care consult   Outcome: Progressing     Problem: PAIN - ADULT  Goal: Verbalizes/displays adequate comfort level or baseline comfort level  Description: Interventions:  - Encourage patient to monitor pain and request assistance  - Assess pain using appropriate pain scale  - Administer analgesics based on type and severity of pain and evaluate response  - Implement non-pharmacological measures as appropriate and evaluate response  - Consider cultural and social influences on pain and pain management  - Notify physician/advanced practitioner if interventions unsuccessful or patient reports new pain  Outcome: Progressing     Problem: INFECTION - ADULT  Goal: Absence or prevention of progression during hospitalization  Description: INTERVENTIONS:  - Assess and monitor for signs and symptoms of infection  - Monitor lab/diagnostic results  - Monitor all insertion sites, i.e. indwelling lines, tubes, and drains  - Monitor endotracheal if appropriate and nasal secretions for changes in amount and color  - [place] appropriate cooling/warming therapies per order  - Administer medications as ordered  - Instruct and encourage patient and family to use good hand hygiene technique  - Identify and instruct in appropriate isolation precautions for  identified infection/condition  Outcome: Progressing  Goal: Absence of fever/infection during neutropenic period  Description: INTERVENTIONS:  - Monitor WBC    Outcome: Progressing     Problem: SAFETY ADULT  Goal: Patient will remain free of falls  Description: INTERVENTIONS:  - Educate patient/family on patient safety including physical limitations  - Instruct patient to call for assistance with activity   - Consult OT/PT to assist with strengthening/mobility   - Keep Call bell within reach  - Keep bed low and locked with side rails adjusted as appropriate  - Keep care items and personal belongings within reach  - Initiate and maintain comfort rounds  - Make Fall Risk Sign visible to staff  - Offer Toileting every 2 Hours, in advance of need  - Initiate/Maintain bed/chair alarm  - Obtain necessary fall risk management equipment  - Apply yellow socks and bracelet for high fall risk patients  - Consider moving patient to room near nurses station  Outcome: Progressing  Goal: Maintain or return to baseline ADL function  Description: INTERVENTIONS:  -  Assess patient's ability to carry out ADLs; assess patient's baseline for ADL function and identify physical deficits which impact ability to perform ADLs (bathing, care of mouth/teeth, toileting, grooming, dressing, etc.)  - Assess/evaluate cause of self-care deficits   - Assess range of motion  - Assess patient's mobility; develop plan if impaired  - Assess patient's need for assistive devices and provide as appropriate  - Encourage maximum independence but intervene and supervise when necessary  - Involve family in performance of ADLs  - Assess for home care needs following discharge   - Consider OT consult to assist with ADL evaluation and planning for discharge  - Provide patient education as appropriate  Outcome: Progressing  Goal: Maintains/Returns to pre admission functional level  Description: INTERVENTIONS:  - Perform AM-PAC 6 Click Basic Mobility/ Daily  Activity assessment daily.  - Set and communicate daily mobility goal to care team and patient/family/caregiver.   - Collaborate with rehabilitation services on mobility goals if consulted  - Perform Range of Motion 3 times a day.  - Reposition patient every 2 hours.  - Dangle patient 3 times a day  - Stand patient 3 times a day  - Ambulate patient 3 times a day  - Out of bed to chair 3 times a day   - Out of bed for meals 3 times a day  - Out of bed for toileting  - Record patient progress and toleration of activity level   Outcome: Progressing     Problem: DISCHARGE PLANNING  Goal: Discharge to home or other facility with appropriate resources  Description: INTERVENTIONS:  - Identify barriers to discharge w/patient and caregiver  - Arrange for needed discharge resources and transportation as appropriate  - Identify discharge learning needs (meds, wound care, etc.)  - Arrange for interpretive services to assist at discharge as needed  - Refer to Case Management Department for coordinating discharge planning if the patient needs post-hospital services based on physician/advanced practitioner order or complex needs related to functional status, cognitive ability, or social support system  Outcome: Progressing     Problem: Knowledge Deficit  Goal: Patient/family/caregiver demonstrates understanding of disease process, treatment plan, medications, and discharge instructions  Description: Complete learning assessment and assess knowledge base.  Interventions:  - Provide teaching at level of understanding  - Provide teaching via preferred learning methods  Outcome: Progressing     Problem: CARDIOVASCULAR - ADULT  Goal: Maintains optimal cardiac output and hemodynamic stability  Description: INTERVENTIONS:  - Monitor I/O, vital signs and rhythm  - Monitor for S/S and trends of decreased cardiac output  - Administer and titrate ordered vasoactive medications to optimize hemodynamic stability  - Assess quality of pulses,  skin color and temperature  - Assess for signs of decreased coronary artery perfusion  - Instruct patient to report change in severity of symptoms  Outcome: Progressing  Goal: Absence of cardiac dysrhythmias or at baseline rhythm  Description: INTERVENTIONS:  - Continuous cardiac monitoring, vital signs, obtain 12 lead EKG if ordered  - Administer antiarrhythmic and heart rate control medications as ordered  - Monitor electrolytes and administer replacement therapy as ordered  Outcome: Progressing     Problem: RESPIRATORY - ADULT  Goal: Achieves optimal ventilation and oxygenation  Description: INTERVENTIONS:  - Assess for changes in respiratory status  - Assess for changes in mentation and behavior  - Position to facilitate oxygenation and minimize respiratory effort  - Oxygen administered by appropriate delivery if ordered  - Initiate smoking cessation education as indicated  - Encourage broncho-pulmonary hygiene including cough, deep breathe, Incentive Spirometry  - Assess the need for suctioning and aspirate as needed  - Assess and instruct to report SOB or any respiratory difficulty  - Respiratory Therapy support as indicated  Outcome: Progressing     Problem: METABOLIC, FLUID AND ELECTROLYTES - ADULT  Goal: Electrolytes maintained within normal limits  Description: INTERVENTIONS:  - Monitor labs and assess patient for signs and symptoms of electrolyte imbalances  - Administer electrolyte replacement as ordered  - Monitor response to electrolyte replacements, including repeat lab results as appropriate  - Instruct patient on fluid and nutrition as appropriate  Outcome: Progressing  Goal: Fluid balance maintained  Description: INTERVENTIONS:  - Monitor labs   - Monitor I/O and WT  - Instruct patient on fluid and nutrition as appropriate  - Assess for signs & symptoms of volume excess or deficit  Outcome: Progressing  Goal: Glucose maintained within target range  Description: INTERVENTIONS:  - Monitor Blood  Glucose as ordered  - Assess for signs and symptoms of hyperglycemia and hypoglycemia  - Administer ordered medications to maintain glucose within target range  - Assess nutritional intake and initiate nutrition service referral as needed  Outcome: Progressing     Problem: Nutrition/Hydration-ADULT  Goal: Nutrient/Hydration intake appropriate for improving, restoring or maintaining nutritional needs  Description: Monitor and assess patient's nutrition/hydration status for malnutrition. Collaborate with interdisciplinary team and initiate plan and interventions as ordered.  Monitor patient's weight and dietary intake as ordered or per policy. Utilize nutrition screening tool and intervene as necessary. Determine patient's food preferences and provide high-protein, high-caloric foods as appropriate.     INTERVENTIONS:  - Monitor oral intake, urinary output, labs, and treatment plans  - Assess nutrition and hydration status and recommend course of action  - Evaluate amount of meals eaten  - Assist patient with eating if necessary   - Allow adequate time for meals  - Recommend/ encourage appropriate diets, oral nutritional supplements, and vitamin/mineral supplements  - Order, calculate, and assess calorie counts as needed  - Recommend, monitor, and adjust tube feedings and TPN/PPN based on assessed needs  - Assess need for intravenous fluids  - Provide specific nutrition/hydration education as appropriate  - Include patient/family/caregiver in decisions related to nutrition  Outcome: Progressing

## 2024-12-05 NOTE — PLAN OF CARE
Problem: Prexisting or High Potential for Compromised Skin Integrity  Goal: Skin integrity is maintained or improved  Description: INTERVENTIONS:  - Identify patients at risk for skin breakdown  - Assess and monitor skin integrity  - Assess and monitor nutrition and hydration status  - Monitor labs   - Assess for incontinence   - Turn and reposition patient  - Assist with mobility/ambulation  - Relieve pressure over bony prominences  - Avoid friction and shearing  - Provide appropriate hygiene as needed including keeping skin clean and dry  - Evaluate need for skin moisturizer/barrier cream  - Collaborate with interdisciplinary team   - Patient/family teaching  - Consider wound care consult   Outcome: Progressing     Problem: PAIN - ADULT  Goal: Verbalizes/displays adequate comfort level or baseline comfort level  Description: Interventions:  - Encourage patient to monitor pain and request assistance  - Assess pain using appropriate pain scale  - Administer analgesics based on type and severity of pain and evaluate response  - Implement non-pharmacological measures as appropriate and evaluate response  - Consider cultural and social influences on pain and pain management  - Notify physician/advanced practitioner if interventions unsuccessful or patient reports new pain  Outcome: Progressing     Problem: INFECTION - ADULT  Goal: Absence or prevention of progression during hospitalization  Description: INTERVENTIONS:  - Assess and monitor for signs and symptoms of infection  - Monitor lab/diagnostic results  - Monitor all insertion sites, i.e. indwelling lines, tubes, and drains  - Monitor endotracheal if appropriate and nasal secretions for changes in amount and color  - [place] appropriate cooling/warming therapies per order  - Administer medications as ordered  - Instruct and encourage patient and family to use good hand hygiene technique  - Identify and instruct in appropriate isolation precautions for  identified infection/condition  Outcome: Progressing  Goal: Absence of fever/infection during neutropenic period  Description: INTERVENTIONS:  - Monitor WBC    Outcome: Progressing     Problem: SAFETY ADULT  Goal: Patient will remain free of falls  Description: INTERVENTIONS:  - Educate patient/family on patient safety including physical limitations  - Instruct patient to call for assistance with activity   - Consult OT/PT to assist with strengthening/mobility   - Keep Call bell within reach  - Keep bed low and locked with side rails adjusted as appropriate  - Keep care items and personal belongings within reach  - Initiate and maintain comfort rounds  - Make Fall Risk Sign visible to staff  - Offer Toileting every 2 Hours, in advance of need  - Initiate/Maintain bed/chair alarm  - Obtain necessary fall risk management equipment  - Apply yellow socks and bracelet for high fall risk patients  - Consider moving patient to room near nurses station  Outcome: Progressing  Goal: Maintain or return to baseline ADL function  Description: INTERVENTIONS:  -  Assess patient's ability to carry out ADLs; assess patient's baseline for ADL function and identify physical deficits which impact ability to perform ADLs (bathing, care of mouth/teeth, toileting, grooming, dressing, etc.)  - Assess/evaluate cause of self-care deficits   - Assess range of motion  - Assess patient's mobility; develop plan if impaired  - Assess patient's need for assistive devices and provide as appropriate  - Encourage maximum independence but intervene and supervise when necessary  - Involve family in performance of ADLs  - Assess for home care needs following discharge   - Consider OT consult to assist with ADL evaluation and planning for discharge  - Provide patient education as appropriate  Outcome: Progressing  Goal: Maintains/Returns to pre admission functional level  Description: INTERVENTIONS:  - Perform AM-PAC 6 Click Basic Mobility/ Daily  Activity assessment daily.  - Set and communicate daily mobility goal to care team and patient/family/caregiver.   - Collaborate with rehabilitation services on mobility goals if consulted  - Perform Range of Motion 3 times a day.  - Reposition patient every 2 hours.  - Dangle patient 3 times a day  - Stand patient 3 times a day  - Ambulate patient 3 times a day  - Out of bed to chair 3 times a day   - Out of bed for meals 3 times a day  - Out of bed for toileting  - Record patient progress and toleration of activity level   Outcome: Progressing     Problem: DISCHARGE PLANNING  Goal: Discharge to home or other facility with appropriate resources  Description: INTERVENTIONS:  - Identify barriers to discharge w/patient and caregiver  - Arrange for needed discharge resources and transportation as appropriate  - Identify discharge learning needs (meds, wound care, etc.)  - Arrange for interpretive services to assist at discharge as needed  - Refer to Case Management Department for coordinating discharge planning if the patient needs post-hospital services based on physician/advanced practitioner order or complex needs related to functional status, cognitive ability, or social support system  Outcome: Progressing     Problem: Knowledge Deficit  Goal: Patient/family/caregiver demonstrates understanding of disease process, treatment plan, medications, and discharge instructions  Description: Complete learning assessment and assess knowledge base.  Interventions:  - Provide teaching at level of understanding  - Provide teaching via preferred learning methods  Outcome: Progressing     Problem: CARDIOVASCULAR - ADULT  Goal: Maintains optimal cardiac output and hemodynamic stability  Description: INTERVENTIONS:  - Monitor I/O, vital signs and rhythm  - Monitor for S/S and trends of decreased cardiac output  - Administer and titrate ordered vasoactive medications to optimize hemodynamic stability  - Assess quality of pulses,  skin color and temperature  - Assess for signs of decreased coronary artery perfusion  - Instruct patient to report change in severity of symptoms  Outcome: Progressing  Goal: Absence of cardiac dysrhythmias or at baseline rhythm  Description: INTERVENTIONS:  - Continuous cardiac monitoring, vital signs, obtain 12 lead EKG if ordered  - Administer antiarrhythmic and heart rate control medications as ordered  - Monitor electrolytes and administer replacement therapy as ordered  Outcome: Progressing     Problem: RESPIRATORY - ADULT  Goal: Achieves optimal ventilation and oxygenation  Description: INTERVENTIONS:  - Assess for changes in respiratory status  - Assess for changes in mentation and behavior  - Position to facilitate oxygenation and minimize respiratory effort  - Oxygen administered by appropriate delivery if ordered  - Initiate smoking cessation education as indicated  - Encourage broncho-pulmonary hygiene including cough, deep breathe, Incentive Spirometry  - Assess the need for suctioning and aspirate as needed  - Assess and instruct to report SOB or any respiratory difficulty  - Respiratory Therapy support as indicated  Outcome: Progressing     Problem: METABOLIC, FLUID AND ELECTROLYTES - ADULT  Goal: Electrolytes maintained within normal limits  Description: INTERVENTIONS:  - Monitor labs and assess patient for signs and symptoms of electrolyte imbalances  - Administer electrolyte replacement as ordered  - Monitor response to electrolyte replacements, including repeat lab results as appropriate  - Instruct patient on fluid and nutrition as appropriate  Outcome: Progressing  Goal: Fluid balance maintained  Description: INTERVENTIONS:  - Monitor labs   - Monitor I/O and WT  - Instruct patient on fluid and nutrition as appropriate  - Assess for signs & symptoms of volume excess or deficit  Outcome: Progressing  Goal: Glucose maintained within target range  Description: INTERVENTIONS:  - Monitor Blood  Glucose as ordered  - Assess for signs and symptoms of hyperglycemia and hypoglycemia  - Administer ordered medications to maintain glucose within target range  - Assess nutritional intake and initiate nutrition service referral as needed  Outcome: Progressing     Problem: Nutrition/Hydration-ADULT  Goal: Nutrient/Hydration intake appropriate for improving, restoring or maintaining nutritional needs  Description: Monitor and assess patient's nutrition/hydration status for malnutrition. Collaborate with interdisciplinary team and initiate plan and interventions as ordered.  Monitor patient's weight and dietary intake as ordered or per policy. Utilize nutrition screening tool and intervene as necessary. Determine patient's food preferences and provide high-protein, high-caloric foods as appropriate.     INTERVENTIONS:  - Monitor oral intake, urinary output, labs, and treatment plans  - Assess nutrition and hydration status and recommend course of action  - Evaluate amount of meals eaten  - Assist patient with eating if necessary   - Allow adequate time for meals  - Recommend/ encourage appropriate diets, oral nutritional supplements, and vitamin/mineral supplements  - Order, calculate, and assess calorie counts as needed  - Recommend, monitor, and adjust tube feedings and TPN/PPN based on assessed needs  - Assess need for intravenous fluids  - Provide specific nutrition/hydration education as appropriate  - Include patient/family/caregiver in decisions related to nutrition  Outcome: Progressing

## 2024-12-05 NOTE — ASSESSMENT & PLAN NOTE
BP stable and is at goal  Rx: Midodrine 15 mg TID. Metoprolol 25 mg BID.  Maintain metoprolol and midodrine at this time.  Patient is on metoprolol for rate control for paroxysmal atrial fibrillation.  Torsemide 60 mg BID stopped on 11/25/24 due to soft blood pressure/diarrhea but was restarted back on torsemide at a dose of 40 mg daily on 12/4, continue at current dose  Continue to monitor blood pressure

## 2024-12-05 NOTE — ASSESSMENT & PLAN NOTE
- On 12/3/2024: Patient asked me about what would happen if dialysis would be stopped, discussed about risk of fluid overload electrolyte imbalance and ultimately death in 2 to 3 weeks.  Discussed that he may have to consider hospice if plan to stop dialysis. He did not want to stop at this time after discussion..  There has been ongoing discussion regarding goals of care

## 2024-12-05 NOTE — PLAN OF CARE
Post-Dialysis RN Treatment Note    Blood Pressure:  Pre 118/64 mm/Hg  Post 114/58 mmHg   EDW:  92 kg    Weight:  Pre 88.1 kg   Post 87.2 kg   Mode of weight measurement: Bed Scale   Volume Removed:  534 ml    Treatment duration: 98 minutes    NS given:  No    Treatment shortened Yes, describe: 2 hours and 22 minutes due to emergent treatment    Medications given during Rx: None Reported   Estimated Kt/V:  0.87   Access type: Permacath/TDC   Needle Gauge:  NA   Access Issues: No    Report called to primary nurse:   Yes Jennifer Alcantara RN        Current hemodialysis plan of care is to remove a total of 2500 ml of fluid over a 4 hour treatment for a net of 2 liters as tolerated.  Monitor vital signs every 15 minutes while on treatment for patient safety.  Utilize a 4 K+ bath for serum potassium of 3.4 to maintain electrolyte balance.  Report received from Jennifer Alcantara RN.  Plan reviewed with Dr Barajas via [email or web address] Chat.        Problem: METABOLIC, FLUID AND ELECTROLYTES - ADULT  Goal: Electrolytes maintained within normal limits  Description: INTERVENTIONS:  - Monitor labs and assess patient for signs and symptoms of electrolyte imbalances  - Administer electrolyte replacement as ordered  - Monitor response to electrolyte replacements, including repeat lab results as appropriate  - Instruct patient on fluid and nutrition as appropriate  Outcome: Progressing  Goal: Fluid balance maintained  Description: INTERVENTIONS:  - Monitor labs   - Monitor I/O and WT  - Instruct patient on fluid and nutrition as appropriate  - Assess for signs & symptoms of volume excess or deficit  Outcome: Progressing

## 2024-12-05 NOTE — ASSESSMENT & PLAN NOTE
Wt Readings from Last 3 Encounters:   12/05/24 88 kg (194 lb 0.1 oz)   10/14/24 115 kg (253 lb)   10/07/24 118 kg (260 lb)   CXR on 11/20 appeared to be pulmonary vascular congestion   Patient is a dialysis basis, received hemodialysis on TTS  Most recent session 11/30 to resume [facility] schedule  Torsemide is on hold secondary to ongoing diarrhea and anorexia  Appreciate continued nephrology follow-up  Monitor intake/output and daily weight  Acute exacerbation seems to have resolved-euvolemic and continue with dialysis

## 2024-12-05 NOTE — ASSESSMENT & PLAN NOTE
Presents from [place]. Tested positive 3 days prior to admission when symptoms started  Positive in our records on 11/20  CT chest negative for any pathology  Started decadron and remdesevir, doxycycline  Has completed therapy with all 3  Was on Coumadin for anticoagulation/DVT prophylaxis  Patient also having diarrhea, and C. difficile showing carrier state -see under diarrhea of infectious origin for further treatment plan  Currently 0.5 L nasal cannula/room air - fluctuating   Has completed quarantine for COVID-no longer needs airborne precautions

## 2024-12-05 NOTE — ASSESSMENT & PLAN NOTE
Lab Results   Component Value Date    EGFR 23 12/05/2024    EGFR 34 12/04/2024    EGFR 21 12/03/2024    CREATININE 2.66 (H) 12/05/2024    CREATININE 1.93 (H) 12/04/2024    CREATININE 2.80 (H) 12/03/2024   Typical regimen Tuesday Thursday Saturday  Nephrology following  Resume prior schedule

## 2024-12-05 NOTE — CASE MANAGEMENT
rec'd vm from Liseth @ [facility] [phone] stated that based on MD note is discussing hospice with his son, asking if there is any documentation about this discussion that  if the patient decided to do hospice or not   Ean notified Tres Reyes

## 2024-12-05 NOTE — PROGRESS NOTES
Progress Note - Hospitalist   Name: Masoud Perry 69 y.o. male I MRN: [id number]  Unit/Bed#: -01 I Date of Admission: 11/20/2024   Date of Service: 12/5/2024 I Hospital Day: 15    Assessment & Plan  Acute respiratory insufficiency  Oxygen needs have waxed and waned  CT chest abdomen pelvis with mild diffuse anasarca improved from prior CT, no specific pathology along  Monitor volume status  Between 0.5 L nasal cannula for comfort and room air  Acute on chronic diastolic HF (heart failure) (HCC)  Wt Readings from Last 3 Encounters:   12/05/24 88 kg (194 lb 0.1 oz)   10/14/24 115 kg (253 lb)   10/07/24 118 kg (260 lb)   CXR on 11/20 appeared to be pulmonary vascular congestion   Patient is a dialysis basis, received hemodialysis on TTS  Most recent session 11/30 to resume [facility] schedule  Torsemide is on hold secondary to ongoing diarrhea and anorexia  Appreciate continued nephrology follow-up  Monitor intake/output and daily weight  Acute exacerbation seems to have resolved-euvolemic and continue with dialysis  COVID-19  Presents from [place]. Tested positive 3 days prior to admission when symptoms started  Positive in our records on 11/20  CT chest negative for any pathology  Started decadron and remdesevir, doxycycline  Has completed therapy with all 3  Was on Coumadin for anticoagulation/DVT prophylaxis  Patient also having diarrhea, and C. difficile showing carrier state -see under diarrhea of infectious origin for further treatment plan  Currently 0.5 L nasal cannula/room air - fluctuating   Has completed quarantine for COVID-no longer needs airborne precautions  Goals of care, counseling/discussion  Patient and I had discussion today about his goals of care moving forward  He endorses that he has been feeling depressed and frustrated with his continued decline in health  He says that sometimes he wishes that he could just go home to pass away and be comfortable  He shares that he is not disagreeable for  treatment but sometimes feels as though he is being tortured  Pain discussed possibilities of psychiatry consult for depression/trying recommendations made by prior psychiatrist for Remeron  At this time patient declines  Palliative care consulted  12/2 at this time declines any additional medications.  States that he is frustrated with continued hospitalization though is agreeable to this and agreeable to discharge to rehab.  Wants to continue treatment focused care without invasive measures  12/3 requesting to reconsult with palliative care -patient expressing wishes to rediscuss hospice  12/4 - patient discussed with hospice liaison, currently declining hospice and would like to continue dialysis but states that he still has a lot of decisions going on in his head.   Agreeable to palliative discussion and hopeful to have son present during this   Ongoing goals of care discussion  Factor 5 Leiden mutation, heterozygous (HCC)  Patient is maintained on Coumadin PTA  INR supratherapeutic at time of presentation and was placed on hold  INR trending upwards and dialysis was delayed for a few days so vitamin K was administered at 2.5 mg  Patient now with subtherapeutic INR  Continue to monitor while administering low-dose Coumadin -on hold due to rapid increase in INR  Also monitor platelets  Lab Results   Component Value Date    INR 2.44 (H) 12/05/2024    INR 2.52 (H) 12/04/2024    INR 2.56 (H) 12/03/2024     Secondary hyperparathyroidism of renal origin ([facility])  Currently on 60 mg p.o. 3 times weekly  Hemodialysis-associated hypotension  Continue midodrine and pyridostigmine  Type 2 diabetes mellitus with stage 4 chronic kidney disease, with long-term current use of insulin (HCC)  Lab Results   Component Value Date    HGBA1C 5.0 11/02/2024       Recent Labs     12/04/24  1803 12/04/24  2212 12/05/24  0803 12/05/24  1159   POCGLU 93 80 72 76       Blood Sugar Average: Last 72 hrs:  (P) 79.85  Sliding scale insulin.  Hypoglycemia protocol  Patient with recurrent hypoglycemia secondary to poor oral intake  Today requiring amp of dextrose  We will continue to discuss patient's appetite with him and possible addition of appetite stimulants  Paroxysmal atrial fibrillation (HCC)  Patient did have rapid response secondary to chest pain  Cardiology consulted-thought to be secondary to A-fib rather than any ACS  Can continue metoprolol if blood pressure tolerates -maintain on midodrine as well to aid in attention  Continue to trend INR with Coumadin   ESRD (end stage renal disease) on dialysis (HCC)  Lab Results   Component Value Date    EGFR 23 12/05/2024    EGFR 34 12/04/2024    EGFR 21 12/03/2024    CREATININE 2.66 (H) 12/05/2024    CREATININE 1.93 (H) 12/04/2024    CREATININE 2.80 (H) 12/03/2024   Typical regimen Tuesday Thursday Saturday  Nephrology following  Resume prior schedule  Anemia due to chronic kidney disease, on chronic dialysis (HCC)  Patient with chronic anemia  Baseline hemoglobin around 8  During hospitalization patient's hemoglobin has been stable between 9 and 10  Mild decrease today   Monitor for signs of bleeding in setting of low platelets  Pancytopenia (HCC)  Multifactorial in origin, continue to monitor  Platelet count has been steadily decreasing with unclear etiology  Currently no signs of bleeding  Previously has supratherapeutic INR  Currently is subtherapeutic, continue to monitor INR and platelets and for any bleeding  Hold Coumadin dosing  Platelets low but stable at 52-56    Lab Results   Component Value Date    WBC 7.53 12/05/2024    RBC 2.91 (L) 12/05/2024    PLT 94 (L) 12/05/2024    PLT 62 (L) 12/04/2024     Electrolyte imbalance risk    Dysuria  Patient reporting ongoing dysuria since prior to admission  UA done 11/30 without any signs for acute cystitis  Is maintained on Proscar  Diarrhea  Patient admitted having significant diarrhea-initially suspected infectious secondary to COVID versus C.  difficile carrier status  Started on a course of oral vancomycin and completed-Per infectious disease do not feel that this is related to his diarrhea  Given lack of improvement in diarrhea-GI consulted  Ending further lab workup possible sources of diarrhea  If infectious etiology ruled out can trial Imodium versus Questran  Patient continues to deny wanting to start any other medications-would not like a powder and does not feel that he can handle a large pill.  GI feels that Imodium would not be a good option  Diarrhea is ongoing because of hypovolemia and for that torsemide is on hold  Epigastric pain    Palliative care by specialist    Hypophosphatemia    Severe protein-calorie malnutrition (HCC)  Malnutrition Findings:   Adult Malnutrition type: Acute illness  Adult Degree of Malnutrition: Other severe protein calorie malnutrition  Malnutrition Characteristics: Inadequate energy, Weight loss                  360 Statement: Acute on chronic severe malnutrition related to refusal for po intake, poor appetite, condition as evidenced by 10.9% weight loss x 14 days (11/20 220lb, 12/3 196lb); < 50% estimated energy intake > 5 days. Treated with: Recommend continued GOC discussion. Continue with daily weights. Can consider oral supplements if pt willing to take po intake though refusing po intake. Consider appetite stimulant if medically appropriate/pt agreeable though pt previously refused. Consider EN feeds if within GOC.    BMI Findings:           Body mass index is 26.31 kg/m².   Continues to decline appetite stimulant  Would decline tube feeds or enteric feedings or any invasive measures  Continue goals of care discussion    VTE Pharmacologic Prophylaxis: VTE Score: 6 High Risk (Score >/= 5) - Pharmacological DVT Prophylaxis Ordered: warfarin (Coumadin). Sequential Compression Devices Ordered.    Mobility:   Basic Mobility Inpatient Raw Score: 7  [facility] Goal: 2: Bed activities/Dependent transfer  [facility]  Achieved: 2: Bed activities/Dependent transfer  -[facility] Goal achieved. Continue to encourage appropriate mobility.    Patient Centered Rounds: I performed bedside rounds with nursing staff today.   Discussions with Specialists or Other Care Team Provider: nursing, cm, GI, palliative    Education and Discussions with Family / Patient: Updated  (daughter in law) via phone. Will call back this afternoon to discuss with son once he is home from work     Current Length of Stay: 15 day(s)  Current Patient Status: Inpatient   Certification Statement: The patient will continue to require additional inpatient hospital stay due to requiring monitoring of oral intake and palliative care discussion  Discharge Plan: Anticipate discharge in 24-48 hrs to rehab facility.    Code Status: Level 3 - DNAR and DNI    Subjective   Patient states that he is still having diarrhea and still does not have an appetite, but is still refusing appetite stimulant. Denies chest pain or shortness of breath. Patient once again stating that he wants to continue dialysis and continue with medical management at SNF and is declining hospice. Agreeable to palliative care discussion and is hopeful that his son can be present for this.    Objective :  Temp:  [97 °F (36.1 °C)-97.2 °F (36.2 °C)] 97.2 °F (36.2 °C)  HR:  [63-81] 78  BP: (105-138)/(60-72) 118/66  Resp:  [16-18] 17  SpO2:  [94 %-98 %] 95 %    Body mass index is 26.31 kg/m².     Input and Output Summary (last 24 hours):     Intake/Output Summary (Last 24 hours) at 12/5/2024 1304  Last data filed at 12/5/2024 0640  Gross per 24 hour   Intake 480 ml   Output 225 ml   Net 255 ml       Physical Exam  Vitals reviewed.   Constitutional:       General: He is not in acute distress.     Appearance: Normal appearance. He is obese. He is ill-appearing.   HENT:      Head: Normocephalic and atraumatic.      Nose: Nose normal.      Mouth/Throat:      Mouth: Mucous membranes are moist.       Pharynx: Oropharynx is clear.   Eyes:      Extraocular Movements: Extraocular movements intact.      Conjunctiva/sclera: Conjunctivae normal.   Cardiovascular:      Rate and Rhythm: Normal rate and regular rhythm.      Pulses: Normal pulses.      Heart sounds: Normal heart sounds. No murmur heard.  Pulmonary:      Effort: Pulmonary effort is normal. No respiratory distress.      Breath sounds: Normal breath sounds. No wheezing.   Abdominal:      General: Abdomen is flat. Bowel sounds are normal. There is no distension.      Palpations: Abdomen is soft.      Tenderness: There is no abdominal tenderness. There is no guarding.   Musculoskeletal:         General: Normal range of motion.      Cervical back: Normal range of motion.      Right lower leg: Edema present.      Left lower leg: Edema present.   Skin:     General: Skin is warm.      Coloration: Skin is pale.   Neurological:      General: No focal deficit present.      Mental Status: He is alert and oriented to person, place, and time. Mental status is at baseline.      Motor: No weakness.   Psychiatric:         Mood and Affect: Mood normal.         Behavior: Behavior normal.         Thought Content: Thought content normal.         Judgment: Judgment normal.         Lines/Drains:  Lines/Drains/Airways       Active Status       Name Placement date Placement time Site Days    HD Permanent Double Catheter 10/14/24  1420  Subclavian  51                            Lab Results: I have reviewed the following results:   Results from last 7 days   Lab Units 12/05/24  0639 12/02/24  0458 12/01/24  0842   WBC Thousand/uL 7.53   < > 5.45   HEMOGLOBIN g/dL 9.2*   < > 9.8*   HEMATOCRIT % 29.2*   < > 31.7*   PLATELETS Thousands/uL 94*   < > 68*   BANDS PCT %  --   --  1   SEGS PCT %  --   --  75   LYMPHO PCT %  --   --  19  19   MONO PCT %  --   --  4  2*   EOS PCT %  --   --  1    < > = values in this interval not displayed.     Results from last 7 days   Lab Units  12/05/24  0639 12/03/24  0609 12/02/24  0458   SODIUM mmol/L 135   < > 136   POTASSIUM mmol/L 3.4*   < > 3.5   CHLORIDE mmol/L 100   < > 101   CO2 mmol/L 29   < > 29   BUN mg/dL 14   < > 18   CREATININE mg/dL 2.66*   < > 2.31*   ANION GAP mmol/L 6   < > 6   CALCIUM mg/dL 8.0*   < > 8.7   ALBUMIN g/dL  --   --  2.5*   TOTAL BILIRUBIN mg/dL  --   --  0.53   ALK PHOS U/L  --   --  56   ALT U/L  --   --  5*   AST U/L  --   --  11*   GLUCOSE RANDOM mg/dL 69   < > 71    < > = values in this interval not displayed.     Results from last 7 days   Lab Units 12/05/24  0639   INR  2.44*     Results from last 7 days   Lab Units 12/05/24  1159 12/05/24  0803 12/04/24  2212 12/04/24  1803 12/04/24  1706 12/04/24  1645 12/04/24  1137 12/04/24  0915 12/04/24  0723 12/03/24  2226 12/03/24  1619 12/03/24  1133   POC GLUCOSE mg/dl 76 72 80 93 73 67 88 102 69 99 92 106         Results from last 7 days   Lab Units 11/29/24  1328 11/29/24  1313   LACTIC ACID mmol/L 1.1  --    PROCALCITONIN ng/ml  --  0.14       Recent Cultures (last 7 days):           Last 24 Hours Medication List:     Current Facility-Administered Medications:     acetaminophen (TYLENOL) tablet 650 mg, Q4H PRN    atorvastatin (LIPITOR) tablet 40 mg, Daily With Dinner    benzonatate (TESSALON PERLES) capsule 100 mg, TID PRN    calcium carbonate (TUMS) chewable tablet 500 mg, Daily PRN    cinacalcet (SENSIPAR) tablet 60 mg, Once per day on Monday Wednesday Friday    docusate sodium (COLACE) capsule 100 mg, BID PRN    finasteride (PROSCAR) tablet 5 mg, Daily    gentamicin (GARAMYCIN) 0.1 % cream, Daily    guaiFENesin (MUCINEX) 12 hr tablet 600 mg, Q12H VASU    HYDROcodone-acetaminophen (NORCO) 5-325 mg per tablet 2 tablet, Q6H PRN    insulin lispro (HumALOG/ADMELOG) 100 units/mL subcutaneous injection 1-6 Units, TID AC **AND** Fingerstick Glucose (POCT), TID AC    levothyroxine tablet 125 mcg, Early Morning    metoprolol tartrate (LOPRESSOR) tablet 25 mg, Q12H VASU     midodrine (PROAMATINE) tablet 15 mg, TID AC    nicotine (NICODERM CQ) 7 mg/24hr TD 24 hr patch 7 mg, Daily    ondansetron (ZOFRAN) injection 4 mg, Q6H PRN    potassium-sodium phosphates (PHOS-NAK) packet 1 packet, BID With Meals    pyridostigmine (MESTINON) tablet 60 mg, TID    simethicone (MYLICON) chewable tablet 80 mg, 4x Daily PRN    torsemide (DEMADEX) tablet 40 mg, Daily    [Held by provider] warfarin (COUMADIN) tablet 2 mg, HS    Administrative Statements   Today, Patient Was Seen By: Ofelia Salmeron PA-C    **Please Note: This note may have been constructed using a voice recognition system.**

## 2024-12-05 NOTE — ASSESSMENT & PLAN NOTE
WBC count improved but platelet count is still low but improving to 94.  Hemoglobin improved to 9.2 g/dL.    LDH was mildly elevated at 131 peripheral smear showed 1% bands, no schistocytes.  Continue monitoring and management per primary team

## 2024-12-05 NOTE — ASSESSMENT & PLAN NOTE
He was on PD but was changed to back up iHD during admission to [facility] in November 2024. Started intermittent hemodialysis on 11/8/24.  Still has PD catheter  Ramana DURON.  Previous EDW 98.5 kg - challenged this hospital admission, most recent post hemodialysis weight  92.4 kg from November 30th  Access: R IJ permcath. PD catheter in place.   Decrease target weight to 90 kg from prior target weight of 98 kg this hospital admission but after hemodialysis treatment on 12/3, postdialysis weight was 88 kg.  May need to lower target weight further plan for hemodialysis treatment today using higher potassium bath as potassium level was 3.4 meq/L.  Patient is now off COVID precautions so would switch to 4 hours of dialysis treatment

## 2024-12-05 NOTE — ASSESSMENT & PLAN NOTE
Called patient, she is in the process of moving  Will call back to schedule appt   Lab Results   Component Value Date    HGBA1C 5.0 11/02/2024       Recent Labs     12/04/24  1803 12/04/24  2212 12/05/24  0803 12/05/24  1159   POCGLU 93 80 72 76       Blood Sugar Average: Last 72 hrs:  (P) 79.85  Sliding scale insulin. Hypoglycemia protocol  Patient with recurrent hypoglycemia secondary to poor oral intake  Today requiring amp of dextrose  We will continue to discuss patient's appetite with him and possible addition of appetite stimulants

## 2024-12-06 LAB
ANION GAP SERPL CALCULATED.3IONS-SCNC: 6 MMOL/L (ref 4–13)
BUN SERPL-MCNC: 11 MG/DL (ref 5–25)
CALCIUM SERPL-MCNC: 8 MG/DL (ref 8.4–10.2)
CHLORIDE SERPL-SCNC: 98 MMOL/L (ref 96–108)
CO2 SERPL-SCNC: 30 MMOL/L (ref 21–32)
CREAT SERPL-MCNC: 2.41 MG/DL (ref 0.6–1.3)
ERYTHROCYTE [DISTWIDTH] IN BLOOD BY AUTOMATED COUNT: 14.2 % (ref 11.6–15.1)
GFR SERPL CREATININE-BSD FRML MDRD: 26 ML/MIN/1.73SQ M
GLUCOSE SERPL-MCNC: 236 MG/DL (ref 65–140)
GLUCOSE SERPL-MCNC: 65 MG/DL (ref 65–140)
GLUCOSE SERPL-MCNC: 68 MG/DL (ref 65–140)
GLUCOSE SERPL-MCNC: 68 MG/DL (ref 65–140)
GLUCOSE SERPL-MCNC: 72 MG/DL (ref 65–140)
GLUCOSE SERPL-MCNC: 77 MG/DL (ref 65–140)
GLUCOSE SERPL-MCNC: 89 MG/DL (ref 65–140)
HCT VFR BLD AUTO: 27.8 % (ref 36.5–49.3)
HGB BLD-MCNC: 8.7 G/DL (ref 12–17)
INR PPP: 2.24 (ref 0.85–1.19)
MCH RBC QN AUTO: 31.5 PG (ref 26.8–34.3)
MCHC RBC AUTO-ENTMCNC: 31.3 G/DL (ref 31.4–37.4)
MCV RBC AUTO: 101 FL (ref 82–98)
PLATELET # BLD AUTO: 74 THOUSANDS/UL (ref 149–390)
PMV BLD AUTO: 9.1 FL (ref 8.9–12.7)
POTASSIUM SERPL-SCNC: 3.5 MMOL/L (ref 3.5–5.3)
PROTHROMBIN TIME: 24.9 SECONDS (ref 12.3–15)
RBC # BLD AUTO: 2.76 MILLION/UL (ref 3.88–5.62)
SODIUM SERPL-SCNC: 134 MMOL/L (ref 135–147)
WBC # BLD AUTO: 5.61 THOUSAND/UL (ref 4.31–10.16)

## 2024-12-06 PROCEDURE — 80048 BASIC METABOLIC PNL TOTAL CA: CPT

## 2024-12-06 PROCEDURE — 82948 REAGENT STRIP/BLOOD GLUCOSE: CPT

## 2024-12-06 PROCEDURE — 99232 SBSQ HOSP IP/OBS MODERATE 35: CPT

## 2024-12-06 PROCEDURE — 85610 PROTHROMBIN TIME: CPT

## 2024-12-06 PROCEDURE — 85027 COMPLETE CBC AUTOMATED: CPT

## 2024-12-06 PROCEDURE — 99232 SBSQ HOSP IP/OBS MODERATE 35: CPT | Performed by: INTERNAL MEDICINE

## 2024-12-06 RX ORDER — LIDOCAINE 50 MG/G
1 PATCH TOPICAL DAILY
Status: DISCONTINUED | OUTPATIENT
Start: 2024-12-07 | End: 2024-12-11 | Stop reason: HOSPADM

## 2024-12-06 RX ADMIN — PYRIDOSTIGMINE BROMIDE 60 MG: 60 TABLET ORAL at 09:11

## 2024-12-06 RX ADMIN — MIDODRINE HYDROCHLORIDE 15 MG: 5 TABLET ORAL at 18:07

## 2024-12-06 RX ADMIN — PANCRELIPASE 6000 UNITS: 30000; 6000; 19000 CAPSULE, DELAYED RELEASE PELLETS ORAL at 14:59

## 2024-12-06 RX ADMIN — GUAIFENESIN 600 MG: 600 TABLET ORAL at 21:04

## 2024-12-06 RX ADMIN — MIDODRINE HYDROCHLORIDE 15 MG: 5 TABLET ORAL at 06:26

## 2024-12-06 RX ADMIN — HYDROCODONE BITARTRATE AND ACETAMINOPHEN 2 TABLET: 5; 325 TABLET ORAL at 02:05

## 2024-12-06 RX ADMIN — PYRIDOSTIGMINE BROMIDE 60 MG: 60 TABLET ORAL at 18:10

## 2024-12-06 RX ADMIN — HYDROCODONE BITARTRATE AND ACETAMINOPHEN 2 TABLET: 5; 325 TABLET ORAL at 21:04

## 2024-12-06 RX ADMIN — MIDODRINE HYDROCHLORIDE 15 MG: 5 TABLET ORAL at 12:20

## 2024-12-06 RX ADMIN — METOPROLOL TARTRATE 25 MG: 25 TABLET, FILM COATED ORAL at 21:02

## 2024-12-06 RX ADMIN — LEVOTHYROXINE SODIUM 125 MCG: 125 TABLET ORAL at 06:27

## 2024-12-06 RX ADMIN — METOPROLOL TARTRATE 25 MG: 25 TABLET, FILM COATED ORAL at 08:58

## 2024-12-06 RX ADMIN — GUAIFENESIN 600 MG: 600 TABLET ORAL at 08:57

## 2024-12-06 RX ADMIN — GENTAMICIN SULFATE: 1 CREAM TOPICAL at 08:59

## 2024-12-06 RX ADMIN — ATORVASTATIN CALCIUM 40 MG: 40 TABLET, FILM COATED ORAL at 18:06

## 2024-12-06 RX ADMIN — HYDROCODONE BITARTRATE AND ACETAMINOPHEN 2 TABLET: 5; 325 TABLET ORAL at 09:15

## 2024-12-06 NOTE — ASSESSMENT & PLAN NOTE
Continue Sensipar 30 mg 3/week.   Phosphorus level is still low at 2.0, continue oral phosphorus supplementation twice daily.  No need to restrict phosphorus in the diet. Check follow up phosphorous level.

## 2024-12-06 NOTE — PROGRESS NOTES
NEPHROLOGY HOSPITAL PROGRESS NOTE   Masoud Perry 69 y.o. male MRN: [id number]  Unit/Bed#: -01 Encounter: [id number]  Reason for Consult: End-stage renal disease on hemodialysis.      Assessment & Plan  ESRD (end stage renal disease) on dialysis ([facility])  He was on PD but was changed to back up iHD during admission to [facility] in November 2024. Started intermittent hemodialysis on 11/8/24.  Still has PD catheter  [facility].  Previous EDW 98.5 kg - challenged this hospital admission, most recent post hemodialysis weight  92.4 kg from November 30th  Access: R IJ permcath. PD catheter in place.   Decrease target weight to 90 kg from prior target weight of 98 kg this hospital admission but after hemodialysis treatment on 12/3, postdialysis weight was 88 kg.  Plan for hemodialysis on Saturday on December 7.  No need for hemodialysis today.  Patient is not hypoxemic and is clinically improved.  Adjust estimated dry weight as able.  Hemodialysis-associated hypotension  BP stable and is at goal  Rx: Midodrine 15 mg TID. Metoprolol 25 mg BID.  Maintain metoprolol and midodrine at this time.  Patient is on metoprolol for rate control for paroxysmal atrial fibrillation.  Torsemide 60 mg BID stopped on 11/25/24 due to soft blood pressure/diarrhea but was restarted back on torsemide at a dose of 40 mg daily on 12/4, continue at current dose  Continue to monitor blood pressure the patient's blood pressure has been stable on the current medication regimen.  And  Acute on chronic diastolic HF (heart failure) ([facility])  7/29/24 Echo: EF 75%, G1DD  Was fluid overloaded on admission and this has improved.  His new estimated dry weight is likely 90.0  kg   but his postdialysis weight after last treatment was 88 kg, may need to lower target weight further.   Continue torsemide 40 mg daily, still with some lower extremity edema would recommend challenge target weight as tolerated  Anemia due to chronic kidney disease, on chronic  dialysis (HCC)  Hemoglobin stable at 8.7 continue to monitor.   Epogen being held in setting of COVID-19 infection.    Secondary hyperparathyroidism of renal origin (HCC)  Continue Sensipar 30 mg 3/week.   Phosphorus level is still low at 2.0, continue oral phosphorus supplementation twice daily.  No need to restrict phosphorus in the diet. Check follow up phosphorous level.     Pancytopenia (HCC)  WBC count improved but platelet count is still low but improving to 94.  Hemoglobin improved to 9.2 g/dL.    LDH was mildly elevated at 131 peripheral smear showed 1% bands, no schistocytes.  Continue monitoring and management per primary team  Acute respiratory insufficiency  Treatment for COVID-19 per primary team.  Now off isolation precautions  There was concern for volume overload which is better now.  Continue oral torsemide and UF with HD    Dysuria  Patient states he is experiencing dysuria which started with onset of COVID.  Recent UA unremarkable 11/25.  Repeat UA shows trace ketones negative leukocytes negative nitrite.   Blood culture is negative, continue management per primary team      COVID-19  Treatment of COVID-19 per primary team.  Was tested positive 3 days prior to admission.    Paroxysmal atrial fibrillation (HCC)  Currently on Metoprolol 25 mg BID.     Electrolyte imbalance risk  Hyponatremia  Likely due to free water intake and impaired free water excretion in the setting of ESRD, sodium level improved to normal range at 135 today.  Continue fluid restriction    Hypophosphatemia  -Phosphorus level low at 1.6, replaced with oral Phos-Nak. Check follow up phos level.   Goals of care, counseling/discussion  - On 12/3/2024: Patient asked nephrology  about what would happen if dialysis would be stopped, discussed about risk of fluid overload electrolyte imbalance and ultimately death in 2 to 3 weeks.  Discussed that he may have to consider hospice if plan to stop dialysis. He did not want to stop at  "this time after discussion..  There has been ongoing discussion regarding goals of care. The patient is still deciding as there \"is a lot to think about\" as per my discussion is on 12/6.   Diarrhea  Continue management per primary team.  Seen by GI.  As per GI note patient was declined colonoscopy and EGD and does not want any invasive testing done.  Severe protein-calorie malnutrition (HCC)  Malnutrition Findings:   Adult Malnutrition type: Acute illness  Adult Degree of Malnutrition: Other severe protein calorie malnutrition  Malnutrition Characteristics: Inadequate energy, Weight loss                  360 Statement: Acute on chronic severe malnutrition related to refusal for po intake, poor appetite, condition as evidenced by 10.9% weight loss x 14 days (11/20 220lb, 12/3 196lb); < 50% estimated energy intake > 5 days. Treated with: Recommend continued GOC discussion. Continue with daily weights. Can consider oral supplements if pt willing to take po intake though refusing po intake. Consider appetite stimulant if medically appropriate/pt agreeable though pt previously refused. Consider EN feeds if within GOC.    BMI Findings:           Body mass index is 26.34 kg/m².   -Continue management per primary team        VIRTUAL CARE DOCUMENTATION:     1. This service was provided via Telemedicine using [employer]     2. Parties in the room with patient during teleconsult Patient only    3. Confidentiality My office door was closed     4. Participants No one else was in the room    5. Patient acknowledged consent and understanding of privacy and security of the  Telemedicine consult. I informed the patient that I have reviewed their record in Epic and presented the opportunity for them to ask any questions regarding the visit today.  The patient agreed to participate.    6. Time spent including review of the medical record, documentation of plan of care, documentation of medical decision making, interview with the " patient, total time approximately 22 minutes.                  End-stage renal disease on hemodialysis.  SUBJECTIVE / 24H INTERVAL HISTORY:  Mr. Perry is seen in follow-up for end-stage renal disease on hemodialysis.  He had hemodialysis on December 5 and the treatment was slightly shortened but approximately  1 kg was removed.  He denies any chest pressure or shortness of breath.  Systolic blood pressures been anywhere from  systolic and pulse oximetry is 97% on room air.    OBJECTIVE:  Current Weight: Weight - Scale: 88.1 kg (194 lb 3.6 oz)  Vitals:    12/05/24 2216 12/06/24 0600 12/06/24 0611 12/06/24 1219   BP: 104/59  123/64 109/64   BP Location:       Pulse: 78  70 71   Resp: 16  16 18   Temp: (!) 97.2 °F (36.2 °C)  (!) 97 °F (36.1 °C) (!) 97 °F (36.1 °C)   TempSrc:       SpO2: 97%  97% 97%   Weight:  88.1 kg (194 lb 3.6 oz)     Height:           Intake/Output Summary (Last 24 hours) at 12/6/2024 1307  Last data filed at 12/6/2024 0900  Gross per 24 hour   Intake 740 ml   Output 1134 ml   Net -394 ml   General: Patient is resting comfortably in no acute distress  HEENT: Normocephalic atraumatic  Neck: Appears to be supple  Pulmonary: No accessory muscle use noted/no increased work of breathing.  Cardiac: Pulse rates in the 70s.  Extremities: No gross deformity noted.  Neurologic: Patient answers questions appropriately for me this morning.    Medications:    Current Facility-Administered Medications:     acetaminophen (TYLENOL) tablet 650 mg, 650 mg, Oral, Q4H PRN, Chao Rios PA-C    atorvastatin (LIPITOR) tablet 40 mg, 40 mg, Oral, Daily With Dinner, Chao Rios PA-C, 40 mg at 12/04/24 1834    benzonatate (TESSALON PERLES) capsule 100 mg, 100 mg, Oral, TID PRN, Miriam Tee MD, 100 mg at 11/29/24 1352    calcium carbonate (TUMS) chewable tablet 500 mg, 500 mg, Oral, Daily PRN, Moses Noel MD    cholestyramine sugar free (QUESTRAN LIGHT) packet 4 g, 4 g, Oral, BID, Ofelia Salmeron PA-C, 4  g at 12/05/24 1840    cinacalcet (SENSIPAR) tablet 60 mg, 60 mg, Oral, Once per day on Monday Wednesday Friday, Jignesh Hussein MD, 60 mg at 12/04/24 1140    finasteride (PROSCAR) tablet 5 mg, 5 mg, Oral, Daily, Chao Rios PA-C, 5 mg at 12/02/24 0846    gentamicin (GARAMYCIN) 0.1 % cream, , Topical, Daily, Jesus Christine MD, Given at 12/06/24 0859    guaiFENesin (MUCINEX) 12 hr tablet 600 mg, 600 mg, Oral, Q12H VASU, Miriam Tee MD, 600 mg at 12/06/24 0857    HYDROcodone-acetaminophen (NORCO) 5-325 mg per tablet 2 tablet, 2 tablet, Oral, Q6H PRN, Chao Rios PA-C, 2 tablet at 12/06/24 0915    insulin lispro (HumALOG/ADMELOG) 100 units/mL subcutaneous injection 1-6 Units, 1-6 Units, Subcutaneous, TID AC, 1 Units at 11/26/24 1236 **AND** Fingerstick Glucose (POCT), , , TID AC, Chao Rios PA-C    levothyroxine tablet 125 mcg, 125 mcg, Oral, Early Morning, Chao Rios PA-C, 125 mcg at 12/06/24 0627    metoprolol tartrate (LOPRESSOR) tablet 25 mg, 25 mg, Oral, Q12H [facility], Moses Noel MD, 25 mg at 12/06/24 0858    midodrine (PROAMATINE) tablet 15 mg, 15 mg, Oral, TID AC, Citlali Yates, JERRINP, 15 mg at 12/06/24 1220    nicotine (NICODERM CQ) 7 mg/24hr TD 24 hr patch 7 mg, 7 mg, Transdermal, Daily, Chao Rios PA-C    ondansetron (ZOFRAN) injection 4 mg, 4 mg, Intravenous, Q6H PRN, Chao Rios PA-C, 4 mg at 11/25/24 1839    potassium-sodium phosphates (PHOS-NAK) packet 1 packet, 1 packet, Oral, BID With Meals, Brad Barajas MD    pyridostigmine (MESTINON) tablet 60 mg, 60 mg, Oral, TID, Chao Rios PA-C, 60 mg at 12/06/24 0911    simethicone (MYLICON) chewable tablet 80 mg, 80 mg, Oral, 4x Daily PRN, Chao Rios PA-C    torsemide (DEMADEX) tablet 40 mg, 40 mg, Oral, Daily, Brad Barajas MD, 40 mg at 12/05/24 0955    [Held by provider] warfarin (COUMADIN) tablet 2 mg, 2 mg, Oral, HS, Moses Noel MD, 2 mg at 11/30/24 2158    Laboratory Results:  Results from last 7 days   Lab  "Units 12/06/24  0621 12/05/24  0639 12/04/24  0608 12/03/24  0609 12/02/24  0458 12/01/24  0842 11/30/24  0606   WBC Thousand/uL 5.61 7.53 6.31 5.77 6.24 5.45 5.01   HEMOGLOBIN g/dL 8.7* 9.2* 8.5* 8.6* 9.1* 9.8* 9.2*   HEMATOCRIT % 27.8* 29.2* 26.7* 27.6* 29.6* 31.7* 29.3*   PLATELETS Thousands/uL 74* 94* 62* 56* 52* 68* 75*   POTASSIUM mmol/L 3.5 3.4* 3.4* 3.5 3.5 3.9 3.8   CHLORIDE mmol/L 98 100 100 102 101 99 99   CO2 mmol/L 30 29 30 28 29 31 27   BUN mg/dL 11 14 11 21 18 15 22   CREATININE mg/dL 2.41* 2.66* 1.93* 2.80* 2.31* 1.86* 2.06*   CALCIUM mg/dL 8.0* 8.0* 7.8* 8.2* 8.7 9.2 8.6   MAGNESIUM mg/dL  --   --  2.1 1.8* 1.5*  --   --    PHOSPHORUS mg/dL  --  2.0* 1.6* 2.6 3.2 1.9*  --        Portions of the record may have been created with voice recognition software. Occasional wrong word or \"sound a like\" substitutions may have occurred due to the inherent limitations of voice recognition software. Read the chart carefully and recognize, using context, where substitutions have occurred.If you have any questions, please contact the dictating provider.   "

## 2024-12-06 NOTE — ASSESSMENT & PLAN NOTE
Wt Readings from Last 3 Encounters:   12/06/24 88.1 kg (194 lb 3.6 oz)   10/14/24 115 kg (253 lb)   10/07/24 118 kg (260 lb)   CXR on 11/20 appeared to be pulmonary vascular congestion   Patient is a dialysis basis, received hemodialysis on TTS  Most recent session 11/30 to resume [facility] schedule  Torsemide is on hold secondary to ongoing diarrhea and anorexia  Appreciate continued nephrology follow-up  Monitor intake/output and daily weight  Acute exacerbation seems to have resolved-euvolemic and continue with dialysis

## 2024-12-06 NOTE — ASSESSMENT & PLAN NOTE
Lab Results   Component Value Date    EGFR 26 12/06/2024    EGFR 23 12/05/2024    EGFR 34 12/04/2024    CREATININE 2.41 (H) 12/06/2024    CREATININE 2.66 (H) 12/05/2024    CREATININE 1.93 (H) 12/04/2024   Typical regimen Tuesday Thursday Saturday  Nephrology following  Resume prior schedule

## 2024-12-06 NOTE — ASSESSMENT & PLAN NOTE
Patient admitted having significant diarrhea-initially suspected infectious secondary to COVID versus C. difficile carrier status  Started on a course of oral vancomycin and completed-Per infectious disease do not feel that this is related to his diarrhea  Given lack of improvement in diarrhea-GI consulted  Patient continues to deny wanting to start any other medications-would not like a powder and does not feel that he can handle a large pill.  GI feels that Imodium would not be a good option  [place] helped when patient took it, but stating that it made him nauseous and he will not take it again  Willing to try a pill to help, since we do not carry colestid, will order creon  Diarrhea is ongoing because of hypovolemia and for that torsemide is on hold

## 2024-12-06 NOTE — PLAN OF CARE
Problem: Prexisting or High Potential for Compromised Skin Integrity  Goal: Skin integrity is maintained or improved  Description: INTERVENTIONS:  - Identify patients at risk for skin breakdown  - Assess and monitor skin integrity  - Assess and monitor nutrition and hydration status  - Monitor labs   - Assess for incontinence   - Turn and reposition patient  - Assist with mobility/ambulation  - Relieve pressure over bony prominences  - Avoid friction and shearing  - Provide appropriate hygiene as needed including keeping skin clean and dry  - Evaluate need for skin moisturizer/barrier cream  - Collaborate with interdisciplinary team   - Patient/family teaching  - Consider wound care consult   Outcome: Progressing     Problem: PAIN - ADULT  Goal: Verbalizes/displays adequate comfort level or baseline comfort level  Description: Interventions:  - Encourage patient to monitor pain and request assistance  - Assess pain using appropriate pain scale  - Administer analgesics based on type and severity of pain and evaluate response  - Implement non-pharmacological measures as appropriate and evaluate response  - Consider cultural and social influences on pain and pain management  - Notify physician/advanced practitioner if interventions unsuccessful or patient reports new pain  Outcome: Progressing     Problem: INFECTION - ADULT  Goal: Absence or prevention of progression during hospitalization  Description: INTERVENTIONS:  - Assess and monitor for signs and symptoms of infection  - Monitor lab/diagnostic results  - Monitor all insertion sites, i.e. indwelling lines, tubes, and drains  - Monitor endotracheal if appropriate and nasal secretions for changes in amount and color  - [place] appropriate cooling/warming therapies per order  - Administer medications as ordered  - Instruct and encourage patient and family to use good hand hygiene technique  - Identify and instruct in appropriate isolation precautions for  identified infection/condition  Outcome: Progressing  Goal: Absence of fever/infection during neutropenic period  Description: INTERVENTIONS:  - Monitor WBC    Outcome: Progressing     Problem: SAFETY ADULT  Goal: Patient will remain free of falls  Description: INTERVENTIONS:  - Educate patient/family on patient safety including physical limitations  - Instruct patient to call for assistance with activity   - Consult OT/PT to assist with strengthening/mobility   - Keep Call bell within reach  - Keep bed low and locked with side rails adjusted as appropriate  - Keep care items and personal belongings within reach  - Initiate and maintain comfort rounds  - Make Fall Risk Sign visible to staff  - Offer Toileting every 2 Hours, in advance of need  - Initiate/Maintain bed/chair alarm  - Obtain necessary fall risk management equipment  - Apply yellow socks and bracelet for high fall risk patients  - Consider moving patient to room near nurses station  Outcome: Progressing  Goal: Maintain or return to baseline ADL function  Description: INTERVENTIONS:  -  Assess patient's ability to carry out ADLs; assess patient's baseline for ADL function and identify physical deficits which impact ability to perform ADLs (bathing, care of mouth/teeth, toileting, grooming, dressing, etc.)  - Assess/evaluate cause of self-care deficits   - Assess range of motion  - Assess patient's mobility; develop plan if impaired  - Assess patient's need for assistive devices and provide as appropriate  - Encourage maximum independence but intervene and supervise when necessary  - Involve family in performance of ADLs  - Assess for home care needs following discharge   - Consider OT consult to assist with ADL evaluation and planning for discharge  - Provide patient education as appropriate  Outcome: Progressing  Goal: Maintains/Returns to pre admission functional level  Description: INTERVENTIONS:  - Perform AM-PAC 6 Click Basic Mobility/ Daily  Activity assessment daily.  - Set and communicate daily mobility goal to care team and patient/family/caregiver.   - Collaborate with rehabilitation services on mobility goals if consulted  - Perform Range of Motion 2 times a day.  - Reposition patient every 2 hours.  - Dangle patient 3 times a day  - Stand patient 3 times a day  - Ambulate patient 3 times a day  - Out of bed to chair 3 times a day   - Out of bed for meals 3 times a day  - Out of bed for toileting  - Record patient progress and toleration of activity level   Outcome: Progressing     Problem: DISCHARGE PLANNING  Goal: Discharge to home or other facility with appropriate resources  Description: INTERVENTIONS:  - Identify barriers to discharge w/patient and caregiver  - Arrange for needed discharge resources and transportation as appropriate  - Identify discharge learning needs (meds, wound care, etc.)  - Arrange for interpretive services to assist at discharge as needed  - Refer to Case Management Department for coordinating discharge planning if the patient needs post-hospital services based on physician/advanced practitioner order or complex needs related to functional status, cognitive ability, or social support system  Outcome: Progressing     Problem: Knowledge Deficit  Goal: Patient/family/caregiver demonstrates understanding of disease process, treatment plan, medications, and discharge instructions  Description: Complete learning assessment and assess knowledge base.  Interventions:  - Provide teaching at level of understanding  - Provide teaching via preferred learning methods  Outcome: Progressing     Problem: CARDIOVASCULAR - ADULT  Goal: Maintains optimal cardiac output and hemodynamic stability  Description: INTERVENTIONS:  - Monitor I/O, vital signs and rhythm  - Monitor for S/S and trends of decreased cardiac output  - Administer and titrate ordered vasoactive medications to optimize hemodynamic stability  - Assess quality of pulses,  skin color and temperature  - Assess for signs of decreased coronary artery perfusion  - Instruct patient to report change in severity of symptoms  Outcome: Progressing  Goal: Absence of cardiac dysrhythmias or at baseline rhythm  Description: INTERVENTIONS:  - Continuous cardiac monitoring, vital signs, obtain 12 lead EKG if ordered  - Administer antiarrhythmic and heart rate control medications as ordered  - Monitor electrolytes and administer replacement therapy as ordered  Outcome: Progressing     Problem: RESPIRATORY - ADULT  Goal: Achieves optimal ventilation and oxygenation  Description: INTERVENTIONS:  - Assess for changes in respiratory status  - Assess for changes in mentation and behavior  - Position to facilitate oxygenation and minimize respiratory effort  - Oxygen administered by appropriate delivery if ordered  - Initiate smoking cessation education as indicated  - Encourage broncho-pulmonary hygiene including cough, deep breathe, Incentive Spirometry  - Assess the need for suctioning and aspirate as needed  - Assess and instruct to report SOB or any respiratory difficulty  - Respiratory Therapy support as indicated  Outcome: Progressing     Problem: METABOLIC, FLUID AND ELECTROLYTES - ADULT  Goal: Electrolytes maintained within normal limits  Description: INTERVENTIONS:  - Monitor labs and assess patient for signs and symptoms of electrolyte imbalances  - Administer electrolyte replacement as ordered  - Monitor response to electrolyte replacements, including repeat lab results as appropriate  - Instruct patient on fluid and nutrition as appropriate  Outcome: Progressing  Goal: Fluid balance maintained  Description: INTERVENTIONS:  - Monitor labs   - Monitor I/O and WT  - Instruct patient on fluid and nutrition as appropriate  - Assess for signs & symptoms of volume excess or deficit  Outcome: Progressing  Goal: Glucose maintained within target range  Description: INTERVENTIONS:  - Monitor Blood  Glucose as ordered  - Assess for signs and symptoms of hyperglycemia and hypoglycemia  - Administer ordered medications to maintain glucose within target range  - Assess nutritional intake and initiate nutrition service referral as needed  Outcome: Progressing     Problem: Nutrition/Hydration-ADULT  Goal: Nutrient/Hydration intake appropriate for improving, restoring or maintaining nutritional needs  Description: Monitor and assess patient's nutrition/hydration status for malnutrition. Collaborate with interdisciplinary team and initiate plan and interventions as ordered.  Monitor patient's weight and dietary intake as ordered or per policy. Utilize nutrition screening tool and intervene as necessary. Determine patient's food preferences and provide high-protein, high-caloric foods as appropriate.     INTERVENTIONS:  - Monitor oral intake, urinary output, labs, and treatment plans  - Assess nutrition and hydration status and recommend course of action  - Evaluate amount of meals eaten  - Assist patient with eating if necessary   - Allow adequate time for meals  - Recommend/ encourage appropriate diets, oral nutritional supplements, and vitamin/mineral supplements  - Order, calculate, and assess calorie counts as needed  - Recommend, monitor, and adjust tube feedings and TPN/PPN based on assessed needs  - Assess need for intravenous fluids  - Provide specific nutrition/hydration education as appropriate  - Include patient/family/caregiver in decisions related to nutrition  Outcome: Progressing

## 2024-12-06 NOTE — ASSESSMENT & PLAN NOTE
"- On 12/3/2024: Patient asked nephrology  about what would happen if dialysis would be stopped, discussed about risk of fluid overload electrolyte imbalance and ultimately death in 2 to 3 weeks.  Discussed that he may have to consider hospice if plan to stop dialysis. He did not want to stop at this time after discussion..  There has been ongoing discussion regarding goals of care. The patient is still deciding as there \"is a lot to think about\" as per my discussion is on 12/6.   "

## 2024-12-06 NOTE — ASSESSMENT & PLAN NOTE
BP stable and is at goal  Rx: Midodrine 15 mg TID. Metoprolol 25 mg BID.  Maintain metoprolol and midodrine at this time.  Patient is on metoprolol for rate control for paroxysmal atrial fibrillation.  Torsemide 60 mg BID stopped on 11/25/24 due to soft blood pressure/diarrhea but was restarted back on torsemide at a dose of 40 mg daily on 12/4, continue at current dose  Continue to monitor blood pressure the patient's blood pressure has been stable on the current medication regimen.  And

## 2024-12-06 NOTE — PROGRESS NOTES
Progress Note - Hospitalist   Name: Masoud Perry 69 y.o. male I MRN: [id number]  Unit/Bed#: -01 I Date of Admission: 11/20/2024   Date of Service: 12/6/2024 I Hospital Day: 16    Assessment & Plan  Acute respiratory insufficiency  Oxygen needs have waxed and waned  CT chest abdomen pelvis with mild diffuse anasarca improved from prior CT, no specific pathology along  Monitor volume status  Between 0.5 L nasal cannula for comfort and room air  Acute on chronic diastolic HF (heart failure) (HCC)  Wt Readings from Last 3 Encounters:   12/06/24 88.1 kg (194 lb 3.6 oz)   10/14/24 115 kg (253 lb)   10/07/24 118 kg (260 lb)   CXR on 11/20 appeared to be pulmonary vascular congestion   Patient is a dialysis basis, received hemodialysis on TTS  Most recent session 11/30 to resume [facility] schedule  Torsemide is on hold secondary to ongoing diarrhea and anorexia  Appreciate continued nephrology follow-up  Monitor intake/output and daily weight  Acute exacerbation seems to have resolved-euvolemic and continue with dialysis  COVID-19  Presents from [place]. Tested positive 3 days prior to admission when symptoms started  Positive in our records on 11/20  CT chest negative for any pathology  Started decadron and remdesevir, doxycycline  Has completed therapy with all 3  Was on Coumadin for anticoagulation/DVT prophylaxis  Patient also having diarrhea, and C. difficile showing carrier state -see under diarrhea of infectious origin for further treatment plan  Currently 0.5 L nasal cannula/room air - fluctuating   Has completed quarantine for COVID-no longer needs airborne precautions  Goals of care, counseling/discussion  Patient and I had discussion today about his goals of care moving forward  He endorses that he has been feeling depressed and frustrated with his continued decline in health  He says that sometimes he wishes that he could just go home to pass away and be comfortable  He shares that he is not disagreeable for  treatment but sometimes feels as though he is being tortured  Pain discussed possibilities of psychiatry consult for depression/trying recommendations made by prior psychiatrist for Remeron  At this time patient declines  Palliative care consulted  12/2 at this time declines any additional medications.  States that he is frustrated with continued hospitalization though is agreeable to this and agreeable to discharge to rehab.  Wants to continue treatment focused care without invasive measures  12/4 - patient discussed with hospice liaison, currently declining hospice and would like to continue dialysis but states that he still has a lot of decisions going on in his head.   12/5 d/w palliative and patient stating he wants to continue current treatment   Ongoing goals of care discussion  Factor 5 Leiden mutation, heterozygous (HCC)  Patient is maintained on Coumadin PTA  INR supratherapeutic at time of presentation and was placed on hold  INR trending upwards and dialysis was delayed for a few days so vitamin K was administered at 2.5 mg  Patient now with subtherapeutic INR  Continue to monitor while administering low-dose Coumadin -on hold due to rapid increase in INR  Also monitor platelets  Lab Results   Component Value Date    INR 2.24 (H) 12/06/2024    INR 2.44 (H) 12/05/2024    INR 2.52 (H) 12/04/2024     Secondary hyperparathyroidism of renal origin (HCC)  Currently on 60 mg p.o. 3 times weekly  Hemodialysis-associated hypotension  Continue midodrine and pyridostigmine  Type 2 diabetes mellitus with stage 4 chronic kidney disease, with long-term current use of insulin (HCC)  Lab Results   Component Value Date    HGBA1C 5.0 11/02/2024       Recent Labs     12/05/24  1709 12/05/24  1837 12/05/24  2105 12/06/24  0812   POCGLU 72 91 84 72       Blood Sugar Average: Last 72 hrs:  (P) 82.65  Sliding scale insulin. Hypoglycemia protocol  Patient with recurrent hypoglycemia secondary to poor oral intake  Today  requiring amp of dextrose  We will continue to discuss patient's appetite with him and possible addition of appetite stimulants  Paroxysmal atrial fibrillation (HCC)  Patient did have rapid response secondary to chest pain  Cardiology consulted-thought to be secondary to A-fib rather than any ACS  Can continue metoprolol if blood pressure tolerates -maintain on midodrine as well to aid in attention  Continue to trend INR with Coumadin   ESRD (end stage renal disease) on dialysis (HCC)  Lab Results   Component Value Date    EGFR 26 12/06/2024    EGFR 23 12/05/2024    EGFR 34 12/04/2024    CREATININE 2.41 (H) 12/06/2024    CREATININE 2.66 (H) 12/05/2024    CREATININE 1.93 (H) 12/04/2024   Typical regimen Tuesday Thursday Saturday  Nephrology following  Resume prior schedule  Anemia due to chronic kidney disease, on chronic dialysis (HCC)  Patient with chronic anemia  Baseline hemoglobin around 8  During hospitalization patient's hemoglobin has been stable between 9 and 10  Mild decrease today   Monitor for signs of bleeding in setting of low platelets  Pancytopenia (HCC)  Multifactorial in origin, continue to monitor  Platelet count has been steadily decreasing with unclear etiology  Currently no signs of bleeding  Previously has supratherapeutic INR  Currently is subtherapeutic, continue to monitor INR and platelets and for any bleeding  Hold Coumadin dosing  Platelets low but stable at 52-56    Lab Results   Component Value Date    WBC 5.61 12/06/2024    RBC 2.76 (L) 12/06/2024    PLT 74 (L) 12/06/2024    PLT 94 (L) 12/05/2024     Electrolyte imbalance risk    Dysuria  Patient reporting ongoing dysuria since prior to admission  UA done 11/30 without any signs for acute cystitis  Is maintained on Proscar  Diarrhea  Patient admitted having significant diarrhea-initially suspected infectious secondary to COVID versus C. difficile carrier status  Started on a course of oral vancomycin and completed-Per infectious  disease do not feel that this is related to his diarrhea  Given lack of improvement in diarrhea-GI consulted  Patient continues to deny wanting to start any other medications-would not like a powder and does not feel that he can handle a large pill.  GI feels that Imodium would not be a good option  Questran helped when patient took it, but stating that it made him nauseous and he will not take it again  Willing to try a pill to help, since we do not carry colestid, will order creon  Diarrhea is ongoing because of hypovolemia and for that torsemide is on hold  Epigastric pain    Palliative care by specialist    Hypophosphatemia    Severe protein-calorie malnutrition (HCC)  Malnutrition Findings:   Adult Malnutrition type: Acute illness  Adult Degree of Malnutrition: Other severe protein calorie malnutrition  Malnutrition Characteristics: Inadequate energy, Weight loss                  360 Statement: Acute on chronic severe malnutrition related to refusal for po intake, poor appetite, condition as evidenced by 10.9% weight loss x 14 days (11/20 220lb, 12/3 196lb); < 50% estimated energy intake > 5 days. Treated with: Recommend continued GOC discussion. Continue with daily weights. Can consider oral supplements if pt willing to take po intake though refusing po intake. Consider appetite stimulant if medically appropriate/pt agreeable though pt previously refused. Consider EN feeds if within GOC.    BMI Findings:           Body mass index is 26.34 kg/m².   Continues to decline appetite stimulant  declined tube feeds or enteric feedings   Continue goals of care discussion    VTE Pharmacologic Prophylaxis: VTE Score: 6 High Risk (Score >/= 5) - Pharmacological DVT Prophylaxis Contraindicated. Sequential Compression Devices Ordered.    Mobility:   Basic Mobility Inpatient Raw Score: 8  -[facility] Goal: 3: Sit at edge of bed  -HLM Achieved: 2: Bed activities/Dependent transfer  -HLM Goal achieved. Continue to encourage  appropriate mobility.    Patient Centered Rounds: I performed bedside rounds with nursing staff today.   Discussions with Specialists or Other Care Team Provider: nursing, cm, nutrition    Education and Discussions with Family / Patient: Updated  (son) via phone.    Current Length of Stay: 16 day(s)  Current Patient Status: Inpatient   Certification Statement: The patient will continue to require additional inpatient hospital stay due to requiring monitoring of oral intake   Discharge Plan: Anticipate discharge in 24-48 hrs to rehab facility.    Code Status: Level 3 - DNAR and DNI    Subjective   Patient states that he was unable to drink all of the questran because it made him feel nauseous. He is agreeable to trying a pill for his diarrhea. Denies chest pain or shortness of breath. Agreeable to family discussion about goals of care with son.    Objective :  Temp:  [97 °F (36.1 °C)-97.2 °F (36.2 °C)] 97 °F (36.1 °C)  HR:  [68-81] 70  BP: ()/(58-67) 123/64  Resp:  [16-18] 16  SpO2:  [95 %-98 %] 97 %  O2 Device: None (Room air)    Body mass index is 26.34 kg/m².     Input and Output Summary (last 24 hours):     Intake/Output Summary (Last 24 hours) at 12/6/2024 0935  Last data filed at 12/5/2024 2216  Gross per 24 hour   Intake 740 ml   Output 1234 ml   Net -494 ml       Physical Exam  Vitals reviewed.   Constitutional:       General: He is not in acute distress.     Appearance: Normal appearance. He is obese. He is ill-appearing.   HENT:      Head: Normocephalic and atraumatic.      Nose: Nose normal.      Mouth/Throat:      Mouth: Mucous membranes are moist.      Pharynx: Oropharynx is clear.   Eyes:      Extraocular Movements: Extraocular movements intact.      Conjunctiva/sclera: Conjunctivae normal.   Cardiovascular:      Rate and Rhythm: Normal rate and regular rhythm.      Pulses: Normal pulses.      Heart sounds: Normal heart sounds. No murmur heard.  Pulmonary:      Effort: Pulmonary  effort is normal. No respiratory distress.      Breath sounds: Normal breath sounds. No wheezing.   Abdominal:      General: Abdomen is flat. Bowel sounds are normal. There is no distension.      Palpations: Abdomen is soft.      Tenderness: There is no abdominal tenderness. There is no guarding.   Musculoskeletal:         General: Normal range of motion.      Cervical back: Normal range of motion.      Right lower leg: Edema present.      Left lower leg: Edema present.   Skin:     General: Skin is warm.      Coloration: Skin is pale.   Neurological:      General: No focal deficit present.      Mental Status: He is alert and oriented to person, place, and time. Mental status is at baseline.      Motor: No weakness.   Psychiatric:         Mood and Affect: Mood normal.         Behavior: Behavior normal.         Thought Content: Thought content normal.         Judgment: Judgment normal.         Lines/Drains:  Lines/Drains/Airways       Active Status       Name Placement date Placement time Site Days    HD Permanent Double Catheter 10/14/24  1420  Subclavian  52                      Lab Results: I have reviewed the following results:   Results from last 7 days   Lab Units 12/06/24  0621 12/02/24 0458 12/01/24  0842   WBC Thousand/uL 5.61   < > 5.45   HEMOGLOBIN g/dL 8.7*   < > 9.8*   HEMATOCRIT % 27.8*   < > 31.7*   PLATELETS Thousands/uL 74*   < > 68*   BANDS PCT %  --   --  1   SEGS PCT %  --   --  75   LYMPHO PCT %  --   --  19  19   MONO PCT %  --   --  4  2*   EOS PCT %  --   --  1    < > = values in this interval not displayed.     Results from last 7 days   Lab Units 12/06/24  0621 12/03/24  0609 12/02/24  0458   SODIUM mmol/L 134*   < > 136   POTASSIUM mmol/L 3.5   < > 3.5   CHLORIDE mmol/L 98   < > 101   CO2 mmol/L 30   < > 29   BUN mg/dL 11   < > 18   CREATININE mg/dL 2.41*   < > 2.31*   ANION GAP mmol/L 6   < > 6   CALCIUM mg/dL 8.0*   < > 8.7   ALBUMIN g/dL  --   --  2.5*   TOTAL BILIRUBIN mg/dL  --   --   0.53   ALK PHOS U/L  --   --  56   ALT U/L  --   --  5*   AST U/L  --   --  11*   GLUCOSE RANDOM mg/dL 65   < > 71    < > = values in this interval not displayed.     Results from last 7 days   Lab Units 12/06/24  0621   INR  2.24*     Results from last 7 days   Lab Units 12/06/24  0812 12/05/24  2105 12/05/24  1837 12/05/24  1709 12/05/24  1651 12/05/24  1159 12/05/24  0803 12/04/24  2212 12/04/24  1803 12/04/24  1706 12/04/24  1645 12/04/24  1137   POC GLUCOSE mg/dl 72 84 91 72 67 76 72 80 93 73 67 88         Results from last 7 days   Lab Units 11/29/24  1328 11/29/24  1313   LACTIC ACID mmol/L 1.1  --    PROCALCITONIN ng/ml  --  0.14       Recent Cultures (last 7 days):         Last 24 Hours Medication List:     Current Facility-Administered Medications:     acetaminophen (TYLENOL) tablet 650 mg, Q4H PRN    atorvastatin (LIPITOR) tablet 40 mg, Daily With Dinner    benzonatate (TESSALON PERLES) capsule 100 mg, TID PRN    calcium carbonate (TUMS) chewable tablet 500 mg, Daily PRN    cholestyramine sugar free (QUESTRAN LIGHT) packet 4 g, BID    cinacalcet (SENSIPAR) tablet 60 mg, Once per day on Monday Wednesday Friday    finasteride (PROSCAR) tablet 5 mg, Daily    gentamicin (GARAMYCIN) 0.1 % cream, Daily    guaiFENesin (MUCINEX) 12 hr tablet 600 mg, Q12H VASU    HYDROcodone-acetaminophen (NORCO) 5-325 mg per tablet 2 tablet, Q6H PRN    insulin lispro (HumALOG/ADMELOG) 100 units/mL subcutaneous injection 1-6 Units, TID AC **AND** Fingerstick Glucose (POCT), TID AC    levothyroxine tablet 125 mcg, Early Morning    metoprolol tartrate (LOPRESSOR) tablet 25 mg, Q12H VASU    midodrine (PROAMATINE) tablet 15 mg, TID AC    nicotine (NICODERM CQ) 7 mg/24hr TD 24 hr patch 7 mg, Daily    ondansetron (ZOFRAN) injection 4 mg, Q6H PRN    potassium-sodium phosphates (PHOS-NAK) packet 1 packet, BID With Meals    pyridostigmine (MESTINON) tablet 60 mg, TID    simethicone (MYLICON) chewable tablet 80 mg, 4x Daily PRN    torsemide  (DEMADEX) tablet 40 mg, Daily    [Held by provider] warfarin (COUMADIN) tablet 2 mg, HS    Administrative Statements   Today, Patient Was Seen By: Ofelia Salmeron PA-C    **Please Note: This note may have been constructed using a voice recognition system.**

## 2024-12-06 NOTE — ASSESSMENT & PLAN NOTE
Patient and I had discussion today about his goals of care moving forward  He endorses that he has been feeling depressed and frustrated with his continued decline in health  He says that sometimes he wishes that he could just go home to pass away and be comfortable  He shares that he is not disagreeable for treatment but sometimes feels as though he is being tortured  Pain discussed possibilities of psychiatry consult for depression/trying recommendations made by prior psychiatrist for Remeron  At this time patient declines  Palliative care consulted  12/2 at this time declines any additional medications.  States that he is frustrated with continued hospitalization though is agreeable to this and agreeable to discharge to rehab.  Wants to continue treatment focused care without invasive measures  12/4 - patient discussed with hospice liaison, currently declining hospice and would like to continue dialysis but states that he still has a lot of decisions going on in his head.   12/5 d/w palliative and patient stating he wants to continue current treatment   Ongoing goals of care discussion

## 2024-12-06 NOTE — ASSESSMENT & PLAN NOTE
Plts markedly low with anemia of ESRD, LFTs not elevated.   -will continue to follow but consider hematology evaluation if this continues to drop    Ongoing GOC discussion

## 2024-12-06 NOTE — PROGRESS NOTES
" Pastoral Care Progress Note   responded to consult request from CM and RN in setting of pt having difficulty with agreeing to a plan of care. Pt received  upright in bed, no family present.    Pt shared extensively about his family structure and its emotional stresses and difficulties: Pt's wife is , his son is patient's poa. Pt shared he is not estranged from his daughter but they are not close. Masoud attests that some time ago he made himself a dnr/dni/ no artifical feeding and his son was supportive at the time. Pt shared his feelings that a life long difference in values and theological beliefs are effecting his relationship with his son, the poa. Pt shared that his Yarsanism kenton has taught him that it is ok to accept death as God's will, and the pt himself attests \" at this time I think it is God's will that I die\". Pt shared that he believes his son's fundamentalist Sikh kenton has a strong view on both the removal of life supporting measures, and the power of God to work miracles on the critically ill. Pt shared that he feels his son, the poa, does not agree with Masoud's consideration of hospice care. Masoud expressed fears and grief that his son may disown him after his death, if he chooses to pursue hospice care at this time.      assesses Masoud feels stuck: his theology tells him that his failing health is a sign that his time to die has come, yet he does not want to lose his family posthumously over a decision to follow comfort care.     facilitated conversation about how Masoud's fear and grief over lost relationships are effecting his current ability to feel good about any plan of care.  facilitated discussion regarding the role of a poa to be the voice of the patient's wishes.  requested that pt ask for a family meeting with himself, his son and the physician so that further understanding might be established.     facilitated conversation with pt regarding his physical " enjoyment of his life/spirits. Pt shared that he does not really enjoy his life at this time because of his pain, however pt did attest that although dialysis makes him tired it hasn't made his life miserable. Pt expressed a desire to return home. CH facilitated conversation about what supports would be available for the pt. CH encouraged pt to receive a visit from his grandson, who has been asking to visit but Masoud has been declining.    CH shared her understanding of the conflict with the physician.    CH will follow.      Chaplaincy Interventions Utilized:   Empowerment: Encouraged focus on present and Provided grief counseling    Exploration: Explored relational needs & resources    Collaboration: Consulted with interdisciplinary team     Relationship Building: Listened empathically    Chaplaincy Outcomes Achieved:  Improved communication    Spiritual Coping Strategies Utilized:   Connectedness       12/06/24 1200   Clinical Encounter Type   Visited With Patient   Referral From Nurse;

## 2024-12-06 NOTE — ASSESSMENT & PLAN NOTE
Malnutrition Findings:   Adult Malnutrition type: Acute illness  Adult Degree of Malnutrition: Other severe protein calorie malnutrition  Malnutrition Characteristics: Inadequate energy, Weight loss                  360 Statement: Acute on chronic severe malnutrition related to refusal for po intake, poor appetite, condition as evidenced by 10.9% weight loss x 14 days (11/20 220lb, 12/3 196lb); < 50% estimated energy intake > 5 days. Treated with: Recommend continued GOC discussion. Continue with daily weights. Can consider oral supplements if pt willing to take po intake though refusing po intake. Consider appetite stimulant if medically appropriate/pt agreeable though pt previously refused. Consider EN feeds if within GOC.    BMI Findings:           Body mass index is 26.34 kg/m².   Continues to decline appetite stimulant  declined tube feeds or enteric feedings   Continue goals of care discussion

## 2024-12-06 NOTE — PLAN OF CARE
Problem: Prexisting or High Potential for Compromised Skin Integrity  Goal: Skin integrity is maintained or improved  Description: INTERVENTIONS:  - Identify patients at risk for skin breakdown  - Assess and monitor skin integrity  - Assess and monitor nutrition and hydration status  - Monitor labs   - Assess for incontinence   - Turn and reposition patient  - Assist with mobility/ambulation  - Relieve pressure over bony prominences  - Avoid friction and shearing  - Provide appropriate hygiene as needed including keeping skin clean and dry  - Evaluate need for skin moisturizer/barrier cream  - Collaborate with interdisciplinary team   - Patient/family teaching  - Consider wound care consult   Outcome: Progressing     Problem: PAIN - ADULT  Goal: Verbalizes/displays adequate comfort level or baseline comfort level  Description: Interventions:  - Encourage patient to monitor pain and request assistance  - Assess pain using appropriate pain scale  - Administer analgesics based on type and severity of pain and evaluate response  - Implement non-pharmacological measures as appropriate and evaluate response  - Consider cultural and social influences on pain and pain management  - Notify physician/advanced practitioner if interventions unsuccessful or patient reports new pain  Outcome: Progressing     Problem: INFECTION - ADULT  Goal: Absence or prevention of progression during hospitalization  Description: INTERVENTIONS:  - Assess and monitor for signs and symptoms of infection  - Monitor lab/diagnostic results  - Monitor all insertion sites, i.e. indwelling lines, tubes, and drains  - Monitor endotracheal if appropriate and nasal secretions for changes in amount and color  - Felton appropriate cooling/warming therapies per order  - Administer medications as ordered  - Instruct and encourage patient and family to use good hand hygiene technique  - Identify and instruct in appropriate isolation precautions for  identified infection/condition  Outcome: Progressing  Goal: Absence of fever/infection during neutropenic period  Description: INTERVENTIONS:  - Monitor WBC    Outcome: Progressing     Problem: SAFETY ADULT  Goal: Patient will remain free of falls  Description: INTERVENTIONS:  - Educate patient/family on patient safety including physical limitations  - Instruct patient to call for assistance with activity   - Consult OT/PT to assist with strengthening/mobility   - Keep Call bell within reach  - Keep bed low and locked with side rails adjusted as appropriate  - Keep care items and personal belongings within reach  - Initiate and maintain comfort rounds  - Make Fall Risk Sign visible to staff  - Offer Toileting every 2 Hours, in advance of need  - Initiate/Maintain bed/chair alarm  - Obtain necessary fall risk management equipment  - Apply yellow socks and bracelet for high fall risk patients  - Consider moving patient to room near nurses station  Outcome: Progressing  Goal: Maintain or return to baseline ADL function  Description: INTERVENTIONS:  -  Assess patient's ability to carry out ADLs; assess patient's baseline for ADL function and identify physical deficits which impact ability to perform ADLs (bathing, care of mouth/teeth, toileting, grooming, dressing, etc.)  - Assess/evaluate cause of self-care deficits   - Assess range of motion  - Assess patient's mobility; develop plan if impaired  - Assess patient's need for assistive devices and provide as appropriate  - Encourage maximum independence but intervene and supervise when necessary  - Involve family in performance of ADLs  - Assess for home care needs following discharge   - Consider OT consult to assist with ADL evaluation and planning for discharge  - Provide patient education as appropriate  Outcome: Progressing  Goal: Maintains/Returns to pre admission functional level  Description: INTERVENTIONS:  - Perform AM-PAC 6 Click Basic Mobility/ Daily  Activity assessment daily.  - Set and communicate daily mobility goal to care team and patient/family/caregiver.   - Collaborate with rehabilitation services on mobility goals if consulted  - Perform Range of Motion 3 times a day.  - Reposition patient every 2 hours.  - Dangle patient 3 times a day  - Stand patient 3 times a day  - Ambulate patient 3 times a day  - Out of bed to chair 3 times a day   - Out of bed for meals 3 times a day  - Out of bed for toileting  - Record patient progress and toleration of activity level   Outcome: Progressing     Problem: DISCHARGE PLANNING  Goal: Discharge to home or other facility with appropriate resources  Description: INTERVENTIONS:  - Identify barriers to discharge w/patient and caregiver  - Arrange for needed discharge resources and transportation as appropriate  - Identify discharge learning needs (meds, wound care, etc.)  - Arrange for interpretive services to assist at discharge as needed  - Refer to Case Management Department for coordinating discharge planning if the patient needs post-hospital services based on physician/advanced practitioner order or complex needs related to functional status, cognitive ability, or social support system  Outcome: Progressing     Problem: Knowledge Deficit  Goal: Patient/family/caregiver demonstrates understanding of disease process, treatment plan, medications, and discharge instructions  Description: Complete learning assessment and assess knowledge base.  Interventions:  - Provide teaching at level of understanding  - Provide teaching via preferred learning methods  Outcome: Progressing     Problem: CARDIOVASCULAR - ADULT  Goal: Maintains optimal cardiac output and hemodynamic stability  Description: INTERVENTIONS:  - Monitor I/O, vital signs and rhythm  - Monitor for S/S and trends of decreased cardiac output  - Administer and titrate ordered vasoactive medications to optimize hemodynamic stability  - Assess quality of pulses,  skin color and temperature  - Assess for signs of decreased coronary artery perfusion  - Instruct patient to report change in severity of symptoms  Outcome: Progressing  Goal: Absence of cardiac dysrhythmias or at baseline rhythm  Description: INTERVENTIONS:  - Continuous cardiac monitoring, vital signs, obtain 12 lead EKG if ordered  - Administer antiarrhythmic and heart rate control medications as ordered  - Monitor electrolytes and administer replacement therapy as ordered  Outcome: Progressing     Problem: RESPIRATORY - ADULT  Goal: Achieves optimal ventilation and oxygenation  Description: INTERVENTIONS:  - Assess for changes in respiratory status  - Assess for changes in mentation and behavior  - Position to facilitate oxygenation and minimize respiratory effort  - Oxygen administered by appropriate delivery if ordered  - Initiate smoking cessation education as indicated  - Encourage broncho-pulmonary hygiene including cough, deep breathe, Incentive Spirometry  - Assess the need for suctioning and aspirate as needed  - Assess and instruct to report SOB or any respiratory difficulty  - Respiratory Therapy support as indicated  Outcome: Progressing     Problem: METABOLIC, FLUID AND ELECTROLYTES - ADULT  Goal: Electrolytes maintained within normal limits  Description: INTERVENTIONS:  - Monitor labs and assess patient for signs and symptoms of electrolyte imbalances  - Administer electrolyte replacement as ordered  - Monitor response to electrolyte replacements, including repeat lab results as appropriate  - Instruct patient on fluid and nutrition as appropriate  Outcome: Progressing  Goal: Fluid balance maintained  Description: INTERVENTIONS:  - Monitor labs   - Monitor I/O and WT  - Instruct patient on fluid and nutrition as appropriate  - Assess for signs & symptoms of volume excess or deficit  Outcome: Progressing  Goal: Glucose maintained within target range  Description: INTERVENTIONS:  - Monitor Blood  Glucose as ordered  - Assess for signs and symptoms of hyperglycemia and hypoglycemia  - Administer ordered medications to maintain glucose within target range  - Assess nutritional intake and initiate nutrition service referral as needed  Outcome: Progressing     Problem: Nutrition/Hydration-ADULT  Goal: Nutrient/Hydration intake appropriate for improving, restoring or maintaining nutritional needs  Description: Monitor and assess patient's nutrition/hydration status for malnutrition. Collaborate with interdisciplinary team and initiate plan and interventions as ordered.  Monitor patient's weight and dietary intake as ordered or per policy. Utilize nutrition screening tool and intervene as necessary. Determine patient's food preferences and provide high-protein, high-caloric foods as appropriate.     INTERVENTIONS:  - Monitor oral intake, urinary output, labs, and treatment plans  - Assess nutrition and hydration status and recommend course of action  - Evaluate amount of meals eaten  - Assist patient with eating if necessary   - Allow adequate time for meals  - Recommend/ encourage appropriate diets, oral nutritional supplements, and vitamin/mineral supplements  - Order, calculate, and assess calorie counts as needed  - Recommend, monitor, and adjust tube feedings and TPN/PPN based on assessed needs  - Assess need for intravenous fluids  - Provide specific nutrition/hydration education as appropriate  - Include patient/family/caregiver in decisions related to nutrition  Outcome: Progressing

## 2024-12-06 NOTE — ASSESSMENT & PLAN NOTE
He was on PD but was changed to back up iHD during admission to [facility] in November 2024. Started intermittent hemodialysis on 11/8/24.  Still has PD catheter  Ramana DURON.  Previous EDW 98.5 kg - challenged this hospital admission, most recent post hemodialysis weight  92.4 kg from November 30th  Access: R IJ permcath. PD catheter in place.   Decrease target weight to 90 kg from prior target weight of 98 kg this hospital admission but after hemodialysis treatment on 12/3, postdialysis weight was 88 kg.  Plan for hemodialysis on Saturday on December 7.  No need for hemodialysis today.  Patient is not hypoxemic and is clinically improved.  Adjust estimated dry weight as able.

## 2024-12-06 NOTE — PROGRESS NOTES
Progress Note - Gastroenterology   Name: Masoud Perry 69 y.o. male I MRN: [id number]  Unit/Bed#: -01 I Date of Admission: 11/20/2024   Date of Service: 12/6/2024 I Hospital Day: 16     Assessment & Plan  Diarrhea  Nocturnal BMs points towards an infectious vs inflammatory vs malabsorptive etiology. C diff colonized but not responding to vanco    -stool studies unremarkable other than C diff colonized and elastase 172. Suspect low elastase is in setting of diarrhea rather than the cause of the diarrhea itself  -avoid excessive sugar intake as well as artificial sweeteners.  -offered colonoscopy and EGD- pt declined. Does not want invasive testing done. Would recommend waiting until COVID symptoms resolve prior to endoscopy  -when infection is ruled out, we could try antidiarrheals. Questran/colestipol may be a safer option in the setting of C diff colonization compared to imodium or other similar agents. Given his ESRD/fluid restriction, colestipol may be the easier option for him.   -Could also consider Creon but again he is refusing additional pills so the pill burden of PERT would be more difficult compared to colestipol  -will arrange for outpatient f/u    GI will sign off at this time, please call if there are questions, concerns, or new GI symptoms.       Epigastric pain  On chronic narcotics for pain, may have a gastroparesis etiology    -HP negative, celiac negative  -offered pepcid for indigestion, pt declined this as well. Could try 10 mg daily if he is agreeable (renal dosing)  -ok for diet as tolerated from GI standpoint  Pancytopenia (HCC)  Plts markedly low with anemia of ESRD, LFTs not elevated.   -will continue to follow but consider hematology evaluation if this continues to drop    Ongoing C discussion  Palliative care by specialist    HPI: Masoud Perry is a 69 y.o. year old male with a PMHx of CHF, Afib on coumadin, Factor V Leiden, HTN, ESRD on peritoneal dialysis (currently on  hemodialysis here), lymphedema, hx of CVA, T2DM who presents with COVID and SOB requiring oxygen. GI consulted for chronic diarrhea.     Diarrhea improved mildly with questran but pt does not like the taste. No bleeding reported. Urgency has improved as well. Still has generalized tenderness but improved compared to earlier this week. No other new GI symptoms.       Medications Prior to Admission:     albuterol (PROVENTIL HFA,VENTOLIN HFA) 90 mcg/act inhaler    allopurinol (ZYLOPRIM) 300 mg tablet    arginine 500 MG tablet    Aspirin 81 MG CAPS    atorvastatin (LIPITOR) 40 mg tablet    colchicine (COLCRYS) 0.6 mg tablet    dexamethasone (DECADRON) 6 mg tablet    finasteride (PROSCAR) 5 mg tablet    FUROSEMIDE PO    FUROSEMIDE PO    HYDROcodone-acetaminophen (Norco) 5-325 mg per tablet    insulin glargine (LANTUS) 100 units/mL subcutaneous injection    insulin lispro (HumALOG/ADMELOG) 100 units/mL injection    levothyroxine 125 mcg tablet    metoprolol tartrate (LOPRESSOR) 25 mg tablet    midodrine (PROAMATINE) 5 mg tablet    MOLNUPIRAVIR PO    pantoprazole (PROTONIX) 40 mg tablet    pyridostigmine (Mestinon) 60 mg tablet    sevelamer (RENAGEL) 800 mg tablet    tamsulosin (FLOMAX) 0.4 mg    torsemide (DEMADEX) 100 mg tablet    warfarin (COUMADIN) 1 mg tablet    acetaminophen (TYLENOL) 325 mg tablet    cinacalcet (SENSIPAR) 30 mg tablet    docusate sodium (COLACE) 100 mg capsule    Magnesium 400 MG TABS    potassium chloride (Klor-Con M20) 20 mEq tablet    simethicone (MYLICON) 80 mg chewable tablet    Current Facility-Administered Medications:     acetaminophen (TYLENOL) tablet 650 mg, Q4H PRN    atorvastatin (LIPITOR) tablet 40 mg, Daily With Dinner    benzonatate (TESSALON PERLES) capsule 100 mg, TID PRN    calcium carbonate (TUMS) chewable tablet 500 mg, Daily PRN    cholestyramine sugar free (QUESTRAN LIGHT) packet 4 g, BID    cinacalcet (SENSIPAR) tablet 60 mg, Once per day on Monday Wednesday Friday     finasteride (PROSCAR) tablet 5 mg, Daily    gentamicin (GARAMYCIN) 0.1 % cream, Daily    guaiFENesin (MUCINEX) 12 hr tablet 600 mg, Q12H VASU    HYDROcodone-acetaminophen (NORCO) 5-325 mg per tablet 2 tablet, Q6H PRN    insulin lispro (HumALOG/ADMELOG) 100 units/mL subcutaneous injection 1-6 Units, TID AC **AND** Fingerstick Glucose (POCT), TID AC    levothyroxine tablet 125 mcg, Early Morning    metoprolol tartrate (LOPRESSOR) tablet 25 mg, Q12H VASU    midodrine (PROAMATINE) tablet 15 mg, TID AC    nicotine (NICODERM CQ) 7 mg/24hr TD 24 hr patch 7 mg, Daily    ondansetron (ZOFRAN) injection 4 mg, Q6H PRN    potassium-sodium phosphates (PHOS-NAK) packet 1 packet, BID With Meals    pyridostigmine (MESTINON) tablet 60 mg, TID    simethicone (MYLICON) chewable tablet 80 mg, 4x Daily PRN    torsemide (DEMADEX) tablet 40 mg, Daily    [Held by provider] warfarin (COUMADIN) tablet 2 mg, HS  Allergies   Allergen Reactions    Carvedilol Shortness Of Breath    Levaquin [Levofloxacin] Other (See Comments)     Unknown    Saccharin - Food Allergy Diarrhea     GI intolerance, adamant does not want to receive    Sucralose - Food Allergy Diarrhea     GI intolerance, adamant does not want to receive    Amiodarone Dizziness    Aspartame - Food Allergy Diarrhea    Bumetanide GI Intolerance and Lightheadedness           Cephalexin Other (See Comments)     unknown    Ciprofloxacin Rash    Hydralazine Tachycardia    Lisinopril GI Intolerance           Metolazone Other (See Comments)     Metallic taste    Morphine Swelling     Of injection site    Nifedipine Lightheadedness    Strawberry Extract - Food Allergy Rash       Physical Exam  Constitutional:       General: He is not in acute distress.     Appearance: He is ill-appearing.   HENT:      Head: Normocephalic and atraumatic.   Eyes:      Conjunctiva/sclera: Conjunctivae normal.   Pulmonary:      Effort: Pulmonary effort is normal.   Abdominal:      General: Abdomen is flat. There is no  distension.      Palpations: Abdomen is soft.      Tenderness: There is abdominal tenderness. There is no guarding.   Skin:     General: Skin is warm and dry.      Coloration: Skin is not jaundiced.   Neurological:      Mental Status: He is alert and oriented to person, place, and time.   Psychiatric:         Behavior: Behavior normal.       Most Recent Vital Signs:  Vitals:    12/05/24 1615 12/05/24 2216 12/06/24 0600 12/06/24 0611   BP: 114/58 104/59  123/64   BP Location:       Pulse: 74 78  70   Resp: 18 16  16   Temp: (!) 97 °F (36.1 °C) (!) 97.2 °F (36.2 °C)  (!) 97 °F (36.1 °C)   TempSrc: Temporal      SpO2: 98% 97%  97%   Weight:   88.1 kg (194 lb 3.6 oz)    Height:           Intake/Output Summary (Last 24 hours) at 12/6/2024 0840  Last data filed at 12/5/2024 2216  Gross per 24 hour   Intake 740 ml   Output 1234 ml   Net -494 ml       LABS/IMAGING  Lab Results: I have reviewed all relevant lab results during this hospitalization.    Imaging Studies:  I have reviewed all the relevant images during this hospitalizations    Counseling / Coordination of Care  Total time spent today 20 minutes. Greater than 50% of total time was spent with the patient and / or family counseling and / or coordination of care.    Puneet Cooper PA-C

## 2024-12-06 NOTE — QUICK NOTE
Pt would like to have nicotine patch removed.      Pt has declined finasteride for past several days; he finds his stream has improved.  No troubles passing urine at this time.  We agreed to stop this med for now.    Otherwise, he remains committed for full cares, with DNR/DNI 3 limit, and maryellen with ongoing dialysis.    Palliative to return Monday, virtually or in person.

## 2024-12-06 NOTE — CASE MANAGEMENT
DCS received call from Glaukos Liseth (P#: ). Looking to confirm details regarding hospice care for patient. Per rep, information is needed today or case will be voided. Requesting same information as per call from yesterday. Case still pended. CM notified: Tres Reyes

## 2024-12-06 NOTE — QUICK NOTE
Family meeting was held with Masoud, son, daughter, provider, and pastoral care.  Discussed goals of care and poor oral intake.  Given information on hospice as well as full treatment and what that entails in terms of goal being to continue dialysis, current medications, increase diet, with plans to go to short-term rehab on discharge.  Patient once again adamantly declining feeding tube at this time, and family agreeing.  Did discuss options of appetite stimulant with family, including Marinol, as family was inquiring about medical marijuana..  Patient would like to talk about this with son and daughter prior to making a decision, as he is hesitant about starting any new medications due to history of polypharmacy and medication side effects.    Patient continued to express his concern about his chronic back pain and not seeing pain management on discharge.  Explained to patient that once he goes to [place], he can set up appointment with his pain management specialist where he can have back injections, as he states these worked the best for him in the past.  Discussed options including trying a new narcotic, lidocaine patch, Voltaren, and muscle relaxer.  Patient declining all of these at this time, stating that he would like to discuss further with his family prior to making any decision.  Once again stating that he is hesitant about starting any new medications due to polypharmacy and side effects.    Explained to patient and family that patient was seen by psychiatry where they deemed that in terms of his mental health, he is able to make decisions and partake in medical decision making with medical team.  Family stating that they do know that their father is depressed, and he has been having an ongoing cole with this due to multiple medical problems.    Also explained to patient that medical goal for him is to optimize him medically as best as we can and this includes with dialysis, increased appetite,  increase mobility, increase strength, decrease pain in order to get him to nursing facility where he can continue to work on his strength and become more independent with a goal to get home.  Explained that hospice is not our goal, our goal is to optimize patient and respect his wishes.    At end of discussion, patient stating that he would like to continue with current treatment as far as dialysis, trying to increase appetite and strength.  However, would like to defer decision making at this time about appetite stimulant and updated pain regimen.  States he would like to talk with his family and discuss further tomorrow to give a decision.  All patient and family questions were answered to the best of my ability, and family stating satisfaction.

## 2024-12-06 NOTE — ASSESSMENT & PLAN NOTE
Multifactorial in origin, continue to monitor  Platelet count has been steadily decreasing with unclear etiology  Currently no signs of bleeding  Previously has supratherapeutic INR  Currently is subtherapeutic, continue to monitor INR and platelets and for any bleeding  Hold Coumadin dosing  Platelets low but stable at 52-56    Lab Results   Component Value Date    WBC 5.61 12/06/2024    RBC 2.76 (L) 12/06/2024    PLT 74 (L) 12/06/2024    PLT 94 (L) 12/05/2024

## 2024-12-06 NOTE — ASSESSMENT & PLAN NOTE
Hemoglobin stable at 8.7 continue to monitor.   Epogen being held in setting of COVID-19 infection.

## 2024-12-06 NOTE — ASSESSMENT & PLAN NOTE
Patient is maintained on Coumadin PTA  INR supratherapeutic at time of presentation and was placed on hold  INR trending upwards and dialysis was delayed for a few days so vitamin K was administered at 2.5 mg  Patient now with subtherapeutic INR  Continue to monitor while administering low-dose Coumadin -on hold due to rapid increase in INR  Also monitor platelets  Lab Results   Component Value Date    INR 2.24 (H) 12/06/2024    INR 2.44 (H) 12/05/2024    INR 2.52 (H) 12/04/2024

## 2024-12-06 NOTE — PROGRESS NOTES
Pastoral Care Progress Note  Ch attempted family contact, support. Cell number on file for son, Masoud, no longer working. Phone call placed to home was not answered.  CH remains available.         12/06/24 1300   Clinical Encounter Type   Visited With Family

## 2024-12-06 NOTE — ASSESSMENT & PLAN NOTE
Malnutrition Findings:   Adult Malnutrition type: Acute illness  Adult Degree of Malnutrition: Other severe protein calorie malnutrition  Malnutrition Characteristics: Inadequate energy, Weight loss                  360 Statement: Acute on chronic severe malnutrition related to refusal for po intake, poor appetite, condition as evidenced by 10.9% weight loss x 14 days (11/20 220lb, 12/3 196lb); < 50% estimated energy intake > 5 days. Treated with: Recommend continued GOC discussion. Continue with daily weights. Can consider oral supplements if pt willing to take po intake though refusing po intake. Consider appetite stimulant if medically appropriate/pt agreeable though pt previously refused. Consider EN feeds if within GOC.    BMI Findings:           Body mass index is 26.34 kg/m².   -Continue management per primary team

## 2024-12-06 NOTE — ASSESSMENT & PLAN NOTE
Nocturnal BMs points towards an infectious vs inflammatory vs malabsorptive etiology. C diff colonized but not responding to vanco    -stool studies unremarkable other than C diff colonized and elastase 172. Suspect low elastase is in setting of diarrhea rather than the cause of the diarrhea itself  -avoid excessive sugar intake as well as artificial sweeteners.  -offered colonoscopy and EGD- pt declined. Does not want invasive testing done. Would recommend waiting until COVID symptoms resolve prior to endoscopy  -when infection is ruled out, we could try antidiarrheals. Questran/colestipol may be a safer option in the setting of C diff colonization compared to imodium or other similar agents. Given his ESRD/fluid restriction, colestipol may be the easier option for him.   -Could also consider Creon but again he is refusing additional pills so the pill burden of PERT would be more difficult compared to colestipol  -will arrange for outpatient f/u    GI will sign off at this time, please call if there are questions, concerns, or new GI symptoms.

## 2024-12-06 NOTE — ASSESSMENT & PLAN NOTE
Lab Results   Component Value Date    HGBA1C 5.0 11/02/2024       Recent Labs     12/05/24  1709 12/05/24  1837 12/05/24  2105 12/06/24  0812   POCGLU 72 91 84 72       Blood Sugar Average: Last 72 hrs:  (P) 82.65  Sliding scale insulin. Hypoglycemia protocol  Patient with recurrent hypoglycemia secondary to poor oral intake  Today requiring amp of dextrose  We will continue to discuss patient's appetite with him and possible addition of appetite stimulants

## 2024-12-06 NOTE — ASSESSMENT & PLAN NOTE
On chronic narcotics for pain, may have a gastroparesis etiology    -HP negative, celiac negative  -offered pepcid for indigestion, pt declined this as well. Could try 10 mg daily if he is agreeable (renal dosing)  -ok for diet as tolerated from GI standpoint

## 2024-12-06 NOTE — ASSESSMENT & PLAN NOTE
Presents from [place]. Tested positive 3 days prior to admission when symptoms started  Positive in our records on 11/20  CT chest negative for any pathology  Started decadron and remdesevir, doxycycline  Has completed therapy with all 3  Was on Coumadin for anticoagulation/DVT prophylaxis  Patient also having diarrhea, and C. difficile showing carrier state -see under diarrhea of infectious origin for further treatment plan  Currently 0.5 L nasal cannula/room air - fluctuating   Has completed quarantine for COVID-no longer needs airborne precautions

## 2024-12-06 NOTE — PROGRESS NOTES
Pastoral Care Progress Note  CH attending family meeting with Masoud, son, daughter and provider. Focus of meeting was to discuss goals of care in setting of poor nutritional intake.    Provider clarified course of admission with family, all questions answered. Provider facilitated question to pt regarding the recommendation of a placement of a feeding tube due to malnourishment. Pt declined this intervention at this time. Family asked extensive questions about various appetite stimulants. Provider clarified options with family.     Pt expressed concerns about management of his back pain and how it effects his quality of life. Extensive discussion was had about how Masoud's pain effects his appetite, mood and outlook, ability to regain strength. Provider encouraged pt to consider receiving some additional pain treatments as ancillary treatments for pain in order to support his healing. Outpatient pain management was explained and encouraged.    Pt expressed concerns about polypharmacy. Family shared this concern and provided historic examples from years past of pt suffering side effects.     Family shared their concerns that pt had been asked to discuss hospice when his capacity was in question. Provider shared chart notes from psychiatry noting that pt was interviewed and does have capacity. Family expressed some distress regarding pt making end of life choices without their input.     Masoud declined decisions this evening, and says he will discuss with provider tomorrow after counseling with his family about appetite stimulation and pain medicine.    CH facilitated questions from family, understanding of what hospitals can provide and what they can't, clarification of protocol, placement choices and limitations of health insurance.    CH will follow up on Monday.       12/06/24 1600   Clinical Encounter Type   Visited With Patient and family together;Health care provider

## 2024-12-07 ENCOUNTER — APPOINTMENT (INPATIENT)
Dept: DIALYSIS | Facility: HOSPITAL | Age: 69
DRG: 177 | End: 2024-12-07
Attending: INTERNAL MEDICINE
Payer: COMMERCIAL

## 2024-12-07 PROBLEM — U07.1 COVID-19: Status: RESOLVED | Noted: 2024-11-20 | Resolved: 2024-12-07

## 2024-12-07 PROBLEM — R06.89 ACUTE RESPIRATORY INSUFFICIENCY: Status: RESOLVED | Noted: 2024-11-21 | Resolved: 2024-12-07

## 2024-12-07 PROBLEM — R30.0 DYSURIA: Status: RESOLVED | Noted: 2024-11-30 | Resolved: 2024-12-07

## 2024-12-07 LAB
ANION GAP SERPL CALCULATED.3IONS-SCNC: 8 MMOL/L (ref 4–13)
BUN SERPL-MCNC: 15 MG/DL (ref 5–25)
CALCIUM SERPL-MCNC: 8.5 MG/DL (ref 8.4–10.2)
CHLORIDE SERPL-SCNC: 98 MMOL/L (ref 96–108)
CO2 SERPL-SCNC: 27 MMOL/L (ref 21–32)
CREAT SERPL-MCNC: 2.91 MG/DL (ref 0.6–1.3)
ERYTHROCYTE [DISTWIDTH] IN BLOOD BY AUTOMATED COUNT: 14.2 % (ref 11.6–15.1)
GFR SERPL CREATININE-BSD FRML MDRD: 20 ML/MIN/1.73SQ M
GLUCOSE SERPL-MCNC: 100 MG/DL (ref 65–140)
GLUCOSE SERPL-MCNC: 45 MG/DL (ref 65–140)
GLUCOSE SERPL-MCNC: 69 MG/DL (ref 65–140)
GLUCOSE SERPL-MCNC: 81 MG/DL (ref 65–140)
GLUCOSE SERPL-MCNC: 86 MG/DL (ref 65–140)
GLUCOSE SERPL-MCNC: 86 MG/DL (ref 65–140)
HCT VFR BLD AUTO: 25.9 % (ref 36.5–49.3)
HGB BLD-MCNC: 8.2 G/DL (ref 12–17)
INR PPP: 1.73 (ref 0.85–1.19)
MCH RBC QN AUTO: 31.9 PG (ref 26.8–34.3)
MCHC RBC AUTO-ENTMCNC: 31.7 G/DL (ref 31.4–37.4)
MCV RBC AUTO: 101 FL (ref 82–98)
PLATELET # BLD AUTO: 104 THOUSANDS/UL (ref 149–390)
PMV BLD AUTO: 10.5 FL (ref 8.9–12.7)
POTASSIUM SERPL-SCNC: 3.4 MMOL/L (ref 3.5–5.3)
PROTHROMBIN TIME: 20.5 SECONDS (ref 12.3–15)
RBC # BLD AUTO: 2.57 MILLION/UL (ref 3.88–5.62)
SODIUM SERPL-SCNC: 133 MMOL/L (ref 135–147)
WBC # BLD AUTO: 5.88 THOUSAND/UL (ref 4.31–10.16)

## 2024-12-07 PROCEDURE — 85027 COMPLETE CBC AUTOMATED: CPT

## 2024-12-07 PROCEDURE — 90935 HEMODIALYSIS ONE EVALUATION: CPT | Performed by: INTERNAL MEDICINE

## 2024-12-07 PROCEDURE — 82948 REAGENT STRIP/BLOOD GLUCOSE: CPT

## 2024-12-07 PROCEDURE — 85610 PROTHROMBIN TIME: CPT

## 2024-12-07 PROCEDURE — 80048 BASIC METABOLIC PNL TOTAL CA: CPT

## 2024-12-07 PROCEDURE — 99232 SBSQ HOSP IP/OBS MODERATE 35: CPT

## 2024-12-07 RX ORDER — DRONABINOL 2.5 MG/1
2.5 CAPSULE ORAL
Status: DISCONTINUED | OUTPATIENT
Start: 2024-12-07 | End: 2024-12-11 | Stop reason: HOSPADM

## 2024-12-07 RX ORDER — TORSEMIDE 20 MG/1
40 TABLET ORAL
Status: DISCONTINUED | OUTPATIENT
Start: 2024-12-08 | End: 2024-12-11 | Stop reason: HOSPADM

## 2024-12-07 RX ADMIN — WARFARIN SODIUM 2 MG: 2 TABLET ORAL at 21:58

## 2024-12-07 RX ADMIN — METOPROLOL TARTRATE 25 MG: 25 TABLET, FILM COATED ORAL at 21:57

## 2024-12-07 RX ADMIN — PANCRELIPASE 6000 UNITS: 30000; 6000; 19000 CAPSULE, DELAYED RELEASE PELLETS ORAL at 17:04

## 2024-12-07 RX ADMIN — ATORVASTATIN CALCIUM 40 MG: 40 TABLET, FILM COATED ORAL at 17:04

## 2024-12-07 RX ADMIN — Medication 2 G: at 12:00

## 2024-12-07 RX ADMIN — POTASSIUM & SODIUM PHOSPHATES POWDER PACK 280-160-250 MG 1 PACKET: 280-160-250 PACK at 17:04

## 2024-12-07 RX ADMIN — PYRIDOSTIGMINE BROMIDE 60 MG: 60 TABLET ORAL at 17:00

## 2024-12-07 RX ADMIN — LEVOTHYROXINE SODIUM 125 MCG: 125 TABLET ORAL at 06:04

## 2024-12-07 RX ADMIN — LIDOCAINE 1 PATCH: 700 PATCH TOPICAL at 14:35

## 2024-12-07 RX ADMIN — Medication 2 G: at 09:00

## 2024-12-07 RX ADMIN — DRONABINOL 2.5 MG: 2.5 CAPSULE ORAL at 17:37

## 2024-12-07 RX ADMIN — MIDODRINE HYDROCHLORIDE 15 MG: 5 TABLET ORAL at 17:00

## 2024-12-07 RX ADMIN — GENTAMICIN SULFATE: 1 CREAM TOPICAL at 09:00

## 2024-12-07 RX ADMIN — Medication 2 G: at 17:05

## 2024-12-07 RX ADMIN — MIDODRINE HYDROCHLORIDE 15 MG: 5 TABLET ORAL at 06:03

## 2024-12-07 RX ADMIN — MIDODRINE HYDROCHLORIDE 15 MG: 5 TABLET ORAL at 12:02

## 2024-12-07 RX ADMIN — GUAIFENESIN 600 MG: 600 TABLET ORAL at 21:57

## 2024-12-07 RX ADMIN — HYDROCODONE BITARTRATE AND ACETAMINOPHEN 2 TABLET: 5; 325 TABLET ORAL at 15:20

## 2024-12-07 NOTE — ASSESSMENT & PLAN NOTE
Significantly improved, patient has had a good response to ultrafiltration with hemodialysis and volume status improved.  Continues to recover from COVID.

## 2024-12-07 NOTE — ASSESSMENT & PLAN NOTE
Patient reporting ongoing dysuria since prior to admission  UA done 11/30 without any signs for acute cystitis  Is maintained on Proscar, refusing this medication and therefore DC it

## 2024-12-07 NOTE — ASSESSMENT & PLAN NOTE
Continue Sensipar, patient tends to experience hypercalcemia with the use of activated vitamin D agents.

## 2024-12-07 NOTE — ASSESSMENT & PLAN NOTE
Lab Results   Component Value Date    HGBA1C 5.0 11/02/2024       Recent Labs     12/06/24  2155 12/07/24  0827 12/07/24  0829 12/07/24  1203   POCGLU 236* 45* 81 100       Blood Sugar Average: Last 72 hrs:  (P) 85  Sliding scale insulin. Hypoglycemia protocol  Patient with recurrent hypoglycemia secondary to poor oral intake  Today requiring amp of dextrose  We will continue to discuss patient's appetite with him and possible addition of appetite stimulants

## 2024-12-07 NOTE — ASSESSMENT & PLAN NOTE
Patient is maintained on Coumadin PTA  INR supratherapeutic at time of presentation and was placed on hold  INR trending upwards and dialysis was delayed for a few days so vitamin K was administered at 2.5 mg  Patient now with subtherapeutic INR  Continue to monitor while administering low-dose Coumadin   Also monitor platelets

## 2024-12-07 NOTE — PROGRESS NOTES
Progress Note - Hospitalist   Name: Masoud Perry 69 y.o. male I MRN: [id number]  Unit/Bed#: -01 I Date of Admission: 11/20/2024   Date of Service: 12/7/2024 I Hospital Day: 17    Assessment & Plan  Acute respiratory insufficiency (Resolved: 12/7/2024)  Oxygen needs have waxed and waned  CT chest abdomen pelvis with mild diffuse anasarca improved from prior CT, no specific pathology along  Monitor volume status  Between 0.5 L nasal cannula for comfort and room air  COVID-19 (Resolved: 12/7/2024)  Presents from [place]. Tested positive 3 days prior to admission when symptoms started  Positive in our records on 11/20  CT chest negative for any pathology  Started decadron and remdesevir, doxycycline  Has completed therapy with all 3  Was on Coumadin for anticoagulation/DVT prophylaxis  Patient also having diarrhea, and C. difficile showing carrier state -see under diarrhea of infectious origin for further treatment plan  Currently 0.5 L nasal cannula/room air - fluctuating   Has completed quarantine for COVID-no longer needs airborne precautions  Diarrhea  Patient admitted having significant diarrhea-initially suspected infectious secondary to COVID versus C. difficile carrier status  Started on a course of oral vancomycin and completed-Per infectious disease do not feel that this is related to his diarrhea  Given lack of improvement in diarrhea-GI consulted  Patient continues to deny wanting to start any other medications-would not like a powder and does not feel that he can handle a large pill.  GI feels that Imodium would not be a good option  Questran helped when patient took it, but stating that it made him nauseous and he will not take it again  Willing to try a pill to help, since we do not carry colestid, will order creon  Diarrhea is ongoing because of hypovolemia and for that torsemide is on hold  Acute on chronic diastolic HF (heart failure) (HCC)  Wt Readings from Last 3 Encounters:   12/07/24 87.1 kg  (192 lb 0.3 oz)   10/14/24 115 kg (253 lb)   10/07/24 118 kg (260 lb)   CXR on 11/20 appeared to be pulmonary vascular congestion   Patient is a dialysis basis, received hemodialysis on TTS  Most recent session 11/30 to resume [facility] schedule  Torsemide is on hold secondary to ongoing diarrhea and anorexia  Appreciate continued nephrology follow-up  Monitor intake/output and daily weight  Acute exacerbation seems to have resolved-euvolemic and continue with dialysis  Goals of care, counseling/discussion  Patient and I had discussion today about his goals of care moving forward  He endorses that he has been feeling depressed and frustrated with his continued decline in health  He says that sometimes he wishes that he could just go home to pass away and be comfortable  He shares that he is not disagreeable for treatment but sometimes feels as though he is being tortured  Pain discussed possibilities of psychiatry consult for depression/trying recommendations made by prior psychiatrist for Remeron  At this time patient declines  Palliative care consulted  12/2 at this time declines any additional medications.  States that he is frustrated with continued hospitalization though is agreeable to this and agreeable to discharge to rehab.  Wants to continue treatment focused care without invasive measures  12/4 - patient discussed with hospice liaison, currently declining hospice and would like to continue dialysis but states that he still has a lot of decisions going on in his head.   12/5 d/w palliative and patient stating he wants to continue current treatment   12/6 family discussion with daughter, son, patient and . Patient willing to try marinol on 12/7. Would like to  continue with current treatment. Declining hospice at this time. See quick note from 12/6 for more details  Ongoing goals of care discussion  Factor 5 Leiden mutation, heterozygous (HCC)  Patient is maintained on Coumadin PTA  INR supratherapeutic  at time of presentation and was placed on hold  INR trending upwards and dialysis was delayed for a few days so vitamin K was administered at 2.5 mg  Patient now with subtherapeutic INR  Continue to monitor while administering low-dose Coumadin   Also monitor platelets    Secondary hyperparathyroidism of renal origin ([facility])  Currently on 60 mg p.o. 3 times weekly  Hemodialysis-associated hypotension  Continue midodrine and pyridostigmine  Type 2 diabetes mellitus with stage 4 chronic kidney disease, with long-term current use of insulin ([facility])  Lab Results   Component Value Date    HGBA1C 5.0 11/02/2024       Recent Labs     12/06/24  2155 12/07/24  0827 12/07/24  0829 12/07/24  1203   POCGLU 236* 45* 81 100       Blood Sugar Average: Last 72 hrs:  (P) 85  Sliding scale insulin. Hypoglycemia protocol  Patient with recurrent hypoglycemia secondary to poor oral intake  Today requiring amp of dextrose  We will continue to discuss patient's appetite with him and possible addition of appetite stimulants  Paroxysmal atrial fibrillation (HCC)  Patient did have rapid response secondary to chest pain  Cardiology consulted-thought to be secondary to A-fib rather than any ACS  Can continue metoprolol if blood pressure tolerates -maintain on midodrine as well to aid in attention  Continue to trend INR with Coumadin   ESRD (end stage renal disease) on dialysis ([facility])  Lab Results   Component Value Date    EGFR 20 12/07/2024    EGFR 26 12/06/2024    EGFR 23 12/05/2024    CREATININE 2.91 (H) 12/07/2024    CREATININE 2.41 (H) 12/06/2024    CREATININE 2.66 (H) 12/05/2024   Typical regimen Tuesday Thursday Saturday  Nephrology following  Resume prior schedule  Anemia due to chronic kidney disease, on chronic dialysis ([facility])  Patient with chronic anemia  Baseline hemoglobin around 8  During hospitalization patient's hemoglobin has been stable between 9 and 10  Mild decrease today   Monitor for signs of bleeding in setting of low  platelets  Pancytopenia (HCC)  Multifactorial in origin, continue to monitor  Platelet count has been steadily decreasing with unclear etiology  Currently no signs of bleeding  Previously has supratherapeutic INR  Currently is subtherapeutic, continue to monitor INR and platelets and for any bleeding  Platelets improving, resume coumadin tonight    Lab Results   Component Value Date    WBC 5.88 12/07/2024    RBC 2.57 (L) 12/07/2024     (L) 12/07/2024    PLT 74 (L) 12/06/2024     Electrolyte imbalance risk    Dysuria (Resolved: 12/7/2024)  Patient reporting ongoing dysuria since prior to admission  UA done 11/30 without any signs for acute cystitis  Is maintained on Proscar, refusing this medication and therefore DC it   Epigastric pain    Palliative care by specialist    Hypophosphatemia    Severe protein-calorie malnutrition (HCC)  Malnutrition Findings:   Adult Malnutrition type: Acute illness  Adult Degree of Malnutrition: Other severe protein calorie malnutrition  Malnutrition Characteristics: Inadequate energy, Weight loss                  360 Statement: Acute on chronic severe malnutrition related to refusal for po intake, poor appetite, condition as evidenced by 10.9% weight loss x 14 days (11/20 220lb, 12/3 196lb); < 50% estimated energy intake > 5 days. Treated with: Recommend continued GOC discussion. Continue with daily weights. Can consider oral supplements if pt willing to take po intake though refusing po intake. Consider appetite stimulant if medically appropriate/pt agreeable though pt previously refused. Consider EN feeds if within GOC.    BMI Findings:           Body mass index is 26.04 kg/m².   Agreeable to trying marinol 2.5mg bid for appetite stimulant   declined tube feeds or enteric feedings   Continue goals of care discussion    VTE Pharmacologic Prophylaxis: VTE Score: 6 High Risk (Score >/= 5) - Pharmacological DVT Prophylaxis Ordered: warfarin (Coumadin). Sequential Compression  Devices Ordered.    Mobility:   Basic Mobility Inpatient Raw Score: 7  JH-HLM Goal: 2: Bed activities/Dependent transfer  JH-HLM Achieved: 2: Bed activities/Dependent transfer  JH-HLM Goal achieved. Continue to encourage appropriate mobility.    Patient Centered Rounds: I performed bedside rounds with nursing staff today.   Discussions with Specialists or Other Care Team Provider: nursing, cm, nephrology    Education and Discussions with Family / Patient: Updated  (daughter in law) via phone.    Current Length of Stay: 17 day(s)  Current Patient Status: Inpatient   Certification Statement: The patient will continue to require additional inpatient hospital stay due to requiring monitoring of oral intake  Discharge Plan: Anticipate discharge in 48-72 hrs to rehab facility.    Code Status: Level 3 - DNAR and DNI    Subjective   Patient states that he is not having any pain or diarrhea today. Denies chest pain, shortness of breath or abdominal pain. States that he did not eat breakfast or lunch today so far because he was getting dialysis. Agreeable to marinol and lidoderm patches.    Objective :  Temp:  [97.2 °F (36.2 °C)-97.9 °F (36.6 °C)] 97.2 °F (36.2 °C)  HR:  [71-91] 80  BP: ()/(55-81) 114/68  Resp:  [15-17] 16  SpO2:  [93 %-100 %] 100 %  O2 Device: None (Room air)    Body mass index is 26.04 kg/m².     Input and Output Summary (last 24 hours):     Intake/Output Summary (Last 24 hours) at 12/7/2024 1306  Last data filed at 12/7/2024 1225  Gross per 24 hour   Intake 600 ml   Output 1050 ml   Net -450 ml       Physical Exam  Vitals reviewed.   Constitutional:       General: He is not in acute distress.     Appearance: Normal appearance. He is obese. He is ill-appearing.   HENT:      Head: Normocephalic and atraumatic.      Nose: Nose normal.      Mouth/Throat:      Mouth: Mucous membranes are moist.      Pharynx: Oropharynx is clear.   Eyes:      Extraocular Movements: Extraocular movements  intact.      Conjunctiva/sclera: Conjunctivae normal.   Cardiovascular:      Rate and Rhythm: Normal rate and regular rhythm.      Pulses: Normal pulses.      Heart sounds: Normal heart sounds. No murmur heard.  Pulmonary:      Effort: Pulmonary effort is normal. No respiratory distress.      Breath sounds: Normal breath sounds. No wheezing.   Abdominal:      General: Abdomen is flat. Bowel sounds are normal. There is no distension.      Palpations: Abdomen is soft.      Tenderness: There is no abdominal tenderness. There is no guarding.   Musculoskeletal:         General: Normal range of motion.      Cervical back: Normal range of motion.      Right lower leg: Edema present.      Left lower leg: Edema present.   Skin:     General: Skin is warm.      Coloration: Skin is pale.   Neurological:      General: No focal deficit present.      Mental Status: He is alert and oriented to person, place, and time. Mental status is at baseline.      Motor: No weakness.   Psychiatric:         Mood and Affect: Mood normal.         Behavior: Behavior normal.         Thought Content: Thought content normal.         Judgment: Judgment normal.         Lines/Drains:  Lines/Drains/Airways       Active Status       Name Placement date Placement time Site Days    HD Permanent Double Catheter 10/14/24  1420  Subclavian  53                      Lab Results: I have reviewed the following results:   Results from last 7 days   Lab Units 12/07/24  0819 12/02/24  0458 12/01/24  0842   WBC Thousand/uL 5.88   < > 5.45   HEMOGLOBIN g/dL 8.2*   < > 9.8*   HEMATOCRIT % 25.9*   < > 31.7*   PLATELETS Thousands/uL 104*   < > 68*   BANDS PCT %  --   --  1   SEGS PCT %  --   --  75   LYMPHO PCT %  --   --  19  19   MONO PCT %  --   --  4  2*   EOS PCT %  --   --  1    < > = values in this interval not displayed.     Results from last 7 days   Lab Units 12/07/24  0819 12/03/24  0609 12/02/24  0458   SODIUM mmol/L 133*   < > 136   POTASSIUM mmol/L 3.4*    < > 3.5   CHLORIDE mmol/L 98   < > 101   CO2 mmol/L 27   < > 29   BUN mg/dL 15   < > 18   CREATININE mg/dL 2.91*   < > 2.31*   ANION GAP mmol/L 8   < > 6   CALCIUM mg/dL 8.5   < > 8.7   ALBUMIN g/dL  --   --  2.5*   TOTAL BILIRUBIN mg/dL  --   --  0.53   ALK PHOS U/L  --   --  56   ALT U/L  --   --  5*   AST U/L  --   --  11*   GLUCOSE RANDOM mg/dL 69   < > 71    < > = values in this interval not displayed.     Results from last 7 days   Lab Units 12/07/24  0819   INR  1.73*     Results from last 7 days   Lab Units 12/07/24  1203 12/07/24  0829 12/07/24  0827 12/06/24  2155 12/06/24  2130 12/06/24  1718 12/06/24  1240 12/06/24  1149 12/06/24  0812 12/05/24  2105 12/05/24  1837 12/05/24  1709   POC GLUCOSE mg/dl 100 81 45* 236* 68 77 89 68 72 84 91 72               Recent Cultures (last 7 days):           Last 24 Hours Medication List:     Current Facility-Administered Medications:     acetaminophen (TYLENOL) tablet 650 mg, Q4H PRN    atorvastatin (LIPITOR) tablet 40 mg, Daily With Dinner    benzonatate (TESSALON PERLES) capsule 100 mg, TID PRN    calcium carbonate (TUMS) chewable tablet 500 mg, Daily PRN    cinacalcet (SENSIPAR) tablet 60 mg, Once per day on Monday Wednesday Friday    Diclofenac Sodium (VOLTAREN) 1 % topical gel 2 g, 4x Daily    dronabinol (MARINOL) capsule 2.5 mg, BID before lunch/dinner    gentamicin (GARAMYCIN) 0.1 % cream, Daily    guaiFENesin (MUCINEX) 12 hr tablet 600 mg, Q12H VASU    HYDROcodone-acetaminophen (NORCO) 5-325 mg per tablet 2 tablet, Q6H PRN    insulin lispro (HumALOG/ADMELOG) 100 units/mL subcutaneous injection 1-6 Units, TID AC **AND** Fingerstick Glucose (POCT), TID AC    levothyroxine tablet 125 mcg, Early Morning    lidocaine (LIDODERM) 5 % patch 1 patch, Daily    metoprolol tartrate (LOPRESSOR) tablet 25 mg, Q12H VASU    midodrine (PROAMATINE) tablet 15 mg, TID AC    ondansetron (ZOFRAN) injection 4 mg, Q6H PRN    pancrelipase (Lip-Prot-Amyl) (CREON) delayed release capsule  6,000 Units, TID With Meals    potassium-sodium phosphates (PHOS-NAK) packet 1 packet, BID With Meals    pyridostigmine (MESTINON) tablet 60 mg, TID    simethicone (MYLICON) chewable tablet 80 mg, 4x Daily PRN    [START ON 12/8/2024] torsemide (DEMADEX) tablet 40 mg, Once per day on Sunday Monday Wednesday Friday    warfarin (COUMADIN) tablet 2 mg, HS    Administrative Statements   Today, Patient Was Seen By: Ofelia Salmeron PA-C    **Please Note: This note may have been constructed using a voice recognition system.**

## 2024-12-07 NOTE — ASSESSMENT & PLAN NOTE
Continue with high dose midodrine supplementation.  Patient has been stable given significant drop in overall fluid weight.

## 2024-12-07 NOTE — PROGRESS NOTES
Progress Note - Nephrology   Name: aMsoud Perry 69 y.o. male I MRN: [id number]  Unit/Bed#: -01 I Date of Admission: 11/20/2024   Date of Service: 12/7/2024 I Hospital Day: 17     HEMODIALYSIS PROCEDURE NOTE  The patient was seen and examined on hemodialysis.  Time: 4 hours  Sodium: 134 Blood flow: 400   Dialyzer: F160 Potassium: 4K Dialysate flow: 600   Access: R Permcath Bicarbonate: 30 Ultrafiltration goal: 400 ml   Medications on HD: none     Revisited the patient while on hemodialysis, seen twice during his treatment.  Reduced ultrafiltration goal from 1 L down to 400 mL due to borderline blood pressures.  Assessment & Plan  ESRD (end stage renal disease) on dialysis ([facility])  Continue Tuesday Thursday Saturday hemodialysis sessions, patient currently on hemodialysis no specific complaints.  Continue to utilize right PermCath at this time.    With respect to peritoneal dialysis catheter, continue with gentamicin daily and local dressing.  Hemodialysis-associated hypotension  Continue with high dose midodrine supplementation.  Patient has been stable given significant drop in overall fluid weight.  Acute on chronic diastolic HF (heart failure) ([facility])  Patient's weight this morning is down to 192 pounds, this may represent the patient's new estimated dry weight somewhere between 191-193 pounds.  Anemia due to chronic kidney disease, on chronic dialysis ([facility])  Continue to monitor hemoglobin, packed red blood cells as indicated.  And reinitiation of JUAN FRANCISCO 1 month after the patient recovers from COVID infection.    Secondary hyperparathyroidism of renal origin ([facility])  Continue Sensipar, patient tends to experience hypercalcemia with the use of activated vitamin D agents.    Pancytopenia ([facility])  Continue to monitor blood counts, packed red blood cells if indicated from the hemoglobin standpoint.  Acute respiratory insufficiency  Significantly improved, patient has had a good response to ultrafiltration with hemodialysis  "and volume status improved.  Continues to recover from COVID.    Dysuria  Stable/improved.  COVID-19  Continue with supportive care    Paroxysmal atrial fibrillation (HCC)  Remains on rate control with metoprolol, blood pressures adequate.    Electrolyte imbalance risk  Continue to monitor all electrolytes, adjustments to hemodialysis electrolyte baths as indicated.    Hypophosphatemia  -Phosphorus level low at 1.6, replaced with oral Phos-Nak. Check follow up phos level.   Goals of care, counseling/discussion  - On 12/3/2024: Patient asked nephrology  about what would happen if dialysis would be stopped, discussed about risk of fluid overload electrolyte imbalance and ultimately death in 2 to 3 weeks.  Discussed that he may have to consider hospice if plan to stop dialysis. He did not want to stop at this time after discussion..  There has been ongoing discussion regarding goals of care. The patient is still deciding as there \"is a lot to think about\" as per my discussion is on 12/6.   Diarrhea  Continue management per primary team.  Seen by GI.  As per GI note patient was declined colonoscopy and EGD and does not want any invasive testing done.  Severe protein-calorie malnutrition (HCC)  Malnutrition Findings:   Adult Malnutrition type: Acute illness  Adult Degree of Malnutrition: Other severe protein calorie malnutrition  Malnutrition Characteristics: Inadequate energy, Weight loss                  360 Statement: Acute on chronic severe malnutrition related to refusal for po intake, poor appetite, condition as evidenced by 10.9% weight loss x 14 days (11/20 220lb, 12/3 196lb); < 50% estimated energy intake > 5 days. Treated with: Recommend continued GOC discussion. Continue with daily weights. Can consider oral supplements if pt willing to take po intake though refusing po intake. Consider appetite stimulant if medically appropriate/pt agreeable though pt previously refused. Consider EN feeds if within " [facility].    BMI Findings:           Body mass index is 26.04 kg/m².   Patient has a poor appetite, after discussion we will initiate Marinol 2.5 mg twice daily.  Patient understands that he needs to significantly increase protein intake in general, and any foods he can in particular  Epigastric pain    Palliative care by specialist        Follow up reason for today's visit: End-stage renal disease/anemia due to chronic kidney disease/volume management    Acute respiratory insufficiency    Patient Active Problem List   Diagnosis    History of CVA (cerebrovascular accident)    Acute on chronic diastolic HF (heart failure) ([facility])    HLD (hyperlipidemia)    Lymphedema    Acquired hypothyroidism    Pancytopenia ([facility])    Thrombocytopenia ([facility])    Angina at rest ([facility])    MI (myocardial infarction) ([facility])    History of PTCA    Sleep apnea    Smoking    Factor 5 Leiden mutation, heterozygous ([facility])    Type 2 diabetes mellitus with stage 4 chronic kidney disease, with long-term current use of insulin ([facility])    Obesity, morbid ([facility])    Secondary hyperparathyroidism of renal origin ([facility])    Stage 5 chronic kidney disease on peritoneal dialysis ([facility])    Chronic kidney disease-mineral and bone disorder    Ureteral stone with hydronephrosis    Cutaneous abscess of buttock    Bilateral lower extremity edema    Cellulitis of right lower extremity    Paroxysmal atrial fibrillation ([facility])    Patient on peritoneal dialysis ([facility])    Hypervolemia    Bacteremia    Elevated LFTs    Nocturnal hypoxia    Lactic acidosis    Hypercalcemia    Chronic acquired lymphedema    Hemodialysis-associated hypotension    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 (HCC)    COPD (chronic obstructive pulmonary disease) ([facility])    ED (erectile dysfunction) of organic origin    Gastroesophageal reflux disease    Gout    History of thromboembolism of vein    Personal history of pulmonary embolism    Ambulatory dysfunction    Moderate protein-calorie malnutrition  (HCC)    Cellulitis    Hyponatremia    Goals of care, counseling/discussion    Diabetic ulcer of right great toe (HCC)    Hematoma    Edema    Stasis ulcer (HCC)    Personal history of gout    COVID-19    Acute respiratory insufficiency    ESRD (end stage renal disease) on dialysis (HCC)    Anemia due to chronic kidney disease, on chronic dialysis (HCC)    Electrolyte imbalance risk    Diarrhea of infectious origin    Dysuria    Diarrhea    Epigastric pain    Palliative care by specialist    Hypophosphatemia    Severe protein-calorie malnutrition (HCC)         Subjective:   No acute complaints at this time.    Objective:     Vitals: Blood pressure 103/60, pulse 90, temperature (!) 97.3 °F (36.3 °C), temperature source Temporal, resp. rate 15, height 6' (1.829 m), weight 87.1 kg (192 lb 0.3 oz), SpO2 94%.,Body mass index is 26.04 kg/m².    Weight (last 2 days)       Date/Time Weight    12/07/24 0600 87.1 (192.02)    12/06/24 0600 88.1 (194.23)    12/05/24 0546 88 (194.01)              Intake/Output Summary (Last 24 hours) at 12/7/2024 0952  Last data filed at 12/7/2024 0828  Gross per 24 hour   Intake 560 ml   Output 350 ml   Net 210 ml     I/O last 3 completed shifts:  In: 360 [P.O.:360]  Out: 450 [Urine:450]    HD Permanent Double Catheter (Active)   Reasons to continue HD Cath Treatment Therapy 12/07/24 0828   Goal for Removal N/A- chronic HD catheter 12/07/24 0828   Line Necessity Reviewed Yes, reviewed with provider 12/07/24 0828   Site Assessment Clean;Dry;Intact 12/07/24 0828   Proximal Lumen Status Blood return noted;Capped 12/07/24 0828   Distal Lumen Status Blood return noted;Capped 12/07/24 0828   Dressing Type Chlorhexidine dressing 12/07/24 0828   Dressing Status Clean;Dry;Intact 12/07/24 0828   Dressing Intervention Dressing changed 12/05/24 1418   Dressing Change Due 12/12/24 12/07/24 0828       Physical Exam: /60   Pulse 90   Temp (!) 97.3 °F (36.3 °C) (Temporal)   Resp 15   Ht 6' (1.829 m)    Wt 87.1 kg (192 lb 0.3 oz)   SpO2 94%   BMI 26.04 kg/m²     General Appearance:    Alert, cooperative, no distress, appears stated age   Head:    Normocephalic, without obvious abnormality, atraumatic   Eyes:    Conjunctiva/corneas clear   Ears:    Normal external ears   Nose:   Nares normal, septum midline, mucosa normal, no drainage    or sinus tenderness   Throat:   Lips, mucosa, and tongue normal; teeth and gums normal   Neck:   Supple   Back:     Symmetric, no curvature, ROM normal, no CVA tenderness   Lungs:     Clear to auscultation bilaterally, respirations unlabored   Chest wall:    No tenderness or deformity   Heart:    Regular rate and rhythm, S1 and S2 normal, no murmur, rub   or gallop   Abdomen:     Soft, non-tender, bowel sounds active   Extremities:   Extremities normal, atraumatic, no cyanosis or edema   Skin:   Skin color, texture, turgor normal, no rashes or lesions   Lymph nodes:   Cervical normal   Neurologic:   CNII-XII intact            Lab, Imaging and other studies: I have personally reviewed pertinent labs.  CBC:   Lab Results   Component Value Date    WBC 5.88 12/07/2024    HGB 8.2 (L) 12/07/2024    HCT 25.9 (L) 12/07/2024     (H) 12/07/2024     (L) 12/07/2024    RBC 2.57 (L) 12/07/2024    MCH 31.9 12/07/2024    MCHC 31.7 12/07/2024    RDW 14.2 12/07/2024    MPV 10.5 12/07/2024     CMP:   Lab Results   Component Value Date    K 3.4 (L) 12/07/2024    CL 98 12/07/2024    CO2 27 12/07/2024    BUN 15 12/07/2024    CREATININE 2.91 (H) 12/07/2024    CALCIUM 8.5 12/07/2024    EGFR 20 12/07/2024       .  Results from last 7 days   Lab Units 12/07/24  0819 12/06/24  0621 12/05/24  0639 12/03/24  0609 12/02/24  0458 12/01/24  0842   POTASSIUM mmol/L 3.4* 3.5 3.4*   < > 3.5 3.9   CHLORIDE mmol/L 98 98 100   < > 101 99   CO2 mmol/L 27 30 29   < > 29 31   BUN mg/dL 15 11 14   < > 18 15   CREATININE mg/dL 2.91* 2.41* 2.66*   < > 2.31* 1.86*   CALCIUM mg/dL 8.5 8.0* 8.0*   < > 8.7 9.2  "  ALK PHOS U/L  --   --   --   --  56 59   ALT U/L  --   --   --   --  5* 4*   AST U/L  --   --   --   --  11* 11*    < > = values in this interval not displayed.         Phosphorus: No results found for: \"PHOS\"  Magnesium: No results found for: \"MG\"  Urinalysis: No results found for: \"COLORU\", \"CLARITYU\", \"SPECGRAV\", \"PHUR\", \"LEUKOCYTESUR\", \"NITRITE\", \"PROTEINUA\", \"GLUCOSEU\", \"KETONESU\", \"BILIRUBINUR\", \"BLOODU\"  Ionized Calcium: No results found for: \"CAION\"  Coagulation:   Lab Results   Component Value Date    INR 1.73 (H) 12/07/2024     Troponin: No results found for: \"TROPONINI\"  ABG: No results found for: \"PHART\", \"MTK7OVX\", \"PO2ART\", \"SWA9DJW\", \"M2MTJKDL\", \"BEART\", \"SOURCE\"  Radiology review:     IMAGING  No results found.      Current Facility-Administered Medications:     acetaminophen (TYLENOL) tablet 650 mg, Q4H PRN    atorvastatin (LIPITOR) tablet 40 mg, Daily With Dinner    benzonatate (TESSALON PERLES) capsule 100 mg, TID PRN    calcium carbonate (TUMS) chewable tablet 500 mg, Daily PRN    cinacalcet (SENSIPAR) tablet 60 mg, Once per day on Monday Wednesday Friday    Diclofenac Sodium (VOLTAREN) 1 % topical gel 2 g, 4x Daily    gentamicin (GARAMYCIN) 0.1 % cream, Daily    guaiFENesin (MUCINEX) 12 hr tablet 600 mg, Q12H VASU    HYDROcodone-acetaminophen (NORCO) 5-325 mg per tablet 2 tablet, Q6H PRN    insulin lispro (HumALOG/ADMELOG) 100 units/mL subcutaneous injection 1-6 Units, TID AC **AND** Fingerstick Glucose (POCT), TID AC    levothyroxine tablet 125 mcg, Early Morning    lidocaine (LIDODERM) 5 % patch 1 patch, Daily    metoprolol tartrate (LOPRESSOR) tablet 25 mg, Q12H VASU    midodrine (PROAMATINE) tablet 15 mg, TID AC    ondansetron (ZOFRAN) injection 4 mg, Q6H PRN    pancrelipase (Lip-Prot-Amyl) (CREON) delayed release capsule 6,000 Units, TID With Meals    potassium-sodium phosphates (PHOS-NAK) packet 1 packet, BID With Meals    pyridostigmine (MESTINON) tablet 60 mg, TID    simethicone " (MYLICON) chewable tablet 80 mg, 4x Daily PRN    torsemide (DEMADEX) tablet 40 mg, Daily    [Held by provider] warfarin (COUMADIN) tablet 2 mg, HS  Medications Discontinued During This Encounter   Medication Reason    nicotine (NICODERM CQ) 21 mg/24 hr TD 24 hr patch 1 patch     furosemide (LASIX) injection 40 mg     potassium chloride (Klor-Con M20) CR tablet 20 mEq     cinacalcet (SENSIPAR) tablet 30 mg     epoetin domingo (EPOGEN,PROCRIT) injection 5,000 Units     epoetin domingo (EPOGEN,PROCRIT) injection 5,000 Units     doxycycline (VIBRAMYCIN) 100 mg in sodium chloride 0.9 % 100 mL IVPB     torsemide (DEMADEX) tablet 60 mg     potassium chloride (Klor-Con M20) CR tablet 20 mEq     multi-electrolyte (PLASMALYTE-A/ISOLYTE-S PH 7.4) IV solution     dextrose 5 % and sodium chloride 0.45 % infusion     warfarin (COUMADIN) tablet 1 mg     vancomycin (VANCOCIN) capsule 125 mg     insulin lispro (HumALOG/ADMELOG) 100 units/mL subcutaneous injection 1-5 Units     sevelamer (RENAGEL) tablet 800 mg     vancomycin (VANCOCIN) capsule 500 mg     docusate sodium (COLACE) capsule 100 mg     nicotine (NICODERM CQ) 7 mg/24hr TD 24 hr patch 7 mg     finasteride (PROSCAR) tablet 5 mg     cholestyramine sugar free (QUESTRAN LIGHT) packet 4 g        Marquise Santos,       This progress note was produced in part using a dictation device which may document imprecise wording from author's original intent.

## 2024-12-07 NOTE — ASSESSMENT & PLAN NOTE
Continue to monitor all electrolytes, adjustments to hemodialysis electrolyte baths as indicated.

## 2024-12-07 NOTE — ASSESSMENT & PLAN NOTE
Patient and I had discussion today about his goals of care moving forward  He endorses that he has been feeling depressed and frustrated with his continued decline in health  He says that sometimes he wishes that he could just go home to pass away and be comfortable  He shares that he is not disagreeable for treatment but sometimes feels as though he is being tortured  Pain discussed possibilities of psychiatry consult for depression/trying recommendations made by prior psychiatrist for [facility]  At this time patient declines  Palliative care consulted  12/2 at this time declines any additional medications.  States that he is frustrated with continued hospitalization though is agreeable to this and agreeable to discharge to rehab.  Wants to continue treatment focused care without invasive measures  12/4 - patient discussed with hospice liaison, currently declining hospice and would like to continue dialysis but states that he still has a lot of decisions going on in his head.   12/5 d/w palliative and patient stating he wants to continue current treatment   12/6 family discussion with daughter, son, patient and . Patient willing to try marinol on 12/7. Would like to  continue with current treatment. Declining hospice at this time. See quick note from 12/6 for more details  Ongoing goals of care discussion

## 2024-12-07 NOTE — CASE MANAGEMENT
Per [facility]ail[facility] portal, SNF auth still pending at this time per pending information from yesterday and day before. CM notified: Tres Reyes

## 2024-12-07 NOTE — ASSESSMENT & PLAN NOTE
Presents from [facility]. Tested positive 3 days prior to admission when symptoms started  Positive in our records on 11/20  CT chest negative for any pathology  Started decadron and remdesevir, doxycycline  Has completed therapy with all 3  Was on Coumadin for anticoagulation/DVT prophylaxis  Patient also having diarrhea, and C. difficile showing carrier state -see under diarrhea of infectious origin for further treatment plan  Currently 0.5 L nasal cannula/room air - fluctuating   Has completed quarantine for COVID-no longer needs airborne precautions

## 2024-12-07 NOTE — ASSESSMENT & PLAN NOTE
Continue Tuesday Thursday Saturday hemodialysis sessions, patient currently on hemodialysis no specific complaints.  Continue to utilize right PermCath at this time.    With respect to peritoneal dialysis catheter, continue with gentamicin daily and local dressing.

## 2024-12-07 NOTE — ASSESSMENT & PLAN NOTE
Patient's weight this morning is down to 192 pounds, this may represent the patient's new estimated dry weight somewhere between 191-193 pounds.

## 2024-12-07 NOTE — ASSESSMENT & PLAN NOTE
Continue to monitor hemoglobin, packed red blood cells as indicated.  And reinitiation of JUAN FRANCISCO 1 month after the patient recovers from COVID infection.

## 2024-12-07 NOTE — ASSESSMENT & PLAN NOTE
Malnutrition Findings:   Adult Malnutrition type: Acute illness  Adult Degree of Malnutrition: Other severe protein calorie malnutrition  Malnutrition Characteristics: Inadequate energy, Weight loss                  360 Statement: Acute on chronic severe malnutrition related to refusal for po intake, poor appetite, condition as evidenced by 10.9% weight loss x 14 days (11/20 220lb, 12/3 196lb); < 50% estimated energy intake > 5 days. Treated with: Recommend continued GOC discussion. Continue with daily weights. Can consider oral supplements if pt willing to take po intake though refusing po intake. Consider appetite stimulant if medically appropriate/pt agreeable though pt previously refused. Consider EN feeds if within GOC.    BMI Findings:           Body mass index is 26.04 kg/m².   Patient has a poor appetite, after discussion we will initiate Marinol 2.5 mg twice daily.  Patient understands that he needs to significantly increase protein intake in general, and any foods he can in particular

## 2024-12-07 NOTE — ASSESSMENT & PLAN NOTE
Continue to monitor blood counts, packed red blood cells if indicated from the hemoglobin standpoint.

## 2024-12-07 NOTE — ASSESSMENT & PLAN NOTE
Lab Results   Component Value Date    EGFR 20 12/07/2024    EGFR 26 12/06/2024    EGFR 23 12/05/2024    CREATININE 2.91 (H) 12/07/2024    CREATININE 2.41 (H) 12/06/2024    CREATININE 2.66 (H) 12/05/2024   Typical regimen Tuesday Thursday Saturday  Nephrology following  Resume prior schedule

## 2024-12-07 NOTE — PLAN OF CARE
Target UF Goal  1  L as tolerated. Patient dialyzing for 4 hours on 4 K bath for serum K of 3.4 per protocol. Treatment plan reviewed with Nephrology.   Post-Dialysis RN Treatment Note    Blood Pressure:  Pre 108/55 mm/Hg  Post 114/68 mmHg   EDW:  90 kg    Weight:  Pre 87.7 kg   Post 87.1 kg   Mode of weight measurement: Bed Scale   Volume Removed:  800 ml    Treatment duration: 240 minutes    NS given:  N/A    Treatment shortened No   Medications given during Rx: None Reported   Estimated Kt/V:  800   Access type: Permacath/TDC   Needle Gauge:  n/a   Access Issues: No    Report called to primary nurse:   Yes Amber Resendez RN       Problem: METABOLIC, FLUID AND ELECTROLYTES - ADULT  Goal: Electrolytes maintained within normal limits  Description: INTERVENTIONS:  - Monitor labs and assess patient for signs and symptoms of electrolyte imbalances  - Administer electrolyte replacement as ordered  - Monitor response to electrolyte replacements, including repeat lab results as appropriate  - Instruct patient on fluid and nutrition as appropriate  Outcome: Progressing  Goal: Fluid balance maintained  Description: INTERVENTIONS:  - Monitor labs   - Monitor I/O and WT  - Instruct patient on fluid and nutrition as appropriate  - Assess for signs & symptoms of volume excess or deficit  Outcome: Progressing

## 2024-12-07 NOTE — ASSESSMENT & PLAN NOTE
Malnutrition Findings:   Adult Malnutrition type: Acute illness  Adult Degree of Malnutrition: Other severe protein calorie malnutrition  Malnutrition Characteristics: Inadequate energy, Weight loss                  360 Statement: Acute on chronic severe malnutrition related to refusal for po intake, poor appetite, condition as evidenced by 10.9% weight loss x 14 days (11/20 220lb, 12/3 196lb); < 50% estimated energy intake > 5 days. Treated with: Recommend continued GOC discussion. Continue with daily weights. Can consider oral supplements if pt willing to take po intake though refusing po intake. Consider appetite stimulant if medically appropriate/pt agreeable though pt previously refused. Consider EN feeds if within GOC.    BMI Findings:           Body mass index is 26.04 kg/m².   Agreeable to trying marinol 2.5mg bid for appetite stimulant   declined tube feeds or enteric feedings   Continue goals of care discussion

## 2024-12-07 NOTE — ASSESSMENT & PLAN NOTE
Wt Readings from Last 3 Encounters:   12/07/24 87.1 kg (192 lb 0.3 oz)   10/14/24 115 kg (253 lb)   10/07/24 118 kg (260 lb)   CXR on 11/20 appeared to be pulmonary vascular congestion   Patient is a dialysis basis, received hemodialysis on TTS  Most recent session 11/30 to resume [facility] schedule  Torsemide is on hold secondary to ongoing diarrhea and anorexia  Appreciate continued nephrology follow-up  Monitor intake/output and daily weight  Acute exacerbation seems to have resolved-euvolemic and continue with dialysis

## 2024-12-07 NOTE — ASSESSMENT & PLAN NOTE
Multifactorial in origin, continue to monitor  Platelet count has been steadily decreasing with unclear etiology  Currently no signs of bleeding  Previously has supratherapeutic INR  Currently is subtherapeutic, continue to monitor INR and platelets and for any bleeding  Platelets improving, resume coumadin tonight    Lab Results   Component Value Date    WBC 5.88 12/07/2024    RBC 2.57 (L) 12/07/2024     (L) 12/07/2024    PLT 74 (L) 12/06/2024

## 2024-12-08 LAB
ANION GAP SERPL CALCULATED.3IONS-SCNC: 5 MMOL/L (ref 4–13)
BUN SERPL-MCNC: 7 MG/DL (ref 5–25)
CALCIUM SERPL-MCNC: 8 MG/DL (ref 8.4–10.2)
CHLORIDE SERPL-SCNC: 98 MMOL/L (ref 96–108)
CO2 SERPL-SCNC: 30 MMOL/L (ref 21–32)
CREAT SERPL-MCNC: 1.86 MG/DL (ref 0.6–1.3)
ERYTHROCYTE [DISTWIDTH] IN BLOOD BY AUTOMATED COUNT: 14.2 % (ref 11.6–15.1)
GFR SERPL CREATININE-BSD FRML MDRD: 35 ML/MIN/1.73SQ M
GLUCOSE SERPL-MCNC: 107 MG/DL (ref 65–140)
GLUCOSE SERPL-MCNC: 78 MG/DL (ref 65–140)
GLUCOSE SERPL-MCNC: 80 MG/DL (ref 65–140)
GLUCOSE SERPL-MCNC: 81 MG/DL (ref 65–140)
GLUCOSE SERPL-MCNC: 95 MG/DL (ref 65–140)
HCT VFR BLD AUTO: 26.2 % (ref 36.5–49.3)
HGB BLD-MCNC: 8.2 G/DL (ref 12–17)
INR PPP: 1.33 (ref 0.85–1.19)
MCH RBC QN AUTO: 31.7 PG (ref 26.8–34.3)
MCHC RBC AUTO-ENTMCNC: 31.3 G/DL (ref 31.4–37.4)
MCV RBC AUTO: 101 FL (ref 82–98)
PLATELET # BLD AUTO: 89 THOUSANDS/UL (ref 149–390)
PMV BLD AUTO: 9.8 FL (ref 8.9–12.7)
POTASSIUM SERPL-SCNC: 3.7 MMOL/L (ref 3.5–5.3)
PROTHROMBIN TIME: 16.9 SECONDS (ref 12.3–15)
RBC # BLD AUTO: 2.59 MILLION/UL (ref 3.88–5.62)
SODIUM SERPL-SCNC: 133 MMOL/L (ref 135–147)
WBC # BLD AUTO: 4.71 THOUSAND/UL (ref 4.31–10.16)

## 2024-12-08 PROCEDURE — 99232 SBSQ HOSP IP/OBS MODERATE 35: CPT

## 2024-12-08 PROCEDURE — 85610 PROTHROMBIN TIME: CPT

## 2024-12-08 PROCEDURE — 80048 BASIC METABOLIC PNL TOTAL CA: CPT

## 2024-12-08 PROCEDURE — 99232 SBSQ HOSP IP/OBS MODERATE 35: CPT | Performed by: INTERNAL MEDICINE

## 2024-12-08 PROCEDURE — 82948 REAGENT STRIP/BLOOD GLUCOSE: CPT

## 2024-12-08 PROCEDURE — 85027 COMPLETE CBC AUTOMATED: CPT

## 2024-12-08 RX ADMIN — HYDROCODONE BITARTRATE AND ACETAMINOPHEN 2 TABLET: 5; 325 TABLET ORAL at 20:08

## 2024-12-08 RX ADMIN — PANCRELIPASE 6000 UNITS: 30000; 6000; 19000 CAPSULE, DELAYED RELEASE PELLETS ORAL at 17:00

## 2024-12-08 RX ADMIN — PANCRELIPASE 6000 UNITS: 30000; 6000; 19000 CAPSULE, DELAYED RELEASE PELLETS ORAL at 09:00

## 2024-12-08 RX ADMIN — DRONABINOL 2.5 MG: 2.5 CAPSULE ORAL at 17:00

## 2024-12-08 RX ADMIN — METOPROLOL TARTRATE 25 MG: 25 TABLET, FILM COATED ORAL at 22:19

## 2024-12-08 RX ADMIN — Medication 2 G: at 09:31

## 2024-12-08 RX ADMIN — PYRIDOSTIGMINE BROMIDE 60 MG: 60 TABLET ORAL at 09:32

## 2024-12-08 RX ADMIN — METOPROLOL TARTRATE 25 MG: 25 TABLET, FILM COATED ORAL at 09:31

## 2024-12-08 RX ADMIN — PYRIDOSTIGMINE BROMIDE 60 MG: 60 TABLET ORAL at 22:18

## 2024-12-08 RX ADMIN — LIDOCAINE 1 PATCH: 700 PATCH TOPICAL at 09:31

## 2024-12-08 RX ADMIN — GENTAMICIN SULFATE: 1 CREAM TOPICAL at 09:31

## 2024-12-08 RX ADMIN — WARFARIN SODIUM 2 MG: 2 TABLET ORAL at 22:16

## 2024-12-08 RX ADMIN — MIDODRINE HYDROCHLORIDE 15 MG: 5 TABLET ORAL at 17:00

## 2024-12-08 RX ADMIN — MIDODRINE HYDROCHLORIDE 15 MG: 5 TABLET ORAL at 11:49

## 2024-12-08 RX ADMIN — HYDROCODONE BITARTRATE AND ACETAMINOPHEN 2 TABLET: 5; 325 TABLET ORAL at 10:44

## 2024-12-08 RX ADMIN — Medication 2 G: at 17:00

## 2024-12-08 RX ADMIN — TORSEMIDE 40 MG: 20 TABLET ORAL at 09:31

## 2024-12-08 RX ADMIN — PYRIDOSTIGMINE BROMIDE 60 MG: 60 TABLET ORAL at 17:00

## 2024-12-08 RX ADMIN — LIDOCAINE 1 PATCH: 700 PATCH TOPICAL at 22:20

## 2024-12-08 RX ADMIN — GUAIFENESIN 600 MG: 600 TABLET ORAL at 22:17

## 2024-12-08 RX ADMIN — PANCRELIPASE 6000 UNITS: 30000; 6000; 19000 CAPSULE, DELAYED RELEASE PELLETS ORAL at 11:49

## 2024-12-08 RX ADMIN — GUAIFENESIN 600 MG: 600 TABLET ORAL at 09:31

## 2024-12-08 RX ADMIN — ATORVASTATIN CALCIUM 40 MG: 40 TABLET, FILM COATED ORAL at 17:00

## 2024-12-08 RX ADMIN — LEVOTHYROXINE SODIUM 125 MCG: 125 TABLET ORAL at 05:09

## 2024-12-08 RX ADMIN — DRONABINOL 2.5 MG: 2.5 CAPSULE ORAL at 11:48

## 2024-12-08 RX ADMIN — MIDODRINE HYDROCHLORIDE 15 MG: 5 TABLET ORAL at 05:09

## 2024-12-08 RX ADMIN — Medication 2 G: at 11:49

## 2024-12-08 NOTE — ASSESSMENT & PLAN NOTE
Continue Sensipar, patient not a candidate for an activated vitamin D agent due to propensity towards hypercalcemia

## 2024-12-08 NOTE — ASSESSMENT & PLAN NOTE
Malnutrition Findings:   Adult Malnutrition type: Acute illness  Adult Degree of Malnutrition: Other severe protein calorie malnutrition  Malnutrition Characteristics: Inadequate energy, Weight loss                  360 Statement: Acute on chronic severe malnutrition related to refusal for po intake, poor appetite, condition as evidenced by 10.9% weight loss x 14 days (11/20 220lb, 12/3 196lb); < 50% estimated energy intake > 5 days. Treated with: Recommend continued GOC discussion. Continue with daily weights. Can consider oral supplements if pt willing to take po intake though refusing po intake. Consider appetite stimulant if medically appropriate/pt agreeable though pt previously refused. Consider EN feeds if within GOC.    BMI Findings:           Body mass index is 26.01 kg/m².   Agreeable to trying marinol 2.5mg bid for appetite stimulant   declined tube feeds or enteric feedings   Continue goals of care discussion

## 2024-12-08 NOTE — ASSESSMENT & PLAN NOTE
Patient weight is down to around 192 pounds, this is likely close to his new estimated dry weight.  Will continue to focus on this as our goal going forward.

## 2024-12-08 NOTE — ASSESSMENT & PLAN NOTE
Multifactorial in origin, continue to monitor  Platelet count has been steadily decreasing with unclear etiology  Currently no signs of bleeding  Previously has supratherapeutic INR  Currently is subtherapeutic, continue to monitor INR and platelets and for any bleeding  Platelets improving, resume coumadin     Lab Results   Component Value Date    WBC 5.88 12/07/2024    RBC 2.57 (L) 12/07/2024     (L) 12/07/2024    PLT 74 (L) 12/06/2024

## 2024-12-08 NOTE — ASSESSMENT & PLAN NOTE
Continue to monitor hemoglobin, patient has been off of JUAN FRANCISCO due to COVID infection last month.  Will likely be able to reinitiate long-acting JUAN FRANCISCO at presentation to his outpatient clinic.

## 2024-12-08 NOTE — ASSESSMENT & PLAN NOTE
Wt Readings from Last 3 Encounters:   12/08/24 87 kg (191 lb 12.8 oz)   10/14/24 115 kg (253 lb)   10/07/24 118 kg (260 lb)   CXR on 11/20 appeared to be pulmonary vascular congestion   Patient is a dialysis basis, received hemodialysis on TTS  Continue PTA torsemide  Appreciate continued nephrology follow-up  Monitor intake/output and daily weight  Acute exacerbation seems to have resolved-euvolemic and continue with dialysis

## 2024-12-08 NOTE — ASSESSMENT & PLAN NOTE
Malnutrition Findings:   Adult Malnutrition type: Acute illness  Adult Degree of Malnutrition: Other severe protein calorie malnutrition  Malnutrition Characteristics: Inadequate energy, Weight loss                  360 Statement: Acute on chronic severe malnutrition related to refusal for po intake, poor appetite, condition as evidenced by 10.9% weight loss x 14 days (11/20 220lb, 12/3 196lb); < 50% estimated energy intake > 5 days. Treated with: Recommend continued GOC discussion. Continue with daily weights. Can consider oral supplements if pt willing to take po intake though refusing po intake. Consider appetite stimulant if medically appropriate/pt agreeable though pt previously refused. Consider EN feeds if within GOC.    BMI Findings:           Body mass index is 26.01 kg/m².     Patient agreeable to initiate Marinol 2.5 mg twice daily, continue to encourage intake of protein.

## 2024-12-08 NOTE — PLAN OF CARE
Problem: Prexisting or High Potential for Compromised Skin Integrity  Goal: Skin integrity is maintained or improved  Description: INTERVENTIONS:  - Identify patients at risk for skin breakdown  - Assess and monitor skin integrity  - Assess and monitor nutrition and hydration status  - Monitor labs   - Assess for incontinence   - Turn and reposition patient  - Assist with mobility/ambulation  - Relieve pressure over bony prominences  - Avoid friction and shearing  - Provide appropriate hygiene as needed including keeping skin clean and dry  - Evaluate need for skin moisturizer/barrier cream  - Collaborate with interdisciplinary team   - Patient/family teaching  - Consider wound care consult   Outcome: Progressing     Problem: PAIN - ADULT  Goal: Verbalizes/displays adequate comfort level or baseline comfort level  Description: Interventions:  - Encourage patient to monitor pain and request assistance  - Assess pain using appropriate pain scale  - Administer analgesics based on type and severity of pain and evaluate response  - Implement non-pharmacological measures as appropriate and evaluate response  - Consider cultural and social influences on pain and pain management  - Notify physician/advanced practitioner if interventions unsuccessful or patient reports new pain  Outcome: Progressing     Problem: INFECTION - ADULT  Goal: Absence or prevention of progression during hospitalization  Description: INTERVENTIONS:  - Assess and monitor for signs and symptoms of infection  - Monitor lab/diagnostic results  - Monitor all insertion sites, i.e. indwelling lines, tubes, and drains  - Monitor endotracheal if appropriate and nasal secretions for changes in amount and color  - [place] appropriate cooling/warming therapies per order  - Administer medications as ordered  - Instruct and encourage patient and family to use good hand hygiene technique  - Identify and instruct in appropriate isolation precautions for  identified infection/condition  Outcome: Progressing  Goal: Absence of fever/infection during neutropenic period  Description: INTERVENTIONS:  - Monitor WBC    Outcome: Progressing     Problem: SAFETY ADULT  Goal: Patient will remain free of falls  Description: INTERVENTIONS:  - Educate patient/family on patient safety including physical limitations  - Instruct patient to call for assistance with activity   - Consult OT/PT to assist with strengthening/mobility   - Keep Call bell within reach  - Keep bed low and locked with side rails adjusted as appropriate  - Keep care items and personal belongings within reach  - Initiate and maintain comfort rounds  - Make Fall Risk Sign visible to staff  - Offer Toileting every 2 Hours, in advance of need  - Initiate/Maintain bed/chair alarm  - Obtain necessary fall risk management equipment  - Apply yellow socks and bracelet for high fall risk patients  - Consider moving patient to room near nurses station  Outcome: Progressing  Goal: Maintain or return to baseline ADL function  Description: INTERVENTIONS:  -  Assess patient's ability to carry out ADLs; assess patient's baseline for ADL function and identify physical deficits which impact ability to perform ADLs (bathing, care of mouth/teeth, toileting, grooming, dressing, etc.)  - Assess/evaluate cause of self-care deficits   - Assess range of motion  - Assess patient's mobility; develop plan if impaired  - Assess patient's need for assistive devices and provide as appropriate  - Encourage maximum independence but intervene and supervise when necessary  - Involve family in performance of ADLs  - Assess for home care needs following discharge   - Consider OT consult to assist with ADL evaluation and planning for discharge  - Provide patient education as appropriate  Outcome: Progressing  Goal: Maintains/Returns to pre admission functional level  Description: INTERVENTIONS:  - Perform AM-PAC 6 Click Basic Mobility/ Daily  Activity assessment daily.  - Set and communicate daily mobility goal to care team and patient/family/caregiver.   - Collaborate with rehabilitation services on mobility goals if consulted  - Perform Range of Motion 3 times a day.  - Reposition patient every 2 hours.  - Dangle patient 3 times a day  - Stand patient 3 times a day  - Ambulate patient 3 times a day  - Out of bed to chair 3 times a day   - Out of bed for meals 3 times a day  - Out of bed for toileting  - Record patient progress and toleration of activity level   Outcome: Progressing     Problem: DISCHARGE PLANNING  Goal: Discharge to home or other facility with appropriate resources  Description: INTERVENTIONS:  - Identify barriers to discharge w/patient and caregiver  - Arrange for needed discharge resources and transportation as appropriate  - Identify discharge learning needs (meds, wound care, etc.)  - Arrange for interpretive services to assist at discharge as needed  - Refer to Case Management Department for coordinating discharge planning if the patient needs post-hospital services based on physician/advanced practitioner order or complex needs related to functional status, cognitive ability, or social support system  Outcome: Progressing     Problem: Knowledge Deficit  Goal: Patient/family/caregiver demonstrates understanding of disease process, treatment plan, medications, and discharge instructions  Description: Complete learning assessment and assess knowledge base.  Interventions:  - Provide teaching at level of understanding  - Provide teaching via preferred learning methods  Outcome: Progressing     Problem: CARDIOVASCULAR - ADULT  Goal: Maintains optimal cardiac output and hemodynamic stability  Description: INTERVENTIONS:  - Monitor I/O, vital signs and rhythm  - Monitor for S/S and trends of decreased cardiac output  - Administer and titrate ordered vasoactive medications to optimize hemodynamic stability  - Assess quality of pulses,  skin color and temperature  - Assess for signs of decreased coronary artery perfusion  - Instruct patient to report change in severity of symptoms  Outcome: Progressing  Goal: Absence of cardiac dysrhythmias or at baseline rhythm  Description: INTERVENTIONS:  - Continuous cardiac monitoring, vital signs, obtain 12 lead EKG if ordered  - Administer antiarrhythmic and heart rate control medications as ordered  - Monitor electrolytes and administer replacement therapy as ordered  Outcome: Progressing     Problem: RESPIRATORY - ADULT  Goal: Achieves optimal ventilation and oxygenation  Description: INTERVENTIONS:  - Assess for changes in respiratory status  - Assess for changes in mentation and behavior  - Position to facilitate oxygenation and minimize respiratory effort  - Oxygen administered by appropriate delivery if ordered  - Initiate smoking cessation education as indicated  - Encourage broncho-pulmonary hygiene including cough, deep breathe, Incentive Spirometry  - Assess the need for suctioning and aspirate as needed  - Assess and instruct to report SOB or any respiratory difficulty  - Respiratory Therapy support as indicated  Outcome: Progressing     Problem: METABOLIC, FLUID AND ELECTROLYTES - ADULT  Goal: Electrolytes maintained within normal limits  Description: INTERVENTIONS:  - Monitor labs and assess patient for signs and symptoms of electrolyte imbalances  - Administer electrolyte replacement as ordered  - Monitor response to electrolyte replacements, including repeat lab results as appropriate  - Instruct patient on fluid and nutrition as appropriate  Outcome: Progressing  Goal: Fluid balance maintained  Description: INTERVENTIONS:  - Monitor labs   - Monitor I/O and WT  - Instruct patient on fluid and nutrition as appropriate  - Assess for signs & symptoms of volume excess or deficit  Outcome: Progressing  Goal: Glucose maintained within target range  Description: INTERVENTIONS:  - Monitor Blood  Glucose as ordered  - Assess for signs and symptoms of hyperglycemia and hypoglycemia  - Administer ordered medications to maintain glucose within target range  - Assess nutritional intake and initiate nutrition service referral as needed  Outcome: Progressing     Problem: Nutrition/Hydration-ADULT  Goal: Nutrient/Hydration intake appropriate for improving, restoring or maintaining nutritional needs  Description: Monitor and assess patient's nutrition/hydration status for malnutrition. Collaborate with interdisciplinary team and initiate plan and interventions as ordered.  Monitor patient's weight and dietary intake as ordered or per policy. Utilize nutrition screening tool and intervene as necessary. Determine patient's food preferences and provide high-protein, high-caloric foods as appropriate.     INTERVENTIONS:  - Monitor oral intake, urinary output, labs, and treatment plans  - Assess nutrition and hydration status and recommend course of action  - Evaluate amount of meals eaten  - Assist patient with eating if necessary   - Allow adequate time for meals  - Recommend/ encourage appropriate diets, oral nutritional supplements, and vitamin/mineral supplements  - Order, calculate, and assess calorie counts as needed  - Recommend, monitor, and adjust tube feedings and TPN/PPN based on assessed needs  - Assess need for intravenous fluids  - Provide specific nutrition/hydration education as appropriate  - Include patient/family/caregiver in decisions related to nutrition  Outcome: Progressing

## 2024-12-08 NOTE — ASSESSMENT & PLAN NOTE
Patient admitted having significant diarrhea-initially suspected infectious secondary to COVID versus C. difficile carrier status  Started on a course of oral vancomycin and completed-Per infectious disease do not feel that this is related to his diarrhea  Given lack of improvement in diarrhea-GI consulted  Patient continues to deny wanting to start any other medications-would not like a powder and does not feel that he can handle a large pill.  GI feels that Imodium would not be a good option  Lolaran helped when patient took it, but stating that it made him nauseous and he will not take it again  Willing to try a pill to help, since we do not carry colestid, will order creon  Diarrhea is improving

## 2024-12-08 NOTE — PROGRESS NOTES
Progress Note - Hospitalist   Name: Masoud Perry 69 y.o. male I MRN: [id number]  Unit/Bed#: -01 I Date of Admission: 11/20/2024   Date of Service: 12/8/2024 I Hospital Day: 18    Assessment & Plan  Diarrhea  Patient admitted having significant diarrhea-initially suspected infectious secondary to COVID versus C. difficile carrier status  Started on a course of oral vancomycin and completed-Per infectious disease do not feel that this is related to his diarrhea  Given lack of improvement in diarrhea-GI consulted  Patient continues to deny wanting to start any other medications-would not like a powder and does not feel that he can handle a large pill.  GI feels that Imodium would not be a good option  Questran helped when patient took it, but stating that it made him nauseous and he will not take it again  Willing to try a pill to help, since we do not carry colestid, will order creon  Diarrhea is improving   Acute on chronic diastolic HF (heart failure) (HCC)  Wt Readings from Last 3 Encounters:   12/08/24 87 kg (191 lb 12.8 oz)   10/14/24 115 kg (253 lb)   10/07/24 118 kg (260 lb)   CXR on 11/20 appeared to be pulmonary vascular congestion   Patient is a dialysis basis, received hemodialysis on TTS  Continue PTA torsemide  Appreciate continued nephrology follow-up  Monitor intake/output and daily weight  Acute exacerbation seems to have resolved-euvolemic and continue with dialysis  Goals of care, counseling/discussion  Patient and I had discussion today about his goals of care moving forward  He endorses that he has been feeling depressed and frustrated with his continued decline in health  He says that sometimes he wishes that he could just go home to pass away and be comfortable  He shares that he is not disagreeable for treatment but sometimes feels as though he is being tortured  discussed possibilities of psychiatry consult for depression/trying recommendations made by prior psychiatrist for [place]  At  this time patient declines  Palliative care consulted  12/2 at this time declines any additional medications.  States that he is frustrated with continued hospitalization though is agreeable to this and agreeable to discharge to rehab.  Wants to continue treatment focused care without invasive measures  12/4 - patient discussed with hospice liaison, currently declining hospice and would like to continue dialysis but states that he still has a lot of decisions going on in his head.   12/5 d/w palliative and patient stating he wants to continue current treatment   12/6 family discussion with daughter, son, patient and . Patient willing to try marinol on 12/7. Would like to  continue with current treatment. Declining hospice at this time. See quick note from 12/6 for more details  Ongoing goals of care discussion  Factor 5 Leiden mutation, heterozygous (HCC)  Patient is maintained on Coumadin PTA  INR supratherapeutic at time of presentation and was placed on hold  INR trending upwards and dialysis was delayed for a few days so vitamin K was administered at 2.5 mg  Patient now with subtherapeutic INR  Continue to monitor while administering low-dose Coumadin   Also monitor platelets  Secondary hyperparathyroidism of renal origin (HCC)  Currently on 60 mg p.o. 3 times weekly  Hemodialysis-associated hypotension  Continue midodrine and pyridostigmine  Type 2 diabetes mellitus with stage 4 chronic kidney disease, with long-term current use of insulin (HCC)  Lab Results   Component Value Date    HGBA1C 5.0 11/02/2024       Recent Labs     12/07/24  1203 12/07/24  1655 12/07/24  2210 12/08/24  0738   POCGLU 100 86 86 78       Blood Sugar Average: Last 72 hrs:  (P) 86  Sliding scale insulin. Hypoglycemia protocol  Patient with recurrent hypoglycemia secondary to poor oral intake  Paroxysmal atrial fibrillation (HCC)  Patient did have rapid response secondary to chest pain  Cardiology consulted-thought to be secondary  to A-fib rather than any ACS  Can continue metoprolol if blood pressure tolerates -maintain on midodrine as well to aid in attention  Continue to trend INR with Coumadin   ESRD (end stage renal disease) on dialysis (HCC)  Lab Results   Component Value Date    EGFR 35 12/08/2024    EGFR 20 12/07/2024    EGFR 26 12/06/2024    CREATININE 1.86 (H) 12/08/2024    CREATININE 2.91 (H) 12/07/2024    CREATININE 2.41 (H) 12/06/2024   Resume typical regimen Tuesday Thursday Saturday  Nephrology following  Anemia due to chronic kidney disease, on chronic dialysis (HCC)  Patient with chronic anemia  Baseline hemoglobin around 8  During hospitalization patient's hemoglobin has been stable between 9 and 10  Monitor for signs of bleeding in setting of low platelets  Pancytopenia (HCC)  Multifactorial in origin, continue to monitor  Platelet count has been steadily decreasing with unclear etiology  Currently no signs of bleeding  Previously has supratherapeutic INR  Currently is subtherapeutic, continue to monitor INR and platelets and for any bleeding  Platelets improving, resume coumadin     Lab Results   Component Value Date    WBC 5.88 12/07/2024    RBC 2.57 (L) 12/07/2024     (L) 12/07/2024    PLT 74 (L) 12/06/2024     Electrolyte imbalance risk    Epigastric pain    Palliative care by specialist    Hypophosphatemia    Severe protein-calorie malnutrition (HCC)  Malnutrition Findings:   Adult Malnutrition type: Acute illness  Adult Degree of Malnutrition: Other severe protein calorie malnutrition  Malnutrition Characteristics: Inadequate energy, Weight loss                  360 Statement: Acute on chronic severe malnutrition related to refusal for po intake, poor appetite, condition as evidenced by 10.9% weight loss x 14 days (11/20 220lb, 12/3 196lb); < 50% estimated energy intake > 5 days. Treated with: Recommend continued GOC discussion. Continue with daily weights. Can consider oral supplements if pt willing to take po  intake though refusing po intake. Consider appetite stimulant if medically appropriate/pt agreeable though pt previously refused. Consider EN feeds if within GOC.    BMI Findings:           Body mass index is 26.01 kg/m².   Agreeable to trying marinol 2.5mg bid for appetite stimulant   declined tube feeds or enteric feedings   Continue goals of care discussion    VTE Pharmacologic Prophylaxis: VTE Score: 6 High Risk (Score >/= 5) - Pharmacological DVT Prophylaxis Ordered: warfarin (Coumadin). Sequential Compression Devices Ordered.    Mobility:   Basic Mobility Inpatient Raw Score: 7  JH-HLM Goal: 2: Bed activities/Dependent transfer  JH-HLM Achieved: 2: Bed activities/Dependent transfer  JH-HLM Goal achieved. Continue to encourage appropriate mobility.    Patient Centered Rounds: I performed bedside rounds with nursing staff today.   Discussions with Specialists or Other Care Team Provider: nursing, cm    Education and Discussions with Family / Patient:  son aware that we are monitoring oral intake on newly started marinol.     Current Length of Stay: 18 day(s)  Current Patient Status: Inpatient   Certification Statement: The patient will continue to require additional inpatient hospital stay due to monitoring intake on marinol to meet daily caloric needs   Discharge Plan: Anticipate discharge in 48-72 hrs to rehab facility.    Code Status: Level 3 - DNAR and DNI    Subjective   Patient states that today is feeling fine, pain controlled by lidocaine patch and Norco.  States that he did not eat his breakfast this morning.  Agreeable to continuing Marinol.  Does not want to try muscle relaxer at this time    Objective :  Temp:  [96.6 °F (35.9 °C)-98.4 °F (36.9 °C)] 98.4 °F (36.9 °C)  HR:  [75-92] 79  BP: ()/(54-81) 105/59  Resp:  [15-20] 20  SpO2:  [94 %-100 %] 95 %  O2 Device: None (Room air)    Body mass index is 26.01 kg/m².     Input and Output Summary (last 24 hours):     Intake/Output Summary (Last 24  hours) at 12/8/2024 0927  Last data filed at 12/8/2024 0001  Gross per 24 hour   Intake 400 ml   Output 900 ml   Net -500 ml       Physical Exam  Vitals reviewed.   Constitutional:       General: He is not in acute distress.     Appearance: Normal appearance. He is obese. He is ill-appearing.   HENT:      Head: Normocephalic and atraumatic.      Nose: Nose normal.      Mouth/Throat:      Mouth: Mucous membranes are moist.      Pharynx: Oropharynx is clear.   Eyes:      Extraocular Movements: Extraocular movements intact.      Conjunctiva/sclera: Conjunctivae normal.   Cardiovascular:      Rate and Rhythm: Normal rate and regular rhythm.      Pulses: Normal pulses.      Heart sounds: Normal heart sounds. No murmur heard.  Pulmonary:      Effort: Pulmonary effort is normal. No respiratory distress.      Breath sounds: Normal breath sounds. No wheezing.   Abdominal:      General: Abdomen is flat. Bowel sounds are normal. There is no distension.      Palpations: Abdomen is soft.      Tenderness: There is no abdominal tenderness. There is no guarding.   Musculoskeletal:         General: Normal range of motion.      Cervical back: Normal range of motion.      Right lower leg: Edema present.      Left lower leg: Edema present.   Skin:     General: Skin is warm.      Coloration: Skin is pale.   Neurological:      General: No focal deficit present.      Mental Status: He is alert and oriented to person, place, and time. Mental status is at baseline.      Motor: No weakness.   Psychiatric:         Mood and Affect: Mood normal.         Behavior: Behavior normal.         Thought Content: Thought content normal.         Judgment: Judgment normal.           Lines/Drains:  Lines/Drains/Airways       Active Status       Name Placement date Placement time Site Days    HD Permanent Double Catheter 10/14/24  1420  Subclavian  54                      Lab Results: I have reviewed the following results:   Results from last 7 days   Lab  Units 12/07/24  0819   WBC Thousand/uL 5.88   HEMOGLOBIN g/dL 8.2*   HEMATOCRIT % 25.9*   PLATELETS Thousands/uL 104*     Results from last 7 days   Lab Units 12/08/24  0501 12/03/24  0609 12/02/24  0458   SODIUM mmol/L 133*   < > 136   POTASSIUM mmol/L 3.7   < > 3.5   CHLORIDE mmol/L 98   < > 101   CO2 mmol/L 30   < > 29   BUN mg/dL 7   < > 18   CREATININE mg/dL 1.86*   < > 2.31*   ANION GAP mmol/L 5   < > 6   CALCIUM mg/dL 8.0*   < > 8.7   ALBUMIN g/dL  --   --  2.5*   TOTAL BILIRUBIN mg/dL  --   --  0.53   ALK PHOS U/L  --   --  56   ALT U/L  --   --  5*   AST U/L  --   --  11*   GLUCOSE RANDOM mg/dL 80   < > 71    < > = values in this interval not displayed.     Results from last 7 days   Lab Units 12/08/24  0509   INR  1.33*     Results from last 7 days   Lab Units 12/08/24  0738 12/07/24  2210 12/07/24  1655 12/07/24  1203 12/07/24  0829 12/07/24  0827 12/06/24  2155 12/06/24  2130 12/06/24  1718 12/06/24  1240 12/06/24  1149 12/06/24  0812   POC GLUCOSE mg/dl 78 86 86 100 81 45* 236* 68 77 89 68 72               Recent Cultures (last 7 days):         Last 24 Hours Medication List:     Current Facility-Administered Medications:     acetaminophen (TYLENOL) tablet 650 mg, Q4H PRN    atorvastatin (LIPITOR) tablet 40 mg, Daily With Dinner    benzonatate (TESSALON PERLES) capsule 100 mg, TID PRN    calcium carbonate (TUMS) chewable tablet 500 mg, Daily PRN    cinacalcet (SENSIPAR) tablet 60 mg, Once per day on Monday Wednesday Friday    Diclofenac Sodium (VOLTAREN) 1 % topical gel 2 g, 4x Daily    dronabinol (MARINOL) capsule 2.5 mg, BID before lunch/dinner    gentamicin (GARAMYCIN) 0.1 % cream, Daily    guaiFENesin (MUCINEX) 12 hr tablet 600 mg, Q12H VASU    HYDROcodone-acetaminophen (NORCO) 5-325 mg per tablet 2 tablet, Q6H PRN    insulin lispro (HumALOG/ADMELOG) 100 units/mL subcutaneous injection 1-6 Units, TID AC **AND** Fingerstick Glucose (POCT), TID AC    levothyroxine tablet 125 mcg, Early Morning     lidocaine (LIDODERM) 5 % patch 1 patch, Daily    metoprolol tartrate (LOPRESSOR) tablet 25 mg, Q12H VASU    midodrine (PROAMATINE) tablet 15 mg, TID AC    ondansetron (ZOFRAN) injection 4 mg, Q6H PRN    pancrelipase (Lip-Prot-Amyl) (CREON) delayed release capsule 6,000 Units, TID With Meals    potassium-sodium phosphates (PHOS-NAK) packet 1 packet, BID With Meals    pyridostigmine (MESTINON) tablet 60 mg, TID    simethicone (MYLICON) chewable tablet 80 mg, 4x Daily PRN    torsemide (DEMADEX) tablet 40 mg, Once per day on Sunday Monday Wednesday Friday    warfarin (COUMADIN) tablet 2 mg, HS    Administrative Statements   Today, Patient Was Seen By: Ofelia Salmeron PA-C    **Please Note: This note may have been constructed using a voice recognition system.**

## 2024-12-08 NOTE — ASSESSMENT & PLAN NOTE
Continue Tuesday Thursday Saturday hemodialysis sessions, patient with no specific issues with dialysis yesterday with the exception of reducing ultrafiltration due to reduced blood pressures.    Continue with gentamicin to peritoneal dialysis catheter once daily.

## 2024-12-08 NOTE — PROGRESS NOTES
Progress Note - Nephrology   Name: Masoud Perry 69 y.o. male I MRN: [id number]  Unit/Bed#: -01 I Date of Admission: 11/20/2024   Date of Service: 12/8/2024 I Hospital Day: 18     Assessment & Plan  ESRD (end stage renal disease) on dialysis ([facility])  Continue Tuesday Thursday Saturday hemodialysis sessions, patient with no specific issues with dialysis yesterday with the exception of reducing ultrafiltration due to reduced blood pressures.    Continue with gentamicin to peritoneal dialysis catheter once daily.  Hemodialysis-associated hypotension  Continue with midodrine, patient tolerating blood pressures well at this time.  Acute on chronic diastolic HF (heart failure) ([facility])  Patient weight is down to around 192 pounds, this is likely close to his new estimated dry weight.  Will continue to focus on this as our goal going forward.  Anemia due to chronic kidney disease, on chronic dialysis ([facility])  Continue to monitor hemoglobin, patient has been off of JUAN FRANCISCO due to COVID infection last month.  Will likely be able to reinitiate long-acting JUAN FRANCISCO at presentation to his outpatient clinic.    Secondary hyperparathyroidism of renal origin ([facility])  Continue Sensipar, patient not a candidate for an activated vitamin D agent due to propensity towards hypercalcemia    Pancytopenia (HCC)  Stable, continue to monitor for now.  Paroxysmal atrial fibrillation (HCC)  Patient under rate control with metoprolol    Electrolyte imbalance risk  Continue to monitor all electrolytes, repletion as indicated.    Hypophosphatemia  Patient is on phosphorus supplementation, look to discontinue when clinically feasible, as noted above, patient not a candidate for calcitriol and is currently on Sensipar.  Goals of care, counseling/discussion  - On 12/3/2024: Patient asked nephrology  about what would happen if dialysis would be stopped, discussed about risk of fluid overload electrolyte imbalance and ultimately death in 2 to 3 weeks.  Discussed  "that he may have to consider hospice if plan to stop dialysis. He did not want to stop at this time after discussion..  There has been ongoing discussion regarding goals of care. The patient is still deciding as there \"is a lot to think about\" as per my discussion is on 12/6.     Patient continues to eat and drink as best as possible, wants to continue with dialysis at this time.  Diarrhea  This is improving, continue with supportive care.  Severe protein-calorie malnutrition (HCC)  Malnutrition Findings:   Adult Malnutrition type: Acute illness  Adult Degree of Malnutrition: Other severe protein calorie malnutrition  Malnutrition Characteristics: Inadequate energy, Weight loss                  360 Statement: Acute on chronic severe malnutrition related to refusal for po intake, poor appetite, condition as evidenced by 10.9% weight loss x 14 days (11/20 220lb, 12/3 196lb); < 50% estimated energy intake > 5 days. Treated with: Recommend continued GOC discussion. Continue with daily weights. Can consider oral supplements if pt willing to take po intake though refusing po intake. Consider appetite stimulant if medically appropriate/pt agreeable though pt previously refused. Consider EN feeds if within GOC.    BMI Findings:           Body mass index is 26.01 kg/m².     Patient agreeable to initiate Marinol 2.5 mg twice daily, continue to encourage intake of protein.  Epigastric pain    Palliative care by specialist      Case discussed with hospitalist.  Continue with regular hemodialysis sessions Tuesday Thursday Saturday, patient's appetite remains stable at this time.    Follow up reason for today's visit: End-stage renal disease/secondary hyperparathyroidism/volume management    Diarrhea    Patient Active Problem List   Diagnosis    History of CVA (cerebrovascular accident)    Acute on chronic diastolic HF (heart failure) (HCC)    HLD (hyperlipidemia)    Lymphedema    Acquired hypothyroidism    Pancytopenia (HCC)    " Thrombocytopenia (HCC)    Angina at rest (HCC)    MI (myocardial infarction) (HCC)    History of PTCA    Sleep apnea    Smoking    Factor 5 Leiden mutation, heterozygous (HCC)    Type 2 diabetes mellitus with stage 4 chronic kidney disease, with long-term current use of insulin (HCC)    Obesity, morbid (HCC)    Secondary hyperparathyroidism of renal origin (HCC)    Stage 5 chronic kidney disease on peritoneal dialysis (HCC)    Chronic kidney disease-mineral and bone disorder    Ureteral stone with hydronephrosis    Cutaneous abscess of buttock    Bilateral lower extremity edema    Cellulitis of right lower extremity    Paroxysmal atrial fibrillation (HCC)    Patient on peritoneal dialysis (HCC)    Hypervolemia    Bacteremia    Elevated LFTs    Nocturnal hypoxia    Lactic acidosis    Hypercalcemia    Chronic acquired lymphedema    Hemodialysis-associated hypotension    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 (HCC)    COPD (chronic obstructive pulmonary disease) (HCC)    ED (erectile dysfunction) of organic origin    Gastroesophageal reflux disease    Gout    History of thromboembolism of vein    Personal history of pulmonary embolism    Ambulatory dysfunction    Moderate protein-calorie malnutrition (HCC)    Cellulitis    Hyponatremia    Goals of care, counseling/discussion    Diabetic ulcer of right great toe (HCC)    Hematoma    Edema    Stasis ulcer (HCC)    Personal history of gout    ESRD (end stage renal disease) on dialysis (HCC)    Anemia due to chronic kidney disease, on chronic dialysis (HCC)    Electrolyte imbalance risk    Diarrhea of infectious origin    Diarrhea    Epigastric pain    Palliative care by specialist    Hypophosphatemia    Severe protein-calorie malnutrition (HCC)         Subjective:   Patient's diet remains reduced, otherwise has no acute complaints at this time.    Objective:     Vitals: Blood pressure 96/55, pulse 92, temperature 98.4 °F (36.9 °C), temperature source Temporal,  resp. rate 20, height 6' (1.829 m), weight 87 kg (191 lb 12.8 oz), SpO2 94%.,Body mass index is 26.01 kg/m².    Weight (last 2 days)       Date/Time Weight    12/08/24 0600 87 (191.8)    12/07/24 0600 87.1 (192.02)    12/06/24 0600 88.1 (194.23)              Intake/Output Summary (Last 24 hours) at 12/8/2024 1337  Last data filed at 12/8/2024 0001  Gross per 24 hour   Intake --   Output 100 ml   Net -100 ml     I/O last 3 completed shifts:  In: 840 [P.O.:360; I.V.:480]  Out: 1150 [Urine:350; Other:800]    HD Permanent Double Catheter (Active)   Reasons to continue HD Cath Treatment Therapy 12/07/24 1225   Goal for Removal N/A- chronic HD catheter 12/07/24 1225   Line Necessity Reviewed Yes, reviewed with provider 12/07/24 1225   Site Assessment Clean;Dry;Intact 12/07/24 1225   Proximal Lumen Status Capped;Flushed & Clamped;Normal saline locked;Passive disinfecting cap applied 12/07/24 1225   Distal Lumen Status Capped;Flushed & Clamped;Normal saline locked;Passive disinfecting cap applied 12/07/24 1225   Dressing Type Chlorhexidine dressing 12/07/24 1225   Dressing Status Clean;Dry;Intact 12/07/24 1225   Dressing Intervention Dressing changed 12/05/24 1418   Dressing Change Due 12/12/24 12/07/24 1225       Physical Exam: BP 96/55   Pulse 92   Temp 98.4 °F (36.9 °C) (Temporal)   Resp 20   Ht 6' (1.829 m)   Wt 87 kg (191 lb 12.8 oz)   SpO2 94%   BMI 26.01 kg/m²     General Appearance:    Alert, cooperative, no distress, appears stated age   Head:    Normocephalic, without obvious abnormality, atraumatic   Eyes:    Conjunctiva/corneas clear   Ears:    Normal external ears   Nose:   Nares normal, septum midline, mucosa normal, no drainage    or sinus tenderness   Throat:   Lips, mucosa, and tongue normal; teeth and gums normal   Neck:   Supple   Back:     Symmetric, no curvature, ROM normal, no CVA tenderness   Lungs:     Clear to auscultation bilaterally, respirations unlabored   Chest wall:    No tenderness or  "deformity   Heart:    Regular rate and rhythm, S1 and S2 normal, no murmur, rub   or gallop   Abdomen:     Soft, non-tender, bowel sounds active   Extremities:   Extremities normal, atraumatic, no cyanosis or edema   Skin:   Skin color, texture, turgor normal, no rashes or lesions   Lymph nodes:   Cervical normal   Neurologic:   CNII-XII intact            Lab, Imaging and other studies: I have personally reviewed pertinent labs.  CBC:   Lab Results   Component Value Date    WBC 4.71 12/08/2024    HGB 8.2 (L) 12/08/2024    HCT 26.2 (L) 12/08/2024     (H) 12/08/2024    PLT 89 (L) 12/08/2024    RBC 2.59 (L) 12/08/2024    MCH 31.7 12/08/2024    MCHC 31.3 (L) 12/08/2024    RDW 14.2 12/08/2024    MPV 9.8 12/08/2024     CMP:   Lab Results   Component Value Date    K 3.7 12/08/2024    CL 98 12/08/2024    CO2 30 12/08/2024    BUN 7 12/08/2024    CREATININE 1.86 (H) 12/08/2024    CALCIUM 8.0 (L) 12/08/2024    EGFR 35 12/08/2024       .  Results from last 7 days   Lab Units 12/08/24  0501 12/07/24  0819 12/06/24  0621 12/03/24  0609 12/02/24  0458   POTASSIUM mmol/L 3.7 3.4* 3.5   < > 3.5   CHLORIDE mmol/L 98 98 98   < > 101   CO2 mmol/L 30 27 30   < > 29   BUN mg/dL 7 15 11   < > 18   CREATININE mg/dL 1.86* 2.91* 2.41*   < > 2.31*   CALCIUM mg/dL 8.0* 8.5 8.0*   < > 8.7   ALK PHOS U/L  --   --   --   --  56   ALT U/L  --   --   --   --  5*   AST U/L  --   --   --   --  11*    < > = values in this interval not displayed.         Phosphorus: No results found for: \"PHOS\"  Magnesium: No results found for: \"MG\"  Urinalysis: No results found for: \"COLORU\", \"CLARITYU\", \"SPECGRAV\", \"PHUR\", \"LEUKOCYTESUR\", \"NITRITE\", \"PROTEINUA\", \"GLUCOSEU\", \"KETONESU\", \"BILIRUBINUR\", \"BLOODU\"  Ionized Calcium: No results found for: \"CAION\"  Coagulation:   Lab Results   Component Value Date    INR 1.33 (H) 12/08/2024     Troponin: No results found for: \"TROPONINI\"  ABG: No results found for: \"PHART\", \"RHB2FNZ\", \"PO2ART\", \"LKV0YHB\", \"W4RFHCLN\", " "\"BEART\", \"SOURCE\"  Radiology review:     IMAGING  No results found.      Current Facility-Administered Medications:     acetaminophen (TYLENOL) tablet 650 mg, Q4H PRN    atorvastatin (LIPITOR) tablet 40 mg, Daily With Dinner    benzonatate (TESSALON PERLES) capsule 100 mg, TID PRN    calcium carbonate (TUMS) chewable tablet 500 mg, Daily PRN    cinacalcet (SENSIPAR) tablet 60 mg, Once per day on Monday Wednesday Friday    Diclofenac Sodium (VOLTAREN) 1 % topical gel 2 g, 4x Daily    dronabinol (MARINOL) capsule 2.5 mg, BID before lunch/dinner    gentamicin (GARAMYCIN) 0.1 % cream, Daily    guaiFENesin (MUCINEX) 12 hr tablet 600 mg, Q12H VASU    HYDROcodone-acetaminophen (NORCO) 5-325 mg per tablet 2 tablet, Q6H PRN    insulin lispro (HumALOG/ADMELOG) 100 units/mL subcutaneous injection 1-6 Units, TID AC **AND** Fingerstick Glucose (POCT), TID AC    levothyroxine tablet 125 mcg, Early Morning    lidocaine (LIDODERM) 5 % patch 1 patch, Daily    metoprolol tartrate (LOPRESSOR) tablet 25 mg, Q12H VASU    midodrine (PROAMATINE) tablet 15 mg, TID AC    ondansetron (ZOFRAN) injection 4 mg, Q6H PRN    pancrelipase (Lip-Prot-Amyl) (CREON) delayed release capsule 6,000 Units, TID With Meals    potassium-sodium phosphates (PHOS-NAK) packet 1 packet, BID With Meals    pyridostigmine (MESTINON) tablet 60 mg, TID    simethicone (MYLICON) chewable tablet 80 mg, 4x Daily PRN    torsemide (DEMADEX) tablet 40 mg, Once per day on Sunday Monday Wednesday Friday    warfarin (COUMADIN) tablet 2 mg, HS  Medications Discontinued During This Encounter   Medication Reason    nicotine (NICODERM CQ) 21 mg/24 hr TD 24 hr patch 1 patch     furosemide (LASIX) injection 40 mg     potassium chloride (Klor-Con M20) CR tablet 20 mEq     cinacalcet (SENSIPAR) tablet 30 mg     epoetin domingo (EPOGEN,PROCRIT) injection 5,000 Units     epoetin domingo (EPOGEN,PROCRIT) injection 5,000 Units     doxycycline (VIBRAMYCIN) 100 mg in sodium chloride 0.9 % 100 mL IVPB "     torsemide (DEMADEX) tablet 60 mg     potassium chloride (Klor-Con M20) CR tablet 20 mEq     multi-electrolyte (PLASMALYTE-A/ISOLYTE-S PH 7.4) IV solution     dextrose 5 % and sodium chloride 0.45 % infusion     warfarin (COUMADIN) tablet 1 mg     vancomycin (VANCOCIN) capsule 125 mg     insulin lispro (HumALOG/ADMELOG) 100 units/mL subcutaneous injection 1-5 Units     sevelamer (RENAGEL) tablet 800 mg     vancomycin (VANCOCIN) capsule 500 mg     docusate sodium (COLACE) capsule 100 mg     nicotine (NICODERM CQ) 7 mg/24hr TD 24 hr patch 7 mg     finasteride (PROSCAR) tablet 5 mg     cholestyramine sugar free (QUESTRAN LIGHT) packet 4 g     torsemide (DEMADEX) tablet 40 mg        Marquise Santos, DO      This progress note was produced in part using a dictation device which may document imprecise wording from author's original intent.

## 2024-12-08 NOTE — ASSESSMENT & PLAN NOTE
Patient is on phosphorus supplementation, look to discontinue when clinically feasible, as noted above, patient not a candidate for calcitriol and is currently on Sensipar.

## 2024-12-08 NOTE — ASSESSMENT & PLAN NOTE
Patient and I had discussion today about his goals of care moving forward  He endorses that he has been feeling depressed and frustrated with his continued decline in health  He says that sometimes he wishes that he could just go home to pass away and be comfortable  He shares that he is not disagreeable for treatment but sometimes feels as though he is being tortured  discussed possibilities of psychiatry consult for depression/trying recommendations made by prior psychiatrist for Remeron  At this time patient declines  Palliative care consulted  12/2 at this time declines any additional medications.  States that he is frustrated with continued hospitalization though is agreeable to this and agreeable to discharge to rehab.  Wants to continue treatment focused care without invasive measures  12/4 - patient discussed with hospice liaison, currently declining hospice and would like to continue dialysis but states that he still has a lot of decisions going on in his head.   12/5 d/w palliative and patient stating he wants to continue current treatment   12/6 family discussion with daughter, son, patient and . Patient willing to try marinol on 12/7. Would like to  continue with current treatment. Declining hospice at this time. See quick note from 12/6 for more details  Ongoing goals of care discussion

## 2024-12-08 NOTE — ASSESSMENT & PLAN NOTE
Lab Results   Component Value Date    HGBA1C 5.0 11/02/2024       Recent Labs     12/07/24  1203 12/07/24  1655 12/07/24  2210 12/08/24  0738   POCGLU 100 86 86 78       Blood Sugar Average: Last 72 hrs:  (P) 86  Sliding scale insulin. Hypoglycemia protocol  Patient with recurrent hypoglycemia secondary to poor oral intake

## 2024-12-08 NOTE — ASSESSMENT & PLAN NOTE
"- On 12/3/2024: Patient asked nephrology  about what would happen if dialysis would be stopped, discussed about risk of fluid overload electrolyte imbalance and ultimately death in 2 to 3 weeks.  Discussed that he may have to consider hospice if plan to stop dialysis. He did not want to stop at this time after discussion..  There has been ongoing discussion regarding goals of care. The patient is still deciding as there \"is a lot to think about\" as per my discussion is on 12/6.     Patient continues to eat and drink as best as possible, wants to continue with dialysis at this time.  "

## 2024-12-08 NOTE — PLAN OF CARE
Problem: Prexisting or High Potential for Compromised Skin Integrity  Goal: Skin integrity is maintained or improved  Description: INTERVENTIONS:  - Identify patients at risk for skin breakdown  - Assess and monitor skin integrity  - Assess and monitor nutrition and hydration status  - Monitor labs   - Assess for incontinence   - Turn and reposition patient  - Assist with mobility/ambulation  - Relieve pressure over bony prominences  - Avoid friction and shearing  - Provide appropriate hygiene as needed including keeping skin clean and dry  - Evaluate need for skin moisturizer/barrier cream  - Collaborate with interdisciplinary team   - Patient/family teaching  - Consider wound care consult   Outcome: Progressing     Problem: PAIN - ADULT  Goal: Verbalizes/displays adequate comfort level or baseline comfort level  Description: Interventions:  - Encourage patient to monitor pain and request assistance  - Assess pain using appropriate pain scale  - Administer analgesics based on type and severity of pain and evaluate response  - Implement non-pharmacological measures as appropriate and evaluate response  - Consider cultural and social influences on pain and pain management  - Notify physician/advanced practitioner if interventions unsuccessful or patient reports new pain  Outcome: Progressing     Problem: INFECTION - ADULT  Goal: Absence or prevention of progression during hospitalization  Description: INTERVENTIONS:  - Assess and monitor for signs and symptoms of infection  - Monitor lab/diagnostic results  - Monitor all insertion sites, i.e. indwelling lines, tubes, and drains  - Monitor endotracheal if appropriate and nasal secretions for changes in amount and color  - [place] appropriate cooling/warming therapies per order  - Administer medications as ordered  - Instruct and encourage patient and family to use good hand hygiene technique  - Identify and instruct in appropriate isolation precautions for  identified infection/condition  Outcome: Progressing  Goal: Absence of fever/infection during neutropenic period  Description: INTERVENTIONS:  - Monitor WBC    Outcome: Progressing     Problem: SAFETY ADULT  Goal: Patient will remain free of falls  Description: INTERVENTIONS:  - Educate patient/family on patient safety including physical limitations  - Instruct patient to call for assistance with activity   - Consult OT/PT to assist with strengthening/mobility   - Keep Call bell within reach  - Keep bed low and locked with side rails adjusted as appropriate  - Keep care items and personal belongings within reach  - Initiate and maintain comfort rounds  - Make Fall Risk Sign visible to staff  - Offer Toileting every 2 Hours, in advance of need  - Initiate/Maintain bed/chair alarm  - Obtain necessary fall risk management equipment  - Apply yellow socks and bracelet for high fall risk patients  - Consider moving patient to room near nurses station  Outcome: Progressing  Goal: Maintain or return to baseline ADL function  Description: INTERVENTIONS:  -  Assess patient's ability to carry out ADLs; assess patient's baseline for ADL function and identify physical deficits which impact ability to perform ADLs (bathing, care of mouth/teeth, toileting, grooming, dressing, etc.)  - Assess/evaluate cause of self-care deficits   - Assess range of motion  - Assess patient's mobility; develop plan if impaired  - Assess patient's need for assistive devices and provide as appropriate  - Encourage maximum independence but intervene and supervise when necessary  - Involve family in performance of ADLs  - Assess for home care needs following discharge   - Consider OT consult to assist with ADL evaluation and planning for discharge  - Provide patient education as appropriate  Outcome: Progressing  Goal: Maintains/Returns to pre admission functional level  Description: INTERVENTIONS:  - Perform AM-PAC 6 Click Basic Mobility/ Daily  Activity assessment daily.  - Set and communicate daily mobility goal to care team and patient/family/caregiver.   - Collaborate with rehabilitation services on mobility goals if consulted  - Perform Range of Motion 3 times a day.  - Reposition patient every 2 hours.  - Dangle patient 3 times a day  - Stand patient 3 times a day  - Ambulate patient 3 times a day  - Out of bed to chair 3 times a day   - Out of bed for meals 3 times a day  - Out of bed for toileting  - Record patient progress and toleration of activity level   Outcome: Progressing     Problem: DISCHARGE PLANNING  Goal: Discharge to home or other facility with appropriate resources  Description: INTERVENTIONS:  - Identify barriers to discharge w/patient and caregiver  - Arrange for needed discharge resources and transportation as appropriate  - Identify discharge learning needs (meds, wound care, etc.)  - Arrange for interpretive services to assist at discharge as needed  - Refer to Case Management Department for coordinating discharge planning if the patient needs post-hospital services based on physician/advanced practitioner order or complex needs related to functional status, cognitive ability, or social support system  Outcome: Progressing     Problem: Knowledge Deficit  Goal: Patient/family/caregiver demonstrates understanding of disease process, treatment plan, medications, and discharge instructions  Description: Complete learning assessment and assess knowledge base.  Interventions:  - Provide teaching at level of understanding  - Provide teaching via preferred learning methods  Outcome: Progressing     Problem: CARDIOVASCULAR - ADULT  Goal: Maintains optimal cardiac output and hemodynamic stability  Description: INTERVENTIONS:  - Monitor I/O, vital signs and rhythm  - Monitor for S/S and trends of decreased cardiac output  - Administer and titrate ordered vasoactive medications to optimize hemodynamic stability  - Assess quality of pulses,  skin color and temperature  - Assess for signs of decreased coronary artery perfusion  - Instruct patient to report change in severity of symptoms  Outcome: Progressing  Goal: Absence of cardiac dysrhythmias or at baseline rhythm  Description: INTERVENTIONS:  - Continuous cardiac monitoring, vital signs, obtain 12 lead EKG if ordered  - Administer antiarrhythmic and heart rate control medications as ordered  - Monitor electrolytes and administer replacement therapy as ordered  Outcome: Progressing     Problem: RESPIRATORY - ADULT  Goal: Achieves optimal ventilation and oxygenation  Description: INTERVENTIONS:  - Assess for changes in respiratory status  - Assess for changes in mentation and behavior  - Position to facilitate oxygenation and minimize respiratory effort  - Oxygen administered by appropriate delivery if ordered  - Initiate smoking cessation education as indicated  - Encourage broncho-pulmonary hygiene including cough, deep breathe, Incentive Spirometry  - Assess the need for suctioning and aspirate as needed  - Assess and instruct to report SOB or any respiratory difficulty  - Respiratory Therapy support as indicated  Outcome: Progressing     Problem: METABOLIC, FLUID AND ELECTROLYTES - ADULT  Goal: Electrolytes maintained within normal limits  Description: INTERVENTIONS:  - Monitor labs and assess patient for signs and symptoms of electrolyte imbalances  - Administer electrolyte replacement as ordered  - Monitor response to electrolyte replacements, including repeat lab results as appropriate  - Instruct patient on fluid and nutrition as appropriate  Outcome: Progressing  Goal: Fluid balance maintained  Description: INTERVENTIONS:  - Monitor labs   - Monitor I/O and WT  - Instruct patient on fluid and nutrition as appropriate  - Assess for signs & symptoms of volume excess or deficit  Outcome: Progressing  Goal: Glucose maintained within target range  Description: INTERVENTIONS:  - Monitor Blood  Glucose as ordered  - Assess for signs and symptoms of hyperglycemia and hypoglycemia  - Administer ordered medications to maintain glucose within target range  - Assess nutritional intake and initiate nutrition service referral as needed  Outcome: Progressing     Problem: Nutrition/Hydration-ADULT  Goal: Nutrient/Hydration intake appropriate for improving, restoring or maintaining nutritional needs  Description: Monitor and assess patient's nutrition/hydration status for malnutrition. Collaborate with interdisciplinary team and initiate plan and interventions as ordered.  Monitor patient's weight and dietary intake as ordered or per policy. Utilize nutrition screening tool and intervene as necessary. Determine patient's food preferences and provide high-protein, high-caloric foods as appropriate.     INTERVENTIONS:  - Monitor oral intake, urinary output, labs, and treatment plans  - Assess nutrition and hydration status and recommend course of action  - Evaluate amount of meals eaten  - Assist patient with eating if necessary   - Allow adequate time for meals  - Recommend/ encourage appropriate diets, oral nutritional supplements, and vitamin/mineral supplements  - Order, calculate, and assess calorie counts as needed  - Recommend, monitor, and adjust tube feedings and TPN/PPN based on assessed needs  - Assess need for intravenous fluids  - Provide specific nutrition/hydration education as appropriate  - Include patient/family/caregiver in decisions related to nutrition  Outcome: Progressing

## 2024-12-08 NOTE — ASSESSMENT & PLAN NOTE
Lab Results   Component Value Date    EGFR 35 12/08/2024    EGFR 20 12/07/2024    EGFR 26 12/06/2024    CREATININE 1.86 (H) 12/08/2024    CREATININE 2.91 (H) 12/07/2024    CREATININE 2.41 (H) 12/06/2024   Resume typical regimen Tuesday Thursday Saturday  Nephrology following

## 2024-12-09 LAB
ALBUMIN SERPL BCG-MCNC: 2.3 G/DL (ref 3.5–5)
ALP SERPL-CCNC: 79 U/L (ref 34–104)
ALT SERPL W P-5'-P-CCNC: 6 U/L (ref 7–52)
ANION GAP SERPL CALCULATED.3IONS-SCNC: 8 MMOL/L (ref 4–13)
AST SERPL W P-5'-P-CCNC: 13 U/L (ref 13–39)
BILIRUB SERPL-MCNC: 0.36 MG/DL (ref 0.2–1)
BUN SERPL-MCNC: 13 MG/DL (ref 5–25)
CALCIUM ALBUM COR SERPL-MCNC: 10 MG/DL (ref 8.3–10.1)
CALCIUM SERPL-MCNC: 8.6 MG/DL (ref 8.4–10.2)
CHLORIDE SERPL-SCNC: 99 MMOL/L (ref 96–108)
CO2 SERPL-SCNC: 27 MMOL/L (ref 21–32)
CREAT SERPL-MCNC: 2.77 MG/DL (ref 0.6–1.3)
ERYTHROCYTE [DISTWIDTH] IN BLOOD BY AUTOMATED COUNT: 14.4 % (ref 11.6–15.1)
GFR SERPL CREATININE-BSD FRML MDRD: 21 ML/MIN/1.73SQ M
GLUCOSE SERPL-MCNC: 108 MG/DL (ref 65–140)
GLUCOSE SERPL-MCNC: 79 MG/DL (ref 65–140)
GLUCOSE SERPL-MCNC: 81 MG/DL (ref 65–140)
GLUCOSE SERPL-MCNC: 88 MG/DL (ref 65–140)
HCT VFR BLD AUTO: 26.8 % (ref 36.5–49.3)
HGB BLD-MCNC: 8.3 G/DL (ref 12–17)
INR PPP: 1.51 (ref 0.85–1.19)
MCH RBC QN AUTO: 31.9 PG (ref 26.8–34.3)
MCHC RBC AUTO-ENTMCNC: 31 G/DL (ref 31.4–37.4)
MCV RBC AUTO: 103 FL (ref 82–98)
PLATELET # BLD AUTO: 91 THOUSANDS/UL (ref 149–390)
PMV BLD AUTO: 10.1 FL (ref 8.9–12.7)
POTASSIUM SERPL-SCNC: 3.2 MMOL/L (ref 3.5–5.3)
PROT SERPL-MCNC: 4.3 G/DL (ref 6.4–8.4)
PROTHROMBIN TIME: 18.5 SECONDS (ref 12.3–15)
RBC # BLD AUTO: 2.6 MILLION/UL (ref 3.88–5.62)
SODIUM SERPL-SCNC: 134 MMOL/L (ref 135–147)
WBC # BLD AUTO: 4.33 THOUSAND/UL (ref 4.31–10.16)

## 2024-12-09 PROCEDURE — 97530 THERAPEUTIC ACTIVITIES: CPT

## 2024-12-09 PROCEDURE — 97110 THERAPEUTIC EXERCISES: CPT

## 2024-12-09 PROCEDURE — 97535 SELF CARE MNGMENT TRAINING: CPT

## 2024-12-09 PROCEDURE — 99232 SBSQ HOSP IP/OBS MODERATE 35: CPT | Performed by: INTERNAL MEDICINE

## 2024-12-09 PROCEDURE — 85610 PROTHROMBIN TIME: CPT

## 2024-12-09 PROCEDURE — 80053 COMPREHEN METABOLIC PANEL: CPT | Performed by: INTERNAL MEDICINE

## 2024-12-09 PROCEDURE — 85027 COMPLETE CBC AUTOMATED: CPT

## 2024-12-09 PROCEDURE — 99232 SBSQ HOSP IP/OBS MODERATE 35: CPT

## 2024-12-09 PROCEDURE — 82948 REAGENT STRIP/BLOOD GLUCOSE: CPT

## 2024-12-09 RX ORDER — POTASSIUM CHLORIDE 1500 MG/1
40 TABLET, EXTENDED RELEASE ORAL ONCE
Status: COMPLETED | OUTPATIENT
Start: 2024-12-09 | End: 2024-12-09

## 2024-12-09 RX ORDER — NYSTATIN 100000 [USP'U]/G
POWDER TOPICAL 2 TIMES DAILY
Status: DISCONTINUED | OUTPATIENT
Start: 2024-12-09 | End: 2024-12-11 | Stop reason: HOSPADM

## 2024-12-09 RX ORDER — POTASSIUM CHLORIDE 1500 MG/1
20 TABLET, EXTENDED RELEASE ORAL ONCE
Status: DISCONTINUED | OUTPATIENT
Start: 2024-12-09 | End: 2024-12-10

## 2024-12-09 RX ADMIN — PANCRELIPASE 6000 UNITS: 30000; 6000; 19000 CAPSULE, DELAYED RELEASE PELLETS ORAL at 11:38

## 2024-12-09 RX ADMIN — LEVOTHYROXINE SODIUM 125 MCG: 125 TABLET ORAL at 06:19

## 2024-12-09 RX ADMIN — ONDANSETRON 4 MG: 2 INJECTION INTRAMUSCULAR; INTRAVENOUS at 17:14

## 2024-12-09 RX ADMIN — MIDODRINE HYDROCHLORIDE 15 MG: 5 TABLET ORAL at 17:32

## 2024-12-09 RX ADMIN — HYDROCODONE BITARTRATE AND ACETAMINOPHEN 2 TABLET: 5; 325 TABLET ORAL at 17:29

## 2024-12-09 RX ADMIN — MIDODRINE HYDROCHLORIDE 15 MG: 5 TABLET ORAL at 06:19

## 2024-12-09 RX ADMIN — HYDROCODONE BITARTRATE AND ACETAMINOPHEN 2 TABLET: 5; 325 TABLET ORAL at 06:38

## 2024-12-09 RX ADMIN — PYRIDOSTIGMINE BROMIDE 60 MG: 60 TABLET ORAL at 17:33

## 2024-12-09 RX ADMIN — DRONABINOL 2.5 MG: 2.5 CAPSULE ORAL at 17:37

## 2024-12-09 RX ADMIN — PANCRELIPASE 6000 UNITS: 30000; 6000; 19000 CAPSULE, DELAYED RELEASE PELLETS ORAL at 17:32

## 2024-12-09 RX ADMIN — POTASSIUM CHLORIDE 20 MEQ: 1500 TABLET, EXTENDED RELEASE ORAL at 11:37

## 2024-12-09 RX ADMIN — PYRIDOSTIGMINE BROMIDE 60 MG: 60 TABLET ORAL at 08:02

## 2024-12-09 RX ADMIN — PANCRELIPASE 6000 UNITS: 30000; 6000; 19000 CAPSULE, DELAYED RELEASE PELLETS ORAL at 08:02

## 2024-12-09 RX ADMIN — GENTAMICIN SULFATE: 1 CREAM TOPICAL at 08:02

## 2024-12-09 RX ADMIN — TORSEMIDE 40 MG: 20 TABLET ORAL at 08:02

## 2024-12-09 RX ADMIN — NYSTATIN: 100000 POWDER TOPICAL at 18:38

## 2024-12-09 RX ADMIN — PYRIDOSTIGMINE BROMIDE 60 MG: 60 TABLET ORAL at 21:35

## 2024-12-09 RX ADMIN — ATORVASTATIN CALCIUM 40 MG: 40 TABLET, FILM COATED ORAL at 17:32

## 2024-12-09 RX ADMIN — MIDODRINE HYDROCHLORIDE 15 MG: 5 TABLET ORAL at 11:38

## 2024-12-09 RX ADMIN — GUAIFENESIN 600 MG: 600 TABLET ORAL at 08:02

## 2024-12-09 RX ADMIN — LIDOCAINE 1 PATCH: 700 PATCH TOPICAL at 21:36

## 2024-12-09 RX ADMIN — CINACALCET 60 MG: 30 TABLET, FILM COATED ORAL at 08:02

## 2024-12-09 RX ADMIN — WARFARIN SODIUM 2 MG: 2 TABLET ORAL at 21:35

## 2024-12-09 RX ADMIN — METOPROLOL TARTRATE 25 MG: 25 TABLET, FILM COATED ORAL at 08:02

## 2024-12-09 RX ADMIN — GUAIFENESIN 600 MG: 600 TABLET ORAL at 21:35

## 2024-12-09 RX ADMIN — DRONABINOL 2.5 MG: 2.5 CAPSULE ORAL at 13:12

## 2024-12-09 NOTE — ASSESSMENT & PLAN NOTE
- On 12/3/2024: Patient asked nephrology  about what would happen if dialysis would be stopped, discussed about risk of fluid overload electrolyte imbalance and ultimately death in 2 to 3 weeks.  Discussed that he may have to consider hospice if plan to stop dialysis. He did not want to stop at this time after discussion..  Ongoing discussions the patient is appropriate

## 2024-12-09 NOTE — ASSESSMENT & PLAN NOTE
Lab Results   Component Value Date    HGBA1C 5.0 11/02/2024       Recent Labs     12/08/24  1616 12/08/24  2216 12/09/24  0745 12/09/24  1123   POCGLU 107 95 88 108       Blood Sugar Average: Last 72 hrs:  (P) [id number]  Sliding scale insulin. Hypoglycemia protocol  Patient with recurrent hypoglycemia secondary to poor oral intake

## 2024-12-09 NOTE — ASSESSMENT & PLAN NOTE
Lab Results   Component Value Date    HGBA1C 5.0 11/02/2024       Recent Labs     12/08/24  1616 12/08/24  2216 12/09/24  0745 12/09/24  1123   POCGLU 107 95 88 108       Blood Sugar Average: Last 72 hrs:  (P) [id number]  Sliding scale insulin. Hypoglycemia protocol  Patient with recurrent hypoglycemia secondary to poor oral intake

## 2024-12-09 NOTE — ASSESSMENT & PLAN NOTE
Continue Sensipar, patient not a candidate for an activated vitamin D agent due to propensity towards hypercalcemia  Current calcium stable at 8.6

## 2024-12-09 NOTE — PHYSICAL THERAPY NOTE
"   PHYSICAL THERAPY TREATMENT NOTE  NAME:  Masoud Perry  DATE: 12/09/24    Length Of Stay: 19  Performed at least 2 patient identifiers during session: Name and Birthday    TREATMENT FLOW SHEET:    12/09/24 0943   PT Last Visit   PT Visit Date 12/09/24   Note Type   Note Type Treatment for insurance authorization   Pain Assessment   Pain Assessment Tool 0-10   Pain Score 8   Pain Location/Orientation Orientation: Lower;Location: Back   Restrictions/Precautions   Other Precautions Chair Alarm;Bed Alarm;Fall Risk;Pain;Limb alert  (right UE limb alert, left primal limb alert)   General   Response to Previous Treatment Patient reporting fatigue but able to participate.   Cognition   Following Commands Follows one step commands without difficulty   Comments not receptive to education for improved completion of LE TE.   Subjective   Subjective \"I need more skilled nursing care where I go.\"   Bed Mobility   Rolling R 4  Minimal assistance   Additional items Assist x 1;Increased time required;Verbal cues;Bedrails   Rolling L 4  Minimal assistance   Additional items Assist x 1;Increased time required;Verbal cues;Bedrails   Supine to Sit 3  Moderate assistance   Additional items Assist x 1;Increased time required;Verbal cues  (trunk management)   Sit to Supine 3  Moderate assistance   Additional items Assist x 1;Increased time required;Verbal cues;LE management  (trunk management)   Additional Comments HOB elevated > 30 degrees. use of bedrails. inc time to complete with manual cues for trunk management. rolling right and left with miNAx1.   Transfers   Sit to Stand   (pt declined due to ongoing c/o dizziness.)   Additional Comments pt sat EOB 15' with supervision. c/o dizziness sitting EOB that \"worsened\" with attempts to have participate in standing. patient ultimately declined to complete transfer trial. pt with increased anxiety when asked to trial standing deespite max encouragement and reassurance and described method " "ot assist patient with standing. pt complete LE TE with B UE support with supervision. instructed to return to bed.   Balance   Static Sitting Fair +   Dynamic Sitting Fair   Endurance Deficit   Endurance Deficit Description /74, HR 86 bpm and Spo2 95% on room air sitting on EOB when c/o dizziness. pt reports \"It always gets worse.\"   Activity Tolerance   Activity Tolerance Patient limited by fatigue;Patient limited by pain   Medical Staff Made Aware OTMarianna   Nurse Made Aware Shayan HARE   Exercises   Hip Flexion Sitting;AROM;Bilateral   Hip Abduction Sitting;AROM;Bilateral   Knee AROM Long Arc Quad Sitting;AROM;Bilateral   Ankle Pumps Sitting;AROM;Bilateral   Balance training  pt completing LE TE voluntarily, declined assistance for improved ROM and muscle recruitment.   Assessment   Prognosis Guarded   Problem List Decreased strength;Decreased endurance;Impaired balance;Decreased mobility;Impaired judgement;Decreased safety awareness;Pain;Obesity   Barriers to Discharge Inaccessible home environment;Decreased caregiver support   Barriers to Discharge Comments requires assistance to complete mobility, decreased insight to deficits, not receptive to education, inc fall risk   Goals   Patient Goals \"to be comfortable and to have more skilled nursing help, get out of bed\" (however not receptive to trial transfer this date)   STG Expiration Date 12/23/24   PT Treatment Day 1   Plan   Treatment/Interventions ADL retraining;Functional transfer training;LE strengthening/ROM;Elevations;Therapeutic exercise;Endurance training;Patient/family training;Equipment eval/education;Bed mobility;Gait training;Compensatory technique education;Spoke to nursing;Spoke to case management;OT   Progress Slow progress, decreased activity tolerance   PT Frequency 2-3x/wk   Discharge Recommendation   Rehab Resource Intensity Level, PT II (Moderate Resource Intensity)   AM-PAC Basic Mobility Inpatient   Turning in Flat Bed Without " Bedrails 3   Lying on Back to Sitting on Edge of Flat Bed Without Bedrails 2   Moving Bed to Chair 1   Standing Up From Chair Using Arms 1   Walk in Room 1   Climb 3-5 Stairs With Railing 1   Basic Mobility Inpatient Raw Score 9   Turning Head Towards Sound 4   Follow Simple Instructions 4   Low Function Basic Mobility Raw Score  17   Low Function Basic Mobility Standardized Score  27.46   [facility] Highest Level Of Mobility   -HL Goal 3: Sit at edge of bed   -HL Achieved 3: Sit at edge of bed   Education   Education Provided Mobility training;Home exercise program;Assistive device   Patient Reinforcement needed   End of Consult   Patient Position at End of Consult Supine;Bed/Chair alarm activated;All needs within reach         The patient's AM-PAC Basic Mobility Inpatient Short Form Raw Score is 9. A Raw score of less than or equal to 16 suggests the patient may benefit from discharge to post-acute rehabilitation services. Please also refer to the recommendation of the Physical Therapist for safe discharge planning.     Pt seen this date for reassessment. He has received skilled PT services consisting of bed mobility, transfer training and LE TE for strengthening. He was able to complete bed mobility this date with decreased assistance but demonstrates limited participation due to c/o dizziness and anxiety with regard to transfer trials despite max reassurance and education. He has had a complex medical course with goals of care discussions, acute on chronic CHF, diarrhea, severe protein calorie malnutrition with need for appetite stimulant. He has only made progress toward goal for bed mobility as he has refused transfer trials. Continue with STGs established on evaluation. He is limited by decreased strength, balance, endurance, decreased participation with refusal for transfer trials and decreased insight to deficits. He will continue to benefit from PT services to maximize LOF.    Pt will: Perform bed  mobility tasks with consistent min A of 1 to reposition in bed and prepare for transfers. Pt will perform transfers with consistent min A of 1 to decrease burden of care, decrease risk for falls, and improve activity tolerance and prepare for ambulation. Pt will ambulate with RW for >/= 15' with  consistent min A of 1  to decrease burden of care, decrease risk for falls, improve activity tolerance, and improve gait quality and to access home environment. Pt will complete 1 step with RW with consistent modA of 1 to return to home with ISELA and improve activity tolerance. Pt will participate in objective balance assessment to determine baseline fall risk. Pt will increase B LE strength >/= 1/2 MMT grade to facilitate functional mobility.     Carlie Dan, PT,DPT

## 2024-12-09 NOTE — ASSESSMENT & PLAN NOTE
Continue to monitor hemoglobin, patient has been off of JUAN FRANCISCO due to COVID infection last month.  Will likely be able to reinitiate long-acting JUAN FRANCISCO at presentation to his outpatient clinic.  Current hemoglobin stable at 8.3  And iron studies seem adequate

## 2024-12-09 NOTE — CASE MANAGEMENT
Per CM, patient refuse Hospice. Called Liseth @ [phone] stated that updated PT/ OT are needed.      Updated notes uploaded to the portal   CM notified Loraine Copeland

## 2024-12-09 NOTE — ASSESSMENT & PLAN NOTE
Patient agreeable to initiate Marinol 2.5 mg twice daily, continue to encourage intake of protein.  To be followed by dietary and primary service

## 2024-12-09 NOTE — ASSESSMENT & PLAN NOTE
Continue Tuesday Thursday Saturday hemodialysis sessions  Please see orders  Access: Right TDC  Continue with gentamicin to peritoneal dialysis catheter once daily.

## 2024-12-09 NOTE — PROGRESS NOTES
Progress Note - Palliative Care   Name: Masoud Perry 69 y.o. male I MRN: [id number]  Unit/Bed#: -01 I Date of Admission: 11/20/2024   Date of Service: 12/9/2024 I Hospital Day: 19      VIRTUAL CARE DOCUMENTATION:     1. This service was provided via Telemedicine using Teams Virtual Rounding      2. Parties in the room with patient during teleconsult Patient only    3. Confidentiality My office door was closed     4. Participants No one else was in the room    5. Patient acknowledged consent and understanding of privacy and security of the  Telemedicine consult. I informed the patient that I have reviewed their record in Epic and presented the opportunity for them to ask any questions regarding the visit today.  The patient agreed to participate.    6. Time spent 20 min       Assessment & Plan  Goals of care, counseling/discussion  Decisional apparatus:  Patient is competent on exam today. He has full insight and shows good understanding of medical diagnoses, treatment options and prognosis  If competency is lost, patient's substitute decision maker would default to adult children by [place].  Advance Directive / Living Will / POLST / POA Forms: None on file    Goals  Level 3 code status.   Disease focused care.   DNR/DNI limits placed.   Wants to continue with dialysis  Hospice consult complete, patient not ready for hospice at this time.   Plan to pursue rehab upon discharge, see CM notes for details  Patient does not feel ready to be discharged yet d/t increased diarrhea and stomach discomfort today.   Open to ongoing palliative care support outpatient, placed on hospital follow up list.   GOC clear at this time. PSC to remain available remotely to revisit goals if hospital course changes. Otherwise, continued GOC discussions can continue outpatient.   Palliative care by specialist  Active issues specifically addressed today include:  Acute respiratory failure  COVID-19 infection  ESRD  HFpEF  Poor  appetite  Moderate PCM  Depression  Diarrhea  Goals of care discussion  Palliative care encounter        Symptom management:  Pt reports increased stomach pain today that reyna snot improve with hydrocodone. However, he says that zofran has helped with this discomfort in the past but he has not asked for it.   RN to give IV zofran now to see if this improves discomfort. Can switch to PO in anticipation of d/c  All other symptom management per primary team/GI.     Social support  Patient is supported by his son and daughter  Supportive listening provided  Normalized experience of patient/family  Provided anxiety containment  Provided anticipatory guidance    Follow up  Palliative Care will continue to follow peripherally until discharge. Will be available to revisit C pending change in hospital course.   Please reach out to on-call provider via Epic Secure Chat  if questions or concerns arise.  Please do not hesitate to reach our on call provider through our clinic answering service at [phone] should you have acute symptom control concerns.      Subjective   Reports doing okay. Complains of worsening diarrhea today and stomach discomfort. No improvement in stomach pain with hydrocodone, however, zofran has helped in the past but he has not been requesting his PRN zofran. Advised to trial this now and see if it helps. Patient plans to return to rehab but is hesitant to be discharged due to abd pain and diarrhea today.  Provided reassurance of outpatient palliative support following discharge.     Objective :  Temp:  [97 °F (36.1 °C)-97.7 °F (36.5 °C)] 97 °F (36.1 °C)  HR:  [69-86] 86  BP: (100-128)/(53-74) 102/56  Resp:  [18-20] 18  SpO2:  [93 %-96 %] 95 %    Physical Exam  Vitals reviewed.   Constitutional:       Appearance: He is ill-appearing.   HENT:      Head: Normocephalic and atraumatic.   Cardiovascular:      Rate and Rhythm: Normal rate.   Pulmonary:      Effort: Pulmonary effort is normal.    Neurological:      Mental Status: He is alert and oriented to person, place, and time.   Psychiatric:         Mood and Affect: Mood normal.         Behavior: Behavior normal.         Lab Results: I have reviewed the following results:  Lab Results   Component Value Date/Time    SODIUM 134 (L) 12/09/2024 06:18 AM    SODIUM 135 11/13/2024 05:47 AM    K 3.2 (L) 12/09/2024 06:18 AM    K 3.8 11/13/2024 05:47 AM    BUN 13 12/09/2024 06:18 AM    BUN 10 11/13/2024 05:47 AM    CREATININE 2.77 (H) 12/09/2024 06:18 AM    CREATININE 2.95 (H) 11/13/2024 05:47 AM    GLUC 81 12/09/2024 06:18 AM    GLUC 73 11/13/2024 05:47 AM    CALCIUM 8.6 12/09/2024 06:18 AM    CALCIUM 8.8 11/13/2024 05:47 AM    AST 13 12/09/2024 06:18 AM    AST 14 11/02/2024 05:51 AM    ALT 6 (L) 12/09/2024 06:18 AM    ALT 9 11/02/2024 05:51 AM    ALB 2.3 (L) 12/09/2024 06:18 AM    ALB 2.0 (L) 11/13/2024 05:47 AM    TP 4.3 (L) 12/09/2024 06:18 AM    TP 4.7 (L) 11/02/2024 05:51 AM    EGFR 21 12/09/2024 06:18 AM    EGFR 22 (L) 11/13/2024 05:47 AM    EGFR 12 (L) 04/29/2024 10:55 AM    EGFR 16 (L) 06/11/2020 05:50 AM     Lab Results   Component Value Date/Time    HGB 8.3 (L) 12/09/2024 06:18 AM    WBC 4.33 12/09/2024 06:18 AM    PLT 91 (L) 12/09/2024 06:18 AM    INR 1.51 (H) 12/09/2024 06:18 AM    INR 4.5 11/13/2024 05:47 AM    PTT 65 (H) 11/20/2024 09:33 PM     Lab Results   Component Value Date/Time    [id number] 12.236 (H) 07/30/2024 06:48 AM       Administrative Statements   I have spent a total time of 35 minutes in caring for this patient on the day of the visit/encounter including Risks and benefits of tx options, Instructions for management, Importance of tx compliance, Risk factor reductions, Counseling / Coordination of care, Documenting in the medical record, Reviewing / ordering tests, medicine, procedures  , Obtaining or reviewing history  , and Communicating with other healthcare professionals . Topics discussed with the patient / family include  symptom assessment and management, medication review, psychosocial support, goals of care, hospice services, supportive listening, and anticipatory guidance. Case discussed with PAYAM CARO.

## 2024-12-09 NOTE — ASSESSMENT & PLAN NOTE
Malnutrition Findings:   Adult Malnutrition type: Acute illness  Adult Degree of Malnutrition: Other severe protein calorie malnutrition  Malnutrition Characteristics: Inadequate energy, Weight loss                  360 Statement: Acute on chronic severe malnutrition related to refusal for po intake, poor appetite, condition as evidenced by 10.9% weight loss x 14 days (11/20 220lb, 12/3 196lb); < 50% estimated energy intake > 5 days. Treated with: Recommend continued GOC discussion. Continue with daily weights. Can consider oral supplements if pt willing to take po intake though refusing po intake. Consider appetite stimulant if medically appropriate/pt agreeable though pt previously refused. Consider EN feeds if within GOC.    BMI Findings:           Body mass index is 26.04 kg/m².   Agreeable to trying marinol 2.5mg bid for appetite stimulant   declined tube feeds or enteric feedings   Continue goals of care discussion  Appetite is improving

## 2024-12-09 NOTE — PROGRESS NOTES
Pastoral Care Progress Note  CH provided follow up support visit after GOC discussion. Pt received CH upright in bed, no family present.  Pt shared about his sense of being rushed. Pt shared that he feels hopeless at times, although he shared he is generally a hopeful person. Pt shared that the appetite stimulant does seem to help him feel a little more like eating.     CH provided empathetic listening and support.      12/09/24 1400   Clinical Encounter Type   Visited With Patient

## 2024-12-09 NOTE — ASSESSMENT & PLAN NOTE
Active issues specifically addressed today include:  Acute respiratory failure  COVID-19 infection  ESRD  HFpEF  Poor appetite  Moderate PCM  Depression  Diarrhea  Goals of care discussion  Palliative care encounter        Symptom management:  Pt reports increased stomach pain today that eryna snot improve with hydrocodone. However, he says that zofran has helped with this discomfort in the past but he has not asked for it.   RN to give IV zofran now to see if this improves discomfort. Can switch to PO in anticipation of d/c  All other symptom management per primary team/GI.     Social support  Patient is supported by his son and daughter  Supportive listening provided  Normalized experience of patient/family  Provided anxiety containment  Provided anticipatory guidance    Follow up  Palliative Care will continue to follow peripherally until discharge. Will be available to revisit GOC pending change in hospital course.   Please reach out to on-call provider via Epic Secure Chat  if questions or concerns arise.  Please do not hesitate to reach our on call provider through our clinic answering service at [phone] should you have acute symptom control concerns.

## 2024-12-09 NOTE — PROGRESS NOTES
Progress Note - Nephrology   Name: Masoud Perry 69 y.o. male I MRN: [id number]  Unit/Bed#: -01 I Date of Admission: 11/20/2024   Date of Service: 12/9/2024 I Hospital Day: 19     Assessment & Plan  ESRD (end stage renal disease) on dialysis ([facility]ly [facility])  Continue Tuesday Thursday Saturday hemodialysis sessions  Please see orders  Access: Right TDC  Continue with gentamicin to peritoneal dialysis catheter once daily.  Hemodialysis-associated hypotension  Continue with midodrine, patient tolerating blood pressures well at this time.  Acute on chronic diastolic HF (heart failure) ([facility])  Target weight 192 pounds seems euvolemic based on exam today  Anemia due to chronic kidney disease, on chronic dialysis ([facility])  Continue to monitor hemoglobin, patient has been off of JUAN FRANCISCO due to COVID infection last month.  Will likely be able to reinitiate long-acting JUAN FRANCISCO at presentation to his outpatient clinic.  Current hemoglobin stable at 8.3  And iron studies seem adequate    Secondary hyperparathyroidism of renal origin ([facility])  Continue Sensipar, patient not a candidate for an activated vitamin D agent due to propensity towards hypercalcemia  Current calcium stable at 8.6    Pancytopenia ([facility])  Stable, continue to monitor for now.  Paroxysmal atrial fibrillation ([facility])  Patient under rate control with metoprolol    Electrolyte imbalance risk  Hyponatremia: Doing well at 134 modest fluid restriction and HD  Hypokalemia replete    Hypophosphatemia  Patient is on phosphorus supplementation, look to discontinue when clinically feasible, as noted above, patient not a candidate for calcitriol and is currently on Sensipar.  Will recheck phosphorus in a.m.  Goals of care, counseling/discussion  - On 12/3/2024: Patient asked nephrology  about what would happen if dialysis would be stopped, discussed about risk of fluid overload electrolyte imbalance and ultimately death in 2 to 3 weeks.  Discussed that he may have to consider hospice if plan  to stop dialysis. He did not want to stop at this time after discussion..  Ongoing discussions the patient is appropriate    Diarrhea  This is improving, continue with supportive care.  Severe protein-calorie malnutrition (HCC)  Patient agreeable to initiate Marinol 2.5 mg twice daily, continue to encourage intake of protein.  To be followed by dietary and primary service    I have reviewed the nephrology recommendations including ongoing treatment with HD per patient's wishes, with SLIM, and we are in agreement with renal plan including the information outlined above.     Subjective   Brief History of Admission -we are seeing for ESRD    Patient denies any shortness of breath or chest pain  No nausea or vomiting; loose stools are improving    Objective :  Temp:  [97 °F (36.1 °C)-97.7 °F (36.5 °C)] 97 °F (36.1 °C)  HR:  [69-92] 86  BP: ()/(53-74) 128/74  Resp:  [18-20] 18  SpO2:  [93 %-96 %] 95 %    Current Weight: Weight - Scale: 87.1 kg (192 lb 0.3 oz)  First Weight: Weight - Scale: 100 kg (220 lb 7.4 oz)  I/O         12/07 0701  12/08 0700 12/08 0701  12/09 0700 12/09 0701  12/10 0700    P.O. 240 120 30    I.V. (mL/kg) 480 (5.5)      Total Intake(mL/kg) 720 (8.3) 120 (1.4) 30 (0.3)    Urine (mL/kg/hr) 100 (0) 250 (0.1)     Other 800      Total Output 900 250     Net -180 -130 +30                 Physical Exam  Physical Exam: General:  No acute distress  Skin:  No acute rash  Eyes:  No scleral icterus and noninjected  ENT:  Moist mucous membranes  Neck:  Supple, no jugular venous distention, trachea midline, overall appearance is normal  Chest:  Clear to auscultation; right TDC clean and dry  CVS:  Regular rate and rhythm, without a rub or gallops  Abdomen:  Normal bowel sounds, soft and nontender and nondistended; PD exit site clean and dry  Extremities: Severe venous stasis changes no significant lower extremity edema, and no cyanosis, no significant arthritic changes  Neuro:  No gross focality  Psych:   Alert and oriented and appropriate    Medications:    Current Facility-Administered Medications:     acetaminophen (TYLENOL) tablet 650 mg, 650 mg, Oral, Q4H PRN, Chao Rios PA-C    atorvastatin (LIPITOR) tablet 40 mg, 40 mg, Oral, Daily With Dinner, Chao Rios PA-C, 40 mg at 12/08/24 1700    benzonatate (TESSALON PERLES) capsule 100 mg, 100 mg, Oral, TID PRN, Miriam Tee MD, 100 mg at 11/29/24 1352    calcium carbonate (TUMS) chewable tablet 500 mg, 500 mg, Oral, Daily PRN, Moses Noel MD    cinacalcet (SENSIPAR) tablet 60 mg, 60 mg, Oral, Once per day on Monday Wednesday Friday, Jignesh Hussein MD, 60 mg at 12/09/24 0802    Diclofenac Sodium (VOLTAREN) 1 % topical gel 2 g, 2 g, Topical, 4x Daily, Kenn Vazquez MD, 2 g at 12/08/24 1700    dronabinol (MARINOL) capsule 2.5 mg, 2.5 mg, Oral, BID before lunch/dinner, Marquise Santos DO, 2.5 mg at 12/08/24 1700    gentamicin (GARAMYCIN) 0.1 % cream, , Topical, Daily, Jesus Christine MD, Given at 12/09/24 0802    guaiFENesin (MUCINEX) 12 hr tablet 600 mg, 600 mg, Oral, Q12H [facility]AS[facility], Miriam Tee MD, 600 mg at 12/09/24 0802    HYDROcodone-acetaminophen (NORCO) 5-325 mg per tablet 2 tablet, 2 tablet, Oral, Q6H PRN, Chao Rios PA-C, 2 tablet at 12/09/24 0638    insulin lispro (HumALOG/ADMELOG) 100 units/mL subcutaneous injection 1-6 Units, 1-6 Units, Subcutaneous, TID AC, 1 Units at 11/26/24 1236 **AND** Fingerstick Glucose (POCT), , , TID AC, Chao Rios PA-C    levothyroxine tablet 125 mcg, 125 mcg, Oral, Early Morning, Chao Rios PA-C, 125 mcg at 12/09/24 0619    lidocaine (LIDODERM) 5 % patch 1 patch, 1 patch, Topical, Daily, Kenn Vazquez MD, 1 patch at 12/08/24 2220    metoprolol tartrate (LOPRESSOR) tablet 25 mg, 25 mg, Oral, Q12H [facility], Moses Noel MD, 25 mg at 12/09/24 0802    midodrine (PROAMATINE) tablet 15 mg, 15 mg, Oral, TID AC, GAGANDEEP Bernardo, 15 mg at 12/09/24 0619    ondansetron (ZOFRAN) injection  "4 mg, 4 mg, Intravenous, Q6H PRN, Chao Rios PA-C, 4 mg at 11/25/24 1839    pancrelipase (Lip-Prot-Amyl) (CREON) delayed release capsule 6,000 Units, 6,000 Units, Oral, TID With Meals, Ofelia Salmeron PA-C, 6,000 Units at 12/09/24 0802    pyridostigmine (MESTINON) tablet 60 mg, 60 mg, Oral, TID, Chao Rios PA-C, 60 mg at 12/09/24 0802    simethicone (MYLICON) chewable tablet 80 mg, 80 mg, Oral, 4x Daily PRN, Chao Rios PA-C    torsemide (DEMADEX) tablet 40 mg, 40 mg, Oral, Once per day on Sunday Monday Wednesday Friday, Marquise Santos DO, 40 mg at 12/09/24 0802    warfarin (COUMADIN) tablet 2 mg, 2 mg, Oral, HS, Ofelia Salmeron PA-C, 2 mg at 12/08/24 2216      Lab Results: I have reviewed the following results:  Results from last 7 days   Lab Units 12/09/24  0618 12/08/24  1150 12/08/24  0501 12/07/24  0819 12/06/24  0621 12/05/24  0639 12/04/24  0608 12/03/24  0609   WBC Thousand/uL 4.33 4.71  --  5.88 5.61 7.53 6.31 5.77   HEMOGLOBIN g/dL 8.3* 8.2*  --  8.2* 8.7* 9.2* 8.5* 8.6*   HEMATOCRIT % 26.8* 26.2*  --  25.9* 27.8* 29.2* 26.7* 27.6*   PLATELETS Thousands/uL 91* 89*  --  104* 74* 94* 62* 56*   POTASSIUM mmol/L 3.2*  --  3.7 3.4* 3.5 3.4* 3.4* 3.5   CHLORIDE mmol/L 99  --  98 98 98 100 100 102   CO2 mmol/L 27  --  30 27 30 29 30 28   BUN mg/dL 13  --  7 15 11 14 11 21   CREATININE mg/dL 2.77*  --  1.86* 2.91* 2.41* 2.66* 1.93* 2.80*   CALCIUM mg/dL 8.6  --  8.0* 8.5 8.0* 8.0* 7.8* 8.2*   MAGNESIUM mg/dL  --   --   --   --   --   --  2.1 1.8*   PHOSPHORUS mg/dL  --   --   --   --   --  2.0* 1.6* 2.6   ALBUMIN g/dL 2.3*  --   --   --   --   --   --   --        Administrative Statements     Portions of the record may have been created with voice recognition software. Occasional wrong word or \"sound a like\" substitutions may have occurred due to the inherent limitations of voice recognition software. Read the chart carefully and recognize, using context, where substitutions have occurred.If you " have any questions, please contact the dictating provider.

## 2024-12-09 NOTE — PLAN OF CARE
Problem: OCCUPATIONAL THERAPY ADULT  Goal: Performs self-care activities at highest level of function for planned discharge setting.  See evaluation for individualized goals.  Description: Treatment Interventions: ADL retraining, Visual perceptual retraining, Functional transfer training, Activityengagement, Energy conservation, Compensatory technique education, Patient/family training          See flowsheet documentation for full assessment, interventions and recommendations.   Outcome: Progressing  Note: Limitation: Decreased ADL status, Decreased UE ROM, Decreased UE strength, Decreased Safe judgement during ADL, Decreased endurance, Decreased self-care trans, Decreased high-level ADLs  Prognosis: Fair  Assessment: PT/OT co-treat completed due to safety concerns and significant mobility deficits. Pt alert and agreeable to participate. Pt tolerated treatment poorly. It is worthy of noting he progressed in functional assistance required for bed mobility this date as compared to previous session. He reports that he does not want to be in bed all the time, but also reports that he doesn't want to set goals for himself because when he does and he does not meet them he is disappointed. He continues to be limited by anxiety and wants therapy to inform PA-C of his proneness to panic attacks. Reassurance and encouragement provided throughout session. He is likely able to complete ADLs without total assistance but continues to require this at this time due to pt with poor participation/refusing/anxiety. He will benefit from continued skilled OT services in continued attempt to regain his independence.     Rehab Resource Intensity Level, OT: II (Moderate Resource Intensity)

## 2024-12-09 NOTE — PROGRESS NOTES
Progress Note - Hospitalist   Name: Masoud Perry 69 y.o. male I MRN: [id number]  Unit/Bed#: -01 I Date of Admission: 11/20/2024   Date of Service: 12/9/2024 I Hospital Day: 19    Assessment & Plan  Diarrhea  Patient admitted having significant diarrhea-initially suspected infectious secondary to COVID versus C. difficile carrier status  Started on a course of oral vancomycin and completed-Per infectious disease do not feel that this is related to his diarrhea  Given lack of improvement in diarrhea-GI consulted  Patient continues to deny wanting to start any other medications-would not like a powder and does not feel that he can handle a large pill.  GI feels that Imodium would not be a good option  Questran helped when patient took it, but stating that it made him nauseous and he will not take it again  Willing to try a pill to help, since we do not carry colestid, will order creon  Diarrhea is improving   Acute on chronic diastolic HF (heart failure) (HCC)  Wt Readings from Last 3 Encounters:   12/09/24 87.1 kg (192 lb 0.3 oz)   10/14/24 115 kg (253 lb)   10/07/24 118 kg (260 lb)   CXR on 11/20 appeared to be pulmonary vascular congestion   Patient is a dialysis basis, received hemodialysis on TTS  Continue PTA torsemide  Appreciate continued nephrology follow-up  Monitor intake/output and daily weight  Acute exacerbation seems to have resolved-euvolemic and continue with dialysis  Goals of care, counseling/discussion  Patient and I had discussion today about his goals of care moving forward  He endorses that he has been feeling depressed and frustrated with his continued decline in health  He says that sometimes he wishes that he could just go home to pass away and be comfortable  He shares that he is not disagreeable for treatment but sometimes feels as though he is being tortured  discussed possibilities of psychiatry consult for depression/trying recommendations made by prior psychiatrist for Tomy  At  this time patient declines  Palliative care consulted  12/2 at this time declines any additional medications.  States that he is frustrated with continued hospitalization though is agreeable to this and agreeable to discharge to rehab.  Wants to continue treatment focused care without invasive measures  12/4 - patient discussed with hospice liaison, currently declining hospice and would like to continue dialysis but states that he still has a lot of decisions going on in his head.   12/5 d/w palliative and patient stating he wants to continue current treatment   12/6 family discussion with daughter, son, patient and . Patient willing to try marinol on 12/7. Would like to  continue with current treatment. Declining hospice at this time. See quick note from 12/6 for more details  Ongoing goals of care discussion  Factor 5 Leiden mutation, heterozygous (HCC)  Patient is maintained on Coumadin PTA  INR supratherapeutic at time of presentation and was placed on hold  INR trending upwards and dialysis was delayed for a few days so vitamin K was administered at 2.5 mg  Patient now with subtherapeutic INR  Continue to monitor while administering low-dose Coumadin   Also monitor platelets  Secondary hyperparathyroidism of renal origin (HCC)  Currently on 60 mg p.o. 3 times weekly  Hemodialysis-associated hypotension  Continue midodrine and pyridostigmine  Type 2 diabetes mellitus with stage 4 chronic kidney disease, with long-term current use of insulin (HCC)  Lab Results   Component Value Date    HGBA1C 5.0 11/02/2024       Recent Labs     12/08/24  1616 12/08/24  2216 12/09/24  0745 12/09/24  1123   POCGLU 107 95 88 108       Blood Sugar Average: Last 72 hrs:  (P) [id number]  Sliding scale insulin. Hypoglycemia protocol  Patient with recurrent hypoglycemia secondary to poor oral intake  Paroxysmal atrial fibrillation (HCC)  Patient did have rapid response secondary to chest pain  Cardiology  consulted-thought to be secondary to A-fib rather than any ACS  Can continue metoprolol if blood pressure tolerates -maintain on midodrine as well to aid in attention  Continue to trend INR with Coumadin   ESRD (end stage renal disease) on dialysis (HCC)  Lab Results   Component Value Date    EGFR 21 12/09/2024    EGFR 35 12/08/2024    EGFR 20 12/07/2024    CREATININE 2.77 (H) 12/09/2024    CREATININE 1.86 (H) 12/08/2024    CREATININE 2.91 (H) 12/07/2024   Resume typical regimen Tuesday Thursday Saturday  Nephrology following  Anemia due to chronic kidney disease, on chronic dialysis (HCC)  Patient with chronic anemia  Baseline hemoglobin around 8  During hospitalization patient's hemoglobin has been stable between 9 and 10  Monitor for signs of bleeding in setting of low platelets  Pancytopenia (HCC)  Multifactorial in origin, continue to monitor  Platelet count has been steadily decreasing with unclear etiology  Currently no signs of bleeding  Previously has supratherapeutic INR  Currently is subtherapeutic, continue to monitor INR and platelets and for any bleeding  Platelets improving, resume coumadin     Lab Results   Component Value Date    WBC 4.33 12/09/2024    RBC 2.60 (L) 12/09/2024    PLT 91 (L) 12/09/2024    PLT 89 (L) 12/08/2024     Electrolyte imbalance risk    Epigastric pain    Palliative care by specialist    Hypophosphatemia    Severe protein-calorie malnutrition (HCC)  Malnutrition Findings:   Adult Malnutrition type: Acute illness  Adult Degree of Malnutrition: Other severe protein calorie malnutrition  Malnutrition Characteristics: Inadequate energy, Weight loss                  360 Statement: Acute on chronic severe malnutrition related to refusal for po intake, poor appetite, condition as evidenced by 10.9% weight loss x 14 days (11/20 220lb, 12/3 196lb); < 50% estimated energy intake > 5 days. Treated with: Recommend continued GOC discussion. Continue with daily weights. Can consider oral  supplements if pt willing to take po intake though refusing po intake. Consider appetite stimulant if medically appropriate/pt agreeable though pt previously refused. Consider EN feeds if within GOC.    BMI Findings:           Body mass index is 26.04 kg/m².   Agreeable to trying marinol 2.5mg bid for appetite stimulant   declined tube feeds or enteric feedings   Continue goals of care discussion  Appetite is improving     VTE Pharmacologic Prophylaxis: VTE Score: 6 High Risk (Score >/= 5) - Pharmacological DVT Prophylaxis Ordered: warfarin (Coumadin). Sequential Compression Devices Ordered.    Mobility:   Basic Mobility Inpatient Raw Score: 9  JH-HLM Goal: 3: Sit at edge of bed  JH-HLM Achieved: 3: Sit at edge of bed  JH-HLM Goal achieved. Continue to encourage appropriate mobility.    Patient Centered Rounds: I performed bedside rounds with nursing staff today.   Discussions with Specialists or Other Care Team Provider: CM    Education and Discussions with Family / Patient: Patient declined call to .     Current Length of Stay: 19 day(s)  Current Patient Status: Inpatient   Certification Statement: The patient will continue to require additional inpatient hospital stay due to pending improvement in appetite  Discharge Plan: Anticipate discharge in 24-48 hrs to rehab facility.    Code Status: Level 3 - DNAR and DNI    Subjective   Seen and examined.  Endorses that he started to eat a little bit.  Also states that his diarrhea seems to be slightly improved.  Still having back pain.     Objective :  Temp:  [97 °F (36.1 °C)-97.7 °F (36.5 °C)] 97 °F (36.1 °C)  HR:  [69-86] 86  BP: (100-128)/(53-74) 128/74  Resp:  [18-20] 18  SpO2:  [93 %-96 %] 95 %    Body mass index is 26.04 kg/m².     Input and Output Summary (last 24 hours):     Intake/Output Summary (Last 24 hours) at 12/9/2024 1254  Last data filed at 12/9/2024 0900  Gross per 24 hour   Intake 150 ml   Output 250 ml   Net -100 ml       Physical  Exam  Vitals and nursing note reviewed.   Constitutional:       General: He is not in acute distress.     Appearance: Normal appearance. He is ill-appearing.   HENT:      Head: Normocephalic and atraumatic.      Nose: No congestion.      Mouth/Throat:      Mouth: Mucous membranes are moist.      Pharynx: Oropharynx is clear.   Eyes:      Conjunctiva/sclera: Conjunctivae normal.   Cardiovascular:      Rate and Rhythm: Normal rate and regular rhythm.      Pulses: Normal pulses.      Heart sounds: Normal heart sounds. No murmur heard.  Pulmonary:      Effort: Pulmonary effort is normal. No respiratory distress.      Breath sounds: Normal breath sounds.   Abdominal:      General: Bowel sounds are normal.      Palpations: Abdomen is soft.      Tenderness: There is no abdominal tenderness.   Musculoskeletal:         General: Normal range of motion.      Cervical back: Normal range of motion.      Right lower leg: Edema present.      Left lower leg: Edema present.   Skin:     General: Skin is warm and dry.   Neurological:      Mental Status: He is alert and oriented to person, place, and time.   Psychiatric:         Mood and Affect: Mood normal.         Behavior: Behavior normal.           Lines/Drains:  Lines/Drains/Airways       Active Status       Name Placement date Placement time Site Days    HD Permanent Double Catheter 10/14/24  1420  Subclavian  55                            Lab Results: I have reviewed the following results:   Results from last 7 days   Lab Units 12/09/24  0618   WBC Thousand/uL 4.33   HEMOGLOBIN g/dL 8.3*   HEMATOCRIT % 26.8*   PLATELETS Thousands/uL 91*     Results from last 7 days   Lab Units 12/09/24  0618   SODIUM mmol/L 134*   POTASSIUM mmol/L 3.2*   CHLORIDE mmol/L 99   CO2 mmol/L 27   BUN mg/dL 13   CREATININE mg/dL 2.77*   ANION GAP mmol/L 8   CALCIUM mg/dL 8.6   ALBUMIN g/dL 2.3*   TOTAL BILIRUBIN mg/dL 0.36   ALK PHOS U/L 79   ALT U/L 6*   AST U/L 13   GLUCOSE RANDOM mg/dL 81      Results from last 7 days   Lab Units 12/09/24  0618   INR  1.51*     Results from last 7 days   Lab Units 12/09/24  1123 12/09/24  0745 12/08/24  2216 12/08/24  1616 12/08/24  1125 12/08/24  0738 12/07/24  2210 12/07/24  1655 12/07/24  1203 12/07/24  0829 12/07/24  0827 12/06/24  2155   POC GLUCOSE mg/dl 108 88 95 107 81 78 86 86 100 81 45* 236*               Recent Cultures (last 7 days):               Last 24 Hours Medication List:     Current Facility-Administered Medications:     acetaminophen (TYLENOL) tablet 650 mg, Q4H PRN    atorvastatin (LIPITOR) tablet 40 mg, Daily With Dinner    benzonatate (TESSALON PERLES) capsule 100 mg, TID PRN    calcium carbonate (TUMS) chewable tablet 500 mg, Daily PRN    cinacalcet (SENSIPAR) tablet 60 mg, Once per day on Monday Wednesday Friday    Diclofenac Sodium (VOLTAREN) 1 % topical gel 2 g, 4x Daily    dronabinol (MARINOL) capsule 2.5 mg, BID before lunch/dinner    gentamicin (GARAMYCIN) 0.1 % cream, Daily    guaiFENesin (MUCINEX) 12 hr tablet 600 mg, Q12H VASU    HYDROcodone-acetaminophen (NORCO) 5-325 mg per tablet 2 tablet, Q6H PRN    insulin lispro (HumALOG/ADMELOG) 100 units/mL subcutaneous injection 1-6 Units, TID AC **AND** Fingerstick Glucose (POCT), TID AC    levothyroxine tablet 125 mcg, Early Morning    lidocaine (LIDODERM) 5 % patch 1 patch, Daily    metoprolol tartrate (LOPRESSOR) tablet 25 mg, Q12H VASU    midodrine (PROAMATINE) tablet 15 mg, TID AC    ondansetron (ZOFRAN) injection 4 mg, Q6H PRN    pancrelipase (Lip-Prot-Amyl) (CREON) delayed release capsule 6,000 Units, TID With Meals    pyridostigmine (MESTINON) tablet 60 mg, TID    simethicone (MYLICON) chewable tablet 80 mg, 4x Daily PRN    torsemide (DEMADEX) tablet 40 mg, Once per day on Sunday Monday Wednesday Friday    warfarin (COUMADIN) tablet 2 mg, HS    Administrative Statements   Today, Patient Was Seen By: Theresa Butt PA-C      **Please Note: This note may have been constructed using a voice  recognition system.**

## 2024-12-09 NOTE — PLAN OF CARE
Problem: PHYSICAL THERAPY ADULT  Goal: Performs mobility at highest level of function for planned discharge setting.  See evaluation for individualized goals.  Description: Treatment/Interventions: ADL retraining, Functional transfer training, LE strengthening/ROM, Elevations, Therapeutic exercise, Endurance training, Patient/family training, Equipment eval/education, Bed mobility, Gait training, Compensatory technique education, Spoke to nursing, Spoke to case management, OT          See flowsheet documentation for full assessment, interventions and recommendations.  12/9/2024 1120 by Carlie Dan, PT  Outcome: Progressing  Note: Prognosis: Guarded  Problem List: Decreased strength, Decreased endurance, Impaired balance, Decreased mobility, Impaired judgement, Decreased safety awareness, Pain, Obesity  Assessment: Pt seen this date for reassessment. He has received skilled PT services consisting of bed mobility, transfer training and LE TE for strengthening. He was able to complete bed mobility this date with decreased assistance but demonstrates limited participation due to c/o dizziness and anxiety with regard to transfer trials despite max reassurance and education. He has had a complex medical course with goals of care discussions, acute on chronic CHF, diarrhea, severe protein calorie malnutrition with need for appetite stimulant. He has only made progress toward goal for bed mobility as he has refused transfer trials. Continue with STGs established on evaluation. He is limited by decreased strength, balance, endurance, decreased participation with refusal for transfer trials and decreased insight to deficits. He will continue to benefit from PT services to maximize LOF.  Barriers to Discharge: Inaccessible home environment, Decreased caregiver support  Barriers to Discharge Comments: requires assistance to complete mobility, decreased insight to deficits, not receptive to education, inc fall risk  Rehab  Resource Intensity Level, PT: II (Moderate Resource Intensity)    See flowsheet documentation for full assessment.

## 2024-12-09 NOTE — ASSESSMENT & PLAN NOTE
Lab Results   Component Value Date    EGFR 21 12/09/2024    EGFR 35 12/08/2024    EGFR 20 12/07/2024    CREATININE 2.77 (H) 12/09/2024    CREATININE 1.86 (H) 12/08/2024    CREATININE 2.91 (H) 12/07/2024   Resume typical regimen Tuesday Thursday Saturday  Nephrology following

## 2024-12-09 NOTE — ASSESSMENT & PLAN NOTE
Decisional apparatus:  Patient is competent on exam today. He has full insight and shows good understanding of medical diagnoses, treatment options and prognosis  If competency is lost, patient's substitute decision maker would default to adult children by [place] 169.  Advance Directive / Living Will / POLST / POA Forms: None on file    Goals  Level 3 code status.   Disease focused care.   DNR/DNI limits placed.   Wants to continue with dialysis  Hospice consult complete, patient not ready for hospice at this time.   Plan to pursue rehab upon discharge, see CM notes for details  Patient does not feel ready to be discharged yet d/t increased diarrhea and stomach discomfort today.   Open to ongoing palliative care support outpatient, placed on hospital follow up list.   GOC clear at this time. PSC to remain available remotely to revisit goals if hospital course changes. Otherwise, continued GOC discussions can continue outpatient.

## 2024-12-09 NOTE — ASSESSMENT & PLAN NOTE
Wt Readings from Last 3 Encounters:   12/09/24 87.1 kg (192 lb 0.3 oz)   10/14/24 115 kg (253 lb)   10/07/24 118 kg (260 lb)   CXR on 11/20 appeared to be pulmonary vascular congestion   Patient is a dialysis basis, received hemodialysis on TTS  Continue PTA torsemide  Appreciate continued nephrology follow-up  Monitor intake/output and daily weight  Acute exacerbation seems to have resolved-euvolemic and continue with dialysis

## 2024-12-09 NOTE — CASE MANAGEMENT
Case Management Discharge Planning Note    Patient name Masoud VERGARA [facility]  Location /-01 MRN [id number]  : 1955 Date 2024       Current Admission Date: 2024  Current Admission Diagnosis:Diarrhea   Patient Active Problem List    Diagnosis Date Noted Date Diagnosed    Hypophosphatemia 2024     Severe protein-calorie malnutrition (HCC) 2024     Palliative care by specialist 2024     Diarrhea 2024     Epigastric pain 2024     Diarrhea of infectious origin 2024     Electrolyte imbalance risk 2024     ESRD (end stage renal disease) on dialysis ([facility]) 2024     Anemia due to chronic kidney disease, on chronic dialysis ([facility]) 2024     Personal history of gout 2024     Stasis ulcer ([facility]) 10/02/2024     Diabetic ulcer of right great toe (HCC) 2024     Hematoma 2024     Edema 2024     Goals of care, counseling/discussion 2024     Hyponatremia 2024     Cellulitis 2024     Moderate protein-calorie malnutrition (HCC) 2024     Ambulatory dysfunction 09/15/2024     Lactic acidosis 2024     Hypercalcemia 2024     Chronic acquired lymphedema 2024     Hemodialysis-associated hypotension 2024     Nocturnal hypoxia 2024     Elevated LFTs 2024     Patient on peritoneal dialysis (HCC) 2024     Hypervolemia 2024     Bacteremia 2024     Cellulitis of right lower extremity 2024     Paroxysmal atrial fibrillation (HCC) 2024     Bilateral lower extremity edema 2023     Ureteral stone with hydronephrosis 2022     Cutaneous abscess of buttock 2022     Chronic kidney disease-mineral and bone disorder 2022     Type 2 diabetes mellitus with stage 4 chronic kidney disease, with long-term current use of insulin (HCC) 07/15/2022     Obesity, morbid (HCC) 07/15/2022     Secondary hyperparathyroidism of renal origin (HCC) 07/15/2022      Stage 5 chronic kidney disease on peritoneal dialysis ([facility]) 07/15/2022     Factor 5 Leiden mutation, heterozygous ([facility]) 01/15/2022     Thrombocytopenia ([facility]) 01/14/2022     Angina at rest ([facility]) 01/14/2022     MI (myocardial infarction) ([facility]) 01/14/2022     History of PTCA 01/14/2022     Sleep apnea 01/14/2022     Smoking 01/14/2022     Pancytopenia ([facility]) 01/13/2022     History of CVA (cerebrovascular accident) 01/12/2022     Acute on chronic diastolic HF (heart failure) ([facility]) 01/12/2022     HLD (hyperlipidemia) 01/12/2022     Lymphedema 01/12/2022     Acquired hypothyroidism 01/12/2022     COPD (chronic obstructive pulmonary disease) ([facility]) 12/31/2018     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 ([facility]) 06/29/2016     Gastroesophageal reflux disease 06/29/2016     Gout 06/29/2016     Personal history of pulmonary embolism 06/29/2016     ED (erectile dysfunction) of organic origin 01/14/2014     History of thromboembolism of vein 01/10/2013       LOS (days): 19  Geometric Mean LOS (GMLOS) (days): 4.6  Days to GMLOS:-14     OBJECTIVE:  Risk of Unplanned Readmission Score: 52.47         Current admission status: Inpatient   Preferred Pharmacy:   [facility] - [place], [place] - [address]  [address]  [place] [address]  Phone: [phone] Fax: [phone]    [facility] [address] [place] [address]  [address] [address]  Phone: [phone] Fax: [phone]    PATIENT/FAMILY REPORTS NO PREFERRED PHARMACY  No address on file      Primary Care Provider: Jignesh Baker DO    [facility]  Secondary Insurance:     DISCHARGE DETAILS:     Updated PT/OT notes completed. CM discharge support aware. Prior auth for [facility], pending approval.

## 2024-12-09 NOTE — ASSESSMENT & PLAN NOTE
Multifactorial in origin, continue to monitor  Platelet count has been steadily decreasing with unclear etiology  Currently no signs of bleeding  Previously has supratherapeutic INR  Currently is subtherapeutic, continue to monitor INR and platelets and for any bleeding  Platelets improving, resume coumadin     Lab Results   Component Value Date    WBC 4.33 12/09/2024    RBC 2.60 (L) 12/09/2024    PLT 91 (L) 12/09/2024    PLT 89 (L) 12/08/2024

## 2024-12-09 NOTE — PLAN OF CARE
Problem: Prexisting or High Potential for Compromised Skin Integrity  Goal: Skin integrity is maintained or improved  Description: INTERVENTIONS:  - Identify patients at risk for skin breakdown  - Assess and monitor skin integrity  - Assess and monitor nutrition and hydration status  - Monitor labs   - Assess for incontinence   - Turn and reposition patient  - Assist with mobility/ambulation  - Relieve pressure over bony prominences  - Avoid friction and shearing  - Provide appropriate hygiene as needed including keeping skin clean and dry  - Evaluate need for skin moisturizer/barrier cream  - Collaborate with interdisciplinary team   - Patient/family teaching  - Consider wound care consult   Outcome: Progressing     Problem: PAIN - ADULT  Goal: Verbalizes/displays adequate comfort level or baseline comfort level  Description: Interventions:  - Encourage patient to monitor pain and request assistance  - Assess pain using appropriate pain scale  - Administer analgesics based on type and severity of pain and evaluate response  - Implement non-pharmacological measures as appropriate and evaluate response  - Consider cultural and social influences on pain and pain management  - Notify physician/advanced practitioner if interventions unsuccessful or patient reports new pain  Outcome: Progressing     Problem: INFECTION - ADULT  Goal: Absence or prevention of progression during hospitalization  Description: INTERVENTIONS:  - Assess and monitor for signs and symptoms of infection  - Monitor lab/diagnostic results  - Monitor all insertion sites, i.e. indwelling lines, tubes, and drains  - Monitor endotracheal if appropriate and nasal secretions for changes in amount and color  - [place] appropriate cooling/warming therapies per order  - Administer medications as ordered  - Instruct and encourage patient and family to use good hand hygiene technique  - Identify and instruct in appropriate isolation precautions for  identified infection/condition  Outcome: Progressing  Goal: Absence of fever/infection during neutropenic period  Description: INTERVENTIONS:  - Monitor WBC    Outcome: Progressing     Problem: SAFETY ADULT  Goal: Patient will remain free of falls  Description: INTERVENTIONS:  - Educate patient/family on patient safety including physical limitations  - Instruct patient to call for assistance with activity   - Consult OT/PT to assist with strengthening/mobility   - Keep Call bell within reach  - Keep bed low and locked with side rails adjusted as appropriate  - Keep care items and personal belongings within reach  - Initiate and maintain comfort rounds  - Make Fall Risk Sign visible to staff  - Offer Toileting every 2 Hours, in advance of need  - Initiate/Maintain bed/chair alarm  - Obtain necessary fall risk management equipment  - Apply yellow socks and bracelet for high fall risk patients  - Consider moving patient to room near nurses station  Outcome: Progressing  Goal: Maintain or return to baseline ADL function  Description: INTERVENTIONS:  -  Assess patient's ability to carry out ADLs; assess patient's baseline for ADL function and identify physical deficits which impact ability to perform ADLs (bathing, care of mouth/teeth, toileting, grooming, dressing, etc.)  - Assess/evaluate cause of self-care deficits   - Assess range of motion  - Assess patient's mobility; develop plan if impaired  - Assess patient's need for assistive devices and provide as appropriate  - Encourage maximum independence but intervene and supervise when necessary  - Involve family in performance of ADLs  - Assess for home care needs following discharge   - Consider OT consult to assist with ADL evaluation and planning for discharge  - Provide patient education as appropriate  Outcome: Progressing  Goal: Maintains/Returns to pre admission functional level  Description: INTERVENTIONS:  - Perform AM-PAC 6 Click Basic Mobility/ Daily  Activity assessment daily.  - Set and communicate daily mobility goal to care team and patient/family/caregiver.   - Collaborate with rehabilitation services on mobility goals if consulted  - Perform Range of Motion 3 times a day.  - Reposition patient every 2 hours.  - Dangle patient 3 times a day  - Stand patient 3 times a day  - Ambulate patient 3 times a day  - Out of bed to chair 3 times a day   - Out of bed for meals 3 times a day  - Out of bed for toileting  - Record patient progress and toleration of activity level   Outcome: Progressing     Problem: DISCHARGE PLANNING  Goal: Discharge to home or other facility with appropriate resources  Description: INTERVENTIONS:  - Identify barriers to discharge w/patient and caregiver  - Arrange for needed discharge resources and transportation as appropriate  - Identify discharge learning needs (meds, wound care, etc.)  - Arrange for interpretive services to assist at discharge as needed  - Refer to Case Management Department for coordinating discharge planning if the patient needs post-hospital services based on physician/advanced practitioner order or complex needs related to functional status, cognitive ability, or social support system  Outcome: Progressing     Problem: Knowledge Deficit  Goal: Patient/family/caregiver demonstrates understanding of disease process, treatment plan, medications, and discharge instructions  Description: Complete learning assessment and assess knowledge base.  Interventions:  - Provide teaching at level of understanding  - Provide teaching via preferred learning methods  Outcome: Progressing     Problem: CARDIOVASCULAR - ADULT  Goal: Maintains optimal cardiac output and hemodynamic stability  Description: INTERVENTIONS:  - Monitor I/O, vital signs and rhythm  - Monitor for S/S and trends of decreased cardiac output  - Administer and titrate ordered vasoactive medications to optimize hemodynamic stability  - Assess quality of pulses,  skin color and temperature  - Assess for signs of decreased coronary artery perfusion  - Instruct patient to report change in severity of symptoms  Outcome: Progressing  Goal: Absence of cardiac dysrhythmias or at baseline rhythm  Description: INTERVENTIONS:  - Continuous cardiac monitoring, vital signs, obtain 12 lead EKG if ordered  - Administer antiarrhythmic and heart rate control medications as ordered  - Monitor electrolytes and administer replacement therapy as ordered  Outcome: Progressing     Problem: RESPIRATORY - ADULT  Goal: Achieves optimal ventilation and oxygenation  Description: INTERVENTIONS:  - Assess for changes in respiratory status  - Assess for changes in mentation and behavior  - Position to facilitate oxygenation and minimize respiratory effort  - Oxygen administered by appropriate delivery if ordered  - Initiate smoking cessation education as indicated  - Encourage broncho-pulmonary hygiene including cough, deep breathe, Incentive Spirometry  - Assess the need for suctioning and aspirate as needed  - Assess and instruct to report SOB or any respiratory difficulty  - Respiratory Therapy support as indicated  Outcome: Progressing     Problem: METABOLIC, FLUID AND ELECTROLYTES - ADULT  Goal: Electrolytes maintained within normal limits  Description: INTERVENTIONS:  - Monitor labs and assess patient for signs and symptoms of electrolyte imbalances  - Administer electrolyte replacement as ordered  - Monitor response to electrolyte replacements, including repeat lab results as appropriate  - Instruct patient on fluid and nutrition as appropriate  Outcome: Progressing  Goal: Fluid balance maintained  Description: INTERVENTIONS:  - Monitor labs   - Monitor I/O and WT  - Instruct patient on fluid and nutrition as appropriate  - Assess for signs & symptoms of volume excess or deficit  Outcome: Progressing  Goal: Glucose maintained within target range  Description: INTERVENTIONS:  - Monitor Blood  Glucose as ordered  - Assess for signs and symptoms of hyperglycemia and hypoglycemia  - Administer ordered medications to maintain glucose within target range  - Assess nutritional intake and initiate nutrition service referral as needed  Outcome: Progressing     Problem: Nutrition/Hydration-ADULT  Goal: Nutrient/Hydration intake appropriate for improving, restoring or maintaining nutritional needs  Description: Monitor and assess patient's nutrition/hydration status for malnutrition. Collaborate with interdisciplinary team and initiate plan and interventions as ordered.  Monitor patient's weight and dietary intake as ordered or per policy. Utilize nutrition screening tool and intervene as necessary. Determine patient's food preferences and provide high-protein, high-caloric foods as appropriate.     INTERVENTIONS:  - Monitor oral intake, urinary output, labs, and treatment plans  - Assess nutrition and hydration status and recommend course of action  - Evaluate amount of meals eaten  - Assist patient with eating if necessary   - Allow adequate time for meals  - Recommend/ encourage appropriate diets, oral nutritional supplements, and vitamin/mineral supplements  - Order, calculate, and assess calorie counts as needed  - Recommend, monitor, and adjust tube feedings and TPN/PPN based on assessed needs  - Assess need for intravenous fluids  - Provide specific nutrition/hydration education as appropriate  - Include patient/family/caregiver in decisions related to nutrition  Outcome: Progressing

## 2024-12-09 NOTE — ASSESSMENT & PLAN NOTE
Patient is on phosphorus supplementation, look to discontinue when clinically feasible, as noted above, patient not a candidate for calcitriol and is currently on Sensipar.  Will recheck phosphorus in a.m.

## 2024-12-09 NOTE — OCCUPATIONAL THERAPY NOTE
Occupational Therapy Progress Note     Patient Name: Masoud Perry  Today's Date: 12/9/2024  Problem List  Principal Problem:    Diarrhea  Active Problems:    Acute on chronic diastolic HF (heart failure) (HCC)    Pancytopenia (HCC)    Factor 5 Leiden mutation, heterozygous (HCC)    Type 2 diabetes mellitus with stage 4 chronic kidney disease, with long-term current use of insulin (HCC)    Secondary hyperparathyroidism of renal origin (HCC)    Paroxysmal atrial fibrillation (HCC)    Hemodialysis-associated hypotension    Goals of care, counseling/discussion    ESRD (end stage renal disease) on dialysis (HCC)    Anemia due to chronic kidney disease, on chronic dialysis (HCC)    Electrolyte imbalance risk    Epigastric pain    Palliative care by specialist    Hypophosphatemia    Severe protein-calorie malnutrition (HCC)       12/09/24 09   Note Type   Note Type Treatment for insurance authorization   Pain Assessment   Pain Assessment Tool 0-10   Pain Score 8   Pain Location/Orientation Orientation: Lower;Location: Back   Restrictions/Precautions   Other Precautions Chair Alarm;Bed Alarm;Fall Risk;Limb alert;Telemetry;Pain  (RUE limb alert, proximal LUE limb alert)   ADL   Grooming Assistance 2  Maximal Assistance   UB Bathing Assistance 2  Maximal Assistance   LB Bathing Assistance 1  Total Assistance   UB Dressing Assistance 2  Maximal Assistance   LB Dressing Assistance 1  Total Assistance   LB Dressing Deficit Don/doff R sock;Don/doff L sock   Toileting Assistance  1  Total Assistance   Toileting Deficit Clothing management up;Clothing management down;Perineal hygiene   Bed Mobility   Rolling R 4  Minimal assistance   Additional items Assist x 1;Increased time required;Verbal cues;Bedrails   Rolling L 4  Minimal assistance   Additional items Assist x 1;Increased time required;Verbal cues;Bedrails   Supine to Sit 3  Moderate assistance   Additional items Assist x 1;Increased time required;Verbal cues;LE  management;Other;Comment;HOB elevated;Bedrails  (Trunk management)   Sit to Supine 3  Moderate assistance   Additional items Assist x 1;Increased time required;Verbal cues;LE management;Other;Comment  (Trunk management)   Additional Comments Pt supine > sit EOB with mod assist x 1 for LE management and trunk management. Upon sitting EOB, pt able to maintain Fair+ static sitting balance. Pt tolerates sitting at EOB ~15 minutes. Attempted to perform sit <> stand transfer however pt refusing despite maximal reassurance and encouragement.   Transfers   Sit to Stand Unable to assess  (Pt refusing, reports he is not ready yet)   Additional Comments Pt with c/o dizziness prior to attempting sit <> stand transfer, BP taken and found to be WNL   Cognition   Arousal/Participation Alert   Attention Within functional limits   Comments Very limited by anxiety and questionable self-limiting behaviors. Does not always respond well to encouragement. Needs increased time for completion of all tasks.   Activity Tolerance   Activity Tolerance Patient limited by fatigue;Patient limited by pain  (Pt limited by anxiety)   Medical Staff Made Aware Carlie POTTER and RN, Shayan   Assessment   Assessment PT/OT co-treat completed due to safety concerns and significant mobility deficits. Pt alert and agreeable to participate. Pt tolerated treatment poorly. It is worthy of noting he progressed in functional assistance required for bed mobility this date as compared to previous session. He reports that he does not want to be in bed all the time, but also reports that he doesn't want to set goals for himself because when he does and he does not meet them he is disappointed. He continues to be limited by anxiety and wants therapy to inform PA-C of his proneness to panic attacks. Reassurance and encouragement provided throughout session. He is likely able to complete ADLs without total assistance but continues to require this at this time due to pt with  poor participation/refusing/anxiety. He will benefit from continued skilled OT services in continued attempt to regain his independence.   Plan   Treatment Interventions ADL retraining;Functional transfer training;UE strengthening/ROM;Endurance training;Patient/family training;Equipment evaluation/education;Compensatory technique education;Continued evaluation;Energy conservation;Activityengagement   Goal Expiration Date 12/23/24   OT Treatment Day 3   OT Frequency 2-3x/wk   Discharge Recommendation   Rehab Resource Intensity Level, OT II (Moderate Resource Intensity)   AM-PAC Daily Activity Inpatient   Lower Body Dressing 1   Bathing 1   Toileting 1   Upper Body Dressing 2   Grooming 2   Eating 3   Daily Activity Raw Score 10   AM-PAC Applied Cognition Inpatient   Following a Speech/Presentation 3   Understanding Ordinary Conversation 3   Taking Medications 3   Remembering Where Things Are Placed or Put Away 3   Remembering List of 4-5 Errands 3   Taking Care of Complicated Tasks 2   Applied Cognition Raw Score 17   Applied Cognition Standardized Score 36.52   End of Consult   Patient Position at End of Consult Supine;Bed/Chair alarm activated;All needs within reach       The patient's raw score on the AM-PAC Daily Activity Inpatient Short Form is 10. A raw score of less than 19 suggests the patient may benefit from discharge to post-acute rehabilitation services. Please refer to the recommendation of the Occupational Therapist for safe discharge planning.    Pt goals to be met by 12/23/2024:    Pt will demonstrate ability to complete grooming/hygiene tasks @ min assist after set-up.  Pt will demonstrate ability to complete supine<>sit @ min assist  in order to increase safety and independence during ADL tasks.  Pt will demonstrate ability to complete UB ADLs including washing/dressing @ min assist in order to increase performance and participation during meaningful tasks  Pt will demonstrate ability to complete LB  dressing @ mod assist in order to increase safety and independence during meaningful tasks.   Pt will demonstrate ability to nain/doff socks/shoes while sitting EOB/chair @ mod assist in order to increase safety and independence during meaningful tasks.   Pt will demonstrate ability to complete toileting tasks including CM and pericare @ min assist in order to increase safety and independence during meaningful tasks.  Pt will demonstrate ability to complete EOB, chair, toilet/commode transfers @ mod assist in order to increase performance and participation during functional tasks.  Pt will demonstrate ability to stand for 10 seconds while maintaining P+ balance with use of RW for UB support.  Pt will demonstrate ability to tolerate 10 minute OT session with no vc'ing for deep breathing or use of energy conservation techniques in order to increase activity tolerance during functional tasks.   Pt will demonstrate Good carryover of use of energy conservation/compensatory strategies during ADLs and functional tasks in order to increase safety and reduce risk for falls.   Pt will demonstrate Good attention and participation in continued evaluation of functional ambulation house hold distances in order to assist with safe d/c planning.  Pt will attend to continued cognitive assessments 100% of the time in order to provide most appropriate d/c recommendations.   Pt will follow 100% simple 2-step commands and be A&O x4 consistently with environmental cues to increase participation in functional activities.   Pt will identify 3 areas of interest/hobbies and 1 intervention on how to incorporate into daily life in order to increase interaction with environment and peers as well as increase participation in meaningful tasks.   Pt will demonstrate 100% carryover of BUE HEP in order to increase BUE MS and increase performance during functional tasks upon d/c home.    Naun Fagan MS, OTR/L

## 2024-12-10 ENCOUNTER — APPOINTMENT (INPATIENT)
Dept: DIALYSIS | Facility: HOSPITAL | Age: 69
DRG: 177 | End: 2024-12-10
Attending: INTERNAL MEDICINE
Payer: COMMERCIAL

## 2024-12-10 ENCOUNTER — APPOINTMENT (INPATIENT)
Dept: RADIOLOGY | Facility: HOSPITAL | Age: 69
DRG: 177 | End: 2024-12-10
Payer: COMMERCIAL

## 2024-12-10 LAB
ANION GAP SERPL CALCULATED.3IONS-SCNC: 6 MMOL/L (ref 4–13)
BASOPHILS # BLD AUTO: 0.04 THOUSANDS/ÂΜL (ref 0–0.1)
BASOPHILS NFR BLD AUTO: 1 % (ref 0–1)
BUN SERPL-MCNC: 16 MG/DL (ref 5–25)
CALCIUM SERPL-MCNC: 8.3 MG/DL (ref 8.4–10.2)
CHLORIDE SERPL-SCNC: 99 MMOL/L (ref 96–108)
CO2 SERPL-SCNC: 29 MMOL/L (ref 21–32)
CREAT SERPL-MCNC: 3.44 MG/DL (ref 0.6–1.3)
EOSINOPHIL # BLD AUTO: 0.05 THOUSAND/ÂΜL (ref 0–0.61)
EOSINOPHIL NFR BLD AUTO: 1 % (ref 0–6)
ERYTHROCYTE [DISTWIDTH] IN BLOOD BY AUTOMATED COUNT: 14.7 % (ref 11.6–15.1)
GFR SERPL CREATININE-BSD FRML MDRD: 16 ML/MIN/1.73SQ M
GLUCOSE SERPL-MCNC: 67 MG/DL (ref 65–140)
GLUCOSE SERPL-MCNC: 70 MG/DL (ref 65–140)
GLUCOSE SERPL-MCNC: 75 MG/DL (ref 65–140)
GLUCOSE SERPL-MCNC: 78 MG/DL (ref 65–140)
GLUCOSE SERPL-MCNC: 80 MG/DL (ref 65–140)
GLUCOSE SERPL-MCNC: 81 MG/DL (ref 65–140)
HCT VFR BLD AUTO: 26.2 % (ref 36.5–49.3)
HGB BLD-MCNC: 8.3 G/DL (ref 12–17)
IMM GRANULOCYTES # BLD AUTO: 0.03 THOUSAND/UL (ref 0–0.2)
IMM GRANULOCYTES NFR BLD AUTO: 1 % (ref 0–2)
INR PPP: 2.23 (ref 0.85–1.19)
LYMPHOCYTES # BLD AUTO: 1.4 THOUSANDS/ÂΜL (ref 0.6–4.47)
LYMPHOCYTES NFR BLD AUTO: 30 % (ref 14–44)
MAGNESIUM SERPL-MCNC: 1.5 MG/DL (ref 1.9–2.7)
MCH RBC QN AUTO: 32.8 PG (ref 26.8–34.3)
MCHC RBC AUTO-ENTMCNC: 31.7 G/DL (ref 31.4–37.4)
MCV RBC AUTO: 104 FL (ref 82–98)
MONOCYTES # BLD AUTO: 0.29 THOUSAND/ÂΜL (ref 0.17–1.22)
MONOCYTES NFR BLD AUTO: 6 % (ref 4–12)
NEUTROPHILS # BLD AUTO: 2.93 THOUSANDS/ÂΜL (ref 1.85–7.62)
NEUTS SEG NFR BLD AUTO: 61 % (ref 43–75)
NRBC BLD AUTO-RTO: 0 /100 WBCS
PHOSPHATE SERPL-MCNC: 2.7 MG/DL (ref 2.3–4.1)
PLATELET # BLD AUTO: 107 THOUSANDS/UL (ref 149–390)
PMV BLD AUTO: 9.1 FL (ref 8.9–12.7)
POTASSIUM SERPL-SCNC: 3.8 MMOL/L (ref 3.5–5.3)
PROTHROMBIN TIME: 24.8 SECONDS (ref 12.3–15)
RBC # BLD AUTO: 2.53 MILLION/UL (ref 3.88–5.62)
SODIUM SERPL-SCNC: 134 MMOL/L (ref 135–147)
WBC # BLD AUTO: 4.74 THOUSAND/UL (ref 4.31–10.16)

## 2024-12-10 PROCEDURE — 85025 COMPLETE CBC W/AUTO DIFF WBC: CPT | Performed by: INTERNAL MEDICINE

## 2024-12-10 PROCEDURE — 82948 REAGENT STRIP/BLOOD GLUCOSE: CPT

## 2024-12-10 PROCEDURE — 85610 PROTHROMBIN TIME: CPT

## 2024-12-10 PROCEDURE — 80048 BASIC METABOLIC PNL TOTAL CA: CPT | Performed by: INTERNAL MEDICINE

## 2024-12-10 PROCEDURE — 99232 SBSQ HOSP IP/OBS MODERATE 35: CPT

## 2024-12-10 PROCEDURE — 99232 SBSQ HOSP IP/OBS MODERATE 35: CPT | Performed by: INTERNAL MEDICINE

## 2024-12-10 PROCEDURE — 83735 ASSAY OF MAGNESIUM: CPT | Performed by: INTERNAL MEDICINE

## 2024-12-10 PROCEDURE — 74022 RADEX COMPL AQT ABD SERIES: CPT

## 2024-12-10 PROCEDURE — 84100 ASSAY OF PHOSPHORUS: CPT | Performed by: INTERNAL MEDICINE

## 2024-12-10 RX ORDER — WARFARIN SODIUM 1 MG/1
1 TABLET ORAL
Status: DISCONTINUED | OUTPATIENT
Start: 2024-12-10 | End: 2024-12-11 | Stop reason: HOSPADM

## 2024-12-10 RX ADMIN — LEVOTHYROXINE SODIUM 125 MCG: 125 TABLET ORAL at 05:58

## 2024-12-10 RX ADMIN — MIDODRINE HYDROCHLORIDE 15 MG: 5 TABLET ORAL at 18:39

## 2024-12-10 RX ADMIN — DRONABINOL 2.5 MG: 2.5 CAPSULE ORAL at 11:14

## 2024-12-10 RX ADMIN — LIDOCAINE 1 PATCH: 700 PATCH TOPICAL at 22:03

## 2024-12-10 RX ADMIN — MIDODRINE HYDROCHLORIDE 15 MG: 5 TABLET ORAL at 11:14

## 2024-12-10 RX ADMIN — GENTAMICIN SULFATE 1 APPLICATION: 1 CREAM TOPICAL at 09:00

## 2024-12-10 RX ADMIN — PYRIDOSTIGMINE BROMIDE 60 MG: 60 TABLET ORAL at 18:41

## 2024-12-10 RX ADMIN — PYRIDOSTIGMINE BROMIDE 60 MG: 60 TABLET ORAL at 22:03

## 2024-12-10 RX ADMIN — PYRIDOSTIGMINE BROMIDE 60 MG: 60 TABLET ORAL at 11:14

## 2024-12-10 RX ADMIN — WARFARIN SODIUM 1 MG: 1 TABLET ORAL at 22:03

## 2024-12-10 RX ADMIN — HYDROCODONE BITARTRATE AND ACETAMINOPHEN 2 TABLET: 5; 325 TABLET ORAL at 06:42

## 2024-12-10 RX ADMIN — PANCRELIPASE 6000 UNITS: 30000; 6000; 19000 CAPSULE, DELAYED RELEASE PELLETS ORAL at 11:01

## 2024-12-10 RX ADMIN — GUAIFENESIN 600 MG: 600 TABLET ORAL at 11:00

## 2024-12-10 RX ADMIN — HYDROCODONE BITARTRATE AND ACETAMINOPHEN 2 TABLET: 5; 325 TABLET ORAL at 18:39

## 2024-12-10 RX ADMIN — DRONABINOL 2.5 MG: 2.5 CAPSULE ORAL at 18:39

## 2024-12-10 RX ADMIN — ONDANSETRON 4 MG: 2 INJECTION INTRAMUSCULAR; INTRAVENOUS at 09:38

## 2024-12-10 RX ADMIN — ATORVASTATIN CALCIUM 40 MG: 40 TABLET, FILM COATED ORAL at 18:39

## 2024-12-10 RX ADMIN — GUAIFENESIN 600 MG: 600 TABLET ORAL at 22:04

## 2024-12-10 RX ADMIN — METOPROLOL TARTRATE 25 MG: 25 TABLET, FILM COATED ORAL at 11:01

## 2024-12-10 RX ADMIN — MIDODRINE HYDROCHLORIDE 15 MG: 5 TABLET ORAL at 06:00

## 2024-12-10 RX ADMIN — PANCRELIPASE 6000 UNITS: 30000; 6000; 19000 CAPSULE, DELAYED RELEASE PELLETS ORAL at 18:39

## 2024-12-10 NOTE — PLAN OF CARE
Problem: Prexisting or High Potential for Compromised Skin Integrity  Goal: Skin integrity is maintained or improved  Description: INTERVENTIONS:  - Identify patients at risk for skin breakdown  - Assess and monitor skin integrity  - Assess and monitor nutrition and hydration status  - Monitor labs   - Assess for incontinence   - Turn and reposition patient  - Assist with mobility/ambulation  - Relieve pressure over bony prominences  - Avoid friction and shearing  - Provide appropriate hygiene as needed including keeping skin clean and dry  - Evaluate need for skin moisturizer/barrier cream  - Collaborate with interdisciplinary team   - Patient/family teaching  - Consider wound care consult   Outcome: Progressing     Problem: PAIN - ADULT  Goal: Verbalizes/displays adequate comfort level or baseline comfort level  Description: Interventions:  - Encourage patient to monitor pain and request assistance  - Assess pain using appropriate pain scale  - Administer analgesics based on type and severity of pain and evaluate response  - Implement non-pharmacological measures as appropriate and evaluate response  - Consider cultural and social influences on pain and pain management  - Notify physician/advanced practitioner if interventions unsuccessful or patient reports new pain  Outcome: Progressing     Problem: INFECTION - ADULT  Goal: Absence or prevention of progression during hospitalization  Description: INTERVENTIONS:  - Assess and monitor for signs and symptoms of infection  - Monitor lab/diagnostic results  - Monitor all insertion sites, i.e. indwelling lines, tubes, and drains  - Monitor endotracheal if appropriate and nasal secretions for changes in amount and color  - [place] appropriate cooling/warming therapies per order  - Administer medications as ordered  - Instruct and encourage patient and family to use good hand hygiene technique  - Identify and instruct in appropriate isolation precautions for  identified infection/condition  Outcome: Progressing  Goal: Absence of fever/infection during neutropenic period  Description: INTERVENTIONS:  - Monitor WBC    Outcome: Progressing     Problem: SAFETY ADULT  Goal: Patient will remain free of falls  Description: INTERVENTIONS:  - Educate patient/family on patient safety including physical limitations  - Instruct patient to call for assistance with activity   - Consult OT/PT to assist with strengthening/mobility   - Keep Call bell within reach  - Keep bed low and locked with side rails adjusted as appropriate  - Keep care items and personal belongings within reach  - Initiate and maintain comfort rounds  - Make Fall Risk Sign visible to staff  - Offer Toileting every 2 Hours, in advance of need  - Initiate/Maintain bed/chair alarm  - Obtain necessary fall risk management equipment  - Apply yellow socks and bracelet for high fall risk patients  - Consider moving patient to room near nurses station  Outcome: Progressing  Goal: Maintain or return to baseline ADL function  Description: INTERVENTIONS:  -  Assess patient's ability to carry out ADLs; assess patient's baseline for ADL function and identify physical deficits which impact ability to perform ADLs (bathing, care of mouth/teeth, toileting, grooming, dressing, etc.)  - Assess/evaluate cause of self-care deficits   - Assess range of motion  - Assess patient's mobility; develop plan if impaired  - Assess patient's need for assistive devices and provide as appropriate  - Encourage maximum independence but intervene and supervise when necessary  - Involve family in performance of ADLs  - Assess for home care needs following discharge   - Consider OT consult to assist with ADL evaluation and planning for discharge  - Provide patient education as appropriate  Outcome: Progressing  Goal: Maintains/Returns to pre admission functional level  Description: INTERVENTIONS:  - Perform AM-PAC 6 Click Basic Mobility/ Daily  Activity assessment daily.  - Set and communicate daily mobility goal to care team and patient/family/caregiver.   - Collaborate with rehabilitation services on mobility goals if consulted  - Perform Range of Motion 3 times a day.  - Reposition patient every 2 hours.  - Dangle patient 3 times a day  - Stand patient 3 times a day  - Ambulate patient 3 times a day  - Out of bed to chair 3 times a day   - Out of bed for meals 3 times a day  - Out of bed for toileting  - Record patient progress and toleration of activity level   Outcome: Progressing     Problem: DISCHARGE PLANNING  Goal: Discharge to home or other facility with appropriate resources  Description: INTERVENTIONS:  - Identify barriers to discharge w/patient and caregiver  - Arrange for needed discharge resources and transportation as appropriate  - Identify discharge learning needs (meds, wound care, etc.)  - Arrange for interpretive services to assist at discharge as needed  - Refer to Case Management Department for coordinating discharge planning if the patient needs post-hospital services based on physician/advanced practitioner order or complex needs related to functional status, cognitive ability, or social support system  Outcome: Progressing     Problem: Knowledge Deficit  Goal: Patient/family/caregiver demonstrates understanding of disease process, treatment plan, medications, and discharge instructions  Description: Complete learning assessment and assess knowledge base.  Interventions:  - Provide teaching at level of understanding  - Provide teaching via preferred learning methods  Outcome: Progressing     Problem: CARDIOVASCULAR - ADULT  Goal: Maintains optimal cardiac output and hemodynamic stability  Description: INTERVENTIONS:  - Monitor I/O, vital signs and rhythm  - Monitor for S/S and trends of decreased cardiac output  - Administer and titrate ordered vasoactive medications to optimize hemodynamic stability  - Assess quality of pulses,  skin color and temperature  - Assess for signs of decreased coronary artery perfusion  - Instruct patient to report change in severity of symptoms  Outcome: Progressing  Goal: Absence of cardiac dysrhythmias or at baseline rhythm  Description: INTERVENTIONS:  - Continuous cardiac monitoring, vital signs, obtain 12 lead EKG if ordered  - Administer antiarrhythmic and heart rate control medications as ordered  - Monitor electrolytes and administer replacement therapy as ordered  Outcome: Progressing     Problem: RESPIRATORY - ADULT  Goal: Achieves optimal ventilation and oxygenation  Description: INTERVENTIONS:  - Assess for changes in respiratory status  - Assess for changes in mentation and behavior  - Position to facilitate oxygenation and minimize respiratory effort  - Oxygen administered by appropriate delivery if ordered  - Initiate smoking cessation education as indicated  - Encourage broncho-pulmonary hygiene including cough, deep breathe, Incentive Spirometry  - Assess the need for suctioning and aspirate as needed  - Assess and instruct to report SOB or any respiratory difficulty  - Respiratory Therapy support as indicated  Outcome: Progressing     Problem: METABOLIC, FLUID AND ELECTROLYTES - ADULT  Goal: Electrolytes maintained within normal limits  Description: INTERVENTIONS:  - Monitor labs and assess patient for signs and symptoms of electrolyte imbalances  - Administer electrolyte replacement as ordered  - Monitor response to electrolyte replacements, including repeat lab results as appropriate  - Instruct patient on fluid and nutrition as appropriate  Outcome: Progressing  Goal: Fluid balance maintained  Description: INTERVENTIONS:  - Monitor labs   - Monitor I/O and WT  - Instruct patient on fluid and nutrition as appropriate  - Assess for signs & symptoms of volume excess or deficit  Outcome: Progressing  Goal: Glucose maintained within target range  Description: INTERVENTIONS:  - Monitor Blood  Glucose as ordered  - Assess for signs and symptoms of hyperglycemia and hypoglycemia  - Administer ordered medications to maintain glucose within target range  - Assess nutritional intake and initiate nutrition service referral as needed  Outcome: Progressing     Problem: Nutrition/Hydration-ADULT  Goal: Nutrient/Hydration intake appropriate for improving, restoring or maintaining nutritional needs  Description: Monitor and assess patient's nutrition/hydration status for malnutrition. Collaborate with interdisciplinary team and initiate plan and interventions as ordered.  Monitor patient's weight and dietary intake as ordered or per policy. Utilize nutrition screening tool and intervene as necessary. Determine patient's food preferences and provide high-protein, high-caloric foods as appropriate.     INTERVENTIONS:  - Monitor oral intake, urinary output, labs, and treatment plans  - Assess nutrition and hydration status and recommend course of action  - Evaluate amount of meals eaten  - Assist patient with eating if necessary   - Allow adequate time for meals  - Recommend/ encourage appropriate diets, oral nutritional supplements, and vitamin/mineral supplements  - Order, calculate, and assess calorie counts as needed  - Recommend, monitor, and adjust tube feedings and TPN/PPN based on assessed needs  - Assess need for intravenous fluids  - Provide specific nutrition/hydration education as appropriate  - Include patient/family/caregiver in decisions related to nutrition  Outcome: Progressing

## 2024-12-10 NOTE — ASSESSMENT & PLAN NOTE
Lab Results   Component Value Date    HGBA1C 5.0 11/02/2024       Recent Labs     12/09/24  0745 12/09/24  1123 12/09/24  1632 12/10/24  0753   POCGLU 88 108 79 80       Blood Sugar Average: Last 72 hrs:  (P) [id number]  Sliding scale insulin. Hypoglycemia protocol  Patient with recurrent hypoglycemia secondary to poor oral intake

## 2024-12-10 NOTE — PLAN OF CARE
Problem: Prexisting or High Potential for Compromised Skin Integrity  Goal: Skin integrity is maintained or improved  Description: INTERVENTIONS:  - Identify patients at risk for skin breakdown  - Assess and monitor skin integrity  - Assess and monitor nutrition and hydration status  - Monitor labs   - Assess for incontinence   - Turn and reposition patient  - Assist with mobility/ambulation  - Relieve pressure over bony prominences  - Avoid friction and shearing  - Provide appropriate hygiene as needed including keeping skin clean and dry  - Evaluate need for skin moisturizer/barrier cream  - Collaborate with interdisciplinary team   - Patient/family teaching  - Consider wound care consult   Outcome: Progressing     Problem: PAIN - ADULT  Goal: Verbalizes/displays adequate comfort level or baseline comfort level  Description: Interventions:  - Encourage patient to monitor pain and request assistance  - Assess pain using appropriate pain scale  - Administer analgesics based on type and severity of pain and evaluate response  - Implement non-pharmacological measures as appropriate and evaluate response  - Consider cultural and social influences on pain and pain management  - Notify physician/advanced practitioner if interventions unsuccessful or patient reports new pain  Outcome: Progressing     Problem: INFECTION - ADULT  Goal: Absence or prevention of progression during hospitalization  Description: INTERVENTIONS:  - Assess and monitor for signs and symptoms of infection  - Monitor lab/diagnostic results  - Monitor all insertion sites, i.e. indwelling lines, tubes, and drains  - Monitor endotracheal if appropriate and nasal secretions for changes in amount and color  - [place] appropriate cooling/warming therapies per order  - Administer medications as ordered  - Instruct and encourage patient and family to use good hand hygiene technique  - Identify and instruct in appropriate isolation precautions for  identified infection/condition  Outcome: Progressing  Goal: Absence of fever/infection during neutropenic period  Description: INTERVENTIONS:  - Monitor WBC    Outcome: Progressing     Problem: SAFETY ADULT  Goal: Patient will remain free of falls  Description: INTERVENTIONS:  - Educate patient/family on patient safety including physical limitations  - Instruct patient to call for assistance with activity   - Consult OT/PT to assist with strengthening/mobility   - Keep Call bell within reach  - Keep bed low and locked with side rails adjusted as appropriate  - Keep care items and personal belongings within reach  - Initiate and maintain comfort rounds  - Make Fall Risk Sign visible to staff  - Offer Toileting every 2 Hours, in advance of need  - Initiate/Maintain bed/chair alarm  - Obtain necessary fall risk management equipment  - Apply yellow socks and bracelet for high fall risk patients  - Consider moving patient to room near nurses station  Outcome: Progressing  Goal: Maintain or return to baseline ADL function  Description: INTERVENTIONS:  -  Assess patient's ability to carry out ADLs; assess patient's baseline for ADL function and identify physical deficits which impact ability to perform ADLs (bathing, care of mouth/teeth, toileting, grooming, dressing, etc.)  - Assess/evaluate cause of self-care deficits   - Assess range of motion  - Assess patient's mobility; develop plan if impaired  - Assess patient's need for assistive devices and provide as appropriate  - Encourage maximum independence but intervene and supervise when necessary  - Involve family in performance of ADLs  - Assess for home care needs following discharge   - Consider OT consult to assist with ADL evaluation and planning for discharge  - Provide patient education as appropriate  Outcome: Progressing  Goal: Maintains/Returns to pre admission functional level  Description: INTERVENTIONS:  - Perform AM-PAC 6 Click Basic Mobility/ Daily  Activity assessment daily.  - Set and communicate daily mobility goal to care team and patient/family/caregiver.   - Collaborate with rehabilitation services on mobility goals if consulted  - Perform Range of Motion 3 times a day.  - Reposition patient every 2 hours.  - Dangle patient 3 times a day  - Stand patient 3 times a day  - Ambulate patient 3 times a day  - Out of bed to chair 3 times a day   - Out of bed for meals 3 times a day  - Out of bed for toileting  - Record patient progress and toleration of activity level   Outcome: Progressing     Problem: DISCHARGE PLANNING  Goal: Discharge to home or other facility with appropriate resources  Description: INTERVENTIONS:  - Identify barriers to discharge w/patient and caregiver  - Arrange for needed discharge resources and transportation as appropriate  - Identify discharge learning needs (meds, wound care, etc.)  - Arrange for interpretive services to assist at discharge as needed  - Refer to Case Management Department for coordinating discharge planning if the patient needs post-hospital services based on physician/advanced practitioner order or complex needs related to functional status, cognitive ability, or social support system  Outcome: Progressing     Problem: Knowledge Deficit  Goal: Patient/family/caregiver demonstrates understanding of disease process, treatment plan, medications, and discharge instructions  Description: Complete learning assessment and assess knowledge base.  Interventions:  - Provide teaching at level of understanding  - Provide teaching via preferred learning methods  Outcome: Progressing     Problem: CARDIOVASCULAR - ADULT  Goal: Maintains optimal cardiac output and hemodynamic stability  Description: INTERVENTIONS:  - Monitor I/O, vital signs and rhythm  - Monitor for S/S and trends of decreased cardiac output  - Administer and titrate ordered vasoactive medications to optimize hemodynamic stability  - Assess quality of pulses,  skin color and temperature  - Assess for signs of decreased coronary artery perfusion  - Instruct patient to report change in severity of symptoms  Outcome: Progressing  Goal: Absence of cardiac dysrhythmias or at baseline rhythm  Description: INTERVENTIONS:  - Continuous cardiac monitoring, vital signs, obtain 12 lead EKG if ordered  - Administer antiarrhythmic and heart rate control medications as ordered  - Monitor electrolytes and administer replacement therapy as ordered  Outcome: Progressing     Problem: RESPIRATORY - ADULT  Goal: Achieves optimal ventilation and oxygenation  Description: INTERVENTIONS:  - Assess for changes in respiratory status  - Assess for changes in mentation and behavior  - Position to facilitate oxygenation and minimize respiratory effort  - Oxygen administered by appropriate delivery if ordered  - Initiate smoking cessation education as indicated  - Encourage broncho-pulmonary hygiene including cough, deep breathe, Incentive Spirometry  - Assess the need for suctioning and aspirate as needed  - Assess and instruct to report SOB or any respiratory difficulty  - Respiratory Therapy support as indicated  Outcome: Progressing     Problem: METABOLIC, FLUID AND ELECTROLYTES - ADULT  Goal: Electrolytes maintained within normal limits  Description: INTERVENTIONS:  - Monitor labs and assess patient for signs and symptoms of electrolyte imbalances  - Administer electrolyte replacement as ordered  - Monitor response to electrolyte replacements, including repeat lab results as appropriate  - Instruct patient on fluid and nutrition as appropriate  Outcome: Progressing  Goal: Fluid balance maintained  Description: INTERVENTIONS:  - Monitor labs   - Monitor I/O and WT  - Instruct patient on fluid and nutrition as appropriate  - Assess for signs & symptoms of volume excess or deficit  Outcome: Progressing  Goal: Glucose maintained within target range  Description: INTERVENTIONS:  - Monitor Blood  Glucose as ordered  - Assess for signs and symptoms of hyperglycemia and hypoglycemia  - Administer ordered medications to maintain glucose within target range  - Assess nutritional intake and initiate nutrition service referral as needed  Outcome: Progressing     Problem: Nutrition/Hydration-ADULT  Goal: Nutrient/Hydration intake appropriate for improving, restoring or maintaining nutritional needs  Description: Monitor and assess patient's nutrition/hydration status for malnutrition. Collaborate with interdisciplinary team and initiate plan and interventions as ordered.  Monitor patient's weight and dietary intake as ordered or per policy. Utilize nutrition screening tool and intervene as necessary. Determine patient's food preferences and provide high-protein, high-caloric foods as appropriate.     INTERVENTIONS:  - Monitor oral intake, urinary output, labs, and treatment plans  - Assess nutrition and hydration status and recommend course of action  - Evaluate amount of meals eaten  - Assist patient with eating if necessary   - Allow adequate time for meals  - Recommend/ encourage appropriate diets, oral nutritional supplements, and vitamin/mineral supplements  - Order, calculate, and assess calorie counts as needed  - Recommend, monitor, and adjust tube feedings and TPN/PPN based on assessed needs  - Assess need for intravenous fluids  - Provide specific nutrition/hydration education as appropriate  - Include patient/family/caregiver in decisions related to nutrition  Outcome: Progressing

## 2024-12-10 NOTE — ASSESSMENT & PLAN NOTE
Wt Readings from Last 3 Encounters:   12/10/24 86.7 kg (191 lb 2.2 oz)   10/14/24 115 kg (253 lb)   10/07/24 118 kg (260 lb)   CXR on 11/20 appeared to be pulmonary vascular congestion   Patient is a dialysis basis, received hemodialysis on TTS  Continue PTA torsemide  Appreciate continued nephrology follow-up  Monitor intake/output and daily weight  Acute exacerbation seems to have resolved-euvolemic and continue with dialysis

## 2024-12-10 NOTE — CASE MANAGEMENT
[facility] has received APPROVED authorization.  Insurance:   [employer]   Auth obtained via Insurance Rep: Liseth  Authorization received for: [facility]  Facility: [place]   Authorization #:[id number]   Start of Care:12/10  Next Review Date:12/13  Continued Stay Care Coordinator: Jessica CALDERON#: [phone]  Submit next review to: [phone]     Care Manager notified: Tres Reyes        Please reach out to CM for updates on any clinical information.

## 2024-12-10 NOTE — ASSESSMENT & PLAN NOTE
Continue to monitor hemoglobin, patient has been off of JUAN FRANCISCO due to COVID infection last month.  Will likely be able to reinitiate long-acting JUAN FRANCISCO at presentation to his outpatient clinic.  Hemoglobin levels were 8.3 on December 9 recheck labs today.  Recheck iron studies as last ones had been checked on November 22 and ferritin was 470.  Certainly iron studies and ferritin level will also be affected by inflammatory states such as COVID as well.

## 2024-12-10 NOTE — ASSESSMENT & PLAN NOTE
- On 12/3/2024: Patient asked nephrology  about what would happen if dialysis would be stopped, discussed about risk of fluid overload electrolyte imbalance and ultimately death in 2 to 3 weeks.  Discussed that he may have to consider hospice if plan to stop dialysis. He did not want to stop at this time after discussion..  Ongoing discussions the patient is appropriate with regards to patient's goals of care.

## 2024-12-10 NOTE — ASSESSMENT & PLAN NOTE
Patient with epigastric pain   Was attempting to eat the last few days but did not eat much yesterday and having nausea     Will be trying prn zofran   Was offered Pepcid last week by GI - declined   He declined scopes at that time as well     Will continue to manage conservatively   Check obstruction series   GI will re-eval tomorrow

## 2024-12-10 NOTE — PLAN OF CARE
Hemodialysis treatment planned for 210 minutes using a 4 K+ bath for potassium 3.8.  Fluid goal 1500 ml/1000 ml net as discussed with EUFEMIA DONIS via Epic Chat.        Post-Dialysis RN Treatment Note    Blood Pressure:  Pre 109/57 mm/Hg  Post 113/65 mmHg   EDW:  86.5 kg    Weight:  Pre 86.4 kg   Post 85.7 kg   Mode of weight measurement: Bed Scale.  Bed zeroed with fitted sheet, flat sheet and one pillow.   Volume Removed:  2004 ml/1500 ml net   Treatment duration: 210 minutes    NS given:  No    Treatment shortened Yes, describe: 210 minutes   Medications given during Rx: None Reported   Estimated Kt/V:  1.62   Access type: Permacath/TDC   Access Issues: Yes, describe: lines reversed     Report called to primary nurse:   Yes               Problem: METABOLIC, FLUID AND ELECTROLYTES - ADULT  Goal: Electrolytes maintained within normal limits  Description: INTERVENTIONS:  - Monitor labs and assess patient for signs and symptoms of electrolyte imbalances  - Administer electrolyte replacement as ordered  - Monitor response to electrolyte replacements, including repeat lab results as appropriate  - Instruct patient on fluid and nutrition as appropriate  Outcome: Progressing  Goal: Fluid balance maintained  Description: INTERVENTIONS:  - Monitor labs   - Monitor I/O and WT  - Instruct patient on fluid and nutrition as appropriate  - Assess for signs & symptoms of volume excess or deficit  Outcome: Progressing

## 2024-12-10 NOTE — ASSESSMENT & PLAN NOTE
Patient agreeable to initiate Marinol 2.5 mg twice daily, continue to encourage intake of protein.  To be followed by dietary and primary service      VIRTUAL CARE DOCUMENTATION:     1. This service was provided via Telemedicine using [employer]     2. Parties in the room with patient during teleconsult Patient only    3. Confidentiality My office door was closed     4. Participants No one else was in the room    5. Patient acknowledged consent and understanding of privacy and security of the  Telemedicine consult. I informed the patient that I have reviewed their record in Epic and presented the opportunity for them to ask any questions regarding the visit today.  The patient agreed to participate.    6. Time spent including interview with the patient, review of the patient's record, documentation of medical plan of care, documentation of medical decision making, total time approximately 25 minutes.

## 2024-12-10 NOTE — ASSESSMENT & PLAN NOTE
Continue Sensipar, patient not a candidate for an activated vitamin D agent due to propensity towards hypercalcemia.  Caring calcium is 8.6 with an albumin of 2.3 and corrected calcium is 10 from December 9.  Recheck phosphorus today as last levels were 2.0 in December 5 especially in the setting of a decreased appetite.

## 2024-12-10 NOTE — ASSESSMENT & PLAN NOTE
Malnutrition Findings:   Adult Malnutrition type: Acute illness  Adult Degree of Malnutrition: Other severe protein calorie malnutrition  Malnutrition Characteristics: Inadequate energy, Weight loss                  360 Statement: Acute on chronic severe malnutrition related to refusal for po intake, poor appetite, condition as evidenced by 10.9% weight loss x 14 days (11/20 220lb, 12/3 196lb); < 50% estimated energy intake > 5 days. Treated with: Recommend continued GOC discussion. Continue with daily weights. Can consider oral supplements if pt willing to take po intake though refusing po intake. Consider appetite stimulant if medically appropriate/pt agreeable though pt previously refused. Consider EN feeds if within GOC.    BMI Findings:           Body mass index is 25.92 kg/m².   Agreeable to trying marinol 2.5mg bid for appetite stimulant   declined tube feeds or enteric feedings   Continue goals of care discussion  Appetite is improving    Walk in

## 2024-12-10 NOTE — UTILIZATION REVIEW
Continued Stay Review    Date: 12/10                          Current Patient Class: Inpatient  Current Level of Care: MS    HPI:69 y.o. male initially admitted on  11/20    Assessment/Plan:     12/10 IM Note   Diarrhea improving , having 2-3 loose BMs a day . Cont w/ epigastric pain . + nausea , attempting to eat last few days . Plan to try zofran prn . Pt declined pepcid . Declined scopes . Cont to manage conservatively . Check obs series . GI re eval tomorrow.     Medications:   Scheduled Medications:  atorvastatin, 40 mg, Oral, Daily With Dinner  cinacalcet, 60 mg, Oral, Once per day on Monday Wednesday Friday  Diclofenac Sodium, 2 g, Topical, 4x Daily  dronabinol, 2.5 mg, Oral, BID before lunch/dinner  gentamicin, , Topical, Daily  guaiFENesin, 600 mg, Oral, Q12H VASU  insulin lispro, 1-6 Units, Subcutaneous, TID AC  levothyroxine, 125 mcg, Oral, Early Morning  lidocaine, 1 patch, Topical, Daily  metoprolol tartrate, 25 mg, Oral, Q12H VASU  midodrine, 15 mg, Oral, TID AC  nystatin, , Topical, BID  pancrelipase (Lip-Prot-Amyl), 6,000 Units, Oral, TID With Meals  potassium chloride, 20 mEq, Oral, Once  pyridostigmine, 60 mg, Oral, TID  torsemide, 40 mg, Oral, Once per day on Sunday Monday Wednesday Friday  warfarin, 2 mg, Oral, HS      Continuous IV Infusions:     PRN Meds:  acetaminophen, 650 mg, Oral, Q4H PRN  benzonatate, 100 mg, Oral, TID PRN  calcium carbonate, 500 mg, Oral, Daily PRN  HYDROcodone-acetaminophen, 2 tablet, Oral, Q6H PRN  ondansetron, 4 mg, Intravenous, Q6H PRN 12/10 x1  simethicone, 80 mg, Oral, 4x Daily PRN      Discharge Plan: TBD     Vital Signs (last 3 days)       Date/Time Temp Pulse Resp BP MAP (mmHg) SpO2 O2 Device Patient Position - Orthostatic VS Abel Coma Scale Score Pain    12/10/24 1101 -- 77 -- 112/56 -- -- -- -- -- --    12/10/24 0900 -- -- -- -- -- 93 % None (Room air) -- 15 No Pain    12/10/24 07:53:37 97 °F (36.1 °C) 71 18 109/58 75 97 % -- -- -- --    12/10/24 0642 -- -- --  -- -- -- -- -- -- 9 12/09/24 2137 -- 74 -- -- -- -- -- -- -- --    12/09/24 21:35:03 -- -- -- 95/56 69 -- -- -- -- --    12/09/24 1923 -- -- -- -- -- 95 % None (Room air) -- 15 No Pain    12/09/24 1729 -- -- -- -- -- -- -- -- -- 10 - Worst Possible Pain    12/09/24 15:05:45 -- -- -- 102/56 71 -- -- -- -- --    12/09/24 09:59:58 -- 86 -- 128/74 92 95 % -- -- -- --    12/09/24 0944 -- -- -- -- -- -- -- -- -- 8 12/09/24 0943 -- -- -- -- -- -- -- -- -- 8 12/09/24 0800 -- -- -- -- -- -- -- -- 15 --    12/09/24 07:46:13 97 °F (36.1 °C) 69 18 107/56 73 96 % -- -- -- --    12/09/24 0638 -- -- -- -- -- -- -- -- -- 8 12/09/24 06:21:09 -- 74 -- 105/55 72 94 % -- -- -- --    12/08/24 22:18:27 97.7 °F (36.5 °C) 74 20 100/53 69 93 % -- -- -- --    12/08/24 2045 -- -- -- -- -- -- -- -- 15 --    12/08/24 2008 -- -- -- -- -- -- -- -- -- 10 - Worst Possible Pain    12/08/24 11:52:02 -- 92 -- 96/55 69 94 % -- -- -- --    12/08/24 1044 -- -- -- -- -- -- -- -- -- 8 12/08/24 09:37:10 -- 83 -- 127/58 81 96 % -- -- -- --    12/08/24 07:38:54 98.4 °F (36.9 °C) 79 20 105/59 74 95 % None (Room air) Lying 15 --    12/08/24 0512 -- -- -- 106/54 -- -- -- Lying -- --    12/07/24 2300 -- -- -- -- -- -- -- -- -- No Pain    12/07/24 21:56:33 -- 75 -- 115/62 80 96 % -- -- -- --    12/07/24 2100 -- -- -- -- -- -- None (Room air) -- 15 --    12/07/24 1520 -- -- -- -- -- -- -- -- -- 9    12/07/24 15:13:57 96.6 °F (35.9 °C) 87 17 97/63 74 96 % -- -- -- --    12/07/24 13:14:40 -- 92 -- 115/69 84 97 % -- -- -- --    12/07/24 1230 97.2 °F (36.2 °C) 80 16 114/68 83 100 % -- Lying -- --    12/07/24 1225 -- 83 16 105/81 89 99 % -- -- -- --    12/07/24 1200 -- 89 16 99/58 72 98 % -- -- -- --    12/07/24 1130 -- 83 16 103/58 73 98 % -- -- -- --    12/07/24 1100 -- 85 15 99/55 68 96 % -- -- -- --    12/07/24 1030 -- 88 15 103/62 74 97 % -- -- -- --    12/07/24 1000 -- 91 16 106/74 85 95 % -- -- -- --    12/07/24 0930 -- 90 15 103/60 74 94 % -- --  -- --    12/07/24 0900 -- 84 16 108/64 79 95 % -- -- -- --    12/07/24 0828 -- 76 16 113/63 80 93 % None (Room air) -- 15 --    12/07/24 0810 97.3 °F (36.3 °C) 74 16 108/55 73 94 % -- Lying -- No Pain    12/07/24 07:21:01 97.2 °F (36.2 °C) 71 17 107/58 74 95 % -- -- -- --          Weight (last 2 days)       Date/Time Weight    12/10/24 0557 86.7 (191.14)    12/09/24 0600 87.1 (192.02)    12/08/24 0600 87 (191.8)            Pertinent Labs/Diagnostic Results:   Radiology:  XR chest portable   Final Interpretation by Vishal Bronson MD (11/29 1416)      No acute cardiopulmonary disease.            Workstation performed: [id number]         CT chest abdomen pelvis wo contrast   Final Interpretation by Sandro Sterling MD (11/25 1655)      1.  No identifiable acute abnormality to account for the patient's clinical presentation.   2.  Mild diffuse anasarca, improved since the prior CT.   3.  Bilateral nonobstructing renal and urinary bladder calculi.      Please refer to the report body for description of other incidental, chronic and/or benign findings.         Workstation performed: [id number]         XR chest 1 view portable   ED Interpretation by Bennie Shoemaker DO (11/20 2225)   Moderate pulmonary vascular congestion small bilateral pleural effusions right greater than left      Final Interpretation by Kenn Ross MD (11/21 0830)      Mild to moderate pulmonary edema with right pleural effusion.      Left lower lobe atelectasis. Pneumonia is not excluded.            Workstation performed: [id number]           Cardiology:  ECG 12 lead   Final Result by Reji Carroll DO (11/29 8451)   Difficult study given baseline artifact   Suspect AFIB with rapid ventricular responce   Left axis deviation   Right bundle branch block   Possible Lateral infarct , age undetermined   Abnormal ECG   No previous ECGs available   Recommend repeat EKG      Confirmed by Reji Carroll ([id number]) on 11/29/2024 3:41:41 PM     "  ECG 12 lead   Final Result by Theodore Bone MD (11/21 0736)   Sinus rhythm   Right bundle branch block   Possible Lateral infarct (cited on or before 21-Sep-2024)   Abnormal ECG   When compared with ECG of 21-Sep-2024 07:35,   No significant change since prior tracing      Confirmed by Theodore Bone ([id number]) on 11/21/2024 7:36:12 AM        GI:  No orders to display           Results from last 7 days   Lab Units 12/09/24  0618 12/08/24  1150 12/07/24  0819 12/06/24  0621 12/05/24  0639   WBC Thousand/uL 4.33 4.71 5.88 5.61 7.53   HEMOGLOBIN g/dL 8.3* 8.2* 8.2* 8.7* 9.2*   HEMATOCRIT % 26.8* 26.2* 25.9* 27.8* 29.2*   PLATELETS Thousands/uL 91* 89* 104* 74* 94*         Results from last 7 days   Lab Units 12/09/24  0618 12/08/24  0501 12/07/24  0819 12/06/24  0621 12/05/24  0639 12/04/24  0608   SODIUM mmol/L 134* 133* 133* 134* 135 135   POTASSIUM mmol/L 3.2* 3.7 3.4* 3.5 3.4* 3.4*   CHLORIDE mmol/L 99 98 98 98 100 100   CO2 mmol/L 27 30 27 30 29 30   ANION GAP mmol/L 8 5 8 6 6 5   BUN mg/dL 13 7 15 11 14 11   CREATININE mg/dL 2.77* 1.86* 2.91* 2.41* 2.66* 1.93*   EGFR ml/min/1.73sq m 21 35 20 26 23 34   CALCIUM mg/dL 8.6 8.0* 8.5 8.0* 8.0* 7.8*   MAGNESIUM mg/dL  --   --   --   --   --  2.1   PHOSPHORUS mg/dL  --   --   --   --  2.0* 1.6*     Results from last 7 days   Lab Units 12/09/24  0618   AST U/L 13   ALT U/L 6*   ALK PHOS U/L 79   TOTAL PROTEIN g/dL 4.3*   ALBUMIN g/dL 2.3*   TOTAL BILIRUBIN mg/dL 0.36     Results from last 7 days   Lab Units 12/10/24  0753 12/09/24  1632 12/09/24  1123 12/09/24  0745 12/08/24  2216 12/08/24  1616 12/08/24  1125 12/08/24  0738 12/07/24  2210 12/07/24  1655 12/07/24  1203 12/07/24  0829   POC GLUCOSE mg/dl 80 79 108 88 95 107 81 78 86 86 100 81     Results from last 7 days   Lab Units 12/09/24  0618 12/08/24  0501 12/07/24  0819 12/06/24  0621 12/05/24  0639 12/04/24  0608   GLUCOSE RANDOM mg/dL 81 80 69 65 69 70             No results found for: \"BETA-HYDROXYBUTYRATE\"         "                   Results from last 7 days   Lab Units 12/09/24  0618 12/08/24  0509 12/07/24  0819   PROTIME seconds 18.5* 16.9* 20.5*   INR  1.51* 1.33* 1.73*                                                                                                               Network Utilization Review Department  ATTENTION: Please call with any questions or concerns to [phone] and carefully listen to the prompts so that you are directed to the right person. All voicemails are confidential.   For Discharge needs, contact Care Management DC Support Team at [phone] opt. 2  Send all requests for admission clinical reviews, approved or denied determinations and any other requests to dedicated fax number below belonging to the campus where the patient is receiving treatment. List of dedicated fax numbers for the Facilities:  FACILITY NAME UR FAX NUMBER   ADMISSION DENIALS (Administrative/Medical Necessity) [phone]   DISCHARGE SUPPORT TEAM (NETWORK) [phone]   PARENT CHILD HEALTH (Maternity/NICU/Pediatrics) [phone]   [facility] [phone]   [facility] [phone]   [facility] [phone]   [facility] [phone]   [facility] [phone]   [facility] [phone]   [facility] [phone]   [facility] [phone]   [facility] [phone]   [facility] [phone]   [facility] [phone]   [facility] [phone]

## 2024-12-10 NOTE — CASE MANAGEMENT
Case Management Discharge Planning Note    Patient name Masoud VERGARA [facility]  Location /-01 MRN [id number]  : 1955 Date 12/10/2024       Current Admission Date: 2024  Current Admission Diagnosis:Diarrhea   Patient Active Problem List    Diagnosis Date Noted Date Diagnosed    Hypophosphatemia 2024     Severe protein-calorie malnutrition (HCC) 2024     Palliative care by specialist 2024     Diarrhea 2024     Epigastric pain 2024     Diarrhea of infectious origin 2024     Electrolyte imbalance risk 2024     ESRD (end stage renal disease) on dialysis ([facility]) 2024     Anemia due to chronic kidney disease, on chronic dialysis ([facility]) 2024     Personal history of gout 2024     Stasis ulcer ([facility]) 10/02/2024     Diabetic ulcer of right great toe (HCC) 2024     Hematoma 2024     Edema 2024     Goals of care, counseling/discussion 2024     Hyponatremia 2024     Cellulitis 2024     Moderate protein-calorie malnutrition (HCC) 2024     Ambulatory dysfunction 09/15/2024     Lactic acidosis 2024     Hypercalcemia 2024     Chronic acquired lymphedema 2024     Hemodialysis-associated hypotension 2024     Nocturnal hypoxia 2024     Elevated LFTs 2024     Patient on peritoneal dialysis (HCC) 2024     Hypervolemia 2024     Bacteremia 2024     Cellulitis of right lower extremity 2024     Paroxysmal atrial fibrillation (HCC) 2024     Bilateral lower extremity edema 2023     Ureteral stone with hydronephrosis 2022     Cutaneous abscess of buttock 2022     Chronic kidney disease-mineral and bone disorder 2022     Type 2 diabetes mellitus with stage 4 chronic kidney disease, with long-term current use of insulin (HCC) 07/15/2022     Obesity, morbid (HCC) 07/15/2022     Secondary hyperparathyroidism of renal origin (HCC) 07/15/2022      Stage 5 chronic kidney disease on peritoneal dialysis ([facility]) 07/15/2022     Factor 5 Leiden mutation, heterozygous ([facility]) 01/15/2022     Thrombocytopenia ([facility]) 01/14/2022     Angina at rest ([facility]) 01/14/2022     MI (myocardial infarction) ([facility]) 01/14/2022     History of PTCA 01/14/2022     Sleep apnea 01/14/2022     Smoking 01/14/2022     Pancytopenia ([facility]) 01/13/2022     History of CVA (cerebrovascular accident) 01/12/2022     Acute on chronic diastolic HF (heart failure) ([facility]) 01/12/2022     HLD (hyperlipidemia) 01/12/2022     Lymphedema 01/12/2022     Acquired hypothyroidism 01/12/2022     COPD (chronic obstructive pulmonary disease) ([facility]) 12/31/2018     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 ([facility]) 06/29/2016     Gastroesophageal reflux disease 06/29/2016     Gout 06/29/2016     Personal history of pulmonary embolism 06/29/2016     ED (erectile dysfunction) of organic origin 01/14/2014     History of thromboembolism of vein 01/10/2013       LOS (days): 20  Geometric Mean LOS (GMLOS) (days): 4.6  Days to GMLOS:-15.1     OBJECTIVE:  Risk of Unplanned Readmission Score: 48.26         Current admission status: Inpatient   Preferred Pharmacy:   [facility] - [place], [place] - [address]  [address]  [place] [address]  Phone: [phone] Fax: [phone]    [facility] [address] [place] [address]  [address] [place] [address]  Phone: [phone] Fax: [phone]    PATIENT/FAMILY REPORTS NO PREFERRED PHARMACY  No address on file      Primary Care Provider: Jignesh Baker DO    Primary Insurance: [employer] RE[employer]  Secondary Insurance:     DISCHARGE DETAILS:    Discharge planning discussed with:: Patient  Freedom of Choice: Yes  Comments - [place] of Choice: [place]     IMM Given (Date):: 12/10/24  IMM Given to:: Patient  Family notified:: Reviewed IMM with patient at bedside       CM met with Pt to inform him that we received authorization  for him to go to [place].    CM reviewed IMM with pt; No questions or concerns. Pt in agreement with rights, and is aware of the right to appeal d/c once medically stable if desired. IMM signed.     CM will continue to follow for DC planning needs

## 2024-12-10 NOTE — ASSESSMENT & PLAN NOTE
Hyponatremia: Doing well at 134 modest fluid restriction and HD.  The patient had hypokalemia on December 9 and this was replaced.  Recheck labs today on December 10.

## 2024-12-10 NOTE — ASSESSMENT & PLAN NOTE
Continue Tuesday Thursday Saturday hemodialysis sessions.  The patient is access is a right tunneled dialysis catheter.  The patient has lost body weight since being in the hospital.  His weight yesterday is 86.7 kg will make his new dry weight to be 86.5 kg for right now as his appetite improves this weight will need to be further adjusted.  Note that the patient also has a PD catheter and we need to continue gentamicin to the PD catheter site once daily.  Hemodialysis orders adjusted to reflect change in dry weight.

## 2024-12-10 NOTE — ASSESSMENT & PLAN NOTE
Patient is on phosphorus supplementation, look to discontinue when clinically feasible, as noted above, patient not a candidate for calcitriol and is currently on Sensipar.  Check phosphorus today.  Will follow-up on a.m. labs.

## 2024-12-10 NOTE — PROGRESS NOTES
Progress Note - Hospitalist   Name: Masoud Perry 69 y.o. male I MRN: [id number]  Unit/Bed#: -01 I Date of Admission: 11/20/2024   Date of Service: 12/10/2024 I Hospital Day: 20    Assessment & Plan  Diarrhea  Patient admitted having significant diarrhea-initially suspected infectious secondary to COVID versus C. difficile carrier status  Started on a course of oral vancomycin and completed-Per infectious disease do not feel that this is related to his diarrhea  Given lack of improvement in diarrhea-GI consulted  Patient continues to deny wanting to start any other medications-would not like a powder and does not feel that he can handle a large pill.  GI feels that Imodium would not be a good option  Questran helped when patient took it, but stating that it made him nauseous and he will not take it again  Willing to try a pill to help, since we do not carry colestid, will order creon  Diarrhea is improving   Having 2-3 loose BM a day   Acute on chronic diastolic HF (heart failure) (HCC)  Wt Readings from Last 3 Encounters:   12/10/24 86.7 kg (191 lb 2.2 oz)   10/14/24 115 kg (253 lb)   10/07/24 118 kg (260 lb)   CXR on 11/20 appeared to be pulmonary vascular congestion   Patient is a dialysis basis, received hemodialysis on TTS  Continue PTA torsemide  Appreciate continued nephrology follow-up  Monitor intake/output and daily weight  Acute exacerbation seems to have resolved-euvolemic and continue with dialysis  Goals of care, counseling/discussion  Patient and I had discussion today about his goals of care moving forward  He endorses that he has been feeling depressed and frustrated with his continued decline in health  He says that sometimes he wishes that he could just go home to pass away and be comfortable  He shares that he is not disagreeable for treatment but sometimes feels as though he is being tortured  discussed possibilities of psychiatry consult for depression/trying recommendations made by prior  psychiatrist for Remeron  At this time patient declines  Palliative care consulted  12/2 at this time declines any additional medications.  States that he is frustrated with continued hospitalization though is agreeable to this and agreeable to discharge to rehab.  Wants to continue treatment focused care without invasive measures  12/4 - patient discussed with hospice liaison, currently declining hospice and would like to continue dialysis but states that he still has a lot of decisions going on in his head.   12/5 d/w palliative and patient stating he wants to continue current treatment   12/6 family discussion with daughter, son, patient and . Patient willing to try marinol on 12/7. Would like to  continue with current treatment. Declining hospice at this time. See quick note from 12/6 for more details  Ongoing goals of care discussion  Factor 5 Leiden mutation, heterozygous (HCC)  Patient is maintained on Coumadin PTA  INR supratherapeutic at time of presentation and was placed on hold  INR trending upwards and dialysis was delayed for a few days so vitamin K was administered at 2.5 mg  Patient now with subtherapeutic INR  Continue to monitor while administering low-dose Coumadin   Also monitor platelets  Secondary hyperparathyroidism of renal origin (HCC)  Currently on 60 mg p.o. 3 times weekly  Hemodialysis-associated hypotension  Continue midodrine and pyridostigmine  Type 2 diabetes mellitus with stage 4 chronic kidney disease, with long-term current use of insulin (HCC)  Lab Results   Component Value Date    HGBA1C 5.0 11/02/2024       Recent Labs     12/09/24  0745 12/09/24  1123 12/09/24  1632 12/10/24  0753   POCGLU 88 108 79 80       Blood Sugar Average: Last 72 hrs:  (P) [id number]  Sliding scale insulin. Hypoglycemia protocol  Patient with recurrent hypoglycemia secondary to poor oral intake  Paroxysmal atrial fibrillation (HCC)  Patient did have rapid response secondary to chest  pain  Cardiology consulted-thought to be secondary to A-fib rather than any ACS  Can continue metoprolol if blood pressure tolerates -maintain on midodrine as well to aid in attention  Continue to trend INR with Coumadin   ESRD (end stage renal disease) on dialysis (HCC)  Lab Results   Component Value Date    EGFR 21 12/09/2024    EGFR 35 12/08/2024    EGFR 20 12/07/2024    CREATININE 2.77 (H) 12/09/2024    CREATININE 1.86 (H) 12/08/2024    CREATININE 2.91 (H) 12/07/2024   Resume typical regimen Tuesday Thursday Saturday  Nephrology following  Anemia due to chronic kidney disease, on chronic dialysis (HCC)  Patient with chronic anemia  Baseline hemoglobin around 8  During hospitalization patient's hemoglobin has been stable between 9 and 10  Monitor for signs of bleeding in setting of low platelets  Pancytopenia (HCC)  Multifactorial in origin, continue to monitor  Platelet count has been steadily decreasing with unclear etiology  Currently no signs of bleeding  Previously has supratherapeutic INR  Currently is subtherapeutic, continue to monitor INR and platelets and for any bleeding  Platelets improving, resume coumadin     Lab Results   Component Value Date    WBC 4.33 12/09/2024    RBC 2.60 (L) 12/09/2024    PLT 91 (L) 12/09/2024    PLT 89 (L) 12/08/2024     Electrolyte imbalance risk    Epigastric pain  Patient with epigastric pain   Was attempting to eat the last few days but did not eat much yesterday and having nausea     Will be trying prn zofran   Was offered Pepcid last week by GI - declined   He declined scopes at that time as well     Will continue to manage conservatively   Check obstruction series   GI will re-eval tomorrow   Palliative care by specialist    Hypophosphatemia    Severe protein-calorie malnutrition (HCC)  Malnutrition Findings:   Adult Malnutrition type: Acute illness  Adult Degree of Malnutrition: Other severe protein calorie malnutrition  Malnutrition Characteristics: Inadequate  energy, Weight loss                  360 Statement: Acute on chronic severe malnutrition related to refusal for po intake, poor appetite, condition as evidenced by 10.9% weight loss x 14 days (11/20 220lb, 12/3 196lb); < 50% estimated energy intake > 5 days. Treated with: Recommend continued GOC discussion. Continue with daily weights. Can consider oral supplements if pt willing to take po intake though refusing po intake. Consider appetite stimulant if medically appropriate/pt agreeable though pt previously refused. Consider EN feeds if within GOC.    BMI Findings:           Body mass index is 25.92 kg/m².   Agreeable to trying marinol 2.5mg bid for appetite stimulant   declined tube feeds or enteric feedings   Continue goals of care discussion  Appetite is improving     VTE Pharmacologic Prophylaxis: VTE Score: 6 High Risk (Score >/= 5) - Pharmacological DVT Prophylaxis Ordered: warfarin (Coumadin). Sequential Compression Devices Ordered.    Mobility:   Basic Mobility Inpatient Raw Score: 9  JH-HLM Goal: 3: Sit at edge of bed  JH-HLM Achieved: 2: Bed activities/Dependent transfer  JH-HLM Goal NOT achieved. Continue with multidisciplinary rounding and encourage appropriate mobility to improve upon JH-HLM goals.    Patient Centered Rounds: I performed bedside rounds with nursing staff today.   Discussions with Specialists or Other Care Team Provider: CM, GI, Neph     Education and Discussions with Family / Patient:  Attempted call to patient's son - line was disconnected after a few rings, will attempt later again.     Current Length of Stay: 20 day(s)  Current Patient Status: Inpatient   Certification Statement: The patient will continue to require additional inpatient hospital stay due to monitoring diarrhea and epigastric pain  Discharge Plan: Anticipate discharge in 24-48 hrs to rehab facility.    Code Status: Level 3 - DNAR and DNI    Subjective   Seen and examined.  Endorses frustration over continued symptoms  "of diarrhea and epigastric pain..  Discussed with him medication options for conservative management versus talking to GI about scopes he says he will \"think about it\".  Discussed with him last week medication options that were offered that he had declined and asked if he would like to resume that.  Appetite initially had improved, yesterday did not eat much because of nausea.     Objective :  Temp:  [97 °F (36.1 °C)] 97 °F (36.1 °C)  HR:  [71-77] 77  BP: ()/(56-58) 112/56  Resp:  [18] 18  SpO2:  [93 %-97 %] 93 %  O2 Device: None (Room air)    Body mass index is 25.92 kg/m².     Input and Output Summary (last 24 hours):     Intake/Output Summary (Last 24 hours) at 12/10/2024 1202  Last data filed at 12/10/2024 0900  Gross per 24 hour   Intake 360 ml   Output 250 ml   Net 110 ml       Physical Exam  Vitals and nursing note reviewed.   Constitutional:       General: He is not in acute distress.     Appearance: Normal appearance. He is ill-appearing.   HENT:      Head: Normocephalic and atraumatic.      Nose: No congestion.      Mouth/Throat:      Mouth: Mucous membranes are moist.      Pharynx: Oropharynx is clear.   Eyes:      Conjunctiva/sclera: Conjunctivae normal.   Cardiovascular:      Rate and Rhythm: Normal rate and regular rhythm.      Pulses: Normal pulses.   Pulmonary:      Effort: Pulmonary effort is normal. No respiratory distress.      Breath sounds: Normal breath sounds.   Abdominal:      General: Bowel sounds are normal.      Palpations: Abdomen is soft.      Tenderness: There is abdominal tenderness.   Musculoskeletal:         General: Swelling present. Normal range of motion.      Cervical back: Normal range of motion.      Right lower leg: No edema.      Left lower leg: No edema.   Skin:     General: Skin is warm and dry.   Neurological:      Mental Status: He is alert and oriented to person, place, and time.   Psychiatric:         Mood and Affect: Mood normal.         Behavior: Behavior " normal.           Lines/Drains:  Lines/Drains/Airways       Active Status       Name Placement date Placement time Site Days    HD Permanent Double Catheter 10/14/24  1420  Subclavian  56                            Lab Results: I have reviewed the following results:   Results from last 7 days   Lab Units 12/09/24  0618   WBC Thousand/uL 4.33   HEMOGLOBIN g/dL 8.3*   HEMATOCRIT % 26.8*   PLATELETS Thousands/uL 91*     Results from last 7 days   Lab Units 12/09/24  0618   SODIUM mmol/L 134*   POTASSIUM mmol/L 3.2*   CHLORIDE mmol/L 99   CO2 mmol/L 27   BUN mg/dL 13   CREATININE mg/dL 2.77*   ANION GAP mmol/L 8   CALCIUM mg/dL 8.6   ALBUMIN g/dL 2.3*   TOTAL BILIRUBIN mg/dL 0.36   ALK PHOS U/L 79   ALT U/L 6*   AST U/L 13   GLUCOSE RANDOM mg/dL 81     Results from last 7 days   Lab Units 12/09/24  0618   INR  1.51*     Results from last 7 days   Lab Units 12/10/24  0753 12/09/24  1632 12/09/24  1123 12/09/24  0745 12/08/24  2216 12/08/24  1616 12/08/24  1125 12/08/24  0738 12/07/24  2210 12/07/24  1655 12/07/24  1203 12/07/24  0829   POC GLUCOSE mg/dl 80 79 108 88 95 107 81 78 86 86 100 81               Recent Cultures (last 7 days):               Last 24 Hours Medication List:     Current Facility-Administered Medications:     acetaminophen (TYLENOL) tablet 650 mg, Q4H PRN    atorvastatin (LIPITOR) tablet 40 mg, Daily With Dinner    benzonatate (TESSALON PERLES) capsule 100 mg, TID PRN    calcium carbonate (TUMS) chewable tablet 500 mg, Daily PRN    cinacalcet (SENSIPAR) tablet 60 mg, Once per day on Monday Wednesday Friday    Diclofenac Sodium (VOLTAREN) 1 % topical gel 2 g, 4x Daily    dronabinol (MARINOL) capsule 2.5 mg, BID before lunch/dinner    gentamicin (GARAMYCIN) 0.1 % cream, Daily    guaiFENesin (MUCINEX) 12 hr tablet 600 mg, Q12H VASU    HYDROcodone-acetaminophen (NORCO) 5-325 mg per tablet 2 tablet, Q6H PRN    insulin lispro (HumALOG/ADMELOG) 100 units/mL subcutaneous injection 1-6 Units, TID AC **AND**  Fingerstick Glucose (POCT), TID AC    levothyroxine tablet 125 mcg, Early Morning    lidocaine (LIDODERM) 5 % patch 1 patch, Daily    metoprolol tartrate (LOPRESSOR) tablet 25 mg, Q12H VASU    midodrine (PROAMATINE) tablet 15 mg, TID AC    nystatin (MYCOSTATIN) powder, BID    ondansetron (ZOFRAN) injection 4 mg, Q6H PRN    pancrelipase (Lip-Prot-Amyl) (CREON) delayed release capsule 6,000 Units, TID With Meals    potassium chloride (Klor-Con M20) CR tablet 20 mEq, Once    pyridostigmine (MESTINON) tablet 60 mg, TID    simethicone (MYLICON) chewable tablet 80 mg, 4x Daily PRN    torsemide (DEMADEX) tablet 40 mg, Once per day on Sunday Monday Wednesday Friday    warfarin (COUMADIN) tablet 2 mg, HS    Administrative Statements   Today, Patient Was Seen By: Theresa Butt PA-C      **Please Note: This note may have been constructed using a voice recognition system.**

## 2024-12-10 NOTE — ASSESSMENT & PLAN NOTE
Continue with midodrine at current dose 15 mg 3 times daily, patient tolerating blood pressures well at this time.

## 2024-12-10 NOTE — NURSING NOTE
Patient nauseous and refusing medications at this time. Patient requesting to take medications once nausea subsides. Provider made aware.

## 2024-12-10 NOTE — PROGRESS NOTES
Marinol ordered BID. Intake improved, noting 75% x 1 meal, a couple meals x 25% and pt taking a few bites of meals now per nursing flowsheets. Pt previously refused ensure supplements, not liking taste of them. Will trial magic cup BID to increase kcal and PRO intake. Of note, pt refusing enteral tube feeds per PA note.

## 2024-12-10 NOTE — CASE MANAGEMENT
Case Management Discharge Planning Note    Patient name Masoud VERGARA [facility]  Location /-01 MRN [id number]  : 1955 Date 12/10/2024       Current Admission Date: 2024  Current Admission Diagnosis:Diarrhea   Patient Active Problem List    Diagnosis Date Noted Date Diagnosed    Hypophosphatemia 2024     Severe protein-calorie malnutrition (HCC) 2024     Palliative care by specialist 2024     Diarrhea 2024     Epigastric pain 2024     Diarrhea of infectious origin 2024     Electrolyte imbalance risk 2024     ESRD (end stage renal disease) on dialysis ([facility]) 2024     Anemia due to chronic kidney disease, on chronic dialysis ([facility]) 2024     Personal history of gout 2024     Stasis ulcer ([facility]) 10/02/2024     Diabetic ulcer of right great toe (HCC) 2024     Hematoma 2024     Edema 2024     Goals of care, counseling/discussion 2024     Hyponatremia 2024     Cellulitis 2024     Moderate protein-calorie malnutrition (HCC) 2024     Ambulatory dysfunction 09/15/2024     Lactic acidosis 2024     Hypercalcemia 2024     Chronic acquired lymphedema 2024     Hemodialysis-associated hypotension 2024     Nocturnal hypoxia 2024     Elevated LFTs 2024     Patient on peritoneal dialysis (HCC) 2024     Hypervolemia 2024     Bacteremia 2024     Cellulitis of right lower extremity 2024     Paroxysmal atrial fibrillation (HCC) 2024     Bilateral lower extremity edema 2023     Ureteral stone with hydronephrosis 2022     Cutaneous abscess of buttock 2022     Chronic kidney disease-mineral and bone disorder 2022     Type 2 diabetes mellitus with stage 4 chronic kidney disease, with long-term current use of insulin (HCC) 07/15/2022     Obesity, morbid (HCC) 07/15/2022     Secondary hyperparathyroidism of renal origin (HCC) 07/15/2022      Stage 5 chronic kidney disease on peritoneal dialysis ([facility]) 07/15/2022     Factor 5 Leiden mutation, heterozygous ([facility]) 01/15/2022     Thrombocytopenia ([facility]) 01/14/2022     Angina at rest ([facility]) 01/14/2022     MI (myocardial infarction) ([facility]) 01/14/2022     History of PTCA 01/14/2022     Sleep apnea 01/14/2022     Smoking 01/14/2022     Pancytopenia ([facility]) 01/13/2022     History of CVA (cerebrovascular accident) 01/12/2022     Acute on chronic diastolic HF (heart failure) ([facility]) 01/12/2022     HLD (hyperlipidemia) 01/12/2022     Lymphedema 01/12/2022     Acquired hypothyroidism 01/12/2022     COPD (chronic obstructive pulmonary disease) ([facility]) 12/31/2018     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 ([facility]) 06/29/2016     Gastroesophageal reflux disease 06/29/2016     Gout 06/29/2016     Personal history of pulmonary embolism 06/29/2016     ED (erectile dysfunction) of organic origin 01/14/2014     History of thromboembolism of vein 01/10/2013       LOS (days): 20  Geometric Mean LOS (GMLOS) (days): 4.6  Days to GMLOS:-14.8     OBJECTIVE:  Risk of Unplanned Readmission Score: 48.5         Current admission status: Inpatient   Preferred Pharmacy:   [facility] - [place], [place] - [address]  [address]  [place] [address]  Phone: [phone] Fax: [phone]    [place], [place] - [address] [place] [address]  [address] [address]  Phone: [phone] Fax: [phone]    PATIENT/FAMILY REPORTS NO PREFERRED PHARMACY  No address on file      Primary Care Provider: Jignesh Baker DO    Primary Insurance: [employer]  Secondary Insurance:     DISCHARGE DETAILS:                                                                                                               Facility Insurance Auth Number: [id number]

## 2024-12-10 NOTE — PROGRESS NOTES
NEPHROLOGY HOSPITAL PROGRESS NOTE   Masoud Perry 69 y.o. male MRN: [id number]  Unit/Bed#: -01 Encounter: [id number]  Reason for Consult: End-stage renal disease on hemodialysis        Assessment & Plan  ESRD (end stage renal disease) on dialysis ([facility])  Continue Tuesday Thursday Saturday hemodialysis sessions.  The patient is access is a right tunneled dialysis catheter.  The patient has lost body weight since being in the hospital.  His weight yesterday is 86.7 kg will make his new dry weight to be 86.5 kg for right now as his appetite improves this weight will need to be further adjusted.  Note that the patient also has a PD catheter and we need to continue gentamicin to the PD catheter site once daily.  Hemodialysis orders adjusted to reflect change in dry weight.    Hemodialysis-associated hypotension  Continue with midodrine at current dose 15 mg 3 times daily, patient tolerating blood pressures well at this time.  Acute on chronic diastolic HF (heart failure) ([facility])  Target weight to be 86.  5 kg as noted above.  Anemia due to chronic kidney disease, on chronic dialysis ([facility])  Continue to monitor hemoglobin, patient has been off of JUAN FRANCISCO due to COVID infection last month.  Will likely be able to reinitiate long-acting JUAN FRANCISCO at presentation to his outpatient clinic.  Hemoglobin levels were 8.3 on December 9 recheck labs today.  Recheck iron studies as last ones had been checked on November 22 and ferritin was 470.  Certainly iron studies and ferritin level will also be affected by inflammatory states such as COVID as well.    Secondary hyperparathyroidism of renal origin ([facility])  Continue Sensipar, patient not a candidate for an activated vitamin D agent due to propensity towards hypercalcemia.  Caring calcium is 8.6 with an albumin of 2.3 and corrected calcium is 10 from December 9.  Recheck phosphorus today as last levels were 2.0 in December 5 especially in the setting of a decreased  appetite.      Pancytopenia (HCC)  Stable, continue to monitor for now.  Paroxysmal atrial fibrillation (HCC)  Patient under rate control with metoprolol    Electrolyte imbalance risk  Hyponatremia: Doing well at 134 modest fluid restriction and HD.  The patient had hypokalemia on December 9 and this was replaced.  Recheck labs today on December 10.    Hypophosphatemia  Patient is on phosphorus supplementation, look to discontinue when clinically feasible, as noted above, patient not a candidate for calcitriol and is currently on Sensipar.  Check phosphorus today.  Will follow-up on a.m. labs.    Goals of care, counseling/discussion  - On 12/3/2024: Patient asked nephrology  about what would happen if dialysis would be stopped, discussed about risk of fluid overload electrolyte imbalance and ultimately death in 2 to 3 weeks.  Discussed that he may have to consider hospice if plan to stop dialysis. He did not want to stop at this time after discussion..  Ongoing discussions the patient is appropriate with regards to patient's goals of care.    Diarrhea  This is slowly mproving, continue with supportive care.  Severe protein-calorie malnutrition (HCC)  Patient agreeable to initiate Marinol 2.5 mg twice daily, continue to encourage intake of protein.  To be followed by dietary and primary service      VIRTUAL CARE DOCUMENTATION:     1. This service was provided via Telemedicine using [employer]     2. Parties in the room with patient during teleconsult Patient only    3. Confidentiality My office door was closed     4. Participants No one else was in the room    5. Patient acknowledged consent and understanding of privacy and security of the  Telemedicine consult. I informed the patient that I have reviewed their record in Epic and presented the opportunity for them to ask any questions regarding the visit today.  The patient agreed to participate.    6. Time spent including interview with the patient, review of the  patient's record, documentation of medical plan of care, documentation of medical decision making, total time approximately 25 minutes.            SUBJECTIVE / 24H INTERVAL HISTORY:  Mr. Perry is seen in follow-up for hemodialysis.  He denies any chest pressure or shortness of breath.  With regards to diarrhea, he has had 2 episodes yesterday and 1 overnight.  He still notes weakness.  He denies any chest pressure or shortness of breath.  He is due for hemodialysis today.  Systolic blood pressure on December 9 has been anywhere from 105-128 systolic and pulse oximetry is 95% on room air patient is afebrile.  Last weight is 86.7 kg on December 10.  When he had hemodialysis on December 7 this post weight was 87.1 kg.    OBJECTIVE:  Current Weight: Weight - Scale: 86.7 kg (191 lb 2.2 oz)  Vitals:    12/09/24 1923 12/09/24 2135 12/09/24 2137 12/10/24 0557   BP:  95/56     Pulse:   74    Resp:       Temp:       TempSrc:       SpO2: 95%      Weight:    86.7 kg (191 lb 2.2 oz)   Height:           Intake/Output Summary (Last 24 hours) at 12/10/2024 0705  Last data filed at 12/10/2024 0645  Gross per 24 hour   Intake 390 ml   Output 250 ml   Net 140 ml     General: Patient is lying flat no acute distress  HEENT: Normocephalic atraumatic  Neck: Supple  Pulmonary: No accessory muscle use; no increased work of breathing.  There is no conversational dyspnea.  Cardiac: Patient's pulse has been in the 60s to 70s.  Extremities: There is venous stasis changes noted no severe lower extremity edema noted.  Neurologic: No focal deficits noted the patient is alert and oriented x 3.  Medications:    Current Facility-Administered Medications:     acetaminophen (TYLENOL) tablet 650 mg, 650 mg, Oral, Q4H PRN, Chao Rios PA-C    atorvastatin (LIPITOR) tablet 40 mg, 40 mg, Oral, Daily With Dinner, Chao Rios PA-C, 40 mg at 12/09/24 1732    benzonatate (TESSALON PERLES) capsule 100 mg, 100 mg, Oral, TID PRN, Miriam Tee MD, 100  mg at 11/29/24 1352    calcium carbonate (TUMS) chewable tablet 500 mg, 500 mg, Oral, Daily PRN, Moses Noel MD    cinacalcet (SENSIPAR) tablet 60 mg, 60 mg, Oral, Once per day on Monday Wednesday Friday, Jignesh Hussein MD, 60 mg at 12/09/24 0802    Diclofenac Sodium (VOLTAREN) 1 % topical gel 2 g, 2 g, Topical, 4x Daily, Kenn Vazquez MD, 2 g at 12/08/24 1700    dronabinol (MARINOL) capsule 2.5 mg, 2.5 mg, Oral, BID before lunch/dinner, Marquise Santos DO, 2.5 mg at 12/09/24 1737    gentamicin (GARAMYCIN) 0.1 % cream, , Topical, Daily, Jesus Christine MD, Given at 12/09/24 0802    guaiFENesin (MUCINEX) 12 hr tablet 600 mg, 600 mg, Oral, Q12H VASU, Miriam Tee MD, 600 mg at 12/09/24 2135    HYDROcodone-acetaminophen (NORCO) 5-325 mg per tablet 2 tablet, 2 tablet, Oral, Q6H PRN, Chao Rios PA-C, 2 tablet at 12/10/24 0642    insulin lispro (HumALOG/ADMELOG) 100 units/mL subcutaneous injection 1-6 Units, 1-6 Units, Subcutaneous, TID AC, 1 Units at 11/26/24 1236 **AND** Fingerstick Glucose (POCT), , , TID AC, Chao Rios PA-C    levothyroxine tablet 125 mcg, 125 mcg, Oral, Early Morning, Chao Rios PA-C, 125 mcg at 12/10/24 0558    lidocaine (LIDODERM) 5 % patch 1 patch, 1 patch, Topical, Daily, Kenn Vazquez MD, 1 patch at 12/09/24 2136    metoprolol tartrate (LOPRESSOR) tablet 25 mg, 25 mg, Oral, Q12H VASU, Moses Noel MD, 25 mg at 12/09/24 0802    midodrine (PROAMATINE) tablet 15 mg, 15 mg, Oral, TID AC, Citlali S Trudy, CRNP, 15 mg at 12/10/24 0600    nystatin (MYCOSTATIN) powder, , Topical, BID, Theresa Butt PA-C, Given at 12/09/24 1838    ondansetron (ZOFRAN) injection 4 mg, 4 mg, Intravenous, Q6H PRN, Chao Rios PA-C, 4 mg at 12/09/24 1714    pancrelipase (Lip-Prot-Amyl) (CREON) delayed release capsule 6,000 Units, 6,000 Units, Oral, TID With Meals, Ofelia Salmeron PA-C, 6,000 Units at 12/09/24 1732    potassium chloride (Klor-Con M20) CR tablet 20 mEq, 20 mEq,  "Oral, Once, Jignesh Hussein MD    pyridostigmine (MESTINON) tablet 60 mg, 60 mg, Oral, TID, Chao Rios PA-C, 60 mg at 12/09/24 2135    simethicone (MYLICON) chewable tablet 80 mg, 80 mg, Oral, 4x Daily PRN, Chao Rios PA-C    torsemide (DEMADEX) tablet 40 mg, 40 mg, Oral, Once per day on Sunday Monday Wednesday Friday, Marquise Santos DO, 40 mg at 12/09/24 0802    warfarin (COUMADIN) tablet 2 mg, 2 mg, Oral, HS, Ofelia Salmeron PA-C, 2 mg at 12/09/24 2135    Laboratory Results:  Results from last 7 days   Lab Units 12/09/24  0618 12/08/24  1150 12/08/24  0501 12/07/24  0819 12/06/24  0621 12/05/24  0639 12/04/24  0608   WBC Thousand/uL 4.33 4.71  --  5.88 5.61 7.53 6.31   HEMOGLOBIN g/dL 8.3* 8.2*  --  8.2* 8.7* 9.2* 8.5*   HEMATOCRIT % 26.8* 26.2*  --  25.9* 27.8* 29.2* 26.7*   PLATELETS Thousands/uL 91* 89*  --  104* 74* 94* 62*   POTASSIUM mmol/L 3.2*  --  3.7 3.4* 3.5 3.4* 3.4*   CHLORIDE mmol/L 99  --  98 98 98 100 100   CO2 mmol/L 27  --  30 27 30 29 30   BUN mg/dL 13  --  7 15 11 14 11   CREATININE mg/dL 2.77*  --  1.86* 2.91* 2.41* 2.66* 1.93*   CALCIUM mg/dL 8.6  --  8.0* 8.5 8.0* 8.0* 7.8*   MAGNESIUM mg/dL  --   --   --   --   --   --  2.1   PHOSPHORUS mg/dL  --   --   --   --   --  2.0* 1.6*       Portions of the record may have been created with voice recognition software. Occasional wrong word or \"sound a like\" substitutions may have occurred due to the inherent limitations of voice recognition software. Read the chart carefully and recognize, using context, where substitutions have occurred.If you have any questions, please contact the dictating provider.   "

## 2024-12-11 VITALS
SYSTOLIC BLOOD PRESSURE: 103 MMHG | HEART RATE: 75 BPM | HEIGHT: 72 IN | DIASTOLIC BLOOD PRESSURE: 59 MMHG | BODY MASS INDEX: 25.98 KG/M2 | OXYGEN SATURATION: 97 % | RESPIRATION RATE: 18 BRPM | WEIGHT: 191.8 LBS | TEMPERATURE: 97.3 F

## 2024-12-11 PROBLEM — E83.39 HYPOPHOSPHATEMIA: Status: RESOLVED | Noted: 2024-12-04 | Resolved: 2024-12-11

## 2024-12-11 PROBLEM — Z99.2 ESRD ON DIALYSIS (HCC): Status: ACTIVE | Noted: 2024-12-11

## 2024-12-11 PROBLEM — N25.81 SECONDARY HYPERPARATHYROIDISM OF RENAL ORIGIN (HCC): Status: RESOLVED | Noted: 2022-07-15 | Resolved: 2024-12-11

## 2024-12-11 PROBLEM — N18.6 ESRD ON DIALYSIS (HCC): Status: ACTIVE | Noted: 2024-12-11

## 2024-12-11 LAB
ANION GAP SERPL CALCULATED.3IONS-SCNC: 6 MMOL/L (ref 4–13)
BUN SERPL-MCNC: 7 MG/DL (ref 5–25)
CALCIUM SERPL-MCNC: 8 MG/DL (ref 8.4–10.2)
CHLORIDE SERPL-SCNC: 97 MMOL/L (ref 96–108)
CO2 SERPL-SCNC: 31 MMOL/L (ref 21–32)
CREAT SERPL-MCNC: 2.1 MG/DL (ref 0.6–1.3)
ERYTHROCYTE [DISTWIDTH] IN BLOOD BY AUTOMATED COUNT: 14.6 % (ref 11.6–15.1)
GFR SERPL CREATININE-BSD FRML MDRD: 30 ML/MIN/1.73SQ M
GLUCOSE SERPL-MCNC: 152 MG/DL (ref 65–140)
GLUCOSE SERPL-MCNC: 69 MG/DL (ref 65–140)
GLUCOSE SERPL-MCNC: 77 MG/DL (ref 65–140)
HCT VFR BLD AUTO: 25.9 % (ref 36.5–49.3)
HGB BLD-MCNC: 8.2 G/DL (ref 12–17)
INR PPP: 2.53 (ref 0.85–1.19)
MCH RBC QN AUTO: 32.7 PG (ref 26.8–34.3)
MCHC RBC AUTO-ENTMCNC: 31.7 G/DL (ref 31.4–37.4)
MCV RBC AUTO: 103 FL (ref 82–98)
PLATELET # BLD AUTO: 85 THOUSANDS/UL (ref 149–390)
PMV BLD AUTO: 9.8 FL (ref 8.9–12.7)
POTASSIUM SERPL-SCNC: 3.8 MMOL/L (ref 3.5–5.3)
PROTHROMBIN TIME: 27.4 SECONDS (ref 12.3–15)
RBC # BLD AUTO: 2.51 MILLION/UL (ref 3.88–5.62)
SODIUM SERPL-SCNC: 134 MMOL/L (ref 135–147)
WBC # BLD AUTO: 3.54 THOUSAND/UL (ref 4.31–10.16)

## 2024-12-11 PROCEDURE — 82948 REAGENT STRIP/BLOOD GLUCOSE: CPT

## 2024-12-11 PROCEDURE — 99232 SBSQ HOSP IP/OBS MODERATE 35: CPT | Performed by: INTERNAL MEDICINE

## 2024-12-11 PROCEDURE — 80048 BASIC METABOLIC PNL TOTAL CA: CPT

## 2024-12-11 PROCEDURE — 85027 COMPLETE CBC AUTOMATED: CPT

## 2024-12-11 PROCEDURE — 85610 PROTHROMBIN TIME: CPT

## 2024-12-11 PROCEDURE — 99239 HOSP IP/OBS DSCHRG MGMT >30: CPT | Performed by: FAMILY MEDICINE

## 2024-12-11 RX ORDER — GENTAMICIN SULFATE 1 MG/G
CREAM TOPICAL DAILY
Start: 2024-12-12

## 2024-12-11 RX ORDER — BENZONATATE 100 MG/1
100 CAPSULE ORAL 3 TIMES DAILY PRN
Start: 2024-12-11

## 2024-12-11 RX ORDER — GUAIFENESIN 600 MG/1
600 TABLET, EXTENDED RELEASE ORAL EVERY 12 HOURS SCHEDULED
Start: 2024-12-11

## 2024-12-11 RX ORDER — NYSTATIN 100000 [USP'U]/G
POWDER TOPICAL 2 TIMES DAILY
Start: 2024-12-11

## 2024-12-11 RX ORDER — TORSEMIDE 20 MG/1
40 TABLET ORAL
Start: 2024-12-13

## 2024-12-11 RX ORDER — HYDROCODONE BITARTRATE AND ACETAMINOPHEN 5; 325 MG/1; MG/1
2 TABLET ORAL EVERY 6 HOURS PRN
Qty: 20 TABLET
Start: 2024-12-11 | End: 2024-12-21

## 2024-12-11 RX ORDER — DRONABINOL 2.5 MG/1
2.5 CAPSULE ORAL
Start: 2024-12-11 | End: 2024-12-21

## 2024-12-11 RX ORDER — MIDODRINE HYDROCHLORIDE 5 MG/1
15 TABLET ORAL
Start: 2024-12-11

## 2024-12-11 RX ORDER — CALCIUM CARBONATE 500 MG/1
500 TABLET, CHEWABLE ORAL DAILY PRN
Start: 2024-12-11

## 2024-12-11 RX ORDER — NICOTINE 21 MG/24HR
21 PATCH, TRANSDERMAL 24 HOURS TRANSDERMAL DAILY
Status: DISCONTINUED | OUTPATIENT
Start: 2024-12-11 | End: 2024-12-11 | Stop reason: HOSPADM

## 2024-12-11 RX ORDER — SACCHAROMYCES BOULARDII 250 MG
250 CAPSULE ORAL 2 TIMES DAILY
Start: 2024-12-11 | End: 2024-12-16

## 2024-12-11 RX ORDER — SACCHAROMYCES BOULARDII 250 MG
250 CAPSULE ORAL 2 TIMES DAILY
Status: DISCONTINUED | OUTPATIENT
Start: 2024-12-11 | End: 2024-12-11 | Stop reason: HOSPADM

## 2024-12-11 RX ORDER — NICOTINE 21 MG/24HR
1 PATCH, TRANSDERMAL 24 HOURS TRANSDERMAL DAILY
Start: 2024-12-11

## 2024-12-11 RX ORDER — LIDOCAINE 50 MG/G
1 PATCH TOPICAL DAILY
Start: 2024-12-11

## 2024-12-11 RX ADMIN — PANCRELIPASE 6000 UNITS: 30000; 6000; 19000 CAPSULE, DELAYED RELEASE PELLETS ORAL at 12:19

## 2024-12-11 RX ADMIN — METOPROLOL TARTRATE 25 MG: 25 TABLET, FILM COATED ORAL at 08:02

## 2024-12-11 RX ADMIN — PYRIDOSTIGMINE BROMIDE 60 MG: 60 TABLET ORAL at 08:02

## 2024-12-11 RX ADMIN — GUAIFENESIN 600 MG: 600 TABLET ORAL at 08:02

## 2024-12-11 RX ADMIN — MIDODRINE HYDROCHLORIDE 15 MG: 5 TABLET ORAL at 12:19

## 2024-12-11 RX ADMIN — PANCRELIPASE 6000 UNITS: 30000; 6000; 19000 CAPSULE, DELAYED RELEASE PELLETS ORAL at 08:02

## 2024-12-11 RX ADMIN — NYSTATIN: 100000 POWDER TOPICAL at 08:01

## 2024-12-11 RX ADMIN — DRONABINOL 2.5 MG: 2.5 CAPSULE ORAL at 12:19

## 2024-12-11 RX ADMIN — CINACALCET 60 MG: 30 TABLET, FILM COATED ORAL at 08:02

## 2024-12-11 RX ADMIN — HYDROCODONE BITARTRATE AND ACETAMINOPHEN 2 TABLET: 5; 325 TABLET ORAL at 00:53

## 2024-12-11 RX ADMIN — HYDROCODONE BITARTRATE AND ACETAMINOPHEN 2 TABLET: 5; 325 TABLET ORAL at 08:06

## 2024-12-11 RX ADMIN — MIDODRINE HYDROCHLORIDE 15 MG: 5 TABLET ORAL at 06:14

## 2024-12-11 RX ADMIN — Medication 250 MG: at 12:19

## 2024-12-11 RX ADMIN — LEVOTHYROXINE SODIUM 125 MCG: 125 TABLET ORAL at 06:14

## 2024-12-11 NOTE — ASSESSMENT & PLAN NOTE
Wt Readings from Last 3 Encounters:   12/11/24 87 kg (191 lb 12.8 oz)   10/14/24 115 kg (253 lb)   10/07/24 118 kg (260 lb)   CXR on 11/20 appeared to be pulmonary vascular congestion   Patient is a dialysis basis, received hemodialysis on TTS  Continue PTA torsemide  Appreciate continued nephrology follow-up upon discharge  Monitor intake/output and daily weight in the outpatient setting  Acute exacerbation seems to have resolved-euvolemic and continue with dialysis

## 2024-12-11 NOTE — ASSESSMENT & PLAN NOTE
Multifactorial in origin, continue to monitor  Platelet count has been steadily decreasing with unclear etiology  Currently no signs of bleeding  Previously has supratherapeutic INR  continue to monitor INR and platelets and for any bleeding in the outpatient setting and upon discharge  Platelets improving, resume coumadin     Lab Results   Component Value Date    WBC 3.54 (L) 12/11/2024    RBC 2.51 (L) 12/11/2024    PLT 85 (L) 12/11/2024     (L) 12/10/2024

## 2024-12-11 NOTE — ASSESSMENT & PLAN NOTE
Patient did have rapid response secondary to chest pain over the course of hospitalization  Cardiology consulted-thought to be secondary to A-fib rather than any ACS  Can continue metoprolol on discharge if blood pressure tolerates -maintain on midodrine as well to aid in attention  Continue to trend INR with Coumadin in the outpatient setting and upon discharge at SNF

## 2024-12-11 NOTE — ASSESSMENT & PLAN NOTE
Lab Results   Component Value Date    EGFR 30 12/11/2024    EGFR 16 12/10/2024    EGFR 21 12/09/2024    CREATININE 2.10 (H) 12/11/2024    CREATININE 3.44 (H) 12/10/2024    CREATININE 2.77 (H) 12/09/2024   Resume typical regimen Tuesday Thursday Saturday  Nephrology following

## 2024-12-11 NOTE — ASSESSMENT & PLAN NOTE
Significantly improved over the course of hospitalization  Patient with epigastric pain on presentation  Was offered Pepcid last week by GI - declined   He declined scopes at that time as well     Will continue to manage conservatively   obstruction series prior to discharge obtained yesterday (12/10) negative for obstruction    Ambulatory referral to gastroenterology provided

## 2024-12-11 NOTE — ASSESSMENT & PLAN NOTE
Lab Results   Component Value Date    EGFR 30 12/11/2024    EGFR 16 12/10/2024    EGFR 21 12/09/2024    CREATININE 2.10 (H) 12/11/2024    CREATININE 3.44 (H) 12/10/2024    CREATININE 2.77 (H) 12/09/2024

## 2024-12-11 NOTE — ASSESSMENT & PLAN NOTE
Malnutrition Findings:   Adult Malnutrition type: Acute illness  Adult Degree of Malnutrition: Other severe protein calorie malnutrition  Malnutrition Characteristics: Inadequate energy, Weight loss                  360 Statement: Acute on chronic severe malnutrition related to refusal for po intake, poor appetite, condition as evidenced by 10.9% weight loss x 14 days (11/20 220lb, 12/3 196lb); < 50% estimated energy intake > 5 days. Treated with: Recommend continued GOC discussion. Continue with daily weights. Can consider oral supplements if pt willing to take po intake though refusing po intake. Consider appetite stimulant if medically appropriate/pt agreeable though pt previously refused. Consider EN feeds if within GOC.    BMI Findings:           Body mass index is 26.01 kg/m².     Continue to encourage use of nutritional supplements in the outpatient setting and upon discharge to SNF

## 2024-12-11 NOTE — ASSESSMENT & PLAN NOTE
Patient admitted having significant diarrhea-initially suspected infectious secondary to COVID versus C. difficile carrier status  Started on a course of oral vancomycin and completed-Per infectious disease do not feel that this is related to his diarrhea  Given lack of improvement in diarrhea-GI consulted  Patient continues to deny wanting to start any other medications-would not like a powder and does not feel that he can handle a large pill.  GI feels that Imodium would not be a good option  Questran helped when patient took it, but stating that it made him nauseous and he will not take it again  Initiated on Creon-continue upon discharge  Diarrhea is improving   Having 2-3 loose BM a day   Now appears euvolemic

## 2024-12-11 NOTE — ASSESSMENT & PLAN NOTE
Patient was maintained on Coumadin PTA  INR supratherapeutic at time of presentation and was placed on hold  INR trending upwards and dialysis was delayed for a few days so vitamin K was administered at 2.5 mg over the course of hospitalization  INR now therapeutic and remains on Coumadin  Will require daily PT/INR-Coumadin monitoring in the outpatient setting and upon discharge to SNF

## 2024-12-11 NOTE — PROGRESS NOTES
NEPHROLOGY PROGRESS NOTE    Masoud Perry 69 y.o. male MRN: [id number]  Unit/Bed#: -01 Encounter: [id number]  Reason for Consult: End-stage renal disease    The patient is awake and alert he looks frustrated and when I spoke to him he says he is feeling worried because he is having loose stool and he is worried that when he goes to rehab he will be able to take care of himself and keep clean.  Otherwise tolerated dialysis and has no shortness of breath no confusion and no fever.    ASSESSMENT/PLAN:    1.  Renal    The patient has end-stage renal disease presently is on intermittent hemodialysis .  The patient has a PD catheter undergoing local care but presently not on peritoneal dialysis.  This will be worked out with his primary nephrology team after discharge.    Labs done today and reviewed potassium 3.8 and hemoglobin 8.2.    Hemodialysis   On midodrine for hemodynamic support  JUAN FRANCISCO was held await resumption of medication  Medications for secondary hyperparathyroidism    2.  Diarrhea    The patient is being managed by primary service is also had ID and GI involved.    Would consider trying a probiotic as well as recent antibiotics.        SUBJECTIVE:  Review of Systems   Constitutional: Positive for malaise/fatigue. Negative for chills, diaphoresis and fever.   HENT: Negative.     Eyes: Negative.    Cardiovascular:  Negative for chest pain and orthopnea.   Respiratory:  Negative for cough and shortness of breath.    Gastrointestinal:  Positive for diarrhea. Negative for abdominal pain, nausea and vomiting.   Neurological:  Positive for weakness. Negative for dizziness and headaches.   Psychiatric/Behavioral:  Negative for altered mental status.        OBJECTIVE:  Current Weight: Weight - Scale: 87 kg (191 lb 12.8 oz)  Vitals:Temp (24hrs), Av.4 °F (36.3 °C), Min:97.3 °F (36.3 °C), Max:97.5 °F (36.4 °C)  Current: Temperature: (!) 97.3 °F (36.3 °C)   Blood  pressure 103/59, pulse 75, temperature (!) 97.3 °F (36.3 °C), resp. rate 18, height 6' (1.829 m), weight 87 kg (191 lb 12.8 oz), SpO2 97%. , Body mass index is 26.01 kg/m².      Intake/Output Summary (Last 24 hours) at 12/11/2024 1055  Last data filed at 12/11/2024 0045  Gross per 24 hour   Intake 1010 ml   Output 2054 ml   Net -1044 ml       Physical Exam: /59   Pulse 75   Temp (!) 97.3 °F (36.3 °C)   Resp 18   Ht 6' (1.829 m)   Wt 87 kg (191 lb 12.8 oz)   SpO2 97%   BMI 26.01 kg/m²   Physical Exam  Constitutional:       General: He is not in acute distress.     Appearance: He is not toxic-appearing.   HENT:      Head: Normocephalic and atraumatic.      Nose: Nose normal.      Mouth/Throat:      Mouth: Mucous membranes are dry.   Eyes:      General: No scleral icterus.  Cardiovascular:      Rate and Rhythm: Normal rate and regular rhythm.      Heart sounds:      No friction rub. No gallop.   Pulmonary:      Effort: Pulmonary effort is normal. No respiratory distress.      Breath sounds: No wheezing or rales.   Abdominal:      General: Bowel sounds are normal. There is no distension.      Palpations: Abdomen is soft.      Tenderness: There is no abdominal tenderness.   Neurological:      Mental Status: He is alert and oriented to person, place, and time.         Medications:    Current Facility-Administered Medications:     acetaminophen (TYLENOL) tablet 650 mg, 650 mg, Oral, Q4H PRN, Chao Rios PA-C    atorvastatin (LIPITOR) tablet 40 mg, 40 mg, Oral, Daily With Dinner, Chao Rios PA-C, 40 mg at 12/10/24 1839    benzonatate (TESSALON PERLES) capsule 100 mg, 100 mg, Oral, TID PRN, Miriam Tee MD, 100 mg at 11/29/24 1352    calcium carbonate (TUMS) chewable tablet 500 mg, 500 mg, Oral, Daily PRN, Moses Noel MD    cinacalcet (SENSIPAR) tablet 60 mg, 60 mg, Oral, Once per day on Monday Wednesday Friday, Jignesh Hussein MD, 60 mg at 12/11/24 0802    Diclofenac Sodium (VOLTAREN) 1 %  topical gel 2 g, 2 g, Topical, 4x Daily, Kenn Vazquez MD, 2 g at 12/08/24 1700    dronabinol (MARINOL) capsule 2.5 mg, 2.5 mg, Oral, BID before lunch/dinner, Marquise Santos DO, 2.5 mg at 12/10/24 1839    gentamicin (GARAMYCIN) 0.1 % cream, , Topical, Daily, Jesus Christine MD, 1 Application at 12/10/24 0900    guaiFENesin (MUCINEX) 12 hr tablet 600 mg, 600 mg, Oral, Q12H VASU, Miriam Tee MD, 600 mg at 12/11/24 0802    HYDROcodone-acetaminophen (NORCO) 5-325 mg per tablet 2 tablet, 2 tablet, Oral, Q6H PRN, Chao Rios PA-C, 2 tablet at 12/11/24 0806    insulin lispro (HumALOG/ADMELOG) 100 units/mL subcutaneous injection 1-6 Units, 1-6 Units, Subcutaneous, TID AC, 1 Units at 11/26/24 1236 **AND** Fingerstick Glucose (POCT), , , TID AC, Chao Rios PA-C    levothyroxine tablet 125 mcg, 125 mcg, Oral, Early Morning, Chao Rios PA-C, 125 mcg at 12/11/24 0614    lidocaine (LIDODERM) 5 % patch 1 patch, 1 patch, Topical, Daily, Kenn Vazquez MD, 1 patch at 12/10/24 2203    metoprolol tartrate (LOPRESSOR) tablet 25 mg, 25 mg, Oral, Q12H VASU, Moses Noel MD, 25 mg at 12/11/24 0802    midodrine (PROAMATINE) tablet 15 mg, 15 mg, Oral, TID AC, Citlali Yates, JERRINP, 15 mg at 12/11/24 0614    nystatin (MYCOSTATIN) powder, , Topical, BID, Theresa Butt PA-C, Given at 12/11/24 0801    ondansetron (ZOFRAN) injection 4 mg, 4 mg, Intravenous, Q6H PRN, Chao Rios PA-C, 4 mg at 12/10/24 0938    pancrelipase (Lip-Prot-Amyl) (CREON) delayed release capsule 6,000 Units, 6,000 Units, Oral, TID With Meals, Ofelia Salmeron PA-C, 6,000 Units at 12/11/24 0802    pyridostigmine (MESTINON) tablet 60 mg, 60 mg, Oral, TID, Chao Rios PA-C, 60 mg at 12/11/24 0802    simethicone (MYLICON) chewable tablet 80 mg, 80 mg, Oral, 4x Daily PRN, Chao Rios PA-C    torsemide (DEMADEX) tablet 40 mg, 40 mg, Oral, Once per day on Sunday Monday Wednesday Friday, Marquise Santos DO, 40 mg at 12/09/24  "0802    warfarin (COUMADIN) tablet 1 mg, 1 mg, Oral, HS, Theresa Butt PA-C, 1 mg at 12/10/24 2203    Laboratory Results:  Lab Results   Component Value Date    WBC 3.54 (L) 12/11/2024    HGB 8.2 (L) 12/11/2024    HCT 25.9 (L) 12/11/2024     (H) 12/11/2024    PLT 85 (L) 12/11/2024     Lab Results   Component Value Date    SODIUM 134 (L) 12/11/2024    K 3.8 12/11/2024    CL 97 12/11/2024    CO2 31 12/11/2024    BUN 7 12/11/2024    CREATININE 2.10 (H) 12/11/2024    GLUC 69 12/11/2024    CALCIUM 8.0 (L) 12/11/2024     Lab Results   Component Value Date    CALCIUM 8.0 (L) 12/11/2024    PHOS 2.7 12/10/2024     No results found for: \"LABPROT\"    "

## 2024-12-11 NOTE — ASSESSMENT & PLAN NOTE
Patient with chronic anemia  Baseline hemoglobin around 8  During hospitalization patient's hemoglobin has been stable between 9 and 10  Monitor for signs of bleeding in setting of low platelets on discharge

## 2024-12-11 NOTE — ASSESSMENT & PLAN NOTE
Patient noted over the course of hospitalization had been feeling depressed and frustrated with his continued decline in health  He says that sometimes he wishes that he could just go home to pass away and be comfortable  He shares that he is not disagreeable for treatment but sometimes feels as though he is being tortured  discussed possibilities of psychiatry consult for depression/trying recommendations made by prior psychiatrist for Remeron  At this time patient declines  Palliative care consulted  12/2 at this time declines any additional medications.  States that he is frustrated with continued hospitalization though is agreeable to this and agreeable to discharge to rehab.  Wants to continue treatment focused care without invasive measures  12/4 - patient discussed with hospice liaison, currently declining hospice and would like to continue dialysis but states that he still has a lot of decisions going on in his head.   12/5 d/w palliative and patient stating he wants to continue current treatment   12/6 family discussion with daughter, son, patient and . Patient willing to try marinol on 12/7. Would like to  continue with current treatment. Declining hospice at this time. See quick note from 12/6 for more details  Ongoing goals of care discussion

## 2024-12-11 NOTE — ASSESSMENT & PLAN NOTE
Lab Results   Component Value Date    HGBA1C 5.0 11/02/2024       Recent Labs     12/10/24  1613 12/10/24  2059 12/11/24  0715 12/11/24  1148   POCGLU 75 78 152* 77       Blood Sugar Average: Last 72 hrs:  (P) [id number]  Sliding scale insulin last. Hypoglycemia protocol  Patient with recurrent hypoglycemia secondary to poor oral intake  Now, [id number] with meals and at nighttime upon discharge

## 2024-12-11 NOTE — DISCHARGE SUMMARY
Discharge Summary - Hospitalist   Name: Masoud Perry 69 y.o. male I MRN: [id number]  Unit/Bed#: -01 I Date of Admission: 11/20/2024   Date of Service: 12/11/2024 I Hospital Day: 21     Assessment & Plan  Diarrhea  Patient admitted having significant diarrhea-initially suspected infectious secondary to COVID versus C. difficile carrier status  Started on a course of oral vancomycin and completed-Per infectious disease do not feel that this is related to his diarrhea  Given lack of improvement in diarrhea-GI consulted  Patient continues to deny wanting to start any other medications-would not like a powder and does not feel that he can handle a large pill.  GI feels that Imodium would not be a good option  Questran helped when patient took it, but stating that it made him nauseous and he will not take it again  Initiated on Creon-continue upon discharge  Diarrhea is improving   Having 2-3 loose BM a day   Now appears euvolemic  Acute on chronic diastolic HF (heart failure) (HCC)  Wt Readings from Last 3 Encounters:   12/11/24 87 kg (191 lb 12.8 oz)   10/14/24 115 kg (253 lb)   10/07/24 118 kg (260 lb)   CXR on 11/20 appeared to be pulmonary vascular congestion   Patient is a dialysis basis, received hemodialysis on TTS  Continue PTA torsemide  Appreciate continued nephrology follow-up upon discharge  Monitor intake/output and daily weight in the outpatient setting  Acute exacerbation seems to have resolved-euvolemic and continue with dialysis  Goals of care, counseling/discussion    Patient noted over the course of hospitalization had been feeling depressed and frustrated with his continued decline in health  He says that sometimes he wishes that he could just go home to pass away and be comfortable  He shares that he is not disagreeable for treatment but sometimes feels as though he is being tortured  discussed possibilities of psychiatry consult for depression/trying recommendations made by prior psychiatrist  for Remeron  At this time patient declines  Palliative care consulted  12/2 at this time declines any additional medications.  States that he is frustrated with continued hospitalization though is agreeable to this and agreeable to discharge to rehab.  Wants to continue treatment focused care without invasive measures  12/4 - patient discussed with hospice liaison, currently declining hospice and would like to continue dialysis but states that he still has a lot of decisions going on in his head.   12/5 d/w palliative and patient stating he wants to continue current treatment   12/6 family discussion with daughter, son, patient and . Patient willing to try marinol on 12/7. Would like to  continue with current treatment. Declining hospice at this time. See quick note from 12/6 for more details  Ongoing goals of care discussion  Factor 5 Leiden mutation, heterozygous (HCC)  Patient was maintained on Coumadin PTA  INR supratherapeutic at time of presentation and was placed on hold  INR trending upwards and dialysis was delayed for a few days so vitamin K was administered at 2.5 mg over the course of hospitalization  INR now therapeutic and remains on Coumadin  Will require daily PT/INR-Coumadin monitoring in the outpatient setting and upon discharge to SNF  Secondary hyperparathyroidism of renal origin (HCC) (Resolved: 12/11/2024)  Currently on 60 mg p.o. 3 times weekly  Hemodialysis-associated hypotension  Continue midodrine and pyridostigmine  Type 2 diabetes mellitus with stage 4 chronic kidney disease, with long-term current use of insulin (HCC)  Lab Results   Component Value Date    HGBA1C 5.0 11/02/2024       Recent Labs     12/10/24  1613 12/10/24  2059 12/11/24  0715 12/11/24  1148   POCGLU 75 78 152* 77       Blood Sugar Average: Last 72 hrs:  (P) [id number]  Sliding scale insulin last. Hypoglycemia protocol  Patient with recurrent hypoglycemia secondary to poor oral intake  Now,  psonlyyd-Ctes-Gcmmn with meals and at nighttime upon discharge  Paroxysmal atrial fibrillation (HCC)  Patient did have rapid response secondary to chest pain over the course of hospitalization  Cardiology consulted-thought to be secondary to A-fib rather than any ACS  Can continue metoprolol on discharge if blood pressure tolerates -maintain on midodrine as well to aid in attention  Continue to trend INR with Coumadin in the outpatient setting and upon discharge at [facility]  ESRD (end stage renal disease) on dialysis (HCC)  Lab Results   Component Value Date    EGFR 30 12/11/2024    EGFR 16 12/10/2024    EGFR 21 12/09/2024    CREATININE 2.10 (H) 12/11/2024    CREATININE 3.44 (H) 12/10/2024    CREATININE 2.77 (H) 12/09/2024   Resume typical regimen Tuesday Thursday Saturday  Nephrology following  Anemia due to chronic kidney disease, on chronic dialysis (HCC)  Patient with chronic anemia  Baseline hemoglobin around 8  During hospitalization patient's hemoglobin has been stable between 9 and 10  Monitor for signs of bleeding in setting of low platelets on discharge  Pancytopenia (HCC)  Multifactorial in origin, continue to monitor  Platelet count has been steadily decreasing with unclear etiology  Currently no signs of bleeding  Previously has supratherapeutic INR  continue to monitor INR and platelets and for any bleeding in the outpatient setting and upon discharge  Platelets improving, resume coumadin     Lab Results   Component Value Date    WBC 3.54 (L) 12/11/2024    RBC 2.51 (L) 12/11/2024    PLT 85 (L) 12/11/2024     (L) 12/10/2024     Electrolyte imbalance risk  Will require close monitoring in the outpatient setting  Epigastric pain  Significantly improved over the course of hospitalization  Patient with epigastric pain on presentation  Was offered Pepcid last week by GI - declined   He declined scopes at that time as well     Will continue to manage conservatively   obstruction series prior to discharge  obtained yesterday (12/10) negative for obstruction    Ambulatory referral to gastroenterology provided  Palliative care by specialist  Evaluated by palliative care over the course of hospitalization-see above  Severe protein-calorie malnutrition (HCC)  Malnutrition Findings:   Adult Malnutrition type: Acute illness  Adult Degree of Malnutrition: Other severe protein calorie malnutrition  Malnutrition Characteristics: Inadequate energy, Weight loss                  360 Statement: Acute on chronic severe malnutrition related to refusal for po intake, poor appetite, condition as evidenced by 10.9% weight loss x 14 days (11/20 220lb, 12/3 196lb); < 50% estimated energy intake > 5 days. Treated with: Recommend continued GOC discussion. Continue with daily weights. Can consider oral supplements if pt willing to take po intake though refusing po intake. Consider appetite stimulant if medically appropriate/pt agreeable though pt previously refused. Consider EN feeds if within GOC.    BMI Findings:           Body mass index is 26.01 kg/m².     Continue to encourage use of nutritional supplements in the outpatient setting and upon discharge to SNF  ESRD on dialysis (formerly [facility])  Lab Results   Component Value Date    EGFR 30 12/11/2024    EGFR 16 12/10/2024    EGFR 21 12/09/2024    CREATININE 2.10 (H) 12/11/2024    CREATININE 3.44 (H) 12/10/2024    CREATININE 2.77 (H) 12/09/2024        Medical Problems       Resolved Problems  Date Reviewed: 10/17/2024          Resolved    Secondary hyperparathyroidism of renal origin (HCC) 12/11/2024     Resolved by  Faisal White DO    COVID-19 12/7/2024     Resolved by  Ofelia Salmeron PA-C    Elevated troponin 11/28/2024     Resolved by  Theresa Butt PA-C    Overview Deleted 11/20/2024 11:32 PM by Chao Rios PA-C          Acute respiratory insufficiency 12/7/2024     Resolved by  Ofelia Salmeron PA-C    Precordial pain 12/1/2024     Resolved by  Theresa Butt PA-C    Dysuria 12/7/2024      Resolved by  Ofelia Salmeron PA-C    Hypomagnesemia 12/4/2024     Resolved by  Brad Barajas MD    Hypophosphatemia 12/11/2024     Resolved by  Faisal White DO          MESSAGE TO PCP (Jignesh Baker DO) FOR FOLLOW UP:   Thank you for allowing us to participate in the care of your patient, Masoud Perry, who was hospitalized from 11/20/2024 through 12/11/24 with the admitting diagnosis of hypovolemia/multiple diagnoses.      Medication Changes:  -See AVS  Outpatient testing recommended:  Recommending gastroenterology follow-up  If you have any additional questions or would like to discuss further, please feel free to contact me.  Faisal White DO  [facility] Internal Medicine, Hospitalist, [phone]     Admission Date:   Admission Orders (From admission, onward)       Ordered        11/20/24 2241  INPATIENT ADMISSION  Once                          Discharge Date: 12/11/24    Consultations During Hospital Stay:  Gastroenterology    Procedures Performed:   None    Significant Findings / Test Results:   None    Incidental Findings:   None      Test Results Pending at Discharge (will require follow up):   None     Complications: None    Reason for Admission:     Hospital Course:   Masoud Perry is a 69 y.o. male patient who originally presented to the hospital on 11/20/2024 due to diarrhea/several acute issues.  Please see problem specific assessment and plan for details of the patient's hospital course.              Please see above list of diagnoses and related plan for additional information.     Condition at Discharge: good    Discharge Day Visit / Exam:   Subjective: Patient reports improvement in abdominal pain and diarrhea however still states he is having loose stools.  Concerns over returning to skilled nursing facility however after discussion agreeable.  Vitals: Blood Pressure: 103/59 (12/11/24 0800)  Pulse: 75 (12/11/24 0800)  Temperature: (!) 97.3 °F (36.3 °C) (12/11/24 0800)  Temp  Source: Temporal (12/10/24 0753)  Respirations: 18 (12/11/24 0718)  Height: 6' (182.9 cm) (11/20/24 3848)  Weight - Scale: 87 kg (191 lb 12.8 oz) (12/11/24 0600)  SpO2: 97 % (12/11/24 0808)    Physical Exam   Vitals and nursing note reviewed.   Constitutional:       General: He is not in acute distress.     Appearance: Normal appearance. He is ill-appearing.   HENT:      Head: Normocephalic and atraumatic.      Nose: No congestion.      Mouth/Throat:      Mouth: Mucous membranes are moist.      Pharynx: Oropharynx is clear.   Eyes:      Conjunctiva/sclera: Conjunctivae normal.   Cardiovascular:      Rate and Rhythm: Normal rate and regular rhythm.      Pulses: Normal pulses.   Pulmonary:      Effort: Pulmonary effort is normal. No respiratory distress.      Breath sounds: Normal breath sounds.   Abdominal:      General: Bowel sounds are normal.      Palpations: Abdomen is soft.      Tenderness: There is abdominal tenderness.   Musculoskeletal:         General: Swelling present. Normal range of motion.      Cervical back: Normal range of motion.      Right lower leg: No edema.      Left lower leg: No edema.   Skin:     General: Skin is warm and dry.   Neurological:      Mental Status: He is alert and oriented to person, place, and time.   Psychiatric:         Mood and Affect: Mood normal.         Behavior: Behavior normal.         Discussion with Family: Patient declined call to .     Discharge instructions/Information to patient and family:   See after visit summary for information provided to patient and family.      Provisions for Follow-Up Care:  See after visit summary for information related to follow-up care and any pertinent home health orders.      Mobility at time of Discharge:   Basic Mobility Inpatient Raw Score: 9  JH-HLM Goal: 3: Sit at edge of bed  JH-HLM Achieved: 2: Bed activities/Dependent transfer  HLM Goal NOT achieved. Continue to encourage mobility in post discharge setting.      Disposition:   Other Skilled Nursing Facility at [place]    Planned Readmission: No    Discharge Medications:  See after visit summary for reconciled discharge medications provided to patient and/or family.      Administrative Statements   Discharge Statement:  I have spent a total time of 44 minutes in caring for this patient on the day of the visit/encounter. >30 minutes of time was spent on: Risks and benefits of tx options, Patient and family education, Risk factor reductions, Counseling / Coordination of care, and Reviewing / ordering tests, medicine, procedures  .    **Please Note: This note may have been constructed using a voice recognition system**

## 2024-12-11 NOTE — CASE MANAGEMENT
Case Management Discharge Planning Note    Patient name Masoud VERGARA [facility]  Location /-01 MRN [id number]  : 1955 Date 2024       Current Admission Date: 2024  Current Admission Diagnosis:Diarrhea   Patient Active Problem List    Diagnosis Date Noted Date Diagnosed    ESRD on dialysis (HCC) 2024     Hypophosphatemia 2024     Severe protein-calorie malnutrition (HCC) 2024     Palliative care by specialist 2024     Diarrhea 2024     Epigastric pain 2024     Diarrhea of infectious origin 2024     Electrolyte imbalance risk 2024     ESRD (end stage renal disease) on dialysis ([facility]) 2024     Anemia due to chronic kidney disease, on chronic dialysis ([facility]) 2024     Personal history of gout 2024     Stasis ulcer ([facility]) 10/02/2024     Diabetic ulcer of right great toe (HCC) 2024     Hematoma 2024     Edema 2024     Goals of care, counseling/discussion 2024     Hyponatremia 2024     Cellulitis 2024     Moderate protein-calorie malnutrition (HCC) 2024     Ambulatory dysfunction 09/15/2024     Lactic acidosis 2024     Hypercalcemia 2024     Chronic acquired lymphedema 2024     Hemodialysis-associated hypotension 2024     Nocturnal hypoxia 2024     Elevated LFTs 2024     Patient on peritoneal dialysis (HCC) 2024     Hypervolemia 2024     Bacteremia 2024     Cellulitis of right lower extremity 2024     Paroxysmal atrial fibrillation (HCC) 2024     Bilateral lower extremity edema 2023     Ureteral stone with hydronephrosis 2022     Cutaneous abscess of buttock 2022     Chronic kidney disease-mineral and bone disorder 2022     Type 2 diabetes mellitus with stage 4 chronic kidney disease, with long-term current use of insulin (HCC) 07/15/2022     Obesity, morbid (HCC) 07/15/2022     Secondary hyperparathyroidism  of renal origin (HCC) 07/15/2022     Stage 5 chronic kidney disease on peritoneal dialysis (HCC) 07/15/2022     Factor 5 Leiden mutation, heterozygous (HCC) 01/15/2022     Thrombocytopenia (HCC) 01/14/2022     Angina at rest (HCC) 01/14/2022     MI (myocardial infarction) (HCC) 01/14/2022     History of PTCA 01/14/2022     Sleep apnea 01/14/2022     Smoking 01/14/2022     Pancytopenia (HCC) 01/13/2022     History of CVA (cerebrovascular accident) 01/12/2022     Acute on chronic diastolic HF (heart failure) (HCC) 01/12/2022     HLD (hyperlipidemia) 01/12/2022     Lymphedema 01/12/2022     Acquired hypothyroidism 01/12/2022     COPD (chronic obstructive pulmonary disease) ([facility]) 12/31/2018     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 ([facility]) 06/29/2016     Gastroesophageal reflux disease 06/29/2016     Gout 06/29/2016     Personal history of pulmonary embolism 06/29/2016     ED (erectile dysfunction) of organic origin 01/14/2014     History of thromboembolism of vein 01/10/2013       LOS (days): 21  Geometric Mean LOS (GMLOS) (days): 4.6  Days to GMLOS:-15.9     OBJECTIVE:  Risk of Unplanned Readmission Score: 56.02         Current admission status: Inpatient   Preferred Pharmacy:   [facility] - [place], [place] - [address]  [address]  [place] [address]  Phone: [phone] Fax: [phone]    [place], [place] - [address] [place] [address]  [address] [place] [address]  Phone: [phone] Fax: [phone]    PATIENT/FAMILY REPORTS NO PREFERRED PHARMACY  No address on file      Primary Care Provider: Jignesh Baker DO    Primary Insurance: [facility]  Secondary Insurance:     DISCHARGE DETAILS:    Discharge planning discussed with:: patient  Freedom of Choice: Yes  Comments - [place] of Choice: ELVIA Barnard  CM contacted family/caregiver?: Yes (Masoud Perry- son)  Were Treatment Team discharge recommendations reviewed with patient/caregiver?:  Yes  Did patient/caregiver verbalize understanding of patient care needs?: Yes  Were patient/caregiver advised of the risks associated with not following Treatment Team discharge recommendations?: Yes    Contacts  Patient Contacts: Masoud Perry- son  Relationship to Patient:: Family  Contact Method: Phone  Phone Number: [phone]  Reason/Outcome: Discharge Planning  Transport requested through round trip. [facility] confirmed transport via stretcher to [facility]. Pt, son and [place] aware of confirmed transport time.              Other Referral/Resources/Interventions Provided:  Interventions: Short Term Rehab         Treatment Team Recommendation: Facility Return, Short Term Rehab  Discharge Destination Plan:: Facility Return, Short Term Rehab  Transport at Discharge : Clarissa padron           ETA of Transport (Date): 12/11/24  ETA of Transport (Time): 6442

## 2024-12-12 NOTE — UTILIZATION REVIEW
NOTIFICATION OF ADMISSION DISCHARGE   This is a Notification of Discharge from [facility]. Please be advised that this patient has been discharge from our facility. Below you will find the admission and discharge date and time including the patient’s disposition.   UTILIZATION REVIEW CONTACT:  Arleen Cotto  Utilization   Network Utilization Review Department  Phone: [phone] x carefully listen to the prompts. All voicemails are confidential.  Email: [email or web address]     ADMISSION INFORMATION  PRESENTATION DATE: 11/20/2024  9:03 PM  OBERVATION ADMISSION DATE: N/A  INPATIENT ADMISSION DATE: 11/20/24 10:41 PM   DISCHARGE DATE: 12/11/2024  1:32 PM   DISPOSITION:Non [facility] SNF/TCU/SNU    Network Utilization Review Department  ATTENTION: Please call with any questions or concerns to [phone] and carefully listen to the prompts so that you are directed to the right person. All voicemails are confidential.   For Discharge needs, contact Care Management DC Support Team at [phone] opt. 2  Send all requests for admission clinical reviews, approved or denied determinations and any other requests to dedicated fax number below belonging to the campus where the patient is receiving treatment. List of dedicated fax numbers for the Facilities:  FACILITY NAME UR FAX NUMBER   ADMISSION DENIALS (Administrative/Medical Necessity) [phone]   DISCHARGE SUPPORT TEAM ([facility]) [phone]   PARENT CHILD HEALTH (Maternity/NICU/Pediatrics) [phone]   [facility] [phone]   [facility] [phone]   Critical access hospital [phone]   [facility] [phone]   [facility] [phone]   [facility] [phone]   [facility] [phone]   [facility]  [place] [phone]   [facility] [phone]   [facility] [phone]   [facility] [phone]   [facility] [phone]

## 2024-12-17 ENCOUNTER — TELEPHONE (OUTPATIENT)
Dept: PALLIATIVE MEDICINE | Facility: CLINIC | Age: 69
End: 2024-12-17

## 2024-12-18 ENCOUNTER — TELEPHONE (OUTPATIENT)
Dept: PALLIATIVE MEDICINE | Facility: CLINIC | Age: 69
End: 2024-12-18

## 2024-12-18 NOTE — TELEPHONE ENCOUNTER
Spoke to pt son (timothy Aldrich) to schedule HFU with Palliative Care. Pt son unsure if they want to schedule at this time. They will call if they like to schedule. x1

## (undated) DEVICE — 1820 FOAM BLOCK NEEDLE COUNTER: Brand: DEVON

## (undated) DEVICE — GLOVE SRG BIOGEL 7

## (undated) DEVICE — TROCAR: Brand: KII SLEEVE

## (undated) DEVICE — DRAIN SPONGES,6 PLY: Brand: EXCILON

## (undated) DEVICE — SUT VICRYL 3-0 SH 27 IN J416H

## (undated) DEVICE — NEPTUNE E-SEP SMOKE EVACUATION PENCIL, COATED, 70MM BLADE, PUSH BUTTON SWITCH: Brand: NEPTUNE E-SEP

## (undated) DEVICE — SUT SILK 0 30 IN A306H

## (undated) DEVICE — ENDOSCOPIC VALVE WITH ADAPTER.: Brand: SURSEAL® II

## (undated) DEVICE — SINGLE-USE DIGITAL FLEXIBLE URETEROSCOPE: Brand: APTRA

## (undated) DEVICE — FIBER STD QUANTA 272 MICRON

## (undated) DEVICE — SPECIMEN CONTAINER STERILE PEEL PACK

## (undated) DEVICE — CATH URET .038 10FR 50CM DUAL LUMEN

## (undated) DEVICE — CHLORAPREP HI-LITE 26ML ORANGE

## (undated) DEVICE — SHEATH URETERAL ACCESS 10/12FR 45CM PROXIS

## (undated) DEVICE — SKIN MARKER DUAL TIP WITH RULER CAP, FLEXIBLE RULER AND LABELS: Brand: DEVON

## (undated) DEVICE — ADHESIVE SKN CLSR HISTOACRYL FLEX 0.5ML LF

## (undated) DEVICE — FALLER TUNNELING TROCAR: Brand: ARGYLE

## (undated) DEVICE — SYRINGE 20ML LL

## (undated) DEVICE — 3M™ TEGADERM™ CHG DRESSING 25/CARTON 4 CARTONS/CASE 1659: Brand: TEGADERM™

## (undated) DEVICE — ENDOPATH 5 MM GRASPERS WITH RATCHET HANDLES: Brand: ENDOPATH

## (undated) DEVICE — TROCARS: Brand: KII® OPTICAL ACCESS SYSTEM

## (undated) DEVICE — SYRINGE 10ML LL

## (undated) DEVICE — GLOVE INDICATOR PI UNDERGLOVE SZ 7 BLUE

## (undated) DEVICE — GUIDEWIRE STRGHT TIP 0.035 IN  SOLO PLUS

## (undated) DEVICE — SUT MONOCRYL 4-0 PS-2 27 IN Y426H

## (undated) DEVICE — NEEDLE 18 G X 1 1/2

## (undated) DEVICE — GENERAL ENDOSCOPY PACK: Brand: CONVERTORS

## (undated) DEVICE — MONTCRIEF CATH TITANIUM ADAPTOR

## (undated) DEVICE — SPONGE LAP 18 X 18 IN STRL RFD

## (undated) DEVICE — INTENDED FOR TISSUE SEPARATION, AND OTHER PROCEDURES THAT REQUIRE A SHARP SURGICAL BLADE TO PUNCTURE OR CUT.: Brand: BARD-PARKER SAFETY BLADES SIZE 15, STERILE

## (undated) DEVICE — LIGHT GLOVE GREEN

## (undated) DEVICE — TUBE SET SMOKE EVAC PNEUMOCLEAR HIGH FLOW

## (undated) DEVICE — GAUZE SPONGES,16 PLY: Brand: CURITY

## (undated) DEVICE — X-RAY DETECTABLE SPONGES,16 PLY: Brand: VISTEC

## (undated) DEVICE — SWABSTCK, BENZOIN TINCTURE, 1/PK, STRL: Brand: APLICARE

## (undated) DEVICE — ANTI-FOG SOLUTION WITH FOAM PAD: Brand: DEVON

## (undated) DEVICE — ENDOPATH PNEUMONEEDLE INSUFFLATION NEEDLES WITH LUER LOCK CONNECTORS 150MM: Brand: ENDOPATH

## (undated) DEVICE — ADHESIVE SKIN CLOSURE SYS EXOFIN FUSION 22CM

## (undated) DEVICE — NEEDLE 25G X 1 1/2

## (undated) DEVICE — 3M™ TEGADERM™ TRANSPARENT FILM DRESSING FRAME STYLE, 1624W, 2-3/8 IN X 2-3/4 IN (6 CM X 7 CM), 100/CT 4CT/CASE: Brand: 3M™ TEGADERM™

## (undated) DEVICE — TUBING SUCTION 5MM X 12 FT

## (undated) DEVICE — PAD GROUNDING DUAL ADULT

## (undated) DEVICE — SUT VICRYL 0 UR-6 27 IN J603H

## (undated) DEVICE — PACK TUR

## (undated) DEVICE — 3M™ TEGADERM™ TRANSPARENT FILM DRESSING FRAME STYLE, 1626W, 4 IN X 4-3/4 IN (10 CM X 12 CM), 50/CT 4CT/CASE: Brand: 3M™ TEGADERM™

## (undated) DEVICE — DRAPE EQUIPMENT RF WAND

## (undated) DEVICE — CYSTO TUBING SINGLE IRRIGATION

## (undated) DEVICE — BASIC SINGLE BASIN 2-LF: Brand: MEDLINE INDUSTRIES, INC.